# Patient Record
Sex: OTHER/UNKNOWN | NOT HISPANIC OR LATINO
[De-identification: names, ages, dates, MRNs, and addresses within clinical notes are randomized per-mention and may not be internally consistent; named-entity substitution may affect disease eponyms.]

---

## 2017-02-15 RX ORDER — MONTELUKAST SODIUM 10 MG/1
TABLET ORAL
Qty: 90 TABLET | Refills: 2 | Status: SHIPPED | OUTPATIENT
Start: 2017-02-15 | End: 2017-11-14 | Stop reason: SDUPTHER

## 2017-02-27 ENCOUNTER — RX ONLY (OUTPATIENT)
Age: 77
Setting detail: RX ONLY
End: 2017-02-27

## 2017-03-10 ENCOUNTER — RX ONLY (OUTPATIENT)
Age: 77
Setting detail: RX ONLY
End: 2017-03-10

## 2017-03-18 ENCOUNTER — TELEPHONE (OUTPATIENT)
Dept: FAMILY MEDICINE CLINIC | Facility: CLINIC | Age: 77
End: 2017-03-18

## 2017-05-02 ENCOUNTER — TELEPHONE (OUTPATIENT)
Dept: FAMILY MEDICINE CLINIC | Facility: CLINIC | Age: 77
End: 2017-05-02

## 2017-05-02 RX ORDER — RABEPRAZOLE SODIUM 20 MG/1
20 TABLET, DELAYED RELEASE ORAL DAILY
Qty: 90 TABLET | Refills: 3 | Status: SHIPPED | OUTPATIENT
Start: 2017-05-02 | End: 2018-03-24 | Stop reason: HOSPADM

## 2017-05-16 ENCOUNTER — TELEPHONE (OUTPATIENT)
Dept: FAMILY MEDICINE CLINIC | Facility: CLINIC | Age: 77
End: 2017-05-16

## 2017-05-16 RX ORDER — CLOPIDOGREL BISULFATE 75 MG/1
75 TABLET ORAL DAILY
Qty: 90 TABLET | Refills: 3 | Status: SHIPPED | OUTPATIENT
Start: 2017-05-16 | End: 2018-01-03 | Stop reason: ALTCHOICE

## 2017-06-12 ENCOUNTER — TELEPHONE (OUTPATIENT)
Dept: FAMILY MEDICINE CLINIC | Facility: CLINIC | Age: 77
End: 2017-06-12

## 2017-06-12 DIAGNOSIS — J44.9 OBSTRUCTIVE CHRONIC BRONCHITIS WITHOUT EXACERBATION (HCC): Primary | ICD-10-CM

## 2017-06-12 RX ORDER — ACETAMINOPHEN 325 MG/1
650 TABLET ORAL EVERY 6 HOURS PRN
Qty: 720 TABLET | Refills: 2 | Status: SHIPPED | OUTPATIENT
Start: 2017-06-12 | End: 2020-01-10 | Stop reason: SDUPTHER

## 2017-06-12 NOTE — TELEPHONE ENCOUNTER
Spoke with pt... informed us he wanted a portable oxygen tank order. Told pt we would get it done.

## 2017-06-12 NOTE — TELEPHONE ENCOUNTER
rivaroxaban (XARELTO) 15 MG tablet  #90      acetaminophen (TYLENOL) 325 MG tablet  #30  WITH 3 REFILLS

## 2017-06-19 ENCOUNTER — TELEPHONE (OUTPATIENT)
Dept: FAMILY MEDICINE CLINIC | Facility: CLINIC | Age: 77
End: 2017-06-19

## 2017-06-22 ENCOUNTER — TELEPHONE (OUTPATIENT)
Dept: FAMILY MEDICINE CLINIC | Facility: CLINIC | Age: 77
End: 2017-06-22

## 2017-07-13 ENCOUNTER — TELEPHONE (OUTPATIENT)
Dept: FAMILY MEDICINE CLINIC | Facility: CLINIC | Age: 77
End: 2017-07-13

## 2017-07-13 RX ORDER — SILDENAFIL 100 MG/1
100 TABLET, FILM COATED ORAL DAILY PRN
Qty: 6 TABLET | Refills: 3 | Status: SHIPPED | OUTPATIENT
Start: 2017-07-13 | End: 2018-06-17 | Stop reason: SDUPTHER

## 2017-08-03 ENCOUNTER — TELEPHONE (OUTPATIENT)
Dept: FAMILY MEDICINE CLINIC | Facility: CLINIC | Age: 77
End: 2017-08-03

## 2017-08-03 RX ORDER — BUDESONIDE AND FORMOTEROL FUMARATE DIHYDRATE 160; 4.5 UG/1; UG/1
2 AEROSOL RESPIRATORY (INHALATION)
Qty: 3 INHALER | Refills: 3 | Status: SHIPPED | OUTPATIENT
Start: 2017-08-03 | End: 2019-06-07

## 2017-11-14 RX ORDER — MONTELUKAST SODIUM 10 MG/1
TABLET ORAL
Qty: 90 TABLET | Refills: 2 | Status: SHIPPED | OUTPATIENT
Start: 2017-11-14 | End: 2018-03-24 | Stop reason: HOSPADM

## 2017-11-27 ENCOUNTER — TELEPHONE (OUTPATIENT)
Dept: FAMILY MEDICINE CLINIC | Facility: CLINIC | Age: 77
End: 2017-11-27

## 2017-11-27 RX ORDER — OMEPRAZOLE 20 MG/1
20 CAPSULE, DELAYED RELEASE ORAL DAILY
Qty: 90 CAPSULE | Refills: 3 | Status: SHIPPED | OUTPATIENT
Start: 2017-11-27 | End: 2019-06-07

## 2017-12-05 ENCOUNTER — TELEPHONE (OUTPATIENT)
Dept: FAMILY MEDICINE CLINIC | Facility: CLINIC | Age: 77
End: 2017-12-05

## 2017-12-05 DIAGNOSIS — J02.9 SORETHROAT: Primary | ICD-10-CM

## 2017-12-05 NOTE — TELEPHONE ENCOUNTER
PATIENT HAS HAD A SORE THROAT FOR A WHILE AND CAN'T GET RID OF IT. WANTS TO GET A REFERRAL FOR AN ENT

## 2017-12-14 ENCOUNTER — TELEPHONE (OUTPATIENT)
Dept: FAMILY MEDICINE CLINIC | Facility: CLINIC | Age: 77
End: 2017-12-14

## 2017-12-14 RX ORDER — ECHINACEA PURPUREA EXTRACT 125 MG
2 TABLET ORAL AS NEEDED
Qty: 4 EACH | Refills: 3 | Status: SHIPPED | OUTPATIENT
Start: 2017-12-14 | End: 2019-09-27 | Stop reason: SDUPTHER

## 2017-12-14 NOTE — TELEPHONE ENCOUNTER
REFILL ON sodium chloride (OCEAN NASAL SPRAY) 0.65 % nasal spray  GET 3 EVERY 21 DAYS. SO PATIENT CAN REFILL FASTER

## 2017-12-29 ENCOUNTER — TELEPHONE (OUTPATIENT)
Dept: FAMILY MEDICINE CLINIC | Facility: CLINIC | Age: 77
End: 2017-12-29

## 2018-01-03 ENCOUNTER — OFFICE VISIT (OUTPATIENT)
Dept: FAMILY MEDICINE CLINIC | Facility: CLINIC | Age: 78
End: 2018-01-03

## 2018-01-03 VITALS
BODY MASS INDEX: 32.34 KG/M2 | SYSTOLIC BLOOD PRESSURE: 116 MMHG | OXYGEN SATURATION: 93 % | TEMPERATURE: 97.6 F | RESPIRATION RATE: 22 BRPM | DIASTOLIC BLOOD PRESSURE: 66 MMHG | HEIGHT: 74 IN | WEIGHT: 252 LBS | HEART RATE: 77 BPM

## 2018-01-03 DIAGNOSIS — E78.5 DYSLIPIDEMIA: ICD-10-CM

## 2018-01-03 DIAGNOSIS — I48.91 ATRIAL FIBRILLATION, UNSPECIFIED TYPE (HCC): ICD-10-CM

## 2018-01-03 DIAGNOSIS — R73.9 HYPERGLYCEMIA: ICD-10-CM

## 2018-01-03 DIAGNOSIS — K21.9 GASTROESOPHAGEAL REFLUX DISEASE WITHOUT ESOPHAGITIS: ICD-10-CM

## 2018-01-03 DIAGNOSIS — J44.9 CHRONIC OBSTRUCTIVE PULMONARY DISEASE, UNSPECIFIED COPD TYPE (HCC): Primary | ICD-10-CM

## 2018-01-03 DIAGNOSIS — I10 ESSENTIAL HYPERTENSION: ICD-10-CM

## 2018-01-03 DIAGNOSIS — I25.10 CORONARY ARTERY DISEASE INVOLVING NATIVE CORONARY ARTERY OF NATIVE HEART WITHOUT ANGINA PECTORIS: ICD-10-CM

## 2018-01-03 DIAGNOSIS — G62.9 NEUROPATHY: ICD-10-CM

## 2018-01-03 LAB
ALBUMIN SERPL-MCNC: 4 G/DL (ref 3.5–5.2)
ALBUMIN/GLOB SERPL: 1.1 G/DL
ALP SERPL-CCNC: 78 U/L (ref 39–117)
ALT SERPL W P-5'-P-CCNC: 20 U/L (ref 1–41)
ANION GAP SERPL CALCULATED.3IONS-SCNC: 13.2 MMOL/L
ANISOCYTOSIS BLD QL: NORMAL
AST SERPL-CCNC: 21 U/L (ref 1–40)
BASOPHILS # BLD AUTO: 0.05 10*3/MM3 (ref 0–0.2)
BASOPHILS NFR BLD AUTO: 0.5 % (ref 0–1.5)
BILIRUB SERPL-MCNC: 0.6 MG/DL (ref 0.1–1.2)
BUN BLD-MCNC: 12 MG/DL (ref 8–23)
BUN/CREAT SERPL: 11.9 (ref 7–25)
CALCIUM SPEC-SCNC: 9.7 MG/DL (ref 8.6–10.5)
CHLORIDE SERPL-SCNC: 94 MMOL/L (ref 98–107)
CHOLEST SERPL-MCNC: 124 MG/DL (ref 0–200)
CO2 SERPL-SCNC: 28.8 MMOL/L (ref 22–29)
CREAT BLD-MCNC: 1.01 MG/DL (ref 0.76–1.27)
DEPRECATED RDW RBC AUTO: 53.8 FL (ref 37–54)
EOSINOPHIL # BLD AUTO: 0.47 10*3/MM3 (ref 0–0.7)
EOSINOPHIL NFR BLD AUTO: 4.8 % (ref 0.3–6.2)
ERYTHROCYTE [DISTWIDTH] IN BLOOD BY AUTOMATED COUNT: 22.6 % (ref 11.5–14.5)
GFR SERPL CREATININE-BSD FRML MDRD: 72 ML/MIN/1.73
GLOBULIN UR ELPH-MCNC: 3.5 GM/DL
GLUCOSE BLD-MCNC: 104 MG/DL (ref 65–99)
HBA1C MFR BLD: 6 % (ref 4.8–5.6)
HCT VFR BLD AUTO: 49.6 % (ref 40.4–52.2)
HDLC SERPL-MCNC: 47 MG/DL (ref 40–60)
HGB BLD-MCNC: 15 G/DL (ref 13.7–17.6)
IMM GRANULOCYTES # BLD: 0.02 10*3/MM3 (ref 0–0.03)
IMM GRANULOCYTES NFR BLD: 0.2 % (ref 0–0.5)
LDLC SERPL CALC-MCNC: 67 MG/DL (ref 0–100)
LDLC/HDLC SERPL: 1.42 {RATIO}
LYMPHOCYTES # BLD AUTO: 1.62 10*3/MM3 (ref 0.9–4.8)
LYMPHOCYTES NFR BLD AUTO: 16.6 % (ref 19.6–45.3)
MCH RBC QN AUTO: 20.7 PG (ref 27–32.7)
MCHC RBC AUTO-ENTMCNC: 30.2 G/DL (ref 32.6–36.4)
MCV RBC AUTO: 68.4 FL (ref 79.8–96.2)
MICROCYTES BLD QL: NORMAL
MONOCYTES # BLD AUTO: 1.28 10*3/MM3 (ref 0.2–1.2)
MONOCYTES NFR BLD AUTO: 13.1 % (ref 5–12)
NEUTROPHILS # BLD AUTO: 6.3 10*3/MM3 (ref 1.9–8.1)
NEUTROPHILS NFR BLD AUTO: 64.8 % (ref 42.7–76)
NRBC BLD MANUAL-RTO: 0 /100 WBC (ref 0–0)
PLAT MORPH BLD: NORMAL
PLATELET # BLD AUTO: 230 10*3/MM3 (ref 140–500)
PMV BLD AUTO: 9.5 FL (ref 6–12)
POTASSIUM BLD-SCNC: 4 MMOL/L (ref 3.5–5.2)
PROT SERPL-MCNC: 7.5 G/DL (ref 6–8.5)
RBC # BLD AUTO: 7.25 10*6/MM3 (ref 4.6–6)
SODIUM BLD-SCNC: 136 MMOL/L (ref 136–145)
STOMATOCYTES BLD QL SMEAR: NORMAL
TARGETS BLD QL SMEAR: NORMAL
TRIGL SERPL-MCNC: 51 MG/DL (ref 0–150)
VLDLC SERPL-MCNC: 10.2 MG/DL (ref 5–40)
WBC MORPH BLD: NORMAL
WBC NRBC COR # BLD: 9.74 10*3/MM3 (ref 4.5–10.7)

## 2018-01-03 PROCEDURE — 85007 BL SMEAR W/DIFF WBC COUNT: CPT | Performed by: FAMILY MEDICINE

## 2018-01-03 PROCEDURE — 99214 OFFICE O/P EST MOD 30 MIN: CPT | Performed by: FAMILY MEDICINE

## 2018-01-03 PROCEDURE — 85025 COMPLETE CBC W/AUTO DIFF WBC: CPT | Performed by: FAMILY MEDICINE

## 2018-01-03 PROCEDURE — 80061 LIPID PANEL: CPT | Performed by: FAMILY MEDICINE

## 2018-01-03 PROCEDURE — 36415 COLL VENOUS BLD VENIPUNCTURE: CPT | Performed by: FAMILY MEDICINE

## 2018-01-03 PROCEDURE — 83036 HEMOGLOBIN GLYCOSYLATED A1C: CPT | Performed by: FAMILY MEDICINE

## 2018-01-03 PROCEDURE — 80053 COMPREHEN METABOLIC PANEL: CPT | Performed by: FAMILY MEDICINE

## 2018-01-03 RX ORDER — RIVAROXABAN 20 MG/1
20 TABLET, FILM COATED ORAL DAILY
COMMUNITY
Start: 2017-11-20 | End: 2019-06-21 | Stop reason: HOSPADM

## 2018-01-03 RX ORDER — FEXOFENADINE HCL 180 MG/1
1 TABLET ORAL DAILY
COMMUNITY
Start: 2017-11-14 | End: 2018-02-14 | Stop reason: HOSPADM

## 2018-01-03 NOTE — PROGRESS NOTES
SUBJECTIVE:  The patient is  77-year-old white male comes in for follow-up.  He also sees physicians in [place] in the [facility].  He has a history of COPD hyperlipidemia arthritis neuropathy hypertension and coronary artery disease.  He has concern regarding his liver because he takes a lot of Tylenol.    PAST MEDICAL HISTORY:  Reviewed.    REVIEW OF SYSTEMS:  Please see above; 14 point ROS otherwise negative.      OBJECTIVE: Vitals signs are reviewed and are stable.    HEENT: PERRLA.    Neck:  Supple.  No bruits  Lungs:  Clear.    Heart:  Regular rate and rhythm.    Abdomen:   Soft, nontender.  Obese.  Bowel sounds present  Extremities:  No cyanosis, clubbing or edema.  Full range of motion    ASSESSMENT:    Coronary artery disease  Hypertension  Hyperlipidemia  COPD  Arthritis  Neuropathy  GERD    PLAN:  CMP fasting lipids CBC hemoglobin A1c ordered.  Patient will follow-up on his labs.  Diet and exercise discussed.  Patient will follow up on his labs.  He will call if problems.    Much of this encounter note is an electronic transcription/translation of spoken language to printed text.  The electronic translation of spoken language may permit erroneous, or at times, nonsensical words or phrases to be inadvertently transcribed.  Although I have reviewed the note for such errors, some may still exist.

## 2018-01-04 ENCOUNTER — TELEPHONE (OUTPATIENT)
Dept: FAMILY MEDICINE CLINIC | Facility: CLINIC | Age: 78
End: 2018-01-04

## 2018-01-04 DIAGNOSIS — R79.89 ABNORMAL CBC: Primary | ICD-10-CM

## 2018-01-22 ENCOUNTER — TELEPHONE (OUTPATIENT)
Dept: FAMILY MEDICINE CLINIC | Facility: CLINIC | Age: 78
End: 2018-01-22

## 2018-01-22 NOTE — TELEPHONE ENCOUNTER
IS RE-FAXING ORDERS FOR MR. RUSHING, THEY WILL SAY ATTENTION TO ELOISE, MR RUSHING WOULD LIKE A CALL BACK WHEN WE HAVE THEM.

## 2018-01-23 DIAGNOSIS — S82.891A CLOSED FRACTURE OF RIGHT ANKLE, INITIAL ENCOUNTER: Primary | ICD-10-CM

## 2018-01-26 ENCOUNTER — TELEPHONE (OUTPATIENT)
Dept: FAMILY MEDICINE CLINIC | Facility: CLINIC | Age: 78
End: 2018-01-26

## 2018-01-26 DIAGNOSIS — S82.891A CLOSED FRACTURE OF RIGHT ANKLE, INITIAL ENCOUNTER: Primary | ICD-10-CM

## 2018-01-26 NOTE — TELEPHONE ENCOUNTER
HAD THERAPY TODAY AND WAS INFORMED, HE NEEDS A WHEELCHAIR ORDER FOR GOULDS OR RESPI-CARE, BUT NEEDS IT TO HAPPEN TODAY BECAUSE HE IS NOT ALLOWED TO PUT WEIGHT ON HIS ANKLE AT ALL. IT WILL NEED TO BE DELIVERED, TO HIS HOUSE, AND EASY TO MANEUVER, AND LIGHTWEIGHT ACCORDING TO PATIENT.

## 2018-01-29 ENCOUNTER — TELEPHONE (OUTPATIENT)
Dept: FAMILY MEDICINE CLINIC | Facility: CLINIC | Age: 78
End: 2018-01-29

## 2018-01-29 NOTE — TELEPHONE ENCOUNTER
01/29 Pt called, per Lv's they need office notes for wheelchair. I informed pt we don't have any office notes because we did not see him for this. He said another dr told him no weight bearing. I told him to call them to put in order for wheelchair and send in office notes since he seen them and he is the one who gave him restrictions.

## 2018-02-07 ENCOUNTER — TELEPHONE (OUTPATIENT)
Dept: FAMILY MEDICINE CLINIC | Facility: CLINIC | Age: 78
End: 2018-02-07

## 2018-02-07 ENCOUNTER — HOSPITAL ENCOUNTER (INPATIENT)
Facility: HOSPITAL | Age: 78
LOS: 7 days | Discharge: HOME | End: 2018-02-14
Attending: INTERNAL MEDICINE | Admitting: INTERNAL MEDICINE

## 2018-02-07 DIAGNOSIS — J18.9 PNEUMONIA OF BOTH LUNGS DUE TO INFECTIOUS ORGANISM, UNSPECIFIED PART OF LUNG: Primary | ICD-10-CM

## 2018-02-07 PROBLEM — R09.02 HYPOXIA: Status: ACTIVE | Noted: 2018-02-07

## 2018-02-07 PROBLEM — Z79.01 CHRONIC ANTICOAGULATION: Status: ACTIVE | Noted: 2018-02-07

## 2018-02-07 RX ORDER — ACETAMINOPHEN 325 MG/1
650 TABLET ORAL EVERY 6 HOURS PRN
Status: DISCONTINUED | OUTPATIENT
Start: 2018-02-07 | End: 2018-02-14 | Stop reason: HOSPADM

## 2018-02-07 RX ORDER — PREGABALIN 75 MG/1
75 CAPSULE ORAL EVERY 12 HOURS SCHEDULED
Status: DISCONTINUED | OUTPATIENT
Start: 2018-02-08 | End: 2018-02-14 | Stop reason: HOSPADM

## 2018-02-07 RX ORDER — ATORVASTATIN CALCIUM 10 MG/1
10 TABLET, FILM COATED ORAL DAILY
Status: DISCONTINUED | OUTPATIENT
Start: 2018-02-08 | End: 2018-02-14 | Stop reason: HOSPADM

## 2018-02-07 RX ORDER — LEVOFLOXACIN 5 MG/ML
750 INJECTION, SOLUTION INTRAVENOUS EVERY 24 HOURS
Status: DISCONTINUED | OUTPATIENT
Start: 2018-02-08 | End: 2018-02-09

## 2018-02-07 RX ORDER — LANOLIN ALCOHOL/MO/W.PET/CERES
50 CREAM (GRAM) TOPICAL DAILY
Status: DISCONTINUED | OUTPATIENT
Start: 2018-02-08 | End: 2018-02-14 | Stop reason: HOSPADM

## 2018-02-07 RX ORDER — ASPIRIN 81 MG/1
81 TABLET, CHEWABLE ORAL DAILY
Status: DISCONTINUED | OUTPATIENT
Start: 2018-02-08 | End: 2018-02-14 | Stop reason: HOSPADM

## 2018-02-07 RX ORDER — SODIUM CHLORIDE 0.9 % (FLUSH) 0.9 %
1-10 SYRINGE (ML) INJECTION AS NEEDED
Status: DISCONTINUED | OUTPATIENT
Start: 2018-02-07 | End: 2018-02-14 | Stop reason: HOSPADM

## 2018-02-07 RX ORDER — BUDESONIDE AND FORMOTEROL FUMARATE DIHYDRATE 160; 4.5 UG/1; UG/1
2 AEROSOL RESPIRATORY (INHALATION)
Status: DISCONTINUED | OUTPATIENT
Start: 2018-02-08 | End: 2018-02-08

## 2018-02-07 RX ORDER — HYDROCHLOROTHIAZIDE 25 MG/1
25 TABLET ORAL DAILY
Status: DISCONTINUED | OUTPATIENT
Start: 2018-02-08 | End: 2018-02-09

## 2018-02-07 RX ORDER — ONDANSETRON 2 MG/ML
4 INJECTION INTRAMUSCULAR; INTRAVENOUS EVERY 6 HOURS PRN
Status: DISCONTINUED | OUTPATIENT
Start: 2018-02-07 | End: 2018-02-14 | Stop reason: HOSPADM

## 2018-02-07 RX ORDER — KETOTIFEN FUMARATE 0.35 MG/ML
1 SOLUTION/ DROPS OPHTHALMIC 2 TIMES DAILY
Status: DISCONTINUED | OUTPATIENT
Start: 2018-02-08 | End: 2018-02-14 | Stop reason: HOSPADM

## 2018-02-07 RX ORDER — ACETAMINOPHEN 325 MG/1
650 TABLET ORAL EVERY 4 HOURS PRN
Status: DISCONTINUED | OUTPATIENT
Start: 2018-02-07 | End: 2018-02-14 | Stop reason: HOSPADM

## 2018-02-07 RX ORDER — PANTOPRAZOLE SODIUM 40 MG/1
40 TABLET, DELAYED RELEASE ORAL
Status: DISCONTINUED | OUTPATIENT
Start: 2018-02-08 | End: 2018-02-14 | Stop reason: HOSPADM

## 2018-02-07 RX ORDER — ECHINACEA PURPUREA EXTRACT 125 MG
2 TABLET ORAL AS NEEDED
Status: DISCONTINUED | OUTPATIENT
Start: 2018-02-07 | End: 2018-02-14 | Stop reason: HOSPADM

## 2018-02-07 RX ORDER — TAMSULOSIN HYDROCHLORIDE 0.4 MG/1
0.4 CAPSULE ORAL NIGHTLY
Status: DISCONTINUED | OUTPATIENT
Start: 2018-02-08 | End: 2018-02-14 | Stop reason: HOSPADM

## 2018-02-07 RX ORDER — ALBUTEROL SULFATE 2.5 MG/3ML
2.5 SOLUTION RESPIRATORY (INHALATION)
Status: DISCONTINUED | OUTPATIENT
Start: 2018-02-08 | End: 2018-02-10

## 2018-02-07 RX ORDER — NIFEDIPINE 30 MG/1
30 TABLET, EXTENDED RELEASE ORAL DAILY
Status: DISCONTINUED | OUTPATIENT
Start: 2018-02-08 | End: 2018-02-14 | Stop reason: HOSPADM

## 2018-02-07 RX ORDER — MONTELUKAST SODIUM 10 MG/1
10 TABLET ORAL NIGHTLY
Status: DISCONTINUED | OUTPATIENT
Start: 2018-02-08 | End: 2018-02-14 | Stop reason: HOSPADM

## 2018-02-07 NOTE — TELEPHONE ENCOUNTER
[facility] SEEN PATIENT TODAY AND LUNGS ARE WET AND SOUND JUNKY. PATIENT IS ON OXYGEN AND HIS STATS IS IN THE 80'S

## 2018-02-07 NOTE — TELEPHONE ENCOUNTER
02/07 Jaida with [facility] was informed that he needs to go to the er as she was with the pt. She is going to let him know.

## 2018-02-08 ENCOUNTER — APPOINTMENT (OUTPATIENT)
Dept: ONCOLOGY | Facility: CLINIC | Age: 78
End: 2018-02-08

## 2018-02-08 ENCOUNTER — APPOINTMENT (OUTPATIENT)
Dept: CARDIOLOGY | Facility: HOSPITAL | Age: 78
End: 2018-02-08
Attending: INTERNAL MEDICINE

## 2018-02-08 ENCOUNTER — APPOINTMENT (OUTPATIENT)
Dept: OTHER | Facility: HOSPITAL | Age: 78
End: 2018-02-08

## 2018-02-08 ENCOUNTER — APPOINTMENT (OUTPATIENT)
Dept: GENERAL RADIOLOGY | Facility: HOSPITAL | Age: 78
End: 2018-02-08

## 2018-02-08 ENCOUNTER — TELEPHONE (OUTPATIENT)
Dept: FAMILY MEDICINE CLINIC | Facility: CLINIC | Age: 78
End: 2018-02-08

## 2018-02-08 LAB
ALBUMIN SERPL-MCNC: 3.2 G/DL (ref 3.5–5.2)
ALBUMIN/GLOB SERPL: 0.9 G/DL
ALP SERPL-CCNC: 64 U/L (ref 39–117)
ALT SERPL W P-5'-P-CCNC: 17 U/L (ref 1–41)
ANION GAP SERPL CALCULATED.3IONS-SCNC: 13.5 MMOL/L
AORTIC DIMENSIONLESS INDEX: 0.6 (DI)
AST SERPL-CCNC: 14 U/L (ref 1–40)
B PERT DNA SPEC QL NAA+PROBE: NOT DETECTED
BASOPHILS # BLD AUTO: 0.01 10*3/MM3 (ref 0–0.2)
BASOPHILS NFR BLD AUTO: 0.1 % (ref 0–1.5)
BH CV ECHO MEAS - ACS: 0.8 CM
BH CV ECHO MEAS - AO MAX PG (FULL): 16.2 MMHG
BH CV ECHO MEAS - AO MAX PG: 25 MMHG
BH CV ECHO MEAS - AO MEAN PG (FULL): 7 MMHG
BH CV ECHO MEAS - AO MEAN PG: 12 MMHG
BH CV ECHO MEAS - AO ROOT AREA (BSA CORRECTED): 1.7
BH CV ECHO MEAS - AO ROOT AREA: 13.2 CM^2
BH CV ECHO MEAS - AO ROOT DIAM: 4.1 CM
BH CV ECHO MEAS - AO V2 MAX: 256 CM/SEC
BH CV ECHO MEAS - AO V2 MEAN: 167 CM/SEC
BH CV ECHO MEAS - AO V2 VTI: 43.8 CM
BH CV ECHO MEAS - ASC AORTA: 4.4 CM
BH CV ECHO MEAS - AVA(I,A): 2.4 CM^2
BH CV ECHO MEAS - AVA(I,D): 2.4 CM^2
BH CV ECHO MEAS - AVA(V,A): 2.6 CM^2
BH CV ECHO MEAS - AVA(V,D): 2.6 CM^2
BH CV ECHO MEAS - BSA(HAYCOCK): 2.4 M^2
BH CV ECHO MEAS - BSA: 2.3 M^2
BH CV ECHO MEAS - BZI_BMI: 30.8 KILOGRAMS/M^2
BH CV ECHO MEAS - BZI_METRIC_HEIGHT: 188 CM
BH CV ECHO MEAS - BZI_METRIC_WEIGHT: 108.9 KG
BH CV ECHO MEAS - CONTRAST EF (2CH): 56.5 ML/M^2
BH CV ECHO MEAS - CONTRAST EF 4CH: 60 ML/M^2
BH CV ECHO MEAS - EDV(CUBED): 91.1 ML
BH CV ECHO MEAS - EDV(MOD-SP2): 115 ML
BH CV ECHO MEAS - EDV(MOD-SP4): 170 ML
BH CV ECHO MEAS - EDV(TEICH): 92.4 ML
BH CV ECHO MEAS - EF(CUBED): 60.6 %
BH CV ECHO MEAS - EF(MOD-SP2): 56.5 %
BH CV ECHO MEAS - EF(MOD-SP4): 60 %
BH CV ECHO MEAS - EF(TEICH): 52.3 %
BH CV ECHO MEAS - ESV(CUBED): 35.9 ML
BH CV ECHO MEAS - ESV(MOD-SP2): 50 ML
BH CV ECHO MEAS - ESV(MOD-SP4): 68 ML
BH CV ECHO MEAS - ESV(TEICH): 44.1 ML
BH CV ECHO MEAS - FS: 26.7 %
BH CV ECHO MEAS - IVS/LVPW: 1.5
BH CV ECHO MEAS - IVSD: 1.7 CM
BH CV ECHO MEAS - LAT PEAK E' VEL: 14 CM/SEC
BH CV ECHO MEAS - LV DIASTOLIC VOL/BSA (35-75): 72.4 ML/M^2
BH CV ECHO MEAS - LV MASS(C)D: 255.1 GRAMS
BH CV ECHO MEAS - LV MASS(C)DI: 108.7 GRAMS/M^2
BH CV ECHO MEAS - LV MAX PG: 10 MMHG
BH CV ECHO MEAS - LV MEAN PG: 5 MMHG
BH CV ECHO MEAS - LV SYSTOLIC VOL/BSA (12-30): 29 ML/M^2
BH CV ECHO MEAS - LV V1 MAX: 158 CM/SEC
BH CV ECHO MEAS - LV V1 MEAN: 100 CM/SEC
BH CV ECHO MEAS - LV V1 VTI: 25.6 CM
BH CV ECHO MEAS - LVIDD: 4.5 CM
BH CV ECHO MEAS - LVIDS: 3.3 CM
BH CV ECHO MEAS - LVLD AP2: 8 CM
BH CV ECHO MEAS - LVLD AP4: 8.7 CM
BH CV ECHO MEAS - LVLS AP2: 7.4 CM
BH CV ECHO MEAS - LVLS AP4: 7.5 CM
BH CV ECHO MEAS - LVOT AREA (M): 4.2 CM^2
BH CV ECHO MEAS - LVOT AREA: 4.2 CM^2
BH CV ECHO MEAS - LVOT DIAM: 2.3 CM
BH CV ECHO MEAS - LVPWD: 1.2 CM
BH CV ECHO MEAS - MED PEAK E' VEL: 8 CM/SEC
BH CV ECHO MEAS - MR MAX PG: 95.3 MMHG
BH CV ECHO MEAS - MR MAX VEL: 488 CM/SEC
BH CV ECHO MEAS - MV DEC SLOPE: 487 CM/SEC^2
BH CV ECHO MEAS - MV DEC TIME: 0.26 SEC
BH CV ECHO MEAS - MV E MAX VEL: 141 CM/SEC
BH CV ECHO MEAS - MV MAX PG: 7.2 MMHG
BH CV ECHO MEAS - MV MEAN PG: 3 MMHG
BH CV ECHO MEAS - MV P1/2T MAX VEL: 131 CM/SEC
BH CV ECHO MEAS - MV P1/2T: 78.8 MSEC
BH CV ECHO MEAS - MV V2 MAX: 134 CM/SEC
BH CV ECHO MEAS - MV V2 MEAN: 75.2 CM/SEC
BH CV ECHO MEAS - MV V2 VTI: 25.8 CM
BH CV ECHO MEAS - MVA P1/2T LCG: 1.7 CM^2
BH CV ECHO MEAS - MVA(P1/2T): 2.8 CM^2
BH CV ECHO MEAS - MVA(VTI): 4.1 CM^2
BH CV ECHO MEAS - PA ACC TIME: 0.08 SEC
BH CV ECHO MEAS - PA MAX PG (FULL): 7.5 MMHG
BH CV ECHO MEAS - PA MAX PG: 9.9 MMHG
BH CV ECHO MEAS - PA PR(ACCEL): 42.1 MMHG
BH CV ECHO MEAS - PA V2 MAX: 157 CM/SEC
BH CV ECHO MEAS - PVA(V,A): 3.4 CM^2
BH CV ECHO MEAS - PVA(V,D): 3.4 CM^2
BH CV ECHO MEAS - QP/QS: 0.8
BH CV ECHO MEAS - RAP SYSTOLE: 15 MMHG
BH CV ECHO MEAS - RV MAX PG: 2.3 MMHG
BH CV ECHO MEAS - RV MEAN PG: 1 MMHG
BH CV ECHO MEAS - RV V1 MAX: 76.1 CM/SEC
BH CV ECHO MEAS - RV V1 MEAN: 49.3 CM/SEC
BH CV ECHO MEAS - RV V1 VTI: 12.1 CM
BH CV ECHO MEAS - RVOT AREA: 7.1 CM^2
BH CV ECHO MEAS - RVOT DIAM: 3 CM
BH CV ECHO MEAS - RVSP: 54 MMHG
BH CV ECHO MEAS - SI(AO): 246.3 ML/M^2
BH CV ECHO MEAS - SI(CUBED): 23.5 ML/M^2
BH CV ECHO MEAS - SI(LVOT): 45.3 ML/M^2
BH CV ECHO MEAS - SI(MOD-SP2): 27.7 ML/M^2
BH CV ECHO MEAS - SI(MOD-SP4): 43.4 ML/M^2
BH CV ECHO MEAS - SI(TEICH): 20.6 ML/M^2
BH CV ECHO MEAS - SV(AO): 578.3 ML
BH CV ECHO MEAS - SV(CUBED): 55.2 ML
BH CV ECHO MEAS - SV(LVOT): 106.4 ML
BH CV ECHO MEAS - SV(MOD-SP2): 65 ML
BH CV ECHO MEAS - SV(MOD-SP4): 102 ML
BH CV ECHO MEAS - SV(RVOT): 85.5 ML
BH CV ECHO MEAS - SV(TEICH): 48.3 ML
BH CV ECHO MEAS - TAPSE (>1.6): 2 CM2
BH CV ECHO MEAS - TR MAX VEL: 311 CM/SEC
BH CV VAS BP RIGHT ARM: NORMAL MMHG
BH CV XLRA - RV BASE: 5.7 CM
BH CV XLRA - RV LENGTH: 8.4 CM
BH CV XLRA - RV MID: 3.8 CM
BH CV XLRA - TDI S': 15 CM/SEC
BILIRUB SERPL-MCNC: 0.5 MG/DL (ref 0.1–1.2)
BUN BLD-MCNC: 10 MG/DL (ref 8–23)
BUN/CREAT SERPL: 14.7 (ref 7–25)
C PNEUM DNA NPH QL NAA+NON-PROBE: NOT DETECTED
CALCIUM SPEC-SCNC: 9.1 MG/DL (ref 8.6–10.5)
CHLORIDE SERPL-SCNC: 92 MMOL/L (ref 98–107)
CO2 SERPL-SCNC: 25.5 MMOL/L (ref 22–29)
CREAT BLD-MCNC: 0.68 MG/DL (ref 0.76–1.27)
DEPRECATED RDW RBC AUTO: 50.5 FL (ref 37–54)
E/E' RATIO: 14
EOSINOPHIL # BLD AUTO: 0 10*3/MM3 (ref 0–0.7)
EOSINOPHIL NFR BLD AUTO: 0 % (ref 0.3–6.2)
ERYTHROCYTE [DISTWIDTH] IN BLOOD BY AUTOMATED COUNT: 20.5 % (ref 11.5–14.5)
FLUAV H1 2009 PAND RNA NPH QL NAA+PROBE: NOT DETECTED
FLUAV H1 HA GENE NPH QL NAA+PROBE: NOT DETECTED
FLUAV H3 RNA NPH QL NAA+PROBE: NOT DETECTED
FLUAV SUBTYP SPEC NAA+PROBE: NOT DETECTED
FLUBV RNA ISLT QL NAA+PROBE: NOT DETECTED
GFR SERPL CREATININE-BSD FRML MDRD: 113 ML/MIN/1.73
GLOBULIN UR ELPH-MCNC: 3.6 GM/DL
GLUCOSE BLD-MCNC: 147 MG/DL (ref 65–99)
HADV DNA SPEC NAA+PROBE: NOT DETECTED
HCOV 229E RNA SPEC QL NAA+PROBE: NOT DETECTED
HCOV HKU1 RNA SPEC QL NAA+PROBE: NOT DETECTED
HCOV NL63 RNA SPEC QL NAA+PROBE: NOT DETECTED
HCOV OC43 RNA SPEC QL NAA+PROBE: NOT DETECTED
HCT VFR BLD AUTO: 38.3 % (ref 40.4–52.2)
HGB BLD-MCNC: 11.5 G/DL (ref 13.7–17.6)
HMPV RNA NPH QL NAA+NON-PROBE: NOT DETECTED
HPIV1 RNA SPEC QL NAA+PROBE: NOT DETECTED
HPIV2 RNA SPEC QL NAA+PROBE: NOT DETECTED
HPIV3 RNA NPH QL NAA+PROBE: NOT DETECTED
HPIV4 P GENE NPH QL NAA+PROBE: NOT DETECTED
IMM GRANULOCYTES # BLD: 0.02 10*3/MM3 (ref 0–0.03)
IMM GRANULOCYTES NFR BLD: 0.2 % (ref 0–0.5)
L PNEUMO1 AG UR QL IA: NEGATIVE
LEFT ATRIUM VOLUME INDEX: 41 ML/M2
LEFT ATRIUM VOLUME: 91 CM3
LYMPHOCYTES # BLD AUTO: 0.41 10*3/MM3 (ref 0.9–4.8)
LYMPHOCYTES NFR BLD AUTO: 4.9 % (ref 19.6–45.3)
M PNEUMO IGG SER IA-ACNC: NOT DETECTED
MAXIMAL PREDICTED HEART RATE: 144 BPM
MCH RBC QN AUTO: 20.8 PG (ref 27–32.7)
MCHC RBC AUTO-ENTMCNC: 30 G/DL (ref 32.6–36.4)
MCV RBC AUTO: 69.3 FL (ref 79.8–96.2)
MONOCYTES # BLD AUTO: 0.11 10*3/MM3 (ref 0.2–1.2)
MONOCYTES NFR BLD AUTO: 1.3 % (ref 5–12)
NEUTROPHILS # BLD AUTO: 7.82 10*3/MM3 (ref 1.9–8.1)
NEUTROPHILS NFR BLD AUTO: 93.5 % (ref 42.7–76)
NT-PROBNP SERPL-MCNC: 2171 PG/ML (ref 0–1800)
PLATELET # BLD AUTO: 274 10*3/MM3 (ref 140–500)
PMV BLD AUTO: 8.7 FL (ref 6–12)
POTASSIUM BLD-SCNC: 3.8 MMOL/L (ref 3.5–5.2)
PROCALCITONIN SERPL-MCNC: 0.08 NG/ML (ref 0.1–0.25)
PROT SERPL-MCNC: 6.8 G/DL (ref 6–8.5)
RBC # BLD AUTO: 5.53 10*6/MM3 (ref 4.6–6)
RHINOVIRUS RNA SPEC NAA+PROBE: NOT DETECTED
RSV RNA NPH QL NAA+NON-PROBE: NOT DETECTED
S PNEUM AG SPEC QL LA: NEGATIVE
SODIUM BLD-SCNC: 131 MMOL/L (ref 136–145)
STRESS TARGET HR: 122 BPM
WBC NRBC COR # BLD: 8.37 10*3/MM3 (ref 4.5–10.7)

## 2018-02-08 PROCEDURE — 85025 COMPLETE CBC W/AUTO DIFF WBC: CPT | Performed by: INTERNAL MEDICINE

## 2018-02-08 PROCEDURE — 94799 UNLISTED PULMONARY SVC/PX: CPT

## 2018-02-08 PROCEDURE — G8979 MOBILITY GOAL STATUS: HCPCS

## 2018-02-08 PROCEDURE — 97161 PT EVAL LOW COMPLEX 20 MIN: CPT

## 2018-02-08 PROCEDURE — 83880 ASSAY OF NATRIURETIC PEPTIDE: CPT | Performed by: INTERNAL MEDICINE

## 2018-02-08 PROCEDURE — 87581 M.PNEUMON DNA AMP PROBE: CPT | Performed by: INTERNAL MEDICINE

## 2018-02-08 PROCEDURE — 87633 RESP VIRUS 12-25 TARGETS: CPT | Performed by: INTERNAL MEDICINE

## 2018-02-08 PROCEDURE — G8978 MOBILITY CURRENT STATUS: HCPCS

## 2018-02-08 PROCEDURE — 25010000002 LEVOFLOXACIN PER 250 MG: Performed by: INTERNAL MEDICINE

## 2018-02-08 PROCEDURE — 87899 AGENT NOS ASSAY W/OPTIC: CPT | Performed by: INTERNAL MEDICINE

## 2018-02-08 PROCEDURE — 80053 COMPREHEN METABOLIC PANEL: CPT | Performed by: INTERNAL MEDICINE

## 2018-02-08 PROCEDURE — 94640 AIRWAY INHALATION TREATMENT: CPT

## 2018-02-08 PROCEDURE — 87798 DETECT AGENT NOS DNA AMP: CPT | Performed by: INTERNAL MEDICINE

## 2018-02-08 PROCEDURE — 84145 PROCALCITONIN (PCT): CPT | Performed by: INTERNAL MEDICINE

## 2018-02-08 PROCEDURE — G8980 MOBILITY D/C STATUS: HCPCS

## 2018-02-08 PROCEDURE — 87486 CHLMYD PNEUM DNA AMP PROBE: CPT | Performed by: INTERNAL MEDICINE

## 2018-02-08 PROCEDURE — 93306 TTE W/DOPPLER COMPLETE: CPT

## 2018-02-08 PROCEDURE — 93306 TTE W/DOPPLER COMPLETE: CPT | Performed by: INTERNAL MEDICINE

## 2018-02-08 PROCEDURE — 71045 X-RAY EXAM CHEST 1 VIEW: CPT

## 2018-02-08 RX ORDER — BUMETANIDE 0.25 MG/ML
3 INJECTION INTRAMUSCULAR; INTRAVENOUS ONCE
Status: COMPLETED | OUTPATIENT
Start: 2018-02-08 | End: 2018-02-08

## 2018-02-08 RX ORDER — BUDESONIDE AND FORMOTEROL FUMARATE DIHYDRATE 160; 4.5 UG/1; UG/1
2 AEROSOL RESPIRATORY (INHALATION)
Status: DISCONTINUED | OUTPATIENT
Start: 2018-02-08 | End: 2018-02-14 | Stop reason: HOSPADM

## 2018-02-08 RX ADMIN — BUDESONIDE AND FORMOTEROL FUMARATE DIHYDRATE 2 PUFF: 160; 4.5 AEROSOL RESPIRATORY (INHALATION) at 20:17

## 2018-02-08 RX ADMIN — NIFEDIPINE 30 MG: 30 TABLET, FILM COATED, EXTENDED RELEASE ORAL at 08:26

## 2018-02-08 RX ADMIN — PREGABALIN 75 MG: 75 CAPSULE ORAL at 05:12

## 2018-02-08 RX ADMIN — TIOTROPIUM BROMIDE 1 CAPSULE: 18 CAPSULE ORAL; RESPIRATORY (INHALATION) at 09:37

## 2018-02-08 RX ADMIN — BUMETANIDE 3 MG: 0.25 INJECTION INTRAMUSCULAR; INTRAVENOUS at 10:59

## 2018-02-08 RX ADMIN — MONTELUKAST 10 MG: 10 TABLET, FILM COATED ORAL at 20:16

## 2018-02-08 RX ADMIN — ALBUTEROL SULFATE 2.5 MG: 2.5 SOLUTION RESPIRATORY (INHALATION) at 13:10

## 2018-02-08 RX ADMIN — ACETAMINOPHEN 650 MG: 325 TABLET, FILM COATED ORAL at 04:11

## 2018-02-08 RX ADMIN — ALBUTEROL SULFATE 2.5 MG: 2.5 SOLUTION RESPIRATORY (INHALATION) at 03:46

## 2018-02-08 RX ADMIN — PANTOPRAZOLE SODIUM 40 MG: 40 TABLET, DELAYED RELEASE ORAL at 08:29

## 2018-02-08 RX ADMIN — ALBUTEROL SULFATE 2.5 MG: 2.5 SOLUTION RESPIRATORY (INHALATION) at 16:35

## 2018-02-08 RX ADMIN — HYDROCHLOROTHIAZIDE 25 MG: 25 TABLET ORAL at 08:26

## 2018-02-08 RX ADMIN — ACETAMINOPHEN 650 MG: 325 TABLET, FILM COATED ORAL at 11:06

## 2018-02-08 RX ADMIN — ACETAMINOPHEN 650 MG: 325 TABLET, FILM COATED ORAL at 16:12

## 2018-02-08 RX ADMIN — ACETAMINOPHEN 650 MG: 325 TABLET, FILM COATED ORAL at 23:30

## 2018-02-08 RX ADMIN — PREGABALIN 75 MG: 75 CAPSULE ORAL at 16:12

## 2018-02-08 RX ADMIN — PYRIDOXINE HCL TAB 50 MG 50 MG: 50 TAB at 11:00

## 2018-02-08 RX ADMIN — ALBUTEROL SULFATE 2.5 MG: 2.5 SOLUTION RESPIRATORY (INHALATION) at 00:46

## 2018-02-08 RX ADMIN — BUDESONIDE AND FORMOTEROL FUMARATE DIHYDRATE 2 PUFF: 160; 4.5 AEROSOL RESPIRATORY (INHALATION) at 09:37

## 2018-02-08 RX ADMIN — RIVAROXABAN 20 MG: 20 TABLET, FILM COATED ORAL at 18:22

## 2018-02-08 RX ADMIN — ALBUTEROL SULFATE 2.5 MG: 2.5 SOLUTION RESPIRATORY (INHALATION) at 09:29

## 2018-02-08 RX ADMIN — ASPIRIN 81 MG: 81 TABLET, CHEWABLE ORAL at 08:27

## 2018-02-08 RX ADMIN — ALBUTEROL SULFATE 2.5 MG: 2.5 SOLUTION RESPIRATORY (INHALATION) at 20:17

## 2018-02-08 RX ADMIN — KETOTIFEN FUMARATE 1 DROP: 0.35 SOLUTION/ DROPS OPHTHALMIC at 20:16

## 2018-02-08 RX ADMIN — MONTELUKAST 10 MG: 10 TABLET, FILM COATED ORAL at 05:12

## 2018-02-08 RX ADMIN — KETOTIFEN FUMARATE 1 DROP: 0.35 SOLUTION/ DROPS OPHTHALMIC at 08:27

## 2018-02-08 RX ADMIN — ATORVASTATIN CALCIUM 10 MG: 10 TABLET, FILM COATED ORAL at 20:16

## 2018-02-08 RX ADMIN — LEVOFLOXACIN 750 MG: 5 INJECTION, SOLUTION INTRAVENOUS at 20:16

## 2018-02-08 RX ADMIN — TAMSULOSIN HYDROCHLORIDE 0.4 MG: 0.4 CAPSULE ORAL at 20:16

## 2018-02-08 NOTE — PLAN OF CARE
Problem: Patient Care Overview (Adult)  Goal: Plan of Care Review   02/08/18 0041 02/08/18 1553 02/08/18 1808   Coping/Psychosocial Response Interventions   Plan Of Care Reviewed With --  patient;family --    Patient Care Overview   Progress no change --  --    Outcome Evaluation   Outcome Summary/Follow up Plan --  --  Pt current requiring 15L of O2 to maintain sats over 90% as clarified by Dr. Kong. Pt complains of mild pain in feet controlled by Tylenol. Pt worked with PT today and had a CXR and Echo. Will continue to monitor.      Goal: Adult Individualization and Mutuality  Outcome: Ongoing (interventions implemented as appropriate)    Goal: Discharge Needs Assessment  Outcome: Ongoing (interventions implemented as appropriate)      Problem: COPD, Chronic Bronchitis/Emphysema (Adult)  Goal: Signs and Symptoms of Listed Potential Problems Will be Absent or Manageable (COPD, Chronic Bronchitis/Emphysema)  Outcome: Ongoing (interventions implemented as appropriate)      Problem: Activity Intolerance (Adult)  Goal: Identify Related Risk Factors and Signs and Symptoms  Outcome: Ongoing (interventions implemented as appropriate)    Goal: Activity Tolerance  Outcome: Ongoing (interventions implemented as appropriate)    Goal: Effective Energy Conservation Techniques  Outcome: Ongoing (interventions implemented as appropriate)      Problem: Fall Risk (Adult)  Goal: Identify Related Risk Factors and Signs and Symptoms  Outcome: Ongoing (interventions implemented as appropriate)    Goal: Absence of Falls  Outcome: Ongoing (interventions implemented as appropriate)

## 2018-02-08 NOTE — PROGRESS NOTES
"  Name: Alessio Allen  ADMIT: 2018   Age/Sex: 77 y.o.male LOS:  LOS: 1 day    :    1940     ROOM: [facility]   MRN:    [id number]    PCP:    Alan Santana MD     Subjective   Still with sob and productive cough    Objective   Vital Signs  Temp:  [97.6 °F (36.4 °C)-98.3 °F (36.8 °C)] 97.6 °F (36.4 °C)  Heart Rate:  [81-87] 84  Resp:  [16-24] 16  BP: (125-141)/(68-74) 133/72  Body mass index is 30.81 kg/(m^2).    Objective:  General Appearance:  Comfortable and well-appearing.    Vital signs: (most recent): Blood pressure 133/72, pulse 84, temperature 97.6 °F (36.4 °C), temperature source Oral, resp. rate 16, height 188 cm (74\"), weight 109 kg (240 lb), SpO2 (!) 86 %.  Vital signs are normal.    HEENT: Normal HEENT exam.    Lungs:  Normal effort.  There are rhonchi.    Heart: Normal rate.  Regular rhythm.    Abdomen: Abdomen is soft and non-distended.  Bowel sounds are normal.   There is no abdominal tenderness.     Extremities: Normal range of motion.  There is no dependent edema.    Neurological: Patient is alert and oriented to person, place and time.    Skin:  Warm and dry.  No rash.             Results Review:       I reviewed the patient's new clinical results.    Results from last 7 days  Lab Units 18  0317   WBC 10*3/mm3 8.37   HEMOGLOBIN g/dL 11.5*   PLATELETS 10*3/mm3 274     Results from last 7 days  Lab Units 18  0317   SODIUM mmol/L 131*   POTASSIUM mmol/L 3.8   CHLORIDE mmol/L 92*   CO2 mmol/L 25.5   BUN mg/dL 10   CREATININE mg/dL 0.68*   GLUCOSE mg/dL 147*   Estimated Creatinine Clearance: 103.2 mL/min (by C-G formula based on Cr of 0.68).  Results from last 7 days  Lab Units 18  0317   CALCIUM mg/dL 9.1   ALBUMIN g/dL 3.20*       RADIOLOGY  2018  Pending      albuterol 2.5 mg Nebulization Q4H - RT   aspirin 81 mg Oral Daily   atorvastatin 10 mg Oral Daily   budesonide-formoterol 2 puff Inhalation BID - RT   bumetanide 3 mg Intravenous Once   hydrochlorothiazide 25 mg " Oral Daily   ketotifen 1 drop Both Eyes BID   levoFLOXacin 750 mg Intravenous Q24H   montelukast 10 mg Oral Nightly   NIFEdipine XL 30 mg Oral Daily   pantoprazole 40 mg Oral Q AM   pregabalin 75 mg Oral Q12H   rivaroxaban 20 mg Oral Daily With Dinner   tamsulosin 0.4 mg Oral Nightly   tiotropium 1 capsule Inhalation Daily - RT   pyridoxine 50 mg Oral Daily      Diet Regular; Cardiac, Consistent Carbohydrate, Renal      Assessment/Plan   Assessment:   Principal Problem:    Pneumonia  Active Problems:    A-fib    BP (high blood pressure)    Neuropathy    Hypertension    COPD (chronic obstructive pulmonary disease)    Asthma    Hypoxia    Chronic anticoagulation        Plan:   1. Pneumonia with A/C HRF  - on levaquin  - RVP negative. I do not hear any wheezing   - could be a component of HF with elevated BNP. Dose of bumex ordered but not given yet  - I will check a CXR and procalcitonin  - check echo as well plus strep pneumo and legionella antigens  - CTA at OSH was reportedly negative for PE  - currently he is satting 86% on 8L NC. Desats to 83-84% with minimal movement    2. AFib  - on xarelto and     3. HTN  - added hold parameters to nifedipine and HCTZ      Disposition  TBD.      Torey Burrell MD  [facility]  02/08/18  10:36 AM

## 2018-02-08 NOTE — PROGRESS NOTES
The CT films were not available at the time of the evaluation by Dr. Sotelo and the concern was whether to treat this as an infectious or not.   CT film are now available and will work looking at seems more of a post infectious/inflammatory process and I would favor antibiotic treatment   Continue to follow

## 2018-02-08 NOTE — H&P
Internal medicine history and physical  INTERNAL MEDICINE   [facility]       Patient Identification:  Name: Alessio Allen  Age: 77 y.o.  Sex: male  :  1940  MRN: [id number]               Primary Care Physician: Alan Santana MD                                   Chief Complaint:  Progressive shortness of breath for the last 1 week increasing dyspnea on exertion and declining functional capacity for the last couple weeks.  Oxygen saturation in low 80s on the pulse ox noted earlier today.    History of Present Illness:   Patient is a 77-year-old male with past medical history as noted below including history of asthma and COPD on home oxygen with baseline oxygen saturation run somewhere between 87-90% on 2 l of nasal cannula oxygen was in his usual state of his health until about a month ago when on his way to [place] by making up with stop at [place] he slipped and fell and broke his right ankle.  Patient underwent open reduction internal fixation of the right ankle at that place and history of going further in [place] he returned around and came back to [place].  According to him he had a follow-up appointment with the podiatrist who he saw 2 weeks ago.  Until that point she was using walker and ambulating and bearing weight on his right ankle.  After the visit with a podiatrist he was told that he needs to offload on his right foot and ankle.  According to patient he has been sedentary since then and started noticing that activity that he was able to do without any problem including up to walk him up to 3-4 miles on oxygen was becoming atrophic for him.  Walking 20-30 feet and back was making him out of breath.  In that background about a week ago he started having increasing shortness of breath and was feeling tired.  He had a home with health nurse who comes and checks on his right ankle has a week and noticed that he was struggling to breathe and she checked his oxygen saturation  on pulse oximeter was 84%.  This resulted in her convincing him to go to the emergency room which did not argue against admitted to [facility] emergency room where he was found to have bibasilar pneumonia and hypoxemia.  Patient was given nebulizer treatment steroids and does of Levaquin and was sent here for further evaluation and management.  Patient denies any fever or denies any chills and says that his appetite is preserved.      Past Medical History:  Past Medical History:   Diagnosis Date   • Arthritis    • Asthma    • Atrial fibrillation    • BPH (benign prostatic hypertrophy)    • Cancer    • COPD (chronic obstructive pulmonary disease)    • H/O Abnormal CBC 2017   • Hyperlipidemia    • Hypertension    • Neuropathy     feet and hands      Past Surgical History:  Past Surgical History:   Procedure Laterality Date   • COLONOSCOPY  12/05/2016    polyps   • FRACTURE SURGERY     • PACEMAKER IMPLANTATION        Home Meds:  Prescriptions Prior to Admission   Medication Sig Dispense Refill Last Dose   • aspirin 81 MG tablet Take 81 mg by mouth Daily.   2/7/2018 at Unknown time   • budesonide-formoterol (SYMBICORT) 160-4.5 MCG/ACT inhaler Inhale 2 puffs 2 (Two) Times a Day. 3 inhaler 3 2/7/2018 at Unknown time   • fexofenadine (ALLEGRA) 180 MG tablet Take 1 tablet by mouth Daily.   2/7/2018 at Unknown time   • hydrochlorothiazide (HYDRODIURIL) 25 MG tablet Take  by mouth daily.   2/7/2018 at Unknown time   • montelukast (SINGULAIR) 10 MG tablet TAKE 1 TABLET DAILY 90 tablet 2 2/7/2018 at Unknown time   • Multiple Vitamin (MULTI VITAMIN DAILY) tablet Take  by mouth.   2/7/2018 at Unknown time   • NIFEdipine XL (PROCARDIA XL) 30 MG 24 hr tablet Take  by mouth daily.   2/7/2018 at Unknown time   • olopatadine (PATANOL) 0.1 % ophthalmic solution Administer 1 drop to both eyes daily. 3 each 12 2/7/2018 at Unknown time   • omeprazole (PRILOSEC) 20 MG capsule Take 1 capsule by mouth Daily. 90 capsule 3 2/7/2018 at  Unknown time   • pregabalin (LYRICA) 75 MG capsule Take 75 mg by mouth 2 (two) times a day.   2/7/2018 at Unknown time   • pyridoxine (VITAMIN B-6) 50 MG tablet Take  by mouth daily.   2/7/2018 at Unknown time   • rivaroxaban (XARELTO) 15 MG tablet Take 1 tablet by mouth Daily With Dinner. (Patient taking differently: Take 20 mg by mouth Daily With Dinner.) 42 tablet 3 2/6/2018 at Unknown time   • simvastatin (ZOCOR) 20 MG tablet Take 0.5 tablets by mouth daily.   2/7/2018 at Unknown time   • sodium chloride (OCEAN NASAL SPRAY) 0.65 % nasal spray 2 sprays into each nostril As Needed for Congestion (qid prn). May refill q 21 days 4 each 3 2/7/2018 at Unknown time   • tamsulosin (FLOMAX) 0.4 MG capsule 24 hr capsule Take 1 capsule by mouth every night. 90 capsule 3 2/7/2018 at Unknown time   • tiotropium (SPIRIVA HANDIHALER) 18 MCG per inhalation capsule Place 2 puffs into inhaler and inhale 1 (one) time daily. 90 each 3 2/7/2018 at Unknown time   • XARELTO 20 MG tablet Take 20 mg by mouth Daily.   2/7/2018 at Unknown time   • acetaminophen (TYLENOL) 325 MG tablet Take 2 tablets by mouth Every 6 (Six) Hours As Needed (prn for pain). 720 tablet 2 Unknown at Unknown time   • acyclovir (ZOVIRAX) 800 MG tablet Take 1 tablet (800 mg total) by mouth daily as needed (prn daily as needed fever blisters) 30 tablet 1 Unknown at Unknown time   • albuterol (PROVENTIL HFA;VENTOLIN HFA) 108 (90 BASE) MCG/ACT inhaler Inhale 2 puffs every 4 (four) hours as needed for wheezing. 1 inhaler 11 Unknown at Unknown time   • cetirizine (ZyrTEC) 10 MG tablet Take 1 tablet by mouth daily. 90 tablet 3 Unknown at Unknown time   • Desoximetasone 0.05 % ointment Apply to affected area bid 60 g 2 Unknown at Unknown time   • Omega-3 Fatty Acids (FISH OIL) 1000 MG capsule capsule Take 1,000 mg by mouth daily with breakfast.   Taking   • RABEprazole (ACIPHEX) 20 MG EC tablet Take 1 tablet by mouth Daily. 90 tablet 3 Unknown at Unknown time   •  sildenafil (VIAGRA) 100 MG tablet Take 1 tablet by mouth Daily As Needed for erectile dysfunction. 6 tablet 3 Taking   • Spacer/Aero-Holding Chambers device Give spacer to use with his inhalers whichever brand he has received is fine 2 each 3 Taking     Current Meds:   No current facility-administered medications for this encounter.   Allergies:  Allergies   Allergen Reactions   • Albuterol    • Atenolol    • Celebrex [Celecoxib]    • Coreg [Carvedilol] Cough     SOA,   • Ibuprofen    • Levaquin [Levofloxacin]      Pt states he can take   • Lisinopril    • Penicillins    • Sulfa Antibiotics      Social History:   Social History   Substance Use Topics   • Smoking status: Former Smoker     Types: Cigarettes     Quit date: 1/3/2001   • Smokeless tobacco: Never Used   • Alcohol use No      Family History:  Family History   Problem Relation Age of Onset   • Hypertension Mother    • Heart attack Father           Review of Systems  See history of present illness and past medical history.    Constitutional: Remarkable for no fever or chills this progressive generalized weakness  Cardiovascular: Remarkable for dyspnea on exertion getting worse denies any chest pain denies any orthopnea.  Patient has history of irregular heartbeat and he says that he's been taking his Xarelto without any interruption in all of this timeframe when he is sedentary and not putting weight on his leg.  GI: Remarkable for preserved appetite no nausea vomiting or diarrhea  Respiratory: Remarkable for cough and wheezing but no hemoptysis no chest pain patient has chronic COPD and his home home oxygen and keeps his oxygen saturation between 87-90% and a half liters nasal cannula.  : Remarkable for no burning in urination frequency urgency  Musculoskeletal: Remarkable for bruise in the right leg and discomfort in the right ankle which is getting better everyday she is still not putting weight on it and using orthotic boot.  Neurological: Remarkable  for no loss of consciousness continence.    Vitals:   /68 (BP Location: Right arm, Patient Position: Lying)  Pulse 87  Temp 98.3 °F (36.8 °C) (Oral)   Resp 22  SpO2 90%  I/O: No intake or output data in the 24 hours ending 02/07/18 2311  Exam:  General Appearance:    Alert, cooperative, no distress, appears stated age, Does not appear to be toxic or septic.     Head:    Normocephalic, without obvious abnormality, atraumatic   Eyes:    PERRL, conjunctiva/corneas clear, EOM's intact, both eyes   Ears:    Normal external ear canals, both ears   Nose:   Nares normal, septum midline, mucosa normal, no drainage    or sinus tenderness   Throat:   Lips, tongue, gums normal; oral mucosa pink and moist   Neck:   Supple, symmetrical, trachea midline, no adenopathy;     thyroid:  no enlargement/tenderness/nodules; no carotid    bruit or JVD   Back:     Symmetric, no curvature, ROM normal, no CVA tenderness   Lungs:     Bibasilar crackles no obvious rhonchi no obvious use of extremity muscles of breathing noted    Chest Wall:    No tenderness or deformity    Heart:    Irregularly irregular, S1 and S2 normal, no murmur, rub   or gallop   Abdomen:     Soft, non-tender, bowel sounds active all four quadrants,     no masses, no hepatomegaly, no splenomegaly   Extremities:   Bruise on the right leg,right ankle and orthotic boot    Pulses:   Pulses palpable in all extremities; symmetric all extremities   Skin:   Skin color normal, Skin is warm and dry,  no rashes or palpable lesions   Neurologic:   CNII-XII intact, motor strength grossly intact, sensation grossly intact to light touch, no focal deficits noted       Data Review:      I reviewed the patient's new clinical results.        ABG at the outside facility shows pH 7.45 PCO2 38 PO2 49 oxygen saturation of 82.9% on 4 L nasal cannula  His chemistry shows sodium 129 potassium 3.7 chloride 92.  Nystatin creatinine 0.78.  LFTs are within normal limits his status shows WBC  11 hemoglobin 12.2 MCV 65.7 and platelet 311 per physician at the outset facilities there is bibasilar infiltrate.  Troponin is 0.03) hypercholesterolemia level available    Assessment:  Active Hospital Problems (** Indicates Principal Problem)    Diagnosis Date Noted   • **Pneumonia [J18.9] 02/07/2018   • Hypoxia [R09.02] 02/07/2018   • Chronic anticoagulation [Z79.01] 02/07/2018   • COPD (chronic obstructive pulmonary disease) [J44.9]    • Hypertension [I10]    • Asthma [J45.909]    • Neuropathy [G62.9]      feet and hands      • A-fib [I48.91] 03/02/2016   • BP (high blood pressure) [I10] 03/02/2016      Resolved Hospital Problems    Diagnosis Date Noted Date Resolved   No resolved problems to display.       Plan:  COPD/asthma on chronic oxygen with home oxygen with increasing dyspnea on exertion and declining in functional capacity while being sedentary due to right ankle fracture in the last 2 weeks with no associated fever or decrease in appetite but chest x-ray showing bibasilar infiltrate and increasing hypoxemia and ongoing use of Xarelto.  This presentation is concerning and consistent with COPD exacerbation possible pneumonia though clinically unlikely given the lack of fever change in appetite and nontoxic appearance that he has with x-ray may be revealing atelectasis due to inactivity.  Plan is to continue nebulizer treatment steroids.  Continue Antibiotic therapy for now.  Check pro-calcitonin level and if normal consider DC antibiotics.  Get pulmonary consultation.  Repeat chest x-ray.  Status post recent right ankle fracture status post open reduction internal fixation in [place] currently on follow-up with podiatry service doing well  Chronic atrial fibrillation of paroxysmal-type on anticoagulation therapy and rate is controlled.  Continue present care.  Hypertension seems to have controlled blood pressure continues on medications.    Ru Royal MD   2/7/2018  11:11 PM  Much of this encounter  note is an electronic transcription/translation of spoken language to printed text. The electronic translation of spoken language may permit erroneous, or at times, nonsensical words or phrases to be inadvertently transcribed; Although I have reviewed the note for such errors, some may still exist

## 2018-02-08 NOTE — DISCHARGE PLACEMENT REQUEST
"Chioma Rushing (77 y.o. Male)     Date of Birth Social Security Number Address Home Phone MRN    1940  [id number] [phone] [id number]    Sikh Marital Status          Yazdanism        Admission Date Admission Type Admitting Provider Attending Provider Department, Room/Bed    2/7/18 Urgent Ru Royal MD Schmitt, Christopher E, MD [address], [address]    Discharge Date Discharge Disposition Discharge Destination                      Attending Provider: Torey Burrell MD     Allergies:  Albuterol, Atenolol, Celebrex [Celecoxib], Coreg [Carvedilol], Ibuprofen, Levaquin [Levofloxacin], Lisinopril, Penicillins, Sulfa Antibiotics    Isolation:  None   Infection:  None   Code Status:  FULL    Ht:  188 cm (74\")   Wt:  109 kg (240 lb)    Admission Cmt:  None   Principal Problem:  Pneumonia [J18.9]                 Active Insurance as of 2/7/2018     Primary Coverage     Payor Plan Insurance Group Employer/Plan Group    MEDICARE MEDICARE A & B      Payor Plan Address Payor Plan Phone Number Effective From Effective To    [address] [phone] 5/1/2000     [place] [address]       Subscriber Name Subscriber Birth Date Member ID       CHIOMA RUSHING 1940 [id number]           Secondary Coverage     Payor Plan Insurance Group Employer/Plan Group     FOR LIFE  FOR LIFE [employer] [id number]      Payor Plan Address Payor Plan Phone Number Effective From Effective To    [address] [phone] 1/30/2018     [place] [address]       Subscriber Name Subscriber Birth Date Member ID       CHIOMA RUSHING 1940 [id number]                 Emergency Contacts      (Rel.) Home Phone Work Phone Mobile Phone    Killian Rushing (Relative) [phone] -- [phone]              "

## 2018-02-08 NOTE — PLAN OF CARE
Problem: Patient Care Overview (Adult)  Goal: Plan of Care Review  Outcome: Ongoing (interventions implemented as appropriate)   02/08/18 0041   Coping/Psychosocial Response Interventions   Plan Of Care Reviewed With patient;spouse   Patient Care Overview   Progress no change   Outcome Evaluation   Outcome Summary/Follow up Plan admit to unit; pt dyspneic with any activity; requiring 7 l 02 to maintain sat at 88 percent; pt denies pain or any complaints at this time; will continue to carefully m ontior     Goal: Adult Individualization and Mutuality  Outcome: Ongoing (interventions implemented as appropriate)    Goal: Discharge Needs Assessment  Outcome: Ongoing (interventions implemented as appropriate)      Problem: COPD, Chronic Bronchitis/Emphysema (Adult)  Goal: Signs and Symptoms of Listed Potential Problems Will be Absent or Manageable (COPD, Chronic Bronchitis/Emphysema)  Outcome: Ongoing (interventions implemented as appropriate)      Problem: Activity Intolerance (Adult)  Goal: Identify Related Risk Factors and Signs and Symptoms  Outcome: Ongoing (interventions implemented as appropriate)    Goal: Activity Tolerance  Outcome: Ongoing (interventions implemented as appropriate)    Goal: Effective Energy Conservation Techniques  Outcome: Ongoing (interventions implemented as appropriate)      Problem: Fall Risk (Adult)  Goal: Identify Related Risk Factors and Signs and Symptoms  Outcome: Ongoing (interventions implemented as appropriate)    Goal: Absence of Falls  Outcome: Ongoing (interventions implemented as appropriate)

## 2018-02-08 NOTE — PLAN OF CARE
Problem: Patient Care Overview (Adult)  Goal: Plan of Care Review   02/08/18 8507   Coping/Psychosocial Response Interventions   Plan Of Care Reviewed With patient;family   Outcome Evaluation   Outcome Summary/Follow up Plan Pt. currently independent with functional mobility and is able to maintain his wt. bearing status with all mobility. Pt. does not require skilled inpt. P.T. and can get up/down with nursing to/from [facility] while here in the hospital. Pt./family with no further questions/concerns regarding functional mobility or home safety. Will sign off. Nursing notified.

## 2018-02-08 NOTE — THERAPY DISCHARGE NOTE
Acute Care - Physical Therapy Initial Eval/Discharge  [facility]     Patient Name: Alessio Allen  : 1940  MRN: [id number]  Today's Date: 2018   Onset of Illness/Injury or Date of Surgery Date: 18  Date of Referral to PT: 18  Referring Physician: Ru Hampton      Admit Date: 2018    Visit Dx:  No diagnosis found.  Patient Active Problem List   Diagnosis   • A-fib   • Dyslipidemia   • BP (high blood pressure)   • Neuropathy   • Hypertension   • COPD (chronic obstructive pulmonary disease)   • BPH (benign prostatic hypertrophy)   • Atrial fibrillation   • Asthma   • Hypoxia   • Pneumonia   • Chronic anticoagulation     Past Medical History:   Diagnosis Date   • Arthritis    • Asthma    • Atrial fibrillation    • BPH (benign prostatic hypertrophy)    • Cancer    • COPD (chronic obstructive pulmonary disease)    • H/O Abnormal CBC    • Hyperlipidemia    • Hypertension    • Neuropathy     feet and hands      Past Surgical History:   Procedure Laterality Date   • COLONOSCOPY  2016    polyps   • FRACTURE SURGERY     • PACEMAKER IMPLANTATION            PT ASSESSMENT (last 72 hours)      PT Evaluation       18 1549 18 1053    Rehab Evaluation    Document Type discharge evaluation/summary  -MS     Subjective Information agree to therapy;complains of;dyspnea  -MS     Patient Effort, Rehab Treatment excellent  -MS     Symptoms Noted Comment Pt. reports SOA with upright activity but otherwise just fatigued.  -MS     General Information    Onset of Illness/Injury or Date of Surgery Date 18  -MS     Referring Physician Ru Hampton  -MS     Pertinent History Of Current Problem Pt. admitted with PNA; SOA; h/o Right ankle fracture (TTWB per pt. report with walking boot)  -MS     Precautions/Limitations --   TTWB RLE with walking boot; 7 liters oxygen  -MS     Prior Level of Function independent:  -MS     Equipment Currently Used at Home walker, rolling  -MS  wheelchair;cane, straight  -DK    Plans/Goals Discussed With patient and family;agreed upon  -MS     Living Environment    Lives With  significant other  -DK    Living Arrangements  house  -DK    Home Accessibility  no concerns  -DK    Type of Financial/Environmental Concern  none  -DK    Transportation Available  car;family or friend will provide  -DK    Clinical Impression    Date of Referral to PT 02/08/18  -MS     Criteria for Skilled Therapeutic Interventions Met no problems identified which require skilled intervention  -MS     Pain Assessment    Pain Assessment No/denies pain   No verbal/visual signs of pain.  -MS     Cognitive Assessment/Intervention    Current Cognitive/Communication Assessment functional  -MS     Orientation Status oriented x 4  -MS     Follows Commands/Answers Questions 100% of the time  -MS     Personal Safety WNL/WFL  -MS     Personal Safety Interventions fall prevention program maintained;gait belt;nonskid shoes/slippers when out of bed;supervised activity  -MS     ROM (Range of Motion)    General ROM no range of motion deficits identified   Exception of Right ankle/foot (walking boot)  -MS     MMT (Manual Muscle Testing)    General MMT Assessment --   BUE/LE MMT (4/5); Exception to Right ankle/foot (boot)  -MS     Bed Mobility, Assessment/Treatment    Bed Mob, Supine to Sit, [place] independent  -MS     Bed Mob, Sit to Supine, [place] independent  -MS     Transfer Assessment/Treatment    Transfers, Sit-Stand [place] independent  -MS     Transfers, Stand-Sit [place] independent  -MS     Transfers, Sit-Stand-Sit, Assist Device rolling walker  -MS     Transfer, Maintain Weight Bearing Status able to maintain weight bearing status   TTWB Right L.E.  -MS     Gait Assessment/Treatment    Gait, [place] Level independent  -MS     Gait, Assistive Device rolling walker  -MS     Gait, Distance (Feet) 10  -MS     Gait, Maintain Weight Bearing Status able to maintain  weight bearing status   TTWB RLE with walking boot  -MS     Gait, Comment Pt. only limited by SOA with upright activity.  Functionally, pt. is completely independent  -MS     Positioning and Restraints    Pre-Treatment Position in bed  -MS     Post Treatment Position bed  -MS     In Bed notified nsg;supine;call light within reach;encouraged to call for assist;with family/caregiver;RLE elevated   All lines intact. V.S.S.  -MS       02/07/18 2253 02/07/18 2245    General Information    Equipment Currently Used at Home  cane, straight;wheelchair  -AC    Living Environment    Lives With significant other  -AC     Living Arrangements house  -AC     Home Accessibility no concerns  -AC     Stair Railings at Home none  -AC     Type of Financial/Environmental Concern none  -AC     Transportation Available family or friend will provide;car  -AC       User Key  (r) = Recorded By, (t) = Taken By, (c) = Cosigned By    Initials Name Provider Type    MS Killian Matos, PT Physical Therapist    AC Trisha Massey, RN Registered Nurse    KASSANDRA Diaz RN Case Manager          Physical Therapy Education     Title: PT OT SLP Therapies (Resolved)     Topic: Physical Therapy (Resolved)     Point: Mobility training (Resolved)    Learning Progress Summary    Learner Readiness Method Response Comment Documented by Status   Patient Acceptance LENORE PRIETO DU  MS 02/08/18 1553 Done               Point: Body mechanics (Resolved)    Learning Progress Summary    Learner Readiness Method Response Comment Documented by Status   Patient Acceptance LENORE PRIETO DU  MS 02/08/18 1553 Done               Point: Precautions (Resolved)    Learning Progress Summary    Learner Readiness Method Response Comment Documented by Status   Patient Acceptance LENORE PRIETO DU  MS 02/08/18 1553 Done                      User Key     Initials Effective Dates Name Provider Type [facility] 12/01/15 -  Killian Matos, PT Physical Therapist PT                PT Recommendation  and Plan  Anticipated Discharge Disposition: home with assist  Plan of Care Review  Plan Of Care Reviewed With: patient, family  Outcome Summary/Follow up Plan: Pt. currently independent with functional mobility and is able to maintain his wt. bearing status with all mobility.  Pt. does not require skilled inpt. P.T. and can get up/down with nursing to/from [facility] while here in the hospital.  Pt./family with no further questions/concerns regarding functional mobility or home safety. Will sign off. Nursing notified.              Outcome Measures       02/08/18 1500          How much help from another person do you currently need...    Turning from your back to your side while in flat bed without using bedrails? 4  -MS      Moving from lying on back to sitting on the side of a flat bed without bedrails? 4  -MS      Moving to and from a bed to a chair (including a wheelchair)? 4  -MS      Standing up from a chair using your arms (e.g., wheelchair, bedside chair)? 4  -MS      Climbing 3-5 steps with a railing? 4  -MS      To walk in hospital room? 4  -MS      AM-PAC 6 Clicks Score 24  -MS      Functional Assessment    Outcome Measure Options AM-PAC 6 Clicks Basic Mobility (PT)  -MS        User Key  (r) = Recorded By, (t) = Taken By, (c) = Cosigned By    Initials Name Provider Type    MS Killian Matos PT Physical Therapist           Time Calculation:         PT Charges       02/08/18 1555          Time Calculation    Start Time 1530  -MS      Stop Time 1547  -MS      Time Calculation (min) 17 min  -MS      PT Received On 02/08/18  -MS        User Key  (r) = Recorded By, (t) = Taken By, (c) = Cosigned By    Initials Name Provider Type    MS Killian Matos PT Physical Therapist          Therapy Charges for Today     Code Description Service Date Service Provider Modifiers Qty    63566399018 HC PT MOBILITY CURRENT 2/8/2018 Killian Matos PT GP,  1    57792923411 HC PT MOBILITY PROJECTED 2/8/2018 Killian Matos  PT GP, CH 1    73027461142 HC PT MOBILITY DISCHARGE 2/8/2018 Killian Matos, PT GP, CH 1    86511651292 HC PT EVAL LOW COMPLEXITY 1 2/8/2018 Killian Matos, PT GP 1          PT G-Codes  PT Professional Judgement Used?: Yes  Outcome Measure Options: AM-PAC 6 Clicks Basic Mobility (PT)  Functional Limitation: Mobility: Walking and moving around  Mobility: Walking and Moving Around Current Status (): 0 percent impaired, limited or restricted  Mobility: Walking and Moving Around Goal Status (): 0 percent impaired, limited or restricted  Mobility: Walking and Moving Around Discharge Status (): 0 percent impaired, limited or restricted    PT Discharge Summary  Anticipated Discharge Disposition: home with assist  Reason for Discharge: Independent  Outcomes Achieved: Refer to plan of care for updates on goals achieved  Discharge Destination: Home, Home with assist    Killian Matos, PT  2/8/2018

## 2018-02-08 NOTE — CONSULTS
Patient Care Team:  Alan Santana MD as PCP - General (Family Medicine)  Rao Maguire MD as Consulting Physician (Hematology and Oncology)  Alan Santana MD as Referring Physician (Family Medicine)      Subjective     Patient is a 77 y.o. male.  I was asked to see in consultation regarding possible pneumonia hypoxia.  I'm sorry I see the consult was entered yesterday it was not called until 6 AM today.  Patient came in for shortness of breath he actually went to one of the [facility] greatest emergency rooms and had evaluation there.  He was sent here for purported pneumonia.  I cannot get the disc to load up to review that CT I have asked radiology to see if they can load those disks.  Patient says they did a CT angiogram of his chest and indeed the handwritten CT scan says the CTA chest and he was told there were no blood clots.  There is unfortunately no records of his CT report in the paperwork that accompanies him.  Patient has COPD and purportedly asthma he is on Singulair Symbicort anterior throat.  He follows through the [facility].  He has got chronic respiratory failure he has been on O2 since 2001 he was a smoker up until 2000.  A few weeks ago he was traveling from [place] and in [place] he tripped and fell and broke his right ankle had surgery there and returned back to [place].  He has not been up on his foot much initially he says his leg was quite swollen but the swelling is gone down significantly.  He's had no fevers chills or sweats no cough versus maybe a little throat clearing cough but nothing deep no chest pain pressure or heaviness.  He denies a history of CHF or heart failure or other heart problems although I do note that there is a chest x-ray scan from 1/1118 into the computers here report lateral pulmonary edema right greater than left with right pleural effusion.  Patient had no sore throat or runny nose.  He denies any history  of kidney disease no decreased urine output no recent dysuria hematuria and urgency or frequency      Review of Systems:  No history of seizures or strokes no recent headaches or visual changes no difficulty swallowing bad heartburn or indigestion no history of ulcer disease no liver disease or hepatitis no melena or hematochezia.  He does have hypertension and high cholesterol he has peripheral neuropathy he has some degenerative arthritis he does have some BPH but not been bothered by that recently no history of diabetes or thyroid disease no polyuria polydipsia heat or cold intolerance now his records indicate he has a pacemaker and it appears on exam he does I have asked him 3 times about heart issues and he denies.  He denies any history of sleep apnea or snoring      History  Past Medical History:   Diagnosis Date   • Arthritis    • Asthma    • Atrial fibrillation    • BPH (benign prostatic hypertrophy)    • Cancer    • COPD (chronic obstructive pulmonary disease)    • H/O Abnormal CBC 2017   • Hyperlipidemia    • Hypertension    • Neuropathy     feet and hands      Past Surgical History:   Procedure Laterality Date   • COLONOSCOPY  12/05/2016    polyps   • FRACTURE SURGERY     • PACEMAKER IMPLANTATION       Social History     Social History   • Marital status:      Spouse name: N/A   • Number of children: N/A   • Years of education: N/A     Occupational History   •  Retired     Social History Main Topics   • Smoking status: Former Smoker     Types: Cigarettes     Quit date: 1/3/2001   • Smokeless tobacco: Never Used   • Alcohol use No   • Drug use: No   • Sexual activity: Not on file     Other Topics Concern   • Not on file     Social History Narrative     Family History   Problem Relation Age of Onset   • Hypertension Mother    • Heart attack Father          Allergies:  Albuterol; Atenolol; Celebrex [celecoxib]; Coreg [carvedilol]; Ibuprofen; Levaquin [levofloxacin]; Lisinopril; Penicillins; and Sulfa  antibiotics    Medications:  Prior to Admission medications    Medication Sig Start Date End Date Taking? Authorizing Provider   aspirin 81 MG tablet Take 81 mg by mouth Daily.   Yes Historical Provider, MD   budesonide-formoterol (SYMBICORT) 160-4.5 MCG/ACT inhaler Inhale 2 puffs 2 (Two) Times a Day. 8/3/17  Yes Alan Santana MD   fexofenadine (ALLEGRA) 180 MG tablet Take 1 tablet by mouth Daily. 11/14/17  Yes Historical Provider, MD   hydrochlorothiazide (HYDRODIURIL) 25 MG tablet Take  by mouth daily. 6/4/14  Yes Historical Provider, MD   montelukast (SINGULAIR) 10 MG tablet TAKE 1 TABLET DAILY 11/14/17  Yes Alan Santana MD   Multiple Vitamin (MULTI VITAMIN DAILY) tablet Take  by mouth. 8/6/14  Yes Historical Provider, MD   NIFEdipine XL (PROCARDIA XL) 30 MG 24 hr tablet Take  by mouth daily. 8/6/14  Yes Historical Provider, MD   olopatadine (PATANOL) 0.1 % ophthalmic solution Administer 1 drop to both eyes daily. 8/15/16  Yes Alan Santana MD   omeprazole (PRILOSEC) 20 MG capsule Take 1 capsule by mouth Daily. 11/27/17  Yes Alan Santana MD   pregabalin (LYRICA) 75 MG capsule Take 75 mg by mouth 2 (two) times a day.   Yes Historical Provider, MD   pyridoxine (VITAMIN B-6) 50 MG tablet Take  by mouth daily. 6/4/14  Yes Historical Provider, MD   rivaroxaban (XARELTO) 15 MG tablet Take 1 tablet by mouth Daily With Dinner.  Patient taking differently: Take 20 mg by mouth Daily With Dinner. 6/12/17  Yes Alan Santana MD   simvastatin (ZOCOR) 20 MG tablet Take 0.5 tablets by mouth daily. 6/4/14  Yes Historical Provider, MD   sodium chloride (OCEAN NASAL SPRAY) 0.65 % nasal spray 2 sprays into each nostril As Needed for Congestion (qid prn). May refill q 21 days 12/14/17  Yes Alan Santana MD   tamsulosin (FLOMAX) 0.4 MG capsule 24 hr capsule Take 1 capsule by mouth every night. 5/12/16  Yes Alan Santana MD   tiotropium (SPIRIVA HANDIHALER) 18 MCG per inhalation capsule Place 2 puffs into inhaler and inhale 1 (one)  time daily. 6/21/16  Yes Alan Santana MD   XARELTO 20 MG tablet Take 20 mg by mouth Daily. 11/20/17  Yes Historical Provider, MD   acetaminophen (TYLENOL) 325 MG tablet Take 2 tablets by mouth Every 6 (Six) Hours As Needed (prn for pain). 6/12/17   Alan Santana MD   acyclovir (ZOVIRAX) 800 MG tablet Take 1 tablet (800 mg total) by mouth daily as needed (prn daily as needed fever blisters) 6/30/16   Alan Santana MD   albuterol (PROVENTIL HFA;VENTOLIN HFA) 108 (90 BASE) MCG/ACT inhaler Inhale 2 puffs every 4 (four) hours as needed for wheezing. 5/24/16   Alan Santana MD   cetirizine (ZyrTEC) 10 MG tablet Take 1 tablet by mouth daily. 5/2/16   Alan Santana MD   Desoximetasone 0.05 % ointment Apply to affected area bid 12/23/16   Alan Santana MD   Omega-3 Fatty Acids (FISH OIL) 1000 MG capsule capsule Take 1,000 mg by mouth daily with breakfast.    Historical Provider, MD   RABEprazole (ACIPHEX) 20 MG EC tablet Take 1 tablet by mouth Daily. 5/2/17   Alan Santana MD   sildenafil (VIAGRA) 100 MG tablet Take 1 tablet by mouth Daily As Needed for erectile dysfunction. 7/13/17   Alan Santana MD   Spacer/Aero-Holding Chambers device Give spacer to use with his inhalers whichever brand he has received is fine 8/3/17   Alan Santana MD       albuterol 2.5 mg Nebulization Q4H - RT   aspirin 81 mg Oral Daily   atorvastatin 10 mg Oral Daily   budesonide-formoterol 2 puff Inhalation BID - RT   hydrochlorothiazide 25 mg Oral Daily   ketotifen 1 drop Both Eyes BID   levoFLOXacin 750 mg Intravenous Q24H   montelukast 10 mg Oral Nightly   NIFEdipine XL 30 mg Oral Daily   pantoprazole 40 mg Oral Q AM   pregabalin 75 mg Oral Q12H   rivaroxaban 20 mg Oral Daily   tamsulosin 0.4 mg Oral Nightly   tiotropium 1 capsule Inhalation Daily - RT   pyridoxine 50 mg Oral Daily          Objective     Vital Signs  Vital Sign Min/Max for last 24 hours  Temp  Min: 98.3 °F (36.8 °C)  Max: 98.3 °F (36.8 °C)   BP  Min: 125/74  Max: 141/68   Pulse   "Min: 81  Max: 87   Resp  Min: 22  Max: 24   SpO2  Min: 87 %  Max: 90 %   Flow (L/min)  Min: 6  Max: 6   Weight  Min: 109 kg (240 lb)  Max: 109 kg (240 lb)       Intake/Output Summary (Last 24 hours) at 02/08/18 0615  Last data filed at 02/08/18 0455   Gross per 24 hour   Intake                0 ml   Output              700 ml   Net             -700 ml     I/O this shift:  In: -   Out: 700 [Urine:700]  Last Weight and Admission Weight    Last Weight    02/08/18  0100   Weight: 109 kg (240 lb)     Flowsheet Rows         First Filed Value    Admission Height  188 cm (74\") Documented at 02/08/2018 0100    Admission Weight  109 kg (240 lb) Documented at 02/08/2018 0100          Body mass index is 30.81 kg/(m^2).           Physical Exam:  General Appearance: Well-developed obese white male he is resting comfortably in bed actually sleeping on my arrival he is on 6 L nasal cannula O2 with saturations of about 90%  Eyes: Conjunctiva are clear and anicteric pupils are 3 mm equal round and reactive to light  ENT: Oral mucous membranes are moist no erythema or exudates nasal septum midline he has a Mallampati type II airway  Neck: No adenopathy or thyromegaly no jugular venous distention trachea midline  Lungs: He's got wet rales crackles at the right lung base the left is clear there is no dullness on percussion chest expansion is symmetric and nonlabored.  There is no wheezing  Cardiac: Regular rate and rhythm no murmur or gallop  Abdomen: Soft nontender no palpable organomegaly or masses  : Examined  Musc/Skel: Recent right lower extremity edema he has a walking cast on his right foot  Skin: No jaundice no petechiae  Neuro: He is alert and oriented ×3 he is cooperative he's moving all extremities following commands  Extremities/P Vascular: No clubbing or cyanosis he has palpable dorsalis pedis and radial pulses bilaterally  MSE: In reasonably good spirits      Labs:    Results from last 7 days  Lab Units 02/08/18 0317 "   GLUCOSE mg/dL 147*   SODIUM mmol/L 131*   POTASSIUM mmol/L 3.8   CO2 mmol/L 25.5   CHLORIDE mmol/L 92*   ANION GAP mmol/L 13.5   CREATININE mg/dL 0.68*   BUN mg/dL 10   BUN / CREAT RATIO  14.7   CALCIUM mg/dL 9.1   EGFR IF NONAFRICN AM mL/min/1.73 113   ALK PHOS U/L 64   TOTAL PROTEIN g/dL 6.8   ALT (SGPT) U/L 17   AST (SGOT) U/L 14   BILIRUBIN mg/dL 0.5   ALBUMIN g/dL 3.20*   GLOBULIN gm/dL 3.6   A/G RATIO g/dL 0.9     Estimated Creatinine Clearance: 103.2 mL/min (by C-G formula based on Cr of 0.68).        Results from last 7 days  Lab Units 02/08/18  0317   WBC 10*3/mm3 8.37   RBC 10*6/mm3 5.53   HEMOGLOBIN g/dL 11.5*   HEMATOCRIT % 38.3*   MCV fL 69.3*   MCH pg 20.8*   MCHC g/dL 30.0*   RDW % 20.5*   RDW-SD fl 50.5   MPV fL 8.7   PLATELETS 10*3/mm3 274   NEUTROPHIL % % 93.5*   LYMPHOCYTE % % 4.9*   MONOCYTES % % 1.3*   EOSINOPHIL % % 0.0*   BASOPHIL % % 0.1   IMM GRAN % % 0.2   NEUTROS ABS 10*3/mm3 7.82   LYMPHS ABS 10*3/mm3 0.41*   MONOS ABS 10*3/mm3 0.11*   EOS ABS 10*3/mm3 0.00   BASOS ABS 10*3/mm3 0.01   IMMATURE GRANS (ABS) 10*3/mm3 0.02           also labs from the [facility] pH is 7.45 PCO2 39 PO2 of 49 on 4 L nasal cannula O2 troponin I was 0.03 PT/INR 1.47 sodium was 129 potassium 3.7 chloride 92 bicarbonate 28 BUN of 10 creatinine 0.78 calcium 8.6 protein 7.2 albumin 3.3 AST 20 ALT 19 white count 11,600 hemoglobin 12.2 and plate count 331,000    Results from last 7 days  Lab Units 02/08/18  0317   PROBNP pg/mL 2171.0*                 Microbiology Results (last 10 days)     Procedure Component Value - Date/Time    Respiratory Panel, PCR - Swab, Nasopharynx [[id number]]  (Normal) Collected:  02/08/18 0029    Lab Status:  Final result Specimen:  Swab from Nasopharynx Updated:  02/08/18 0240     ADENOVIRUS, PCR Not Detected     Coronavirus 229E Not Detected     Coronavirus HKU1 Not Detected     Coronavirus NL63 Not Detected     Coronavirus OC43 Not Detected     Human Metapneumovirus Not  Detected     Human Rhinovirus/Enterovirus Not Detected     Influenza B PCR Not Detected     Parainfluenza Virus 1 Not Detected     Parainfluenza Virus 2 Not Detected     Parainfluenza Virus 3 Not Detected     Parainfluenza Virus 4 Not Detected     Bordetella pertussis pcr Not Detected     Influenza A H1N1 2009 PCR Not Detected     Chlamydophila pneumoniae PCR Not Detected     Mycoplasma pneumo by PCR Not Detected     Influenza A PCR Not Detected     Influenza A H3 Not Detected     Influenza A H1 Not Detected     RSV, PCR Not Detected            Diagnostics:  No results found.  Results for orders placed in visit on 05/18/16   SCANNED - ECHOCARDIOGRAM   EKG was ventricular paced but looks like probably underlying A. fib    I am awaiting to see if radiology can get the CT scan loaded so that I can review.    Assessment/Plan     1. Acute on chronic hypoxemic respiratory failure I very much would like to review the CT scan.  Hopefully we can get that up in a short period of time.  I agree with Dr. Royal if as the patient states the CT angiogram was negative for PE.  Her some, reported bibasilar disease I would probably favor atelectasis over pneumonia given the lack of symptoms I agree a pro-calcitonin may be very helpful here the viral respiratory panel was negative.  Other possible causes CHF looking at his old records it looks like he's had CHF in the past and his proBNP is significantly elevated and he may benefit from diuretics.  Pulmonary hygiene may be beneficial particularly if there is any atelectasis involved  2. Cardiac patient denies heart disease and I ask him multiple times but he clearly has a pacemaker I think is probably get A. fib and that's probably wise on the Xarelto when I wonder about CHF and benefits potentially of diuretics.  3. COPD certainly doesn't sound like an acute exacerbation and I would hold on steroids.  And continue bronchodilators as you're doing.  4. Hyponatremia status to be  monitored the most obvious cause would be the hydrochlorothiazide that he is on at home may want to consider discontinuing this.  5. Hypertension  6. Hyperlipidemia  7. Recent right ankle fracture and repair  Addendum radiology's been trying for about 30 minutes and we still have not been able to get the films loaded for review    Ismael Sotelo MD  02/08/18  6:15 AM    Time:

## 2018-02-09 ENCOUNTER — APPOINTMENT (OUTPATIENT)
Dept: CARDIOLOGY | Facility: HOSPITAL | Age: 78
End: 2018-02-09
Attending: INTERNAL MEDICINE

## 2018-02-09 LAB
ANION GAP SERPL CALCULATED.3IONS-SCNC: 12 MMOL/L
BUN BLD-MCNC: 17 MG/DL (ref 8–23)
BUN/CREAT SERPL: 20.5 (ref 7–25)
CALCIUM SPEC-SCNC: 8.1 MG/DL (ref 8.6–10.5)
CHLORIDE SERPL-SCNC: 87 MMOL/L (ref 98–107)
CO2 SERPL-SCNC: 27 MMOL/L (ref 22–29)
CREAT BLD-MCNC: 0.83 MG/DL (ref 0.76–1.27)
CREAT UR-MCNC: 97.1 MG/DL
GFR SERPL CREATININE-BSD FRML MDRD: 90 ML/MIN/1.73
GLUCOSE BLD-MCNC: 102 MG/DL (ref 65–99)
POTASSIUM BLD-SCNC: 3.3 MMOL/L (ref 3.5–5.2)
SODIUM BLD-SCNC: 126 MMOL/L (ref 136–145)
SODIUM UR-SCNC: 24 MMOL/L

## 2018-02-09 PROCEDURE — 25010000002 CEFEPIME PER 500 MG: Performed by: INTERNAL MEDICINE

## 2018-02-09 PROCEDURE — 80048 BASIC METABOLIC PNL TOTAL CA: CPT | Performed by: INTERNAL MEDICINE

## 2018-02-09 PROCEDURE — 25010000002 METHYLPREDNISOLONE PER 40 MG: Performed by: INTERNAL MEDICINE

## 2018-02-09 PROCEDURE — 83935 ASSAY OF URINE OSMOLALITY: CPT | Performed by: INTERNAL MEDICINE

## 2018-02-09 PROCEDURE — 94799 UNLISTED PULMONARY SVC/PX: CPT

## 2018-02-09 PROCEDURE — 83930 ASSAY OF BLOOD OSMOLALITY: CPT | Performed by: INTERNAL MEDICINE

## 2018-02-09 PROCEDURE — 84300 ASSAY OF URINE SODIUM: CPT | Performed by: INTERNAL MEDICINE

## 2018-02-09 PROCEDURE — 82570 ASSAY OF URINE CREATININE: CPT | Performed by: INTERNAL MEDICINE

## 2018-02-09 PROCEDURE — 93970 EXTREMITY STUDY: CPT

## 2018-02-09 RX ORDER — DIAPER,BRIEF,INFANT-TODD,DISP
EACH MISCELLANEOUS EVERY 6 HOURS PRN
Status: DISCONTINUED | OUTPATIENT
Start: 2018-02-09 | End: 2018-02-14 | Stop reason: HOSPADM

## 2018-02-09 RX ORDER — SENNA AND DOCUSATE SODIUM 50; 8.6 MG/1; MG/1
2 TABLET, FILM COATED ORAL 2 TIMES DAILY
Status: DISCONTINUED | OUTPATIENT
Start: 2018-02-09 | End: 2018-02-14 | Stop reason: HOSPADM

## 2018-02-09 RX ORDER — FUROSEMIDE 40 MG/1
40 TABLET ORAL 3 TIMES DAILY
Status: DISCONTINUED | OUTPATIENT
Start: 2018-02-09 | End: 2018-02-14

## 2018-02-09 RX ORDER — METHYLPREDNISOLONE SODIUM SUCCINATE 40 MG/ML
40 INJECTION, POWDER, LYOPHILIZED, FOR SOLUTION INTRAMUSCULAR; INTRAVENOUS EVERY 6 HOURS
Status: DISCONTINUED | OUTPATIENT
Start: 2018-02-09 | End: 2018-02-10

## 2018-02-09 RX ADMIN — FUROSEMIDE 40 MG: 40 TABLET ORAL at 16:01

## 2018-02-09 RX ADMIN — NIFEDIPINE 30 MG: 30 TABLET, FILM COATED, EXTENDED RELEASE ORAL at 08:49

## 2018-02-09 RX ADMIN — ACETAMINOPHEN 650 MG: 325 TABLET, FILM COATED ORAL at 22:08

## 2018-02-09 RX ADMIN — HYDROCHLOROTHIAZIDE 25 MG: 25 TABLET ORAL at 08:49

## 2018-02-09 RX ADMIN — ALBUTEROL SULFATE 2.5 MG: 2.5 SOLUTION RESPIRATORY (INHALATION) at 15:46

## 2018-02-09 RX ADMIN — METHYLPREDNISOLONE SODIUM SUCCINATE 40 MG: 40 INJECTION, POWDER, FOR SOLUTION INTRAMUSCULAR; INTRAVENOUS at 16:01

## 2018-02-09 RX ADMIN — RIVAROXABAN 20 MG: 20 TABLET, FILM COATED ORAL at 18:01

## 2018-02-09 RX ADMIN — ALBUTEROL SULFATE 2.5 MG: 2.5 SOLUTION RESPIRATORY (INHALATION) at 04:17

## 2018-02-09 RX ADMIN — ATORVASTATIN CALCIUM 10 MG: 10 TABLET, FILM COATED ORAL at 21:46

## 2018-02-09 RX ADMIN — ALBUTEROL SULFATE 2.5 MG: 2.5 SOLUTION RESPIRATORY (INHALATION) at 12:02

## 2018-02-09 RX ADMIN — ASPIRIN 81 MG: 81 TABLET, CHEWABLE ORAL at 08:49

## 2018-02-09 RX ADMIN — WATER 2 G: 1 INJECTION INTRAMUSCULAR; INTRAVENOUS; SUBCUTANEOUS at 13:19

## 2018-02-09 RX ADMIN — BUDESONIDE AND FORMOTEROL FUMARATE DIHYDRATE 2 PUFF: 160; 4.5 AEROSOL RESPIRATORY (INHALATION) at 20:27

## 2018-02-09 RX ADMIN — MONTELUKAST 10 MG: 10 TABLET, FILM COATED ORAL at 21:46

## 2018-02-09 RX ADMIN — PREGABALIN 75 MG: 75 CAPSULE ORAL at 18:01

## 2018-02-09 RX ADMIN — ACETAMINOPHEN 650 MG: 325 TABLET, FILM COATED ORAL at 06:13

## 2018-02-09 RX ADMIN — ALBUTEROL SULFATE 2.5 MG: 2.5 SOLUTION RESPIRATORY (INHALATION) at 00:24

## 2018-02-09 RX ADMIN — FUROSEMIDE 40 MG: 40 TABLET ORAL at 21:46

## 2018-02-09 RX ADMIN — TIOTROPIUM BROMIDE 1 CAPSULE: 18 CAPSULE ORAL; RESPIRATORY (INHALATION) at 08:05

## 2018-02-09 RX ADMIN — WATER 2 G: 1 INJECTION INTRAMUSCULAR; INTRAVENOUS; SUBCUTANEOUS at 21:56

## 2018-02-09 RX ADMIN — DOCUSATE SODIUM -SENNOSIDES 2 TABLET: 50; 8.6 TABLET, COATED ORAL at 21:46

## 2018-02-09 RX ADMIN — PANTOPRAZOLE SODIUM 40 MG: 40 TABLET, DELAYED RELEASE ORAL at 06:09

## 2018-02-09 RX ADMIN — PYRIDOXINE HCL TAB 50 MG 50 MG: 50 TAB at 08:49

## 2018-02-09 RX ADMIN — METHYLPREDNISOLONE SODIUM SUCCINATE 40 MG: 40 INJECTION, POWDER, FOR SOLUTION INTRAMUSCULAR; INTRAVENOUS at 21:47

## 2018-02-09 RX ADMIN — ALBUTEROL SULFATE 2.5 MG: 2.5 SOLUTION RESPIRATORY (INHALATION) at 20:27

## 2018-02-09 RX ADMIN — KETOTIFEN FUMARATE 1 DROP: 0.35 SOLUTION/ DROPS OPHTHALMIC at 21:53

## 2018-02-09 RX ADMIN — ALBUTEROL SULFATE 2.5 MG: 2.5 SOLUTION RESPIRATORY (INHALATION) at 08:00

## 2018-02-09 RX ADMIN — KETOTIFEN FUMARATE 1 DROP: 0.35 SOLUTION/ DROPS OPHTHALMIC at 08:49

## 2018-02-09 RX ADMIN — BUDESONIDE AND FORMOTEROL FUMARATE DIHYDRATE 2 PUFF: 160; 4.5 AEROSOL RESPIRATORY (INHALATION) at 08:06

## 2018-02-09 RX ADMIN — ACETAMINOPHEN 650 MG: 325 TABLET, FILM COATED ORAL at 13:19

## 2018-02-09 RX ADMIN — PREGABALIN 75 MG: 75 CAPSULE ORAL at 06:09

## 2018-02-09 RX ADMIN — TAMSULOSIN HYDROCHLORIDE 0.4 MG: 0.4 CAPSULE ORAL at 21:46

## 2018-02-09 NOTE — PLAN OF CARE
Problem: Patient Care Overview (Adult)  Goal: Plan of Care Review  Outcome: Ongoing (interventions implemented as appropriate)   02/09/18 0454   Coping/Psychosocial Response Interventions   Plan Of Care Reviewed With patient   Patient Care Overview   Progress no change   Outcome Evaluation   Outcome Summary/Follow up Plan Pt. VS WNL. C/o leg/foot pain at times relieved by tylenol. Pt. was on 15L hiflow, decreased to 14L sats [id number]%. Lungs diminished. Pt. recieving IV abx, duoneb. Pt. resting comfortably at present, will continue to monitor closely.     Goal: Adult Individualization and Mutuality  Outcome: Ongoing (interventions implemented as appropriate)    Goal: Discharge Needs Assessment  Outcome: Ongoing (interventions implemented as appropriate)      Problem: COPD, Chronic Bronchitis/Emphysema (Adult)  Goal: Signs and Symptoms of Listed Potential Problems Will be Absent or Manageable (COPD, Chronic Bronchitis/Emphysema)  Outcome: Ongoing (interventions implemented as appropriate)      Problem: Activity Intolerance (Adult)  Goal: Identify Related Risk Factors and Signs and Symptoms  Outcome: Outcome(s) achieved Date Met: 02/09/18    Goal: Activity Tolerance  Outcome: Ongoing (interventions implemented as appropriate)    Goal: Effective Energy Conservation Techniques  Outcome: Ongoing (interventions implemented as appropriate)      Problem: Fall Risk (Adult)  Goal: Identify Related Risk Factors and Signs and Symptoms  Outcome: Outcome(s) achieved Date Met: 02/09/18    Goal: Absence of Falls  Outcome: Ongoing (interventions implemented as appropriate)

## 2018-02-09 NOTE — PROGRESS NOTES
"  Name: Alessio Allen  ADMIT: 2018   Age/Sex: 77 y.o.male LOS:  LOS: 2 days    :    1940     ROOM: [facility]   MRN:    [id number]    PCP:    Alan Santana MD     Subjective   Still sob. Now with rash on both arms.     Objective   Vital Signs  Temp:  [97.3 °F (36.3 °C)-98 °F (36.7 °C)] 97.3 °F (36.3 °C)  Heart Rate:  [79-91] 83  Resp:  [18-20] 18  BP: (112-129)/(59-73) 129/73  Body mass index is 30.81 kg/(m^2).    Objective:  General Appearance:  Comfortable and well-appearing.    Vital signs: (most recent): Blood pressure 129/73, pulse 83, temperature 97.3 °F (36.3 °C), temperature source Oral, resp. rate 18, height 188 cm (74\"), weight 109 kg (240 lb), SpO2 (!) 87 %.  Vital signs are normal.    HEENT: Normal HEENT exam.    Lungs:  Normal effort.  There are rhonchi and decreased breath sounds.    Heart: Normal rate.  Regular rhythm.    Abdomen: Abdomen is soft and non-distended.  Bowel sounds are normal.   There is no abdominal tenderness.     Extremities: There is no deformity or dependent edema.    Neurological: Patient is alert and oriented to person, place and time.    Skin:  Warm and dry.  There is a rash.              Results Review:       I reviewed the patient's new clinical results.    Results from last 7 days  Lab Units 18   WBC 10*3/mm3 8.37   HEMOGLOBIN g/dL 11.5*   PLATELETS 10*3/mm3 274     Results from last 7 days  Lab Units 18   SODIUM mmol/L 126* 131*   POTASSIUM mmol/L 3.3* 3.8   CHLORIDE mmol/L 87* 92*   CO2 mmol/L 27.0 25.5   BUN mg/dL 17 10   CREATININE mg/dL 0.83 0.68*   GLUCOSE mg/dL 102* 147*   Estimated Creatinine Clearance: 99.5 mL/min (by C-G formula based on Cr of 0.83).  Results from last 7 days  Lab Units 18   CALCIUM mg/dL 8.1* 9.1   ALBUMIN g/dL  --  3.20*       RADIOLOGY  2018  Pending      albuterol 2.5 mg Nebulization Q4H - RT   aspirin 81 mg Oral Daily   atorvastatin 10 mg Oral Daily "   budesonide-formoterol 2 puff Inhalation BID - RT   hydrochlorothiazide 25 mg Oral Daily   ketotifen 1 drop Both Eyes BID   levoFLOXacin 750 mg Intravenous Q24H   montelukast 10 mg Oral Nightly   NIFEdipine XL 30 mg Oral Daily   pantoprazole 40 mg Oral Q AM   pregabalin 75 mg Oral Q12H   rivaroxaban 20 mg Oral Daily With Dinner   tamsulosin 0.4 mg Oral Nightly   tiotropium 1 capsule Inhalation Daily - RT   pyridoxine 50 mg Oral Daily      Diet Regular; Cardiac, Consistent Carbohydrate, Renal      Assessment/Plan   Assessment:   Principal Problem:    Pneumonia  Active Problems:    A-fib    BP (high blood pressure)    Neuropathy    Hypertension    COPD (chronic obstructive pulmonary disease)    Asthma    Hypoxia    Chronic anticoagulation        Plan:   1. Pneumonia with A/C HRF  - on levaquin, day 3. Switch to cefepime for rash   - RVP negative. I do not hear any wheezing   - did not respond much to bumex given yesterday  - procalcitonin was normal and so is WBC. Could this be pulm fibrosis? Defer to pulm  - echo with normal EF  - CTA at OSH was reportedly negative for PE  - currently he is satting 89% on 14L HFNC. Desats to 83-84% with minimal movement    2. Hyponatremia  - checking urine and serum osm, urine sodium and Cr  - will ask nephro to see  - holding HCTZ  - no more diuretics until nephro sees    3. Rash  - looks like it could be allergic vs drug eruption. States he has had issues with levaquin in the past  - will switch to cefepime  - topical hydrocortisone cream     4. AFib  - on xarelto      5. HTN  - hold HCTZ due to hyponatremia       Disposition  TBD.      Torey Burrell MD  [facility]  02/09/18  11:15 AM

## 2018-02-09 NOTE — PROGRESS NOTES
BLEV doppler completed.  Preliminary results:  Negative for DVT in bilateral lower extremities.  Results given to GRETEL Hobbs.

## 2018-02-09 NOTE — PROGRESS NOTES
"  Daily Progress Note.   [address]  2/9/2018    Patient:  Name:  Alessio Allen  MRN:  [id number]  1940  77 y.o.  male         Interval History:  States he feels breathing is better.  Relates history of recent travel  Usually only wears 2L NC    Physical Exam:  /73 (BP Location: Left arm, Patient Position: Lying)  Pulse 83  Temp 97.3 °F (36.3 °C) (Oral)   Resp 18  Ht 188 cm (74\")  Wt 109 kg (240 lb)  SpO2 (!) 87%  BMI 30.81 kg/m2  Body mass index is 30.81 kg/(m^2).    Intake/Output Summary (Last 24 hours) at 02/09/18 0927  Last data filed at 02/09/18 0457   Gross per 24 hour   Intake              450 ml   Output             1700 ml   Net            -1250 ml     GENERAL:  Ill appearing but not toxic  HEENT:  EOMI, nonicteric sclera, no JVD  PULMONARY:   Diminished breath sounds, crackles at bilateral bases, no wheeze, unlabored resp effort, symmetric air entry  CARDIAC:  RRR   ABD: SNTND BS+  EXT: no c/c/e, pulses symmetric 2+ bilaterally RLe in boot  NEURO:  CNs II-XII intact, no focal deficits  SKIN: no lesions  PSYCH: appropriate mood    Data Review:  Notable Labs:    Results from last 7 days  Lab Units 02/08/18  0317   WBC 10*3/mm3 8.37   HEMOGLOBIN g/dL 11.5*   PLATELETS 10*3/mm3 274     Results from last 7 days  Lab Units 02/09/18  0315 02/08/18  0317   SODIUM mmol/L 126* 131*   POTASSIUM mmol/L 3.3* 3.8   CHLORIDE mmol/L 87* 92*   CO2 mmol/L 27.0 25.5   BUN mg/dL 17 10   CREATININE mg/dL 0.83 0.68*   GLUCOSE mg/dL 102* 147*   CALCIUM mg/dL 8.1* 9.1   Estimated Creatinine Clearance: 99.5 mL/min (by C-G formula based on Cr of 0.83).    Imaging:  osh reviewed ct chest    Scheduled meds:      albuterol 2.5 mg Nebulization Q4H - RT   aspirin 81 mg Oral Daily   atorvastatin 10 mg Oral Daily   budesonide-formoterol 2 puff Inhalation BID - RT   hydrochlorothiazide 25 mg Oral Daily   ketotifen 1 drop Both Eyes BID   levoFLOXacin 750 mg Intravenous Q24H   montelukast 10 mg Oral " Nightly   NIFEdipine XL 30 mg Oral Daily   pantoprazole 40 mg Oral Q AM   pregabalin 75 mg Oral Q12H   rivaroxaban 20 mg Oral Daily With Dinner   tamsulosin 0.4 mg Oral Nightly   tiotropium 1 capsule Inhalation Daily - RT   pyridoxine 50 mg Oral Daily       ASSESSMENT  /  PLAN:  Acute on chronic hypoxemic resp failure  ILD  COPD  Hyponatremia  HTN  HLD    Duplex le ultrasound  Continue abx  Will add steroids - severe emphysema on ct chest  Continue BD therapies  osh ct chest neg for pe    Surprised by degree of hypoxia, will scan le for clot.  reproted osh neg for pe and pt on rivaroxaban.  Start steriods given severity of disease on scan and degree of hypoxia.      Merlin Fontana MD  [facility]  02/09/18  9:27 AM

## 2018-02-09 NOTE — CONSULTS
Alessio Allen is a 77 y.o. male      Reason for Consultation: Hyponatremia        Chief complaint shortness of breath    History of present illness:  This is a 77-year-old male with a post medical history of COPD asthma who presented with a chief complaint of shortness of breath gradually worsening for about 1 week prior to admission.  He was on his way to [place] stopped initially and slipped and fell and broke his right ankle and he underwent on per reduction internal fixation of the right ankle.  Noted that he developed severe dyspnea on exertion about 30 feet.  He was started on antibiotics on admission for pneumonia and treatment for COPD exacerbation and pulmonary was consulted.  His sodium was noted to be low at 126 with a from 131 yesterday hydrochlorothiazide was stopped and the nephrology consult was obtained for further evaluation and treatment.  He denies dizziness or falls since admission his gait is impaired because of his boot in the right leg.  His only complaint is constipation he denies chest pain shortness of breath at rest fever chills.SNa 136 in January then 131 yesterday and 126 today.      Review of Systems  .ro  Pertinent items are noted in HPI    Objective     Vital Signs  Temp:  [97.3 °F (36.3 °C)-98 °F (36.7 °C)] 97.3 °F (36.3 °C)  Heart Rate:  [79-91] 89  Resp:  [18-20] 20  BP: (112-129)/(59-73) 129/73    Intake/Output Summary (Last 24 hours) at 02/09/18 1242  Last data filed at 02/09/18 0457   Gross per 24 hour   Intake              450 ml   Output              675 ml   Net             -225 ml         Physical Exam  Constitutional : well developed ,No acute distress  ENMT lips pink oral mucosa moist   Eyes sclerae white PERRL  Respiratory: Clear to auscultation , No crackles no wheezing respirations are even and un-labored decreased air entry b/l  GI: Soft, Non tender, BS active in all 4 quadrants  CVS: S1 S2 regular, no rub murmur or gallops  Skin warm dry no rashes no  petechiae  Neuro grossly nonfocal cranial nerves 2-12 grossly intact sensation intact  Ext: no LE b/l edema, Peripheral pulses intact   Heme no cervical  lymphadenopathy no petechiae    Results Review:   I reviewed the patient's new clinical results.    WBC No results found for: WBCS   HGB Hemoglobin   Date Value Ref Range Status   02/08/2018 11.5 (L) 13.7 - 17.6 g/dL Final      HCT Hematocrit   Date Value Ref Range Status   02/08/2018 38.3 (L) 40.4 - 52.2 % Final      Platlets No results found for: LABPLAT   MCV MCV   Date Value Ref Range Status   02/08/2018 69.3 (L) 79.8 - 96.2 fL Final          Sodium Sodium   Date Value Ref Range Status   02/09/2018 126 (L) 136 - 145 mmol/L Final   02/08/2018 131 (L) 136 - 145 mmol/L Final      Potassium Potassium   Date Value Ref Range Status   02/09/2018 3.3 (L) 3.5 - 5.2 mmol/L Final   02/08/2018 3.8 3.5 - 5.2 mmol/L Final      Chloride Chloride   Date Value Ref Range Status   02/09/2018 87 (L) 98 - 107 mmol/L Final   02/08/2018 92 (L) 98 - 107 mmol/L Final      CO2 CO2   Date Value Ref Range Status   02/09/2018 27.0 22.0 - 29.0 mmol/L Final   02/08/2018 25.5 22.0 - 29.0 mmol/L Final      BUN BUN   Date Value Ref Range Status   02/09/2018 17 8 - 23 mg/dL Final   02/08/2018 10 8 - 23 mg/dL Final      Creatinine Creatinine   Date Value Ref Range Status   02/09/2018 0.83 0.76 - 1.27 mg/dL Final   02/08/2018 0.68 (L) 0.76 - 1.27 mg/dL Final      Calcium Calcium   Date Value Ref Range Status   02/09/2018 8.1 (L) 8.6 - 10.5 mg/dL Final   02/08/2018 9.1 8.6 - 10.5 mg/dL Final      PO4 No results found for: CAPO4   Albumin Albumin   Date Value Ref Range Status   02/08/2018 3.20 (L) 3.50 - 5.20 g/dL Final      Magnesium No results found for: MG   Uric Acid No results found for: URICACID     Medications   Current Facility-Administered Medications   Medication Dose Route Frequency Provider Last Rate Last Dose   • acetaminophen (TYLENOL) tablet 650 mg  650 mg Oral Q6H PRN Ru Royal MD        • acetaminophen (TYLENOL) tablet 650 mg  650 mg Oral Q4H PRN Ru Royal MD   650 mg at 02/09/18 0613   • albuterol (PROVENTIL) nebulizer solution 0.083% 2.5 mg/3mL  2.5 mg Nebulization Q4H - RT Ru Royal MD   2.5 mg at 02/09/18 1202   • aspirin chewable tablet 81 mg  81 mg Oral Daily Jawed MD Lele   81 mg at 02/09/18 0849   • atorvastatin (LIPITOR) tablet 10 mg  10 mg Oral Daily Jawed MD Lele   10 mg at 02/08/18 2016   • budesonide-formoterol (SYMBICORT) 160-4.5 MCG/ACT inhaler 2 puff  2 puff Inhalation BID - RT Torey Burrell MD   2 puff at 02/09/18 0806   • ceFEPime (MAXIPIME) in SWFI 2g/10ml IV PUSH syringe  2 g Intravenous Q8H Torey Burrell MD       • hydrocortisone 1 % cream   Topical Q6H PRN Torey Burrell MD       • ketotifen (ZADITOR) 0.025 % ophthalmic solution 1 drop  1 drop Both Eyes BID Ru Royal MD   1 drop at 02/09/18 0849   • montelukast (SINGULAIR) tablet 10 mg  10 mg Oral Nightly Ru Royal MD   10 mg at 02/08/18 2016   • NIFEdipine XL (PROCARDIA XL) 24 hr tablet 30 mg  30 mg Oral Daily Torey Burrell MD   30 mg at 02/09/18 0849   • ondansetron (ZOFRAN) injection 4 mg  4 mg Intravenous Q6H PRN Ru Royal MD       • pantoprazole (PROTONIX) EC tablet 40 mg  40 mg Oral Q AM Ru Royal MD   40 mg at 02/09/18 0609   • pregabalin (LYRICA) capsule 75 mg  75 mg Oral Q12H Ru Royal MD   75 mg at 02/09/18 0609   • rivaroxaban (XARELTO) tablet 20 mg  20 mg Oral Daily With Dinner Torey Burrell MD   20 mg at 02/08/18 1822   • sodium chloride (OCEAN) nasal spray 2 spray  2 spray Nasal PRN Ru Royal MD       • sodium chloride 0.9 % flush 1-10 mL  1-10 mL Intravenous PRN Ru Royal MD       • tamsulosin (FLOMAX) 24 hr capsule 0.4 mg  0.4 mg Oral Nightly Jawed MD Lele   0.4 mg at 02/08/18 2016   • tiotropium (SPIRIVA) 18 MCG per inhalation capsule 1 capsule  1 capsule Inhalation Daily - RT Jawangelica Royal MD   1 capsule at 02/09/18 0805   •  vitamin B-6 (PYRIDOXINE) tablet 50 mg  50 mg Oral Daily Ru Royal MD   50 mg at 02/09/18 0849            Imaging studies: I have personally reviewed the pertinent imaging studies    2D Echo:    Assessment/Plan     Principal Problem:    Pneumonia  Active Problems:    A-fib    BP (high blood pressure)    Neuropathy    Hypertension    COPD (chronic obstructive pulmonary disease)    Asthma    Hypoxia    Chronic anticoagulation      Hyponatremia      stop HCTZ  Can check Ulytes U osm Sosm  Switch to lasix as a diuretic instead of HCTZ noted very elevated probnp  Fluid restriction 1 liter a day  Further mgmt as per clinical course.      The above assessment and plan was discussed at length with the patient who voiced understanding and agrees to proceed with the plan as outlined above.All the patient's questions were answered to his/her satisfaction.  Thank you very much for allowing me to participate in the care of this patient.Please do not hesitate to call if you have any questions or concerns.  Chart reviewed.  Lamine Franklin MD  02/09/18  @NOW

## 2018-02-09 NOTE — PLAN OF CARE
Problem: Patient Care Overview (Adult)  Goal: Plan of Care Review  Outcome: Ongoing (interventions implemented as appropriate)   02/09/18 4307   Coping/Psychosocial Response Interventions   Plan Of Care Reviewed With patient   Patient Care Overview   Progress improving   Outcome Evaluation   Outcome Summary/Follow up Plan Weaned pt from 15L HFNC to 10L HFNC, sats between 90-92%. Lungs remain diminished, pt compliant wiht IS. Pt has no complaints and is resting comfortably. VSS, will continue to monitor.     Goal: Adult Individualization and Mutuality  Outcome: Ongoing (interventions implemented as appropriate)    Goal: Discharge Needs Assessment  Outcome: Ongoing (interventions implemented as appropriate)      Problem: COPD, Chronic Bronchitis/Emphysema (Adult)  Goal: Signs and Symptoms of Listed Potential Problems Will be Absent or Manageable (COPD, Chronic Bronchitis/Emphysema)  Outcome: Ongoing (interventions implemented as appropriate)      Problem: Activity Intolerance (Adult)  Goal: Activity Tolerance  Outcome: Ongoing (interventions implemented as appropriate)    Goal: Effective Energy Conservation Techniques  Outcome: Ongoing (interventions implemented as appropriate)      Problem: Fall Risk (Adult)  Goal: Absence of Falls  Outcome: Ongoing (interventions implemented as appropriate)

## 2018-02-10 PROBLEM — J18.9 PNEUMONIA OF BOTH LUNGS DUE TO INFECTIOUS ORGANISM: Status: ACTIVE | Noted: 2018-02-07

## 2018-02-10 LAB
ANION GAP SERPL CALCULATED.3IONS-SCNC: 12.4 MMOL/L
BH CV LOWER VASCULAR LEFT COMMON FEMORAL AUGMENT: NORMAL
BH CV LOWER VASCULAR LEFT COMMON FEMORAL COMPETENT: NORMAL
BH CV LOWER VASCULAR LEFT COMMON FEMORAL COMPRESS: NORMAL
BH CV LOWER VASCULAR LEFT COMMON FEMORAL PHASIC: NORMAL
BH CV LOWER VASCULAR LEFT COMMON FEMORAL SPONT: NORMAL
BH CV LOWER VASCULAR LEFT DISTAL FEMORAL COMPRESS: NORMAL
BH CV LOWER VASCULAR LEFT GASTRONEMIUS COMPRESS: NORMAL
BH CV LOWER VASCULAR LEFT GREATER SAPH AK COMPRESS: NORMAL
BH CV LOWER VASCULAR LEFT GREATER SAPH BK COMPRESS: NORMAL
BH CV LOWER VASCULAR LEFT MID FEMORAL AUGMENT: NORMAL
BH CV LOWER VASCULAR LEFT MID FEMORAL COMPETENT: NORMAL
BH CV LOWER VASCULAR LEFT MID FEMORAL COMPRESS: NORMAL
BH CV LOWER VASCULAR LEFT MID FEMORAL PHASIC: NORMAL
BH CV LOWER VASCULAR LEFT MID FEMORAL SPONT: NORMAL
BH CV LOWER VASCULAR LEFT PERONEAL COMPRESS: NORMAL
BH CV LOWER VASCULAR LEFT POPLITEAL AUGMENT: NORMAL
BH CV LOWER VASCULAR LEFT POPLITEAL COMPETENT: NORMAL
BH CV LOWER VASCULAR LEFT POPLITEAL COMPRESS: NORMAL
BH CV LOWER VASCULAR LEFT POPLITEAL PHASIC: NORMAL
BH CV LOWER VASCULAR LEFT POPLITEAL SPONT: NORMAL
BH CV LOWER VASCULAR LEFT POSTERIOR TIBIAL COMPRESS: NORMAL
BH CV LOWER VASCULAR LEFT PROXIMAL FEMORAL COMPRESS: NORMAL
BH CV LOWER VASCULAR LEFT SAPHENOFEMORAL JUNCTION AUGMENT: NORMAL
BH CV LOWER VASCULAR LEFT SAPHENOFEMORAL JUNCTION COMPRESS: NORMAL
BH CV LOWER VASCULAR LEFT SAPHENOFEMORAL JUNCTION PHASIC: NORMAL
BH CV LOWER VASCULAR LEFT SAPHENOFEMORAL JUNCTION SPONT: NORMAL
BH CV LOWER VASCULAR RIGHT COMMON FEMORAL AUGMENT: NORMAL
BH CV LOWER VASCULAR RIGHT COMMON FEMORAL COMPETENT: NORMAL
BH CV LOWER VASCULAR RIGHT COMMON FEMORAL COMPRESS: NORMAL
BH CV LOWER VASCULAR RIGHT COMMON FEMORAL PHASIC: NORMAL
BH CV LOWER VASCULAR RIGHT COMMON FEMORAL SPONT: NORMAL
BH CV LOWER VASCULAR RIGHT DISTAL FEMORAL COMPRESS: NORMAL
BH CV LOWER VASCULAR RIGHT GASTRONEMIUS COMPRESS: NORMAL
BH CV LOWER VASCULAR RIGHT GREATER SAPH AK COMPRESS: NORMAL
BH CV LOWER VASCULAR RIGHT GREATER SAPH BK COMPRESS: NORMAL
BH CV LOWER VASCULAR RIGHT MID FEMORAL AUGMENT: NORMAL
BH CV LOWER VASCULAR RIGHT MID FEMORAL COMPETENT: NORMAL
BH CV LOWER VASCULAR RIGHT MID FEMORAL COMPRESS: NORMAL
BH CV LOWER VASCULAR RIGHT MID FEMORAL PHASIC: NORMAL
BH CV LOWER VASCULAR RIGHT MID FEMORAL SPONT: NORMAL
BH CV LOWER VASCULAR RIGHT PERONEAL COMPRESS: NORMAL
BH CV LOWER VASCULAR RIGHT POPLITEAL AUGMENT: NORMAL
BH CV LOWER VASCULAR RIGHT POPLITEAL COMPETENT: NORMAL
BH CV LOWER VASCULAR RIGHT POPLITEAL COMPRESS: NORMAL
BH CV LOWER VASCULAR RIGHT POPLITEAL PHASIC: NORMAL
BH CV LOWER VASCULAR RIGHT POPLITEAL SPONT: NORMAL
BH CV LOWER VASCULAR RIGHT POSTERIOR TIBIAL COMPRESS: NORMAL
BH CV LOWER VASCULAR RIGHT PROXIMAL FEMORAL COMPRESS: NORMAL
BH CV LOWER VASCULAR RIGHT SAPHENOFEMORAL JUNCTION AUGMENT: NORMAL
BH CV LOWER VASCULAR RIGHT SAPHENOFEMORAL JUNCTION COMPRESS: NORMAL
BH CV LOWER VASCULAR RIGHT SAPHENOFEMORAL JUNCTION PHASIC: NORMAL
BH CV LOWER VASCULAR RIGHT SAPHENOFEMORAL JUNCTION SPONT: NORMAL
BUN BLD-MCNC: 17 MG/DL (ref 8–23)
BUN/CREAT SERPL: 21.8 (ref 7–25)
CALCIUM SPEC-SCNC: 9 MG/DL (ref 8.6–10.5)
CHLORIDE SERPL-SCNC: 91 MMOL/L (ref 98–107)
CO2 SERPL-SCNC: 27.6 MMOL/L (ref 22–29)
CREAT BLD-MCNC: 0.78 MG/DL (ref 0.76–1.27)
GFR SERPL CREATININE-BSD FRML MDRD: 97 ML/MIN/1.73
GLUCOSE BLD-MCNC: 167 MG/DL (ref 65–99)
POTASSIUM BLD-SCNC: 4.1 MMOL/L (ref 3.5–5.2)
REF LAB TEST RESULTS: NORMAL
SODIUM BLD-SCNC: 131 MMOL/L (ref 136–145)

## 2018-02-10 PROCEDURE — 25010000002 METHYLPREDNISOLONE PER 40 MG: Performed by: INTERNAL MEDICINE

## 2018-02-10 PROCEDURE — 87070 CULTURE OTHR SPECIMN AEROBIC: CPT | Performed by: INTERNAL MEDICINE

## 2018-02-10 PROCEDURE — 80048 BASIC METABOLIC PNL TOTAL CA: CPT | Performed by: INTERNAL MEDICINE

## 2018-02-10 PROCEDURE — 94799 UNLISTED PULMONARY SVC/PX: CPT

## 2018-02-10 PROCEDURE — 87205 SMEAR GRAM STAIN: CPT | Performed by: INTERNAL MEDICINE

## 2018-02-10 PROCEDURE — 25010000002 CEFEPIME PER 500 MG: Performed by: INTERNAL MEDICINE

## 2018-02-10 RX ORDER — ALBUTEROL SULFATE 2.5 MG/3ML
2.5 SOLUTION RESPIRATORY (INHALATION)
Status: DISCONTINUED | OUTPATIENT
Start: 2018-02-10 | End: 2018-02-11

## 2018-02-10 RX ORDER — METHYLPREDNISOLONE SODIUM SUCCINATE 40 MG/ML
40 INJECTION, POWDER, LYOPHILIZED, FOR SOLUTION INTRAMUSCULAR; INTRAVENOUS EVERY 12 HOURS
Status: COMPLETED | OUTPATIENT
Start: 2018-02-10 | End: 2018-02-11

## 2018-02-10 RX ADMIN — PREGABALIN 75 MG: 75 CAPSULE ORAL at 18:47

## 2018-02-10 RX ADMIN — ALBUTEROL SULFATE 2.5 MG: 2.5 SOLUTION RESPIRATORY (INHALATION) at 17:28

## 2018-02-10 RX ADMIN — DOCUSATE SODIUM -SENNOSIDES 2 TABLET: 50; 8.6 TABLET, COATED ORAL at 09:30

## 2018-02-10 RX ADMIN — METHYLPREDNISOLONE SODIUM SUCCINATE 40 MG: 40 INJECTION, POWDER, FOR SOLUTION INTRAMUSCULAR; INTRAVENOUS at 03:36

## 2018-02-10 RX ADMIN — ATORVASTATIN CALCIUM 10 MG: 10 TABLET, FILM COATED ORAL at 19:57

## 2018-02-10 RX ADMIN — NIFEDIPINE 30 MG: 30 TABLET, FILM COATED, EXTENDED RELEASE ORAL at 09:29

## 2018-02-10 RX ADMIN — METHYLPREDNISOLONE SODIUM SUCCINATE 40 MG: 40 INJECTION, POWDER, FOR SOLUTION INTRAMUSCULAR; INTRAVENOUS at 09:30

## 2018-02-10 RX ADMIN — ALBUTEROL SULFATE 2.5 MG: 2.5 SOLUTION RESPIRATORY (INHALATION) at 21:05

## 2018-02-10 RX ADMIN — FUROSEMIDE 40 MG: 40 TABLET ORAL at 18:48

## 2018-02-10 RX ADMIN — BUDESONIDE AND FORMOTEROL FUMARATE DIHYDRATE 2 PUFF: 160; 4.5 AEROSOL RESPIRATORY (INHALATION) at 08:18

## 2018-02-10 RX ADMIN — METHYLPREDNISOLONE SODIUM SUCCINATE 40 MG: 40 INJECTION, POWDER, FOR SOLUTION INTRAMUSCULAR; INTRAVENOUS at 22:30

## 2018-02-10 RX ADMIN — DOCUSATE SODIUM -SENNOSIDES 2 TABLET: 50; 8.6 TABLET, COATED ORAL at 19:57

## 2018-02-10 RX ADMIN — MONTELUKAST 10 MG: 10 TABLET, FILM COATED ORAL at 19:58

## 2018-02-10 RX ADMIN — ALBUTEROL SULFATE 2.5 MG: 2.5 SOLUTION RESPIRATORY (INHALATION) at 12:46

## 2018-02-10 RX ADMIN — TIOTROPIUM BROMIDE 1 CAPSULE: 18 CAPSULE ORAL; RESPIRATORY (INHALATION) at 08:18

## 2018-02-10 RX ADMIN — ACETAMINOPHEN 650 MG: 325 TABLET, FILM COATED ORAL at 14:01

## 2018-02-10 RX ADMIN — TAMSULOSIN HYDROCHLORIDE 0.4 MG: 0.4 CAPSULE ORAL at 19:57

## 2018-02-10 RX ADMIN — PYRIDOXINE HCL TAB 50 MG 50 MG: 50 TAB at 09:30

## 2018-02-10 RX ADMIN — ASPIRIN 81 MG: 81 TABLET, CHEWABLE ORAL at 09:30

## 2018-02-10 RX ADMIN — FUROSEMIDE 40 MG: 40 TABLET ORAL at 09:29

## 2018-02-10 RX ADMIN — Medication 10 ML: at 13:08

## 2018-02-10 RX ADMIN — PANTOPRAZOLE SODIUM 40 MG: 40 TABLET, DELAYED RELEASE ORAL at 06:27

## 2018-02-10 RX ADMIN — WATER 2 G: 1 INJECTION INTRAMUSCULAR; INTRAVENOUS; SUBCUTANEOUS at 13:08

## 2018-02-10 RX ADMIN — WATER 2 G: 1 INJECTION INTRAMUSCULAR; INTRAVENOUS; SUBCUTANEOUS at 03:36

## 2018-02-10 RX ADMIN — ACETAMINOPHEN 650 MG: 325 TABLET, FILM COATED ORAL at 22:26

## 2018-02-10 RX ADMIN — KETOTIFEN FUMARATE 1 DROP: 0.35 SOLUTION/ DROPS OPHTHALMIC at 21:00

## 2018-02-10 RX ADMIN — KETOTIFEN FUMARATE 1 DROP: 0.35 SOLUTION/ DROPS OPHTHALMIC at 09:36

## 2018-02-10 RX ADMIN — PREGABALIN 75 MG: 75 CAPSULE ORAL at 06:27

## 2018-02-10 RX ADMIN — BUDESONIDE AND FORMOTEROL FUMARATE DIHYDRATE 2 PUFF: 160; 4.5 AEROSOL RESPIRATORY (INHALATION) at 21:06

## 2018-02-10 RX ADMIN — ALBUTEROL SULFATE 2.5 MG: 2.5 SOLUTION RESPIRATORY (INHALATION) at 00:28

## 2018-02-10 RX ADMIN — FUROSEMIDE 40 MG: 40 TABLET ORAL at 19:58

## 2018-02-10 RX ADMIN — ACETAMINOPHEN 650 MG: 325 TABLET, FILM COATED ORAL at 06:31

## 2018-02-10 RX ADMIN — WATER 2 G: 1 INJECTION INTRAMUSCULAR; INTRAVENOUS; SUBCUTANEOUS at 19:57

## 2018-02-10 RX ADMIN — ALBUTEROL SULFATE 2.5 MG: 2.5 SOLUTION RESPIRATORY (INHALATION) at 08:17

## 2018-02-10 NOTE — PLAN OF CARE
Problem: Patient Care Overview (Adult)  Goal: Plan of Care Review  Outcome: Ongoing (interventions implemented as appropriate)   02/10/18 1801   Coping/Psychosocial Response Interventions   Plan Of Care Reviewed With patient   Patient Care Overview   Progress improving   Outcome Evaluation   Outcome Summary/Follow up Plan Pt remains on 8liter high flow O2. Bronchoscopy scheduled for the morning. NPO after midnight. No complaints of pain at this time. Will continue to monitor closely     Goal: Adult Individualization and Mutuality  Outcome: Ongoing (interventions implemented as appropriate)    Goal: Discharge Needs Assessment  Outcome: Ongoing (interventions implemented as appropriate)   02/08/18 0041 02/10/18 1801   Discharge Needs Assessment   Concerns To Be Addressed --  denies needs/concerns at this time   Readmission Within The Last 30 Days --  no previous admission in last 30 days   Discharge Disposition still a patient --        Problem: COPD, Chronic Bronchitis/Emphysema (Adult)  Goal: Signs and Symptoms of Listed Potential Problems Will be Absent or Manageable (COPD, Chronic Bronchitis/Emphysema)  Outcome: Ongoing (interventions implemented as appropriate)   02/10/18 1801   COPD, Chronic Bronchitis/Emphysema   Problems Assessed (COPD, Chronic Bronchitis/Emphysema) all   Problems Present (COPD, Chronic Bronchitis/Emphysema) dyspnea;hypoxia/hypoxemia       Problem: Activity Intolerance (Adult)  Goal: Activity Tolerance  Outcome: Ongoing (interventions implemented as appropriate)   02/10/18 1801   Activity Intolerance (Adult)   Activity Tolerance making progress toward outcome     Goal: Effective Energy Conservation Techniques  Outcome: Ongoing (interventions implemented as appropriate)   02/10/18 1801   Activity Intolerance (Adult)   Effective Energy Conservation Techniques making progress toward outcome       Problem: Fall Risk (Adult)  Goal: Absence of Falls  Outcome: Ongoing (interventions implemented as  appropriate)   02/10/18 1801   Fall Risk (Adult)   Absence of Falls making progress toward outcome

## 2018-02-10 NOTE — PROGRESS NOTES
[facility]  Phone: [phone]  Suresh Hernandez MD    Subjective:  LOS: 3    He feels better. Not much cough or phlegm.    Objective   Vital Signs past 24hrs  BP range: BP: (117-123)/(68-75) 122/70  Pulse range: Heart Rate:  [79-96] 84  Resp rate range: Resp:  [16-20] 16  Temp range: Temp (24hrs), Av °F (36.7 °C), Min:97.9 °F (36.6 °C), Max:98.1 °F (36.7 °C)    O2 Device: high-flow nasal cannulaFlow (L/min):  [8-14] 8  Oxygen range:SpO2:  [89 %-93 %] 91 %   109 kg (240 lb); Body mass index is 30.81 kg/(m^2).    Intake/Output Summary (Last 24 hours) at 02/10/18 0954  Last data filed at 02/10/18 0715   Gross per 24 hour   Intake               10 ml   Output             2600 ml   Net            -2590 ml       Physical Exam   Cardiovascular: Normal rate and regular rhythm.    No murmur heard.  Pulmonary/Chest: He has decreased breath sounds. He has no wheezes. He has rales (fine) in the right middle field, the right lower field and the left lower field.   Abdominal: Soft. Bowel sounds are normal. There is no tenderness.   Musculoskeletal: He exhibits no edema.   Neurological: He is alert.   Nursing note and vitals reviewed.    Results Review:    I have reviewed the laboratory and imaging data since the last note by [facility]PC physician.  My annotations are noted in assessment and plan.    Medication Review:  I have reviewed the current MAR.  My annotations are noted in assessment and plan.       Plan   PCCM Problems  Acute on chronic hypoxemic resp failure  Pneumonia - bilateral on CT, left prominent on CxR  ILD ??  COPD  Severe pulmonary hypertension  Afib on AC  Hyponatremia      Plan of Treatment  Pneumonia. On abx. Send sputum sample. On Cefepime, may need Vanc.    Hypoxia is severe, if not better by tomorrow offered bronch, agrees. NPO after midnight. Hold Xarelto 20mg daily tonight.    Underlying COPD, not wheezing, reduce pred and nebs.    ECHO shows severe PHT, possibly due to COPD. Will need fu with  [facility] for [facility]. Contributing to hypoxia.    Address low Na per primary.    Suresh Hernandez MD  02/10/18  9:54 AM    Part of this note may be an electronic transcription/translation of spoken language to printed text using the Dragon Dictation System.

## 2018-02-10 NOTE — PROGRESS NOTES
"     LOS: 3 days   Primary Care Physician: Aaln Santana MD     Subjective   Short of breath/dyspnea on exertion.  Minimal cough, nonproductive.    Vital Signs  Body mass index is 30.81 kg/(m^2).  Temp:  [97.4 °F (36.3 °C)-98.1 °F (36.7 °C)] 97.4 °F (36.3 °C)  Heart Rate:  [79-90] 90  Resp:  [16-22] 22  BP: (117-122)/(68-75) 122/74    On 3 L oxygen    Objective:  General Appearance:  In no acute distress (Obese.  Older than age).    Vital signs: (most recent): Blood pressure 122/74, pulse 90, temperature 97.4 °F (36.3 °C), temperature source Oral, resp. rate 22, height 188 cm (74\"), weight 109 kg (240 lb), SpO2 (!) 89 %.    Lungs:  There are rales and decreased breath sounds.  No wheezes or rhonchi.  (I lateral crackles)  Heart: Normal rate.  Regular rhythm.  No murmur.   Abdomen: Abdomen is soft and non-distended.  (Obese)Bowel sounds are normal.   There is no abdominal tenderness.   There is no splenomegaly. There is no hepatomegaly.   Extremities: There is dependent edema.  (Trace to 1+ left ankle.  Right leg with walking boot on)  Neurological: Patient is alert.          Results Review:    I reviewed the patient's new clinical results.      Results from last 7 days  Lab Units 02/08/18  0317   WBC 10*3/mm3 8.37   HEMOGLOBIN g/dL 11.5*   PLATELETS 10*3/mm3 274       Results from last 7 days  Lab Units 02/10/18  0456 02/09/18  0315   SODIUM mmol/L 131* 126*   POTASSIUM mmol/L 4.1 3.3*   CHLORIDE mmol/L 91* 87*   CO2 mmol/L 27.6 27.0   BUN mg/dL 17 17   CREATININE mg/dL 0.78 0.83   CALCIUM mg/dL 9.0 8.1*   GLUCOSE mg/dL 167* 102*         Hemoglobin A1C:  Lab Results   Component Value Date    HGBA1C 6.00 (H) 01/03/2018       Glucose Range:No results found for: POCGLU    No results found for: XFTCPLSI25    No results found for: TSH    Assessment & Plan      Medication Review: Yes    Active Hospital Problems (** Indicates Principal Problem)    Diagnosis Date Noted   • **Pneumonia of both lungs due to infectious " organism [J18.9] 2018   • Hypoxia [R09.02] 2018   • Pneumonia [J18.9] 2018   • Chronic anticoagulation [Z79.01] 2018   • COPD (chronic obstructive pulmonary disease) [J44.9]    • Hypertension [I10]    • Asthma [J45.909]    • Neuropathy [G62.9]    • A-fib [I48.91] 2016   • BP (high blood pressure) [I10] 2016      Resolved Hospital Problems    Diagnosis Date Noted Date Resolved   No resolved problems to display.       Assessment/Plan  1.  Acute and chronic hypoxic respiratory failure, uncertain etiology.  Continue antibiotics for bilateral pneumonia.  Pulmonary hypertension by echo.  Possible pulmonary fibrosis.  May need bronchoscopy per pulmonary.  Continue O2, antibiotics, IV steroids, inhalers mini nebs  2.  Hyponatremia.  Better.  Off hydrochlorothiazide.  Nephrology following.  3.  PAF.  Xarelto on hold for bronchoscopy tomorrow  4.  Hypertension.  On Procardia (and Lasix).  Blood pressures are controlled.  5.  Right ankle/foot fracture 2018.  Initial orthopedist said he was able to bear weight but recent appointment prior to admission said no weightbearing.  Ask Ortho to see here.  Continue with boot    Discussed with patient at length.  Discussed with family member at bedside.  Total time 35 minutes with greater than half in counselin PM to 2:20 PM    Ellen Velázquez MD  02/10/18  4:25 PM

## 2018-02-10 NOTE — PROGRESS NOTES
Subjective sob is better    Chief complaint:hyponatremia         Objective:      Vital Signs  Temp:  [97.4 °F (36.3 °C)-98.1 °F (36.7 °C)] 97.4 °F (36.3 °C)  Heart Rate:  [79-90] 90  Resp:  [16-22] 22  BP: (117-122)/(68-75) 122/74        Intake/Output Summary (Last 24 hours) at 02/10/18 1636  Last data filed at 02/10/18 1100   Gross per 24 hour   Intake                0 ml   Output             2600 ml   Net            -2600 ml           Physical Exam    Constitutional : well developed ,No acute distress  ENMT lips pink oral mucosa moist   Eyes sclerae white PERRL  Respiratory: Clear to auscultation , No crackles no wheezing respirations are even and un-labored  GI: Soft, Non tender, BS active in all 4 quadrants  CVS: S1 S2 regular, no rub murmur or gallops  Skin warm dry no rashes no petechiae  Neuro grossly nonfocal cranial nerves 2-12 grossly intact sensation intact  Ext: no LE  edema, Peripheral pulses intact boot Right foot   Heme no cervical  lymphadenopathy no petechiae         Results Review:      Lab Results   Component Value Date    GLUCOSE 167 (H) 02/10/2018    CALCIUM 9.0 02/10/2018     (L) 02/10/2018    K 4.1 02/10/2018    CO2 27.6 02/10/2018    CL 91 (L) 02/10/2018    BUN 17 02/10/2018    CREATININE 0.78 02/10/2018    EGFRIFNONA 97 02/10/2018    BCR 21.8 02/10/2018    ANIONGAP 12.4 02/10/2018       Lab Results   Component Value Date    WBC 8.37 02/08/2018    HGB 11.5 (L) 02/08/2018    HCT 38.3 (L) 02/08/2018    MCV 69.3 (L) 02/08/2018     02/08/2018       Pertinent Imaging studies were reviewed      Medication Review:       Current Facility-Administered Medications:   •  acetaminophen (TYLENOL) tablet 650 mg, 650 mg, Oral, Q6H PRN, Ru Royal MD, 650 mg at 02/10/18 0631  •  acetaminophen (TYLENOL) tablet 650 mg, 650 mg, Oral, Q4H PRN, Ru Royal MD, 650 mg at 02/10/18 1401  •  albuterol (PROVENTIL) nebulizer solution 0.083% 2.5 mg/3mL, 2.5 mg, Nebulization, 4x Daily - RT, Suresh David,  MD, 2.5 mg at 02/10/18 1246  •  aspirin chewable tablet 81 mg, 81 mg, Oral, Daily, Ru Royal MD, 81 mg at 02/10/18 0930  •  atorvastatin (LIPITOR) tablet 10 mg, 10 mg, Oral, Daily, Jawangelica Royal MD, 10 mg at 02/09/18 2146  •  budesonide-formoterol (SYMBICORT) 160-4.5 MCG/ACT inhaler 2 puff, 2 puff, Inhalation, BID - RT, Torey Burrell MD, 2 puff at 02/10/18 0818  •  ceFEPime (MAXIPIME) in SWFI 2g/10ml IV PUSH syringe, 2 g, Intravenous, Q8H, Torey Burrell MD, 2 g at 02/10/18 1308  •  furosemide (LASIX) tablet 40 mg, 40 mg, Oral, TID, Lamine Franklin MD, 40 mg at 02/10/18 0929  •  hydrocortisone 1 % cream, , Topical, Q6H PRN, Torey Burrell MD  •  ketotifen (ZADITOR) 0.025 % ophthalmic solution 1 drop, 1 drop, Both Eyes, BID, Ru Royal MD, 1 drop at 02/10/18 0936  •  methylPREDNISolone sodium succinate (SOLU-Medrol) injection 40 mg, 40 mg, Intravenous, Q12H, Suresh Hernandez MD  •  montelukast (SINGULAIR) tablet 10 mg, 10 mg, Oral, Nightly, Ru Royal MD, 10 mg at 02/09/18 2146  •  NIFEdipine XL (PROCARDIA XL) 24 hr tablet 30 mg, 30 mg, Oral, Daily, Torey Burrell MD, 30 mg at 02/10/18 0929  •  ondansetron (ZOFRAN) injection 4 mg, 4 mg, Intravenous, Q6H PRN, Ru Royal MD  •  pantoprazole (PROTONIX) EC tablet 40 mg, 40 mg, Oral, Q AM, Ru Royal MD, 40 mg at 02/10/18 0627  •  pregabalin (LYRICA) capsule 75 mg, 75 mg, Oral, Q12H, Ru Royal MD, 75 mg at 02/10/18 0627  •  sennosides-docusate sodium (SENOKOT-S) 8.6-50 MG tablet 2 tablet, 2 tablet, Oral, BID, Torey Burrell MD, 2 tablet at 02/10/18 0930  •  sodium chloride (OCEAN) nasal spray 2 spray, 2 spray, Nasal, PRN, Jawed MD Lele  •  sodium chloride 0.9 % flush 1-10 mL, 1-10 mL, Intravenous, PRN, Jawed MD Lele, 10 mL at 02/10/18 1308  •  tamsulosin (FLOMAX) 24 hr capsule 0.4 mg, 0.4 mg, Oral, Nightly, Jawed MD Lele, 0.4 mg at 02/09/18 9512  •  tiotropium (SPIRIVA) 18 MCG per inhalation capsule 1 capsule, 1  capsule, Inhalation, Daily - RT, Jawed MD Lele, 1 capsule at 02/10/18 0818  •  vitamin B-6 (PYRIDOXINE) tablet 50 mg, 50 mg, Oral, Daily, Jawed MD Lele, 50 mg at 02/10/18 0930       Assesment  Hyponatremia  HTN  PH  PNA      Plan:  SNa 131 better avoided overcorrection continue lasix will decrease lasix dose tomorrow  bp ok        Lamine Franklin MD  02/10/18  4:36 PM  Tel: [phone]  Fax: [phone]

## 2018-02-11 LAB
ANION GAP SERPL CALCULATED.3IONS-SCNC: 11.9 MMOL/L
BUN BLD-MCNC: 22 MG/DL (ref 8–23)
BUN/CREAT SERPL: 26.5 (ref 7–25)
CALCIUM SPEC-SCNC: 8.4 MG/DL (ref 8.6–10.5)
CHLORIDE SERPL-SCNC: 92 MMOL/L (ref 98–107)
CO2 SERPL-SCNC: 28.1 MMOL/L (ref 22–29)
CREAT BLD-MCNC: 0.83 MG/DL (ref 0.76–1.27)
GFR SERPL CREATININE-BSD FRML MDRD: 90 ML/MIN/1.73
GLUCOSE BLD-MCNC: 137 MG/DL (ref 65–99)
POTASSIUM BLD-SCNC: 3.7 MMOL/L (ref 3.5–5.2)
SODIUM BLD-SCNC: 132 MMOL/L (ref 136–145)

## 2018-02-11 PROCEDURE — 25010000002 LINEZOLID 600 MG/300ML SOLUTION: Performed by: INTERNAL MEDICINE

## 2018-02-11 PROCEDURE — 94799 UNLISTED PULMONARY SVC/PX: CPT

## 2018-02-11 PROCEDURE — 25010000002 METHYLPREDNISOLONE PER 40 MG: Performed by: INTERNAL MEDICINE

## 2018-02-11 PROCEDURE — 80048 BASIC METABOLIC PNL TOTAL CA: CPT | Performed by: INTERNAL MEDICINE

## 2018-02-11 PROCEDURE — 25010000002 ENOXAPARIN PER 10 MG: Performed by: INTERNAL MEDICINE

## 2018-02-11 PROCEDURE — 25010000002 CEFEPIME PER 500 MG: Performed by: INTERNAL MEDICINE

## 2018-02-11 RX ORDER — LINEZOLID 2 MG/ML
600 INJECTION, SOLUTION INTRAVENOUS EVERY 12 HOURS
Status: DISCONTINUED | OUTPATIENT
Start: 2018-02-11 | End: 2018-02-13

## 2018-02-11 RX ORDER — PREDNISONE 20 MG/1
40 TABLET ORAL
Status: DISCONTINUED | OUTPATIENT
Start: 2018-02-11 | End: 2018-02-11

## 2018-02-11 RX ORDER — PREDNISONE 20 MG/1
40 TABLET ORAL
Status: DISCONTINUED | OUTPATIENT
Start: 2018-02-12 | End: 2018-02-14 | Stop reason: HOSPADM

## 2018-02-11 RX ADMIN — BUDESONIDE AND FORMOTEROL FUMARATE DIHYDRATE 2 PUFF: 160; 4.5 AEROSOL RESPIRATORY (INHALATION) at 21:05

## 2018-02-11 RX ADMIN — ENOXAPARIN SODIUM 110 MG: 120 INJECTION SUBCUTANEOUS at 12:12

## 2018-02-11 RX ADMIN — TAMSULOSIN HYDROCHLORIDE 0.4 MG: 0.4 CAPSULE ORAL at 22:33

## 2018-02-11 RX ADMIN — PREGABALIN 75 MG: 75 CAPSULE ORAL at 05:52

## 2018-02-11 RX ADMIN — LINEZOLID 600 MG: 600 INJECTION, SOLUTION INTRAVENOUS at 22:44

## 2018-02-11 RX ADMIN — PANTOPRAZOLE SODIUM 40 MG: 40 TABLET, DELAYED RELEASE ORAL at 05:52

## 2018-02-11 RX ADMIN — WATER 2 G: 1 INJECTION INTRAMUSCULAR; INTRAVENOUS; SUBCUTANEOUS at 22:34

## 2018-02-11 RX ADMIN — DOCUSATE SODIUM -SENNOSIDES 2 TABLET: 50; 8.6 TABLET, COATED ORAL at 09:19

## 2018-02-11 RX ADMIN — FUROSEMIDE 40 MG: 40 TABLET ORAL at 22:33

## 2018-02-11 RX ADMIN — ASPIRIN 81 MG: 81 TABLET, CHEWABLE ORAL at 09:20

## 2018-02-11 RX ADMIN — PREGABALIN 75 MG: 75 CAPSULE ORAL at 16:07

## 2018-02-11 RX ADMIN — ACETAMINOPHEN 650 MG: 325 TABLET, FILM COATED ORAL at 16:07

## 2018-02-11 RX ADMIN — ENOXAPARIN SODIUM 110 MG: 120 INJECTION SUBCUTANEOUS at 22:44

## 2018-02-11 RX ADMIN — ACETAMINOPHEN 650 MG: 325 TABLET, FILM COATED ORAL at 22:33

## 2018-02-11 RX ADMIN — BUDESONIDE AND FORMOTEROL FUMARATE DIHYDRATE 2 PUFF: 160; 4.5 AEROSOL RESPIRATORY (INHALATION) at 07:39

## 2018-02-11 RX ADMIN — LINEZOLID 600 MG: 600 INJECTION, SOLUTION INTRAVENOUS at 12:12

## 2018-02-11 RX ADMIN — FUROSEMIDE 40 MG: 40 TABLET ORAL at 09:19

## 2018-02-11 RX ADMIN — WATER 2 G: 1 INJECTION INTRAMUSCULAR; INTRAVENOUS; SUBCUTANEOUS at 13:55

## 2018-02-11 RX ADMIN — ALBUTEROL SULFATE 2.5 MG: 2.5 SOLUTION RESPIRATORY (INHALATION) at 07:39

## 2018-02-11 RX ADMIN — TIOTROPIUM BROMIDE 1 CAPSULE: 18 CAPSULE ORAL; RESPIRATORY (INHALATION) at 07:39

## 2018-02-11 RX ADMIN — DOCUSATE SODIUM -SENNOSIDES 2 TABLET: 50; 8.6 TABLET, COATED ORAL at 22:33

## 2018-02-11 RX ADMIN — ATORVASTATIN CALCIUM 10 MG: 10 TABLET, FILM COATED ORAL at 22:33

## 2018-02-11 RX ADMIN — ACETAMINOPHEN 650 MG: 325 TABLET, FILM COATED ORAL at 10:30

## 2018-02-11 RX ADMIN — MONTELUKAST 10 MG: 10 TABLET, FILM COATED ORAL at 22:33

## 2018-02-11 RX ADMIN — FUROSEMIDE 40 MG: 40 TABLET ORAL at 16:07

## 2018-02-11 RX ADMIN — KETOTIFEN FUMARATE 1 DROP: 0.35 SOLUTION/ DROPS OPHTHALMIC at 09:20

## 2018-02-11 RX ADMIN — ALBUTEROL SULFATE 2.5 MG: 2.5 SOLUTION RESPIRATORY (INHALATION) at 11:45

## 2018-02-11 RX ADMIN — NIFEDIPINE 30 MG: 30 TABLET, FILM COATED, EXTENDED RELEASE ORAL at 09:20

## 2018-02-11 RX ADMIN — PYRIDOXINE HCL TAB 50 MG 50 MG: 50 TAB at 09:19

## 2018-02-11 RX ADMIN — ACETAMINOPHEN 650 MG: 325 TABLET, FILM COATED ORAL at 03:37

## 2018-02-11 RX ADMIN — METHYLPREDNISOLONE SODIUM SUCCINATE 40 MG: 40 INJECTION, POWDER, FOR SOLUTION INTRAMUSCULAR; INTRAVENOUS at 09:19

## 2018-02-11 RX ADMIN — KETOTIFEN FUMARATE 1 DROP: 0.35 SOLUTION/ DROPS OPHTHALMIC at 22:34

## 2018-02-11 RX ADMIN — METHYLPREDNISOLONE SODIUM SUCCINATE 40 MG: 40 INJECTION, POWDER, FOR SOLUTION INTRAMUSCULAR; INTRAVENOUS at 22:33

## 2018-02-11 RX ADMIN — WATER 2 G: 1 INJECTION INTRAMUSCULAR; INTRAVENOUS; SUBCUTANEOUS at 03:38

## 2018-02-11 NOTE — PLAN OF CARE
Problem: Patient Care Overview (Adult)  Goal: Plan of Care Review  Outcome: Ongoing (interventions implemented as appropriate)   02/11/18 1703   Coping/Psychosocial Response Interventions   Plan Of Care Reviewed With patient   Patient Care Overview   Progress improving   Outcome Evaluation   Outcome Summary/Follow up Plan pt down to 5L high flow from 8L, bronch cancelled today because of high need of oxygen, tylenol given for neuropathy pain, VSS, will continue to monitor closely      Goal: Adult Individualization and Mutuality  Outcome: Ongoing (interventions implemented as appropriate)    Goal: Discharge Needs Assessment  Outcome: Ongoing (interventions implemented as appropriate)      Problem: COPD, Chronic Bronchitis/Emphysema (Adult)  Goal: Signs and Symptoms of Listed Potential Problems Will be Absent or Manageable (COPD, Chronic Bronchitis/Emphysema)  Outcome: Outcome(s) achieved Date Met: 02/11/18      Problem: Activity Intolerance (Adult)  Goal: Activity Tolerance  Outcome: Ongoing (interventions implemented as appropriate)    Goal: Effective Energy Conservation Techniques  Outcome: Ongoing (interventions implemented as appropriate)      Problem: Fall Risk (Adult)  Goal: Absence of Falls  Outcome: Ongoing (interventions implemented as appropriate)

## 2018-02-11 NOTE — CONSULTS
77-year-old male admitted for exacerbation of COPD.  Status post recent right ankle fracture status post open reduction internal fixation in [place] currently on follow-up with podiatry service Dr Valdovinos doing well   He was advised nonweightbearing.  He has a Cam boot.  As I was  On call  for the emergency room.  I was asked to see him for the RIGHT ankle.   But the patient denies any problems with it and would like to follow-up with his podiatrist.  Continue instructions from his physician.  I will sign off but will be available

## 2018-02-11 NOTE — PROGRESS NOTES
Subjective sob is better    Chief complaint:hyponatremia         Objective:      Vital Signs  Temp:  [97.3 °F (36.3 °C)-98.3 °F (36.8 °C)] 98.3 °F (36.8 °C)  Heart Rate:  [79-85] 85  Resp:  [18-20] 18  BP: (108-123)/(58-73) 123/70        Intake/Output Summary (Last 24 hours) at 02/11/18 1821  Last data filed at 02/11/18 0700   Gross per 24 hour   Intake               20 ml   Output             1350 ml   Net            -1330 ml           Physical Exam    Constitutional : well developed ,No acute distress  ENMT lips pink oral mucosa moist   Eyes sclerae white PERRL  Respiratory: Clear to auscultation , No crackles no wheezing respirations are even and un-labored  GI: Soft, Non tender, BS active in all 4 quadrants  CVS: S1 S2 regular, no rub murmur or gallops  Skin warm dry no rashes no petechiae  Neuro grossly nonfocal cranial nerves 2-12 grossly intact sensation intact  Ext: no LE  edema, Peripheral pulses intact boot Right foot   Heme no cervical  lymphadenopathy no petechiae         Results Review:      Lab Results   Component Value Date    GLUCOSE 137 (H) 02/11/2018    CALCIUM 8.4 (L) 02/11/2018     (L) 02/11/2018    K 3.7 02/11/2018    CO2 28.1 02/11/2018    CL 92 (L) 02/11/2018    BUN 22 02/11/2018    CREATININE 0.83 02/11/2018    EGFRIFNONA 90 02/11/2018    BCR 26.5 (H) 02/11/2018    ANIONGAP 11.9 02/11/2018       Lab Results   Component Value Date    WBC 8.37 02/08/2018    HGB 11.5 (L) 02/08/2018    HCT 38.3 (L) 02/08/2018    MCV 69.3 (L) 02/08/2018     02/08/2018       Pertinent Imaging studies were reviewed      Medication Review:       Current Facility-Administered Medications:   •  acetaminophen (TYLENOL) tablet 650 mg, 650 mg, Oral, Q6H PRN, Ru Royal MD, 650 mg at 02/10/18 0631  •  acetaminophen (TYLENOL) tablet 650 mg, 650 mg, Oral, Q4H PRN, Ru Royal MD, 650 mg at 02/11/18 1607  •  aspirin chewable tablet 81 mg, 81 mg, Oral, Daily, Jawed MD Lele, 81 mg at 02/11/18 0920  •   atorvastatin (LIPITOR) tablet 10 mg, 10 mg, Oral, Daily, Jawangelica Royal MD, 10 mg at 02/10/18 1957  •  budesonide-formoterol (SYMBICORT) 160-4.5 MCG/ACT inhaler 2 puff, 2 puff, Inhalation, BID - RT, Torey Burrell MD, 2 puff at 02/11/18 0739  •  ceFEPime (MAXIPIME) in SWFI 2g/10ml IV PUSH syringe, 2 g, Intravenous, Q8H, Torey Burrell MD, 2 g at 02/11/18 1355  •  enoxaparin (LOVENOX) syringe 110 mg, 1 mg/kg, Subcutaneous, Q12H, Suresh Hernandez MD, 110 mg at 02/11/18 1212  •  furosemide (LASIX) tablet 40 mg, 40 mg, Oral, TID, Lamine Franklin MD, 40 mg at 02/11/18 1607  •  hydrocortisone 1 % cream, , Topical, Q6H PRN, Torey Burrell MD  •  ketotifen (ZADITOR) 0.025 % ophthalmic solution 1 drop, 1 drop, Both Eyes, BID, Ru Royal MD, 1 drop at 02/11/18 0920  •  Linezolid (ZYVOX) 600 mg 300 mL, 600 mg, Intravenous, Q12H, Suresh Hernandez MD, Last Rate: 300 mL/hr at 02/11/18 1212, 600 mg at 02/11/18 1212  •  methylPREDNISolone sodium succinate (SOLU-Medrol) injection 40 mg, 40 mg, Intravenous, Q12H, Suresh Hernandez MD, 40 mg at 02/11/18 0919  •  montelukast (SINGULAIR) tablet 10 mg, 10 mg, Oral, Nightly, Ru Royal MD, 10 mg at 02/10/18 1958  •  NIFEdipine XL (PROCARDIA XL) 24 hr tablet 30 mg, 30 mg, Oral, Daily, Torey Burrell MD, 30 mg at 02/11/18 0920  •  ondansetron (ZOFRAN) injection 4 mg, 4 mg, Intravenous, Q6H PRN, Ru Royal MD  •  pantoprazole (PROTONIX) EC tablet 40 mg, 40 mg, Oral, Q AM, Ru Royal MD, 40 mg at 02/11/18 8541  •  Pharmacy to Dose enoxaparin (LOVENOX), , Does not apply, Continuous PRN, Suresh Hernandez MD  •  [START ON 2/12/2018] predniSONE (DELTASONE) tablet 40 mg, 40 mg, Oral, Daily With Breakfast, Suresh Hernandez MD  •  pregabalin (LYRICA) capsule 75 mg, 75 mg, Oral, Q12H, Ru Royal MD, 75 mg at 02/11/18 1607  •  sennosides-docusate sodium (SENOKOT-S) 8.6-50 MG tablet 2 tablet, 2 tablet, Oral, BID, Torey Burrell MD, 2 tablet at 02/11/18 9354  •  sodium chloride  (OCEAN) nasal spray 2 spray, 2 spray, Nasal, PRN, Jawangelica Royal MD  •  sodium chloride 0.9 % flush 1-10 mL, 1-10 mL, Intravenous, PRN, Jawangelica Royal MD, 10 mL at 02/10/18 1308  •  tamsulosin (FLOMAX) 24 hr capsule 0.4 mg, 0.4 mg, Oral, Nightly, Jawed MD Lele, 0.4 mg at 02/10/18 1957  •  tiotropium (SPIRIVA) 18 MCG per inhalation capsule 1 capsule, 1 capsule, Inhalation, Daily - RT, Jawangelica Royal MD, 1 capsule at 02/11/18 0739  •  vitamin B-6 (PYRIDOXINE) tablet 50 mg, 50 mg, Oral, Daily, Jawed MD Lele, 50 mg at 02/11/18 0919    Pharmacy to Dose enoxaparin (LOVENOX)        Assesment  Hyponatremia  HTN  PH  PNA      Plan:  SNa 132 better avoided overcorrection continue lasix   bp ok        Lamine Franklin MD  02/11/18  6:21 PM  Tel: [phone]  Fax: [phone]

## 2018-02-11 NOTE — PROGRESS NOTES
"Pharmacy Consult - [email or web address] [facility]    [facility] has been consulted for pharmacy to dose enoxaparin for atrial fibrillation.  Consult for Dr. Hernandez    Relevant clinical data and objective history reviewed:  77 y.o. male 188 cm (74\") 109 kg (240 lb)    Past Medical History:   Diagnosis Date   • Arthritis    • Asthma    • Atrial fibrillation    • BPH (benign prostatic hypertrophy)    • Cancer    • COPD (chronic obstructive pulmonary disease)    • H/O Abnormal CBC 2017   • Hyperlipidemia    • Hypertension    • Neuropathy     feet and hands      is allergic to albuterol; atenolol; celebrex [celecoxib]; coreg [carvedilol]; ibuprofen; levaquin [levofloxacin]; lisinopril; penicillins; and sulfa antibiotics.    Lab Results   Component Value Date    WBC 8.37 02/08/2018    HGB 11.5 (L) 02/08/2018    HCT 38.3 (L) 02/08/2018    MCV 69.3 (L) 02/08/2018     02/08/2018     Lab Results   Component Value Date    GLUCOSE 137 (H) 02/11/2018    CALCIUM 8.4 (L) 02/11/2018     (L) 02/11/2018    K 3.7 02/11/2018    CO2 28.1 02/11/2018    CL 92 (L) 02/11/2018    BUN 22 02/11/2018    CREATININE 0.83 02/11/2018    EGFRIFNONA 90 02/11/2018    BCR 26.5 (H) 02/11/2018    ANIONGAP 11.9 02/11/2018     Estimated Creatinine Clearance: 99.5 mL/min (by C-G formula based on Cr of 0.83).    Assessment/Plan    Will start patient on enoxaparin 110mg (1 mg/kg) subcutaneous every 12 hours.  Pharmacy will continue to follow.     Enma Rebolledo, PharmD  2/11/2018  9:54 AM    "

## 2018-02-11 NOTE — PLAN OF CARE
Problem: Patient Care Overview (Adult)  Goal: Plan of Care Review  Outcome: Ongoing (interventions implemented as appropriate)   02/11/18 0421   Coping/Psychosocial Response Interventions   Plan Of Care Reviewed With patient   Patient Care Overview   Progress improving   Outcome Evaluation   Outcome Summary/Follow up Plan pt remains on 8 liter high flow. Bronchoscopy scheduled in am. NPO at 12MN. C/o pain x2 prn Tylenol given. Will continue to monitor.       Problem: COPD, Chronic Bronchitis/Emphysema (Adult)  Goal: Signs and Symptoms of Listed Potential Problems Will be Absent or Manageable (COPD, Chronic Bronchitis/Emphysema)  Outcome: Ongoing (interventions implemented as appropriate)      Problem: Activity Intolerance (Adult)  Goal: Activity Tolerance  Outcome: Ongoing (interventions implemented as appropriate)    Goal: Effective Energy Conservation Techniques  Outcome: Ongoing (interventions implemented as appropriate)      Problem: Fall Risk (Adult)  Goal: Absence of Falls  Outcome: Ongoing (interventions implemented as appropriate)

## 2018-02-11 NOTE — PROGRESS NOTES
[facility]  Phone: [phone]  Suresh Hernandez MD    Subjective:  LOS: 4    He feels better however still on HF oxygen. No significant cough or phlegm. No chest pain.    Objective   Vital Signs past 24hrs  BP range: BP: (114-122)/(58-74) 114/73  Pulse range: Heart Rate:  [81-90] 81  Resp rate range: Resp:  [16-22] 20  Temp range: Temp (24hrs), Av.8 °F (36.6 °C), Min:97.4 °F (36.3 °C), Max:98 °F (36.7 °C)    O2 Device: high-flow nasal cannulaFlow (L/min):  [8] 8  Oxygen range:SpO2:  [89 %-93 %] 91 %   109 kg (240 lb); Body mass index is 30.81 kg/(m^2).    Intake/Output Summary (Last 24 hours) at 18 0842  Last data filed at 18 0540   Gross per 24 hour   Intake               20 ml   Output             1400 ml   Net            -1380 ml       Physical Exam   Cardiovascular: Normal rate and regular rhythm.    No murmur heard.  Pulmonary/Chest: He has decreased breath sounds. He has no wheezes. He has rales (fine) in the right lower field and the left lower field.   Abdominal: Soft. Bowel sounds are normal. There is no tenderness.   Musculoskeletal: He exhibits no edema.   Neurological: He is alert.   Nursing note and vitals reviewed.    Results Review:    I have reviewed the laboratory and imaging data since the last note by [facility] physician.  My annotations are noted in assessment and plan.    Medication Review:  I have reviewed the current MAR.  My annotations are noted in assessment and plan.       Plan   PCCM Problems  Acute on chronic hypoxemic resp failure  Pneumonia - bilateral on CT, left prominent on CxR  ILD ??  COPD  Severe pulmonary hypertension  Afib on AC  Hyponatremia      Plan of Treatment  Pneumonia. On abx. Send sputum sample. On Cefepime. Add Zyvox.  Offered bronch but anesthesia feels high risk. Defer for 1-2 days and try Zyvox first. Await sputum culture.  Continue to hold Xarelto. Added full dose lovenox.    Hypoxia is severe. Wean O2.    Underlying COPD, not wheezing,  reduce pred and nebs.    ECHO shows severe PHT, possibly due to COPD. [facility] need [facility] with [facility] for [facility]. Contributing to hypoxia.    Address low Na per renal. On Lasix.    Suresh Hernandez MD  02/11/18  8:42 AM    Part of this note may be an electronic transcription/translation of spoken language to printed text using the Dragon Dictation System.

## 2018-02-12 LAB
ANION GAP SERPL CALCULATED.3IONS-SCNC: 12.6 MMOL/L
BACTERIA SPEC RESP CULT: NORMAL
BACTERIA SPEC RESP CULT: NORMAL
BUN BLD-MCNC: 21 MG/DL (ref 8–23)
BUN/CREAT SERPL: 22.3 (ref 7–25)
CALCIUM SPEC-SCNC: 8.3 MG/DL (ref 8.6–10.5)
CHLORIDE SERPL-SCNC: 94 MMOL/L (ref 98–107)
CO2 SERPL-SCNC: 27.4 MMOL/L (ref 22–29)
CREAT BLD-MCNC: 0.94 MG/DL (ref 0.76–1.27)
GFR SERPL CREATININE-BSD FRML MDRD: 78 ML/MIN/1.73
GLUCOSE BLD-MCNC: 155 MG/DL (ref 65–99)
GRAM STN SPEC: NORMAL
POTASSIUM BLD-SCNC: 4.1 MMOL/L (ref 3.5–5.2)
REF LAB TEST RESULTS: NORMAL
REF LAB TEST RESULTS: NORMAL
SODIUM BLD-SCNC: 134 MMOL/L (ref 136–145)

## 2018-02-12 PROCEDURE — 80048 BASIC METABOLIC PNL TOTAL CA: CPT | Performed by: INTERNAL MEDICINE

## 2018-02-12 PROCEDURE — 25010000002 METHYLPREDNISOLONE PER 125 MG: Performed by: INTERNAL MEDICINE

## 2018-02-12 PROCEDURE — 25010000002 LINEZOLID 600 MG/300ML SOLUTION: Performed by: INTERNAL MEDICINE

## 2018-02-12 PROCEDURE — 25010000002 CEFEPIME PER 500 MG: Performed by: INTERNAL MEDICINE

## 2018-02-12 PROCEDURE — 25010000002 ENOXAPARIN PER 10 MG: Performed by: INTERNAL MEDICINE

## 2018-02-12 PROCEDURE — 94799 UNLISTED PULMONARY SVC/PX: CPT

## 2018-02-12 PROCEDURE — 63710000001 PREDNISONE PER 1 MG: Performed by: INTERNAL MEDICINE

## 2018-02-12 RX ORDER — METHYLPREDNISOLONE SODIUM SUCCINATE 125 MG/2ML
80 INJECTION, POWDER, LYOPHILIZED, FOR SOLUTION INTRAMUSCULAR; INTRAVENOUS ONCE
Status: COMPLETED | OUTPATIENT
Start: 2018-02-12 | End: 2018-02-12

## 2018-02-12 RX ADMIN — PREGABALIN 75 MG: 75 CAPSULE ORAL at 04:04

## 2018-02-12 RX ADMIN — WATER 2 G: 1 INJECTION INTRAMUSCULAR; INTRAVENOUS; SUBCUTANEOUS at 04:05

## 2018-02-12 RX ADMIN — ENOXAPARIN SODIUM 110 MG: 120 INJECTION SUBCUTANEOUS at 22:26

## 2018-02-12 RX ADMIN — MONTELUKAST 10 MG: 10 TABLET, FILM COATED ORAL at 21:26

## 2018-02-12 RX ADMIN — ACETAMINOPHEN 650 MG: 325 TABLET, FILM COATED ORAL at 14:03

## 2018-02-12 RX ADMIN — ACETAMINOPHEN 650 MG: 325 TABLET, FILM COATED ORAL at 22:26

## 2018-02-12 RX ADMIN — ACETAMINOPHEN 650 MG: 325 TABLET, FILM COATED ORAL at 05:46

## 2018-02-12 RX ADMIN — LINEZOLID 600 MG: 600 INJECTION, SOLUTION INTRAVENOUS at 21:27

## 2018-02-12 RX ADMIN — ASPIRIN 81 MG: 81 TABLET, CHEWABLE ORAL at 09:59

## 2018-02-12 RX ADMIN — KETOTIFEN FUMARATE 1 DROP: 0.35 SOLUTION/ DROPS OPHTHALMIC at 09:59

## 2018-02-12 RX ADMIN — DOCUSATE SODIUM -SENNOSIDES 2 TABLET: 50; 8.6 TABLET, COATED ORAL at 21:26

## 2018-02-12 RX ADMIN — PYRIDOXINE HCL TAB 50 MG 50 MG: 50 TAB at 09:59

## 2018-02-12 RX ADMIN — TIOTROPIUM BROMIDE 1 CAPSULE: 18 CAPSULE ORAL; RESPIRATORY (INHALATION) at 09:45

## 2018-02-12 RX ADMIN — FUROSEMIDE 40 MG: 40 TABLET ORAL at 09:58

## 2018-02-12 RX ADMIN — TAMSULOSIN HYDROCHLORIDE 0.4 MG: 0.4 CAPSULE ORAL at 21:26

## 2018-02-12 RX ADMIN — ENOXAPARIN SODIUM 110 MG: 120 INJECTION SUBCUTANEOUS at 11:09

## 2018-02-12 RX ADMIN — FUROSEMIDE 40 MG: 40 TABLET ORAL at 21:26

## 2018-02-12 RX ADMIN — LINEZOLID 600 MG: 600 INJECTION, SOLUTION INTRAVENOUS at 09:58

## 2018-02-12 RX ADMIN — WATER 2 G: 1 INJECTION INTRAMUSCULAR; INTRAVENOUS; SUBCUTANEOUS at 21:32

## 2018-02-12 RX ADMIN — NIFEDIPINE 30 MG: 30 TABLET, FILM COATED, EXTENDED RELEASE ORAL at 09:59

## 2018-02-12 RX ADMIN — BUDESONIDE AND FORMOTEROL FUMARATE DIHYDRATE 2 PUFF: 160; 4.5 AEROSOL RESPIRATORY (INHALATION) at 09:46

## 2018-02-12 RX ADMIN — BUDESONIDE AND FORMOTEROL FUMARATE DIHYDRATE 2 PUFF: 160; 4.5 AEROSOL RESPIRATORY (INHALATION) at 20:38

## 2018-02-12 RX ADMIN — ATORVASTATIN CALCIUM 10 MG: 10 TABLET, FILM COATED ORAL at 21:26

## 2018-02-12 RX ADMIN — WATER 2 G: 1 INJECTION INTRAMUSCULAR; INTRAVENOUS; SUBCUTANEOUS at 11:22

## 2018-02-12 RX ADMIN — PREDNISONE 40 MG: 20 TABLET ORAL at 09:59

## 2018-02-12 RX ADMIN — METHYLPREDNISOLONE SODIUM SUCCINATE 80 MG: 125 INJECTION, POWDER, FOR SOLUTION INTRAMUSCULAR; INTRAVENOUS at 17:09

## 2018-02-12 RX ADMIN — PREGABALIN 75 MG: 75 CAPSULE ORAL at 17:09

## 2018-02-12 RX ADMIN — KETOTIFEN FUMARATE 1 DROP: 0.35 SOLUTION/ DROPS OPHTHALMIC at 21:26

## 2018-02-12 RX ADMIN — DOCUSATE SODIUM -SENNOSIDES 2 TABLET: 50; 8.6 TABLET, COATED ORAL at 09:58

## 2018-02-12 RX ADMIN — PANTOPRAZOLE SODIUM 40 MG: 40 TABLET, DELAYED RELEASE ORAL at 05:45

## 2018-02-12 RX ADMIN — FUROSEMIDE 40 MG: 40 TABLET ORAL at 17:09

## 2018-02-12 NOTE — PROGRESS NOTES
"     LOS: 4 days   Primary Care Physician: Alan Santana MD     Subjective   Better today.  Still some short of breath but not as bad as yesterday.    Vital Signs  Body mass index is 30.81 kg/(m^2).  Temp:  [97.3 °F (36.3 °C)-98.3 °F (36.8 °C)] 98.3 °F (36.8 °C)  Heart Rate:  [79-85] 85  Resp:  [18-20] 18  BP: (108-123)/(58-73) 123/70    On 5 L oxygen    Objective:  General Appearance:  In no acute distress (More comfortable than yesterday).    Vital signs: (most recent): Blood pressure 123/70, pulse 85, temperature 98.3 °F (36.8 °C), temperature source Oral, resp. rate 18, height 188 cm (74\"), weight 109 kg (240 lb), SpO2 90 %.    HEENT: (No JVD.  Neck supple.  No lymphadenopathy.  Trachea midline)    Lungs:  There are rales and decreased breath sounds.  No wheezes or rhonchi.  (Crackles greater on the left than the right)  Heart: Normal rate.  Regular rhythm.  No murmur.   Abdomen: Abdomen is soft and non-distended.  Bowel sounds are normal.   There is no abdominal tenderness.   There is no splenomegaly. There is no hepatomegaly.   Extremities: There is dependent edema.  (Trace to 1+)  Neurological: Patient is alert.          Results Review:    I reviewed the patient's new clinical results.      Results from last 7 days  Lab Units 02/08/18  0317   WBC 10*3/mm3 8.37   HEMOGLOBIN g/dL 11.5*   PLATELETS 10*3/mm3 274       Results from last 7 days  Lab Units 02/11/18  0542 02/10/18  0456   SODIUM mmol/L 132* 131*   POTASSIUM mmol/L 3.7 4.1   CHLORIDE mmol/L 92* 91*   CO2 mmol/L 28.1 27.6   BUN mg/dL 22 17   CREATININE mg/dL 0.83 0.78   CALCIUM mg/dL 8.4* 9.0   GLUCOSE mg/dL 137* 167*         Hemoglobin A1C:  Lab Results   Component Value Date    HGBA1C 6.00 (H) 01/03/2018       Glucose Range:No results found for: POCGLU    No results found for: QHEROEIN51    No results found for: TSH    Assessment & Plan      Medication Review: Yes    Active Hospital Problems (** Indicates Principal Problem)    Diagnosis Date Noted "   • **Pneumonia of both lungs due to infectious organism [J18.9] 02/07/2018   • Hypoxia [R09.02] 02/07/2018   • Pneumonia [J18.9] 02/07/2018   • Chronic anticoagulation [Z79.01] 02/07/2018   • COPD (chronic obstructive pulmonary disease) [J44.9]    • Hypertension [I10]    • Asthma [J45.909]    • Neuropathy [G62.9]    • A-fib [I48.91] 03/02/2016   • BP (high blood pressure) [I10] 03/02/2016      Resolved Hospital Problems    Diagnosis Date Noted Date Resolved   No resolved problems to display.       Assessment/Plan  1.  Acute and chronic hypoxic respiratory failure.  Improving.  Zyvox added to antibiotics.  Severe pulmonary hypertension by echocardiogram.  Bronchoscopy on hold.  Continue O2, steroids, mini nebs, inhaler.  2.  Hyponatremia, improved.  Off hydrochlorothiazide  3.  PAF, now on weight-based Lovenox instead of Xarelto secondary to above  4.  Hypertension, controlled  5.  Right ankle fracture January 2018.  No weightbearing.  Follow-up with previous podiatrist.    Ellen Velázquez MD  02/11/18  7:46 PM

## 2018-02-12 NOTE — PROGRESS NOTES
Subjective sob better chronic neuropathic pain LE no other c/o    Chief complaint:hyponatremia         Objective:      Vital Signs  Temp:  [97.2 °F (36.2 °C)-98.3 °F (36.8 °C)] 97.2 °F (36.2 °C)  Heart Rate:  [79-92] 82  Resp:  [18-20] 20  BP: (120-142)/(70-87) 137/84        Intake/Output Summary (Last 24 hours) at 02/12/18 1049  Last data filed at 02/12/18 0548   Gross per 24 hour   Intake                0 ml   Output             2875 ml   Net            -2875 ml           Physical Exam    Constitutional : well developed ,No acute distress  ENMT lips pink oral mucosa moist   Eyes sclerae white PERRL  Respiratory: Clear to auscultation , No crackles no wheezing respirations are even and un-labored  GI: Soft, Non tender, BS active in all 4 quadrants  CVS: S1 S2 regular, no rub murmur or gallops  Skin warm dry no rashes no petechiae  Neuro grossly nonfocal cranial nerves 2-12 grossly intact sensation intact  Ext: no LE  edema, Peripheral pulses intact boot Right foot   Heme no cervical  lymphadenopathy no petechiae         Results Review:      Lab Results   Component Value Date    GLUCOSE 155 (H) 02/12/2018    CALCIUM 8.3 (L) 02/12/2018     (L) 02/12/2018    K 4.1 02/12/2018    CO2 27.4 02/12/2018    CL 94 (L) 02/12/2018    BUN 21 02/12/2018    CREATININE 0.94 02/12/2018    EGFRIFNONA 78 02/12/2018    BCR 22.3 02/12/2018    ANIONGAP 12.6 02/12/2018       Lab Results   Component Value Date    WBC 8.37 02/08/2018    HGB 11.5 (L) 02/08/2018    HCT 38.3 (L) 02/08/2018    MCV 69.3 (L) 02/08/2018     02/08/2018       Pertinent Imaging studies were reviewed      Medication Review:       Current Facility-Administered Medications:   •  acetaminophen (TYLENOL) tablet 650 mg, 650 mg, Oral, Q6H PRN, Ru Royal MD, 650 mg at 02/10/18 0631  •  acetaminophen (TYLENOL) tablet 650 mg, 650 mg, Oral, Q4H PRN, Ru Royal MD, 650 mg at 02/12/18 0546  •  aspirin chewable tablet 81 mg, 81 mg, Oral, Daily, Ru Royal MD,  81 mg at 02/12/18 0959  •  atorvastatin (LIPITOR) tablet 10 mg, 10 mg, Oral, Daily, Jawangelica Royal MD, 10 mg at 02/11/18 2233  •  budesonide-formoterol (SYMBICORT) 160-4.5 MCG/ACT inhaler 2 puff, 2 puff, Inhalation, BID - RT, Torey Burrell MD, 2 puff at 02/12/18 0946  •  ceFEPime (MAXIPIME) in SWFI 2g/10ml IV PUSH syringe, 2 g, Intravenous, Q8H, Torey Burrell MD, 2 g at 02/12/18 0405  •  enoxaparin (LOVENOX) syringe 110 mg, 1 mg/kg, Subcutaneous, Q12H, Suresh Hernandez MD, 110 mg at 02/11/18 2244  •  furosemide (LASIX) tablet 40 mg, 40 mg, Oral, TID, Lamine Franklin MD, 40 mg at 02/12/18 0958  •  hydrocortisone 1 % cream, , Topical, Q6H PRN, Torey Burrell MD  •  ketotifen (ZADITOR) 0.025 % ophthalmic solution 1 drop, 1 drop, Both Eyes, BID, Jawangelica Royal MD, 1 drop at 02/12/18 0959  •  Linezolid (ZYVOX) 600 mg 300 mL, 600 mg, Intravenous, Q12H, Suresh Hernandez MD, Last Rate: 300 mL/hr at 02/12/18 0958, 600 mg at 02/12/18 0958  •  montelukast (SINGULAIR) tablet 10 mg, 10 mg, Oral, Nightly, Ru Royal MD, 10 mg at 02/11/18 2233  •  NIFEdipine XL (PROCARDIA XL) 24 hr tablet 30 mg, 30 mg, Oral, Daily, Torey Burrell MD, 30 mg at 02/12/18 0959  •  ondansetron (ZOFRAN) injection 4 mg, 4 mg, Intravenous, Q6H PRN, Ru Royal MD  •  pantoprazole (PROTONIX) EC tablet 40 mg, 40 mg, Oral, Q AM, Ru Royal MD, 40 mg at 02/12/18 0545  •  Pharmacy to Dose enoxaparin (LOVENOX), , Does not apply, Continuous PRN, Suresh Hernandez MD  •  predniSONE (DELTASONE) tablet 40 mg, 40 mg, Oral, Daily With Breakfast, Suresh Hernandez MD, 40 mg at 02/12/18 0959  •  pregabalin (LYRICA) capsule 75 mg, 75 mg, Oral, Q12H, Jawed MD Lele, 75 mg at 02/12/18 0404  •  sennosides-docusate sodium (SENOKOT-S) 8.6-50 MG tablet 2 tablet, 2 tablet, Oral, BID, Torey Burrell MD, 2 tablet at 02/12/18 0958  •  sodium chloride (OCEAN) nasal spray 2 spray, 2 spray, Nasal, PRN, Jawangelica Royal MD  •  sodium chloride 0.9 % flush 1-10 mL,  1-10 mL, Intravenous, PRN, Jawangelica Royal MD, 10 mL at 02/10/18 1308  •  tamsulosin (FLOMAX) 24 hr capsule 0.4 mg, 0.4 mg, Oral, Nightly, Jawed MD Lele, 0.4 mg at 02/11/18 2233  •  tiotropium (SPIRIVA) 18 MCG per inhalation capsule 1 capsule, 1 capsule, Inhalation, Daily - RT, Jawangelica Royal MD, 1 capsule at 02/12/18 0945  •  vitamin B-6 (PYRIDOXINE) tablet 50 mg, 50 mg, Oral, Daily, Jawed MD Lele, 50 mg at 02/12/18 0959    Pharmacy to Dose enoxaparin (LOVENOX)        Assesment  Hyponatremia  HTN  PH  PNA      Plan:  SNa 13 better continue lasix   bp ok        Lamine Franklin MD  02/12/18  10:49 AM  Tel: [phone]  Fax: [phone]

## 2018-02-12 NOTE — PROGRESS NOTES
"     LOS: 5 days   Primary Care Physician: Alan Santana MD     Subjective   Feels better.  On 4 L oxygen now.    Vital Signs  Body mass index is 30.81 kg/(m^2).  Temp:  [97 °F (36.1 °C)-98.2 °F (36.8 °C)] 97 °F (36.1 °C)  Heart Rate:  [80-92] 83  Resp:  [18-20] 20  BP: (120-142)/(72-87) 133/86      Objective:  General Appearance:  In no acute distress.    Vital signs: (most recent): Blood pressure 133/86, pulse 83, temperature 97 °F (36.1 °C), temperature source Oral, resp. rate 20, height 188 cm (74\"), weight 109 kg (240 lb), SpO2 90 %.    Lungs:  There are rales and decreased breath sounds.  No wheezes or rhonchi.  (A few crackles right base.  Otherwise clear)  Heart: Normal rate.  Regular rhythm.  No murmur.   Abdomen: Abdomen is soft and non-distended.  (Obese)Bowel sounds are normal.   There is no abdominal tenderness.   There is no splenomegaly. There is no hepatomegaly.   Extremities: There is dependent edema.  (Trace left ankle.  Right has the walking boot on)  Neurological: Patient is alert.          Results Review:    I reviewed the patient's new clinical results.      Results from last 7 days  Lab Units 02/08/18  0317   WBC 10*3/mm3 8.37   HEMOGLOBIN g/dL 11.5*   PLATELETS 10*3/mm3 274       Results from last 7 days  Lab Units 02/12/18  0347 02/11/18  0542   SODIUM mmol/L 134* 132*   POTASSIUM mmol/L 4.1 3.7   CHLORIDE mmol/L 94* 92*   CO2 mmol/L 27.4 28.1   BUN mg/dL 21 22   CREATININE mg/dL 0.94 0.83   CALCIUM mg/dL 8.3* 8.4*   GLUCOSE mg/dL 155* 137*         Hemoglobin A1C:  Lab Results   Component Value Date    HGBA1C 6.00 (H) 01/03/2018       Glucose Range:No results found for: POCGLU    No results found for: VHVAQUOD84    No results found for: TSH    Assessment & Plan      Medication Review: Yes    Active Hospital Problems (** Indicates Principal Problem)    Diagnosis Date Noted   • **Pneumonia of both lungs due to infectious organism [J18.9] 02/07/2018   • Hypoxia [R09.02] 02/07/2018   • " Pneumonia [J18.9] 02/07/2018   • Chronic anticoagulation [Z79.01] 02/07/2018   • COPD (chronic obstructive pulmonary disease) [J44.9]    • Hypertension [I10]    • Asthma [J45.909]    • Neuropathy [G62.9]    • A-fib [I48.91] 03/02/2016   • BP (high blood pressure) [I10] 03/02/2016      Resolved Hospital Problems    Diagnosis Date Noted Date Resolved   No resolved problems to display.       Assessment/Plan  1.  Acute and chronic hypoxic respiratory failure.  He continues to improve daily.  Continue Zyvox and cefepime, O2, mini nebs, inhaler, steroids.  2.  Hyponatremia, improved off hydrochlorothiazide.  On 3 times a day Lasix per nephrology  3.  PAF on weight-based Lovenox.  On Xarelto at home.  4.  Right ankle fracture January 2018.  Follow-up with his outpatient podiatrist.  Nonweightbearing  5.  Hypertension.  Numbers acceptable.    Ellen Velázquez MD  02/12/18  6:55 PM

## 2018-02-12 NOTE — PLAN OF CARE
Problem: Patient Care Overview (Adult)  Goal: Plan of Care Review  Outcome: Ongoing (interventions implemented as appropriate)   02/12/18 0350   Coping/Psychosocial Response Interventions   Plan Of Care Reviewed With patient   Patient Care Overview   Progress improving   Outcome Evaluation   Outcome Summary/Follow up Plan pt lert and oriented X4. VSS, complains of leg and foot pain, managed with tylenol. 5 L O2. resting well, will continue to monitor.      Goal: Adult Individualization and Mutuality  Outcome: Ongoing (interventions implemented as appropriate)    Goal: Discharge Needs Assessment  Outcome: Ongoing (interventions implemented as appropriate)      Problem: Activity Intolerance (Adult)  Goal: Activity Tolerance  Outcome: Ongoing (interventions implemented as appropriate)    Goal: Effective Energy Conservation Techniques  Outcome: Ongoing (interventions implemented as appropriate)      Problem: Fall Risk (Adult)  Goal: Absence of Falls  Outcome: Ongoing (interventions implemented as appropriate)

## 2018-02-12 NOTE — PLAN OF CARE
Problem: Patient Care Overview (Adult)  Goal: Plan of Care Review  Outcome: Ongoing (interventions implemented as appropriate)   02/12/18 2486   Coping/Psychosocial Response Interventions   Plan Of Care Reviewed With patient   Patient Care Overview   Progress improving   Outcome Evaluation   Outcome Summary/Follow up Plan Pt ambulating well to BR using walker/assist x1, still some soa w/exertion.Steroids given today. O2 weaned to 3L NC. Tylenol PRN. Wound care to right ankle. Using IS independently. will closely monitor.       Problem: Activity Intolerance (Adult)  Goal: Activity Tolerance  Outcome: Ongoing (interventions implemented as appropriate)    Goal: Effective Energy Conservation Techniques  Outcome: Ongoing (interventions implemented as appropriate)      Problem: Fall Risk (Adult)  Goal: Absence of Falls  Outcome: Ongoing (interventions implemented as appropriate)

## 2018-02-12 NOTE — PROGRESS NOTES
[facility]  Phone: [phone]  Suresh Hernandez MD    Subjective:  LOS: 5    Much better today. Now on low flow O2.    Objective   Vital Signs past 24hrs  BP range: BP: (120-142)/(72-87) 133/86  Pulse range: Heart Rate:  [80-92] 83  Resp rate range: Resp:  [18-20] 20  Temp range: Temp (24hrs), Av.5 °F (36.4 °C), Min:97 °F (36.1 °C), Max:98.2 °F (36.8 °C)    O2 Device: nasal cannula with humidificationFlow (L/min):  [4-5] 4  Oxygen range:SpO2:  [90 %-93 %] 90 %   109 kg (240 lb); Body mass index is 30.81 kg/(m^2).    Intake/Output Summary (Last 24 hours) at 18 1614  Last data filed at 18 1357   Gross per 24 hour   Intake                0 ml   Output             3975 ml   Net            -3975 ml       Physical Exam   Cardiovascular: Normal rate and regular rhythm.    No murmur heard.  Pulmonary/Chest: He has decreased breath sounds. He has wheezes (slight wheeze). He has rales (minimal in bases now) in the right lower field and the left lower field.   Abdominal: Soft. Bowel sounds are normal. There is no tenderness.   Musculoskeletal: He exhibits no edema.   Neurological: He is alert.   Nursing note and vitals reviewed.    Results Review:    I have reviewed the laboratory and imaging data since the last note by [facility] physician.  My annotations are noted in assessment and plan.    Medication Review:  I have reviewed the current MAR.  My annotations are noted in assessment and plan.      Pharmacy to Dose enoxaparin (LOVENOX)      Plan   PCCM Problems  Acute on chronic hypoxemic resp failure  Pneumonia - bilateral on CT, left prominent on CxR  ILD ??  COPD  Severe pulmonary hypertension  Afib on AC  Hyponatremia      Plan of Treatment  Pneumonia. Offered bronch but anesthesia feels high risk. Defer for 1-2 days and try Zyvox first. Await sputum culture.  Continue to hold Xarelto. Added full dose lovenox.    Hypoxia improving. Wean O2.    Underlying COPD, some wheezing. Give medrol x 1.    ECHO  shows severe PHT, possibly due to COPD. [facility]. Contributing to hypoxia.    Address low Na per renal. On Lasix.    Suresh Hernandez MD  02/12/18  4:14 PM    Part of this note may be an electronic transcription/translation of spoken language to printed text using the Dragon Dictation System.

## 2018-02-12 NOTE — PROGRESS NOTES
Continued Stay Note  [facility]     Patient Name: Alessio Allen  MRN: [id number]  Today's Date: 2/12/2018    Admit Date: 2/7/2018          Discharge Plan       02/12/18 1532    Case Management/Social Work Plan    Plan Home with significant other, Pat and [facility]    Patient/Family In Agreement With Plan yes    Additional Comments Spoke with patient, who is alert and oriented x 4, and significant other, Pat, with patient's permission at bedside and they state the plan is for patient to return home with s/o and [facility](patient is current) and has a wheelchair, straight cane, oxygen 2l continuous from [employer] and denies any DME needs and Pat will provide transportation at discharge.  CCP will follow and assist as needed....Bhavna Griffin RN,CCP               Discharge Codes     None            Bhavna Griffin RN

## 2018-02-13 LAB
ANION GAP SERPL CALCULATED.3IONS-SCNC: 7.4 MMOL/L
BUN BLD-MCNC: 18 MG/DL (ref 8–23)
BUN/CREAT SERPL: 21.2 (ref 7–25)
CALCIUM SPEC-SCNC: 8.1 MG/DL (ref 8.6–10.5)
CHLORIDE SERPL-SCNC: 93 MMOL/L (ref 98–107)
CO2 SERPL-SCNC: 33.6 MMOL/L (ref 22–29)
CREAT BLD-MCNC: 0.85 MG/DL (ref 0.76–1.27)
GFR SERPL CREATININE-BSD FRML MDRD: 88 ML/MIN/1.73
GLUCOSE BLD-MCNC: 123 MG/DL (ref 65–99)
PLATELET # BLD AUTO: 346 10*3/MM3 (ref 140–500)
POTASSIUM BLD-SCNC: 3.7 MMOL/L (ref 3.5–5.2)
SODIUM BLD-SCNC: 134 MMOL/L (ref 136–145)

## 2018-02-13 PROCEDURE — 94799 UNLISTED PULMONARY SVC/PX: CPT

## 2018-02-13 PROCEDURE — 85049 AUTOMATED PLATELET COUNT: CPT | Performed by: PHARMACIST

## 2018-02-13 PROCEDURE — 25010000002 LINEZOLID 600 MG/300ML SOLUTION: Performed by: INTERNAL MEDICINE

## 2018-02-13 PROCEDURE — 63710000001 PREDNISONE PER 1 MG: Performed by: INTERNAL MEDICINE

## 2018-02-13 PROCEDURE — 25010000002 ENOXAPARIN PER 10 MG: Performed by: INTERNAL MEDICINE

## 2018-02-13 PROCEDURE — 94760 N-INVAS EAR/PLS OXIMETRY 1: CPT

## 2018-02-13 PROCEDURE — 25010000002 CEFEPIME PER 500 MG: Performed by: INTERNAL MEDICINE

## 2018-02-13 PROCEDURE — 80048 BASIC METABOLIC PNL TOTAL CA: CPT | Performed by: INTERNAL MEDICINE

## 2018-02-13 RX ORDER — LINEZOLID 600 MG/1
600 TABLET, FILM COATED ORAL EVERY 12 HOURS SCHEDULED
Status: DISCONTINUED | OUTPATIENT
Start: 2018-02-13 | End: 2018-02-13

## 2018-02-13 RX ORDER — LINEZOLID 600 MG/1
600 TABLET, FILM COATED ORAL EVERY 12 HOURS SCHEDULED
Status: DISCONTINUED | OUTPATIENT
Start: 2018-02-13 | End: 2018-02-14 | Stop reason: HOSPADM

## 2018-02-13 RX ADMIN — DOCUSATE SODIUM -SENNOSIDES 2 TABLET: 50; 8.6 TABLET, COATED ORAL at 08:20

## 2018-02-13 RX ADMIN — FUROSEMIDE 40 MG: 40 TABLET ORAL at 08:20

## 2018-02-13 RX ADMIN — ACETAMINOPHEN 650 MG: 325 TABLET, FILM COATED ORAL at 16:21

## 2018-02-13 RX ADMIN — BUDESONIDE AND FORMOTEROL FUMARATE DIHYDRATE 2 PUFF: 160; 4.5 AEROSOL RESPIRATORY (INHALATION) at 08:54

## 2018-02-13 RX ADMIN — ATORVASTATIN CALCIUM 10 MG: 10 TABLET, FILM COATED ORAL at 20:18

## 2018-02-13 RX ADMIN — ACETAMINOPHEN 650 MG: 325 TABLET, FILM COATED ORAL at 10:53

## 2018-02-13 RX ADMIN — FUROSEMIDE 40 MG: 40 TABLET ORAL at 20:18

## 2018-02-13 RX ADMIN — NIFEDIPINE 30 MG: 30 TABLET, FILM COATED, EXTENDED RELEASE ORAL at 08:20

## 2018-02-13 RX ADMIN — MONTELUKAST 10 MG: 10 TABLET, FILM COATED ORAL at 20:18

## 2018-02-13 RX ADMIN — ASPIRIN 81 MG: 81 TABLET, CHEWABLE ORAL at 08:20

## 2018-02-13 RX ADMIN — BUDESONIDE AND FORMOTEROL FUMARATE DIHYDRATE 2 PUFF: 160; 4.5 AEROSOL RESPIRATORY (INHALATION) at 21:05

## 2018-02-13 RX ADMIN — KETOTIFEN FUMARATE 1 DROP: 0.35 SOLUTION/ DROPS OPHTHALMIC at 20:23

## 2018-02-13 RX ADMIN — LINEZOLID 600 MG: 600 TABLET, FILM COATED ORAL at 20:18

## 2018-02-13 RX ADMIN — TIOTROPIUM BROMIDE 1 CAPSULE: 18 CAPSULE ORAL; RESPIRATORY (INHALATION) at 08:54

## 2018-02-13 RX ADMIN — ACETAMINOPHEN 650 MG: 325 TABLET, FILM COATED ORAL at 04:35

## 2018-02-13 RX ADMIN — PREGABALIN 75 MG: 75 CAPSULE ORAL at 16:21

## 2018-02-13 RX ADMIN — WATER 2 G: 1 INJECTION INTRAMUSCULAR; INTRAVENOUS; SUBCUTANEOUS at 20:23

## 2018-02-13 RX ADMIN — LINEZOLID 600 MG: 600 INJECTION, SOLUTION INTRAVENOUS at 08:20

## 2018-02-13 RX ADMIN — PYRIDOXINE HCL TAB 50 MG 50 MG: 50 TAB at 08:20

## 2018-02-13 RX ADMIN — TAMSULOSIN HYDROCHLORIDE 0.4 MG: 0.4 CAPSULE ORAL at 20:18

## 2018-02-13 RX ADMIN — PANTOPRAZOLE SODIUM 40 MG: 40 TABLET, DELAYED RELEASE ORAL at 05:57

## 2018-02-13 RX ADMIN — RIVAROXABAN 20 MG: 20 TABLET, FILM COATED ORAL at 18:13

## 2018-02-13 RX ADMIN — WATER 2 G: 1 INJECTION INTRAMUSCULAR; INTRAVENOUS; SUBCUTANEOUS at 04:35

## 2018-02-13 RX ADMIN — KETOTIFEN FUMARATE 1 DROP: 0.35 SOLUTION/ DROPS OPHTHALMIC at 08:33

## 2018-02-13 RX ADMIN — PREDNISONE 40 MG: 20 TABLET ORAL at 08:20

## 2018-02-13 RX ADMIN — FUROSEMIDE 40 MG: 40 TABLET ORAL at 16:21

## 2018-02-13 RX ADMIN — ENOXAPARIN SODIUM 110 MG: 120 INJECTION SUBCUTANEOUS at 13:39

## 2018-02-13 RX ADMIN — PREGABALIN 75 MG: 75 CAPSULE ORAL at 05:56

## 2018-02-13 RX ADMIN — ACETAMINOPHEN 650 MG: 325 TABLET, FILM COATED ORAL at 22:13

## 2018-02-13 RX ADMIN — WATER 2 G: 1 INJECTION INTRAMUSCULAR; INTRAVENOUS; SUBCUTANEOUS at 13:40

## 2018-02-13 RX ADMIN — DOCUSATE SODIUM -SENNOSIDES 2 TABLET: 50; 8.6 TABLET, COATED ORAL at 20:18

## 2018-02-13 NOTE — PROGRESS NOTES
[facility]  Phone: [phone]  Suresh Hernandez MD    Subjective:  LOS: 6    Continues to improve. Uses continuous O2 at 2L at home.    Objective   Vital Signs past 24hrs  BP range: BP: (115-126)/(72-85) 126/83  Pulse range: Heart Rate:  [79-96] 94  Resp rate range: Resp:  [18-20] 20  Temp range: Temp (24hrs), Av.8 °F (36.6 °C), Min:97.4 °F (36.3 °C), Max:98.2 °F (36.8 °C)    O2 Device: nasal cannula with humidificationFlow (L/min):  [3] 3  Oxygen range:SpO2:  [90 %-92 %] 91 %   109 kg (240 lb); Body mass index is 30.81 kg/(m^2).    Intake/Output Summary (Last 24 hours) at 18 1502  Last data filed at 18 1300   Gross per 24 hour   Intake              480 ml   Output             1750 ml   Net            -1270 ml       Physical Exam   Cardiovascular: Normal rate and regular rhythm.    No murmur heard.  Pulmonary/Chest: He has decreased breath sounds. He has no wheezes. He has rales (minimal in bases now) in the right lower field and the left lower field.   Abdominal: Soft. Bowel sounds are normal. There is no tenderness.   Musculoskeletal: He exhibits no edema.   Neurological: He is alert.   Nursing note and vitals reviewed.    Results Review:    I have reviewed the laboratory and imaging data since the last note by [facility] physician.  My annotations are noted in assessment and plan.    Medication Review:  I have reviewed the current MAR.  My annotations are noted in assessment and plan.      Pharmacy to Dose enoxaparin (LOVENOX)      Plan   PCCM Problems  Acute on chronic hypoxemic resp failure  Pneumonia - bilateral on CT, left prominent on CxR  ILD ??  COPD  Severe pulmonary hypertension  Afib on AC  Hyponatremia      Plan of Treatment  Pneumonia. Likely Staph but unable to confirm. Can switch to oral Zyvox to finish 10-14 days and if CxR better, no objections to discharge on same.    In view of improvement, no plans to bronch. Resume Xarelto.    Hypoxia improving. Wean O2. Almost at home  flows.    Underlying COPD, not wheezing any more. Can taper off prednisone on discharge.    ECHO shows severe PHT, possibly due to COPD. [facility]. Contributing to hypoxia.    Address low Na per renal. On Lasix.    Patient plans to [facility] Teran - advised to seek CxR or CT in 4-6 weeks for clearing of infiltrates.    No objection to discharge if continues to improve tomorrow.    Suresh Hernandez MD  02/13/18  3:02 PM    Part of this note may be an electronic transcription/translation of spoken language to printed text using the Dragon Dictation System.

## 2018-02-13 NOTE — PROGRESS NOTES
Subjective sob better chronic neuropathic pain LE no other c/o    Chief complaint:hyponatremia         Objective:      Vital Signs  Temp:  [97 °F (36.1 °C)-98.2 °F (36.8 °C)] 98.2 °F (36.8 °C)  Heart Rate:  [79-96] 94  Resp:  [18-20] 20  BP: (115-133)/(72-86) 126/83        Intake/Output Summary (Last 24 hours) at 02/13/18 1243  Last data filed at 02/13/18 0900   Gross per 24 hour   Intake              240 ml   Output             2650 ml   Net            -2410 ml           Physical Exam    Constitutional : well developed ,No acute distress  ENMT lips pink oral mucosa moist   Eyes sclerae white PERRL  Respiratory: Clear to auscultation , No crackles no wheezing respirations are even and un-labored  GI: Soft, Non tender, BS active in all 4 quadrants  CVS: S1 S2 regular, no rub murmur or gallops  Skin warm dry no rashes no petechiae  Neuro grossly nonfocal cranial nerves 2-12 grossly intact sensation intact  Ext: no LE  edema, Peripheral pulses intact boot Right foot   Heme no cervical  lymphadenopathy no petechiae         Results Review:      Lab Results   Component Value Date    GLUCOSE 123 (H) 02/13/2018    CALCIUM 8.1 (L) 02/13/2018     (L) 02/13/2018    K 3.7 02/13/2018    CO2 33.6 (H) 02/13/2018    CL 93 (L) 02/13/2018    BUN 18 02/13/2018    CREATININE 0.85 02/13/2018    EGFRIFNONA 88 02/13/2018    BCR 21.2 02/13/2018    ANIONGAP 7.4 02/13/2018       Lab Results   Component Value Date    WBC 8.37 02/08/2018    HGB 11.5 (L) 02/08/2018    HCT 38.3 (L) 02/08/2018    MCV 69.3 (L) 02/08/2018     02/13/2018       Pertinent Imaging studies were reviewed      Medication Review:       Current Facility-Administered Medications:   •  acetaminophen (TYLENOL) tablet 650 mg, 650 mg, Oral, Q6H PRN, Ru Royal MD, 650 mg at 02/12/18 1403  •  acetaminophen (TYLENOL) tablet 650 mg, 650 mg, Oral, Q4H PRN, Ru Royal MD, 650 mg at 02/13/18 1053  •  aspirin chewable tablet 81 mg, 81 mg, Oral, Daily, Ru Royal MD,  81 mg at 02/13/18 0820  •  atorvastatin (LIPITOR) tablet 10 mg, 10 mg, Oral, Daily, Jawangelica Royal MD, 10 mg at 02/12/18 2126  •  budesonide-formoterol (SYMBICORT) 160-4.5 MCG/ACT inhaler 2 puff, 2 puff, Inhalation, BID - RT, Torey Burrell MD, 2 puff at 02/13/18 0854  •  ceFEPime (MAXIPIME) in SWFI 2g/10ml IV PUSH syringe, 2 g, Intravenous, Q8H, Torey Burrell MD, 2 g at 02/13/18 0435  •  enoxaparin (LOVENOX) syringe 110 mg, 1 mg/kg, Subcutaneous, Q12H, Suresh Hernandez MD, 110 mg at 02/12/18 2226  •  furosemide (LASIX) tablet 40 mg, 40 mg, Oral, TID, Lamine Franklin MD, 40 mg at 02/13/18 0820  •  hydrocortisone 1 % cream, , Topical, Q6H PRN, Torey Burrell MD  •  ketotifen (ZADITOR) 0.025 % ophthalmic solution 1 drop, 1 drop, Both Eyes, BID, Jawangelica Royal MD, 1 drop at 02/13/18 0833  •  Linezolid (ZYVOX) 600 mg 300 mL, 600 mg, Intravenous, Q12H, Suresh Hernandez MD, Last Rate: 300 mL/hr at 02/13/18 0820, 600 mg at 02/13/18 0820  •  montelukast (SINGULAIR) tablet 10 mg, 10 mg, Oral, Nightly, Ru Royal MD, 10 mg at 02/12/18 2126  •  NIFEdipine XL (PROCARDIA XL) 24 hr tablet 30 mg, 30 mg, Oral, Daily, Torey Burrell MD, 30 mg at 02/13/18 0820  •  ondansetron (ZOFRAN) injection 4 mg, 4 mg, Intravenous, Q6H PRN, Ru Royal MD  •  pantoprazole (PROTONIX) EC tablet 40 mg, 40 mg, Oral, Q AM, Ru Royal MD, 40 mg at 02/13/18 0557  •  Pharmacy to Dose enoxaparin (LOVENOX), , Does not apply, Continuous PRN, Suresh Hernandez MD  •  predniSONE (DELTASONE) tablet 40 mg, 40 mg, Oral, Daily With Breakfast, Suresh Hernandez MD, 40 mg at 02/13/18 0820  •  pregabalin (LYRICA) capsule 75 mg, 75 mg, Oral, Q12H, Jawed MD Lele, 75 mg at 02/13/18 0556  •  sennosides-docusate sodium (SENOKOT-S) 8.6-50 MG tablet 2 tablet, 2 tablet, Oral, BID, Torey Burrell MD, 2 tablet at 02/13/18 0820  •  sodium chloride (OCEAN) nasal spray 2 spray, 2 spray, Nasal, PRN, Jawangelica Royal MD  •  sodium chloride 0.9 % flush 1-10 mL,  1-10 mL, Intravenous, PRN, Jawangelica Royal MD, 10 mL at 02/10/18 1308  •  tamsulosin (FLOMAX) 24 hr capsule 0.4 mg, 0.4 mg, Oral, Nightly, Jawangelica Royal MD, 0.4 mg at 02/12/18 2126  •  tiotropium (SPIRIVA) 18 MCG per inhalation capsule 1 capsule, 1 capsule, Inhalation, Daily - RT, Ru Royal MD, 1 capsule at 02/13/18 0854  •  vitamin B-6 (PYRIDOXINE) tablet 50 mg, 50 mg, Oral, Daily, Jawangelica Royal MD, 50 mg at 02/13/18 0820    Pharmacy to Dose enoxaparin (LOVENOX)        Assesment    Hyponatremia  HTN  PH  PNA      Plan:  SNa 134 stable continue lasix still on high dose steroids  bp ok        Lamine Franklin MD  02/13/18  12:43 PM  Tel: [phone]  Fax: [phone]

## 2018-02-13 NOTE — PROGRESS NOTES
"DAILY PROGRESS NOTE  [facility]    Patient Identification:  Name: Alessio Allen  Age: 77 y.o.  Sex: male  :  1940  MRN: [id number]         Primary Care Physician: Alan Santana MD      Subjective  No new c/o.  Overall feeling much better.     Objective:  General Appearance:  Comfortable, well-appearing, in no acute distress and not in pain.    Vital signs: (most recent): Blood pressure 126/83, pulse 94, temperature 98.2 °F (36.8 °C), temperature source Oral, resp. rate 20, height 188 cm (74\"), weight 109 kg (240 lb), SpO2 91 %.    Lungs:  Normal respiratory rate and normal effort.  He is not in respiratory distress.  No stridor.  There are decreased breath sounds.  No wheezes, rales or rhonchi.    Heart: Normal rate.  Irregular rhythm.  (Paced. )  Extremities: There is no dependent edema.    Neurological: Patient is alert and oriented to person, place and time.    Skin:  Warm and dry.                Vital signs in last 24 hours:  Temp:  [97.4 °F (36.3 °C)-98.2 °F (36.8 °C)] 98.2 °F (36.8 °C)  Heart Rate:  [79-96] 94  Resp:  [18-20] 20  BP: (115-126)/(72-85) 126/83    Intake/Output:    Intake/Output Summary (Last 24 hours) at 18 1705  Last data filed at 18 1300   Gross per 24 hour   Intake              480 ml   Output             1750 ml   Net            -1270 ml           Results from last 7 days  Lab Units 18  0401 18  0317   WBC 10*3/mm3  --  8.37   HEMOGLOBIN g/dL  --  11.5*   PLATELETS 10*3/mm3 346 274     Results from last 7 days  Lab Units 18  0401 18  0347 18  0542 02/10/18  0456 18  0315 18  0317   SODIUM mmol/L 134* 134* 132* 131* 126* 131*   POTASSIUM mmol/L 3.7 4.1 3.7 4.1 3.3* 3.8   CHLORIDE mmol/L 93* 94* 92* 91* 87* 92*   CO2 mmol/L 33.6* 27.4 28.1 27.6 27.0 25.5   BUN mg/dL 18 21 22 17 17 10   CREATININE mg/dL 0.85 0.94 0.83 0.78 0.83 0.68*   GLUCOSE mg/dL 123* 155* 137* 167* 102* 147*   Estimated Creatinine Clearance: " 97.2 mL/min (by C-G formula based on Cr of 0.85).  Results from last 7 days  Lab Units 02/13/18  0401 02/12/18  0347 02/11/18  0542 02/10/18  0456 02/09/18  0315 02/08/18  0317   CALCIUM mg/dL 8.1* 8.3* 8.4* 9.0 8.1* 9.1   ALBUMIN g/dL  --   --   --   --   --  3.20*     Results from last 7 days  Lab Units 02/08/18  0317   ALBUMIN g/dL 3.20*   BILIRUBIN mg/dL 0.5   ALK PHOS U/L 64   AST (SGOT) U/L 14   ALT (SGPT) U/L 17       Assessment:  Principal Problem:    Pneumonia of both lungs due to infectious organism  Active Problems:    Acute on chronic hypoxic respiratory failure.    A-fib    BP (high blood pressure)    Neuropathy    Hypertension    COPD (chronic obstructive pulmonary disease) w exacerbation.      Chronic anticoagulation    Pulm HTN    Microcytic anemia - check iron panel.     Hyponatremia.     Plan:  See above.  Pulm, Renal input appreciated.  D/C planning.     Marcos Trujillo MD  2/13/2018  5:05 PM

## 2018-02-13 NOTE — PLAN OF CARE
Problem: Patient Care Overview (Adult)  Goal: Plan of Care Review  Outcome: Ongoing (interventions implemented as appropriate)   02/13/18 0402 02/13/18 0818 02/13/18 1548   Coping/Psychosocial Response Interventions   Plan Of Care Reviewed With --  patient --    Patient Care Overview   Progress improving --  --    Outcome Evaluation   Outcome Summary/Follow up Plan --  --  patient alert and oriented today. Doing well and still trying to wean off O2     Goal: Adult Individualization and Mutuality  Outcome: Ongoing (interventions implemented as appropriate)    Goal: Discharge Needs Assessment  Outcome: Ongoing (interventions implemented as appropriate)      Problem: Activity Intolerance (Adult)  Goal: Activity Tolerance  Outcome: Ongoing (interventions implemented as appropriate)    Goal: Effective Energy Conservation Techniques  Outcome: Ongoing (interventions implemented as appropriate)      Problem: Fall Risk (Adult)  Goal: Absence of Falls  Outcome: Ongoing (interventions implemented as appropriate)

## 2018-02-13 NOTE — PLAN OF CARE
Problem: Patient Care Overview (Adult)  Goal: Plan of Care Review  Outcome: Ongoing (interventions implemented as appropriate)   02/13/18 0402   Coping/Psychosocial Response Interventions   Plan Of Care Reviewed With patient   Patient Care Overview   Progress improving   Outcome Evaluation   Outcome Summary/Follow up Plan pt alert and oriented X4. VSS, still complaining of foot pain, managed with tylenol. ot ambulates with walker to bathroom. diuresing well. O2 down to 3L. pt uses IS on his own, resting well. will continue to monitor.      Goal: Adult Individualization and Mutuality  Outcome: Ongoing (interventions implemented as appropriate)    Goal: Discharge Needs Assessment  Outcome: Ongoing (interventions implemented as appropriate)      Problem: Activity Intolerance (Adult)  Goal: Activity Tolerance  Outcome: Ongoing (interventions implemented as appropriate)    Goal: Effective Energy Conservation Techniques  Outcome: Ongoing (interventions implemented as appropriate)      Problem: Fall Risk (Adult)  Goal: Absence of Falls  Outcome: Ongoing (interventions implemented as appropriate)

## 2018-02-14 ENCOUNTER — APPOINTMENT (OUTPATIENT)
Dept: GENERAL RADIOLOGY | Facility: HOSPITAL | Age: 78
End: 2018-02-14
Attending: INTERNAL MEDICINE

## 2018-02-14 VITALS
TEMPERATURE: 97.2 F | HEART RATE: 83 BPM | WEIGHT: 252.2 LBS | RESPIRATION RATE: 18 BRPM | DIASTOLIC BLOOD PRESSURE: 79 MMHG | SYSTOLIC BLOOD PRESSURE: 122 MMHG | BODY MASS INDEX: 32.37 KG/M2 | HEIGHT: 74 IN | OXYGEN SATURATION: 90 %

## 2018-02-14 PROBLEM — E87.1 HYPONATREMIA: Status: ACTIVE | Noted: 2018-02-14

## 2018-02-14 LAB
ANION GAP SERPL CALCULATED.3IONS-SCNC: 6.1 MMOL/L
ANISOCYTOSIS BLD QL: NORMAL
BASOPHILS # BLD AUTO: 0.01 10*3/MM3 (ref 0–0.2)
BASOPHILS NFR BLD AUTO: 0.1 % (ref 0–1.5)
BUN BLD-MCNC: 20 MG/DL (ref 8–23)
BUN/CREAT SERPL: 21.5 (ref 7–25)
CALCIUM SPEC-SCNC: 8.2 MG/DL (ref 8.6–10.5)
CHLORIDE SERPL-SCNC: 93 MMOL/L (ref 98–107)
CO2 SERPL-SCNC: 34.9 MMOL/L (ref 22–29)
CREAT BLD-MCNC: 0.93 MG/DL (ref 0.76–1.27)
DEPRECATED RDW RBC AUTO: 50.9 FL (ref 37–54)
ELLIPTOCYTES BLD QL SMEAR: NORMAL
EOSINOPHIL # BLD AUTO: 0.07 10*3/MM3 (ref 0–0.7)
EOSINOPHIL NFR BLD AUTO: 0.5 % (ref 0.3–6.2)
ERYTHROCYTE [DISTWIDTH] IN BLOOD BY AUTOMATED COUNT: 20.4 % (ref 11.5–14.5)
GFR SERPL CREATININE-BSD FRML MDRD: 79 ML/MIN/1.73
GLUCOSE BLD-MCNC: 96 MG/DL (ref 65–99)
HCT VFR BLD AUTO: 41.7 % (ref 40.4–52.2)
HGB BLD-MCNC: 12.6 G/DL (ref 13.7–17.6)
IMM GRANULOCYTES # BLD: 0.03 10*3/MM3 (ref 0–0.03)
IMM GRANULOCYTES NFR BLD: 0.2 % (ref 0–0.5)
IRON 24H UR-MRATE: 26 MCG/DL (ref 59–158)
IRON SATN MFR SERPL: 6 % (ref 20–50)
LYMPHOCYTES # BLD AUTO: 2.55 10*3/MM3 (ref 0.9–4.8)
LYMPHOCYTES NFR BLD AUTO: 19.8 % (ref 19.6–45.3)
MCH RBC QN AUTO: 21 PG (ref 27–32.7)
MCHC RBC AUTO-ENTMCNC: 30.2 G/DL (ref 32.6–36.4)
MCV RBC AUTO: 69.4 FL (ref 79.8–96.2)
MICROCYTES BLD QL: NORMAL
MONOCYTES # BLD AUTO: 1.42 10*3/MM3 (ref 0.2–1.2)
MONOCYTES NFR BLD AUTO: 11 % (ref 5–12)
NEUTROPHILS # BLD AUTO: 8.82 10*3/MM3 (ref 1.9–8.1)
NEUTROPHILS NFR BLD AUTO: 68.4 % (ref 42.7–76)
PLAT MORPH BLD: NORMAL
PLATELET # BLD AUTO: 407 10*3/MM3 (ref 140–500)
PMV BLD AUTO: 8.6 FL (ref 6–12)
POTASSIUM BLD-SCNC: 3.2 MMOL/L (ref 3.5–5.2)
POTASSIUM BLD-SCNC: 4 MMOL/L (ref 3.5–5.2)
RBC # BLD AUTO: 6.01 10*6/MM3 (ref 4.6–6)
SODIUM BLD-SCNC: 134 MMOL/L (ref 136–145)
STOMATOCYTES BLD QL SMEAR: NORMAL
TARGETS BLD QL SMEAR: NORMAL
TIBC SERPL-MCNC: 402 MCG/DL
TRANSFERRIN SERPL-MCNC: 270 MG/DL (ref 200–360)
WBC MORPH BLD: NORMAL
WBC NRBC COR # BLD: 12.9 10*3/MM3 (ref 4.5–10.7)

## 2018-02-14 PROCEDURE — 84466 ASSAY OF TRANSFERRIN: CPT | Performed by: HOSPITALIST

## 2018-02-14 PROCEDURE — 83540 ASSAY OF IRON: CPT | Performed by: HOSPITALIST

## 2018-02-14 PROCEDURE — 85007 BL SMEAR W/DIFF WBC COUNT: CPT | Performed by: HOSPITALIST

## 2018-02-14 PROCEDURE — 84132 ASSAY OF SERUM POTASSIUM: CPT | Performed by: HOSPITALIST

## 2018-02-14 PROCEDURE — 25010000002 CEFEPIME PER 500 MG: Performed by: INTERNAL MEDICINE

## 2018-02-14 PROCEDURE — 71046 X-RAY EXAM CHEST 2 VIEWS: CPT

## 2018-02-14 PROCEDURE — 80048 BASIC METABOLIC PNL TOTAL CA: CPT | Performed by: INTERNAL MEDICINE

## 2018-02-14 PROCEDURE — 94799 UNLISTED PULMONARY SVC/PX: CPT

## 2018-02-14 PROCEDURE — 85025 COMPLETE CBC W/AUTO DIFF WBC: CPT | Performed by: HOSPITALIST

## 2018-02-14 PROCEDURE — 63710000001 PREDNISONE PER 1 MG: Performed by: INTERNAL MEDICINE

## 2018-02-14 RX ORDER — POTASSIUM CHLORIDE 1.5 G/1.77G
40 POWDER, FOR SOLUTION ORAL AS NEEDED
Status: DISCONTINUED | OUTPATIENT
Start: 2018-02-14 | End: 2018-02-14 | Stop reason: CLARIF

## 2018-02-14 RX ORDER — POTASSIUM CHLORIDE 750 MG/1
20 TABLET, FILM COATED, EXTENDED RELEASE ORAL DAILY
Qty: 60 TABLET | Refills: 0 | Status: SHIPPED | OUTPATIENT
Start: 2018-02-14 | End: 2018-02-23 | Stop reason: SDUPTHER

## 2018-02-14 RX ORDER — FUROSEMIDE 40 MG/1
40 TABLET ORAL DAILY
Status: DISCONTINUED | OUTPATIENT
Start: 2018-02-15 | End: 2018-02-14 | Stop reason: HOSPADM

## 2018-02-14 RX ORDER — FERROUS SULFATE 325(65) MG
325 TABLET ORAL
Status: DISCONTINUED | OUTPATIENT
Start: 2018-02-14 | End: 2018-02-14 | Stop reason: HOSPADM

## 2018-02-14 RX ORDER — PREDNISONE 20 MG/1
TABLET ORAL
Qty: 9 TABLET | Refills: 0 | Status: SHIPPED | OUTPATIENT
Start: 2018-02-14 | End: 2018-03-05 | Stop reason: HOSPADM

## 2018-02-14 RX ORDER — FERROUS SULFATE 325(65) MG
325 TABLET ORAL
Qty: 30 TABLET | Refills: 0 | Status: SHIPPED | OUTPATIENT
Start: 2018-02-15 | End: 2018-02-23 | Stop reason: SDUPTHER

## 2018-02-14 RX ORDER — LINEZOLID 600 MG/1
600 TABLET, FILM COATED ORAL EVERY 12 HOURS SCHEDULED
Qty: 14 TABLET | Refills: 0 | Status: SHIPPED | OUTPATIENT
Start: 2018-02-14 | End: 2018-02-21

## 2018-02-14 RX ORDER — FUROSEMIDE 40 MG/1
20 TABLET ORAL DAILY
Qty: 15 TABLET | Refills: 0 | Status: SHIPPED | OUTPATIENT
Start: 2018-02-15 | End: 2018-02-23 | Stop reason: SDUPTHER

## 2018-02-14 RX ORDER — POTASSIUM CHLORIDE 750 MG/1
40 CAPSULE, EXTENDED RELEASE ORAL AS NEEDED
Status: DISCONTINUED | OUTPATIENT
Start: 2018-02-14 | End: 2018-02-14 | Stop reason: HOSPADM

## 2018-02-14 RX ADMIN — FERROUS SULFATE TAB 325 MG (65 MG ELEMENTAL FE) 325 MG: 325 (65 FE) TAB at 12:24

## 2018-02-14 RX ADMIN — BUDESONIDE AND FORMOTEROL FUMARATE DIHYDRATE 2 PUFF: 160; 4.5 AEROSOL RESPIRATORY (INHALATION) at 10:08

## 2018-02-14 RX ADMIN — ASPIRIN 81 MG: 81 TABLET, CHEWABLE ORAL at 09:19

## 2018-02-14 RX ADMIN — DOCUSATE SODIUM -SENNOSIDES 2 TABLET: 50; 8.6 TABLET, COATED ORAL at 09:19

## 2018-02-14 RX ADMIN — WATER 2 G: 1 INJECTION INTRAMUSCULAR; INTRAVENOUS; SUBCUTANEOUS at 05:12

## 2018-02-14 RX ADMIN — KETOTIFEN FUMARATE 1 DROP: 0.35 SOLUTION/ DROPS OPHTHALMIC at 09:25

## 2018-02-14 RX ADMIN — PREGABALIN 75 MG: 75 CAPSULE ORAL at 05:12

## 2018-02-14 RX ADMIN — ACETAMINOPHEN 650 MG: 325 TABLET, FILM COATED ORAL at 04:13

## 2018-02-14 RX ADMIN — PANTOPRAZOLE SODIUM 40 MG: 40 TABLET, DELAYED RELEASE ORAL at 05:12

## 2018-02-14 RX ADMIN — NIFEDIPINE 30 MG: 30 TABLET, FILM COATED, EXTENDED RELEASE ORAL at 09:19

## 2018-02-14 RX ADMIN — TIOTROPIUM BROMIDE 1 CAPSULE: 18 CAPSULE ORAL; RESPIRATORY (INHALATION) at 10:08

## 2018-02-14 RX ADMIN — PREDNISONE 40 MG: 20 TABLET ORAL at 09:19

## 2018-02-14 RX ADMIN — LINEZOLID 600 MG: 600 TABLET, FILM COATED ORAL at 09:19

## 2018-02-14 RX ADMIN — POTASSIUM CHLORIDE 40 MEQ: 750 CAPSULE, EXTENDED RELEASE ORAL at 09:19

## 2018-02-14 RX ADMIN — WATER 2 G: 1 INJECTION INTRAMUSCULAR; INTRAVENOUS; SUBCUTANEOUS at 12:25

## 2018-02-14 RX ADMIN — PYRIDOXINE HCL TAB 50 MG 50 MG: 50 TAB at 09:19

## 2018-02-14 RX ADMIN — FUROSEMIDE 40 MG: 40 TABLET ORAL at 09:19

## 2018-02-14 RX ADMIN — POTASSIUM CHLORIDE 40 MEQ: 750 CAPSULE, EXTENDED RELEASE ORAL at 05:12

## 2018-02-14 NOTE — PROGRESS NOTES
[facility]  Phone: [phone]  Suresh Hernandez MD    Subjective:  LOS: 7    Continues to improve. Uses continuous O2 at 2L at home.    Objective   Vital Signs past 24hrs  BP range: BP: (121-124)/(73-82) 122/79  Pulse range: Heart Rate:  [82-85] 83  Resp rate range: Resp:  [16-18] 18  Temp range: Temp (24hrs), Av.3 °F (36.3 °C), Min:97.2 °F (36.2 °C), Max:97.6 °F (36.4 °C)    O2 Device: nasal cannula with humidificationFlow (L/min):  [3] 3  Oxygen range:SpO2:  [88 %-91 %] 90 %   114 kg (252 lb 3.2 oz); Body mass index is 32.38 kg/(m^2).    Intake/Output Summary (Last 24 hours) at 18 1308  Last data filed at 18 0345   Gross per 24 hour   Intake                0 ml   Output             3125 ml   Net            -3125 ml       Physical Exam   Cardiovascular: Normal rate and regular rhythm.    No murmur heard.  Pulmonary/Chest: He has decreased breath sounds. He has no wheezes. He has rales (minimal in bases now) in the right lower field and the left lower field.   Abdominal: Soft. Bowel sounds are normal. There is no tenderness.   Musculoskeletal: He exhibits no edema.   Neurological: He is alert.   Nursing note and vitals reviewed.    Results Review:    I have reviewed the laboratory and imaging data since the last note by [facility] physician.  My annotations are noted in assessment and plan.    Medication Review:  I have reviewed the current MAR.  My annotations are noted in assessment and plan.       Plan   PCCM Problems  Acute on chronic hypoxemic resp failure  Pneumonia - bilateral on CT, left prominent on CxR  ILD ??  COPD  Severe pulmonary hypertension  Afib on AC  Hyponatremia      Plan of Treatment  Pneumonia. Likely Staph but unable to confirm. Oral Zyvox to finish 10-14 days. CxR reviewed and improved per me. Leukocytosis due to steroids.  NEEDS FU IMAGING in 6 weeks and fu with pulmonary. Impressed upon patient and wife.    Hypoxia improving. Wean O2. Almost at home flows.    Underlying  COPD, not wheezing any more. Can taper off prednisone on discharge.    ECHO shows severe PHT, possibly due to COPD. [facility]. Contributing to hypoxia.    Address low Na per renal. On Lasix. Also low K.    Patient plans to fu [facility] Teran - advised to seek CxR or CT in 4-6 weeks for clearing of infiltrates.    No objection to discharge. Discussed with [facility].    Suresh Hernandez MD  02/14/18  1:08 PM    Part of this note may be an electronic transcription/translation of spoken language to printed text using the Dragon Dictation System.

## 2018-02-14 NOTE — PLAN OF CARE
Problem: Patient Care Overview (Adult)  Goal: Plan of Care Review  Outcome: Ongoing (interventions implemented as appropriate)   02/14/18 0307 02/14/18 0918 02/14/18 1333   Coping/Psychosocial Response Interventions   Plan Of Care Reviewed With --  patient --    Patient Care Overview   Progress improving --  --    Outcome Evaluation   Outcome Summary/Follow up Plan --  --  Pt has no complaints, d/c today with home health.     Goal: Adult Individualization and Mutuality  Outcome: Ongoing (interventions implemented as appropriate)      Problem: Activity Intolerance (Adult)  Goal: Activity Tolerance  Outcome: Ongoing (interventions implemented as appropriate)    Goal: Effective Energy Conservation Techniques  Outcome: Ongoing (interventions implemented as appropriate)      Problem: Fall Risk (Adult)  Goal: Absence of Falls  Outcome: Ongoing (interventions implemented as appropriate)

## 2018-02-14 NOTE — PLAN OF CARE
Problem: Patient Care Overview (Adult)  Goal: Plan of Care Review  Outcome: Ongoing (interventions implemented as appropriate)   02/14/18 0307   Coping/Psychosocial Response Interventions   Plan Of Care Reviewed With patient   Patient Care Overview   Progress improving   Outcome Evaluation   Outcome Summary/Follow up Plan pt alert and oriented. VSS, complains of bilateral foot pain, managed with tylenol Q4. trying to wean O2, down to 3L. possible dischareg today. resting well. will contue to monitor.      Goal: Adult Individualization and Mutuality  Outcome: Ongoing (interventions implemented as appropriate)    Goal: Discharge Needs Assessment  Outcome: Ongoing (interventions implemented as appropriate)      Problem: Activity Intolerance (Adult)  Goal: Activity Tolerance  Outcome: Ongoing (interventions implemented as appropriate)    Goal: Effective Energy Conservation Techniques  Outcome: Ongoing (interventions implemented as appropriate)      Problem: Fall Risk (Adult)  Goal: Absence of Falls  Outcome: Ongoing (interventions implemented as appropriate)

## 2018-02-14 NOTE — PROGRESS NOTES
Case Management Discharge Note    Final Note: Discharged  2/14/18 to home with [facility]    Discharge Placement     Facility/Agency Request Status Selected? Address Phone Number Fax Number    [facility] Accepted    Yes [address] [place] [id number] [phone]        Other: Other (private transportation at discharge)    Discharge Codes: 06  Discharged/transferred to home under care of organized home health service organization in anticipation of skilled care

## 2018-02-14 NOTE — PROGRESS NOTES
"   LOS: 7 days   Patient Care Team:  Alan Santana MD as PCP - General (Family Medicine)  Rao Maguire MD as Consulting Physician (Hematology and Oncology)  lAan Santana MD as Referring Physician (Family Medicine)    Chief Complaint/ Reason for encounter: Hyponatremia, diuretic management        Subjective     History of Present Illness    Subjective:  Symptoms:  Improved.  No shortness of breath or chest pain.  (No complaints  Shortness of breath improved  No edema, good appetite  No nausea or vomiting).    Diet:  Adequate intake.  No nausea.    Activity level: Normal.    Pain:  He reports no pain.          History taken from: Patient and chart    Objective     Vital Signs  Temp:  [97.2 °F (36.2 °C)-97.6 °F (36.4 °C)] 97.2 °F (36.2 °C)  Heart Rate:  [82-85] 83  Resp:  [16-18] 18  BP: (121-124)/(73-82) 122/79       Wt Readings from Last 1 Encounters:   02/14/18 0521 114 kg (252 lb 3.2 oz)   02/08/18 0100 109 kg (240 lb)       Objective:  General Appearance:  Comfortable, well-appearing, in no acute distress and not in pain.    Vital signs: (most recent): Blood pressure 122/79, pulse 83, temperature 97.2 °F (36.2 °C), temperature source Oral, resp. rate 18, height 188 cm (74\"), weight 114 kg (252 lb 3.2 oz), SpO2 90 %.  Vital signs are normal.  No fever.    Output: Producing urine.    HEENT: Normal HEENT exam.    Lungs:  Normal respiratory rate and normal effort.  He is not in respiratory distress.  Breath sounds clear to auscultation.  No stridor.  There are decreased breath sounds.  No wheezes, rales or rhonchi.    Heart: Normal rate.  Irregular rhythm.  (Paced. )  Abdomen: Bowel sounds are normal.   There is no abdominal tenderness.  There is no epigastric area or no suprapubic area tenderness.     Extremities: Normal range of motion.  There is no dependent edema.    Neurological: Patient is alert and oriented to person, place and time.    Skin:  Warm and dry.  No rash or cyanosis.             Results " Review:    Past Medical History: reviewed and updated  Past Medical History:   Diagnosis Date   • Arthritis    • Asthma    • Atrial fibrillation    • BPH (benign prostatic hypertrophy)    • Cancer    • COPD (chronic obstructive pulmonary disease)    • H/O Abnormal CBC 2017   • Hyperlipidemia    • Hypertension    • Neuropathy     feet and hands          Allergies:  Allergies   Allergen Reactions   • Albuterol    • Atenolol    • Celebrex [Celecoxib]    • Coreg [Carvedilol] Cough     SOA,   • Ibuprofen    • Levaquin [Levofloxacin]      Pt states he can take   • Lisinopril    • Penicillins    • Sulfa Antibiotics        Intake/Output:     Intake/Output Summary (Last 24 hours) at 02/14/18 1223  Last data filed at 02/14/18 0345   Gross per 24 hour   Intake              240 ml   Output             3125 ml   Net            -2885 ml         DATA:  Radiology and Labs:  The following labs independently reviewed by me  Interval notes, chart personally reviewed by me.       Labs:   Recent Results (from the past 24 hour(s))   Basic Metabolic Panel    Collection Time: 02/14/18  3:28 AM   Result Value Ref Range    Glucose 96 65 - 99 mg/dL    BUN 20 8 - 23 mg/dL    Creatinine 0.93 0.76 - 1.27 mg/dL    Sodium 134 (L) 136 - 145 mmol/L    Potassium 3.2 (L) 3.5 - 5.2 mmol/L    Chloride 93 (L) 98 - 107 mmol/L    CO2 34.9 (H) 22.0 - 29.0 mmol/L    Calcium 8.2 (L) 8.6 - 10.5 mg/dL    eGFR Non African Amer 79 >60 mL/min/1.73    BUN/Creatinine Ratio 21.5 7.0 - 25.0    Anion Gap 6.1 mmol/L   Iron Profile    Collection Time: 02/14/18  3:28 AM   Result Value Ref Range    Iron 26 (L) 59 - 158 mcg/dL    Iron Saturation 6 (L) 20 - 50 %    Transferrin 270 200 - 360 mg/dL    TIBC 402 mcg/dL   CBC Auto Differential    Collection Time: 02/14/18  3:28 AM   Result Value Ref Range    WBC 12.90 (H) 4.50 - 10.70 10*3/mm3    RBC 6.01 (H) 4.60 - 6.00 10*6/mm3    Hemoglobin 12.6 (L) 13.7 - 17.6 g/dL    Hematocrit 41.7 40.4 - 52.2 %    MCV 69.4 (L) 79.8 - 96.2  fL    MCH 21.0 (L) 27.0 - 32.7 pg    MCHC 30.2 (L) 32.6 - 36.4 g/dL    RDW 20.4 (H) 11.5 - 14.5 %    RDW-SD 50.9 37.0 - 54.0 fl    MPV 8.6 6.0 - 12.0 fL    Platelets 407 140 - 500 10*3/mm3    Neutrophil % 68.4 42.7 - 76.0 %    Lymphocyte % 19.8 19.6 - 45.3 %    Monocyte % 11.0 5.0 - 12.0 %    Eosinophil % 0.5 0.3 - 6.2 %    Basophil % 0.1 0.0 - 1.5 %    Immature Grans % 0.2 0.0 - 0.5 %    Neutrophils, Absolute 8.82 (H) 1.90 - 8.10 10*3/mm3    Lymphocytes, Absolute 2.55 0.90 - 4.80 10*3/mm3    Monocytes, Absolute 1.42 (H) 0.20 - 1.20 10*3/mm3    Eosinophils, Absolute 0.07 0.00 - 0.70 10*3/mm3    Basophils, Absolute 0.01 0.00 - 0.20 10*3/mm3    Immature Grans, Absolute 0.03 0.00 - 0.03 10*3/mm3   Scan Slide    Collection Time: 02/14/18  3:28 AM   Result Value Ref Range    Anisocytosis Mod/2+ None Seen    Elliptocytes Slight/1+ None Seen    Microcytes Mod/2+ None Seen    Stomatocytes Slight/1+ None Seen    Target Cells Mod/2+ None Seen    WBC Morphology Normal Normal    Platelet Morphology Normal Normal       Radiology:  Imaging Results (last 24 hours)     Procedure Component Value Units Date/Time    XR Chest 2 View [[id number]] Collected:  02/14/18 0933     Updated:  02/14/18 0933    Narrative:       PA AND LATERAL CHEST X-RAY     HISTORY: Follow-up pneumonia.     TECHNIQUE: PA and lateral images of the chest are provided and  correlated with prior x-rays, most recently February 2018.     FINDINGS: There is a cardiac pacing device. The cardiomediastinal  silhouette is normal. Interstitial and alveolar opacity in the lower  lung zones, more dense on the left than the right, is again observed.  The left base density appears slightly less dense today than on the  recent prior exam. No new abnormality is present. There is no effusion  or pneumothorax.       Impression:       Persistent bibasilar infiltrate with perhaps some mild  interval improvement.                Medications have been reviewed:  Current  Facility-Administered Medications   Medication Dose Route Frequency Provider Last Rate Last Dose   • acetaminophen (TYLENOL) tablet 650 mg  650 mg Oral Q6H PRN Ru Royal MD   650 mg at 02/12/18 1403   • acetaminophen (TYLENOL) tablet 650 mg  650 mg Oral Q4H PRN Ru Royal MD   650 mg at 02/14/18 0413   • aspirin chewable tablet 81 mg  81 mg Oral Daily Ru Royal MD   81 mg at 02/14/18 0919   • atorvastatin (LIPITOR) tablet 10 mg  10 mg Oral Daily Ru Royal MD   10 mg at 02/13/18 2018   • budesonide-formoterol (SYMBICORT) 160-4.5 MCG/ACT inhaler 2 puff  2 puff Inhalation BID - RT Torey Burrell MD   2 puff at 02/14/18 1008   • ceFEPime (MAXIPIME) in SWFI 2g/10ml IV PUSH syringe  2 g Intravenous Q8H Torey Burrell MD   2 g at 02/14/18 0512   • ferrous sulfate tablet 325 mg  325 mg Oral Daily With Breakfast Marcos Trujillo MD       • [START ON 2/15/2018] furosemide (LASIX) tablet 40 mg  40 mg Oral Daily James Tariq MD       • hydrocortisone 1 % cream   Topical Q6H PRN Torey Burrell MD       • ketotifen (ZADITOR) 0.025 % ophthalmic solution 1 drop  1 drop Both Eyes BID Ru Royal MD   1 drop at 02/14/18 0925   • linezolid (ZYVOX) tablet 600 mg  600 mg Oral Q12H Suresh Hernandez MD   600 mg at 02/14/18 0919   • montelukast (SINGULAIR) tablet 10 mg  10 mg Oral Nightly Ru Royal MD   10 mg at 02/13/18 2018   • NIFEdipine XL (PROCARDIA XL) 24 hr tablet 30 mg  30 mg Oral Daily Torey Burrell MD   30 mg at 02/14/18 0919   • ondansetron (ZOFRAN) injection 4 mg  4 mg Intravenous Q6H PRN Ru Royal MD       • pantoprazole (PROTONIX) EC tablet 40 mg  40 mg Oral Q AM Ru Royal MD   40 mg at 02/14/18 0512   • potassium chloride (MICRO-K) CR capsule 40 mEq  40 mEq Oral PRN Franco Montgomery MD   40 mEq at 02/14/18 0919   • predniSONE (DELTASONE) tablet 40 mg  40 mg Oral Daily With Breakfast Suresh Hernandez MD   40 mg at 02/14/18 0919   • pregabalin (LYRICA) capsule 75 mg   75 mg Oral Q12H Jawangelica Royal MD   75 mg at 02/14/18 0512   • rivaroxaban (XARELTO) tablet 20 mg  20 mg Oral Daily With Dinner Suresh Hernandez MD   20 mg at 02/13/18 1813   • sennosides-docusate sodium (SENOKOT-S) 8.6-50 MG tablet 2 tablet  2 tablet Oral BID Torey Burrell MD   2 tablet at 02/14/18 0919   • sodium chloride (OCEAN) nasal spray 2 spray  2 spray Nasal PRN Ru Royal MD       • sodium chloride 0.9 % flush 1-10 mL  1-10 mL Intravenous PRN Ru Royal MD   10 mL at 02/10/18 1308   • tamsulosin (FLOMAX) 24 hr capsule 0.4 mg  0.4 mg Oral Nightly Ru Royal MD   0.4 mg at 02/13/18 2018   • tiotropium (SPIRIVA) 18 MCG per inhalation capsule 1 capsule  1 capsule Inhalation Daily - RT Ru Royal MD   1 capsule at 02/14/18 1008   • vitamin B-6 (PYRIDOXINE) tablet 50 mg  50 mg Oral Daily Jawed MD Lele   50 mg at 02/14/18 0919       Assessment/Plan     Principal Problem:    Pneumonia of both lungs due to infectious organism  Active Problems:    A-fib    BP (high blood pressure)    Neuropathy    Hypertension    COPD (chronic obstructive pulmonary disease)    Asthma    Hypoxia    Pneumonia    Chronic anticoagulation      Assessment:  (Hyponatremia, improved, 134 today  Volume overload, improved with the diuretics  Chronic hypertension  Pulmonary hypertension  Improving pneumonia        Plan:  Renal function remains stable and at baseline  Sodium level improved to 134  Volume status much improved  Decrease Lasix to 40 mg daily  Steroid taper per pulmonary  ).             Continue to monitor renal function, electroytes and volume closely   Please call me with any questions or concerns      James Tariq MD   [facility]   [phone]    02/14/18  12:23 PM      Dictation performed using Dragon dictation software

## 2018-02-14 NOTE — PROGRESS NOTES
Continued Stay Note  [facility]     Patient Name: Alessio Allen  MRN: [id number]  Today's Date: 2/14/2018    Admit Date: 2/7/2018          Discharge Plan       02/14/18 1326    Case Management/Social Work Plan    Plan Home with significant other, [facility] and [facility]    Additional Comments Informed Zoie at [facility] at [phone] of patient's discharge home today.....Bhavna Griffin RN,CCP              Discharge Codes     None        Expected Discharge Date and Time     Expected Discharge Date Expected Discharge Time    Feb 14, 2018             Bhavna Griffin RN

## 2018-02-14 NOTE — DISCHARGE SUMMARY
PHYSICIAN DISCHARGE SUMMARY                                                                        [facility]    Patient Identification:  Name: Alessio Allen  Age: 77 y.o.  Sex: male  :  1940  MRN: [id number]  Primary Care Physician: Alan Santana MD    Admit date: 2018  Discharge date and time: 2018     Discharged Condition: good    Discharge Diagnoses:   Principal Problem:    Pneumonia of both lungs due to infectious organism  Active Problems:    Acute on chronic hypoxic respiratory failure.    A-fib    BP (high blood pressure)    Neuropathy    Hypertension    COPD (chronic obstructive pulmonary disease) w exacerbation.      Chronic anticoagulation    Pulm HTN    Microcytic anemia - Iron def.  (hx/o polycythemia)    Hyponatremia.       Hospital Course:  Pleasant 77 oral gentleman with history of advanced COPD with chronic respiratory failure on 2 L nasal cannula at home.  He presents with increasing shortness of air.  Please see history and physical for full details.  Chest x-ray results revealed haziness in both bases.  On presentation there is no leukocytosis but there was a left shift.  Pro-calcitonin was within normal limits.  He was initially hypoxic with O2 levels are running as low as 86% while using 7 L nasal cannula.  Pulmonary consultation was obtained.  It was their opinion this represented a MRSA ammonia.  Cultures were negative.  He was admitted and COPD exacerbation treated with aggressive steroids and aerosol treatments.  He was covered with broad-spectrum antibiotics and then narrowed down to Zyvox.  He hasn't improved markedly.  Symptomatically he feels he is back to baseline.  O2 has been weaned down to 3 L with good O2 sats.  His baseline is 2 L.  Chest x-ray also shows improvement.  In addition above is also noted be hyponatremic on presentation.  Sodium levels reach as well as 126.  Nephrology  consultation was obtained.  He was on hydrochlorothiazide which was discontinued.  This is later replaced with Lasix.  He is done very nicely with this switch.  Sodium this morning is 134.  Blood pressure remains in good range.  He did receive an echocardiogram during hospitalization which revealed the following:  · The left ventricular cavity is mildly dilated.  · Left ventricular systolic function is normal. Calculated EF = 60%  · Left ventricular wall thickness is consistent with mild concentric hypertrophy.  · Right ventricular cavity is severely dilated.  · Left atrial cavity size is severely dilated  · Right atrial cavity size is severely dilated.  · There is mild aortic stenosis with a peak gradient of 25 mm Hg, and a mean gradient of 12 mm Hg.  · Mild mitral valve regurgitation is present  · Mild to moderate tricuspid valve regurgitation is present.  · Calculated right ventricular systolic pressure from tricuspid regurgitation is 54 mmHg.  · The ascending aorta is moderately dilated, measuring up to 4.4 cm. .  · There is no evidence of pericardial effusion.  Presently he is on all oral treatment and markedly improved and can be discharge remainder of his treatment follow-up as an outpatient.    Consults:     Consults     Date and Time Order Name Status Description    2/10/2018 1624 Inpatient Consult to Orthopedic Surgery Completed     2/9/2018 1130 Inpatient Consult to Nephrology Completed     2/7/2018 2323 Inpatient Consult to Pulmonology              Discharge Exam:  Physical Exam  Afebrile vital signs stable.  Well-developed well-nourished male no apparent distress.  Lungs occasional rhonchi but good air movement.  Heart regular rate and rhythm.  Extremities with no clubbing cyanosis or edema  Alert oriented conversant cooperative and pleasant.       Disposition:  Home    Patient Instructions:    Alessio Allen   Home Medication Instructions VAN:[id number]    Printed on:02/14/18 1319   Medication  Information                      acetaminophen (TYLENOL) 325 MG tablet  Take 2 tablets by mouth Every 6 (Six) Hours As Needed (prn for pain).             acyclovir (ZOVIRAX) 800 MG tablet  Take 1 tablet (800 mg total) by mouth daily as needed (prn daily as needed fever blisters)             albuterol (PROVENTIL HFA;VENTOLIN HFA) 108 (90 BASE) MCG/ACT inhaler  Inhale 2 puffs every 4 (four) hours as needed for wheezing.             aspirin 81 MG tablet  Take 81 mg by mouth Daily.             budesonide-formoterol (SYMBICORT) 160-4.5 MCG/ACT inhaler  Inhale 2 puffs 2 (Two) Times a Day.             cetirizine (ZyrTEC) 10 MG tablet  Take 1 tablet by mouth daily.             Desoximetasone 0.05 % ointment  Apply to affected area bid             ferrous sulfate 325 (65 FE) MG tablet  Take 1 tablet by mouth Daily With Breakfast.             furosemide (LASIX) 40 MG tablet  Take 0.5 tablets by mouth Daily.             linezolid (ZYVOX) 600 MG tablet  Take 1 tablet by mouth Every 12 (Twelve) Hours for 7 days. Indications: Pneumonia             montelukast (SINGULAIR) 10 MG tablet  TAKE 1 TABLET DAILY             Multiple Vitamin (MULTI VITAMIN DAILY) tablet  Take  by mouth.             NIFEdipine XL (PROCARDIA XL) 30 MG 24 hr tablet  Take  by mouth daily.             olopatadine (PATANOL) 0.1 % ophthalmic solution  Administer 1 drop to both eyes daily.             Omega-3 Fatty Acids (FISH OIL) 1000 MG capsule capsule  Take 1,000 mg by mouth daily with breakfast.             omeprazole (PRILOSEC) 20 MG capsule  Take 1 capsule by mouth Daily.             potassium chloride (K-DUR) 10 MEQ CR tablet  Take 2 tablets by mouth Daily.             predniSONE (DELTASONE) 20 MG tablet  Take 2 pills daily for 3 days then take 1 pill daily for 3 days then stop.             pregabalin (LYRICA) 75 MG capsule  Take 75 mg by mouth 2 (two) times a day.             pyridoxine (VITAMIN B-6) 50 MG tablet  Take  by mouth daily.              RABEprazole (ACIPHEX) 20 MG EC tablet  Take 1 tablet by mouth Daily.             sildenafil (VIAGRA) 100 MG tablet  Take 1 tablet by mouth Daily As Needed for erectile dysfunction.             simvastatin (ZOCOR) 20 MG tablet  Take 0.5 tablets by mouth daily.             sodium chloride (OCEAN NASAL SPRAY) 0.65 % nasal spray  2 sprays into each nostril As Needed for Congestion (qid prn). May refill q 21 days             Spacer/Aero-Holding Chambers device  Give spacer to use with his inhalers whichever brand he has received is fine             tamsulosin (FLOMAX) 0.4 MG capsule 24 hr capsule  Take 1 capsule by mouth every night.             tiotropium (SPIRIVA HANDIHALER) 18 MCG per inhalation capsule  Place 2 puffs into inhaler and inhale 1 (one) time daily.             XARELTO 20 MG tablet  Take 20 mg by mouth Daily.               Diet Instructions     Diet: Cardiac       Discharge Diet:  Cardiac               Future Appointments  Date Time Provider Department Center   2/20/2018 3:10 PM Alan Santana MD [facility] None   3/15/2018 1:00 PM LAB CHAIR [facility] None   3/15/2018 1:40 PM Rao Maguire MD [facility]     Additional Instructions for the Follow-ups that You Need to Schedule     Discharge Follow-up with PCP    As directed    Follow Up Details:  1 week           Discharge Follow-up with Specialty: Pulmonology    As directed    Specialty:  Pulmonology    Follow Up Details:  As directed.           Basic Metabolic Panel    Feb 19, 2018 (Approximate)    Results to PCP   Order Comments:  Results to PCP            CBC & Differential    Feb 19, 2018 (Approximate)    Results to PCP   Order Comments:  Results to PCP     Manual Differential:  No                 Follow-up Information     Follow up with [facility] .    Specialty:  Home Health Services    Contact information:    [address] [address] [phone]        Follow up with Alan  JAY Santana MD .    Specialty:  Family Medicine    Why:  1 week    Contact information:    [address] [place] [facility] [address]  [phone]          Discharge Order     Start     Ordered    02/14/18 1315  Discharge patient  Once     Expected Discharge Date:  02/14/18    Discharge Disposition:  Home or Self Care        02/14/18 1319            Total time spent discharging patient including evaluation,post hospitalization follow up,  medication and post hospitalization instructions and education total time exceeds 30 minutes.    Signed:  Marcos Trujillo MD  2/14/2018  1:19 PM    EMR Dragon/Transcription disclaimer:   Much of this encounter note is an electronic transcription/translation of spoken language to printed text. The electronic translation of spoken language may permit erroneous, or at times, nonsensical words or phrases to be inadvertently transcribed; Although I have reviewed the note for such errors, some may still exist.

## 2018-02-16 ENCOUNTER — TELEPHONE (OUTPATIENT)
Dept: FAMILY MEDICINE CLINIC | Facility: CLINIC | Age: 78
End: 2018-02-16

## 2018-02-16 RX ORDER — CETIRIZINE HYDROCHLORIDE 10 MG/1
10 TABLET ORAL DAILY
Qty: 90 TABLET | Refills: 3 | Status: SHIPPED | OUTPATIENT
Start: 2018-02-16 | End: 2018-03-07 | Stop reason: SDUPTHER

## 2018-02-16 NOTE — TELEPHONE ENCOUNTER
DR. GILLESPIE HIS PULMONOLOGIST WANTS HIM TO SWITCH FROM ALLEGRA TO ZYRTEC AND HE NEEDS THE ZTE SENT TO [facility]

## 2018-02-22 ENCOUNTER — OFFICE VISIT (OUTPATIENT)
Dept: FAMILY MEDICINE CLINIC | Facility: CLINIC | Age: 78
End: 2018-02-22

## 2018-02-22 ENCOUNTER — TELEPHONE (OUTPATIENT)
Dept: FAMILY MEDICINE CLINIC | Facility: CLINIC | Age: 78
End: 2018-02-22

## 2018-02-22 VITALS
OXYGEN SATURATION: 88 % | SYSTOLIC BLOOD PRESSURE: 138 MMHG | BODY MASS INDEX: 32.34 KG/M2 | TEMPERATURE: 97.8 F | HEART RATE: 87 BPM | DIASTOLIC BLOOD PRESSURE: 78 MMHG | HEIGHT: 74 IN | WEIGHT: 252 LBS

## 2018-02-22 DIAGNOSIS — J44.9 CHRONIC OBSTRUCTIVE PULMONARY DISEASE, UNSPECIFIED COPD TYPE (HCC): ICD-10-CM

## 2018-02-22 DIAGNOSIS — R09.02 HYPOXIA: ICD-10-CM

## 2018-02-22 DIAGNOSIS — I48.0 PAROXYSMAL ATRIAL FIBRILLATION (HCC): Primary | ICD-10-CM

## 2018-02-22 DIAGNOSIS — E78.5 DYSLIPIDEMIA: ICD-10-CM

## 2018-02-22 DIAGNOSIS — J18.9 PNEUMONIA OF BOTH LUNGS DUE TO INFECTIOUS ORGANISM, UNSPECIFIED PART OF LUNG: ICD-10-CM

## 2018-02-22 DIAGNOSIS — I10 ESSENTIAL HYPERTENSION: ICD-10-CM

## 2018-02-22 PROCEDURE — 99213 OFFICE O/P EST LOW 20 MIN: CPT | Performed by: FAMILY MEDICINE

## 2018-02-22 NOTE — PROGRESS NOTES
SUBJECTIVE:  The patient is [] a 77-year-old white male is here for follow-up.  He was hospitalized earlier in the month for hypoxemia COPD  exacerbation and pneumonia.  He tells me he is doing fine.  He states he feels much better.  He states he feels better even though his O2 sats are in the upper 90s.    PAST MEDICAL HISTORY:  Reviewed.    REVIEW OF SYSTEMS:  Please see above; 14 point ROS otherwise negative.      OBJECTIVE: Vitals signs are reviewed and are stable.  He is chronically ill-appearing  HEENT: PERRLA.  []Throat clear  Neck:  Supple.  []  Lungs:  Clear.    Breath sounds are positive bilaterally.  Breath sounds are diminished.  Heart:  Regular rate and rhythm.  []  Abdomen:   Soft, nontender.  []  70s: Nontender.  No edema currently.    ASSESSMENT:    []Recent pneumonia-hospital follow-up  Recent respiratory failure  Hypoxemia   Recent ankle fracture   COPD    PLAN:  []   Even though the patient does not appear is normal state of health including his breathing he states he feels just fine.  Patient will continue his current medications.  He will follow-up in about 6 weeks.  He'll notify me sooner if any problems.  He's advised to go the emergency room if any problems.    Much of this encounter note is an electronic transcription/translation of spoken language to printed text.  The electronic translation of spoken language may permit erroneous, or at times, nonsensical words or phrases to be inadvertently transcribed.  Although I have reviewed the note for such errors, some may still exist.         Current outpatient and discharge medications have been reconciled for the patient.  Alan Santana MD

## 2018-02-23 RX ORDER — FERROUS SULFATE 325(65) MG
325 TABLET ORAL
Qty: 90 TABLET | Refills: 3 | Status: SHIPPED | OUTPATIENT
Start: 2018-02-23

## 2018-02-23 RX ORDER — FUROSEMIDE 20 MG/1
20 TABLET ORAL DAILY
Qty: 90 TABLET | Refills: 3 | Status: ON HOLD | OUTPATIENT
Start: 2018-02-23 | End: 2018-03-24

## 2018-02-23 RX ORDER — POTASSIUM CHLORIDE 750 MG/1
20 TABLET, FILM COATED, EXTENDED RELEASE ORAL DAILY
Qty: 180 TABLET | Refills: 3 | Status: SHIPPED | OUTPATIENT
Start: 2018-02-23

## 2018-02-26 ENCOUNTER — APPOINTMENT (OUTPATIENT)
Dept: CT IMAGING | Facility: HOSPITAL | Age: 78
End: 2018-02-26

## 2018-02-26 ENCOUNTER — HOSPITAL ENCOUNTER (INPATIENT)
Facility: HOSPITAL | Age: 78
LOS: 7 days | Discharge: NURSING, REHAB OR LONG-TERM CARE | End: 2018-03-05
Attending: FAMILY MEDICINE | Admitting: INTERNAL MEDICINE

## 2018-02-26 ENCOUNTER — APPOINTMENT (OUTPATIENT)
Dept: GENERAL RADIOLOGY | Facility: HOSPITAL | Age: 78
End: 2018-02-26

## 2018-02-26 DIAGNOSIS — J96.21 ACUTE ON CHRONIC RESPIRATORY FAILURE WITH HYPOXIA (HCC): ICD-10-CM

## 2018-02-26 DIAGNOSIS — J44.1 COPD EXACERBATION (HCC): Primary | ICD-10-CM

## 2018-02-26 DIAGNOSIS — R26.2 DIFFICULTY WALKING: ICD-10-CM

## 2018-02-26 LAB
ALBUMIN SERPL-MCNC: 3.8 G/DL (ref 3.5–5.2)
ALBUMIN/GLOB SERPL: 1.3 G/DL
ALP SERPL-CCNC: 77 U/L (ref 39–117)
ALT SERPL W P-5'-P-CCNC: 21 U/L (ref 1–41)
ANION GAP SERPL CALCULATED.3IONS-SCNC: 13.2 MMOL/L
ARTERIAL PATENCY WRIST A: POSITIVE
AST SERPL-CCNC: 19 U/L (ref 1–40)
ATMOSPHERIC PRESS: 757.2 MMHG
BASE EXCESS BLDA CALC-SCNC: 1.4 MMOL/L (ref 0–2)
BASOPHILS # BLD AUTO: 0.03 10*3/MM3 (ref 0–0.2)
BASOPHILS NFR BLD AUTO: 0.2 % (ref 0–1.5)
BDY SITE: ABNORMAL
BILIRUB SERPL-MCNC: 0.7 MG/DL (ref 0.1–1.2)
BUN BLD-MCNC: 10 MG/DL (ref 8–23)
BUN/CREAT SERPL: 11 (ref 7–25)
CALCIUM SPEC-SCNC: 9.2 MG/DL (ref 8.6–10.5)
CHLORIDE SERPL-SCNC: 96 MMOL/L (ref 98–107)
CO2 SERPL-SCNC: 25.8 MMOL/L (ref 22–29)
CREAT BLD-MCNC: 0.91 MG/DL (ref 0.76–1.27)
D DIMER PPP FEU-MCNC: 0.52 MCGFEU/ML (ref 0–0.49)
D-LACTATE SERPL-SCNC: 1 MMOL/L (ref 0.5–2)
D-LACTATE SERPL-SCNC: 2.5 MMOL/L (ref 0.5–2)
DEPRECATED RDW RBC AUTO: 51.1 FL (ref 37–54)
EOSINOPHIL # BLD AUTO: 0.08 10*3/MM3 (ref 0–0.7)
EOSINOPHIL NFR BLD AUTO: 0.6 % (ref 0.3–6.2)
ERYTHROCYTE [DISTWIDTH] IN BLOOD BY AUTOMATED COUNT: 21.9 % (ref 11.5–14.5)
GAS FLOW AIRWAY: 8 LPM
GFR SERPL CREATININE-BSD FRML MDRD: 81 ML/MIN/1.73
GLOBULIN UR ELPH-MCNC: 3 GM/DL
GLUCOSE BLD-MCNC: 129 MG/DL (ref 65–99)
GLUCOSE BLDC GLUCOMTR-MCNC: 98 MG/DL (ref 70–130)
HCO3 BLDA-SCNC: 25.2 MMOL/L (ref 22–28)
HCT VFR BLD AUTO: 39.9 % (ref 40.4–52.2)
HGB BLD-MCNC: 11.9 G/DL (ref 13.7–17.6)
HOLD SPECIMEN: NORMAL
IMM GRANULOCYTES # BLD: 0.2 10*3/MM3 (ref 0–0.03)
IMM GRANULOCYTES NFR BLD: 1.4 % (ref 0–0.5)
INR PPP: 1.28 (ref 0.9–1.1)
LYMPHOCYTES # BLD AUTO: 0.94 10*3/MM3 (ref 0.9–4.8)
LYMPHOCYTES NFR BLD AUTO: 6.6 % (ref 19.6–45.3)
MCH RBC QN AUTO: 21 PG (ref 27–32.7)
MCHC RBC AUTO-ENTMCNC: 29.8 G/DL (ref 32.6–36.4)
MCV RBC AUTO: 70.5 FL (ref 79.8–96.2)
MODALITY: ABNORMAL
MONOCYTES # BLD AUTO: 1.43 10*3/MM3 (ref 0.2–1.2)
MONOCYTES NFR BLD AUTO: 10 % (ref 5–12)
NEUTROPHILS # BLD AUTO: 11.55 10*3/MM3 (ref 1.9–8.1)
NEUTROPHILS NFR BLD AUTO: 81.2 % (ref 42.7–76)
NT-PROBNP SERPL-MCNC: 2167 PG/ML (ref 0–1800)
PCO2 BLDA: 36.4 MM HG (ref 35–45)
PH BLDA: 7.45 PH UNITS (ref 7.35–7.45)
PLATELET # BLD AUTO: 137 10*3/MM3 (ref 140–500)
PMV BLD AUTO: 9.3 FL (ref 6–12)
PO2 BLDA: 49.9 MM HG (ref 80–100)
POTASSIUM BLD-SCNC: 4.6 MMOL/L (ref 3.5–5.2)
PROT SERPL-MCNC: 6.8 G/DL (ref 6–8.5)
PROTHROMBIN TIME: 15.7 SECONDS (ref 11.7–14.2)
RBC # BLD AUTO: 5.66 10*6/MM3 (ref 4.6–6)
SAO2 % BLDCOA: 86.8 % (ref 92–99)
SODIUM BLD-SCNC: 135 MMOL/L (ref 136–145)
TOTAL RATE: 20 BREATHS/MINUTE
TROPONIN T SERPL-MCNC: <0.01 NG/ML (ref 0–0.03)
WBC NRBC COR # BLD: 14.23 10*3/MM3 (ref 4.5–10.7)
WHOLE BLOOD HOLD SPECIMEN: NORMAL
WHOLE BLOOD HOLD SPECIMEN: NORMAL

## 2018-02-26 PROCEDURE — 85379 FIBRIN DEGRADATION QUANT: CPT | Performed by: FAMILY MEDICINE

## 2018-02-26 PROCEDURE — 94640 AIRWAY INHALATION TREATMENT: CPT

## 2018-02-26 PROCEDURE — 80053 COMPREHEN METABOLIC PANEL: CPT | Performed by: FAMILY MEDICINE

## 2018-02-26 PROCEDURE — 83605 ASSAY OF LACTIC ACID: CPT | Performed by: INTERNAL MEDICINE

## 2018-02-26 PROCEDURE — 93005 ELECTROCARDIOGRAM TRACING: CPT | Performed by: FAMILY MEDICINE

## 2018-02-26 PROCEDURE — 25010000002 CEFEPIME PER 500 MG: Performed by: FAMILY MEDICINE

## 2018-02-26 PROCEDURE — 82803 BLOOD GASES ANY COMBINATION: CPT

## 2018-02-26 PROCEDURE — 94799 UNLISTED PULMONARY SVC/PX: CPT

## 2018-02-26 PROCEDURE — 84484 ASSAY OF TROPONIN QUANT: CPT | Performed by: FAMILY MEDICINE

## 2018-02-26 PROCEDURE — 25010000002 METHYLPREDNISOLONE PER 125 MG: Performed by: FAMILY MEDICINE

## 2018-02-26 PROCEDURE — 85610 PROTHROMBIN TIME: CPT | Performed by: INTERNAL MEDICINE

## 2018-02-26 PROCEDURE — 93010 ELECTROCARDIOGRAM REPORT: CPT | Performed by: INTERNAL MEDICINE

## 2018-02-26 PROCEDURE — 71275 CT ANGIOGRAPHY CHEST: CPT

## 2018-02-26 PROCEDURE — 0 IOPAMIDOL PER 1 ML: Performed by: INTERNAL MEDICINE

## 2018-02-26 PROCEDURE — 36600 WITHDRAWAL OF ARTERIAL BLOOD: CPT

## 2018-02-26 PROCEDURE — 87040 BLOOD CULTURE FOR BACTERIA: CPT | Performed by: FAMILY MEDICINE

## 2018-02-26 PROCEDURE — 25010000002 VANCOMYCIN PER 500 MG: Performed by: FAMILY MEDICINE

## 2018-02-26 PROCEDURE — 83605 ASSAY OF LACTIC ACID: CPT | Performed by: FAMILY MEDICINE

## 2018-02-26 PROCEDURE — 25010000002 METHYLPREDNISOLONE PER 40 MG: Performed by: INTERNAL MEDICINE

## 2018-02-26 PROCEDURE — 71046 X-RAY EXAM CHEST 2 VIEWS: CPT

## 2018-02-26 PROCEDURE — 83880 ASSAY OF NATRIURETIC PEPTIDE: CPT | Performed by: FAMILY MEDICINE

## 2018-02-26 PROCEDURE — 82962 GLUCOSE BLOOD TEST: CPT

## 2018-02-26 PROCEDURE — 85025 COMPLETE CBC W/AUTO DIFF WBC: CPT | Performed by: FAMILY MEDICINE

## 2018-02-26 PROCEDURE — 99291 CRITICAL CARE FIRST HOUR: CPT

## 2018-02-26 RX ORDER — METHYLPREDNISOLONE SODIUM SUCCINATE 125 MG/2ML
125 INJECTION, POWDER, LYOPHILIZED, FOR SOLUTION INTRAMUSCULAR; INTRAVENOUS ONCE
Status: COMPLETED | OUTPATIENT
Start: 2018-02-26 | End: 2018-02-26

## 2018-02-26 RX ORDER — PREGABALIN 75 MG/1
75 CAPSULE ORAL EVERY 12 HOURS SCHEDULED
Status: DISCONTINUED | OUTPATIENT
Start: 2018-02-26 | End: 2018-03-05 | Stop reason: HOSPADM

## 2018-02-26 RX ORDER — FUROSEMIDE 20 MG/1
20 TABLET ORAL DAILY
Status: DISCONTINUED | OUTPATIENT
Start: 2018-02-26 | End: 2018-03-05 | Stop reason: HOSPADM

## 2018-02-26 RX ORDER — ASPIRIN 81 MG/1
81 TABLET, CHEWABLE ORAL DAILY
Status: DISCONTINUED | OUTPATIENT
Start: 2018-02-26 | End: 2018-03-05 | Stop reason: HOSPADM

## 2018-02-26 RX ORDER — ACETAMINOPHEN 325 MG/1
650 TABLET ORAL EVERY 6 HOURS PRN
Status: DISCONTINUED | OUTPATIENT
Start: 2018-02-26 | End: 2018-03-05 | Stop reason: HOSPADM

## 2018-02-26 RX ORDER — TAMSULOSIN HYDROCHLORIDE 0.4 MG/1
0.4 CAPSULE ORAL NIGHTLY
Status: DISCONTINUED | OUTPATIENT
Start: 2018-02-26 | End: 2018-03-05 | Stop reason: HOSPADM

## 2018-02-26 RX ORDER — IPRATROPIUM BROMIDE AND ALBUTEROL SULFATE 2.5; .5 MG/3ML; MG/3ML
3 SOLUTION RESPIRATORY (INHALATION) ONCE
Status: COMPLETED | OUTPATIENT
Start: 2018-02-26 | End: 2018-02-26

## 2018-02-26 RX ORDER — METHYLPREDNISOLONE SODIUM SUCCINATE 40 MG/ML
40 INJECTION, POWDER, LYOPHILIZED, FOR SOLUTION INTRAMUSCULAR; INTRAVENOUS EVERY 8 HOURS
Status: DISCONTINUED | OUTPATIENT
Start: 2018-02-26 | End: 2018-03-02

## 2018-02-26 RX ORDER — SODIUM CHLORIDE 0.9 % (FLUSH) 0.9 %
10 SYRINGE (ML) INJECTION AS NEEDED
Status: DISCONTINUED | OUTPATIENT
Start: 2018-02-26 | End: 2018-03-05 | Stop reason: HOSPADM

## 2018-02-26 RX ORDER — IPRATROPIUM BROMIDE AND ALBUTEROL SULFATE 2.5; .5 MG/3ML; MG/3ML
3 SOLUTION RESPIRATORY (INHALATION)
Status: DISCONTINUED | OUTPATIENT
Start: 2018-02-26 | End: 2018-02-27

## 2018-02-26 RX ORDER — SODIUM CHLORIDE 9 MG/ML
10 INJECTION, SOLUTION INTRAVENOUS CONTINUOUS
Status: DISCONTINUED | OUTPATIENT
Start: 2018-02-26 | End: 2018-03-05 | Stop reason: HOSPADM

## 2018-02-26 RX ADMIN — IPRATROPIUM BROMIDE AND ALBUTEROL SULFATE 3 ML: .5; 3 SOLUTION RESPIRATORY (INHALATION) at 13:51

## 2018-02-26 RX ADMIN — VANCOMYCIN HYDROCHLORIDE 2250 MG: 5 INJECTION, POWDER, LYOPHILIZED, FOR SOLUTION INTRAVENOUS at 14:30

## 2018-02-26 RX ADMIN — TAMSULOSIN HYDROCHLORIDE 0.4 MG: 0.4 CAPSULE ORAL at 21:00

## 2018-02-26 RX ADMIN — CEFEPIME HYDROCHLORIDE 1 G: 1 INJECTION, POWDER, FOR SOLUTION INTRAMUSCULAR; INTRAVENOUS at 22:00

## 2018-02-26 RX ADMIN — IOPAMIDOL 95 ML: 755 INJECTION, SOLUTION INTRAVENOUS at 15:37

## 2018-02-26 RX ADMIN — METHYLPREDNISOLONE SODIUM SUCCINATE 125 MG: 125 INJECTION, POWDER, FOR SOLUTION INTRAMUSCULAR; INTRAVENOUS at 12:41

## 2018-02-26 RX ADMIN — IPRATROPIUM BROMIDE AND ALBUTEROL SULFATE 3 ML: .5; 3 SOLUTION RESPIRATORY (INHALATION) at 20:42

## 2018-02-26 RX ADMIN — RIVAROXABAN 20 MG: 20 TABLET, FILM COATED ORAL at 18:40

## 2018-02-26 RX ADMIN — CEFEPIME HYDROCHLORIDE 2 G: 2 INJECTION, POWDER, FOR SOLUTION INTRAVENOUS at 13:49

## 2018-02-26 RX ADMIN — SODIUM CHLORIDE 10 ML/HR: 9 INJECTION, SOLUTION INTRAVENOUS at 19:24

## 2018-02-26 RX ADMIN — METHYLPREDNISOLONE SODIUM SUCCINATE 40 MG: 40 INJECTION, POWDER, LYOPHILIZED, FOR SOLUTION INTRAMUSCULAR; INTRAVENOUS at 16:39

## 2018-02-26 RX ADMIN — PREGABALIN 75 MG: 75 CAPSULE ORAL at 22:30

## 2018-02-27 ENCOUNTER — APPOINTMENT (OUTPATIENT)
Dept: GENERAL RADIOLOGY | Facility: HOSPITAL | Age: 78
End: 2018-02-27
Attending: INTERNAL MEDICINE

## 2018-02-27 LAB
ALBUMIN SERPL-MCNC: 3.3 G/DL (ref 3.5–5.2)
ALBUMIN/GLOB SERPL: 1.1 G/DL
ALP SERPL-CCNC: 61 U/L (ref 39–117)
ALT SERPL W P-5'-P-CCNC: 17 U/L (ref 1–41)
ANION GAP SERPL CALCULATED.3IONS-SCNC: 12.5 MMOL/L
AST SERPL-CCNC: 15 U/L (ref 1–40)
BILIRUB SERPL-MCNC: 0.6 MG/DL (ref 0.1–1.2)
BUN BLD-MCNC: 12 MG/DL (ref 8–23)
BUN/CREAT SERPL: 17.6 (ref 7–25)
CALCIUM SPEC-SCNC: 8.8 MG/DL (ref 8.6–10.5)
CHLORIDE SERPL-SCNC: 97 MMOL/L (ref 98–107)
CO2 SERPL-SCNC: 24.5 MMOL/L (ref 22–29)
CREAT BLD-MCNC: 0.68 MG/DL (ref 0.76–1.27)
DEPRECATED RDW RBC AUTO: 51 FL (ref 37–54)
ERYTHROCYTE [DISTWIDTH] IN BLOOD BY AUTOMATED COUNT: 22.1 % (ref 11.5–14.5)
GFR SERPL CREATININE-BSD FRML MDRD: 113 ML/MIN/1.73
GLOBULIN UR ELPH-MCNC: 3 GM/DL
GLUCOSE BLD-MCNC: 137 MG/DL (ref 65–99)
GLUCOSE BLDC GLUCOMTR-MCNC: 113 MG/DL (ref 70–130)
HCT VFR BLD AUTO: 38 % (ref 40.4–52.2)
HGB BLD-MCNC: 11.2 G/DL (ref 13.7–17.6)
MCH RBC QN AUTO: 20.8 PG (ref 27–32.7)
MCHC RBC AUTO-ENTMCNC: 29.5 G/DL (ref 32.6–36.4)
MCV RBC AUTO: 70.6 FL (ref 79.8–96.2)
PLATELET # BLD AUTO: 124 10*3/MM3 (ref 140–500)
PMV BLD AUTO: 9 FL (ref 6–12)
POTASSIUM BLD-SCNC: 4.7 MMOL/L (ref 3.5–5.2)
PROT SERPL-MCNC: 6.3 G/DL (ref 6–8.5)
RBC # BLD AUTO: 5.38 10*6/MM3 (ref 4.6–6)
SODIUM BLD-SCNC: 134 MMOL/L (ref 136–145)
WBC NRBC COR # BLD: 7.09 10*3/MM3 (ref 4.5–10.7)

## 2018-02-27 PROCEDURE — 25010000002 METHYLPREDNISOLONE PER 40 MG: Performed by: INTERNAL MEDICINE

## 2018-02-27 PROCEDURE — 85027 COMPLETE CBC AUTOMATED: CPT | Performed by: INTERNAL MEDICINE

## 2018-02-27 PROCEDURE — 25010000002 VANCOMYCIN: Performed by: INTERNAL MEDICINE

## 2018-02-27 PROCEDURE — 92610 EVALUATE SWALLOWING FUNCTION: CPT

## 2018-02-27 PROCEDURE — 87081 CULTURE SCREEN ONLY: CPT | Performed by: INTERNAL MEDICINE

## 2018-02-27 PROCEDURE — 94799 UNLISTED PULMONARY SVC/PX: CPT

## 2018-02-27 PROCEDURE — 73600 X-RAY EXAM OF ANKLE: CPT

## 2018-02-27 PROCEDURE — 82962 GLUCOSE BLOOD TEST: CPT

## 2018-02-27 PROCEDURE — 80053 COMPREHEN METABOLIC PANEL: CPT | Performed by: INTERNAL MEDICINE

## 2018-02-27 RX ORDER — PETROLATUM 42 G/100G
OINTMENT TOPICAL
Status: DISCONTINUED | OUTPATIENT
Start: 2018-02-27 | End: 2018-03-05 | Stop reason: HOSPADM

## 2018-02-27 RX ORDER — KETOTIFEN FUMARATE 0.35 MG/ML
1 SOLUTION/ DROPS OPHTHALMIC 2 TIMES DAILY
Status: DISCONTINUED | OUTPATIENT
Start: 2018-02-27 | End: 2018-03-05 | Stop reason: HOSPADM

## 2018-02-27 RX ORDER — PANTOPRAZOLE SODIUM 40 MG/1
40 TABLET, DELAYED RELEASE ORAL
Status: DISCONTINUED | OUTPATIENT
Start: 2018-02-28 | End: 2018-03-05 | Stop reason: HOSPADM

## 2018-02-27 RX ORDER — DEXTROSE MONOHYDRATE 25 G/50ML
25 INJECTION, SOLUTION INTRAVENOUS
Status: DISCONTINUED | OUTPATIENT
Start: 2018-02-27 | End: 2018-03-05 | Stop reason: HOSPADM

## 2018-02-27 RX ORDER — NICOTINE POLACRILEX 4 MG
15 LOZENGE BUCCAL
Status: DISCONTINUED | OUTPATIENT
Start: 2018-02-27 | End: 2018-03-05 | Stop reason: HOSPADM

## 2018-02-27 RX ORDER — BUDESONIDE AND FORMOTEROL FUMARATE DIHYDRATE 160; 4.5 UG/1; UG/1
2 AEROSOL RESPIRATORY (INHALATION)
Status: DISCONTINUED | OUTPATIENT
Start: 2018-02-27 | End: 2018-03-05 | Stop reason: HOSPADM

## 2018-02-27 RX ORDER — IPRATROPIUM BROMIDE AND ALBUTEROL SULFATE 2.5; .5 MG/3ML; MG/3ML
3 SOLUTION RESPIRATORY (INHALATION) EVERY 4 HOURS PRN
Status: DISCONTINUED | OUTPATIENT
Start: 2018-02-27 | End: 2018-03-05 | Stop reason: HOSPADM

## 2018-02-27 RX ORDER — ECHINACEA PURPUREA EXTRACT 125 MG
1 TABLET ORAL AS NEEDED
Status: DISCONTINUED | OUTPATIENT
Start: 2018-02-27 | End: 2018-03-05 | Stop reason: HOSPADM

## 2018-02-27 RX ADMIN — CEFEPIME HYDROCHLORIDE 1 G: 1 INJECTION, POWDER, FOR SOLUTION INTRAMUSCULAR; INTRAVENOUS at 13:03

## 2018-02-27 RX ADMIN — PREGABALIN 75 MG: 75 CAPSULE ORAL at 20:56

## 2018-02-27 RX ADMIN — RIVAROXABAN 20 MG: 20 TABLET, FILM COATED ORAL at 18:25

## 2018-02-27 RX ADMIN — METHYLPREDNISOLONE SODIUM SUCCINATE 40 MG: 40 INJECTION, POWDER, LYOPHILIZED, FOR SOLUTION INTRAMUSCULAR; INTRAVENOUS at 17:11

## 2018-02-27 RX ADMIN — VANCOMYCIN HYDROCHLORIDE 1500 MG: 1 INJECTION, POWDER, LYOPHILIZED, FOR SOLUTION INTRAVENOUS at 14:58

## 2018-02-27 RX ADMIN — METHYLPREDNISOLONE SODIUM SUCCINATE 40 MG: 40 INJECTION, POWDER, LYOPHILIZED, FOR SOLUTION INTRAMUSCULAR; INTRAVENOUS at 08:32

## 2018-02-27 RX ADMIN — VANCOMYCIN HYDROCHLORIDE 1500 MG: 1 INJECTION, POWDER, LYOPHILIZED, FOR SOLUTION INTRAVENOUS at 02:30

## 2018-02-27 RX ADMIN — PREGABALIN 75 MG: 75 CAPSULE ORAL at 08:34

## 2018-02-27 RX ADMIN — ASPIRIN 81 MG: 81 TABLET, CHEWABLE ORAL at 08:29

## 2018-02-27 RX ADMIN — TIOTROPIUM BROMIDE 1 CAPSULE: 18 CAPSULE ORAL; RESPIRATORY (INHALATION) at 11:47

## 2018-02-27 RX ADMIN — ACETAMINOPHEN 650 MG: 325 TABLET ORAL at 18:25

## 2018-02-27 RX ADMIN — METHYLPREDNISOLONE SODIUM SUCCINATE 40 MG: 40 INJECTION, POWDER, LYOPHILIZED, FOR SOLUTION INTRAMUSCULAR; INTRAVENOUS at 00:45

## 2018-02-27 RX ADMIN — KETOTIFEN FUMARATE 1 DROP: 0.35 SOLUTION/ DROPS OPHTHALMIC at 20:56

## 2018-02-27 RX ADMIN — ACETAMINOPHEN 650 MG: 325 TABLET ORAL at 23:15

## 2018-02-27 RX ADMIN — Medication 1 SPRAY: at 11:19

## 2018-02-27 RX ADMIN — BUDESONIDE AND FORMOTEROL FUMARATE DIHYDRATE 2 PUFF: 160; 4.5 AEROSOL RESPIRATORY (INHALATION) at 20:23

## 2018-02-27 RX ADMIN — CEFEPIME HYDROCHLORIDE 1 G: 1 INJECTION, POWDER, FOR SOLUTION INTRAMUSCULAR; INTRAVENOUS at 06:02

## 2018-02-27 RX ADMIN — KETOTIFEN FUMARATE 1 DROP: 0.35 SOLUTION/ DROPS OPHTHALMIC at 11:18

## 2018-02-27 RX ADMIN — FUROSEMIDE 20 MG: 20 TABLET ORAL at 08:32

## 2018-02-27 RX ADMIN — CEFEPIME HYDROCHLORIDE 1 G: 1 INJECTION, POWDER, FOR SOLUTION INTRAMUSCULAR; INTRAVENOUS at 22:00

## 2018-02-27 RX ADMIN — PETROLATUM: 42 OINTMENT TOPICAL at 11:18

## 2018-02-27 RX ADMIN — IPRATROPIUM BROMIDE AND ALBUTEROL SULFATE 3 ML: .5; 3 SOLUTION RESPIRATORY (INHALATION) at 08:08

## 2018-02-27 RX ADMIN — BUDESONIDE AND FORMOTEROL FUMARATE DIHYDRATE 2 PUFF: 160; 4.5 AEROSOL RESPIRATORY (INHALATION) at 11:47

## 2018-02-27 RX ADMIN — TAMSULOSIN HYDROCHLORIDE 0.4 MG: 0.4 CAPSULE ORAL at 20:56

## 2018-02-28 ENCOUNTER — APPOINTMENT (OUTPATIENT)
Dept: GENERAL RADIOLOGY | Facility: HOSPITAL | Age: 78
End: 2018-02-28

## 2018-02-28 LAB
ANION GAP SERPL CALCULATED.3IONS-SCNC: 12 MMOL/L
BUN BLD-MCNC: 18 MG/DL (ref 8–23)
BUN/CREAT SERPL: 26.1 (ref 7–25)
CALCIUM SPEC-SCNC: 8.5 MG/DL (ref 8.6–10.5)
CHLORIDE SERPL-SCNC: 100 MMOL/L (ref 98–107)
CO2 SERPL-SCNC: 24 MMOL/L (ref 22–29)
CREAT BLD-MCNC: 0.69 MG/DL (ref 0.76–1.27)
DEPRECATED RDW RBC AUTO: 51.8 FL (ref 37–54)
ERYTHROCYTE [DISTWIDTH] IN BLOOD BY AUTOMATED COUNT: 22.2 % (ref 11.5–14.5)
GFR SERPL CREATININE-BSD FRML MDRD: 111 ML/MIN/1.73
GLUCOSE BLD-MCNC: 133 MG/DL (ref 65–99)
GLUCOSE BLDC GLUCOMTR-MCNC: 116 MG/DL (ref 70–130)
GLUCOSE BLDC GLUCOMTR-MCNC: 118 MG/DL (ref 70–130)
GLUCOSE BLDC GLUCOMTR-MCNC: 140 MG/DL (ref 70–130)
GLUCOSE BLDC GLUCOMTR-MCNC: 171 MG/DL (ref 70–130)
HCT VFR BLD AUTO: 36.6 % (ref 40.4–52.2)
HGB BLD-MCNC: 10.8 G/DL (ref 13.7–17.6)
MCH RBC QN AUTO: 21.1 PG (ref 27–32.7)
MCHC RBC AUTO-ENTMCNC: 29.5 G/DL (ref 32.6–36.4)
MCV RBC AUTO: 71.3 FL (ref 79.8–96.2)
PLATELET # BLD AUTO: 124 10*3/MM3 (ref 140–500)
PMV BLD AUTO: 9.5 FL (ref 6–12)
POTASSIUM BLD-SCNC: 4.6 MMOL/L (ref 3.5–5.2)
RBC # BLD AUTO: 5.13 10*6/MM3 (ref 4.6–6)
SODIUM BLD-SCNC: 136 MMOL/L (ref 136–145)
VANCOMYCIN TROUGH SERPL-MCNC: 8 MCG/ML (ref 5–20)
WBC NRBC COR # BLD: 10.44 10*3/MM3 (ref 4.5–10.7)

## 2018-02-28 PROCEDURE — 94799 UNLISTED PULMONARY SVC/PX: CPT

## 2018-02-28 PROCEDURE — 25010000002 CEFEPIME PER 500 MG: Performed by: INTERNAL MEDICINE

## 2018-02-28 PROCEDURE — 25010000002 METHYLPREDNISOLONE PER 40 MG: Performed by: INTERNAL MEDICINE

## 2018-02-28 PROCEDURE — 25010000002 VANCOMYCIN: Performed by: INTERNAL MEDICINE

## 2018-02-28 PROCEDURE — 82962 GLUCOSE BLOOD TEST: CPT

## 2018-02-28 PROCEDURE — 97110 THERAPEUTIC EXERCISES: CPT

## 2018-02-28 PROCEDURE — 63710000001 INSULIN ASPART PER 5 UNITS: Performed by: INTERNAL MEDICINE

## 2018-02-28 PROCEDURE — 74230 X-RAY XM SWLNG FUNCJ C+: CPT

## 2018-02-28 PROCEDURE — 25010000002 VANCOMYCIN 10 G RECONSTITUTED SOLUTION: Performed by: INTERNAL MEDICINE

## 2018-02-28 PROCEDURE — 97162 PT EVAL MOD COMPLEX 30 MIN: CPT

## 2018-02-28 PROCEDURE — 92611 MOTION FLUOROSCOPY/SWALLOW: CPT

## 2018-02-28 PROCEDURE — 80202 ASSAY OF VANCOMYCIN: CPT | Performed by: INTERNAL MEDICINE

## 2018-02-28 PROCEDURE — 71045 X-RAY EXAM CHEST 1 VIEW: CPT

## 2018-02-28 PROCEDURE — 85027 COMPLETE CBC AUTOMATED: CPT | Performed by: INTERNAL MEDICINE

## 2018-02-28 PROCEDURE — 80048 BASIC METABOLIC PNL TOTAL CA: CPT | Performed by: INTERNAL MEDICINE

## 2018-02-28 RX ADMIN — BARIUM SULFATE 65 ML: 960 POWDER, FOR SUSPENSION ORAL at 11:15

## 2018-02-28 RX ADMIN — PREGABALIN 75 MG: 75 CAPSULE ORAL at 08:50

## 2018-02-28 RX ADMIN — METHYLPREDNISOLONE SODIUM SUCCINATE 40 MG: 40 INJECTION, POWDER, LYOPHILIZED, FOR SOLUTION INTRAMUSCULAR; INTRAVENOUS at 18:40

## 2018-02-28 RX ADMIN — KETOTIFEN FUMARATE 1 DROP: 0.35 SOLUTION/ DROPS OPHTHALMIC at 20:46

## 2018-02-28 RX ADMIN — ASPIRIN 81 MG: 81 TABLET, CHEWABLE ORAL at 08:50

## 2018-02-28 RX ADMIN — BUDESONIDE AND FORMOTEROL FUMARATE DIHYDRATE 2 PUFF: 160; 4.5 AEROSOL RESPIRATORY (INHALATION) at 20:57

## 2018-02-28 RX ADMIN — BUDESONIDE AND FORMOTEROL FUMARATE DIHYDRATE 2 PUFF: 160; 4.5 AEROSOL RESPIRATORY (INHALATION) at 08:09

## 2018-02-28 RX ADMIN — TAMSULOSIN HYDROCHLORIDE 0.4 MG: 0.4 CAPSULE ORAL at 20:45

## 2018-02-28 RX ADMIN — CEFEPIME HYDROCHLORIDE 1 G: 1 INJECTION, POWDER, FOR SOLUTION INTRAMUSCULAR; INTRAVENOUS at 22:21

## 2018-02-28 RX ADMIN — FUROSEMIDE 20 MG: 20 TABLET ORAL at 08:50

## 2018-02-28 RX ADMIN — TIOTROPIUM BROMIDE 1 CAPSULE: 18 CAPSULE ORAL; RESPIRATORY (INHALATION) at 08:09

## 2018-02-28 RX ADMIN — METHYLPREDNISOLONE SODIUM SUCCINATE 40 MG: 40 INJECTION, POWDER, LYOPHILIZED, FOR SOLUTION INTRAMUSCULAR; INTRAVENOUS at 08:50

## 2018-02-28 RX ADMIN — RIVAROXABAN 20 MG: 20 TABLET, FILM COATED ORAL at 18:40

## 2018-02-28 RX ADMIN — BARIUM SULFATE 8 ML: 980 POWDER, FOR SUSPENSION ORAL at 11:15

## 2018-02-28 RX ADMIN — KETOTIFEN FUMARATE 1 DROP: 0.35 SOLUTION/ DROPS OPHTHALMIC at 08:50

## 2018-02-28 RX ADMIN — METHYLPREDNISOLONE SODIUM SUCCINATE 40 MG: 40 INJECTION, POWDER, LYOPHILIZED, FOR SOLUTION INTRAMUSCULAR; INTRAVENOUS at 01:00

## 2018-02-28 RX ADMIN — CEFEPIME HYDROCHLORIDE 1 G: 1 INJECTION, POWDER, FOR SOLUTION INTRAMUSCULAR; INTRAVENOUS at 06:26

## 2018-02-28 RX ADMIN — VANCOMYCIN HYDROCHLORIDE 1500 MG: 1 INJECTION, POWDER, LYOPHILIZED, FOR SOLUTION INTRAVENOUS at 02:00

## 2018-02-28 RX ADMIN — CEFEPIME HYDROCHLORIDE 1 G: 1 INJECTION, POWDER, FOR SOLUTION INTRAMUSCULAR; INTRAVENOUS at 14:02

## 2018-02-28 RX ADMIN — ACETAMINOPHEN 650 MG: 325 TABLET ORAL at 18:42

## 2018-02-28 RX ADMIN — VANCOMYCIN HYDROCHLORIDE 2000 MG: 10 INJECTION, POWDER, LYOPHILIZED, FOR SOLUTION INTRAVENOUS at 17:39

## 2018-02-28 RX ADMIN — PREGABALIN 75 MG: 75 CAPSULE ORAL at 20:46

## 2018-02-28 RX ADMIN — BARIUM SULFATE 50 ML: 400 SUSPENSION ORAL at 11:15

## 2018-02-28 RX ADMIN — PANTOPRAZOLE SODIUM 40 MG: 40 TABLET, DELAYED RELEASE ORAL at 06:27

## 2018-02-28 RX ADMIN — ACETAMINOPHEN 650 MG: 325 TABLET ORAL at 05:20

## 2018-02-28 RX ADMIN — INSULIN ASPART 4 UNITS: 100 INJECTION, SOLUTION INTRAVENOUS; SUBCUTANEOUS at 06:26

## 2018-03-01 LAB
ANION GAP SERPL CALCULATED.3IONS-SCNC: 10.6 MMOL/L
BUN BLD-MCNC: 19 MG/DL (ref 8–23)
BUN/CREAT SERPL: 26.8 (ref 7–25)
CALCIUM SPEC-SCNC: 8.2 MG/DL (ref 8.6–10.5)
CHLORIDE SERPL-SCNC: 99 MMOL/L (ref 98–107)
CO2 SERPL-SCNC: 27.4 MMOL/L (ref 22–29)
CREAT BLD-MCNC: 0.71 MG/DL (ref 0.76–1.27)
DEPRECATED RDW RBC AUTO: 53.6 FL (ref 37–54)
ERYTHROCYTE [DISTWIDTH] IN BLOOD BY AUTOMATED COUNT: 22.6 % (ref 11.5–14.5)
GFR SERPL CREATININE-BSD FRML MDRD: 108 ML/MIN/1.73
GLUCOSE BLD-MCNC: 131 MG/DL (ref 65–99)
GLUCOSE BLDC GLUCOMTR-MCNC: 121 MG/DL (ref 70–130)
GLUCOSE BLDC GLUCOMTR-MCNC: 129 MG/DL (ref 70–130)
GLUCOSE BLDC GLUCOMTR-MCNC: 140 MG/DL (ref 70–130)
HCT VFR BLD AUTO: 38.2 % (ref 40.4–52.2)
HGB BLD-MCNC: 11.2 G/DL (ref 13.7–17.6)
MAGNESIUM SERPL-MCNC: 2.2 MG/DL (ref 1.6–2.4)
MCH RBC QN AUTO: 20.8 PG (ref 27–32.7)
MCHC RBC AUTO-ENTMCNC: 29.3 G/DL (ref 32.6–36.4)
MCV RBC AUTO: 71 FL (ref 79.8–96.2)
MRSA SPEC QL CULT: NORMAL
PLATELET # BLD AUTO: 146 10*3/MM3 (ref 140–500)
PMV BLD AUTO: 9.8 FL (ref 6–12)
POTASSIUM BLD-SCNC: 4.4 MMOL/L (ref 3.5–5.2)
RBC # BLD AUTO: 5.38 10*6/MM3 (ref 4.6–6)
SODIUM BLD-SCNC: 137 MMOL/L (ref 136–145)
WBC NRBC COR # BLD: 8.56 10*3/MM3 (ref 4.5–10.7)

## 2018-03-01 PROCEDURE — 83735 ASSAY OF MAGNESIUM: CPT | Performed by: INTERNAL MEDICINE

## 2018-03-01 PROCEDURE — 25010000002 METHYLPREDNISOLONE PER 40 MG: Performed by: INTERNAL MEDICINE

## 2018-03-01 PROCEDURE — 80048 BASIC METABOLIC PNL TOTAL CA: CPT | Performed by: INTERNAL MEDICINE

## 2018-03-01 PROCEDURE — 85027 COMPLETE CBC AUTOMATED: CPT | Performed by: INTERNAL MEDICINE

## 2018-03-01 PROCEDURE — 94799 UNLISTED PULMONARY SVC/PX: CPT

## 2018-03-01 PROCEDURE — 25010000002 CEFEPIME PER 500 MG: Performed by: INTERNAL MEDICINE

## 2018-03-01 PROCEDURE — 82962 GLUCOSE BLOOD TEST: CPT

## 2018-03-01 PROCEDURE — 97110 THERAPEUTIC EXERCISES: CPT

## 2018-03-01 PROCEDURE — 25010000002 VANCOMYCIN 10 G RECONSTITUTED SOLUTION: Performed by: INTERNAL MEDICINE

## 2018-03-01 RX ORDER — CEFDINIR 300 MG/1
300 CAPSULE ORAL EVERY 12 HOURS SCHEDULED
Status: COMPLETED | OUTPATIENT
Start: 2018-03-01 | End: 2018-03-04

## 2018-03-01 RX ADMIN — BUDESONIDE AND FORMOTEROL FUMARATE DIHYDRATE 2 PUFF: 160; 4.5 AEROSOL RESPIRATORY (INHALATION) at 08:59

## 2018-03-01 RX ADMIN — ASPIRIN 81 MG: 81 TABLET, CHEWABLE ORAL at 09:47

## 2018-03-01 RX ADMIN — METHYLPREDNISOLONE SODIUM SUCCINATE 40 MG: 40 INJECTION, POWDER, LYOPHILIZED, FOR SOLUTION INTRAMUSCULAR; INTRAVENOUS at 17:21

## 2018-03-01 RX ADMIN — PETROLATUM: 42 OINTMENT TOPICAL at 09:47

## 2018-03-01 RX ADMIN — PREGABALIN 75 MG: 75 CAPSULE ORAL at 09:47

## 2018-03-01 RX ADMIN — CEFDINIR 300 MG: 300 CAPSULE ORAL at 21:46

## 2018-03-01 RX ADMIN — ACETAMINOPHEN 650 MG: 325 TABLET ORAL at 14:36

## 2018-03-01 RX ADMIN — Medication 1750 MG: at 04:52

## 2018-03-01 RX ADMIN — METHYLPREDNISOLONE SODIUM SUCCINATE 40 MG: 40 INJECTION, POWDER, LYOPHILIZED, FOR SOLUTION INTRAMUSCULAR; INTRAVENOUS at 09:47

## 2018-03-01 RX ADMIN — TIOTROPIUM BROMIDE 1 CAPSULE: 18 CAPSULE ORAL; RESPIRATORY (INHALATION) at 08:59

## 2018-03-01 RX ADMIN — ACETAMINOPHEN 650 MG: 325 TABLET ORAL at 21:46

## 2018-03-01 RX ADMIN — PREGABALIN 75 MG: 75 CAPSULE ORAL at 21:46

## 2018-03-01 RX ADMIN — TAMSULOSIN HYDROCHLORIDE 0.4 MG: 0.4 CAPSULE ORAL at 22:56

## 2018-03-01 RX ADMIN — RIVAROXABAN 20 MG: 20 TABLET, FILM COATED ORAL at 17:21

## 2018-03-01 RX ADMIN — PANTOPRAZOLE SODIUM 40 MG: 40 TABLET, DELAYED RELEASE ORAL at 07:16

## 2018-03-01 RX ADMIN — CEFEPIME HYDROCHLORIDE 1 G: 1 INJECTION, POWDER, FOR SOLUTION INTRAMUSCULAR; INTRAVENOUS at 08:05

## 2018-03-01 RX ADMIN — KETOTIFEN FUMARATE 1 DROP: 0.35 SOLUTION/ DROPS OPHTHALMIC at 21:48

## 2018-03-01 RX ADMIN — METHYLPREDNISOLONE SODIUM SUCCINATE 40 MG: 40 INJECTION, POWDER, LYOPHILIZED, FOR SOLUTION INTRAMUSCULAR; INTRAVENOUS at 01:43

## 2018-03-01 RX ADMIN — FUROSEMIDE 20 MG: 20 TABLET ORAL at 09:47

## 2018-03-01 RX ADMIN — ACETAMINOPHEN 650 MG: 325 TABLET ORAL at 04:56

## 2018-03-01 RX ADMIN — KETOTIFEN FUMARATE 1 DROP: 0.35 SOLUTION/ DROPS OPHTHALMIC at 09:52

## 2018-03-01 NOTE — PLAN OF CARE
Problem: Patient Care Overview (Adult)  Goal: Plan of Care Review  Outcome: Ongoing (interventions implemented as appropriate)   03/01/18 0328   Coping/Psychosocial Response Interventions   Plan Of Care Reviewed With patient   Patient Care Overview   Progress improving   Outcome Evaluation   Outcome Summary/Follow up Plan Boot on R ankle d/t a fracture 1/9/18. Bedrest. O2 on 5L overnight tolerated well. Solumedrol IV, abx for PNA. Pt had no c/o pain or discomfort. Cont to monitor, safety maintained.        Problem: Respiratory Insufficiency (Adult)  Goal: Acid/Base Balance  Outcome: Ongoing (interventions implemented as appropriate)    Goal: Effective Ventilation  Outcome: Ongoing (interventions implemented as appropriate)

## 2018-03-01 NOTE — PLAN OF CARE
Problem: Patient Care Overview (Adult)  Goal: Plan of Care Review  Outcome: Ongoing (interventions implemented as appropriate)   02/28/18 2004   Coping/Psychosocial Response Interventions   Plan Of Care Reviewed With patient;spouse   Patient Care Overview   Progress improving   Outcome Evaluation   Outcome Summary/Follow up Plan Pt moved out of ICU. O2 at 5l/Hi [place] NC. Sats 88%. Mary well. Brace on Rt ankle.     Goal: Adult Individualization and Mutuality  Outcome: Ongoing (interventions implemented as appropriate)    Goal: Discharge Needs Assessment  Outcome: Ongoing (interventions implemented as appropriate)      Problem: Respiratory Insufficiency (Adult)  Goal: Identify Related Risk Factors and Signs and Symptoms  Outcome: Outcome(s) achieved Date Met: 02/28/18    Goal: Acid/Base Balance  Outcome: Ongoing (interventions implemented as appropriate)    Goal: Effective Ventilation  Outcome: Ongoing (interventions implemented as appropriate)

## 2018-03-01 NOTE — THERAPY TREATMENT NOTE
Acute Care - Physical Therapy Treatment Note  [facility]     Patient Name: Alessio Allen  : 1940  MRN: [id number]  Today's Date: 3/1/2018  Onset of Illness/Injury or Date of Surgery Date: 18     Referring Physician: Cornell    Admit Date: 2018    Visit Dx:    ICD-10-CM ICD-9-CM   1. COPD exacerbation J44.1 491.21   2. Acute on chronic respiratory failure with hypoxia J96.21 518.84     799.02   3. Difficulty walking R26.2 719.7     Patient Active Problem List   Diagnosis   • A-fib   • Dyslipidemia   • BP (high blood pressure)   • Neuropathy   • Hypertension   • COPD (chronic obstructive pulmonary disease)   • BPH (benign prostatic hypertrophy)   • Atrial fibrillation   • Asthma   • Hypoxia   • Pneumonia   • Chronic anticoagulation   • Pneumonia of both lungs due to infectious organism   • Hyponatremia   • COPD exacerbation               Adult Rehabilitation Note       18 0800          Rehab Assessment/Intervention    Discipline physical therapy assistant  -CW      Document Type therapy note (daily note)  -CW      Subjective Information agree to therapy;complains of;weakness  -CW      Patient Effort, Rehab Treatment good  -CW      Precautions/Limitations fall precautions   Boot on R LE when up  -CW      Specific Treatment Considerations Pt states that he is NWB on R LE  -CW      Recorded by [CW] Cesario Kwon PTA      Vital Signs    O2 Delivery Pre Treatment supplemental O2  -CW      O2 Delivery Intra Treatment supplemental O2  -CW      O2 Delivery Post Treatment supplemental O2  -CW      Recorded by [CW] Cesario Kwon, PTA      Pain Assessment    Pain Assessment No/denies pain  -CW      Recorded by [CW] Cesario Kwon PTA      Cognitive Assessment/Intervention    Current Cognitive/Communication Assessment functional  -CW      Orientation Status oriented x 4  -CW      Follows Commands/Answers Questions 100% of the time  -CW      Personal Safety WNL/WFL  -CW      Personal  Safety Interventions fall prevention program maintained;gait belt;muscle strengthening facilitated;nonskid shoes/slippers when out of bed  -CW      Recorded by [CW] Cesario Kwon PTA      Bed Mobility, Assessment/Treatment    Bed Mob, Supine to Sit, [place] supervision required  -CW      Bed Mob, Sit to Supine, [place] supervision required  -CW      Recorded by [CW] Cesario Kwon PTA      Transfer Assessment/Treatment    Transfers, Sit-Stand [place] stand by assist  -CW      Transfers, Stand-Sit [place] stand by assist  -CW      Transfers, Sit-Stand-Sit, Assist Device rolling walker  -CW      Recorded by [CW] Cesario Kwon PTA      Gait Assessment/Treatment    Gait, [place] Level not tested  -CW      Gait, Comment Pt refused to to amb due to stating he can't put weight through R LE  -CW      Recorded by [CW] Cesario Kwon PTA      Positioning and Restraints    Pre-Treatment Position in bed  -CW      Post Treatment Position chair  -CW      In Chair notified nsg;reclined;call light within reach;encouraged to call for assist  -CW      Recorded by [CW] Cesario Kwon PTA        User Key  (r) = Recorded By, (t) = Taken By, (c) = Cosigned By    Initials Name Effective Dates     Cesario Kwon PTA 12/13/16 -                 IP PT Goals       02/28/18 1453          Transfer Training PT LTG    Transfer Training PT LTG, Date Established 02/28/18  -EM      Transfer Training PT LTG, Time to Achieve 1 wk  -EM      Transfer Training PT LTG, Activity Type all transfers  -EM      Transfer Training PT LTG, [place] Level conditional independence  -EM      Transfer Training PT LTG, Assist Device walker, rolling  -EM      Gait Training PT LTG    Gait Training Goal PT LTG, Date Established 02/28/18  -EM      Gait Training Goal PT LTG, Time to Achieve 1 wk  -EM      Gait Training Goal PT LTG, [place] Level supervision required  -EM      Gait Training Goal PT LTG,  Assist Device walker, rolling  -EM      Gait Training Goal PT LTG, Distance to Achieve 25 feet    with sats >90%   -EM        User Key  (r) = Recorded By, (t) = Taken By, (c) = Cosigned By    Initials Name Provider Type    EM Edna Guerrero, PT Physical Therapist          Physical Therapy Education     Title: PT OT SLP Therapies (Done)     Topic: Physical Therapy (Done)     Point: Mobility training (Done)    Learning Progress Summary    Learner Readiness Method Response Comment Documented by Status   Patient Acceptance E,TB DU,VU   03/01/18 0901 Done    Acceptance E NR   02/28/18 1451 Active               Point: Home exercise program (Done)    Learning Progress Summary    Learner Readiness Method Response Comment Documented by Status   Patient Acceptance E,TB DU,VU   03/01/18 0901 Done               Point: Body mechanics (Done)    Learning Progress Summary    Learner Readiness Method Response Comment Documented by Status   Patient Acceptance E,TB DU,VU   03/01/18 0901 Done               Point: Precautions (Done)    Learning Progress Summary    Learner Readiness Method Response Comment Documented by Status   Patient Acceptance E,TB DU,VU   03/01/18 0901 Done    Acceptance E NR   02/28/18 1451 Active                      User Key     Initials Effective Dates Name Provider Type Discipline    EM 12/01/15 -  Edna Guerrero, PT Physical Therapist PT     12/13/16 -  Cesario Kwon, PTA Physical Therapy Assistant PT                    PT Recommendation and Plan  Anticipated Discharge Disposition: home with assist  Planned Therapy Interventions: gait training, home exercise program, transfer training  PT Frequency: daily  Plan of Care Review  Plan Of Care Reviewed With: patient  Progress: progress towards functional goals is fair  Outcome Summary/Follow up Plan: Pt increased with transfer and bed mobility          Outcome Measures       03/01/18 0900 02/28/18 1500       How much help from another  person do you currently need...    Turning from your back to your side while in flat bed without using bedrails? 4  -CW 4  -EM     Moving from lying on back to sitting on the side of a flat bed without bedrails? 3  -CW 3  -EM     Moving to and from a bed to a chair (including a wheelchair)? 3  -CW 3  -EM     Standing up from a chair using your arms (e.g., wheelchair, bedside chair)? 3  -CW 3  -EM     Climbing 3-5 steps with a railing? 2  -CW 2  -EM     To walk in hospital room? 3  -CW 3  -EM     AM-PAC 6 Clicks Score 18  -CW 18  -EM     Functional Assessment    Outcome Measure Options AM-PAC 6 Clicks Basic Mobility (PT)  -CW AM-PAC 6 Clicks Basic Mobility (PT)  -EM       User Key  (r) = Recorded By, (t) = Taken By, (c) = Cosigned By    Initials Name Provider Type    EM Edna Guerrero, PT Physical Therapist    CW Cesario Kwon PTA Physical Therapy Assistant           Time Calculation:         PT Charges       03/01/18 0902          Time Calculation    Start Time 0834  -CW      Stop Time 0902  -CW      Time Calculation (min) 28 min  -CW      PT Received On 03/01/18  -CW      PT - Next Appointment 03/02/18  -CW        User Key  (r) = Recorded By, (t) = Taken By, (c) = Cosigned By    Initials Name Provider Type    NIKITA Kwon PTA Physical Therapy Assistant          Therapy Charges for Today     Code Description Service Date Service Provider Modifiers Qty    82569207426 HC PT THER PROC EA 15 MIN 3/1/2018 Cesario Kwon PTA GP 2    03558117904 HC PT THER SUPP EA 15 MIN 3/1/2018 Cesario Kwon PTA GP 2          PT G-Codes  Outcome Measure Options: AM-PAC 6 Clicks Basic Mobility (PT)    Cesario Kwon PTA  3/1/2018

## 2018-03-01 NOTE — PROGRESS NOTES
"[facility] PULMONARY CARE         Dr Cruz Sayied   LOS: 3 days   Patient Care Team:  Alan Santana MD as PCP - General (Family Medicine)  Rao Maguire MD as Consulting Physician (Hematology and Oncology)  Alan Santana MD as Referring Physician (Family Medicine)    Chief Complaint: Acute on chronic hypoxemic respiratory failure with bilateral pneumonia    Interval History:   Auction requirement continues to improve.  Still awaiting orthosis) patient can bear weight on his foot and ambulate.  REVIEW OF SYSTEMS:   CARDIOVASCULAR: No chest pain, chest pressure or chest discomfort. No palpitations or edema.   RESPIRATORY:  shortness of breath with minimal exertion.  GASTROINTESTINAL: No anorexia, nausea, vomiting or diarrhea. No abdominal pain or blood.   HEMATOLOGIC: No bleeding or bruising.     Ventilator/Non-Invasive Ventilation Settings     None            Vital Signs  Temp:  [97.6 °F (36.4 °C)-97.8 °F (36.6 °C)] 97.6 °F (36.4 °C)  Heart Rate:  [] 88  Resp:  [16-20] 16  BP: (132-145)/(80-93) 145/83    Intake/Output Summary (Last 24 hours) at 03/01/18 1642  Last data filed at 03/01/18 1333   Gross per 24 hour   Intake              720 ml   Output             1350 ml   Net             -630 ml     Flowsheet Rows         First Filed Value    Admission Height  188 cm (74\") Documented at 02/26/2018 1136    Admission Weight  113 kg (250 lb) Documented at 02/26/2018 1136          Physical Exam:   General Appearance:    Alert, cooperative, in no acute distress   Lungs:     Diminished breath sounds crackles bilaterally right greater than left.      Heart:    Regular rhythm and normal rate, normal S1 and S2, no            murmur, no gallop, no rub, no click   Chest Wall:    No abnormalities observed   Abdomen:     Normal bowel sounds, no masses, no organomegaly, soft        non-tender, non-distended, no guarding, no rebound                tenderness   Extremities:   Moves all extremities well, 1+  edema, no " cyanosis, no             redness     Results Review:          Results from last 7 days  Lab Units 03/01/18  0544 02/28/18  0352 02/27/18  0453   SODIUM mmol/L 137 136 134*   POTASSIUM mmol/L 4.4 4.6 4.7   CHLORIDE mmol/L 99 100 97*   CO2 mmol/L 27.4 24.0 24.5   BUN mg/dL 19 18 12   CREATININE mg/dL 0.71* 0.69* 0.68*   GLUCOSE mg/dL 131* 133* 137*   CALCIUM mg/dL 8.2* 8.5* 8.8       Results from last 7 days  Lab Units 02/26/18  1145   TROPONIN T ng/mL <0.010       Results from last 7 days  Lab Units 03/01/18  0544 02/28/18  0352 02/27/18  0454   WBC 10*3/mm3 8.56 10.44 7.09   HEMOGLOBIN g/dL 11.2* 10.8* 11.2*   HEMATOCRIT % 38.2* 36.6* 38.0*   PLATELETS 10*3/mm3 146 124* 124*       Results from last 7 days  Lab Units 02/26/18  1717   INR  1.28*           Results from last 7 days  Lab Units 03/01/18  0544   MAGNESIUM mg/dL 2.2           Results from last 7 days  Lab Units 02/26/18  1152   PH, ARTERIAL pH units 7.448   PO2 ART mm Hg 49.9*   PCO2, ARTERIAL mm Hg 36.4   HCO3 ART mmol/L 25.2       I reviewed the patient's new clinical results.  I personally viewed and interpreted the patient's CXR        Medication Review:     aspirin 81 mg Oral Daily   budesonide-formoterol 2 puff Inhalation BID - RT   cefepime 1 g Intravenous Q8H   furosemide 20 mg Oral Daily   hydrophor  Topical Q24H   insulin aspart 0-24 Units Subcutaneous Q6H   ketotifen 1 drop Both Eyes BID   methylPREDNISolone sodium succinate 40 mg Intravenous Q8H   pantoprazole 40 mg Oral Q AM   pregabalin 75 mg Oral Q12H   rivaroxaban 20 mg Oral Q24H   tamsulosin 0.4 mg Oral Nightly   tiotropium 1 capsule Inhalation Daily - RT   vancomycin 1,750 mg Intravenous Q12H         Pharmacy to dose vancomycin     sodium chloride 10 mL/hr Last Rate: 10 mL/hr (02/26/18 1924)       ASSESSMENT:   Acute on chronic hypoxemic respiratory failure  Bilateral pulmonary infiltrate worsening pneumonia  Questionable ILD  Severe pulmonary hypertension  COPD  A. fib on  anticoagulation  Right ankle fracture recent    PLAN:  Wean off oxygen as tolerated  Switch to oral antibiotics and discontinue vancomycin  Diet started.  Still awaiting orthopedics to clear for weightbearing physical therapy  Continue anticoagulation for A. fib  No further hemoptysis reported  Possibly discharge in a day or 2        Nancy Sarabia MD  03/01/18  4:42 PM

## 2018-03-01 NOTE — PLAN OF CARE
Problem: Patient Care Overview (Adult)  Goal: Plan of Care Review  Outcome: Ongoing (interventions implemented as appropriate)   03/01/18 0901   Coping/Psychosocial Response Interventions   Plan Of Care Reviewed With patient   Patient Care Overview   Progress progress towards functional goals is fair   Outcome Evaluation   Outcome Summary/Follow up Plan Pt increased with transfer and bed mobility

## 2018-03-02 LAB
ANION GAP SERPL CALCULATED.3IONS-SCNC: 10.9 MMOL/L
BUN BLD-MCNC: 20 MG/DL (ref 8–23)
BUN/CREAT SERPL: 24.7 (ref 7–25)
CALCIUM SPEC-SCNC: 8.3 MG/DL (ref 8.6–10.5)
CHLORIDE SERPL-SCNC: 100 MMOL/L (ref 98–107)
CO2 SERPL-SCNC: 27.1 MMOL/L (ref 22–29)
CREAT BLD-MCNC: 0.81 MG/DL (ref 0.76–1.27)
GFR SERPL CREATININE-BSD FRML MDRD: 93 ML/MIN/1.73
GLUCOSE BLD-MCNC: 126 MG/DL (ref 65–99)
GLUCOSE BLDC GLUCOMTR-MCNC: 108 MG/DL (ref 70–130)
GLUCOSE BLDC GLUCOMTR-MCNC: 126 MG/DL (ref 70–130)
GLUCOSE BLDC GLUCOMTR-MCNC: 128 MG/DL (ref 70–130)
GLUCOSE BLDC GLUCOMTR-MCNC: 138 MG/DL (ref 70–130)
POTASSIUM BLD-SCNC: 4.4 MMOL/L (ref 3.5–5.2)
SODIUM BLD-SCNC: 138 MMOL/L (ref 136–145)

## 2018-03-02 PROCEDURE — 97110 THERAPEUTIC EXERCISES: CPT

## 2018-03-02 PROCEDURE — 80048 BASIC METABOLIC PNL TOTAL CA: CPT | Performed by: INTERNAL MEDICINE

## 2018-03-02 PROCEDURE — 63710000001 PREDNISONE PER 1 MG: Performed by: INTERNAL MEDICINE

## 2018-03-02 PROCEDURE — 94799 UNLISTED PULMONARY SVC/PX: CPT

## 2018-03-02 PROCEDURE — 25010000002 METHYLPREDNISOLONE PER 40 MG: Performed by: INTERNAL MEDICINE

## 2018-03-02 PROCEDURE — 82962 GLUCOSE BLOOD TEST: CPT

## 2018-03-02 RX ORDER — PREDNISONE 20 MG/1
20 TABLET ORAL 2 TIMES DAILY WITH MEALS
Status: DISCONTINUED | OUTPATIENT
Start: 2018-03-02 | End: 2018-03-04

## 2018-03-02 RX ADMIN — METHYLPREDNISOLONE SODIUM SUCCINATE 40 MG: 40 INJECTION, POWDER, LYOPHILIZED, FOR SOLUTION INTRAMUSCULAR; INTRAVENOUS at 08:43

## 2018-03-02 RX ADMIN — FUROSEMIDE 20 MG: 20 TABLET ORAL at 08:41

## 2018-03-02 RX ADMIN — KETOTIFEN FUMARATE 1 DROP: 0.35 SOLUTION/ DROPS OPHTHALMIC at 20:17

## 2018-03-02 RX ADMIN — ACETAMINOPHEN 650 MG: 325 TABLET ORAL at 08:53

## 2018-03-02 RX ADMIN — TAMSULOSIN HYDROCHLORIDE 0.4 MG: 0.4 CAPSULE ORAL at 20:17

## 2018-03-02 RX ADMIN — PREGABALIN 75 MG: 75 CAPSULE ORAL at 20:17

## 2018-03-02 RX ADMIN — PREGABALIN 75 MG: 75 CAPSULE ORAL at 08:41

## 2018-03-02 RX ADMIN — RIVAROXABAN 20 MG: 20 TABLET, FILM COATED ORAL at 18:15

## 2018-03-02 RX ADMIN — PETROLATUM 1 APPLICATION: 42 OINTMENT TOPICAL at 08:42

## 2018-03-02 RX ADMIN — CEFDINIR 300 MG: 300 CAPSULE ORAL at 20:17

## 2018-03-02 RX ADMIN — ACETAMINOPHEN 650 MG: 325 TABLET ORAL at 18:15

## 2018-03-02 RX ADMIN — PREDNISONE 20 MG: 20 TABLET ORAL at 18:15

## 2018-03-02 RX ADMIN — ASPIRIN 81 MG: 81 TABLET, CHEWABLE ORAL at 08:41

## 2018-03-02 RX ADMIN — CEFDINIR 300 MG: 300 CAPSULE ORAL at 08:41

## 2018-03-02 RX ADMIN — KETOTIFEN FUMARATE 1 DROP: 0.35 SOLUTION/ DROPS OPHTHALMIC at 08:42

## 2018-03-02 RX ADMIN — BUDESONIDE AND FORMOTEROL FUMARATE DIHYDRATE 2 PUFF: 160; 4.5 AEROSOL RESPIRATORY (INHALATION) at 19:40

## 2018-03-02 RX ADMIN — BUDESONIDE AND FORMOTEROL FUMARATE DIHYDRATE 2 PUFF: 160; 4.5 AEROSOL RESPIRATORY (INHALATION) at 09:01

## 2018-03-02 RX ADMIN — PANTOPRAZOLE SODIUM 40 MG: 40 TABLET, DELAYED RELEASE ORAL at 06:34

## 2018-03-02 RX ADMIN — Medication 10 ML: at 08:43

## 2018-03-02 RX ADMIN — METHYLPREDNISOLONE SODIUM SUCCINATE 40 MG: 40 INJECTION, POWDER, LYOPHILIZED, FOR SOLUTION INTRAMUSCULAR; INTRAVENOUS at 01:39

## 2018-03-02 RX ADMIN — TIOTROPIUM BROMIDE 1 CAPSULE: 18 CAPSULE ORAL; RESPIRATORY (INHALATION) at 09:01

## 2018-03-02 NOTE — THERAPY TREATMENT NOTE
Acute Care - Physical Therapy Treatment Note  [facility]     Patient Name: Alessio Allen  : 1940  MRN: [id number]  Today's Date: 3/2/2018  Onset of Illness/Injury or Date of Surgery Date: 18     Referring Physician: Cornell    Admit Date: 2018    Visit Dx:    ICD-10-CM ICD-9-CM   1. COPD exacerbation J44.1 491.21   2. Acute on chronic respiratory failure with hypoxia J96.21 518.84     799.02   3. Difficulty walking R26.2 719.7     Patient Active Problem List   Diagnosis   • A-fib   • Dyslipidemia   • BP (high blood pressure)   • Neuropathy   • Hypertension   • COPD (chronic obstructive pulmonary disease)   • BPH (benign prostatic hypertrophy)   • Atrial fibrillation   • Asthma   • Hypoxia   • Pneumonia   • Chronic anticoagulation   • Pneumonia of both lungs due to infectious organism   • Hyponatremia   • COPD exacerbation               Adult Rehabilitation Note       18 0832 18 0800       Rehab Assessment/Intervention    Discipline physical therapy assistant  -RW physical therapy assistant  -CW     Document Type therapy note (daily note)  -RW therapy note (daily note)  -CW     Subjective Information agree to therapy;no complaints  -RW agree to therapy;complains of;weakness  -CW     Patient Effort, Rehab Treatment good  -RW good  -CW     Precautions/Limitations fall precautions;other (see comments)   CAM boot on RLE when up  -RW fall precautions   Boot on R LE when up  -CW     Specific Treatment Considerations Per Dr. Santos verbally, pt is WBAT on RLE for transfers and short distances w/ use of CAM boot  -RW Pt states that he is NWB on R LE  -CW     Recorded by [RW] Jerica Real PTA [CW] Cesario Kwon PTA     Vital Signs    Pre SpO2 (%) 89  -RW      O2 Delivery Pre Treatment supplemental O2   5L  -RW supplemental O2  -CW     O2 Delivery Intra Treatment supplemental O2   5L  -RW supplemental O2  -CW     O2 Delivery Post Treatment supplemental O2   5L  -RW supplemental O2   -CW     Recorded by [RW] Jercia Real PTA [CW] Cesario Kwon PTA     Pain Assessment    Pain Assessment No/denies pain  -RW No/denies pain  -CW     Recorded by [RW] Jerica Real PTA [CW] Cesario Kwon PTA     Cognitive Assessment/Intervention    Current Cognitive/Communication Assessment functional  -RW functional  -CW     Orientation Status oriented x 4  -RW oriented x 4  -CW     Follows Commands/Answers Questions 100% of the time  -% of the time  -CW     Personal Safety WNL/WFL  -RW WNL/WFL  -CW     Personal Safety Interventions fall prevention program maintained;gait belt;nonskid shoes/slippers when out of bed  -RW fall prevention program maintained;gait belt;muscle strengthening facilitated;nonskid shoes/slippers when out of bed  -CW     Recorded by [RW] Jerica Real PTA [CW] Cesario Kwon PTA     Mobility Assessment/Training    Extremity Weight-Bearing Status right lower extremity  -RW      Right Lower Extremity Weight-Bearing weight-bearing as tolerated   for tranfers and short distance only  -RW      Recorded by [RW] Jerica Real PTA      Bed Mobility, Assessment/Treatment    Bed Mobility, Assistive Device bed rails;head of bed elevated  -RW      Bed Mob, Supine to Sit, [place] supervision required  -RW supervision required  -CW     Bed Mob, Sit to Supine, [place] not tested   Pt in bathroom  -RW supervision required  -CW     Recorded by [RW] Jerica Real PTA [CW] Cesario Kwon PTA     Transfer Assessment/Treatment    Transfers, Sit-Stand [place] stand by assist  -RW stand by assist  -CW     Transfers, Stand-Sit [place] stand by assist;verbal cues required   cueing for hand placement when sitting at low commode  -RW stand by assist  -CW     Transfers, Sit-Stand-Sit, Assist Device rolling walker  -RW rolling walker  -CW     Recorded by [RW] Jerica Real PTA [CW] Cesario Kwon PTA     Gait Assessment/Treatment    Gait, [place]  Level stand by assist  -RW not tested  -CW     Gait, Assistive Device rolling walker  -RW      Gait, Distance (Feet) 12  -RW      Gait, Gait Deviations forward flexed posture;right:;antalgic;bilateral:;crystal decreased;step length decreased  -RW      Gait, Safety Issues step length decreased;supplemental O2   5L O2  -RW      Gait, Impairments strength decreased  -RW      Gait, Comment No LOB or c/o pain during ambulation  -RW Pt refused to to amb due to stating he can't put weight through R LE  -CW     Recorded by [RW] Jerica Real, PTA [CW] Cesario Kwon PTA     Positioning and Restraints    Pre-Treatment Position in bed  -RW in bed  -CW     Post Treatment Position bathroom  -RW chair  -CW     In Chair  notified nsg;reclined;call light within reach;encouraged to call for assist  -CW     Bathroom sitting;call light within reach;encouraged to call for assist;with nsg  -RW      Recorded by [RW] Jerica Real, HARDEEP [CW] Cesario Kwon PTA       User Key  (r) = Recorded By, (t) = Taken By, (c) = Cosigned By    Initials Name Effective Dates     Jerica Real, HARDEEP 04/06/16 -     CW Cesario Kwon, HARDEEP 12/13/16 -                 IP PT Goals       02/28/18 1453          Transfer Training PT LTG    Transfer Training PT LTG, Date Established 02/28/18  -EM      Transfer Training PT LTG, Time to Achieve 1 wk  -EM      Transfer Training PT LTG, Activity Type all transfers  -EM      Transfer Training PT LTG, [place] Level conditional independence  -EM      Transfer Training PT LTG, Assist Device walker, rolling  -EM      Gait Training PT LTG    Gait Training Goal PT LTG, Date Established 02/28/18  -EM      Gait Training Goal PT LTG, Time to Achieve 1 wk  -EM      Gait Training Goal PT LTG, [place] Level supervision required  -EM      Gait Training Goal PT LTG, Assist Device walker, rolling  -EM      Gait Training Goal PT LTG, Distance to Achieve 25 feet    with sats >90%   -EM        User Key   (r) = Recorded By, (t) = Taken By, (c) = Cosigned By    Initials Name Provider Type    EM Edna Guerrero, PT Physical Therapist          Physical Therapy Education     Title: PT OT SLP Therapies (Done)     Topic: Physical Therapy (Done)     Point: Mobility training (Done)    Learning Progress Summary    Learner Readiness Method Response Comment Documented by Status   Patient Acceptance E,TB,D VU,NR   03/02/18 0853 Done    Acceptance E VU   03/02/18 0312 Done    Acceptance E,TB DU,VU   03/01/18 0901 Done    Acceptance E NR   02/28/18 1451 Active               Point: Home exercise program (Done)    Learning Progress Summary    Learner Readiness Method Response Comment Documented by Status   Patient Acceptance E VU  TG 03/02/18 0312 Done    Acceptance E,TB DU,VU   03/01/18 0901 Done               Point: Body mechanics (Done)    Learning Progress Summary    Learner Readiness Method Response Comment Documented by Status   Patient Acceptance E,TB,D VU,NR   03/02/18 0853 Done    Acceptance E VU   03/02/18 0312 Done    Acceptance E,TB DU,VU   03/01/18 0901 Done               Point: Precautions (Done)    Learning Progress Summary    Learner Readiness Method Response Comment Documented by Status   Patient Acceptance E,TB,D VU,NR   03/02/18 0853 Done    Acceptance E VU   03/02/18 0312 Done    Acceptance E,TB DU,VU   03/01/18 0901 Done    Acceptance E NR   02/28/18 1451 Active                      User Key     Initials Effective Dates Name Provider Type Discipline     12/01/15 -  Edna Guerrero, PT Physical Therapist PT     04/06/16 -  Jerica Real, PTA Physical Therapy Assistant PT     06/16/16 -  Meenu Arora, RN Registered Nurse Nurse     12/13/16 -  Cesario Kwon PTA Physical Therapy Assistant PT                    PT Recommendation and Plan  Anticipated Discharge Disposition: home with assist  Planned Therapy Interventions: gait training, home exercise program, transfer  training  PT Frequency: daily  Plan of Care Review  Plan Of Care Reviewed With: patient  Outcome Summary/Follow up Plan: Pt is now able to WBAT on RLE w/ boot on for short distances or transfers. Ambulated to the restroom w/ SBA. Minimal verbal cueing for safety w/ low commode. Close to baseline at this time.          Outcome Measures       03/02/18 0830 03/01/18 0900 02/28/18 1500    How much help from another person do you currently need...    Turning from your back to your side while in flat bed without using bedrails? 4  -RW 4  -CW 4  -EM    Moving from lying on back to sitting on the side of a flat bed without bedrails? 4  -RW 3  -CW 3  -EM    Moving to and from a bed to a chair (including a wheelchair)? 3  -RW 3  -CW 3  -EM    Standing up from a chair using your arms (e.g., wheelchair, bedside chair)? 3  -RW 3  -CW 3  -EM    Climbing 3-5 steps with a railing? 2  -RW 2  -CW 2  -EM    To walk in hospital room? 3  -RW 3  -CW 3  -EM    AM-PAC 6 Clicks Score 19  -RW 18  -CW 18  -EM    Functional Assessment    Outcome Measure Options AM-PAC 6 Clicks Basic Mobility (PT)  -RW AM-PAC 6 Clicks Basic Mobility (PT)  -CW AM-PAC 6 Clicks Basic Mobility (PT)  -EM      User Key  (r) = Recorded By, (t) = Taken By, (c) = Cosigned By    Initials Name Provider Type    EM Edna Guerrero, PT Physical Therapist    SUKUMAR Real PTA Physical Therapy Assistant    NIKITA Kwon PTA Physical Therapy Assistant           Time Calculation:         PT Charges       03/02/18 0830          Time Calculation    Start Time 0825  -RW      Stop Time 0843  -RW      Time Calculation (min) 18 min  -RW      PT Received On 03/02/18  -RW      PT - Next Appointment 03/03/18  -RW        User Key  (r) = Recorded By, (t) = Taken By, (c) = Cosigned By    Initials Name Provider Type    SUKUMAR Real PTA Physical Therapy Assistant          Therapy Charges for Today     Code Description Service Date Service Provider Modifiers Qty     43853390272  PT THER PROC EA 15 MIN 3/2/2018 Jerica Real, PTA GP 1          PT G-Codes  Outcome Measure Options: AM-PAC 6 Clicks Basic Mobility (PT)    Jerica Real PTA  3/2/2018

## 2018-03-02 NOTE — DISCHARGE PLACEMENT REQUEST
"Alessio Allen (77 y.o. Male)     Date of Birth Social Security Number Address Home Phone MRN    1940  [id number] [address] [phone] [id number]    [employer] Marital Status          [facility] [place]        Admission Date Admission Type Admitting Provider Attending Provider Department, Room/Bed    2/26/18 Emergency Nancy Sarabia MD Sayied, Tausif, MD [address], 438/1    Discharge Date Discharge Disposition Discharge Destination                      Attending Provider: Nancy Sarabia MD     Allergies:  Albuterol, Atenolol, Celebrex [Celecoxib], Coreg [Carvedilol], Ibuprofen, Levaquin [Levofloxacin], Lisinopril, Penicillins, Sulfa Antibiotics    Isolation:  None   Infection:  None   Code Status:  Prior    Ht:  188 cm (74\")   Wt:  113 kg (250 lb)    Admission Cmt:  None   Principal Problem:  None                Active Insurance as of 2/26/2018     Primary Coverage     Payor Plan Insurance Group Employer/Plan Group    MEDICARE MEDICARE A & B      Payor Plan Address Payor Plan Phone Number Effective From Effective To    [address] [phone] 5/1/2000     [place] [address]       Subscriber Name Subscriber Birth Date Member ID       ALESSIO ALLEN 1940 [id number]           Secondary Coverage     Payor Plan Insurance Group Employer/Plan Group     FOR LIFE  FOR LIFE MC [id number]      Payor Plan Address Payor Plan Phone Number Effective From Effective To    [address] [phone] 1/30/2018     [place] [address]       Subscriber Name Subscriber Birth Date Member ID       ALESSIO ALLEN 1940 [id number]                 Emergency Contacts      (Rel.) Home Phone Work Phone Mobile Phone    Dulce Allen (Partner) [phone] -- --              "

## 2018-03-02 NOTE — CONSULTS
Orthopedic Consult      Patient: Alessio Allen  YOB: 1940     Date of Admission: 2/26/2018 11:29 AM    Medical Record Number: [id number]    Attending Physician: Nancy Sarabia MD    Consulting Physician: Nan Santos MD    Reason for Consult: Right ankle fracture, status post internal fixation managed elsewhere.  Question regarding weightbearing status.    History of Present Illness: 77 y.o. male admitted to [facility], [place], operated by [facility] with COPD exacerbation [J44.1]  Acute on chronic respiratory failure with hypoxia [J96.21].     The patient is a 77-year-old male who has been admitted with history of COPD exacerbation.  He has a history of right ankle fracture managed with open reduction internal fixation around January 19 in [place].  Apparently he slipped on ice and was managed with internal fixation at that time.  He was initially advised weightbearing as tolerated by his surgeon  According to the patient.  The patient was admitted to this hospital around the first week of February and I was consult regarding his right ankle fracture.  At that time the patient denied any problem with his ankle and wanted to establish care with a podiatrist with  Whom he had an appointment.  The patient has been kept nonweightbearing by the podiatrist and the patient has been neck cam walker boot.  He was initially admitted to the ICU.  I was contacted when he was in the ICU.  I advised him to remain nonweightbearing.  I'm seeing him on  the floor and phyio therapists are in the room.  The patient seems to be very impatient and wants to weight-bear right away.  He has an appointment to see his podiatrist today and he was hoping that he would be allowed to weight-bear.  Unfortunately, he is here in the hospital and wants somebody to address and tell him to go ahead and walk on his leg.  Secondary to the age and multiple medical co morbidities the patient was admitted to the hospitalist.   As I was on call  for the emergency room I was consulted for further evaluation and treatment.     Patient denies any history of: DVT/PE, MRSA, CHF, CAD, Diabetes mellitus, Dementia or .   The patient has history of : Atrial fibrillation, COPD  The patient is on anticoagulants: Xarelto      Past medical history, Past surgical history, family history, Social history, current medications, home medications Have been reviewed by me.    Past Medical History:   Diagnosis Date   • Arthritis    • Asthma    • Atrial fibrillation    • BPH (benign prostatic hypertrophy)    • Cancer    • COPD (chronic obstructive pulmonary disease)    • H/O Abnormal CBC 2017   • Hyperlipidemia    • Hypertension    • Neuropathy     feet and hands    • Pneumonia      Past Surgical History:   Procedure Laterality Date   • COLONOSCOPY  12/05/2016    polyps   • FRACTURE SURGERY     • PACEMAKER IMPLANTATION       Social History     Occupational History   •  Retired     Social History Main Topics   • Smoking status: Former Smoker     Types: Cigarettes     Quit date: 1/3/2001   • Smokeless tobacco: Never Used   • Alcohol use No   • Drug use: No   • Sexual activity: Not on file    Social History     Social History Narrative     Family History   Problem Relation Age of Onset   • Hypertension Mother    • Heart attack Father           Allergies   Allergen Reactions   • Albuterol    • Atenolol    • Celebrex [Celecoxib]    • Coreg [Carvedilol] Cough     SOA,   • Ibuprofen    • Levaquin [Levofloxacin]      Pt states he can take   • Lisinopril    • Penicillins    • Sulfa Antibiotics         Home Medications:  Prescriptions Prior to Admission   Medication Sig Dispense Refill Last Dose   • albuterol (PROVENTIL HFA;VENTOLIN HFA) 108 (90 BASE) MCG/ACT inhaler Inhale 2 puffs every 4 (four) hours as needed for wheezing. 1 inhaler 11 2/26/2018 at Unknown time   • aspirin 81 MG tablet Take 81 mg by mouth Daily.   2/26/2018 at Unknown time   • budesonide-formoterol (SYMBICORT) 160-4.5  MCG/ACT inhaler Inhale 2 puffs 2 (Two) Times a Day. 3 inhaler 3 2/26/2018 at Unknown time   • cetirizine (zyrTEC) 10 MG tablet Take 1 tablet by mouth Daily. 90 tablet 3 2/26/2018 at Unknown time   • ferrous sulfate 325 (65 FE) MG tablet Take 1 tablet by mouth Daily With Breakfast. 90 tablet 3    • furosemide (LASIX) 20 MG tablet Take 1 tablet by mouth Daily. 90 tablet 3    • montelukast (SINGULAIR) 10 MG tablet TAKE 1 TABLET DAILY 90 tablet 2 Taking   • Multiple Vitamin (MULTI VITAMIN DAILY) tablet Take  by mouth.   Taking   • NIFEdipine XL (PROCARDIA XL) 30 MG 24 hr tablet Take  by mouth daily.   Taking   • olopatadine (PATANOL) 0.1 % ophthalmic solution Administer 1 drop to both eyes daily. 3 each 12 Taking   • Omega-3 Fatty Acids (FISH OIL) 1000 MG capsule capsule Take 1,000 mg by mouth daily with breakfast.   Taking   • omeprazole (PRILOSEC) 20 MG capsule Take 1 capsule by mouth Daily. 90 capsule 3 Taking   • potassium chloride (K-DUR) 10 MEQ CR tablet Take 2 tablets by mouth Daily. 180 tablet 3    • pregabalin (LYRICA) 75 MG capsule Take 75 mg by mouth 2 (two) times a day.   Taking   • pyridoxine (VITAMIN B-6) 50 MG tablet Take  by mouth daily.   Taking   • RABEprazole (ACIPHEX) 20 MG EC tablet Take 1 tablet by mouth Daily. 90 tablet 3 Taking   • sildenafil (VIAGRA) 100 MG tablet Take 1 tablet by mouth Daily As Needed for erectile dysfunction. 6 tablet 3 Taking   • simvastatin (ZOCOR) 20 MG tablet Take 0.5 tablets by mouth daily.   Taking   • sodium chloride (OCEAN NASAL SPRAY) 0.65 % nasal spray 2 sprays into each nostril As Needed for Congestion (qid prn). May refill q 21 days 4 each 3 Taking   • Spacer/Aero-Holding Chambers device Give spacer to use with his inhalers whichever brand he has received is fine 2 each 3 Taking   • tamsulosin (FLOMAX) 0.4 MG capsule 24 hr capsule Take 1 capsule by mouth every night. 90 capsule 3 Taking   • tiotropium (SPIRIVA HANDIHALER) 18 MCG per inhalation capsule Place 2 puffs  into inhaler and inhale 1 (one) time daily. 90 each 3 Taking   • XARELTO 20 MG tablet Take 20 mg by mouth Daily.   Taking   • acetaminophen (TYLENOL) 325 MG tablet Take 2 tablets by mouth Every 6 (Six) Hours As Needed (prn for pain). 720 tablet 2 Unknown at Unknown time   • acyclovir (ZOVIRAX) 800 MG tablet Take 1 tablet (800 mg total) by mouth daily as needed (prn daily as needed fever blisters) 30 tablet 1 Unknown at Unknown time   • Desoximetasone 0.05 % ointment Apply to affected area bid 60 g 2 Unknown at Unknown time   • predniSONE (DELTASONE) 20 MG tablet Take 2 pills daily for 3 days then take 1 pill daily for 3 days then stop. 9 tablet 0 Taking       Current Medications:  Scheduled Meds:  aspirin 81 mg Oral Daily   budesonide-formoterol 2 puff Inhalation BID - RT   cefdinir 300 mg Oral Q12H   furosemide 20 mg Oral Daily   hydrophor  Topical Q24H   insulin aspart 0-24 Units Subcutaneous Q6H   ketotifen 1 drop Both Eyes BID   pantoprazole 40 mg Oral Q AM   predniSONE 20 mg Oral BID With Meals   pregabalin 75 mg Oral Q12H   rivaroxaban 20 mg Oral Q24H   tamsulosin 0.4 mg Oral Nightly   tiotropium 1 capsule Inhalation Daily - RT     Continuous Infusions:  sodium chloride 10 mL/hr Last Rate: 10 mL/hr (02/26/18 1924)     PRN Meds:.•  acetaminophen  •  dextrose  •  dextrose  •  glucagon (human recombinant)  •  ipratropium-albuterol  •  sodium chloride  •  sodium chloride    Review of Systems:   A 12 point system review was reviewed with the patient and from the chart  and is negative except as mentioned in history of present illness.      Physical Exam: 77 y.o. male                    Vitals:    03/02/18 0710 03/02/18 0855 03/02/18 0901 03/02/18 1347   BP: 144/84 147/80  132/82   BP Location: Right arm Right arm  Right arm   Patient Position: Lying Sitting  Lying   Pulse: 84 88 91 84   Resp: 20  20 20   Temp: 97.7 °F (36.5 °C)   97.6 °F (36.4 °C)   TempSrc: Oral   Oral   SpO2: 96%  90% 91%   Weight:       Height:             Gait: Not evaluated.     Mental/HEENT/cardio/skin: The patient's general appearance was well-nourished, well-hydrated, no acute distress.  Orientation was alert and oriented ×3.  The patient's mood was normal.   Pulmonary exam shows normal late exchange, no labored breathing, or shortness of breath.    The  skin exam showed normal temperature and color in the area of examination. He has a medial malleolar eschar.    Extremities: Right ankle in a cam walker boot.  Boot removed and examined.  Lateral incision has healed well.  No sign of infection.  There is a small eschar measuring 1 cm² on the medial malleolus.  There is no sign of infection.  No sign of drainage.  No exposed hardware.  He has some stiffness of the ankle.  He is able to do gentle active toe moments.    Pulses:  Pulses palpable and equal bilaterally    Diagnostic Tests:      Results from last 7 days  Lab Units 03/01/18  0544 02/28/18  0352 02/27/18  0454   WBC 10*3/mm3 8.56 10.44 7.09   HEMOGLOBIN g/dL 11.2* 10.8* 11.2*   HEMATOCRIT % 38.2* 36.6* 38.0*   PLATELETS 10*3/mm3 146 124* 124*       Results from last 7 days  Lab Units 03/02/18  0438 03/01/18  0544 02/28/18  0352   SODIUM mmol/L 138 137 136   POTASSIUM mmol/L 4.4 4.4 4.6   CHLORIDE mmol/L 100 99 100   CO2 mmol/L 27.1 27.4 24.0   BUN mg/dL 20 19 18   CREATININE mg/dL 0.81 0.71* 0.69*   GLUCOSE mg/dL 126* 131* 133*   CALCIUM mg/dL 8.3* 8.2* 8.5*       Results from last 7 days  Lab Units 02/26/18  1717   INR  1.28*     No results found for: CRYSTAL]  No results found for: CULTURE]  No results found for: URICACID]  )Xr Chest 2 View    Result Date: 2/26/2018  Narrative: XR CHEST 2 VW-  HISTORY: Male who is 77 years-old,  short of breath  TECHNIQUE: Frontal and lateral views of the chest  COMPARISON: 2/14/2018  FINDINGS: Heart size is borderline. Left-sided pacemaker and cardiac leads are seen. Mild prominence of vascular and interstitial markings. Borderline fullness of right hilum  could reflect presence of lymph nodes. Patchy bilateral pulmonary densities, predominantly in the mid to lower lungs appears similar to prior exam. Minimal pleural effusion, nor pneumothorax. No acute osseous process.      Impression: No significant change, continued follow-up recommended  This report was finalized on 2/26/2018 12:16 PM by Dr. Krishan Escalera MD.      Xr Chest 2 View    Result Date: 2/14/2018  Narrative: PA AND LATERAL CHEST X-RAY  HISTORY: Follow-up pneumonia.  TECHNIQUE: PA and lateral images of the chest are provided and correlated with prior x-rays, most recently February 2018.  FINDINGS: There is a cardiac pacing device. The cardiomediastinal silhouette is normal. Interstitial and alveolar opacity in the lower lung zones, more dense on the left than the right, is again observed. The left base density appears slightly less dense today than on the recent prior exam. No new abnormality is present. There is no effusion or pneumothorax.      Impression: Persistent bibasilar infiltrate with perhaps some mild interval improvement.  This report was finalized on 2/14/2018 2:54 PM by Dr. Toby Thomas MD.      Xr Ankle 2 View Right    Result Date: 2/27/2018  Narrative: RIGHT ANKLE: 3 VIEWS  HISTORY: Postoperative right ankle surgery in January 2018.  FINDINGS: A pin and 3 screws transfix an obliquely oriented distal fibular fracture that is associated with 5 mm lateral displacement. This fracture extends into the distal tibiofibular articulation and there is a single syndesmotic screw. Mild lucency surrounds the syndesmotic screw tip within the distal tibia.  2 screws transfix the medial malleolar fracture that is transversely oriented. The distal fibular and tibial fractures do not appear to be completely healed/united. There is widening of the articulation between the base of the medial malleolus and the medial talar dome. Degenerative heel spurs are present. Overlying casting material is noted.  There is no evidence for new fracture.  This report was finalized on 2/27/2018 12:23 PM by Dr. Feliberto Serrano MD.      Xr Chest 1 View    Result Date: 3/2/2018  Narrative: X-RAY CHEST 1 VIEW.  HISTORY: Shortness of breath.  COMPARISON: 02/26/2018.  FINDINGS: Cardiomegaly and low lung volumes.     Bilateral infiltrates in the mid and lower lung zones.  Mild to moderate emphysema.          Impression: Overall no significant improvement.     This report was finalized on 3/2/2018 11:33 AM by Dr. Nick Evans MD.      Xr Chest 1 View    Result Date: 2/8/2018  Narrative: One VIEW PORTABLE CHEST AT 10:57 AM  HISTORY: Shortness of breath.  There is moderate cardiomegaly with a pacemaker in place. There is some increased haziness in both lower lobes that appears to correspond areas of pneumonia and chronic underlying fibrotic scarring is noted on yesterday's outside chest CT scan. The patient has severe pulmonary emphysema as seen on the CT scan.  This report was finalized on 2/8/2018 11:16 AM by Dr. Dusty Cisneros MD.      Ct Angiogram Chest With Contrast    Result Date: 2/26/2018  Narrative: CT ANGIOGRAM CHEST W CONTRAST-  Clinical: Shortness of breath, rule out pulmonary embolus, pneumonia  COMPARISON 02/07/2018  CT scan of the chest with intravenous contrast pulmonary embolus protocol to include CT angiogram and three-dimensional reconstructed images  FINDINGS: Thin axial and CT Angio reconstructed images fail to demonstrate pulmonary embolus. There is cardiac enlargement with pacer leads in the right heart. There is ectasia of the thoracic aorta, mild dilatation of the ascending portion, 4.7 cm in diameter. No interval change. There are stable enlarged mediastinal and hilar lymph nodes. The esophagus is satisfactory in course and caliber.  There is extensive bleb and bullae formation. There are small free-flowing pleural effusions. No change in the distribution size. There appears to be an abnormal subpleural  interstitial pattern involving both lungs with a basilar predominance with areas having the appearance of honeycombing, suggesting pulmonary fibrosis. No acute consolidation has developed. There are again demonstrated patchy nodular areas of consolidation in both lower lobes. No significant improvement. No suspicious pulmonary nodule has developed.. Limited images through the upper abdomen.  FINDINGS called to the ICU at the time of completion, 3:50 PM.  CONCLUSION: 1. Cardiomegaly. 2. No pulmonary embolus, no aortic dissection. 3. Pleural effusions with abnormal interstitial pattern suggesting pulmonary fibrosis, there are patchy areas of bibasilar consolidation without improvement. 3. Extensive bleb and bullae formation compatible with emphysema. 4. Stable mediastinal and hilar adenopathy.    Radiation dose reduction techniques were utilized, including automated exposure control and exposure modulation based on body size.  This report was finalized on 2/26/2018 3:54 PM by Dr. Zach Epperson MD.      Fl Video Swallow    Result Date: 2/28/2018  Narrative: VIDEO ESOPHAGRAM  HISTORY: Dysphagia.  The patient exhibits functional swallowing with all consistencies that were evaluated. Please also see the speech therapist's report. 3 images were obtained and the fluoroscopy time measures 1 minute and 39 seconds.  This report was finalized on 2/28/2018 6:00 PM by Dr. Dusty Cisneros MD.        Assessment: Right ankle fracture, status post ORIF in an outside institution.    Patient Active Problem List   Diagnosis   • A-fib   • Dyslipidemia   • BP (high blood pressure)   • Neuropathy   • Hypertension   • COPD (chronic obstructive pulmonary disease)   • BPH (benign prostatic hypertrophy)   • Atrial fibrillation   • Asthma   • Hypoxia   • Pneumonia   • Chronic anticoagulation   • Pneumonia of both lungs due to infectious organism   • Hyponatremia   • COPD exacerbation       Plan:    Options and alternatives were discussed in  detail with the patient . I have reviewed his x-rays.  The fixation is intact.  I do have genuine concerns that he may worsen, the eschar if he starts weightbearing in the boot.    But I do feel that the patient should start weightbearing as tolerated with the boot for transfers  Initially, and for short distance gait training only.  He is encouraged to remove the boot the rest of the time  he is in bed.  He is advised to follow-up with his podiatrist on release from the hospital.    Date: 3/2/2018    Nan Santos MD     CC: Alan Santana MD; MD Nancy Dominguez MD

## 2018-03-02 NOTE — PLAN OF CARE
Problem: Patient Care Overview (Adult)  Goal: Plan of Care Review  Outcome: Ongoing (interventions implemented as appropriate)   03/02/18 0850   Coping/Psychosocial Response Interventions   Plan Of Care Reviewed With patient   Outcome Evaluation   Outcome Summary/Follow up Plan Pt is now able to WBAT on RLE w/ boot on for short distances or transfers. Ambulated to the restroom w/ SBA. Minimal verbal cueing for safety w/ low commode. Close to baseline at this time.

## 2018-03-02 NOTE — PLAN OF CARE
Problem: Patient Care Overview (Adult)  Goal: Plan of Care Review  Outcome: Ongoing (interventions implemented as appropriate)   03/01/18 1927   Coping/Psychosocial Response Interventions   Plan Of Care Reviewed With patient   Patient Care Overview   Progress improving   Outcome Evaluation   Outcome Summary/Follow up Plan O2 on at 5 l/NC. Denies any SOA with activity. Lungs diminished.     Goal: Adult Individualization and Mutuality  Outcome: Ongoing (interventions implemented as appropriate)    Goal: Discharge Needs Assessment  Outcome: Ongoing (interventions implemented as appropriate)      Problem: Respiratory Insufficiency (Adult)  Goal: Acid/Base Balance  Outcome: Ongoing (interventions implemented as appropriate)    Goal: Effective Ventilation  Outcome: Ongoing (interventions implemented as appropriate)      Problem: Fall Risk (Adult)  Goal: Identify Related Risk Factors and Signs and Symptoms  Outcome: Outcome(s) achieved Date Met: 03/01/18    Goal: Absence of Falls  Outcome: Ongoing (interventions implemented as appropriate)      Problem: Pressure Ulcer Risk (Kev Scale) (Adult,Obstetrics,Pediatric)  Goal: Identify Related Risk Factors and Signs and Symptoms  Outcome: Outcome(s) achieved Date Met: 03/01/18    Goal: Skin Integrity  Outcome: Ongoing (interventions implemented as appropriate)

## 2018-03-02 NOTE — PLAN OF CARE
Problem: Patient Care Overview (Adult)  Goal: Plan of Care Review  Outcome: Ongoing (interventions implemented as appropriate)   03/02/18 0307   Coping/Psychosocial Response Interventions   Plan Of Care Reviewed With patient   Patient Care Overview   Progress improving   Outcome Evaluation   Outcome Summary/Follow up Plan O2 on 3L/ NC, pt tolerating well. Lungs diminished. Pt wants to be able to walk and move better with the boot for the fractured ankle. Abx PO. Solu-medrol IV. Pt was able to have PT yesterday. Pt possible D/C in a day or two. Cont to monitor, safety maintained.        Problem: Respiratory Insufficiency (Adult)  Goal: Acid/Base Balance  Outcome: Ongoing (interventions implemented as appropriate)    Goal: Effective Ventilation  Outcome: Ongoing (interventions implemented as appropriate)      Problem: Fall Risk (Adult)  Goal: Absence of Falls  Outcome: Ongoing (interventions implemented as appropriate)      Problem: Pressure Ulcer Risk (Kev Scale) (Adult,Obstetrics,Pediatric)  Goal: Skin Integrity  Outcome: Ongoing (interventions implemented as appropriate)

## 2018-03-02 NOTE — PROGRESS NOTES
Discharge Planning Assessment  [facility]     Patient Name: Alessio Allen  MRN: [id number]  Today's Date: 3/2/2018    Admit Date: 2/26/2018          Discharge Needs Assessment     None            Discharge Plan       03/02/18 1059    Case Management/Social Work Plan    Additional Comments IMM   02/26    Spoke to patient  at bedside.  I introduced myself and explained CCP role.  Verified face sheet.  Confirmed   that patient obtains his medications from  [facility].  Pt lives in a house wife..  He uses a rolling walker to ambulate.   Pt is independent with some ADL's.  He reports no history of home health.  He has not  been to rehab.  He would like a referral to [facility]  first [facility] and [facility] second [facility]  No other needs   Call to Reba sanchez  and Erica/ Lit.  CCP following      03/02/18 1057    Case Management/Social Work Plan    Plan Rehab  [facility] 1st choice [facility] 2nd choice        Discharge Placement     Facility/Agency Request Status Selected? Address Phone Number Fax Number    [facility] Accepted     [address] [place], [place] [id number] [phone]    [facility] Pending - Request Sent     [address] [facility] [address] [phone]        Hermila Merchant RN 3/2/2018 11:03    Messaged [facility] Pending - Request Sent     [address] [facility] [address] [phone]        Hermila Merchant RN 3/2/2018 11:05    Erica aware following                           Demographic Summary     None            Functional Status     None            Psychosocial     None            Abuse/Neglect     None            Legal     None            Substance Abuse     None            Patient Forms     None          Hermila Merchant RN

## 2018-03-02 NOTE — PLAN OF CARE
Problem: Patient Care Overview (Adult)  Goal: Plan of Care Review  Outcome: Ongoing (interventions implemented as appropriate)   03/02/18 1613   Coping/Psychosocial Response Interventions   Plan Of Care Reviewed With patient;spouse   Patient Care Overview   Progress improving   Outcome Evaluation   Outcome Summary/Follow up Plan VSS, mild pain to right ankle relieved by PRN Tylenol. Had WOCN eval right ankle wound, picture taken. Keep boot off when not ambulating to reduce pressure occurance. O2 weaned to 3 liters, tolerating well. BG monitoring, WNL. Remains in AFib, rate controlled w/ occ A-Paced spikes. Patient wishes to go to short-term rehab Continue to monitor.       Problem: Respiratory Insufficiency (Adult)  Goal: Acid/Base Balance  Outcome: Ongoing (interventions implemented as appropriate)   03/02/18 1613   Respiratory Insufficiency (Adult)   Acid/Base Balance making progress toward outcome     Goal: Effective Ventilation  Outcome: Ongoing (interventions implemented as appropriate)   03/02/18 1613   Respiratory Insufficiency (Adult)   Effective Ventilation making progress toward outcome       Problem: Fall Risk (Adult)  Goal: Absence of Falls  Outcome: Ongoing (interventions implemented as appropriate)   03/02/18 1613   Fall Risk (Adult)   Absence of Falls making progress toward outcome       Problem: Pressure Ulcer Risk (Kev Scale) (Adult,Obstetrics,Pediatric)  Goal: Skin Integrity  Outcome: Ongoing (interventions implemented as appropriate)   03/02/18 1613   Pressure Ulcer Risk (Kev Scale) (Adult,Obstetrics,Pediatric)   Skin Integrity making progress toward outcome       Problem: Pain, Acute (Adult)  Goal: Identify Related Risk Factors and Signs and Symptoms  Outcome: Outcome(s) achieved Date Met: 03/02/18 03/02/18 1613   Pain, Acute   Related Risk Factors (Acute Pain) patient perception;persistent pain;positioning;surgery   Signs and Symptoms (Acute Pain) alteration in muscle tone;BADLs/IADLs  reluctance/inability to perform;fatigue/weakness;guarding/abnormal posturing/positioning;verbalization of pain descriptors     Goal: Acceptable Pain Control/Comfort Level  Outcome: Ongoing (interventions implemented as appropriate)   03/02/18 1613   Pain, Acute (Adult)   Acceptable Pain Control/Comfort Level making progress toward outcome

## 2018-03-02 NOTE — NURSING NOTE
WOCN assessement: Dry eschar wound right lateral ankle. Previous ankle surgery in [place] last January.  States Surgeon in [place] aware of dry wound.  Was wearing foot boot 24 hours a day but is now starting remove when non ambulatory.    Home Health visits patient 2 times per week, applies betadine / kerlix and ace to right leg.  Recommend applying foam dressing to avoid friction. Patient verbalizes understanding . Explained HH can order the foam dressing. No further recommendations. MD assessed wound as well today.     03/02/18 1137   Wound 02/27/18 1200 Right other (see comments) ankle other (see comments)   Date first assessed/Time first assessed: 02/27/18 1200   Side: Right  Orientation: other (see comments)  Location: ankle  Type: other (see comments)  Additional Comments: right inner ankle; scab   Wound WDL WDL   Dressing Appearance dry;intact   Base dry  (black)   Periwound Area intact;pink   Edges irregular   Length (cm) 2   Width (cm) 2.5   Drainage Amount none   Picture taken yes   Wound Interventions (betadine applied kerlix ace wraps)

## 2018-03-02 NOTE — PROGRESS NOTES
"[facility]         Dr Cruz Sayied   LOS: 4 days   Patient Care Team:  Alan Santana MD as PCP - General (Family Medicine)  Rao Maguire MD as Consulting Physician (Hematology and Oncology)  Alan Santana MD as Referring Physician (Family Medicine)    Chief Complaint: Acute on chronic hypoxemic respiratory failure with bilateral pneumonia    Interval History:   Oxygen requirement continues to improve.  Finally cleared by by orthopedics to ambulate     REVIEW  OF SYSTEMS:   CARDIOVASCULAR: No chest pain, chest pressure or chest discomfort. No palpitations or edema.   RESPIRATORY:  shortness of breath with minimal exertion.  GASTROINTESTINAL: No anorexia, nausea, vomiting or diarrhea. No abdominal pain or blood.   HEMATOLOGIC: No bleeding or bruising.     Ventilator/Non-Invasive Ventilation Settings     None            Vital Signs  Temp:  [97.7 °F (36.5 °C)-97.9 °F (36.6 °C)] 97.7 °F (36.5 °C)  Heart Rate:  [80-91] 91  Resp:  [16-20] 20  BP: (144-149)/(80-95) 147/80    Intake/Output Summary (Last 24 hours) at 03/02/18 1337  Last data filed at 03/02/18 0710   Gross per 24 hour   Intake              360 ml   Output             2525 ml   Net            -2165 ml     Flowsheet Rows         First Filed Value    Admission Height  188 cm (74\") Documented at 02/26/2018 1136    Admission Weight  113 kg (250 lb) Documented at 02/26/2018 1136          Physical Exam:   General Appearance:    Alert, cooperative, in no acute distress   Lungs:     Diminished breath sounds crackles bilaterally right greater than left.      Heart:    Regular rhythm and normal rate, normal S1 and S2, no            murmur, no gallop, no rub, no click   Chest Wall:    No abnormalities observed   Abdomen:     Normal bowel sounds, no masses, no organomegaly, soft        non-tender, non-distended, no guarding, no rebound                tenderness   Extremities:   Moves all extremities well, 1+  edema, no cyanosis, no             " redness     Results Review:          Results from last 7 days  Lab Units 03/02/18  0438 03/01/18  0544 02/28/18  0352   SODIUM mmol/L 138 137 136   POTASSIUM mmol/L 4.4 4.4 4.6   CHLORIDE mmol/L 100 99 100   CO2 mmol/L 27.1 27.4 24.0   BUN mg/dL 20 19 18   CREATININE mg/dL 0.81 0.71* 0.69*   GLUCOSE mg/dL 126* 131* 133*   CALCIUM mg/dL 8.3* 8.2* 8.5*       Results from last 7 days  Lab Units 02/26/18  1145   TROPONIN T ng/mL <0.010       Results from last 7 days  Lab Units 03/01/18  0544 02/28/18  0352 02/27/18  0454   WBC 10*3/mm3 8.56 10.44 7.09   HEMOGLOBIN g/dL 11.2* 10.8* 11.2*   HEMATOCRIT % 38.2* 36.6* 38.0*   PLATELETS 10*3/mm3 146 124* 124*       Results from last 7 days  Lab Units 02/26/18  1717   INR  1.28*           Results from last 7 days  Lab Units 03/01/18  0544   MAGNESIUM mg/dL 2.2           Results from last 7 days  Lab Units 02/26/18  1152   PH, ARTERIAL pH units 7.448   PO2 ART mm Hg 49.9*   PCO2, ARTERIAL mm Hg 36.4   HCO3 ART mmol/L 25.2       I reviewed the patient's new clinical results.  I personally viewed and interpreted the patient's CXR        Medication Review:     aspirin 81 mg Oral Daily   budesonide-formoterol 2 puff Inhalation BID - RT   cefdinir 300 mg Oral Q12H   furosemide 20 mg Oral Daily   hydrophor  Topical Q24H   insulin aspart 0-24 Units Subcutaneous Q6H   ketotifen 1 drop Both Eyes BID   methylPREDNISolone sodium succinate 40 mg Intravenous Q8H   pantoprazole 40 mg Oral Q AM   pregabalin 75 mg Oral Q12H   rivaroxaban 20 mg Oral Q24H   tamsulosin 0.4 mg Oral Nightly   tiotropium 1 capsule Inhalation Daily - RT         sodium chloride 10 mL/hr Last Rate: 10 mL/hr (02/26/18 1924)       ASSESSMENT:   Acute on chronic hypoxemic respiratory failure  Bilateral pulmonary infiltrate worsening pneumonia  Questionable ILD  Severe pulmonary hypertension  COPD  A. fib on anticoagulation  Right ankle fracture recent    PLAN:  Wean off oxygen as tolerated  Oral antibiotics   Diet  started.  PT as per recommendation of orthopedics   Continue anticoagulation for A. fib  No further hemoptysis reported  Discharge once bed available at nursing home.          Nancy Sarabia MD  03/02/18  1:37 PM

## 2018-03-03 LAB
ANION GAP SERPL CALCULATED.3IONS-SCNC: 6.9 MMOL/L
BACTERIA SPEC AEROBE CULT: NORMAL
BACTERIA SPEC AEROBE CULT: NORMAL
BUN BLD-MCNC: 19 MG/DL (ref 8–23)
BUN/CREAT SERPL: 27.9 (ref 7–25)
CALCIUM SPEC-SCNC: 8.2 MG/DL (ref 8.6–10.5)
CHLORIDE SERPL-SCNC: 99 MMOL/L (ref 98–107)
CO2 SERPL-SCNC: 31.1 MMOL/L (ref 22–29)
CREAT BLD-MCNC: 0.68 MG/DL (ref 0.76–1.27)
GFR SERPL CREATININE-BSD FRML MDRD: 113 ML/MIN/1.73
GLUCOSE BLD-MCNC: 112 MG/DL (ref 65–99)
GLUCOSE BLDC GLUCOMTR-MCNC: 101 MG/DL (ref 70–130)
POTASSIUM BLD-SCNC: 4.4 MMOL/L (ref 3.5–5.2)
SODIUM BLD-SCNC: 137 MMOL/L (ref 136–145)

## 2018-03-03 PROCEDURE — 82962 GLUCOSE BLOOD TEST: CPT

## 2018-03-03 PROCEDURE — 94799 UNLISTED PULMONARY SVC/PX: CPT

## 2018-03-03 PROCEDURE — 63710000001 PREDNISONE PER 1 MG: Performed by: INTERNAL MEDICINE

## 2018-03-03 PROCEDURE — 80048 BASIC METABOLIC PNL TOTAL CA: CPT | Performed by: INTERNAL MEDICINE

## 2018-03-03 RX ADMIN — PREGABALIN 75 MG: 75 CAPSULE ORAL at 21:09

## 2018-03-03 RX ADMIN — ACETAMINOPHEN 650 MG: 325 TABLET ORAL at 21:42

## 2018-03-03 RX ADMIN — RIVAROXABAN 20 MG: 20 TABLET, FILM COATED ORAL at 18:46

## 2018-03-03 RX ADMIN — TIOTROPIUM BROMIDE 1 CAPSULE: 18 CAPSULE ORAL; RESPIRATORY (INHALATION) at 10:16

## 2018-03-03 RX ADMIN — TAMSULOSIN HYDROCHLORIDE 0.4 MG: 0.4 CAPSULE ORAL at 21:09

## 2018-03-03 RX ADMIN — ACETAMINOPHEN 650 MG: 325 TABLET ORAL at 05:01

## 2018-03-03 RX ADMIN — PETROLATUM: 42 OINTMENT TOPICAL at 09:12

## 2018-03-03 RX ADMIN — BUDESONIDE AND FORMOTEROL FUMARATE DIHYDRATE 2 PUFF: 160; 4.5 AEROSOL RESPIRATORY (INHALATION) at 22:28

## 2018-03-03 RX ADMIN — PANTOPRAZOLE SODIUM 40 MG: 40 TABLET, DELAYED RELEASE ORAL at 05:01

## 2018-03-03 RX ADMIN — KETOTIFEN FUMARATE 1 DROP: 0.35 SOLUTION/ DROPS OPHTHALMIC at 09:11

## 2018-03-03 RX ADMIN — BUDESONIDE AND FORMOTEROL FUMARATE DIHYDRATE 2 PUFF: 160; 4.5 AEROSOL RESPIRATORY (INHALATION) at 10:16

## 2018-03-03 RX ADMIN — PREGABALIN 75 MG: 75 CAPSULE ORAL at 09:16

## 2018-03-03 RX ADMIN — CEFDINIR 300 MG: 300 CAPSULE ORAL at 21:09

## 2018-03-03 RX ADMIN — CEFDINIR 300 MG: 300 CAPSULE ORAL at 09:11

## 2018-03-03 RX ADMIN — ASPIRIN 81 MG: 81 TABLET, CHEWABLE ORAL at 09:11

## 2018-03-03 RX ADMIN — FUROSEMIDE 20 MG: 20 TABLET ORAL at 09:11

## 2018-03-03 RX ADMIN — PREDNISONE 20 MG: 20 TABLET ORAL at 18:46

## 2018-03-03 RX ADMIN — KETOTIFEN FUMARATE 1 DROP: 0.35 SOLUTION/ DROPS OPHTHALMIC at 21:10

## 2018-03-03 RX ADMIN — PREDNISONE 20 MG: 20 TABLET ORAL at 09:11

## 2018-03-03 NOTE — PROGRESS NOTES
[facility]            LOS: 5 days   Patient Care Team:  Alan Santana MD as PCP - General (Family Medicine)  Rao Maguire MD as Consulting Physician (Hematology and Oncology)  Alan Santana MD as Referring Physician (Family Medicine)    Chief Complaint: Acute on chronic hypoxemic respiratory failure with bilateral pneumonia    Interval History:   Breathing improved.  Ambulated to bathroom, ortho seeing appreciated and extending his walking - working with PT.  No hemoptysis.      Vital Signs  Temp:  [97.2 °F (36.2 °C)-98.1 °F (36.7 °C)] 97.2 °F (36.2 °C)  Heart Rate:  [82-88] 82  Resp:  [18-20] 20  BP: (132-138)/(82-98) 138/96    Intake/Output Summary (Last 24 hours) at 03/03/18 1129  Last data filed at 03/03/18 0900   Gross per 24 hour   Intake              240 ml   Output             1650 ml   Net            -1410 ml     GENERAL:  NAD,alert oriented  HEENT:  EOMI, nonicteric sclera, no JVD  PULMONARY:    Unlabored effort, no wheeze, faint bibasilar crackles, no rhonchi, symmetric air entry  CARDIAC:  RRR no MRG, S1 S2  ABD: obese SNTND BS+  EXT: no c/c/e, pulses symmetric 2+ bilaterally RLE wrapped with ace  NEURO:  CNs II-XII intact, no focal deficits  SKIN: no lesions, no rash  PSYCH: appropriate mood  LYMPH: no cervical LAD    Results Review:    Results from last 7 days  Lab Units 03/03/18  0440 03/02/18  0438 03/01/18  0544   SODIUM mmol/L 137 138 137   POTASSIUM mmol/L 4.4 4.4 4.4   CHLORIDE mmol/L 99 100 99   CO2 mmol/L 31.1* 27.1 27.4   BUN mg/dL 19 20 19   CREATININE mg/dL 0.68* 0.81 0.71*   GLUCOSE mg/dL 112* 126* 131*   CALCIUM mg/dL 8.2* 8.3* 8.2*       Results from last 7 days  Lab Units 02/26/18  1145   TROPONIN T ng/mL <0.010       Results from last 7 days  Lab Units 03/01/18  0544 02/28/18  0352 02/27/18  0454   WBC 10*3/mm3 8.56 10.44 7.09   HEMOGLOBIN g/dL 11.2* 10.8* 11.2*   HEMATOCRIT % 38.2* 36.6* 38.0*   PLATELETS 10*3/mm3 146 124* 124*       Results from last 7  days  Lab Units 02/26/18  1717   INR  1.28*           Results from last 7 days  Lab Units 03/01/18  0544   MAGNESIUM mg/dL 2.2           Results from last 7 days  Lab Units 02/26/18  1152   PH, ARTERIAL pH units 7.448   PO2 ART mm Hg 49.9*   PCO2, ARTERIAL mm Hg 36.4   HCO3 ART mmol/L 25.2       I reviewed the patient's new clinical results.  I personally viewed and interpreted the patient's CXR        Medication Review:     aspirin 81 mg Oral Daily   budesonide-formoterol 2 puff Inhalation BID - RT   cefdinir 300 mg Oral Q12H   furosemide 20 mg Oral Daily   hydrophor  Topical Q24H   insulin aspart 0-24 Units Subcutaneous Q6H   ketotifen 1 drop Both Eyes BID   pantoprazole 40 mg Oral Q AM   predniSONE 20 mg Oral BID With Meals   pregabalin 75 mg Oral Q12H   rivaroxaban 20 mg Oral Q24H   tamsulosin 0.4 mg Oral Nightly   tiotropium 1 capsule Inhalation Daily - RT         sodium chloride 10 mL/hr Last Rate: 10 mL/hr (02/26/18 1924)       ASSESSMENT:   Acute on chronic hypoxemic respiratory failure  Bilateral pulmonary infiltrate worsening pneumonia  AS mild  Mild MR  Suggestion of Phtn on echo  COPD  A. fib on anticoagulation  Right ankle fracture recent    PLAN:  Wean off oxygen as tolerated  Oral antibiotics   Diet started.  PT as per recommendation of orthopedics   Continue anticoagulation for A. fib  No further hemoptysis reported  Discharge once bed available at nursing home.          Merlin Fontana MD  03/03/18  11:29 AM

## 2018-03-03 NOTE — PLAN OF CARE
Problem: Patient Care Overview (Adult)  Goal: Plan of Care Review  Outcome: Ongoing (interventions implemented as appropriate)   03/02/18 1613 03/03/18 0049   Coping/Psychosocial Response Interventions   Plan Of Care Reviewed With --  patient   Patient Care Overview   Progress improving --    Outcome Evaluation   Outcome Summary/Follow up Plan --  Pt has no complaints of pain. Pt needs to keep boot off except for as tolerated weight bearing . 3L O2. ABX changed to PO. VSS. Continue to monitor. Safety maintained.        Problem: Respiratory Insufficiency (Adult)  Goal: Acid/Base Balance  Outcome: Ongoing (interventions implemented as appropriate)    Goal: Effective Ventilation  Outcome: Ongoing (interventions implemented as appropriate)      Problem: Fall Risk (Adult)  Goal: Absence of Falls  Outcome: Ongoing (interventions implemented as appropriate)      Problem: Pressure Ulcer Risk (Kev Scale) (Adult,Obstetrics,Pediatric)  Goal: Skin Integrity  Outcome: Ongoing (interventions implemented as appropriate)      Problem: Pain, Acute (Adult)  Goal: Acceptable Pain Control/Comfort Level  Outcome: Ongoing (interventions implemented as appropriate)

## 2018-03-04 LAB
ANION GAP SERPL CALCULATED.3IONS-SCNC: 10.6 MMOL/L
BUN BLD-MCNC: 18 MG/DL (ref 8–23)
BUN/CREAT SERPL: 25 (ref 7–25)
CALCIUM SPEC-SCNC: 8.2 MG/DL (ref 8.6–10.5)
CHLORIDE SERPL-SCNC: 99 MMOL/L (ref 98–107)
CO2 SERPL-SCNC: 27.4 MMOL/L (ref 22–29)
CREAT BLD-MCNC: 0.72 MG/DL (ref 0.76–1.27)
GFR SERPL CREATININE-BSD FRML MDRD: 106 ML/MIN/1.73
GLUCOSE BLD-MCNC: 107 MG/DL (ref 65–99)
GLUCOSE BLDC GLUCOMTR-MCNC: 113 MG/DL (ref 70–130)
GLUCOSE BLDC GLUCOMTR-MCNC: 114 MG/DL (ref 70–130)
GLUCOSE BLDC GLUCOMTR-MCNC: 122 MG/DL (ref 70–130)
GLUCOSE BLDC GLUCOMTR-MCNC: 143 MG/DL (ref 70–130)
GLUCOSE BLDC GLUCOMTR-MCNC: 90 MG/DL (ref 70–130)
POTASSIUM BLD-SCNC: 4.4 MMOL/L (ref 3.5–5.2)
SODIUM BLD-SCNC: 137 MMOL/L (ref 136–145)

## 2018-03-04 PROCEDURE — 82962 GLUCOSE BLOOD TEST: CPT

## 2018-03-04 PROCEDURE — 63710000001 PREDNISONE PER 1 MG: Performed by: INTERNAL MEDICINE

## 2018-03-04 PROCEDURE — 97110 THERAPEUTIC EXERCISES: CPT | Performed by: PHYSICAL THERAPIST

## 2018-03-04 PROCEDURE — 94799 UNLISTED PULMONARY SVC/PX: CPT

## 2018-03-04 PROCEDURE — 80048 BASIC METABOLIC PNL TOTAL CA: CPT | Performed by: INTERNAL MEDICINE

## 2018-03-04 RX ORDER — PREDNISONE 20 MG/1
20 TABLET ORAL ONCE
Status: COMPLETED | OUTPATIENT
Start: 2018-03-04 | End: 2018-03-04

## 2018-03-04 RX ADMIN — PREGABALIN 75 MG: 75 CAPSULE ORAL at 08:49

## 2018-03-04 RX ADMIN — KETOTIFEN FUMARATE 1 DROP: 0.35 SOLUTION/ DROPS OPHTHALMIC at 08:49

## 2018-03-04 RX ADMIN — PREDNISONE 20 MG: 20 TABLET ORAL at 19:08

## 2018-03-04 RX ADMIN — TAMSULOSIN HYDROCHLORIDE 0.4 MG: 0.4 CAPSULE ORAL at 20:18

## 2018-03-04 RX ADMIN — RIVAROXABAN 20 MG: 20 TABLET, FILM COATED ORAL at 19:08

## 2018-03-04 RX ADMIN — KETOTIFEN FUMARATE 1 DROP: 0.35 SOLUTION/ DROPS OPHTHALMIC at 20:18

## 2018-03-04 RX ADMIN — PETROLATUM: 42 OINTMENT TOPICAL at 08:49

## 2018-03-04 RX ADMIN — TIOTROPIUM BROMIDE 1 CAPSULE: 18 CAPSULE ORAL; RESPIRATORY (INHALATION) at 13:35

## 2018-03-04 RX ADMIN — PANTOPRAZOLE SODIUM 40 MG: 40 TABLET, DELAYED RELEASE ORAL at 06:24

## 2018-03-04 RX ADMIN — PREGABALIN 75 MG: 75 CAPSULE ORAL at 20:17

## 2018-03-04 RX ADMIN — FUROSEMIDE 20 MG: 20 TABLET ORAL at 08:49

## 2018-03-04 RX ADMIN — PREDNISONE 20 MG: 20 TABLET ORAL at 08:49

## 2018-03-04 RX ADMIN — ACETAMINOPHEN 650 MG: 325 TABLET ORAL at 22:07

## 2018-03-04 RX ADMIN — CEFDINIR 300 MG: 300 CAPSULE ORAL at 08:49

## 2018-03-04 RX ADMIN — BUDESONIDE AND FORMOTEROL FUMARATE DIHYDRATE 2 PUFF: 160; 4.5 AEROSOL RESPIRATORY (INHALATION) at 13:34

## 2018-03-04 RX ADMIN — ACETAMINOPHEN 650 MG: 325 TABLET ORAL at 04:18

## 2018-03-04 RX ADMIN — BUDESONIDE AND FORMOTEROL FUMARATE DIHYDRATE 2 PUFF: 160; 4.5 AEROSOL RESPIRATORY (INHALATION) at 20:48

## 2018-03-04 RX ADMIN — ACETAMINOPHEN 650 MG: 325 TABLET ORAL at 10:17

## 2018-03-04 RX ADMIN — ASPIRIN 81 MG: 81 TABLET, CHEWABLE ORAL at 08:49

## 2018-03-04 NOTE — PLAN OF CARE
Problem: Patient Care Overview (Adult)  Goal: Plan of Care Review  Outcome: Ongoing (interventions implemented as appropriate)   03/04/18 1817   Coping/Psychosocial Response Interventions   Plan Of Care Reviewed With patient   Patient Care Overview   Progress improving   Outcome Evaluation   Outcome Summary/Follow up Plan Patient cleared to ambulate further in CAM boot by AB through heel but is limited by SOB. Took about 1 min of seated rest to recover in drop of SpO2 while walking

## 2018-03-04 NOTE — PROGRESS NOTES
Orthopedic Progress Note      Patient: Alessio Allen  YOB: 1940     Date of Admission: 2/26/2018 11:29 AM Medical Record Number: [id number]     Attending Physician: Merlin Fontana MD    Right ankle fracture post op , outside institution, WB status    Subjective : No new orthopaedic complaints , was able to WB with boot and walker    Pain Relief: some relief with present medication.     Systemic Complaints: No Complaints  Vitals:    03/03/18 1314 03/03/18 1948 03/03/18 2228 03/04/18 0024   BP: 145/89 156/92  144/98   BP Location: Left arm Right arm  Right arm   Patient Position: Sitting Lying  Lying   Pulse: 85 90 83 88   Resp: 20 20 20 20   Temp: 97.4 °F (36.3 °C) 97.8 °F (36.6 °C)  98.2 °F (36.8 °C)   TempSrc: Oral Oral  Oral   SpO2:  93% 93% 95%   Weight:       Height:           Physical Exam: 77 y.o. male    General Appearance:       Alert, cooperative, in no acute distress                  Extremities:    Dressing Clean, Dry and Intact, medial malleolar eschar         Incision healthy without signs or symptoms of infections         No clinical sign of DVT        Able to do good movements of digits    Pulses:   Pulses palpable and equal bilaterally           Diagnostic Tests:       Results from last 7 days  Lab Units 03/01/18  0544 02/28/18  0352 02/27/18  0454   WBC 10*3/mm3 8.56 10.44 7.09   HEMOGLOBIN g/dL 11.2* 10.8* 11.2*   HEMATOCRIT % 38.2* 36.6* 38.0*   PLATELETS 10*3/mm3 146 124* 124*       Results from last 7 days  Lab Units 03/04/18  0501 03/03/18  0440 03/02/18  0438   SODIUM mmol/L 137 137 138   POTASSIUM mmol/L 4.4 4.4 4.4   CHLORIDE mmol/L 99 99 100   CO2 mmol/L 27.4 31.1* 27.1   BUN mg/dL 18 19 20   CREATININE mg/dL 0.72* 0.68* 0.81   GLUCOSE mg/dL 107* 112* 126*   CALCIUM mg/dL 8.2* 8.2* 8.3*       Results from last 7 days  Lab Units 02/26/18  1717   INR  1.28*     No results found for: CRYSTAL]  No results found for: CULTURE]  No results found for: URICACID]  Xr Chest 2  View    Result Date: 2/26/2018  Narrative: XR CHEST 2 VW-  HISTORY: Male who is 77 years-old,  short of breath  TECHNIQUE: Frontal and lateral views of the chest  COMPARISON: 2/14/2018  FINDINGS: Heart size is borderline. Left-sided pacemaker and cardiac leads are seen. Mild prominence of vascular and interstitial markings. Borderline fullness of right hilum could reflect presence of lymph nodes. Patchy bilateral pulmonary densities, predominantly in the mid to lower lungs appears similar to prior exam. Minimal pleural effusion, nor pneumothorax. No acute osseous process.      Impression: No significant change, continued follow-up recommended  This report was finalized on 2/26/2018 12:16 PM by Dr. Krishan Escalera MD.      Xr Chest 2 View    Result Date: 2/14/2018  Narrative: PA AND LATERAL CHEST X-RAY  HISTORY: Follow-up pneumonia.  TECHNIQUE: PA and lateral images of the chest are provided and correlated with prior x-rays, most recently February 2018.  FINDINGS: There is a cardiac pacing device. The cardiomediastinal silhouette is normal. Interstitial and alveolar opacity in the lower lung zones, more dense on the left than the right, is again observed. The left base density appears slightly less dense today than on the recent prior exam. No new abnormality is present. There is no effusion or pneumothorax.      Impression: Persistent bibasilar infiltrate with perhaps some mild interval improvement.  This report was finalized on 2/14/2018 2:54 PM by Dr. Toby Thomas MD.      Xr Ankle 2 View Right    Result Date: 2/27/2018  Narrative: RIGHT ANKLE: 3 VIEWS  HISTORY: Postoperative right ankle surgery in January 2018.  FINDINGS: A pin and 3 screws transfix an obliquely oriented distal fibular fracture that is associated with 5 mm lateral displacement. This fracture extends into the distal tibiofibular articulation and there is a single syndesmotic screw. Mild lucency surrounds the syndesmotic screw tip within the  distal tibia.  2 screws transfix the medial malleolar fracture that is transversely oriented. The distal fibular and tibial fractures do not appear to be completely healed/united. There is widening of the articulation between the base of the medial malleolus and the medial talar dome. Degenerative heel spurs are present. Overlying casting material is noted. There is no evidence for new fracture.  This report was finalized on 2/27/2018 12:23 PM by Dr. Feliberto Serrano MD.      Xr Chest 1 View    Result Date: 3/2/2018  Narrative: X-RAY CHEST 1 VIEW.  HISTORY: Shortness of breath.  COMPARISON: 02/26/2018.  FINDINGS: Cardiomegaly and low lung volumes.     Bilateral infiltrates in the mid and lower lung zones.  Mild to moderate emphysema.          Impression: Overall no significant improvement.     This report was finalized on 3/2/2018 11:33 AM by Dr. Nick Evans MD.      Xr Chest 1 View    Result Date: 2/8/2018  Narrative: One VIEW PORTABLE CHEST AT 10:57 AM  HISTORY: Shortness of breath.  There is moderate cardiomegaly with a pacemaker in place. There is some increased haziness in both lower lobes that appears to correspond areas of pneumonia and chronic underlying fibrotic scarring is noted on yesterday's outside chest CT scan. The patient has severe pulmonary emphysema as seen on the CT scan.  This report was finalized on 2/8/2018 11:16 AM by Dr. Dusty Cisneros MD.      Ct Angiogram Chest With Contrast    Result Date: 2/26/2018  Narrative: CT ANGIOGRAM CHEST W CONTRAST-  Clinical: Shortness of breath, rule out pulmonary embolus, pneumonia  COMPARISON 02/07/2018  CT scan of the chest with intravenous contrast pulmonary embolus protocol to include CT angiogram and three-dimensional reconstructed images  FINDINGS: Thin axial and CT Angio reconstructed images fail to demonstrate pulmonary embolus. There is cardiac enlargement with pacer leads in the right heart. There is ectasia of the thoracic aorta, mild dilatation  of the ascending portion, 4.7 cm in diameter. No interval change. There are stable enlarged mediastinal and hilar lymph nodes. The esophagus is satisfactory in course and caliber.  There is extensive bleb and bullae formation. There are small free-flowing pleural effusions. No change in the distribution size. There appears to be an abnormal subpleural interstitial pattern involving both lungs with a basilar predominance with areas having the appearance of honeycombing, suggesting pulmonary fibrosis. No acute consolidation has developed. There are again demonstrated patchy nodular areas of consolidation in both lower lobes. No significant improvement. No suspicious pulmonary nodule has developed.. Limited images through the upper abdomen.  FINDINGS called to the ICU at the time of completion, 3:50 PM.  CONCLUSION: 1. Cardiomegaly. 2. No pulmonary embolus, no aortic dissection. 3. Pleural effusions with abnormal interstitial pattern suggesting pulmonary fibrosis, there are patchy areas of bibasilar consolidation without improvement. 3. Extensive bleb and bullae formation compatible with emphysema. 4. Stable mediastinal and hilar adenopathy.    Radiation dose reduction techniques were utilized, including automated exposure control and exposure modulation based on body size.  This report was finalized on 2/26/2018 3:54 PM by Dr. Zach Epperson MD.      Fl Video Swallow    Result Date: 2/28/2018  Narrative: VIDEO ESOPHAGRAM  HISTORY: Dysphagia.  The patient exhibits functional swallowing with all consistencies that were evaluated. Please also see the speech therapist's report. 3 images were obtained and the fluoroscopy time measures 1 minute and 39 seconds.  This report was finalized on 2/28/2018 6:00 PM by Dr. Dusty Cisneros MD.          Current Medications:  Scheduled Meds:  aspirin 81 mg Oral Daily   budesonide-formoterol 2 puff Inhalation BID - RT   cefdinir 300 mg Oral Q12H   furosemide 20 mg Oral Daily   hydrophor   Topical Q24H   insulin aspart 0-24 Units Subcutaneous Q6H   ketotifen 1 drop Both Eyes BID   pantoprazole 40 mg Oral Q AM   predniSONE 20 mg Oral BID With Meals   pregabalin 75 mg Oral Q12H   rivaroxaban 20 mg Oral Q24H   tamsulosin 0.4 mg Oral Nightly   tiotropium 1 capsule Inhalation Daily - RT     Continuous Infusions:  sodium chloride 10 mL/hr Last Rate: 10 mL/hr (02/26/18 1924)     PRN Meds:.•  acetaminophen  •  dextrose  •  dextrose  •  glucagon (human recombinant)  •  ipratropium-albuterol  •  sodium chloride  •  sodium chloride    Right ankle fracture post op , outside institution, WB status    Patient Active Problem List   Diagnosis   • A-fib   • Dyslipidemia   • BP (high blood pressure)   • Neuropathy   • Hypertension   • COPD (chronic obstructive pulmonary disease)   • BPH (benign prostatic hypertrophy)   • Atrial fibrillation   • Asthma   • Hypoxia   • Pneumonia   • Chronic anticoagulation   • Pneumonia of both lungs due to infectious organism   • Hyponatremia   • COPD exacerbation       PLAN:   In view of his COPD and pending rehab, I think we should let him WBAT , with walker, wean off boot. Likely his ankle fracture may heal / go onto posttraumatic arthritis which can be dealt at a later date.     Weight Bearing: WBAT  Discharge Plan: OK to plan for discharge   from orthopadic perspective.  I will sign off but will be available. OK to follow up with me in office in 3 -4 weeks.     Nan Santos MD    Date: 3/4/2018    Time: 7:54 AM

## 2018-03-04 NOTE — PLAN OF CARE
Problem: Patient Care Overview (Adult)  Goal: Plan of Care Review  Outcome: Ongoing (interventions implemented as appropriate)   03/04/18 0354   Coping/Psychosocial Response Interventions   Plan Of Care Reviewed With patient   Patient Care Overview   Progress improving   Outcome Evaluation   Outcome Summary/Follow up Plan Pt c/o neuropathy pain in toes. Given tylenol. Activity is weight bearing as tolerated. Continue to monitor BG Q6H. Pt is on 3L NC. Has infrequent cough with brown sputum. Contine to monitor. VSS. Safety maintained.       Problem: Respiratory Insufficiency (Adult)  Goal: Acid/Base Balance  Outcome: Ongoing (interventions implemented as appropriate)    Goal: Effective Ventilation  Outcome: Ongoing (interventions implemented as appropriate)      Problem: Fall Risk (Adult)  Goal: Absence of Falls  Outcome: Ongoing (interventions implemented as appropriate)      Problem: Pressure Ulcer Risk (Kev Scale) (Adult,Obstetrics,Pediatric)  Goal: Skin Integrity  Outcome: Ongoing (interventions implemented as appropriate)      Problem: Pain, Acute (Adult)  Goal: Acceptable Pain Control/Comfort Level  Outcome: Ongoing (interventions implemented as appropriate)

## 2018-03-04 NOTE — PLAN OF CARE
Problem: Patient Care Overview (Adult)  Goal: Plan of Care Review  Outcome: Ongoing (interventions implemented as appropriate)   03/03/18 1945   Coping/Psychosocial Response Interventions   Plan Of Care Reviewed With patient   Patient Care Overview   Progress improving   Outcome Evaluation   Outcome Summary/Follow up Plan Pending placememnt; up in chair majority of the day       Problem: Respiratory Insufficiency (Adult)  Goal: Acid/Base Balance  Outcome: Ongoing (interventions implemented as appropriate)      Problem: Fall Risk (Adult)  Goal: Absence of Falls  Outcome: Ongoing (interventions implemented as appropriate)   03/03/18 1945   Fall Risk (Adult)   Absence of Falls making progress toward outcome       Problem: Pressure Ulcer Risk (Kev Scale) (Adult,Obstetrics,Pediatric)  Goal: Skin Integrity  Outcome: Ongoing (interventions implemented as appropriate)      Problem: Pain, Acute (Adult)  Goal: Acceptable Pain Control/Comfort Level  Outcome: Ongoing (interventions implemented as appropriate)

## 2018-03-04 NOTE — PROGRESS NOTES
[facility]            LOS: 6 days   Patient Care Team:  Alan Santana MD as PCP - General (Family Medicine)  Rao Maguire MD as Consulting Physician (Hematology and Oncology)  Alan Santana MD as Referring Physician (Family Medicine)    Chief Complaint: Acute on chronic hypoxemic respiratory failure with bilateral pneumonia    Interval History:   Breathing improved.    No cp  Some cough, worse in afternoon after PT.  Walked with pt, needs o2 when ambulating.  No hemoptysis.      Vital Signs  Temp:  [97.5 °F (36.4 °C)-98.2 °F (36.8 °C)] 97.5 °F (36.4 °C)  Heart Rate:  [83-90] 86  Resp:  [20] 20  BP: (126-156)/(75-98) 126/75    Intake/Output Summary (Last 24 hours) at 03/04/18 1613  Last data filed at 03/04/18 0900   Gross per 24 hour   Intake              680 ml   Output             1750 ml   Net            -1070 ml     GENERAL:  NAD,alert oriented  HEENT:  EOMI, nonicteric sclera, no JVD  PULMONARY:    Unlabored effort, no wheeze, left basilar crackles, no rhonchi, symmetric air entry  CARDIAC:  RRR no MRG, S1 S2  ABD: obese SNTND BS+  EXT: no c/c/e, pulses symmetric 2+ bilaterally RLE wrapped with ace  NEURO:  CNs II-XII intact, no focal deficits  SKIN: no lesions, no rash  PSYCH: appropriate mood  LYMPH: no cervical LAD    Results Review:      Results from last 7 days  Lab Units 03/04/18  0501 03/03/18  0440 03/02/18  0438   SODIUM mmol/L 137 137 138   POTASSIUM mmol/L 4.4 4.4 4.4   CHLORIDE mmol/L 99 99 100   CO2 mmol/L 27.4 31.1* 27.1   BUN mg/dL 18 19 20   CREATININE mg/dL 0.72* 0.68* 0.81   GLUCOSE mg/dL 107* 112* 126*   CALCIUM mg/dL 8.2* 8.2* 8.3*       Results from last 7 days  Lab Units 02/26/18  1145   TROPONIN T ng/mL <0.010       Results from last 7 days  Lab Units 03/01/18  0544 02/28/18  0352 02/27/18  0454   WBC 10*3/mm3 8.56 10.44 7.09   HEMOGLOBIN g/dL 11.2* 10.8* 11.2*   HEMATOCRIT % 38.2* 36.6* 38.0*   PLATELETS 10*3/mm3 146 124* 124*       Results from last 7 days  Lab  Units 02/26/18  1717   INR  1.28*           Results from last 7 days  Lab Units 03/01/18  0544   MAGNESIUM mg/dL 2.2           Results from last 7 days  Lab Units 02/26/18  1152   PH, ARTERIAL pH units 7.448   PO2 ART mm Hg 49.9*   PCO2, ARTERIAL mm Hg 36.4   HCO3 ART mmol/L 25.2       I reviewed the patient's new clinical results.  I personally viewed and interpreted the patient's CXR        Medication Review:     aspirin 81 mg Oral Daily   budesonide-formoterol 2 puff Inhalation BID - RT   furosemide 20 mg Oral Daily   hydrophor  Topical Q24H   insulin aspart 0-24 Units Subcutaneous Q6H   ketotifen 1 drop Both Eyes BID   pantoprazole 40 mg Oral Q AM   predniSONE 20 mg Oral BID With Meals   pregabalin 75 mg Oral Q12H   rivaroxaban 20 mg Oral Q24H   tamsulosin 0.4 mg Oral Nightly   tiotropium 1 capsule Inhalation Daily - RT         sodium chloride 10 mL/hr Last Rate: 10 mL/hr (02/26/18 1924)       ASSESSMENT:   Acute on chronic hypoxemic respiratory failure  Bilateral pulmonary infiltrate worsening pneumonia  AS mild  Mild MR  Suggestion of Phtn on echo  COPD  A. fib on anticoagulation  Right ankle fracture recent    PLAN:  Wean off oxygen as tolerated  Oral antibiotics - completed 7 day course  Wean pred to 30  Diet started.  PT as per recommendation of orthopedics   Continue anticoagulation for A. fib  No further hemoptysis reported  Discharge once bed available at nursing home.          Merlin Fontana MD  03/04/18  4:13 PM

## 2018-03-04 NOTE — THERAPY TREATMENT NOTE
Acute Care - Physical Therapy Treatment Note  [facility]     Patient Name: Alessio Allen  : 1940  MRN: [id number]  Today's Date: 3/4/2018  Onset of Illness/Injury or Date of Surgery Date: 18     Referring Physician: Cornell    Admit Date: 2018    Visit Dx:    ICD-10-CM ICD-9-CM   1. COPD exacerbation J44.1 491.21   2. Acute on chronic respiratory failure with hypoxia J96.21 518.84     799.02   3. Difficulty walking R26.2 719.7     Patient Active Problem List   Diagnosis   • A-fib   • Dyslipidemia   • BP (high blood pressure)   • Neuropathy   • Hypertension   • COPD (chronic obstructive pulmonary disease)   • BPH (benign prostatic hypertrophy)   • Atrial fibrillation   • Asthma   • Hypoxia   • Pneumonia   • Chronic anticoagulation   • Pneumonia of both lungs due to infectious organism   • Hyponatremia   • COPD exacerbation               Adult Rehabilitation Note       18 1355 18 0832       Rehab Assessment/Intervention    Discipline physical therapist  - physical therapy assistant  -RW     Document Type therapy note (daily note)  - therapy note (daily note)  -RW     Subjective Information no complaints;agree to therapy  - agree to therapy;no complaints  -RW     Patient Effort, Rehab Treatment good  -KH good  -RW     Symptoms Noted During/After Treatment shortness of breath  -      Precautions/Limitations fall precautions;oxygen therapy device and L/min  -KH fall precautions;other (see comments)   CAM boot on RLE when up  -RW     Specific Treatment Considerations WBAT with boot  -KH Per Dr. Santos verbally, pt is WBAT on RLE for transfers and short distances w/ use of CAM boot  -RW     Recorded by [KH] Luly Dawkins, PT [RW] Jerica Real, PTA     Vital Signs    Pre SpO2 (%) 90  -KH 89  -RW     O2 Delivery Pre Treatment supplemental O2  -KH supplemental O2   5L  -RW     Intra SpO2 (%) 70  -KH      O2 Delivery Intra Treatment supplemental O2  -KH supplemental O2   5L  -RW      Post SpO2 (%) 88  -KH      O2 Delivery Post Treatment supplemental O2  -KH supplemental O2   5L  -RW     Pre Patient Position Sitting  -KH      Intra Patient Position Standing  -KH      Post Patient Position Sitting  -KH      Recovery Time 1 min  -KH      Rest Breaks  1  -KH      Recorded by [KH] Luly Dawkins, PT [RW] Jerica Real PTA     Pain Assessment    Pain Assessment No/denies pain  -KH No/denies pain  -RW     Recorded by [KH] Luly Dawkins, PT [RW] Jerica Real PTA     Vision Assessment/Intervention    Visual Impairment WFL  -KH      Recorded by [KH] Luly Dawkins PT      Cognitive Assessment/Intervention    Current Cognitive/Communication Assessment functional  -KH functional  -RW     Orientation Status oriented x 4  -KH oriented x 4  -RW     Follows Commands/Answers Questions 100% of the time  -% of the time  -RW     Personal Safety WNL/WFL  -KH WNL/WFL  -RW     Personal Safety Interventions fall prevention program maintained;gait belt;nonskid shoes/slippers when out of bed;supervised activity  -KH fall prevention program maintained;gait belt;nonskid shoes/slippers when out of bed  -RW     Short/Long Term Memory intact short term memory;intact long term memory  -KH      Recorded by [KH] Luly Dawkins, PT [RW] Jerica Real PTA     Mobility Assessment/Training    Extremity Weight-Bearing Status  right lower extremity  -RW     Right Lower Extremity Weight-Bearing  weight-bearing as tolerated   for tranfers and short distance only  -RW     Recorded by  [RW] Jerica Real PTA     Bed Mobility, Assessment/Treatment    Bed Mobility, Assistive Device  bed rails;head of bed elevated  -RW     Bed Mob, Supine to Sit, [place] not tested  -KH supervision required  -RW     Bed Mob, Sit to Supine, [place] not tested  -KH not tested   Pt in bathroom  -RW     Bed Mobility, Comment pt. up in chair  -KH      Recorded by [KH] Luly Dawkins, PT [RW] Jerica Real PTA     Transfer  Assessment/Treatment    Transfers, Sit-Stand [place] stand by assist  - stand by assist  -RW     Transfers, Stand-Sit [place] stand by assist  - stand by assist;verbal cues required   cueing for hand placement when sitting at low commode  -RW     Transfers, Sit-Stand-Sit, Assist Device rolling walker  - rolling walker  -RW     Recorded by [KH] Luly Dawkins, PT [RW] Jerica Real PTA     Gait Assessment/Treatment    Gait, [place] Level stand by assist  - stand by assist  -RW     Gait, Assistive Device rolling walker  -KH rolling walker  -RW     Gait, Distance (Feet) 150  - 12  -RW     Gait, Gait Deviations crystal decreased;forward flexed posture;stride width increased  - forward flexed posture;right:;antalgic;bilateral:;crystal decreased;step length decreased  -RW     Gait, Safety Issues  step length decreased;supplemental O2   5L O2  -RW     Gait, Impairments strength decreased  - strength decreased  -     Gait, Comment one standing rest break due to SOB. WB through heel of CAM boot  - No LOB or c/o pain during ambulation  -RW     Recorded by [KH] Luly Dawkins, PT [RW] Jerica Real PTA     Positioning and Restraints    Pre-Treatment Position sitting in chair/recliner  - in bed  -RW     Post Treatment Position chair  - bathroom  -     In Chair reclined;call light within reach;encouraged to call for assist;RUE elevated;LUE elevated  -      Bathroom  sitting;call light within reach;encouraged to call for assist;with nsg  -RW     Recorded by [KH] Luly Dawkins, PT [RW] Jerica Real PTA       User Key  (r) = Recorded By, (t) = Taken By, (c) = Cosigned By    Initials Name Effective Dates     Luly Dawkins, PT 06/22/16 -     RW Jerica Real PTA 04/06/16 -                 IP PT Goals       02/28/18 1453          Transfer Training PT LTG    Transfer Training PT LTG, Date Established 02/28/18  -EM      Transfer Training PT LTG, Time to Achieve 1 wk  -EM      Transfer  Training PT LTG, Activity Type all transfers  -EM      Transfer Training PT LTG, [place] Level conditional independence  -EM      Transfer Training PT LTG, Assist Device walker, rolling  -EM      Gait Training PT LTG    Gait Training Goal PT LTG, Date Established 02/28/18  -EM      Gait Training Goal PT LTG, Time to Achieve 1 wk  -EM      Gait Training Goal PT LTG, [place] Level supervision required  -EM      Gait Training Goal PT LTG, Assist Device walker, rolling  -EM      Gait Training Goal PT LTG, Distance to Achieve 25 feet    with sats >90%   -EM        User Key  (r) = Recorded By, (t) = Taken By, (c) = Cosigned By    Initials Name Provider Type    EM Edna Guerrero, PT Physical Therapist          Physical Therapy Education     Title: PT OT SLP Therapies (Done)     Topic: Physical Therapy (Done)     Point: Mobility training (Done)    Learning Progress Summary    Learner Readiness Method Response Comment Documented by Status   Patient Acceptance E VU  KH 03/04/18 1357 Done    Acceptance E,TB,D VU,NR  RW 03/02/18 0853 Done    Acceptance E VU  TG 03/02/18 0312 Done    Acceptance E,TB DU,VU  CW 03/01/18 0901 Done    Acceptance E NR  EM 02/28/18 1451 Active               Point: Home exercise program (Done)    Learning Progress Summary    Learner Readiness Method Response Comment Documented by Status   Patient Acceptance E VU  KH 03/04/18 1357 Done    Acceptance E VU  TG 03/02/18 0312 Done    Acceptance E,TB DU,VU  CW 03/01/18 0901 Done               Point: Body mechanics (Done)    Learning Progress Summary    Learner Readiness Method Response Comment Documented by Status   Patient Acceptance E,TB,D VU,NR  RW 03/02/18 0853 Done    Acceptance E VU  TG 03/02/18 0312 Done    Acceptance E,TB DU,VU  CW 03/01/18 0901 Done               Point: Precautions (Done)    Learning Progress Summary    Learner Readiness Method Response Comment Documented by Status   Patient Acceptance E VU  KH 03/04/18 1357 Done     Acceptance E,TB,D VU,NR  RW 03/02/18 0853 Done    Acceptance E VU  TG 03/02/18 0312 Done    Acceptance E,TB DU,VU   03/01/18 0901 Done    Acceptance E NR  EM 02/28/18 1451 Active                      User Key     Initials Effective Dates Name Provider Type Discipline    EM 12/01/15 -  Edna Guerrero, PT Physical Therapist PT     06/22/16 -  Luly Dawkins, PT Physical Therapist PT     04/06/16 -  Jerica Real, PTA Physical Therapy Assistant PT     06/16/16 -  Meenu Arora RN Registered Nurse Nurse     12/13/16 -  Cesario Kwon, PTA Physical Therapy Assistant PT                    PT Recommendation and Plan  Anticipated Discharge Disposition: home with assist  Planned Therapy Interventions: gait training, home exercise program, transfer training  PT Frequency: daily  Plan of Care Review  Plan Of Care Reviewed With: patient  Progress: improving  Outcome Summary/Follow up Plan: Patient cleared to ambulate further in CAM boot by AB through heel but is limited by SOB. Took about 1 min of seated rest to recover in drop of SpO2 while walking          Outcome Measures       03/04/18 1300 03/02/18 0830       How much help from another person do you currently need...    Turning from your back to your side while in flat bed without using bedrails? 4  -KH 4  -RW     Moving from lying on back to sitting on the side of a flat bed without bedrails? 4  -KH 4  -RW     Moving to and from a bed to a chair (including a wheelchair)? 3  -KH 3  -RW     Standing up from a chair using your arms (e.g., wheelchair, bedside chair)? 3  -KH 3  -RW     Climbing 3-5 steps with a railing? 3  -KH 2  -RW     To walk in hospital room? 3  -KH 3  -RW     AM-PAC 6 Clicks Score 20  -KH 19  -RW     Functional Assessment    Outcome Measure Options AM-PAC 6 Clicks Basic Mobility (PT)  -KH AM-PAC 6 Clicks Basic Mobility (PT)  -RW       User Key  (r) = Recorded By, (t) = Taken By, (c) = Cosigned By    Initials Name Provider Type      Luly Dawkins, PT Physical Therapist    RW Jerica Real, PTA Physical Therapy Assistant           Time Calculation:         PT Charges       03/04/18 1355          Time Calculation    Start Time 1330  -KH      Stop Time 1344  -KH      Time Calculation (min) 14 min  -      PT Received On 03/04/18  -      PT - Next Appointment 03/05/18  -        User Key  (r) = Recorded By, (t) = Taken By, (c) = Cosigned By    Initials Name Provider Type     Luly Dawkins, PT Physical Therapist          Therapy Charges for Today     Code Description Service Date Service Provider Modifiers Qty    85052455548 HC PT THER PROC EA 15 MIN 3/4/2018 Luly Dawkins, PT GP 1          PT G-Codes  Outcome Measure Options: AM-PAC 6 Clicks Basic Mobility (PT)    Luly Dawkins PT  3/4/2018

## 2018-03-04 NOTE — PLAN OF CARE
Problem: Patient Care Overview (Adult)  Goal: Plan of Care Review  Outcome: Ongoing (interventions implemented as appropriate)   03/04/18 1574   Coping/Psychosocial Response Interventions   Plan Of Care Reviewed With patient   Patient Care Overview   Progress improving   Outcome Evaluation   Outcome Summary/Follow up Plan Remains on 2L; steroids titrated; ambulated halls with PT; mild dyspnea; pending rehab placement       Problem: Respiratory Insufficiency (Adult)  Goal: Acid/Base Balance  Outcome: Ongoing (interventions implemented as appropriate)      Problem: Fall Risk (Adult)  Goal: Absence of Falls  Outcome: Ongoing (interventions implemented as appropriate)      Problem: Pressure Ulcer Risk (Kev Scale) (Adult,Obstetrics,Pediatric)  Goal: Skin Integrity  Outcome: Ongoing (interventions implemented as appropriate)      Problem: Pain, Acute (Adult)  Goal: Acceptable Pain Control/Comfort Level  Outcome: Ongoing (interventions implemented as appropriate)

## 2018-03-05 VITALS
SYSTOLIC BLOOD PRESSURE: 123 MMHG | OXYGEN SATURATION: 93 % | DIASTOLIC BLOOD PRESSURE: 86 MMHG | BODY MASS INDEX: 32.08 KG/M2 | WEIGHT: 250 LBS | HEART RATE: 98 BPM | RESPIRATION RATE: 20 BRPM | TEMPERATURE: 98.3 F | HEIGHT: 74 IN

## 2018-03-05 LAB
ANION GAP SERPL CALCULATED.3IONS-SCNC: 10.6 MMOL/L
BUN BLD-MCNC: 15 MG/DL (ref 8–23)
BUN/CREAT SERPL: 19.2 (ref 7–25)
CALCIUM SPEC-SCNC: 8.5 MG/DL (ref 8.6–10.5)
CHLORIDE SERPL-SCNC: 98 MMOL/L (ref 98–107)
CO2 SERPL-SCNC: 27.4 MMOL/L (ref 22–29)
CREAT BLD-MCNC: 0.78 MG/DL (ref 0.76–1.27)
GFR SERPL CREATININE-BSD FRML MDRD: 97 ML/MIN/1.73
GLUCOSE BLD-MCNC: 114 MG/DL (ref 65–99)
GLUCOSE BLDC GLUCOMTR-MCNC: 105 MG/DL (ref 70–130)
GLUCOSE BLDC GLUCOMTR-MCNC: 113 MG/DL (ref 70–130)
GLUCOSE BLDC GLUCOMTR-MCNC: 84 MG/DL (ref 70–130)
POTASSIUM BLD-SCNC: 4.4 MMOL/L (ref 3.5–5.2)
SODIUM BLD-SCNC: 136 MMOL/L (ref 136–145)

## 2018-03-05 PROCEDURE — 97110 THERAPEUTIC EXERCISES: CPT

## 2018-03-05 PROCEDURE — 63710000001 PREDNISONE PER 1 MG: Performed by: INTERNAL MEDICINE

## 2018-03-05 PROCEDURE — 63710000001 PREDNISONE PER 5 MG: Performed by: INTERNAL MEDICINE

## 2018-03-05 PROCEDURE — 94799 UNLISTED PULMONARY SVC/PX: CPT

## 2018-03-05 PROCEDURE — 82962 GLUCOSE BLOOD TEST: CPT

## 2018-03-05 PROCEDURE — 80048 BASIC METABOLIC PNL TOTAL CA: CPT | Performed by: INTERNAL MEDICINE

## 2018-03-05 RX ORDER — PREDNISONE 10 MG/1
TABLET ORAL
Qty: 31 TABLET | Refills: 0 | Status: SHIPPED | OUTPATIENT
Start: 2018-03-05 | End: 2018-03-24 | Stop reason: HOSPADM

## 2018-03-05 RX ADMIN — RIVAROXABAN 20 MG: 20 TABLET, FILM COATED ORAL at 17:23

## 2018-03-05 RX ADMIN — ASPIRIN 81 MG: 81 TABLET, CHEWABLE ORAL at 09:44

## 2018-03-05 RX ADMIN — FUROSEMIDE 20 MG: 20 TABLET ORAL at 09:44

## 2018-03-05 RX ADMIN — PREDNISONE 30 MG: 20 TABLET ORAL at 09:43

## 2018-03-05 RX ADMIN — ACETAMINOPHEN 650 MG: 325 TABLET ORAL at 05:10

## 2018-03-05 RX ADMIN — KETOTIFEN FUMARATE 1 DROP: 0.35 SOLUTION/ DROPS OPHTHALMIC at 09:44

## 2018-03-05 RX ADMIN — TIOTROPIUM BROMIDE 1 CAPSULE: 18 CAPSULE ORAL; RESPIRATORY (INHALATION) at 07:17

## 2018-03-05 RX ADMIN — PETROLATUM: 42 OINTMENT TOPICAL at 11:00

## 2018-03-05 RX ADMIN — PREGABALIN 75 MG: 75 CAPSULE ORAL at 09:43

## 2018-03-05 RX ADMIN — PREGABALIN 75 MG: 75 CAPSULE ORAL at 18:06

## 2018-03-05 RX ADMIN — PANTOPRAZOLE SODIUM 40 MG: 40 TABLET, DELAYED RELEASE ORAL at 06:26

## 2018-03-05 RX ADMIN — BUDESONIDE AND FORMOTEROL FUMARATE DIHYDRATE 2 PUFF: 160; 4.5 AEROSOL RESPIRATORY (INHALATION) at 07:17

## 2018-03-05 RX ADMIN — ACETAMINOPHEN 650 MG: 325 TABLET ORAL at 14:05

## 2018-03-05 NOTE — PLAN OF CARE
Problem: Patient Care Overview (Adult)  Goal: Plan of Care Review  Outcome: Ongoing (interventions implemented as appropriate)   03/05/18 0908   Coping/Psychosocial Response Interventions   Plan Of Care Reviewed With patient   Patient Care Overview   Progress progress toward functional goals as expected   Outcome Evaluation   Outcome Summary/Follow up Plan pt slightly fatigued and incr SOA , but recovered well after brief standing rest(pt insisted on amb that dist even w/educ offered ), if pt has someone at home just for safety, could manage at home if all agree

## 2018-03-05 NOTE — PROGRESS NOTES
Continued Stay Note  [facility]     Patient Name: Alessio Allen  MRN: [id number]  Today's Date: 3/5/2018    Admit Date: 2/26/2018          Discharge Plan       03/05/18 1335    Case Management/Social Work Plan    Plan [facility]    Additional Comments [facility] has no beds  Offered [facility]  Pt agrees  bernadette notified  Pt to DC to ready bed at [facility]                Discharge Codes     None            Hermila Merchant RN

## 2018-03-05 NOTE — PLAN OF CARE
Problem: Respiratory Insufficiency (Adult)  Goal: Acid/Base Balance  Outcome: Ongoing (interventions implemented as appropriate)    Goal: Effective Ventilation  Outcome: Ongoing (interventions implemented as appropriate)      Problem: Fall Risk (Adult)  Goal: Absence of Falls  Outcome: Ongoing (interventions implemented as appropriate)      Problem: Pressure Ulcer Risk (Kev Scale) (Adult,Obstetrics,Pediatric)  Goal: Skin Integrity  Outcome: Ongoing (interventions implemented as appropriate)      Problem: Pain, Acute (Adult)  Goal: Acceptable Pain Control/Comfort Level  Outcome: Ongoing (interventions implemented as appropriate)

## 2018-03-05 NOTE — THERAPY TREATMENT NOTE
Acute Care - Physical Therapy Treatment Note  [facility]     Patient Name: Alessio Allen  : 1940  MRN: [id number]  Today's Date: 3/5/2018  Onset of Illness/Injury or Date of Surgery Date: 18     Referring Physician: Cornell    Admit Date: 2018    Visit Dx:    ICD-10-CM ICD-9-CM   1. COPD exacerbation J44.1 491.21   2. Acute on chronic respiratory failure with hypoxia J96.21 518.84     799.02   3. Difficulty walking R26.2 719.7     Patient Active Problem List   Diagnosis   • A-fib   • Dyslipidemia   • BP (high blood pressure)   • Neuropathy   • Hypertension   • COPD (chronic obstructive pulmonary disease)   • BPH (benign prostatic hypertrophy)   • Atrial fibrillation   • Asthma   • Hypoxia   • Pneumonia   • Chronic anticoagulation   • Pneumonia of both lungs due to infectious organism   • Hyponatremia   • COPD exacerbation               Adult Rehabilitation Note       18 0900 18 1355       Rehab Assessment/Intervention    Discipline physical therapy assistant  - physical therapist  -     Document Type therapy note (daily note)  - therapy note (daily note)  -     Subjective Information agree to therapy;complains of;fatigue;dyspnea  -DAVID no complaints;agree to therapy  -     Patient Effort, Rehab Treatment  good  -     Symptoms Noted During/After Treatment  shortness of breath  -     Precautions/Limitations fall precautions;oxygen therapy device and L/min  -DAVID fall precautions;oxygen therapy device and L/min  -     Specific Treatment Considerations WBAT w/CAM boot, pt states he can now walk further per MD MORRISON WBAT with boot  -KH     Recorded by [DAVID] Korin Cleveland PTA [] Luly Dawkins, [place]     Vital Signs    Pre SpO2 (%) --   sensor not reading   -DAVID 90  -KH     O2 Delivery Pre Treatment  supplemental O2  -KH     Intra SpO2 (%)  70  -KH     O2 Delivery Intra Treatment  supplemental O2  -KH     Post SpO2 (%)  88  -KH     O2 Delivery Post Treatment  supplemental O2  -KH      Pre Patient Position  Sitting  -     Intra Patient Position  Standing  -     Post Patient Position  Sitting  -     Recovery Time  1 min  -     Rest Breaks   1  -KH     Recorded by [JM] Korin Cleveland PTA [] Luly Dawkins, PT     Pain Assessment    Pain Assessment 0-10  - No/denies pain  -     Pain Score 3  -      Pain Location Ankle  -      Pain Orientation Right  -JM      Recorded by [JM] Korin Cleveland PTA [KH] uLly Dawkins, PT     Vision Assessment/Intervention    Visual Impairment  WFL  -KH     Recorded by  [KH] Luly Dawkins PT     Cognitive Assessment/Intervention    Current Cognitive/Communication Assessment  functional  -     Orientation Status  oriented x 4  -     Follows Commands/Answers Questions  100% of the time  -     Personal Safety  WNL/WFL  -     Personal Safety Interventions  fall prevention program maintained;gait belt;nonskid shoes/slippers when out of bed;supervised activity  -     Short/Long Term Memory  intact short term memory;intact long term memory  -     Recorded by  [KH] Luly Dawkins, PT     Bed Mobility, Assessment/Treatment    Bed Mob, Supine to Sit, [place] conditional independence  - not tested  -     Bed Mob, Sit to Supine, [place]  not tested  -     Bed Mobility, Comment  pt. up in chair  -     Recorded by [JM] Korin Cleveland PTA [KH] Luly Dawkins, PT     Transfer Assessment/Treatment    Transfers, Sit-Stand [place] supervision required  - stand by assist  -     Transfers, Stand-Sit [place] supervision required  - stand by assist  -     Transfers, Sit-Stand-Sit, Assist Device rolling walker  - rolling walker  -     Recorded by [JM] Korin Cleveland PTA [KH] Luly Dawkins, PT     Gait Assessment/Treatment    Gait, [place] Level stand by assist  - stand by assist  -     Gait, Assistive Device rolling walker  - rolling walker  -     Gait, Distance (Feet) 75   x2-one standing rest req, unable to get  reading of SATS  - 150  -KH     Gait, Gait Deviations  crystal decreased;forward flexed posture;stride width increased  -     Gait, Impairments strength decreased  - strength decreased  -     Gait, Comment one standing rest to complete  - one standing rest break due to SOB. WB through heel of CAM boot  -     Recorded by [DAVID] Korin Cleveland PTA [] Luly Dawkins, PT     Positioning and Restraints    Pre-Treatment Position in bed  - sitting in chair/recliner  -     Post Treatment Position  chair  -     In Chair  reclined;call light within reach;encouraged to call for assist;RUE elevated;LUE elevated  -     Bathroom sitting;call light within reach;encouraged to call for assist;notified nsg   GRETEL Carrasco aware  -      Recorded by [DAVID] Korin Cleveland PTA [] Luly Dawkins, PT       User Key  (r) = Recorded By, (t) = Taken By, (c) = Cosigned By    Initials Name Effective Dates     Korin Cleveland, HARDEEP 02/18/16 -     KH Luly Dawkins, PT 06/22/16 -                 IP PT Goals       02/28/18 1453          Transfer Training PT LTG    Transfer Training PT LTG, Date Established 02/28/18  -EM      Transfer Training PT LTG, Time to Achieve 1 wk  -EM      Transfer Training PT LTG, Activity Type all transfers  -EM      Transfer Training PT LTG, [place] conditional independence  -EM      Transfer Training PT LTG, Assist Device walker, rolling  -EM      Gait Training PT LTG    Gait Training Goal PT LTG, Date Established 02/28/18  -EM      Gait Training Goal PT LTG, Time to Achieve 1 wk  -EM      Gait Training Goal PT LTG, [place] supervision required  -EM      Gait Training Goal PT LTG, Assist Device walker, rolling  -EM      Gait Training Goal PT LTG, Distance to Achieve 25 feet    with sats >90%   -EM        User Key  (r) = Recorded By, (t) = Taken By, (c) = Cosigned By    Initials Name Provider Type    EM Edna Guerrero, PT Physical Therapist          Physical Therapy Education      Title: PT OT SLP Therapies (Done)     Topic: Physical Therapy (Done)     Point: Mobility training (Done)    Learning Progress Summary    Learner Readiness Method Response Comment Documented by Status   Patient Eager E,TB DU   03/05/18 0911 Done    Acceptance E VU   03/04/18 1357 Done    Acceptance E,TB,D VU,NR  RW 03/02/18 0853 Done    Acceptance E VU  TG 03/02/18 0312 Done    Acceptance E,TB DU,VU  CW 03/01/18 0901 Done    Acceptance E NR  EM 02/28/18 1451 Active               Point: Home exercise program (Done)    Learning Progress Summary    Learner Readiness Method Response Comment Documented by Status   Patient Eager E,TB DU   03/05/18 0911 Done    Acceptance E VU  KH 03/04/18 1357 Done    Acceptance E VU  TG 03/02/18 0312 Done    Acceptance E,TB DU,VU  CW 03/01/18 0901 Done               Point: Body mechanics (Done)    Learning Progress Summary    Learner Readiness Method Response Comment Documented by Status   Patient Eager E,TB DU   03/05/18 0911 Done    Acceptance E,TB,D VU,NR  RW 03/02/18 0853 Done    Acceptance E VU  TG 03/02/18 0312 Done    Acceptance E,TB DU,VU  CW 03/01/18 0901 Done               Point: Precautions (Done)    Learning Progress Summary    Learner Readiness Method Response Comment Documented by Status   Patient Eager E,TB DU   03/05/18 0911 Done    Acceptance E VU  KH 03/04/18 1357 Done    Acceptance E,TB,D VU,NR  RW 03/02/18 0853 Done    Acceptance E VU  TG 03/02/18 0312 Done    Acceptance E,TB DU,VU  CW 03/01/18 0901 Done    Acceptance E NR  EM 02/28/18 1451 Active                      User Key     Initials Effective Dates Name Provider Type Discipline    EM 12/01/15 -  Edna Guerrero, PT Physical Therapist PT     02/18/16 -  Korin Cleveland, PTA Physical Therapy Assistant PT     06/22/16 -  Luly Dawkins, PT Physical Therapist PT    RW 04/06/16 -  Jerica Real PTA Physical Therapy Assistant PT    TG 06/16/16 -  Meenu Arora, RN Registered Nurse Nurse      12/13/16 -  Cesario Kwon PTA Physical Therapy Assistant PT                    PT Recommendation and Plan  Anticipated Discharge Disposition: home with assist  Planned Therapy Interventions: gait training, home exercise program, transfer training  PT Frequency: daily  Plan of Care Review  Plan Of Care Reviewed With: patient  Progress: progress toward functional goals as expected  Outcome Summary/Follow up Plan: pt slightly fatigued and incr SOA , but recovered well after brief standing rest(pt insisted on amb that dist even w/educ offered ), if pt has someone at home just for safety, could manage at home if all agree          Outcome Measures       03/05/18 0900 03/04/18 1300       How much help from another person do you currently need...    Turning from your back to your side while in flat bed without using bedrails? 4  -JM 4  -KH     Moving from lying on back to sitting on the side of a flat bed without bedrails? 4  - 4  -KH     Moving to and from a bed to a chair (including a wheelchair)? 4  - 3  -KH     Standing up from a chair using your arms (e.g., wheelchair, bedside chair)? 4  - 3  -KH     Climbing 3-5 steps with a railing? 3  - 3  -KH     To walk in hospital room? 3  - 3  -KH     AM-PAC 6 Clicks Score 22  - 20  -KH     Functional Assessment    Outcome Measure Options  AM-PAC 6 Clicks Basic Mobility (PT)  -       User Key  (r) = Recorded By, (t) = Taken By, (c) = Cosigned By    Initials Name Provider Type    DAVID Cleveland PTA Physical Therapy Assistant     Luly Dawkins, PT Physical Therapist           Time Calculation:         PT Charges       03/05/18 0911          Time Calculation    Start Time 0840  -      Stop Time 0900  -      Time Calculation (min) 20 min  -      PT Received On 03/05/18  -DAVID      PT - Next Appointment 03/06/18  -        User Key  (r) = Recorded By, (t) = Taken By, (c) = Cosigned By    Initials Name Provider Type    DAVID Cleveland PTA Physical  Therapy Assistant          Therapy Charges for Today     Code Description Service Date Service Provider Modifiers Qty    63827582970 HC PT THER PROC EA 15 MIN 3/5/2018 Korin Cleveland, PTA GP 1          PT G-Codes  Outcome Measure Options: AM-PAC 6 Clicks Basic Mobility (PT)    Korin Cleveland, HARDEEP  3/5/2018

## 2018-03-05 NOTE — DISCHARGE SUMMARY
PHYSICIAN DISCHARGE SUMMARY                                                                          [facility]    Patient Identification:  Name: Alessio Allen  Age: 77 y.o.  Sex: male  :  1940  MRN: [id number]  Primary Care Physician: Alan Santana MD    Admit date: 2018  Discharge date and time:   Discharged Condition: good    Discharge Diagnoses:Active Problems:    COPD exacerbation       Hospital Course: Alessio Allen  is 77-year-old gentleman who presented to the hospital with acute hypoxic respiratory failure bilateral pulmonary infiltrates was treated for acute on chronic hypoxic respiratory failure.  He was treated with 7 day course of antibiotics.  He subsequently improves significant only.  Discharge on a prednisone wean was up and ambulating with this with a prior discharge.  He did have some scant hemoptysis during his hospital stay however this resolved with antibiotic therapy.  The patient will follow-up with Dr. Suresh Hernandez with [facility] upon discharge.  He did tolerate resumption of his any coagulation with no further hemoptysis.    Consults:   IP CONSULT TO INTERNAL MEDICINE  IP CONSULT TO PULMONOLOGY  IP CONSULT TO ORTHOPEDIC SURGERY  IP CONSULT TO CASE MANAGEMENT   IP CONSULT TO CASE MANAGEMENT     Significant Diagnostic Studies:    Results from last 7 days  Lab Units 18  0544 18  0352 18  0454   WBC 10*3/mm3 8.56 10.44 7.09   HEMOGLOBIN g/dL 11.2* 10.8* 11.2*   PLATELETS 10*3/mm3 146 124* 124*     Results from last 7 days  Lab Units 18  0529 18  0501 18  0440 18  0438 18  0544 18  0352 18  0453   SODIUM mmol/L 136 137 137 138 137 136 134*   POTASSIUM mmol/L 4.4 4.4 4.4 4.4 4.4 4.6 4.7   CHLORIDE mmol/L 98 99 99 100 99 100 97*   CO2 mmol/L 27.4 27.4 31.1* 27.1 27.4 24.0 24.5   BUN mg/dL 15 18 19 20 19 18 12    CREATININE mg/dL 0.78 0.72* 0.68* 0.81 0.71* 0.69* 0.68*   GLUCOSE mg/dL 114* 107* 112* 126* 131* 133* 137*   CALCIUM mg/dL 8.5* 8.2* 8.2* 8.3* 8.2* 8.5* 8.8   MAGNESIUM mg/dL  --   --   --   --  2.2  --   --    Estimated Creatinine Clearance: 105 mL/min (by C-G formula based on Cr of 0.78).    Discharge Exam:  Temp:  [97.6 °F (36.4 °C)-98.5 °F (36.9 °C)] 98.3 °F (36.8 °C)  Heart Rate:  [86-98] 98  Resp:  [18-20] 20  BP: (123-149)/(76-98) 123/86  GENERAL:  NAD,alert oriented  HEENT:  EOMI, nonicteric sclera, no JVD  PULMONARY:    Unlabored effort, no wheeze, left basilar crackles, no rhonchi, symmetric air entry  CARDIAC:  RRR no MRG, S1 S2  ABD: obese SNTND BS+  EXT: no c/c/e, pulses symmetric 2+ bilaterally RLE wrapped with ace  NEURO:  CNs II-XII intact, no focal deficits  SKIN: no lesions, no rash  PSYCH: appropriate mood  LYMPH: no cervical LAD     Disposition:  Skilled nursing facility     Your medication list      CHANGE how you take these medications       Instructions Last Dose Given Next Dose Due    predniSONE 10 MG tablet   Commonly known as:  DELTASONE   What changed:    - medication strength  - additional instructions        Take  take 3 tabs daily x 3 days, then take 2 tabs daily x 3 days, then take 1 tab daily x 3 days           CONTINUE taking these medications       Instructions Last Dose Given Next Dose Due    acetaminophen 325 MG tablet   Commonly known as:  TYLENOL        Take 2 tablets by mouth Every 6 (Six) Hours As Needed (prn for pain).         acyclovir 800 MG tablet   Commonly known as:  ZOVIRAX        Take 1 tablet (800 mg total) by mouth daily as needed (prn daily as needed fever blisters)         albuterol 108 (90 Base) MCG/ACT inhaler   Commonly known as:  PROVENTIL HFA;VENTOLIN HFA        Inhale 2 puffs every 4 (four) hours as needed for wheezing.         aspirin 81 MG tablet              budesonide-formoterol 160-4.5 MCG/ACT inhaler   Commonly known as:  SYMBICORT        Inhale 2  puffs 2 (Two) Times a Day.         cetirizine 10 MG tablet   Commonly known as:  zyrTEC        Take 1 tablet by mouth Daily.         Desoximetasone 0.05 % ointment        Apply to affected area bid         ferrous sulfate 325 (65 FE) MG tablet        Take 1 tablet by mouth Daily With Breakfast.         fish oil 1000 MG capsule capsule              furosemide 20 MG tablet   Commonly known as:  LASIX        Take 1 tablet by mouth Daily.         montelukast 10 MG tablet   Commonly known as:  SINGULAIR        TAKE 1 TABLET DAILY         MULTI VITAMIN DAILY tablet              NIFEdipine XL 30 MG 24 hr tablet   Commonly known as:  PROCARDIA XL              olopatadine 0.1 % ophthalmic solution   Commonly known as:  PATANOL        Administer 1 drop to both eyes daily.         omeprazole 20 MG capsule   Commonly known as:  PRILOSEC        Take 1 capsule by mouth Daily.         potassium chloride 10 MEQ CR tablet   Commonly known as:  K-DUR        Take 2 tablets by mouth Daily.         pregabalin 75 MG capsule   Commonly known as:  LYRICA              pyridoxine 50 MG tablet   Commonly known as:  VITAMIN B-6              RABEprazole 20 MG EC tablet   Commonly known as:  ACIPHEX        Take 1 tablet by mouth Daily.         sildenafil 100 MG tablet   Commonly known as:  VIAGRA        Take 1 tablet by mouth Daily As Needed for erectile dysfunction.         simvastatin 20 MG tablet   Commonly known as:  ZOCOR              sodium chloride 0.65 % nasal spray   Commonly known as:  OCEAN NASAL SPRAY        2 sprays into each nostril As Needed for Congestion (qid prn). May refill q 21 days         Spacer/Aero-Holding Chambers device        Give spacer to use with his inhalers whichever brand he has received is fine         tamsulosin 0.4 MG capsule 24 hr capsule   Commonly known as:  FLOMAX        Take 1 capsule by mouth every night.         tiotropium 18 MCG per inhalation capsule   Commonly known as:  SPIRIVA HANDIHALER         Place 2 puffs into inhaler and inhale 1 (one) time daily.         XARELTO 20 MG tablet   Generic drug:  rivaroxaban                   Where to Get Your Medications      These medications were sent to [facility] [facility] [place] - [address] - [phone]  - [phone] FX  [address] [place] [address]     Phone:  [phone]    • predniSONE 10 MG tablet             Patient Instructions:   Follow-up Information     Follow up with Nan Santos MD Follow up in 4 week(s).    Specialty:  Orthopedic Surgery    Contact information:    [address] [place] [address] [address]  [place] [id number] [phone]             Medication Reconciliation: Please see electronically completed Med Rec.    Total time spent discharging patient including evaluation, medication reconciliation, arranging follow up, and post hospitalization instructions and education total time exceeds 30 minutes.    Signed:  Merlin Fontana MD  3/5/2018  3:19 PM

## 2018-03-07 ENCOUNTER — TELEPHONE (OUTPATIENT)
Dept: FAMILY MEDICINE CLINIC | Facility: CLINIC | Age: 78
End: 2018-03-07

## 2018-03-07 RX ORDER — CETIRIZINE HYDROCHLORIDE 10 MG/1
10 TABLET ORAL DAILY
Qty: 90 TABLET | Refills: 3 | Status: SHIPPED | OUTPATIENT
Start: 2018-03-07 | End: 2018-03-24 | Stop reason: HOSPADM

## 2018-03-07 NOTE — PROGRESS NOTES
Case Management Discharge Note    Final Note: Victor Manuel Bridges for [facility]    Discharge Placement     Facility/Agency Request Status Selected? Address Phone Number Fax Number    [facility] Accepted    Yes [address] [facility] [address] [phone]    [facility] Accepted     [address] [id number] [phone]    [facility] Pending - Request Sent     [address] [facility] [address] [phone]        Hermila Merchant, RN 3/2/2018 11:05    Erica aware following               [facility] Declined     [address] [facility] [address] [phone]        Hermila Merchant, RN 3/2/2018 11:03    Messaged Luz                   Other: Other    Discharge Codes: 03  Discharged/transferred to skilled nursing facility (SNF) with Medicare certification in anticipation of skilled care

## 2018-03-15 ENCOUNTER — APPOINTMENT (OUTPATIENT)
Dept: OTHER | Facility: HOSPITAL | Age: 78
End: 2018-03-15

## 2018-03-15 ENCOUNTER — APPOINTMENT (OUTPATIENT)
Dept: ONCOLOGY | Facility: CLINIC | Age: 78
End: 2018-03-15

## 2018-03-17 ENCOUNTER — APPOINTMENT (OUTPATIENT)
Dept: GENERAL RADIOLOGY | Facility: HOSPITAL | Age: 78
End: 2018-03-17

## 2018-03-17 ENCOUNTER — APPOINTMENT (OUTPATIENT)
Dept: CT IMAGING | Facility: HOSPITAL | Age: 78
End: 2018-03-17

## 2018-03-17 ENCOUNTER — HOSPITAL ENCOUNTER (INPATIENT)
Facility: HOSPITAL | Age: 78
LOS: 6 days | Discharge: HOME HEALTH | End: 2018-03-24
Attending: EMERGENCY MEDICINE | Admitting: INTERNAL MEDICINE

## 2018-03-17 DIAGNOSIS — J96.01 ACUTE RESPIRATORY FAILURE WITH HYPOXIA (HCC): Primary | ICD-10-CM

## 2018-03-17 DIAGNOSIS — R53.1 GENERALIZED WEAKNESS: ICD-10-CM

## 2018-03-17 PROBLEM — J96.00 ACUTE RESPIRATORY FAILURE (HCC): Status: ACTIVE | Noted: 2018-03-17

## 2018-03-17 LAB
ALBUMIN SERPL-MCNC: 3.9 G/DL (ref 3.5–5.2)
ALBUMIN/GLOB SERPL: 1.3 G/DL
ALP SERPL-CCNC: 65 U/L (ref 39–117)
ALT SERPL W P-5'-P-CCNC: 29 U/L (ref 1–41)
ANION GAP SERPL CALCULATED.3IONS-SCNC: 12.5 MMOL/L
ANION GAP SERPL CALCULATED.3IONS-SCNC: 12.8 MMOL/L
ANISOCYTOSIS BLD QL: NORMAL
ARTERIAL PATENCY WRIST A: POSITIVE
AST SERPL-CCNC: 20 U/L (ref 1–40)
ATMOSPHERIC PRESS: 744.3 MMHG
B PERT DNA SPEC QL NAA+PROBE: NOT DETECTED
BASE EXCESS BLDA CALC-SCNC: 3.7 MMOL/L (ref 0–2)
BASOPHILS # BLD AUTO: 0.02 10*3/MM3 (ref 0–0.2)
BASOPHILS # BLD AUTO: 0.02 10*3/MM3 (ref 0–0.2)
BASOPHILS NFR BLD AUTO: 0.2 % (ref 0–1.5)
BASOPHILS NFR BLD AUTO: 0.2 % (ref 0–1.5)
BDY SITE: ABNORMAL
BILIRUB SERPL-MCNC: 0.6 MG/DL (ref 0.1–1.2)
BUN BLD-MCNC: 11 MG/DL (ref 8–23)
BUN BLD-MCNC: 11 MG/DL (ref 8–23)
BUN/CREAT SERPL: 13.8 (ref 7–25)
BUN/CREAT SERPL: 15.1 (ref 7–25)
C PNEUM DNA NPH QL NAA+NON-PROBE: NOT DETECTED
CALCIUM SPEC-SCNC: 8.9 MG/DL (ref 8.6–10.5)
CALCIUM SPEC-SCNC: 9 MG/DL (ref 8.6–10.5)
CHLORIDE SERPL-SCNC: 99 MMOL/L (ref 98–107)
CHLORIDE SERPL-SCNC: 99 MMOL/L (ref 98–107)
CO2 SERPL-SCNC: 26.2 MMOL/L (ref 22–29)
CO2 SERPL-SCNC: 27.5 MMOL/L (ref 22–29)
CREAT BLD-MCNC: 0.73 MG/DL (ref 0.76–1.27)
CREAT BLD-MCNC: 0.8 MG/DL (ref 0.76–1.27)
D-LACTATE SERPL-SCNC: 1.9 MMOL/L (ref 0.5–2)
DEPRECATED RDW RBC AUTO: 62.5 FL (ref 37–54)
DEPRECATED RDW RBC AUTO: 62.6 FL (ref 37–54)
EOSINOPHIL # BLD AUTO: 0.01 10*3/MM3 (ref 0–0.7)
EOSINOPHIL # BLD AUTO: 0.07 10*3/MM3 (ref 0–0.7)
EOSINOPHIL NFR BLD AUTO: 0.1 % (ref 0.3–6.2)
EOSINOPHIL NFR BLD AUTO: 0.8 % (ref 0.3–6.2)
ERYTHROCYTE [DISTWIDTH] IN BLOOD BY AUTOMATED COUNT: 24.5 % (ref 11.5–14.5)
ERYTHROCYTE [DISTWIDTH] IN BLOOD BY AUTOMATED COUNT: 24.5 % (ref 11.5–14.5)
FLUAV H1 2009 PAND RNA NPH QL NAA+PROBE: NOT DETECTED
FLUAV H1 HA GENE NPH QL NAA+PROBE: NOT DETECTED
FLUAV H3 RNA NPH QL NAA+PROBE: NOT DETECTED
FLUAV SUBTYP SPEC NAA+PROBE: NOT DETECTED
FLUBV RNA ISLT QL NAA+PROBE: NOT DETECTED
GFR SERPL CREATININE-BSD FRML MDRD: 104 ML/MIN/1.73
GFR SERPL CREATININE-BSD FRML MDRD: 94 ML/MIN/1.73
GLOBULIN UR ELPH-MCNC: 2.9 GM/DL
GLUCOSE BLD-MCNC: 116 MG/DL (ref 65–99)
GLUCOSE BLD-MCNC: 136 MG/DL (ref 65–99)
GLUCOSE BLDC GLUCOMTR-MCNC: 158 MG/DL (ref 70–130)
GLUCOSE BLDC GLUCOMTR-MCNC: 94 MG/DL (ref 70–130)
HADV DNA SPEC NAA+PROBE: NOT DETECTED
HCO3 BLDA-SCNC: 28.9 MMOL/L (ref 22–28)
HCOV 229E RNA SPEC QL NAA+PROBE: NOT DETECTED
HCOV HKU1 RNA SPEC QL NAA+PROBE: NOT DETECTED
HCOV NL63 RNA SPEC QL NAA+PROBE: NOT DETECTED
HCOV OC43 RNA SPEC QL NAA+PROBE: NOT DETECTED
HCT VFR BLD AUTO: 41 % (ref 40.4–52.2)
HCT VFR BLD AUTO: 42.5 % (ref 40.4–52.2)
HGB BLD-MCNC: 12.1 G/DL (ref 13.7–17.6)
HGB BLD-MCNC: 12.5 G/DL (ref 13.7–17.6)
HMPV RNA NPH QL NAA+NON-PROBE: NOT DETECTED
HOLD SPECIMEN: NORMAL
HOLD SPECIMEN: NORMAL
HOROWITZ INDEX BLD+IHG-RTO: 60 %
HPIV1 RNA SPEC QL NAA+PROBE: NOT DETECTED
HPIV2 RNA SPEC QL NAA+PROBE: NOT DETECTED
HPIV3 RNA NPH QL NAA+PROBE: NOT DETECTED
HPIV4 P GENE NPH QL NAA+PROBE: NOT DETECTED
IMM GRANULOCYTES # BLD: 0.02 10*3/MM3 (ref 0–0.03)
IMM GRANULOCYTES # BLD: 0.03 10*3/MM3 (ref 0–0.03)
IMM GRANULOCYTES NFR BLD: 0.2 % (ref 0–0.5)
IMM GRANULOCYTES NFR BLD: 0.3 % (ref 0–0.5)
LYMPHOCYTES # BLD AUTO: 0.67 10*3/MM3 (ref 0.9–4.8)
LYMPHOCYTES # BLD AUTO: 1.03 10*3/MM3 (ref 0.9–4.8)
LYMPHOCYTES NFR BLD AUTO: 11.3 % (ref 19.6–45.3)
LYMPHOCYTES NFR BLD AUTO: 6 % (ref 19.6–45.3)
M PNEUMO IGG SER IA-ACNC: NOT DETECTED
MAGNESIUM SERPL-MCNC: 1.9 MG/DL (ref 1.6–2.4)
MCH RBC QN AUTO: 21.6 PG (ref 27–32.7)
MCH RBC QN AUTO: 21.6 PG (ref 27–32.7)
MCHC RBC AUTO-ENTMCNC: 29.4 G/DL (ref 32.6–36.4)
MCHC RBC AUTO-ENTMCNC: 29.5 G/DL (ref 32.6–36.4)
MCV RBC AUTO: 73.2 FL (ref 79.8–96.2)
MCV RBC AUTO: 73.3 FL (ref 79.8–96.2)
MODALITY: ABNORMAL
MONOCYTES # BLD AUTO: 0.25 10*3/MM3 (ref 0.2–1.2)
MONOCYTES # BLD AUTO: 0.69 10*3/MM3 (ref 0.2–1.2)
MONOCYTES NFR BLD AUTO: 2.2 % (ref 5–12)
MONOCYTES NFR BLD AUTO: 7.6 % (ref 5–12)
NEUTROPHILS # BLD AUTO: 10.17 10*3/MM3 (ref 1.9–8.1)
NEUTROPHILS # BLD AUTO: 7.3 10*3/MM3 (ref 1.9–8.1)
NEUTROPHILS NFR BLD AUTO: 79.9 % (ref 42.7–76)
NEUTROPHILS NFR BLD AUTO: 91.2 % (ref 42.7–76)
NRBC BLD MANUAL-RTO: 0 /100 WBC (ref 0–0)
NT-PROBNP SERPL-MCNC: 1698 PG/ML (ref 0–1800)
O2 A-A PPRESDIFF RESPIRATORY: 0.2 MMHG
OVALOCYTES BLD QL SMEAR: NORMAL
PCO2 BLDA: 45 MM HG (ref 35–45)
PEEP RESPIRATORY: 6 CM[H2O]
PH BLDA: 7.42 PH UNITS (ref 7.35–7.45)
PHOSPHATE SERPL-MCNC: 3.5 MG/DL (ref 2.5–4.5)
PLAT MORPH BLD: NORMAL
PLATELET # BLD AUTO: 175 10*3/MM3 (ref 140–500)
PLATELET # BLD AUTO: 177 10*3/MM3 (ref 140–500)
PMV BLD AUTO: 8.8 FL (ref 6–12)
PMV BLD AUTO: 9.1 FL (ref 6–12)
PO2 BLDA: 81.4 MM HG (ref 80–100)
POTASSIUM BLD-SCNC: 4.5 MMOL/L (ref 3.5–5.2)
POTASSIUM BLD-SCNC: 4.7 MMOL/L (ref 3.5–5.2)
PROCALCITONIN SERPL-MCNC: 0.04 NG/ML (ref 0.1–0.25)
PROT SERPL-MCNC: 6.8 G/DL (ref 6–8.5)
RBC # BLD AUTO: 5.6 10*6/MM3 (ref 4.6–6)
RBC # BLD AUTO: 5.8 10*6/MM3 (ref 4.6–6)
RHINOVIRUS RNA SPEC NAA+PROBE: NOT DETECTED
RSV RNA NPH QL NAA+NON-PROBE: NOT DETECTED
SAO2 % BLDCOA: 96 % (ref 92–99)
SODIUM BLD-SCNC: 138 MMOL/L (ref 136–145)
SODIUM BLD-SCNC: 139 MMOL/L (ref 136–145)
SPHEROCYTES BLD QL SMEAR: NORMAL
TOTAL RATE: 25 BREATHS/MINUTE
TROPONIN T SERPL-MCNC: <0.01 NG/ML (ref 0–0.03)
WBC MORPH BLD: NORMAL
WBC NRBC COR # BLD: 11.15 10*3/MM3 (ref 4.5–10.7)
WBC NRBC COR # BLD: 9.13 10*3/MM3 (ref 4.5–10.7)
WHOLE BLOOD HOLD SPECIMEN: NORMAL
WHOLE BLOOD HOLD SPECIMEN: NORMAL

## 2018-03-17 PROCEDURE — 84145 PROCALCITONIN (PCT): CPT | Performed by: EMERGENCY MEDICINE

## 2018-03-17 PROCEDURE — 25010000002 METHYLPREDNISOLONE PER 40 MG: Performed by: INTERNAL MEDICINE

## 2018-03-17 PROCEDURE — 94799 UNLISTED PULMONARY SVC/PX: CPT

## 2018-03-17 PROCEDURE — 84100 ASSAY OF PHOSPHORUS: CPT | Performed by: INTERNAL MEDICINE

## 2018-03-17 PROCEDURE — 71045 X-RAY EXAM CHEST 1 VIEW: CPT

## 2018-03-17 PROCEDURE — 25010000002 METHYLPREDNISOLONE PER 125 MG

## 2018-03-17 PROCEDURE — G0378 HOSPITAL OBSERVATION PER HR: HCPCS

## 2018-03-17 PROCEDURE — 82803 BLOOD GASES ANY COMBINATION: CPT

## 2018-03-17 PROCEDURE — 87633 RESP VIRUS 12-25 TARGETS: CPT | Performed by: INTERNAL MEDICINE

## 2018-03-17 PROCEDURE — 94640 AIRWAY INHALATION TREATMENT: CPT

## 2018-03-17 PROCEDURE — 85025 COMPLETE CBC W/AUTO DIFF WBC: CPT | Performed by: EMERGENCY MEDICINE

## 2018-03-17 PROCEDURE — 87040 BLOOD CULTURE FOR BACTERIA: CPT | Performed by: INTERNAL MEDICINE

## 2018-03-17 PROCEDURE — 94660 CPAP INITIATION&MGMT: CPT

## 2018-03-17 PROCEDURE — 83880 ASSAY OF NATRIURETIC PEPTIDE: CPT | Performed by: EMERGENCY MEDICINE

## 2018-03-17 PROCEDURE — 25010000002 VANCOMYCIN 10 G RECONSTITUTED SOLUTION: Performed by: INTERNAL MEDICINE

## 2018-03-17 PROCEDURE — 71275 CT ANGIOGRAPHY CHEST: CPT

## 2018-03-17 PROCEDURE — 99284 EMERGENCY DEPT VISIT MOD MDM: CPT

## 2018-03-17 PROCEDURE — 93005 ELECTROCARDIOGRAM TRACING: CPT | Performed by: EMERGENCY MEDICINE

## 2018-03-17 PROCEDURE — 94760 N-INVAS EAR/PLS OXIMETRY 1: CPT

## 2018-03-17 PROCEDURE — 84484 ASSAY OF TROPONIN QUANT: CPT | Performed by: EMERGENCY MEDICINE

## 2018-03-17 PROCEDURE — 85025 COMPLETE CBC W/AUTO DIFF WBC: CPT | Performed by: INTERNAL MEDICINE

## 2018-03-17 PROCEDURE — 87798 DETECT AGENT NOS DNA AMP: CPT | Performed by: INTERNAL MEDICINE

## 2018-03-17 PROCEDURE — 85007 BL SMEAR W/DIFF WBC COUNT: CPT | Performed by: EMERGENCY MEDICINE

## 2018-03-17 PROCEDURE — 82962 GLUCOSE BLOOD TEST: CPT

## 2018-03-17 PROCEDURE — 87581 M.PNEUMON DNA AMP PROBE: CPT | Performed by: INTERNAL MEDICINE

## 2018-03-17 PROCEDURE — 36600 WITHDRAWAL OF ARTERIAL BLOOD: CPT

## 2018-03-17 PROCEDURE — 83735 ASSAY OF MAGNESIUM: CPT | Performed by: INTERNAL MEDICINE

## 2018-03-17 PROCEDURE — 87040 BLOOD CULTURE FOR BACTERIA: CPT | Performed by: EMERGENCY MEDICINE

## 2018-03-17 PROCEDURE — 87486 CHLMYD PNEUM DNA AMP PROBE: CPT | Performed by: INTERNAL MEDICINE

## 2018-03-17 PROCEDURE — 83605 ASSAY OF LACTIC ACID: CPT | Performed by: EMERGENCY MEDICINE

## 2018-03-17 PROCEDURE — 80053 COMPREHEN METABOLIC PANEL: CPT | Performed by: EMERGENCY MEDICINE

## 2018-03-17 PROCEDURE — 93010 ELECTROCARDIOGRAM REPORT: CPT | Performed by: INTERNAL MEDICINE

## 2018-03-17 RX ORDER — KETOTIFEN FUMARATE 0.35 MG/ML
1 SOLUTION/ DROPS OPHTHALMIC 2 TIMES DAILY
Status: DISCONTINUED | OUTPATIENT
Start: 2018-03-17 | End: 2018-03-24 | Stop reason: HOSPADM

## 2018-03-17 RX ORDER — FERROUS SULFATE 325(65) MG
325 TABLET ORAL
Status: DISCONTINUED | OUTPATIENT
Start: 2018-03-18 | End: 2018-03-22

## 2018-03-17 RX ORDER — SODIUM CHLORIDE 0.9 % (FLUSH) 0.9 %
1-10 SYRINGE (ML) INJECTION AS NEEDED
Status: DISCONTINUED | OUTPATIENT
Start: 2018-03-17 | End: 2018-03-24 | Stop reason: HOSPADM

## 2018-03-17 RX ORDER — NIFEDIPINE 30 MG/1
30 TABLET, EXTENDED RELEASE ORAL DAILY
Status: DISCONTINUED | OUTPATIENT
Start: 2018-03-17 | End: 2018-03-24 | Stop reason: HOSPADM

## 2018-03-17 RX ORDER — PANTOPRAZOLE SODIUM 40 MG/1
40 TABLET, DELAYED RELEASE ORAL EVERY MORNING
Status: DISCONTINUED | OUTPATIENT
Start: 2018-03-18 | End: 2018-03-24 | Stop reason: HOSPADM

## 2018-03-17 RX ORDER — METHYLPREDNISOLONE SODIUM SUCCINATE 125 MG/2ML
125 INJECTION, POWDER, LYOPHILIZED, FOR SOLUTION INTRAMUSCULAR; INTRAVENOUS ONCE
Status: COMPLETED | OUTPATIENT
Start: 2018-03-17 | End: 2018-03-17

## 2018-03-17 RX ORDER — IPRATROPIUM BROMIDE AND ALBUTEROL SULFATE 2.5; .5 MG/3ML; MG/3ML
3 SOLUTION RESPIRATORY (INHALATION) ONCE
Status: COMPLETED | OUTPATIENT
Start: 2018-03-17 | End: 2018-03-17

## 2018-03-17 RX ORDER — MONTELUKAST SODIUM 10 MG/1
10 TABLET ORAL NIGHTLY
Status: DISCONTINUED | OUTPATIENT
Start: 2018-03-17 | End: 2018-03-24 | Stop reason: HOSPADM

## 2018-03-17 RX ORDER — ATORVASTATIN CALCIUM 10 MG/1
10 TABLET, FILM COATED ORAL NIGHTLY
Status: DISCONTINUED | OUTPATIENT
Start: 2018-03-17 | End: 2018-03-24 | Stop reason: HOSPADM

## 2018-03-17 RX ORDER — IPRATROPIUM BROMIDE AND ALBUTEROL SULFATE 2.5; .5 MG/3ML; MG/3ML
3 SOLUTION RESPIRATORY (INHALATION)
Status: DISCONTINUED | OUTPATIENT
Start: 2018-03-17 | End: 2018-03-24 | Stop reason: HOSPADM

## 2018-03-17 RX ORDER — SODIUM CHLORIDE 0.9 % (FLUSH) 0.9 %
10 SYRINGE (ML) INJECTION AS NEEDED
Status: DISCONTINUED | OUTPATIENT
Start: 2018-03-17 | End: 2018-03-24 | Stop reason: HOSPADM

## 2018-03-17 RX ORDER — VANCOMYCIN HYDROCHLORIDE 1 G/200ML
1000 INJECTION, SOLUTION INTRAVENOUS EVERY 12 HOURS
Status: DISCONTINUED | OUTPATIENT
Start: 2018-03-17 | End: 2018-03-17 | Stop reason: DRUGHIGH

## 2018-03-17 RX ORDER — ASPIRIN 81 MG/1
81 TABLET ORAL DAILY
Status: DISCONTINUED | OUTPATIENT
Start: 2018-03-17 | End: 2018-03-24 | Stop reason: HOSPADM

## 2018-03-17 RX ORDER — PREGABALIN 75 MG/1
75 CAPSULE ORAL EVERY 12 HOURS SCHEDULED
Status: DISCONTINUED | OUTPATIENT
Start: 2018-03-17 | End: 2018-03-24 | Stop reason: HOSPADM

## 2018-03-17 RX ORDER — METHYLPREDNISOLONE SODIUM SUCCINATE 125 MG/2ML
INJECTION, POWDER, LYOPHILIZED, FOR SOLUTION INTRAMUSCULAR; INTRAVENOUS
Status: COMPLETED
Start: 2018-03-17 | End: 2018-03-17

## 2018-03-17 RX ORDER — IPRATROPIUM BROMIDE AND ALBUTEROL SULFATE 2.5; .5 MG/3ML; MG/3ML
SOLUTION RESPIRATORY (INHALATION)
Status: COMPLETED
Start: 2018-03-17 | End: 2018-03-17

## 2018-03-17 RX ORDER — METHYLPREDNISOLONE SODIUM SUCCINATE 40 MG/ML
40 INJECTION, POWDER, LYOPHILIZED, FOR SOLUTION INTRAMUSCULAR; INTRAVENOUS EVERY 6 HOURS
Status: DISCONTINUED | OUTPATIENT
Start: 2018-03-17 | End: 2018-03-19

## 2018-03-17 RX ORDER — TAMSULOSIN HYDROCHLORIDE 0.4 MG/1
0.4 CAPSULE ORAL NIGHTLY
Status: DISCONTINUED | OUTPATIENT
Start: 2018-03-17 | End: 2018-03-24 | Stop reason: HOSPADM

## 2018-03-17 RX ADMIN — TAMSULOSIN HYDROCHLORIDE 0.4 MG: 0.4 CAPSULE ORAL at 22:03

## 2018-03-17 RX ADMIN — METHYLPREDNISOLONE SODIUM SUCCINATE 125 MG: 125 INJECTION, POWDER, LYOPHILIZED, FOR SOLUTION INTRAMUSCULAR; INTRAVENOUS at 15:12

## 2018-03-17 RX ADMIN — MONTELUKAST 10 MG: 10 TABLET, FILM COATED ORAL at 21:00

## 2018-03-17 RX ADMIN — METHYLPREDNISOLONE SODIUM SUCCINATE 125 MG: 125 INJECTION, POWDER, FOR SOLUTION INTRAMUSCULAR; INTRAVENOUS at 15:12

## 2018-03-17 RX ADMIN — METHYLPREDNISOLONE SODIUM SUCCINATE 40 MG: 40 INJECTION, POWDER, FOR SOLUTION INTRAMUSCULAR; INTRAVENOUS at 18:36

## 2018-03-17 RX ADMIN — ATORVASTATIN CALCIUM 10 MG: 10 TABLET, FILM COATED ORAL at 22:03

## 2018-03-17 RX ADMIN — IPRATROPIUM BROMIDE AND ALBUTEROL SULFATE 3 ML: 2.5; .5 SOLUTION RESPIRATORY (INHALATION) at 13:22

## 2018-03-17 RX ADMIN — SODIUM CHLORIDE 2 G: 900 INJECTION INTRAVENOUS at 22:03

## 2018-03-17 RX ADMIN — KETOTIFEN FUMARATE 1 DROP: 0.35 SOLUTION/ DROPS OPHTHALMIC at 22:03

## 2018-03-17 RX ADMIN — IPRATROPIUM BROMIDE AND ALBUTEROL SULFATE 3 ML: .5; 3 SOLUTION RESPIRATORY (INHALATION) at 13:22

## 2018-03-17 RX ADMIN — PREGABALIN 75 MG: 75 CAPSULE ORAL at 22:02

## 2018-03-17 RX ADMIN — VANCOMYCIN HYDROCHLORIDE 2250 MG: 10 INJECTION, POWDER, LYOPHILIZED, FOR SOLUTION INTRAVENOUS at 18:45

## 2018-03-17 NOTE — NURSING NOTE
Pt sats 87 on 15L hiflow oxygen, RR 30, does not tolerate supine position at this time. Dr Fontana notified. Stat CT pending. Plan is to transport pt on bipap to CT accompanied by RT. RT notified.

## 2018-03-17 NOTE — ED NOTES
Pt actively coughing. Pt. has purulent sputum followed by hemoptysis.     Joseph Crawley, GRETEL  03/17/18 5704

## 2018-03-17 NOTE — ED NOTES
As per Dr. Delacruz pt to come off BiPAP and placed on high flow NC between 5-6 LPM to maintain saturation of 90%. Respiratory paged and made aware.     Joseph Crawley RN  03/17/18 2232

## 2018-03-17 NOTE — PLAN OF CARE
Problem: Chronic Obstructive Pulmonary Disease (Adult)  Goal: Signs and Symptoms of Listed Potential Problems Will be Absent, Minimized or Managed (Chronic Obstructive Pulmonary Disease)  Outcome: Ongoing (interventions implemented as appropriate)      Problem: Fall Risk (Adult)  Goal: Identify Related Risk Factors and Signs and Symptoms  Outcome: Ongoing (interventions implemented as appropriate)      Problem: Asthma (Adult)  Goal: Signs and Symptoms of Listed Potential Problems Will be Absent, Minimized or Managed (Asthma)  Outcome: Ongoing (interventions implemented as appropriate)      Problem: Cardiac Rhythm Management Device (Adult)  Goal: Signs and Symptoms of Listed Potential Problems Will be Absent, Minimized or Managed (Cardiac Rhythm Management Device)  Outcome: Ongoing (interventions implemented as appropriate)

## 2018-03-17 NOTE — ED NOTES
Pt sat dropped to 89% respiratory made aware as well as ED physician.     Joseph Crawley, RN  03/17/18 4197

## 2018-03-17 NOTE — PROGRESS NOTES
"Pharmacokinetic Consult - Vancomycin Dosing (Initial Note)    [facility]andro has been consulted for pharmacy to dose vancomycin for HCAP per Dr. Merlin Fontana's request. Goal trough: 15-20 mcg/mL.    Duration of Therapy: 7 days    Other Antimicrobials: Aztreonam 2g IV q8h EI    Relevant clinical data and objective history reviewed:  77 y.o. male 188 cm (74\") 117 kg (257 lb 15 oz)  Patient presents to the hospital from a rehab facility with acute onset worsening SOA. She had been sent to the rehab facility after hospitalization with COPD exacerbation secondary to PNA. CXR on 3/17 shows minimal patchy infiltrate in both lower lungs.     Creatinine   Date Value Ref Range Status   03/17/2018 0.80 0.76 - 1.27 mg/dL Final     BUN   Date Value Ref Range Status   03/17/2018 11 8 - 23 mg/dL Final     Estimated Creatinine Clearance: 106.8 mL/min (by C-G formula based on SCr of 0.8 mg/dL).    Lab Results   Component Value Date    WBC 9.13 03/17/2018     Temp Readings from Last 3 Encounters:   03/17/18 97.6 °F (36.4 °C) (Oral)      Baseline culture/source/susceptibility:   3/17: Blood cultures x 2-in process    Assessment/Plan    1. Will give the patient a vancomycin loading dose of 2250 mg (20 mg/kg) IV once now, then start a maintenance dose of 1500 mg (~13 mg/kg) IV q12h starting tomorrow around 0600 per patient parameters.     2. Will schedule a vancomycin trough for Monday morning 3/19 before the 0600 dose (before the 4th total dose).    3. Will monitor serum creatinine every 24 hours for the first 3 days then at least every 48 hours per dosing recommendations. SCr was 0.8 this afternoon before antibiotics were started.    4. Pharmacy will continue to follow daily while on vancomycin and adjust as needed.     Thank you for allowing me to participate in your patient's care,  Riri Toledo, Pharm.D., BCPS  "

## 2018-03-17 NOTE — ED TRIAGE NOTES
Patient reports feeling SOB yesterday. Patient 71% on 4 liters nasal cannula. Patient in mild respiratory distress.

## 2018-03-17 NOTE — H&P
.     Admission Note    Patient Identification:  Alessio Allen  77 y.o.  male  1940  [id number]            CC: Short of breath   History of Present Illness:  Patient is a 77-year-old male who presents today with acute onset worsening shortness of breath.  Patient has recently been discharged to rehabilitation about 2 weeks ago from a hospitalization with acute exacerbation CBC secondary to pneumonia.  Patient progressing well and his rehabilitation.  Patient acute onset shortness of breath.  No significant cough.  No chest pain.  No palpitations.  Patient takes therapeutic any coagulation it has been taking so rehabilitation.  In the ED he was noted to have some purulent sputum as well as some description of what may be hemoptysis.  Chest x-ray showed no change from prior did show bibasilar interstitial prominence.  Patient denies fevers chills.    Past Medical History:   Diagnosis Date   • Arthritis    • Asthma    • Atrial fibrillation    • BPH (benign prostatic hypertrophy)    • Cancer    • COPD (chronic obstructive pulmonary disease)    • H/O Abnormal CBC 2017   • History of heart artery stent 03/2017   • Hyperlipidemia    • Hypertension    • Neuropathy     feet and hands    • Pneumonia        Past Surgical History:   Procedure Laterality Date   • COLONOSCOPY  12/05/2016    polyps   • FRACTURE SURGERY     • PACEMAKER IMPLANTATION          Social History     Social History   • Marital status:      Spouse name: N/A   • Number of children: N/A   • Years of education: N/A     Occupational History   •  Retired     Social History Main Topics   • Smoking status: Former Smoker     Types: Cigarettes     Quit date: 1/3/2001   • Smokeless tobacco: Never Used   • Alcohol use No   • Drug use: No   • Sexual activity: Defer     Other Topics Concern   • Not on file     Social History Narrative   • No narrative on file       Family History   Problem Relation Age of Onset   • Hypertension Mother    • Heart attack  Father        Prescriptions Prior to Admission   Medication Sig Dispense Refill Last Dose   • acetaminophen (TYLENOL) 325 MG tablet Take 2 tablets by mouth Every 6 (Six) Hours As Needed (prn for pain). 720 tablet 2 Unknown at Unknown time   • aspirin 81 MG tablet Take 81 mg by mouth Daily.   2/26/2018 at Unknown time   • budesonide-formoterol (SYMBICORT) 160-4.5 MCG/ACT inhaler Inhale 2 puffs 2 (Two) Times a Day. 3 inhaler 3 2/26/2018 at Unknown time   • cetirizine (zyrTEC) 10 MG tablet Take 1 tablet by mouth Daily. 90 tablet 3    • ferrous sulfate 325 (65 FE) MG tablet Take 1 tablet by mouth Daily With Breakfast. 90 tablet 3    • furosemide (LASIX) 20 MG tablet Take 1 tablet by mouth Daily. 90 tablet 3    • montelukast (SINGULAIR) 10 MG tablet TAKE 1 TABLET DAILY 90 tablet 2 Taking   • Multiple Vitamin (MULTI VITAMIN DAILY) tablet Take  by mouth.   Taking   • NIFEdipine XL (PROCARDIA XL) 30 MG 24 hr tablet Take  by mouth daily.   Taking   • olopatadine (PATANOL) 0.1 % ophthalmic solution Administer 1 drop to both eyes daily. 3 each 12 Taking   • Omega-3 Fatty Acids (FISH OIL) 1000 MG capsule capsule Take 1,000 mg by mouth daily with breakfast.   Taking   • omeprazole (PRILOSEC) 20 MG capsule Take 1 capsule by mouth Daily. 90 capsule 3 Taking   • potassium chloride (K-DUR) 10 MEQ CR tablet Take 2 tablets by mouth Daily. 180 tablet 3    • pregabalin (LYRICA) 75 MG capsule Take 75 mg by mouth 2 (two) times a day.   Taking   • pyridoxine (VITAMIN B-6) 50 MG tablet Take  by mouth daily.   Taking   • sildenafil (VIAGRA) 100 MG tablet Take 1 tablet by mouth Daily As Needed for erectile dysfunction. 6 tablet 3 Taking   • simvastatin (ZOCOR) 20 MG tablet Take 0.5 tablets by mouth daily.   Taking   • sodium chloride (OCEAN NASAL SPRAY) 0.65 % nasal spray 2 sprays into each nostril As Needed for Congestion (qid prn). May refill q 21 days 4 each 3 Taking   • Spacer/Aero-Holding Chambers device Give spacer to use with his  inhalers whichever brand he has received is fine 2 each 3 Taking   • tamsulosin (FLOMAX) 0.4 MG capsule 24 hr capsule Take 1 capsule by mouth every night. 90 capsule 3 Taking   • tiotropium (SPIRIVA HANDIHALER) 18 MCG per inhalation capsule Place 2 puffs into inhaler and inhale 1 (one) time daily. 90 each 3 Taking   • XARELTO 20 MG tablet Take 20 mg by mouth Daily.   Taking   • acyclovir (ZOVIRAX) 800 MG tablet Take 1 tablet (800 mg total) by mouth daily as needed (prn daily as needed fever blisters) 30 tablet 1 Unknown at Unknown time   • albuterol (PROVENTIL HFA;VENTOLIN HFA) 108 (90 BASE) MCG/ACT inhaler Inhale 2 puffs every 4 (four) hours as needed for wheezing. 1 inhaler 11 2/26/2018 at Unknown time   • Desoximetasone 0.05 % ointment Apply to affected area bid 60 g 2 Unknown at Unknown time   • predniSONE (DELTASONE) 10 MG tablet Take  take 3 tabs daily x 3 days, then take 2 tabs daily x 3 days, then take 1 tab daily x 3 days 31 tablet 0    • RABEprazole (ACIPHEX) 20 MG EC tablet Take 1 tablet by mouth Daily. 90 tablet 3 Taking       Allergies   Allergen Reactions   • Lisinopril Shortness Of Breath   • Penicillins Shortness Of Breath   • Sulfa Antibiotics Shortness Of Breath   • Albuterol Irritability and Hallucinations   • Atenolol Other (See Comments)     drowsiness   • Coreg [Carvedilol] Cough     SOA,   • Ibuprofen    • Levaquin [Levofloxacin]      Pt states he can take   • Celebrex [Celecoxib] Rash       Review of Systems:  CONSTITUTIONAL:  Denies fevers or chills  EYE:  No new vision changes  EAR:  No change in hearing  CARDIAC:  No chest pain  PULMONARY:  No productive cough +shortness of breath  GI:  No diarrhea, hematemesis or hematochezia,  RENAL:  No dysuria or urinary frequency  MUSCULOSKELETAL:  No musculoskeletal complaints  ENDOCRINE:  No heat or cold intolerance  INTEGUMENTARY: RLE with redness, warmth around prior surgical site  NEUROLOGICAL:  No dizziness or confusion.  No seizure  "activity  PSYCHIATRIC:  No new anxiety or depression  12 system review of systems performed and all else negative    Physical Exam:  Vitals:  Vitals:    03/17/18 1333 03/17/18 1402 03/17/18 1503 03/17/18 1638   BP: 117/77 117/72 122/82    Pulse:  89 80    Resp:       Temp:    97.6 °F (36.4 °C)   TempSrc:    Oral   SpO2: 95% (!) 89% 94%    Weight:    117 kg (257 lb 15 oz)   Height:    188 cm (74\")     FiO2 (%): 50 %     Body mass index is 33.12 kg/m².  No intake or output data in the 24 hours ending 03/17/18 1704  On bipap    Exam:  GENERAL: on NIV, no in acute distress but ill  HEENT:  EOMI, nonicteric sclera, no JVD  PULMONARY:    + wheeze, no crackles, no rhonchi, symmetric air entry  CARDIAC:  Tachy reg   ABD: mild truncal obesitySNTND BS+  EXT: bilateral le edema 1+ pulses symmetric 2+ bilaterally  NEURO:  CNs II-XII intact, no focal deficits  SKIN:erythematous right ankle  PSYCH: appropriate mood  LYMPH: no cervical LAD    Scheduled meds:    aspirin 81 mg Oral Daily   atorvastatin 10 mg Oral Daily   [START ON 3/18/2018] ferrous sulfate 325 mg Oral Daily With Breakfast   ipratropium-albuterol 3 mL Nebulization 4x Daily - RT   ketotifen 1 drop Both Eyes BID   methylPREDNISolone sodium succinate 40 mg Intravenous Q6H   montelukast 10 mg Oral Daily   NIFEdipine XL 30 mg Oral Daily   [START ON 3/18/2018] pantoprazole 40 mg Oral QAM   pregabalin 75 mg Oral Q12H   tamsulosin 0.4 mg Oral Nightly       Data Review:   I reviewed the patient's medications and new clinical results.  Lab Results   Component Value Date    CALCIUM 9.0 03/17/2018     Results from last 7 days  Lab Units 03/17/18  1312   AST (SGOT) U/L 20   SODIUM mmol/L 139   POTASSIUM mmol/L 4.5   CHLORIDE mmol/L 99   CO2 mmol/L 27.5   BUN mg/dL 11   CREATININE mg/dL 0.80   GLUCOSE mg/dL 136*   CALCIUM mg/dL 9.0   WBC 10*3/mm3 9.13   HEMOGLOBIN g/dL 12.5*   PLATELETS 10*3/mm3 175   ALT (SGPT) U/L 29       Results from last 7 days  Lab Units 03/17/18  1312 "   TROPONIN T ng/mL <0.010       Results from last 7 days  Lab Units 03/17/18  1420   PH, ARTERIAL pH units 7.416   PO2 ART mm Hg 81.4   PCO2, ARTERIAL mm Hg 45.0   HCO3 ART mmol/L 28.9*     Estimated Creatinine Clearance: 106.8 mL/min (by C-G formula based on SCr of 0.8 mg/dL).    IMAGING:  I have reviewed all imaging studies since my last documentation.  Imaging Results (most recent)     Procedure Component Value Units Date/Time    XR Chest 1 View [[id number]] Collected:  03/17/18 1344     Updated:  03/17/18 1350    Narrative:       PORTABLE CHEST 3/17/2018 AT 1:15 PM     CLINICAL HISTORY: Dyspnea. COPD. Hypertension.     Compared to the previous chest x-ray dated 2/28/2018.     The lungs are somewhat poorly expanded. Minimal patchy infiltrate in  both lower lung zones shown on the previous chest x-ray has cleared.  Currently, no definite acute focal infiltrates are identified. There are  no pleural effusions. The heart is moderately enlarged and unchanged. A  dual-chamber pacemaker remains in satisfactory position in the left  subclavian vein.     IMPRESSIONS: Moderate cardiomegaly. No evidence of acute disease within  the chest.     This report was finalized on 3/17/2018 1:47 PM by Dr. Alden Spencer MD.             ASSESSMENT /   PLAN:  Ae of COPD  Acute hypoxic respiratory failure  Noninvasive ventilation requiring BiPAP therapy  Patient Active Problem List   Diagnosis   • A-fib   • Dyslipidemia   • BP (high blood pressure)   • Neuropathy   • Hypertension   • COPD (chronic obstructive pulmonary disease)   • BPH (benign prostatic hypertrophy)   • Atrial fibrillation   • Asthma   • Hypoxia   • Pneumonia   • Chronic anticoagulation   • Pneumonia of both lungs due to infectious organism   • Hyponatremia   • COPD exacerbation   • Acute respiratory failure     Shortness of breath of sudden onset after a hospital stay and rehabilitation stay.  He is on coagulation therapeutic leave which she takes on routine.  However  given the sudden onset of symptoms without associated fevers chills or cough we will have to rule out pulmonary embolism.  This also let us evaluate whether pulmonary edema or infiltrate is present.  Infectious workup on labs appears negative so far.  Will send a respiratory viral panel.  We'll start empiric antibiotics at this point however likely be able to de-escalate.  Bronchodilator therapy  steriods      Total critical care time was 45 minutes, excluding any separately billable procedure time.    Merlin Fontana MD  [facility]  03/17/18  5:04 PM

## 2018-03-17 NOTE — ED NOTES
Pt arrives to ED via private vehicle from [facility]. Pt. Wears oxygen 24 hours a day at 4 LPM and had a saturation of 71%. Pt exhibits moderate work of breathing and placed on BiPAP as per ED physician. Pt oxygen saturation now 94 % on BiPAP.     Joseph Crawley RN  03/17/18 1312

## 2018-03-17 NOTE — ED PROVIDER NOTES
EMERGENCY DEPARTMENT ENCOUNTER    CHIEF COMPLAINT  Chief Complaint: dyspnea  History given by: patient, family  History limited by: none  Room Number: 16/16  PMD: Alan Santana MD      HPI:  Pt is a 77 y.o. male who [facility] complaining of worsening dyspnea that began yesterday. Pt's family are bedside and advised that pt was released from the hospital on 3/5/18 for respiratory failure. She reports pt uses supplemental O2 at home (2L via NC).    Duration:  One day  Onset: gradual  Timing: constant  Location: n/a  Radiation: n/a  Quality: shortness of air  Intensity/Severity: moderate to severe  Progression: worsening  Associated Symptoms: none  Aggravating Factors: none  Alleviating Factors: none  Previous Episodes: pt has chronic respiratory illness  Treatment before arrival: none    PAST MEDICAL HISTORY  Active Ambulatory Problems     Diagnosis Date Noted   • A-fib 03/02/2016   • Dyslipidemia 03/02/2016   • BP (high blood pressure) 03/02/2016   • Neuropathy    • Hypertension    • COPD (chronic obstructive pulmonary disease)    • BPH (benign prostatic hypertrophy)    • Atrial fibrillation    • Asthma    • Hypoxia 02/07/2018   • Pneumonia 02/07/2018   • Chronic anticoagulation 02/07/2018   • Pneumonia of both lungs due to infectious organism 02/07/2018   • Hyponatremia 02/14/2018   • COPD exacerbation 02/26/2018     Resolved Ambulatory Problems     Diagnosis Date Noted   • No Resolved Ambulatory Problems     Past Medical History:   Diagnosis Date   • Arthritis    • Asthma    • Atrial fibrillation    • BPH (benign prostatic hypertrophy)    • Cancer    • COPD (chronic obstructive pulmonary disease)    • H/O Abnormal CBC 2017   • Hyperlipidemia    • Hypertension    • Neuropathy    • Pneumonia        PAST SURGICAL HISTORY  Past Surgical History:   Procedure Laterality Date   • COLONOSCOPY  12/05/2016    polyps   • FRACTURE SURGERY     • PACEMAKER IMPLANTATION         FAMILY HISTORY  Family History    Problem Relation Age of Onset   • Hypertension Mother    • Heart attack Father        SOCIAL HISTORY  Social History     Social History   • Marital status:      Spouse name: N/A   • Number of children: N/A   • Years of education: N/A     Occupational History   •  Retired     Social History Main Topics   • Smoking status: Former Smoker     Types: Cigarettes     Quit date: 1/3/2001   • Smokeless tobacco: Never Used   • Alcohol use No   • Drug use: No   • Sexual activity: Not on file     Other Topics Concern   • Not on file     Social History Narrative   • No narrative on file       ALLERGIES  Albuterol; Atenolol; Celebrex [celecoxib]; Coreg [carvedilol]; Ibuprofen; Levaquin [levofloxacin]; Lisinopril; Penicillins; and Sulfa antibiotics    REVIEW OF SYSTEMS  Review of Systems   Constitutional: Negative for activity change, appetite change and fever.   HENT: Negative for congestion and sore throat.    Eyes: Negative.    Respiratory: Positive for shortness of breath. Negative for cough.    Cardiovascular: Negative for chest pain and leg swelling.   Gastrointestinal: Negative for abdominal pain, diarrhea and vomiting.   Endocrine: Negative.    Genitourinary: Negative for decreased urine volume and dysuria.   Musculoskeletal: Negative for neck pain.   Skin: Negative for rash and wound.   Allergic/Immunologic: Negative.    Neurological: Negative for weakness, numbness and headaches.   Hematological: Negative.    Psychiatric/Behavioral: Negative.    All other systems reviewed and are negative.      PHYSICAL EXAM  ED Triage Vitals   Temp Heart Rate Resp BP SpO2   -- 03/17/18 1305 03/17/18 1303 -- 03/17/18 1300    97 24  (!) 71 %      Temp src Heart Rate Source Patient Position BP Location FiO2 (%)   -- -- -- -- --              Physical Exam   Constitutional: He appears distressed (moderately).   HENT:   Head: Normocephalic and atraumatic.   Eyes: EOM are normal. Pupils are equal, round, and reactive to light.   Neck:  Normal range of motion. Neck supple.   Cardiovascular: Normal rate, regular rhythm and normal heart sounds.    Pulmonary/Chest: Tachypnea noted. He is in respiratory distress (moderate). He has rhonchi (left).   Diminished lung sounds throughout. 92% on 15 L NRB.   Abdominal: Soft. There is no tenderness. There is no rebound and no guarding.   Musculoskeletal: Normal range of motion. He exhibits no edema.   1+ edema bilateral. Right ankle in ortho boot.   Neurological: He is alert.   Skin: Skin is warm and dry.   Psychiatric: Mood and affect normal.   Nursing note and vitals reviewed.      LAB RESULTS  Lab Results (last 24 hours)     Procedure Component Value Units Date/Time    CBC & Differential [[id number]] Collected:  03/17/18 1312    Specimen:  Blood Updated:  03/17/18 1169    Narrative:       The following orders were created for panel order CBC & Differential.  Procedure                               Abnormality         Status                     ---------                               -----------         ------                     Scan Slide[[id number]]                                       Final result               CBC Auto Differential[[id number]]        Abnormal            Final result                 Please view results for these tests on the individual orders.    Comprehensive Metabolic Panel [[id number]]  (Abnormal) Collected:  03/17/18 1312    Specimen:  Blood Updated:  03/17/18 1350     Glucose 136 (H) mg/dL      BUN 11 mg/dL      Creatinine 0.80 mg/dL      Sodium 139 mmol/L      Potassium 4.5 mmol/L      Chloride 99 mmol/L      CO2 27.5 mmol/L      Calcium 9.0 mg/dL      Total Protein 6.8 g/dL      Albumin 3.90 g/dL      ALT (SGPT) 29 U/L      AST (SGOT) 20 U/L      Alkaline Phosphatase 65 U/L      Total Bilirubin 0.6 mg/dL      eGFR Non African Amer 94 mL/min/1.73      Globulin 2.9 gm/dL      A/G Ratio 1.3 g/dL      BUN/Creatinine Ratio 13.8     Anion Gap 12.5 mmol/L     Narrative:       The MDRD GFR  formula is only valid for adults with stable renal function between ages 18 and 70.    BNP [[id number]]  (Normal) Collected:  03/17/18 1312    Specimen:  Blood Updated:  03/17/18 1348     proBNP 1,698.0 pg/mL     Narrative:       Among patients with dyspnea, NT-proBNP is highly sensitive for the detection of acute congestive heart failure. In addition NT-proBNP of <300 pg/ml effectively rules out acute congestive heart failure with 99% negative predictive value.    Troponin [[id number]]  (Normal) Collected:  03/17/18 1312    Specimen:  Blood Updated:  03/17/18 1350     Troponin T <0.010 ng/mL     Narrative:       Troponin T Reference Ranges:  Less than 0.03 ng/mL:    Negative for AMI  0.03 to 0.09 ng/mL:      Indeterminant for AMI  Greater than 0.09 ng/mL: Positive for AMI    Lactic Acid, Plasma [[id number]]  (Normal) Collected:  03/17/18 1312    Specimen:  Blood Updated:  03/17/18 1333     Lactate 1.9 mmol/L     CBC Auto Differential [[id number]]  (Abnormal) Collected:  03/17/18 1312    Specimen:  Blood Updated:  03/17/18 1339     WBC 9.13 10*3/mm3      RBC 5.80 10*6/mm3      Hemoglobin 12.5 (L) g/dL      Hematocrit 42.5 %      MCV 73.3 (L) fL      MCH 21.6 (L) pg      MCHC 29.4 (L) g/dL      RDW 24.5 (H) %      RDW-SD 62.5 (H) fl      MPV 8.8 fL      Platelets 175 10*3/mm3      Neutrophil % 79.9 (H) %      Lymphocyte % 11.3 (L) %      Monocyte % 7.6 %      Eosinophil % 0.8 %      Basophil % 0.2 %      Immature Grans % 0.2 %      Neutrophils, Absolute 7.30 10*3/mm3      Lymphocytes, Absolute 1.03 10*3/mm3      Monocytes, Absolute 0.69 10*3/mm3      Eosinophils, Absolute 0.07 10*3/mm3      Basophils, Absolute 0.02 10*3/mm3      Immature Grans, Absolute 0.02 10*3/mm3      nRBC 0.0 /100 WBC     Scan Slide [[id number]] Collected:  03/17/18 1312    Specimen:  Blood Updated:  03/17/18 1339     Anisocytosis Mod/2+     Ovalocytes Slight/1+     Spherocytes Slight/1+     WBC Morphology Normal     Platelet Morphology Normal     "Procalcitonin [[id number]]  (Abnormal) Collected:  03/17/18 1312    Specimen:  Blood Updated:  03/17/18 1414     Procalcitonin 0.04 (L) ng/mL     Narrative:       As a Marker for Sepsis (Non-Neonates):   1. <0.5 ng/mL represents a low risk of severe sepsis and/or septic shock.  1. >2 ng/mL represents a high risk of severe sepsis and/or septic shock.    As a Marker for Lower Respiratory Tract Infections that require antibiotic therapy:  PCT on Admission     Antibiotic Therapy             6-12 Hrs later  > 0.5                Strongly Recommended            >0.25 - <0.5         Recommended  0.1 - 0.25           Discouraged                   Remeasure/reassess PCT  <0.1                 Strongly Discouraged          Remeasure/reassess PCT      As 28 day mortality risk marker: \"Change in Procalcitonin Result\" (> 80 % or <=80 %) if Day 0 (or Day 1) and Day 4 values are available. Refer to http://www.[facility]-pct-calculator.com/   Change in PCT <=80 %   A decrease of PCT levels below or equal to 80 % defines a positive change in PCT test result representing a higher risk for 28-day all-cause mortality of patients diagnosed with severe sepsis or septic shock.  Change in PCT > 80 %   A decrease of PCT levels of more than 80 % defines a negative change in PCT result representing a lower risk for 28-day all-cause mortality of patients diagnosed with severe sepsis or septic shock.                Blood Culture - Blood, Blood, Venous Line [[id number]] Collected:  03/17/18 1313    Specimen:  Blood from Arm, Left Updated:  03/17/18 1318    Blood Culture - Blood, Blood, Venous Line [[id number]] Collected:  03/17/18 1348    Specimen:  Blood from Arm, Right Updated:  03/17/18 1432    Blood Gas, Arterial [[id number]]  (Abnormal) Collected:  03/17/18 1420    Specimen:  Arterial Blood Updated:  03/17/18 1422     Site Arterial: right radial     Jordy's Test Positive     pH, Arterial 7.416 pH units      pCO2, Arterial 45.0 mm Hg      pO2, " Arterial 81.4 mm Hg      HCO3, Arterial 28.9 (H) mmol/L      Base Excess, Arterial 3.7 (H) mmol/L      O2 Saturation Calculated 96.0 %      A-a Gradiant 0.2 mmHg      Barometric Pressure for Blood Gas 744.3 mmHg      Modality BiPap     FIO2 60 %      Rate 25 Breaths/minute      PEEP 6    Narrative:       sat 94 12/6 Meter: [id number] : [id number] Vargasrose Butlern          I ordered the above labs and reviewed the results    RADIOLOGY  XR Chest 1 View   Final Result   Moderate cardiomegaly. No evidence of acute disease within the chest.        I ordered the above noted radiological studies. Interpreted by radiologist. Reviewed by me in PACS.       PROCEDURES  Procedures      PROGRESS AND CONSULTS  ED Course   1308  Respiratory paged. Will place pt on bipap.    1458  BP- 117/72 HR- 89 Temp- 99.1 °F (37.3 °C) (Tympanic) O2 sat- 94%  Rechecked the patient who is in NAD and is resting comfortably with bipap placed. Pt feels improved. Discussed imaging showing no signs of PNA and the plan to remove the bipap and evaluate.     1509  BP- 122/82 HR- 80 Temp- 99.1 °F (37.3 °C) (Tympanic) O2 sat- 90%  Pt is is now on 14L via NRB. Will administer solumedrol.    1519  Consulted with Dr. Sotelo , pulmonology, about the pt. He agreed to admit. He also advised to put pt back on bipap.     MEDICAL DECISION MAKING  Results were reviewed/discussed with the patient and they were also made aware of online access. Pt also made aware that some labs, such as cultures, will not be resulted during ER visit and follow up with PMD is necessary.     MDM  Number of Diagnoses or Management Options  Acute respiratory failure with hypoxia:      Amount and/or Complexity of Data Reviewed  Clinical lab tests: reviewed and ordered (BNP 1,698, Troponin is <0.010, lactate 1.9)  Tests in the radiology section of CPT®: reviewed and ordered (CXR- moderate cardiomegaly, negative acute)  Tests in the medicine section of CPT®: reviewed and ordered (See EKG  procedure note. )  Decide to obtain previous medical records or to obtain history from someone other than the patient: yes (D/c on 3/5/18 after acute respiratory failure w/ PNA. Dr. JARON Fontana sent him home on prednisone.)  Discuss the patient with other providers: yes (Dr. Sotelo, pulmonology)    Critical Care  Total time providing critical care: 30-74 minutes    Patient Progress  Patient progress: stable         DIAGNOSIS  Final diagnoses:   Acute respiratory failure with hypoxia       DISPOSITION  ADMISSION    Discussed treatment plan and reason for admission with pt/family and admitting physician.  Pt/family voiced understanding of the plan for admission for further testing/treatment as needed.     Latest Documented Vital Signs:  As of 3:40 PM  BP- 122/82 HR- 80 Temp- 99.1 °F (37.3 °C) (Tympanic) O2 sat- 94%    --  Documentation assistance provided by tomasa Peters for Dr. Delacruz.  Information recorded by the scribe was done at my direction and has been verified and validated by me.       Lynne Peters  03/17/18 9228       Shiraz Delacruz MD  03/17/18 2876

## 2018-03-18 ENCOUNTER — APPOINTMENT (OUTPATIENT)
Dept: CARDIOLOGY | Facility: HOSPITAL | Age: 78
End: 2018-03-18
Attending: INTERNAL MEDICINE

## 2018-03-18 LAB
ANION GAP SERPL CALCULATED.3IONS-SCNC: 12.7 MMOL/L
BASOPHILS # BLD AUTO: 0 10*3/MM3 (ref 0–0.2)
BASOPHILS NFR BLD AUTO: 0 % (ref 0–1.5)
BH CV LOWER VASCULAR LEFT COMMON FEMORAL AUGMENT: NORMAL
BH CV LOWER VASCULAR LEFT COMMON FEMORAL COMPETENT: NORMAL
BH CV LOWER VASCULAR LEFT COMMON FEMORAL COMPRESS: NORMAL
BH CV LOWER VASCULAR LEFT COMMON FEMORAL PHASIC: NORMAL
BH CV LOWER VASCULAR LEFT COMMON FEMORAL SPONT: NORMAL
BH CV LOWER VASCULAR LEFT DISTAL FEMORAL COMPRESS: NORMAL
BH CV LOWER VASCULAR LEFT GASTRONEMIUS COMPRESS: NORMAL
BH CV LOWER VASCULAR LEFT GREATER SAPH AK COMPRESS: NORMAL
BH CV LOWER VASCULAR LEFT GREATER SAPH BK COMPRESS: NORMAL
BH CV LOWER VASCULAR LEFT MID FEMORAL AUGMENT: NORMAL
BH CV LOWER VASCULAR LEFT MID FEMORAL COMPETENT: NORMAL
BH CV LOWER VASCULAR LEFT MID FEMORAL COMPRESS: NORMAL
BH CV LOWER VASCULAR LEFT MID FEMORAL PHASIC: NORMAL
BH CV LOWER VASCULAR LEFT MID FEMORAL SPONT: NORMAL
BH CV LOWER VASCULAR LEFT PERONEAL COMPRESS: NORMAL
BH CV LOWER VASCULAR LEFT POPLITEAL AUGMENT: NORMAL
BH CV LOWER VASCULAR LEFT POPLITEAL COMPETENT: NORMAL
BH CV LOWER VASCULAR LEFT POPLITEAL COMPRESS: NORMAL
BH CV LOWER VASCULAR LEFT POPLITEAL PHASIC: NORMAL
BH CV LOWER VASCULAR LEFT POPLITEAL SPONT: NORMAL
BH CV LOWER VASCULAR LEFT POSTERIOR TIBIAL COMPRESS: NORMAL
BH CV LOWER VASCULAR LEFT PROXIMAL FEMORAL COMPRESS: NORMAL
BH CV LOWER VASCULAR LEFT SAPHENOFEMORAL JUNCTION AUGMENT: NORMAL
BH CV LOWER VASCULAR LEFT SAPHENOFEMORAL JUNCTION COMPETENT: NORMAL
BH CV LOWER VASCULAR LEFT SAPHENOFEMORAL JUNCTION COMPRESS: NORMAL
BH CV LOWER VASCULAR LEFT SAPHENOFEMORAL JUNCTION PHASIC: NORMAL
BH CV LOWER VASCULAR LEFT SAPHENOFEMORAL JUNCTION SPONT: NORMAL
BH CV LOWER VASCULAR RIGHT COMMON FEMORAL AUGMENT: NORMAL
BH CV LOWER VASCULAR RIGHT COMMON FEMORAL COMPETENT: NORMAL
BH CV LOWER VASCULAR RIGHT COMMON FEMORAL COMPRESS: NORMAL
BH CV LOWER VASCULAR RIGHT COMMON FEMORAL PHASIC: NORMAL
BH CV LOWER VASCULAR RIGHT COMMON FEMORAL SPONT: NORMAL
BH CV LOWER VASCULAR RIGHT DISTAL FEMORAL COMPRESS: NORMAL
BH CV LOWER VASCULAR RIGHT GASTRONEMIUS COMPRESS: NORMAL
BH CV LOWER VASCULAR RIGHT GREATER SAPH AK COMPRESS: NORMAL
BH CV LOWER VASCULAR RIGHT GREATER SAPH BK COMPRESS: NORMAL
BH CV LOWER VASCULAR RIGHT MID FEMORAL AUGMENT: NORMAL
BH CV LOWER VASCULAR RIGHT MID FEMORAL COMPETENT: NORMAL
BH CV LOWER VASCULAR RIGHT MID FEMORAL COMPRESS: NORMAL
BH CV LOWER VASCULAR RIGHT MID FEMORAL PHASIC: NORMAL
BH CV LOWER VASCULAR RIGHT MID FEMORAL SPONT: NORMAL
BH CV LOWER VASCULAR RIGHT PERONEAL COMPRESS: NORMAL
BH CV LOWER VASCULAR RIGHT POPLITEAL AUGMENT: NORMAL
BH CV LOWER VASCULAR RIGHT POPLITEAL COMPETENT: NORMAL
BH CV LOWER VASCULAR RIGHT POPLITEAL COMPRESS: NORMAL
BH CV LOWER VASCULAR RIGHT POPLITEAL PHASIC: NORMAL
BH CV LOWER VASCULAR RIGHT POPLITEAL SPONT: NORMAL
BH CV LOWER VASCULAR RIGHT POSTERIOR TIBIAL COMPRESS: NORMAL
BH CV LOWER VASCULAR RIGHT PROXIMAL FEMORAL COMPRESS: NORMAL
BH CV LOWER VASCULAR RIGHT SAPHENOFEMORAL JUNCTION AUGMENT: NORMAL
BH CV LOWER VASCULAR RIGHT SAPHENOFEMORAL JUNCTION COMPETENT: NORMAL
BH CV LOWER VASCULAR RIGHT SAPHENOFEMORAL JUNCTION COMPRESS: NORMAL
BH CV LOWER VASCULAR RIGHT SAPHENOFEMORAL JUNCTION PHASIC: NORMAL
BH CV LOWER VASCULAR RIGHT SAPHENOFEMORAL JUNCTION SPONT: NORMAL
BUN BLD-MCNC: 15 MG/DL (ref 8–23)
BUN/CREAT SERPL: 23.1 (ref 7–25)
CALCIUM SPEC-SCNC: 8.4 MG/DL (ref 8.6–10.5)
CHLORIDE SERPL-SCNC: 98 MMOL/L (ref 98–107)
CO2 SERPL-SCNC: 25.3 MMOL/L (ref 22–29)
CREAT BLD-MCNC: 0.65 MG/DL (ref 0.76–1.27)
DEPRECATED RDW RBC AUTO: 62.2 FL (ref 37–54)
EOSINOPHIL # BLD AUTO: 0 10*3/MM3 (ref 0–0.7)
EOSINOPHIL NFR BLD AUTO: 0 % (ref 0.3–6.2)
ERYTHROCYTE [DISTWIDTH] IN BLOOD BY AUTOMATED COUNT: 24.4 % (ref 11.5–14.5)
GFR SERPL CREATININE-BSD FRML MDRD: 119 ML/MIN/1.73
GLUCOSE BLD-MCNC: 247 MG/DL (ref 65–99)
GLUCOSE BLDC GLUCOMTR-MCNC: 130 MG/DL (ref 70–130)
GLUCOSE BLDC GLUCOMTR-MCNC: 141 MG/DL (ref 70–130)
GLUCOSE BLDC GLUCOMTR-MCNC: 191 MG/DL (ref 70–130)
GLUCOSE BLDC GLUCOMTR-MCNC: 191 MG/DL (ref 70–130)
HCT VFR BLD AUTO: 40.1 % (ref 40.4–52.2)
HGB BLD-MCNC: 11.8 G/DL (ref 13.7–17.6)
IMM GRANULOCYTES # BLD: 0.03 10*3/MM3 (ref 0–0.03)
IMM GRANULOCYTES NFR BLD: 0.4 % (ref 0–0.5)
LYMPHOCYTES # BLD AUTO: 0.53 10*3/MM3 (ref 0.9–4.8)
LYMPHOCYTES NFR BLD AUTO: 7.9 % (ref 19.6–45.3)
MAGNESIUM SERPL-MCNC: 1.9 MG/DL (ref 1.6–2.4)
MCH RBC QN AUTO: 21.7 PG (ref 27–32.7)
MCHC RBC AUTO-ENTMCNC: 29.4 G/DL (ref 32.6–36.4)
MCV RBC AUTO: 73.6 FL (ref 79.8–96.2)
MONOCYTES # BLD AUTO: 0.06 10*3/MM3 (ref 0.2–1.2)
MONOCYTES NFR BLD AUTO: 0.9 % (ref 5–12)
NEUTROPHILS # BLD AUTO: 6.11 10*3/MM3 (ref 1.9–8.1)
NEUTROPHILS NFR BLD AUTO: 90.8 % (ref 42.7–76)
NRBC BLD MANUAL-RTO: 0 /100 WBC (ref 0–0)
PHOSPHATE SERPL-MCNC: 3 MG/DL (ref 2.5–4.5)
PLATELET # BLD AUTO: 169 10*3/MM3 (ref 140–500)
PMV BLD AUTO: 9.5 FL (ref 6–12)
POTASSIUM BLD-SCNC: 4.5 MMOL/L (ref 3.5–5.2)
RBC # BLD AUTO: 5.45 10*6/MM3 (ref 4.6–6)
SODIUM BLD-SCNC: 136 MMOL/L (ref 136–145)
WBC NRBC COR # BLD: 6.73 10*3/MM3 (ref 4.5–10.7)

## 2018-03-18 PROCEDURE — 85025 COMPLETE CBC W/AUTO DIFF WBC: CPT | Performed by: INTERNAL MEDICINE

## 2018-03-18 PROCEDURE — 93970 EXTREMITY STUDY: CPT

## 2018-03-18 PROCEDURE — 94799 UNLISTED PULMONARY SVC/PX: CPT

## 2018-03-18 PROCEDURE — 87040 BLOOD CULTURE FOR BACTERIA: CPT | Performed by: INTERNAL MEDICINE

## 2018-03-18 PROCEDURE — 25010000002 VANCOMYCIN 10 G RECONSTITUTED SOLUTION: Performed by: INTERNAL MEDICINE

## 2018-03-18 PROCEDURE — 0 IOPAMIDOL PER 1 ML: Performed by: INTERNAL MEDICINE

## 2018-03-18 PROCEDURE — 80048 BASIC METABOLIC PNL TOTAL CA: CPT | Performed by: INTERNAL MEDICINE

## 2018-03-18 PROCEDURE — 94660 CPAP INITIATION&MGMT: CPT

## 2018-03-18 PROCEDURE — 84100 ASSAY OF PHOSPHORUS: CPT | Performed by: INTERNAL MEDICINE

## 2018-03-18 PROCEDURE — 83735 ASSAY OF MAGNESIUM: CPT | Performed by: INTERNAL MEDICINE

## 2018-03-18 PROCEDURE — 82962 GLUCOSE BLOOD TEST: CPT

## 2018-03-18 PROCEDURE — 25010000002 METHYLPREDNISOLONE PER 40 MG: Performed by: INTERNAL MEDICINE

## 2018-03-18 RX ORDER — MINERAL OIL AND PETROLATUM 150; 830 MG/G; MG/G
OINTMENT OPHTHALMIC
Status: DISCONTINUED | OUTPATIENT
Start: 2018-03-18 | End: 2018-03-24 | Stop reason: HOSPADM

## 2018-03-18 RX ORDER — ECHINACEA PURPUREA EXTRACT 125 MG
1 TABLET ORAL AS NEEDED
Status: DISCONTINUED | OUTPATIENT
Start: 2018-03-18 | End: 2018-03-24 | Stop reason: HOSPADM

## 2018-03-18 RX ORDER — SODIUM CHLORIDE 9 MG/ML
9 INJECTION, SOLUTION INTRAVENOUS CONTINUOUS
Status: DISCONTINUED | OUTPATIENT
Start: 2018-03-18 | End: 2018-03-24 | Stop reason: HOSPADM

## 2018-03-18 RX ORDER — SODIUM CHLORIDE FOR INHALATION 7 %
4 VIAL, NEBULIZER (ML) INHALATION
Status: DISCONTINUED | OUTPATIENT
Start: 2018-03-18 | End: 2018-03-23

## 2018-03-18 RX ORDER — ACETAMINOPHEN 325 MG/1
650 TABLET ORAL EVERY 6 HOURS PRN
Status: DISCONTINUED | OUTPATIENT
Start: 2018-03-18 | End: 2018-03-24 | Stop reason: HOSPADM

## 2018-03-18 RX ADMIN — METHYLPREDNISOLONE SODIUM SUCCINATE 40 MG: 40 INJECTION, POWDER, FOR SOLUTION INTRAMUSCULAR; INTRAVENOUS at 23:59

## 2018-03-18 RX ADMIN — FERROUS SULFATE TAB 325 MG (65 MG ELEMENTAL FE) 325 MG: 325 (65 FE) TAB at 08:08

## 2018-03-18 RX ADMIN — IPRATROPIUM BROMIDE AND ALBUTEROL SULFATE 3 ML: .5; 3 SOLUTION RESPIRATORY (INHALATION) at 06:49

## 2018-03-18 RX ADMIN — VANCOMYCIN HYDROCHLORIDE 1500 MG: 10 INJECTION, POWDER, LYOPHILIZED, FOR SOLUTION INTRAVENOUS at 05:59

## 2018-03-18 RX ADMIN — IPRATROPIUM BROMIDE AND ALBUTEROL SULFATE 3 ML: .5; 3 SOLUTION RESPIRATORY (INHALATION) at 16:12

## 2018-03-18 RX ADMIN — ATORVASTATIN CALCIUM 10 MG: 10 TABLET, FILM COATED ORAL at 20:34

## 2018-03-18 RX ADMIN — PREGABALIN 75 MG: 75 CAPSULE ORAL at 08:08

## 2018-03-18 RX ADMIN — ACETAMINOPHEN 650 MG: 325 TABLET ORAL at 16:27

## 2018-03-18 RX ADMIN — PREGABALIN 75 MG: 75 CAPSULE ORAL at 20:34

## 2018-03-18 RX ADMIN — AZTREONAM 2 G: 2 INJECTION, POWDER, LYOPHILIZED, FOR SOLUTION INTRAMUSCULAR; INTRAVENOUS at 20:39

## 2018-03-18 RX ADMIN — KETOTIFEN FUMARATE 1 DROP: 0.35 SOLUTION/ DROPS OPHTHALMIC at 08:12

## 2018-03-18 RX ADMIN — TAMSULOSIN HYDROCHLORIDE 0.4 MG: 0.4 CAPSULE ORAL at 20:34

## 2018-03-18 RX ADMIN — ASPIRIN 81 MG: 81 TABLET ORAL at 08:09

## 2018-03-18 RX ADMIN — METHYLPREDNISOLONE SODIUM SUCCINATE 40 MG: 40 INJECTION, POWDER, FOR SOLUTION INTRAMUSCULAR; INTRAVENOUS at 17:49

## 2018-03-18 RX ADMIN — IOPAMIDOL 95 ML: 755 INJECTION, SOLUTION INTRAVENOUS at 00:01

## 2018-03-18 RX ADMIN — AZTREONAM 2 G: 2 INJECTION, POWDER, LYOPHILIZED, FOR SOLUTION INTRAMUSCULAR; INTRAVENOUS at 12:14

## 2018-03-18 RX ADMIN — RIVAROXABAN 20 MG: 20 TABLET, FILM COATED ORAL at 00:25

## 2018-03-18 RX ADMIN — AZTREONAM 2 G: 2 INJECTION, POWDER, LYOPHILIZED, FOR SOLUTION INTRAMUSCULAR; INTRAVENOUS at 03:20

## 2018-03-18 RX ADMIN — IPRATROPIUM BROMIDE AND ALBUTEROL SULFATE 3 ML: .5; 3 SOLUTION RESPIRATORY (INHALATION) at 20:02

## 2018-03-18 RX ADMIN — PANTOPRAZOLE SODIUM 40 MG: 40 TABLET, DELAYED RELEASE ORAL at 06:02

## 2018-03-18 RX ADMIN — NIFEDIPINE 30 MG: 30 TABLET, FILM COATED, EXTENDED RELEASE ORAL at 08:08

## 2018-03-18 RX ADMIN — IPRATROPIUM BROMIDE AND ALBUTEROL SULFATE 3 ML: .5; 3 SOLUTION RESPIRATORY (INHALATION) at 11:44

## 2018-03-18 RX ADMIN — IPRATROPIUM BROMIDE AND ALBUTEROL SULFATE 3 ML: .5; 3 SOLUTION RESPIRATORY (INHALATION) at 00:15

## 2018-03-18 RX ADMIN — METHYLPREDNISOLONE SODIUM SUCCINATE 40 MG: 40 INJECTION, POWDER, FOR SOLUTION INTRAMUSCULAR; INTRAVENOUS at 00:25

## 2018-03-18 RX ADMIN — ACETAMINOPHEN 650 MG: 325 TABLET ORAL at 22:30

## 2018-03-18 RX ADMIN — METHYLPREDNISOLONE SODIUM SUCCINATE 40 MG: 40 INJECTION, POWDER, FOR SOLUTION INTRAMUSCULAR; INTRAVENOUS at 05:59

## 2018-03-18 RX ADMIN — KETOTIFEN FUMARATE 1 DROP: 0.35 SOLUTION/ DROPS OPHTHALMIC at 20:36

## 2018-03-18 RX ADMIN — RIVAROXABAN 20 MG: 20 TABLET, FILM COATED ORAL at 17:49

## 2018-03-18 RX ADMIN — MONTELUKAST 10 MG: 10 TABLET, FILM COATED ORAL at 20:34

## 2018-03-18 RX ADMIN — VANCOMYCIN HYDROCHLORIDE 1500 MG: 10 INJECTION, POWDER, LYOPHILIZED, FOR SOLUTION INTRAVENOUS at 17:49

## 2018-03-18 RX ADMIN — SODIUM CHLORIDE SOLN NEBU 7% 4 ML: 7 NEBU SOLN at 20:12

## 2018-03-18 RX ADMIN — METHYLPREDNISOLONE SODIUM SUCCINATE 40 MG: 40 INJECTION, POWDER, FOR SOLUTION INTRAMUSCULAR; INTRAVENOUS at 12:15

## 2018-03-18 NOTE — PLAN OF CARE
Problem: Patient Care Overview  Goal: Plan of Care Review  Outcome: Ongoing (interventions implemented as appropriate)   03/18/18 1938   OTHER   Outcome Summary Pt on 10L high flow cannula. Initiated diet- tolerated well. Wound care consulted for right ankle wound. Doppler bilat lower ext negative. Tylenol given for pain.        Problem: Chronic Obstructive Pulmonary Disease (Adult)  Goal: Signs and Symptoms of Listed Potential Problems Will be Absent, Minimized or Managed (Chronic Obstructive Pulmonary Disease)  Outcome: Ongoing (interventions implemented as appropriate)      Problem: Cardiac Rhythm Management Device (Adult)  Goal: Signs and Symptoms of Listed Potential Problems Will be Absent, Minimized or Managed (Cardiac Rhythm Management Device)  Outcome: Ongoing (interventions implemented as appropriate)

## 2018-03-18 NOTE — PLAN OF CARE
Problem: Patient Care Overview  Goal: Plan of Care Review  Outcome: Ongoing (interventions implemented as appropriate)  The patient has done well throughout the shift. The CT of the chest was negative for PE, though it showed lower lung opacities. He was primarily been on bipap through the evening, taking occasional breaks for non-rebreather/hi-jason. Morning labs look relatively unremarkable, BS is becoming more elevated. No acute changes through the evening.     03/18/18 2336   Coping/Psychosocial   Plan of Care Reviewed With patient   Plan of Care Review   Progress improving

## 2018-03-18 NOTE — PROGRESS NOTES
"  Daily Progress Note.   [facility] CORONARY CARE  3/18/2018    Patient:  Name:  Alessio Allen  MRN:  [id number]  1940  77 y.o.  male         Interval History:  Ct overnight revealing of pna  Diffuse emphysema  Weaned off bipap overnight  Still on high flow nc  Feels breathing is much better    Physical Exam:  /81   Pulse 80   Temp 98.4 °F (36.9 °C) (Oral)   Resp 21   Ht 188 cm (74\")   Wt 117 kg (257 lb 11.5 oz)   SpO2 94%   BMI 33.09 kg/m²   Body mass index is 33.09 kg/m².    Intake/Output Summary (Last 24 hours) at 03/18/18 1022  Last data filed at 03/18/18 0320   Gross per 24 hour   Intake              690 ml   Output             1400 ml   Net             -710 ml     GENERAL: on NIV, no in acute distress but ill  HEENT:  EOMI, nonicteric sclera, no JVD  PULMONARY:   decreased breath sounds bilaterally no wheeze, no crackles, no rhonchi, symmetric air entry  CARDIAC:  RRR  ABD: mild truncal obesity SNTND BS+  EXT: bilateral le edema 1+ pulses symmetric 2+ bilaterally  NEURO:  CNs II-XII intact, no focal deficits  SKIN:erythematous right ankle  PSYCH: appropriate mood  LYMPH: no cervical LAD    Data Review:  Notable Labs:    Results from last 7 days  Lab Units 03/18/18  0317 03/17/18  1754 03/17/18  1312   WBC 10*3/mm3 6.73 11.15* 9.13   HEMOGLOBIN g/dL 11.8* 12.1* 12.5*   PLATELETS 10*3/mm3 169 177 175     Results from last 7 days  Lab Units 03/18/18  0317 03/17/18  1754 03/17/18  1312   SODIUM mmol/L 136 138 139   POTASSIUM mmol/L 4.5 4.7 4.5   CHLORIDE mmol/L 98 99 99   CO2 mmol/L 25.3 26.2 27.5   BUN mg/dL 15 11 11   CREATININE mg/dL 0.65* 0.73* 0.80   GLUCOSE mg/dL 247* 116* 136*   CALCIUM mg/dL 8.4* 8.9 9.0   MAGNESIUM mg/dL 1.9 1.9  --    PHOSPHORUS mg/dL 3.0 3.5  --    Estimated Creatinine Clearance: 106.8 mL/min (by C-G formula based on SCr of 0.65 mg/dL (L)).    Imaging:  Ct chest reviewed personally  Ct overnight revealing of pna  Diffuse emphysema    Scheduled meds:  "     aspirin 81 mg Oral Daily   atorvastatin 10 mg Oral Nightly   aztreonam 2 g Intravenous Q8H   ferrous sulfate 325 mg Oral Daily With Breakfast   ipratropium-albuterol 3 mL Nebulization 4x Daily - RT   ketotifen 1 drop Both Eyes BID   methylPREDNISolone sodium succinate 40 mg Intravenous Q6H   montelukast 10 mg Oral Nightly   NIFEdipine XL 30 mg Oral Daily   pantoprazole 40 mg Oral QAM   pregabalin 75 mg Oral Q12H   rivaroxaban 20 mg Oral Daily With Dinner   tamsulosin 0.4 mg Oral Nightly   vancomycin 1,500 mg Intravenous Q12H       ASSESSMENT  /  PLAN:  Ae of COPD  Acute hypoxic respiratory failure  Noninvasive ventilation requiring BiPAP therapy  Patient Active Problem List   Diagnosis   • A-fib   • Dyslipidemia   • BP (high blood pressure)   • Neuropathy   • Hypertension   • COPD (chronic obstructive pulmonary disease)   • BPH (benign prostatic hypertrophy)   • Atrial fibrillation   • Asthma   • Hypoxia   • Pneumonia   • Chronic anticoagulation   • Pneumonia of both lungs due to infectious organism   • Hyponatremia   • COPD exacerbation   • Acute respiratory failure      Cont steroids  Cont bds  Continue abx  Monitor today, out of unit later potentially if resp status maintains.  BIPAP at night and PRN       Merlin Fontana MD  [facility]  03/18/18  [id number]

## 2018-03-19 LAB
ANION GAP SERPL CALCULATED.3IONS-SCNC: 12.8 MMOL/L
BASOPHILS # BLD AUTO: 0.01 10*3/MM3 (ref 0–0.2)
BASOPHILS NFR BLD AUTO: 0.1 % (ref 0–1.5)
BUN BLD-MCNC: 24 MG/DL (ref 8–23)
BUN/CREAT SERPL: 33.3 (ref 7–25)
CALCIUM SPEC-SCNC: 8.4 MG/DL (ref 8.6–10.5)
CHLORIDE SERPL-SCNC: 102 MMOL/L (ref 98–107)
CO2 SERPL-SCNC: 25.2 MMOL/L (ref 22–29)
CREAT BLD-MCNC: 0.72 MG/DL (ref 0.76–1.27)
DEPRECATED RDW RBC AUTO: 65.1 FL (ref 37–54)
EOSINOPHIL # BLD AUTO: 0 10*3/MM3 (ref 0–0.7)
EOSINOPHIL NFR BLD AUTO: 0 % (ref 0.3–6.2)
ERYTHROCYTE [DISTWIDTH] IN BLOOD BY AUTOMATED COUNT: 24.8 % (ref 11.5–14.5)
GFR SERPL CREATININE-BSD FRML MDRD: 106 ML/MIN/1.73
GLUCOSE BLD-MCNC: 161 MG/DL (ref 65–99)
GLUCOSE BLDC GLUCOMTR-MCNC: 152 MG/DL (ref 70–130)
GLUCOSE BLDC GLUCOMTR-MCNC: 162 MG/DL (ref 70–130)
HCT VFR BLD AUTO: 37.5 % (ref 40.4–52.2)
HGB BLD-MCNC: 11 G/DL (ref 13.7–17.6)
IMM GRANULOCYTES # BLD: 0.03 10*3/MM3 (ref 0–0.03)
IMM GRANULOCYTES NFR BLD: 0.2 % (ref 0–0.5)
LYMPHOCYTES # BLD AUTO: 0.54 10*3/MM3 (ref 0.9–4.8)
LYMPHOCYTES NFR BLD AUTO: 4.3 % (ref 19.6–45.3)
MAGNESIUM SERPL-MCNC: 2.1 MG/DL (ref 1.6–2.4)
MCH RBC QN AUTO: 21.8 PG (ref 27–32.7)
MCHC RBC AUTO-ENTMCNC: 29.3 G/DL (ref 32.6–36.4)
MCV RBC AUTO: 74.3 FL (ref 79.8–96.2)
MONOCYTES # BLD AUTO: 0.43 10*3/MM3 (ref 0.2–1.2)
MONOCYTES NFR BLD AUTO: 3.4 % (ref 5–12)
NEUTROPHILS # BLD AUTO: 11.65 10*3/MM3 (ref 1.9–8.1)
NEUTROPHILS NFR BLD AUTO: 92 % (ref 42.7–76)
PHOSPHATE SERPL-MCNC: 3.9 MG/DL (ref 2.5–4.5)
PLATELET # BLD AUTO: 176 10*3/MM3 (ref 140–500)
PMV BLD AUTO: 9.3 FL (ref 6–12)
POTASSIUM BLD-SCNC: 4.7 MMOL/L (ref 3.5–5.2)
RBC # BLD AUTO: 5.05 10*6/MM3 (ref 4.6–6)
SODIUM BLD-SCNC: 140 MMOL/L (ref 136–145)
VANCOMYCIN TROUGH SERPL-MCNC: 10.7 MCG/ML (ref 5–20)
WBC NRBC COR # BLD: 12.66 10*3/MM3 (ref 4.5–10.7)

## 2018-03-19 PROCEDURE — 25010000002 VANCOMYCIN 10 G RECONSTITUTED SOLUTION: Performed by: INTERNAL MEDICINE

## 2018-03-19 PROCEDURE — 80202 ASSAY OF VANCOMYCIN: CPT | Performed by: INTERNAL MEDICINE

## 2018-03-19 PROCEDURE — 85025 COMPLETE CBC W/AUTO DIFF WBC: CPT | Performed by: INTERNAL MEDICINE

## 2018-03-19 PROCEDURE — 83735 ASSAY OF MAGNESIUM: CPT | Performed by: INTERNAL MEDICINE

## 2018-03-19 PROCEDURE — 80048 BASIC METABOLIC PNL TOTAL CA: CPT | Performed by: INTERNAL MEDICINE

## 2018-03-19 PROCEDURE — 94799 UNLISTED PULMONARY SVC/PX: CPT

## 2018-03-19 PROCEDURE — 82962 GLUCOSE BLOOD TEST: CPT

## 2018-03-19 PROCEDURE — 84100 ASSAY OF PHOSPHORUS: CPT | Performed by: INTERNAL MEDICINE

## 2018-03-19 PROCEDURE — 25010000002 METHYLPREDNISOLONE PER 40 MG: Performed by: INTERNAL MEDICINE

## 2018-03-19 RX ORDER — METHYLPREDNISOLONE SODIUM SUCCINATE 40 MG/ML
40 INJECTION, POWDER, LYOPHILIZED, FOR SOLUTION INTRAMUSCULAR; INTRAVENOUS EVERY 12 HOURS
Status: DISCONTINUED | OUTPATIENT
Start: 2018-03-19 | End: 2018-03-20

## 2018-03-19 RX ADMIN — RIVAROXABAN 20 MG: 20 TABLET, FILM COATED ORAL at 17:17

## 2018-03-19 RX ADMIN — KETOTIFEN FUMARATE 1 DROP: 0.35 SOLUTION/ DROPS OPHTHALMIC at 21:07

## 2018-03-19 RX ADMIN — VANCOMYCIN HYDROCHLORIDE 1500 MG: 10 INJECTION, POWDER, LYOPHILIZED, FOR SOLUTION INTRAVENOUS at 06:44

## 2018-03-19 RX ADMIN — ACETAMINOPHEN 650 MG: 325 TABLET ORAL at 21:51

## 2018-03-19 RX ADMIN — KETOTIFEN FUMARATE 1 DROP: 0.35 SOLUTION/ DROPS OPHTHALMIC at 09:45

## 2018-03-19 RX ADMIN — TAMSULOSIN HYDROCHLORIDE 0.4 MG: 0.4 CAPSULE ORAL at 21:07

## 2018-03-19 RX ADMIN — ASPIRIN 81 MG: 81 TABLET ORAL at 09:12

## 2018-03-19 RX ADMIN — NIFEDIPINE 30 MG: 30 TABLET, FILM COATED, EXTENDED RELEASE ORAL at 09:12

## 2018-03-19 RX ADMIN — FERROUS SULFATE TAB 325 MG (65 MG ELEMENTAL FE) 325 MG: 325 (65 FE) TAB at 09:12

## 2018-03-19 RX ADMIN — ACETAMINOPHEN 650 MG: 325 TABLET ORAL at 04:26

## 2018-03-19 RX ADMIN — AZTREONAM 2 G: 2 INJECTION, POWDER, LYOPHILIZED, FOR SOLUTION INTRAMUSCULAR; INTRAVENOUS at 11:56

## 2018-03-19 RX ADMIN — SODIUM CHLORIDE SOLN NEBU 7% 4 ML: 7 NEBU SOLN at 21:28

## 2018-03-19 RX ADMIN — SODIUM CHLORIDE SOLN NEBU 7% 4 ML: 7 NEBU SOLN at 07:22

## 2018-03-19 RX ADMIN — MONTELUKAST 10 MG: 10 TABLET, FILM COATED ORAL at 21:07

## 2018-03-19 RX ADMIN — AZTREONAM 2 G: 2 INJECTION, POWDER, LYOPHILIZED, FOR SOLUTION INTRAMUSCULAR; INTRAVENOUS at 02:23

## 2018-03-19 RX ADMIN — PREGABALIN 75 MG: 75 CAPSULE ORAL at 21:07

## 2018-03-19 RX ADMIN — PREGABALIN 75 MG: 75 CAPSULE ORAL at 09:12

## 2018-03-19 RX ADMIN — IPRATROPIUM BROMIDE AND ALBUTEROL SULFATE 3 ML: .5; 3 SOLUTION RESPIRATORY (INHALATION) at 15:26

## 2018-03-19 RX ADMIN — PANTOPRAZOLE SODIUM 40 MG: 40 TABLET, DELAYED RELEASE ORAL at 06:02

## 2018-03-19 RX ADMIN — NYSTATIN 500000 UNITS: 100000 SUSPENSION ORAL at 21:07

## 2018-03-19 RX ADMIN — ATORVASTATIN CALCIUM 10 MG: 10 TABLET, FILM COATED ORAL at 21:07

## 2018-03-19 RX ADMIN — IPRATROPIUM BROMIDE AND ALBUTEROL SULFATE 3 ML: .5; 3 SOLUTION RESPIRATORY (INHALATION) at 07:22

## 2018-03-19 RX ADMIN — ACETAMINOPHEN 650 MG: 325 TABLET ORAL at 10:23

## 2018-03-19 RX ADMIN — METHYLPREDNISOLONE SODIUM SUCCINATE 40 MG: 40 INJECTION, POWDER, FOR SOLUTION INTRAMUSCULAR; INTRAVENOUS at 06:02

## 2018-03-19 RX ADMIN — METHYLPREDNISOLONE SODIUM SUCCINATE 40 MG: 40 INJECTION, POWDER, FOR SOLUTION INTRAMUSCULAR; INTRAVENOUS at 17:16

## 2018-03-19 RX ADMIN — IPRATROPIUM BROMIDE AND ALBUTEROL SULFATE 3 ML: .5; 3 SOLUTION RESPIRATORY (INHALATION) at 11:47

## 2018-03-19 RX ADMIN — IPRATROPIUM BROMIDE AND ALBUTEROL SULFATE 3 ML: .5; 3 SOLUTION RESPIRATORY (INHALATION) at 21:28

## 2018-03-19 NOTE — PLAN OF CARE
Problem: Patient Care Overview  Goal: Plan of Care Review  Outcome: Ongoing (interventions implemented as appropriate)   03/19/18 1720   Coping/Psychosocial   Plan of Care Reviewed With patient;spouse   Plan of Care Review   Progress improving   OTHER   Outcome Summary Pt transfred from CCU today with resp issues. Pt had a fall in feb and foot fx pt has cast on right foot. BS done no coverage given. Pt is alert oriented x4 pt O2 8Lt high flow . Pt has dressing change to the right foot Q shift will cont to monitoer.       Problem: Chronic Obstructive Pulmonary Disease (Adult)  Goal: Signs and Symptoms of Listed Potential Problems Will be Absent, Minimized or Managed (Chronic Obstructive Pulmonary Disease)  Outcome: Ongoing (interventions implemented as appropriate)      Problem: Fall Risk (Adult)  Goal: Identify Related Risk Factors and Signs and Symptoms  Outcome: Ongoing (interventions implemented as appropriate)    Goal: Absence of Fall  Outcome: Ongoing (interventions implemented as appropriate)      Problem: Asthma (Adult)  Goal: Signs and Symptoms of Listed Potential Problems Will be Absent, Minimized or Managed (Asthma)  Outcome: Ongoing (interventions implemented as appropriate)      Problem: Cardiac Rhythm Management Device (Adult)  Goal: Signs and Symptoms of Listed Potential Problems Will be Absent, Minimized or Managed (Cardiac Rhythm Management Device)  Outcome: Ongoing (interventions implemented as appropriate)      Problem: Skin Injury Risk (Adult)  Goal: Identify Related Risk Factors and Signs and Symptoms  Outcome: Ongoing (interventions implemented as appropriate)    Goal: Skin Health and Integrity  Outcome: Ongoing (interventions implemented as appropriate)

## 2018-03-19 NOTE — PLAN OF CARE
Problem: Patient Care Overview  Goal: Plan of Care Review  Outcome: Ongoing (interventions implemented as appropriate)  The patient has done well through the evening. His morning labs look relatively unremarkable, slight elevation in the WBC. The patient has refused the bipap through the evening, he has good oxygen saturation on 10 liters high-flow. No acute changes through the evening.     03/19/18 0512   Coping/Psychosocial   Plan of Care Reviewed With patient   Plan of Care Review   Progress improving

## 2018-03-19 NOTE — NURSING NOTE
WOCN consult: Patient known from previous admission. Previous right ankle surgery due to fall .  Dry black scab Right medial ankle. Right foot edematous. Wears ace wraps and brace.  Recently Discharged from Rehab facility to home.     03/19/18 1658   Wound 03/19/18 Right medial    Date first assessed: 03/19/18   Present On Admission : yes  Side: Right  Orientation: (c) medial  Type: (c)    Dressing Appearance dry;intact   Base black eschar   Periwound intact;pink   Periwound Temperature warm   Edges irregular   Wound length (cm) 1 cm   Wound width (cm) 2 cm   Drainage Amount none   Care, Wound (apply betadine/ silicone dsg/ apply ace wraps)

## 2018-03-19 NOTE — PROGRESS NOTES
"  Daily Progress Note.   [facility] CORONARY CARE  3/19/2018    Patient:  Name:  Alessio Allen  MRN:  [id number]  1940  77 y.o.  male         Interval History:  Continues to improve  Less soa.  No sig cough.  No chest pain    Physical Exam:  /72   Pulse 79   Temp 97.1 °F (36.2 °C)   Resp 16   Ht 188 cm (74\")   Wt 117 kg (257 lb 8 oz)   SpO2 96%   BMI 33.06 kg/m²   Body mass index is 33.06 kg/m².    Intake/Output Summary (Last 24 hours) at 03/19/18 0802  Last data filed at 03/19/18 0427   Gross per 24 hour   Intake             1400 ml   Output             1800 ml   Net             -400 ml     GENERAL: on high flow nc on 8l no in acute distress   HEENT:  EOMI, nonicteric sclera, no JVD  PULMONARY:   decreased breath sounds bilaterally no wheeze, no crackles, no rhonchi, symmetric air entry  CARDIAC:  RRR  ABD: mild truncal obesity SNTND BS+  EXT: bilateral le edema trace pulses symmetric 2+ bilaterally  NEURO:  CNs II-XII intact, no focal deficits  SKIN:no focal rash  PSYCH: appropriate mood  LYMPH: no cervical LAD    Data Review:  Notable Labs:    Results from last 7 days  Lab Units 03/19/18  0222 03/18/18  0317 03/17/18  1754 03/17/18  1312   WBC 10*3/mm3 12.66* 6.73 11.15* 9.13   HEMOGLOBIN g/dL 11.0* 11.8* 12.1* 12.5*   PLATELETS 10*3/mm3 176 169 177 175       Results from last 7 days  Lab Units 03/19/18  0222 03/18/18  0317 03/17/18  1754 03/17/18  1312   SODIUM mmol/L 140 136 138 139   POTASSIUM mmol/L 4.7 4.5 4.7 4.5   CHLORIDE mmol/L 102 98 99 99   CO2 mmol/L 25.2 25.3 26.2 27.5   BUN mg/dL 24* 15 11 11   CREATININE mg/dL 0.72* 0.65* 0.73* 0.80   GLUCOSE mg/dL 161* 247* 116* 136*   CALCIUM mg/dL 8.4* 8.4* 8.9 9.0   MAGNESIUM mg/dL 2.1 1.9 1.9  --    PHOSPHORUS mg/dL 3.9 3.0 3.5  --    Estimated Creatinine Clearance: 106.8 mL/min (by C-G formula based on SCr of 0.72 mg/dL (L)).    Imaging:  Ct chest reviewed personally  Ct overnight revealing of pna  Diffuse " emphysema    Scheduled meds:      aspirin 81 mg Oral Daily   atorvastatin 10 mg Oral Nightly   aztreonam 2 g Intravenous Q8H   ferrous sulfate 325 mg Oral Daily With Breakfast   ipratropium-albuterol 3 mL Nebulization 4x Daily - RT   ketotifen 1 drop Both Eyes BID   methylPREDNISolone sodium succinate 40 mg Intravenous Q6H   montelukast 10 mg Oral Nightly   NIFEdipine XL 30 mg Oral Daily   pantoprazole 40 mg Oral QAM   pregabalin 75 mg Oral Q12H   rivaroxaban 20 mg Oral Daily With Dinner   sodium chloride 4 mL Nebulization BID - RT   tamsulosin 0.4 mg Oral Nightly   vancomycin 1,500 mg Intravenous Q12H       ASSESSMENT  /  PLAN:  Ae of COPD  Acute hypoxic respiratory failure  Leukocytosis likely reactive to steroids      Patient Active Problem List   Diagnosis   • A-fib   • Dyslipidemia   • BP (high blood pressure)   • Neuropathy   • Hypertension   • COPD (chronic obstructive pulmonary disease)   • BPH (benign prostatic hypertrophy)   • Atrial fibrillation   • Asthma   • Hypoxia   • Pneumonia   • Chronic anticoagulation   • Pneumonia of both lungs due to infectious organism   • Hyponatremia   • COPD exacerbation   • Acute respiratory failure      Cont steroids - weaning dose  Cont bds  De-escalate abx, recheck procal, neg mrsa screen stop vanco  TTF       Merlin Fontana MD  [facility]  03/19/18

## 2018-03-20 LAB
ANION GAP SERPL CALCULATED.3IONS-SCNC: 9 MMOL/L
BASOPHILS # BLD AUTO: 0 10*3/MM3 (ref 0–0.2)
BASOPHILS NFR BLD AUTO: 0 % (ref 0–1.5)
BUN BLD-MCNC: 19 MG/DL (ref 8–23)
BUN/CREAT SERPL: 34.5 (ref 7–25)
CALCIUM SPEC-SCNC: 8.9 MG/DL (ref 8.6–10.5)
CHLORIDE SERPL-SCNC: 96 MMOL/L (ref 98–107)
CO2 SERPL-SCNC: 27 MMOL/L (ref 22–29)
CREAT BLD-MCNC: 0.55 MG/DL (ref 0.76–1.27)
DEPRECATED RDW RBC AUTO: 65.7 FL (ref 37–54)
EOSINOPHIL # BLD AUTO: 0 10*3/MM3 (ref 0–0.7)
EOSINOPHIL NFR BLD AUTO: 0 % (ref 0.3–6.2)
ERYTHROCYTE [DISTWIDTH] IN BLOOD BY AUTOMATED COUNT: 25 % (ref 11.5–14.5)
GFR SERPL CREATININE-BSD FRML MDRD: 145 ML/MIN/1.73
GLUCOSE BLD-MCNC: 119 MG/DL (ref 65–99)
HCT VFR BLD AUTO: 40.8 % (ref 40.4–52.2)
HGB BLD-MCNC: 11.7 G/DL (ref 13.7–17.6)
IMM GRANULOCYTES # BLD: 0.03 10*3/MM3 (ref 0–0.03)
IMM GRANULOCYTES NFR BLD: 0.3 % (ref 0–0.5)
LYMPHOCYTES # BLD AUTO: 0.93 10*3/MM3 (ref 0.9–4.8)
LYMPHOCYTES NFR BLD AUTO: 7.8 % (ref 19.6–45.3)
MAGNESIUM SERPL-MCNC: 2.1 MG/DL (ref 1.6–2.4)
MCH RBC QN AUTO: 21.3 PG (ref 27–32.7)
MCHC RBC AUTO-ENTMCNC: 28.7 G/DL (ref 32.6–36.4)
MCV RBC AUTO: 74.3 FL (ref 79.8–96.2)
MONOCYTES # BLD AUTO: 0.97 10*3/MM3 (ref 0.2–1.2)
MONOCYTES NFR BLD AUTO: 8.1 % (ref 5–12)
NEUTROPHILS # BLD AUTO: 9.98 10*3/MM3 (ref 1.9–8.1)
NEUTROPHILS NFR BLD AUTO: 83.8 % (ref 42.7–76)
PHOSPHATE SERPL-MCNC: 3.3 MG/DL (ref 2.5–4.5)
PLATELET # BLD AUTO: 166 10*3/MM3 (ref 140–500)
PMV BLD AUTO: 9 FL (ref 6–12)
POTASSIUM BLD-SCNC: 4.7 MMOL/L (ref 3.5–5.2)
PROCALCITONIN SERPL-MCNC: 0.04 NG/ML (ref 0.1–0.25)
RBC # BLD AUTO: 5.49 10*6/MM3 (ref 4.6–6)
SODIUM BLD-SCNC: 132 MMOL/L (ref 136–145)
WBC NRBC COR # BLD: 11.91 10*3/MM3 (ref 4.5–10.7)

## 2018-03-20 PROCEDURE — 97161 PT EVAL LOW COMPLEX 20 MIN: CPT

## 2018-03-20 PROCEDURE — 25010000002 METHYLPREDNISOLONE PER 40 MG: Performed by: INTERNAL MEDICINE

## 2018-03-20 PROCEDURE — G8978 MOBILITY CURRENT STATUS: HCPCS

## 2018-03-20 PROCEDURE — 80048 BASIC METABOLIC PNL TOTAL CA: CPT | Performed by: INTERNAL MEDICINE

## 2018-03-20 PROCEDURE — 94799 UNLISTED PULMONARY SVC/PX: CPT

## 2018-03-20 PROCEDURE — 85025 COMPLETE CBC W/AUTO DIFF WBC: CPT | Performed by: INTERNAL MEDICINE

## 2018-03-20 PROCEDURE — G8979 MOBILITY GOAL STATUS: HCPCS

## 2018-03-20 PROCEDURE — 84145 PROCALCITONIN (PCT): CPT | Performed by: INTERNAL MEDICINE

## 2018-03-20 PROCEDURE — G8980 MOBILITY D/C STATUS: HCPCS

## 2018-03-20 PROCEDURE — 84100 ASSAY OF PHOSPHORUS: CPT | Performed by: INTERNAL MEDICINE

## 2018-03-20 PROCEDURE — 83735 ASSAY OF MAGNESIUM: CPT | Performed by: INTERNAL MEDICINE

## 2018-03-20 RX ORDER — METHYLPREDNISOLONE SODIUM SUCCINATE 40 MG/ML
40 INJECTION, POWDER, LYOPHILIZED, FOR SOLUTION INTRAMUSCULAR; INTRAVENOUS EVERY 8 HOURS
Status: COMPLETED | OUTPATIENT
Start: 2018-03-20 | End: 2018-03-21

## 2018-03-20 RX ADMIN — PANTOPRAZOLE SODIUM 40 MG: 40 TABLET, DELAYED RELEASE ORAL at 06:45

## 2018-03-20 RX ADMIN — FERROUS SULFATE TAB 325 MG (65 MG ELEMENTAL FE) 325 MG: 325 (65 FE) TAB at 09:02

## 2018-03-20 RX ADMIN — AZTREONAM 2 G: 2 INJECTION, POWDER, LYOPHILIZED, FOR SOLUTION INTRAMUSCULAR; INTRAVENOUS at 11:19

## 2018-03-20 RX ADMIN — SODIUM CHLORIDE SOLN NEBU 7% 4 ML: 7 NEBU SOLN at 12:40

## 2018-03-20 RX ADMIN — IPRATROPIUM BROMIDE AND ALBUTEROL SULFATE 3 ML: .5; 3 SOLUTION RESPIRATORY (INHALATION) at 20:16

## 2018-03-20 RX ADMIN — IPRATROPIUM BROMIDE AND ALBUTEROL SULFATE 3 ML: .5; 3 SOLUTION RESPIRATORY (INHALATION) at 12:40

## 2018-03-20 RX ADMIN — KETOTIFEN FUMARATE 1 DROP: 0.35 SOLUTION/ DROPS OPHTHALMIC at 09:03

## 2018-03-20 RX ADMIN — NIFEDIPINE 30 MG: 30 TABLET, FILM COATED, EXTENDED RELEASE ORAL at 08:23

## 2018-03-20 RX ADMIN — TAMSULOSIN HYDROCHLORIDE 0.4 MG: 0.4 CAPSULE ORAL at 21:06

## 2018-03-20 RX ADMIN — KETOTIFEN FUMARATE 1 DROP: 0.35 SOLUTION/ DROPS OPHTHALMIC at 21:06

## 2018-03-20 RX ADMIN — METHYLPREDNISOLONE SODIUM SUCCINATE 40 MG: 40 INJECTION, POWDER, FOR SOLUTION INTRAMUSCULAR; INTRAVENOUS at 06:45

## 2018-03-20 RX ADMIN — RIVAROXABAN 20 MG: 20 TABLET, FILM COATED ORAL at 17:14

## 2018-03-20 RX ADMIN — METHYLPREDNISOLONE SODIUM SUCCINATE 40 MG: 40 INJECTION, POWDER, FOR SOLUTION INTRAMUSCULAR; INTRAVENOUS at 17:12

## 2018-03-20 RX ADMIN — ATORVASTATIN CALCIUM 10 MG: 10 TABLET, FILM COATED ORAL at 21:06

## 2018-03-20 RX ADMIN — IPRATROPIUM BROMIDE AND ALBUTEROL SULFATE 3 ML: .5; 3 SOLUTION RESPIRATORY (INHALATION) at 16:52

## 2018-03-20 RX ADMIN — AZTREONAM 2 G: 2 INJECTION, POWDER, LYOPHILIZED, FOR SOLUTION INTRAMUSCULAR; INTRAVENOUS at 18:35

## 2018-03-20 RX ADMIN — ASPIRIN 81 MG: 81 TABLET ORAL at 08:24

## 2018-03-20 RX ADMIN — NYSTATIN 500000 UNITS: 100000 SUSPENSION ORAL at 17:12

## 2018-03-20 RX ADMIN — NYSTATIN 500000 UNITS: 100000 SUSPENSION ORAL at 11:19

## 2018-03-20 RX ADMIN — ACETAMINOPHEN 650 MG: 325 TABLET ORAL at 15:33

## 2018-03-20 RX ADMIN — NYSTATIN 500000 UNITS: 100000 SUSPENSION ORAL at 21:06

## 2018-03-20 RX ADMIN — MONTELUKAST 10 MG: 10 TABLET, FILM COATED ORAL at 21:06

## 2018-03-20 RX ADMIN — NYSTATIN 500000 UNITS: 100000 SUSPENSION ORAL at 08:23

## 2018-03-20 RX ADMIN — ACETAMINOPHEN 650 MG: 325 TABLET ORAL at 21:35

## 2018-03-20 RX ADMIN — PREGABALIN 75 MG: 75 CAPSULE ORAL at 21:06

## 2018-03-20 RX ADMIN — PREGABALIN 75 MG: 75 CAPSULE ORAL at 08:25

## 2018-03-20 RX ADMIN — AZTREONAM 2 G: 2 INJECTION, POWDER, LYOPHILIZED, FOR SOLUTION INTRAMUSCULAR; INTRAVENOUS at 04:10

## 2018-03-20 NOTE — DISCHARGE PLACEMENT REQUEST
"Chioma Allen (77 y.o. Male)     Date of Birth Social Security Number Address Home Phone MRN    1940  [address] [facility] [address] [phone] [id number]    Orthodoxy Marital Status          Latter-day        Admission Date Admission Type Admitting Provider Attending Provider Department, Room/Bed    3/17/18 Emergency Ismael Sotelo MD Haller, Ismael Seaman MD [address], 612/1    Discharge Date Discharge Disposition Discharge Destination                       Attending Provider:  Ismael Sotelo MD    Allergies:  Lisinopril, Penicillins, Sulfa Antibiotics, Albuterol, Atenolol, Coreg [Carvedilol], Ibuprofen, Levaquin [Levofloxacin], Celebrex [Celecoxib]    Isolation:  None   Infection:  None   Code Status:  FULL    Ht:  188 cm (74\")   Wt:  123 kg (270 lb 11.2 oz)    Admission Cmt:  None   Principal Problem:  None                Active Insurance as of 3/17/2018     Primary Coverage     Payor Plan Insurance Group Employer/Plan Group    MEDICARE MEDICARE A & B      Payor Plan Address Payor Plan Phone Number Effective From Effective To    [address] [phone] 5/1/2000     [place] [address]       Subscriber Name Subscriber Birth Date Member ID       CHIOMA ALLEN 1940 [id number]           Secondary Coverage     Payor Plan Insurance Group Employer/Plan Group     FOR LIFE  FOR LIFE MC [id number]      Payor Plan Address Payor Plan Phone Number Effective From Effective To    [address] [phone] 1/30/2018     [place] [address]       Subscriber Name Subscriber Birth Date Member ID       CHIOMA ALLEN 1940 [id number]                 Emergency Contacts      (Rel.) Home Phone Work Phone Mobile Phone    Dulce Allen (Partner) [phone] -- --              "

## 2018-03-20 NOTE — PROGRESS NOTES
Discharge Planning Assessment  [facility]     Patient Name: Alessio Allen  MRN: [id number]  Today's Date: 3/20/2018    Admit Date: 3/17/2018          Discharge Needs Assessment     Row Name 03/20/18 1442       Living Environment    Lives With spouse    Name(s) of Who Lives With Patient Dulce Allen [phone]    Current Living Arrangements home/apartment/condo    Primary Care Provided by self    Provides Primary Care For no one    Family Caregiver if Needed spouse    Quality of Family Relationships helpful;involved;supportive    Able to Return to Prior Arrangements yes       Resource/Environmental Concerns    Resource/Environmental Concerns none    Transportation Concerns car, none       Transition Planning    Patient/Family Anticipates Transition to home with help/services    Patient/Family Anticipated Services at Transition home health care    Transportation Anticipated family or friend will provide       Discharge Needs Assessment    Concerns to be Addressed discharge planning    Equipment Currently Used at Home walker, rolling    Anticipated Changes Related to Illness none    Outpatient/Agency/Support Group Needs homecare agency    Discharge Facility/Level of Care Needs home with home health    Discharge Coordination/Progress [facility]             Discharge Plan     Row Name 03/20/18 1315       Plan    Plan [facility]     Patient/Family in Agreement with Plan yes    Plan Comments IMM letter checked. CCP met with pt and wife (Dulce Allen, [phone]) to discuss d/c planning. Facesheet verified. CCP role explained. Pt resides with his wife and uses rolling walker for mobility, and reports current home health via [facility]  (confirmed via Zoie) and was admitted while participating in sub-acute rehab at [facility]. Pt confirms pharmacy is [facility] pharmacy. Per Erica, pt is from a skilled bed  with no bed hold but can return. Pt states plan to d/c home with [facility]  as he was reportedly planned to complete rehab today.  CCP to follow to assist should additional d/c needs arise. Yuli Palmer LCSW         Destination     Service Request Status Selected Specialties Address Phone Number Fax Number    [facility] Pending - Request Sent N/A [address] [address] [phone]      Durable Medical Equipment     No service coordination in this encounter.      Dialysis/Infusion     No service coordination in this encounter.      Home Medical Care - Selection Complete     Service Request Status Selected Specialties Address Phone Number Fax Number    [facility] Selected [facility] Health Services [address] [address] [phone]      Social Care     No service coordination in this encounter.                Demographic Summary     Row Name 03/20/18 1441       General Information    Admission Type inpatient    Arrived From subacute/long term acute care    Required Notices Provided Important Message from Medicare    Referral Source nursing;physician    Reason for Consult discharge planning    Preferred Language English            Functional Status     Row Name 03/20/18 1441       Functional Status    Usual Activity Tolerance moderate    Current Activity Tolerance moderate       Functional Status, IADL    Medications independent    Meal Preparation independent    Housekeeping independent    Laundry independent    Shopping independent       Mental Status Summary    Recent Changes in Mental Status/Cognitive Functioning no changes            Psychosocial    No documentation.           Abuse/Neglect    No documentation.           Legal    No documentation.           Substance Abuse    No documentation.           Patient Forms    No documentation.         Madison Palmer LCSW

## 2018-03-20 NOTE — PLAN OF CARE
Problem: Patient Care Overview  Goal: Plan of Care Review   03/20/18 1004   Coping/Psychosocial   Plan of Care Reviewed With spouse;patient   OTHER   Outcome Summary pt Mod independent w. functional mobility, safe household ambulator good tolerance, pursed lip breathing 8L high flow - R boot and 02, rwx. has all equipment needs., most recently at [facility] with plan to return home, anticipate home with HHC at [place]. notified RN and CCP of pt status. needs to ambulate with nsg staff BID. no further skilled PT warranted at pt at Mod I level household distances. will sign off.

## 2018-03-20 NOTE — THERAPY DISCHARGE NOTE
Acute Care - Physical Therapy Initial Eval/Discharge  [facility]     Patient Name: Alessio Allen  : 1940  MRN: [id number]  Today's Date: 3/20/2018   Onset of Illness/Injury or Date of Surgery: 18  Date of Referral to PT: 18  Referring Physician: Ashleigh      Admit Date: 3/17/2018    Visit Dx:    ICD-10-CM ICD-9-CM   1. Acute respiratory failure with hypoxia J96.01 518.81   2. Generalized weakness R53.1 780.79     Patient Active Problem List   Diagnosis   • A-fib   • Dyslipidemia   • BP (high blood pressure)   • Neuropathy   • Hypertension   • COPD (chronic obstructive pulmonary disease)   • BPH (benign prostatic hypertrophy)   • Atrial fibrillation   • Asthma   • Hypoxia   • Pneumonia   • Chronic anticoagulation   • Pneumonia of both lungs due to infectious organism   • Hyponatremia   • COPD exacerbation   • Acute respiratory failure     Past Medical History:   Diagnosis Date   • Arthritis    • Asthma    • Atrial fibrillation    • BPH (benign prostatic hypertrophy)    • Cancer    • COPD (chronic obstructive pulmonary disease)    • H/O Abnormal CBC    • History of heart artery stent 2017   • Hyperlipidemia    • Hypertension    • Neuropathy     feet and hands    • Pneumonia      Past Surgical History:   Procedure Laterality Date   • COLONOSCOPY  2016    polyps   • FRACTURE SURGERY     • PACEMAKER IMPLANTATION            PT ASSESSMENT (last 72 hours)      Physical Therapy Evaluation     Row Name 18 0956          PT Evaluation Time/Intention    Subjective Information no complaints  -     Document Type evaluation  -     Mode of Treatment physical therapy;individual therapy  -     Patient Effort excellent  -     Symptoms Noted During/After Treatment none  -     Comment 8L high flow, no acute distress  -     Row Name 18 0956          General Information    Patient Profile Reviewed? yes  -     Onset of Illness/Injury or Date of Surgery 18  -      Referring Physician Ashleigh  -     Patient Observations alert;cooperative;agree to therapy  -     Patient/Family Observations spouse bedside - states has all equipment needs and reinforced goal is to return home  -     General Observations of Patient supine in bed no acute distress, pt very particular,  was just at [facility] and going to be DC'ed home, did well. pt very indep.   -     Prior Level of Function independent:  -     Equipment Currently Used at Home walker, rolling   R boot  -     [facility] of [facility] resp issues  -     Existing Precautions/Restrictions fall;oxygen therapy device and L/min  -     Risks Reviewed patient and family:  -     Benefits Reviewed patient and family:  -     Barriers to Rehab none identified  -     Row Name 03/20/18 0956          Relationship/Environment    Lives With spouse  -     Row Name 03/20/18 0956          Resource/Environmental Concerns    Current Living Arrangements home/apartment/condo  -     Row Name 03/20/18 0956          Cognitive Assessment/Intervention- PT/OT    Orientation Status (Cognition) oriented x 3  -     Follows Commands (Cognition) WFL  -     Safety Deficit (Cognitive) safety precautions awareness  -     Row Name 03/20/18 0956          Bed Mobility Assessment/Treatment    Bed Mobility Assessment/Treatment supine-sit;rolling left  -     Rolling Left [place] (Bed Mobility) conditional independence  -     Supine-Sit [place] (Bed Mobility) conditional independence  -     Row Name 03/20/18 0956          Transfer Assessment/Treatment    Transfer Assessment/Treatment sit-stand transfer;stand-sit transfer;toilet transfer  -     Maintains Weight-bearing Status (Transfers) able to maintain  -     Sit-Stand [place] (Transfers) conditional independence  -     Stand-Sit [place] (Transfers) conditional independence   -LH     [place] Level (Toilet Transfer) conditional independence  -     Assistive Device (Toilet Transfer) walker, front-wheeled  -     Row Name 03/20/18 0956          Sit-Stand Transfer    Assistive Device (Sit-Stand Transfers) walker, front-wheeled  -     Row Name 03/20/18 0956          Stand-Sit Transfer    Assistive Device (Stand-Sit Transfers) walker, front-wheeled  -     Row Name 03/20/18 0956          Toilet Transfer    Type (Toilet Transfer) stand pivot/stand step  -     Row Name 03/20/18 0956          Gait/Stairs Assessment/Training    [place] Level (Gait) conditional independence  -     Assistive Device (Gait) walker, front-wheeled  -     Distance in Feet (Gait) 100  -LH     Comment (Gait/Stairs) R boot donned  -     Row Name 03/20/18 0956          General ROM    GENERAL ROM COMMENTS BLEs WFL except R ankle  -     Row Name 03/20/18 0956          General Assessment (Manual Muscle Testing)    Comment, General Manual Muscle Testing (MMT) Assessment BLEs WFL except R ankle  -     Row Name 03/20/18 0956          Vision Assessment/Intervention    Visual Impairment/Limitations WNL  -     Row Name 03/20/18 0956          Pain Assessment    Additional Documentation Pain Scale: Numbers Pre/Post-Treatment (Group)  -     Row Name 03/20/18 0956          Pain Scale: Numbers Pre/Post-Treatment    Pain Scale: Numbers, Pretreatment 0/10 - no pain  -     Pain Scale: Numbers, Post-Treatment 0/10 - no pain  -     Row Name             Wound 03/19/18 Right medial     Wound - Properties Group Date first assessed: 03/19/18  -BF Present On Admission : yes  -BF Side: Right  -BF Orientation: medial  -BF, medial ankle     Row Name 03/20/18 0956          Physical Therapy Clinical Impression    Date of Referral to PT 03/20/18  -     PT Diagnosis (PT Clinical Impression) baseline mobility  -     Criteria for Skilled Interventions Met (PT Clinical Impression) current level of function same as  previous level of function  -     Pathology/Pathophysiology Noted (Describe Specifically for Each System) pulmonary  -     Impairments Found (describe specific impairments) aerobic capacity/endurance  -     Row Name 03/20/18 0956          Positioning and Restraints    Pre-Treatment Position in bed  -     Post Treatment Position bathroom  -     Bathroom sitting;with family/caregiver;encouraged to call for assist;call light within reach;notified nsg  -     Row Name 03/20/18 0956          Living Environment    Home Accessibility other (see comments)   no stairs  -       User Key  (r) = Recorded By, (t) = Taken By, (c) = Cosigned By    Initials Name Provider Type     Elizabeth Alexandre, RN, CWOCN Registered Nurse     Jaida Porter, [place] Physical Therapist          Physical Therapy Education     Title: PT OT SLP Therapies (Resolved)     Topic: Physical Therapy (Resolved)     Point: Mobility training (Resolved)    Learning Progress Summary     Learner Status Readiness Method Response Comment Documented by    Patient Done Acceptance E VU,[facility] 03/20/18 1004    Family Done Acceptance E ,[facility] 03/20/18 1004          Point: Home exercise program (Resolved)    Learning Progress Summary     Learner Status Readiness Method Response Comment Documented by    Patient Done Acceptance E VU,[facility] 03/20/18 1004    Family Done Acceptance E VU,[facility] 03/20/18 1004          Point: Body mechanics (Resolved)    Learning Progress Summary     Learner Status Readiness Method Response Comment Documented by    Patient Done Acceptance E VU,[facility] 03/20/18 1004    Family Done Acceptance E VU,[facility] 03/20/18 1004          Point: Precautions (Resolved)    Learning Progress Summary     Learner Status Readiness Method Response Comment Documented by    Patient Done Acceptance E VU,[facility] 03/20/18 1004    Family Done Acceptance E VU,[facility] 03/20/18 1004                      User Key     Initials Effective Dates Name Provider Type  Discipline     02/07/17 -  Jaida Porter PT Physical Therapist PT                PT Recommendation and Plan  Anticipated Discharge Disposition (PT): home with home health care  Therapy Frequency (PT Clinical Impression): evaluation only  Outcome Summary/Treatment Plan (PT)  Anticipated Discharge Disposition (PT): home with home health care  Plan of Care Reviewed With: spouse, patient  Outcome Summary: pt Mod independent w. functional mobility, safe household ambulator - R boot and 02, rwx. has all equipment needs., most recently at [facility] with plan to return home, anticipate home with HHC at [place]. notified RN and CCP of pt status. needs to ambulate with [facility] staff BID. no further skilled PT warranted at pt at Mod I level household distances. will sign off.           Outcome Measures     Row Name 03/20/18 1000             How much help from another person do you currently need...    Turning from your back to your side while in flat bed without using bedrails? 4  -LH      Moving from lying on back to sitting on the side of a flat bed without bedrails? 4  -LH      Moving to and from a bed to a chair (including a wheelchair)? 4  -LH      Standing up from a chair using your arms (e.g., wheelchair, bedside chair)? 4  -LH      Climbing 3-5 steps with a railing? 3  -LH      To walk in hospital room? 3  -      AM-PAC 6 Clicks Score 22  -         Functional Assessment    Outcome Measure Options AM-PAC 6 Clicks Basic Mobility (PT)  -        User Key  (r) = Recorded By, (t) = Taken By, (c) = Cosigned By    Initials Name Provider Type     Jaida Porter PT Physical Therapist           Time Calculation:         PT Charges     Row Name 03/20/18 1007             Time Calculation    Start Time 0932  -      Stop Time 0950  -      Time Calculation (min) 18 min  -      PT Received On 03/20/18  -        User Key  (r) = Recorded By, (t) = Taken By, (c) = Cosigned By    Initials Name Provider Type     Jaida Porter PT  Physical Therapist          Therapy Charges for Today     Code Description Service Date Service Provider Modifiers Qty    06991075730 HC PT EVAL LOW COMPLEXITY 2 3/20/2018 Jaida Porter, PT GP 1    35129712388 HC PT MOBILITY CURRENT 3/20/2018 Jaida Porter, PT GP, CI 1    80658279451 HC PT MOBILITY PROJECTED 3/20/2018 Jaida Porter, PT GP, CI 1    64792562989 HC PT MOBILITY DISCHARGE 3/20/2018 Jaida Porter, PT GP, CI 1          PT G-Codes  Outcome Measure Options: AM-PAC 6 Clicks Basic Mobility (PT)  Functional Limitation: Mobility: Walking and moving around  Mobility: Walking and Moving Around Current Status (): At least 1 percent but less than 20 percent impaired, limited or restricted  Mobility: Walking and Moving Around Goal Status (): At least 1 percent but less than 20 percent impaired, limited or restricted  Mobility: Walking and Moving Around Discharge Status (): At least 1 percent but less than 20 percent impaired, limited or restricted    PT Discharge Summary  Anticipated Discharge Disposition (PT): home with home health care    Jaida Porter, PT  3/20/2018

## 2018-03-20 NOTE — PLAN OF CARE
Problem: Patient Care Overview  Goal: Plan of Care Review  Outcome: Ongoing (interventions implemented as appropriate)   03/20/18 6969   Coping/Psychosocial   Plan of Care Reviewed With patient;spouse   Plan of Care Review   Progress improving   OTHER   Outcome Summary Pt In chair this shift pt has been up to BR with assist PT worked with pt and pt ok to be up with stand by assist abt given will cont to monitor.       Problem: Chronic Obstructive Pulmonary Disease (Adult)  Goal: Signs and Symptoms of Listed Potential Problems Will be Absent, Minimized or Managed (Chronic Obstructive Pulmonary Disease)  Outcome: Ongoing (interventions implemented as appropriate)      Problem: Fall Risk (Adult)  Goal: Identify Related Risk Factors and Signs and Symptoms  Outcome: Ongoing (interventions implemented as appropriate)    Goal: Absence of Fall  Outcome: Ongoing (interventions implemented as appropriate)      Problem: Asthma (Adult)  Goal: Signs and Symptoms of Listed Potential Problems Will be Absent, Minimized or Managed (Asthma)  Outcome: Ongoing (interventions implemented as appropriate)      Problem: Cardiac Rhythm Management Device (Adult)  Goal: Signs and Symptoms of Listed Potential Problems Will be Absent, Minimized or Managed (Cardiac Rhythm Management Device)  Outcome: Ongoing (interventions implemented as appropriate)      Problem: Skin Injury Risk (Adult)  Goal: Identify Related Risk Factors and Signs and Symptoms  Outcome: Ongoing (interventions implemented as appropriate)    Goal: Skin Health and Integrity  Outcome: Ongoing (interventions implemented as appropriate)

## 2018-03-21 DIAGNOSIS — J18.9 PNEUMONIA OF BOTH LUNGS DUE TO INFECTIOUS ORGANISM, UNSPECIFIED PART OF LUNG: ICD-10-CM

## 2018-03-21 LAB
ANION GAP SERPL CALCULATED.3IONS-SCNC: 11.3 MMOL/L
BASOPHILS # BLD AUTO: 0 10*3/MM3 (ref 0–0.2)
BASOPHILS NFR BLD AUTO: 0 % (ref 0–1.5)
BUN BLD-MCNC: 16 MG/DL (ref 8–23)
BUN/CREAT SERPL: 27.6 (ref 7–25)
CALCIUM SPEC-SCNC: 8.7 MG/DL (ref 8.6–10.5)
CHLORIDE SERPL-SCNC: 98 MMOL/L (ref 98–107)
CO2 SERPL-SCNC: 25.7 MMOL/L (ref 22–29)
CREAT BLD-MCNC: 0.58 MG/DL (ref 0.76–1.27)
DEPRECATED RDW RBC AUTO: 65.5 FL (ref 37–54)
EOSINOPHIL # BLD AUTO: 0 10*3/MM3 (ref 0–0.7)
EOSINOPHIL NFR BLD AUTO: 0 % (ref 0.3–6.2)
ERYTHROCYTE [DISTWIDTH] IN BLOOD BY AUTOMATED COUNT: 24.6 % (ref 11.5–14.5)
GFR SERPL CREATININE-BSD FRML MDRD: 136 ML/MIN/1.73
GLUCOSE BLD-MCNC: 141 MG/DL (ref 65–99)
HCT VFR BLD AUTO: 39.5 % (ref 40.4–52.2)
HGB BLD-MCNC: 11.5 G/DL (ref 13.7–17.6)
IMM GRANULOCYTES # BLD: 0 10*3/MM3 (ref 0–0.03)
IMM GRANULOCYTES NFR BLD: 0 % (ref 0–0.5)
LYMPHOCYTES # BLD AUTO: 0.53 10*3/MM3 (ref 0.9–4.8)
LYMPHOCYTES NFR BLD AUTO: 6.7 % (ref 19.6–45.3)
MAGNESIUM SERPL-MCNC: 2.1 MG/DL (ref 1.6–2.4)
MCH RBC QN AUTO: 21.7 PG (ref 27–32.7)
MCHC RBC AUTO-ENTMCNC: 29.1 G/DL (ref 32.6–36.4)
MCV RBC AUTO: 74.4 FL (ref 79.8–96.2)
MONOCYTES # BLD AUTO: 0.41 10*3/MM3 (ref 0.2–1.2)
MONOCYTES NFR BLD AUTO: 5.2 % (ref 5–12)
NEUTROPHILS # BLD AUTO: 6.96 10*3/MM3 (ref 1.9–8.1)
NEUTROPHILS NFR BLD AUTO: 88.1 % (ref 42.7–76)
NRBC BLD MANUAL-RTO: 0 /100 WBC (ref 0–0)
PHOSPHATE SERPL-MCNC: 3.1 MG/DL (ref 2.5–4.5)
PLATELET # BLD AUTO: 145 10*3/MM3 (ref 140–500)
PMV BLD AUTO: 9.3 FL (ref 6–12)
POTASSIUM BLD-SCNC: 4.5 MMOL/L (ref 3.5–5.2)
RBC # BLD AUTO: 5.31 10*6/MM3 (ref 4.6–6)
SODIUM BLD-SCNC: 135 MMOL/L (ref 136–145)
WBC NRBC COR # BLD: 7.9 10*3/MM3 (ref 4.5–10.7)

## 2018-03-21 PROCEDURE — 94799 UNLISTED PULMONARY SVC/PX: CPT

## 2018-03-21 PROCEDURE — 80048 BASIC METABOLIC PNL TOTAL CA: CPT

## 2018-03-21 PROCEDURE — 36415 COLL VENOUS BLD VENIPUNCTURE: CPT

## 2018-03-21 PROCEDURE — 83735 ASSAY OF MAGNESIUM: CPT | Performed by: INTERNAL MEDICINE

## 2018-03-21 PROCEDURE — 85007 BL SMEAR W/DIFF WBC COUNT: CPT

## 2018-03-21 PROCEDURE — 84100 ASSAY OF PHOSPHORUS: CPT | Performed by: INTERNAL MEDICINE

## 2018-03-21 PROCEDURE — 85025 COMPLETE CBC W/AUTO DIFF WBC: CPT

## 2018-03-21 PROCEDURE — 25010000002 METHYLPREDNISOLONE PER 40 MG: Performed by: INTERNAL MEDICINE

## 2018-03-21 RX ORDER — PREDNISONE 50 MG/1
50 TABLET ORAL
Status: DISCONTINUED | OUTPATIENT
Start: 2018-03-22 | End: 2018-03-24 | Stop reason: HOSPADM

## 2018-03-21 RX ADMIN — FERROUS SULFATE TAB 325 MG (65 MG ELEMENTAL FE) 325 MG: 325 (65 FE) TAB at 09:03

## 2018-03-21 RX ADMIN — ACETAMINOPHEN 650 MG: 325 TABLET ORAL at 13:05

## 2018-03-21 RX ADMIN — ASPIRIN 81 MG: 81 TABLET ORAL at 09:02

## 2018-03-21 RX ADMIN — NYSTATIN 500000 UNITS: 100000 SUSPENSION ORAL at 18:02

## 2018-03-21 RX ADMIN — TAMSULOSIN HYDROCHLORIDE 0.4 MG: 0.4 CAPSULE ORAL at 21:05

## 2018-03-21 RX ADMIN — IPRATROPIUM BROMIDE AND ALBUTEROL SULFATE 3 ML: .5; 3 SOLUTION RESPIRATORY (INHALATION) at 10:16

## 2018-03-21 RX ADMIN — METHYLPREDNISOLONE SODIUM SUCCINATE 40 MG: 40 INJECTION, POWDER, FOR SOLUTION INTRAMUSCULAR; INTRAVENOUS at 18:02

## 2018-03-21 RX ADMIN — PREGABALIN 75 MG: 75 CAPSULE ORAL at 21:05

## 2018-03-21 RX ADMIN — RIVAROXABAN 20 MG: 20 TABLET, FILM COATED ORAL at 18:02

## 2018-03-21 RX ADMIN — METHYLPREDNISOLONE SODIUM SUCCINATE 40 MG: 40 INJECTION, POWDER, FOR SOLUTION INTRAMUSCULAR; INTRAVENOUS at 23:35

## 2018-03-21 RX ADMIN — PANTOPRAZOLE SODIUM 40 MG: 40 TABLET, DELAYED RELEASE ORAL at 06:15

## 2018-03-21 RX ADMIN — ACETAMINOPHEN 650 MG: 325 TABLET ORAL at 19:26

## 2018-03-21 RX ADMIN — METHYLPREDNISOLONE SODIUM SUCCINATE 40 MG: 40 INJECTION, POWDER, FOR SOLUTION INTRAMUSCULAR; INTRAVENOUS at 09:03

## 2018-03-21 RX ADMIN — KETOTIFEN FUMARATE 1 DROP: 0.35 SOLUTION/ DROPS OPHTHALMIC at 09:00

## 2018-03-21 RX ADMIN — AZTREONAM 2 G: 2 INJECTION, POWDER, LYOPHILIZED, FOR SOLUTION INTRAMUSCULAR; INTRAVENOUS at 23:34

## 2018-03-21 RX ADMIN — AZTREONAM 2 G: 2 INJECTION, POWDER, LYOPHILIZED, FOR SOLUTION INTRAMUSCULAR; INTRAVENOUS at 18:02

## 2018-03-21 RX ADMIN — SODIUM CHLORIDE SOLN NEBU 7% 4 ML: 7 NEBU SOLN at 10:16

## 2018-03-21 RX ADMIN — NYSTATIN 500000 UNITS: 100000 SUSPENSION ORAL at 13:05

## 2018-03-21 RX ADMIN — METHYLPREDNISOLONE SODIUM SUCCINATE 40 MG: 40 INJECTION, POWDER, FOR SOLUTION INTRAMUSCULAR; INTRAVENOUS at 01:47

## 2018-03-21 RX ADMIN — NIFEDIPINE 30 MG: 30 TABLET, FILM COATED, EXTENDED RELEASE ORAL at 09:03

## 2018-03-21 RX ADMIN — IPRATROPIUM BROMIDE AND ALBUTEROL SULFATE 3 ML: .5; 3 SOLUTION RESPIRATORY (INHALATION) at 12:38

## 2018-03-21 RX ADMIN — MONTELUKAST 10 MG: 10 TABLET, FILM COATED ORAL at 21:05

## 2018-03-21 RX ADMIN — ATORVASTATIN CALCIUM 10 MG: 10 TABLET, FILM COATED ORAL at 21:05

## 2018-03-21 RX ADMIN — IPRATROPIUM BROMIDE AND ALBUTEROL SULFATE 3 ML: .5; 3 SOLUTION RESPIRATORY (INHALATION) at 16:24

## 2018-03-21 RX ADMIN — SODIUM CHLORIDE SOLN NEBU 7% 4 ML: 7 NEBU SOLN at 23:21

## 2018-03-21 RX ADMIN — NYSTATIN 500000 UNITS: 100000 SUSPENSION ORAL at 21:06

## 2018-03-21 RX ADMIN — NYSTATIN 500000 UNITS: 100000 SUSPENSION ORAL at 09:03

## 2018-03-21 RX ADMIN — AZTREONAM 2 G: 2 INJECTION, POWDER, LYOPHILIZED, FOR SOLUTION INTRAMUSCULAR; INTRAVENOUS at 03:03

## 2018-03-21 RX ADMIN — PREGABALIN 75 MG: 75 CAPSULE ORAL at 09:03

## 2018-03-21 RX ADMIN — IPRATROPIUM BROMIDE AND ALBUTEROL SULFATE 3 ML: .5; 3 SOLUTION RESPIRATORY (INHALATION) at 23:21

## 2018-03-21 RX ADMIN — KETOTIFEN FUMARATE 1 DROP: 0.35 SOLUTION/ DROPS OPHTHALMIC at 21:08

## 2018-03-21 RX ADMIN — AZTREONAM 2 G: 2 INJECTION, POWDER, LYOPHILIZED, FOR SOLUTION INTRAMUSCULAR; INTRAVENOUS at 11:04

## 2018-03-21 NOTE — PLAN OF CARE
Problem: Patient Care Overview  Goal: Plan of Care Review  Outcome: Ongoing (interventions implemented as appropriate)   03/20/18 1643 03/21/18 0904 03/21/18 9670   Coping/Psychosocial   Plan of Care Reviewed With --  patient --    Plan of Care Review   Progress improving --  --    OTHER   Outcome Summary --  --  Pt sat up in chair most of the day. Ambulated twice in the hallway with staff. Still requiring 8L high flow nc. IV steroid to be switched to PO in the morning, IV antibiotics may get switched to PO tomorrow depending on cultures. Will continue to monitor.       Problem: Chronic Obstructive Pulmonary Disease (Adult)  Goal: Signs and Symptoms of Listed Potential Problems Will be Absent, Minimized or Managed (Chronic Obstructive Pulmonary Disease)  Outcome: Ongoing (interventions implemented as appropriate)      Problem: Fall Risk (Adult)  Goal: Identify Related Risk Factors and Signs and Symptoms  Outcome: Ongoing (interventions implemented as appropriate)    Goal: Absence of Fall  Outcome: Ongoing (interventions implemented as appropriate)      Problem: Asthma (Adult)  Goal: Signs and Symptoms of Listed Potential Problems Will be Absent, Minimized or Managed (Asthma)  Outcome: Ongoing (interventions implemented as appropriate)      Problem: Cardiac Rhythm Management Device (Adult)  Goal: Signs and Symptoms of Listed Potential Problems Will be Absent, Minimized or Managed (Cardiac Rhythm Management Device)  Outcome: Ongoing (interventions implemented as appropriate)      Problem: Skin Injury Risk (Adult)  Goal: Identify Related Risk Factors and Signs and Symptoms  Outcome: Ongoing (interventions implemented as appropriate)    Goal: Skin Health and Integrity  Outcome: Ongoing (interventions implemented as appropriate)

## 2018-03-21 NOTE — PROGRESS NOTES
"  Daily Progress Note.   [address]  3/20/2018    Patient:  Name:  Alessio Allen  MRN:  [id number]  1940  77 y.o.  male         Interval History:  Doing better than admission. Still on 10L high flow up from 8 yesterday.  No distress.  States he is doing better overall    Physical Exam:  /77 (BP Location: Right arm, Patient Position: Sitting)   Pulse 93   Temp 97.4 °F (36.3 °C) (Oral)   Resp 18   Ht 188 cm (74\")   Wt 123 kg (270 lb 11.2 oz)   SpO2 92%   BMI 34.76 kg/m²   Body mass index is 34.76 kg/m².    Intake/Output Summary (Last 24 hours) at 03/20/18 2010  Last data filed at 03/20/18 1835   Gross per 24 hour   Intake              400 ml   Output             1525 ml   Net            -1125 ml     GENERAL: on high flow nc on 8l no in acute distress   HEENT:  EOMI, nonicteric sclera, no JVD  PULMONARY:   decreased breath sounds bilaterally no wheeze, no crackles, no rhonchi, symmetric air entry  CARDIAC:  RRR  ABD: mild truncal obesity SNTND BS+  EXT: bilateral le edema trace pulses symmetric 2+ bilaterally  NEURO:  CNs II-XII intact, no focal deficits  SKIN:no focal rash  PSYCH: appropriate mood  LYMPH: no cervical LAD    Data Review:  Notable Labs:    Results from last 7 days  Lab Units 03/20/18  0544 03/19/18 0222 03/18/18 0317 03/17/18  1754 03/17/18  1312   WBC 10*3/mm3 11.91* 12.66* 6.73 11.15* 9.13   HEMOGLOBIN g/dL 11.7* 11.0* 11.8* 12.1* 12.5*   PLATELETS 10*3/mm3 166 176 169 177 175       Results from last 7 days  Lab Units 03/20/18  0544 03/19/18  0222 03/18/18  0317 03/17/18  1754 03/17/18  1312   SODIUM mmol/L 132* 140 136 138 139   POTASSIUM mmol/L 4.7 4.7 4.5 4.7 4.5   CHLORIDE mmol/L 96* 102 98 99 99   CO2 mmol/L 27.0 25.2 25.3 26.2 27.5   BUN mg/dL 19 24* 15 11 11   CREATININE mg/dL 0.55* 0.72* 0.65* 0.73* 0.80   GLUCOSE mg/dL 119* 161* 247* 116* 136*   CALCIUM mg/dL 8.9 8.4* 8.4* 8.9 9.0   MAGNESIUM mg/dL 2.1 2.1 1.9 1.9  --    PHOSPHORUS mg/dL 3.3 3.9 " 3.0 3.5  --    Estimated Creatinine Clearance: 109.4 mL/min (by C-G formula based on SCr of 0.55 mg/dL (L)).    Imaging:  Ct chest reviewed personally  Ct overnight revealing of pna  Diffuse emphysema    Scheduled meds:      aspirin 81 mg Oral Daily   atorvastatin 10 mg Oral Nightly   aztreonam 2 g Intravenous Q8H   ferrous sulfate 325 mg Oral Daily With Breakfast   ipratropium-albuterol 3 mL Nebulization 4x Daily - RT   ketotifen 1 drop Both Eyes BID   methylPREDNISolone sodium succinate 40 mg Intravenous Q8H   montelukast 10 mg Oral Nightly   NIFEdipine XL 30 mg Oral Daily   nystatin 5 mL Oral 4x Daily   pantoprazole 40 mg Oral QAM   pregabalin 75 mg Oral Q12H   rivaroxaban 20 mg Oral Daily With Dinner   sodium chloride 4 mL Nebulization BID - RT   tamsulosin 0.4 mg Oral Nightly       ASSESSMENT  /  PLAN:  Ae of COPD  Acute hypoxic respiratory failure  Leukocytosis likely reactive to steroids      Patient Active Problem List   Diagnosis   • A-fib   • Dyslipidemia   • BP (high blood pressure)   • Neuropathy   • Hypertension   • COPD (chronic obstructive pulmonary disease)   • BPH (benign prostatic hypertrophy)   • Atrial fibrillation   • Asthma   • Hypoxia   • Pneumonia   • Chronic anticoagulation   • Pneumonia of both lungs due to infectious organism   • Hyponatremia   • COPD exacerbation   • Acute respiratory failure      Cont steroids still not making great progress seems to be slow  Cont bds  Cont current abx  More time       Merlin Fontana MD  [facility]  03/20/18

## 2018-03-21 NOTE — PLAN OF CARE
Problem: Patient Care Overview  Goal: Plan of Care Review   03/20/18 1643 03/20/18 2135 03/21/18 0540   Coping/Psychosocial   Plan of Care Reviewed With --  patient --    Plan of Care Review   Progress improving --  --    OTHER   Outcome Summary --  --  Pt resting well this shift. 8L high flow nc. Slow progress, but improving. Possible DC later in week. Monitor labs, vitals.

## 2018-03-21 NOTE — PROGRESS NOTES
"  Daily Progress Note.   [address]  3/21/2018    Patient:  Name:  Alessio Allen  MRN:  [id number]  1940  77 y.o.  male         Interval History:  Continues to improve  Awake alert sitting in chair  No resp distress  Physical Exam:  /69 (BP Location: Right arm, Patient Position: Sitting)   Pulse 83   Temp 97.6 °F (36.4 °C) (Oral)   Resp 18   Ht 188 cm (74\")   Wt 124 kg (272 lb 11.2 oz)   SpO2 97%   BMI 35.01 kg/m²   Body mass index is 35.01 kg/m².    Intake/Output Summary (Last 24 hours) at 03/21/18 1641  Last data filed at 03/21/18 1330   Gross per 24 hour   Intake              460 ml   Output             2720 ml   Net            -2260 ml     GENERAL: on high flow nc on 8l no in acute distress   HEENT:  EOMI, nonicteric sclera, no JVD  PULMONARY:   decreased breath sounds bilaterally no wheeze, no crackles, no rhonchi, symmetric air entry  CARDIAC:  RRR  ABD: mild truncal obesity SNTND BS+  EXT: bilateral le edema trace pulses symmetric 2+ bilaterally  NEURO:  CNs II-XII intact, no focal deficits  SKIN:no focal rash  PSYCH: appropriate mood  LYMPH: no cervical LAD    Data Review:  Notable Labs:    Results from last 7 days  Lab Units 03/21/18  0635 03/20/18  0544 03/19/18  0222 03/18/18  0317 03/17/18  1754 03/17/18  1312   WBC 10*3/mm3 7.90 11.91* 12.66* 6.73 11.15* 9.13   HEMOGLOBIN g/dL 11.5* 11.7* 11.0* 11.8* 12.1* 12.5*   PLATELETS 10*3/mm3 145 166 176 169 177 175       Results from last 7 days  Lab Units 03/21/18  0645 03/20/18  0544 03/19/18  0222 03/18/18  0317 03/17/18  1754 03/17/18  1312   SODIUM mmol/L 135* 132* 140 136 138 139   POTASSIUM mmol/L 4.5 4.7 4.7 4.5 4.7 4.5   CHLORIDE mmol/L 98 96* 102 98 99 99   CO2 mmol/L 25.7 27.0 25.2 25.3 26.2 27.5   BUN mg/dL 16 19 24* 15 11 11   CREATININE mg/dL 0.58* 0.55* 0.72* 0.65* 0.73* 0.80   GLUCOSE mg/dL 141* 119* 161* 247* 116* 136*   CALCIUM mg/dL 8.7 8.9 8.4* 8.4* 8.9 9.0   MAGNESIUM mg/dL 2.1 2.1 2.1 1.9 1.9  --  "   PHOSPHORUS mg/dL 3.1 3.3 3.9 3.0 3.5  --    Estimated Creatinine Clearance: 109.9 mL/min (by C-G formula based on SCr of 0.58 mg/dL (L)).    Imaging:  Ct chest reviewed personally  Ct overnight revealing of pna  Diffuse emphysema    Scheduled meds:      aspirin 81 mg Oral Daily   atorvastatin 10 mg Oral Nightly   aztreonam 2 g Intravenous Q8H   ferrous sulfate 325 mg Oral Daily With Breakfast   ipratropium-albuterol 3 mL Nebulization 4x Daily - RT   ketotifen 1 drop Both Eyes BID   methylPREDNISolone sodium succinate 40 mg Intravenous Q8H   montelukast 10 mg Oral Nightly   NIFEdipine XL 30 mg Oral Daily   nystatin 5 mL Oral 4x Daily   pantoprazole 40 mg Oral QAM   [START ON 3/22/2018] predniSONE 50 mg Oral Daily With Breakfast   pregabalin 75 mg Oral Q12H   rivaroxaban 20 mg Oral Daily With Dinner   sodium chloride 4 mL Nebulization BID - RT   tamsulosin 0.4 mg Oral Nightly       ASSESSMENT  /  PLAN:  Ae of COPD  Acute hypoxic respiratory failure  Leukocytosis likely reactive to steroids      Patient Active Problem List   Diagnosis   • A-fib   • Dyslipidemia   • BP (high blood pressure)   • Neuropathy   • Hypertension   • COPD (chronic obstructive pulmonary disease)   • BPH (benign prostatic hypertrophy)   • Atrial fibrillation   • Asthma   • Hypoxia   • Pneumonia   • Chronic anticoagulation   • Pneumonia of both lungs due to infectious organism   • Hyponatremia   • COPD exacerbation   • Acute respiratory failure      Steroids to po  Cont bds  Cont resp toilet  Abx to po tomorrow pending culture       Merlin Fontana MD  [facility]  03/21/18

## 2018-03-22 LAB
ANION GAP SERPL CALCULATED.3IONS-SCNC: 9.7 MMOL/L
BACTERIA SPEC AEROBE CULT: NORMAL
BASOPHILS # BLD AUTO: 0 10*3/MM3 (ref 0–0.2)
BASOPHILS NFR BLD AUTO: 0 % (ref 0–1.5)
BUN BLD-MCNC: 17 MG/DL (ref 8–23)
BUN/CREAT SERPL: 28.3 (ref 7–25)
CALCIUM SPEC-SCNC: 8.3 MG/DL (ref 8.6–10.5)
CHLORIDE SERPL-SCNC: 98 MMOL/L (ref 98–107)
CO2 SERPL-SCNC: 29.3 MMOL/L (ref 22–29)
CREAT BLD-MCNC: 0.6 MG/DL (ref 0.76–1.27)
DEPRECATED RDW RBC AUTO: 66 FL (ref 37–54)
EOSINOPHIL # BLD AUTO: 0 10*3/MM3 (ref 0–0.7)
EOSINOPHIL NFR BLD AUTO: 0 % (ref 0.3–6.2)
ERYTHROCYTE [DISTWIDTH] IN BLOOD BY AUTOMATED COUNT: 24.9 % (ref 11.5–14.5)
GFR SERPL CREATININE-BSD FRML MDRD: 131 ML/MIN/1.73
GLUCOSE BLD-MCNC: 169 MG/DL (ref 65–99)
HCT VFR BLD AUTO: 40.2 % (ref 40.4–52.2)
HGB BLD-MCNC: 11.9 G/DL (ref 13.7–17.6)
IMM GRANULOCYTES # BLD: 0.02 10*3/MM3 (ref 0–0.03)
IMM GRANULOCYTES NFR BLD: 0.3 % (ref 0–0.5)
LYMPHOCYTES # BLD AUTO: 0.46 10*3/MM3 (ref 0.9–4.8)
LYMPHOCYTES NFR BLD AUTO: 6.5 % (ref 19.6–45.3)
MAGNESIUM SERPL-MCNC: 2.1 MG/DL (ref 1.6–2.4)
MCH RBC QN AUTO: 22 PG (ref 27–32.7)
MCHC RBC AUTO-ENTMCNC: 29.6 G/DL (ref 32.6–36.4)
MCV RBC AUTO: 74.3 FL (ref 79.8–96.2)
MONOCYTES # BLD AUTO: 0.37 10*3/MM3 (ref 0.2–1.2)
MONOCYTES NFR BLD AUTO: 5.2 % (ref 5–12)
NEUTROPHILS # BLD AUTO: 6.22 10*3/MM3 (ref 1.9–8.1)
NEUTROPHILS NFR BLD AUTO: 88 % (ref 42.7–76)
NRBC BLD MANUAL-RTO: 0 /100 WBC (ref 0–0)
PHOSPHATE SERPL-MCNC: 3 MG/DL (ref 2.5–4.5)
PLATELET # BLD AUTO: 142 10*3/MM3 (ref 140–500)
PMV BLD AUTO: 9.3 FL (ref 6–12)
POTASSIUM BLD-SCNC: 4.6 MMOL/L (ref 3.5–5.2)
RBC # BLD AUTO: 5.41 10*6/MM3 (ref 4.6–6)
SODIUM BLD-SCNC: 137 MMOL/L (ref 136–145)
WBC NRBC COR # BLD: 7.07 10*3/MM3 (ref 4.5–10.7)

## 2018-03-22 PROCEDURE — 84100 ASSAY OF PHOSPHORUS: CPT | Performed by: INTERNAL MEDICINE

## 2018-03-22 PROCEDURE — 80048 BASIC METABOLIC PNL TOTAL CA: CPT | Performed by: INTERNAL MEDICINE

## 2018-03-22 PROCEDURE — 94799 UNLISTED PULMONARY SVC/PX: CPT

## 2018-03-22 PROCEDURE — 83735 ASSAY OF MAGNESIUM: CPT | Performed by: INTERNAL MEDICINE

## 2018-03-22 PROCEDURE — 63710000001 PREDNISONE PER 5 MG: Performed by: INTERNAL MEDICINE

## 2018-03-22 PROCEDURE — 85025 COMPLETE CBC W/AUTO DIFF WBC: CPT | Performed by: INTERNAL MEDICINE

## 2018-03-22 RX ORDER — DOXYCYCLINE 100 MG/1
100 CAPSULE ORAL EVERY 12 HOURS SCHEDULED
Status: DISCONTINUED | OUTPATIENT
Start: 2018-03-22 | End: 2018-03-24

## 2018-03-22 RX ORDER — FUROSEMIDE 20 MG/1
20 TABLET ORAL DAILY
Status: DISCONTINUED | OUTPATIENT
Start: 2018-03-22 | End: 2018-03-24

## 2018-03-22 RX ADMIN — NYSTATIN 500000 UNITS: 100000 SUSPENSION ORAL at 08:57

## 2018-03-22 RX ADMIN — ASPIRIN 81 MG: 81 TABLET ORAL at 08:57

## 2018-03-22 RX ADMIN — FERROUS SULFATE TAB 325 MG (65 MG ELEMENTAL FE) 325 MG: 325 (65 FE) TAB at 08:56

## 2018-03-22 RX ADMIN — AZTREONAM 2 G: 2 INJECTION, POWDER, LYOPHILIZED, FOR SOLUTION INTRAMUSCULAR; INTRAVENOUS at 12:15

## 2018-03-22 RX ADMIN — NIFEDIPINE 30 MG: 30 TABLET, FILM COATED, EXTENDED RELEASE ORAL at 08:56

## 2018-03-22 RX ADMIN — MONTELUKAST 10 MG: 10 TABLET, FILM COATED ORAL at 20:19

## 2018-03-22 RX ADMIN — FUROSEMIDE 20 MG: 20 TABLET ORAL at 16:00

## 2018-03-22 RX ADMIN — RIVAROXABAN 20 MG: 20 TABLET, FILM COATED ORAL at 17:19

## 2018-03-22 RX ADMIN — IPRATROPIUM BROMIDE AND ALBUTEROL SULFATE 3 ML: .5; 3 SOLUTION RESPIRATORY (INHALATION) at 15:48

## 2018-03-22 RX ADMIN — PREDNISONE 50 MG: 50 TABLET ORAL at 08:57

## 2018-03-22 RX ADMIN — TAMSULOSIN HYDROCHLORIDE 0.4 MG: 0.4 CAPSULE ORAL at 20:19

## 2018-03-22 RX ADMIN — ATORVASTATIN CALCIUM 10 MG: 10 TABLET, FILM COATED ORAL at 20:19

## 2018-03-22 RX ADMIN — IPRATROPIUM BROMIDE AND ALBUTEROL SULFATE 3 ML: .5; 3 SOLUTION RESPIRATORY (INHALATION) at 11:17

## 2018-03-22 RX ADMIN — SODIUM CHLORIDE SOLN NEBU 7% 4 ML: 7 NEBU SOLN at 07:36

## 2018-03-22 RX ADMIN — KETOTIFEN FUMARATE 1 DROP: 0.35 SOLUTION/ DROPS OPHTHALMIC at 20:20

## 2018-03-22 RX ADMIN — PREGABALIN 75 MG: 75 CAPSULE ORAL at 08:57

## 2018-03-22 RX ADMIN — IPRATROPIUM BROMIDE AND ALBUTEROL SULFATE 3 ML: .5; 3 SOLUTION RESPIRATORY (INHALATION) at 19:48

## 2018-03-22 RX ADMIN — NYSTATIN 500000 UNITS: 100000 SUSPENSION ORAL at 20:19

## 2018-03-22 RX ADMIN — SODIUM CHLORIDE SOLN NEBU 7% 4 ML: 7 NEBU SOLN at 19:48

## 2018-03-22 RX ADMIN — PANTOPRAZOLE SODIUM 40 MG: 40 TABLET, DELAYED RELEASE ORAL at 05:33

## 2018-03-22 RX ADMIN — PREGABALIN 75 MG: 75 CAPSULE ORAL at 20:19

## 2018-03-22 RX ADMIN — IPRATROPIUM BROMIDE AND ALBUTEROL SULFATE 3 ML: .5; 3 SOLUTION RESPIRATORY (INHALATION) at 07:35

## 2018-03-22 RX ADMIN — NYSTATIN 500000 UNITS: 100000 SUSPENSION ORAL at 17:19

## 2018-03-22 RX ADMIN — NYSTATIN 500000 UNITS: 100000 SUSPENSION ORAL at 12:15

## 2018-03-22 RX ADMIN — ACETAMINOPHEN 650 MG: 325 TABLET ORAL at 12:15

## 2018-03-22 RX ADMIN — DOXYCYCLINE 100 MG: 100 CAPSULE ORAL at 16:00

## 2018-03-22 RX ADMIN — ACETAMINOPHEN 650 MG: 325 TABLET ORAL at 05:32

## 2018-03-22 RX ADMIN — KETOTIFEN FUMARATE 1 DROP: 0.35 SOLUTION/ DROPS OPHTHALMIC at 08:58

## 2018-03-22 RX ADMIN — ACETAMINOPHEN 650 MG: 325 TABLET ORAL at 18:23

## 2018-03-22 NOTE — PROGRESS NOTES
"  Daily Progress Note.   [address]  3/22/2018    Patient:  Name:  Alessio Allen  MRN:  [id number]  1940  77 y.o.  male         Interval History:  Continues to improve  Awake alert sitting in chair  No resp distress  Physical Exam:  /82 (BP Location: Left arm, Patient Position: Lying)   Pulse 96   Temp 97.3 °F (36.3 °C) (Oral)   Resp 20   Ht 188 cm (74\")   Wt 125 kg (275 lb 3.2 oz)   SpO2 95%   BMI 35.33 kg/m²   Body mass index is 35.33 kg/m².    Intake/Output Summary (Last 24 hours) at 03/22/18 1148  Last data filed at 03/22/18 0530   Gross per 24 hour   Intake              360 ml   Output              650 ml   Net             -290 ml     GENERAL: on high flow nc on 8l no in acute distress   HEENT:  EOMI, nonicteric sclera, no JVD  PULMONARY:   decreased breath sounds bilaterally no wheeze, no crackles, no rhonchi, symmetric air entry  CARDIAC:  RRR  ABD: mild truncal obesity SNTND BS+  EXT: bilateral le edema trace pulses symmetric 2+ bilaterally  NEURO:  CNs II-XII intact, no focal deficits  SKIN:no focal rash  PSYCH: appropriate mood  LYMPH: no cervical LAD    Data Review:  Notable Labs:    Results from last 7 days  Lab Units 03/22/18  0508 03/21/18  0635 03/20/18  0544 03/19/18  0222 03/18/18  0317 03/17/18  1754 03/17/18  1312   WBC 10*3/mm3 7.07 7.90 11.91* 12.66* 6.73 11.15* 9.13   HEMOGLOBIN g/dL 11.9* 11.5* 11.7* 11.0* 11.8* 12.1* 12.5*   PLATELETS 10*3/mm3 142 145 166 176 169 177 175       Results from last 7 days  Lab Units 03/22/18  0508 03/21/18  0645 03/20/18  0544 03/19/18  0222 03/18/18  0317 03/17/18  1754 03/17/18  1312   SODIUM mmol/L 137 135* 132* 140 136 138 139   POTASSIUM mmol/L 4.6 4.5 4.7 4.7 4.5 4.7 4.5   CHLORIDE mmol/L 98 98 96* 102 98 99 99   CO2 mmol/L 29.3* 25.7 27.0 25.2 25.3 26.2 27.5   BUN mg/dL 17 16 19 24* 15 11 11   CREATININE mg/dL 0.60* 0.58* 0.55* 0.72* 0.65* 0.73* 0.80   GLUCOSE mg/dL 169* 141* 119* 161* 247* 116* 136*   CALCIUM " mg/dL 8.3* 8.7 8.9 8.4* 8.4* 8.9 9.0   MAGNESIUM mg/dL 2.1 2.1 2.1 2.1 1.9 1.9  --    PHOSPHORUS mg/dL 3.0 3.1 3.3 3.9 3.0 3.5  --    Estimated Creatinine Clearance: 110.3 mL/min (by C-G formula based on SCr of 0.6 mg/dL (L)).    Imaging:  Ct chest reviewed personally  Ct overnight revealing of pna  Diffuse emphysema    Scheduled meds:      aspirin 81 mg Oral Daily   atorvastatin 10 mg Oral Nightly   aztreonam 2 g Intravenous Q8H   ferrous sulfate 325 mg Oral Daily With Breakfast   ipratropium-albuterol 3 mL Nebulization 4x Daily - RT   ketotifen 1 drop Both Eyes BID   montelukast 10 mg Oral Nightly   NIFEdipine XL 30 mg Oral Daily   nystatin 5 mL Oral 4x Daily   pantoprazole 40 mg Oral QAM   predniSONE 50 mg Oral Daily With Breakfast   pregabalin 75 mg Oral Q12H   rivaroxaban 20 mg Oral Daily With Dinner   sodium chloride 4 mL Nebulization BID - RT   tamsulosin 0.4 mg Oral Nightly       ASSESSMENT  /  PLAN:  Ae of COPD  Acute hypoxic respiratory failure  Leukocytosis likely reactive to steroids      Patient Active Problem List   Diagnosis   • A-fib   • Dyslipidemia   • BP (high blood pressure)   • Neuropathy   • Hypertension   • COPD (chronic obstructive pulmonary disease)   • BPH (benign prostatic hypertrophy)   • Atrial fibrillation   • Asthma   • Hypoxia   • Pneumonia   • Chronic anticoagulation   • Pneumonia of both lungs due to infectious organism   • Hyponatremia   • COPD exacerbation   • Acute respiratory failure      Steroids to po today  Cont bds  Cont resp toilet  Abx to po today  - seems that doxy may be best option that he doesn't have an allergy to, as he has quite a few.         Merlin Fontana MD  [facility]  03/22/18

## 2018-03-22 NOTE — PROGRESS NOTES
Continued Stay Note  [facility]     Patient Name: Alessio Allen  MRN: [id number]  Today's Date: 3/22/2018    Admit Date: 3/17/2018          Discharge Plan     Row Name 03/22/18 1328       Plan    Plan home with [facility]    Patient/Family in Agreement with Plan yes    Plan Comments Spoke with patient in room.  He confirms that plan is home with [facility].  States that he has home o2 from [facility], but it only goes up to 5 liters.  CCP will follow. Madison De Anda RN              Discharge Codes    No documentation.           Madison De Anda RN

## 2018-03-22 NOTE — PLAN OF CARE
Problem: Patient Care Overview  Goal: Plan of Care Review   03/20/18 1643 03/22/18 0527   Coping/Psychosocial   Plan of Care Reviewed With --  patient   Plan of Care Review   Progress improving --    OTHER   Outcome Summary --  Patient improving, O2 decreased to 6L high flow. Plan to change steroids and antibiotics to PO. VSS this shift, will continue to monitor.      Goal: Individualization and Mutuality  Outcome: Ongoing (interventions implemented as appropriate)    Goal: Discharge Needs Assessment  Outcome: Ongoing (interventions implemented as appropriate)      Problem: Chronic Obstructive Pulmonary Disease (Adult)  Goal: Signs and Symptoms of Listed Potential Problems Will be Absent, Minimized or Managed (Chronic Obstructive Pulmonary Disease)  Outcome: Ongoing (interventions implemented as appropriate)      Problem: Fall Risk (Adult)  Goal: Identify Related Risk Factors and Signs and Symptoms  Outcome: Ongoing (interventions implemented as appropriate)    Goal: Absence of Fall  Outcome: Ongoing (interventions implemented as appropriate)      Problem: Asthma (Adult)  Goal: Signs and Symptoms of Listed Potential Problems Will be Absent, Minimized or Managed (Asthma)  Outcome: Ongoing (interventions implemented as appropriate)      Problem: Cardiac Rhythm Management Device (Adult)  Goal: Signs and Symptoms of Listed Potential Problems Will be Absent, Minimized or Managed (Cardiac Rhythm Management Device)  Outcome: Ongoing (interventions implemented as appropriate)      Problem: Skin Injury Risk (Adult)  Goal: Identify Related Risk Factors and Signs and Symptoms  Outcome: Ongoing (interventions implemented as appropriate)    Goal: Skin Health and Integrity  Outcome: Ongoing (interventions implemented as appropriate)

## 2018-03-23 LAB
ANION GAP SERPL CALCULATED.3IONS-SCNC: 8.3 MMOL/L
BACTERIA SPEC AEROBE CULT: NORMAL
BASOPHILS # BLD AUTO: 0 10*3/MM3 (ref 0–0.2)
BASOPHILS NFR BLD AUTO: 0 % (ref 0–1.5)
BUN BLD-MCNC: 16 MG/DL (ref 8–23)
BUN/CREAT SERPL: 23.5 (ref 7–25)
CALCIUM SPEC-SCNC: 8.5 MG/DL (ref 8.6–10.5)
CHLORIDE SERPL-SCNC: 100 MMOL/L (ref 98–107)
CO2 SERPL-SCNC: 29.7 MMOL/L (ref 22–29)
CREAT BLD-MCNC: 0.68 MG/DL (ref 0.76–1.27)
DEPRECATED RDW RBC AUTO: 66.8 FL (ref 37–54)
EOSINOPHIL # BLD AUTO: 0.16 10*3/MM3 (ref 0–0.7)
EOSINOPHIL NFR BLD AUTO: 1.7 % (ref 0.3–6.2)
ERYTHROCYTE [DISTWIDTH] IN BLOOD BY AUTOMATED COUNT: 25.3 % (ref 11.5–14.5)
GFR SERPL CREATININE-BSD FRML MDRD: 113 ML/MIN/1.73
GLUCOSE BLD-MCNC: 91 MG/DL (ref 65–99)
HCT VFR BLD AUTO: 41.4 % (ref 40.4–52.2)
HGB BLD-MCNC: 12.3 G/DL (ref 13.7–17.6)
IMM GRANULOCYTES # BLD: 0.03 10*3/MM3 (ref 0–0.03)
IMM GRANULOCYTES NFR BLD: 0.3 % (ref 0–0.5)
LYMPHOCYTES # BLD AUTO: 2.16 10*3/MM3 (ref 0.9–4.8)
LYMPHOCYTES NFR BLD AUTO: 22.8 % (ref 19.6–45.3)
MAGNESIUM SERPL-MCNC: 2.1 MG/DL (ref 1.6–2.4)
MCH RBC QN AUTO: 22.2 PG (ref 27–32.7)
MCHC RBC AUTO-ENTMCNC: 29.7 G/DL (ref 32.6–36.4)
MCV RBC AUTO: 74.6 FL (ref 79.8–96.2)
MONOCYTES # BLD AUTO: 1.04 10*3/MM3 (ref 0.2–1.2)
MONOCYTES NFR BLD AUTO: 11 % (ref 5–12)
NEUTROPHILS # BLD AUTO: 6.1 10*3/MM3 (ref 1.9–8.1)
NEUTROPHILS NFR BLD AUTO: 64.2 % (ref 42.7–76)
PHOSPHATE SERPL-MCNC: 2.6 MG/DL (ref 2.5–4.5)
PLATELET # BLD AUTO: 174 10*3/MM3 (ref 140–500)
PMV BLD AUTO: 9.2 FL (ref 6–12)
POTASSIUM BLD-SCNC: 4.2 MMOL/L (ref 3.5–5.2)
RBC # BLD AUTO: 5.55 10*6/MM3 (ref 4.6–6)
SODIUM BLD-SCNC: 138 MMOL/L (ref 136–145)
WBC NRBC COR # BLD: 9.49 10*3/MM3 (ref 4.5–10.7)

## 2018-03-23 PROCEDURE — 94799 UNLISTED PULMONARY SVC/PX: CPT

## 2018-03-23 PROCEDURE — 84100 ASSAY OF PHOSPHORUS: CPT | Performed by: INTERNAL MEDICINE

## 2018-03-23 PROCEDURE — 83735 ASSAY OF MAGNESIUM: CPT | Performed by: INTERNAL MEDICINE

## 2018-03-23 PROCEDURE — 85025 COMPLETE CBC W/AUTO DIFF WBC: CPT | Performed by: INTERNAL MEDICINE

## 2018-03-23 PROCEDURE — 25010000002 FUROSEMIDE PER 20 MG: Performed by: INTERNAL MEDICINE

## 2018-03-23 PROCEDURE — 63710000001 PREDNISONE PER 5 MG: Performed by: INTERNAL MEDICINE

## 2018-03-23 PROCEDURE — 80048 BASIC METABOLIC PNL TOTAL CA: CPT | Performed by: INTERNAL MEDICINE

## 2018-03-23 RX ORDER — FUROSEMIDE 10 MG/ML
40 INJECTION INTRAMUSCULAR; INTRAVENOUS EVERY 24 HOURS
Status: DISCONTINUED | OUTPATIENT
Start: 2018-03-23 | End: 2018-03-24

## 2018-03-23 RX ADMIN — KETOTIFEN FUMARATE 1 DROP: 0.35 SOLUTION/ DROPS OPHTHALMIC at 09:59

## 2018-03-23 RX ADMIN — FUROSEMIDE 40 MG: 10 INJECTION, SOLUTION INTRAMUSCULAR; INTRAVENOUS at 15:43

## 2018-03-23 RX ADMIN — FUROSEMIDE 20 MG: 20 TABLET ORAL at 09:51

## 2018-03-23 RX ADMIN — PREDNISONE 50 MG: 50 TABLET ORAL at 09:51

## 2018-03-23 RX ADMIN — PREGABALIN 75 MG: 75 CAPSULE ORAL at 09:51

## 2018-03-23 RX ADMIN — DOXYCYCLINE 100 MG: 100 CAPSULE ORAL at 09:51

## 2018-03-23 RX ADMIN — ACETAMINOPHEN 650 MG: 325 TABLET ORAL at 13:37

## 2018-03-23 RX ADMIN — NYSTATIN 500000 UNITS: 100000 SUSPENSION ORAL at 10:00

## 2018-03-23 RX ADMIN — NYSTATIN 500000 UNITS: 100000 SUSPENSION ORAL at 21:24

## 2018-03-23 RX ADMIN — ACETAMINOPHEN 650 MG: 325 TABLET ORAL at 00:51

## 2018-03-23 RX ADMIN — ACETAMINOPHEN 650 MG: 325 TABLET ORAL at 07:41

## 2018-03-23 RX ADMIN — DOXYCYCLINE 100 MG: 100 CAPSULE ORAL at 21:21

## 2018-03-23 RX ADMIN — ASPIRIN 81 MG: 81 TABLET ORAL at 09:51

## 2018-03-23 RX ADMIN — NIFEDIPINE 30 MG: 30 TABLET, FILM COATED, EXTENDED RELEASE ORAL at 09:51

## 2018-03-23 RX ADMIN — MONTELUKAST 10 MG: 10 TABLET, FILM COATED ORAL at 21:21

## 2018-03-23 RX ADMIN — ATORVASTATIN CALCIUM 10 MG: 10 TABLET, FILM COATED ORAL at 21:22

## 2018-03-23 RX ADMIN — PREGABALIN 75 MG: 75 CAPSULE ORAL at 21:22

## 2018-03-23 RX ADMIN — NYSTATIN 500000 UNITS: 100000 SUSPENSION ORAL at 13:37

## 2018-03-23 RX ADMIN — IPRATROPIUM BROMIDE AND ALBUTEROL SULFATE 3 ML: .5; 3 SOLUTION RESPIRATORY (INHALATION) at 11:36

## 2018-03-23 RX ADMIN — KETOTIFEN FUMARATE 1 DROP: 0.35 SOLUTION/ DROPS OPHTHALMIC at 21:22

## 2018-03-23 RX ADMIN — NYSTATIN 500000 UNITS: 100000 SUSPENSION ORAL at 18:46

## 2018-03-23 RX ADMIN — IPRATROPIUM BROMIDE AND ALBUTEROL SULFATE 3 ML: .5; 3 SOLUTION RESPIRATORY (INHALATION) at 15:54

## 2018-03-23 RX ADMIN — RIVAROXABAN 20 MG: 20 TABLET, FILM COATED ORAL at 18:46

## 2018-03-23 RX ADMIN — IPRATROPIUM BROMIDE AND ALBUTEROL SULFATE 3 ML: .5; 3 SOLUTION RESPIRATORY (INHALATION) at 20:21

## 2018-03-23 RX ADMIN — PANTOPRAZOLE SODIUM 40 MG: 40 TABLET, DELAYED RELEASE ORAL at 05:53

## 2018-03-23 RX ADMIN — IPRATROPIUM BROMIDE AND ALBUTEROL SULFATE 3 ML: .5; 3 SOLUTION RESPIRATORY (INHALATION) at 07:31

## 2018-03-23 RX ADMIN — TAMSULOSIN HYDROCHLORIDE 0.4 MG: 0.4 CAPSULE ORAL at 21:22

## 2018-03-23 NOTE — PROGRESS NOTES
"  Daily Progress Note.   [address]  3/23/2018    Patient:  Name:  Alessio Allen  MRN:  [id number]  1940  77 y.o.  male         Interval History:  Sig le swelling bothering him.  Otherwise breathing imrpoved.  Back on home lasix.  No hemoptysis    Physical Exam:  /75 (BP Location: Left arm, Patient Position: Sitting)   Pulse 82   Temp 97.6 °F (36.4 °C) (Oral)   Resp 18   Ht 188 cm (74\")   Wt 125 kg (274 lb 11.2 oz)   SpO2 93%   BMI 35.27 kg/m²   Body mass index is 35.27 kg/m².    Intake/Output Summary (Last 24 hours) at 03/23/18 1413  Last data filed at 03/23/18 1347   Gross per 24 hour   Intake              480 ml   Output             3900 ml   Net            -3420 ml     GENERAL: on high flow nc on 4 no in acute distress   HEENT:  EOMI, nonicteric sclera, no JVD  PULMONARY:   decreased breath sounds bilaterally no wheeze, no crackles, no rhonchi, symmetric air entry  CARDIAC:  RRR  ABD: mild truncal obesity SNTND BS+  EXT: bilateral le edema trace pulses symmetric 2+ bilaterally  NEURO:  CNs II-XII intact, no focal deficits  SKIN:no focal rash  PSYCH: appropriate mood  LYMPH: no cervical LAD    Data Review:  Notable Labs:    Results from last 7 days  Lab Units 03/23/18  0622 03/22/18  0508 03/21/18  0635 03/20/18  0544 03/19/18  0222 03/18/18  0317 03/17/18  1754   WBC 10*3/mm3 9.49 7.07 7.90 11.91* 12.66* 6.73 11.15*   HEMOGLOBIN g/dL 12.3* 11.9* 11.5* 11.7* 11.0* 11.8* 12.1*   PLATELETS 10*3/mm3 174 142 145 166 176 169 177       Results from last 7 days  Lab Units 03/23/18  0621 03/22/18  0508 03/21/18  0645 03/20/18  0544 03/19/18  0222 03/18/18  0317 03/17/18  1754   SODIUM mmol/L 138 137 135* 132* 140 136 138   POTASSIUM mmol/L 4.2 4.6 4.5 4.7 4.7 4.5 4.7   CHLORIDE mmol/L 100 98 98 96* 102 98 99   CO2 mmol/L 29.7* 29.3* 25.7 27.0 25.2 25.3 26.2   BUN mg/dL 16 17 16 19 24* 15 11   CREATININE mg/dL 0.68* 0.60* 0.58* 0.55* 0.72* 0.65* 0.73*   GLUCOSE mg/dL 91 169* 141* " 119* 161* 247* 116*   CALCIUM mg/dL 8.5* 8.3* 8.7 8.9 8.4* 8.4* 8.9   MAGNESIUM mg/dL 2.1 2.1 2.1 2.1 2.1 1.9 1.9   PHOSPHORUS mg/dL 2.6 3.0 3.1 3.3 3.9 3.0 3.5   Estimated Creatinine Clearance: 110.3 mL/min (by C-G formula based on SCr of 0.68 mg/dL (L)).    Imaging:  Ct chest reviewed personally  Ct overnight revealing of pna  Diffuse emphysema    Scheduled meds:      aspirin 81 mg Oral Daily   atorvastatin 10 mg Oral Nightly   doxycycline 100 mg Oral Q12H   furosemide 40 mg Intravenous Q24H   furosemide 20 mg Oral Daily   ipratropium-albuterol 3 mL Nebulization 4x Daily - RT   ketotifen 1 drop Both Eyes BID   montelukast 10 mg Oral Nightly   NIFEdipine XL 30 mg Oral Daily   nystatin 5 mL Oral 4x Daily   pantoprazole 40 mg Oral QAM   predniSONE 50 mg Oral Daily With Breakfast   pregabalin 75 mg Oral Q12H   rivaroxaban 20 mg Oral Daily With Dinner   tamsulosin 0.4 mg Oral Nightly       ASSESSMENT  /  PLAN:  Ae of COPD  Acute hypoxic respiratory failure        Patient Active Problem List   Diagnosis   • A-fib   • Dyslipidemia   • BP (high blood pressure)   • Neuropathy   • Hypertension   • COPD (chronic obstructive pulmonary disease)   • BPH (benign prostatic hypertrophy)   • Atrial fibrillation   • Asthma   • Hypoxia   • Pneumonia   • Chronic anticoagulation   • Pneumonia of both lungs due to infectious organism   • Hyponatremia   • COPD exacerbation   • Acute respiratory failure      Steroids po  Cont bds  Cont resp toilet  Abx  Po ( seems that doxy may be best option that he doesn't have an allergy to, as he has quite a few.)  Will diurese today with extra lasix IV 40 in afternoon.  Otherwise close to discharge.         Merlin Fontana MD  [facility]  03/23/18

## 2018-03-24 VITALS
RESPIRATION RATE: 18 BRPM | HEART RATE: 78 BPM | DIASTOLIC BLOOD PRESSURE: 79 MMHG | TEMPERATURE: 97.7 F | BODY MASS INDEX: 35.29 KG/M2 | SYSTOLIC BLOOD PRESSURE: 140 MMHG | OXYGEN SATURATION: 90 % | WEIGHT: 275 LBS | HEIGHT: 74 IN

## 2018-03-24 LAB
ANION GAP SERPL CALCULATED.3IONS-SCNC: 9 MMOL/L
BASOPHILS # BLD AUTO: 0 10*3/MM3 (ref 0–0.2)
BASOPHILS NFR BLD AUTO: 0 % (ref 0–1.5)
BUN BLD-MCNC: 13 MG/DL (ref 8–23)
BUN/CREAT SERPL: 22 (ref 7–25)
CALCIUM SPEC-SCNC: 8.2 MG/DL (ref 8.6–10.5)
CHLORIDE SERPL-SCNC: 97 MMOL/L (ref 98–107)
CO2 SERPL-SCNC: 31 MMOL/L (ref 22–29)
CREAT BLD-MCNC: 0.59 MG/DL (ref 0.76–1.27)
DEPRECATED RDW RBC AUTO: 65.9 FL (ref 37–54)
EOSINOPHIL # BLD AUTO: 0.26 10*3/MM3 (ref 0–0.7)
EOSINOPHIL NFR BLD AUTO: 3.1 % (ref 0.3–6.2)
ERYTHROCYTE [DISTWIDTH] IN BLOOD BY AUTOMATED COUNT: 25 % (ref 11.5–14.5)
GFR SERPL CREATININE-BSD FRML MDRD: 134 ML/MIN/1.73
GLUCOSE BLD-MCNC: 87 MG/DL (ref 65–99)
HCT VFR BLD AUTO: 41.1 % (ref 40.4–52.2)
HGB BLD-MCNC: 12.1 G/DL (ref 13.7–17.6)
IMM GRANULOCYTES # BLD: 0.03 10*3/MM3 (ref 0–0.03)
IMM GRANULOCYTES NFR BLD: 0.4 % (ref 0–0.5)
LYMPHOCYTES # BLD AUTO: 1.86 10*3/MM3 (ref 0.9–4.8)
LYMPHOCYTES NFR BLD AUTO: 22.1 % (ref 19.6–45.3)
MAGNESIUM SERPL-MCNC: 2.1 MG/DL (ref 1.6–2.4)
MCH RBC QN AUTO: 22 PG (ref 27–32.7)
MCHC RBC AUTO-ENTMCNC: 29.4 G/DL (ref 32.6–36.4)
MCV RBC AUTO: 74.6 FL (ref 79.8–96.2)
MONOCYTES # BLD AUTO: 0.88 10*3/MM3 (ref 0.2–1.2)
MONOCYTES NFR BLD AUTO: 10.5 % (ref 5–12)
NEUTROPHILS # BLD AUTO: 5.37 10*3/MM3 (ref 1.9–8.1)
NEUTROPHILS NFR BLD AUTO: 63.9 % (ref 42.7–76)
PHOSPHATE SERPL-MCNC: 3.3 MG/DL (ref 2.5–4.5)
PLATELET # BLD AUTO: 187 10*3/MM3 (ref 140–500)
PMV BLD AUTO: 9.3 FL (ref 6–12)
POTASSIUM BLD-SCNC: 3.6 MMOL/L (ref 3.5–5.2)
RBC # BLD AUTO: 5.51 10*6/MM3 (ref 4.6–6)
SODIUM BLD-SCNC: 137 MMOL/L (ref 136–145)
WBC NRBC COR # BLD: 8.4 10*3/MM3 (ref 4.5–10.7)

## 2018-03-24 PROCEDURE — 84100 ASSAY OF PHOSPHORUS: CPT | Performed by: INTERNAL MEDICINE

## 2018-03-24 PROCEDURE — 25010000002 FUROSEMIDE PER 20 MG: Performed by: INTERNAL MEDICINE

## 2018-03-24 PROCEDURE — 83735 ASSAY OF MAGNESIUM: CPT | Performed by: INTERNAL MEDICINE

## 2018-03-24 PROCEDURE — 63710000001 PREDNISONE PER 5 MG: Performed by: INTERNAL MEDICINE

## 2018-03-24 PROCEDURE — 85025 COMPLETE CBC W/AUTO DIFF WBC: CPT | Performed by: INTERNAL MEDICINE

## 2018-03-24 PROCEDURE — 94799 UNLISTED PULMONARY SVC/PX: CPT

## 2018-03-24 PROCEDURE — 80048 BASIC METABOLIC PNL TOTAL CA: CPT | Performed by: INTERNAL MEDICINE

## 2018-03-24 RX ORDER — FUROSEMIDE 20 MG/1
40 TABLET ORAL DAILY
Qty: 90 TABLET | Refills: 3 | Status: ON HOLD | OUTPATIENT
Start: 2018-03-24 | End: 2018-04-19

## 2018-03-24 RX ORDER — ALBUTEROL SULFATE 90 UG/1
2 AEROSOL, METERED RESPIRATORY (INHALATION) EVERY 6 HOURS PRN
Qty: 1 INHALER | Refills: 11 | Status: SHIPPED | OUTPATIENT
Start: 2018-03-24 | End: 2022-04-28

## 2018-03-24 RX ORDER — FUROSEMIDE 10 MG/ML
40 INJECTION INTRAMUSCULAR; INTRAVENOUS ONCE
Status: COMPLETED | OUTPATIENT
Start: 2018-03-24 | End: 2018-03-24

## 2018-03-24 RX ADMIN — ACETAMINOPHEN 650 MG: 325 TABLET ORAL at 06:54

## 2018-03-24 RX ADMIN — IPRATROPIUM BROMIDE AND ALBUTEROL SULFATE 3 ML: .5; 3 SOLUTION RESPIRATORY (INHALATION) at 08:52

## 2018-03-24 RX ADMIN — KETOTIFEN FUMARATE 1 DROP: 0.35 SOLUTION/ DROPS OPHTHALMIC at 10:29

## 2018-03-24 RX ADMIN — RIVAROXABAN 20 MG: 20 TABLET, FILM COATED ORAL at 17:48

## 2018-03-24 RX ADMIN — PANTOPRAZOLE SODIUM 40 MG: 40 TABLET, DELAYED RELEASE ORAL at 06:22

## 2018-03-24 RX ADMIN — NYSTATIN 500000 UNITS: 100000 SUSPENSION ORAL at 13:20

## 2018-03-24 RX ADMIN — NYSTATIN 500000 UNITS: 100000 SUSPENSION ORAL at 17:49

## 2018-03-24 RX ADMIN — PREGABALIN 75 MG: 75 CAPSULE ORAL at 10:27

## 2018-03-24 RX ADMIN — NYSTATIN 500000 UNITS: 100000 SUSPENSION ORAL at 10:27

## 2018-03-24 RX ADMIN — NIFEDIPINE 30 MG: 30 TABLET, FILM COATED, EXTENDED RELEASE ORAL at 10:27

## 2018-03-24 RX ADMIN — ASPIRIN 81 MG: 81 TABLET ORAL at 10:28

## 2018-03-24 RX ADMIN — DOXYCYCLINE 100 MG: 100 CAPSULE ORAL at 10:27

## 2018-03-24 RX ADMIN — FUROSEMIDE 20 MG: 20 TABLET ORAL at 10:28

## 2018-03-24 RX ADMIN — IPRATROPIUM BROMIDE AND ALBUTEROL SULFATE 3 ML: .5; 3 SOLUTION RESPIRATORY (INHALATION) at 11:19

## 2018-03-24 RX ADMIN — IPRATROPIUM BROMIDE AND ALBUTEROL SULFATE 3 ML: .5; 3 SOLUTION RESPIRATORY (INHALATION) at 16:21

## 2018-03-24 RX ADMIN — FUROSEMIDE 40 MG: 10 INJECTION, SOLUTION INTRAMUSCULAR; INTRAVENOUS at 17:36

## 2018-03-24 RX ADMIN — PREDNISONE 50 MG: 50 TABLET ORAL at 10:27

## 2018-03-24 NOTE — DISCHARGE SUMMARY
Patient Identification:  Name: Alessio Allen  Age: 77 y.o.  Sex: male  :  1940  MRN: [id number]  Primary Care Physician: Alan Santana MD    Admit date: 3/17/2018  Discharge date and time: 2018   Discharged Condition: good    Discharge Diagnoses:   COPD exacerbation  Pneumonia versus atelectasis  Acute hypoxic respiratory failure  Right hilar adenopathy  Volume overload  Bilateral lower extremity edema      Hospital Course: Alessio Allen presented to [facility]  with shortness of breath.  He was diagnosed with acute on chronic respiratory failure and significant edema of his lower extremities.  He was also diagnosed with possible pneumonia.  He was treated with 6 days of doxycycline.  He has responded quite well.    CT scan of the chest showed what seems to be atelectasis.  Nevertheless the patient was treated for possible pneumonia with 6 days of doxycycline, especially because he is allergic to several antibiotics.  He responded quite well, therefore today was his last dose of doxycycline.    His edema has responded quite well to diuresis.  He is now being discharged home with double dose of Lasix, 40 mg daily, until he sees Dr. Hernandez on Monday in the office.    He has chronic respiratory failure and is usually on oxygen at home.  He is now going to require at least 3-4 L continuous flow of oxygen until he starts feeling better at home.  He is going home with home health for nursing needs as well as physical therapy.    I am adding rescue albuterol inhaler to his regimen since he tells me he does not have one at home.      Consults:   IP CONSULT TO PULMONOLOGY  IP CONSULT TO CASE MANAGEMENT     Significant Diagnostic Studies:   Imaging Results (most recent)     Procedure Component Value Units Date/Time    CT Angiogram Chest With & Without Contrast [[id number]] Collected:  18 0012     Updated:  18 0158    Narrative:       CT ANGIOGRAM THORAX  WITH CONTRAST, PULMONARY EMBOLISM PROTOCOL     HISTORY: Pulmonary embolism.     COMPARISON: February 26, 2018.     TECHNIQUE: Radiation dose reduction techniques were utilized, including  automated exposure control and exposure modulation based on body size.  Axial contrast-enhanced images of the chest were obtained according to  the pulmonary embolism protocol. Coronal oblique 3-D MIP reformatted  images were supplemented and reviewed.  100 mls of non ionic contrast  was utilized intravenously.     FINDINGS CHEST CT: The lungs are hyperexpanded suggesting COPD.  Emphysema and diffuse fibrotic opacities are again seen. There are  increasing areas of consolidation mostly in the dependent portions of  the lower lobes which may be related to atelectasis or pneumonia. These  are more pronounced on the left side compared to the right. There are  some air bronchograms as well. Small bilateral effusions have improved.  Neither appears loculated. Stable cardiomegaly. There is right  infrahilar adenopathy measuring 2.4 cm, increased slightly from 2.1 cm.  This may be reactive or neoplastic. Recommend follow-up. No filling  defects are identified in the pulmonary arteries to suggest pulmonary  embolism. Aorta poorly opacified but nonaneurysmal.                   Impression:       1. Emphysema/COPD with increasing lower lobe opacities as discussed.  Pneumonia not excluded.  2. Improving pleural effusions.  3. Some increase in right infrahilar adenopathy. Recommend follow-up.  4. No evidence of pulmonary embolism     This report was finalized on 3/18/2018 12:16 AM by Killian Smiley MD.       XR Chest 1 View [[id number]] Collected:  03/17/18 1344     Updated:  03/17/18 1350    Narrative:       PORTABLE CHEST 3/17/2018 AT 1:15 PM     CLINICAL HISTORY: Dyspnea. COPD. Hypertension.     Compared to the previous chest x-ray dated 2/28/2018.     The lungs are somewhat poorly expanded. Minimal patchy infiltrate in  both lower lung zones  shown on the previous chest x-ray has cleared.  Currently, no definite acute focal infiltrates are identified. There are  no pleural effusions. The heart is moderately enlarged and unchanged. A  dual-chamber pacemaker remains in satisfactory position in the left  subclavian vein.     IMPRESSIONS: Moderate cardiomegaly. No evidence of acute disease within  the chest.     This report was finalized on 3/17/2018 1:47 PM by Dr. Alden Spencer MD.             Results from last 7 days  Lab Units 03/17/18  1742   ADENOVIRUS DETECTION BY PCR  Not Detected   CORONAVIRUS 229E  Not Detected   CORONAVIRUS HKU1  Not Detected   CORONAVIRUS NL63  Not Detected   CORONAVIRUS OC43  Not Detected   HUMAN METAPNEUMOVIRUS  Not Detected   HUMAN RHINOVIRUS/ENTEROVIRUS  Not Detected   INFLUENZA B PCR  Not Detected   PARAINFLUENZA 1  Not Detected   PARAINFLUENZA VIRUS 2  Not Detected   PARAINFLUENZA VIRUS 3  Not Detected   PARAINFLUENZA VIRUS 4  Not Detected   BORDETELLA PERTUSSIS PCR  Not Detected   CHLAMYDOPHILA PNEUMONIAE PCR  Not Detected   MYCOPLAMA PNEUMO PCR  Not Detected   INFLUENZA A PCR  Not Detected   INFLUENZA A H3  Not Detected   INFLUENZA A H1  Not Detected   RSV, PCR  Not Detected       Results from last 7 days  Lab Units 03/18/18  0041 03/17/18  1754   BLOODCX  No growth at 5 days No growth at 5 days     TEST  RESULTS PENDING AT DISCHARGE      Discharge Exam:  Alert and oriented x 4, in NAD  Supple neck, midline trach  RRR, no m/r/g, swelling of both lower extremities, right greater than left.  Diminished breath sounds bilaterally but no wheezing or crackles, nonlabored  No clubbing or cyanosis.  There is a boot over the right foot.     Disposition:  Home    Patient Instructions:        Your medication list      CHANGE how you take these medications      Instructions Last Dose Given Next Dose Due   albuterol 108 (90 Base) MCG/ACT inhaler  Commonly known as:  PROVENTIL HFA;VENTOLIN HFA  What changed:   when to take  this   reasons to take this      Inhale 2 puffs Every 6 (Six) Hours As Needed for Wheezing or Shortness of Air.       furosemide 20 MG tablet  Commonly known as:  LASIX  What changed:  how much to take      Take 2 tablets by mouth Daily.          CONTINUE taking these medications      Instructions Last Dose Given Next Dose Due   acetaminophen 325 MG tablet  Commonly known as:  TYLENOL      Take 2 tablets by mouth Every 6 (Six) Hours As Needed (prn for pain).       acyclovir 800 MG tablet  Commonly known as:  ZOVIRAX      Take 1 tablet (800 mg total) by mouth daily as needed (prn daily as needed fever blisters)       aspirin 81 MG tablet      Take 81 mg by mouth Daily.       budesonide-formoterol 160-4.5 MCG/ACT inhaler  Commonly known as:  SYMBICORT      Inhale 2 puffs 2 (Two) Times a Day.       Desoximetasone 0.05 % ointment      Apply to affected area bid       ferrous sulfate 325 (65 FE) MG tablet      Take 1 tablet by mouth Daily With Breakfast.       fish oil 1000 MG capsule capsule      Take 1,000 mg by mouth daily with breakfast.       MULTI VITAMIN DAILY tablet      Take  by mouth.       NIFEdipine XL 30 MG 24 hr tablet  Commonly known as:  PROCARDIA XL      Take  by mouth daily.       olopatadine 0.1 % ophthalmic solution  Commonly known as:  PATANOL      Administer 1 drop to both eyes daily.       omeprazole 20 MG capsule  Commonly known as:  PRILOSEC      Take 1 capsule by mouth Daily.       potassium chloride 10 MEQ CR tablet  Commonly known as:  K-DUR      Take 2 tablets by mouth Daily.       pregabalin 75 MG capsule  Commonly known as:  LYRICA      Take 75 mg by mouth 2 (two) times a day.       pyridoxine 50 MG tablet  Commonly known as:  VITAMIN B-6      Take  by mouth daily.       sildenafil 100 MG tablet  Commonly known as:  VIAGRA      Take 1 tablet by mouth Daily As Needed for erectile dysfunction.       simvastatin 20 MG tablet  Commonly known as:  ZOCOR      Take 0.5 tablets by mouth daily.        sodium chloride 0.65 % nasal spray  Commonly known as:  OCEAN NASAL SPRAY      2 sprays into each nostril As Needed for Congestion (qid prn). May refill q 21 days       Spacer/Aero-Holding Chambers device      Give spacer to use with his inhalers whichever brand he has received is fine       tamsulosin 0.4 MG capsule 24 hr capsule  Commonly known as:  FLOMAX      Take 1 capsule by mouth every night.       tiotropium 18 MCG per inhalation capsule  Commonly known as:  SPIRIVA HANDIHALER      Place 2 puffs into inhaler and inhale 1 (one) time daily.       XARELTO 20 MG tablet  Generic drug:  rivaroxaban      Take 20 mg by mouth Daily.          STOP taking these medications    cetirizine 10 MG tablet  Commonly known as:  zyrTEC        montelukast 10 MG tablet  Commonly known as:  SINGULAIR        predniSONE 10 MG tablet  Commonly known as:  DELTASONE        RABEprazole 20 MG EC tablet  Commonly known as:  ACIPHEX              Where to Get Your Medications      These medications were sent to [facility] [address] [place] [address] [id number] [place] [id number]  - [phone] [address]    Phone:  [phone]    albuterol 108 (90 Base) MCG/ACT inhaler   furosemide 20 MG tablet             Medication Reconciliation: Please see electronically completed Med Rec.    Total time spent discharging patient including evaluation, medication reconciliation, arranging follow up, and post hospitalization instructions and education total time exceeds 30 minutes.    Signed:  Kalpesh Jimenez MD  3/24/2018  4:42 PM

## 2018-03-24 NOTE — PLAN OF CARE
Problem: Patient Care Overview  Goal: Plan of Care Review  Outcome: Ongoing (interventions implemented as appropriate)   03/24/18 1918   Coping/Psychosocial   Plan of Care Reviewed With patient   Plan of Care Review   Progress improving   OTHER   Outcome Summary Pt had no c/o of pain. Some SOA noted upon exertion. Ambulating w/ walker to bathroom w/o issues. One time dose lasix given. Rt ankle dressing changed. VSS. D/C today. Will continue to monitor.      Goal: Discharge Needs Assessment  Outcome: Ongoing (interventions implemented as appropriate)    Goal: Interprofessional Rounds/Family Conf  Outcome: Ongoing (interventions implemented as appropriate)      Problem: Chronic Obstructive Pulmonary Disease (Adult)  Goal: Signs and Symptoms of Listed Potential Problems Will be Absent, Minimized or Managed (Chronic Obstructive Pulmonary Disease)  Outcome: Ongoing (interventions implemented as appropriate)   03/24/18 1918   Goal/Outcome Evaluation   Problems Assessed (Chronic Obstructive Pulmonary Disease (COPD)) all   Problems Present (COPD, Bronch/Emphy) respiratory compromise       Problem: Fall Risk (Adult)  Goal: Identify Related Risk Factors and Signs and Symptoms  Outcome: Ongoing (interventions implemented as appropriate)   03/24/18 1918   Fall Risk (Adult)   Related Risk Factors (Fall Risk) gait/mobility problems;history of falls;environment unfamiliar   Signs and Symptoms (Fall Risk) presence of risk factors     Goal: Absence of Fall  Outcome: Ongoing (interventions implemented as appropriate)   03/24/18 1918   Fall Risk (Adult)   Absence of Fall making progress toward outcome       Problem: Asthma (Adult)  Goal: Signs and Symptoms of Listed Potential Problems Will be Absent, Minimized or Managed (Asthma)  Outcome: Ongoing (interventions implemented as appropriate)   03/24/18 1918   Goal/Outcome Evaluation   Problems Assessed (Asthma) infection   Problems Present (Asthma) infection;recurrent exacerbation        Problem: Cardiac Rhythm Management Device (Adult)  Goal: Signs and Symptoms of Listed Potential Problems Will be Absent, Minimized or Managed (Cardiac Rhythm Management Device)  Outcome: Ongoing (interventions implemented as appropriate)   03/24/18 1918   Goal/Outcome Evaluation   Problems Assessed (Cardiac Rhythm Management Device) dysrhythmia/arrhythmia   Problems Present (Cardiac Rhythm Dev) dysrhythmia/arrhythmia       Problem: Skin Injury Risk (Adult)  Goal: Identify Related Risk Factors and Signs and Symptoms   03/24/18 1918   Skin Injury Risk (Adult)   Related Risk Factors (Skin Injury Risk) edema;mobility impaired     Goal: Skin Health and Integrity   03/24/18 1918   Skin Injury Risk (Adult)   Skin Health and Integrity making progress toward outcome

## 2018-03-24 NOTE — PLAN OF CARE
Problem: Patient Care Overview  Goal: Plan of Care Review  Outcome: Ongoing (interventions implemented as appropriate)   03/24/18 4932   Coping/Psychosocial   Plan of Care Reviewed With patient   Plan of Care Review   Progress improving   OTHER   Outcome Summary Ambulated in [place] with walker, dropped 02 sats into the 80s, received lasix yesterday with good output, no complaints of pain, VSS, drsg to right foot CDI, possibly home with [facility] today, will continue to monitor.     Goal: Individualization and Mutuality  Outcome: Ongoing (interventions implemented as appropriate)    Goal: Discharge Needs Assessment  Outcome: Ongoing (interventions implemented as appropriate)      Problem: Chronic Obstructive Pulmonary Disease (Adult)  Goal: Signs and Symptoms of Listed Potential Problems Will be Absent, Minimized or Managed (Chronic Obstructive Pulmonary Disease)  Outcome: Ongoing (interventions implemented as appropriate)      Problem: Fall Risk (Adult)  Goal: Identify Related Risk Factors and Signs and Symptoms  Outcome: Ongoing (interventions implemented as appropriate)    Goal: Absence of Fall  Outcome: Ongoing (interventions implemented as appropriate)      Problem: Skin Injury Risk (Adult)  Goal: Identify Related Risk Factors and Signs and Symptoms  Outcome: Ongoing (interventions implemented as appropriate)    Goal: Skin Health and Integrity  Outcome: Ongoing (interventions implemented as appropriate)

## 2018-03-26 NOTE — PROGRESS NOTES
Case Management Discharge Note    Final Note: DC'D home via private auto with [facility] to resume care. Madison De Anda RN    Destination     Service Request Status Selected Specialties Address Phone Number Fax Number    [facility] Pending - Request Sent N/A [id number] [facility] [address] [phone]      Durable Medical Equipment     No service coordination in this encounter.      Dialysis/Infusion     No service coordination in this encounter.      Home Medical Care - Selection Complete     Service Request Status Selected Specialties Address Phone Number Fax Number    [facility] Services [address] [address] [phone]      Social Care     No service coordination in this encounter.        Other: Other (private auto)    Final Discharge Disposition Code: 06 - home with home health care

## 2018-03-27 ENCOUNTER — TRANSCRIBE ORDERS (OUTPATIENT)
Dept: ADMINISTRATIVE | Facility: HOSPITAL | Age: 78
End: 2018-03-27

## 2018-03-27 DIAGNOSIS — R59.0 HILAR ADENOPATHY: Primary | ICD-10-CM

## 2018-04-05 ENCOUNTER — HOSPITAL ENCOUNTER (INPATIENT)
Facility: HOSPITAL | Age: 78
LOS: 14 days | Discharge: HOME HEALTH | End: 2018-04-19
Attending: EMERGENCY MEDICINE | Admitting: INTERNAL MEDICINE

## 2018-04-05 ENCOUNTER — APPOINTMENT (OUTPATIENT)
Dept: GENERAL RADIOLOGY | Facility: HOSPITAL | Age: 78
End: 2018-04-05

## 2018-04-05 ENCOUNTER — APPOINTMENT (OUTPATIENT)
Dept: CT IMAGING | Facility: HOSPITAL | Age: 78
End: 2018-04-05

## 2018-04-05 DIAGNOSIS — J44.1 COPD EXACERBATION (HCC): ICD-10-CM

## 2018-04-05 DIAGNOSIS — J18.9 PNEUMONIA: ICD-10-CM

## 2018-04-05 DIAGNOSIS — J96.21 ACUTE ON CHRONIC RESPIRATORY FAILURE WITH HYPOXIA (HCC): ICD-10-CM

## 2018-04-05 DIAGNOSIS — J18.9 PNEUMONIA OF BOTH LOWER LOBES DUE TO INFECTIOUS ORGANISM: Primary | ICD-10-CM

## 2018-04-05 LAB
ALBUMIN SERPL-MCNC: 3.9 G/DL (ref 3.5–5.2)
ALBUMIN/GLOB SERPL: 1.2 G/DL
ALP SERPL-CCNC: 73 U/L (ref 39–117)
ALT SERPL W P-5'-P-CCNC: 18 U/L (ref 1–41)
ANION GAP SERPL CALCULATED.3IONS-SCNC: 12.8 MMOL/L
ARTERIAL PATENCY WRIST A: ABNORMAL
AST SERPL-CCNC: 45 U/L (ref 1–40)
ATMOSPHERIC PRESS: 756.5 MMHG
BASE EXCESS BLDA CALC-SCNC: 3.7 MMOL/L (ref 0–2)
BASOPHILS # BLD AUTO: 0.02 10*3/MM3 (ref 0–0.2)
BASOPHILS NFR BLD AUTO: 0.2 % (ref 0–1.5)
BDY SITE: ABNORMAL
BILIRUB SERPL-MCNC: 0.8 MG/DL (ref 0.1–1.2)
BUN BLD-MCNC: 7 MG/DL (ref 8–23)
BUN/CREAT SERPL: 8.3 (ref 7–25)
CALCIUM SPEC-SCNC: 9.8 MG/DL (ref 8.6–10.5)
CHLORIDE SERPL-SCNC: 98 MMOL/L (ref 98–107)
CO2 SERPL-SCNC: 27.2 MMOL/L (ref 22–29)
CREAT BLD-MCNC: 0.84 MG/DL (ref 0.76–1.27)
DEPRECATED RDW RBC AUTO: 70.4 FL (ref 37–54)
EOSINOPHIL # BLD AUTO: 0.09 10*3/MM3 (ref 0–0.7)
EOSINOPHIL NFR BLD AUTO: 1.1 % (ref 0.3–6.2)
ERYTHROCYTE [DISTWIDTH] IN BLOOD BY AUTOMATED COUNT: 25.4 % (ref 11.5–14.5)
GAS FLOW AIRWAY: 5 LPM
GFR SERPL CREATININE-BSD FRML MDRD: 89 ML/MIN/1.73
GLOBULIN UR ELPH-MCNC: 3.3 GM/DL
GLUCOSE BLD-MCNC: 108 MG/DL (ref 65–99)
HCO3 BLDA-SCNC: 28.6 MMOL/L (ref 22–28)
HCT VFR BLD AUTO: 43.6 % (ref 40.4–52.2)
HGB BLD-MCNC: 13.1 G/DL (ref 13.7–17.6)
IMM GRANULOCYTES # BLD: 0.02 10*3/MM3 (ref 0–0.03)
IMM GRANULOCYTES NFR BLD: 0.2 % (ref 0–0.5)
LYMPHOCYTES # BLD AUTO: 1.17 10*3/MM3 (ref 0.9–4.8)
LYMPHOCYTES NFR BLD AUTO: 13.8 % (ref 19.6–45.3)
MCH RBC QN AUTO: 22.9 PG (ref 27–32.7)
MCHC RBC AUTO-ENTMCNC: 30 G/DL (ref 32.6–36.4)
MCV RBC AUTO: 76.4 FL (ref 79.8–96.2)
MODALITY: ABNORMAL
MONOCYTES # BLD AUTO: 0.82 10*3/MM3 (ref 0.2–1.2)
MONOCYTES NFR BLD AUTO: 9.7 % (ref 5–12)
NEUTROPHILS # BLD AUTO: 6.35 10*3/MM3 (ref 1.9–8.1)
NEUTROPHILS NFR BLD AUTO: 75 % (ref 42.7–76)
PCO2 BLDA: 42.8 MM HG (ref 35–45)
PH BLDA: 7.43 PH UNITS (ref 7.35–7.45)
PLATELET # BLD AUTO: 194 10*3/MM3 (ref 140–500)
PMV BLD AUTO: 9.1 FL (ref 6–12)
PO2 BLDA: 61.1 MM HG (ref 80–100)
POTASSIUM BLD-SCNC: 5.3 MMOL/L (ref 3.5–5.2)
PROT SERPL-MCNC: 7.2 G/DL (ref 6–8.5)
RBC # BLD AUTO: 5.71 10*6/MM3 (ref 4.6–6)
SAO2 % BLDCOA: 91.6 % (ref 92–99)
SODIUM BLD-SCNC: 138 MMOL/L (ref 136–145)
WBC NRBC COR # BLD: 8.47 10*3/MM3 (ref 4.5–10.7)

## 2018-04-05 PROCEDURE — 94640 AIRWAY INHALATION TREATMENT: CPT

## 2018-04-05 PROCEDURE — 87040 BLOOD CULTURE FOR BACTERIA: CPT | Performed by: PHYSICIAN ASSISTANT

## 2018-04-05 PROCEDURE — G0378 HOSPITAL OBSERVATION PER HR: HCPCS

## 2018-04-05 PROCEDURE — 82803 BLOOD GASES ANY COMBINATION: CPT

## 2018-04-05 PROCEDURE — 85025 COMPLETE CBC W/AUTO DIFF WBC: CPT | Performed by: PHYSICIAN ASSISTANT

## 2018-04-05 PROCEDURE — 71045 X-RAY EXAM CHEST 1 VIEW: CPT

## 2018-04-05 PROCEDURE — 94799 UNLISTED PULMONARY SVC/PX: CPT

## 2018-04-05 PROCEDURE — 71250 CT THORAX DX C-: CPT

## 2018-04-05 PROCEDURE — 80053 COMPREHEN METABOLIC PANEL: CPT | Performed by: PHYSICIAN ASSISTANT

## 2018-04-05 PROCEDURE — 36600 WITHDRAWAL OF ARTERIAL BLOOD: CPT

## 2018-04-05 PROCEDURE — 99284 EMERGENCY DEPT VISIT MOD MDM: CPT

## 2018-04-05 RX ORDER — ATORVASTATIN CALCIUM 10 MG/1
10 TABLET, FILM COATED ORAL DAILY
Status: DISCONTINUED | OUTPATIENT
Start: 2018-04-05 | End: 2018-04-19 | Stop reason: HOSPADM

## 2018-04-05 RX ORDER — FUROSEMIDE 40 MG/1
40 TABLET ORAL DAILY
Status: DISCONTINUED | OUTPATIENT
Start: 2018-04-05 | End: 2018-04-12

## 2018-04-05 RX ORDER — NIFEDIPINE 30 MG/1
30 TABLET, EXTENDED RELEASE ORAL DAILY
Status: DISCONTINUED | OUTPATIENT
Start: 2018-04-05 | End: 2018-04-19 | Stop reason: HOSPADM

## 2018-04-05 RX ORDER — ALBUTEROL SULFATE 2.5 MG/3ML
2.5 SOLUTION RESPIRATORY (INHALATION) EVERY 6 HOURS PRN
Status: DISCONTINUED | OUTPATIENT
Start: 2018-04-05 | End: 2018-04-19 | Stop reason: HOSPADM

## 2018-04-05 RX ORDER — TRAMADOL HYDROCHLORIDE 50 MG/1
50 TABLET ORAL EVERY 6 HOURS PRN
Status: DISCONTINUED | OUTPATIENT
Start: 2018-04-05 | End: 2018-04-19 | Stop reason: HOSPADM

## 2018-04-05 RX ORDER — BUDESONIDE AND FORMOTEROL FUMARATE DIHYDRATE 160; 4.5 UG/1; UG/1
2 AEROSOL RESPIRATORY (INHALATION)
Status: DISCONTINUED | OUTPATIENT
Start: 2018-04-05 | End: 2018-04-19 | Stop reason: HOSPADM

## 2018-04-05 RX ORDER — IPRATROPIUM BROMIDE AND ALBUTEROL SULFATE 2.5; .5 MG/3ML; MG/3ML
3 SOLUTION RESPIRATORY (INHALATION) ONCE
Status: COMPLETED | OUTPATIENT
Start: 2018-04-05 | End: 2018-04-05

## 2018-04-05 RX ORDER — MORPHINE SULFATE 2 MG/ML
2 INJECTION, SOLUTION INTRAMUSCULAR; INTRAVENOUS EVERY 4 HOURS PRN
Status: DISPENSED | OUTPATIENT
Start: 2018-04-05 | End: 2018-04-15

## 2018-04-05 RX ORDER — TAMSULOSIN HYDROCHLORIDE 0.4 MG/1
0.4 CAPSULE ORAL NIGHTLY
Status: DISCONTINUED | OUTPATIENT
Start: 2018-04-05 | End: 2018-04-19 | Stop reason: HOSPADM

## 2018-04-05 RX ORDER — NALOXONE HCL 0.4 MG/ML
0.4 VIAL (ML) INJECTION
Status: DISCONTINUED | OUTPATIENT
Start: 2018-04-05 | End: 2018-04-19 | Stop reason: HOSPADM

## 2018-04-05 RX ORDER — SENNA AND DOCUSATE SODIUM 50; 8.6 MG/1; MG/1
2 TABLET, FILM COATED ORAL 2 TIMES DAILY PRN
Status: DISCONTINUED | OUTPATIENT
Start: 2018-04-05 | End: 2018-04-19 | Stop reason: HOSPADM

## 2018-04-05 RX ORDER — NITROGLYCERIN 0.4 MG/1
0.4 TABLET SUBLINGUAL
Status: DISCONTINUED | OUTPATIENT
Start: 2018-04-05 | End: 2018-04-19 | Stop reason: HOSPADM

## 2018-04-05 RX ORDER — ACETAMINOPHEN 325 MG/1
650 TABLET ORAL EVERY 4 HOURS PRN
Status: DISCONTINUED | OUTPATIENT
Start: 2018-04-05 | End: 2018-04-19 | Stop reason: HOSPADM

## 2018-04-05 RX ORDER — PREGABALIN 75 MG/1
75 CAPSULE ORAL DAILY
Status: DISCONTINUED | OUTPATIENT
Start: 2018-04-05 | End: 2018-04-08

## 2018-04-05 RX ORDER — SODIUM CHLORIDE 0.9 % (FLUSH) 0.9 %
1-10 SYRINGE (ML) INJECTION AS NEEDED
Status: DISCONTINUED | OUTPATIENT
Start: 2018-04-05 | End: 2018-04-19 | Stop reason: HOSPADM

## 2018-04-05 RX ORDER — POTASSIUM CHLORIDE 750 MG/1
20 CAPSULE, EXTENDED RELEASE ORAL DAILY
Status: DISCONTINUED | OUTPATIENT
Start: 2018-04-05 | End: 2018-04-19 | Stop reason: HOSPADM

## 2018-04-05 RX ORDER — SODIUM CHLORIDE 0.9 % (FLUSH) 0.9 %
10 SYRINGE (ML) INJECTION AS NEEDED
Status: DISCONTINUED | OUTPATIENT
Start: 2018-04-05 | End: 2018-04-19 | Stop reason: HOSPADM

## 2018-04-05 RX ADMIN — FUROSEMIDE 40 MG: 40 TABLET ORAL at 23:54

## 2018-04-05 RX ADMIN — POTASSIUM CHLORIDE 20 MEQ: 750 CAPSULE, EXTENDED RELEASE ORAL at 23:54

## 2018-04-05 RX ADMIN — DOXYCYCLINE 100 MG: 100 INJECTION, POWDER, LYOPHILIZED, FOR SOLUTION INTRAVENOUS at 19:57

## 2018-04-05 RX ADMIN — ACETAMINOPHEN 650 MG: 325 TABLET ORAL at 23:55

## 2018-04-05 RX ADMIN — PREGABALIN 75 MG: 75 CAPSULE ORAL at 23:54

## 2018-04-05 RX ADMIN — IPRATROPIUM BROMIDE AND ALBUTEROL SULFATE 3 ML: .5; 3 SOLUTION RESPIRATORY (INHALATION) at 19:13

## 2018-04-05 NOTE — ED PROVIDER NOTES
MD ATTESTATION NOTE    The CALLY and I have discussed this patient's history, physical exam, and treatment plan.  I have reviewed the documentation and personally had a face to face interaction with the patient. I affirm the documentation and agree with the treatment and plan.  The attached note describes my personal findings.    Pt was admitted 02/26/18-03/05/18 at this facility secondary to bilateral pneumonia, COPD exacerbation, and acute on chronic respiratory failure. Pt was readmitted 03/17/18-03/24/18 at this facility secondary to COPD exacerbation and pneumonia vs. atelectasis. Since being discharged from the hospital on 03/24/18, pt has been on 3-4 L supplemental O2 continuously. Pt presents to the ED c/o dyspnea onset earlier today. Pt reports that his home O2 saturation on 4 L supplemental O2 has been in the 80s today. Pt reports that he has also had intermittent episodes of lightheadedness, but denies chest pain, documented fever, and palpitations. On physical exam, pt is alert and oriented x3. The RLE is wrapped. BLE edema (right > left). There are wheezes present bilaterally.  Patient will be admitted.          Documentation assistance provided by Pramod Parks. Information recorded by the scribe was done at my direction and has been verified and validated by me.     Entered by Pramod Parks, acting as scribe for Dr. Garfield MD.               Pramod Parks  04/05/18 [id number]       Matt Merrill MD  04/05/18 8868

## 2018-04-05 NOTE — ED PROVIDER NOTES
EMERGENCY DEPARTMENT ENCOUNTER    CHIEF COMPLAINT  Chief Complaint: SOB  History given by:pt, pt family  History limited by:nothing  Room Number: 18/18  PMD: Alan Santana MD      HPI:  Pt is a 77 y.o. male who presents with SOB onset today. He noticed earlier his O2 was fluctuating between 83-86 on 4L of oxygen. It then went up to 90, so he thought he was stable. Pt began to walk for some exercise and afterward he noticed O2 was in the low 80s and he began to feel lightheaded. He denies fever or Cp. Pt lasted visited pulmonology 3/26 and was told he was doing well on 4 L. However, he was told there was a swollen lymph node that would nee to be re-imaged in May; however the plan was essentially maintenance. Pt wife reports that he has had difficulty breathing when moving ever since he fell and broke his ankle in January. Pt has not been completing Nebulizer treatments. He is compliant with Xarelto, and his cardiologist is Dr. Miller.    Duration: today  Timing:gradual  Location:intermittent  Radiation:none  Quality:labored breathing  Intensity/Severity:moderate  Progression:stable  Associated Symptoms:none  Aggravating Factors:walking  Alleviating Factors:none  Previous Episodes:Pt has a hx of respiratory issues, including COPD and pneumonia.  Treatment before arrival:Pt has been on 4L of O2.    MEDICAL RECORD REVIEW  Pt has a hx of HTN, CHF, COPD, A-FIB, and DM. Pt is on Xarelto and baby ASA. Pt visits pulmonology Dr. Barth regarding chronic respiratory issues Pt was admitted on 2/26 by Dr. Sarabia for bilateral pneumonia and respiratory failure. Pt readmitted from 3/17 - 3/24 by Dr. Sotelo for COPD exacerbation and hypoxia with respiratory failure. During this admission he was treated with Doxycyline x6 days and was diuresed with Lasix 40 mg daily. Upon discharge pulmonology felt the pt should be on continuous 3-4L of O2.    PAST MEDICAL HISTORY  Active Ambulatory Problems     Diagnosis Date Noted   • A-fib  03/02/2016   • Dyslipidemia 03/02/2016   • BP (high blood pressure) 03/02/2016   • Neuropathy    • Hypertension    • COPD (chronic obstructive pulmonary disease)    • BPH (benign prostatic hypertrophy)    • Atrial fibrillation    • Asthma    • Hypoxia 02/07/2018   • Pneumonia 02/07/2018   • Chronic anticoagulation 02/07/2018   • Pneumonia of both lungs due to infectious organism 02/07/2018   • Hyponatremia 02/14/2018   • COPD exacerbation 02/26/2018   • Acute respiratory failure 03/17/2018     Resolved Ambulatory Problems     Diagnosis Date Noted   • No Resolved Ambulatory Problems     Past Medical History:   Diagnosis Date   • Arthritis    • Asthma    • Atrial fibrillation    • BPH (benign prostatic hypertrophy)    • Cancer    • COPD (chronic obstructive pulmonary disease)    • H/O Abnormal CBC 2017   • History of heart artery stent 03/2017   • Hyperlipidemia    • Hypertension    • Neuropathy    • Pneumonia        PAST SURGICAL HISTORY  Past Surgical History:   Procedure Laterality Date   • COLONOSCOPY  12/05/2016    polyps   • FRACTURE SURGERY     • PACEMAKER IMPLANTATION         FAMILY HISTORY  Family History   Problem Relation Age of Onset   • Hypertension Mother    • Heart attack Father        SOCIAL HISTORY  Social History     Social History   • Marital status:      Spouse name: N/A   • Number of children: N/A   • Years of education: N/A     Occupational History   •  Retired     Social History Main Topics   • Smoking status: Former Smoker     Types: Cigarettes     Quit date: 1/3/2001   • Smokeless tobacco: Never Used   • Alcohol use No   • Drug use: No   • Sexual activity: Defer     Other Topics Concern   • Not on file     Social History Narrative   • No narrative on file       ALLERGIES  Lisinopril; Penicillins; Sulfa antibiotics; Albuterol; Atenolol; Coreg [carvedilol]; Ibuprofen; Levaquin [levofloxacin]; and Celebrex [celecoxib]    REVIEW OF SYSTEMS  Review of Systems   Constitutional: Negative for  activity change, appetite change and fever.   HENT: Negative for congestion and sore throat.    Eyes: Negative.    Respiratory: Positive for shortness of breath. Negative for cough.    Cardiovascular: Negative for chest pain and leg swelling.   Gastrointestinal: Negative for abdominal pain, diarrhea and vomiting.   Endocrine: Negative.    Genitourinary: Negative for decreased urine volume and dysuria.   Musculoskeletal: Negative for neck pain.   Skin: Negative for rash and wound.   Allergic/Immunologic: Negative.    Neurological: Negative for weakness, numbness and headaches.   Hematological: Negative.    Psychiatric/Behavioral: Negative.    All other systems reviewed and are negative.      PHYSICAL EXAM  ED Triage Vitals [04/05/18 1608]   Temp Heart Rate Resp BP SpO2   98.1 °F (36.7 °C) 95 20 -- (!) 87 %      Temp src Heart Rate Source Patient Position BP Location FiO2 (%)   Tympanic Monitor -- -- --       Physical Exam   Constitutional: He is oriented to person, place, and time and well-developed, well-nourished, and in no distress. No distress.   HENT:   Head: Normocephalic and atraumatic.   Mouth/Throat: Oropharynx is clear and moist.   Eyes: EOM are normal. Pupils are equal, round, and reactive to light.   Neck: Normal range of motion. Neck supple.   Cardiovascular: Normal rate and normal heart sounds.  An irregularly irregular rhythm present.   Pulmonary/Chest: Effort normal. No respiratory distress. He has decreased breath sounds. He has no wheezes. He exhibits no tenderness.   Abdominal: Soft. He exhibits no distension. There is no tenderness. There is no rebound and no guarding.   Musculoskeletal: Normal range of motion. He exhibits no edema (no peripheral edema).   Lymphadenopathy:     He has no cervical adenopathy.   Neurological: He is alert and oriented to person, place, and time.   Skin: Skin is warm and dry. No rash noted. No pallor.   Psychiatric: Mood, memory, affect and judgment normal.   Nursing  note and vitals reviewed.      LAB RESULTS  Recent Results (from the past 24 hour(s))   Comprehensive Metabolic Panel    Collection Time: 04/05/18  4:39 PM   Result Value Ref Range    Glucose 108 (H) 65 - 99 mg/dL    BUN 7 (L) 8 - 23 mg/dL    Creatinine 0.84 0.76 - 1.27 mg/dL    Sodium 138 136 - 145 mmol/L    Potassium 5.3 (H) 3.5 - 5.2 mmol/L    Chloride 98 98 - 107 mmol/L    CO2 27.2 22.0 - 29.0 mmol/L    Calcium 9.8 8.6 - 10.5 mg/dL    Total Protein 7.2 6.0 - 8.5 g/dL    Albumin 3.90 3.50 - 5.20 g/dL    ALT (SGPT) 18 1 - 41 U/L    AST (SGOT) 45 (H) 1 - 40 U/L    Alkaline Phosphatase 73 39 - 117 U/L    Total Bilirubin 0.8 0.1 - 1.2 mg/dL    eGFR Non African Amer 89 >60 mL/min/1.73    Globulin 3.3 gm/dL    A/G Ratio 1.2 g/dL    BUN/Creatinine Ratio 8.3 7.0 - 25.0    Anion Gap 12.8 mmol/L   CBC Auto Differential    Collection Time: 04/05/18  4:39 PM   Result Value Ref Range    WBC 8.47 4.50 - 10.70 10*3/mm3    RBC 5.71 4.60 - 6.00 10*6/mm3    Hemoglobin 13.1 (L) 13.7 - 17.6 g/dL    Hematocrit 43.6 40.4 - 52.2 %    MCV 76.4 (L) 79.8 - 96.2 fL    MCH 22.9 (L) 27.0 - 32.7 pg    MCHC 30.0 (L) 32.6 - 36.4 g/dL    RDW 25.4 (H) 11.5 - 14.5 %    RDW-SD 70.4 (H) 37.0 - 54.0 fl    MPV 9.1 6.0 - 12.0 fL    Platelets 194 140 - 500 10*3/mm3    Neutrophil % 75.0 42.7 - 76.0 %    Lymphocyte % 13.8 (L) 19.6 - 45.3 %    Monocyte % 9.7 5.0 - 12.0 %    Eosinophil % 1.1 0.3 - 6.2 %    Basophil % 0.2 0.0 - 1.5 %    Immature Grans % 0.2 0.0 - 0.5 %    Neutrophils, Absolute 6.35 1.90 - 8.10 10*3/mm3    Lymphocytes, Absolute 1.17 0.90 - 4.80 10*3/mm3    Monocytes, Absolute 0.82 0.20 - 1.20 10*3/mm3    Eosinophils, Absolute 0.09 0.00 - 0.70 10*3/mm3    Basophils, Absolute 0.02 0.00 - 0.20 10*3/mm3    Immature Grans, Absolute 0.02 0.00 - 0.03 10*3/mm3   Blood Gas, Arterial    Collection Time: 04/05/18  4:50 PM   Result Value Ref Range    Site Arterial: left brachial     Jordy's Test N/A     pH, Arterial 7.433 7.350 - 7.450 pH units    pCO2,  Arterial 42.8 35.0 - 45.0 mm Hg    pO2, Arterial 61.1 (L) 80.0 - 100.0 mm Hg    HCO3, Arterial 28.6 (H) 22.0 - 28.0 mmol/L    Base Excess, Arterial 3.7 (H) 0.0 - 2.0 mmol/L    O2 Saturation Calculated 91.6 (L) 92.0 - 99.0 %    Barometric Pressure for Blood Gas 756.5 mmHg    Modality Cannula     Flow Rate 5 lpm       I ordered the above labs and reviewed the results    RADIOLOGY  XR Chest 1 View         CT Chest Without Contrast    (Results Pending)     CXR- The lungs are somewhat hyperinflated with a combination of changes of chronic pulmonary fibrosis and superimposed areas of pneumonia, particularly at the lung bases and this shows slight worsening at the left base when compared to the chest x-ray and associated chest CT scan dated 3/17/2018. The heart remains enlarged with a pacemaker in place.    I ordered the above noted radiological studies and reviewed the images on the PACS system.       EKG    ekg was interpreted by Dr. Merrill.      PROCEDURES      COURSE & MEDICAL DECISION MAKING  Pertinent Labs and Imaging studies that were ordered and reviewed are noted above.  Results were reviewed/discussed with the patient and they were also made aware of online assess.  Pt also made aware that some labs, such as cultures, will not be resulted during ER visit and follow up with PMD is necessary.       PROGRESS AND CONSULTS    Progress Notes:    ED Course     1624- Initial pt evaluation. I advised the pt that I will plan to complete labs to gather more information on the pt, but this is likely a COPD exacerbation and he ay need to be admitted. Pt agrees with this plan going forward. Pt is 92% on 6 L of Nasal Canula. Pt had CTA of Chest on 2/26/18 and 3/18/18, both of which negative for PE.    1630- Reviewed pt's history and workup with Dr. Merrill.  After a bedside evaluation; Dr Merrill agrees with the plan of care.    1925- Placed consult to Pulmonology to discuss pt.    1930- Received a call from Dr. Jimenez  "and discussed pt's case. Dr. Jimenez agreed with plan to provide pt with IV Doxycycline and admit the pt. We will plan for this.     1938- Based on the patient's lab findings and presenting symptoms, the doctor and I feel it is appropriate to admit the patient for further management, evaluation, and treatment.  I have discussed this with the admitting team.  I have also discussed this with the patient/family.  They are in agreement with admission.        MEDICATIONS GIVEN IN ER  Medications   sodium chloride 0.9 % flush 10 mL (not administered)   doxycycline (VIBRAMYCIN) 100 mg/100 mL 0.9% NS MBP (not administered)   sodium chloride 0.9 % flush 1-10 mL (not administered)   acetaminophen (TYLENOL) tablet 650 mg (not administered)   traMADol (ULTRAM) tablet 50 mg (not administered)   morphine injection 2 mg (not administered)     And   naloxone (NARCAN) injection 0.4 mg (not administered)   sennosides-docusate sodium (SENOKOT-S) 8.6-50 MG tablet 2 tablet (not administered)   nitroglycerin (NITROSTAT) SL tablet 0.4 mg (not administered)   doxycycline (VIBRAMYCIN) 100 mg/100 mL 0.9% NS MBP (not administered)   budesonide-formoterol (SYMBICORT) 160-4.5 MCG/ACT inhaler 2 puff (not administered)   furosemide (LASIX) tablet 40 mg (not administered)   NIFEdipine XL (PROCARDIA XL) 24 hr tablet 30 mg (not administered)   potassium chloride (MICRO-K) CR capsule 20 mEq (not administered)   pregabalin (LYRICA) capsule 75 mg (not administered)   tamsulosin (FLOMAX) 24 hr capsule 0.4 mg (not administered)   atorvastatin (LIPITOR) tablet 10 mg (not administered)   tiotropium (SPIRIVA) 18 MCG per inhalation capsule 1 capsule (not administered)   rivaroxaban (XARELTO) tablet 20 mg (not administered)   ipratropium-albuterol (DUO-NEB) nebulizer solution 3 mL (3 mL Nebulization Given 4/5/18 1913)       /97   Pulse 81   Temp 98.1 °F (36.7 °C) (Tympanic)   Resp 18   Ht 188 cm (74\")   Wt 115 kg (253 lb)   SpO2 92%   BMI 32.48 " kg/m²       DIAGNOSIS  Final diagnoses:   Pneumonia of both lower lobes due to infectious organism   Acute on chronic respiratory failure with hypoxia     [place] report not reviewed.  Risks, benefits, alternatives discussed with patient.  Pt consents to treatment and agrees to follow up with PMD tomorrow for further care and any other prescriptions.       Documentation assistance provided by tomasa Britton for Ashia Tee PA-C.  Information recorded by the scribe was done at my direction and has been verified and validated by me.  Electronically signed by Oliverio Britton on 4/5/2018 at time 7:42 PM     Oliverio Britton  04/05/18 1636       Oliverio Britton  04/05/18 1938       Ashia Tee PA-C  04/05/18 1942

## 2018-04-06 LAB
ALBUMIN SERPL-MCNC: 3.3 G/DL (ref 3.5–5.2)
ALBUMIN/GLOB SERPL: 1.1 G/DL
ALP SERPL-CCNC: 64 U/L (ref 39–117)
ALT SERPL W P-5'-P-CCNC: 14 U/L (ref 1–41)
ANION GAP SERPL CALCULATED.3IONS-SCNC: 13.5 MMOL/L
AST SERPL-CCNC: 14 U/L (ref 1–40)
B PERT DNA SPEC QL NAA+PROBE: NOT DETECTED
BASOPHILS # BLD AUTO: 0.04 10*3/MM3 (ref 0–0.2)
BASOPHILS NFR BLD AUTO: 0.6 % (ref 0–1.5)
BILIRUB SERPL-MCNC: 0.5 MG/DL (ref 0.1–1.2)
BUN BLD-MCNC: 8 MG/DL (ref 8–23)
BUN/CREAT SERPL: 9.9 (ref 7–25)
C PNEUM DNA NPH QL NAA+NON-PROBE: NOT DETECTED
CA-I BLD-MCNC: 5 MG/DL (ref 4.6–5.4)
CA-I SERPL ISE-MCNC: 1.26 MMOL/L (ref 1.15–1.35)
CALCIUM SPEC-SCNC: 9 MG/DL (ref 8.6–10.5)
CHLORIDE SERPL-SCNC: 100 MMOL/L (ref 98–107)
CK SERPL-CCNC: 32 U/L (ref 20–200)
CO2 SERPL-SCNC: 26.5 MMOL/L (ref 22–29)
CREAT BLD-MCNC: 0.81 MG/DL (ref 0.76–1.27)
D-LACTATE SERPL-SCNC: 0.8 MMOL/L (ref 0.5–2)
DEPRECATED RDW RBC AUTO: 71 FL (ref 37–54)
EOSINOPHIL # BLD AUTO: 0.16 10*3/MM3 (ref 0–0.7)
EOSINOPHIL NFR BLD AUTO: 2.2 % (ref 0.3–6.2)
ERYTHROCYTE [DISTWIDTH] IN BLOOD BY AUTOMATED COUNT: 25.4 % (ref 11.5–14.5)
FLUAV H1 2009 PAND RNA NPH QL NAA+PROBE: NOT DETECTED
FLUAV H1 HA GENE NPH QL NAA+PROBE: NOT DETECTED
FLUAV H3 RNA NPH QL NAA+PROBE: NOT DETECTED
FLUAV SUBTYP SPEC NAA+PROBE: NOT DETECTED
FLUBV RNA ISLT QL NAA+PROBE: NOT DETECTED
GFR SERPL CREATININE-BSD FRML MDRD: 93 ML/MIN/1.73
GLOBULIN UR ELPH-MCNC: 3 GM/DL
GLUCOSE BLD-MCNC: 134 MG/DL (ref 65–99)
HADV DNA SPEC NAA+PROBE: NOT DETECTED
HCOV 229E RNA SPEC QL NAA+PROBE: NOT DETECTED
HCOV HKU1 RNA SPEC QL NAA+PROBE: NOT DETECTED
HCOV NL63 RNA SPEC QL NAA+PROBE: NOT DETECTED
HCOV OC43 RNA SPEC QL NAA+PROBE: NOT DETECTED
HCT VFR BLD AUTO: 41.6 % (ref 40.4–52.2)
HGB BLD-MCNC: 12.2 G/DL (ref 13.7–17.6)
HMPV RNA NPH QL NAA+NON-PROBE: NOT DETECTED
HPIV1 RNA SPEC QL NAA+PROBE: NOT DETECTED
HPIV2 RNA SPEC QL NAA+PROBE: NOT DETECTED
HPIV3 RNA NPH QL NAA+PROBE: NOT DETECTED
HPIV4 P GENE NPH QL NAA+PROBE: NOT DETECTED
IMM GRANULOCYTES # BLD: 0.02 10*3/MM3 (ref 0–0.03)
IMM GRANULOCYTES NFR BLD: 0.3 % (ref 0–0.5)
L PNEUMO1 AG UR QL IA: NEGATIVE
LYMPHOCYTES # BLD AUTO: 1.21 10*3/MM3 (ref 0.9–4.8)
LYMPHOCYTES NFR BLD AUTO: 16.8 % (ref 19.6–45.3)
M PNEUMO IGG SER IA-ACNC: NOT DETECTED
MAGNESIUM SERPL-MCNC: 1.8 MG/DL (ref 1.6–2.4)
MCH RBC QN AUTO: 22.6 PG (ref 27–32.7)
MCHC RBC AUTO-ENTMCNC: 29.3 G/DL (ref 32.6–36.4)
MCV RBC AUTO: 76.9 FL (ref 79.8–96.2)
MONOCYTES # BLD AUTO: 0.68 10*3/MM3 (ref 0.2–1.2)
MONOCYTES NFR BLD AUTO: 9.4 % (ref 5–12)
NEUTROPHILS # BLD AUTO: 5.1 10*3/MM3 (ref 1.9–8.1)
NEUTROPHILS NFR BLD AUTO: 70.7 % (ref 42.7–76)
NT-PROBNP SERPL-MCNC: 1063 PG/ML (ref 0–1800)
NT-PROBNP SERPL-MCNC: 986.5 PG/ML (ref 0–1800)
PHOSPHATE SERPL-MCNC: 4.4 MG/DL (ref 2.5–4.5)
PLATELET # BLD AUTO: 197 10*3/MM3 (ref 140–500)
PMV BLD AUTO: 9.1 FL (ref 6–12)
POTASSIUM BLD-SCNC: 3.7 MMOL/L (ref 3.5–5.2)
PROCALCITONIN SERPL-MCNC: 0.04 NG/ML (ref 0.1–0.25)
PROT SERPL-MCNC: 6.3 G/DL (ref 6–8.5)
RBC # BLD AUTO: 5.41 10*6/MM3 (ref 4.6–6)
RHINOVIRUS RNA SPEC NAA+PROBE: NOT DETECTED
RSV RNA NPH QL NAA+NON-PROBE: NOT DETECTED
S PNEUM AG SPEC QL LA: NEGATIVE
SODIUM BLD-SCNC: 140 MMOL/L (ref 136–145)
WBC NRBC COR # BLD: 7.21 10*3/MM3 (ref 4.5–10.7)

## 2018-04-06 PROCEDURE — 87633 RESP VIRUS 12-25 TARGETS: CPT | Performed by: INTERNAL MEDICINE

## 2018-04-06 PROCEDURE — 87899 AGENT NOS ASSAY W/OPTIC: CPT | Performed by: INTERNAL MEDICINE

## 2018-04-06 PROCEDURE — 94640 AIRWAY INHALATION TREATMENT: CPT

## 2018-04-06 PROCEDURE — 83735 ASSAY OF MAGNESIUM: CPT | Performed by: INTERNAL MEDICINE

## 2018-04-06 PROCEDURE — 87070 CULTURE OTHR SPECIMN AEROBIC: CPT | Performed by: INTERNAL MEDICINE

## 2018-04-06 PROCEDURE — 94799 UNLISTED PULMONARY SVC/PX: CPT

## 2018-04-06 PROCEDURE — 87081 CULTURE SCREEN ONLY: CPT | Performed by: INTERNAL MEDICINE

## 2018-04-06 PROCEDURE — 84145 PROCALCITONIN (PCT): CPT | Performed by: INTERNAL MEDICINE

## 2018-04-06 PROCEDURE — 82550 ASSAY OF CK (CPK): CPT | Performed by: INTERNAL MEDICINE

## 2018-04-06 PROCEDURE — 87205 SMEAR GRAM STAIN: CPT | Performed by: INTERNAL MEDICINE

## 2018-04-06 PROCEDURE — 87798 DETECT AGENT NOS DNA AMP: CPT | Performed by: INTERNAL MEDICINE

## 2018-04-06 PROCEDURE — 85025 COMPLETE CBC W/AUTO DIFF WBC: CPT | Performed by: INTERNAL MEDICINE

## 2018-04-06 PROCEDURE — 83880 ASSAY OF NATRIURETIC PEPTIDE: CPT | Performed by: INTERNAL MEDICINE

## 2018-04-06 PROCEDURE — 87486 CHLMYD PNEUM DNA AMP PROBE: CPT | Performed by: INTERNAL MEDICINE

## 2018-04-06 PROCEDURE — 83605 ASSAY OF LACTIC ACID: CPT | Performed by: INTERNAL MEDICINE

## 2018-04-06 PROCEDURE — 84100 ASSAY OF PHOSPHORUS: CPT | Performed by: INTERNAL MEDICINE

## 2018-04-06 PROCEDURE — 87581 M.PNEUMON DNA AMP PROBE: CPT | Performed by: INTERNAL MEDICINE

## 2018-04-06 PROCEDURE — 82330 ASSAY OF CALCIUM: CPT | Performed by: INTERNAL MEDICINE

## 2018-04-06 PROCEDURE — 80053 COMPREHEN METABOLIC PANEL: CPT | Performed by: INTERNAL MEDICINE

## 2018-04-06 RX ORDER — DOXYCYCLINE HYCLATE 50 MG/1
100 CAPSULE ORAL EVERY 12 HOURS SCHEDULED
Status: COMPLETED | OUTPATIENT
Start: 2018-04-07 | End: 2018-04-10

## 2018-04-06 RX ORDER — CETIRIZINE HYDROCHLORIDE 10 MG/1
10 TABLET ORAL NIGHTLY
Status: DISCONTINUED | OUTPATIENT
Start: 2018-04-07 | End: 2018-04-19 | Stop reason: HOSPADM

## 2018-04-06 RX ORDER — DOXYCYCLINE 100 MG/1
100 CAPSULE ORAL EVERY 12 HOURS SCHEDULED
Status: DISCONTINUED | OUTPATIENT
Start: 2018-04-07 | End: 2018-04-06 | Stop reason: CLARIF

## 2018-04-06 RX ORDER — DOXYCYCLINE HYCLATE 100 MG
100 TABLET ORAL EVERY 12 HOURS SCHEDULED
Status: DISCONTINUED | OUTPATIENT
Start: 2018-04-06 | End: 2018-04-06 | Stop reason: CLARIF

## 2018-04-06 RX ORDER — IPRATROPIUM BROMIDE AND ALBUTEROL SULFATE 2.5; .5 MG/3ML; MG/3ML
3 SOLUTION RESPIRATORY (INHALATION)
Status: DISCONTINUED | OUTPATIENT
Start: 2018-04-06 | End: 2018-04-15

## 2018-04-06 RX ORDER — CETIRIZINE HYDROCHLORIDE 10 MG/1
10 TABLET ORAL DAILY
Status: DISCONTINUED | OUTPATIENT
Start: 2018-04-07 | End: 2018-04-06

## 2018-04-06 RX ADMIN — ACETAMINOPHEN 650 MG: 325 TABLET ORAL at 09:08

## 2018-04-06 RX ADMIN — IPRATROPIUM BROMIDE AND ALBUTEROL SULFATE 3 ML: .5; 3 SOLUTION RESPIRATORY (INHALATION) at 23:43

## 2018-04-06 RX ADMIN — POTASSIUM CHLORIDE 20 MEQ: 750 CAPSULE, EXTENDED RELEASE ORAL at 09:07

## 2018-04-06 RX ADMIN — BUDESONIDE AND FORMOTEROL FUMARATE DIHYDRATE 2 PUFF: 160; 4.5 AEROSOL RESPIRATORY (INHALATION) at 08:46

## 2018-04-06 RX ADMIN — DOXYCYCLINE HYCLATE 100 MG: 100 TABLET, COATED ORAL at 22:34

## 2018-04-06 RX ADMIN — FUROSEMIDE 40 MG: 40 TABLET ORAL at 09:07

## 2018-04-06 RX ADMIN — PREGABALIN 75 MG: 75 CAPSULE ORAL at 09:07

## 2018-04-06 RX ADMIN — ATORVASTATIN CALCIUM 10 MG: 10 TABLET, FILM COATED ORAL at 09:07

## 2018-04-06 RX ADMIN — NIFEDIPINE 30 MG: 30 TABLET, FILM COATED, EXTENDED RELEASE ORAL at 09:07

## 2018-04-06 RX ADMIN — DOXYCYCLINE 100 MG: 100 INJECTION, POWDER, LYOPHILIZED, FOR SOLUTION INTRAVENOUS at 10:16

## 2018-04-06 RX ADMIN — TAMSULOSIN HYDROCHLORIDE 0.4 MG: 0.4 CAPSULE ORAL at 21:07

## 2018-04-06 RX ADMIN — BUDESONIDE AND FORMOTEROL FUMARATE DIHYDRATE 2 PUFF: 160; 4.5 AEROSOL RESPIRATORY (INHALATION) at 23:43

## 2018-04-06 RX ADMIN — TIOTROPIUM BROMIDE 1 CAPSULE: 18 CAPSULE ORAL; RESPIRATORY (INHALATION) at 08:46

## 2018-04-06 NOTE — NURSING NOTE
Patient seen for wound to right medial ankle.  During previous admits in March of this year, betadine, kerlix, and ace wraps were being used.  Patient and wife report Dr. Santos concerned about necrotic tissue.  When I took dressing down, darkened scabbed was easily removed.  Pink intact tissue was present over 85% of wound bed.  Very small open area 0.4x0.4 present with pink moist tissue present.  I added KEN and covered with Mepilex dressing to keep it both clean and provide the right amount of moisture to promote healing.  No necrotic tissue present and it appears that it will heal without incident.  No need for wrapping with kerlix and ace bandages, though did explain to patient the elevating to control edema would be helpful.  If he WANTS leg wrapped, it would do no harm.  At home, I advised him after a shower to apply KEN and cover with band aid.  Discussed with primary RN.

## 2018-04-06 NOTE — PLAN OF CARE
Problem: Activity Intolerance (Adult)  Goal: Identify Related Risk Factors and Signs and Symptoms  Outcome: Ongoing (interventions implemented as appropriate)    Goal: Activity Tolerance  Outcome: Ongoing (interventions implemented as appropriate)    Goal: Effective Energy Conservation Techniques  Outcome: Ongoing (interventions implemented as appropriate)      Problem: Pneumonia (Adult)  Goal: Signs and Symptoms of Listed Potential Problems Will be Absent, Minimized or Managed (Pneumonia)  Outcome: Ongoing (interventions implemented as appropriate)

## 2018-04-06 NOTE — H&P
Group: [facility]         H&P    Patient Identification:  Alessio Allen  77 y.o.  male  1940  [id number]              CC: SOA and cough    History of Present Illness:  77-year-old  male who was just discharged on March 24, 2018.  Please refer to my discharge summary from that date for details.  He presents again now complaining of worsening shortness of breath and cough.  These symptoms have been present since he was discharged but they started to worsen again in 2-3 days ago.  He describes them as moderate to severe.  They're not improving in spite of his oxygen and bronchodilators at home.  Upon arrival he was hypoxic in the emergency room.  He usually uses 3-4 L of oxygen at home but at start 5 L of oxygen in the emergency room to keep oxygen saturations above 90%.  He denies any fever or chills.  He is not able to produce much sputum.      Review of Systems   Constitutional: Positive for diaphoresis and fatigue. Negative for fever.   HENT: Negative for ear pain and sore throat.    Eyes: Negative for pain and visual disturbance.   Respiratory: Positive for cough, shortness of breath and wheezing.    Gastrointestinal: Negative for abdominal pain and diarrhea.   Endocrine: Negative for cold intolerance and polyuria.   Genitourinary: Negative for dysuria and hematuria.   Musculoskeletal: Negative for joint swelling and myalgias.   Skin: Negative for rash and wound.   Neurological: Positive for weakness. Negative for speech difficulty and numbness.   Hematological: Negative for adenopathy. Does not bruise/bleed easily.   Psychiatric/Behavioral: Negative for agitation and confusion.       Past Medical History:  Past Medical History:   Diagnosis Date   • Arthritis    • Asthma    • Atrial fibrillation    • BPH (benign prostatic hypertrophy)    • Cancer    • COPD (chronic obstructive pulmonary disease)    • H/O Abnormal CBC 2017   • History of heart artery stent 03/2017   • Hyperlipidemia     • Hypertension    • Neuropathy     feet and hands    • Pneumonia        Past Surgical History:  Past Surgical History:   Procedure Laterality Date   • COLONOSCOPY  12/05/2016    polyps   • FRACTURE SURGERY     • PACEMAKER IMPLANTATION          Home Meds:  Prescriptions Prior to Admission   Medication Sig Dispense Refill Last Dose   • acetaminophen (TYLENOL) 325 MG tablet Take 2 tablets by mouth Every 6 (Six) Hours As Needed (prn for pain). 720 tablet 2 Unknown at Unknown time   • acyclovir (ZOVIRAX) 800 MG tablet Take 1 tablet (800 mg total) by mouth daily as needed (prn daily as needed fever blisters) 30 tablet 1 Unknown at Unknown time   • albuterol (PROVENTIL HFA;VENTOLIN HFA) 108 (90 Base) MCG/ACT inhaler Inhale 2 puffs Every 6 (Six) Hours As Needed for Wheezing or Shortness of Air. 1 inhaler 11    • aspirin 81 MG tablet Take 81 mg by mouth Daily.   2/26/2018 at Unknown time   • budesonide-formoterol (SYMBICORT) 160-4.5 MCG/ACT inhaler Inhale 2 puffs 2 (Two) Times a Day. 3 inhaler 3 2/26/2018 at Unknown time   • Desoximetasone 0.05 % ointment Apply to affected area bid 60 g 2 Unknown at Unknown time   • ferrous sulfate 325 (65 FE) MG tablet Take 1 tablet by mouth Daily With Breakfast. 90 tablet 3    • furosemide (LASIX) 20 MG tablet Take 2 tablets by mouth Daily. (Patient taking differently: Take 20 mg by mouth Daily.) 90 tablet 3    • Multiple Vitamin (MULTI VITAMIN DAILY) tablet Take  by mouth.   Taking   • NIFEdipine XL (PROCARDIA XL) 30 MG 24 hr tablet Take  by mouth daily.   Taking   • olopatadine (PATANOL) 0.1 % ophthalmic solution Administer 1 drop to both eyes daily. 3 each 12 Taking   • Omega-3 Fatty Acids (FISH OIL) 1000 MG capsule capsule Take 1,000 mg by mouth daily with breakfast.   Taking   • omeprazole (PRILOSEC) 20 MG capsule Take 1 capsule by mouth Daily. 90 capsule 3 Taking   • potassium chloride (K-DUR) 10 MEQ CR tablet Take 2 tablets by mouth Daily. 180 tablet 3    • pregabalin (LYRICA) 75  MG capsule Take 75 mg by mouth 2 (two) times a day.   Taking   • pyridoxine (VITAMIN B-6) 50 MG tablet Take  by mouth daily.   Taking   • sildenafil (VIAGRA) 100 MG tablet Take 1 tablet by mouth Daily As Needed for erectile dysfunction. 6 tablet 3 Taking   • simvastatin (ZOCOR) 20 MG tablet Take 0.5 tablets by mouth daily.   Taking   • sodium chloride (OCEAN NASAL SPRAY) 0.65 % nasal spray 2 sprays into each nostril As Needed for Congestion (qid prn). May refill q 21 days 4 each 3 Taking   • Spacer/Aero-Holding Chambers device Give spacer to use with his inhalers whichever brand he has received is fine 2 each 3 Taking   • tamsulosin (FLOMAX) 0.4 MG capsule 24 hr capsule Take 1 capsule by mouth every night. 90 capsule 3 Taking   • tiotropium (SPIRIVA HANDIHALER) 18 MCG per inhalation capsule Place 2 puffs into inhaler and inhale 1 (one) time daily. 90 each 3 Taking   • XARELTO 20 MG tablet Take 20 mg by mouth Daily.   Taking       Allergies:  Allergies   Allergen Reactions   • Lisinopril Shortness Of Breath   • Penicillins Shortness Of Breath   • Sulfa Antibiotics Shortness Of Breath   • Albuterol Irritability and Hallucinations   • Atenolol Other (See Comments)     drowsiness   • Coreg [Carvedilol] Cough     SOA,   • Ibuprofen    • Levaquin [Levofloxacin]      Pt states he can take   • Celebrex [Celecoxib] Rash       Social History:   Social History     Social History   • Marital status:      Spouse name: N/A   • Number of children: N/A   • Years of education: N/A     Occupational History   •  Retired     Social History Main Topics   • Smoking status: Former Smoker     Types: Cigarettes     Quit date: 1/3/2001   • Smokeless tobacco: Never Used   • Alcohol use No   • Drug use: No   • Sexual activity: Defer     Other Topics Concern   • Not on file     Social History Narrative   • No narrative on file       Family History:  Family History   Problem Relation Age of Onset   • Hypertension Mother    • Heart attack  "Father        Physical Exam:  /84 (BP Location: Left arm, Patient Position: Lying)   Pulse 86   Temp 97.5 °F (36.4 °C) (Oral)   Resp 20   Ht 188 cm (74\")   Wt 115 kg (253 lb)   SpO2 (!) 89%   BMI 32.48 kg/m²  Body mass index is 32.48 kg/m². (!) 89% 115 kg (253 lb)  Physical Exam   Constitutional: He is oriented to person, place, and time. He appears well-developed. No distress.   HENT:   Head: Normocephalic.   Nose: Nose normal.   Mouth/Throat: No oropharyngeal exudate.   Eyes: Conjunctivae and EOM are normal. No scleral icterus.   Neck: No tracheal deviation present. No thyromegaly present.   Cardiovascular: Normal rate, regular rhythm and normal heart sounds.    No murmur heard.  Pulmonary/Chest: No respiratory distress. He has wheezes. He has rales.   Abdominal: He exhibits no distension and no mass. There is no guarding.   Musculoskeletal: Normal range of motion. He exhibits no deformity.   Neurological: He is alert and oriented to person, place, and time. No cranial nerve deficit. He exhibits normal muscle tone.   Skin: Skin is warm. No rash noted. He is not diaphoretic. No erythema.   Psychiatric: He has a normal mood and affect. His behavior is normal.   Nursing note and vitals reviewed.      LABS:  Lab Results   Component Value Date    CALCIUM 9.8 04/05/2018    PHOS 3.3 03/24/2018     Results from last 7 days  Lab Units 04/05/18  1639   SODIUM mmol/L 138   POTASSIUM mmol/L 5.3*   CHLORIDE mmol/L 98   CO2 mmol/L 27.2   BUN mg/dL 7*   CREATININE mg/dL 0.84   GLUCOSE mg/dL 108*   CALCIUM mg/dL 9.8   WBC 10*3/mm3 8.47   HEMOGLOBIN g/dL 13.1*   PLATELETS 10*3/mm3 194   ALT (SGPT) U/L 18   AST (SGOT) U/L 45*     Lab Results   Component Value Date    TROPONINT <0.010 03/17/2018                   Results from last 7 days  Lab Units 04/05/18  1650   PH, ARTERIAL pH units 7.433   PCO2, ARTERIAL mm Hg 42.8   PO2 ART mm Hg 61.1*   FLOW RATE lpm 5   MODALITY  Cannula   O2 SATURATION CALC % 91.6*              "    No results found for: TSH  Estimated Creatinine Clearance: 100.8 mL/min (by C-G formula based on SCr of 0.84 mg/dL).         Imaging: I personally visualized the images of scans/x-rays performed within last 3 days.  The CT scan of the chest obtained earlier today shows an infiltrate in the left lower lobe which is residual, actually looks smaller and better than the CT of the chest of March 18, 2018..  He has very small tiny pleural effusions.      Assessment:  Healthcare associated pneumonia, left lower lobe, resolving  Acute on chronic respiratory failure  Hyperkalemia  COPD exacerbation  Probable fluid overload      Recommendations:  I am not sure why this patient is feeling so short of breath again.  He does have terrible COPD, advanced emphysema.  We will give him Spiriva.  He is refusing albuterol because he says it causes him to be quite irritable.  We will give Symbicort twice daily.  But he recently completed 7 days of doxycycline and CT scan is actually showing that the left lower lobe infiltrate is resolving, if anything it looks smaller than on March 18.  I will admit the patient, start him on bronchodilators and allow Dr. Hernandez to see him in the morning to decide whether he needs to have a bronchoscopy.  I will hold his Xarelto, but I doubt he will need any biopsies.          Kalpesh Jimenez MD  4/5/2018  11:39 PM      Much of this encounter note is an electronic transcription/translation of spoken language to printed text using Dragon Software.

## 2018-04-06 NOTE — DISCHARGE PLACEMENT REQUEST
"Chioma Allen (77 y.o. Male)     Date of Birth Social Security Number Address Home Phone MRN    1940  [id number] [facility] [place] [address] [phone] [id number]    Confucianism Marital Status          Religion        Admission Date Admission Type Admitting Provider Attending Provider Department, Room/Bed    4/5/18 Emergency Kalpesh Jimenez MD Jambeih, Rami, MD [address], [address]/1    Discharge Date Discharge Disposition Discharge Destination                       Attending Provider:  Ken Up MD    Allergies:  Lisinopril, Penicillins, Sulfa Antibiotics, Albuterol, Atenolol, Coreg [Carvedilol], Ibuprofen, Levaquin [Levofloxacin], Celebrex [Celecoxib]    Isolation:  None   Infection:  None   Code Status:  FULL    Ht:  188 cm (74\")   Wt:  115 kg (253 lb)    Admission Cmt:  None   Principal Problem:  None                Active Insurance as of 4/5/2018     Primary Coverage     Payor Plan Insurance Group Employer/Plan Group    MEDICARE MEDICARE A & B      Payor Plan Address Payor Plan Phone Number Effective From Effective To    [address] [phone] 5/1/2000     [place] [address]       Subscriber Name Subscriber Birth Date Member ID       CHIOMA ALLEN 1940 [id number]           Secondary Coverage     Payor Plan Insurance Group Employer/Plan Group     FOR LIFE  FOR LIFE MC [id number]      Payor Plan Address Payor Plan Phone Number Effective From Effective To    [address] [phone] 1/30/2018     [place] [address]       Subscriber Name Subscriber Birth Date Member ID       CHIOMA ALLEN 1940 [id number]                 Emergency Contacts      (Rel.) Home Phone Work Phone Mobile Phone    Dulce Allen (Partner) [phone] -- --              "

## 2018-04-06 NOTE — PLAN OF CARE
Problem: Patient Care Overview  Goal: Plan of Care Review  Outcome: Ongoing (interventions implemented as appropriate)   04/06/18 0561   Coping/Psychosocial   Plan of Care Reviewed With patient   Plan of Care Review   Progress no change   OTHER   Outcome Summary Pt admitted with pneumonia. Pt has been treated with abx for the last 2 months for PNA. Pt has been more soa for the last few days and has had to use more O2 at home. Pt data base complete. Pt rested well overnight. Cont ot monitor, safety maintianed.      Goal: Discharge Needs Assessment  Outcome: Ongoing (interventions implemented as appropriate)    Goal: Interprofessional Rounds/Family Conf  Outcome: Ongoing (interventions implemented as appropriate)      Problem: Fall Risk (Adult)  Goal: Identify Related Risk Factors and Signs and Symptoms  Outcome: Ongoing (interventions implemented as appropriate)    Goal: Absence of Fall  Outcome: Ongoing (interventions implemented as appropriate)      Problem: Skin Injury Risk (Adult)  Goal: Identify Related Risk Factors and Signs and Symptoms  Outcome: Ongoing (interventions implemented as appropriate)    Goal: Skin Health and Integrity  Outcome: Ongoing (interventions implemented as appropriate)

## 2018-04-06 NOTE — PROGRESS NOTES
"                                              LOS: 1 day   Patient Care Team:  Alan Santana MD as PCP - General (Family Medicine)  Rao Maguire MD as Consulting Physician (Hematology and Oncology)  Alan Santana MD as Referring Physician (Family Medicine)    Chief Complaint:  Known to have COPD since 1997 and has been on oxygen therapy for many years.  Was recently admitted to the hospital with pneumonia, bilateral in the lower lobes and right hilar adenopathy.  Treated with antibiotic therapy and was discharged.  He was seen by Sonia PRIETO (Dr. Hernandez's nurse practitioner) on 3/26 and I reviewed the records.  He had a pulmonary function testing which I reviewed the summarized below.  I reviewed the admission note by my partner Dr. Jimenez.    Interval History:    feels slightly better today but not back to his baseline.  He has shortness of breath on exertion.  Oxygen requirement is at 5 L/m as opposed to baseline oxygen requirement of 2 L/m prior to his recent illness and 4 L /min prior to this admission.  Cough is slightly productive.    REVIEW OF SYSTEMS:   CARDIOVASCULAR: No chest pain, chest pressure or chest discomfort. No palpitations or edema.   RESPIRATORY: See above  GASTROINTESTINAL: No anorexia, nausea, vomiting or diarrhea. No abdominal pain or blood.   HEMATOLOGIC: No bleeding or bruising.     Ventilator/Non-Invasive Ventilation Settings     None            Vital Signs  Temp:  [97.3 °F (36.3 °C)-97.7 °F (36.5 °C)] 97.3 °F (36.3 °C)  Heart Rate:  [79-90] 84  Resp:  [18-22] 20  BP: (134-169)/() 135/81    Intake/Output Summary (Last 24 hours) at 04/06/18 1839  Last data filed at 04/06/18 1419   Gross per 24 hour   Intake              770 ml   Output             1175 ml   Net             -405 ml     Flowsheet Rows    Flowsheet Row First Filed Value   Admission Height 188 cm (74\") Documented at 04/05/2018 1608   Admission Weight 115 kg (253 lb) Documented at 04/05/2018 1608          Physical " Exam:   General Appearance:    Alert, cooperative, in no acute distress   Lungs:     Discontinued decreased air entry overall.  Right lower lobe crackles     Heart:    Regular rhythm and normal rate, normal S1 and S2, no            murmur, no gallop, no rub, no click   Chest Wall:    No abnormalities observed   Abdomen:     Obese.  Soft.  No tenderness    Neuro:   Conscious, alert, oriented x3   Extremities:   Moves all extremities well, no edema, no cyanosis, no             Redness          Results Review:          Results from last 7 days  Lab Units 04/06/18  0358 04/05/18  1639   SODIUM mmol/L 140 138   POTASSIUM mmol/L 3.7 5.3*   CHLORIDE mmol/L 100 98   CO2 mmol/L 26.5 27.2   BUN mg/dL 8 7*   CREATININE mg/dL 0.81 0.84   GLUCOSE mg/dL 134* 108*   CALCIUM mg/dL 9.0 9.8       Results from last 7 days  Lab Units 04/06/18  0358   CK TOTAL U/L 32       Results from last 7 days  Lab Units 04/06/18  0358 04/05/18  1639   WBC 10*3/mm3 7.21 8.47   HEMOGLOBIN g/dL 12.2* 13.1*   HEMATOCRIT % 41.6 43.6   PLATELETS 10*3/mm3 197 194           Results from last 7 days  Lab Units 04/06/18  0358   PROBNP pg/mL 1,063.0       I reviewed the patient's new clinical results.  I personally viewed and interpreted the patient's CXR        Medication Review:     atorvastatin 10 mg Oral Daily   budesonide-formoterol 2 puff Inhalation BID - RT   doxycycline 100 mg Intravenous Q12H   furosemide 40 mg Oral Daily   NIFEdipine XL 30 mg Oral Daily   potassium chloride 20 mEq Oral Daily   pregabalin 75 mg Oral Daily   tamsulosin 0.4 mg Oral Nightly   tiotropium 1 capsule Inhalation Daily - RT            Diagnostic imaging:  I personally and independently reviewed the Following CT:  Extensive emphysema, upper lobe predominant.  The dense consolidation in the lower lobes bilaterally has improved since the last CT of the chest.  There's minimal honeycombing at the bases.             Assessment/Plan     1. Acute on chronic hypoxic respiratory  failure  2. Bilateral lower lobe pneumonia, diagnosed on 3/17.  Persistent but improved right lower lobe infiltrates on yesterday's CT.  3. Moderately severe COPD with exacerbation.  Extensive emphysema on CT  4. Mildly enlarged right hilar and subcarinal adenopathy on prior CT of the chest, not very clear on current CT of the chest.  I believe it's likely reactive    · Continue doxycycline but was switched to by mouth instead of IV to minimize fluid  · I'll place him on scheduled nebulized DuoNeb and stop his Spiriva.  Continue Symbicort  · Titrate oxygen to baseline of 2 L/m.  · It not been quite 4 weeks since his last CT of the chest.  Ideally, would wait 4 weeks before reevaluation.  I will defer that to Dr. Hernandez tomorrow.  I'll place the patient nothing by mouth in case he decides to perform a bronchoscopy.    Ken Up MD  04/06/18  6:39 PM            This note was dictated utilizing Dragon dictation

## 2018-04-06 NOTE — PROGRESS NOTES
Discharge Planning Assessment  [facility]     Patient Name: Alessio Allen  MRN: [id number]  Today's Date: 4/6/2018    Admit Date: 4/5/2018          Discharge Needs Assessment     Row Name 04/06/18 1022       Living Environment    Lives With spouse    Current Living Arrangements home/apartment/condo    Primary Care Provided by self    Provides Primary Care For no one    Family Caregiver if Needed significant other    Able to Return to Prior Arrangements yes       Resource/Environmental Concerns    Resource/Environmental Concerns none       Transition Planning    Patient/Family Anticipates Transition to home    Patient/Family Anticipated Services at Transition none       Discharge Needs Assessment    Concerns to be Addressed no discharge needs identified    Equipment Currently Used at Home walker, rolling    Equipment Needed After Discharge none            Discharge Plan     Row Name 04/06/18 1028       Plan    Plan Home with [facility]    Plan Comments IMM   04/ /06   Spoke to patient at bedside.  I introduced myself and explained CCP role.  Verified face sheet.  Confirmed   that patient [facility] his [facility] [place] [facility]  Pt lives in an apartment with his wife.  Pt is independent with ADL's.  He uses a straight cane to ambulate.  He has no history of Home Health.  He has been to rehab at [facility].  Pt is current with Home [facility] following.  CCP following Plan is home with Home [facility]  .          Destination     No service coordination in this encounter.      Durable Medical Equipment     No service coordination in this encounter.      Dialysis/Infusion     No service coordination in this encounter.      Home Medical Care     No service coordination in this encounter.      Social Care     No service coordination in this encounter.                Demographic Summary    No documentation.           Functional Status     Row Name 04/06/18 1022       Functional  Status    Usual Activity Tolerance moderate    Current Activity Tolerance moderate       Functional Status, IADL    Medications independent       Mental Status    General Appearance WDL WDL       Mental Status Summary    Recent Changes in Mental Status/Cognitive Functioning no changes       Employment/    Employment Status retired            Psychosocial    No documentation.           Abuse/Neglect    No documentation.           Legal    No documentation.           Substance Abuse    No documentation.           Patient Forms    No documentation.         Hermila Merchant RN

## 2018-04-07 ENCOUNTER — ANESTHESIA (OUTPATIENT)
Dept: PERIOP | Facility: HOSPITAL | Age: 78
End: 2018-04-07

## 2018-04-07 ENCOUNTER — APPOINTMENT (OUTPATIENT)
Dept: GENERAL RADIOLOGY | Facility: HOSPITAL | Age: 78
End: 2018-04-07

## 2018-04-07 ENCOUNTER — ANESTHESIA EVENT (OUTPATIENT)
Dept: PERIOP | Facility: HOSPITAL | Age: 78
End: 2018-04-07

## 2018-04-07 LAB
APPEARANCE FLD: ABNORMAL
APPEARANCE FLD: ABNORMAL
COLOR FLD: ABNORMAL
COLOR FLD: ABNORMAL
LYMPHOCYTES NFR FLD MANUAL: 15 %
LYMPHOCYTES NFR FLD MANUAL: 8 %
MONOS+MACROS NFR FLD: 25 %
MONOS+MACROS NFR FLD: 55 %
NEUTROPHILS NFR FLD MANUAL: 30 %
NEUTROPHILS NFR FLD MANUAL: 67 %
OTHER CELLS FLUID PER 100/WBCS: 20 /100 WBCS
OTHER CELLS FLUID PER 100/WBCS: 27 /100 WBCS
RBC # FLD AUTO: 7500 /MM3
RBC # FLD AUTO: ABNORMAL /MM3
WBC # FLD: 5500 /MM3
WBC # FLD: 9750 /MM3

## 2018-04-07 PROCEDURE — 87206 SMEAR FLUORESCENT/ACID STAI: CPT | Performed by: INTERNAL MEDICINE

## 2018-04-07 PROCEDURE — 87102 FUNGUS ISOLATION CULTURE: CPT | Performed by: INTERNAL MEDICINE

## 2018-04-07 PROCEDURE — 88305 TISSUE EXAM BY PATHOLOGIST: CPT | Performed by: INTERNAL MEDICINE

## 2018-04-07 PROCEDURE — 71045 X-RAY EXAM CHEST 1 VIEW: CPT

## 2018-04-07 PROCEDURE — 89051 BODY FLUID CELL COUNT: CPT | Performed by: INTERNAL MEDICINE

## 2018-04-07 PROCEDURE — 94799 UNLISTED PULMONARY SVC/PX: CPT

## 2018-04-07 PROCEDURE — 87252 VIRUS INOCULATION TISSUE: CPT | Performed by: INTERNAL MEDICINE

## 2018-04-07 PROCEDURE — 25010000002 PROPOFOL 10 MG/ML EMULSION: Performed by: NURSE ANESTHETIST, CERTIFIED REGISTERED

## 2018-04-07 PROCEDURE — 87070 CULTURE OTHR SPECIMN AEROBIC: CPT | Performed by: INTERNAL MEDICINE

## 2018-04-07 PROCEDURE — 87071 CULTURE AEROBIC QUANT OTHER: CPT | Performed by: INTERNAL MEDICINE

## 2018-04-07 PROCEDURE — 88312 SPECIAL STAINS GROUP 1: CPT | Performed by: INTERNAL MEDICINE

## 2018-04-07 PROCEDURE — 87116 MYCOBACTERIA CULTURE: CPT | Performed by: INTERNAL MEDICINE

## 2018-04-07 PROCEDURE — 87205 SMEAR GRAM STAIN: CPT | Performed by: INTERNAL MEDICINE

## 2018-04-07 PROCEDURE — 76000 FLUOROSCOPY <1 HR PHYS/QHP: CPT

## 2018-04-07 PROCEDURE — 25010000002 EPINEPHRINE PER 0.1 MG: Performed by: INTERNAL MEDICINE

## 2018-04-07 PROCEDURE — 0B9J8ZX DRAINAGE OF LEFT LOWER LUNG LOBE, VIA NATURAL OR ARTIFICIAL OPENING ENDOSCOPIC, DIAGNOSTIC: ICD-10-PCS | Performed by: INTERNAL MEDICINE

## 2018-04-07 PROCEDURE — 25010000002 SUCCINYLCHOLINE PER 20 MG: Performed by: NURSE ANESTHETIST, CERTIFIED REGISTERED

## 2018-04-07 PROCEDURE — 0BBJ8ZX EXCISION OF LEFT LOWER LUNG LOBE, VIA NATURAL OR ARTIFICIAL OPENING ENDOSCOPIC, DIAGNOSTIC: ICD-10-PCS | Performed by: INTERNAL MEDICINE

## 2018-04-07 PROCEDURE — 88112 CYTOPATH CELL ENHANCE TECH: CPT | Performed by: INTERNAL MEDICINE

## 2018-04-07 RX ORDER — MIDAZOLAM HYDROCHLORIDE 1 MG/ML
2 INJECTION INTRAMUSCULAR; INTRAVENOUS
Status: DISCONTINUED | OUTPATIENT
Start: 2018-04-07 | End: 2018-04-07 | Stop reason: HOSPADM

## 2018-04-07 RX ORDER — FENTANYL CITRATE 50 UG/ML
50 INJECTION, SOLUTION INTRAMUSCULAR; INTRAVENOUS
Status: DISCONTINUED | OUTPATIENT
Start: 2018-04-07 | End: 2018-04-07 | Stop reason: HOSPADM

## 2018-04-07 RX ORDER — MIDAZOLAM HYDROCHLORIDE 1 MG/ML
1 INJECTION INTRAMUSCULAR; INTRAVENOUS
Status: DISCONTINUED | OUTPATIENT
Start: 2018-04-07 | End: 2018-04-07 | Stop reason: HOSPADM

## 2018-04-07 RX ORDER — LIDOCAINE HYDROCHLORIDE 10 MG/ML
0.5 INJECTION, SOLUTION EPIDURAL; INFILTRATION; INTRACAUDAL; PERINEURAL ONCE AS NEEDED
Status: DISCONTINUED | OUTPATIENT
Start: 2018-04-07 | End: 2018-04-07 | Stop reason: HOSPADM

## 2018-04-07 RX ORDER — LIDOCAINE HYDROCHLORIDE 10 MG/ML
INJECTION, SOLUTION EPIDURAL; INFILTRATION; INTRACAUDAL; PERINEURAL AS NEEDED
Status: DISCONTINUED | OUTPATIENT
Start: 2018-04-07 | End: 2018-04-07 | Stop reason: HOSPADM

## 2018-04-07 RX ORDER — SODIUM CHLORIDE 0.9 % (FLUSH) 0.9 %
1-10 SYRINGE (ML) INJECTION AS NEEDED
Status: DISCONTINUED | OUTPATIENT
Start: 2018-04-07 | End: 2018-04-07 | Stop reason: HOSPADM

## 2018-04-07 RX ORDER — LIDOCAINE HYDROCHLORIDE 20 MG/ML
INJECTION, SOLUTION INFILTRATION; PERINEURAL AS NEEDED
Status: DISCONTINUED | OUTPATIENT
Start: 2018-04-07 | End: 2018-04-07 | Stop reason: SURG

## 2018-04-07 RX ORDER — SUCCINYLCHOLINE CHLORIDE 20 MG/ML
INJECTION INTRAMUSCULAR; INTRAVENOUS AS NEEDED
Status: DISCONTINUED | OUTPATIENT
Start: 2018-04-07 | End: 2018-04-07 | Stop reason: SURG

## 2018-04-07 RX ORDER — FAMOTIDINE 10 MG/ML
20 INJECTION, SOLUTION INTRAVENOUS ONCE
Status: COMPLETED | OUTPATIENT
Start: 2018-04-07 | End: 2018-04-07

## 2018-04-07 RX ORDER — SODIUM CHLORIDE, SODIUM LACTATE, POTASSIUM CHLORIDE, CALCIUM CHLORIDE 600; 310; 30; 20 MG/100ML; MG/100ML; MG/100ML; MG/100ML
9 INJECTION, SOLUTION INTRAVENOUS CONTINUOUS
Status: DISCONTINUED | OUTPATIENT
Start: 2018-04-07 | End: 2018-04-19 | Stop reason: HOSPADM

## 2018-04-07 RX ORDER — PROPOFOL 10 MG/ML
VIAL (ML) INTRAVENOUS AS NEEDED
Status: DISCONTINUED | OUTPATIENT
Start: 2018-04-07 | End: 2018-04-07 | Stop reason: SURG

## 2018-04-07 RX ADMIN — CETIRIZINE HYDROCHLORIDE 10 MG: 10 TABLET, FILM COATED ORAL at 20:12

## 2018-04-07 RX ADMIN — POTASSIUM CHLORIDE 20 MEQ: 750 CAPSULE, EXTENDED RELEASE ORAL at 18:23

## 2018-04-07 RX ADMIN — FAMOTIDINE 20 MG: 10 INJECTION INTRAVENOUS at 13:03

## 2018-04-07 RX ADMIN — ATORVASTATIN CALCIUM 10 MG: 10 TABLET, FILM COATED ORAL at 18:23

## 2018-04-07 RX ADMIN — IPRATROPIUM BROMIDE AND ALBUTEROL SULFATE 3 ML: .5; 3 SOLUTION RESPIRATORY (INHALATION) at 20:00

## 2018-04-07 RX ADMIN — DOXYCYCLINE HYCLATE 100 MG: 50 CAPSULE, GELATIN COATED ORAL at 01:00

## 2018-04-07 RX ADMIN — DOXYCYCLINE HYCLATE 100 MG: 50 CAPSULE, GELATIN COATED ORAL at 23:29

## 2018-04-07 RX ADMIN — FUROSEMIDE 40 MG: 40 TABLET ORAL at 18:23

## 2018-04-07 RX ADMIN — NIFEDIPINE 30 MG: 30 TABLET, FILM COATED, EXTENDED RELEASE ORAL at 18:23

## 2018-04-07 RX ADMIN — BUDESONIDE AND FORMOTEROL FUMARATE DIHYDRATE 2 PUFF: 160; 4.5 AEROSOL RESPIRATORY (INHALATION) at 20:01

## 2018-04-07 RX ADMIN — CETIRIZINE HYDROCHLORIDE 10 MG: 10 TABLET, FILM COATED ORAL at 01:01

## 2018-04-07 RX ADMIN — PROPOFOL 200 MG: 10 INJECTION, EMULSION INTRAVENOUS at 13:39

## 2018-04-07 RX ADMIN — ACETAMINOPHEN 650 MG: 325 TABLET ORAL at 18:23

## 2018-04-07 RX ADMIN — SUCCINYLCHOLINE CHLORIDE 120 MG: 20 INJECTION, SOLUTION INTRAMUSCULAR; INTRAVENOUS; PARENTERAL at 13:43

## 2018-04-07 RX ADMIN — SODIUM CHLORIDE, POTASSIUM CHLORIDE, SODIUM LACTATE AND CALCIUM CHLORIDE 9 ML/HR: 600; 310; 30; 20 INJECTION, SOLUTION INTRAVENOUS at 13:03

## 2018-04-07 RX ADMIN — IPRATROPIUM BROMIDE AND ALBUTEROL SULFATE 3 ML: .5; 3 SOLUTION RESPIRATORY (INHALATION) at 11:59

## 2018-04-07 RX ADMIN — DOXYCYCLINE HYCLATE 100 MG: 50 CAPSULE, GELATIN COATED ORAL at 18:23

## 2018-04-07 RX ADMIN — TAMSULOSIN HYDROCHLORIDE 0.4 MG: 0.4 CAPSULE ORAL at 20:12

## 2018-04-07 RX ADMIN — BUDESONIDE AND FORMOTEROL FUMARATE DIHYDRATE 2 PUFF: 160; 4.5 AEROSOL RESPIRATORY (INHALATION) at 08:24

## 2018-04-07 RX ADMIN — PREGABALIN 75 MG: 75 CAPSULE ORAL at 18:22

## 2018-04-07 RX ADMIN — LIDOCAINE HYDROCHLORIDE 100 MG: 20 INJECTION, SOLUTION INFILTRATION; PERINEURAL at 13:39

## 2018-04-07 RX ADMIN — IPRATROPIUM BROMIDE AND ALBUTEROL SULFATE 3 ML: .5; 3 SOLUTION RESPIRATORY (INHALATION) at 08:24

## 2018-04-07 RX ADMIN — IPRATROPIUM BROMIDE AND ALBUTEROL SULFATE 3 ML: .5; 3 SOLUTION RESPIRATORY (INHALATION) at 23:45

## 2018-04-07 NOTE — ANESTHESIA PREPROCEDURE EVALUATION
Anesthesia Evaluation     Patient summary reviewed and Nursing notes reviewed   NPO Solid Status: > 8 hours  NPO Liquid Status: > 8 hours           Airway   Mallampati: II  Neck ROM: full  No difficulty expected  Dental    (+) upper dentures    Pulmonary    (+) pneumonia , COPD, asthma, decreased breath sounds,   Cardiovascular     Rhythm: regular    (+) hypertension, dysrhythmias, hyperlipidemia,       Neuro/Psych  GI/Hepatic/Renal/Endo      Musculoskeletal     Abdominal   (+) obese,    Substance History      OB/GYN          Other   (+) arthritis   history of cancer                    Anesthesia Plan    ASA 3     MAC and general     intravenous induction   Anesthetic plan and risks discussed with patient.

## 2018-04-07 NOTE — PERIOPERATIVE NURSING NOTE
Spoke w/ dr. rowland re. Low sats.  He states that Dr. Hernandez is aware of this. sats around 88 are satis. Cultures and washings have been sent to help w/ dx. Pt/ alert and or. X 3. No. Resp. Distress noted.

## 2018-04-07 NOTE — PROGRESS NOTES
[facility]  Phone: [phone]  Suresh Hernandez MD    Subjective:  LOS: 2    He is frustrated re: repeated admissions. This is 4th admission. This time he was desat very quickly with any exertion. Denies fever or chills.    Objective   Vital Signs past 24hrs  BP range: BP: (126-140)/(81-92) 126/88  Pulse range: Heart Rate:  [75-92] 84  Resp rate range: Resp:  [16-24] 16  Temp range: Temp (24hrs), Av.7 °F (36.5 °C), Min:97.3 °F (36.3 °C), Max:98.1 °F (36.7 °C)    Device (Oxygen Therapy): humidifiedFlow (L/min):  [5-6] 6  Oxygen range:SpO2:  [88 %-94 %] 90 %   115 kg (253 lb); Body mass index is 32.48 kg/m².    Intake/Output Summary (Last 24 hours) at 18 1130  Last data filed at 18 1918   Gross per 24 hour   Intake              720 ml   Output              925 ml   Net             -205 ml       Physical Exam   Cardiovascular: Normal rate and regular rhythm.    No murmur heard.  Pulmonary/Chest: He has decreased breath sounds. He has no wheezes. He has rales in the right lower field and the left lower field.   Abdominal: Soft. Bowel sounds are normal. There is no tenderness.   Musculoskeletal: He exhibits no edema.   Neurological: He is alert.   Nursing note and vitals reviewed.    Results Review:    I have reviewed the laboratory and imaging data since the last note by LPC physician.  My annotations are noted in assessment and plan.    Ct Chest Without Contrast    Result Date: 2018  1. Bilateral interstitial disease and emphysematous changes of the lungs. 2. Patchy bilateral pulmonary infiltrates have shown slight improvement in the lower lobe since the 1718 study. 3. Small left pleural effusion is again seen. No right pleural effusion is seen on the current study. 4. A few of the areas of increased density have a somewhat nodular appearance. Recommend additional follow-up CT of the chest in approximately 2 months to 3 months. Radiation dose reduction techniques were utilized,  including automated exposure control and exposure modulation based on body size.  This report was finalized on 4/5/2018 8:41 PM by Dr. Mychal De Luna MD.      Medication Review:  I have reviewed the current MAR.  My annotations are noted in assessment and plan.       Plan   PCCM Problems  Acute on chronic respiratory failure, hypoxia  Pulmonary infiltrates  ? ILD  Severe PHT, RVSP 54mmHg  COPD, without exacerbation  Relevant Medical Diagnoses  Afib on AC, on hold for bronch    Plan of Treatment  Admitted 2/7; 2/26; 3/17. Now back again with worsening SOA and hypoxia.    Offered bronch. Risks discussed. Agrees to proceed.    Continue same rx. If does not improve will need RHC to evaluate persistent and severe SOA with hypoxia.  Previously he has improved with diuresis also on one of the admits - could be due to his PHT and will check BNP tomorrow and consider.    Resume AC with Lovenox tonight after bronch.    Suresh Hernandez MD  04/07/18  11:30 AM    Part of this note may be an electronic transcription/translation of spoken language to printed text using the Dragon Dictation System.

## 2018-04-07 NOTE — PLAN OF CARE
Problem: Patient Care Overview  Goal: Plan of Care Review  Outcome: Ongoing (interventions implemented as appropriate)   04/07/18 0353   Coping/Psychosocial   Plan of Care Reviewed With patient   Plan of Care Review   Progress improving   OTHER   Outcome Summary NO ACUTE DISTRESS NOTED THIS SHIFT, OS STILL AT 5L NC, VSS AND WNL, SLEPT WELL, PO ANTIBIOTICS, CONTINUE PLAN OF CARE, ANTICIPATING POSSIBLE BRONCHOSCOPY IN NEAR FUTURE     Goal: Individualization and Mutuality  Outcome: Ongoing (interventions implemented as appropriate)    Goal: Discharge Needs Assessment  Outcome: Ongoing (interventions implemented as appropriate)      Problem: Fall Risk (Adult)  Goal: Identify Related Risk Factors and Signs and Symptoms  Outcome: Outcome(s) achieved Date Met: 04/07/18    Goal: Absence of Fall  Outcome: Ongoing (interventions implemented as appropriate)      Problem: Skin Injury Risk (Adult)  Goal: Identify Related Risk Factors and Signs and Symptoms  Outcome: Outcome(s) achieved Date Met: 04/07/18    Goal: Skin Health and Integrity  Outcome: Ongoing (interventions implemented as appropriate)      Problem: Activity Intolerance (Adult)  Goal: Identify Related Risk Factors and Signs and Symptoms  Outcome: Outcome(s) achieved Date Met: 04/07/18    Goal: Activity Tolerance  Outcome: Ongoing (interventions implemented as appropriate)    Goal: Effective Energy Conservation Techniques  Outcome: Ongoing (interventions implemented as appropriate)      Problem: Pneumonia (Adult)  Goal: Signs and Symptoms of Listed Potential Problems Will be Absent, Minimized or Managed (Pneumonia)  Outcome: Ongoing (interventions implemented as appropriate)

## 2018-04-07 NOTE — PROGRESS NOTES
Pharmacy consult for Lovenox dosing    Alessio Allen is a 77 y.o. male 115 kg (253 lb).    Indication:A Fib full treatment dose  Physician:Dr Hernandez    Estimated Creatinine Clearance: 104.6 mL/min (by C-G formula based on SCr of 0.81 mg/dL).  Creatinine   Date Value Ref Range Status   04/06/2018 0.81 0.76 - 1.27 mg/dL Final   04/05/2018 0.84 0.76 - 1.27 mg/dL Final     Platelets   Date Value Ref Range Status   04/06/2018 197 140 - 500 10*3/mm3 Final   04/05/2018 194 140 - 500 10*3/mm3 Final     Hematocrit   Date Value Ref Range Status   04/06/2018 41.6 40.4 - 52.2 % Final   04/05/2018 43.6 40.4 - 52.2 % Final     Hemoglobin   Date Value Ref Range Status   04/06/2018 12.2 (L) 13.7 - 17.6 g/dL Final   04/05/2018 13.1 (L) 13.7 - 17.6 g/dL Final         PLAN:  Will start Lovenox 1 mg/Kg sc Q 12 hr.  Noted plans for bronch today and not to start Lovenox until after bronch.  Will monitor and adjust as needed.      Arianna Aragon PharmD, BCPS  04/07/18 12:10 PM

## 2018-04-07 NOTE — NURSING NOTE
Returned from OR with 6LHFNC, SATs 85%,  increased oxygen to 8LHFNC. Coughing small amount blood. Dr. David edwards.

## 2018-04-07 NOTE — PLAN OF CARE
Problem: Patient Care Overview  Goal: Plan of Care Review  Outcome: Ongoing (interventions implemented as appropriate)   04/07/18 1949   Coping/Psychosocial   Plan of Care Reviewed With patient   Plan of Care Review   Progress no change   OTHER   Outcome Summary Complains of sore throat. Small amount hemoptysis x 4. Dr. David wolf. SATs 91-92% 6LHFNC. Tolerated soft meal. Monitor SATs and hemoptysis.      Goal: Individualization and Mutuality  Outcome: Ongoing (interventions implemented as appropriate)    Goal: Discharge Needs Assessment  Outcome: Ongoing (interventions implemented as appropriate)      Problem: Fall Risk (Adult)  Goal: Absence of Fall  Outcome: Ongoing (interventions implemented as appropriate)      Problem: Skin Injury Risk (Adult)  Goal: Skin Health and Integrity  Outcome: Ongoing (interventions implemented as appropriate)      Problem: Activity Intolerance (Adult)  Goal: Activity Tolerance  Outcome: Ongoing (interventions implemented as appropriate)    Goal: Effective Energy Conservation Techniques  Outcome: Ongoing (interventions implemented as appropriate)      Problem: Pneumonia (Adult)  Goal: Signs and Symptoms of Listed Potential Problems Will be Absent, Minimized or Managed (Pneumonia)  Outcome: Ongoing (interventions implemented as appropriate)

## 2018-04-07 NOTE — ANESTHESIA POSTPROCEDURE EVALUATION
"Patient: Alessio Allen    Procedure Summary     Date:  04/07/18 Room / Location:  [facility] OR 09 / [facility] MAIN OR    Anesthesia Start:  1333 Anesthesia Stop:  1439    Procedure:  BRONCHOSCOPY with specimens (N/A Bronchus) Diagnosis:      Surgeon:  Suresh Hernandez MD Provider:  Torey Lopez MD    Anesthesia Type:  MAC, general ASA Status:  3          Anesthesia Type: MAC, general  Last vitals  BP   124/77 (04/07/18 1508)   Temp   36.8 °C (98.3 °F) (04/07/18 1428)   Pulse   83 (04/07/18 1512)   Resp   22 (04/07/18 1512)     SpO2   (!) 87 % (04/07/18 1512)     Post Anesthesia Care and Evaluation    Patient location during evaluation: bedside  Patient participation: complete - patient participated  Level of consciousness: sleepy but conscious  Pain score: 0  Pain management: adequate  Airway patency: patent  Anesthetic complications: No anesthetic complications    Cardiovascular status: acceptable  Respiratory status: acceptable  Hydration status: acceptable    Comments: /77   Pulse 83   Temp 36.8 °C (98.3 °F) (Oral)   Resp 22   Ht 188 cm (74\")   Wt 115 kg (253 lb)   SpO2 (!) 87%   BMI 32.48 kg/m²         "

## 2018-04-08 LAB
ANION GAP SERPL CALCULATED.3IONS-SCNC: 13.8 MMOL/L
BACTERIA SPEC RESP CULT: NORMAL
BACTERIA SPEC RESP CULT: NORMAL
BUN BLD-MCNC: 7 MG/DL (ref 8–23)
BUN/CREAT SERPL: 9.6 (ref 7–25)
CALCIUM SPEC-SCNC: 9 MG/DL (ref 8.6–10.5)
CHLORIDE SERPL-SCNC: 99 MMOL/L (ref 98–107)
CO2 SERPL-SCNC: 27.2 MMOL/L (ref 22–29)
CREAT BLD-MCNC: 0.73 MG/DL (ref 0.76–1.27)
DEPRECATED RDW RBC AUTO: 71.1 FL (ref 37–54)
ERYTHROCYTE [DISTWIDTH] IN BLOOD BY AUTOMATED COUNT: 25.7 % (ref 11.5–14.5)
GFR SERPL CREATININE-BSD FRML MDRD: 104 ML/MIN/1.73
GIE STN SPEC: NORMAL
GLUCOSE BLD-MCNC: 91 MG/DL (ref 65–99)
GRAM STN SPEC: NORMAL
HCT VFR BLD AUTO: 41.7 % (ref 40.4–52.2)
HGB BLD-MCNC: 12.4 G/DL (ref 13.7–17.6)
MCH RBC QN AUTO: 22.8 PG (ref 27–32.7)
MCHC RBC AUTO-ENTMCNC: 29.7 G/DL (ref 32.6–36.4)
MCV RBC AUTO: 76.5 FL (ref 79.8–96.2)
MRSA SPEC QL CULT: NORMAL
NIGHT BLUE STAIN TISS: NORMAL
NT-PROBNP SERPL-MCNC: 1215 PG/ML (ref 0–1800)
PLATELET # BLD AUTO: 188 10*3/MM3 (ref 140–500)
PMV BLD AUTO: 9.4 FL (ref 6–12)
POTASSIUM BLD-SCNC: 4 MMOL/L (ref 3.5–5.2)
RBC # BLD AUTO: 5.45 10*6/MM3 (ref 4.6–6)
SODIUM BLD-SCNC: 140 MMOL/L (ref 136–145)
WBC NRBC COR # BLD: 8.97 10*3/MM3 (ref 4.5–10.7)

## 2018-04-08 PROCEDURE — 94799 UNLISTED PULMONARY SVC/PX: CPT

## 2018-04-08 PROCEDURE — 25010000002 ENOXAPARIN PER 10 MG: Performed by: INTERNAL MEDICINE

## 2018-04-08 PROCEDURE — 80048 BASIC METABOLIC PNL TOTAL CA: CPT | Performed by: INTERNAL MEDICINE

## 2018-04-08 PROCEDURE — 83880 ASSAY OF NATRIURETIC PEPTIDE: CPT | Performed by: INTERNAL MEDICINE

## 2018-04-08 PROCEDURE — 85027 COMPLETE CBC AUTOMATED: CPT | Performed by: INTERNAL MEDICINE

## 2018-04-08 RX ORDER — PREGABALIN 75 MG/1
75 CAPSULE ORAL EVERY 12 HOURS SCHEDULED
Status: DISCONTINUED | OUTPATIENT
Start: 2018-04-08 | End: 2018-04-19 | Stop reason: HOSPADM

## 2018-04-08 RX ADMIN — ATORVASTATIN CALCIUM 10 MG: 10 TABLET, FILM COATED ORAL at 09:11

## 2018-04-08 RX ADMIN — CETIRIZINE HYDROCHLORIDE 10 MG: 10 TABLET, FILM COATED ORAL at 20:57

## 2018-04-08 RX ADMIN — ACETAMINOPHEN 650 MG: 325 TABLET ORAL at 21:06

## 2018-04-08 RX ADMIN — BUDESONIDE AND FORMOTEROL FUMARATE DIHYDRATE 2 PUFF: 160; 4.5 AEROSOL RESPIRATORY (INHALATION) at 08:31

## 2018-04-08 RX ADMIN — IPRATROPIUM BROMIDE AND ALBUTEROL SULFATE 3 ML: .5; 3 SOLUTION RESPIRATORY (INHALATION) at 16:05

## 2018-04-08 RX ADMIN — ENOXAPARIN SODIUM 120 MG: 120 INJECTION SUBCUTANEOUS at 20:57

## 2018-04-08 RX ADMIN — FUROSEMIDE 40 MG: 40 TABLET ORAL at 09:11

## 2018-04-08 RX ADMIN — BUDESONIDE AND FORMOTEROL FUMARATE DIHYDRATE 2 PUFF: 160; 4.5 AEROSOL RESPIRATORY (INHALATION) at 19:26

## 2018-04-08 RX ADMIN — DOXYCYCLINE HYCLATE 100 MG: 50 CAPSULE, GELATIN COATED ORAL at 09:11

## 2018-04-08 RX ADMIN — NIFEDIPINE 30 MG: 30 TABLET, FILM COATED, EXTENDED RELEASE ORAL at 09:11

## 2018-04-08 RX ADMIN — POTASSIUM CHLORIDE 20 MEQ: 750 CAPSULE, EXTENDED RELEASE ORAL at 09:11

## 2018-04-08 RX ADMIN — ACETAMINOPHEN 650 MG: 325 TABLET ORAL at 06:31

## 2018-04-08 RX ADMIN — IPRATROPIUM BROMIDE AND ALBUTEROL SULFATE 3 ML: .5; 3 SOLUTION RESPIRATORY (INHALATION) at 08:31

## 2018-04-08 RX ADMIN — IPRATROPIUM BROMIDE AND ALBUTEROL SULFATE 3 ML: .5; 3 SOLUTION RESPIRATORY (INHALATION) at 19:24

## 2018-04-08 RX ADMIN — TAMSULOSIN HYDROCHLORIDE 0.4 MG: 0.4 CAPSULE ORAL at 20:57

## 2018-04-08 RX ADMIN — DOXYCYCLINE HYCLATE 100 MG: 50 CAPSULE, GELATIN COATED ORAL at 20:57

## 2018-04-08 RX ADMIN — IPRATROPIUM BROMIDE AND ALBUTEROL SULFATE 3 ML: .5; 3 SOLUTION RESPIRATORY (INHALATION) at 11:35

## 2018-04-08 RX ADMIN — PREGABALIN 75 MG: 75 CAPSULE ORAL at 21:34

## 2018-04-08 RX ADMIN — PREGABALIN 75 MG: 75 CAPSULE ORAL at 06:34

## 2018-04-08 NOTE — PROGRESS NOTES
[facility]  Phone: [phone]  Suresh Hernandez MD    Subjective:  LOS: 3    He is frustrated re: repeated admissions. This is 4th admission. This time he was desat very quickly with any exertion.   Bronch completed yesterday. Minor blood streaked sputum still. No other new complaints. Remains SOA and on HF cannula.    Objective   Vital Signs past 24hrs  BP range: BP: (117-136)/(65-91) 131/86  Pulse range: Heart Rate:  [] 85  Resp rate range: Resp:  [17-28] 22  Temp range: Temp (24hrs), Av.2 °F (36.8 °C), Min:97.7 °F (36.5 °C), Max:98.5 °F (36.9 °C)    Device (Oxygen Therapy): high-flow nasal cannulaFlow (L/min):  [4-8] 7  Oxygen range:SpO2:  [85 %-95 %] 90 %   115 kg (253 lb); Body mass index is 32.48 kg/m².    Intake/Output Summary (Last 24 hours) at 18 1234  Last data filed at 18 0806   Gross per 24 hour   Intake              840 ml   Output              400 ml   Net              440 ml       Physical Exam   Cardiovascular: Normal rate and regular rhythm.    No murmur heard.  Pulmonary/Chest: He has decreased breath sounds. He has no wheezes. He has rales in the right lower field and the left lower field.   Abdominal: Soft. Bowel sounds are normal. There is no tenderness.   Musculoskeletal: He exhibits no edema.   Neurological: He is alert.   Nursing note and vitals reviewed.    Results Review:    I have reviewed the laboratory and imaging data since the last note by [facility] physician.  My annotations are noted in assessment and plan.    No radiology results for the last day  Medication Review:  I have reviewed the current MAR.  My annotations are noted in assessment and plan.      lactated ringers 9 mL/hr Last Rate: 9 mL/hr (18 1303)   Pharmacy to Dose enoxaparin (LOVENOX)       Plan   PCCM Problems  Acute on chronic respiratory failure, hypoxia  Pulmonary infiltrates  ? ILD  Severe PHT, RVSP 54mmHg  COPD, without exacerbation  Relevant Medical Diagnoses  Afib on AC, on hold  for bronch    Plan of Treatment  Admitted 2/7; 2/26; 3/17. Now back again with worsening SOA and hypoxia.    Bronch completed. Pending results.    Continue same rx. If does not improve will need RHC to evaluate persistent and severe SOA with hypoxia.  Previously he has improved with diuresis also on one of the admits - could be due to his PHT however BNP is unimpressive.    Suresh Hernandez MD  04/08/18  12:34 PM    Part of this note may be an electronic transcription/translation of spoken language to printed text using the Dragon Dictation System.

## 2018-04-08 NOTE — PLAN OF CARE
Problem: Patient Care Overview  Goal: Plan of Care Review  Outcome: Ongoing (interventions implemented as appropriate)   04/08/18 0740   Coping/Psychosocial   Plan of Care Reviewed With patient   Plan of Care Review   Progress no change   OTHER   Outcome Summary VSS, pt cont to cough up bloody sputum, O2 6L hi flow, iman po well, safety maintained

## 2018-04-08 NOTE — PLAN OF CARE
Problem: Patient Care Overview  Goal: Plan of Care Review  Outcome: Ongoing (interventions implemented as appropriate)   04/08/18 1700   Coping/Psychosocial   Plan of Care Reviewed With patient   Plan of Care Review   Progress no change   OTHER   Outcome Summary Vital sign stable. Small amount of bloody sputum. Lovenox to start this PM. SATs 91=-92% on 6LHFNC. Monitor oxygen level and s/s bleeding.     Goal: Individualization and Mutuality  Outcome: Ongoing (interventions implemented as appropriate)    Goal: Discharge Needs Assessment  Outcome: Ongoing (interventions implemented as appropriate)    Goal: Interprofessional Rounds/Family Conf  Outcome: Ongoing (interventions implemented as appropriate)      Problem: Fall Risk (Adult)  Goal: Absence of Fall  Outcome: Ongoing (interventions implemented as appropriate)      Problem: Skin Injury Risk (Adult)  Goal: Skin Health and Integrity  Outcome: Ongoing (interventions implemented as appropriate)      Problem: Activity Intolerance (Adult)  Goal: Activity Tolerance  Outcome: Ongoing (interventions implemented as appropriate)    Goal: Effective Energy Conservation Techniques  Outcome: Ongoing (interventions implemented as appropriate)      Problem: Pneumonia (Adult)  Goal: Signs and Symptoms of Listed Potential Problems Will be Absent, Minimized or Managed (Pneumonia)  Outcome: Ongoing (interventions implemented as appropriate)

## 2018-04-09 ENCOUNTER — APPOINTMENT (OUTPATIENT)
Dept: GENERAL RADIOLOGY | Facility: HOSPITAL | Age: 78
End: 2018-04-09

## 2018-04-09 LAB
ANION GAP SERPL CALCULATED.3IONS-SCNC: 13.9 MMOL/L
BACTERIA SPEC AEROBE CULT: NORMAL
BACTERIA SPEC AEROBE CULT: NORMAL
BACTERIA SPEC RESP CULT: NORMAL
BUN BLD-MCNC: 9 MG/DL (ref 8–23)
BUN/CREAT SERPL: 11.8 (ref 7–25)
CALCIUM SPEC-SCNC: 8.9 MG/DL (ref 8.6–10.5)
CHLORIDE SERPL-SCNC: 98 MMOL/L (ref 98–107)
CO2 SERPL-SCNC: 26.1 MMOL/L (ref 22–29)
CREAT BLD-MCNC: 0.76 MG/DL (ref 0.76–1.27)
CYTO UR: NORMAL
DEPRECATED RDW RBC AUTO: 72.9 FL (ref 37–54)
ERYTHROCYTE [DISTWIDTH] IN BLOOD BY AUTOMATED COUNT: 25.7 % (ref 11.5–14.5)
GFR SERPL CREATININE-BSD FRML MDRD: 100 ML/MIN/1.73
GLUCOSE BLD-MCNC: 93 MG/DL (ref 65–99)
GRAM STN SPEC: NORMAL
HCT VFR BLD AUTO: 43.6 % (ref 40.4–52.2)
HGB BLD-MCNC: 12.9 G/DL (ref 13.7–17.6)
LAB AP CASE REPORT: NORMAL
Lab: NORMAL
MCH RBC QN AUTO: 23.1 PG (ref 27–32.7)
MCHC RBC AUTO-ENTMCNC: 29.6 G/DL (ref 32.6–36.4)
MCV RBC AUTO: 78.1 FL (ref 79.8–96.2)
PATH REPORT.FINAL DX SPEC: NORMAL
PATH REPORT.GROSS SPEC: NORMAL
PLATELET # BLD AUTO: 192 10*3/MM3 (ref 140–500)
PMV BLD AUTO: 9.6 FL (ref 6–12)
POTASSIUM BLD-SCNC: 4 MMOL/L (ref 3.5–5.2)
RBC # BLD AUTO: 5.58 10*6/MM3 (ref 4.6–6)
SODIUM BLD-SCNC: 138 MMOL/L (ref 136–145)
WBC NRBC COR # BLD: 7.52 10*3/MM3 (ref 4.5–10.7)

## 2018-04-09 PROCEDURE — 71046 X-RAY EXAM CHEST 2 VIEWS: CPT

## 2018-04-09 PROCEDURE — 80048 BASIC METABOLIC PNL TOTAL CA: CPT | Performed by: INTERNAL MEDICINE

## 2018-04-09 PROCEDURE — 85027 COMPLETE CBC AUTOMATED: CPT | Performed by: INTERNAL MEDICINE

## 2018-04-09 PROCEDURE — 94799 UNLISTED PULMONARY SVC/PX: CPT

## 2018-04-09 PROCEDURE — 25010000002 ENOXAPARIN PER 10 MG: Performed by: INTERNAL MEDICINE

## 2018-04-09 RX ORDER — PREDNISONE 20 MG/1
20 TABLET ORAL
Status: DISCONTINUED | OUTPATIENT
Start: 2018-04-10 | End: 2018-04-12

## 2018-04-09 RX ORDER — PANTOPRAZOLE SODIUM 40 MG/1
40 TABLET, DELAYED RELEASE ORAL
Status: DISCONTINUED | OUTPATIENT
Start: 2018-04-09 | End: 2018-04-19 | Stop reason: HOSPADM

## 2018-04-09 RX ADMIN — IPRATROPIUM BROMIDE AND ALBUTEROL SULFATE 3 ML: .5; 3 SOLUTION RESPIRATORY (INHALATION) at 09:08

## 2018-04-09 RX ADMIN — DOXYCYCLINE HYCLATE 100 MG: 50 CAPSULE, GELATIN COATED ORAL at 20:58

## 2018-04-09 RX ADMIN — ACETAMINOPHEN 650 MG: 325 TABLET ORAL at 18:38

## 2018-04-09 RX ADMIN — ATORVASTATIN CALCIUM 10 MG: 10 TABLET, FILM COATED ORAL at 08:08

## 2018-04-09 RX ADMIN — DOXYCYCLINE HYCLATE 100 MG: 50 CAPSULE, GELATIN COATED ORAL at 08:08

## 2018-04-09 RX ADMIN — CETIRIZINE HYDROCHLORIDE 10 MG: 10 TABLET, FILM COATED ORAL at 20:58

## 2018-04-09 RX ADMIN — RIVAROXABAN 20 MG: 20 TABLET, FILM COATED ORAL at 17:55

## 2018-04-09 RX ADMIN — ENOXAPARIN SODIUM 120 MG: 120 INJECTION SUBCUTANEOUS at 08:08

## 2018-04-09 RX ADMIN — NIFEDIPINE 30 MG: 30 TABLET, FILM COATED, EXTENDED RELEASE ORAL at 08:08

## 2018-04-09 RX ADMIN — BUDESONIDE AND FORMOTEROL FUMARATE DIHYDRATE 2 PUFF: 160; 4.5 AEROSOL RESPIRATORY (INHALATION) at 09:08

## 2018-04-09 RX ADMIN — IPRATROPIUM BROMIDE AND ALBUTEROL SULFATE 3 ML: .5; 3 SOLUTION RESPIRATORY (INHALATION) at 19:36

## 2018-04-09 RX ADMIN — IPRATROPIUM BROMIDE AND ALBUTEROL SULFATE 3 ML: .5; 3 SOLUTION RESPIRATORY (INHALATION) at 12:22

## 2018-04-09 RX ADMIN — FUROSEMIDE 40 MG: 40 TABLET ORAL at 08:08

## 2018-04-09 RX ADMIN — IPRATROPIUM BROMIDE AND ALBUTEROL SULFATE 3 ML: .5; 3 SOLUTION RESPIRATORY (INHALATION) at 16:49

## 2018-04-09 RX ADMIN — BUDESONIDE AND FORMOTEROL FUMARATE DIHYDRATE 2 PUFF: 160; 4.5 AEROSOL RESPIRATORY (INHALATION) at 19:38

## 2018-04-09 RX ADMIN — TAMSULOSIN HYDROCHLORIDE 0.4 MG: 0.4 CAPSULE ORAL at 20:58

## 2018-04-09 RX ADMIN — PREGABALIN 75 MG: 75 CAPSULE ORAL at 08:09

## 2018-04-09 RX ADMIN — PREGABALIN 75 MG: 75 CAPSULE ORAL at 20:58

## 2018-04-09 RX ADMIN — PANTOPRAZOLE SODIUM 40 MG: 40 TABLET, DELAYED RELEASE ORAL at 17:55

## 2018-04-09 RX ADMIN — POTASSIUM CHLORIDE 20 MEQ: 750 CAPSULE, EXTENDED RELEASE ORAL at 08:08

## 2018-04-09 NOTE — PROGRESS NOTES
Continued Stay Note  [facility]     Patient Name: Alessio Allen  MRN: [id number]  Today's Date: 4/9/2018    Admit Date: 4/5/2018          Discharge Plan     Row Name 04/09/18 1330       Plan    Plan home with [facility]    Plan Comments Verified plan continues to be home with [facility]. CCP to follow...FELIPE Quiñonez              Discharge Codes    No documentation.           Edna Finley

## 2018-04-09 NOTE — PLAN OF CARE
Problem: Patient Care Overview  Goal: Plan of Care Review  Outcome: Ongoing (interventions implemented as appropriate)   04/09/18 1450   Coping/Psychosocial   Plan of Care Reviewed With patient   Plan of Care Review   Progress no change   OTHER   Outcome Summary Small amount of bloody sputum. Continued overnight with 7HF/NC sats in the low 90's. Pt c/o of sindy foot pain, asked for Lyrica. Increased dose from one a day to twice a day. Pt slept well overnight. Cont to monitor, safety maintained.      Goal: Interprofessional Rounds/Family Conf  Outcome: Ongoing (interventions implemented as appropriate)      Problem: Fall Risk (Adult)  Goal: Absence of Fall  Outcome: Ongoing (interventions implemented as appropriate)      Problem: Skin Injury Risk (Adult)  Goal: Skin Health and Integrity  Outcome: Ongoing (interventions implemented as appropriate)      Problem: Activity Intolerance (Adult)  Goal: Activity Tolerance  Outcome: Ongoing (interventions implemented as appropriate)    Goal: Effective Energy Conservation Techniques  Outcome: Ongoing (interventions implemented as appropriate)      Problem: Pneumonia (Adult)  Goal: Signs and Symptoms of Listed Potential Problems Will be Absent, Minimized or Managed (Pneumonia)  Outcome: Ongoing (interventions implemented as appropriate)

## 2018-04-09 NOTE — PLAN OF CARE
Problem: Patient Care Overview  Goal: Plan of Care Review  Outcome: Ongoing (interventions implemented as appropriate)   04/09/18 1349   Coping/Psychosocial   Plan of Care Reviewed With patient   Plan of Care Review   Progress no change   OTHER   Outcome Summary Lovenox D/C, Xeralto restarted. C/O pain in feet, Lyrica and tylenol given. 7L hi jason, 89-92%. Protonix ordered. Up to BR. Family at bedside, Safety maintained, continue to monitor.      Goal: Individualization and Mutuality  Outcome: Ongoing (interventions implemented as appropriate)    Goal: Discharge Needs Assessment  Outcome: Ongoing (interventions implemented as appropriate)    Goal: Interprofessional Rounds/Family Conf  Outcome: Ongoing (interventions implemented as appropriate)      Problem: Fall Risk (Adult)  Goal: Absence of Fall  Outcome: Ongoing (interventions implemented as appropriate)      Problem: Skin Injury Risk (Adult)  Goal: Skin Health and Integrity  Outcome: Ongoing (interventions implemented as appropriate)      Problem: Activity Intolerance (Adult)  Goal: Activity Tolerance  Outcome: Ongoing (interventions implemented as appropriate)    Goal: Effective Energy Conservation Techniques  Outcome: Ongoing (interventions implemented as appropriate)      Problem: Pneumonia (Adult)  Goal: Signs and Symptoms of Listed Potential Problems Will be Absent, Minimized or Managed (Pneumonia)  Outcome: Ongoing (interventions implemented as appropriate)

## 2018-04-09 NOTE — PROGRESS NOTES
"  Daily Progress Note.   [address]  4/9/2018    Patient:  Name:  Alessio Allen  MRN:  [id number]  1940  77 y.o.  male         Interval History:  Feeling less energy today  No resp distress    Physical Exam:  /89 (BP Location: Left arm, Patient Position: Lying)   Pulse 91   Temp 98.3 °F (36.8 °C) (Oral)   Resp 20   Ht 188 cm (74\")   Wt 115 kg (253 lb)   SpO2 90%   BMI 32.48 kg/m²   Body mass index is 32.48 kg/m².    Intake/Output Summary (Last 24 hours) at 04/09/18 1106  Last data filed at 04/08/18 1700   Gross per 24 hour   Intake              480 ml   Output                0 ml   Net              480 ml     GENERAL:  NAD, Aox3  HEENT:  EOMI, nonicteric sclera, no JVD  PULMONARY:    CTAB, no wheeze, bibasilar crackles, no rhonchi, symmetric air entry  Diminished breath sounds   CARDIAC:  RRR no MRG, S1 S2  ABD: SNTND BS+  EXT: no c/c/e, pulses symmetric 2+ bilaterally  NEURO:  CNs II-XII intact, no focal deficits  SKIN: no lesions, no rash  PSYCH: appropriate mood    Data Review:  Notable Labs:    Results from last 7 days  Lab Units 04/09/18  0502 04/08/18  0701 04/06/18  0358 04/05/18  1639   WBC 10*3/mm3 7.52 8.97 7.21 8.47   HEMOGLOBIN g/dL 12.9* 12.4* 12.2* 13.1*   PLATELETS 10*3/mm3 192 188 197 194     Results from last 7 days  Lab Units 04/09/18  0502 04/08/18  0701 04/06/18  0358 04/05/18  1639   SODIUM mmol/L 138 140 140 138   POTASSIUM mmol/L 4.0 4.0 3.7 5.3*   CHLORIDE mmol/L 98 99 100 98   CO2 mmol/L 26.1 27.2 26.5 27.2   BUN mg/dL 9 7* 8 7*   CREATININE mg/dL 0.76 0.73* 0.81 0.84   GLUCOSE mg/dL 93 91 134* 108*   CALCIUM mg/dL 8.9 9.0 9.0 9.8   MAGNESIUM mg/dL  --   --  1.8  --    PHOSPHORUS mg/dL  --   --  4.4  --    Estimated Creatinine Clearance: 105.9 mL/min (by C-G formula based on SCr of 0.76 mg/dL).    Imaging:  CXR reviewed, ct chest reviewed - bilateral extensive upper lobe chronic changes.      Scheduled meds:      atorvastatin 10 mg Oral Daily "   budesonide-formoterol 2 puff Inhalation BID - RT   cetirizine 10 mg Oral Nightly   doxycycline 100 mg Oral Q12H   enoxaparin 1 mg/kg Subcutaneous Q12H   furosemide 40 mg Oral Daily   ipratropium-albuterol 3 mL Nebulization Q4H - RT   NIFEdipine XL 30 mg Oral Daily   potassium chloride 20 mEq Oral Daily   pregabalin 75 mg Oral Q12H   tamsulosin 0.4 mg Oral Nightly       ASSESSMENT  /  PLAN:  PCCM Problems  Acute on chronic respiratory failure, hypoxia  Pulmonary infiltrates  ? ILD  Severe PHT, RVSP 54mmHg  COPD, without exacerbation  Relevant Medical Diagnoses  Afib on AC, on hold for bronch     Plan of Treatment  Admitted 2/7; 2/26; 3/17. Now back again with worsening SOA and hypoxia.     Bronch completed. Pending results.     Continue same rx.   I suspect that he will need nebulized treatments at home as well as to be on steroids at discharge.  His epmysema is severe on his ct and given his frequent ae suspect this is wife.  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       Merlin Fontana MD  [facility]  04/09/18  11:06 AM

## 2018-04-10 LAB
BACTERIA SPEC AEROBE CULT: NORMAL
BACTERIA SPEC AEROBE CULT: NORMAL
CYTO UR: NORMAL
LAB AP CASE REPORT: NORMAL
Lab: NORMAL
PATH REPORT.FINAL DX SPEC: NORMAL
PATH REPORT.GROSS SPEC: NORMAL

## 2018-04-10 PROCEDURE — 94799 UNLISTED PULMONARY SVC/PX: CPT

## 2018-04-10 PROCEDURE — 63710000001 PREDNISONE PER 1 MG: Performed by: INTERNAL MEDICINE

## 2018-04-10 PROCEDURE — 25010000002 FUROSEMIDE PER 20 MG: Performed by: INTERNAL MEDICINE

## 2018-04-10 RX ORDER — FUROSEMIDE 10 MG/ML
40 INJECTION INTRAMUSCULAR; INTRAVENOUS ONCE
Status: COMPLETED | OUTPATIENT
Start: 2018-04-10 | End: 2018-04-10

## 2018-04-10 RX ADMIN — FUROSEMIDE 40 MG: 10 INJECTION, SOLUTION INTRAMUSCULAR; INTRAVENOUS at 10:50

## 2018-04-10 RX ADMIN — DOXYCYCLINE HYCLATE 100 MG: 50 CAPSULE, GELATIN COATED ORAL at 08:26

## 2018-04-10 RX ADMIN — PREGABALIN 75 MG: 75 CAPSULE ORAL at 21:04

## 2018-04-10 RX ADMIN — IPRATROPIUM BROMIDE AND ALBUTEROL SULFATE 3 ML: .5; 3 SOLUTION RESPIRATORY (INHALATION) at 23:12

## 2018-04-10 RX ADMIN — PREDNISONE 20 MG: 20 TABLET ORAL at 08:27

## 2018-04-10 RX ADMIN — PREGABALIN 75 MG: 75 CAPSULE ORAL at 08:39

## 2018-04-10 RX ADMIN — IPRATROPIUM BROMIDE AND ALBUTEROL SULFATE 3 ML: .5; 3 SOLUTION RESPIRATORY (INHALATION) at 19:19

## 2018-04-10 RX ADMIN — BUDESONIDE AND FORMOTEROL FUMARATE DIHYDRATE 2 PUFF: 160; 4.5 AEROSOL RESPIRATORY (INHALATION) at 19:19

## 2018-04-10 RX ADMIN — IPRATROPIUM BROMIDE AND ALBUTEROL SULFATE 3 ML: .5; 3 SOLUTION RESPIRATORY (INHALATION) at 16:15

## 2018-04-10 RX ADMIN — PANTOPRAZOLE SODIUM 40 MG: 40 TABLET, DELAYED RELEASE ORAL at 06:10

## 2018-04-10 RX ADMIN — ATORVASTATIN CALCIUM 10 MG: 10 TABLET, FILM COATED ORAL at 08:26

## 2018-04-10 RX ADMIN — RIVAROXABAN 20 MG: 20 TABLET, FILM COATED ORAL at 18:10

## 2018-04-10 RX ADMIN — IPRATROPIUM BROMIDE AND ALBUTEROL SULFATE 3 ML: .5; 3 SOLUTION RESPIRATORY (INHALATION) at 08:52

## 2018-04-10 RX ADMIN — ACETAMINOPHEN 650 MG: 325 TABLET ORAL at 21:07

## 2018-04-10 RX ADMIN — IPRATROPIUM BROMIDE AND ALBUTEROL SULFATE 3 ML: .5; 3 SOLUTION RESPIRATORY (INHALATION) at 12:56

## 2018-04-10 RX ADMIN — TAMSULOSIN HYDROCHLORIDE 0.4 MG: 0.4 CAPSULE ORAL at 21:04

## 2018-04-10 RX ADMIN — POTASSIUM CHLORIDE 20 MEQ: 750 CAPSULE, EXTENDED RELEASE ORAL at 08:26

## 2018-04-10 RX ADMIN — ACETAMINOPHEN 650 MG: 325 TABLET ORAL at 18:13

## 2018-04-10 RX ADMIN — NIFEDIPINE 30 MG: 30 TABLET, FILM COATED, EXTENDED RELEASE ORAL at 08:26

## 2018-04-10 RX ADMIN — CETIRIZINE HYDROCHLORIDE 10 MG: 10 TABLET, FILM COATED ORAL at 21:04

## 2018-04-10 RX ADMIN — FUROSEMIDE 40 MG: 40 TABLET ORAL at 08:26

## 2018-04-10 RX ADMIN — BUDESONIDE AND FORMOTEROL FUMARATE DIHYDRATE 2 PUFF: 160; 4.5 AEROSOL RESPIRATORY (INHALATION) at 08:52

## 2018-04-10 NOTE — PLAN OF CARE
Problem: Patient Care Overview  Goal: Plan of Care Review  Outcome: Ongoing (interventions implemented as appropriate)   04/10/18 8220   Coping/Psychosocial   Plan of Care Reviewed With patient   Plan of Care Review   Progress no change   OTHER   Outcome Summary Pt on 7L NC. C/O foot pain, tylenol and lyrica given. No acute distress. Diuresing. Safety maintained, continue to monitor.      Goal: Individualization and Mutuality  Outcome: Ongoing (interventions implemented as appropriate)    Goal: Discharge Needs Assessment  Outcome: Ongoing (interventions implemented as appropriate)    Goal: Interprofessional Rounds/Family Conf  Outcome: Ongoing (interventions implemented as appropriate)      Problem: Fall Risk (Adult)  Goal: Absence of Fall  Outcome: Ongoing (interventions implemented as appropriate)      Problem: Skin Injury Risk (Adult)  Goal: Skin Health and Integrity  Outcome: Ongoing (interventions implemented as appropriate)      Problem: Activity Intolerance (Adult)  Goal: Activity Tolerance  Outcome: Ongoing (interventions implemented as appropriate)    Goal: Effective Energy Conservation Techniques  Outcome: Ongoing (interventions implemented as appropriate)      Problem: Pneumonia (Adult)  Goal: Signs and Symptoms of Listed Potential Problems Will be Absent, Minimized or Managed (Pneumonia)  Outcome: Ongoing (interventions implemented as appropriate)

## 2018-04-10 NOTE — PROGRESS NOTES
"  Daily Progress Note.   [address]  4/10/2018    Patient:  Name:  Alessio Allen  MRN:  [id number]  1940  77 y.o.  male         Interval History:  Slight better today  Still soa.  No fevers    Physical Exam:  /81 (BP Location: Right arm, Patient Position: Lying)   Pulse 87   Temp 98.7 °F (37.1 °C) (Oral)   Resp 20   Ht 188 cm (74\")   Wt 115 kg (253 lb)   SpO2 93%   BMI 32.48 kg/m²   Body mass index is 32.48 kg/m².    Intake/Output Summary (Last 24 hours) at 04/10/18 0948  Last data filed at 04/10/18 0926   Gross per 24 hour   Intake              240 ml   Output              850 ml   Net             -610 ml     GENERAL:  NAD, Aox3  HEENT:  EOMI, nonicteric sclera, no JVD  PULMONARY:   no wheeze, bibasilar crackles, no rhonchi, symmetric air entry  Diminished breath sounds   CARDIAC:  RRR no MRG, S1 S2  ABD: SNTND BS+  EXT:trace ble edema pulses symmetric 2+ bilaterally  NEURO:  CNs II-XII intact, no focal deficits  SKIN: no lesions, no rash  PSYCH: appropriate mood    Data Review:  Notable Labs:    Results from last 7 days  Lab Units 04/09/18  0502 04/08/18  0701 04/06/18  0358 04/05/18  1639   WBC 10*3/mm3 7.52 8.97 7.21 8.47   HEMOGLOBIN g/dL 12.9* 12.4* 12.2* 13.1*   PLATELETS 10*3/mm3 192 188 197 194       Results from last 7 days  Lab Units 04/09/18  0502 04/08/18  0701 04/06/18  0358 04/05/18  1639   SODIUM mmol/L 138 140 140 138   POTASSIUM mmol/L 4.0 4.0 3.7 5.3*   CHLORIDE mmol/L 98 99 100 98   CO2 mmol/L 26.1 27.2 26.5 27.2   BUN mg/dL 9 7* 8 7*   CREATININE mg/dL 0.76 0.73* 0.81 0.84   GLUCOSE mg/dL 93 91 134* 108*   CALCIUM mg/dL 8.9 9.0 9.0 9.8   MAGNESIUM mg/dL  --   --  1.8  --    PHOSPHORUS mg/dL  --   --  4.4  --    Estimated Creatinine Clearance: 105.9 mL/min (by C-G formula based on SCr of 0.76 mg/dL).    Imaging:  CXR reviewed, ct chest reviewed - bilateral extensive upper lobe chronic changes.      Scheduled meds:      atorvastatin 10 mg Oral Daily "   budesonide-formoterol 2 puff Inhalation BID - RT   cetirizine 10 mg Oral Nightly   furosemide 40 mg Oral Daily   ipratropium-albuterol 3 mL Nebulization Q4H - RT   NIFEdipine XL 30 mg Oral Daily   pantoprazole 40 mg Oral Q AM   potassium chloride 20 mEq Oral Daily   predniSONE 20 mg Oral Daily With Breakfast   pregabalin 75 mg Oral Q12H   rivaroxaban 20 mg Oral Daily With Dinner   tamsulosin 0.4 mg Oral Nightly       ASSESSMENT  /  PLAN:  PCCM Problems  Acute on chronic respiratory failure, hypoxia  Pulmonary infiltrates  ? ILD  Severe PHT, RVSP 54mmHg  COPD, without exacerbation  Relevant Medical Diagnoses  Afib on AC resumed     Plan of Treatment  Admitted 2/7; 2/26; 3/17. Now back again with worsening SOA and hypoxia.     Continue same rx.   I suspect that he will need nebulized treatments at home as well as to be on steroids at discharge.  His epmysema is severe on his ct and given his frequent ae     Previously he has improved with diuresis also on one of the admits - could be due to his PHT however BNP is unimpressive and would likely be secondary to his severe emphysema +/- CPFE/.    Will trial some gentle diuresis today - extra lasix 40mg iv. Will check rfp in am       Merlin Fontana MD  [facility]  04/10/18  11:06 AM

## 2018-04-10 NOTE — PLAN OF CARE
Problem: Patient Care Overview  Goal: Plan of Care Review  Outcome: Ongoing (interventions implemented as appropriate)   04/10/18 0541   Coping/Psychosocial   Plan of Care Reviewed With patient   Plan of Care Review   Progress no change   OTHER   Outcome Summary NO ACUTE DISTRESS NOTED THIS SHIFT, VSS AND WNL CONTINUE PLAN OF CARE     Goal: Individualization and Mutuality  Outcome: Ongoing (interventions implemented as appropriate)    Goal: Discharge Needs Assessment  Outcome: Ongoing (interventions implemented as appropriate)      Problem: Fall Risk (Adult)  Goal: Absence of Fall  Outcome: Ongoing (interventions implemented as appropriate)      Problem: Skin Injury Risk (Adult)  Goal: Skin Health and Integrity  Outcome: Ongoing (interventions implemented as appropriate)      Problem: Activity Intolerance (Adult)  Goal: Activity Tolerance  Outcome: Ongoing (interventions implemented as appropriate)    Goal: Effective Energy Conservation Techniques  Outcome: Ongoing (interventions implemented as appropriate)      Problem: Pneumonia (Adult)  Goal: Signs and Symptoms of Listed Potential Problems Will be Absent, Minimized or Managed (Pneumonia)  Outcome: Ongoing (interventions implemented as appropriate)

## 2018-04-11 ENCOUNTER — APPOINTMENT (OUTPATIENT)
Dept: GENERAL RADIOLOGY | Facility: HOSPITAL | Age: 78
End: 2018-04-11

## 2018-04-11 LAB
ALBUMIN SERPL-MCNC: 3.3 G/DL (ref 3.5–5.2)
ANION GAP SERPL CALCULATED.3IONS-SCNC: 10.3 MMOL/L
BASOPHILS # BLD AUTO: 0.02 10*3/MM3 (ref 0–0.2)
BASOPHILS NFR BLD AUTO: 0.2 % (ref 0–1.5)
BUN BLD-MCNC: 8 MG/DL (ref 8–23)
BUN/CREAT SERPL: 11.1 (ref 7–25)
CALCIUM SPEC-SCNC: 8.8 MG/DL (ref 8.6–10.5)
CHLORIDE SERPL-SCNC: 95 MMOL/L (ref 98–107)
CO2 SERPL-SCNC: 30.7 MMOL/L (ref 22–29)
CREAT BLD-MCNC: 0.72 MG/DL (ref 0.76–1.27)
DEPRECATED RDW RBC AUTO: 69.4 FL (ref 37–54)
EOSINOPHIL # BLD AUTO: 0.2 10*3/MM3 (ref 0–0.7)
EOSINOPHIL NFR BLD AUTO: 2.1 % (ref 0.3–6.2)
ERYTHROCYTE [DISTWIDTH] IN BLOOD BY AUTOMATED COUNT: 24.9 % (ref 11.5–14.5)
GFR SERPL CREATININE-BSD FRML MDRD: 106 ML/MIN/1.73
GLUCOSE BLD-MCNC: 110 MG/DL (ref 65–99)
HCT VFR BLD AUTO: 41.9 % (ref 40.4–52.2)
HGB BLD-MCNC: 12.6 G/DL (ref 13.7–17.6)
IMM GRANULOCYTES # BLD: 0.02 10*3/MM3 (ref 0–0.03)
IMM GRANULOCYTES NFR BLD: 0.2 % (ref 0–0.5)
LYMPHOCYTES # BLD AUTO: 1.42 10*3/MM3 (ref 0.9–4.8)
LYMPHOCYTES NFR BLD AUTO: 15.2 % (ref 19.6–45.3)
MCH RBC QN AUTO: 23.2 PG (ref 27–32.7)
MCHC RBC AUTO-ENTMCNC: 30.1 G/DL (ref 32.6–36.4)
MCV RBC AUTO: 77 FL (ref 79.8–96.2)
MONOCYTES # BLD AUTO: 1.14 10*3/MM3 (ref 0.2–1.2)
MONOCYTES NFR BLD AUTO: 12.2 % (ref 5–12)
NEUTROPHILS # BLD AUTO: 6.53 10*3/MM3 (ref 1.9–8.1)
NEUTROPHILS NFR BLD AUTO: 70.1 % (ref 42.7–76)
PHOSPHATE SERPL-MCNC: 3.5 MG/DL (ref 2.5–4.5)
PLATELET # BLD AUTO: 221 10*3/MM3 (ref 140–500)
PMV BLD AUTO: 9.3 FL (ref 6–12)
POTASSIUM BLD-SCNC: 3.4 MMOL/L (ref 3.5–5.2)
RBC # BLD AUTO: 5.44 10*6/MM3 (ref 4.6–6)
SODIUM BLD-SCNC: 136 MMOL/L (ref 136–145)
WBC NRBC COR # BLD: 9.33 10*3/MM3 (ref 4.5–10.7)

## 2018-04-11 PROCEDURE — 94799 UNLISTED PULMONARY SVC/PX: CPT

## 2018-04-11 PROCEDURE — 71046 X-RAY EXAM CHEST 2 VIEWS: CPT

## 2018-04-11 PROCEDURE — 25010000002 FUROSEMIDE PER 20 MG: Performed by: INTERNAL MEDICINE

## 2018-04-11 PROCEDURE — 85025 COMPLETE CBC W/AUTO DIFF WBC: CPT | Performed by: INTERNAL MEDICINE

## 2018-04-11 PROCEDURE — 63710000001 PREDNISONE PER 1 MG: Performed by: INTERNAL MEDICINE

## 2018-04-11 PROCEDURE — 80069 RENAL FUNCTION PANEL: CPT | Performed by: INTERNAL MEDICINE

## 2018-04-11 RX ORDER — FUROSEMIDE 10 MG/ML
40 INJECTION INTRAMUSCULAR; INTRAVENOUS ONCE
Status: COMPLETED | OUTPATIENT
Start: 2018-04-11 | End: 2018-04-11

## 2018-04-11 RX ADMIN — ATORVASTATIN CALCIUM 10 MG: 10 TABLET, FILM COATED ORAL at 09:52

## 2018-04-11 RX ADMIN — IPRATROPIUM BROMIDE AND ALBUTEROL SULFATE 3 ML: .5; 3 SOLUTION RESPIRATORY (INHALATION) at 07:01

## 2018-04-11 RX ADMIN — ACETAMINOPHEN 650 MG: 325 TABLET ORAL at 20:44

## 2018-04-11 RX ADMIN — BUDESONIDE AND FORMOTEROL FUMARATE DIHYDRATE 2 PUFF: 160; 4.5 AEROSOL RESPIRATORY (INHALATION) at 07:02

## 2018-04-11 RX ADMIN — NIFEDIPINE 30 MG: 30 TABLET, FILM COATED, EXTENDED RELEASE ORAL at 09:52

## 2018-04-11 RX ADMIN — IPRATROPIUM BROMIDE AND ALBUTEROL SULFATE 3 ML: .5; 3 SOLUTION RESPIRATORY (INHALATION) at 15:25

## 2018-04-11 RX ADMIN — PREGABALIN 75 MG: 75 CAPSULE ORAL at 20:44

## 2018-04-11 RX ADMIN — IPRATROPIUM BROMIDE AND ALBUTEROL SULFATE 3 ML: .5; 3 SOLUTION RESPIRATORY (INHALATION) at 19:58

## 2018-04-11 RX ADMIN — IPRATROPIUM BROMIDE AND ALBUTEROL SULFATE 3 ML: .5; 3 SOLUTION RESPIRATORY (INHALATION) at 03:31

## 2018-04-11 RX ADMIN — IPRATROPIUM BROMIDE AND ALBUTEROL SULFATE 3 ML: .5; 3 SOLUTION RESPIRATORY (INHALATION) at 11:47

## 2018-04-11 RX ADMIN — POTASSIUM CHLORIDE 20 MEQ: 750 CAPSULE, EXTENDED RELEASE ORAL at 09:52

## 2018-04-11 RX ADMIN — ACETAMINOPHEN 650 MG: 325 TABLET ORAL at 09:52

## 2018-04-11 RX ADMIN — FUROSEMIDE 40 MG: 10 INJECTION, SOLUTION INTRAMUSCULAR; INTRAVENOUS at 14:41

## 2018-04-11 RX ADMIN — CETIRIZINE HYDROCHLORIDE 10 MG: 10 TABLET, FILM COATED ORAL at 20:44

## 2018-04-11 RX ADMIN — PREDNISONE 20 MG: 20 TABLET ORAL at 09:52

## 2018-04-11 RX ADMIN — FUROSEMIDE 40 MG: 40 TABLET ORAL at 09:52

## 2018-04-11 RX ADMIN — RIVAROXABAN 20 MG: 20 TABLET, FILM COATED ORAL at 18:35

## 2018-04-11 RX ADMIN — PREGABALIN 75 MG: 75 CAPSULE ORAL at 09:52

## 2018-04-11 RX ADMIN — TAMSULOSIN HYDROCHLORIDE 0.4 MG: 0.4 CAPSULE ORAL at 20:44

## 2018-04-11 RX ADMIN — BUDESONIDE AND FORMOTEROL FUMARATE DIHYDRATE 2 PUFF: 160; 4.5 AEROSOL RESPIRATORY (INHALATION) at 19:59

## 2018-04-11 NOTE — PLAN OF CARE
Problem: Patient Care Overview  Goal: Plan of Care Review  Outcome: Ongoing (interventions implemented as appropriate)   04/11/18 1441 04/11/18 1826   Coping/Psychosocial   Plan of Care Reviewed With patient --    Plan of Care Review   Progress --  no change   OTHER   Outcome Summary --  VSS; right ankle mepilex changed; recieving diuretics p.o. and a 1 x dose of lasix IV; patient had a chest x-ray today that showed a little fluid on the left side     Goal: Individualization and Mutuality  Outcome: Ongoing (interventions implemented as appropriate)   04/11/18 1826   Individualization   Patient Specific Interventions continue to monitor     Goal: Interprofessional Rounds/Family Conf  Outcome: Ongoing (interventions implemented as appropriate)      Problem: Fall Risk (Adult)  Goal: Absence of Fall  Outcome: Ongoing (interventions implemented as appropriate)      Problem: Skin Injury Risk (Adult)  Goal: Skin Health and Integrity  Outcome: Ongoing (interventions implemented as appropriate)      Problem: Activity Intolerance (Adult)  Goal: Activity Tolerance  Outcome: Ongoing (interventions implemented as appropriate)    Goal: Effective Energy Conservation Techniques  Outcome: Ongoing (interventions implemented as appropriate)      Problem: Pneumonia (Adult)  Goal: Signs and Symptoms of Listed Potential Problems Will be Absent, Minimized or Managed (Pneumonia)  Outcome: Ongoing (interventions implemented as appropriate)

## 2018-04-11 NOTE — PROGRESS NOTES
Continued Stay Note  [facility]     Patient Name: Alessio Allen  MRN: [id number]  Today's Date: 4/11/2018    Admit Date: 4/5/2018          Discharge Plan     Row Name 04/11/18 1421       Plan    Plan Comments Home with [facility]  Pt wants [facility] notified if he needs greater than 5 litres at DC                Discharge Codes    No documentation.           Hermila Merchant RN

## 2018-04-11 NOTE — PROGRESS NOTES
"  Daily Progress Note.   [address]  4/11/2018    Patient:  Name:  Alessio Allen  MRN:  [id number]  1940  77 y.o.  male         Interval History:  Seems to have improved with extra diuretics yesterday  No chest tightness.  More energy    Physical Exam:  /76 (BP Location: Left arm, Patient Position: Lying)   Pulse 75   Temp 97.3 °F (36.3 °C) (Oral)   Resp 18   Ht 188 cm (74\")   Wt 115 kg (253 lb)   SpO2 90%   BMI 32.48 kg/m²   Body mass index is 32.48 kg/m².    Intake/Output Summary (Last 24 hours) at 04/11/18 0824  Last data filed at 04/11/18 0738   Gross per 24 hour   Intake              720 ml   Output             2750 ml   Net            -2030 ml     GENERAL:  NAD, Aox3  HEENT:  EOMI, nonicteric sclera, no JVD  PULMONARY:   no wheeze, bibasilar crackles, no rhonchi, symmetric air entry  Diminished breath sounds   CARDIAC:  RRR no MRG, S1 S2  ABD: SNTND BS+  EXT:trace ble edema pulses symmetric 2+ bilaterally  NEURO:  CNs II-XII intact, no focal deficits  SKIN: no lesions, no rash  PSYCH: appropriate mood    Data Review:  Notable Labs:    Results from last 7 days  Lab Units 04/09/18  0502 04/08/18  0701 04/06/18  0358 04/05/18  1639   WBC 10*3/mm3 7.52 8.97 7.21 8.47   HEMOGLOBIN g/dL 12.9* 12.4* 12.2* 13.1*   PLATELETS 10*3/mm3 192 188 197 194       Results from last 7 days  Lab Units 04/09/18  0502 04/08/18  0701 04/06/18  0358 04/05/18  1639   SODIUM mmol/L 138 140 140 138   POTASSIUM mmol/L 4.0 4.0 3.7 5.3*   CHLORIDE mmol/L 98 99 100 98   CO2 mmol/L 26.1 27.2 26.5 27.2   BUN mg/dL 9 7* 8 7*   CREATININE mg/dL 0.76 0.73* 0.81 0.84   GLUCOSE mg/dL 93 91 134* 108*   CALCIUM mg/dL 8.9 9.0 9.0 9.8   MAGNESIUM mg/dL  --   --  1.8  --    PHOSPHORUS mg/dL  --   --  4.4  --    Estimated Creatinine Clearance: 105.9 mL/min (by C-G formula based on SCr of 0.76 mg/dL).    Imaging:  CXR reviewed, ct chest reviewed - bilateral extensive upper lobe chronic changes.      Scheduled " meds:      atorvastatin 10 mg Oral Daily   budesonide-formoterol 2 puff Inhalation BID - RT   cetirizine 10 mg Oral Nightly   furosemide 40 mg Oral Daily   ipratropium-albuterol 3 mL Nebulization Q4H - RT   NIFEdipine XL 30 mg Oral Daily   pantoprazole 40 mg Oral Q AM   potassium chloride 20 mEq Oral Daily   predniSONE 20 mg Oral Daily With Breakfast   pregabalin 75 mg Oral Q12H   rivaroxaban 20 mg Oral Daily With Dinner   tamsulosin 0.4 mg Oral Nightly       ASSESSMENT  /  PLAN:  PCCM Problems  Acute on chronic respiratory failure, hypoxia  Pulmonary infiltrates  ? ILD  Severe PHT, RVSP 54mmHg  COPD, without exacerbation  Relevant Medical Diagnoses  Afib on AC resumed     Plan of Treatment  Admitted 2/7; 2/26; 3/17. Now back again with worsening SOA and hypoxia.     Continue same rx.   I suspect that he will need nebulized treatments at home as well as to be on steroids at discharge.  His epmysema is severe on his ct and given his frequent ae     Previously he has improved with diuresis also on one of the admits -continue gentle diuresis, check lytes. Replace as needed         Merlin Fontana MD  [facility]  04/11/18  176

## 2018-04-11 NOTE — PLAN OF CARE
Problem: Patient Care Overview  Goal: Plan of Care Review  Outcome: Ongoing (interventions implemented as appropriate)   04/10/18 1906 04/11/18 0756   Coping/Psychosocial   Plan of Care Reviewed With --  patient   Plan of Care Review   Progress --  no change   OTHER   Outcome Summary Pt on 7L NC. C/O foot pain, tylenol and lyrica given. No acute distress. Diuresing. Safety maintained, continue to monitor.  --      Goal: Individualization and Mutuality  Outcome: Ongoing (interventions implemented as appropriate)    Goal: Discharge Needs Assessment  Outcome: Ongoing (interventions implemented as appropriate)      Problem: Fall Risk (Adult)  Goal: Absence of Fall  Outcome: Ongoing (interventions implemented as appropriate)      Problem: Skin Injury Risk (Adult)  Goal: Skin Health and Integrity  Outcome: Ongoing (interventions implemented as appropriate)      Problem: Activity Intolerance (Adult)  Goal: Activity Tolerance  Outcome: Ongoing (interventions implemented as appropriate)    Goal: Effective Energy Conservation Techniques  Outcome: Ongoing (interventions implemented as appropriate)      Problem: Pneumonia (Adult)  Goal: Signs and Symptoms of Listed Potential Problems Will be Absent, Minimized or Managed (Pneumonia)  Outcome: Ongoing (interventions implemented as appropriate)

## 2018-04-12 LAB
ALBUMIN SERPL-MCNC: 3.2 G/DL (ref 3.5–5.2)
ANION GAP SERPL CALCULATED.3IONS-SCNC: 9.7 MMOL/L
BASOPHILS # BLD AUTO: 0.01 10*3/MM3 (ref 0–0.2)
BASOPHILS NFR BLD AUTO: 0.2 % (ref 0–1.5)
BUN BLD-MCNC: 10 MG/DL (ref 8–23)
BUN/CREAT SERPL: 14.5 (ref 7–25)
CALCIUM SPEC-SCNC: 8.9 MG/DL (ref 8.6–10.5)
CHLORIDE SERPL-SCNC: 96 MMOL/L (ref 98–107)
CO2 SERPL-SCNC: 30.3 MMOL/L (ref 22–29)
CREAT BLD-MCNC: 0.69 MG/DL (ref 0.76–1.27)
DEPRECATED RDW RBC AUTO: 69.7 FL (ref 37–54)
EOSINOPHIL # BLD AUTO: 0.13 10*3/MM3 (ref 0–0.7)
EOSINOPHIL NFR BLD AUTO: 2 % (ref 0.3–6.2)
ERYTHROCYTE [DISTWIDTH] IN BLOOD BY AUTOMATED COUNT: 24.7 % (ref 11.5–14.5)
GFR SERPL CREATININE-BSD FRML MDRD: 111 ML/MIN/1.73
GLUCOSE BLD-MCNC: 129 MG/DL (ref 65–99)
HCT VFR BLD AUTO: 41.5 % (ref 40.4–52.2)
HGB BLD-MCNC: 12.2 G/DL (ref 13.7–17.6)
IMM GRANULOCYTES # BLD: 0 10*3/MM3 (ref 0–0.03)
IMM GRANULOCYTES NFR BLD: 0 % (ref 0–0.5)
LYMPHOCYTES # BLD AUTO: 1.29 10*3/MM3 (ref 0.9–4.8)
LYMPHOCYTES NFR BLD AUTO: 20 % (ref 19.6–45.3)
MCH RBC QN AUTO: 22.9 PG (ref 27–32.7)
MCHC RBC AUTO-ENTMCNC: 29.4 G/DL (ref 32.6–36.4)
MCV RBC AUTO: 77.9 FL (ref 79.8–96.2)
MONOCYTES # BLD AUTO: 0.81 10*3/MM3 (ref 0.2–1.2)
MONOCYTES NFR BLD AUTO: 12.5 % (ref 5–12)
NEUTROPHILS # BLD AUTO: 4.22 10*3/MM3 (ref 1.9–8.1)
NEUTROPHILS NFR BLD AUTO: 65.3 % (ref 42.7–76)
PHOSPHATE SERPL-MCNC: 3.8 MG/DL (ref 2.5–4.5)
PLATELET # BLD AUTO: 237 10*3/MM3 (ref 140–500)
PMV BLD AUTO: 9.2 FL (ref 6–12)
POTASSIUM BLD-SCNC: 3.8 MMOL/L (ref 3.5–5.2)
RBC # BLD AUTO: 5.33 10*6/MM3 (ref 4.6–6)
SODIUM BLD-SCNC: 136 MMOL/L (ref 136–145)
WBC NRBC COR # BLD: 6.46 10*3/MM3 (ref 4.5–10.7)

## 2018-04-12 PROCEDURE — 63710000001 PREDNISONE PER 1 MG: Performed by: INTERNAL MEDICINE

## 2018-04-12 PROCEDURE — 94799 UNLISTED PULMONARY SVC/PX: CPT

## 2018-04-12 PROCEDURE — 25010000002 FUROSEMIDE PER 20 MG: Performed by: INTERNAL MEDICINE

## 2018-04-12 PROCEDURE — 99232 SBSQ HOSP IP/OBS MODERATE 35: CPT | Performed by: INTERNAL MEDICINE

## 2018-04-12 PROCEDURE — 80069 RENAL FUNCTION PANEL: CPT | Performed by: INTERNAL MEDICINE

## 2018-04-12 PROCEDURE — 85025 COMPLETE CBC W/AUTO DIFF WBC: CPT | Performed by: INTERNAL MEDICINE

## 2018-04-12 RX ORDER — PREDNISONE 20 MG/1
40 TABLET ORAL
Status: DISCONTINUED | OUTPATIENT
Start: 2018-04-13 | End: 2018-04-15

## 2018-04-12 RX ORDER — FUROSEMIDE 10 MG/ML
40 INJECTION INTRAMUSCULAR; INTRAVENOUS 2 TIMES DAILY
Status: DISCONTINUED | OUTPATIENT
Start: 2018-04-12 | End: 2018-04-17

## 2018-04-12 RX ADMIN — IPRATROPIUM BROMIDE AND ALBUTEROL SULFATE 3 ML: .5; 3 SOLUTION RESPIRATORY (INHALATION) at 08:48

## 2018-04-12 RX ADMIN — ACETAMINOPHEN 650 MG: 325 TABLET ORAL at 18:09

## 2018-04-12 RX ADMIN — IPRATROPIUM BROMIDE AND ALBUTEROL SULFATE 3 ML: .5; 3 SOLUTION RESPIRATORY (INHALATION) at 16:09

## 2018-04-12 RX ADMIN — PREGABALIN 75 MG: 75 CAPSULE ORAL at 20:57

## 2018-04-12 RX ADMIN — FUROSEMIDE 40 MG: 10 INJECTION, SOLUTION INTRAMUSCULAR; INTRAVENOUS at 20:57

## 2018-04-12 RX ADMIN — IPRATROPIUM BROMIDE AND ALBUTEROL SULFATE 3 ML: .5; 3 SOLUTION RESPIRATORY (INHALATION) at 18:42

## 2018-04-12 RX ADMIN — NIFEDIPINE 30 MG: 30 TABLET, FILM COATED, EXTENDED RELEASE ORAL at 08:16

## 2018-04-12 RX ADMIN — FUROSEMIDE 40 MG: 40 TABLET ORAL at 08:16

## 2018-04-12 RX ADMIN — IPRATROPIUM BROMIDE AND ALBUTEROL SULFATE 3 ML: .5; 3 SOLUTION RESPIRATORY (INHALATION) at 11:31

## 2018-04-12 RX ADMIN — BUDESONIDE AND FORMOTEROL FUMARATE DIHYDRATE 2 PUFF: 160; 4.5 AEROSOL RESPIRATORY (INHALATION) at 08:48

## 2018-04-12 RX ADMIN — BUDESONIDE AND FORMOTEROL FUMARATE DIHYDRATE 2 PUFF: 160; 4.5 AEROSOL RESPIRATORY (INHALATION) at 18:42

## 2018-04-12 RX ADMIN — POTASSIUM CHLORIDE 20 MEQ: 750 CAPSULE, EXTENDED RELEASE ORAL at 08:16

## 2018-04-12 RX ADMIN — ATORVASTATIN CALCIUM 10 MG: 10 TABLET, FILM COATED ORAL at 08:16

## 2018-04-12 RX ADMIN — PREDNISONE 20 MG: 20 TABLET ORAL at 08:16

## 2018-04-12 RX ADMIN — CETIRIZINE HYDROCHLORIDE 10 MG: 10 TABLET, FILM COATED ORAL at 20:57

## 2018-04-12 RX ADMIN — PREGABALIN 75 MG: 75 CAPSULE ORAL at 08:16

## 2018-04-12 RX ADMIN — FUROSEMIDE 40 MG: 10 INJECTION, SOLUTION INTRAMUSCULAR; INTRAVENOUS at 12:43

## 2018-04-12 RX ADMIN — ACETAMINOPHEN 650 MG: 325 TABLET ORAL at 22:18

## 2018-04-12 RX ADMIN — PANTOPRAZOLE SODIUM 40 MG: 40 TABLET, DELAYED RELEASE ORAL at 08:19

## 2018-04-12 RX ADMIN — TAMSULOSIN HYDROCHLORIDE 0.4 MG: 0.4 CAPSULE ORAL at 20:57

## 2018-04-12 RX ADMIN — RIVAROXABAN 20 MG: 20 TABLET, FILM COATED ORAL at 18:09

## 2018-04-12 NOTE — PLAN OF CARE
Problem: Patient Care Overview  Goal: Plan of Care Review  Outcome: Ongoing (interventions implemented as appropriate)   04/12/18 0634   Coping/Psychosocial   Plan of Care Reviewed With patient   Plan of Care Review   Progress no change   OTHER   Outcome Summary Pt c/o of pain both feet neuropathy. Medicated with tylenol. Pt is getting discouraged and wants to know why he is not getting better. Asked about abx treatment. Cont to monitor, safety maintained.      Goal: Interprofessional Rounds/Family Conf  Outcome: Ongoing (interventions implemented as appropriate)      Problem: Fall Risk (Adult)  Goal: Absence of Fall  Outcome: Ongoing (interventions implemented as appropriate)      Problem: Skin Injury Risk (Adult)  Goal: Skin Health and Integrity  Outcome: Ongoing (interventions implemented as appropriate)      Problem: Activity Intolerance (Adult)  Goal: Activity Tolerance  Outcome: Ongoing (interventions implemented as appropriate)    Goal: Effective Energy Conservation Techniques  Outcome: Ongoing (interventions implemented as appropriate)      Problem: Pneumonia (Adult)  Goal: Signs and Symptoms of Listed Potential Problems Will be Absent, Minimized or Managed (Pneumonia)  Outcome: Ongoing (interventions implemented as appropriate)

## 2018-04-12 NOTE — PROGRESS NOTES
"  Daily Progress Note.   [address]  4/12/2018    Patient:  Name:  Alessio Allen  MRN:  [id number]  1940  77 y.o.  male         Interval History:  lingering oxygen need, prev on 3 L, improved with diuretics last visit, completed courseabx, still with  Cough  Still with soa.    Physical Exam:  /91 (BP Location: Left arm, Patient Position: Lying)   Pulse 79   Temp 97.4 °F (36.3 °C) (Oral)   Resp 18   Ht 188 cm (74\")   Wt 115 kg (253 lb)   SpO2 90%   BMI 32.48 kg/m²   Body mass index is 32.48 kg/m².    Intake/Output Summary (Last 24 hours) at 04/12/18 1219  Last data filed at 04/11/18 1834   Gross per 24 hour   Intake                0 ml   Output             1800 ml   Net            -1800 ml     GENERAL:  NAD, Aox3  HEENT:  EOMI, nonicteric sclera, no JVD  PULMONARY:   Faint exp wheeze, bibasilar crackles, intrhonchi, symmetric air entry  Diminished breath sounds   CARDIAC:  RRR no MRG, S1 S2  ABD: SNTND BS+  EXT:trace ble edema pulses symmetric 2+ bilaterally  NEURO:  CNs II-XII intact, no focal deficits  SKIN: no lesions, no rash  PSYCH: appropriate mood    Data Review:  Notable Labs:    Results from last 7 days  Lab Units 04/12/18 0317 04/11/18  0956 04/09/18  0502 04/08/18  0701 04/06/18  0358 04/05/18  1639   WBC 10*3/mm3 6.46 9.33 7.52 8.97 7.21 8.47   HEMOGLOBIN g/dL 12.2* 12.6* 12.9* 12.4* 12.2* 13.1*   PLATELETS 10*3/mm3 237 221 192 188 197 194       Results from last 7 days  Lab Units 04/12/18 0317 04/11/18  0956 04/09/18  0502 04/08/18  0701 04/06/18  0358 04/05/18  1639   SODIUM mmol/L 136 136 138 140 140 138   POTASSIUM mmol/L 3.8 3.4* 4.0 4.0 3.7 5.3*   CHLORIDE mmol/L 96* 95* 98 99 100 98   CO2 mmol/L 30.3* 30.7* 26.1 27.2 26.5 27.2   BUN mg/dL 10 8 9 7* 8 7*   CREATININE mg/dL 0.69* 0.72* 0.76 0.73* 0.81 0.84   GLUCOSE mg/dL 129* 110* 93 91 134* 108*   CALCIUM mg/dL 8.9 8.8 8.9 9.0 9.0 9.8   MAGNESIUM mg/dL  --   --   --   --  1.8  --    PHOSPHORUS mg/dL 3.8 " 3.5  --   --  4.4  --    Estimated Creatinine Clearance: 105.9 mL/min (by C-G formula based on SCr of 0.69 mg/dL (L)).    Imaging:  CXR reviewed, ct chest reviewed - bilateral extensive upper lobe chronic changes.        TTE2/2018:  · The left ventricular cavity is mildly dilated.  · Left ventricular systolic function is normal. Calculated EF = 60%  · Left ventricular wall thickness is consistent with mild concentric hypertrophy.  · Right ventricular cavity is severely dilated.  · Left atrial cavity size is severely dilated  · Right atrial cavity size is severely dilated.  · There is mild aortic stenosis with a peak gradient of 25 mm Hg, and a mean gradient of 12 mm Hg.  · Mild mitral valve regurgitation is present  · Mild to moderate tricuspid valve regurgitation is present.  · Calculated right ventricular systolic pressure from tricuspid regurgitation is 54 mmHg.  · The ascending aorta is moderately dilated, measuring up to 4.4 cm. .  · There is no evidence of pericardial effusion.  Scheduled meds:      atorvastatin 10 mg Oral Daily   budesonide-formoterol 2 puff Inhalation BID - RT   cetirizine 10 mg Oral Nightly   furosemide 40 mg Intravenous BID   ipratropium-albuterol 3 mL Nebulization Q4H - RT   NIFEdipine XL 30 mg Oral Daily   pantoprazole 40 mg Oral Q AM   potassium chloride 20 mEq Oral Daily   [START ON 4/13/2018] predniSONE 40 mg Oral Daily With Breakfast   pregabalin 75 mg Oral Q12H   rivaroxaban 20 mg Oral Daily With Dinner   tamsulosin 0.4 mg Oral Nightly       ASSESSMENT  /  PLAN:  PCCM Problems  Acute on chronic respiratory failure, hypoxia  Pulmonary edema  ? ILD  COPD, without exacerbation  Relevant Medical Diagnoses  Afib on AC resumed     Plan of Treatment  Admitted 2/7; 2/26; 3/17. Now back again with worsening SOA and hypoxia.    I suspect that he will need nebulized treatments at home as well as to be on steroids at discharge.  His epmysema is severe on his ct and given his frequent ae      Lasix 40mg iv BID    Previously he has improved with diuresis also on one of the admits -continue gentle diuresis, check lytes. Replace as needed.  Family has not established cardiologist in [facility] system, note that he has valvular disease and continued admission with diuretic therapy and improvement in ahrf, request cards consult, will place.         Merlin Fontana MD  [facility]  04/12/18  3955

## 2018-04-12 NOTE — CONSULTS
Patient Name: Alessio Allen  :1940  77 y.o.    Date of Admission: 2018  Date of Consultation:  18  Encounter Provider: Marizol Holt MD  Place of Service: [facility] CARDIOLOGY  Referring Provider: Kalpesh Jimenez MD  Patient Care Team:  Alan Santana MD as PCP - General (Family Medicine)  Rao Maguire MD as Consulting Physician (Hematology and Oncology)  Alan Santana MD as Referring Physician (Family Medicine)      Chief complaint: SOB    Consulted for: patient request    History of Present Illness: Mr. Allen is a 77 year old with a history of COPD (4L home O2), hypertension, paroxsymal atrial fibrillation on chronic anticoagulation on rivaroxaban, status post permanent pacemaker placement, and diabetes mellitus type 2 who is admitted for acute hypoxic respiratory failure.    The patient presented to the emergency room from rehab with hypoxia.  He has been treated for a COPD exacerbation and pneumonia.  This is his fourth admission in the last couple of months for acute respiratory failure.  He reports he has been on low dose oral furosemide following the last couple of admission.  An echocardiogram was done performed in 3018 that showed an EF of 60%, LA/RA cavity severely dilated,mild AS, mild-mod TR and moderately dilated ascending aorta; 4.4cm.     I previously followed the patient and last saw him in the office in 2016 for a history of paroxsymal atrial fibrillation, intermittent junctional bradycardia and 2:1 AV block.  At that time I recommended anticoagulation for his atrial fibrillation.  Since then he has primarily followed up with his cardiologist in [place] (where he normally spends his schneider).  He reports that about a year ago he underwent a stent placement after a coronary CT scan showed evidence of coronary artery disease.  It was recommended that he undergo pacemaker placement at that time but the patient waited to return to  [place] to get that done.  He had the pacemaker placed last year with Dr. Miller and has since followed up with him.  He last saw him about 6 months ago.  He reports he had an echo performed by Dr. Miller sometime last year.  Unfortunately his [facility] records are not available for my review on Care Everywhere.      Despite treatment for COPD and presumed pneumonia the patient is still requiring high amounts of oxygen.  It is now felt that his respiratory failure may be more due to volume overload and was just switched from oral furosemide to IV within the last day.  He and his family report improvement in lower extremity swelling since starting the IV diuresis.  He denies any chest pain or palpitations.       Cardiac Testing:  Echo 2/8/18  · The left ventricular cavity is mildly dilated.  · Left ventricular systolic function is normal. Calculated EF = 60%  · Left ventricular wall thickness is consistent with mild concentric hypertrophy.  · Right ventricular cavity is severely dilated.  · Left atrial cavity size is severely dilated  · Right atrial cavity size is severely dilated.  · There is mild aortic stenosis with a peak gradient of 25 mm Hg, and a mean gradient of 12 mm Hg.  · Mild mitral valve regurgitation is present  · Mild to moderate tricuspid valve regurgitation is present.  · Calculated right ventricular systolic pressure from tricuspid regurgitation is 54 mmHg.  · The ascending aorta is moderately dilated, measuring up to 4.4 cm. .  · There is no evidence of pericardial effusion.    Holter Monitor 6/2016  No symptoms reported during the monitoring period. No complications noted. The predominant rhythm noted during the testing period was sinus rhythm. Premature atrial contractions occured rarely. Evidence of atrial fibrillation was noted. Episodes of atrial arrhythmias noted with heart rates of 138-160 bpm. Can not rule out atrial flutter or atrial fibrillation with rapid ventricular rate. Premature  ventricular contractions occured occasionally. And rare ventricular couplets, triplets, bigeminy and trigeminy. 6 episodes of non-sustained ventricular tachycardia with the longest run lasting 9 beats and fastest with a rate of 231 bpm. Sinoatrial node conduction was normal. First degree AV block at baseline. There was an episode of Mobitz 1 second degree AV block and junctional bradycardia.  Past Medical History:   Diagnosis Date   • Arthritis    • Asthma    • Atrial fibrillation    • BPH (benign prostatic hypertrophy)    • Cancer    • COPD (chronic obstructive pulmonary disease)    • H/O Abnormal CBC 2017   • History of heart artery stent 03/2017   • Hyperlipidemia    • Hypertension    • Neuropathy     feet and hands    • Pneumonia        Past Surgical History:   Procedure Laterality Date   • BRONCHOSCOPY N/A 4/7/2018    Procedure: BRONCHOSCOPY with specimens;  Surgeon: Suresh Hernandez MD;  Location: [facility];  Service: Pulmonary   • COLONOSCOPY  12/05/2016    polyps   • FRACTURE SURGERY     • PACEMAKER IMPLANTATION           Prior to Admission medications    Medication Sig Start Date End Date Taking? Authorizing Provider   acetaminophen (TYLENOL) 325 MG tablet Take 2 tablets by mouth Every 6 (Six) Hours As Needed (prn for pain). 6/12/17  Yes Alan Santana MD   acyclovir (ZOVIRAX) 800 MG tablet Take 1 tablet (800 mg total) by mouth daily as needed (prn daily as needed fever blisters) 6/30/16  Yes Alan Santana MD   albuterol (PROVENTIL HFA;VENTOLIN HFA) 108 (90 Base) MCG/ACT inhaler Inhale 2 puffs Every 6 (Six) Hours As Needed for Wheezing or Shortness of Air. 3/24/18  Yes Kalpesh Jimenez MD   aspirin 81 MG tablet Take 81 mg by mouth Daily.   Yes Historical Provider, MD   budesonide-formoterol (SYMBICORT) 160-4.5 MCG/ACT inhaler Inhale 2 puffs 2 (Two) Times a Day. 8/3/17  Yes Alan Santana MD   Cetirizine HCl (ZYRTEC ALLERGY) 10 MG capsule Take 1 capsule by mouth Every Night.   Yes Historical Provider, MD    Desoximetasone 0.05 % ointment Apply to affected area bid 12/23/16  Yes Alan Santana MD   ferrous sulfate 325 (65 FE) MG tablet Take 1 tablet by mouth Daily With Breakfast. 2/23/18  Yes Alan Santana MD   furosemide (LASIX) 20 MG tablet Take 2 tablets by mouth Daily.  Patient taking differently: Take 20 mg by mouth Daily. 3/24/18  Yes Kalpesh Jimenez MD   Multiple Vitamin (MULTI VITAMIN DAILY) tablet Take  by mouth. 8/6/14  Yes Historical Provider, MD   NIFEdipine XL (PROCARDIA XL) 30 MG 24 hr tablet Take  by mouth daily. 8/6/14  Yes Historical Provider, MD   olopatadine (PATANOL) 0.1 % ophthalmic solution Administer 1 drop to both eyes daily. 8/15/16  Yes Alan Santana MD   Omega-3 Fatty Acids (FISH OIL) 1000 MG capsule capsule Take 1,000 mg by mouth daily with breakfast.   Yes Historical Provider, MD   omeprazole (PRILOSEC) 20 MG capsule Take 1 capsule by mouth Daily. 11/27/17  Yes Alan Santana MD   potassium chloride (K-DUR) 10 MEQ CR tablet Take 2 tablets by mouth Daily. 2/23/18  Yes Alan Santana MD   pregabalin (LYRICA) 75 MG capsule Take 75 mg by mouth 2 (two) times a day.   Yes Historical Provider, MD   pyridoxine (VITAMIN B-6) 50 MG tablet Take  by mouth daily. 6/4/14  Yes Historical Provider, MD   sildenafil (VIAGRA) 100 MG tablet Take 1 tablet by mouth Daily As Needed for erectile dysfunction. 7/13/17  Yes Alan Santana MD   simvastatin (ZOCOR) 20 MG tablet Take 0.5 tablets by mouth daily. 6/4/14  Yes Historical Provider, MD   sodium chloride (OCEAN NASAL SPRAY) 0.65 % nasal spray 2 sprays into each nostril As Needed for Congestion (qid prn). May refill q 21 days 12/14/17  Yes Alan Santana MD   Spacer/Aero-Holding Chambers device Give spacer to use with his inhalers whichever brand he has received is fine 8/3/17  Yes Alan Santana MD   tamsulosin (FLOMAX) 0.4 MG capsule 24 hr capsule Take 1 capsule by mouth every night. 5/12/16  Yes Alan Santana MD   tiotropium (SPIRIVA HANDIHALER) 18 MCG per  inhalation capsule Place 2 puffs into inhaler and inhale 1 (one) time daily. 6/21/16  Yes Alan Santana MD   XARELTO 20 MG tablet Take 20 mg by mouth Daily. 11/20/17  Yes Historical Provider, MD       Allergies   Allergen Reactions   • Lisinopril Shortness Of Breath   • Penicillins Shortness Of Breath   • Sulfa Antibiotics Shortness Of Breath   • Albuterol Irritability and Hallucinations   • Atenolol Other (See Comments)     drowsiness   • Coreg [Carvedilol] Cough     SOA,   • Ibuprofen    • Levaquin [Levofloxacin]      Pt states he can take   • Celebrex [Celecoxib] Rash       Social History     Social History   • Marital status:      Occupational History   •  Retired     Social History Main Topics   • Smoking status: Former Smoker     Types: Cigarettes     Quit date: 1/3/2001   • Smokeless tobacco: Never Used   • Alcohol use No   • Drug use: No   • Sexual activity: Defer     Other Topics Concern   • Not on file       Family History   Problem Relation Age of Onset   • Hypertension Mother    • Heart attack Father        REVIEW OF SYSTEMS:   All systems reviewed.  Pertinent positives identified in HPI.  All other systems are negative.      Objective:     Vitals:    04/12/18 0848 04/12/18 0852 04/12/18 1131 04/12/18 1303   BP:    141/86   BP Location:    Left arm   Patient Position:    Sitting   Pulse: 79 83 79 94   Resp: 16 16 18 20   Temp:    98.1 °F (36.7 °C)   TempSrc:    Oral   SpO2: 90%  90% (!) 88%   Weight:       Height:         Body mass index is 32.48 kg/m².    General Appearance:    Alert, cooperative, in no acute distress   Head:    Normocephalic, without obvious abnormality, atraumatic   Eyes:            Lids and lashes normal, conjunctivae and sclerae normal, no   icterus, no pallor, corneas clear, PERRLA   Ears:    Ears appear intact with no abnormalities noted   Neck:   No adenopathy, supple, trachea midline, no thyromegaly, no   carotid bruit, no JVD   Lungs:     Clear to  auscultation,respirations regular, even and unlabored    Heart:    Regular rhythm and normal rate, normal S1 and S2, no murmur, no gallop, no rub, no click   Chest Wall:    No abnormalities observed   Abdomen:     Normal bowel sounds, no masses, no organomegaly, soft        non-tender, non-distended, no guarding, no rebound  tenderness   Extremities:   Moves all extremities well, no edema, no cyanosis, no redness   Pulses:   Pulses palpable and equal bilaterally. Normal radial, carotid, femoral, dorsalis pedis and posterior tibial pulses bilaterally. Normal abdominal aorta   Skin:  Psychiatric:   No bleeding, bruising or rash    Alert and oriented x 3, normal mood and affect   Lab Review:       Results from last 7 days  Lab Units 04/12/18  0317  04/06/18  0358   SODIUM mmol/L 136  < > 140   POTASSIUM mmol/L 3.8  < > 3.7   CHLORIDE mmol/L 96*  < > 100   CO2 mmol/L 30.3*  < > 26.5   BUN mg/dL 10  < > 8   CREATININE mg/dL 0.69*  < > 0.81   CALCIUM mg/dL 8.9  < > 9.0   BILIRUBIN mg/dL  --   --  0.5   ALK PHOS U/L  --   --  64   ALT (SGPT) U/L  --   --  14   AST (SGOT) U/L  --   --  14   GLUCOSE mg/dL 129*  < > 134*   < > = values in this interval not displayed.    Results from last 7 days  Lab Units 04/06/18  0358   CK TOTAL U/L 32       Results from last 7 days  Lab Units 04/12/18  0317   WBC 10*3/mm3 6.46   HEMOGLOBIN g/dL 12.2*   HEMATOCRIT % 41.5   PLATELETS 10*3/mm3 237           Results from last 7 days  Lab Units 04/06/18  0358   MAGNESIUM mg/dL 1.8                    I personally viewed and interpreted the patient's EKG/Telemetry data.        Assessment and Plan:       1. Volume overload. Likely due to diastolic CHF.  On IV furosemide currently.  Echo from 2/2018 with normal LV function and no significant valvular disease.   2. Acute hypoxic respiratory failure. Still requiring 7L high flow.   3. COPD  4. Paroxysmal atrial fibrillation. Appears to be in sinus rhythm.  On rivaroxaban for anticoagulation.   5.  Status post dual chamber permanent pacemaker placement. Followed by Dr. Miller.   6. Pulmonary hypertension. Likely due to COPD and diastolic CHF.     -  Continue IV furosemide at current dose.        Marizol Holt MD  04/12/18  3:39 PM

## 2018-04-12 NOTE — PLAN OF CARE
Problem: Patient Care Overview  Goal: Plan of Care Review  Outcome: Ongoing (interventions implemented as appropriate)   04/12/18 6160   Coping/Psychosocial   Plan of Care Reviewed With patient   Plan of Care Review   Progress no change   OTHER   Outcome Summary Pt on 6L o2. Cardio consulted. Up in chair. Lasix increased. Safety maintained, continue to monitor.      Goal: Individualization and Mutuality  Outcome: Ongoing (interventions implemented as appropriate)    Goal: Discharge Needs Assessment  Outcome: Ongoing (interventions implemented as appropriate)    Goal: Interprofessional Rounds/Family Conf  Outcome: Ongoing (interventions implemented as appropriate)      Problem: Fall Risk (Adult)  Goal: Absence of Fall  Outcome: Ongoing (interventions implemented as appropriate)      Problem: Skin Injury Risk (Adult)  Goal: Skin Health and Integrity  Outcome: Ongoing (interventions implemented as appropriate)      Problem: Activity Intolerance (Adult)  Goal: Activity Tolerance  Outcome: Ongoing (interventions implemented as appropriate)    Goal: Effective Energy Conservation Techniques  Outcome: Ongoing (interventions implemented as appropriate)      Problem: Pneumonia (Adult)  Goal: Signs and Symptoms of Listed Potential Problems Will be Absent, Minimized or Managed (Pneumonia)  Outcome: Ongoing (interventions implemented as appropriate)

## 2018-04-13 LAB
ALBUMIN SERPL-MCNC: 3.3 G/DL (ref 3.5–5.2)
ANION GAP SERPL CALCULATED.3IONS-SCNC: 13.4 MMOL/L
BASOPHILS # BLD AUTO: 0.03 10*3/MM3 (ref 0–0.2)
BASOPHILS NFR BLD AUTO: 0.4 % (ref 0–1.5)
BUN BLD-MCNC: 11 MG/DL (ref 8–23)
BUN/CREAT SERPL: 16.9 (ref 7–25)
CALCIUM SPEC-SCNC: 9 MG/DL (ref 8.6–10.5)
CHLORIDE SERPL-SCNC: 95 MMOL/L (ref 98–107)
CO2 SERPL-SCNC: 29.6 MMOL/L (ref 22–29)
CREAT BLD-MCNC: 0.65 MG/DL (ref 0.76–1.27)
DEPRECATED RDW RBC AUTO: 71.8 FL (ref 37–54)
EOSINOPHIL # BLD AUTO: 0.16 10*3/MM3 (ref 0–0.7)
EOSINOPHIL NFR BLD AUTO: 2.2 % (ref 0.3–6.2)
ERYTHROCYTE [DISTWIDTH] IN BLOOD BY AUTOMATED COUNT: 24.9 % (ref 11.5–14.5)
GFR SERPL CREATININE-BSD FRML MDRD: 119 ML/MIN/1.73
GLUCOSE BLD-MCNC: 112 MG/DL (ref 65–99)
HCT VFR BLD AUTO: 44 % (ref 40.4–52.2)
HGB BLD-MCNC: 13.3 G/DL (ref 13.7–17.6)
IMM GRANULOCYTES # BLD: 0.02 10*3/MM3 (ref 0–0.03)
IMM GRANULOCYTES NFR BLD: 0.3 % (ref 0–0.5)
LYMPHOCYTES # BLD AUTO: 1.89 10*3/MM3 (ref 0.9–4.8)
LYMPHOCYTES NFR BLD AUTO: 25.8 % (ref 19.6–45.3)
MCH RBC QN AUTO: 23.8 PG (ref 27–32.7)
MCHC RBC AUTO-ENTMCNC: 30.2 G/DL (ref 32.6–36.4)
MCV RBC AUTO: 78.7 FL (ref 79.8–96.2)
MONOCYTES # BLD AUTO: 1 10*3/MM3 (ref 0.2–1.2)
MONOCYTES NFR BLD AUTO: 13.6 % (ref 5–12)
NEUTROPHILS # BLD AUTO: 4.23 10*3/MM3 (ref 1.9–8.1)
NEUTROPHILS NFR BLD AUTO: 57.7 % (ref 42.7–76)
PHOSPHATE SERPL-MCNC: 4.4 MG/DL (ref 2.5–4.5)
PLATELET # BLD AUTO: 258 10*3/MM3 (ref 140–500)
PMV BLD AUTO: 9.5 FL (ref 6–12)
POTASSIUM BLD-SCNC: 3.5 MMOL/L (ref 3.5–5.2)
RBC # BLD AUTO: 5.59 10*6/MM3 (ref 4.6–6)
SODIUM BLD-SCNC: 138 MMOL/L (ref 136–145)
WBC NRBC COR # BLD: 7.33 10*3/MM3 (ref 4.5–10.7)

## 2018-04-13 PROCEDURE — 25010000002 FUROSEMIDE PER 20 MG: Performed by: INTERNAL MEDICINE

## 2018-04-13 PROCEDURE — 99232 SBSQ HOSP IP/OBS MODERATE 35: CPT | Performed by: NURSE PRACTITIONER

## 2018-04-13 PROCEDURE — 94799 UNLISTED PULMONARY SVC/PX: CPT

## 2018-04-13 PROCEDURE — 63710000001 PREDNISONE PER 1 MG: Performed by: INTERNAL MEDICINE

## 2018-04-13 PROCEDURE — 85025 COMPLETE CBC W/AUTO DIFF WBC: CPT | Performed by: INTERNAL MEDICINE

## 2018-04-13 PROCEDURE — 80069 RENAL FUNCTION PANEL: CPT | Performed by: INTERNAL MEDICINE

## 2018-04-13 RX ORDER — POTASSIUM CHLORIDE 7.45 MG/ML
10 INJECTION INTRAVENOUS
Status: DISCONTINUED | OUTPATIENT
Start: 2018-04-13 | End: 2018-04-19 | Stop reason: HOSPADM

## 2018-04-13 RX ORDER — POTASSIUM CHLORIDE 750 MG/1
40 CAPSULE, EXTENDED RELEASE ORAL AS NEEDED
Status: DISCONTINUED | OUTPATIENT
Start: 2018-04-13 | End: 2018-04-19 | Stop reason: HOSPADM

## 2018-04-13 RX ORDER — POTASSIUM CHLORIDE 1.5 G/1.77G
40 POWDER, FOR SOLUTION ORAL AS NEEDED
Status: DISCONTINUED | OUTPATIENT
Start: 2018-04-13 | End: 2018-04-13 | Stop reason: CLARIF

## 2018-04-13 RX ADMIN — TAMSULOSIN HYDROCHLORIDE 0.4 MG: 0.4 CAPSULE ORAL at 19:56

## 2018-04-13 RX ADMIN — ACETAMINOPHEN 650 MG: 325 TABLET ORAL at 19:58

## 2018-04-13 RX ADMIN — PREGABALIN 75 MG: 75 CAPSULE ORAL at 11:37

## 2018-04-13 RX ADMIN — BUDESONIDE AND FORMOTEROL FUMARATE DIHYDRATE 2 PUFF: 160; 4.5 AEROSOL RESPIRATORY (INHALATION) at 07:29

## 2018-04-13 RX ADMIN — CETIRIZINE HYDROCHLORIDE 10 MG: 10 TABLET, FILM COATED ORAL at 19:57

## 2018-04-13 RX ADMIN — NIFEDIPINE 30 MG: 30 TABLET, FILM COATED, EXTENDED RELEASE ORAL at 09:59

## 2018-04-13 RX ADMIN — ACETAMINOPHEN 650 MG: 325 TABLET ORAL at 06:38

## 2018-04-13 RX ADMIN — IPRATROPIUM BROMIDE AND ALBUTEROL SULFATE 3 ML: .5; 3 SOLUTION RESPIRATORY (INHALATION) at 15:04

## 2018-04-13 RX ADMIN — FUROSEMIDE 40 MG: 10 INJECTION, SOLUTION INTRAMUSCULAR; INTRAVENOUS at 09:58

## 2018-04-13 RX ADMIN — IPRATROPIUM BROMIDE AND ALBUTEROL SULFATE 3 ML: .5; 3 SOLUTION RESPIRATORY (INHALATION) at 10:42

## 2018-04-13 RX ADMIN — PANTOPRAZOLE SODIUM 40 MG: 40 TABLET, DELAYED RELEASE ORAL at 06:36

## 2018-04-13 RX ADMIN — PREGABALIN 75 MG: 75 CAPSULE ORAL at 19:57

## 2018-04-13 RX ADMIN — BUDESONIDE AND FORMOTEROL FUMARATE DIHYDRATE 2 PUFF: 160; 4.5 AEROSOL RESPIRATORY (INHALATION) at 19:29

## 2018-04-13 RX ADMIN — POTASSIUM CHLORIDE 40 MEQ: 750 CAPSULE, EXTENDED RELEASE ORAL at 10:18

## 2018-04-13 RX ADMIN — POTASSIUM CHLORIDE 20 MEQ: 750 CAPSULE, EXTENDED RELEASE ORAL at 09:57

## 2018-04-13 RX ADMIN — PREDNISONE 40 MG: 20 TABLET ORAL at 09:56

## 2018-04-13 RX ADMIN — FUROSEMIDE 40 MG: 10 INJECTION, SOLUTION INTRAMUSCULAR; INTRAVENOUS at 19:54

## 2018-04-13 RX ADMIN — IPRATROPIUM BROMIDE AND ALBUTEROL SULFATE 3 ML: .5; 3 SOLUTION RESPIRATORY (INHALATION) at 07:29

## 2018-04-13 RX ADMIN — RIVAROXABAN 20 MG: 20 TABLET, FILM COATED ORAL at 18:06

## 2018-04-13 RX ADMIN — POTASSIUM CHLORIDE 40 MEQ: 750 CAPSULE, EXTENDED RELEASE ORAL at 18:07

## 2018-04-13 RX ADMIN — IPRATROPIUM BROMIDE AND ALBUTEROL SULFATE 3 ML: .5; 3 SOLUTION RESPIRATORY (INHALATION) at 19:27

## 2018-04-13 RX ADMIN — IPRATROPIUM BROMIDE AND ALBUTEROL SULFATE 3 ML: .5; 3 SOLUTION RESPIRATORY (INHALATION) at 23:15

## 2018-04-13 RX ADMIN — ATORVASTATIN CALCIUM 10 MG: 10 TABLET, FILM COATED ORAL at 09:56

## 2018-04-13 NOTE — PLAN OF CARE
Problem: Patient Care Overview  Goal: Plan of Care Review  Outcome: Ongoing (interventions implemented as appropriate)   04/13/18 0529   Coping/Psychosocial   Plan of Care Reviewed With patient   Plan of Care Review   Progress no change   OTHER   Outcome Summary Pt continues on 6L of O2 even though at home he was on 3L. Lasix seemes to be helping his edema. C/o of bilat foot pain chronic. Given tylenol given. Cont to monitor, safety maintianed.        Problem: Fall Risk (Adult)  Goal: Absence of Fall  Outcome: Ongoing (interventions implemented as appropriate)      Problem: Skin Injury Risk (Adult)  Goal: Skin Health and Integrity  Outcome: Ongoing (interventions implemented as appropriate)      Problem: Activity Intolerance (Adult)  Goal: Activity Tolerance  Outcome: Ongoing (interventions implemented as appropriate)    Goal: Effective Energy Conservation Techniques  Outcome: Ongoing (interventions implemented as appropriate)      Problem: Pneumonia (Adult)  Goal: Signs and Symptoms of Listed Potential Problems Will be Absent, Minimized or Managed (Pneumonia)  Outcome: Ongoing (interventions implemented as appropriate)

## 2018-04-13 NOTE — PROGRESS NOTES
"    Patient Name: Alessio Allen  :1940  77 y.o.      Patient Care Team:  Alan Santana MD as PCP - General (Family Medicine)  Rao Maguire MD as Consulting Physician (Hematology and Oncology)  Alan Santana MD as Referring Physician (Family Medicine)    Chief Complaint: follow up diastolic heart failure    Interval History: Reports he feels the same as yesterday. His oxygen drops quickly with exertion. No orthopnea.        Objective   Vital Signs  Temp:  [97.2 °F (36.2 °C)-97.7 °F (36.5 °C)] 97.2 °F (36.2 °C)  Heart Rate:  [79-92] 79  Resp:  [18-20] 18  BP: (117-135)/(72-90) 128/90    Intake/Output Summary (Last 24 hours) at 18 1447  Last data filed at 18 0505   Gross per 24 hour   Intake              240 ml   Output             1300 ml   Net            -1060 ml     Flowsheet Rows    Flowsheet Row First Filed Value   Admission Height 188 cm (74\") Documented at 2018 1608   Admission Weight 115 kg (253 lb) Documented at 2018 1608          Physical Exam:   General Appearance:    Alert, cooperative, in no acute distress   Lungs:     Crackles in bilateral bases to auscultation.  Normal respiratory effort and rate.      Heart:    Regular rhythm and normal rate, normal S1 and S2, no murmurs, gallops or rubs.     Chest Wall:    No abnormalities observed   Abdomen:     Soft, nontender, positive bowel sounds.     Extremities:   no cyanosis, clubbing or edema.  No marked joint deformities.  Adequate musculoskeletal strength.       Results Review:      Results from last 7 days  Lab Units 18  0348   SODIUM mmol/L 138   POTASSIUM mmol/L 3.5   CHLORIDE mmol/L 95*   CO2 mmol/L 29.6*   BUN mg/dL 11   CREATININE mg/dL 0.65*   GLUCOSE mg/dL 112*   CALCIUM mg/dL 9.0           Results from last 7 days  Lab Units 18  0348   WBC 10*3/mm3 7.33   HEMOGLOBIN g/dL 13.3*   HEMATOCRIT % 44.0   PLATELETS 10*3/mm3 258                           Medication Review:     atorvastatin 10 mg Oral Daily "   budesonide-formoterol 2 puff Inhalation BID - RT   cetirizine 10 mg Oral Nightly   furosemide 40 mg Intravenous BID   ipratropium-albuterol 3 mL Nebulization Q4H - RT   NIFEdipine XL 30 mg Oral Daily   pantoprazole 40 mg Oral Q AM   potassium chloride 20 mEq Oral Daily   predniSONE 40 mg Oral Daily With Breakfast   pregabalin 75 mg Oral Q12H   rivaroxaban 20 mg Oral Daily With Dinner   tamsulosin 0.4 mg Oral Nightly          lactated ringers 9 mL/hr Last Rate: 9 mL/hr (04/07/18 1303)       Assessment/Plan   1. Volume overload. Likely due to diastolic CHF.  On IV furosemide currently.  Echo from 2/2018 with normal LV function and no significant valvular disease.   2. Acute hypoxic respiratory failure. Still requiring 7L high flow.   3. COPD  4. Paroxysmal atrial fibrillation. Appears to be in sinus rhythm.  On rivaroxaban for anticoagulation.   5. Status post dual chamber permanent pacemaker placement. Followed by Dr. Miller.   6. Pulmonary hypertension. Likely due to COPD and diastolic CHF.      Kidney function is stable. He remains short of breath with exertion and on high flow oxygen. Continue lasix IV today.     DERRELL Joseph  [facility]  04/13/18  2:47 PM

## 2018-04-13 NOTE — PROGRESS NOTES
"  Daily Progress Note.   [address]  4/13/2018    Patient:  Name:  Alessio Allen  MRN:  [id number]  1940  77 y.o.  male         Interval History:  Chart reivwed.  Doing well  Less soa.  Still not feeling well  No fevers.  No productive cough - cough but feels that he cant bring it up    Physical Exam:  /90 (BP Location: Left arm, Patient Position: Sitting)   Pulse 79   Temp 97.2 °F (36.2 °C) (Oral)   Resp 20   Ht 188 cm (74\")   Wt 115 kg (253 lb)   SpO2 98%   BMI 32.48 kg/m²   Body mass index is 32.48 kg/m².    Intake/Output Summary (Last 24 hours) at 04/13/18 0832  Last data filed at 04/13/18 0505   Gross per 24 hour   Intake              240 ml   Output             1300 ml   Net            -1060 ml     GENERAL:  NAD, Aox3  HEENT:  EOMI, nonicteric sclera, no JVD  PULMONARY:   noexp wheeze, bibasilar crackles, no rhonchi, symmetric air entry  Diminished breath sounds   CARDIAC:  RRR no MRG, S1 S2  ABD: SNTND BS+  EXT:no le edema pulses symmetric 2+ bilaterally  NEURO:  CNs II-XII intact, no focal deficits  SKIN: no lesions, no rash  PSYCH: appropriate mood    Data Review:  Notable Labs:    Results from last 7 days  Lab Units 04/13/18 0348 04/12/18 0317 04/11/18  0956 04/09/18  0502 04/08/18  0701   WBC 10*3/mm3 7.33 6.46 9.33 7.52 8.97   HEMOGLOBIN g/dL 13.3* 12.2* 12.6* 12.9* 12.4*   PLATELETS 10*3/mm3 258 237 221 192 188       Results from last 7 days  Lab Units 04/13/18 0348 04/12/18 0317 04/11/18  0956 04/09/18  0502 04/08/18  0701   SODIUM mmol/L 138 136 136 138 140   POTASSIUM mmol/L 3.5 3.8 3.4* 4.0 4.0   CHLORIDE mmol/L 95* 96* 95* 98 99   CO2 mmol/L 29.6* 30.3* 30.7* 26.1 27.2   BUN mg/dL 11 10 8 9 7*   CREATININE mg/dL 0.65* 0.69* 0.72* 0.76 0.73*   GLUCOSE mg/dL 112* 129* 110* 93 91   CALCIUM mg/dL 9.0 8.9 8.8 8.9 9.0   PHOSPHORUS mg/dL 4.4 3.8 3.5  --   --    Estimated Creatinine Clearance: 105.9 mL/min (by C-G formula based on SCr of 0.65 mg/dL " (L)).    Imaging:  CXR reviewed, ct chest reviewed - bilateral extensive upper lobe chronic changes.        TTE2/2018:  · The left ventricular cavity is mildly dilated.  · Left ventricular systolic function is normal. Calculated EF = 60%  · Left ventricular wall thickness is consistent with mild concentric hypertrophy.  · Right ventricular cavity is severely dilated.  · Left atrial cavity size is severely dilated  · Right atrial cavity size is severely dilated.  · There is mild aortic stenosis with a peak gradient of 25 mm Hg, and a mean gradient of 12 mm Hg.  · Mild mitral valve regurgitation is present  · Mild to moderate tricuspid valve regurgitation is present.  · Calculated right ventricular systolic pressure from tricuspid regurgitation is 54 mmHg.  · The ascending aorta is moderately dilated, measuring up to 4.4 cm. .  · There is no evidence of pericardial effusion.  Scheduled meds:      atorvastatin 10 mg Oral Daily   budesonide-formoterol 2 puff Inhalation BID - RT   cetirizine 10 mg Oral Nightly   furosemide 40 mg Intravenous BID   ipratropium-albuterol 3 mL Nebulization Q4H - RT   NIFEdipine XL 30 mg Oral Daily   pantoprazole 40 mg Oral Q AM   potassium chloride 20 mEq Oral Daily   predniSONE 40 mg Oral Daily With Breakfast   pregabalin 75 mg Oral Q12H   rivaroxaban 20 mg Oral Daily With Dinner   tamsulosin 0.4 mg Oral Nightly       ASSESSMENT  /  PLAN:  PCCM Problems  Acute on chronic respiratory failure, hypoxia  Pulmonary edema  COPD, without exacerbation  Relevant Medical Diagnoses  Afib on AC resumed     Plan of Treatment  Admitted 2/7; 2/26; 3/17. Now back again with worsening SOA and hypoxia.    I suspect that he will need nebulized treatments at home as well as to be on steroids at discharge.  His epmysema is severe on his ct and given his frequent ae     Lasix 40mg iv again this am. - seeing improvement in oxygenation slowly    Appreciate cards consultation.           Merlin Fontana,  MD [facility]  04/13/18

## 2018-04-14 LAB
ALBUMIN SERPL-MCNC: 3.3 G/DL (ref 3.5–5.2)
ANION GAP SERPL CALCULATED.3IONS-SCNC: 12.1 MMOL/L
BASOPHILS # BLD AUTO: 0.01 10*3/MM3 (ref 0–0.2)
BASOPHILS NFR BLD AUTO: 0.1 % (ref 0–1.5)
BUN BLD-MCNC: 11 MG/DL (ref 8–23)
BUN/CREAT SERPL: 16.4 (ref 7–25)
CALCIUM SPEC-SCNC: 9.2 MG/DL (ref 8.6–10.5)
CHLORIDE SERPL-SCNC: 95 MMOL/L (ref 98–107)
CO2 SERPL-SCNC: 27.9 MMOL/L (ref 22–29)
CREAT BLD-MCNC: 0.67 MG/DL (ref 0.76–1.27)
DEPRECATED RDW RBC AUTO: 69.8 FL (ref 37–54)
EOSINOPHIL # BLD AUTO: 0 10*3/MM3 (ref 0–0.7)
EOSINOPHIL NFR BLD AUTO: 0 % (ref 0.3–6.2)
ERYTHROCYTE [DISTWIDTH] IN BLOOD BY AUTOMATED COUNT: 24.5 % (ref 11.5–14.5)
GFR SERPL CREATININE-BSD FRML MDRD: 115 ML/MIN/1.73
GLUCOSE BLD-MCNC: 179 MG/DL (ref 65–99)
HCT VFR BLD AUTO: 43.7 % (ref 40.4–52.2)
HGB BLD-MCNC: 13.1 G/DL (ref 13.7–17.6)
IMM GRANULOCYTES # BLD: 0.02 10*3/MM3 (ref 0–0.03)
IMM GRANULOCYTES NFR BLD: 0.2 % (ref 0–0.5)
LYMPHOCYTES # BLD AUTO: 1.01 10*3/MM3 (ref 0.9–4.8)
LYMPHOCYTES NFR BLD AUTO: 12.5 % (ref 19.6–45.3)
MCH RBC QN AUTO: 23.4 PG (ref 27–32.7)
MCHC RBC AUTO-ENTMCNC: 30 G/DL (ref 32.6–36.4)
MCV RBC AUTO: 77.9 FL (ref 79.8–96.2)
MONOCYTES # BLD AUTO: 0.76 10*3/MM3 (ref 0.2–1.2)
MONOCYTES NFR BLD AUTO: 9.4 % (ref 5–12)
NEUTROPHILS # BLD AUTO: 6.29 10*3/MM3 (ref 1.9–8.1)
NEUTROPHILS NFR BLD AUTO: 77.8 % (ref 42.7–76)
PHOSPHATE SERPL-MCNC: 3.8 MG/DL (ref 2.5–4.5)
PLATELET # BLD AUTO: 265 10*3/MM3 (ref 140–500)
PMV BLD AUTO: 9 FL (ref 6–12)
POTASSIUM BLD-SCNC: 4.5 MMOL/L (ref 3.5–5.2)
RBC # BLD AUTO: 5.61 10*6/MM3 (ref 4.6–6)
SODIUM BLD-SCNC: 135 MMOL/L (ref 136–145)
WBC NRBC COR # BLD: 8.09 10*3/MM3 (ref 4.5–10.7)

## 2018-04-14 PROCEDURE — 80069 RENAL FUNCTION PANEL: CPT | Performed by: INTERNAL MEDICINE

## 2018-04-14 PROCEDURE — 94799 UNLISTED PULMONARY SVC/PX: CPT

## 2018-04-14 PROCEDURE — 63710000001 PREDNISONE PER 1 MG: Performed by: INTERNAL MEDICINE

## 2018-04-14 PROCEDURE — 99232 SBSQ HOSP IP/OBS MODERATE 35: CPT | Performed by: INTERNAL MEDICINE

## 2018-04-14 PROCEDURE — 85025 COMPLETE CBC W/AUTO DIFF WBC: CPT | Performed by: INTERNAL MEDICINE

## 2018-04-14 PROCEDURE — 25010000002 FUROSEMIDE PER 20 MG: Performed by: INTERNAL MEDICINE

## 2018-04-14 RX ORDER — KETOTIFEN FUMARATE 0.35 MG/ML
1 SOLUTION/ DROPS OPHTHALMIC 2 TIMES DAILY
Status: DISCONTINUED | OUTPATIENT
Start: 2018-04-14 | End: 2018-04-19 | Stop reason: HOSPADM

## 2018-04-14 RX ORDER — ECHINACEA PURPUREA EXTRACT 125 MG
1 TABLET ORAL AS NEEDED
Status: DISCONTINUED | OUTPATIENT
Start: 2018-04-14 | End: 2018-04-19 | Stop reason: HOSPADM

## 2018-04-14 RX ADMIN — ACETAMINOPHEN 650 MG: 325 TABLET ORAL at 20:59

## 2018-04-14 RX ADMIN — FUROSEMIDE 40 MG: 10 INJECTION, SOLUTION INTRAMUSCULAR; INTRAVENOUS at 18:07

## 2018-04-14 RX ADMIN — KETOTIFEN FUMARATE 1 DROP: 0.35 SOLUTION/ DROPS OPHTHALMIC at 21:00

## 2018-04-14 RX ADMIN — PREDNISONE 40 MG: 20 TABLET ORAL at 09:35

## 2018-04-14 RX ADMIN — ACETAMINOPHEN 650 MG: 325 TABLET ORAL at 09:35

## 2018-04-14 RX ADMIN — RIVAROXABAN 20 MG: 20 TABLET, FILM COATED ORAL at 17:57

## 2018-04-14 RX ADMIN — IPRATROPIUM BROMIDE AND ALBUTEROL SULFATE 3 ML: .5; 3 SOLUTION RESPIRATORY (INHALATION) at 07:04

## 2018-04-14 RX ADMIN — IPRATROPIUM BROMIDE AND ALBUTEROL SULFATE 3 ML: .5; 3 SOLUTION RESPIRATORY (INHALATION) at 10:48

## 2018-04-14 RX ADMIN — TRAMADOL HYDROCHLORIDE 50 MG: 50 TABLET, FILM COATED ORAL at 14:21

## 2018-04-14 RX ADMIN — IPRATROPIUM BROMIDE AND ALBUTEROL SULFATE 3 ML: .5; 3 SOLUTION RESPIRATORY (INHALATION) at 15:18

## 2018-04-14 RX ADMIN — BUDESONIDE AND FORMOTEROL FUMARATE DIHYDRATE 2 PUFF: 160; 4.5 AEROSOL RESPIRATORY (INHALATION) at 07:04

## 2018-04-14 RX ADMIN — FUROSEMIDE 40 MG: 10 INJECTION, SOLUTION INTRAMUSCULAR; INTRAVENOUS at 09:35

## 2018-04-14 RX ADMIN — TAMSULOSIN HYDROCHLORIDE 0.4 MG: 0.4 CAPSULE ORAL at 20:59

## 2018-04-14 RX ADMIN — POTASSIUM CHLORIDE 20 MEQ: 750 CAPSULE, EXTENDED RELEASE ORAL at 09:34

## 2018-04-14 RX ADMIN — ATORVASTATIN CALCIUM 10 MG: 10 TABLET, FILM COATED ORAL at 09:34

## 2018-04-14 RX ADMIN — IPRATROPIUM BROMIDE AND ALBUTEROL SULFATE 3 ML: .5; 3 SOLUTION RESPIRATORY (INHALATION) at 19:58

## 2018-04-14 RX ADMIN — CETIRIZINE HYDROCHLORIDE 10 MG: 10 TABLET, FILM COATED ORAL at 20:59

## 2018-04-14 RX ADMIN — NIFEDIPINE 30 MG: 30 TABLET, FILM COATED, EXTENDED RELEASE ORAL at 09:35

## 2018-04-14 RX ADMIN — PREGABALIN 75 MG: 75 CAPSULE ORAL at 09:35

## 2018-04-14 RX ADMIN — PANTOPRAZOLE SODIUM 40 MG: 40 TABLET, DELAYED RELEASE ORAL at 06:38

## 2018-04-14 RX ADMIN — BUDESONIDE AND FORMOTEROL FUMARATE DIHYDRATE 2 PUFF: 160; 4.5 AEROSOL RESPIRATORY (INHALATION) at 19:58

## 2018-04-14 RX ADMIN — PREGABALIN 75 MG: 75 CAPSULE ORAL at 20:59

## 2018-04-14 NOTE — PLAN OF CARE
Problem: Patient Care Overview  Goal: Plan of Care Review  Outcome: Ongoing (interventions implemented as appropriate)   04/14/18 8901   Coping/Psychosocial   Plan of Care Reviewed With patient;spouse   Plan of Care Review   Progress improving   OTHER   Outcome Summary O2 decreased to 3L NC, Ambulates w walker and stand by assist x 1. A&O x 4. Tramadol x1. Lungs coarse and diminished. No other complaints at this time, Will continue to monitor. CCRN 4/14/18 @ 7666

## 2018-04-14 NOTE — PLAN OF CARE
Problem: Pneumonia (Adult)  Goal: Signs and Symptoms of Listed Potential Problems Will be Absent, Minimized or Managed (Pneumonia)  Outcome: Ongoing (interventions implemented as appropriate)

## 2018-04-14 NOTE — PROGRESS NOTES
[facility]  Progress note: 2018    Patient Identification:  Name:Alessio Allen  Age:77 y.o.  Sex: male  :  1940  MRN: [id number]           CC:  Diastolic heart failure    Interval history:  Modest diuresis overnight and vitals stable.  Renal function stable.  Still on IV Lasix.  Agitated. He denies chest pain    Vital Signs:   Temp:  [97.7 °F (36.5 °C)-98.7 °F (37.1 °C)] 97.7 °F (36.5 °C)  Heart Rate:  [72-90] 72  Resp:  [18] 18  BP: (113-135)/(77-84) 119/77    Intake/Output Summary (Last 24 hours) at 18 1317  Last data filed at 18 0900   Gross per 24 hour   Intake              540 ml   Output             1450 ml   Net             -910 ml       Physical Examination:    General Appearance No acute distress   Neck No adenopathy, supple, trachea midline, no thyromegaly, no carotid bruit, no JVD   Lungs Bibasilar rales approximately 1/5 of the way out,respirations regular, even and unlabored   Heart Regular rhythm and normal rate, normal S1 and S2, no murmur, no gallop, no rub, no click   Chest wall No abnormalities observed   Abdomen Normal bowel sounds, no masses, no hepatomegaly, soft   Extremities Moves all extremities well, trace edema, no cyanosis, no redness   Neurological Alert and oriented x 3     Lab Review:  Personally reviewed the labs, radiology imaging and other cardiac procedures.   Results from last 7 days  Lab Units 18  0422   SODIUM mmol/L 135*   POTASSIUM mmol/L 4.5   CHLORIDE mmol/L 95*   CO2 mmol/L 27.9   BUN mg/dL 11   CREATININE mg/dL 0.67*   CALCIUM mg/dL 9.2   GLUCOSE mg/dL 179*         )  Results from last 7 days  Lab Units 18  0422 18  0348 18  0317   WBC 10*3/mm3 8.09 7.33 6.46   HEMOGLOBIN g/dL 13.1* 13.3* 12.2*   HEMATOCRIT % 43.7 44.0 41.5   PLATELETS 10*3/mm3 265 258 237           Medication Review:   Meds reviewed  Scheduled Meds:  atorvastatin 10 mg Oral Daily   budesonide-formoterol 2 puff Inhalation BID - RT   cetirizine 10  mg Oral Nightly   furosemide 40 mg Intravenous BID   ipratropium-albuterol 3 mL Nebulization Q4H - RT   NIFEdipine XL 30 mg Oral Daily   pantoprazole 40 mg Oral Q AM   potassium chloride 20 mEq Oral Daily   predniSONE 40 mg Oral Daily With Breakfast   pregabalin 75 mg Oral Q12H   rivaroxaban 20 mg Oral Daily With Dinner   tamsulosin 0.4 mg Oral Nightly     Continuous Infusions:  lactated ringers 9 mL/hr Last Rate: 9 mL/hr (04/07/18 1303)     I personally viewed and interpreted the patient's EKG/Telemetry data    Assessment and Plan  1.  Acute heart failure with preserved LV systolic function.  Still sounds wet.  Continue with IV Lasix for another 24 hours  2.  COPD exacerbation  3.  Paroxysmal atrial fibrillation on anticoagulant therapy and maintaining sinus rhythm  4.  Status post permanent pacemaker placement  5.  Pulmonary hypertension.  6.  Questionable a sending aortic aneurysm.  Echocardiogram noted a size of 4.4 cm in February 2018 and CT in February 2018 showed an aortic size of 4.7 cm.  Continue with outpatient follow-up.  7.  Coronary artery disease with history of prior stent placement    Ayse Sarabia  4/14/20181:17 PM  25min spent in reviewing records, discussion and examination of the patient and discussion with other members of the patient's medical team.     Dictated utilizing Dragon dictation

## 2018-04-14 NOTE — PROGRESS NOTES
[facility]  Phone: [phone]  Suresh Hernandez MD    Subjective:  LOS: 9    Feels better with diuresis. Cardiology managing. Complains of itchy eyes due to allergies.    Objective   Vital Signs past 24hrs    Temp range: Temp (24hrs), Av °F (36.7 °C), Min:97.6 °F (36.4 °C), Max:98.7 °F (37.1 °C)    BP range: BP: (113-135)/(75-84) 120/75  Pulse range: Heart Rate:  [72-88] 83  Resp rate range: Resp:  [18-20] 18    Device (Oxygen Therapy): nasal cannula;humidifiedFlow (L/min):  [3-6] 3  Oxygen range:SpO2:  [92 %-96 %] 92 %      104 kg (229 lb); Body mass index is 29.4 kg/m².    Intake/Output Summary (Last 24 hours) at 18 1523  Last data filed at 18 1334   Gross per 24 hour   Intake              640 ml   Output             2050 ml   Net            -1410 ml       Physical Exam   Cardiovascular: Normal rate and regular rhythm.    No murmur heard.  Pulmonary/Chest: He has decreased breath sounds. He has no wheezes. He has rales (mild) in the right lower field and the left lower field.   Abdominal: Soft. Bowel sounds are normal. There is no tenderness.   Musculoskeletal: He exhibits no edema.   Neurological: He is alert.   Nursing note and vitals reviewed.    Results Review:    I have reviewed the laboratory and imaging data since the last note by [facility] physician.  My annotations are noted in assessment and plan.    Medication Review:  I have reviewed the current MAR.  My annotations are noted in assessment and plan.      lactated ringers 9 mL/hr Last Rate: 9 mL/hr (18 1303)     Plan   PCCM Problems  Acute on chronic resp failure, hyoxia  Acute decompensated HFpEF  COPD severe, exacerbation  Relevant Medical Diagnoses  Afib, on AC  PHT  CAD    Plan of Treatment  Doing better with diuretics. Manage per cards.    COPD on oral steroids. Monitor and taper.    Continue O2 and wean as tolerated.    Give eye drops for itching.    CMV detected in BAL from . Unclear if it means infection. Check CMV  - IgM. Check with ID - curbside.    Suresh Hernandez MD  04/14/18  3:23 PM    Part of this note may be an electronic transcription/translation of spoken language to printed text using the Dragon Dictation System.

## 2018-04-14 NOTE — PLAN OF CARE
Problem: Activity Intolerance (Adult)  Goal: Effective Energy Conservation Techniques  Outcome: Ongoing (interventions implemented as appropriate)

## 2018-04-14 NOTE — PLAN OF CARE
Problem: Activity Intolerance (Adult)  Goal: Activity Tolerance  Outcome: Outcome(s) achieved Date Met: 04/14/18

## 2018-04-14 NOTE — PLAN OF CARE
Problem: Patient Care Overview  Goal: Plan of Care Review  Outcome: Ongoing (interventions implemented as appropriate)   04/13/18 1958 04/14/18 0456   Coping/Psychosocial   Plan of Care Reviewed With patient --    Plan of Care Review   Progress --  no change   OTHER   Outcome Summary --  Pt on 5 L NC. IV lasix continues. Pt encouraged to turn. PRN tylenol given. Safety maintained.      Goal: Individualization and Mutuality  Outcome: Ongoing (interventions implemented as appropriate)    Goal: Discharge Needs Assessment  Outcome: Ongoing (interventions implemented as appropriate)    Goal: Interprofessional Rounds/Family Conf  Outcome: Ongoing (interventions implemented as appropriate)      Problem: Fall Risk (Adult)  Goal: Absence of Fall  Outcome: Ongoing (interventions implemented as appropriate)      Problem: Skin Injury Risk (Adult)  Goal: Skin Health and Integrity  Outcome: Ongoing (interventions implemented as appropriate)      Problem: Activity Intolerance (Adult)  Goal: Activity Tolerance  Outcome: Ongoing (interventions implemented as appropriate)    Goal: Effective Energy Conservation Techniques  Outcome: Ongoing (interventions implemented as appropriate)      Problem: Pneumonia (Adult)  Goal: Signs and Symptoms of Listed Potential Problems Will be Absent, Minimized or Managed (Pneumonia)  Outcome: Ongoing (interventions implemented as appropriate)

## 2018-04-15 LAB
ALBUMIN SERPL-MCNC: 3.3 G/DL (ref 3.5–5.2)
ANION GAP SERPL CALCULATED.3IONS-SCNC: 11.7 MMOL/L
BASOPHILS # BLD AUTO: 0.01 10*3/MM3 (ref 0–0.2)
BASOPHILS NFR BLD AUTO: 0.1 % (ref 0–1.5)
BUN BLD-MCNC: 15 MG/DL (ref 8–23)
BUN/CREAT SERPL: 21.7 (ref 7–25)
CALCIUM SPEC-SCNC: 9.2 MG/DL (ref 8.6–10.5)
CHLORIDE SERPL-SCNC: 97 MMOL/L (ref 98–107)
CO2 SERPL-SCNC: 31.3 MMOL/L (ref 22–29)
CREAT BLD-MCNC: 0.69 MG/DL (ref 0.76–1.27)
DEPRECATED RDW RBC AUTO: 68.5 FL (ref 37–54)
EOSINOPHIL # BLD AUTO: 0.08 10*3/MM3 (ref 0–0.7)
EOSINOPHIL NFR BLD AUTO: 0.9 % (ref 0.3–6.2)
ERYTHROCYTE [DISTWIDTH] IN BLOOD BY AUTOMATED COUNT: 24.4 % (ref 11.5–14.5)
GFR SERPL CREATININE-BSD FRML MDRD: 111 ML/MIN/1.73
GLUCOSE BLD-MCNC: 102 MG/DL (ref 65–99)
HCT VFR BLD AUTO: 43.4 % (ref 40.4–52.2)
HGB BLD-MCNC: 12.9 G/DL (ref 13.7–17.6)
IMM GRANULOCYTES # BLD: 0.04 10*3/MM3 (ref 0–0.03)
IMM GRANULOCYTES NFR BLD: 0.4 % (ref 0–0.5)
LYMPHOCYTES # BLD AUTO: 1.86 10*3/MM3 (ref 0.9–4.8)
LYMPHOCYTES NFR BLD AUTO: 20 % (ref 19.6–45.3)
MCH RBC QN AUTO: 23 PG (ref 27–32.7)
MCHC RBC AUTO-ENTMCNC: 29.7 G/DL (ref 32.6–36.4)
MCV RBC AUTO: 77.5 FL (ref 79.8–96.2)
MONOCYTES # BLD AUTO: 1.14 10*3/MM3 (ref 0.2–1.2)
MONOCYTES NFR BLD AUTO: 12.2 % (ref 5–12)
NEUTROPHILS # BLD AUTO: 6.19 10*3/MM3 (ref 1.9–8.1)
NEUTROPHILS NFR BLD AUTO: 66.4 % (ref 42.7–76)
PHOSPHATE SERPL-MCNC: 4.4 MG/DL (ref 2.5–4.5)
PLATELET # BLD AUTO: 289 10*3/MM3 (ref 140–500)
PMV BLD AUTO: 9.3 FL (ref 6–12)
POTASSIUM BLD-SCNC: 4 MMOL/L (ref 3.5–5.2)
RBC # BLD AUTO: 5.6 10*6/MM3 (ref 4.6–6)
SODIUM BLD-SCNC: 140 MMOL/L (ref 136–145)
WBC NRBC COR # BLD: 9.32 10*3/MM3 (ref 4.5–10.7)

## 2018-04-15 PROCEDURE — 94799 UNLISTED PULMONARY SVC/PX: CPT

## 2018-04-15 PROCEDURE — 63710000001 PREDNISONE PER 1 MG: Performed by: INTERNAL MEDICINE

## 2018-04-15 PROCEDURE — 25010000002 FUROSEMIDE PER 20 MG: Performed by: INTERNAL MEDICINE

## 2018-04-15 PROCEDURE — 86645 CMV ANTIBODY IGM: CPT | Performed by: INTERNAL MEDICINE

## 2018-04-15 PROCEDURE — 99223 1ST HOSP IP/OBS HIGH 75: CPT | Performed by: INTERNAL MEDICINE

## 2018-04-15 PROCEDURE — 80069 RENAL FUNCTION PANEL: CPT | Performed by: INTERNAL MEDICINE

## 2018-04-15 PROCEDURE — 85025 COMPLETE CBC W/AUTO DIFF WBC: CPT | Performed by: INTERNAL MEDICINE

## 2018-04-15 RX ORDER — ASPIRIN 81 MG/1
81 TABLET ORAL DAILY
Status: DISCONTINUED | OUTPATIENT
Start: 2018-04-15 | End: 2018-04-19 | Stop reason: HOSPADM

## 2018-04-15 RX ORDER — PREDNISONE 20 MG/1
20 TABLET ORAL
Status: DISCONTINUED | OUTPATIENT
Start: 2018-04-16 | End: 2018-04-19 | Stop reason: HOSPADM

## 2018-04-15 RX ORDER — IPRATROPIUM BROMIDE AND ALBUTEROL SULFATE 2.5; .5 MG/3ML; MG/3ML
3 SOLUTION RESPIRATORY (INHALATION)
Status: DISCONTINUED | OUTPATIENT
Start: 2018-04-15 | End: 2018-04-19 | Stop reason: HOSPADM

## 2018-04-15 RX ADMIN — BUDESONIDE AND FORMOTEROL FUMARATE DIHYDRATE 2 PUFF: 160; 4.5 AEROSOL RESPIRATORY (INHALATION) at 20:26

## 2018-04-15 RX ADMIN — ACETAMINOPHEN 650 MG: 325 TABLET ORAL at 17:35

## 2018-04-15 RX ADMIN — TAMSULOSIN HYDROCHLORIDE 0.4 MG: 0.4 CAPSULE ORAL at 20:29

## 2018-04-15 RX ADMIN — PREGABALIN 75 MG: 75 CAPSULE ORAL at 17:35

## 2018-04-15 RX ADMIN — PREDNISONE 40 MG: 20 TABLET ORAL at 09:41

## 2018-04-15 RX ADMIN — FUROSEMIDE 40 MG: 10 INJECTION, SOLUTION INTRAMUSCULAR; INTRAVENOUS at 05:31

## 2018-04-15 RX ADMIN — KETOTIFEN FUMARATE 1 DROP: 0.35 SOLUTION/ DROPS OPHTHALMIC at 20:32

## 2018-04-15 RX ADMIN — CETIRIZINE HYDROCHLORIDE 10 MG: 10 TABLET, FILM COATED ORAL at 20:29

## 2018-04-15 RX ADMIN — ASPIRIN 81 MG: 81 TABLET ORAL at 20:29

## 2018-04-15 RX ADMIN — NIFEDIPINE 30 MG: 30 TABLET, FILM COATED, EXTENDED RELEASE ORAL at 09:41

## 2018-04-15 RX ADMIN — BUDESONIDE AND FORMOTEROL FUMARATE DIHYDRATE 2 PUFF: 160; 4.5 AEROSOL RESPIRATORY (INHALATION) at 07:04

## 2018-04-15 RX ADMIN — IPRATROPIUM BROMIDE AND ALBUTEROL SULFATE 3 ML: .5; 3 SOLUTION RESPIRATORY (INHALATION) at 07:04

## 2018-04-15 RX ADMIN — PANTOPRAZOLE SODIUM 40 MG: 40 TABLET, DELAYED RELEASE ORAL at 05:32

## 2018-04-15 RX ADMIN — ATORVASTATIN CALCIUM 10 MG: 10 TABLET, FILM COATED ORAL at 09:41

## 2018-04-15 RX ADMIN — RIVAROXABAN 20 MG: 20 TABLET, FILM COATED ORAL at 17:35

## 2018-04-15 RX ADMIN — FUROSEMIDE 40 MG: 10 INJECTION, SOLUTION INTRAMUSCULAR; INTRAVENOUS at 17:35

## 2018-04-15 RX ADMIN — IPRATROPIUM BROMIDE AND ALBUTEROL SULFATE 3 ML: .5; 3 SOLUTION RESPIRATORY (INHALATION) at 14:53

## 2018-04-15 RX ADMIN — IPRATROPIUM BROMIDE AND ALBUTEROL SULFATE 3 ML: .5; 3 SOLUTION RESPIRATORY (INHALATION) at 20:26

## 2018-04-15 RX ADMIN — KETOTIFEN FUMARATE 1 DROP: 0.35 SOLUTION/ DROPS OPHTHALMIC at 09:41

## 2018-04-15 RX ADMIN — ACETAMINOPHEN 650 MG: 325 TABLET ORAL at 05:32

## 2018-04-15 RX ADMIN — ACETAMINOPHEN 650 MG: 325 TABLET ORAL at 11:44

## 2018-04-15 RX ADMIN — PREGABALIN 75 MG: 75 CAPSULE ORAL at 05:32

## 2018-04-15 RX ADMIN — POTASSIUM CHLORIDE 20 MEQ: 750 CAPSULE, EXTENDED RELEASE ORAL at 09:41

## 2018-04-15 RX ADMIN — IPRATROPIUM BROMIDE AND ALBUTEROL SULFATE 3 ML: .5; 3 SOLUTION RESPIRATORY (INHALATION) at 10:52

## 2018-04-15 NOTE — PLAN OF CARE
Problem: Patient Care Overview  Goal: Plan of Care Review  Outcome: Ongoing (interventions implemented as appropriate)   04/14/18 2059 04/15/18 0454   Coping/Psychosocial   Plan of Care Reviewed With patient --    Plan of Care Review   Progress --  no change   OTHER   Outcome Summary --  Pt tolerating 3 L NC. Safety maintained. BM at start of shift. No complaints of pain after lyrica. Safety maintained. Continue to monitor.      Goal: Individualization and Mutuality  Outcome: Ongoing (interventions implemented as appropriate)    Goal: Discharge Needs Assessment  Outcome: Ongoing (interventions implemented as appropriate)    Goal: Interprofessional Rounds/Family Conf  Outcome: Ongoing (interventions implemented as appropriate)      Problem: Fall Risk (Adult)  Goal: Absence of Fall  Outcome: Ongoing (interventions implemented as appropriate)      Problem: Skin Injury Risk (Adult)  Goal: Skin Health and Integrity  Outcome: Ongoing (interventions implemented as appropriate)      Problem: Activity Intolerance (Adult)  Goal: Effective Energy Conservation Techniques  Outcome: Ongoing (interventions implemented as appropriate)      Problem: Pneumonia (Adult)  Goal: Signs and Symptoms of Listed Potential Problems Will be Absent, Minimized or Managed (Pneumonia)  Outcome: Ongoing (interventions implemented as appropriate)

## 2018-04-15 NOTE — CONSULTS
Referring Provider: Kalpesh Jimenez MD  [id number]  [address] [address]    Reason for Consultation: significance of CMV on BAL    History of present illness:  Mr Allen is a 78 YO who I am asked to evaluate and give opinion for significance of CMV on BAL. History is obtained from the patient, Dr SHENA Hernandez, and review of the old medical records which I summarize/synthesize as follows: He presented to the ER on 4/5/18 with acute on chronic shortness of air made worse w/ exertion. He had mild productive cough. He was not having fevers, chills, or sweats. No sick contacts.     In the ER he was afebrile with normal HR with O2 sat 87%. Labs were notable for normal WBC. CXR showed chronic changes and question infiltrate at the left base. He was started on doxycycline. Cardiology saw the patient and felt he had diastolic heart failure and recommended diuresis.     He follows with pulmonology for COPD and wears 3-5 L O2 via NC at home. He has had COPD since the early 1990s but says it has been worse recently. He says it all start January 2018 when he fell and shattered his R ankle requiring ORIF. He was then admitted here 2/7-2/14 and diagnosed with MRSA pneumonia (I don't see any cultures positive for MRSA) and discharged on linezolid. He was readmitted 2/26-3/5 for COPD exacerbation and treated with steroids and 7 days of cefdinir and methylprednisolone. Most recently admitted 3/17-3/24 for COPD exacerbation and treated with steroids and doxycycline. Each time his symptoms improved but required readmission.    On 4/7/18 he went for bronch and had BAL done. All cultures done except a CMV was positive. He has no history of profound immunosupression such as transplant. His only immunosuppression is intermittent steroids. Bronch was negative for PCP.    He says his breathing is now improved but not quite back to baseline. He is requiring about 4L NC. He still has a non-productive cough.         Past Medical  History:   Diagnosis Date   • Arthritis    • Asthma    • Atrial fibrillation    • BPH (benign prostatic hypertrophy)    • Cancer    • COPD (chronic obstructive pulmonary disease)    • H/O Abnormal CBC 2017   • History of heart artery stent 03/2017   • Hyperlipidemia    • Hypertension    • Neuropathy     feet and hands    • Pneumonia        Past Surgical History:   Procedure Laterality Date   • BRONCHOSCOPY N/A 4/7/2018    Procedure: BRONCHOSCOPY with specimens;  Surgeon: Suresh Hernandez MD;  Location: [facility];  Service: Pulmonary   • COLONOSCOPY  12/05/2016    polyps   • FRACTURE SURGERY     • PACEMAKER IMPLANTATION         Social History:  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 PCN - rash and hives in late 1990s  2. Sulfa - hives unknown year      Medications:    Current Facility-Administered Medications:   •  acetaminophen (TYLENOL) tablet 650 mg, 650 mg, Oral, Q4H PRN, Kalpesh Jimenez MD, 650 mg at 04/15/18 0532  •  albuterol (PROVENTIL) nebulizer solution 0.083% 2.5 mg/3mL, 2.5 mg, Nebulization, Q6H PRN, Kalpesh Jimenez MD  •  atorvastatin (LIPITOR) tablet 10 mg, 10 mg, Oral, Daily, Kalpesh Jimenez MD, 10 mg at 04/15/18 0941  •  budesonide-formoterol (SYMBICORT) 160-4.5 MCG/ACT inhaler 2 puff, 2 puff, Inhalation, BID - RT, Kalpesh Jimenez MD, 2 puff at 04/15/18 0704  •  cetirizine (zyrTEC) tablet 10 mg, 10 mg, Oral, Nightly, Macho Fontana MD, 10 mg at 04/14/18 2059  •  furosemide (LASIX) injection 40 mg, 40 mg, Intravenous, BID, Merlin Fontana MD, 40 mg at 04/15/18 0531  •  ipratropium-albuterol (DUO-NEB) nebulizer solution 3 mL, 3 mL, Nebulization, Q4H - RT, Ken Up MD, 3 mL at 04/15/18 1052  •  ketotifen (ZADITOR) 0.025 % ophthalmic solution 1 drop, 1 drop, Both Eyes, BID, Suresh Hernandez MD, 1 drop at 04/15/18 0941  •  lactated ringers infusion, 9 mL/hr, Intravenous, Continuous, Torey EVANS  MD Jessica, Last Rate: 9 mL/hr at 04/07/18 1303, 9 mL/hr at 04/07/18 1303  •  morphine injection 2 mg, 2 mg, Intravenous, Q4H PRN **AND** naloxone (NARCAN) injection 0.4 mg, 0.4 mg, Intravenous, Q5 Min PRN, Kalpesh Jimenez MD  •  NIFEdipine XL (PROCARDIA XL) 24 hr tablet 30 mg, 30 mg, Oral, Daily, Kalpeshfreddie Jimenez MD, 30 mg at 04/15/18 0941  •  nitroglycerin (NITROSTAT) SL tablet 0.4 mg, 0.4 mg, Sublingual, Q5 Min PRN, Kalpesh Jimenez MD  •  pantoprazole (PROTONIX) EC tablet 40 mg, 40 mg, Oral, Q AM, Merlin Fontana MD, 40 mg at 04/15/18 0532  •  potassium chloride (MICRO-K) CR capsule 20 mEq, 20 mEq, Oral, Daily, Kalpeshfreddie Jimenez MD, 20 mEq at 04/15/18 0941  •  potassium chloride (MICRO-K) CR capsule 40 mEq, 40 mEq, Oral, PRN, 40 mEq at 04/13/18 1807 **OR** [DISCONTINUED] potassium chloride (KLOR-CON) packet 40 mEq, 40 mEq, Oral, PRN **OR** potassium chloride 10 mEq in 100 mL IVPB, 10 mEq, Intravenous, Q1H PRN, Merlin Fontana MD  •  predniSONE (DELTASONE) tablet 40 mg, 40 mg, Oral, Daily With Breakfast, Merlin Fontana MD, 40 mg at 04/15/18 0941  •  pregabalin (LYRICA) capsule 75 mg, 75 mg, Oral, Q12H, Ken Up MD, 75 mg at 04/15/18 0532  •  rivaroxaban (XARELTO) tablet 20 mg, 20 mg, Oral, Daily With Dinner, Merlin Fontana MD, 20 mg at 04/14/18 1757  •  sennosides-docusate sodium (SENOKOT-S) 8.6-50 MG tablet 2 tablet, 2 tablet, Oral, BID PRN, Kalpesh Jimenez MD  •  sodium chloride (OCEAN) nasal spray 1 spray, 1 spray, Each Nare, PRN, Merlin Fontana MD  •  sodium chloride 0.9 % flush 1-10 mL, 1-10 mL, Intravenous, PRN, Kalpesh Jimenez MD  •  Insert peripheral IV, , , Once **AND** sodium chloride 0.9 % flush 10 mL, 10 mL, Intravenous, PRN, Ashia Tee PA-C  •  tamsulosin (FLOMAX) 24 hr capsule 0.4 mg, 0.4 mg, Oral, Nightly, Kalpesh Jimenez MD, 0.4 mg at 04/14/18 2059  •  traMADol (ULTRAM) tablet 50 mg, 50 mg, Oral, Q6H PRN, Kalpesh Jimenez MD, 50 mg at 04/14/18 1421      Review  of Systems  All systems were reviewed and are negative unless otherwise stated above in the HPI    Objective   Vital Signs   Temp:  [97.5 °F (36.4 °C)-98.7 °F (37.1 °C)] 97.5 °F (36.4 °C)  Heart Rate:  [78-84] 80  Resp:  [18-20] 18  BP: (119-136)/(75-85) 119/81    Physical Exam:   General: awake, alert, chronically ill appearing, fair historian  Head: Normocephalic, atraumatic  Eyes: PERRL, EOMI, no scleral icterus, no conjunctival pallor, no conjunctival hemorrhages.   ENT: MMM, OP clear, no thrush. Dentures  Neck: Supple, no visible thyromegaly  Cardiovascular: NR, RR, no murmurs, rubs, or gallops; no LE edema  Respiratory: poor air movement, no rales or wheezing; some cough,  On 4L NC  GI: Abdomen is obese, soft, non-tender, non-distended, normal bowel sounds in all four quadrants; no hepatosplenomegaly, no masses palpated  : no Springer catheter present  Musculoskeletal: R ankle incisions healing well  Skin: No rashes, lesions, or embolic phenomenon  Neurological: Alert and oriented x 3, cranial nerves 2-12 grossly intact, motor strength 5/5 in all four extremities  Psychiatric: Normal mood and affect   Lymph: no pre-auricular, post-auricular, submandibular, cervical, supraclavicular  LAD  Vasc: no cyanosis; PIV w/o erythema    Labs:     Lab Results   Component Value Date    WBC 9.32 04/15/2018    HGB 12.9 (L) 04/15/2018    HCT 43.4 04/15/2018    MCV 77.5 (L) 04/15/2018     04/15/2018       Lab Results   Component Value Date    GLUCOSE 102 (H) 04/15/2018    BUN 15 04/15/2018    CREATININE 0.69 (L) 04/15/2018    EGFRIFNONA 111 04/15/2018    BCR 21.7 04/15/2018    CO2 31.3 (H) 04/15/2018    CALCIUM 9.2 04/15/2018    ALBUMIN 3.30 (L) 04/15/2018    LABIL2 1.1 04/06/2018    AST 14 04/06/2018    ALT 14 04/06/2018     Procal 0.04    Microbiology:  4/5 BCx: negative  4/6 RVP: negative  4/7 Bronch Cx:  -Bacteria negative  -Fungus negative  -AFB negative  -Viral culture with CMV immunoflouresence    BAL Path  4/7/18:  No tumor or granuloma. GMS stain negative for PCP though some small yeast seen.    Radiology (personally reviewed images and interpreted:  CXR 4/11 with cardiomegaly, pacer present, no new new infiltrate; agree w/ radiologist's mention of fibrotic changes  CT chest with emphysema and patchy B infiltrates with groundglass changes on my read; similar compared to CTs going back 2 months    Assessment/Plan   1. Acute on chronic hypoxic respiratory failure secondary to COPD exacerbation  -continue supportive pulmonary care with supplemental O2  -agree with steroids; he might benefit from a longer course this time  -in a non-transplant patient, I do not think the CMV seen on the BAL is causing disease and it does not require treatment; I'll send a CMV PCR but expect it to either be negative or just low-level positive  -glad to see the GMS stain is negative for PCP  -with recurrent admissions and CT findings, could he have ILD? Or is this just all end stage COPD?    2. History of tobacco abuse    3. Acute on chronic diastolic heart failure  -complicating #1; diuresis has helped    Thank you for this consult. ID will follow. I discussed the workup with Dr Hernandez.

## 2018-04-15 NOTE — PLAN OF CARE
Problem: Patient Care Overview  Goal: Plan of Care Review  Outcome: Ongoing (interventions implemented as appropriate)   04/15/18 2482   Coping/Psychosocial   Plan of Care Reviewed With patient;spouse   Plan of Care Review   Progress improving   OTHER   Outcome Summary iv lasix bid. id consulted. prn tylenol for neuropathy pain in feet and toes. up in room and chair. resting comfortably. safety maintained, cont. to monitor        Problem: Fall Risk (Adult)  Goal: Absence of Fall  Outcome: Ongoing (interventions implemented as appropriate)      Problem: Skin Injury Risk (Adult)  Goal: Skin Health and Integrity  Outcome: Ongoing (interventions implemented as appropriate)      Problem: Activity Intolerance (Adult)  Goal: Effective Energy Conservation Techniques  Outcome: Ongoing (interventions implemented as appropriate)      Problem: Pneumonia (Adult)  Goal: Signs and Symptoms of Listed Potential Problems Will be Absent, Minimized or Managed (Pneumonia)  Outcome: Ongoing (interventions implemented as appropriate)

## 2018-04-15 NOTE — PROGRESS NOTES
[facility]  Phone: [phone]  Suresh Hernandez MD    Subjective:  LOS: 10    Slowly better. Diuresing with improvement.    Objective   Vital Signs past 24hrs    Temp range: Temp (24hrs), Av °F (36.7 °C), Min:97.5 °F (36.4 °C), Max:98.7 °F (37.1 °C)    BP range: BP: (119-136)/(75-85) 119/81  Pulse range: Heart Rate:  [78-84] 82  Resp rate range: Resp:  [18-20] 18    Device (Oxygen Therapy): nasal cannula;humidifiedFlow (L/min):  [3-4] 3  Oxygen range:SpO2:  [90 %-93 %] 90 %      106 kg (233 lb 3.2 oz); Body mass index is 29.94 kg/m².    Intake/Output Summary (Last 24 hours) at 04/15/18 1220  Last data filed at 04/15/18 0605   Gross per 24 hour   Intake              100 ml   Output             1930 ml   Net            -1830 ml       Physical Exam   Cardiovascular: Normal rate and regular rhythm.    No murmur heard.  Pulmonary/Chest: He has decreased breath sounds. He has no wheezes. He has rales (mild) in the right lower field and the left lower field.   Abdominal: Soft. Bowel sounds are normal. There is no tenderness.   Musculoskeletal: He exhibits no edema.   Neurological: He is alert.   Nursing note and vitals reviewed.    Results Review:    I have reviewed the laboratory and imaging data since the last note by [facility] physician.  My annotations are noted in assessment and plan.    Medication Review:  I have reviewed the current MAR.  My annotations are noted in assessment and plan.      lactated ringers 9 mL/hr Last Rate: 9 mL/hr (18 1303)     Plan   PCCM Problems  Acute on chronic resp failure, hyoxia  Acute decompensated HFpEF  COPD severe, exacerbation  CMV +ve BAL  Relevant Medical Diagnoses  Afib, on AC  PHT  CAD    Plan of Treatment  Doing better with diuretics. Manage per cards.    COPD on oral steroids. Monitor and taper.    Continue O2 and wean as tolerated.    CMV detected in BAL from . Unclear if it means infection. Discussed with ID. Await recos.    Given eye drops for  itching.      Suresh Hernandez MD  04/15/18  12:20 PM    Part of this note may be an electronic transcription/translation of spoken language to printed text using the Dragon Dictation System.

## 2018-04-15 NOTE — PROGRESS NOTES
"[facility]  Progress note: 4/15/2018    Patient Identification:  Name:Alessio Allen  Age:77 y.o.  Sex: male  :  1940  MRN: [id number]           CC:  Diastolic heart failure    Interval history:  Modest diuresis overnight and vitals stable.  Renal function stable.  Still on IV Lasix.  easily Agitated. He denies chest pain or orthopnea.  \"i can lay flat just fine, I just don't want to.\"    Vital Signs:   Temp:  [97.5 °F (36.4 °C)-98.7 °F (37.1 °C)] 97.6 °F (36.4 °C)  Heart Rate:  [78-90] 86  Resp:  [18-20] 20  BP: (109-136)/(68-85) 109/68    Intake/Output Summary (Last 24 hours) at 04/15/18 1841  Last data filed at 04/15/18 1348   Gross per 24 hour   Intake              780 ml   Output             1930 ml   Net            -1150 ml       04/15/18 0605  106 kg (233 lb 3.2 oz)   18 0638  104 kg (229 lb)   18 1608  115 kg (253 lb)         Physical Examination:    General Appearance No acute distress   Neck No adenopathy, supple, trachea midline, no thyromegaly, no carotid bruit, no JVD   Lungs Bibasilar rales noted.  respirations regular, even and unlabored is on 02.    Heart Regular rhythm and normal rate, normal S1 and S2, no murmur, no gallop, no rub, no click   Chest wall No abnormalities observed   Abdomen Normal bowel sounds, no masses, no hepatomegaly, soft   Extremities Moves all extremities well, trace edema, no cyanosis, no redness   Neurological Alert and oriented x 3     Lab Review:  Personally reviewed the labs, radiology imaging and other cardiac procedures.     Results from last 7 days  Lab Units 04/15/18  0500   SODIUM mmol/L 140   POTASSIUM mmol/L 4.0   CHLORIDE mmol/L 97*   CO2 mmol/L 31.3*   BUN mg/dL 15   CREATININE mg/dL 0.69*   CALCIUM mg/dL 9.2   GLUCOSE mg/dL 102*         )    Results from last 7 days  Lab Units 04/15/18  0459 18  0422 18  0348   WBC 10*3/mm3 9.32 8.09 7.33   HEMOGLOBIN g/dL 12.9* 13.1* 13.3*   HEMATOCRIT % 43.4 43.7 44.0   PLATELETS " 10*3/mm3 289 265 258           Medication Review:   Meds reviewed  Scheduled Meds:    atorvastatin 10 mg Oral Daily   budesonide-formoterol 2 puff Inhalation BID - RT   cetirizine 10 mg Oral Nightly   furosemide 40 mg Intravenous BID   ipratropium-albuterol 3 mL Nebulization 4x Daily - RT   ketotifen 1 drop Both Eyes BID   NIFEdipine XL 30 mg Oral Daily   pantoprazole 40 mg Oral Q AM   potassium chloride 20 mEq Oral Daily   [START ON 4/16/2018] predniSONE 20 mg Oral Daily With Breakfast   pregabalin 75 mg Oral Q12H   rivaroxaban 20 mg Oral Daily With Dinner   tamsulosin 0.4 mg Oral Nightly     Continuous Infusions:    lactated ringers 9 mL/hr Last Rate: 9 mL/hr (04/07/18 1303)     I personally viewed and interpreted the patient's EKG/Telemetry data    Assessment and Plan  1.  Acute heart failure with preserved LV systolic function. .  Continue with IV Lasix. reevalute in am. Creatine stable  2.  COPD exacerbation  3.  Paroxysmal atrial fibrillation on anticoagulant therapy and maintaining sinus rhythm  4.  Status post permanent pacemaker placement  5.  Pulmonary hypertension.  6.  Questionable ascending aortic aneurysm.  Echocardiogram noted a size of 4.4 cm in February 2018 and CT in February 2018 showed an aortic size of 4.7 cm.  Continue with outpatient follow-up.  7.  Coronary artery disease with history of prior stent placement is on atorvastatin, not currently on asa inpt.  Was on his med profile from home.  Plt wnl.  Mild anemia.  Will resume.     Radha Alexander, APRN  4/15/2018 1844

## 2018-04-16 LAB
ALBUMIN SERPL-MCNC: 3.5 G/DL (ref 3.5–5.2)
ANION GAP SERPL CALCULATED.3IONS-SCNC: 11.2 MMOL/L
BUN BLD-MCNC: 18 MG/DL (ref 8–23)
BUN/CREAT SERPL: 24 (ref 7–25)
CALCIUM SPEC-SCNC: 8.9 MG/DL (ref 8.6–10.5)
CHLORIDE SERPL-SCNC: 95 MMOL/L (ref 98–107)
CO2 SERPL-SCNC: 30.8 MMOL/L (ref 22–29)
CREAT BLD-MCNC: 0.75 MG/DL (ref 0.76–1.27)
DEPRECATED RDW RBC AUTO: 68.7 FL (ref 37–54)
ERYTHROCYTE [DISTWIDTH] IN BLOOD BY AUTOMATED COUNT: 24.5 % (ref 11.5–14.5)
GFR SERPL CREATININE-BSD FRML MDRD: 101 ML/MIN/1.73
GLUCOSE BLD-MCNC: 143 MG/DL (ref 65–99)
HCT VFR BLD AUTO: 44.3 % (ref 40.4–52.2)
HGB BLD-MCNC: 13.2 G/DL (ref 13.7–17.6)
MCH RBC QN AUTO: 23.2 PG (ref 27–32.7)
MCHC RBC AUTO-ENTMCNC: 29.8 G/DL (ref 32.6–36.4)
MCV RBC AUTO: 77.7 FL (ref 79.8–96.2)
PHOSPHATE SERPL-MCNC: 4.5 MG/DL (ref 2.5–4.5)
PLATELET # BLD AUTO: 304 10*3/MM3 (ref 140–500)
PMV BLD AUTO: 9.6 FL (ref 6–12)
POTASSIUM BLD-SCNC: 4.3 MMOL/L (ref 3.5–5.2)
RBC # BLD AUTO: 5.7 10*6/MM3 (ref 4.6–6)
SODIUM BLD-SCNC: 137 MMOL/L (ref 136–145)
WBC NRBC COR # BLD: 8.17 10*3/MM3 (ref 4.5–10.7)

## 2018-04-16 PROCEDURE — 94799 UNLISTED PULMONARY SVC/PX: CPT

## 2018-04-16 PROCEDURE — 63710000001 PREDNISONE PER 1 MG: Performed by: INTERNAL MEDICINE

## 2018-04-16 PROCEDURE — 80069 RENAL FUNCTION PANEL: CPT | Performed by: INTERNAL MEDICINE

## 2018-04-16 PROCEDURE — 25010000002 FUROSEMIDE PER 20 MG: Performed by: INTERNAL MEDICINE

## 2018-04-16 PROCEDURE — 99232 SBSQ HOSP IP/OBS MODERATE 35: CPT | Performed by: INTERNAL MEDICINE

## 2018-04-16 PROCEDURE — 85027 COMPLETE CBC AUTOMATED: CPT | Performed by: INTERNAL MEDICINE

## 2018-04-16 RX ADMIN — BUDESONIDE AND FORMOTEROL FUMARATE DIHYDRATE 2 PUFF: 160; 4.5 AEROSOL RESPIRATORY (INHALATION) at 07:29

## 2018-04-16 RX ADMIN — PANTOPRAZOLE SODIUM 40 MG: 40 TABLET, DELAYED RELEASE ORAL at 06:38

## 2018-04-16 RX ADMIN — PREGABALIN 75 MG: 75 CAPSULE ORAL at 06:38

## 2018-04-16 RX ADMIN — CETIRIZINE HYDROCHLORIDE 10 MG: 10 TABLET, FILM COATED ORAL at 20:09

## 2018-04-16 RX ADMIN — IPRATROPIUM BROMIDE AND ALBUTEROL SULFATE 3 ML: .5; 3 SOLUTION RESPIRATORY (INHALATION) at 19:58

## 2018-04-16 RX ADMIN — ACETAMINOPHEN 650 MG: 325 TABLET ORAL at 18:27

## 2018-04-16 RX ADMIN — PREGABALIN 75 MG: 75 CAPSULE ORAL at 18:27

## 2018-04-16 RX ADMIN — IPRATROPIUM BROMIDE AND ALBUTEROL SULFATE 3 ML: .5; 3 SOLUTION RESPIRATORY (INHALATION) at 16:10

## 2018-04-16 RX ADMIN — ATORVASTATIN CALCIUM 10 MG: 10 TABLET, FILM COATED ORAL at 08:54

## 2018-04-16 RX ADMIN — KETOTIFEN FUMARATE 1 DROP: 0.35 SOLUTION/ DROPS OPHTHALMIC at 20:09

## 2018-04-16 RX ADMIN — KETOTIFEN FUMARATE 1 DROP: 0.35 SOLUTION/ DROPS OPHTHALMIC at 08:55

## 2018-04-16 RX ADMIN — FUROSEMIDE 40 MG: 10 INJECTION, SOLUTION INTRAMUSCULAR; INTRAVENOUS at 06:39

## 2018-04-16 RX ADMIN — PREDNISONE 20 MG: 20 TABLET ORAL at 08:54

## 2018-04-16 RX ADMIN — TAMSULOSIN HYDROCHLORIDE 0.4 MG: 0.4 CAPSULE ORAL at 20:09

## 2018-04-16 RX ADMIN — IPRATROPIUM BROMIDE AND ALBUTEROL SULFATE 3 ML: .5; 3 SOLUTION RESPIRATORY (INHALATION) at 11:08

## 2018-04-16 RX ADMIN — BUDESONIDE AND FORMOTEROL FUMARATE DIHYDRATE 2 PUFF: 160; 4.5 AEROSOL RESPIRATORY (INHALATION) at 19:58

## 2018-04-16 RX ADMIN — ASPIRIN 81 MG: 81 TABLET ORAL at 08:54

## 2018-04-16 RX ADMIN — IPRATROPIUM BROMIDE AND ALBUTEROL SULFATE 3 ML: .5; 3 SOLUTION RESPIRATORY (INHALATION) at 07:29

## 2018-04-16 RX ADMIN — RIVAROXABAN 20 MG: 20 TABLET, FILM COATED ORAL at 18:22

## 2018-04-16 RX ADMIN — ACETAMINOPHEN 650 MG: 325 TABLET ORAL at 06:38

## 2018-04-16 RX ADMIN — NIFEDIPINE 30 MG: 30 TABLET, FILM COATED, EXTENDED RELEASE ORAL at 08:53

## 2018-04-16 RX ADMIN — FUROSEMIDE 40 MG: 10 INJECTION, SOLUTION INTRAMUSCULAR; INTRAVENOUS at 18:22

## 2018-04-16 RX ADMIN — POTASSIUM CHLORIDE 20 MEQ: 750 CAPSULE, EXTENDED RELEASE ORAL at 08:53

## 2018-04-16 NOTE — PLAN OF CARE
Problem: Patient Care Overview  Goal: Plan of Care Review  Outcome: Ongoing (interventions implemented as appropriate)   04/15/18 2029 04/16/18 0301   Coping/Psychosocial   Plan of Care Reviewed With patient --    Plan of Care Review   Progress --  no change   OTHER   Outcome Summary --  IV lasix continues. VSS. Pt feeling better. Safety maintained. Good output with lasix. Aspirin restarted. Continue to monitor.      Goal: Individualization and Mutuality  Outcome: Ongoing (interventions implemented as appropriate)    Goal: Discharge Needs Assessment  Outcome: Ongoing (interventions implemented as appropriate)    Goal: Interprofessional Rounds/Family Conf  Outcome: Ongoing (interventions implemented as appropriate)      Problem: Fall Risk (Adult)  Goal: Absence of Fall  Outcome: Ongoing (interventions implemented as appropriate)      Problem: Skin Injury Risk (Adult)  Goal: Skin Health and Integrity  Outcome: Ongoing (interventions implemented as appropriate)      Problem: Activity Intolerance (Adult)  Goal: Effective Energy Conservation Techniques  Outcome: Ongoing (interventions implemented as appropriate)      Problem: Pneumonia (Adult)  Goal: Signs and Symptoms of Listed Potential Problems Will be Absent, Minimized or Managed (Pneumonia)  Outcome: Ongoing (interventions implemented as appropriate)

## 2018-04-16 NOTE — PLAN OF CARE
Problem: Patient Care Overview  Goal: Plan of Care Review  Outcome: Ongoing (interventions implemented as appropriate)   04/16/18 2225   Coping/Psychosocial   Plan of Care Reviewed With patient   Plan of Care Review   Progress no change   OTHER   Outcome Summary Pt SOA on exertion. 4L O2 today. Diuresing on IV lasix. Pain in feet from Neuropathy. Safety maintained, continue to monitor.      Goal: Individualization and Mutuality  Outcome: Ongoing (interventions implemented as appropriate)    Goal: Discharge Needs Assessment  Outcome: Ongoing (interventions implemented as appropriate)    Goal: Interprofessional Rounds/Family Conf  Outcome: Ongoing (interventions implemented as appropriate)      Problem: Fall Risk (Adult)  Goal: Absence of Fall  Outcome: Ongoing (interventions implemented as appropriate)      Problem: Skin Injury Risk (Adult)  Goal: Skin Health and Integrity  Outcome: Ongoing (interventions implemented as appropriate)      Problem: Activity Intolerance (Adult)  Goal: Effective Energy Conservation Techniques  Outcome: Ongoing (interventions implemented as appropriate)      Problem: Pneumonia (Adult)  Goal: Signs and Symptoms of Listed Potential Problems Will be Absent, Minimized or Managed (Pneumonia)  Outcome: Ongoing (interventions implemented as appropriate)

## 2018-04-16 NOTE — PROGRESS NOTES
Hospital Follow Up        Chief Complaint: Follow up CHF    Interval History:  Does not feel like breathing has improved much.    Objective:     Objective:  Temp:  [97.4 °F (36.3 °C)-98 °F (36.7 °C)] 97.4 °F (36.3 °C)  Heart Rate:  [78-90] 78  Resp:  [16-20] 18  BP: (109-120)/(68-86) 120/86     Intake/Output Summary (Last 24 hours) at 04/16/18 0734  Last data filed at 04/15/18 2311   Gross per 24 hour   Intake             1400 ml   Output             1875 ml   Net             -475 ml     Body mass index is 29.94 kg/m².  1    04/05/18  1608 04/14/18  0638 04/15/18  0605   Weight: 115 kg (253 lb) 104 kg (229 lb) 106 kg (233 lb 3.2 oz)     Weight change:       Physical Exam:   General : Alert, cooperative, in no acute distress.  Neuro: alert,cooperative and oriented  Lungs: improved air movement.  CV:: Regular rate and rhythm, normal S1 and S2, no murmurs, gallops or rubs.  ABD: Soft, nontender, non-distended. positive bowel sounds  Extr: trace bilateral pedal edema    Lab Review:     Results from last 7 days  Lab Units 04/16/18  0400 04/15/18  0500   SODIUM mmol/L 137 140   POTASSIUM mmol/L 4.3 4.0   CHLORIDE mmol/L 95* 97*   CO2 mmol/L 30.8* 31.3*   BUN mg/dL 18 15   CREATININE mg/dL 0.75* 0.69*   GLUCOSE mg/dL 143* 102*   CALCIUM mg/dL 8.9 9.2           Results from last 7 days  Lab Units 04/16/18  0400 04/15/18  0459   WBC 10*3/mm3 8.17 9.32   HEMOGLOBIN g/dL 13.2* 12.9*   HEMATOCRIT % 44.3 43.4   PLATELETS 10*3/mm3 304 289                   Invalid input(s): LDLCALC          I reviewed the patient's new clinical results.  I personally viewed and interpreted the patient's EKG  Current Medications:   Scheduled Meds:  aspirin 81 mg Oral Daily   atorvastatin 10 mg Oral Daily   budesonide-formoterol 2 puff Inhalation BID - RT   cetirizine 10 mg Oral Nightly   furosemide 40 mg Intravenous BID   ipratropium-albuterol 3 mL Nebulization 4x Daily - RT   ketotifen 1 drop Both Eyes BID   NIFEdipine XL 30 mg Oral Daily    pantoprazole 40 mg Oral Q AM   potassium chloride 20 mEq Oral Daily   predniSONE 20 mg Oral Daily With Breakfast   pregabalin 75 mg Oral Q12H   rivaroxaban 20 mg Oral Daily With Dinner   tamsulosin 0.4 mg Oral Nightly     Continuous Infusions:  lactated ringers 9 mL/hr Last Rate: 9 mL/hr (04/07/18 1303)       Allergies:  Allergies   Allergen Reactions   • Lisinopril Shortness Of Breath   • Penicillins Shortness Of Breath   • Sulfa Antibiotics Shortness Of Breath   • Albuterol Irritability and Hallucinations   • Atenolol Other (See Comments)     drowsiness   • Coreg [Carvedilol] Cough     SOA,   • Ibuprofen    • Celebrex [Celecoxib] Rash       Assessment/Plan:       1.  Acute heart failure with preserved LV systolic function. Appears improved overall on IV furosemide.  2.  Acute hypoxic respiratory failure.  Some improvement since last week, but slow.  3.  COPD exacerbation  4.  Paroxysmal atrial fibrillation. On anticoagulant therapy and maintaining sinus rhythm  5.  Status post permanent pacemaker placement  6.  Pulmonary hypertension.  7.  Questionable ascending aortic aneurysm.  Echocardiogram noted a size of 4.4 cm in February 2018 and CT in February 2018 showed an aortic size of 4.7 cm.  Continue with outpatient follow-up.  8.  Coronary artery disease with history of prior stent placement. Is on atorvastatin and aspirin.   9.  Pulmonary hypertension    -  Continue IV furosemide today and consider switch to oral tomorrow.        Marizol Holt MD  04/16/18  7:34 AM

## 2018-04-16 NOTE — PROGRESS NOTES
[facility]  Phone: [phone]  Suresh Hernandez MD    Subjective:  LOS: 11    Improving. Still desats easily with exertion.    Objective   Vital Signs past 24hrs    Temp range: Temp (24hrs), Av.6 °F (36.4 °C), Min:97.3 °F (36.3 °C), Max:98 °F (36.7 °C)    BP range: BP: (113-122)/(71-86) 113/71  Pulse range: Heart Rate:  [78-91] 86  Resp rate range: Resp:  [16-20] 16    Device (Oxygen Therapy): nasal cannula;humidifiedFlow (L/min):  [3-4] 4  Oxygen range:SpO2:  [85 %-98 %] 94 %      106 kg (233 lb 3.2 oz); Body mass index is 29.94 kg/m².    Intake/Output Summary (Last 24 hours) at 18  Last data filed at 18 1745   Gross per 24 hour   Intake             1260 ml   Output             1075 ml   Net              185 ml       Physical Exam   Cardiovascular: Normal rate and regular rhythm.    No murmur heard.  Pulmonary/Chest: He has decreased breath sounds. He has no wheezes. He has rales (mild) in the right lower field and the left lower field.   Abdominal: Soft. Bowel sounds are normal. There is no tenderness.   Musculoskeletal: He exhibits no edema.   Neurological: He is alert.   Nursing note and vitals reviewed.    Results Review:    I have reviewed the laboratory and imaging data since the last note by [facility]PC physician.  My annotations are noted in assessment and plan.    Medication Review:  I have reviewed the current MAR.  My annotations are noted in assessment and plan.      lactated ringers 9 mL/hr Last Rate: 9 mL/hr (18 1303)     Plan   PCCM Problems  Acute on chronic resp failure, hyoxia  Acute decompensated HFpEF  COPD severe, exacerbation  CMV +ve BAL  Relevant Medical Diagnoses  Afib, on AC  PHT  CAD    Plan of Treatment  Doing better with diuretics. Manage per cards. He is tolerating them amazingly well !!    COPD on oral steroids. Monitor and taper. Remains without wheezing.    Continue O2 and wean as tolerated.    CMV detected in BAL from . Unclear if it means  infection. Discussed with ID - await labs and unlikely to need rx.    Given eye drops for itching.      Suresh Hernandez MD  04/16/18  7:50 PM    Part of this note may be an electronic transcription/translation of spoken language to printed text using the Dragon Dictation System.

## 2018-04-16 NOTE — PROGRESS NOTES
LOS: 11 days     Chief Complaint:  Follow-up respiratory failure     Interval History:  No acute events. He reports excellent UOP with diuretics which has helped his breathing. He has no fevers or productive cough. He is stable off of antibiotics.    ROS: no n/v/d    Vital Signs  Temp:  [97.4 °F (36.3 °C)-98 °F (36.7 °C)] 97.4 °F (36.3 °C)  Heart Rate:  [78-90] 83  Resp:  [16-20] 18  BP: (109-120)/(68-86) 120/86    Physical Exam:  General: awake, alert, chronically ill appearing  Head: Normocephalic  Eyes: Pno scleral icterus  ENT: MMM, OP clear, no thrush. Dentures  Neck: Supple,  Cardiovascular: NR, s; no LE edema  Respiratory: poor air movement, no rales or wheezing; some cough,  On 4L NC  GI: Abdomen is obese, soft, non-tender, non-distended  : no Springer catheter present  Musculoskeletal: R ankle incisions healing well  Skin: No rashes, lesions, or embolic phenomenon  Neurological: Alert and oriented x 3,  motor strength 5/5 in all four extremities  Psychiatric: Normal mood and affect   Vasc: no cyanosis; PIV w/o erythema    Medications:    Current Facility-Administered Medications:   •  acetaminophen (TYLENOL) tablet 650 mg, 650 mg, Oral, Q4H PRN, Kalpesh Jimenez MD, 650 mg at 04/16/18 0638  •  albuterol (PROVENTIL) nebulizer solution 0.083% 2.5 mg/3mL, 2.5 mg, Nebulization, Q6H PRN, Kalpesh Jimenez MD  •  aspirin EC tablet 81 mg, 81 mg, Oral, Daily, Radha Alexander, APRN, 81 mg at 04/16/18 0854  •  atorvastatin (LIPITOR) tablet 10 mg, 10 mg, Oral, Daily, Kalpesh Jimenez MD, 10 mg at 04/16/18 0854  •  budesonide-formoterol (SYMBICORT) 160-4.5 MCG/ACT inhaler 2 puff, 2 puff, Inhalation, BID - RT, Kalpesh Jimenez MD, 2 puff at 04/16/18 0729  •  cetirizine (zyrTEC) tablet 10 mg, 10 mg, Oral, Nightly, Macho Fontana MD, 10 mg at 04/15/18 2029  •  furosemide (LASIX) injection 40 mg, 40 mg, Intravenous, BID, Merlin Fontana MD, 40 mg at 04/16/18 0639  •  ipratropium-albuterol (DUO-NEB) nebulizer solution 3  mL, 3 mL, Nebulization, 4x Daily - RT, Suresh Hernandez MD, 3 mL at 18 0729  •  ketotifen (ZADITOR) 0.025 % ophthalmic solution 1 drop, 1 drop, Both Eyes, BID, Suresh Hernandez MD, 1 drop at 18 0855  •  lactated ringers infusion, 9 mL/hr, Intravenous, Continuous, Torey Lopez MD, Last Rate: 9 mL/hr at 18 1303, 9 mL/hr at 18 1303  •  [] morphine injection 2 mg, 2 mg, Intravenous, Q4H PRN **AND** naloxone (NARCAN) injection 0.4 mg, 0.4 mg, Intravenous, Q5 Min PRN, Kalpesh Jimenez MD  •  NIFEdipine XL (PROCARDIA XL) 24 hr tablet 30 mg, 30 mg, Oral, Daily, Kalpeshfreddie Jimenez MD, 30 mg at 18 0853  •  nitroglycerin (NITROSTAT) SL tablet 0.4 mg, 0.4 mg, Sublingual, Q5 Min PRN, Kalpesh Jimenez MD  •  pantoprazole (PROTONIX) EC tablet 40 mg, 40 mg, Oral, Q AM, Merlin Fontana MD, 40 mg at 18 0638  •  potassium chloride (MICRO-K) CR capsule 20 mEq, 20 mEq, Oral, Daily, Kalpeshfreddie Jimenez MD, 20 mEq at 18 0853  •  potassium chloride (MICRO-K) CR capsule 40 mEq, 40 mEq, Oral, PRN, 40 mEq at 18 1807 **OR** [DISCONTINUED] potassium chloride (KLOR-CON) packet 40 mEq, 40 mEq, Oral, PRN **OR** potassium chloride 10 mEq in 100 mL IVPB, 10 mEq, Intravenous, Q1H PRN, Merlin Fontana MD  •  predniSONE (DELTASONE) tablet 20 mg, 20 mg, Oral, Daily With Breakfast, Suresh Hernandez MD, 20 mg at 18 0854  •  pregabalin (LYRICA) capsule 75 mg, 75 mg, Oral, Q12H, Ken Up MD, 75 mg at 18 0638  •  rivaroxaban (XARELTO) tablet 20 mg, 20 mg, Oral, Daily With Dinner, Merlin Fontana MD, 20 mg at 04/15/18 1735  •  sennosides-docusate sodium (SENOKOT-S) 8.6-50 MG tablet 2 tablet, 2 tablet, Oral, BID PRN, Kalpesh Jimenez MD  •  sodium chloride (OCEAN) nasal spray 1 spray, 1 spray, Each Nare, PRN, Merlin Fontana MD  •  sodium chloride 0.9 % flush 1-10 mL, 1-10 mL, Intravenous, PRN, Kalpesh Jimenez MD  •  Insert peripheral IV, , , Once **AND** sodium chloride 0.9 % flush 10  mL, 10 mL, Intravenous, PRN, Ashia Tee PA-C  •  tamsulosin (FLOMAX) 24 hr capsule 0.4 mg, 0.4 mg, Oral, Nightly, Kalpesh Jimenez MD, 0.4 mg at 04/15/18 2029  •  traMADol (ULTRAM) tablet 50 mg, 50 mg, Oral, Q6H PRN, Kalpesh Jimenez MD, 50 mg at 04/14/18 1421    LABS:  CBC, BMP, micro reviewed today  Lab Results   Component Value Date    WBC 8.17 04/16/2018    HGB 13.2 (L) 04/16/2018    HCT 44.3 04/16/2018    MCV 77.7 (L) 04/16/2018     04/16/2018     Lab Results   Component Value Date    GLUCOSE 143 (H) 04/16/2018    BUN 18 04/16/2018    CREATININE 0.75 (L) 04/16/2018    EGFRIFNONA 101 04/16/2018    BCR 24.0 04/16/2018    CO2 30.8 (H) 04/16/2018    CALCIUM 8.9 04/16/2018    ALBUMIN 3.50 04/16/2018    LABIL2 1.1 04/06/2018    AST 14 04/06/2018    ALT 14 04/06/2018     CMV PCR pending    Microbiology:  4/5 BCx: negative  4/6 RVP: negative  4/7 Bronch Cx:  -Bacteria negative  -Fungus negative  -AFB negative  -Viral culture with CMV immunoflouresence    BAL Path 4/7/18:  No tumor or granuloma. GMS stain negative for PCP though some small yeast seen.    Assessment/Plan   1. Acute on chronic hypoxic respiratory failure secondary to COPD exacerbation  -continue supportive pulmonary care with supplemental O2  -agree with steroids  -in a non-transplant patient, I do not think the CMV seen on the BAL is causing disease and it does not require treatment; I sent a CMV PCR but expect it to either be negative or just low-level positive  -glad to see the GMS stain is negative for PCP  -with recurrent admissions and CT findings, could he have ILD? Or is this just all end stage COPD?     2. History of tobacco abuse     3. Acute on chronic diastolic heart failure  -complicating #1; diuresis has helped significantly     Thank you for this consult. ID will sign off but I will follow-up the CMV testing and contact the patient if any treatment were to be required. I discussed the case and workup with Dr SHENA Hernandez.

## 2018-04-17 LAB
ALBUMIN SERPL-MCNC: 3.4 G/DL (ref 3.5–5.2)
ANION GAP SERPL CALCULATED.3IONS-SCNC: 12.9 MMOL/L
BUN BLD-MCNC: 19 MG/DL (ref 8–23)
BUN/CREAT SERPL: 23.8 (ref 7–25)
CALCIUM SPEC-SCNC: 9.5 MG/DL (ref 8.6–10.5)
CHLORIDE SERPL-SCNC: 94 MMOL/L (ref 98–107)
CMV IGM SERPL IA-ACNC: <30 AU/ML (ref 0–29.9)
CO2 SERPL-SCNC: 30.1 MMOL/L (ref 22–29)
CREAT BLD-MCNC: 0.8 MG/DL (ref 0.76–1.27)
GFR SERPL CREATININE-BSD FRML MDRD: 94 ML/MIN/1.73
GLUCOSE BLD-MCNC: 105 MG/DL (ref 65–99)
NT-PROBNP SERPL-MCNC: 700.7 PG/ML (ref 0–1800)
PHOSPHATE SERPL-MCNC: 4.6 MG/DL (ref 2.5–4.5)
POTASSIUM BLD-SCNC: 3.9 MMOL/L (ref 3.5–5.2)
SODIUM BLD-SCNC: 137 MMOL/L (ref 136–145)
VIRAL CULTURE, GENERAL: ABNORMAL

## 2018-04-17 PROCEDURE — 63710000001 PREDNISONE PER 1 MG: Performed by: INTERNAL MEDICINE

## 2018-04-17 PROCEDURE — 94799 UNLISTED PULMONARY SVC/PX: CPT

## 2018-04-17 PROCEDURE — 4A023N6 MEASUREMENT OF CARDIAC SAMPLING AND PRESSURE, RIGHT HEART, PERCUTANEOUS APPROACH: ICD-10-PCS | Performed by: INTERNAL MEDICINE

## 2018-04-17 PROCEDURE — 80069 RENAL FUNCTION PANEL: CPT | Performed by: INTERNAL MEDICINE

## 2018-04-17 PROCEDURE — 25010000002 FUROSEMIDE PER 20 MG: Performed by: INTERNAL MEDICINE

## 2018-04-17 PROCEDURE — 94618 PULMONARY STRESS TESTING: CPT

## 2018-04-17 PROCEDURE — 99232 SBSQ HOSP IP/OBS MODERATE 35: CPT | Performed by: INTERNAL MEDICINE

## 2018-04-17 PROCEDURE — 83880 ASSAY OF NATRIURETIC PEPTIDE: CPT | Performed by: INTERNAL MEDICINE

## 2018-04-17 RX ORDER — FUROSEMIDE 40 MG/1
40 TABLET ORAL
Status: DISCONTINUED | OUTPATIENT
Start: 2018-04-17 | End: 2018-04-19 | Stop reason: HOSPADM

## 2018-04-17 RX ORDER — FUROSEMIDE 40 MG/1
40 TABLET ORAL
Status: DISCONTINUED | OUTPATIENT
Start: 2018-04-17 | End: 2018-04-17

## 2018-04-17 RX ADMIN — IPRATROPIUM BROMIDE AND ALBUTEROL SULFATE 3 ML: .5; 3 SOLUTION RESPIRATORY (INHALATION) at 15:30

## 2018-04-17 RX ADMIN — POTASSIUM CHLORIDE 20 MEQ: 750 CAPSULE, EXTENDED RELEASE ORAL at 10:04

## 2018-04-17 RX ADMIN — ACETAMINOPHEN 650 MG: 325 TABLET ORAL at 23:09

## 2018-04-17 RX ADMIN — CETIRIZINE HYDROCHLORIDE 10 MG: 10 TABLET, FILM COATED ORAL at 22:08

## 2018-04-17 RX ADMIN — PREGABALIN 75 MG: 75 CAPSULE ORAL at 05:11

## 2018-04-17 RX ADMIN — ATORVASTATIN CALCIUM 10 MG: 10 TABLET, FILM COATED ORAL at 10:04

## 2018-04-17 RX ADMIN — ASPIRIN 81 MG: 81 TABLET ORAL at 10:05

## 2018-04-17 RX ADMIN — KETOTIFEN FUMARATE 1 DROP: 0.35 SOLUTION/ DROPS OPHTHALMIC at 10:05

## 2018-04-17 RX ADMIN — BUDESONIDE AND FORMOTEROL FUMARATE DIHYDRATE 2 PUFF: 160; 4.5 AEROSOL RESPIRATORY (INHALATION) at 11:17

## 2018-04-17 RX ADMIN — IPRATROPIUM BROMIDE AND ALBUTEROL SULFATE 3 ML: .5; 3 SOLUTION RESPIRATORY (INHALATION) at 21:12

## 2018-04-17 RX ADMIN — NIFEDIPINE 30 MG: 30 TABLET, FILM COATED, EXTENDED RELEASE ORAL at 10:04

## 2018-04-17 RX ADMIN — FUROSEMIDE 40 MG: 40 TABLET ORAL at 19:03

## 2018-04-17 RX ADMIN — KETOTIFEN FUMARATE 1 DROP: 0.35 SOLUTION/ DROPS OPHTHALMIC at 22:08

## 2018-04-17 RX ADMIN — PREGABALIN 75 MG: 75 CAPSULE ORAL at 19:03

## 2018-04-17 RX ADMIN — IPRATROPIUM BROMIDE AND ALBUTEROL SULFATE 3 ML: .5; 3 SOLUTION RESPIRATORY (INHALATION) at 11:17

## 2018-04-17 RX ADMIN — FUROSEMIDE 40 MG: 10 INJECTION, SOLUTION INTRAMUSCULAR; INTRAVENOUS at 05:11

## 2018-04-17 RX ADMIN — ACETAMINOPHEN 650 MG: 325 TABLET ORAL at 19:03

## 2018-04-17 RX ADMIN — BUDESONIDE AND FORMOTEROL FUMARATE DIHYDRATE 2 PUFF: 160; 4.5 AEROSOL RESPIRATORY (INHALATION) at 21:13

## 2018-04-17 RX ADMIN — ACETAMINOPHEN 650 MG: 325 TABLET ORAL at 05:15

## 2018-04-17 RX ADMIN — TAMSULOSIN HYDROCHLORIDE 0.4 MG: 0.4 CAPSULE ORAL at 22:08

## 2018-04-17 RX ADMIN — PREDNISONE 20 MG: 20 TABLET ORAL at 10:05

## 2018-04-17 RX ADMIN — PANTOPRAZOLE SODIUM 40 MG: 40 TABLET, DELAYED RELEASE ORAL at 05:11

## 2018-04-17 NOTE — PLAN OF CARE
Problem: Fall Risk (Adult)  Goal: Absence of Fall  Outcome: Ongoing (interventions implemented as appropriate)      Problem: Skin Injury Risk (Adult)  Goal: Skin Health and Integrity  Outcome: Ongoing (interventions implemented as appropriate)      Problem: Activity Intolerance (Adult)  Goal: Effective Energy Conservation Techniques  Outcome: Ongoing (interventions implemented as appropriate)      Problem: Pneumonia (Adult)  Goal: Signs and Symptoms of Listed Potential Problems Will be Absent, Minimized or Managed (Pneumonia)  Outcome: Ongoing (interventions implemented as appropriate)

## 2018-04-17 NOTE — PROGRESS NOTES
Exercise Oximetry    Patient Name:Alessio Allen   MRN: [id number]   Date: 04/17/18             ROOM AIR BASELINE   SpO2% 93   Heart Rate 77   Blood Pressure      EXERCISE ON ROOM AIR SpO2% EXERCISE ON O2 @ 2-10 LPM SpO2%   1 MINUTE  1 MINUTE 92%   2 MINUTES  2 MINUTES 76% pt placed on 5L   3 MINUTES  3 MINUTES 82%, pt placed on 8L   4 MINUTES  4 MINUTES 86 %   5 MINUTES  5 MINUTES 82%, pt placed on 10L   6 MINUTES  6 MINUTES 85%              Distance Walked   Distance Walked   Dyspnea (Kayla Scale)   Dyspnea (Kayla Scale)   Fatigue (Kayla Scale)   Fatigue (Kayla Scale)   SpO2% Post Exercise  95% after 5 mins  SpO2% Post Exercise   HR Post Exercise  81 HR Post Exercise   Time to Recovery  5 Time to Recovery     Comments: Pt placed on RA prior to walk and pt sats were 72%, pt immediately placed on 4L, then I waited until pt recovered to start walk.  Pt  wanted to complete walk after me advising him that his sats were 85%, stated he felt fine and he wanted to finish.  Pt's sats never got over 85% during walk, but after walk pt's sats returned to 95% on 3L O2.  Pt will qualify for home O2 at 10L during exertion.

## 2018-04-17 NOTE — PROGRESS NOTES
Hospital Follow Up        Chief Complaint: Follow up  CHF    Interval History: Reports he feels more weak today.  He is concerned that he is getting dehydrated.    Objective:     Objective:  Temp:  [97.3 °F (36.3 °C)-98.1 °F (36.7 °C)] 98.1 °F (36.7 °C)  Heart Rate:  [79-91] 82  Resp:  [16-20] 16  BP: (109-122)/(71-81) 109/73     Intake/Output Summary (Last 24 hours) at 04/17/18 0733  Last data filed at 04/17/18 0500   Gross per 24 hour   Intake             1260 ml   Output             2050 ml   Net             -790 ml     Body mass index is 30.53 kg/m².  1    04/14/18  0638 04/15/18  0605 04/17/18  0500   Weight: 104 kg (229 lb) 106 kg (233 lb 3.2 oz) 108 kg (237 lb 12.8 oz)     Weight change:       Physical Exam:   General : Alert, cooperative, in no acute distress.  Neuro: alert,cooperative and oriented  Lungs: CTAB. Normal respiratory effort and rate.  CV:: Regular rate and rhythm, normal S1 and S2, no murmurs, gallops or rubs.  ABD: Soft, nontender, non-distended. positive bowel sounds  Extr: No edema     Lab Review:     Results from last 7 days  Lab Units 04/17/18  0513 04/16/18  0400   SODIUM mmol/L 137 137   POTASSIUM mmol/L 3.9 4.3   CHLORIDE mmol/L 94* 95*   CO2 mmol/L 30.1* 30.8*   BUN mg/dL 19 18   CREATININE mg/dL 0.80 0.75*   GLUCOSE mg/dL 105* 143*   CALCIUM mg/dL 9.5 8.9           Results from last 7 days  Lab Units 04/16/18  0400 04/15/18  0459   WBC 10*3/mm3 8.17 9.32   HEMOGLOBIN g/dL 13.2* 12.9*   HEMATOCRIT % 44.3 43.4   PLATELETS 10*3/mm3 304 289                   Invalid input(s): LDLCALC    Results from last 7 days  Lab Units 04/17/18  0513   PROBNP pg/mL 700.7         I reviewed the patient's new clinical results.  I personally viewed and interpreted the patient's EKG  Current Medications:   Scheduled Meds:  aspirin 81 mg Oral Daily   atorvastatin 10 mg Oral Daily   budesonide-formoterol 2 puff Inhalation BID - RT   cetirizine 10 mg Oral Nightly   furosemide 40 mg Intravenous BID    ipratropium-albuterol 3 mL Nebulization 4x Daily - RT   ketotifen 1 drop Both Eyes BID   NIFEdipine XL 30 mg Oral Daily   pantoprazole 40 mg Oral Q AM   potassium chloride 20 mEq Oral Daily   predniSONE 20 mg Oral Daily With Breakfast   pregabalin 75 mg Oral Q12H   rivaroxaban 20 mg Oral Daily With Dinner   tamsulosin 0.4 mg Oral Nightly     Continuous Infusions:  lactated ringers 9 mL/hr Last Rate: 9 mL/hr (04/07/18 1303)       Allergies:  Allergies   Allergen Reactions   • Lisinopril Shortness Of Breath   • Penicillins Shortness Of Breath   • Sulfa Antibiotics Shortness Of Breath   • Albuterol Irritability and Hallucinations   • Atenolol Other (See Comments)     drowsiness   • Coreg [Carvedilol] Cough     SOA,   • Ibuprofen    • Celebrex [Celecoxib] Rash       Assessment/Plan:     1.  Acute heart failure with preserved LV systolic function. Appears improved overall on IV furosemide.  2.  Acute hypoxic respiratory failure.  Some improvement since last week, but slow.  3.  COPD exacerbation  4.  Paroxysmal atrial fibrillation. On anticoagulant therapy with rivaroxaban and maintaining sinus rhythm  5.  Status post permanent pacemaker placement  6.  Pulmonary hypertension.  7.  Questionable ascending aortic aneurysm.  Echocardiogram noted a size of 4.4 cm in February 2018 and CT in February 2018 showed an aortic size of 4.7 cm.  Continue with outpatient follow-up.  8.  Coronary artery disease with history of prior stent placement. Is on atorvastatin and aspirin.   9.  Pulmonary hypertension    -  Switch to oral furosemide today      Marizol Holt MD  04/17/18  7:33 AM

## 2018-04-18 LAB
ALBUMIN SERPL-MCNC: 3.3 G/DL (ref 3.5–5.2)
ANION GAP SERPL CALCULATED.3IONS-SCNC: 12.8 MMOL/L
BUN BLD-MCNC: 17 MG/DL (ref 8–23)
BUN/CREAT SERPL: 23.9 (ref 7–25)
CALCIUM SPEC-SCNC: 8.9 MG/DL (ref 8.6–10.5)
CHLORIDE SERPL-SCNC: 97 MMOL/L (ref 98–107)
CMV DNA SERPL NAA+PROBE-ACNC: NEGATIVE IU/ML
CO2 SERPL-SCNC: 30.2 MMOL/L (ref 22–29)
CREAT BLD-MCNC: 0.71 MG/DL (ref 0.76–1.27)
GFR SERPL CREATININE-BSD FRML MDRD: 108 ML/MIN/1.73
GLUCOSE BLD-MCNC: 132 MG/DL (ref 65–99)
HCT VFR BLDA CALC: 46 % (ref 38–51)
HGB BLDA-MCNC: 15.6 G/DL (ref 12–17)
LOG 10 CMV QN DNA PI: NORMAL LOG10 IU/ML
PHOSPHATE SERPL-MCNC: 4.1 MG/DL (ref 2.5–4.5)
POTASSIUM BLD-SCNC: 3.8 MMOL/L (ref 3.5–5.2)
SAO2 % BLDA: 63 % (ref 95–98)
SAO2 % BLDA: 64 % (ref 95–98)
SAO2 % BLDA: 65 % (ref 95–98)
SODIUM BLD-SCNC: 140 MMOL/L (ref 136–145)

## 2018-04-18 PROCEDURE — 99232 SBSQ HOSP IP/OBS MODERATE 35: CPT | Performed by: INTERNAL MEDICINE

## 2018-04-18 PROCEDURE — 94799 UNLISTED PULMONARY SVC/PX: CPT

## 2018-04-18 PROCEDURE — 85014 HEMATOCRIT: CPT

## 2018-04-18 PROCEDURE — 25010000002 MIDAZOLAM PER 1 MG: Performed by: INTERNAL MEDICINE

## 2018-04-18 PROCEDURE — 85018 HEMOGLOBIN: CPT

## 2018-04-18 PROCEDURE — C1894 INTRO/SHEATH, NON-LASER: HCPCS | Performed by: INTERNAL MEDICINE

## 2018-04-18 PROCEDURE — 25010000002 FENTANYL CITRATE (PF) 100 MCG/2ML SOLUTION: Performed by: INTERNAL MEDICINE

## 2018-04-18 PROCEDURE — 80069 RENAL FUNCTION PANEL: CPT | Performed by: INTERNAL MEDICINE

## 2018-04-18 PROCEDURE — 63710000001 PREDNISONE PER 1 MG: Performed by: INTERNAL MEDICINE

## 2018-04-18 PROCEDURE — 93451 RIGHT HEART CATH: CPT | Performed by: INTERNAL MEDICINE

## 2018-04-18 PROCEDURE — C1769 GUIDE WIRE: HCPCS | Performed by: INTERNAL MEDICINE

## 2018-04-18 PROCEDURE — 99152 MOD SED SAME PHYS/QHP 5/>YRS: CPT | Performed by: INTERNAL MEDICINE

## 2018-04-18 RX ORDER — FENTANYL CITRATE 50 UG/ML
INJECTION, SOLUTION INTRAMUSCULAR; INTRAVENOUS AS NEEDED
Status: DISCONTINUED | OUTPATIENT
Start: 2018-04-18 | End: 2018-04-18 | Stop reason: HOSPADM

## 2018-04-18 RX ORDER — MIDAZOLAM HYDROCHLORIDE 1 MG/ML
INJECTION INTRAMUSCULAR; INTRAVENOUS AS NEEDED
Status: DISCONTINUED | OUTPATIENT
Start: 2018-04-18 | End: 2018-04-18 | Stop reason: HOSPADM

## 2018-04-18 RX ORDER — SODIUM CHLORIDE 9 MG/ML
INJECTION, SOLUTION INTRAVENOUS CONTINUOUS PRN
Status: DISCONTINUED | OUTPATIENT
Start: 2018-04-18 | End: 2018-04-18 | Stop reason: HOSPADM

## 2018-04-18 RX ORDER — LIDOCAINE HYDROCHLORIDE 20 MG/ML
INJECTION, SOLUTION INFILTRATION; PERINEURAL AS NEEDED
Status: DISCONTINUED | OUTPATIENT
Start: 2018-04-18 | End: 2018-04-18 | Stop reason: HOSPADM

## 2018-04-18 RX ADMIN — FUROSEMIDE 40 MG: 40 TABLET ORAL at 20:09

## 2018-04-18 RX ADMIN — ACETAMINOPHEN 650 MG: 325 TABLET ORAL at 20:09

## 2018-04-18 RX ADMIN — NIFEDIPINE 30 MG: 30 TABLET, FILM COATED, EXTENDED RELEASE ORAL at 08:44

## 2018-04-18 RX ADMIN — BUDESONIDE AND FORMOTEROL FUMARATE DIHYDRATE 2 PUFF: 160; 4.5 AEROSOL RESPIRATORY (INHALATION) at 08:12

## 2018-04-18 RX ADMIN — IPRATROPIUM BROMIDE AND ALBUTEROL SULFATE 3 ML: .5; 3 SOLUTION RESPIRATORY (INHALATION) at 08:12

## 2018-04-18 RX ADMIN — KETOTIFEN FUMARATE 1 DROP: 0.35 SOLUTION/ DROPS OPHTHALMIC at 08:45

## 2018-04-18 RX ADMIN — PANTOPRAZOLE SODIUM 40 MG: 40 TABLET, DELAYED RELEASE ORAL at 06:08

## 2018-04-18 RX ADMIN — ASPIRIN 81 MG: 81 TABLET ORAL at 15:35

## 2018-04-18 RX ADMIN — PREGABALIN 75 MG: 75 CAPSULE ORAL at 18:27

## 2018-04-18 RX ADMIN — ATORVASTATIN CALCIUM 10 MG: 10 TABLET, FILM COATED ORAL at 15:35

## 2018-04-18 RX ADMIN — ACETAMINOPHEN 650 MG: 325 TABLET ORAL at 08:44

## 2018-04-18 RX ADMIN — TAMSULOSIN HYDROCHLORIDE 0.4 MG: 0.4 CAPSULE ORAL at 20:09

## 2018-04-18 RX ADMIN — RIVAROXABAN 20 MG: 20 TABLET, FILM COATED ORAL at 18:27

## 2018-04-18 RX ADMIN — POTASSIUM CHLORIDE 20 MEQ: 750 CAPSULE, EXTENDED RELEASE ORAL at 15:35

## 2018-04-18 RX ADMIN — KETOTIFEN FUMARATE 1 DROP: 0.35 SOLUTION/ DROPS OPHTHALMIC at 20:10

## 2018-04-18 RX ADMIN — IPRATROPIUM BROMIDE AND ALBUTEROL SULFATE 3 ML: .5; 3 SOLUTION RESPIRATORY (INHALATION) at 10:49

## 2018-04-18 RX ADMIN — BUDESONIDE AND FORMOTEROL FUMARATE DIHYDRATE 2 PUFF: 160; 4.5 AEROSOL RESPIRATORY (INHALATION) at 19:14

## 2018-04-18 RX ADMIN — ACETAMINOPHEN 650 MG: 325 TABLET ORAL at 15:39

## 2018-04-18 RX ADMIN — CETIRIZINE HYDROCHLORIDE 10 MG: 10 TABLET, FILM COATED ORAL at 20:09

## 2018-04-18 RX ADMIN — IPRATROPIUM BROMIDE AND ALBUTEROL SULFATE 3 ML: .5; 3 SOLUTION RESPIRATORY (INHALATION) at 19:14

## 2018-04-18 RX ADMIN — IPRATROPIUM BROMIDE AND ALBUTEROL SULFATE 3 ML: .5; 3 SOLUTION RESPIRATORY (INHALATION) at 17:14

## 2018-04-18 RX ADMIN — PREDNISONE 20 MG: 20 TABLET ORAL at 08:44

## 2018-04-18 RX ADMIN — PREGABALIN 75 MG: 75 CAPSULE ORAL at 06:09

## 2018-04-18 RX ADMIN — FUROSEMIDE 40 MG: 40 TABLET ORAL at 15:35

## 2018-04-18 NOTE — PLAN OF CARE
Problem: Patient Care Overview  Goal: Plan of Care Review  Outcome: Ongoing (interventions implemented as appropriate)   04/17/18 2009   Coping/Psychosocial   Plan of Care Reviewed With patient   Plan of Care Review   Progress no change   OTHER   Outcome Summary Pt on 3L o2 at rest, 4-5L when eating or exerting himself. Walking oximetry done by RT, 85% on 10L walking 240 feet and he felt okay. Cath scheduled for tomorrow, NPO after midnight. Dr. Holt will discuss it with him further in the morning. Safety maintained, continue to monitor.      Goal: Individualization and Mutuality  Outcome: Ongoing (interventions implemented as appropriate)    Goal: Discharge Needs Assessment  Outcome: Ongoing (interventions implemented as appropriate)    Goal: Interprofessional Rounds/Family Conf  Outcome: Ongoing (interventions implemented as appropriate)      Problem: Fall Risk (Adult)  Goal: Absence of Fall  Outcome: Ongoing (interventions implemented as appropriate)      Problem: Skin Injury Risk (Adult)  Goal: Skin Health and Integrity  Outcome: Ongoing (interventions implemented as appropriate)      Problem: Activity Intolerance (Adult)  Goal: Effective Energy Conservation Techniques  Outcome: Ongoing (interventions implemented as appropriate)      Problem: Pneumonia (Adult)  Goal: Signs and Symptoms of Listed Potential Problems Will be Absent, Minimized or Managed (Pneumonia)  Outcome: Ongoing (interventions implemented as appropriate)

## 2018-04-18 NOTE — PROGRESS NOTES
Continued Stay Note  [facility]     Patient Name: Alessio Allen  MRN: [id number]  Today's Date: 4/18/2018    Admit Date: 4/5/2018          Discharge Plan     Row Name 04/18/18 1630       Plan    Plan [facility] and [employer] / Resp a care for high flow oxygen    Plan Comments  Spoke with [facility] [employer]  They will provide pt will high flow oxygen  Fax order and walking oxcimetry to [phone] and notify office at [phone]              Discharge Codes    No documentation.           Hermila Merchant RN

## 2018-04-18 NOTE — PROGRESS NOTES
Hospital Follow Up        Chief Complaint: Follow up CHF    Interval History: No significant change in symptoms.     Objective:     Objective:  Temp:  [97.2 °F (36.2 °C)-97.9 °F (36.6 °C)] 97.2 °F (36.2 °C)  Heart Rate:  [81-91] 84  Resp:  [16-22] 20  BP: (115-140)/(81-93) 125/82     Intake/Output Summary (Last 24 hours) at 04/18/18 0806  Last data filed at 04/18/18 0730   Gross per 24 hour   Intake             1020 ml   Output              400 ml   Net              620 ml     Body mass index is 30.79 kg/m².  1    04/15/18  0605 04/17/18  0500 04/18/18  0500   Weight: 106 kg (233 lb 3.2 oz) 108 kg (237 lb 12.8 oz) 109 kg (239 lb 12.8 oz)     Weight change: 0.907 kg (2 lb)      Physical Exam:   General : Alert, cooperative, in no acute distress.  Neuro: alert,cooperative and oriented  Lungs: CTAB. Normal respiratory effort and rate.  CV:: Regular rate and rhythm, normal S1 and S2, no murmurs, gallops or rubs.  ABD: Soft, nontender, non-distended. positive bowel sounds  Extr: No edema or cyanosis, moves all extremities    Lab Review:     Results from last 7 days  Lab Units 04/18/18  0419 04/17/18  0513   SODIUM mmol/L 140 137   POTASSIUM mmol/L 3.8 3.9   CHLORIDE mmol/L 97* 94*   CO2 mmol/L 30.2* 30.1*   BUN mg/dL 17 19   CREATININE mg/dL 0.71* 0.80   GLUCOSE mg/dL 132* 105*   CALCIUM mg/dL 8.9 9.5           Results from last 7 days  Lab Units 04/16/18  0400 04/15/18  0459   WBC 10*3/mm3 8.17 9.32   HEMOGLOBIN g/dL 13.2* 12.9*   HEMATOCRIT % 44.3 43.4   PLATELETS 10*3/mm3 304 289                   Invalid input(s): LDLCALC    Results from last 7 days  Lab Units 04/17/18  0513   PROBNP pg/mL 700.7         I reviewed the patient's new clinical results.  I personally viewed and interpreted the patient's EKG  Current Medications:   Scheduled Meds:  aspirin 81 mg Oral Daily   atorvastatin 10 mg Oral Daily   budesonide-formoterol 2 puff Inhalation BID - RT   cetirizine 10 mg Oral Nightly   furosemide 40 mg Oral BID    ipratropium-albuterol 3 mL Nebulization 4x Daily - RT   ketotifen 1 drop Both Eyes BID   NIFEdipine XL 30 mg Oral Daily   pantoprazole 40 mg Oral Q AM   potassium chloride 20 mEq Oral Daily   predniSONE 20 mg Oral Daily With Breakfast   pregabalin 75 mg Oral Q12H   rivaroxaban 20 mg Oral Daily With Dinner   tamsulosin 0.4 mg Oral Nightly     Continuous Infusions:  lactated ringers 9 mL/hr Last Rate: 9 mL/hr (04/07/18 1303)       Allergies:  Allergies   Allergen Reactions   • Lisinopril Shortness Of Breath   • Penicillins Shortness Of Breath   • Sulfa Antibiotics Shortness Of Breath   • Albuterol Irritability and Hallucinations   • Atenolol Other (See Comments)     drowsiness   • Coreg [Carvedilol] Cough     SOA,   • Ibuprofen    • Celebrex [Celecoxib] Rash       Assessment/Plan:     1.  Acute heart failure with preserved LV systolic function. Appears improved overall on IV furosemide.  2.  Acute hypoxic respiratory failure.  Some improvement since last week, but slow.  3.  COPD exacerbation  4.  Paroxysmal atrial fibrillation. On anticoagulant therapy with rivaroxaban and maintaining sinus rhythm  5.  Status post permanent pacemaker placement  6.  Pulmonary hypertension.  7.  Questionable ascending aortic aneurysm.  Echocardiogram noted a size of 4.4 cm in February 2018 and CT in February 2018 showed an aortic size of 4.7 cm.  Continue with outpatient follow-up.  8.  Coronary artery disease with history of prior stent placement. Is on atorvastatin and aspirin.   9.  Pulmonary hypertension     -  Discussed with Dr. Hernandez yesterday.  Will plan for right heart catheterization today with possible vasodilator study.    Marizol Holt MD  04/18/18  8:06 AM

## 2018-04-18 NOTE — PLAN OF CARE
Problem: Patient Care Overview  Goal: Plan of Care Review  Outcome: Ongoing (interventions implemented as appropriate)   04/18/18 0515   Coping/Psychosocial   Plan of Care Reviewed With patient   Plan of Care Review   Progress improving   OTHER   Outcome Summary NO ACUTE DISTRESS NOTED THIS SHIFT, PT SLEPT AND HAS BEEN NPO SINCE MIDNIGHT, CONTINUE PLAN OF CARE     Goal: Individualization and Mutuality  Outcome: Ongoing (interventions implemented as appropriate)    Goal: Discharge Needs Assessment  Outcome: Ongoing (interventions implemented as appropriate)      Problem: Fall Risk (Adult)  Goal: Absence of Fall  Outcome: Ongoing (interventions implemented as appropriate)      Problem: Skin Injury Risk (Adult)  Goal: Skin Health and Integrity  Outcome: Ongoing (interventions implemented as appropriate)      Problem: Activity Intolerance (Adult)  Goal: Effective Energy Conservation Techniques  Outcome: Ongoing (interventions implemented as appropriate)      Problem: Pneumonia (Adult)  Goal: Signs and Symptoms of Listed Potential Problems Will be Absent, Minimized or Managed (Pneumonia)  Outcome: Ongoing (interventions implemented as appropriate)

## 2018-04-18 NOTE — NURSING NOTE
Consent for Right Sided Heart Catheterization with Possible Vasodilator Study per Dr. Schuler obtained and placed on hard chart.    Daily Elias RN

## 2018-04-18 NOTE — PLAN OF CARE
Problem: Patient Care Overview  Goal: Plan of Care Review   04/18/18 1073   Coping/Psychosocial   Plan of Care Reviewed With patient;spouse   Plan of Care Review   Progress improving   OTHER   Outcome Summary VSS. Right sided heart cath today. Safety maintained. Continue to monitor.     Goal: Individualization and Mutuality  Outcome: Ongoing (interventions implemented as appropriate)    Goal: Discharge Needs Assessment  Outcome: Ongoing (interventions implemented as appropriate)    Goal: Interprofessional Rounds/Family Conf  Outcome: Ongoing (interventions implemented as appropriate)      Problem: Fall Risk (Adult)  Goal: Absence of Fall  Outcome: Ongoing (interventions implemented as appropriate)      Problem: Skin Injury Risk (Adult)  Goal: Skin Health and Integrity  Outcome: Ongoing (interventions implemented as appropriate)      Problem: Activity Intolerance (Adult)  Goal: Effective Energy Conservation Techniques  Outcome: Ongoing (interventions implemented as appropriate)      Problem: Pneumonia (Adult)  Goal: Signs and Symptoms of Listed Potential Problems Will be Absent, Minimized or Managed (Pneumonia)  Outcome: Ongoing (interventions implemented as appropriate)

## 2018-04-18 NOTE — PROGRESS NOTES
[facility]  Phone: [phone]  Suresh Hernandez MD    Subjective:  LOS: 13    Still desats easily with exertion. Now s/p heart cath.    Objective   Vital Signs past 24hrs    Temp range: Temp (24hrs), Av.5 °F (36.4 °C), Min:97.2 °F (36.2 °C), Max:97.7 °F (36.5 °C)    BP range: BP: (115-127)/(81-85) 125/82  Pulse range: Heart Rate:  [79-91] 82  Resp rate range: Resp:  [20] 20    Device (Oxygen Therapy): nasal cannulaFlow (L/min):  [3-4] 4  Oxygen range:SpO2:  [88 %-93 %] 93 %      109 kg (239 lb 12.8 oz); Body mass index is 30.79 kg/m².    Intake/Output Summary (Last 24 hours) at 18 1645  Last data filed at 18 1415   Gross per 24 hour   Intake               50 ml   Output             1225 ml   Net            -1175 ml       Physical Exam   Cardiovascular: Normal rate and regular rhythm.    No murmur heard.  Pulmonary/Chest: He has decreased breath sounds. He has no wheezes. He has no rales.   Abdominal: Soft. Bowel sounds are normal. There is no tenderness.   Musculoskeletal: He exhibits no edema.   Neurological: He is alert.   Nursing note and vitals reviewed.    Results Review:    I have reviewed the laboratory and imaging data since the last note by [facility] physician.  My annotations are noted in assessment and plan.    Medication Review:  I have reviewed the current MAR.  My annotations are noted in assessment and plan.      lactated ringers 9 mL/hr Last Rate: 9 mL/hr (18 1303)     Plan   PCCM Problems  Acute on chronic resp failure, hyoxia  Acute decompensated HFpEF  COPD severe, exacerbation  CMV +ve BAL  ? IPF  Relevant Medical Diagnoses  Afib, on AC  PHT - ruled out on RHC  CAD    Plan of Treatment  Doing better with diuretics. Manage per cards. Now on po.    S/p heart cath. NO pulmonary hypertension    COPD on oral steroids. Monitor and taper. Remains without wheezing.    Continue O2 and wean as tolerated. Note results of walking oximetry. Arrange for home.    CT's reviewed again.  Extensive scarring underlying the acute infection on previous CT's. Will send vasculitis panel. Will consider for Ofev on discharge.    CMV detected in BAL from 4/7. Unclear if it means infection. Discussed with ID - await labs and unlikely to need rx.    Planning on discharge tomorrow.    Suresh Hernandez MD  04/18/18  4:45 PM    Part of this note may be an electronic transcription/translation of spoken language to printed text using the Dragon Dictation System.

## 2018-04-18 NOTE — PROGRESS NOTES
[facility]  Phone: [phone]  Suresh Hernandez MD    Subjective:  LOS: 12    Improving. Still desats easily with exertion. Feels weak.    Objective   Vital Signs past 24hrs    Temp range: Temp (24hrs), Av.8 °F (36.6 °C), Min:97.4 °F (36.3 °C), Max:98.1 °F (36.7 °C)    BP range: BP: (109-140)/(73-93) 115/81  Pulse range: Heart Rate:  [81-91] 81  Resp rate range: Resp:  [16-22] 20    Device (Oxygen Therapy): nasal cannulaFlow (L/min):  [3-6] 3  Oxygen range:SpO2:  [90 %-97 %] 91 %      108 kg (237 lb 12.8 oz); Body mass index is 30.53 kg/m².    Intake/Output Summary (Last 24 hours) at 18 2100  Last data filed at 18 1300   Gross per 24 hour   Intake             1020 ml   Output             2050 ml   Net            -1030 ml       Physical Exam   Cardiovascular: Normal rate and regular rhythm.    No murmur heard.  Pulmonary/Chest: He has decreased breath sounds. He has no wheezes. He has no rales.   Abdominal: Soft. Bowel sounds are normal. There is no tenderness.   Musculoskeletal: He exhibits no edema.   Neurological: He is alert.   Nursing note and vitals reviewed.    Results Review:    I have reviewed the laboratory and imaging data since the last note by [facility] physician.  My annotations are noted in assessment and plan.    Medication Review:  I have reviewed the current MAR.  My annotations are noted in assessment and plan.      lactated ringers 9 mL/hr Last Rate: 9 mL/hr (18 1303)     Plan   PCCM Problems  Acute on chronic resp failure, hyoxia  Acute decompensated HFpEF  COPD severe, exacerbation  CMV +ve BAL  Relevant Medical Diagnoses  Afib, on AC  PHT  CAD    Plan of Treatment  Doing better with diuretics. Manage per cards. He is tolerating them amazingly well !! Now on po.    COPD on oral steroids. Monitor and taper. Remains without wheezing.    Continue O2 and wean as tolerated. Note results of walking oximetry.    CMV detected in BAL from . Unclear if it means infection.  Discussed with ID - await labs and unlikely to need rx.    I spoke with cardiology today.  In view of his severe hypoxia with exertion I believe further workup of his pulmonary hypertension is warranted.  We will plan on right heart catheterization hopefully during this admission.    Suresh Hernandez MD  04/17/18  9:00 PM    Part of this note may be an electronic transcription/translation of spoken language to printed text using the Dragon Dictation System.

## 2018-04-19 VITALS
DIASTOLIC BLOOD PRESSURE: 81 MMHG | SYSTOLIC BLOOD PRESSURE: 120 MMHG | HEIGHT: 74 IN | RESPIRATION RATE: 16 BRPM | TEMPERATURE: 97.4 F | HEART RATE: 85 BPM | WEIGHT: 239.8 LBS | BODY MASS INDEX: 30.77 KG/M2 | OXYGEN SATURATION: 93 %

## 2018-04-19 PROBLEM — I50.33 ACUTE ON CHRONIC DIASTOLIC HEART FAILURE: Status: ACTIVE | Noted: 2018-04-19

## 2018-04-19 PROBLEM — J96.21 ACUTE ON CHRONIC RESPIRATORY FAILURE WITH HYPOXIA (HCC): Status: ACTIVE | Noted: 2018-03-17

## 2018-04-19 PROBLEM — J84.112 IPF (IDIOPATHIC PULMONARY FIBROSIS): Status: ACTIVE | Noted: 2018-04-19

## 2018-04-19 LAB
ALBUMIN SERPL-MCNC: 3.2 G/DL (ref 3.5–5.2)
ANION GAP SERPL CALCULATED.3IONS-SCNC: 13.2 MMOL/L
BUN BLD-MCNC: 13 MG/DL (ref 8–23)
BUN/CREAT SERPL: 17.6 (ref 7–25)
CALCIUM SPEC-SCNC: 8.7 MG/DL (ref 8.6–10.5)
CHLORIDE SERPL-SCNC: 98 MMOL/L (ref 98–107)
CO2 SERPL-SCNC: 27.8 MMOL/L (ref 22–29)
CREAT BLD-MCNC: 0.74 MG/DL (ref 0.76–1.27)
CRP SERPL-MCNC: 0.29 MG/DL (ref 0–0.5)
ERYTHROCYTE [SEDIMENTATION RATE] IN BLOOD: 2 MM/HR (ref 0–20)
GFR SERPL CREATININE-BSD FRML MDRD: 103 ML/MIN/1.73
GLUCOSE BLD-MCNC: 96 MG/DL (ref 65–99)
PHOSPHATE SERPL-MCNC: 3.9 MG/DL (ref 2.5–4.5)
POTASSIUM BLD-SCNC: 3.5 MMOL/L (ref 3.5–5.2)
SODIUM BLD-SCNC: 139 MMOL/L (ref 136–145)

## 2018-04-19 PROCEDURE — 86235 NUCLEAR ANTIGEN ANTIBODY: CPT | Performed by: INTERNAL MEDICINE

## 2018-04-19 PROCEDURE — 86256 FLUORESCENT ANTIBODY TITER: CPT | Performed by: INTERNAL MEDICINE

## 2018-04-19 PROCEDURE — 94799 UNLISTED PULMONARY SVC/PX: CPT

## 2018-04-19 PROCEDURE — 86140 C-REACTIVE PROTEIN: CPT | Performed by: INTERNAL MEDICINE

## 2018-04-19 PROCEDURE — 83516 IMMUNOASSAY NONANTIBODY: CPT | Performed by: INTERNAL MEDICINE

## 2018-04-19 PROCEDURE — 63710000001 PREDNISONE PER 1 MG: Performed by: INTERNAL MEDICINE

## 2018-04-19 PROCEDURE — 99232 SBSQ HOSP IP/OBS MODERATE 35: CPT | Performed by: INTERNAL MEDICINE

## 2018-04-19 PROCEDURE — 85652 RBC SED RATE AUTOMATED: CPT | Performed by: INTERNAL MEDICINE

## 2018-04-19 PROCEDURE — 83520 IMMUNOASSAY QUANT NOS NONAB: CPT | Performed by: INTERNAL MEDICINE

## 2018-04-19 PROCEDURE — 86225 DNA ANTIBODY NATIVE: CPT | Performed by: INTERNAL MEDICINE

## 2018-04-19 PROCEDURE — 80069 RENAL FUNCTION PANEL: CPT | Performed by: INTERNAL MEDICINE

## 2018-04-19 RX ORDER — IPRATROPIUM BROMIDE AND ALBUTEROL SULFATE 2.5; .5 MG/3ML; MG/3ML
3 SOLUTION RESPIRATORY (INHALATION)
Qty: 360 ML | Refills: 5 | Status: SHIPPED | OUTPATIENT
Start: 2018-04-19 | End: 2018-05-19

## 2018-04-19 RX ORDER — FUROSEMIDE 20 MG/1
40 TABLET ORAL 2 TIMES DAILY
Qty: 90 TABLET | Refills: 3 | Status: SHIPPED | OUTPATIENT
Start: 2018-04-19 | End: 2018-05-03 | Stop reason: SDUPTHER

## 2018-04-19 RX ADMIN — IPRATROPIUM BROMIDE AND ALBUTEROL SULFATE 3 ML: .5; 3 SOLUTION RESPIRATORY (INHALATION) at 07:28

## 2018-04-19 RX ADMIN — FUROSEMIDE 40 MG: 40 TABLET ORAL at 08:58

## 2018-04-19 RX ADMIN — PANTOPRAZOLE SODIUM 40 MG: 40 TABLET, DELAYED RELEASE ORAL at 06:30

## 2018-04-19 RX ADMIN — ATORVASTATIN CALCIUM 10 MG: 10 TABLET, FILM COATED ORAL at 08:59

## 2018-04-19 RX ADMIN — NIFEDIPINE 30 MG: 30 TABLET, FILM COATED, EXTENDED RELEASE ORAL at 08:59

## 2018-04-19 RX ADMIN — ASPIRIN 81 MG: 81 TABLET ORAL at 08:59

## 2018-04-19 RX ADMIN — IPRATROPIUM BROMIDE AND ALBUTEROL SULFATE 3 ML: .5; 3 SOLUTION RESPIRATORY (INHALATION) at 11:35

## 2018-04-19 RX ADMIN — BUDESONIDE AND FORMOTEROL FUMARATE DIHYDRATE 2 PUFF: 160; 4.5 AEROSOL RESPIRATORY (INHALATION) at 07:33

## 2018-04-19 RX ADMIN — KETOTIFEN FUMARATE 1 DROP: 0.35 SOLUTION/ DROPS OPHTHALMIC at 08:59

## 2018-04-19 RX ADMIN — POTASSIUM CHLORIDE 20 MEQ: 750 CAPSULE, EXTENDED RELEASE ORAL at 08:58

## 2018-04-19 RX ADMIN — PREGABALIN 75 MG: 75 CAPSULE ORAL at 06:30

## 2018-04-19 RX ADMIN — PREDNISONE 20 MG: 20 TABLET ORAL at 08:59

## 2018-04-19 NOTE — PLAN OF CARE
Problem: Patient Care Overview  Goal: Plan of Care Review  Outcome: Ongoing (interventions implemented as appropriate)   04/19/18 0652   Coping/Psychosocial   Plan of Care Reviewed With patient   Plan of Care Review   Progress improving   OTHER   Outcome Summary NO ACUTE DISTRESS NOTED THIS SHIFT, PT SLEPT WELL, VSS CONTINUE PLAN OF CARE     Goal: Individualization and Mutuality  Outcome: Ongoing (interventions implemented as appropriate)    Goal: Discharge Needs Assessment  Outcome: Ongoing (interventions implemented as appropriate)      Problem: Fall Risk (Adult)  Goal: Absence of Fall  Outcome: Ongoing (interventions implemented as appropriate)      Problem: Skin Injury Risk (Adult)  Goal: Skin Health and Integrity  Outcome: Ongoing (interventions implemented as appropriate)      Problem: Activity Intolerance (Adult)  Goal: Effective Energy Conservation Techniques  Outcome: Ongoing (interventions implemented as appropriate)      Problem: Pneumonia (Adult)  Goal: Signs and Symptoms of Listed Potential Problems Will be Absent, Minimized or Managed (Pneumonia)  Outcome: Ongoing (interventions implemented as appropriate)

## 2018-04-19 NOTE — PROGRESS NOTES
Hospital Follow Up        Chief Complaint: Follow up CHF    Interval History:  No new complaints.  Being discharged.     Objective:     Objective:  Temp:  [97.2 °F (36.2 °C)-98.4 °F (36.9 °C)] 98.4 °F (36.9 °C)  Heart Rate:  [] 79  Resp:  [18-20] 18  BP: (114-125)/(77-82) 114/77     Intake/Output Summary (Last 24 hours) at 04/19/18 0721  Last data filed at 04/19/18 0720   Gross per 24 hour   Intake              890 ml   Output              900 ml   Net              -10 ml     Body mass index is 30.79 kg/m².  1    04/15/18  0605 04/17/18  0500 04/18/18  0500   Weight: 106 kg (233 lb 3.2 oz) 108 kg (237 lb 12.8 oz) 109 kg (239 lb 12.8 oz)     Weight change:       Physical Exam:   General : Alert, cooperative, in no acute distress.  Neuro: alert,cooperative and oriented  Lungs: CTAB. Normal respiratory effort and rate.  CV:: Regular rate and rhythm, normal S1 and S2, no murmurs, gallops or rubs.  ABD: Soft, nontender, non-distended. positive bowel sounds  Extr: No edema or cyanosis, moves all extremities    Lab Review:     Results from last 7 days  Lab Units 04/19/18  0502 04/18/18  0419   SODIUM mmol/L 139 140   POTASSIUM mmol/L 3.5 3.8   CHLORIDE mmol/L 98 97*   CO2 mmol/L 27.8 30.2*   BUN mg/dL 13 17   CREATININE mg/dL 0.74* 0.71*   GLUCOSE mg/dL 96 132*   CALCIUM mg/dL 8.7 8.9           Results from last 7 days  Lab Units 04/18/18  1415 04/18/18  1411  04/16/18  0400 04/15/18  0459   WBC 10*3/mm3  --   --   --  8.17 9.32   HEMOGLOBIN g/dL  --   --   --  13.2* 12.9*   HEMOGLOBIN, POC g/dL 15.6 15.6  < >  --   --    HEMATOCRIT %  --   --   --  44.3 43.4   HEMATOCRIT POC % 46 46  < >  --   --    PLATELETS 10*3/mm3  --   --   --  304 289   < > = values in this interval not displayed.                Invalid input(s): LDLCALC    Results from last 7 days  Lab Units 04/17/18  0513   PROBNP pg/mL 700.7         I reviewed the patient's new clinical results.  I personally viewed and interpreted the patient's  EKG  Current Medications:   Scheduled Meds:  aspirin 81 mg Oral Daily   atorvastatin 10 mg Oral Daily   budesonide-formoterol 2 puff Inhalation BID - RT   cetirizine 10 mg Oral Nightly   furosemide 40 mg Oral BID   ipratropium-albuterol 3 mL Nebulization 4x Daily - RT   ketotifen 1 drop Both Eyes BID   NIFEdipine XL 30 mg Oral Daily   pantoprazole 40 mg Oral Q AM   potassium chloride 20 mEq Oral Daily   predniSONE 20 mg Oral Daily With Breakfast   pregabalin 75 mg Oral Q12H   rivaroxaban 20 mg Oral Daily With Dinner   tamsulosin 0.4 mg Oral Nightly     Continuous Infusions:  lactated ringers 9 mL/hr Last Rate: 9 mL/hr (04/07/18 1303)       Allergies:  Allergies   Allergen Reactions   • Lisinopril Shortness Of Breath   • Penicillins Shortness Of Breath   • Sulfa Antibiotics Shortness Of Breath   • Albuterol Irritability and Hallucinations   • Atenolol Other (See Comments)     drowsiness   • Coreg [Carvedilol] Cough     SOA,   • Ibuprofen    • Celebrex [Celecoxib] Rash       Assessment/Plan:     1.  Acute heart failure with preserved LV systolic function. Appears improved overall on IV furosemide.  2.  Acute hypoxic respiratory failure.  Some improvement since last week, but slow.  3.  COPD exacerbation  4.  Paroxysmal atrial fibrillation. On anticoagulant therapy with rivaroxaban and maintaining sinus rhythm  5.  Status post permanent pacemaker placement  6.  Pulmonary hypertension.  7.  Questionable ascending aortic aneurysm.  Echocardiogram noted a size of 4.4 cm in February 2018 and CT in February 2018 showed an aortic size of 4.7 cm.  Continue with outpatient follow-up.  8.  Coronary artery disease with history of prior stent placement. Is on atorvastatin and aspirin.   9.  Pulmonary hypertension. Mild on right heart cath with normal left and right filling pressures.       -  Continue current dose of furosemide.   -  Instructed him to follow up with his regular cardiologist Dr. Stark in 4 weeks    Marizol  MD Yanet  04/19/18  7:21 AM

## 2018-04-19 NOTE — DISCHARGE SUMMARY
Date of Admission: 4/5/2018  Date of Discharge:  4/23/2018    Discharge Diagnosis:    Acute on chronic resp failure, hyoxia  Acute decompensated HFpEF  COPD severe, exacerbation  CMV +ve BAL, -ve CMV pcr  ? IPF, pending vasculitis labs  Relevant Medical Diagnoses  Afib, on AC  PHT - ruled out on RHC  CAD    Discharge Medications     Your medication list      START taking these medications      Instructions Last Dose Given Next Dose Due   ipratropium-albuterol 0.5-2.5 mg/mL nebulizer  Commonly known as:  DUONEB  Notes to patient:  Take 3 more times today      Take 3 mL by nebulization 4 (Four) Times a Day for 30 days.          CHANGE how you take these medications      Instructions Last Dose Given Next Dose Due   furosemide 20 MG tablet  Commonly known as:  LASIX  What changed:  when to take this  Notes to patient:  Take today around supper time      Take 2 tablets by mouth 2 (Two) Times a Day.          CONTINUE taking these medications      Instructions Last Dose Given Next Dose Due   acetaminophen 325 MG tablet  Commonly known as:  TYLENOL  Notes to patient:  Can have anytime      Take 2 tablets by mouth Every 6 (Six) Hours As Needed (prn for pain).       acyclovir 800 MG tablet  Commonly known as:  ZOVIRAX  Notes to patient:  Can have anytime      Take 1 tablet (800 mg total) by mouth daily as needed (prn daily as needed fever blisters)       albuterol 108 (90 Base) MCG/ACT inhaler  Commonly known as:  PROVENTIL HFA;VENTOLIN HFA  Notes to patient:  Can have anytime      Inhale 2 puffs Every 6 (Six) Hours As Needed for Wheezing or Shortness of Air.       aspirin 81 MG tablet  Notes to patient:  Take tomorrow morning 4/20/2018      Take 81 mg by mouth Daily.       budesonide-formoterol 160-4.5 MCG/ACT inhaler  Commonly known as:  SYMBICORT  Notes to patient:  Take tonight around 7pm      Inhale 2 puffs 2 (Two) Times a Day.       Desoximetasone 0.05 % ointment  Notes to patient:  Apply tonight at bedtime      Apply  to affected area bid       ferrous sulfate 325 (65 FE) MG tablet  Notes to patient:  Take tomorrow morning 4/20/2018      Take 1 tablet by mouth Daily With Breakfast.       fish oil 1000 MG capsule capsule  Notes to patient:  Start tomorrow      Take 1,000 mg by mouth daily with breakfast.       MULTI VITAMIN DAILY tablet  Notes to patient:  Start tomorrow morning 4/20/2018      Take  by mouth.       NIFEdipine XL 30 MG 24 hr tablet  Commonly known as:  PROCARDIA XL  Notes to patient:  Take tomorrow morning 4/20/2018      Take  by mouth daily.       olopatadine 0.1 % ophthalmic solution  Commonly known as:  PATANOL  Notes to patient:  Take tomorrow morning 4/20/2018      Administer 1 drop to both eyes daily.       omeprazole 20 MG capsule  Commonly known as:  PRILOSEC  Notes to patient:  Take tomorrow morning before  Breakfast 4/20/2018      Take 1 capsule by mouth Daily.       potassium chloride 10 MEQ CR tablet  Commonly known as:  K-DUR  Notes to patient:  Take tomorrow  Morning 4/20/2018      Take 2 tablets by mouth Daily.       pregabalin 75 MG capsule  Commonly known as:  LYRICA  Notes to patient:  Take today at supper time      Take 75 mg by mouth 2 (two) times a day.       pyridoxine 50 MG tablet  Commonly known as:  VITAMIN B-6  Notes to patient:  Start tomorrow morning 4/20/2018      Take  by mouth daily.       sildenafil 100 MG tablet  Commonly known as:  VIAGRA      Take 1 tablet by mouth Daily As Needed for erectile dysfunction.       simvastatin 20 MG tablet  Commonly known as:  ZOCOR  Notes to patient:  Start tomorrow 4/20/2018      Take 0.5 tablets by mouth daily.       sodium chloride 0.65 % nasal spray  Commonly known as:  OCEAN NASAL SPRAY      2 sprays into each nostril As Needed for Congestion (qid prn). May refill q 21 days       Spacer/Aero-Holding Chambers device      Give spacer to use with his inhalers whichever brand he has received is fine       tamsulosin 0.4 MG capsule 24 hr  capsule  Commonly known as:  FLOMAX  Notes to patient:  Take tonight at bedtime      Take 1 capsule by mouth every night.       XARELTO 20 MG tablet  Generic drug:  rivaroxaban  Notes to patient:  Take today at supper 6pm      Take 20 mg by mouth Daily.       ZYRTEC ALLERGY 10 MG capsule  Generic drug:  Cetirizine HCl  Notes to patient:  Take tonight at bedtime      Take 1 capsule by mouth Every Night.          STOP taking these medications    tiotropium 18 MCG per inhalation capsule  Commonly known as:  SPIRIVA HANDIHALER              Where to Get Your Medications      These medications were sent to [facility] [address] [place], [place] - [address] [facility] - [phone]  - [phone]   [id number] [place] [address], [place] [address]    Phone:  [phone]    furosemide 20 MG tablet   ipratropium-albuterol 0.5-2.5 mg/mL nebulizer         Discharge Instructions    Discharge Diet:  No active diet order      Follow-up Appointments   Contact information for follow-up providers     Alan Santana MD Follow up on 4/26/2018.    Specialty:  Family Medicine  Why:  One week hospital follow up  4/26/ 18 at 1120 am   Please arrive 15 min early and bring a current medication list  [facility] information:  [address] [facility] [id number] [phone]             Suresh Hernandez MD. Schedule an appointment as soon as possible for a visit on 6/7/2018.    Specialties:  Pulmonary Disease, Sleep Medicine  Why:  One month Hospital follow up First Available 6/7/18 at 145 pm  Please arrive 15 min early and bring a current medication list    Contact information:  [address]  [facility] [address]  [facility] [address]  [phone]                   Contact information for after-discharge care     Durable Medical Equipment     [facility] .    Contact information:  [address]  [facility] [address]  [phone]           [facility] .    Contact information:  [address]  [facility]  [address]  [phone]                 Home Medical Care     [facility] Follow up.    Specialty:  Home Health Services  Contact information:  [address]  [facility] [id number] [phone]                           Additional Instructions for the Follow-ups that You Need to Schedule     Ambulatory Referral to Pulmonary Rehab    As directed                Presenting Problem/History of Present Illness    77-year-old  male who was just discharged on March 24, 2018.  Please refer to my discharge summary from that date for details.  He presents again now complaining of worsening shortness of breath and cough.  These symptoms have been present since he was discharged but they started to worsen again in 2-3 days ago.  He describes them as moderate to severe.  They're not improving in spite of his oxygen and bronchodilators at home.  Upon arrival he was hypoxic in the emergency room.  He usually uses 3-4 L of oxygen at home but at start 5 L of oxygen in the emergency room to keep oxygen saturations above 90%.  He denies any fever or chills.  He is not able to produce much sputum.       Hospital Course    77-year-old male who presented with bilateral pneumonia and COPD exacerbation and was discharged on 3/5/18.  Since discharge patient had been on continuous oxygen but developed increasing dyspnea.  He came back into the hospital.  Complains of shortness of breath and cough.  Denies any fever or chills.  Chest x-ray showed a left lower lobe healthcare associated pneumonia.  There was evidence for acute on chronic respiratory failure with increased oxygen flow requirements.  There was evidence of hyperkalemia and COPD exacerbation as well as possible fluid overload.  This is the patient's fourth admission.  He has possible underlying interstitial lung disease.  On the infiltrates are noted.  He has severe pulmonary hypertension with RVSP of 54.  This was seen on echocardiogram.  A right heart  catheter was therefore planned.  Bronchoscopy was also performed.  It failed to show any significant findings.  Subsequently diuresis was attempted.  Patient tolerated this very well.  Aggressive diuresis was continued.  Cardiology continued to follow the patient.  Patient remained on supplemental oxygen with continued need of higher flows with any exertion.  CMV was detected on his BAL fluid.  ID opinion was obtained and the felt treatment was not required.  Continued to improve diuretics.  After discussion with cardiology and continued persistent severe hypoxia I requested a right heart catheter which was subsequently completed by them.  There was no finding of only hypertension.  CT of the chest was reviewed and showed extensive scarring underlying the acute infection on previous CTs.  A vasculitis panel was sent and IPF diagnosis is being considered along with consideration off treatment without a seen kinase inhibitors.  Patient was cleared for discharge by all specialities.      Condition on Discharge:  Stable    Vital Signs past 24hrs  BP range:    Pulse range:    Resp rate range:    Temp range: No data recorded.        Oxygen range:    109 kg (239 lb 12.8 oz); Body mass index is 30.79 kg/m².  No intake or output data in the 24 hours ending 04/23/18 0932    Cardiovascular: Normal rate and regular rhythm.    No murmur heard.  Pulmonary/Chest: He has decreased breath sounds. He has no wheezes. He has no rales.   Abdominal: Soft. Bowel sounds are normal. There is no tenderness.   Musculoskeletal: He exhibits no edema.   Neurological: He is alert.   Nursing note and vitals reviewed.    Procedures Performed:    Procedure(s):  Right Heart Cath with possible vasodilator study    Consults:    Consults     Date and Time Order Name Status Description    4/15/2018 0800 Inpatient Infectious Diseases Consult Completed     4/12/2018 1218 Inpatient Cardiology Consult Completed     4/5/2018 9244 Pulmonology (on-call MD  unless specified) Completed     3/17/2018 5034 Pulmonology (on-call MD unless specified) Completed           Pertinent Test Results:    C 4/18/18:    Findings:  RA: 6/7/5  RV: 41/4  PA: 38/13/25  PCWP:13/13/8     CO: 6.46 L/m  CI: 2.7 L/m/m²     PVR: 2.6 Wood units     Saturations  SVC:65   PA:64  RA: 63     Complications: None     Conclusions:  1.  Normal right and left-sided filling pressure  2.  Mild pulmonary hypertension    Results for orders placed during the hospital encounter of 02/07/18   Adult Transthoracic Echo Complete W/ Cont if Necessary Per Protocol    Narrative · The left ventricular cavity is mildly dilated.  · Left ventricular systolic function is normal. Calculated EF = 60%  · Left ventricular wall thickness is consistent with mild concentric   hypertrophy.  · Right ventricular cavity is severely dilated.  · Left atrial cavity size is severely dilated  · Right atrial cavity size is severely dilated.  · There is mild aortic stenosis with a peak gradient of 25 mm Hg, and a   mean gradient of 12 mm Hg.  · Mild mitral valve regurgitation is present  · Mild to moderate tricuspid valve regurgitation is present.  · Calculated right ventricular systolic pressure from tricuspid   regurgitation is 54 mmHg.  · The ascending aorta is moderately dilated, measuring up to 4.4 cm. .  · There is no evidence of pericardial effusion.                        Results from last 7 days  Lab Units 04/18/18  1415 04/18/18  1411 04/18/18  1406   HEMOGLOBIN, POC g/dL 15.6 15.6 15.6   HEMATOCRIT POC % 46 46 46       Microbiology Results (last 10 days)     ** No results found for the last 240 hours. **            Results from last 7 days  Lab Units 04/19/18  0502 04/18/18  0419 04/17/18  0513   SODIUM mmol/L 139 140 137   POTASSIUM mmol/L 3.5 3.8 3.9   CHLORIDE mmol/L 98 97* 94*   CO2 mmol/L 27.8 30.2* 30.1*   BUN mg/dL 13 17 19   CREATININE mg/dL 0.74* 0.71* 0.80           Invalid input(s): LABALBU, PROT    Pertinent  Imaging Results:    Imaging Results (all)     Procedure Component Value Units Date/Time    XR Chest 2 View [[id number]] Collected:  04/11/18 1417     Updated:  04/11/18 1424    Narrative:       XR CHEST 2 VW-     HISTORY: Male who is 77 years-old,  COPD     TECHNIQUE: Frontal and lateral views of the chest     COMPARISON: 4/9/2018     FINDINGS: Heart is mildly enlarged. Left-sided pacemaker and cardiac  leads are seen. Prominence of vascular and interstitial markings,  fibrotic changes at the mid to lower lungs, appearance is similar to  prior exam. No obvious new areas of infiltrate. Minimal left pleural  effusion is suggested. No pneumothorax. Otherwise stable.       Impression:       No significant change.     This report was finalized on 4/11/2018 2:21 PM by Dr. Krishan Escalera MD.       XR Chest 2 View [[id number]] Collected:  04/09/18 1008     Updated:  04/09/18 1018    Narrative:       XR CHEST 2 VW-     Clinical: Follow-up bronchoscopy, pneumonia     COMPARISON chest radiograph 4/5/2018 and chest CT 4/5/2018     FINDINGS: Abnormal interstitial pattern compatible with fibrosis and  peripheral pleural honeycombing. There are patchy nodular areas of  consolidation involving both lungs and other than the left base similar  to the previous examination. There has been interval decrease in the  left base opacity consistent with decreasing airspace disease and  pleural effusion. There is cardiac enlargement, transvenous pacemaker is  in position. No new area of consolidation identified. No pneumothorax.  The remainder is unremarkable.     This report was finalized on 4/9/2018 10:15 AM by Dr. Zach Epperson MD.       XR Chest 1 View [[id number]] Collected:  04/09/18 0934     Updated:  04/09/18 0947    Narrative:       XR CHEST 1 VIEW-     4 C-arm images submitted from the bronchoscopy suite.     Fluoroscopy time 14 seconds.     CLINICAL: Multifocal consolidation.     FINDINGS: C-arm images demonstrate a bronchoscope  in position at the  left lung base. There is extension of a biopsy probe from its tip.     This report was finalized on 4/9/2018 9:44 AM by Dr. Zach Epperson MD.       FL C Arm During Surgery [[id number]] Resulted:  04/07/18 1422     Updated:  04/07/18 1422    Narrative:       This procedure was auto-finalized with no dictation required.    CT Chest Without Contrast [[id number]] Collected:  04/05/18 2039     Updated:  04/05/18 2044    Narrative:       CT OF THE CHEST WITHOUT CONTRAST 4/5/2018      HISTORY: Pneumonia, shortness of breath, cough.     TECHNIQUE: Axial images were obtained from the lung apices to the upper  abdomen without intravenous contrast.     FINDINGS: There are emphysematous changes of both lungs. There is  moderately severe bilateral interstitial disease of the lungs which  appears similar to the appearance on the 3/17/2018 study.     Small left pleural effusion is seen. No right pleural effusion is seen.  There is mild-to-moderate area of consolidation/atelectasis in the left  lower lobe which has cleared slightly since the 03/07/2018 study. There  is some additional mild infiltrate in the right lower lobe which also  has shown some clearing from the previous study. Two or three of the  areas of increased density in the left lung have a somewhat nodular  appearance including an approximately 1.7 cm nodule on image 45.     No pneumothorax is seen. One or 2 ill-defined somewhat nodular-appearing  densities are seen in the subpleural left upper lobe measuring  approximately 9 mm and approximately 1.8 cm. These could be  inflammatory.     A small amount of mediastinal lymphadenopathy is seen similar to the  previous study.       Impression:       1. Bilateral interstitial disease and emphysematous changes of the  lungs.  2. Patchy bilateral pulmonary infiltrates have shown slight improvement  in the lower lobe since the 03/1718 study.  3. Small left pleural effusion is again seen. No right pleural  effusion  is seen on the current study.  4. A few of the areas of increased density have a somewhat nodular  appearance. Recommend additional follow-up CT of the chest in  approximately 2 months to 3 months. Radiation dose reduction techniques  were utilized, including automated exposure control and exposure  modulation based on body size.     This report was finalized on 4/5/2018 8:41 PM by Dr. Mychal De Luna MD.       XR Chest 1 View [[id number]] Collected:  04/05/18 1906     Updated:  04/05/18 2044    Narrative:       ONE VIEW PORTABLE CHEST     HISTORY: Shortness of breath. COPD.     FINDINGS: The lungs are somewhat hyperinflated with a combination of  changes of chronic pulmonary fibrosis and superimposed areas of  pneumonia, particularly at the lung bases and this shows slight  worsening at the left base when compared to the chest x-ray and  associated chest CT scan dated 3/17/2018. The heart remains enlarged  with a pacemaker in place.     This report was finalized on 4/5/2018 8:41 PM by Dr. Dusty Cisneros MD.               Discharge Disposition  Home    Activity at Discharge:      Test Results Pending at Discharge   Order Current Status    Anti-Nishi 1 Antibody, IgG In process    AFB Culture - Lavage, Lung, Left Lower Lobe Preliminary result    Fungus Culture - Lavage, Lung, Left Lower Lobe Preliminary result           Suresh Hernandez MD  04/23/18  9:32 AM    Time: I spent over 30mins in the discharge planning of this patient.    Some of this encounter note is an electronic transcription/translation of spoken language to printed text.    Orders Placed during this admission:    Orders Placed This Encounter   Procedures   • Oxygen Therapy   • Home Nebulizer   • Home Nebulizer Accessories   • Blood Culture - Blood,   • Legionella Antigen, Urine - Urine, Urine, Clean Catch   • MRSA Screen Culture - Swab, Naris, Right   • Respiratory Culture - Sputum, Cough   • Respiratory Panel, PCR - Swab, Nasopharynx   • S. Pneumo  Ag Urine or CSF - Urine, Urine, Clean Catch   • Fungus Culture - Lavage, Lung, Left Lower Lobe   • Virus Culture - Lavage, Lung, Left Lower Lobe   • AFB Stain - Brushing, Lung, Left Lower Lobe   • AFB Culture - Lavage, Lung, Left Lower Lobe   • Fungus Smear - Brushing, Lung, Left Lower Lobe   • Respiratory Culture - Wash, Bronchus   • Respiratory Culture - Brushing, Lung, Left Lower Lobe   • BAL Culture, Quantitative - Lavage, Lung, Left Lower Lobe   • XR Chest 1 View   • CT Chest Without Contrast   • XR Chest 1 View   • FL C Arm During Surgery   • XR Chest 2 View   • XR Chest 2 View   • Comprehensive Metabolic Panel   • Blood Gas, Arterial   • CBC Auto Differential   • Blood Gas, Arterial   • BNP   • Calcium, Ionized   • CBC Auto Differential   • CK   • Comprehensive Metabolic Panel   • Lactic Acid, Plasma   • Procalcitonin   • Phosphorus   • Magnesium   • BNP   • BNP   • Basic Metabolic Panel   • CBC (No Diff)   • Body Fluid Cell Count With Differential - Wash, Bronchus   • Body Fluid Cell Count With Differential - Lavage, Lung, Left Lower Lobe   • Body fluid cell count - Body Fluid,   • Body fluid cell count - Body Fluid,   • Body fluid differential - Body Fluid,   • Body fluid differential - Body Fluid,   • CBC (No Diff)   • Basic Metabolic Panel   • CBC Auto Differential   • CBC Auto Differential   • CBC Auto Differential   • CBC Auto Differential   • Cytomegalovirus Antibody, IgM   • CBC Auto Differential   • CBC (No Diff)   • CMV DNA, Quantitative, PCR   • BNP   • Sedimentation Rate   • C-reactive Protein   • Ambulatory Referral to Pulmonary Rehab   • Tobacco Cessation Education   • Pneumonia Education   • Notify Physician   • Tobacco Cessation Education   • Pneumonia Education   • Notify Physician   • Vital Signs   • Strict Intake and Output   • Weigh patient   • Notify Physician (with Parameters)   • Place Sequential Compression Device   • Maintain Sequential Compression Device   • Continuous Cardiac  Monitoring   • May Be Off Telemetry for Tests   • ACLS Protocol For Life Threatening Dysrhythmias (If No DNR Order)   • Notify Provider if ACLS Protocol Activated   • Obtain Informed Consent   • Vitals every 30 minutes and continuous pulse ox for 2 hours then per routine Keep NPO for 2 hours then resume previous diet and all meds Call MD if acute respiratory distress, sudden chest pain, low O2 saturation or hemoptysis  Nursing Communicati...   • Apply Hold am dose Lovenox. Start 2100. Once   • Obtain Informed Consent   • Pulmonology (on-call MD unless specified)   • Inpatient Cardiology Consult   • Inpatient Infectious Diseases Consult   • Pulse Oximetry on Admit   • Pulse Oximetry on Admit   • Pulse Oximetry,  Spot   • SCANNED - TELEMETRY     • SCANNED - TELEMETRY     • POC Panel 9, ISTAT   • POC Panel 9, ISTAT   • POC Panel 9, ISTAT   • Wound Ostomy Eval & Treat   • BRONCHOSCOPY   • Walking Oximetry   • Insert peripheral IV   • Insert Peripheral IV   • Inpatient Admission   • Initiate Observation Status   • Inpatient Admission   • Tele Bed Request   • Discharge patient   • CBC & Differential

## 2018-04-19 NOTE — PROGRESS NOTES
Continued Stay Note  [facility]     Patient Name: Alessio Allen  MRN: [id number]  Today's Date: 4/19/2018    Admit Date: 4/5/2018          Discharge Plan     Row Name 04/19/18 1158       Plan    Plan Home via private auto with [facility] to follow and O2 from [facility] and nebulizer from [facility]    Plan Comments [facility] has delivered O2 tank to the room and per Bassam/[employer] they will deliver the concentrator to pt's home later today. Faxed all clinicals to [facility] and indicated on fax cover sheet that pt would like the nebulizer/supplies to his home later today. Staff RN updated. CCP to follow...........ANIVAL    Row Name 04/19/18 5617       Plan    Plan Comments Received fax notification that earlier fax did not go thru. Called to verify fax number and spoke with Leonel/Js. He provided fax number [phone]. Faxed clinicals to this number and will follow for fax confirmation............ANIVAL              Discharge Codes    No documentation.       Expected Discharge Date and Time     Expected Discharge Date Expected Discharge Time    Apr 19, 2018             Vickie Obando, RN

## 2018-04-19 NOTE — PROGRESS NOTES
Continued Stay Note  [facility]     Patient Name: Alessio Allen  MRN: [id number]  Today's Date: 4/19/2018    Admit Date: 4/5/2018          Discharge Plan     Row Name 04/19/18 0945       Plan    Plan Comments Received fax notification that earlier fax did not go thru. Called to verify fax number and spoke with Leonel/Js. He provided fax number [phone]. Faxed clinicals to this number and will follow for fax confirmation............ANIVAL    Row Name 04/19/18 1877       Plan    Plan Home via private auto with [facility] to follow and O2 from [employer]    Patient/Family in Agreement with Plan yes   spoke with pt and his partner Pat at bedside    Plan Comments Spoke with Dr. Hernandez who states when O2 is arranged he will DC pt today. Spoke with Edgardo/Js ([phone]) and they have a concentrator ready to be delivered to pt's home and will bring a tank to the hospital for transport to home. Faxed MD order and walking oximetry to [phone]. Updated staff RN. CCP to follow...............ANIVAL              Discharge Codes    No documentation.           Vickie Obando, RN

## 2018-04-19 NOTE — PROGRESS NOTES
Continued Stay Note  [facility]     Patient Name: Alessio Allen  MRN: [id number]  Today's Date: 4/19/2018    Admit Date: 4/5/2018          Discharge Plan     Row Name 04/19/18 0839       Plan    Plan Home via private auto with [facility] to follow and O2 from [employer]    Patient/Family in Agreement with Plan yes   spoke with pt and his partner Pat at bedside    Plan Comments Spoke with Dr. Hernandez who states when O2 is arranged he will DC pt today. Spoke with Edg[employer] ([phone]) and they have a concentrator ready to be delivered to pt's home and will bring a tank to the hospital for transport to home. Faxed MD order and walking oximetry to [phone]. Updated staff RN. CCP to follow...............JW              Discharge Codes    No documentation.           Vickie Obando, RN

## 2018-04-20 LAB
CENTROMERE B AB SER-ACNC: <0.2 AI (ref 0–0.9)
CHROMATIN AB SERPL-ACNC: <0.2 AI (ref 0–0.9)
DSDNA AB SER-ACNC: <1 IU/ML (ref 0–9)
ENA JO1 AB SER-ACNC: <0.2 AI (ref 0–0.9)
ENA RNP AB SER-ACNC: 0.5 AI (ref 0–0.9)
ENA SCL70 AB SER-ACNC: <0.2 AI (ref 0–0.9)
ENA SM AB SER-ACNC: <0.2 AI (ref 0–0.9)
ENA SS-A AB SER-ACNC: <0.2 AI (ref 0–0.9)
ENA SS-B AB SER-ACNC: <0.2 AI (ref 0–0.9)
Lab: NORMAL
RIBOSOMAL P AB SER-ACNC: <0.2 AI (ref 0–0.9)
RIBOSOMAL P AB SER-ACNC: <0.2 AI (ref 0–0.9)

## 2018-04-20 NOTE — PROGRESS NOTES
Case Management Discharge Note    Final Note: Home w/ [facility] and DME from [facility]'s    Destination     No service coordination in this encounter.      Durable Medical Equipment - Selection Complete     Service Request Status Selected Specialties Address Phone Number Fax Number    [facility] [address] [facility] [id number] [phone]        Vickie Obando, RN 4/19/2018 0846    Will deliver a tank to room today and concentrator to home later today               [facility] [place] [facility] [address] [address] [phone]        Vickie Obando, RN 4/19/2018 1029    Would like nebulizer from [facility]. Waiting for DC summary / med list to fax to [facility].                 Dialysis/Infusion     No service coordination in this encounter.      Home Medical Care - Selection Complete     Service Request Status Selected Specialties Address Phone Number Fax Number    [facility] Selected [facility] Services [address] [id number] [phone]        Hermila Merchant, RN 4/6/2018 1604    Zoie aware and following                 Social Care     No service coordination in this encounter.        Other: Other    Final Discharge Disposition Code: 06 - home with home health care

## 2018-04-21 LAB
C-ANCA TITR SER IF: NORMAL TITER
MYELOPEROXIDASE AB SER-ACNC: <9 U/ML (ref 0–9)
P-ANCA ATYPICAL TITR SER IF: NORMAL TITER
P-ANCA TITR SER IF: NORMAL TITER
PROTEINASE3 AB SER IA-ACNC: <3.5 U/ML (ref 0–3.5)

## 2018-04-23 LAB
FUNGUS WND CULT: ABNORMAL
FUNGUS WND CULT: ABNORMAL

## 2018-04-27 ENCOUNTER — TELEPHONE (OUTPATIENT)
Dept: FAMILY MEDICINE CLINIC | Facility: CLINIC | Age: 78
End: 2018-04-27

## 2018-05-03 ENCOUNTER — OFFICE VISIT (OUTPATIENT)
Dept: FAMILY MEDICINE CLINIC | Facility: CLINIC | Age: 78
End: 2018-05-03

## 2018-05-03 VITALS
BODY MASS INDEX: 30.8 KG/M2 | TEMPERATURE: 98.6 F | OXYGEN SATURATION: 90 % | WEIGHT: 240 LBS | SYSTOLIC BLOOD PRESSURE: 130 MMHG | DIASTOLIC BLOOD PRESSURE: 72 MMHG | HEART RATE: 82 BPM | HEIGHT: 74 IN

## 2018-05-03 DIAGNOSIS — J18.9 PNEUMONIA OF BOTH LUNGS DUE TO INFECTIOUS ORGANISM, UNSPECIFIED PART OF LUNG: ICD-10-CM

## 2018-05-03 DIAGNOSIS — J44.9 CHRONIC OBSTRUCTIVE PULMONARY DISEASE, UNSPECIFIED COPD TYPE (HCC): ICD-10-CM

## 2018-05-03 DIAGNOSIS — Z11.59 NEED FOR HEPATITIS C SCREENING TEST: Primary | ICD-10-CM

## 2018-05-03 DIAGNOSIS — Z09 HOSPITAL DISCHARGE FOLLOW-UP: ICD-10-CM

## 2018-05-03 LAB — HCV AB SER DONR QL: NORMAL

## 2018-05-03 PROCEDURE — 86803 HEPATITIS C AB TEST: CPT | Performed by: NURSE PRACTITIONER

## 2018-05-03 PROCEDURE — 36415 COLL VENOUS BLD VENIPUNCTURE: CPT | Performed by: NURSE PRACTITIONER

## 2018-05-03 PROCEDURE — 80048 BASIC METABOLIC PNL TOTAL CA: CPT | Performed by: NURSE PRACTITIONER

## 2018-05-03 PROCEDURE — 99213 OFFICE O/P EST LOW 20 MIN: CPT | Performed by: NURSE PRACTITIONER

## 2018-05-03 RX ORDER — FUROSEMIDE 20 MG/1
40 TABLET ORAL 2 TIMES DAILY
Qty: 120 TABLET | Refills: 3 | Status: SHIPPED | OUTPATIENT
Start: 2018-05-03 | End: 2021-02-26

## 2018-05-03 NOTE — PATIENT INSTRUCTIONS
Bmp and hep c screen today.   Cont f/u with pulm as scheduled.   Refilled lasix 40mg bid and incruse inhaler.   Increase fluid intake, get plenty of rest.   If any worsening symptoms call office or go to the ER.   Patient agrees with plan of care and understands instructions. Call if worsening symptoms or any problems or concerns.

## 2018-05-03 NOTE — PROGRESS NOTES
Leyda Allen is a 77 y.o. male.     History of Present Illness   Here today for hospital f/u, he was admitted to [facility] on 4/5/18 d/c home on 4/23/18, resp faily, effusion, pneumonia, COPD exacerbation, he had bronch, he sees tammie Tate, sees again in 6/18. His lasix dose was changed 40mg bid, he needs refill of this, also needs recheck of labs since dose changed. He is on 4L O2 at home and today room, he states he does have home health once a week, he states he is feeling much better. Uses symbicort and incruse inhalers, needs incruse refilled. States this is helping, has not used albuterol, was given duoneb nebulizer but has not used d/t sore throat. He states wheezing improved. He denies smoking, quit in 2000. Nodule on CT chest, has f/u in 2-3 months for follow up with pulm.   He is also due for hep C test.     The following portions of the patient's history were reviewed and updated as appropriate: allergies, current medications, past family history, past medical history, past social history, past surgical history and problem list.    Review of Systems   Constitutional: Negative for chills, diaphoresis and fever.   Respiratory: Negative for cough, chest tightness, shortness of breath and wheezing.    Cardiovascular: Negative for chest pain and leg swelling.   Musculoskeletal: Negative for arthralgias and myalgias.   Skin: Negative for pallor.   Neurological: Negative for dizziness, light-headedness and headache.   All other systems reviewed and are negative.      Objective   Physical Exam   Constitutional: He is oriented to person, place, and time. He appears well-developed and well-nourished.   HENT:   Head: Normocephalic.   Right Ear: Tympanic membrane and ear canal normal.   Left Ear: Tympanic membrane and ear canal normal.   Nose: Nose normal.   Mouth/Throat: Uvula is midline, oropharynx is clear and moist and mucous membranes are normal.   Eyes: Pupils are equal, round, and reactive to  light.   Neck: Normal range of motion.   Cardiovascular: Normal rate, regular rhythm, normal heart sounds and intact distal pulses.    Pulmonary/Chest: Effort normal and breath sounds normal. No respiratory distress. He has no decreased breath sounds. He has no wheezes. He has no rhonchi. He has no rales. He exhibits no tenderness.   Musculoskeletal: Normal range of motion.   Neurological: He is alert and oriented to person, place, and time.   Skin: Skin is warm and dry.   Psychiatric: He has a normal mood and affect. His behavior is normal.   Nursing note and vitals reviewed.        Assessment/Plan   Alessio was seen today for follow-up.    Diagnoses and all orders for this visit:    Need for hepatitis C screening test  -     Hepatitis C antibody    Pneumonia of both lungs due to infectious organism, unspecified part of lung  -     Basic Metabolic Panel    Chronic obstructive pulmonary disease, unspecified COPD type  -     Basic Metabolic Panel    Hospital discharge follow-up    Other orders  -     furosemide (LASIX) 20 MG tablet; Take 2 tablets by mouth 2 (Two) Times a Day.  -     Umeclidinium Bromide 62.5 MCG/INH aerosol powder ; Inhale 1 puff Daily.    Bmp and hep c screen today.   Cont f/u with pulm as scheduled.   Refilled lasix 40mg bid and incruse inhaler.   Increase fluid intake, get plenty of rest.   If any worsening symptoms call office or go to the ER.   Patient agrees with plan of care and understands instructions. Call if worsening symptoms or any problems or concerns.       Current outpatient and discharge medications have been reconciled for the patient.  DERRELL Rose

## 2018-05-04 LAB
ANION GAP SERPL CALCULATED.3IONS-SCNC: 15.1 MMOL/L
BUN BLD-MCNC: 11 MG/DL (ref 8–23)
BUN/CREAT SERPL: 13.1 (ref 7–25)
CALCIUM SPEC-SCNC: 9.8 MG/DL (ref 8.6–10.5)
CHLORIDE SERPL-SCNC: 96 MMOL/L (ref 98–107)
CO2 SERPL-SCNC: 28.9 MMOL/L (ref 22–29)
CREAT BLD-MCNC: 0.84 MG/DL (ref 0.76–1.27)
GFR SERPL CREATININE-BSD FRML MDRD: 89 ML/MIN/1.73
GLUCOSE BLD-MCNC: 86 MG/DL (ref 65–99)
POTASSIUM BLD-SCNC: 4.5 MMOL/L (ref 3.5–5.2)
SODIUM BLD-SCNC: 140 MMOL/L (ref 136–145)

## 2018-05-07 ENCOUNTER — TELEPHONE (OUTPATIENT)
Dept: FAMILY MEDICINE CLINIC | Facility: CLINIC | Age: 78
End: 2018-05-07

## 2018-05-07 NOTE — TELEPHONE ENCOUNTER
----- Message from DERRELL Rose sent at 5/7/2018  8:15 AM EDT -----  Please call patient with results. BS, liver func and electrolytes are normal.    Pt informed of lab results

## 2018-05-10 ENCOUNTER — TELEPHONE (OUTPATIENT)
Dept: FAMILY MEDICINE CLINIC | Facility: CLINIC | Age: 78
End: 2018-05-10

## 2018-05-10 NOTE — TELEPHONE ENCOUNTER
PT CALLING IN REGARDS TO A PRESCRIPTION, STATES HE TALKED TO LESA EARLIER THIS MORNING AND WOULD LIKE TO TALK TO HER AGAIN

## 2018-05-10 NOTE — TELEPHONE ENCOUNTER
Patient called he had requested 90 day supply on meds but when picked up at [facility] [place] [facility] they only had 30 day, he will call when he needs Rx filled the next time.

## 2018-05-16 ENCOUNTER — TELEPHONE (OUTPATIENT)
Dept: FAMILY MEDICINE CLINIC | Facility: CLINIC | Age: 78
End: 2018-05-16

## 2018-05-19 LAB
MYCOBACTERIUM SPEC CULT: NORMAL
NIGHT BLUE STAIN TISS: NORMAL

## 2018-05-31 ENCOUNTER — HOSPITAL ENCOUNTER (OUTPATIENT)
Dept: CT IMAGING | Facility: HOSPITAL | Age: 78
Discharge: HOME | End: 2018-05-31
Admitting: NURSE PRACTITIONER

## 2018-05-31 DIAGNOSIS — R59.0 HILAR ADENOPATHY: ICD-10-CM

## 2018-05-31 LAB — CREAT BLDA-MCNC: 0.9 MG/DL (ref 0.6–1.3)

## 2018-05-31 PROCEDURE — 25010000002 IOPAMIDOL 61 % SOLUTION: Performed by: NURSE PRACTITIONER

## 2018-05-31 PROCEDURE — 82565 ASSAY OF CREATININE: CPT

## 2018-05-31 PROCEDURE — 71260 CT THORAX DX C+: CPT

## 2018-05-31 RX ADMIN — IOPAMIDOL 75 ML: 612 INJECTION, SOLUTION INTRAVENOUS at 13:16

## 2018-06-01 ENCOUNTER — TELEPHONE (OUTPATIENT)
Dept: FAMILY MEDICINE CLINIC | Facility: CLINIC | Age: 78
End: 2018-06-01

## 2018-06-01 ENCOUNTER — OFFICE VISIT (OUTPATIENT)
Dept: FAMILY MEDICINE CLINIC | Facility: CLINIC | Age: 78
End: 2018-06-01

## 2018-06-01 VITALS
WEIGHT: 247 LBS | HEIGHT: 74 IN | SYSTOLIC BLOOD PRESSURE: 98 MMHG | BODY MASS INDEX: 31.7 KG/M2 | TEMPERATURE: 97.7 F | DIASTOLIC BLOOD PRESSURE: 62 MMHG | HEART RATE: 77 BPM | OXYGEN SATURATION: 92 % | RESPIRATION RATE: 16 BRPM

## 2018-06-01 DIAGNOSIS — R68.82 LOW LIBIDO: ICD-10-CM

## 2018-06-01 DIAGNOSIS — G62.9 NEUROPATHY: ICD-10-CM

## 2018-06-01 DIAGNOSIS — J02.9 ACUTE PHARYNGITIS, UNSPECIFIED ETIOLOGY: ICD-10-CM

## 2018-06-01 DIAGNOSIS — R59.0 CERVICAL LYMPHADENOPATHY: Primary | ICD-10-CM

## 2018-06-01 LAB
ALBUMIN SERPL-MCNC: 4 G/DL (ref 3.5–5.2)
ALBUMIN/GLOB SERPL: 1.1 G/DL
ALP SERPL-CCNC: 70 U/L (ref 39–117)
ALT SERPL W P-5'-P-CCNC: 13 U/L (ref 1–41)
ANION GAP SERPL CALCULATED.3IONS-SCNC: 12.1 MMOL/L
AST SERPL-CCNC: 20 U/L (ref 1–40)
BILIRUB SERPL-MCNC: 0.4 MG/DL (ref 0.1–1.2)
BUN BLD-MCNC: 10 MG/DL (ref 8–23)
BUN/CREAT SERPL: 11.5 (ref 7–25)
CALCIUM SPEC-SCNC: 9.5 MG/DL (ref 8.6–10.5)
CHLORIDE SERPL-SCNC: 100 MMOL/L (ref 98–107)
CO2 SERPL-SCNC: 28.9 MMOL/L (ref 22–29)
CREAT BLD-MCNC: 0.87 MG/DL (ref 0.76–1.27)
ERYTHROCYTE [DISTWIDTH] IN BLOOD BY AUTOMATED COUNT: 21.5 % (ref 4.5–15)
GFR SERPL CREATININE-BSD FRML MDRD: 85 ML/MIN/1.73
GLOBULIN UR ELPH-MCNC: 3.5 GM/DL
GLUCOSE BLD-MCNC: 103 MG/DL (ref 65–99)
HCT VFR BLD AUTO: 46.8 % (ref 35–60)
HGB BLD-MCNC: 15.5 G/DL (ref 13.5–18)
LYMPHOCYTES # BLD AUTO: 1.8 10*3/MM3 (ref 1.2–3.4)
LYMPHOCYTES NFR BLD AUTO: 22.5 % (ref 21–51)
MCH RBC QN AUTO: 26.2 PG (ref 26.1–33.1)
MCHC RBC AUTO-ENTMCNC: 33.2 G/DL (ref 33–37)
MCV RBC AUTO: 78.8 FL (ref 80–99)
MONOCYTES # BLD AUTO: 0.3 10*3/MM3 (ref 0.1–0.6)
MONOCYTES NFR BLD AUTO: 3.4 % (ref 2–9)
NEUTROPHILS # BLD AUTO: 5.9 10*3/MM3 (ref 1.4–6.5)
NEUTROPHILS NFR BLD AUTO: 74.1 % (ref 42–75)
PLATELET # BLD AUTO: 188 10*3/MM3 (ref 150–450)
PMV BLD AUTO: 7.4 FL (ref 7.1–10.5)
POTASSIUM BLD-SCNC: 4 MMOL/L (ref 3.5–5.2)
PROT SERPL-MCNC: 7.5 G/DL (ref 6–8.5)
RBC # BLD AUTO: 5.93 10*6/MM3 (ref 4–6)
SODIUM BLD-SCNC: 141 MMOL/L (ref 136–145)
VIT B12 BLD-MCNC: 535 PG/ML (ref 211–946)
WBC NRBC COR # BLD: 8 10*3/MM3 (ref 4.5–10)

## 2018-06-01 PROCEDURE — 36415 COLL VENOUS BLD VENIPUNCTURE: CPT | Performed by: NURSE PRACTITIONER

## 2018-06-01 PROCEDURE — 80053 COMPREHEN METABOLIC PANEL: CPT | Performed by: NURSE PRACTITIONER

## 2018-06-01 PROCEDURE — 85025 COMPLETE CBC W/AUTO DIFF WBC: CPT | Performed by: NURSE PRACTITIONER

## 2018-06-01 PROCEDURE — 99213 OFFICE O/P EST LOW 20 MIN: CPT | Performed by: NURSE PRACTITIONER

## 2018-06-01 PROCEDURE — 82607 VITAMIN B-12: CPT | Performed by: NURSE PRACTITIONER

## 2018-06-01 RX ORDER — AZITHROMYCIN 250 MG/1
TABLET, FILM COATED ORAL
Qty: 6 TABLET | Refills: 0 | Status: SHIPPED | OUTPATIENT
Start: 2018-06-01 | End: 2019-03-20 | Stop reason: HOSPADM

## 2018-06-01 NOTE — TELEPHONE ENCOUNTER
----- Message from DERRELL Rose sent at 6/1/2018  3:20 PM EDT -----  Please call patient with results. Blood count is normal.    Pt informed of lab results

## 2018-06-01 NOTE — PATIENT INSTRUCTIONS
Start zpack, take as directed   US neck will call with appt.   Cbc,cmp,vit b12, testosterone ordered will call with results.   Cont home O2 and f/u with pulm,   Call office if symptoms persist.   Increase fluid intake, get plenty of rest.   Patient agrees with plan of care and understands instructions. Call if worsening symptoms or any problems or concerns.

## 2018-06-01 NOTE — PROGRESS NOTES
Leyda Allen is a 77 y.o. male.     History of Present Illness   C/o sore throat, swollen gland on right neck. He states he noticed this about 1 week ago. He denies fever. Some pain with swallowing at time. He does have some slight cough d/t drainage. He denies ear pain. He did try cough drops at home, he denies aches, he denies wheezing, SOA, he does wear home O2 4L, sees Dr. Hernandez, sees again on 6/7/18.   he would also like his testosterone checked. He has low libido, ED, he states he would also like b12 checked d/t his neuropathy.     The following portions of the patient's history were reviewed and updated as appropriate: allergies, current medications, past family history, past medical history, past social history, past surgical history and problem list.    Review of Systems   Constitutional: Negative for chills, diaphoresis and fever.   HENT: Positive for sore throat. Negative for congestion, ear pain, rhinorrhea and sinus pressure.    Respiratory: Positive for cough. Negative for shortness of breath and wheezing.    Cardiovascular: Negative for chest pain.   Musculoskeletal: Negative for arthralgias and myalgias.   Skin: Negative for pallor.   Allergic/Immunologic: Positive for environmental allergies.   Neurological: Negative for dizziness, light-headedness and headache.   Hematological: Positive for adenopathy.   All other systems reviewed and are negative.      Objective   Physical Exam   Constitutional: He is oriented to person, place, and time. He appears well-developed and well-nourished.   HENT:   Head: Normocephalic.   Right Ear: Tympanic membrane and ear canal normal.   Left Ear: Tympanic membrane is erythematous.   Nose: Nose normal.   Mouth/Throat: Uvula is midline and mucous membranes are normal. Posterior oropharyngeal erythema present.   Eyes: Pupils are equal, round, and reactive to light.   Neck: Normal range of motion.   Cardiovascular: Normal rate, regular rhythm and normal  heart sounds.    Pulmonary/Chest: Effort normal and breath sounds normal. No respiratory distress. He has no decreased breath sounds. He has no wheezes. He has no rhonchi. He has no rales.   Musculoskeletal: Normal range of motion.   Lymphadenopathy:     He has cervical adenopathy.   Neurological: He is alert and oriented to person, place, and time.   Skin: Skin is warm and dry.   Psychiatric: He has a normal mood and affect. His behavior is normal.   Nursing note and vitals reviewed.        Assessment/Plan   Alessio was seen today for swollen glands.    Diagnoses and all orders for this visit:    Cervical lymphadenopathy  -     US Head Neck Soft Tissue; Future    Neuropathy  -     CBC & Differential  -     Comprehensive Metabolic Panel  -     Vitamin B12  -     CBC Auto Differential    Low libido  -     Testosterone, Free, Total    Acute pharyngitis, unspecified etiology  -     CBC & Differential  -     Comprehensive Metabolic Panel  -     CBC Auto Differential    Other orders  -     azithromycin (ZITHROMAX) 250 MG tablet; Take 2 tablets the first day, then 1 tablet daily for 4 days.      Start zpack, take as directed   US neck will call with appt.   Cbc,cmp,vit b12, testosterone ordered will call with results.   Cont home O2 and f/u with pulm,   Call office if symptoms persist.   Increase fluid intake, get plenty of rest.   Patient agrees with plan of care and understands instructions. Call if worsening symptoms or any problems or concerns.

## 2018-06-03 LAB
TESTOST FREE SERPL-MCNC: 2.4 PG/ML (ref 6.6–18.1)
TESTOST SERPL-MCNC: 267 NG/DL (ref 264–916)

## 2018-06-04 ENCOUNTER — TELEPHONE (OUTPATIENT)
Dept: FAMILY MEDICINE CLINIC | Facility: CLINIC | Age: 78
End: 2018-06-04

## 2018-06-04 DIAGNOSIS — R79.89 LOW TESTOSTERONE IN MALE: Primary | ICD-10-CM

## 2018-06-04 NOTE — TELEPHONE ENCOUNTER
----- Message from DERRELL Rose sent at 6/4/2018  8:20 AM EDT -----  Please call patient with results. Testosterone is low, will refer to urology for further eval, B12 is normal. BS, kidney and liver func is normal.    Pt's wife informed of lab results

## 2018-06-08 ENCOUNTER — HOSPITAL ENCOUNTER (OUTPATIENT)
Dept: ULTRASOUND IMAGING | Facility: HOSPITAL | Age: 78
Discharge: HOME | End: 2018-06-08
Admitting: NURSE PRACTITIONER

## 2018-06-08 DIAGNOSIS — R59.0 CERVICAL LYMPHADENOPATHY: ICD-10-CM

## 2018-06-08 PROCEDURE — 76536 US EXAM OF HEAD AND NECK: CPT

## 2018-06-11 DIAGNOSIS — R59.0 CERVICAL LYMPHADENOPATHY: Primary | ICD-10-CM

## 2018-06-12 ENCOUNTER — TELEPHONE (OUTPATIENT)
Dept: FAMILY MEDICINE CLINIC | Facility: CLINIC | Age: 78
End: 2018-06-12

## 2018-06-12 NOTE — TELEPHONE ENCOUNTER
----- Message from DERRELL Rose sent at 6/11/2018  4:09 PM EDT -----  Please call patient with results.US shows cervical lymph nodes enlarged, will refer to ENT    Pt informed of US results

## 2018-06-15 ENCOUNTER — TELEPHONE (OUTPATIENT)
Dept: FAMILY MEDICINE CLINIC | Facility: CLINIC | Age: 78
End: 2018-06-15

## 2018-06-15 NOTE — TELEPHONE ENCOUNTER
PATIENT WANTS TO BE DISCHARGED FROM O.T. A WEEK EARLY BEFORE HE STARTS OUT PATIENT  PULMONARY. NEXT WEEK WITH BE PATIENTS LAST WEEK WITH HOME HEALTH

## 2018-06-17 RX ORDER — SILDENAFIL 100 MG/1
100 TABLET, FILM COATED ORAL DAILY PRN
Qty: 6 TABLET | Refills: 3 | Status: SHIPPED | OUTPATIENT
Start: 2018-06-17 | End: 2019-06-21 | Stop reason: HOSPADM

## 2018-06-18 RX ORDER — FLUOCINOLONE ACETONIDE 0.1 MG/ML
SOLUTION TOPICAL AS NEEDED
Qty: 60 ML | Refills: 1 | Status: SHIPPED | OUTPATIENT
Start: 2018-06-18 | End: 2018-07-16 | Stop reason: SDUPTHER

## 2018-06-18 RX ORDER — FLUOCINOLONE ACETONIDE 0.1 MG/ML
SOLUTION TOPICAL AS NEEDED
Qty: 60 ML | Refills: 1 | Status: SHIPPED | OUTPATIENT
Start: 2018-06-18 | End: 2018-06-18 | Stop reason: SDUPTHER

## 2018-06-20 ENCOUNTER — TELEPHONE (OUTPATIENT)
Dept: FAMILY MEDICINE CLINIC | Facility: CLINIC | Age: 78
End: 2018-06-20

## 2018-06-26 ENCOUNTER — TRANSCRIBE ORDERS (OUTPATIENT)
Dept: CARDIAC REHAB | Facility: HOSPITAL | Age: 78
End: 2018-06-26

## 2018-06-26 DIAGNOSIS — J44.9 COPD, MODERATE (HCC): Primary | ICD-10-CM

## 2018-06-29 ENCOUNTER — RX ONLY (OUTPATIENT)
Age: 78
Setting detail: RX ONLY
End: 2018-06-29

## 2018-07-02 ENCOUNTER — TELEPHONE (OUTPATIENT)
Dept: FAMILY MEDICINE CLINIC | Facility: CLINIC | Age: 78
End: 2018-07-02

## 2018-07-03 ENCOUNTER — TREATMENT (OUTPATIENT)
Dept: CARDIAC REHAB | Facility: HOSPITAL | Age: 78
End: 2018-07-03

## 2018-07-03 DIAGNOSIS — J44.9 COPD, SEVERE (HCC): Primary | ICD-10-CM

## 2018-07-03 PROCEDURE — G0424 PULMONARY REHAB W EXER: HCPCS

## 2018-07-05 ENCOUNTER — TREATMENT (OUTPATIENT)
Dept: CARDIAC REHAB | Facility: HOSPITAL | Age: 78
End: 2018-07-05

## 2018-07-05 DIAGNOSIS — J44.9 COPD, SEVERE (HCC): Primary | ICD-10-CM

## 2018-07-05 PROCEDURE — G0424 PULMONARY REHAB W EXER: HCPCS

## 2018-07-07 ENCOUNTER — TREATMENT (OUTPATIENT)
Dept: CARDIAC REHAB | Facility: HOSPITAL | Age: 78
End: 2018-07-07

## 2018-07-07 DIAGNOSIS — J44.9 COPD, SEVERE (HCC): Primary | ICD-10-CM

## 2018-07-07 PROCEDURE — G0424 PULMONARY REHAB W EXER: HCPCS

## 2018-07-10 ENCOUNTER — TREATMENT (OUTPATIENT)
Dept: CARDIAC REHAB | Facility: HOSPITAL | Age: 78
End: 2018-07-10

## 2018-07-10 DIAGNOSIS — J44.9 COPD, SEVERE (HCC): Primary | ICD-10-CM

## 2018-07-10 PROCEDURE — G0424 PULMONARY REHAB W EXER: HCPCS

## 2018-07-12 ENCOUNTER — TREATMENT (OUTPATIENT)
Dept: CARDIAC REHAB | Facility: HOSPITAL | Age: 78
End: 2018-07-12

## 2018-07-12 DIAGNOSIS — J44.9 COPD, SEVERE (HCC): Primary | ICD-10-CM

## 2018-07-12 PROCEDURE — G0424 PULMONARY REHAB W EXER: HCPCS

## 2018-07-14 ENCOUNTER — TREATMENT (OUTPATIENT)
Dept: CARDIAC REHAB | Facility: HOSPITAL | Age: 78
End: 2018-07-14

## 2018-07-14 DIAGNOSIS — J44.9 COPD, SEVERE (HCC): Primary | ICD-10-CM

## 2018-07-14 PROCEDURE — G0424 PULMONARY REHAB W EXER: HCPCS

## 2018-07-16 ENCOUNTER — TELEPHONE (OUTPATIENT)
Dept: FAMILY MEDICINE CLINIC | Facility: CLINIC | Age: 78
End: 2018-07-16

## 2018-07-16 RX ORDER — FLUOCINOLONE ACETONIDE 0.1 MG/ML
SOLUTION TOPICAL AS NEEDED
Qty: 60 ML | Refills: 1 | Status: SHIPPED | OUTPATIENT
Start: 2018-07-16 | End: 2022-09-23

## 2018-07-16 NOTE — TELEPHONE ENCOUNTER
CAN fluocinolone (SYNALAR) 0.01 % external solution BE RE SENT TO PHARMACY. PHARMACY NEVER RECEIVED RX

## 2018-07-17 ENCOUNTER — TREATMENT (OUTPATIENT)
Dept: CARDIAC REHAB | Facility: HOSPITAL | Age: 78
End: 2018-07-17

## 2018-07-17 DIAGNOSIS — J44.9 COPD, SEVERE (HCC): Primary | ICD-10-CM

## 2018-07-17 PROCEDURE — G0424 PULMONARY REHAB W EXER: HCPCS

## 2018-07-17 NOTE — PROGRESS NOTES
CARDIAC/PULMONARY REHAB NUTRITION EDUCATION/ASSESSMENT      78 y.o.         Height: 74 in    Weight: 245 lb     BMI: 31.5 IBW: 190 lb +/- 10%                                   Time seen: 3 PM   Diet Survey Score: 59   Weight Assessment: Obese  Weight Change:  weight loss of 15 lbs over the past 6 month(s)    Intentional?  No  Usual Weight: 260 lb  Desired Weight: 245 lb     Current Diet: Regular  Appetite: good   Factors limiting PO intake: N/A  Taste/smell changes:  No Food records reviewed? Yes     Occupation: Retired  Job Activity Level: N/A    Routine Exercise: mild   Who does the patient live with: significant other  Who does the cooking at home: Significant other  Spouse/significant other present for diet instruction today? No  Patient actively receiving lifestyle support from others at home? Yes       Pertinent Lab Values:   Total: No components found for: CHOLESTEROL  HDL:   HDL Cholesterol   Date Value Ref Range Status   01/03/2018 47 40 - 60 mg/dL Final     LDL:  LDL Cholesterol    Date Value Ref Range Status   01/03/2018 67 0 - 100 mg/dL Final     Triglyceride: No components found for: TRIGLYCERIDE  Last HGBA1C with date if applicable:No components found for: A1C  Glucose:   Glucose   Date Value Ref Range Status   06/01/2018 103 (H) 65 - 99 mg/dL Final    Nutritional Supplements: Multivitamin, calcium, garlic, fish oil             Stated Problem Areas / Concerns: Lawrence states that he fell on ice in January 2018 and broke his ankle. He has been admitted to the hospital 5 times in 2018. He states that he has lost 15 pounds since January due to illness. He would like to maintain his weight loss and hopefully lose a few pounds more. He states that he has a history of hypertension.      Assessment / Recommendations: Offered encouragement for Lawrence's recent weight loss. Discussed weight maintenance tips. Obtained his usual meal pattern and food preferences. Encouraged nutrient-dense, fiber-rich food choices.  Discussed the COPD Foundation's nutrition tips, DASH diet guidelines to help manage hypertension, and food labels.   Motivation level toward diet compliance: strong         Instructed on: COPD Foundation's nutrition tips, DASH diet, food labels  Written materials given: COPD Foundation's nutrition tips, DASH diet, food labels       Goals: Follow DASH diet guidelines to help manage hypertension, COPD and weight              2:59 PM  7/17/2018  Joana Gonzalez RD

## 2018-07-19 ENCOUNTER — TREATMENT (OUTPATIENT)
Dept: CARDIAC REHAB | Facility: HOSPITAL | Age: 78
End: 2018-07-19

## 2018-07-19 DIAGNOSIS — J44.9 COPD, SEVERE (HCC): Primary | ICD-10-CM

## 2018-07-19 PROCEDURE — G0424 PULMONARY REHAB W EXER: HCPCS

## 2018-07-21 ENCOUNTER — TREATMENT (OUTPATIENT)
Dept: CARDIAC REHAB | Facility: HOSPITAL | Age: 78
End: 2018-07-21

## 2018-07-21 DIAGNOSIS — J44.9 COPD, SEVERE (HCC): Primary | ICD-10-CM

## 2018-07-21 PROCEDURE — G0424 PULMONARY REHAB W EXER: HCPCS

## 2018-07-24 ENCOUNTER — TREATMENT (OUTPATIENT)
Dept: CARDIAC REHAB | Facility: HOSPITAL | Age: 78
End: 2018-07-24

## 2018-07-24 DIAGNOSIS — J44.9 COPD, SEVERE (HCC): Primary | ICD-10-CM

## 2018-07-24 PROCEDURE — G0424 PULMONARY REHAB W EXER: HCPCS

## 2018-07-26 ENCOUNTER — TREATMENT (OUTPATIENT)
Dept: CARDIAC REHAB | Facility: HOSPITAL | Age: 78
End: 2018-07-26

## 2018-07-26 DIAGNOSIS — J44.9 COPD, SEVERE (HCC): Primary | ICD-10-CM

## 2018-07-26 PROCEDURE — G0424 PULMONARY REHAB W EXER: HCPCS

## 2018-07-27 ENCOUNTER — TELEPHONE (OUTPATIENT)
Dept: FAMILY MEDICINE CLINIC | Facility: CLINIC | Age: 78
End: 2018-07-27

## 2018-07-27 RX ORDER — TAMSULOSIN HYDROCHLORIDE 0.4 MG/1
1 CAPSULE ORAL NIGHTLY
Qty: 90 CAPSULE | Refills: 3 | Status: SHIPPED | OUTPATIENT
Start: 2018-07-27 | End: 2019-06-07

## 2018-07-27 NOTE — TELEPHONE ENCOUNTER
Needs the blue pill flomax filled he is out not any others only the blue pill,... Wants a call please, so he can explain?

## 2018-07-28 ENCOUNTER — TREATMENT (OUTPATIENT)
Dept: CARDIAC REHAB | Facility: HOSPITAL | Age: 78
End: 2018-07-28

## 2018-07-28 DIAGNOSIS — J44.9 COPD, SEVERE (HCC): Primary | ICD-10-CM

## 2018-07-28 PROCEDURE — G0424 PULMONARY REHAB W EXER: HCPCS

## 2018-08-04 ENCOUNTER — TREATMENT (OUTPATIENT)
Dept: CARDIAC REHAB | Facility: HOSPITAL | Age: 78
End: 2018-08-04

## 2018-08-04 DIAGNOSIS — J44.9 COPD, SEVERE (HCC): Primary | ICD-10-CM

## 2018-08-04 PROCEDURE — G0424 PULMONARY REHAB W EXER: HCPCS

## 2018-08-07 ENCOUNTER — TREATMENT (OUTPATIENT)
Dept: CARDIAC REHAB | Facility: HOSPITAL | Age: 78
End: 2018-08-07

## 2018-08-07 DIAGNOSIS — J44.9 COPD, SEVERE (HCC): Primary | ICD-10-CM

## 2018-08-07 PROCEDURE — G0424 PULMONARY REHAB W EXER: HCPCS

## 2018-08-09 ENCOUNTER — TREATMENT (OUTPATIENT)
Dept: CARDIAC REHAB | Facility: HOSPITAL | Age: 78
End: 2018-08-09

## 2018-08-09 DIAGNOSIS — J44.9 COPD, SEVERE (HCC): Primary | ICD-10-CM

## 2018-08-09 PROCEDURE — G0424 PULMONARY REHAB W EXER: HCPCS

## 2018-08-10 ENCOUNTER — DOCUMENTATION (OUTPATIENT)
Dept: FAMILY MEDICINE CLINIC | Facility: CLINIC | Age: 78
End: 2018-08-10

## 2018-08-11 ENCOUNTER — TREATMENT (OUTPATIENT)
Dept: CARDIAC REHAB | Facility: HOSPITAL | Age: 78
End: 2018-08-11

## 2018-08-11 DIAGNOSIS — J44.9 COPD, SEVERE (HCC): Primary | ICD-10-CM

## 2018-08-11 PROCEDURE — G0424 PULMONARY REHAB W EXER: HCPCS

## 2018-08-14 ENCOUNTER — TREATMENT (OUTPATIENT)
Dept: CARDIAC REHAB | Facility: HOSPITAL | Age: 78
End: 2018-08-14

## 2018-08-14 DIAGNOSIS — J44.9 COPD, SEVERE (HCC): Primary | ICD-10-CM

## 2018-08-14 PROCEDURE — G0424 PULMONARY REHAB W EXER: HCPCS

## 2018-08-16 ENCOUNTER — TREATMENT (OUTPATIENT)
Dept: CARDIAC REHAB | Facility: HOSPITAL | Age: 78
End: 2018-08-16

## 2018-08-16 DIAGNOSIS — J44.9 COPD, SEVERE (HCC): Primary | ICD-10-CM

## 2018-08-16 PROCEDURE — G0424 PULMONARY REHAB W EXER: HCPCS

## 2018-08-18 ENCOUNTER — TREATMENT (OUTPATIENT)
Dept: CARDIAC REHAB | Facility: HOSPITAL | Age: 78
End: 2018-08-18

## 2018-08-18 DIAGNOSIS — J44.9 COPD, SEVERE (HCC): Primary | ICD-10-CM

## 2018-08-18 PROCEDURE — G0424 PULMONARY REHAB W EXER: HCPCS

## 2018-08-21 ENCOUNTER — TREATMENT (OUTPATIENT)
Dept: CARDIAC REHAB | Facility: HOSPITAL | Age: 78
End: 2018-08-21

## 2018-08-21 DIAGNOSIS — J44.9 COPD, SEVERE (HCC): Primary | ICD-10-CM

## 2018-08-21 PROCEDURE — G0424 PULMONARY REHAB W EXER: HCPCS

## 2018-08-23 ENCOUNTER — TREATMENT (OUTPATIENT)
Dept: CARDIAC REHAB | Facility: HOSPITAL | Age: 78
End: 2018-08-23

## 2018-08-23 DIAGNOSIS — J44.9 COPD, SEVERE (HCC): Primary | ICD-10-CM

## 2018-08-23 PROCEDURE — G0424 PULMONARY REHAB W EXER: HCPCS

## 2018-08-25 ENCOUNTER — TREATMENT (OUTPATIENT)
Dept: CARDIAC REHAB | Facility: HOSPITAL | Age: 78
End: 2018-08-25

## 2018-08-25 DIAGNOSIS — J44.9 COPD, SEVERE (HCC): Primary | ICD-10-CM

## 2018-08-25 PROCEDURE — G0424 PULMONARY REHAB W EXER: HCPCS

## 2018-08-28 ENCOUNTER — TRANSCRIBE ORDERS (OUTPATIENT)
Dept: ADMINISTRATIVE | Facility: HOSPITAL | Age: 78
End: 2018-08-28

## 2018-08-28 ENCOUNTER — TREATMENT (OUTPATIENT)
Dept: CARDIAC REHAB | Facility: HOSPITAL | Age: 78
End: 2018-08-28

## 2018-08-28 DIAGNOSIS — J44.9 COPD, SEVERE (HCC): Primary | ICD-10-CM

## 2018-08-28 DIAGNOSIS — C32.1: Primary | ICD-10-CM

## 2018-08-28 PROCEDURE — G0424 PULMONARY REHAB W EXER: HCPCS

## 2018-08-30 ENCOUNTER — TREATMENT (OUTPATIENT)
Dept: CARDIAC REHAB | Facility: HOSPITAL | Age: 78
End: 2018-08-30

## 2018-08-30 DIAGNOSIS — J44.9 COPD, SEVERE (HCC): Primary | ICD-10-CM

## 2018-08-30 PROCEDURE — G0424 PULMONARY REHAB W EXER: HCPCS

## 2018-08-31 ENCOUNTER — HOSPITAL ENCOUNTER (OUTPATIENT)
Dept: PET IMAGING | Facility: HOSPITAL | Age: 78
Discharge: HOME | End: 2018-08-31

## 2018-08-31 ENCOUNTER — HOSPITAL ENCOUNTER (OUTPATIENT)
Dept: PET IMAGING | Facility: HOSPITAL | Age: 78
Discharge: HOME | End: 2018-08-31
Admitting: RADIOLOGY

## 2018-08-31 DIAGNOSIS — C32.1: ICD-10-CM

## 2018-08-31 LAB — GLUCOSE BLDC GLUCOMTR-MCNC: 102 MG/DL (ref 70–130)

## 2018-08-31 PROCEDURE — 82962 GLUCOSE BLOOD TEST: CPT

## 2018-08-31 PROCEDURE — A9552 F18 FDG: HCPCS | Performed by: RADIOLOGY

## 2018-08-31 PROCEDURE — 0 FLUDEOXYGLUCOSE F18 SOLUTION: Performed by: RADIOLOGY

## 2018-08-31 PROCEDURE — 78815 PET IMAGE W/CT SKULL-THIGH: CPT

## 2018-08-31 RX ADMIN — FLUDEOXYGLUCOSE F18 1 DOSE: 300 INJECTION INTRAVENOUS at 07:57

## 2018-09-01 ENCOUNTER — TREATMENT (OUTPATIENT)
Dept: CARDIAC REHAB | Facility: HOSPITAL | Age: 78
End: 2018-09-01

## 2018-09-01 DIAGNOSIS — J44.9 COPD, SEVERE (HCC): Primary | ICD-10-CM

## 2018-09-01 PROCEDURE — G0424 PULMONARY REHAB W EXER: HCPCS

## 2018-09-04 ENCOUNTER — TREATMENT (OUTPATIENT)
Dept: CARDIAC REHAB | Facility: HOSPITAL | Age: 78
End: 2018-09-04

## 2018-09-04 DIAGNOSIS — J44.9 COPD, SEVERE (HCC): Primary | ICD-10-CM

## 2018-09-04 PROCEDURE — G0424 PULMONARY REHAB W EXER: HCPCS

## 2018-09-06 ENCOUNTER — TREATMENT (OUTPATIENT)
Dept: CARDIAC REHAB | Facility: HOSPITAL | Age: 78
End: 2018-09-06

## 2018-09-06 DIAGNOSIS — J44.9 COPD, SEVERE (HCC): Primary | ICD-10-CM

## 2018-09-06 PROCEDURE — G0424 PULMONARY REHAB W EXER: HCPCS

## 2018-09-08 ENCOUNTER — TREATMENT (OUTPATIENT)
Dept: CARDIAC REHAB | Facility: HOSPITAL | Age: 78
End: 2018-09-08

## 2018-09-08 DIAGNOSIS — J44.9 COPD, SEVERE (HCC): Primary | ICD-10-CM

## 2018-09-08 PROCEDURE — G0424 PULMONARY REHAB W EXER: HCPCS

## 2018-09-11 ENCOUNTER — TREATMENT (OUTPATIENT)
Dept: CARDIAC REHAB | Facility: HOSPITAL | Age: 78
End: 2018-09-11

## 2018-09-11 DIAGNOSIS — J44.9 COPD, SEVERE (HCC): Primary | ICD-10-CM

## 2018-09-11 PROCEDURE — G0424 PULMONARY REHAB W EXER: HCPCS

## 2018-09-13 ENCOUNTER — TREATMENT (OUTPATIENT)
Dept: CARDIAC REHAB | Facility: HOSPITAL | Age: 78
End: 2018-09-13

## 2018-09-13 DIAGNOSIS — J44.9 COPD, SEVERE (HCC): Primary | ICD-10-CM

## 2018-09-13 PROCEDURE — G0424 PULMONARY REHAB W EXER: HCPCS

## 2018-09-15 ENCOUNTER — TREATMENT (OUTPATIENT)
Dept: CARDIAC REHAB | Facility: HOSPITAL | Age: 78
End: 2018-09-15

## 2018-09-15 DIAGNOSIS — J44.9 COPD, SEVERE (HCC): Primary | ICD-10-CM

## 2018-09-15 PROCEDURE — G0424 PULMONARY REHAB W EXER: HCPCS

## 2018-09-19 ENCOUNTER — TELEPHONE (OUTPATIENT)
Dept: FAMILY MEDICINE CLINIC | Facility: CLINIC | Age: 78
End: 2018-09-19

## 2018-09-19 NOTE — TELEPHONE ENCOUNTER
Patient states he missed a few doses of his Lasix 40 bid and now has swelling in ankles, states oxygen level normal no soa/ per Dr Santana take an additional Lasix 20mg daily for a couple days check potassium with labs for chemo next week and call in a few days with progress report.

## 2018-09-21 ENCOUNTER — TELEPHONE (OUTPATIENT)
Dept: FAMILY MEDICINE CLINIC | Facility: CLINIC | Age: 78
End: 2018-09-21

## 2018-09-21 NOTE — TELEPHONE ENCOUNTER
Patient states he has not been drinking much water, he has no symptoms, he will increase water go to er if sx develop

## 2018-09-27 ENCOUNTER — TREATMENT (OUTPATIENT)
Dept: CARDIAC REHAB | Facility: HOSPITAL | Age: 78
End: 2018-09-27

## 2018-09-27 DIAGNOSIS — J44.9 COPD, SEVERE (HCC): Primary | ICD-10-CM

## 2018-09-27 PROCEDURE — G0424 PULMONARY REHAB W EXER: HCPCS

## 2018-09-28 ENCOUNTER — HOSPITAL ENCOUNTER (EMERGENCY)
Facility: HOSPITAL | Age: 78
Discharge: HOME | End: 2018-09-28
Attending: FAMILY MEDICINE | Admitting: FAMILY MEDICINE

## 2018-09-28 ENCOUNTER — APPOINTMENT (OUTPATIENT)
Dept: GENERAL RADIOLOGY | Facility: HOSPITAL | Age: 78
End: 2018-09-28

## 2018-09-28 VITALS
HEIGHT: 74 IN | TEMPERATURE: 98.2 F | WEIGHT: 261 LBS | DIASTOLIC BLOOD PRESSURE: 70 MMHG | RESPIRATION RATE: 17 BRPM | SYSTOLIC BLOOD PRESSURE: 125 MMHG | HEART RATE: 77 BPM | OXYGEN SATURATION: 95 % | BODY MASS INDEX: 33.5 KG/M2

## 2018-09-28 DIAGNOSIS — I50.9 ACUTE ON CHRONIC CONGESTIVE HEART FAILURE, UNSPECIFIED CONGESTIVE HEART FAILURE TYPE: Primary | ICD-10-CM

## 2018-09-28 LAB
ALBUMIN SERPL-MCNC: 4 G/DL (ref 3.5–5.2)
ALBUMIN/GLOB SERPL: 1.2 G/DL
ALP SERPL-CCNC: 66 U/L (ref 39–117)
ALT SERPL W P-5'-P-CCNC: 23 U/L (ref 1–41)
ANION GAP SERPL CALCULATED.3IONS-SCNC: 7.3 MMOL/L
AST SERPL-CCNC: 20 U/L (ref 1–40)
BASOPHILS # BLD AUTO: 0 10*3/MM3 (ref 0–0.2)
BASOPHILS NFR BLD AUTO: 0 % (ref 0–1.5)
BILIRUB SERPL-MCNC: 0.4 MG/DL (ref 0.1–1.2)
BUN BLD-MCNC: 25 MG/DL (ref 8–23)
BUN/CREAT SERPL: 22.5 (ref 7–25)
CALCIUM SPEC-SCNC: 9.3 MG/DL (ref 8.6–10.5)
CHLORIDE SERPL-SCNC: 100 MMOL/L (ref 98–107)
CO2 SERPL-SCNC: 24.7 MMOL/L (ref 22–29)
CREAT BLD-MCNC: 1.11 MG/DL (ref 0.76–1.27)
DEPRECATED RDW RBC AUTO: 44.9 FL (ref 37–54)
EOSINOPHIL # BLD AUTO: 0 10*3/MM3 (ref 0–0.7)
EOSINOPHIL NFR BLD AUTO: 0 % (ref 0.3–6.2)
ERYTHROCYTE [DISTWIDTH] IN BLOOD BY AUTOMATED COUNT: 13.6 % (ref 11.5–14.5)
GFR SERPL CREATININE-BSD FRML MDRD: 64 ML/MIN/1.73
GLOBULIN UR ELPH-MCNC: 3.4 GM/DL
GLUCOSE BLD-MCNC: 142 MG/DL (ref 65–99)
HCT VFR BLD AUTO: 48 % (ref 40.4–52.2)
HGB BLD-MCNC: 16 G/DL (ref 13.7–17.6)
HOLD SPECIMEN: NORMAL
HOLD SPECIMEN: NORMAL
IMM GRANULOCYTES # BLD: 0.04 10*3/MM3 (ref 0–0.03)
IMM GRANULOCYTES NFR BLD: 0.3 % (ref 0–0.5)
LYMPHOCYTES # BLD AUTO: 0.41 10*3/MM3 (ref 0.9–4.8)
LYMPHOCYTES NFR BLD AUTO: 3.1 % (ref 19.6–45.3)
MCH RBC QN AUTO: 30.4 PG (ref 27–32.7)
MCHC RBC AUTO-ENTMCNC: 33.3 G/DL (ref 32.6–36.4)
MCV RBC AUTO: 91.3 FL (ref 79.8–96.2)
MONOCYTES # BLD AUTO: 0.77 10*3/MM3 (ref 0.2–1.2)
MONOCYTES NFR BLD AUTO: 5.9 % (ref 5–12)
NEUTROPHILS # BLD AUTO: 11.94 10*3/MM3 (ref 1.9–8.1)
NEUTROPHILS NFR BLD AUTO: 91 % (ref 42.7–76)
NT-PROBNP SERPL-MCNC: 2463 PG/ML (ref 0–1800)
PLATELET # BLD AUTO: 224 10*3/MM3 (ref 140–500)
PMV BLD AUTO: 9.3 FL (ref 6–12)
POTASSIUM BLD-SCNC: 4.7 MMOL/L (ref 3.5–5.2)
PROT SERPL-MCNC: 7.4 G/DL (ref 6–8.5)
RBC # BLD AUTO: 5.26 10*6/MM3 (ref 4.6–6)
SODIUM BLD-SCNC: 132 MMOL/L (ref 136–145)
TROPONIN T SERPL-MCNC: <0.01 NG/ML (ref 0–0.03)
WBC NRBC COR # BLD: 13.12 10*3/MM3 (ref 4.5–10.7)
WHOLE BLOOD HOLD SPECIMEN: NORMAL
WHOLE BLOOD HOLD SPECIMEN: NORMAL

## 2018-09-28 PROCEDURE — 93005 ELECTROCARDIOGRAM TRACING: CPT | Performed by: PHYSICIAN ASSISTANT

## 2018-09-28 PROCEDURE — 83880 ASSAY OF NATRIURETIC PEPTIDE: CPT | Performed by: PHYSICIAN ASSISTANT

## 2018-09-28 PROCEDURE — 85025 COMPLETE CBC W/AUTO DIFF WBC: CPT | Performed by: PHYSICIAN ASSISTANT

## 2018-09-28 PROCEDURE — 80053 COMPREHEN METABOLIC PANEL: CPT | Performed by: PHYSICIAN ASSISTANT

## 2018-09-28 PROCEDURE — 71045 X-RAY EXAM CHEST 1 VIEW: CPT

## 2018-09-28 PROCEDURE — 99283 EMERGENCY DEPT VISIT LOW MDM: CPT

## 2018-09-28 PROCEDURE — 84484 ASSAY OF TROPONIN QUANT: CPT | Performed by: PHYSICIAN ASSISTANT

## 2018-09-28 PROCEDURE — 36415 COLL VENOUS BLD VENIPUNCTURE: CPT

## 2018-09-28 RX ORDER — DEXAMETHASONE 4 MG/1
4 TABLET ORAL 2 TIMES DAILY WITH MEALS
COMMUNITY
End: 2019-03-20 | Stop reason: HOSPADM

## 2018-09-28 RX ORDER — SODIUM CHLORIDE 0.9 % (FLUSH) 0.9 %
10 SYRINGE (ML) INJECTION AS NEEDED
Status: DISCONTINUED | OUTPATIENT
Start: 2018-09-28 | End: 2018-09-28 | Stop reason: HOSPADM

## 2018-09-29 ENCOUNTER — TREATMENT (OUTPATIENT)
Dept: CARDIAC REHAB | Facility: HOSPITAL | Age: 78
End: 2018-09-29

## 2018-09-29 DIAGNOSIS — J44.9 COPD, SEVERE (HCC): Primary | ICD-10-CM

## 2018-09-29 PROCEDURE — G0424 PULMONARY REHAB W EXER: HCPCS

## 2018-10-04 ENCOUNTER — TREATMENT (OUTPATIENT)
Dept: CARDIAC REHAB | Facility: HOSPITAL | Age: 78
End: 2018-10-04

## 2018-10-04 DIAGNOSIS — J44.9 COPD, SEVERE (HCC): Primary | ICD-10-CM

## 2018-10-04 PROCEDURE — G0424 PULMONARY REHAB W EXER: HCPCS

## 2018-10-06 ENCOUNTER — TREATMENT (OUTPATIENT)
Dept: CARDIAC REHAB | Facility: HOSPITAL | Age: 78
End: 2018-10-06

## 2018-10-06 DIAGNOSIS — J44.9 COPD, SEVERE (HCC): Primary | ICD-10-CM

## 2018-10-06 PROCEDURE — G0424 PULMONARY REHAB W EXER: HCPCS

## 2018-10-08 ENCOUNTER — TELEPHONE (OUTPATIENT)
Dept: FAMILY MEDICINE CLINIC | Facility: CLINIC | Age: 78
End: 2018-10-08

## 2018-10-08 ENCOUNTER — EPISODE CHANGES (OUTPATIENT)
Dept: CASE MANAGEMENT | Facility: OTHER | Age: 78
End: 2018-10-08

## 2018-10-09 ENCOUNTER — OFFICE VISIT (OUTPATIENT)
Dept: CARDIAC REHAB | Facility: HOSPITAL | Age: 78
End: 2018-10-09

## 2018-10-09 ENCOUNTER — TRANSCRIBE ORDERS (OUTPATIENT)
Dept: CARDIAC REHAB | Facility: HOSPITAL | Age: 78
End: 2018-10-09

## 2018-10-09 DIAGNOSIS — J44.9 COPD MIXED TYPE (HCC): Primary | ICD-10-CM

## 2018-10-09 DIAGNOSIS — J44.9 COPD, SEVERE (HCC): Primary | ICD-10-CM

## 2018-10-13 ENCOUNTER — OFFICE VISIT (OUTPATIENT)
Dept: CARDIAC REHAB | Facility: HOSPITAL | Age: 78
End: 2018-10-13

## 2018-10-13 DIAGNOSIS — J44.9 COPD, SEVERE (HCC): Primary | ICD-10-CM

## 2018-10-24 ENCOUNTER — TELEPHONE (OUTPATIENT)
Dept: FAMILY MEDICINE CLINIC | Facility: CLINIC | Age: 78
End: 2018-10-24

## 2018-10-24 NOTE — TELEPHONE ENCOUNTER
Pt was d/c from the hospital and d/c instructions said to stop lasix, prilosec, and potassium. Wife says he should not stop lasix. Please advise.

## 2018-10-29 ENCOUNTER — TELEPHONE (OUTPATIENT)
Dept: FAMILY MEDICINE CLINIC | Facility: CLINIC | Age: 78
End: 2018-10-29

## 2018-10-29 NOTE — TELEPHONE ENCOUNTER
HOME HEALTH NURSE IS AT THE PT'S HOUSE AT THE MOMENT, AND HE INFORMED HER THAT THE TUBE FEEDS HE IS RECEIVING ARE GIVING HIM CONSTANT DIARRHEA.  SHE STATED THAT SOMEONE HAD MENTIONED A DIETICIAN, BUT SHE INFORMED ME THAT THERE IS NO ONE ON HER END TO CONTACT REGARDING ONE. SHE ASKED IF MAYBE DR. GRIFFIN WAS ALREADY AWARE OF THIS AND HAD A PLAN IN PLACE?

## 2018-10-30 ENCOUNTER — TELEPHONE (OUTPATIENT)
Dept: FAMILY MEDICINE CLINIC | Facility: CLINIC | Age: 78
End: 2018-10-30

## 2018-10-30 NOTE — TELEPHONE ENCOUNTER
PT CALLED IN REGARDS TO WHAT THE HOME HEALTH NURSE WAS CALLING ABOUT YESTERDAY. HE STATES THAT DR. GRIFFIN WAS SUPPOSE TO BE SIGNING SOMETHING TO DO WITH A DIETICIAN, BUT HE ALSO STATES THAT HE BELIEVES THE REASON FOR HIS TUBE FEEDING ISSUE IS DUE TO NEEDING A DIFFERENT FORMULA WITH FIBER.  HE ASKED IF LESA OR DR. GRIFFIN COULD PLEASE GIVE HIM A CALL ABOUT ALL OF THIS

## 2018-11-01 ENCOUNTER — OFFICE VISIT (OUTPATIENT)
Dept: CARDIAC REHAB | Facility: HOSPITAL | Age: 78
End: 2018-11-01

## 2018-11-01 DIAGNOSIS — J44.9 COPD, SEVERE (HCC): Primary | ICD-10-CM

## 2018-11-03 ENCOUNTER — OFFICE VISIT (OUTPATIENT)
Dept: CARDIAC REHAB | Facility: HOSPITAL | Age: 78
End: 2018-11-03

## 2018-11-03 DIAGNOSIS — J44.9 COPD, SEVERE (HCC): Primary | ICD-10-CM

## 2018-11-05 ENCOUNTER — TELEPHONE (OUTPATIENT)
Dept: FAMILY MEDICINE CLINIC | Facility: CLINIC | Age: 78
End: 2018-11-05

## 2018-11-05 NOTE — TELEPHONE ENCOUNTER
PT CALLED TO GET A COUPLE OF HIS MEDICATIONS REFILLED. BECAUSE THEY ARE NOT CONTROLLED MEDICATIONS, I WAS TOLD TO INFORM PT'S TO CALL THEIR PHARMACY FOR THE REFILL SO THEY CAN FAX US THE REQUEST. PT WAS UPSET WITH THIS AND ASKED ME TO SEND A MESSAGE TO ' NURSE SO SHE COULD CALL HIM ABOUT THIS ISSUE PLEASE

## 2018-11-05 NOTE — TELEPHONE ENCOUNTER
Called pt, he said [facility] gave him the meds he needs. We have no record of them. He said rena downey doesn't fax. He wanted me to take the meds from him and call them in. I informed him I could not do that but I will ask the on call dr to see if he can do that. Pt was upset and said forget it.

## 2018-11-06 ENCOUNTER — OFFICE VISIT (OUTPATIENT)
Dept: CARDIAC REHAB | Facility: HOSPITAL | Age: 78
End: 2018-11-06

## 2018-11-06 DIAGNOSIS — J44.9 COPD, SEVERE (HCC): Primary | ICD-10-CM

## 2018-11-07 ENCOUNTER — TELEPHONE (OUTPATIENT)
Dept: FAMILY MEDICINE CLINIC | Facility: CLINIC | Age: 78
End: 2018-11-07

## 2018-11-07 NOTE — TELEPHONE ENCOUNTER
WHEN THE NURSE VISITED THE PATIENT NURSE STATED THAT PATIENT HAS DIABETES PATIENT GOT UPSET AND SAID HE DOESN'T HAVE DIABETES. [facility] NEEDS TO KNOW IF PATIENT HAS DIABETES

## 2018-11-12 ENCOUNTER — TELEPHONE (OUTPATIENT)
Dept: FAMILY MEDICINE CLINIC | Facility: CLINIC | Age: 78
End: 2018-11-12

## 2018-11-12 ENCOUNTER — DOCUMENTATION (OUTPATIENT)
Dept: FAMILY MEDICINE CLINIC | Facility: CLINIC | Age: 78
End: 2018-11-12

## 2018-11-12 RX ORDER — CLONIDINE HYDROCHLORIDE 0.1 MG/1
0.1 TABLET ORAL EVERY 12 HOURS SCHEDULED
Qty: 60 TABLET | Refills: 0 | Status: SHIPPED | OUTPATIENT
Start: 2018-11-12 | End: 2019-06-07

## 2018-11-12 NOTE — TELEPHONE ENCOUNTER
PT CALLED STATING THAT WHILE IN THE HOSPITAL, THE DOCTOR PRESCRIBED HIM   CLONIDINE HCL 0.1 MG. TAKING TWICE DAILY    HE ASKED FOR IT TO BE REFILLED AND SENT TO THE [facility], 1 MONTH SUPPLY

## 2018-11-12 NOTE — TELEPHONE ENCOUNTER
Patient informed med sent over FYI he only has 2 more chemo and 4 more radiation then hopefully will be able to swallow again

## 2018-11-13 ENCOUNTER — OFFICE VISIT (OUTPATIENT)
Dept: CARDIAC REHAB | Facility: HOSPITAL | Age: 78
End: 2018-11-13

## 2018-11-13 DIAGNOSIS — J44.9 COPD, SEVERE (HCC): Primary | ICD-10-CM

## 2018-11-14 ENCOUNTER — TELEPHONE (OUTPATIENT)
Dept: FAMILY MEDICINE CLINIC | Facility: CLINIC | Age: 78
End: 2018-11-14

## 2018-11-14 NOTE — TELEPHONE ENCOUNTER
CAN THEY DC TEST FOR C-DIFF TEST ? STOOLS HAVE BEEN NORMAL, AND IT WAS DONE IN THE HOSPITAL. ALSO, CAN HE STAY ON ENSURE PLUS?

## 2018-11-19 ENCOUNTER — TELEPHONE (OUTPATIENT)
Dept: FAMILY MEDICINE CLINIC | Facility: CLINIC | Age: 78
End: 2018-11-19

## 2018-11-19 NOTE — TELEPHONE ENCOUNTER
Sending over a total of four pages, that the last page needs to be signed and returned. They also need it dated and a length of need when it comes through and faxed back to [phone]

## 2018-12-05 ENCOUNTER — TELEPHONE (OUTPATIENT)
Dept: FAMILY MEDICINE CLINIC | Facility: CLINIC | Age: 78
End: 2018-12-05

## 2018-12-05 NOTE — TELEPHONE ENCOUNTER
Calling in regards to discharging patient from home health today, and he is following up with oncology, he is on point with everything, they wanted him to do. This is an FYI

## 2018-12-07 ENCOUNTER — TELEPHONE (OUTPATIENT)
Dept: FAMILY MEDICINE CLINIC | Facility: CLINIC | Age: 78
End: 2018-12-07

## 2018-12-07 NOTE — TELEPHONE ENCOUNTER
Pt called and he has throat cancer, and he has trouble swallowing. He was taking omeprazole with applesauce and now he is not able to do that. He was informed zantac comes in liquid form and if it is compares to omeprazole could you call in a rx for it? [facility] in [place].

## 2018-12-21 ENCOUNTER — TRANSCRIBE ORDERS (OUTPATIENT)
Dept: ADMINISTRATIVE | Facility: HOSPITAL | Age: 78
End: 2018-12-21

## 2018-12-21 DIAGNOSIS — C32.1 MALIGNANT NEOPLASM OF SUPRAGLOTTIS (HCC): Primary | ICD-10-CM

## 2019-01-05 ENCOUNTER — OFFICE VISIT (OUTPATIENT)
Dept: CARDIAC REHAB | Facility: HOSPITAL | Age: 79
End: 2019-01-05

## 2019-01-05 DIAGNOSIS — J44.9 COPD, SEVERE (HCC): Primary | ICD-10-CM

## 2019-01-08 ENCOUNTER — OFFICE VISIT (OUTPATIENT)
Dept: CARDIAC REHAB | Facility: HOSPITAL | Age: 79
End: 2019-01-08

## 2019-01-08 DIAGNOSIS — J44.9 COPD, SEVERE (HCC): Primary | ICD-10-CM

## 2019-01-10 ENCOUNTER — OFFICE VISIT (OUTPATIENT)
Dept: CARDIAC REHAB | Facility: HOSPITAL | Age: 79
End: 2019-01-10

## 2019-01-10 DIAGNOSIS — J44.9 COPD, SEVERE (HCC): Primary | ICD-10-CM

## 2019-01-15 ENCOUNTER — OFFICE VISIT (OUTPATIENT)
Dept: CARDIAC REHAB | Facility: HOSPITAL | Age: 79
End: 2019-01-15

## 2019-01-15 DIAGNOSIS — J44.9 COPD, SEVERE (HCC): Primary | ICD-10-CM

## 2019-01-18 ENCOUNTER — TELEPHONE (OUTPATIENT)
Dept: FAMILY MEDICINE CLINIC | Facility: CLINIC | Age: 79
End: 2019-01-18

## 2019-01-19 ENCOUNTER — OFFICE VISIT (OUTPATIENT)
Dept: CARDIAC REHAB | Facility: HOSPITAL | Age: 79
End: 2019-01-19

## 2019-01-19 DIAGNOSIS — J44.9 COPD, SEVERE (HCC): Primary | ICD-10-CM

## 2019-01-22 ENCOUNTER — OFFICE VISIT (OUTPATIENT)
Dept: CARDIAC REHAB | Facility: HOSPITAL | Age: 79
End: 2019-01-22

## 2019-01-22 DIAGNOSIS — J44.9 COPD, SEVERE (HCC): Primary | ICD-10-CM

## 2019-01-24 ENCOUNTER — OFFICE VISIT (OUTPATIENT)
Dept: CARDIAC REHAB | Facility: HOSPITAL | Age: 79
End: 2019-01-24

## 2019-01-24 DIAGNOSIS — J44.9 COPD, SEVERE (HCC): Primary | ICD-10-CM

## 2019-01-26 ENCOUNTER — OFFICE VISIT (OUTPATIENT)
Dept: CARDIAC REHAB | Facility: HOSPITAL | Age: 79
End: 2019-01-26

## 2019-01-26 DIAGNOSIS — J44.9 COPD, SEVERE (HCC): Primary | ICD-10-CM

## 2019-01-29 ENCOUNTER — OFFICE VISIT (OUTPATIENT)
Dept: CARDIAC REHAB | Facility: HOSPITAL | Age: 79
End: 2019-01-29

## 2019-01-29 DIAGNOSIS — J44.9 COPD, SEVERE (HCC): Primary | ICD-10-CM

## 2019-02-02 ENCOUNTER — OFFICE VISIT (OUTPATIENT)
Dept: CARDIAC REHAB | Facility: HOSPITAL | Age: 79
End: 2019-02-02

## 2019-02-02 DIAGNOSIS — J44.9 COPD, SEVERE (HCC): Primary | ICD-10-CM

## 2019-02-05 ENCOUNTER — OFFICE VISIT (OUTPATIENT)
Dept: CARDIAC REHAB | Facility: HOSPITAL | Age: 79
End: 2019-02-05

## 2019-02-05 DIAGNOSIS — J44.9 COPD, SEVERE (HCC): Primary | ICD-10-CM

## 2019-02-09 ENCOUNTER — OFFICE VISIT (OUTPATIENT)
Dept: CARDIAC REHAB | Facility: HOSPITAL | Age: 79
End: 2019-02-09

## 2019-02-09 DIAGNOSIS — J44.9 COPD, SEVERE (HCC): Primary | ICD-10-CM

## 2019-02-12 ENCOUNTER — OFFICE VISIT (OUTPATIENT)
Dept: CARDIAC REHAB | Facility: HOSPITAL | Age: 79
End: 2019-02-12

## 2019-02-12 DIAGNOSIS — J44.9 COPD, SEVERE (HCC): Primary | ICD-10-CM

## 2019-02-14 ENCOUNTER — OFFICE VISIT (OUTPATIENT)
Dept: CARDIAC REHAB | Facility: HOSPITAL | Age: 79
End: 2019-02-14

## 2019-02-14 DIAGNOSIS — J44.9 COPD, SEVERE (HCC): Primary | ICD-10-CM

## 2019-02-16 ENCOUNTER — OFFICE VISIT (OUTPATIENT)
Dept: CARDIAC REHAB | Facility: HOSPITAL | Age: 79
End: 2019-02-16

## 2019-02-16 DIAGNOSIS — J44.9 COPD, SEVERE (HCC): Primary | ICD-10-CM

## 2019-02-18 ENCOUNTER — HOSPITAL ENCOUNTER (OUTPATIENT)
Dept: PET IMAGING | Facility: HOSPITAL | Age: 79
Discharge: HOME | End: 2019-02-18

## 2019-02-18 ENCOUNTER — HOSPITAL ENCOUNTER (OUTPATIENT)
Dept: PET IMAGING | Facility: HOSPITAL | Age: 79
Discharge: HOME | End: 2019-02-18
Admitting: RADIOLOGY

## 2019-02-18 DIAGNOSIS — C32.1 MALIGNANT NEOPLASM OF SUPRAGLOTTIS (HCC): ICD-10-CM

## 2019-02-18 LAB — GLUCOSE BLDC GLUCOMTR-MCNC: 87 MG/DL (ref 70–130)

## 2019-02-18 PROCEDURE — 0 FLUDEOXYGLUCOSE F18 SOLUTION: Performed by: RADIOLOGY

## 2019-02-18 PROCEDURE — 78815 PET IMAGE W/CT SKULL-THIGH: CPT

## 2019-02-18 PROCEDURE — 82962 GLUCOSE BLOOD TEST: CPT

## 2019-02-18 PROCEDURE — A9552 F18 FDG: HCPCS | Performed by: RADIOLOGY

## 2019-02-18 RX ADMIN — FLUDEOXYGLUCOSE F18 1 DOSE: 300 INJECTION INTRAVENOUS at 12:07

## 2019-02-19 ENCOUNTER — OFFICE VISIT (OUTPATIENT)
Dept: CARDIAC REHAB | Facility: HOSPITAL | Age: 79
End: 2019-02-19

## 2019-02-19 DIAGNOSIS — J44.9 COPD, SEVERE (HCC): Primary | ICD-10-CM

## 2019-02-23 ENCOUNTER — OFFICE VISIT (OUTPATIENT)
Dept: CARDIAC REHAB | Facility: HOSPITAL | Age: 79
End: 2019-02-23

## 2019-02-23 DIAGNOSIS — J44.9 COPD, SEVERE (HCC): Primary | ICD-10-CM

## 2019-02-28 ENCOUNTER — APPOINTMENT (OUTPATIENT)
Dept: GENERAL RADIOLOGY | Facility: HOSPITAL | Age: 79
End: 2019-02-28

## 2019-02-28 ENCOUNTER — HOSPITAL ENCOUNTER (INPATIENT)
Facility: HOSPITAL | Age: 79
LOS: 20 days | Discharge: HOME | End: 2019-03-20
Attending: INTERNAL MEDICINE | Admitting: INTERNAL MEDICINE

## 2019-02-28 DIAGNOSIS — J96.01 ACUTE RESPIRATORY FAILURE WITH HYPOXIA (HCC): Primary | ICD-10-CM

## 2019-02-28 DIAGNOSIS — I48.0 AF (PAROXYSMAL ATRIAL FIBRILLATION) (HCC): ICD-10-CM

## 2019-02-28 DIAGNOSIS — Z74.09 IMPAIRED MOBILITY: ICD-10-CM

## 2019-02-28 LAB
ALBUMIN SERPL-MCNC: 3.8 G/DL (ref 3.5–5.2)
ALBUMIN/GLOB SERPL: 0.9 G/DL
ALP SERPL-CCNC: 84 U/L (ref 39–117)
ALT SERPL W P-5'-P-CCNC: 12 U/L (ref 1–41)
ANION GAP SERPL CALCULATED.3IONS-SCNC: 12.2 MMOL/L
ARTERIAL PATENCY WRIST A: POSITIVE
AST SERPL-CCNC: 20 U/L (ref 1–40)
ATMOSPHERIC PRESS: 750 MMHG
BASE EXCESS BLDA CALC-SCNC: 5.6 MMOL/L (ref 0–2)
BASOPHILS # BLD AUTO: 0.04 10*3/MM3 (ref 0–0.2)
BASOPHILS NFR BLD AUTO: 0.3 % (ref 0–1.5)
BDY SITE: ABNORMAL
BILIRUB SERPL-MCNC: 0.8 MG/DL (ref 0.1–1.2)
BUN BLD-MCNC: 16 MG/DL (ref 8–23)
BUN/CREAT SERPL: 16.8 (ref 7–25)
CALCIUM SPEC-SCNC: 9.8 MG/DL (ref 8.6–10.5)
CHLORIDE SERPL-SCNC: 93 MMOL/L (ref 98–107)
CO2 SERPL-SCNC: 28.8 MMOL/L (ref 22–29)
CREAT BLD-MCNC: 0.95 MG/DL (ref 0.76–1.27)
D-LACTATE SERPL-SCNC: 1.1 MMOL/L (ref 0.5–2)
DEPRECATED RDW RBC AUTO: 51 FL (ref 37–54)
EOSINOPHIL # BLD AUTO: 0.04 10*3/MM3 (ref 0–0.4)
EOSINOPHIL NFR BLD AUTO: 0.3 % (ref 0.3–6.2)
ERYTHROCYTE [DISTWIDTH] IN BLOOD BY AUTOMATED COUNT: 14.2 % (ref 12.3–15.4)
GFR SERPL CREATININE-BSD FRML MDRD: 77 ML/MIN/1.73
GLOBULIN UR ELPH-MCNC: 4.3 GM/DL
GLUCOSE BLD-MCNC: 91 MG/DL (ref 65–99)
HCO3 BLDA-SCNC: 30.6 MMOL/L (ref 22–28)
HCT VFR BLD AUTO: 43.7 % (ref 37.5–51)
HGB BLD-MCNC: 13.8 G/DL (ref 13–17.7)
IMM GRANULOCYTES # BLD AUTO: 0.04 10*3/MM3 (ref 0–0.05)
IMM GRANULOCYTES NFR BLD AUTO: 0.3 % (ref 0–0.5)
LYMPHOCYTES # BLD AUTO: 0.73 10*3/MM3 (ref 0.7–3.1)
LYMPHOCYTES NFR BLD AUTO: 5.7 % (ref 19.6–45.3)
MCH RBC QN AUTO: 30.8 PG (ref 26.6–33)
MCHC RBC AUTO-ENTMCNC: 31.6 G/DL (ref 31.5–35.7)
MCV RBC AUTO: 97.5 FL (ref 79–97)
MODALITY: ABNORMAL
MONOCYTES # BLD AUTO: 1.25 10*3/MM3 (ref 0.1–0.9)
MONOCYTES NFR BLD AUTO: 9.7 % (ref 5–12)
NEUTROPHILS # BLD AUTO: 10.74 10*3/MM3 (ref 1.4–7)
NEUTROPHILS NFR BLD AUTO: 83.7 % (ref 42.7–76)
NRBC BLD AUTO-RTO: 0 /100 WBC (ref 0–0)
NT-PROBNP SERPL-MCNC: 1492 PG/ML (ref 0–1800)
PCO2 BLDA: 44.3 MM HG (ref 35–45)
PH BLDA: 7.45 PH UNITS (ref 7.35–7.45)
PLATELET # BLD AUTO: 198 10*3/MM3 (ref 140–450)
PMV BLD AUTO: 9.6 FL (ref 6–12)
PO2 BLDA: 53.2 MM HG (ref 80–100)
POTASSIUM BLD-SCNC: 4.2 MMOL/L (ref 3.5–5.2)
PROCALCITONIN SERPL-MCNC: 0.07 NG/ML (ref 0.1–0.25)
PROT SERPL-MCNC: 8.1 G/DL (ref 6–8.5)
RBC # BLD AUTO: 4.48 10*6/MM3 (ref 4.14–5.8)
SAO2 % BLDCOA: 88.3 % (ref 92–99)
SODIUM BLD-SCNC: 134 MMOL/L (ref 136–145)
TOTAL RATE: 20 BREATHS/MINUTE
TROPONIN T SERPL-MCNC: <0.01 NG/ML (ref 0–0.03)
WBC NRBC COR # BLD: 12.84 10*3/MM3 (ref 3.4–10.8)

## 2019-02-28 PROCEDURE — 93005 ELECTROCARDIOGRAM TRACING: CPT | Performed by: INTERNAL MEDICINE

## 2019-02-28 PROCEDURE — 93010 ELECTROCARDIOGRAM REPORT: CPT | Performed by: INTERNAL MEDICINE

## 2019-02-28 PROCEDURE — 71046 X-RAY EXAM CHEST 2 VIEWS: CPT

## 2019-02-28 PROCEDURE — 83605 ASSAY OF LACTIC ACID: CPT | Performed by: INTERNAL MEDICINE

## 2019-02-28 PROCEDURE — 84484 ASSAY OF TROPONIN QUANT: CPT | Performed by: INTERNAL MEDICINE

## 2019-02-28 PROCEDURE — 87040 BLOOD CULTURE FOR BACTERIA: CPT | Performed by: INTERNAL MEDICINE

## 2019-02-28 PROCEDURE — 84145 PROCALCITONIN (PCT): CPT | Performed by: INTERNAL MEDICINE

## 2019-02-28 PROCEDURE — 94799 UNLISTED PULMONARY SVC/PX: CPT

## 2019-02-28 PROCEDURE — 80053 COMPREHEN METABOLIC PANEL: CPT | Performed by: INTERNAL MEDICINE

## 2019-02-28 PROCEDURE — 36600 WITHDRAWAL OF ARTERIAL BLOOD: CPT

## 2019-02-28 PROCEDURE — 83880 ASSAY OF NATRIURETIC PEPTIDE: CPT | Performed by: INTERNAL MEDICINE

## 2019-02-28 PROCEDURE — 25010000002 FUROSEMIDE PER 20 MG: Performed by: INTERNAL MEDICINE

## 2019-02-28 PROCEDURE — 85025 COMPLETE CBC W/AUTO DIFF WBC: CPT | Performed by: INTERNAL MEDICINE

## 2019-02-28 PROCEDURE — 82803 BLOOD GASES ANY COMBINATION: CPT

## 2019-02-28 RX ORDER — IPRATROPIUM BROMIDE AND ALBUTEROL SULFATE 2.5; .5 MG/3ML; MG/3ML
3 SOLUTION RESPIRATORY (INHALATION) EVERY 4 HOURS PRN
Status: ON HOLD | COMMUNITY
End: 2019-06-21 | Stop reason: SDUPTHER

## 2019-02-28 RX ORDER — POLYVINYL ALCOHOL 14 MG/ML
1 SOLUTION/ DROPS OPHTHALMIC AS NEEDED
COMMUNITY

## 2019-02-28 RX ORDER — PILOCARPINE HYDROCHLORIDE 5 MG/1
5 TABLET, FILM COATED ORAL 3 TIMES DAILY
COMMUNITY
End: 2020-07-14

## 2019-02-28 RX ORDER — AMLODIPINE BESYLATE 5 MG/1
5 TABLET ORAL NIGHTLY
COMMUNITY
End: 2019-03-20 | Stop reason: HOSPADM

## 2019-02-28 RX ORDER — LANSOPRAZOLE
30 KIT
Status: DISCONTINUED | OUTPATIENT
Start: 2019-03-01 | End: 2019-03-20 | Stop reason: HOSPADM

## 2019-02-28 RX ORDER — PANTOPRAZOLE SODIUM 40 MG/1
40 GRANULE, DELAYED RELEASE ORAL
COMMUNITY

## 2019-02-28 RX ORDER — DIAPER,BRIEF,INFANT-TODD,DISP
1 EACH MISCELLANEOUS 2 TIMES DAILY PRN
COMMUNITY
End: 2020-12-21 | Stop reason: SDUPTHER

## 2019-02-28 RX ORDER — ASPIRIN 81 MG/1
81 TABLET ORAL DAILY
Status: DISCONTINUED | OUTPATIENT
Start: 2019-02-28 | End: 2019-03-15

## 2019-02-28 RX ORDER — FUROSEMIDE 10 MG/ML
40 INJECTION INTRAMUSCULAR; INTRAVENOUS EVERY 12 HOURS
Status: COMPLETED | OUTPATIENT
Start: 2019-02-28 | End: 2019-03-01

## 2019-02-28 RX ORDER — FERROUS SULFATE 325(65) MG
325 TABLET ORAL
Status: DISCONTINUED | OUTPATIENT
Start: 2019-03-01 | End: 2019-03-10

## 2019-02-28 RX ORDER — ATORVASTATIN CALCIUM 10 MG/1
10 TABLET, FILM COATED ORAL DAILY
Status: DISCONTINUED | OUTPATIENT
Start: 2019-02-28 | End: 2019-03-20 | Stop reason: HOSPADM

## 2019-02-28 RX ORDER — MONTELUKAST SODIUM 10 MG/1
10 TABLET ORAL NIGHTLY
Status: DISCONTINUED | OUTPATIENT
Start: 2019-02-28 | End: 2019-03-10

## 2019-02-28 RX ORDER — MONTELUKAST SODIUM 10 MG/1
10 TABLET ORAL NIGHTLY
COMMUNITY

## 2019-02-28 RX ADMIN — RIVAROXABAN 20 MG: 20 TABLET, FILM COATED ORAL at 21:15

## 2019-02-28 RX ADMIN — MONTELUKAST SODIUM 10 MG: 10 TABLET, FILM COATED ORAL at 21:15

## 2019-02-28 RX ADMIN — FUROSEMIDE 40 MG: 10 INJECTION, SOLUTION INTRAMUSCULAR; INTRAVENOUS at 16:25

## 2019-02-28 RX ADMIN — ATORVASTATIN CALCIUM 10 MG: 10 TABLET, FILM COATED ORAL at 21:15

## 2019-03-01 LAB
ANION GAP SERPL CALCULATED.3IONS-SCNC: 14 MMOL/L
BUN BLD-MCNC: 17 MG/DL (ref 8–23)
BUN/CREAT SERPL: 18.5 (ref 7–25)
CALCIUM SPEC-SCNC: 9.7 MG/DL (ref 8.6–10.5)
CHLORIDE SERPL-SCNC: 94 MMOL/L (ref 98–107)
CO2 SERPL-SCNC: 29 MMOL/L (ref 22–29)
CREAT BLD-MCNC: 0.92 MG/DL (ref 0.76–1.27)
DEPRECATED RDW RBC AUTO: 51.9 FL (ref 37–54)
ERYTHROCYTE [DISTWIDTH] IN BLOOD BY AUTOMATED COUNT: 14.3 % (ref 12.3–15.4)
GFR SERPL CREATININE-BSD FRML MDRD: 80 ML/MIN/1.73
GLUCOSE BLD-MCNC: 103 MG/DL (ref 65–99)
GLUCOSE BLDC GLUCOMTR-MCNC: 159 MG/DL (ref 70–130)
GLUCOSE BLDC GLUCOMTR-MCNC: 215 MG/DL (ref 70–130)
HCT VFR BLD AUTO: 43.6 % (ref 37.5–51)
HGB BLD-MCNC: 13.6 G/DL (ref 13–17.7)
MCH RBC QN AUTO: 31 PG (ref 26.6–33)
MCHC RBC AUTO-ENTMCNC: 31.2 G/DL (ref 31.5–35.7)
MCV RBC AUTO: 99.3 FL (ref 79–97)
PLATELET # BLD AUTO: 196 10*3/MM3 (ref 140–450)
PMV BLD AUTO: 10 FL (ref 6–12)
POTASSIUM BLD-SCNC: 3.7 MMOL/L (ref 3.5–5.2)
RBC # BLD AUTO: 4.39 10*6/MM3 (ref 4.14–5.8)
SODIUM BLD-SCNC: 137 MMOL/L (ref 136–145)
WBC NRBC COR # BLD: 10.71 10*3/MM3 (ref 3.4–10.8)

## 2019-03-01 PROCEDURE — 94799 UNLISTED PULMONARY SVC/PX: CPT

## 2019-03-01 PROCEDURE — 25010000002 FUROSEMIDE PER 20 MG: Performed by: INTERNAL MEDICINE

## 2019-03-01 PROCEDURE — 82962 GLUCOSE BLOOD TEST: CPT

## 2019-03-01 PROCEDURE — 63710000001 INSULIN LISPRO (HUMAN) PER 5 UNITS: Performed by: INTERNAL MEDICINE

## 2019-03-01 PROCEDURE — 94640 AIRWAY INHALATION TREATMENT: CPT

## 2019-03-01 PROCEDURE — 85027 COMPLETE CBC AUTOMATED: CPT | Performed by: INTERNAL MEDICINE

## 2019-03-01 PROCEDURE — 80048 BASIC METABOLIC PNL TOTAL CA: CPT | Performed by: INTERNAL MEDICINE

## 2019-03-01 PROCEDURE — 63710000001 PREDNISONE PER 1 MG: Performed by: INTERNAL MEDICINE

## 2019-03-01 RX ORDER — NICOTINE POLACRILEX 4 MG
15 LOZENGE BUCCAL
Status: DISCONTINUED | OUTPATIENT
Start: 2019-03-01 | End: 2019-03-20 | Stop reason: HOSPADM

## 2019-03-01 RX ORDER — IPRATROPIUM BROMIDE AND ALBUTEROL SULFATE 2.5; .5 MG/3ML; MG/3ML
3 SOLUTION RESPIRATORY (INHALATION)
Status: DISCONTINUED | OUTPATIENT
Start: 2019-03-01 | End: 2019-03-20 | Stop reason: HOSPADM

## 2019-03-01 RX ORDER — PREDNISONE 20 MG/1
20 TABLET ORAL
Status: DISCONTINUED | OUTPATIENT
Start: 2019-03-01 | End: 2019-03-02

## 2019-03-01 RX ORDER — DEXTROSE MONOHYDRATE 25 G/50ML
25 INJECTION, SOLUTION INTRAVENOUS
Status: DISCONTINUED | OUTPATIENT
Start: 2019-03-01 | End: 2019-03-20 | Stop reason: HOSPADM

## 2019-03-01 RX ADMIN — IPRATROPIUM BROMIDE AND ALBUTEROL SULFATE 3 ML: 2.5; .5 SOLUTION RESPIRATORY (INHALATION) at 15:57

## 2019-03-01 RX ADMIN — LANSOPRAZOLE 30 MG: KIT at 05:44

## 2019-03-01 RX ADMIN — INSULIN LISPRO 4 UNITS: 100 INJECTION, SOLUTION INTRAVENOUS; SUBCUTANEOUS at 17:44

## 2019-03-01 RX ADMIN — ASPIRIN 81 MG: 81 TABLET, DELAYED RELEASE ORAL at 08:19

## 2019-03-01 RX ADMIN — IPRATROPIUM BROMIDE AND ALBUTEROL SULFATE 3 ML: 2.5; .5 SOLUTION RESPIRATORY (INHALATION) at 19:20

## 2019-03-01 RX ADMIN — PREDNISONE 20 MG: 20 TABLET ORAL at 19:37

## 2019-03-01 RX ADMIN — FUROSEMIDE 40 MG: 10 INJECTION, SOLUTION INTRAMUSCULAR; INTRAVENOUS at 16:07

## 2019-03-01 RX ADMIN — RIVAROXABAN 20 MG: 20 TABLET, FILM COATED ORAL at 17:44

## 2019-03-01 RX ADMIN — MONTELUKAST SODIUM 10 MG: 10 TABLET, FILM COATED ORAL at 20:45

## 2019-03-01 RX ADMIN — INSULIN LISPRO 2 UNITS: 100 INJECTION, SOLUTION INTRAVENOUS; SUBCUTANEOUS at 21:30

## 2019-03-01 RX ADMIN — ATORVASTATIN CALCIUM 10 MG: 10 TABLET, FILM COATED ORAL at 08:19

## 2019-03-01 RX ADMIN — FUROSEMIDE 40 MG: 10 INJECTION, SOLUTION INTRAMUSCULAR; INTRAVENOUS at 03:38

## 2019-03-01 NOTE — CONSULTS
Adult Nutrition  Assessment/PES    Patient Name:  Alessio Allen  YOB: 1940  MRN: [id number]  Admit Date:  2/28/2019    Assessment Date:  3/1/2019    Comments:  Consult for TF assessment.  Patient with history of throat cancer, s/p chemo RT.  Scheduled for RND by ENT, here for pre-op eval.  Found to be in acute respiratory failure.    Patient has PEG tube in place.  This was placed in September per wife and has been used for TFs since October.  Wife reports they have tried TF formulas in the past and the only thing he has tolerated per PEG is Ensure Plus.  She reports his TFs are being managed per RD at [facility].    TF regimen at home consists of 8 cartons of Ensure Plus per day.  She reports patient gets boluses of 2-3 cartons at breakfast, lunch and dinner.  She reports 60 ml flushes before and after each bolus.  Patient also receives 20-30 ml water flushes with meds.  Wife reports patient has been maintaining weight.    Patient is requesting water.  Per his and wife's report, SLP evaluated patient back in the beginning of February (not at this facility) and cleared patient to have sips of water throughout day to practice swallowing.  Spoke with RN about this.    Wife brought Ensure Plus with her.  Told her she can go ahead and use that supply and I am getting Boost Plus ordered to come up as TFs.    RD to continue to follow.    Reason for Assessment     Row Name 03/01/19 1303          Reason for Assessment    Reason For Assessment  physician consult;TF/PN     Diagnosis  pulmonary disease;cardiac disease;cancer diagnosis/related complications;neurologic conditions acute respiratory failure, acute HF, HTN, COPD, throat CA s/p chemo RT, dysphagia         Nutrition/Diet History     Row Name 03/01/19 1309          Nutrition/Diet History    Typical Food/Fluid Intake  per wife report patient recieves Ensure Plus 8 cartons per day (2-3 cartons per bolus) via PEG, flushes with 50-60 ml  before and after each bolus         Anthropometrics     Row Name 03/01/19 1310 03/01/19 0631       Anthropometrics    Weight  --  106 kg (234 lb 6.4 oz)       Admit Weight    Admit Weight  -- 234# 3/1  --       Usual Body Weight (UBW)    Weight Loss Time Frame  wife reports has been maintaining weight  --       Body Mass Index (BMI)    BMI Assessment  BMI 30-34.9: obesity grade I  --        Labs/Tests/Procedures/Meds     Row Name 03/01/19 1312          Labs/Procedures/Meds    Lab Results Reviewed  reviewed, pertinent     Lab Results Comments  Gluc        Diagnostic Tests/Procedures    Diagnostic Test/Procedure Reviewed  reviewed, pertinent     Diagnostic Test/Procedures Comments  scheduled for RND by ENT, but too unwell for this presently per MD note        Medications    Pertinent Medications Reviewed  reviewed, pertinent     Pertinent Medications Comments  FeSO4, lasix         Physical Findings     Row Name 03/01/19 1313          Physical Findings    Overall Physical Appearance  obese     Gastrointestinal  feeding tube     Tubes  PEG     Skin  -- B=20, bruised         Estimated/Assessed Needs     Row Name 03/01/19 1313          Calculation Measurements    Weight Used For Calculations  106 kg (233 lb 11 oz)        Estimated/Assessed Needs    Additional Documentation  KCAL/KG (Group);Protein Requirements (Group);Fluid Requirements (Group)        KCAL/KG    14 Kcal/Kg (kcal)  1484     15 Kcal/Kg (kcal)  1590     18 Kcal/Kg (kcal)  1908     20 Kcal/Kg (kcal)  2120     25 Kcal/Kg (kcal)  2650     30 Kcal/Kg (kcal)  3180     35 Kcal/Kg (kcal)  3710     40 Kcal/Kg (kcal)  4240     45 Kcal/Kg (kcal)  4770     50 Kcal/Kg (kcal)  5300     kcal/kg (Specify)  -- 2650        Protein Requirements    Est Protein Requirement Amount (gms/kg)  1.0 gm protein     Estimated Protein Requirements (gms/day)  106        Fluid Requirements    Estimated Fluid Requirements (mL/day)  2650     Estimated Fluid Requirement Method  RDA Method      RDA Method (mL)  2650     Nicole-Rosalie Method (over 20 kg)  3620         Nutrition Prescription Ordered     Row Name 03/01/19 1326          Nutrition Prescription PO    Current PO Diet  NPO         Evaluation of Received Nutrient/Fluid Intake     Row Name 03/01/19 1313          Calculation Measurements    Weight Used For Calculations  106 kg (233 lb 11 oz)         Evaluation of Prescribed Nutrient/Fluid Intake     Row Name 03/01/19 1313          Calculation Measurements    Weight Used For Calculations  106 kg (233 lb 11 oz)             Problem/Interventions:  Problem 1     Row Name 03/01/19 1329          Nutrition Diagnoses Problem 1    Problem 1  Needs Alternate Route     Etiology (related to)  Medical Diagnosis     Neurological  Dysphagia     Oncology  Head/neck cancer     Signs/Symptoms (evidenced by)  Report/Observation                 Intervention Goal     Row Name 03/01/19 1329          Intervention Goal    General  Maintain nutrition;Nutrition support treatment;Meet nutritional needs for age/condition;Disease management/therapy;Reduce/improve symptoms     TF/PN  Inititiate TF/PN;Maintain TF/PN;Deliver (%) goal     Deliver % of Goal  100 %     Weight  No significant weight loss         Nutrition Intervention     Row Name 03/01/19 1330          Nutrition Intervention    RD/Tech Action  Follow Tx progress;Care plan reviewd;Recommend/ordered     Recommended/Ordered  EN         Nutrition Prescription     Row Name 03/01/19 1330          Nutrition Prescription PO    PO Prescription  Begin/change supplement     Supplement  Boost Plus     Supplement Frequency  3 times a day 3 cartons at breakfast and lunch, 2 cartons at dinner     New PO Prescription Ordered?  Yes        Nutrition Prescription EN    Enteral Prescription  Enteral begin/change;Enteral to supply     Enteral Route  PEG     Product  -- Boost Plus - 8 cartons/day     TF Delivery Method  Bolus     TF Bolus Goal Volume (mL)  711 mL 2 boluses per day with  3 cartons; 1 bolus per day with 2 cartons     TF Bolus Frequency  3 times a day     Water flush (mL)   120 mL 60 ml before and after each bolus     Water Flush Frequency  Every feeding        EN to Supply    Kcal/Day  2880 Kcal/Day     Kcal/Kg  27 Kcal/Kg     Kcal/Kg Weight Method  Actual weight     Protein (gm/day)  112 gm/day     Meet Estimated Kcal Need (%)  100 %     Meet Estimated Protein Need (%)  100 %     TF Free H2O (mL)  1480 mL     Total Free H2O (mL/day)  1840 mL/day plus 20-30 ml flushes with meds and sips of water         Education/Evaluation     Row Name 03/01/19 1334 03/01/19 1331       Education    Education  Will Instruct as appropriate  Will Instruct as appropriate       Monitor/Evaluation    Monitor  Per protocol;I&O;Pertinent labs;TF delivery/tolerance;Weight;Symptoms  Per protocol    Education Follow-up  Reinforce PRN  --          Electronically signed by:  Makayla Mccollum RD  03/01/19 1:35 PM

## 2019-03-01 NOTE — PROGRESS NOTES
Discharge Planning Assessment  [facility]     Patient Name: Alessio Allen  MRN: [id number]  Today's Date: 3/1/2019    Admit Date: 2/28/2019    Discharge Needs Assessment     Row Name 03/01/19 1601       Living Environment    Lives With  spouse    Name(s) of Who Lives With Patient  wife    Current Living Arrangements  home/apartment/condo    Primary Care Provided by  self    Provides Primary Care For  no one    Family Caregiver if Needed  spouse    Quality of Family Relationships  helpful;involved;supportive    Able to Return to Prior Arrangements  yes       Resource/Environmental Concerns    Resource/Environmental Concerns  none       Transition Planning    Patient/Family Anticipates Transition to  home with family;home with help/services    Patient/Family Anticipated Services at Transition  home health care    Transportation Anticipated  family or friend will provide       Discharge Needs Assessment    Concerns to be Addressed  no discharge needs identified;denies needs/concerns at this time    Equipment Currently Used at Home  oxygen;nebulizer;cane, straight    Outpatient/Agency/Support Group Needs  homecare agency    Discharge Facility/Level of Care Needs  home with home health    Discharge Coordination/Progress  Referral to A         Discharge Plan     Row Name 03/01/19 1606       Plan    Plan  Referral to A     Patient/Family in Agreement with Plan  yes    Plan Comments  IMM letter checked. CCP met with pt and wife ( Dulce Allen, [phone]) to discuss d/c planning. Facesheet verified. CCP role explained. Pt resides with his wife in a single level home and uses continuous O2 via [employer], tube feed supplies, nebulizer and cane. Pt reports past home health via A  and requests a referral if services needed following d/c (referral made to [facility]). Pt has been to past outpatient pulmonary rehab and past sub-acute rehab at [place]. Pt confirms pharmacy is [facility]. Pt identifies no  additional known needs. CCP to follow to assist should additional d/c needs arise. Yuli Palmer LCSW        Destination      No service coordination in this encounter.      Durable Medical Equipment      No service coordination in this encounter.      Dialysis/Infusion      No service coordination in this encounter.      Home Medical Care      Service Provider Request Status Selected Services Address Phone Number Fax Number    [facility] Pending - Request Sent N/A [address] [place] [address] [phone]      [facility]      No service coordination in this encounter.          Demographic Summary     Row Name 03/01/19 1559       General Information    Admission Type  inpatient    Arrived From  home    Required Notices Provided  Important Message from Medicare    Referral Source  admission list    Reason for Consult  discharge planning    Preferred Language  English        Functional Status     Row Name 03/01/19 1559       Functional Status    Usual Activity Tolerance  moderate    Current Activity Tolerance  moderate       Functional Status, IADL    Medications  assistive equipment    Meal Preparation  assistive equipment    Housekeeping  assistive equipment    Laundry  assistive equipment    Shopping  assistive equipment       Mental Status Summary    Recent Changes in Mental Status/Cognitive Functioning  no changes        Psychosocial    No documentation.       Abuse/Neglect    No documentation.       Legal    No documentation.       Substance Abuse    No documentation.       Patient Forms    No documentation.           Madison Palmer LCSW

## 2019-03-01 NOTE — PLAN OF CARE
Problem: Patient Care Overview  Goal: Plan of Care Review  Outcome: Ongoing (interventions implemented as appropriate)   03/01/19 1602   Plan of Care Review   Progress declining   OTHER   Outcome Summary Pt showing SOA and increasedwork of breathing. O2 reqirment higher than home use.

## 2019-03-01 NOTE — PROGRESS NOTES
PROGRESS NOTE  Patient Name: Alessio Allen  Age/Sex: 78 y.o. male  : 1940  MRN: [id number]    Date of Admission: 2019  Date of Encounter Visit: 19   LOS: 1 day   Patient Care Team:  Alan Santana MD as PCP - General (Family Medicine)  Isael Beltre MD as PCP - Claims Attributed  Rao Maguire MD as Consulting Physician (Hematology and Oncology)  Alan Santana MD as Referring Physician (Family Medicine)    Chief Complaint: Cough and shortness of breath    Hospital course: Admitted from our office with worsening dyspnea and increased oxygen requirement and cough    Interval History: Patient has laryngeal cancer, his last imaging showed some increased activity in the neck lymph node and is supposed to be admitted for radical neck dissection, he was not cleared for the surgery on yesterday's assessment rather he was admitted to the hospital for treatment with diuretics and for COPD exacerbation.    REVIEW OF SYSTEMS:   CONSTITUTIONAL: no fever or chills  CARDIOVASCULAR: No chest pain, chest pressure or chest discomfort. No palpitations or edema.   RESPIRATORY: Stiff cough positive wheezing and positive congestion.   GASTROINTESTINAL: No anorexia, nausea, vomiting or diarrhea. No abdominal pain or blood.   HEMATOLOGIC: No bleeding or bruising.     Ventilator/Non-Invasive Ventilation Settings (From admission, onward)    None            Vital Signs  Temp:  [97.8 °F (36.6 °C)-98.6 °F (37 °C)] 97.8 °F (36.6 °C)  Heart Rate:  [79-99] 79  Resp:  [18-20] 20  BP: (111-125)/(63-76) 125/76  SpO2:  [87 %-90 %] 90 %  on  Flow (L/min):  [8-10] 10 Device (Oxygen Therapy): high-flow nasal cannula    Intake/Output Summary (Last 24 hours) at 3/1/2019 1305  Last data filed at 3/1/2019 0728  Gross per 24 hour   Intake 30 ml   Output 1175 ml   Net -1145 ml     Flowsheet Rows      First Filed Value   Admission Height  No data   Admission Weight  109 kg (240 lb) Documented at 2019 1023        Body mass  index is 30.1 kg/m².      02/28/19  1023 03/01/19  0631   Weight: 109 kg (240 lb) 106 kg (234 lb 6.4 oz)       Physical Exam:  GEN: In mild distress from the coughing, alert, cooperative, well developed   EYES:   Sclera clear. No icterus. PERRL. Normal EOM  ENT:   External ears/nose normal, no oral lesions, no thrush, mucous membranes moist  NECK:  Supple, midline trachea, no JVD  LUNGS: Normal chest on inspection, patient has been coughing and having wheezing and tightness with crackles posteriorly right more than left.   CV:  Regular rhythm and rate. Normal S1/S2. No murmurs, gallops, or rubs noted.  ABD:  Soft, non-tender and non-distended. Normal bowel sounds. No guarding old healed PEG tube site there is a small hole in the PEG tube  EXT:  Moves all extremities well. No cyanosis. No redness. No edema.   Skin: dry, intact, no bleeding    Results Review:      Results from last 7 days   Lab Units 03/01/19  0431 02/28/19  1243   SODIUM mmol/L 137 134*   POTASSIUM mmol/L 3.7 4.2   CHLORIDE mmol/L 94* 93*   CO2 mmol/L 29.0 28.8   BUN mg/dL 17 16   CREATININE mg/dL 0.92 0.95   CALCIUM mg/dL 9.7 9.8   AST (SGOT) U/L  --  20   ALT (SGPT) U/L  --  12   ANION GAP mmol/L 14.0 12.2   ALBUMIN g/dL  --  3.80     Results from last 7 days   Lab Units 02/28/19  1243   TROPONIN T ng/mL <0.010         Results from last 7 days   Lab Units 02/28/19  1243   PROBNP pg/mL 1,492.0     Results from last 7 days   Lab Units 03/01/19  0431 02/28/19  1243   WBC 10*3/mm3 10.71 12.84*   HEMOGLOBIN g/dL 13.6 13.8   HEMATOCRIT % 43.6 43.7   PLATELETS 10*3/mm3 196 198   MCV fL 99.3* 97.5*   NEUTROPHIL % %  --  83.7*   LYMPHOCYTE % %  --  5.7*   MONOCYTES % %  --  9.7   EOSINOPHIL % %  --  0.3   BASOPHIL % %  --  0.3   IMM GRAN % %  --  0.3                   Invalid input(s): LDLCALC  Results from last 7 days   Lab Units 02/28/19  1444   PH, ARTERIAL pH units 7.447   PCO2, ARTERIAL mm Hg 44.3   PO2 ART mm Hg 53.2*   HCO3 ART mmol/L 30.6*          No results found for: POCGLU  Results from last 7 days   Lab Units 02/28/19  1243   PROCALCITONIN ng/mL 0.07*   LACTATE mmol/L 1.1     Results from last 7 days   Lab Units 02/28/19  1612 02/28/19  1444   BLOODCX  No growth at less than 24 hours No growth at less than 24 hours                   Imaging:   Imaging Results (all)     Procedure Component Value Units Date/Time    XR Chest 2 View [[id number]] Collected:  02/28/19 1425     Updated:  02/28/19 1430    Narrative:       XR CHEST 2 VW-     HISTORY: Male who is 78 years-old,  hypoxia     TECHNIQUE: Frontal and lateral views of the chest     COMPARISON: 9/28/2018     FINDINGS: Stable appearing right chest port. Left-sided pacemaker and  cardiac leads. Heart is enlarged. Mild prominence of vascular and  interstitial markings. A new area of opacity at the right mid lung,  suspicious for infection or edema superimposed on chronic changes. No  pleural effusion, or pneumothorax. No acute osseous process.       Impression:       A new area of opacity at the right mid lung, suspicious for  infection or edema superimposed on chronic changes, clinical correlation  and follow-up advised.      This report was finalized on 2/28/2019 2:27 PM by Dr. Krishan Escalera M.D.             I reviewed the patient's new clinical results.  I personally viewed and interpreted the patient's imaging results:        Medication Review:     aspirin 81 mg Oral Daily   atorvastatin 10 mg Oral Daily   ferrous sulfate 325 mg Oral Daily With Breakfast   furosemide 40 mg Intravenous Q12H   lansoprazole 30 mg Per PEG Tube Q AM   montelukast 10 mg Oral Nightly   rivaroxaban 20 mg Oral Daily With Dinner            ASSESSMENT:   1. Acute respiratory failure on chronic  2. Acute decompensated diastolic HF  3. COPD, no obvious exacerbation  4. Throat cancer s/p chemo RT  5. Dysphagia - PEG  6. ILD ??  7. Afib, on AC  8. CAD  9. Obesity    PLAN:  Patient needs to be on some systemic steroids to help  with the bronchospasm and the coughing spell and bronchodilator therapy  His PEG tube need to be replaced, will see if radiology can do that over a wire otherwise may need to consult the surgeon, his procedure was done at an outlying facility and his surgeon does not practice at [facility]  Patient is to have speech therapy reevaluation  Need to have Accu-Cheks to cover for any potential steroid-induced hyperglycemia with sliding scale insulin  As stated by Dr. Suresh Hernandez, his surgery is definitely on hold until the patient gets better from the clinical standpoint  Consider evaluation with HRCT once his acute decompensation has been treated and reversed  Discussed with patient and with the spouse    Disposition: Continue to monitor as an inpatient    Chani Kong MD  03/01/19  1:05 PM          Dictated utilizing Dragon dictation

## 2019-03-01 NOTE — DISCHARGE PLACEMENT REQUEST
"Chioma Rushing (78 y.o. Male)     Date of Birth Social Security Number Address Home Phone MRN    1940  [id number] [facility] [address] [phone] [id number]    [employer] Mar[employer] Status          Jainism        Admission Date Admission Type Admitting Provider Attending Provider Department, Room/Bed    2/28/19 Urgent Chani Kong MD Saad, Lebnan S, MD [address], [address]/1    Discharge Date Discharge Disposition Discharge Destination                       Attending Provider:  Chani Kong MD    Allergies:  Lisinopril, Penicillins, Sulfa Antibiotics, Atenolol, Coreg [Carvedilol], Ibuprofen, Celebrex [Celecoxib]    Isolation:  None   Infection:  None   Code Status:  CPR    Ht:  188 cm (74\")   Wt:  106 kg (234 lb 6.4 oz)    Admission Cmt:  None   Principal Problem:  None                Active Insurance as of 2/28/2019     Primary Coverage     Payor Plan Insurance Group Employer/Plan Group    MEDICARE MEDICARE A & B      Payor Plan Address Payor Plan Phone Number Payor Plan Fax Number Effective Dates    [address] [phone]  5/1/2000 - None Entered    [facility] [address]       Subscriber Name Subscriber Birth Date Member ID       CHIOMA RUSHING 1940 [id number]           Secondary Coverage     Payor Plan Insurance Group Employer/Plan Group     FOR LIFE  FOR LIFE MC SUPP      Payor Plan Address Payor Plan Phone Number Payor Plan Fax Number Effective Dates    [address] [phone]  1/30/2018 - None Entered    [facility] [address]       Subscriber Name Subscriber Birth Date Member ID       CHIOMA RUSHING 1940 [id number]                 Emergency Contacts      (Rel.) Home Phone Work Phone Mobile Phone    Alejandro,Pat (Friend) [phone] -- --    AlejandroEllis (Son) -- -- [phone]            "

## 2019-03-01 NOTE — PLAN OF CARE
Problem: Nutrition, Enteral (Adult)  Intervention: Monitor/Manage Nutrition Support   03/01/19 1340   Nutrition Interventions   Nutrition Support Management tube feeding to be initiated; recommend Boost Plus 8 cartons per day (comparable to home TFs)

## 2019-03-02 ENCOUNTER — APPOINTMENT (OUTPATIENT)
Dept: GENERAL RADIOLOGY | Facility: HOSPITAL | Age: 79
End: 2019-03-02

## 2019-03-02 LAB
GLUCOSE BLDC GLUCOMTR-MCNC: 114 MG/DL (ref 70–130)
GLUCOSE BLDC GLUCOMTR-MCNC: 135 MG/DL (ref 70–130)
GLUCOSE BLDC GLUCOMTR-MCNC: 146 MG/DL (ref 70–130)
GLUCOSE BLDC GLUCOMTR-MCNC: 227 MG/DL (ref 70–130)

## 2019-03-02 PROCEDURE — 94799 UNLISTED PULMONARY SVC/PX: CPT

## 2019-03-02 PROCEDURE — 63710000001 PREDNISONE PER 1 MG: Performed by: INTERNAL MEDICINE

## 2019-03-02 PROCEDURE — 71046 X-RAY EXAM CHEST 2 VIEWS: CPT

## 2019-03-02 PROCEDURE — 82962 GLUCOSE BLOOD TEST: CPT

## 2019-03-02 PROCEDURE — 63710000001 INSULIN LISPRO (HUMAN) PER 5 UNITS: Performed by: INTERNAL MEDICINE

## 2019-03-02 PROCEDURE — 25010000002 FUROSEMIDE PER 20 MG: Performed by: INTERNAL MEDICINE

## 2019-03-02 RX ORDER — FUROSEMIDE 10 MG/ML
40 INJECTION INTRAMUSCULAR; INTRAVENOUS EVERY 8 HOURS
Status: DISCONTINUED | OUTPATIENT
Start: 2019-03-02 | End: 2019-03-03

## 2019-03-02 RX ADMIN — INSULIN LISPRO 6 UNITS: 100 INJECTION, SOLUTION INTRAVENOUS; SUBCUTANEOUS at 21:15

## 2019-03-02 RX ADMIN — LANSOPRAZOLE 30 MG: KIT at 06:09

## 2019-03-02 RX ADMIN — PREDNISONE 20 MG: 20 TABLET ORAL at 12:47

## 2019-03-02 RX ADMIN — ASPIRIN 81 MG: 81 TABLET, DELAYED RELEASE ORAL at 08:13

## 2019-03-02 RX ADMIN — IPRATROPIUM BROMIDE AND ALBUTEROL SULFATE 3 ML: 2.5; .5 SOLUTION RESPIRATORY (INHALATION) at 15:22

## 2019-03-02 RX ADMIN — ATORVASTATIN CALCIUM 10 MG: 10 TABLET, FILM COATED ORAL at 08:13

## 2019-03-02 RX ADMIN — FERROUS SULFATE TAB 325 MG (65 MG ELEMENTAL FE) 325 MG: 325 (65 FE) TAB at 08:13

## 2019-03-02 RX ADMIN — PREDNISONE 20 MG: 20 TABLET ORAL at 08:13

## 2019-03-02 RX ADMIN — MONTELUKAST SODIUM 10 MG: 10 TABLET, FILM COATED ORAL at 21:15

## 2019-03-02 RX ADMIN — RIVAROXABAN 20 MG: 20 TABLET, FILM COATED ORAL at 17:04

## 2019-03-02 RX ADMIN — IPRATROPIUM BROMIDE AND ALBUTEROL SULFATE 3 ML: 2.5; .5 SOLUTION RESPIRATORY (INHALATION) at 07:07

## 2019-03-02 RX ADMIN — IPRATROPIUM BROMIDE AND ALBUTEROL SULFATE 3 ML: 2.5; .5 SOLUTION RESPIRATORY (INHALATION) at 19:54

## 2019-03-02 RX ADMIN — FUROSEMIDE 40 MG: 10 INJECTION, SOLUTION INTRAVENOUS at 16:01

## 2019-03-02 NOTE — PROGRESS NOTES
[facility]  [phone]  Suresh Hernandez MD    Subjective:  LOS: 2    He continues to require extremely high flows oxygen.  He was given 3 doses of IV Lasix.  No urine output today.  Last dose did not produce a lot of urine.  He takes 40 mg p.o. twice daily of Lasix at home.  He denies coughing up any significant phlegm.    Objective   Vital Signs past 24hrs    Temp range: Temp (24hrs), Av.8 °F (37.1 °C), Min:98.4 °F (36.9 °C), Max:99.2 °F (37.3 °C)    BP range: BP: (115-121)/(62-68) 121/62  Pulse range: Heart Rate:  [] 79  Resp rate range: Resp:  [16-28] 18    Device (Oxygen Therapy): high-flow nasal cannula;humidifiedFlow (L/min):  [10-15] 15  Oxygen range:SpO2:  [88 %-90 %] 89 %      105 kg (232 lb 9.4 oz); Body mass index is 29.86 kg/m².    Intake/Output Summary (Last 24 hours) at 3/2/2019 1517  Last data filed at 3/2/2019 0813  Gross per 24 hour   Intake 826 ml   Output 1 ml   Net 825 ml       Physical Exam   Cardiovascular: Normal rate and regular rhythm.   No murmur heard.  Pulmonary/Chest: He has no wheezes. He has rales in the right lower field and the left lower field.   Abdominal: Soft. Bowel sounds are normal. There is no tenderness.   PEG +   Musculoskeletal: He exhibits no edema.   Neurological: He is alert.   Nursing note and vitals reviewed.    Results Review:    I have reviewed the laboratory and imaging data since the last note by [facility] physician.  My annotations are noted in assessment and plan.    Medication Review:  I have reviewed the current MAR.  My annotations are noted in assessment and plan.       Plan   PCCM Problems  Acute respiratory failure  Acute decompensated diastolic HF  COPD, no obvious exacerbation  Throat cancer s/p chemo RT  Dysphagia - PEG  ILD ??  Relevant Medical Diagnoses  Afib, on AC  CAD  Obesity    Plan of Treatment  Add Lasix 40 mg IV every 8 hours scheduled.  If no significant improvement in oxygenation will do a CT of the chest.    Chest x-ray  today reviewed.  Right lower lobe patchy infiltrate is again seen.  Unclear of significance with negative pro calcitonin.  Again as above to CT chest if does not improve.    Repeat labs tomorrow morning.    Patient is taking sips of water.  I would like to see a swallow evaluation so patient can continue to take sips of water.  He remains with a PEG tube and feet.  Majority of his intake as well as medicines go down his PEG tube.    I have previously inserted the diagnosis of interstitial lung disease on this patient.  However his infiltrates substantially improved with diuresis.  Obviously not typical for interstitial lung disease.    He remains on Xarelto for his A. Fib.    Stop prednisone. Unclear why started. Not on decadron at home per review of home meds.    His PEG tube is leaking.  There is a small hole apparently.  We are awaiting replacement though it appears to be functioning appropriately at the moment.    If he again substantially improved with diuresis I might want to get another cardiology opinion as to why he keeps decompensating.  His last echo was again reviewed by me.    Discussed with wife.    Suresh Hernandez MD  03/02/19  3:17 PM    Part of this note may be an electronic transcription/translation of spoken language to printed text using the Dragon Dictation System.

## 2019-03-02 NOTE — SIGNIFICANT NOTE
03/02/19 0921   Rehab Time/Intention   Evaluation Not Performed other (see comments)  ( Note order for Bedside Swallow Eval, but patient is on PEG tube feedings, was cleared by SLP at another hospital for water sips only, and per RN patient is not appropriate due to respiratory status. Clinical Swallow Evaluation to be deferred this date, and plan to discuss goals of care with MD)   Rehab Treatment   Discipline speech language pathologist

## 2019-03-02 NOTE — PLAN OF CARE
Problem: Patient Care Overview  Goal: Plan of Care Review  Outcome: Ongoing (interventions implemented as appropriate)   03/02/19 1521   Coping/Psychosocial   Plan of Care Reviewed With patient   Plan of Care Review   Progress no change   OTHER   Outcome Summary No c/o pain but increased oxygen to 15L high flow. Xray unremarkable. Feeding boluses throughout the day with peg tube. Radiology unable to replace peg tube at this time. Will continue to closely monitor.

## 2019-03-02 NOTE — PLAN OF CARE
Problem: Breathing Pattern Ineffective (Adult)  Intervention: Monitor/Manage Contributing Psychosocial Factors  Pt has increaed o2 needs. Pt on high flow nc & mini nebs. On going SOA

## 2019-03-02 NOTE — PLAN OF CARE
Problem: Patient Care Overview  Goal: Plan of Care Review  Outcome: Ongoing (interventions implemented as appropriate)   03/02/19 5882   Coping/Psychosocial   Plan of Care Reviewed With patient   Plan of Care Review   Progress no change   OTHER   Outcome Summary No complaints of pain, increased 02 to 13L to maintain sats of 89%, confused almost combative at beginning of shift, fed patient via GTube, seemed to calm down, instructed patient that he is not to get up to the bathroom, takes too long to get 02 sats back to 89%, radiology to replace peg tube today, vss, will continue to monitor.     Goal: Individualization and Mutuality  Outcome: Ongoing (interventions implemented as appropriate)    Goal: Discharge Needs Assessment  Outcome: Ongoing (interventions implemented as appropriate)      Problem: Fall Risk (Adult)  Goal: Absence of Fall  Outcome: Ongoing (interventions implemented as appropriate)      Problem: Breathing Pattern Ineffective (Adult)  Goal: Effective Oxygenation/Ventilation  Outcome: Ongoing (interventions implemented as appropriate)    Goal: Anxiety/Fear Reduction  Outcome: Ongoing (interventions implemented as appropriate)      Problem: Nutrition, Enteral (Adult)  Goal: Signs and Symptoms of Listed Potential Problems Will be Absent, Minimized or Managed (Nutrition, Enteral)  Outcome: Ongoing (interventions implemented as appropriate)

## 2019-03-03 ENCOUNTER — APPOINTMENT (OUTPATIENT)
Dept: CT IMAGING | Facility: HOSPITAL | Age: 79
End: 2019-03-03

## 2019-03-03 PROBLEM — Z43.1 ATTENTION TO G-TUBE (HCC): Status: ACTIVE | Noted: 2019-03-03

## 2019-03-03 LAB
ANION GAP SERPL CALCULATED.3IONS-SCNC: 12.4 MMOL/L
BUN BLD-MCNC: 30 MG/DL (ref 8–23)
BUN/CREAT SERPL: 33.3 (ref 7–25)
CALCIUM SPEC-SCNC: 9.8 MG/DL (ref 8.6–10.5)
CHLORIDE SERPL-SCNC: 93 MMOL/L (ref 98–107)
CO2 SERPL-SCNC: 35.6 MMOL/L (ref 22–29)
CREAT BLD-MCNC: 0.9 MG/DL (ref 0.76–1.27)
DEPRECATED RDW RBC AUTO: 49.8 FL (ref 37–54)
ERYTHROCYTE [DISTWIDTH] IN BLOOD BY AUTOMATED COUNT: 13.6 % (ref 12.3–15.4)
GFR SERPL CREATININE-BSD FRML MDRD: 82 ML/MIN/1.73
GLUCOSE BLD-MCNC: 114 MG/DL (ref 65–99)
GLUCOSE BLDC GLUCOMTR-MCNC: 104 MG/DL (ref 70–130)
GLUCOSE BLDC GLUCOMTR-MCNC: 138 MG/DL (ref 70–130)
GLUCOSE BLDC GLUCOMTR-MCNC: 168 MG/DL (ref 70–130)
GLUCOSE BLDC GLUCOMTR-MCNC: 183 MG/DL (ref 70–130)
HCT VFR BLD AUTO: 43.4 % (ref 37.5–51)
HGB BLD-MCNC: 13.5 G/DL (ref 13–17.7)
MAGNESIUM SERPL-MCNC: 2 MG/DL (ref 1.6–2.4)
MCH RBC QN AUTO: 30.7 PG (ref 26.6–33)
MCHC RBC AUTO-ENTMCNC: 31.1 G/DL (ref 31.5–35.7)
MCV RBC AUTO: 98.6 FL (ref 79–97)
NT-PROBNP SERPL-MCNC: 1560 PG/ML (ref 0–1800)
PLATELET # BLD AUTO: 184 10*3/MM3 (ref 140–450)
PMV BLD AUTO: 9.7 FL (ref 6–12)
POTASSIUM BLD-SCNC: 3.2 MMOL/L (ref 3.5–5.2)
POTASSIUM BLD-SCNC: 3.5 MMOL/L (ref 3.5–5.2)
PROCALCITONIN SERPL-MCNC: 0.11 NG/ML (ref 0.1–0.25)
RBC # BLD AUTO: 4.4 10*6/MM3 (ref 4.14–5.8)
SODIUM BLD-SCNC: 141 MMOL/L (ref 136–145)
WBC NRBC COR # BLD: 10.84 10*3/MM3 (ref 3.4–10.8)

## 2019-03-03 PROCEDURE — 94799 UNLISTED PULMONARY SVC/PX: CPT

## 2019-03-03 PROCEDURE — 84145 PROCALCITONIN (PCT): CPT | Performed by: INTERNAL MEDICINE

## 2019-03-03 PROCEDURE — 82962 GLUCOSE BLOOD TEST: CPT

## 2019-03-03 PROCEDURE — 99232 SBSQ HOSP IP/OBS MODERATE 35: CPT | Performed by: SURGERY

## 2019-03-03 PROCEDURE — 70491 CT SOFT TISSUE NECK W/DYE: CPT

## 2019-03-03 PROCEDURE — 25010000002 ACETAZOLAMIDE PER 500 MG: Performed by: INTERNAL MEDICINE

## 2019-03-03 PROCEDURE — 83880 ASSAY OF NATRIURETIC PEPTIDE: CPT | Performed by: INTERNAL MEDICINE

## 2019-03-03 PROCEDURE — 63710000001 INSULIN LISPRO (HUMAN) PER 5 UNITS: Performed by: INTERNAL MEDICINE

## 2019-03-03 PROCEDURE — 85027 COMPLETE CBC AUTOMATED: CPT | Performed by: INTERNAL MEDICINE

## 2019-03-03 PROCEDURE — 80048 BASIC METABOLIC PNL TOTAL CA: CPT | Performed by: INTERNAL MEDICINE

## 2019-03-03 PROCEDURE — 25010000002 FUROSEMIDE PER 20 MG: Performed by: INTERNAL MEDICINE

## 2019-03-03 PROCEDURE — 25010000002 IOPAMIDOL 61 % SOLUTION: Performed by: INTERNAL MEDICINE

## 2019-03-03 PROCEDURE — 84132 ASSAY OF SERUM POTASSIUM: CPT | Performed by: INTERNAL MEDICINE

## 2019-03-03 PROCEDURE — 71260 CT THORAX DX C+: CPT

## 2019-03-03 PROCEDURE — 83735 ASSAY OF MAGNESIUM: CPT | Performed by: INTERNAL MEDICINE

## 2019-03-03 RX ORDER — ACETAZOLAMIDE 500 MG/5ML
500 INJECTION, POWDER, LYOPHILIZED, FOR SOLUTION INTRAVENOUS ONCE
Status: COMPLETED | OUTPATIENT
Start: 2019-03-03 | End: 2019-03-03

## 2019-03-03 RX ORDER — POTASSIUM CHLORIDE 1.5 G/1.77G
40 POWDER, FOR SOLUTION ORAL AS NEEDED
Status: DISCONTINUED | OUTPATIENT
Start: 2019-03-03 | End: 2019-03-20 | Stop reason: HOSPADM

## 2019-03-03 RX ORDER — POTASSIUM CHLORIDE 750 MG/1
40 CAPSULE, EXTENDED RELEASE ORAL AS NEEDED
Status: DISCONTINUED | OUTPATIENT
Start: 2019-03-03 | End: 2019-03-20 | Stop reason: HOSPADM

## 2019-03-03 RX ORDER — FUROSEMIDE 10 MG/ML
40 INJECTION INTRAMUSCULAR; INTRAVENOUS EVERY 8 HOURS
Status: COMPLETED | OUTPATIENT
Start: 2019-03-03 | End: 2019-03-03

## 2019-03-03 RX ORDER — POTASSIUM CHLORIDE 7.45 MG/ML
10 INJECTION INTRAVENOUS
Status: DISCONTINUED | OUTPATIENT
Start: 2019-03-03 | End: 2019-03-20 | Stop reason: HOSPADM

## 2019-03-03 RX ADMIN — INSULIN LISPRO 2 UNITS: 100 INJECTION, SOLUTION INTRAVENOUS; SUBCUTANEOUS at 22:39

## 2019-03-03 RX ADMIN — POTASSIUM CHLORIDE 40 MEQ: 750 CAPSULE, EXTENDED RELEASE ORAL at 08:20

## 2019-03-03 RX ADMIN — FUROSEMIDE 40 MG: 10 INJECTION, SOLUTION INTRAVENOUS at 22:40

## 2019-03-03 RX ADMIN — POTASSIUM CHLORIDE 40 MEQ: 750 CAPSULE, EXTENDED RELEASE ORAL at 11:31

## 2019-03-03 RX ADMIN — MONTELUKAST SODIUM 10 MG: 10 TABLET, FILM COATED ORAL at 22:39

## 2019-03-03 RX ADMIN — IPRATROPIUM BROMIDE AND ALBUTEROL SULFATE 3 ML: 2.5; .5 SOLUTION RESPIRATORY (INHALATION) at 12:20

## 2019-03-03 RX ADMIN — IPRATROPIUM BROMIDE AND ALBUTEROL SULFATE 3 ML: 2.5; .5 SOLUTION RESPIRATORY (INHALATION) at 07:09

## 2019-03-03 RX ADMIN — FUROSEMIDE 40 MG: 10 INJECTION, SOLUTION INTRAVENOUS at 14:26

## 2019-03-03 RX ADMIN — IPRATROPIUM BROMIDE AND ALBUTEROL SULFATE 3 ML: 2.5; .5 SOLUTION RESPIRATORY (INHALATION) at 15:36

## 2019-03-03 RX ADMIN — ACETAZOLAMIDE SODIUM 500 MG: 500 INJECTION, POWDER, LYOPHILIZED, FOR SOLUTION INTRAVENOUS at 09:34

## 2019-03-03 RX ADMIN — ASPIRIN 81 MG: 81 TABLET, DELAYED RELEASE ORAL at 08:20

## 2019-03-03 RX ADMIN — ATORVASTATIN CALCIUM 10 MG: 10 TABLET, FILM COATED ORAL at 08:20

## 2019-03-03 RX ADMIN — LANSOPRAZOLE 30 MG: KIT at 05:26

## 2019-03-03 RX ADMIN — FUROSEMIDE 40 MG: 10 INJECTION, SOLUTION INTRAVENOUS at 00:17

## 2019-03-03 RX ADMIN — IPRATROPIUM BROMIDE AND ALBUTEROL SULFATE 3 ML: 2.5; .5 SOLUTION RESPIRATORY (INHALATION) at 19:41

## 2019-03-03 RX ADMIN — FERROUS SULFATE TAB 325 MG (65 MG ELEMENTAL FE) 325 MG: 325 (65 FE) TAB at 08:20

## 2019-03-03 RX ADMIN — IOPAMIDOL 75 ML: 612 INJECTION, SOLUTION INTRAVENOUS at 21:55

## 2019-03-03 RX ADMIN — POTASSIUM CHLORIDE 40 MEQ: 750 CAPSULE, EXTENDED RELEASE ORAL at 15:16

## 2019-03-03 RX ADMIN — INSULIN LISPRO 2 UNITS: 100 INJECTION, SOLUTION INTRAVENOUS; SUBCUTANEOUS at 11:31

## 2019-03-03 NOTE — PROGRESS NOTES
[facility]  [phone]  Suresh Hernandez MD    Subjective:  LOS: 3    He continues to require extremely high flows oxygen.  He was given additional IV Lasix.  This morning I gave him Diamox for high bicarb.  He denies subjective dyspnea.  Still requiring high flow oxygen.    He had some nosebleed last night.  Thereafter he is coughed up some blood.  He believes as do I that this is likely from the nosebleed caused by high flow oxygen.    Objective   Vital Signs past 24hrs    Temp range: Temp (24hrs), Av.7 °F (36.5 °C), Min:97.3 °F (36.3 °C), Max:98.5 °F (36.9 °C)    BP range: BP: (119-133)/(72-81) 125/79  Pulse range: Heart Rate:  [79-85] 84  Resp rate range: Resp:  [14-16] 16    Device (Oxygen Therapy): high-flow nasal cannula;humidifiedFlow (L/min):  [12-15] 15  Oxygen range:SpO2:  [90 %-94 %] 92 %      104 kg (228 lb 8 oz); Body mass index is 29.34 kg/m².    Intake/Output Summary (Last 24 hours) at 3/3/2019 1332  Last data filed at 3/3/2019 1230  Gross per 24 hour   Intake 1618 ml   Output 1300 ml   Net 318 ml       Physical Exam   Cardiovascular: Normal rate and regular rhythm.   No murmur heard.  Pulmonary/Chest: He has no wheezes. He has rales in the right lower field and the left lower field.   Abdominal: Soft. Bowel sounds are normal. There is no tenderness.   PEG +   Musculoskeletal: He exhibits no edema.   Neurological: He is alert.   Nursing note and vitals reviewed.    Results Review:    I have reviewed the laboratory and imaging data since the last note by [facility] physician.  My annotations are noted in assessment and plan.    Medication Review:  I have reviewed the current MAR.  My annotations are noted in assessment and plan.       Plan   PCCM Problems  Acute respiratory failure  Acute decompensated diastolic HF  COPD, no obvious exacerbation  Throat cancer s/p chemo RT  Dysphagia - PEG  ILD ??  Relevant Medical Diagnoses  Afib, on AC  CAD  Obesity    Plan of Treatment  Has gotten Lasix  and Diamox.  Significant urination.  Still with severe hypoxia.  I will proceed with CT of the chest with IV contrast.    Chest x-ray today reviewed.  Right lower lobe patchy infiltrate is again seen.  Unclear of significance with negative pro calcitonin.  CT chest.    Patient is taking sips of water.  I would like to see a swallow evaluation so patient can continue to take sips of water.  He remains with a PEG tube and feeds.  Majority of his intake as well as medicines go down his PEG tube.    I have previously considered the diagnosis of interstitial lung disease on this patient.  However his infiltrates substantially improved with diuresis.  Obviously not typical for interstitial lung disease.    He remains on Xarelto for his A. Fib. Was on hold for PEG tube replacement. Hold today and resume tomorrow if no further significant hemoptysis or epistaxis.    His PEG tube was replaced / fixed by surgery.    I will get another cardiology opinion as to why he keeps decompensating.  ? Evaluate for shunt.    Replace lytes.    Discussed with wife.    Suresh Hernandez MD  03/03/19  1:32 PM    Part of this note may be an electronic transcription/translation of spoken language to printed text using the Dragon Dictation System.

## 2019-03-03 NOTE — PLAN OF CARE
Problem: Patient Care Overview  Goal: Plan of Care Review  Outcome: Ongoing (interventions implemented as appropriate)   03/03/19 0354   Coping/Psychosocial   Plan of Care Reviewed With patient   Plan of Care Review   Progress no change   OTHER   Outcome Summary patient alert and oriented denies pain. up to the bathroom with large BM. patient with nose bleed with noted to be swallowed and coughed up with cough-monitored and seems to have stopped. patient continue to cough. pt on 15 liters high flow. no acute distress noted, will continue to monitor.     Goal: Individualization and Mutuality  Outcome: Ongoing (interventions implemented as appropriate)    Goal: Discharge Needs Assessment  Outcome: Ongoing (interventions implemented as appropriate)    Goal: Interprofessional Rounds/Family Conf  Outcome: Ongoing (interventions implemented as appropriate)      Problem: Fall Risk (Adult)  Goal: Absence of Fall  Outcome: Ongoing (interventions implemented as appropriate)      Problem: Breathing Pattern Ineffective (Adult)  Goal: Effective Oxygenation/Ventilation  Outcome: Ongoing (interventions implemented as appropriate)    Goal: Anxiety/Fear Reduction  Outcome: Ongoing (interventions implemented as appropriate)      Problem: Nutrition, Enteral (Adult)  Goal: Signs and Symptoms of Listed Potential Problems Will be Absent, Minimized or Managed (Nutrition, Enteral)  Outcome: Ongoing (interventions implemented as appropriate)

## 2019-03-03 NOTE — PLAN OF CARE
Problem: Breathing Pattern Ineffective (Adult)  Goal: Effective Oxygenation/Ventilation  Outcome: Ongoing (interventions implemented as appropriate)  Pt remains on 15 lpm high flow cannula, unable to wean at this time. Encouraged pt to deep breathe and cough.

## 2019-03-03 NOTE — CONSULTS
Inpatient General Surgery Consult  Consult performed by: Joseph Nickerson Jr., MD  Consult ordered by: Suresh Hernandez MD          Patient Care Team:  Alan Santana MD as PCP - General (Family Medicine)  Isael Beltre MD as PCP - Claims Attributed  Rao Maguire MD as Consulting Physician (Hematology and Oncology)  Alan Santana MD as Referring Physician (Family Medicine)    Chief complaint: G-tube malfunction    Subjective     History of Present Illness     The patient is a pleasant 78-year-old male with laryngeal cancer with the need for radical neck dissection.  He has COPD and was admitted on 2/28/2019 with cough and shortness of breath related to his COPD with respiratory failure.  He is requiring high flow oxygen for acute respiratory failure.    The patient has dysphagia and a long-standing PEG.  He has had difficulty with leakage from the PEG due to a pinhole defect.  Request was made for surgical consultation to address the leaking G-tube.    Review of Systems   Constitutional: Negative for chills and fever.   Respiratory: Positive for shortness of breath. Negative for chest tightness.    Cardiovascular: Negative for chest pain and palpitations.   Gastrointestinal: Negative for abdominal pain, blood in stool, constipation, diarrhea, nausea and vomiting.        Past Medical History:   Diagnosis Date   • Acute on chronic diastolic heart failure (CMS/HCC)    • Arthritis    • Asthma    • Atrial fibrillation (CMS/HCC)    • BPH (benign prostatic hypertrophy)    • Cancer (CMS/HCC)     throat CA   • COPD (chronic obstructive pulmonary disease) (CMS/HCC)    • H/O Abnormal CBC 2017   • History of heart artery stent 03/2017   • Hyperlipidemia    • Hypertension    • Neuropathy     feet and hands    • Pneumonia    ,   Past Surgical History:   Procedure Laterality Date   • BRONCHOSCOPY N/A 4/7/2018    Procedure: BRONCHOSCOPY with specimens;  Surgeon: Suresh Hernandez MD;  Location: [facility] OR;  Service: Pulmonary   •  CARDIAC CATHETERIZATION N/A 2018    Procedure: Right Heart Cath with possible vasodilator study;  Surgeon: Torey Schuler MD;  Location:  [facility];  Service: Cardiovascular   • COLONOSCOPY  2016    polyps   • FRACTURE SURGERY     • PACEMAKER IMPLANTATION     ,   Family History   Problem Relation Age of Onset   • Hypertension Mother    • Heart attack Father    ,   Social History     Tobacco Use   • Smoking status: Former Smoker     Packs/day: 1.50     Years: 45.00     Pack years: 67.50     Types: Cigarettes     Last attempt to quit: 1/3/2001     Years since quittin.1   • Smokeless tobacco: Never Used   Substance Use Topics   • Alcohol use: No   • Drug use: No    and Allergies:  Lisinopril; Penicillins; Sulfa antibiotics; Atenolol; Coreg [carvedilol]; Ibuprofen; and Celebrex [celecoxib]    Objective      Vital Signs  Temp:  [97.3 °F (36.3 °C)-98.5 °F (36.9 °C)] 97.3 °F (36.3 °C)  Heart Rate:  [79-85] 79  Resp:  [14-16] 16  BP: (119-133)/(72-81) 125/79    Physical Exam   Constitutional: He is oriented to person, place, and time. He appears well-developed and well-nourished.  Non-toxic appearance.   Abdominal: Soft. Normal appearance. There is no tenderness.   There is a left upper quadrant PEG with no evidence of infection.  There is a pinhole defect in the distal tube with drainage.   Neurological: He is alert and oriented to person, place, and time.   Skin: Skin is warm and dry.   Psychiatric: He has a normal mood and affect. His behavior is normal. Judgment and thought content normal.       Results Review:    I reviewed the patient's new clinical results.        Assessment/Plan       Acute respiratory failure with hypoxia (CMS/HCC)    Attention to G-tube (CMS/HCC)      Assessment & Plan     1.  G-tube malfunction: The patient has a pinhole leak in the G-tube and the distal tubing.  The distal tubing was cut off and the tip of the G-tube was replaced onto the end of the  shortened tubing.  There should be no further drainage.  The tubing has adequate length for proper use.  I will sign off but remain available if needed.    I discussed the patients findings and my recommendations with patient and nursing staff    Joseph Nickerson Jr., MD  03/03/19  9:00 AM

## 2019-03-03 NOTE — PLAN OF CARE
Problem: Patient Care Overview  Goal: Plan of Care Review  Outcome: Ongoing (interventions implemented as appropriate)   03/03/19 6503   Coping/Psychosocial   Plan of Care Reviewed With patient   Plan of Care Review   Progress no change   OTHER   Outcome Summary Patient still requiring large amounts of oxygen. Coughed up large blood clot today and had a nose bleed last night.Will hold xarelto today. Orders for CT and cardiology consult. Will continue to monitor and assess.

## 2019-03-03 NOTE — NURSING NOTE
Patient with cough on 15 L/highflow. Earlier in shift patient with nose bleed, it resolved. Patient noted to have blood coming out of his mouth with cough. Inspected mouth and some blood is noted in the back of his throat. Called respiratory therapy and with the oxygen being humidified there is nothing they can do. Placed call to Dr Sarabia at [phone] to answering service, waiting for return call at this time.

## 2019-03-03 NOTE — SIGNIFICANT NOTE
PT coughed up large pljug/clot of blood post treatment. PT stated he had had a nose bleed this am and feels it is a result of that. RN made aware.

## 2019-03-04 ENCOUNTER — APPOINTMENT (OUTPATIENT)
Dept: CARDIOLOGY | Facility: HOSPITAL | Age: 79
End: 2019-03-04

## 2019-03-04 ENCOUNTER — ANESTHESIA (OUTPATIENT)
Dept: GASTROENTEROLOGY | Facility: HOSPITAL | Age: 79
End: 2019-03-04

## 2019-03-04 ENCOUNTER — ANESTHESIA EVENT (OUTPATIENT)
Dept: GASTROENTEROLOGY | Facility: HOSPITAL | Age: 79
End: 2019-03-04

## 2019-03-04 LAB
ANION GAP SERPL CALCULATED.3IONS-SCNC: 13.7 MMOL/L
AORTIC DIMENSIONLESS INDEX: 0.6 (DI)
BH CV ECHO MEAS - ACS: 2 CM
BH CV ECHO MEAS - AO MAX PG (FULL): 14.3 MMHG
BH CV ECHO MEAS - AO MAX PG: 20.6 MMHG
BH CV ECHO MEAS - AO MEAN PG (FULL): 7 MMHG
BH CV ECHO MEAS - AO MEAN PG: 10 MMHG
BH CV ECHO MEAS - AO ROOT AREA (BSA CORRECTED): 1.6
BH CV ECHO MEAS - AO ROOT AREA: 10.2 CM^2
BH CV ECHO MEAS - AO ROOT DIAM: 3.6 CM
BH CV ECHO MEAS - AO V2 MAX: 227 CM/SEC
BH CV ECHO MEAS - AO V2 MEAN: 143 CM/SEC
BH CV ECHO MEAS - AO V2 VTI: 32.7 CM
BH CV ECHO MEAS - ASC AORTA: 4.4 CM
BH CV ECHO MEAS - AVA(I,A): 2.4 CM^2
BH CV ECHO MEAS - AVA(I,D): 2.4 CM^2
BH CV ECHO MEAS - AVA(V,A): 2.1 CM^2
BH CV ECHO MEAS - AVA(V,D): 2.1 CM^2
BH CV ECHO MEAS - BSA(HAYCOCK): 2.3 M^2
BH CV ECHO MEAS - BSA: 2.3 M^2
BH CV ECHO MEAS - BZI_BMI: 29 KILOGRAMS/M^2
BH CV ECHO MEAS - BZI_METRIC_HEIGHT: 188 CM
BH CV ECHO MEAS - BZI_METRIC_WEIGHT: 102.5 KG
BH CV ECHO MEAS - EDV(CUBED): 227 ML
BH CV ECHO MEAS - EDV(MOD-SP2): 110 ML
BH CV ECHO MEAS - EDV(MOD-SP4): 137 ML
BH CV ECHO MEAS - EDV(TEICH): 186.9 ML
BH CV ECHO MEAS - EF(CUBED): 59.9 %
BH CV ECHO MEAS - EF(MOD-BP): 58 %
BH CV ECHO MEAS - EF(MOD-SP2): 55.5 %
BH CV ECHO MEAS - EF(MOD-SP4): 57.7 %
BH CV ECHO MEAS - EF(TEICH): 50.5 %
BH CV ECHO MEAS - ESV(CUBED): 91.1 ML
BH CV ECHO MEAS - ESV(MOD-SP2): 49 ML
BH CV ECHO MEAS - ESV(MOD-SP4): 58 ML
BH CV ECHO MEAS - ESV(TEICH): 92.4 ML
BH CV ECHO MEAS - FS: 26.2 %
BH CV ECHO MEAS - IVS/LVPW: 1
BH CV ECHO MEAS - IVSD: 1.1 CM
BH CV ECHO MEAS - LAT PEAK E' VEL: 11 CM/SEC
BH CV ECHO MEAS - LV DIASTOLIC VOL/BSA (35-75): 59.9 ML/M^2
BH CV ECHO MEAS - LV MASS(C)D: 287.5 GRAMS
BH CV ECHO MEAS - LV MASS(C)DI: 125.6 GRAMS/M^2
BH CV ECHO MEAS - LV MAX PG: 6.4 MMHG
BH CV ECHO MEAS - LV MEAN PG: 3 MMHG
BH CV ECHO MEAS - LV SYSTOLIC VOL/BSA (12-30): 25.3 ML/M^2
BH CV ECHO MEAS - LV V1 MAX: 126 CM/SEC
BH CV ECHO MEAS - LV V1 MEAN: 78.5 CM/SEC
BH CV ECHO MEAS - LV V1 VTI: 20.5 CM
BH CV ECHO MEAS - LVIDD: 6.1 CM
BH CV ECHO MEAS - LVIDS: 4.5 CM
BH CV ECHO MEAS - LVLD AP2: 7.7 CM
BH CV ECHO MEAS - LVLD AP4: 8.3 CM
BH CV ECHO MEAS - LVLS AP2: 7.4 CM
BH CV ECHO MEAS - LVLS AP4: 7.4 CM
BH CV ECHO MEAS - LVOT AREA (M): 3.8 CM^2
BH CV ECHO MEAS - LVOT AREA: 3.8 CM^2
BH CV ECHO MEAS - LVOT DIAM: 2.2 CM
BH CV ECHO MEAS - LVPWD: 1.1 CM
BH CV ECHO MEAS - MED PEAK E' VEL: 9 CM/SEC
BH CV ECHO MEAS - MR MAX PG: 110.7 MMHG
BH CV ECHO MEAS - MR MAX VEL: 526 CM/SEC
BH CV ECHO MEAS - MV DEC SLOPE: 610 CM/SEC^2
BH CV ECHO MEAS - MV DEC TIME: 0.2 SEC
BH CV ECHO MEAS - MV E MAX VEL: 99 CM/SEC
BH CV ECHO MEAS - MV MAX PG: 5.6 MMHG
BH CV ECHO MEAS - MV MEAN PG: 2 MMHG
BH CV ECHO MEAS - MV P1/2T MAX VEL: 118 CM/SEC
BH CV ECHO MEAS - MV P1/2T: 56.7 MSEC
BH CV ECHO MEAS - MV V2 MAX: 118 CM/SEC
BH CV ECHO MEAS - MV V2 MEAN: 60.4 CM/SEC
BH CV ECHO MEAS - MV V2 VTI: 26 CM
BH CV ECHO MEAS - MVA P1/2T LCG: 1.9 CM^2
BH CV ECHO MEAS - MVA(P1/2T): 3.9 CM^2
BH CV ECHO MEAS - MVA(VTI): 3 CM^2
BH CV ECHO MEAS - PA ACC TIME: 0.09 SEC
BH CV ECHO MEAS - PA MAX PG (FULL): 7.3 MMHG
BH CV ECHO MEAS - PA MAX PG: 8.3 MMHG
BH CV ECHO MEAS - PA PR(ACCEL): 38.5 MMHG
BH CV ECHO MEAS - PA V2 MAX: 144 CM/SEC
BH CV ECHO MEAS - PULM DIAS VEL: 62.5 CM/SEC
BH CV ECHO MEAS - PULM S/D: 0.58
BH CV ECHO MEAS - PULM SYS VEL: 36.5 CM/SEC
BH CV ECHO MEAS - PVA(V,A): 2 CM^2
BH CV ECHO MEAS - PVA(V,D): 2 CM^2
BH CV ECHO MEAS - QP/QS: 0.66
BH CV ECHO MEAS - RAP SYSTOLE: 8 MMHG
BH CV ECHO MEAS - RV MAX PG: 1 MMHG
BH CV ECHO MEAS - RV MEAN PG: 0 MMHG
BH CV ECHO MEAS - RV V1 MAX: 50.2 CM/SEC
BH CV ECHO MEAS - RV V1 MEAN: 31.2 CM/SEC
BH CV ECHO MEAS - RV V1 VTI: 9 CM
BH CV ECHO MEAS - RVOT AREA: 5.7 CM^2
BH CV ECHO MEAS - RVOT DIAM: 2.7 CM
BH CV ECHO MEAS - RVSP: 53.4 MMHG
BH CV ECHO MEAS - SI(AO): 145.4 ML/M^2
BH CV ECHO MEAS - SI(CUBED): 59.4 ML/M^2
BH CV ECHO MEAS - SI(LVOT): 34 ML/M^2
BH CV ECHO MEAS - SI(MOD-SP2): 26.6 ML/M^2
BH CV ECHO MEAS - SI(MOD-SP4): 34.5 ML/M^2
BH CV ECHO MEAS - SI(TEICH): 41.3 ML/M^2
BH CV ECHO MEAS - SV(AO): 332.8 ML
BH CV ECHO MEAS - SV(CUBED): 135.9 ML
BH CV ECHO MEAS - SV(LVOT): 77.9 ML
BH CV ECHO MEAS - SV(MOD-SP2): 61 ML
BH CV ECHO MEAS - SV(MOD-SP4): 79 ML
BH CV ECHO MEAS - SV(RVOT): 51.2 ML
BH CV ECHO MEAS - SV(TEICH): 94.5 ML
BH CV ECHO MEAS - TAPSE (>1.6): 1.6 CM2
BH CV ECHO MEAS - TR MAX VEL: 337 CM/SEC
BH CV ECHO MEASUREMENTS AVERAGE E/E' RATIO: 9.9
BH CV VAS BP LEFT ARM: NORMAL MMHG
BH CV XLRA - RV BASE: 5.2 CM
BH CV XLRA - TDI S': 11 CM/SEC
BUN BLD-MCNC: 31 MG/DL (ref 8–23)
BUN/CREAT SERPL: 31.3 (ref 7–25)
CALCIUM SPEC-SCNC: 9.9 MG/DL (ref 8.6–10.5)
CHLORIDE SERPL-SCNC: 96 MMOL/L (ref 98–107)
CO2 SERPL-SCNC: 32.3 MMOL/L (ref 22–29)
CREAT BLD-MCNC: 0.99 MG/DL (ref 0.76–1.27)
DEPRECATED RDW RBC AUTO: 51.4 FL (ref 37–54)
ERYTHROCYTE [DISTWIDTH] IN BLOOD BY AUTOMATED COUNT: 13.9 % (ref 12.3–15.4)
GFR SERPL CREATININE-BSD FRML MDRD: 73 ML/MIN/1.73
GLUCOSE BLD-MCNC: 115 MG/DL (ref 65–99)
GLUCOSE BLDC GLUCOMTR-MCNC: 111 MG/DL (ref 70–130)
GLUCOSE BLDC GLUCOMTR-MCNC: 151 MG/DL (ref 70–130)
GLUCOSE BLDC GLUCOMTR-MCNC: 174 MG/DL (ref 70–130)
GLUCOSE BLDC GLUCOMTR-MCNC: 203 MG/DL (ref 70–130)
HCT VFR BLD AUTO: 42.3 % (ref 37.5–51)
HGB BLD-MCNC: 12.7 G/DL (ref 13–17.7)
LEFT ATRIUM VOLUME INDEX: 51 ML/M2
LEFT ATRIUM VOLUME: 110 CM3
MAGNESIUM SERPL-MCNC: 1.4 MG/DL (ref 1.6–2.4)
MAXIMAL PREDICTED HEART RATE: 143 BPM
MCH RBC QN AUTO: 29.7 PG (ref 26.6–33)
MCHC RBC AUTO-ENTMCNC: 30 G/DL (ref 31.5–35.7)
MCV RBC AUTO: 99.1 FL (ref 79–97)
NT-PROBNP SERPL-MCNC: 875.8 PG/ML (ref 0–1800)
PHOSPHATE SERPL-MCNC: 3.1 MG/DL (ref 2.5–4.5)
PLATELET # BLD AUTO: 201 10*3/MM3 (ref 140–450)
PMV BLD AUTO: 10 FL (ref 6–12)
POTASSIUM BLD-SCNC: 3.9 MMOL/L (ref 3.5–5.2)
RBC # BLD AUTO: 4.27 10*6/MM3 (ref 4.14–5.8)
SODIUM BLD-SCNC: 142 MMOL/L (ref 136–145)
STRESS TARGET HR: 122 BPM
WBC NRBC COR # BLD: 10.08 10*3/MM3 (ref 3.4–10.8)

## 2019-03-04 PROCEDURE — 94799 UNLISTED PULMONARY SVC/PX: CPT

## 2019-03-04 PROCEDURE — 82962 GLUCOSE BLOOD TEST: CPT

## 2019-03-04 PROCEDURE — 83735 ASSAY OF MAGNESIUM: CPT | Performed by: INTERNAL MEDICINE

## 2019-03-04 PROCEDURE — 85027 COMPLETE CBC AUTOMATED: CPT | Performed by: INTERNAL MEDICINE

## 2019-03-04 PROCEDURE — 25010000002 MAGNESIUM SULFATE IN D5W 1G/100ML (PREMIX) 1-5 GM/100ML-% SOLUTION: Performed by: INTERNAL MEDICINE

## 2019-03-04 PROCEDURE — 84100 ASSAY OF PHOSPHORUS: CPT | Performed by: INTERNAL MEDICINE

## 2019-03-04 PROCEDURE — 83880 ASSAY OF NATRIURETIC PEPTIDE: CPT | Performed by: INTERNAL MEDICINE

## 2019-03-04 PROCEDURE — 99221 1ST HOSP IP/OBS SF/LOW 40: CPT | Performed by: INTERNAL MEDICINE

## 2019-03-04 PROCEDURE — 80048 BASIC METABOLIC PNL TOTAL CA: CPT | Performed by: INTERNAL MEDICINE

## 2019-03-04 PROCEDURE — 63710000001 INSULIN LISPRO (HUMAN) PER 5 UNITS: Performed by: INTERNAL MEDICINE

## 2019-03-04 PROCEDURE — 93306 TTE W/DOPPLER COMPLETE: CPT

## 2019-03-04 PROCEDURE — 93306 TTE W/DOPPLER COMPLETE: CPT | Performed by: INTERNAL MEDICINE

## 2019-03-04 RX ORDER — MAGNESIUM SULFATE 1 G/100ML
2 INJECTION INTRAVENOUS
Status: DISCONTINUED | OUTPATIENT
Start: 2019-03-04 | End: 2019-03-04 | Stop reason: SDUPTHER

## 2019-03-04 RX ORDER — MAGNESIUM SULFATE 1 G/100ML
1 INJECTION INTRAVENOUS
Status: COMPLETED | OUTPATIENT
Start: 2019-03-04 | End: 2019-03-04

## 2019-03-04 RX ADMIN — IPRATROPIUM BROMIDE AND ALBUTEROL SULFATE 3 ML: 2.5; .5 SOLUTION RESPIRATORY (INHALATION) at 11:27

## 2019-03-04 RX ADMIN — ASPIRIN 81 MG: 81 TABLET, DELAYED RELEASE ORAL at 12:39

## 2019-03-04 RX ADMIN — IPRATROPIUM BROMIDE AND ALBUTEROL SULFATE 3 ML: 2.5; .5 SOLUTION RESPIRATORY (INHALATION) at 21:17

## 2019-03-04 RX ADMIN — IPRATROPIUM BROMIDE AND ALBUTEROL SULFATE 3 ML: 2.5; .5 SOLUTION RESPIRATORY (INHALATION) at 15:24

## 2019-03-04 RX ADMIN — MAGNESIUM SULFATE HEPTAHYDRATE 1 G: 1 INJECTION, SOLUTION INTRAVENOUS at 11:25

## 2019-03-04 RX ADMIN — MAGNESIUM SULFATE HEPTAHYDRATE 1 G: 1 INJECTION, SOLUTION INTRAVENOUS at 09:00

## 2019-03-04 RX ADMIN — INSULIN LISPRO 4 UNITS: 100 INJECTION, SOLUTION INTRAVENOUS; SUBCUTANEOUS at 17:33

## 2019-03-04 RX ADMIN — MONTELUKAST SODIUM 10 MG: 10 TABLET, FILM COATED ORAL at 20:07

## 2019-03-04 RX ADMIN — MAGNESIUM SULFATE HEPTAHYDRATE 1 G: 1 INJECTION, SOLUTION INTRAVENOUS at 12:37

## 2019-03-04 RX ADMIN — INSULIN LISPRO 2 UNITS: 100 INJECTION, SOLUTION INTRAVENOUS; SUBCUTANEOUS at 21:05

## 2019-03-04 RX ADMIN — IPRATROPIUM BROMIDE AND ALBUTEROL SULFATE 3 ML: 2.5; .5 SOLUTION RESPIRATORY (INHALATION) at 07:28

## 2019-03-04 RX ADMIN — ATORVASTATIN CALCIUM 10 MG: 10 TABLET, FILM COATED ORAL at 12:39

## 2019-03-04 RX ADMIN — LANSOPRAZOLE 30 MG: KIT at 05:05

## 2019-03-04 RX ADMIN — INSULIN LISPRO 2 UNITS: 100 INJECTION, SOLUTION INTRAVENOUS; SUBCUTANEOUS at 12:40

## 2019-03-04 RX ADMIN — MAGNESIUM SULFATE HEPTAHYDRATE 1 G: 1 INJECTION, SOLUTION INTRAVENOUS at 10:22

## 2019-03-04 NOTE — PROGRESS NOTES
[facility]  [phone]  Suresh Hernandez MD    Subjective:  LOS: 4    Continued need for high flow oxygen.  Had CT chest yesterday.  Good diuresis with IV Lasix and Diamox.  He is back on his Xarelto.  He remains n.p.o. with a PEG tube.  He feels extremely weak.      Objective   Vital Signs past 24hrs    Temp range: Temp (24hrs), Av.4 °F (36.3 °C), Min:97 °F (36.1 °C), Max:97.6 °F (36.4 °C)    BP range: BP: (113-123)/(66-86) 113/86  Pulse range: Heart Rate:  [79-92] 82  Resp rate range: Resp:  [16-22] 16    Device (Oxygen Therapy): high-flow nasal cannula;humidifiedFlow (L/min):  [15] 15  Oxygen range:SpO2:  [88 %-94 %] 91 %      103 kg (226 lb 6.6 oz); Body mass index is 29.07 kg/m².    Intake/Output Summary (Last 24 hours) at 3/4/2019 0902  Last data filed at 3/4/2019 0523  Gross per 24 hour   Intake 692 ml   Output 2500 ml   Net -1808 ml       Physical Exam   Cardiovascular: Normal rate and regular rhythm.   No murmur heard.  Pulmonary/Chest: He has no wheezes. He has rales in the right lower field and the left lower field.   Abdominal: Soft. Bowel sounds are normal. There is no tenderness.   PEG +   Musculoskeletal: He exhibits no edema.   Neurological: He is alert.   Nursing note and vitals reviewed.    Results Review:    I have reviewed the laboratory and imaging data since the last note by [facility] physician.  My annotations are noted in assessment and plan.    Medication Review:  I have reviewed the current MAR.  My annotations are noted in assessment and plan.       Plan   PCCM Problems  Acute respiratory failure  Acute decompensated diastolic HF  COPD, no obvious exacerbation  Throat cancer s/p chemo RT  Dysphagia - PEG  ILD ??  Relevant Medical Diagnoses  Afib, on AC  CAD  Obesity    Plan of Treatment  Has gotten Lasix and Diamox.  Significant volume out.  Still with severe hypoxia.  BUN is now up.  Hold on further diuresis.    CT chest shows bibasilar infiltrates.  Could be continued  aspiration despite PEG tube feeding tube.  His pro calcitonin and white cell count are normal.  I will check a bronchoscopy today for cultures and BAL only, consider biopsy.  Last dose Xarelto was 3-19.    CT neck was reviewed. Subtle inflammation of right nasopharynx. Opacification of left sphenoid sinus. Will review with Dr. Merrill later.    Patient is taking sips of water.  I would like to see a swallow evaluation so patient can continue to take sips of water.  He remains with a PEG tube and feeds.  Majority of his intake as well as medicines go down his PEG tube. Schedule for tomorrow.    I have previously considered the diagnosis of interstitial lung disease on this patient.  However his infiltrates substantially improved with diuresis.  Not typical for interstitial lung disease.    He remains on Xarelto for his A. Fib. Was on hold for PEG tube replacement. On hold since 3/2/19. Risk of bleeding should be minimal.    His PEG tube was replaced / fixed by surgery.    I will get another cardiology opinion as to why he keeps decompensating.  ? Evaluate for shunt.    Replace lytes.    Discussed with wife/friend.    Suresh Hernandez MD  03/04/19  9:02 AM    Part of this note may be an electronic transcription/translation of spoken language to printed text using the Dragon Dictation System.

## 2019-03-04 NOTE — PLAN OF CARE
Problem: Patient Care Overview  Goal: Plan of Care Review  Outcome: Ongoing (interventions implemented as appropriate)   03/04/19 2416   Coping/Psychosocial   Plan of Care Reviewed With patient   Plan of Care Review   Progress no change   OTHER   Outcome Summary patient remains on 15L high flow nasal cannula overnight. CT chest and neck completed overnight per order. no blood clots/nose bleeds noted this shift-xarelto on hold. cardiology consulted to see this am. continue IV lasix as ordered-700ml urine output thus far in shift. patient became angry when staff took away water at bedside stating he has been told he can have sips of water for dry mouth, staff educated pt on NPO order until speech eval to r/o aspiration issues. Oral care supplies offered and pt refused. meds per PEG tube. Slept between care. No falls. Am labs. ctm closely      Goal: Individualization and Mutuality  Outcome: Ongoing (interventions implemented as appropriate)    Goal: Discharge Needs Assessment  Outcome: Ongoing (interventions implemented as appropriate)      Problem: Fall Risk (Adult)  Goal: Absence of Fall  Outcome: Ongoing (interventions implemented as appropriate)      Problem: Breathing Pattern Ineffective (Adult)  Goal: Anxiety/Fear Reduction  Outcome: Ongoing (interventions implemented as appropriate)      Problem: Nutrition, Enteral (Adult)  Goal: Signs and Symptoms of Listed Potential Problems Will be Absent, Minimized or Managed (Nutrition, Enteral)  Outcome: Ongoing (interventions implemented as appropriate)      Problem: Skin Injury Risk (Adult)  Goal: Skin Health and Integrity  Outcome: Ongoing (interventions implemented as appropriate)

## 2019-03-04 NOTE — CONSULTS
[facility] Consult    Patient Name: Alesiso Allen  :1940  78 y.o.    Date of Admission: 2019  Date of Consultation:  19  Encounter Provider: Marizol Holt MD  Place of Service: [facility] CARDIOLOGY  Referring Provider: Chani Kong MD  Patient Care Team:  Alan Santana MD as PCP - General (Family Medicine)  Isael Beltre MD as PCP - Claims Attributed  Rao Maguire MD as Consulting Physician (Hematology and Oncology)  Alan Santana MD as Referring Physician (Family Medicine)      Chief complaint: Hypoxia    History of Present Illness: Mr. Allen is a 78 year old with a history of COPD (4L home O2), hypertension, paroxsymal atrial fibrillation on chronic anticoagulation on rivaroxaban, status post permanent pacemaker placement, and diabetes mellitus type 2, throat cancer status post chemo and radiation therapy, who is admitted for acute hypoxic respiratory failure.    He was seen by Dr. Kong in his office  for preoperative clearance for surgery of his head and neck cancer. He had reported increasing his oxygen to 8 liters due to shortness of breath on exertion at that visit and was subsequently admitted to the hospital.  A CT of his chest showed interval development of bilateral lower lobe infiltrates, concerning for pneumonia or interstitial fibrosis.  It also showed enlargement of the main and bilateral pulmonary arteries suggestive of pulmonary hypertension.  During his admission he has been treated with antibiotics and diuresis.  Despite this the patient continues to have high O2 requirements.      He was initially seen by me in 2016 for history of PAF, intermittent junctional and 2:1 AV Block. He was recommended anticoagulation for his atrial fibrillation. He followed wit his cardiologist in [place]( where he spent winter). He reported history of stent placement. He underwent pacemaker placement in 2017 by Dr. Miller.  It does not  appear that he has followed up with them since.   I saw him last in as a consult in 4/208 while he was admitted for treatment of COPD and resumed pneumonia . He was in volume overload and his diuretics were switched form po to IV for diuresis.  An echocardiogram at that time was unremarkable.       Previous Cardiac Testing:     Right Heart Cardiac Catheterization: 4/2018    Conclusions:  1.  Normal right and left-sided filling pressure  2.  Mild pulmonary hypertension      Venous Dopplers 3/2018    · Normal bilateral lower extremity venous duplex scan      Echo 2/2018    · The left ventricular cavity is mildly dilated.  · Left ventricular systolic function is normal. Calculated EF = 60%  · Left ventricular wall thickness is consistent with mild concentric hypertrophy.  · Right ventricular cavity is severely dilated.  · Left atrial cavity size is severely dilated  · Right atrial cavity size is severely dilated.  · There is mild aortic stenosis with a peak gradient of 25 mm Hg, and a mean gradient of 12 mm Hg.  · Mild mitral valve regurgitation is present  · Mild to moderate tricuspid valve regurgitation is present.  · Calculated right ventricular systolic pressure from tricuspid regurgitation is 54 mmHg.  · The ascending aorta is moderately dilated, measuring up to 4.4 cm. .  · There is no evidence of pericardial effusion.         Past Medical History:   Diagnosis Date   • Acute on chronic diastolic heart failure (CMS/HCC)    • Arthritis    • Asthma    • Atrial fibrillation (CMS/HCC)    • BPH (benign prostatic hypertrophy)    • Cancer (CMS/HCC)     throat CA   • COPD (chronic obstructive pulmonary disease) (CMS/HCC)    • H/O Abnormal CBC 2017   • History of heart artery stent 03/2017   • Hyperlipidemia    • Hypertension    • Neuropathy     feet and hands    • Pneumonia        Past Surgical History:   Procedure Laterality Date   • BRONCHOSCOPY N/A 4/7/2018    Procedure: BRONCHOSCOPY with specimens;  Surgeon: Suresh  MD David;  Location: [facility] MAIN OR;  Service: Pulmonary   • CARDIAC CATHETERIZATION N/A 4/18/2018    Procedure: Right Heart Cath with possible vasodilator study;  Surgeon: Torey Schuler MD;  Location: [facility] CATH INVASIVE LOCATION;  Service: Cardiovascular   • COLONOSCOPY  12/05/2016    polyps   • FRACTURE SURGERY     • PACEMAKER IMPLANTATION           Prior to Admission medications    Medication Sig Start Date End Date Taking? Authorizing Provider   acetaminophen (TYLENOL) 325 MG tablet Take 2 tablets by mouth Every 6 (Six) Hours As Needed (prn for pain). 6/12/17  Yes Alan Santana MD   albuterol (PROVENTIL HFA;VENTOLIN HFA) 108 (90 Base) MCG/ACT inhaler Inhale 2 puffs Every 6 (Six) Hours As Needed for Wheezing or Shortness of Air. 3/24/18  Yes Kalpesh Jimenez MD   amLODIPine (NORVASC) 5 MG tablet Take 5 mg by mouth Every Night.   Yes ProviderNorberto MD   aspirin 81 MG tablet Take 81 mg by mouth Daily.   Yes ProviderNorberto MD   Cetirizine HCl (ZYRTEC ALLERGY) 10 MG capsule Take 1 capsule by mouth Every Night.   Yes ProviderNorberto MD   ferrous sulfate 325 (65 FE) MG tablet Take 1 tablet by mouth Daily With Breakfast. 2/23/18  Yes Alan Santana MD   Fluticasone-Umeclidin-Vilant (TRELEGY ELLIPTA) 100-62.5-25 MCG/INH aerosol powder  Inhale 1 each Daily. 1/18/19  Yes Alan Santana MD   furosemide (LASIX) 20 MG tablet Take 2 tablets by mouth 2 (Two) Times a Day. 5/3/18  Yes Luly Nickerson APRN   hydrocortisone 1 % cream Apply 1 application topically to the appropriate area as directed 2 (Two) Times a Day As Needed for Rash. Chest   Yes Norberto Zazueta MD   magic mouthwash oral suspension Swish and spit 5 mL Every 4 (Four) Hours As Needed.   Yes Norberto Zazueta MD   montelukast (SINGULAIR) 10 MG tablet Take 10 mg by mouth Every Night.   Yes Norberto Zazueta MD   olopatadine (PATANOL) 0.1 % ophthalmic solution Administer 1 drop to both eyes daily. 8/15/16  Yes Alan Santana  MD KATHY   pantoprazole (PROTONIX) 40 MG pack packet Take 40 mg by mouth Every Morning Before Breakfast. PEG tube   Yes Norberto Zazueta MD   pilocarpine (SALAGEN) 5 MG tablet Take 5 mg by mouth 3 (Three) Times a Day.   Yes Norberto Zazueta MD   polyvinyl alcohol (LIQUIFILM) 1.4 % ophthalmic solution 1 drop As Needed for Dry Eyes.   Yes Norberto Zazueta MD   potassium chloride (K-DUR) 10 MEQ CR tablet Take 2 tablets by mouth Daily. 2/23/18  Yes Alan Santana MD   pregabalin (LYRICA) 75 MG capsule Take 75 mg by mouth 2 (two) times a day.   Yes Norberto Zazueta MD   pyridoxine (VITAMIN B-6) 50 MG tablet Take  by mouth daily. 6/4/14  Yes Norberto Zazueta MD   simvastatin (ZOCOR) 20 MG tablet Take 0.5 tablets by mouth daily. 6/4/14  Yes Norberto Zazueta MD   sodium chloride (OCEAN NASAL SPRAY) 0.65 % nasal spray 2 sprays into each nostril As Needed for Congestion (qid prn). May refill q 21 days 12/14/17  Yes Alan Santana MD   Spacer/Aero-Holding Chambers device Give spacer to use with his inhalers whichever brand he has received is fine 8/3/17  Yes Alan Santana MD   XARELTO 20 MG tablet Take 20 mg by mouth Daily. 11/20/17  Yes Norberto Zazueta MD   acyclovir (ZOVIRAX) 800 MG tablet Take 1 tablet (800 mg total) by mouth daily as needed (prn daily as needed fever blisters) 6/30/16   Alan Santana MD   azithromycin (ZITHROMAX) 250 MG tablet Take 2 tablets the first day, then 1 tablet daily for 4 days. 6/1/18   Luly Nickerson, APRN   budesonide-formoterol (SYMBICORT) 160-4.5 MCG/ACT inhaler Inhale 2 puffs 2 (Two) Times a Day. 8/3/17   Alan Santana MD   CloNIDine (CATAPRES) 0.1 MG tablet Take 1 tablet by mouth Every 12 (Twelve) Hours. 11/12/18   Alan Santana MD   Desoximetasone 0.05 % ointment Apply to affected area bid 12/23/16   Alan Santana MD   dexamethasone (DECADRON) 4 MG tablet Take 4 mg by mouth 2 (Two) Times a Day With Meals.    Provider, MD Norberto   fluocinolone  (SYNALAR) 0.01 % external solution Apply  topically As Needed (prn). 7/16/18   Alan Santana MD   ipratropium-albuterol (DUO-NEB) 0.5-2.5 mg/3 ml nebulizer Take 3 mL by nebulization Every 4 (Four) Hours As Needed for Wheezing.    Norberto Zazueta MD   Multiple Vitamin (MULTI VITAMIN DAILY) tablet Take  by mouth. 8/6/14   Norberto Zazueta MD   NIFEdipine XL (PROCARDIA XL) 30 MG 24 hr tablet Take  by mouth daily. 8/6/14   Norberto Zazueta MD   Omega-3 Fatty Acids (FISH OIL) 1000 MG capsule capsule Take 1,000 mg by mouth daily with breakfast.    Norberto Zazueta MD   omeprazole (PRILOSEC) 20 MG capsule Take 1 capsule by mouth Daily. 11/27/17   Alan Santana MD   pramoxine-hydrocortisone 1-1 % rectal cream Insert 1 application into the rectum 2 (Two) Times a Day As Needed for Hemorrhoids.    Norberto Zazueta MD   sildenafil (VIAGRA) 100 MG tablet Take 1 tablet by mouth Daily As Needed for erectile dysfunction. 6/17/18   Alan Santana MD   tamsulosin (FLOMAX) 0.4 MG capsule 24 hr capsule Take 1 capsule by mouth Every Night. Patient needs the blue capsule 7/27/18   Alan Santana MD   Umeclidinium Bromide 62.5 MCG/INH aerosol powder  Inhale 1 puff Daily. 5/3/18   Luly Nickerson APRN       Allergies   Allergen Reactions   • Lisinopril Shortness Of Breath   • Penicillins Shortness Of Breath   • Sulfa Antibiotics Shortness Of Breath   • Atenolol Other (See Comments)     drowsiness   • Coreg [Carvedilol] Cough     SOA,   • Ibuprofen    • Celebrex [Celecoxib] Rash       Social History     Socioeconomic History   • Marital status:      Spouse name: Not on file   • Number of children: 2   • Years of education: Not on file   • Highest education level: Not on file   Occupational History     Employer: RETIRED   Tobacco Use   • Smoking status: Former Smoker     Packs/day: 1.50     Years: 45.00     Pack years: 67.50     Types: Cigarettes     Last attempt to quit: 1/3/2001     Years since quitting:  18.1   • Smokeless tobacco: Never Used   Substance and Sexual Activity   • Alcohol use: No   • Drug use: No   • Sexual activity: Defer       Family History   Problem Relation Age of Onset   • Hypertension Mother    • Heart attack Father        REVIEW OF SYSTEMS:   All systems reviewed.  Pertinent positives identified in HPI.  All other systems are negative.      Objective:     Vitals:    03/04/19 0600 03/04/19 0713 03/04/19 0728 03/04/19 0734   BP:  113/86     BP Location:  Left arm     Patient Position:  Lying     Pulse: 79 79 85 82   Resp: 22 22 16 16   Temp:  97 °F (36.1 °C)     TempSrc:  Oral     SpO2: 93% 93% 92% 91%   Weight: 103 kg (226 lb 6.6 oz)      Height:         Body mass index is 29.07 kg/m².    General Appearance:    Alert, cooperative, appears unwell   Head:    Normocephalic, without obvious abnormality, atraumatic   Eyes:            Lids and lashes normal, conjunctivae and sclerae normal, no   icterus, no pallor, corneas clear, PERRLA   Ears:    Ears appear intact with no abnormalities noted   Neck:   No adenopathy, supple, trachea midline, no thyromegaly, no   carotid bruit, no JVD   Lungs:     Clear to auscultation,respirations regular, even and unlabored    Heart:    Regular rhythm and normal rate, normal S1 and S2, no murmur, no gallop, no rub, no click   Chest Wall:    No abnormalities observed   Abdomen:     Normal bowel sounds, no masses, no organomegaly, soft        non-tender, non-distended, no guarding, no rebound  tenderness   Extremities:   Moves all extremities well, no edema, no cyanosis, no redness   Pulses:   Pulses palpable and equal bilaterally. Normal radial, carotid, femoral, dorsalis pedis and posterior tibial pulses bilaterally. Normal abdominal aorta   Skin:  Psychiatric:   No bleeding, bruising or rash    Alert and oriented x 3, normal mood and affect   Lab Review:     Results from last 7 days   Lab Units 03/04/19  0628  02/28/19  1243   SODIUM mmol/L 142   < > 134*    POTASSIUM mmol/L 3.9   < > 4.2   CHLORIDE mmol/L 96*   < > 93*   CO2 mmol/L 32.3*   < > 28.8   BUN mg/dL 31*   < > 16   CREATININE mg/dL 0.99   < > 0.95   CALCIUM mg/dL 9.9   < > 9.8   BILIRUBIN mg/dL  --   --  0.8   ALK PHOS U/L  --   --  84   ALT (SGPT) U/L  --   --  12   AST (SGOT) U/L  --   --  20   GLUCOSE mg/dL 115*   < > 91    < > = values in this interval not displayed.     Results from last 7 days   Lab Units 02/28/19  1243   TROPONIN T ng/mL <0.010     Results from last 7 days   Lab Units 03/04/19  0509   WBC 10*3/mm3 10.08   HEMOGLOBIN g/dL 12.7*   HEMATOCRIT % 42.3   PLATELETS 10*3/mm3 201         Results from last 7 days   Lab Units 03/04/19  0509   MAGNESIUM mg/dL 1.4*            9/2018              I personally viewed and interpreted the patient's EKG/Telemetry data.        Assessment and Plan:       1. Acute/chronic hypoxic respiratory failure.  No significant improvement despite treatment for pneumonia and diuresis.  Concern for cardiac shunt per pulmonary noted.   2. Bilateral pulmonary infiltrates.  Noted on CT yesterday.   3. Throat cancer.  Status post chemotherapy and radiation  4. PEG placement.  Some dysfunction that was addressed by surgery.  Plan for swallow study per pulmonary noted.   2. Paroxsymal atrial fibrillation. Paced rhythm with underlying atrial fibrillation.   Rivaroxaban on hold for bronchoscopy.   3. Status post permanent pacemaker placement.  No recent interrogations here or at [facility] noted.   4. COPD  5. Pulmonary hypertension  6. Coronary artery disease.  History of prior stent placement.   7. Ascending aortic aneurysm.  Stable on recent CT.     -  Will start with echocardiogram to re-evaluate function since it has been over a year since last study.  Will note be able to due agitated saline contrast study due to high O2 requirement.  Depending on that will determine if we need to consider a repeat right heart cath with shunt study.   -  Agree with holding diuresis for  now.   -  Plan for bronchoscopy today noted.    Marizol Holt MD  03/04/19  8:14 AM

## 2019-03-04 NOTE — PLAN OF CARE
Problem: Patient Care Overview  Goal: Plan of Care Review  Outcome: Ongoing (interventions implemented as appropriate)   03/04/19 7569   Coping/Psychosocial   Plan of Care Reviewed With patient;spouse   Plan of Care Review   Progress improving   OTHER   Outcome Summary a&o, VSS, afebrile,monitor tracing paced rhythm, O2 HF humidified at 15l in place with O2 sats 91-96%, lungs with coarse breath sounds on expiration, denies SOB, voiding qs in urinal johnna urine, appetite fair, no BM, pt was originally for bronchoscopy today, teaching done , consent and ed sheet in chart, NPO status maintained, procedure cancelled for today, given bolus feeds per orders and meds, SLP recalled to see pt, echo done per orders, Dr Hernandez at bedside in afternoon, O2 turned down to 6l HF by MD and O2 sats low 90s, MD also made aware pt c/o eye irritation and has drainage tan from left eye, wife at bedside since AM, monitored closely.     Goal: Individualization and Mutuality  Outcome: Ongoing (interventions implemented as appropriate)    Goal: Discharge Needs Assessment  Outcome: Ongoing (interventions implemented as appropriate)    Goal: Interprofessional Rounds/Family Conf  Outcome: Ongoing (interventions implemented as appropriate)      Problem: Fall Risk (Adult)  Goal: Absence of Fall  Outcome: Ongoing (interventions implemented as appropriate)      Problem: Breathing Pattern Ineffective (Adult)  Goal: Effective Oxygenation/Ventilation  Outcome: Ongoing (interventions implemented as appropriate)    Goal: Anxiety/Fear Reduction  Outcome: Ongoing (interventions implemented as appropriate)      Problem: Nutrition, Enteral (Adult)  Goal: Signs and Symptoms of Listed Potential Problems Will be Absent, Minimized or Managed (Nutrition, Enteral)  Outcome: Ongoing (interventions implemented as appropriate)      Problem: Skin Injury Risk (Adult)  Goal: Identify Related Risk Factors and Signs and Symptoms  Outcome: Ongoing (interventions  implemented as appropriate)    Goal: Skin Health and Integrity  Outcome: Ongoing (interventions implemented as appropriate)

## 2019-03-04 NOTE — SIGNIFICANT NOTE
03/04/19 1442   Rehab Time/Intention   Evaluation Not Performed other (see comments)  (Discussed with RN, Bronch cancelled for today. Pt with PEG and currently on 14 liters high flow. ST to follow for bedside swallow tomorrow AM and instrumental as warranted. )   Rehab Treatment   Discipline speech language pathologist

## 2019-03-04 NOTE — SIGNIFICANT NOTE
03/04/19 0857   Rehab Time/Intention   Evaluation Not Performed other (see comments)  (Dr. Hernandez discussed with SLP on unit. Hold eval 3/4/19 as patient to have bronch this date. )   Rehab Treatment   Discipline speech language pathologist   Recommendations   SLP - Next Appointment (See 3/5/19 per MD. )

## 2019-03-04 NOTE — PROGRESS NOTES
Continued Stay Note  [facility]     Patient Name: Alessio Allen  MRN: [id number]  Today's Date: 3/4/2019    Admit Date: 2/28/2019    Discharge Plan     Row Name 03/04/19 0841       Plan    Plan  Plan SNF or home with [facility].   JARON Juarez RN    Plan Comments  Spoke to pt at bedside.  Pt on 15L High Flow O2.  Pt to have bronch today.  Follow for skilled needs.   Plan home with [facility]. JARON Juarez RN        Discharge Codes    No documentation.             Mahsa Juarez RN

## 2019-03-05 LAB
ANION GAP SERPL CALCULATED.3IONS-SCNC: 11.4 MMOL/L
BACTERIA SPEC AEROBE CULT: NORMAL
BACTERIA SPEC AEROBE CULT: NORMAL
BUN BLD-MCNC: 33 MG/DL (ref 8–23)
BUN/CREAT SERPL: 31.4 (ref 7–25)
CALCIUM SPEC-SCNC: 9.9 MG/DL (ref 8.6–10.5)
CHLORIDE SERPL-SCNC: 99 MMOL/L (ref 98–107)
CO2 SERPL-SCNC: 31.6 MMOL/L (ref 22–29)
CREAT BLD-MCNC: 1.05 MG/DL (ref 0.76–1.27)
DEPRECATED RDW RBC AUTO: 50.4 FL (ref 37–54)
ERYTHROCYTE [DISTWIDTH] IN BLOOD BY AUTOMATED COUNT: 13.8 % (ref 12.3–15.4)
GFR SERPL CREATININE-BSD FRML MDRD: 68 ML/MIN/1.73
GLUCOSE BLD-MCNC: 124 MG/DL (ref 65–99)
GLUCOSE BLDC GLUCOMTR-MCNC: 128 MG/DL (ref 70–130)
GLUCOSE BLDC GLUCOMTR-MCNC: 154 MG/DL (ref 70–130)
GLUCOSE BLDC GLUCOMTR-MCNC: 190 MG/DL (ref 70–130)
GLUCOSE BLDC GLUCOMTR-MCNC: 202 MG/DL (ref 70–130)
GLUCOSE BLDC GLUCOMTR-MCNC: 214 MG/DL (ref 70–130)
HCT VFR BLD AUTO: 44 % (ref 37.5–51)
HGB BLD-MCNC: 13.5 G/DL (ref 13–17.7)
MAGNESIUM SERPL-MCNC: 2.3 MG/DL (ref 1.6–2.4)
MCH RBC QN AUTO: 30.3 PG (ref 26.6–33)
MCHC RBC AUTO-ENTMCNC: 30.7 G/DL (ref 31.5–35.7)
MCV RBC AUTO: 98.7 FL (ref 79–97)
PLATELET # BLD AUTO: 201 10*3/MM3 (ref 140–450)
PMV BLD AUTO: 9.8 FL (ref 6–12)
POTASSIUM BLD-SCNC: 3.8 MMOL/L (ref 3.5–5.2)
RBC # BLD AUTO: 4.46 10*6/MM3 (ref 4.14–5.8)
SODIUM BLD-SCNC: 142 MMOL/L (ref 136–145)
WBC NRBC COR # BLD: 12.66 10*3/MM3 (ref 3.4–10.8)

## 2019-03-05 PROCEDURE — 80048 BASIC METABOLIC PNL TOTAL CA: CPT | Performed by: INTERNAL MEDICINE

## 2019-03-05 PROCEDURE — 63710000001 INSULIN LISPRO (HUMAN) PER 5 UNITS: Performed by: INTERNAL MEDICINE

## 2019-03-05 PROCEDURE — 92610 EVALUATE SWALLOWING FUNCTION: CPT

## 2019-03-05 PROCEDURE — 25010000002 FUROSEMIDE PER 20 MG: Performed by: INTERNAL MEDICINE

## 2019-03-05 PROCEDURE — 83735 ASSAY OF MAGNESIUM: CPT | Performed by: INTERNAL MEDICINE

## 2019-03-05 PROCEDURE — 97162 PT EVAL MOD COMPLEX 30 MIN: CPT

## 2019-03-05 PROCEDURE — 85027 COMPLETE CBC AUTOMATED: CPT | Performed by: INTERNAL MEDICINE

## 2019-03-05 PROCEDURE — 99232 SBSQ HOSP IP/OBS MODERATE 35: CPT | Performed by: INTERNAL MEDICINE

## 2019-03-05 PROCEDURE — 82962 GLUCOSE BLOOD TEST: CPT

## 2019-03-05 PROCEDURE — 94799 UNLISTED PULMONARY SVC/PX: CPT

## 2019-03-05 RX ORDER — KETOTIFEN FUMARATE 0.35 MG/ML
1 SOLUTION/ DROPS OPHTHALMIC 2 TIMES DAILY
Status: DISCONTINUED | OUTPATIENT
Start: 2019-03-05 | End: 2019-03-20 | Stop reason: HOSPADM

## 2019-03-05 RX ORDER — FUROSEMIDE 10 MG/ML
40 INJECTION INTRAMUSCULAR; INTRAVENOUS EVERY 8 HOURS
Status: COMPLETED | OUTPATIENT
Start: 2019-03-05 | End: 2019-03-06

## 2019-03-05 RX ADMIN — IPRATROPIUM BROMIDE AND ALBUTEROL SULFATE 3 ML: 2.5; .5 SOLUTION RESPIRATORY (INHALATION) at 15:24

## 2019-03-05 RX ADMIN — MONTELUKAST SODIUM 10 MG: 10 TABLET, FILM COATED ORAL at 20:20

## 2019-03-05 RX ADMIN — KETOTIFEN FUMARATE 1 DROP: 0.35 SOLUTION/ DROPS OPHTHALMIC at 20:20

## 2019-03-05 RX ADMIN — LANSOPRAZOLE 30 MG: KIT at 05:17

## 2019-03-05 RX ADMIN — INSULIN LISPRO 4 UNITS: 100 INJECTION, SOLUTION INTRAVENOUS; SUBCUTANEOUS at 13:09

## 2019-03-05 RX ADMIN — IPRATROPIUM BROMIDE AND ALBUTEROL SULFATE 3 ML: 2.5; .5 SOLUTION RESPIRATORY (INHALATION) at 11:06

## 2019-03-05 RX ADMIN — FERROUS SULFATE TAB 325 MG (65 MG ELEMENTAL FE) 325 MG: 325 (65 FE) TAB at 09:36

## 2019-03-05 RX ADMIN — FUROSEMIDE 40 MG: 10 INJECTION, SOLUTION INTRAVENOUS at 20:20

## 2019-03-05 RX ADMIN — IPRATROPIUM BROMIDE AND ALBUTEROL SULFATE 3 ML: 2.5; .5 SOLUTION RESPIRATORY (INHALATION) at 07:22

## 2019-03-05 RX ADMIN — INSULIN LISPRO 2 UNITS: 100 INJECTION, SOLUTION INTRAVENOUS; SUBCUTANEOUS at 17:41

## 2019-03-05 RX ADMIN — FUROSEMIDE 40 MG: 10 INJECTION, SOLUTION INTRAVENOUS at 13:13

## 2019-03-05 RX ADMIN — ASPIRIN 81 MG: 81 TABLET, DELAYED RELEASE ORAL at 09:37

## 2019-03-05 RX ADMIN — IPRATROPIUM BROMIDE AND ALBUTEROL SULFATE 3 ML: 2.5; .5 SOLUTION RESPIRATORY (INHALATION) at 20:11

## 2019-03-05 RX ADMIN — INSULIN LISPRO 2 UNITS: 100 INJECTION, SOLUTION INTRAVENOUS; SUBCUTANEOUS at 21:50

## 2019-03-05 RX ADMIN — ATORVASTATIN CALCIUM 10 MG: 10 TABLET, FILM COATED ORAL at 09:37

## 2019-03-05 NOTE — PLAN OF CARE
Problem: Patient Care Overview  Goal: Plan of Care Review   03/05/19 0097   Coping/Psychosocial   Plan of Care Reviewed With patient   OTHER   Outcome Summary pt presents w generalized weakness 2' immobility, fair tolerance to short distance gait MIn A rwx, 8L high flow, sa02 86% after recovery. pt may benefit from skilled PT acutely to address functional deficits, inc pulmonary endurance and independence.

## 2019-03-05 NOTE — THERAPY EVALUATION
Acute Care - Speech Language Pathology   Swallow Initial Evaluation [facility]     Patient Name: Alessio Allen  : 1940  MRN: [id number]  Today's Date: 3/5/2019               Admit Date: 2019    Visit Dx:     ICD-10-CM ICD-9-CM   1. Acute respiratory failure with hypoxia (CMS/Formerly [facility]) J96.01 518.81     Patient Active Problem List   Diagnosis   • A-fib (CMS/Formerly [facility])   • Dyslipidemia   • BP (high blood pressure)   • Neuropathy   • Hypertension   • COPD (chronic obstructive pulmonary disease) (CMS/HCC)   • BPH (benign prostatic hypertrophy)   • Atrial fibrillation (CMS/HCC)   • Asthma   • Hypoxia   • Pneumonia   • Chronic anticoagulation   • Pneumonia of both lungs due to infectious organism   • Hyponatremia   • COPD exacerbation (CMS/HCC)   • Acute on chronic respiratory failure with hypoxia (CMS/HCC)   • Pneumonia of both lower lobes due to infectious organism (CMS/HCC)   • Acute on chronic diastolic heart failure (CMS/HCC)   • IPF (idiopathic pulmonary fibrosis) (CMS/Formerly [facility])   • Acute respiratory failure with hypoxia (CMS/HCC)   • Attention to G-tube (CMS/HCC)     Past Medical History:   Diagnosis Date   • Acute on chronic diastolic heart failure (CMS/HCC)    • Arthritis    • Asthma    • Atrial fibrillation (CMS/HCC)    • BPH (benign prostatic hypertrophy)    • Cancer (CMS/Formerly [facility])     throat CA   • COPD (chronic obstructive pulmonary disease) (CMS/Formerly [facility])    • H/O Abnormal CBC    • History of heart artery stent 2017   • Hyperlipidemia    • Hypertension    • Neuropathy     feet and hands    • Pneumonia      Past Surgical History:   Procedure Laterality Date   • BRONCHOSCOPY N/A 2018    Procedure: BRONCHOSCOPY with specimens;  Surgeon: Suresh Hernandez MD;  Location: [facility] OR;  Service: Pulmonary   • CARDIAC CATHETERIZATION N/A 2018    Procedure: Right Heart Cath with possible vasodilator study;  Surgeon: Torey Schuler MD;  Location: [facility] INVASIVE LOCATION;  Service: Cardiovascular   •  COLONOSCOPY  12/05/2016    polyps   • FRACTURE SURGERY     • PACEMAKER IMPLANTATION          SWALLOW EVALUATION (last 72 hours)      SLP Adult Swallow Evaluation     Row Name 03/05/19 0943          Document Type  evaluation  -OC    Subjective Information  no complaints  -OC    Patient Observations  alert;cooperative;agree to therapy  -OC    Patient Effort  good  -OC    Symptoms Noted During/After Treatment  none  -OC          Patient Profile Reviewed  yes  -OC    Pertinent History Of Current Problem  Pt admitted for acute respiratory failure with hypoxia. Pt s/p radiation/chemo for layngreal CA fall 2018. PEG placed 09/2018, NPO with TFs 10/2018. Pt awaiting clearance for surgery for radial neck dissection, unsure if treatment plan to include future chemo/radiation.    -OC    Current Method of Nutrition  gastrostomy feedings  -OC    Precautions/Limitations, Vision  WFL  -OC    Precautions/Limitations, Hearing  WFL  -OC    Prior Level of Function-Communication  WFL  -OC    Prior Level of Function-Swallowing  other (see comments) VFSS 02/2019 per pt/fam report NPO, small sips water only  -OC    Plans/Goals Discussed with  patient;spouse/S.O.;agreed upon  -OC    Barriers to Rehab  medically complex  -OC    Patient's Goals for Discharge  return to PO diet water  -OC    Family Goals for Discharge  patient able to return to PO diet water  -OC          Additional Documentation  Pain Scale: Numbers Pre/Post-Treatment (Group)  -OC          Pain Scale: Numbers, Pretreatment  0/10 - no pain  -OC    Pain Scale: Numbers, Post-Treatment  0/10 - no pain  -OC          Dentition Assessment  natural, present and adequate  -OC    Secretion Management  WNL/WFL  -OC    Mucosal Quality  dry  -OC    Volitional Swallow  WFL  -OC    Volitional Cough  WFL  -OC          Oral Motor General Assessment  WFL  -OC    Vocal Impairment, Detail. Cranial Nerve X (Vagus)  other (see comments) hoarse vocal quality  -OC          Oral Prep Phase  WFL  -OC     Oral Transit  WFL  -OC    Oral Residue  WFL  -OC    Pharyngeal Phase  no overt signs/symptoms of pharyngeal impairment  -OC    Clinical Swallow Evaluation Summary  No overt s/s aspiration with ice chips and small single sips of water. No change in vocal quality appreciated, no cough/throat clear. Discussed recent VFSS with patient. Reviewed effects of chemo/radiation and dysphagia.  Further trials not assessed secondary to VFSS recs 02/2019  -OC          SLP Swallowing Diagnosis  oral dysfunction;suspected pharyngeal dysfunction  -OC    Functional Impact  risk of aspiration/pneumonia  -OC    Rehab Potential/Prognosis, Swallowing  good, to achieve stated therapy goals  -OC    Swallow Criteria for Skilled Therapeutic Interventions Met  demonstrates skilled criteria  -OC          Therapy Frequency (Swallow)  PRN  -OC    Predicted Duration Therapy Intervention (Days)  until discharge  -OC    SLP Diet Recommendation  NPO;long term alternate methods of nutrition/hydration;water between meals after oral care, with supervision;ice chips between meals after oral care, with supervision  -OC    Recommended Diagnostics  reassess via clinical swallow evaluation;other (see comments) as warranted  -OC    Recommended Precautions and Strategies  upright posture during/after eating;small bites of food and sips of liquid;other (see comments) small single sips  -OC    SLP Rec. for Method of Medication Administration  meds via alternate route  -OC    Monitor for Signs of Aspiration  yes;notify SLP if any concerns  -OC    Anticipated Dischage Disposition  anticipate therapy at next level of care  -OC          Oral Nutrition/Hydration Goal Selection (SLP)  oral nutrition/hydration, SLP goal 1  -OC          Oral Nutrition/Hydration Goal 1, SLP  Tolerate small sips of water without overt s/s aspiration.   -OC    Time Frame (Oral Nutrition/Hydration Goal 1, SLP)  by discharge  -OC      User Key  (r) = Recorded By, (t) = Taken By, (c) =  Cosigned By    Initials Name Effective Dates    Roxana Gabriel MA,[facility]-SLP 06/08/18 -           EDUCATION  The patient has been educated in the following areas:   Dysphagia (Swallowing Impairment).    SLP Recommendation and Plan  SLP Swallowing Diagnosis: oral dysfunction, suspected pharyngeal dysfunction  SLP Diet Recommendation: NPO, long term alternate methods of nutrition/hydration, water between meals after oral care, with supervision, ice chips between meals after oral care, with supervision  Recommended Precautions and Strategies: upright posture during/after eating, small bites of food and sips of liquid, other (see comments)(small single sips)     Monitor for Signs of Aspiration: yes, notify SLP if any concerns  Recommended Diagnostics: reassess via clinical swallow evaluation, other (see comments)(as warranted)  Swallow Criteria for Skilled Therapeutic Interventions Met: demonstrates skilled criteria  Anticipated Dischage Disposition: anticipate therapy at next level of care  Rehab Potential/Prognosis, Swallowing: good, to achieve stated therapy goals  Therapy Frequency (Swallow): PRN  Predicted Duration Therapy Intervention (Days): until discharge       Plan of Care Reviewed With: patient  Plan of Care Review  Plan of Care Reviewed With: patient  Outcome Summary: Bedside swallow eval completed. Recommend continue with water protocol only, small single sips of water. ST to follow for education PRN and recommend continued therapy as outpatient.     SLP GOALS     Row Name 03/05/19 0943             Oral Nutrition/Hydration Goal 1 (SLP)    Oral Nutrition/Hydration Goal 1, SLP  Tolerate small sips of water without overt s/s aspiration.   -OC      Time Frame (Oral Nutrition/Hydration Goal 1, SLP)  by discharge  -OC        User Key  (r) = Recorded By, (t) = Taken By, (c) = Cosigned By    Initials Name Provider Type    Roxana Gabriel MA,[facility]-SLP Speech and Language Pathologist           SLP Outcome Measures  (last 72 hours)      SLP Outcome Measures     Row Name 03/05/19 0954             SLP Outcome Measures    Outcome Measure Used?  Adult NOMS  -OC         Adult FCM Scores    FCM Chosen  Swallowing  -OC      Swallowing FCM Score  2  -OC        User Key  (r) = Recorded By, (t) = Taken By, (c) = Cosigned By    Initials Name Effective Dates    Roxana Gabriel MA,[facility]-SLP 06/08/18 -            Time Calculation:   Time Calculation- SLP     Row Name 03/05/19 0954             Time Calculation- SLP    SLP Start Time  0900  -OC      SLP Received On  03/05/19  -OC        User Key  (r) = Recorded By, (t) = Taken By, (c) = Cosigned By    Initials Name Provider Type    Roxana Gabriel MA,CCC-SLP Speech and Language Pathologist          Therapy Charges for Today     Code Description Service Date Service Provider Modifiers Qty    13170338026  ST EVAL ORAL PHARYNG SWALLOW 3 3/5/2019 Roxana Aguayo MA,[facility]-SLP GN 1               Roxana Aguayo MA, CCC-SLP  3/5/2019

## 2019-03-05 NOTE — PLAN OF CARE
Problem: Patient Care Overview  Goal: Plan of Care Review  Outcome: Ongoing (interventions implemented as appropriate)   03/05/19 0421   Coping/Psychosocial   Plan of Care Reviewed With patient   Plan of Care Review   Progress no change   OTHER   Outcome Summary Patient alert and oriented. VSS. SpO2 low 90s on 6L high flow while awake. When sleeping, pt is mouth breather and SpO2 dropping to mid 80s- requiring 10L high flow to maintain >88%. Very tired this shift. Frequent loose cough. Denies pain. Speech to see patient today for follow up. No signs of acute distress noted at this time. Will continue to monitor.        Problem: Fall Risk (Adult)  Goal: Absence of Fall  Outcome: Ongoing (interventions implemented as appropriate)   03/05/19 0421   Fall Risk (Adult)   Absence of Fall making progress toward outcome       Problem: Breathing Pattern Ineffective (Adult)  Goal: Anxiety/Fear Reduction  Outcome: Ongoing (interventions implemented as appropriate)   03/05/19 0421   Breathing Pattern Ineffective (Adult)   Anxiety/Fear Reduction making progress toward outcome       Problem: Nutrition, Enteral (Adult)  Goal: Signs and Symptoms of Listed Potential Problems Will be Absent, Minimized or Managed (Nutrition, Enteral)  Outcome: Ongoing (interventions implemented as appropriate)   03/05/19 0421   Goal/Outcome Evaluation   Problems Assessed (Enteral Nutrition) all   Problems Present (Enteral Nutrition) gastrointestinal complications;malnutrition       Problem: Skin Injury Risk (Adult)  Goal: Identify Related Risk Factors and Signs and Symptoms  Outcome: Outcome(s) achieved Date Met: 03/05/19 03/05/19 0421   Skin Injury Risk (Adult)   Related Risk Factors (Skin Injury Risk) advanced age;mobility impaired     Goal: Skin Health and Integrity  Outcome: Ongoing (interventions implemented as appropriate)

## 2019-03-05 NOTE — THERAPY EVALUATION
Acute Care - Physical Therapy Initial Evaluation  [facility]     Patient Name: Alessio Allen  : 1940  MRN: [id number]  Today's Date: 3/5/2019   Onset of Illness/Injury or Date of Surgery: 19  Date of Referral to PT: 19  Referring Physician: David      Admit Date: 2019    Visit Dx:     ICD-10-CM ICD-9-CM   1. Acute respiratory failure with hypoxia (CMS/HCC) J96.01 518.81   2. Impaired mobility Z74.09 799.89     Patient Active Problem List   Diagnosis   • A-fib (CMS/HCC)   • Dyslipidemia   • BP (high blood pressure)   • Neuropathy   • Hypertension   • COPD (chronic obstructive pulmonary disease) (CMS/HCC)   • BPH (benign prostatic hypertrophy)   • Atrial fibrillation (CMS/HCC)   • Asthma   • Hypoxia   • Pneumonia   • Chronic anticoagulation   • Pneumonia of both lungs due to infectious organism   • Hyponatremia   • COPD exacerbation (CMS/HCC)   • Acute on chronic respiratory failure with hypoxia (CMS/HCC)   • Pneumonia of both lower lobes due to infectious organism (CMS/HCC)   • Acute on chronic diastolic heart failure (CMS/HCC)   • IPF (idiopathic pulmonary fibrosis) (CMS/HCC)   • Acute respiratory failure with hypoxia (CMS/HCC)   • Attention to G-tube (CMS/HCC)     Past Medical History:   Diagnosis Date   • Acute on chronic diastolic heart failure (CMS/HCC)    • Arthritis    • Asthma    • Atrial fibrillation (CMS/HCC)    • BPH (benign prostatic hypertrophy)    • Cancer (CMS/HCC)     throat CA   • COPD (chronic obstructive pulmonary disease) (CMS/HCC)    • H/O Abnormal CBC    • History of heart artery stent 2017   • Hyperlipidemia    • Hypertension    • Neuropathy     feet and hands    • Pneumonia      Past Surgical History:   Procedure Laterality Date   • BRONCHOSCOPY N/A 2018    Procedure: BRONCHOSCOPY with specimens;  Surgeon: Suresh Hernandez MD;  Location: [facility] MAIN OR;  Service: Pulmonary   • CARDIAC CATHETERIZATION N/A 2018    Procedure: Right Heart Cath with possible  vasodilator study;  Surgeon: Torey Schuler MD;  Location:  [facility] INVASIVE LOCATION;  Service: Cardiovascular   • COLONOSCOPY  12/05/2016    polyps   • FRACTURE SURGERY     • PACEMAKER IMPLANTATION          PT ASSESSMENT (last 12 hours)      Physical Therapy Evaluation     Row Name 03/05/19 1259          PT Evaluation Time/Intention    Subjective Information  no complaints  -     Document Type  evaluation  -     Mode of Treatment  individual therapy;physical therapy  -     Patient Effort  good  -     Row Name 03/05/19 1259          General Information    Patient Profile Reviewed?  yes  -     Onset of Illness/Injury or Date of Surgery  02/28/19  -     Referring Physician  David  -     Patient Observations  alert;cooperative;agree to therapy  -     Patient/Family Observations  pt spouse bedside  -     General Observations of Patient  pt supine in bed no acute distress  -     Pertinent History of Current Functional Problem  resp failure  -     Existing Precautions/Restrictions  fall  -     Risks Reviewed  patient and family:  -     Benefits Reviewed  patient and family:  -     Row Name 03/05/19 1259          Cognitive Assessment/Intervention- PT/OT    Orientation Status (Cognition)  oriented x 3  -     Follows Commands (Cognition)  WFL  -     Safety Deficit (Cognitive)  mild deficit  -     Row Name 03/05/19 1259          Bed Mobility Assessment/Treatment    Bed Mobility Assessment/Treatment  supine-sit;sit-supine  -     Supine-Sit [place] (Bed Mobility)  minimum assist (75% patient effort);contact guard;verbal cues  -     Sit-Supine [place] (Bed Mobility)  not tested  -     Row Name 03/05/19 1259          Transfer Assessment/Treatment    Transfer Assessment/Treatment  sit-stand transfer;stand-sit transfer  -     Sit-Stand [place] (Transfers)  minimum assist (75% patient effort);verbal cues  -     Stand-Sit [place] (Transfers)  minimum assist  (75% patient effort);verbal cues  -     Row Name 03/05/19 1259          Sit-Stand Transfer    Assistive Device (Sit-Stand Transfers)  walker, front-wheeled  -     Row Name 03/05/19 1259          Stand-Sit Transfer    Assistive Device (Stand-Sit Transfers)  walker, front-wheeled  -     Row Name 03/05/19 1259          Gait/Stairs Assessment/Training    [place] Level (Gait)  minimum assist (75% patient effort);nonverbal cues (demo/gesture);verbal cues  -     Assistive Device (Gait)  walker, front-wheeled  -     Distance in Feet (Gait)  65  -     Pattern (Gait)  step-through  -     Deviations/Abnormal Patterns (Gait)  gait speed decreased  -     Bilateral Gait Deviations  forward flexed posture;heel strike decreased  -[facility] Name 03/05/19 1259          General ROM    GENERAL ROM COMMENTS  BLEs grossly functional  -[facility] Name 03/05/19 1259          MMT (Manual Muscle Testing)    General MMT Comments  BLEs grossly at least 3/5  -[facility] Name 03/05/19 1259          Pain Scale: Numbers Pre/Post-Treatment    Pain Scale: Numbers, Pretreatment  0/10 - no pain  -     Pain Scale: Numbers, Post-Treatment  0/10 - no pain  -[facility] Name 03/05/19 1259          Plan of Care Review    Plan of Care Reviewed With  patient  -[facility] Name 03/05/19 1259          Physical Therapy Clinical Impression    Date of Referral to PT  03/05/19  -     Criteria for Skilled Interventions Met (PT Clinical Impression)  yes  -     Pathology/Pathophysiology Noted (Describe Specifically for Each System)  musculoskeletal;neuromuscular  -     Impairments Found (describe specific impairments)  ROM;joint integrity and mobility;gait, locomotion, and balance;aerobic capacity/endurance  -     Functional Limitations in Following Categories (Describe Specific Limitations)  self-care;home management  -     Rehab Potential (PT Clinical Summary)  good, to achieve stated therapy goals  -[facility] Name 03/05/19 1251           Vital Signs    Pre SpO2 (%)  88  -LH     O2 Delivery Pre Treatment  hi-flow 6L  -LH     Post SpO2 (%)  86  -     O2 Delivery Post Treatment  hi-flow 8L  -LH     Row Name 03/05/19 1259          Physical Therapy Goals    Bed Mobility Goal Selection (PT)  bed mobility, PT goal 1  -     Transfer Goal Selection (PT)  transfer, PT goal 1  -     Gait Training Goal Selection (PT)  gait training, PT goal 1  -     Row Name 03/05/19 1259          Bed Mobility Goal 1 (PT)    Activity/Assistive Device (Bed Mobility Goal 1, PT)  bed mobility activities, all  -LH     [place] Level/Cues Needed (Bed Mobility Goal 1, PT)  supervision required  -LH     Time Frame (Bed Mobility Goal 1, PT)  long term goal (LTG);1 week  -[facility] Name 03/05/19 125          Transfer Goal 1 (PT)    Activity/Assistive Device (Transfer Goal 1, PT)  sit-to-stand/stand-to-sit;bed-to-chair/chair-to-bed;walker, rolling  -LH     [place] Level/Cues Needed (Transfer Goal 1, PT)  standby assist  -LH     Time Frame (Transfer Goal 1, PT)  long term goal (LTG);1 week  -[facility] Name 03/05/19 1250          Gait Training Goal 1 (PT)    Activity/Assistive Device (Gait Training Goal 1, PT)  assistive device use;walker, rolling  -LH     [place] Level (Gait Training Goal 1, PT)  contact guard assist  -LH     Distance (Gait Goal 1, PT)  150  -LH     Time Frame (Gait Training Goal 1, PT)  long term goal (LTG);1 week  -     Row Name 03/05/19 1250          Positioning and Restraints    Pre-Treatment Position  in bed  -LH     Post Treatment Position  chair  -LH     In Chair  reclined;call light within reach;encouraged to call for assist;with family/caregiver;with nsg  -LH       User Key  (r) = Recorded By, (t) = Taken By, (c) = Cosigned By    Initials Name Provider Type     Jaida Porter, PT Physical Therapist        Physical Therapy Education     Title: PT OT SLP Therapies (In Progress)     Topic: Physical Therapy (In Progress)     Point:  Mobility training (In Progress)     Learning Progress Summary           Patient Acceptance, E, NR by  at 3/5/2019  1:08 PM                   Point: Home exercise program (In Progress)     Learning Progress Summary           Patient Acceptance, E, NR by  at 3/5/2019  1:08 PM                   Point: Body mechanics (In Progress)     Learning Progress Summary           Patient Acceptance, E, NR by  at 3/5/2019  1:08 PM                   Point: Precautions (In Progress)     Learning Progress Summary           Patient Acceptance, E, NR by  at 3/5/2019  1:08 PM                               User Key     Initials Effective Dates Name Provider Type Discipline     04/03/18 -  Jaida Porter, PT Physical Therapist PT              PT Recommendation and Plan  Anticipated Discharge Disposition (PT): skilled nursing facility, home with OP services  Planned Therapy Interventions (PT Eval): balance training, bed mobility training, gait training, home exercise program, ROM (range of motion), stair training, strengthening, stretching, transfer training  Therapy Frequency (PT Clinical Impression): daily  Outcome Summary/Treatment Plan (PT)  Anticipated Discharge Disposition (PT): skilled nursing facility, home with OP services  Plan of Care Reviewed With: patient  Outcome Summary: pt presents w generalized weakness 2' immobility, fair tolerance to short distance gait MIn A rwx, 8L high flow, sa02 86% after recovery. pt may benefit from skilled PT acutely to address functional deficits, inc pulmonary endurance and independence.   Outcome Measures     Row Name 03/05/19 1300             How much help from another person do you currently need...    Turning from your back to your side while in flat bed without using bedrails?  3  -LH      Moving from lying on back to sitting on the side of a flat bed without bedrails?  3  -LH      Moving to and from a bed to a chair (including a wheelchair)?  2  -LH      Standing up from a chair  using your arms (e.g., wheelchair, bedside chair)?  2  -      Climbing 3-5 steps with a railing?  2  -      To walk in hospital room?  2  -      AM-PAC 6 Clicks Score  14  -         Functional Assessment    Outcome Measure Options  AM-PAC 6 Clicks Basic Mobility (PT)  -        User Key  (r) = Recorded By, (t) = Taken By, (c) = Cosigned By    Initials Name Provider Type     Jaida Porter, PT Physical Therapist         Time Calculation:   PT Charges     Row Name 03/05/19 1310             Time Calculation    Start Time  1145  -      Stop Time  1200  -      Time Calculation (min)  15 min  -      PT Received On  03/05/19  -      PT - Next Appointment  03/06/19  -      PT Goal Re-Cert Due Date  03/12/19  -        User Key  (r) = Recorded By, (t) = Taken By, (c) = Cosigned By    Initials Name Provider Type     Jaida Porter, PT Physical Therapist        Therapy Suggested Charges     Code   Minutes Charges    None           Therapy Charges for Today     Code Description Service Date Service Provider Modifiers Qty    23971778254 HC PT EVAL MOD COMPLEXITY 2 3/5/2019 Jaida Porter, PT GP 1          PT G-Codes  Outcome Measure Options: AM-PAC 6 Clicks Basic Mobility (PT)  AM-PAC 6 Clicks Score: 14      Jaida Porter, PT  3/5/2019

## 2019-03-05 NOTE — PROGRESS NOTES
Adult Nutrition  Assessment/PES    Patient Name:  Alessio Allen  YOB: 1940  MRN: [id number]  Admit Date:  2/28/2019    Assessment Date:  3/5/2019    Comments:  Follow up.  Patient is s/p SLP evaluation this am - rec continuing only free water protocol.  ECHO yesterday, bronch yesterday cancelled.      Patient seems to be tolerating Boost Plus as well as he typically tolerates Ensure Plus per wife and patient.  No diarrhea reported.    Will continue to follow.    Reason for Assessment     Row Name 03/05/19 1339          Reason for Assessment    Reason For Assessment  TF/PN;follow-up protocol         Nutrition/Diet History     Row Name 03/05/19 1340          Nutrition/Diet History    Typical Food/Fluid Intake  tolerating Boost well per wife and patient         Anthropometrics     Row Name 03/05/19 1340          Admit Weight    Admit Weight  -- 231# 3/5        Body Mass Index (BMI)    BMI Assessment  BMI 25-29.9: overweight         Labs/Tests/Procedures/Meds     Row Name 03/05/19 1340          Labs/Procedures/Meds    Lab Results Reviewed  reviewed, pertinent     Lab Results Comments  BUN, WBC        Diagnostic Tests/Procedures    Diagnostic Test/Procedure Reviewed  reviewed, pertinent     Diagnostic Test/Procedures Comments  s/p ECHO yesterday; bronch cancelled yesterday        Medications    Pertinent Medications Reviewed  reviewed, pertinent     Pertinent Medications Comments  FeSO4, lasix, insulin         Physical Findings     Row Name 03/05/19 1341          Physical Findings    Overall Physical Appearance  obese     Gastrointestinal  feeding tube     Tubes  PEG     Skin  -- B=17, bruised           Nutrition Prescription Ordered     Row Name 03/05/19 1341          Nutrition Prescription PO    Current PO Diet  NPO        Nutrition Prescription EN    Enteral Route  PEG     Product  -- Boost Plus (8 cartons/day)     TF Delivery Method  Bolus     TF Bolus Goal Volume (mL)  711 mL     TF Bolus Frequency   3 times a day     Water flush (mL)   120 mL     Water Flush Frequency  Every feeding         Evaluation of Received Nutrient/Fluid Intake     Row Name 03/05/19 1342          Nutrient/Fluid Evaluation    Additional Documentation  Calories Evaluation (Group);Protein Evaluation (Group)        Calories Evaluation    Enteral Calories (kcal)  2880     % of Kcal Needs  100        Protein Evaluation    Enteral Protein (gm)  112     % of Protein Needs  100               Problem/Interventions:            Intervention Goal     Row Name 03/05/19 1343          Intervention Goal    General  Maintain nutrition;Nutrition support treatment;Meet nutritional needs for age/condition;Disease management/therapy     TF/PN  Maintain TF/PN     Weight  No significant weight loss         Nutrition Intervention     Row Name 03/05/19 1347          Nutrition Intervention    RD/Tech Action  Follow Tx progress     Recommended/Ordered  EN         Nutrition Prescription     Row Name 03/05/19 1350          Nutrition Prescription EN    Enteral Prescription  Continue same protocol         Education/Evaluation     Row Name 03/05/19 1350          Education    Education  Will Instruct as appropriate        Monitor/Evaluation    Monitor  Per protocol;I&O;Pertinent labs;TF delivery/tolerance;Weight;Symptoms     Education Follow-up  Reinforce PRN           Electronically signed by:  Makayla Mccollum RD  03/05/19 1:50 PM

## 2019-03-05 NOTE — PROGRESS NOTES
[facility] Follow Up      Chief Complaint: Follow up hypoxia, pulmonary hypertension    Interval History:  Breathing unchanged.  Down to 6 liters yesterday but required higher O2 overnight.  Feels a little better otherwise this morning. Asking when he can drink water.     Objective:     Objective:  Temp:  [97.6 °F (36.4 °C)-98.1 °F (36.7 °C)] 98.1 °F (36.7 °C)  Heart Rate:  [79-84] 79  Resp:  [16-20] 20  BP: (113-121)/(66-91) 116/91     Intake/Output Summary (Last 24 hours) at 3/5/2019 0740  Last data filed at 3/5/2019 0720  Gross per 24 hour   Intake 180 ml   Output 925 ml   Net -745 ml     Body mass index is 29.72 kg/m².      03/04/19  0600 03/04/19  1311 03/05/19  0532   Weight: 103 kg (226 lb 6.6 oz) 103 kg (226 lb) 105 kg (231 lb 8 oz)     Weight change: -0.187 kg (-6.6 oz)      Physical Exam:   General : Alert, cooperative, in no acute distress.  Neuro: alert,cooperative and oriented  Lungs: CTAB. Normal respiratory effort and rate.  CV:: Regular rate and rhythm, normal S1 and S2, no murmurs, gallops or rubs.  ABD: Soft, nontender, non-distended. positive bowel sounds  Extr: No edema or cyanosis, moves all extremities    Lab Review:   Results from last 7 days   Lab Units 03/05/19  0517 03/04/19  0628  02/28/19  1243   SODIUM mmol/L 142 142   < > 134*   POTASSIUM mmol/L 3.8 3.9   < > 4.2   CHLORIDE mmol/L 99 96*   < > 93*   CO2 mmol/L 31.6* 32.3*   < > 28.8   BUN mg/dL 33* 31*   < > 16   CREATININE mg/dL 1.05 0.99   < > 0.95   GLUCOSE mg/dL 124* 115*   < > 91   CALCIUM mg/dL 9.9 9.9   < > 9.8   AST (SGOT) U/L  --   --   --  20   ALT (SGPT) U/L  --   --   --  12    < > = values in this interval not displayed.     Results from last 7 days   Lab Units 02/28/19  1243   TROPONIN T ng/mL <0.010     Results from last 7 days   Lab Units 03/05/19  0517 03/04/19  0509   WBC 10*3/mm3 12.66* 10.08   HEMOGLOBIN g/dL 13.5 12.7*   HEMATOCRIT % 44.0 42.3   PLATELETS 10*3/mm3 201 201         Results from  last 7 days   Lab Units 03/05/19  0517 03/04/19  0509   MAGNESIUM mg/dL 2.3 1.4*           Invalid input(s): LDLCALC  Results from last 7 days   Lab Units 03/04/19  0509 03/03/19  0531 02/28/19  1243   PROBNP pg/mL 875.8 1,560.0 1,492.0         I reviewed the patient's new clinical results.  I personally viewed and interpreted the patient's EKG  Current Medications:   Scheduled Meds:  aspirin 81 mg Oral Daily   atorvastatin 10 mg Oral Daily   ferrous sulfate 325 mg Oral Daily With Breakfast   insulin lispro 0-9 Units Subcutaneous 4x Daily With Meals & Nightly   ipratropium-albuterol 3 mL Nebulization 4x Daily - RT   lansoprazole 30 mg Per PEG Tube Q AM   montelukast 10 mg Oral Nightly     Continuous Infusions:     Allergies:  Allergies   Allergen Reactions   • Lisinopril Shortness Of Breath   • Penicillins Shortness Of Breath   • Sulfa Antibiotics Shortness Of Breath   • Atenolol Other (See Comments)     drowsiness   • Coreg [Carvedilol] Cough     SOA,   • Ibuprofen    • Celebrex [Celecoxib] Rash       Assessment/Plan:     1. Acute/chronic hypoxic respiratory failure.  No significant improvement despite treatment for pneumonia and diuresis.  Concern for cardiac shunt per pulmonary noted.  Left ventricular systolic function normal on echo yesterday, diastolic function indeterminate.  No shunt noted on agitated saline contrast study.   2. Bilateral pulmonary infiltrates.  Noted on CT yesterday.    3. Throat cancer.  Status post chemotherapy and radiation  4. PEG placement.  Some dysfunction that was addressed by surgery.  Plan for swallow study per pulmonary noted.   2. Paroxsymal atrial fibrillation. Paced rhythm with underlying atrial fibrillation.   Rivaroxaban on hold for bronchoscopy.   3. Status post permanent pacemaker placement.  No recent interrogations here or at [facility] noted.   4. COPD  5. Pulmonary hypertension.  Moderate to severe on echo yesterday.  Could be all related to COPD but can not rule out  cardiac component.    6. Coronary artery disease.  History of prior stent placement.   7. Ascending aortic aneurysm.  Stable on recent CT.   8. Mitral regurgitation. Moderate on echo yesterday.     -  Echo yesterday with normal LV systolic function and normal agitated saline contrast study without evidence of interatrial shunt.  There was evidence of moderate to severe pulmonary hypertension.    May still benefit from a right heart cath with shunt run to evaluate for elevated left sided pressures as cause of pulmonary hypertension and further rule out shunt.   Will tentatively consider proceeding later this week depending on further work up with swallow eval and bronchoscopy per Dr. Hernandez.     Marizol Holt MD  03/05/19  7:40 AM

## 2019-03-05 NOTE — PLAN OF CARE
Problem: Patient Care Overview  Goal: Plan of Care Review  Outcome: Ongoing (interventions implemented as appropriate)   03/05/19 6377   Coping/Psychosocial   Plan of Care Reviewed With patient;spouse   Plan of Care Review   Progress no change   OTHER   Outcome Summary A&O,gets frustrated at times, VSS, afebrile, monitor tracing Vpacing with underlying afib, O2 via nc HF at 10 liters in place in AM, lungs with course rhonchi, loose productive cough for thick white sputum small amounts, O2 sats 88-89%, Dr Hernandez at bedside and titrated O2 to 6l HF with new parameters, to keep O2 sats > 86%, ambulated with O2 and PT, O2 sats decreased afterwards to 80%, O2 increased to 8l HF and O2 sats 86-89%, pt sitting in chair for 2 hours, tolerating feeds without residual, Lasix given IV per orders and pt diuresing (see I&O), eyes irritated, SLP in and pt tolerating small sips H2O, Dr Hernandez at bedside to speak with pta nd wife re bronchoscopy for 3/6/19, consent obtained, assisted to commode and BTB, wife at bedside. monitored closely.     Goal: Individualization and Mutuality  Outcome: Ongoing (interventions implemented as appropriate)    Goal: Discharge Needs Assessment  Outcome: Ongoing (interventions implemented as appropriate)    Goal: Interprofessional Rounds/Family Conf  Outcome: Ongoing (interventions implemented as appropriate)      Problem: Fall Risk (Adult)  Goal: Absence of Fall  Outcome: Ongoing (interventions implemented as appropriate)      Problem: Breathing Pattern Ineffective (Adult)  Goal: Effective Oxygenation/Ventilation  Outcome: Ongoing (interventions implemented as appropriate)    Goal: Anxiety/Fear Reduction  Outcome: Ongoing (interventions implemented as appropriate)      Problem: Nutrition, Enteral (Adult)  Goal: Signs and Symptoms of Listed Potential Problems Will be Absent, Minimized or Managed (Nutrition, Enteral)  Outcome: Ongoing (interventions implemented as appropriate)      Problem: Skin Injury  Risk (Adult)  Goal: Skin Health and Integrity  Outcome: Ongoing (interventions implemented as appropriate)

## 2019-03-05 NOTE — PLAN OF CARE
Problem: Patient Care Overview  Goal: Plan of Care Review  Outcome: Ongoing (interventions implemented as appropriate)   03/05/19 1378   Coping/Psychosocial   Plan of Care Reviewed With patient   OTHER   Outcome Summary Bedside swallow eval completed. Recommend continue with water protocol only, small single sips of water. TF and meds alternate route. ST to follow for education PRN and recommend continued therapy as outpatient.

## 2019-03-05 NOTE — PLAN OF CARE
Problem: Patient Care Overview  Goal: Plan of Care Review  Outcome: Ongoing (interventions implemented as appropriate)   03/05/19 1392   Coping/Psychosocial   Plan of Care Reviewed With patient   Plan of Care Review   Progress no change   OTHER   Outcome Summary Pt O2 have been decreased and increased, not complaing of SOA, but has increased WOB. Continue to monitor and treatment plan.

## 2019-03-05 NOTE — PROGRESS NOTES
[facility]  [phone]  Suresh Hernandez MD    Subjective:  LOS: 5    Continued need for high flow oxygen.  He feels like his breathing is normal even when his saturation is 85% on the monitor.    Objective   Vital Signs past 24hrs    Temp range: Temp (24hrs), Av.8 °F (36.6 °C), Min:97.6 °F (36.4 °C), Max:98.1 °F (36.7 °C)    BP range: BP: (113-121)/(66-91) 116/91  Pulse range: Heart Rate:  [79-84] 84  Resp rate range: Resp:  [16-20] 18    Device (Oxygen Therapy): high-flow nasal cannula;humidifiedFlow (L/min):  [6-14] 6  Oxygen range:SpO2:  [88 %-95 %] 88 %      105 kg (231 lb 8 oz); Body mass index is 29.72 kg/m².    Intake/Output Summary (Last 24 hours) at 3/5/2019 1141  Last data filed at 3/5/2019 0900  Gross per 24 hour   Intake 300 ml   Output 925 ml   Net -625 ml       Physical Exam   Cardiovascular: Normal rate and regular rhythm.   No murmur heard.  Pulmonary/Chest: He has no wheezes. He has rales in the right lower field and the left lower field.   Abdominal: Soft. Bowel sounds are normal. There is no tenderness.   PEG +   Musculoskeletal: He exhibits no edema.   Neurological: He is alert.   Nursing note and vitals reviewed.    Results Review:    I have reviewed the laboratory and imaging data since the last note by LPC physician.  My annotations are noted in assessment and plan.    Medication Review:  I have reviewed the current MAR.  My annotations are noted in assessment and plan.       Plan   PCCM Problems  Acute respiratory failure  Acute decompensated diastolic HF  COPD, no obvious exacerbation  Throat cancer s/p chemo RT  Dysphagia - PEG  ILD ??  Relevant Medical Diagnoses  Afib, on AC  CAD  Obesity    Plan of Treatment  Discussed with Dr. Holt.  Patient does have some mitral regurgitation.  Unclear if this completely explains his hypoxia.  Only medical intervention for now.  However may still be worthwhile doing a right and left heart cath to risk stratify the patient especially  with planned radical neck dissection.  Cardiology will decide regarding same.  In the meantime we will try and diurese the patient more.  Discussed the same also with cardiology.    Borderline/low blood pressure.  Therefore all of his antihypertensives from home are on hold.    CT chest shows bibasilar infiltrates.  White count slightly higher.  So far bronchoscopy on hold because anesthesia is unable to do an Endo suite.  Will schedule in the OR tomorrow.  We will try and get biopsies from the lower lobes as well to try and get a more definitive diagnosis.    CT neck was reviewed. Subtle inflammation of right nasopharynx. Opacification of left sphenoid sinus. Will review with Dr. Merrill later.    Patient is taking sips of water.  Note and appreciate speech input.   He remains with a PEG tube and feeds.  Majority of his intake as well as medicines go down his PEG tube.    I have previously considered the diagnosis of interstitial lung disease on this patient.  However his infiltrates substantially improved with diuresis.  Not typical for interstitial lung disease.    No further epistaxis or hemoptysis reported.  Patient's Xarelto has been on hold for several days.  I will give him 1 dose of Lovenox subcutaneous now.  Resume full anticoagulation after bronchoscopy and potentially after right and left heart catheterization.    Discussed with wife/friend.    Suresh Hernandez MD  03/05/19  11:41 AM    Part of this note may be an electronic transcription/translation of spoken language to printed text using the Dragon Dictation System.

## 2019-03-06 ENCOUNTER — APPOINTMENT (OUTPATIENT)
Dept: GENERAL RADIOLOGY | Facility: HOSPITAL | Age: 79
End: 2019-03-06

## 2019-03-06 ENCOUNTER — ANESTHESIA (OUTPATIENT)
Dept: PERIOP | Facility: HOSPITAL | Age: 79
End: 2019-03-06

## 2019-03-06 ENCOUNTER — ANESTHESIA EVENT (OUTPATIENT)
Dept: PERIOP | Facility: HOSPITAL | Age: 79
End: 2019-03-06

## 2019-03-06 LAB
ANION GAP SERPL CALCULATED.3IONS-SCNC: 10.7 MMOL/L
APPEARANCE FLD: ABNORMAL
ARTERIAL PATENCY WRIST A: POSITIVE
ATMOSPHERIC PRESS: 761 MMHG
BASE EXCESS BLDA CALC-SCNC: 4.9 MMOL/L (ref 0–2)
BDY SITE: ABNORMAL
BUN BLD-MCNC: 39 MG/DL (ref 8–23)
BUN/CREAT SERPL: 39 (ref 7–25)
CALCIUM SPEC-SCNC: 10.3 MG/DL (ref 8.6–10.5)
CHLORIDE SERPL-SCNC: 101 MMOL/L (ref 98–107)
CO2 SERPL-SCNC: 33.3 MMOL/L (ref 22–29)
COLOR FLD: ABNORMAL
CREAT BLD-MCNC: 1 MG/DL (ref 0.76–1.27)
DEPRECATED RDW RBC AUTO: 50.3 FL (ref 37–54)
ERYTHROCYTE [DISTWIDTH] IN BLOOD BY AUTOMATED COUNT: 14.2 % (ref 12.3–15.4)
GAS FLOW AIRWAY: 15 LPM
GFR SERPL CREATININE-BSD FRML MDRD: 72 ML/MIN/1.73
GLUCOSE BLD-MCNC: 127 MG/DL (ref 65–99)
GLUCOSE BLDC GLUCOMTR-MCNC: 111 MG/DL (ref 70–130)
GLUCOSE BLDC GLUCOMTR-MCNC: 124 MG/DL (ref 70–130)
GLUCOSE BLDC GLUCOMTR-MCNC: 125 MG/DL (ref 70–130)
GLUCOSE BLDC GLUCOMTR-MCNC: 172 MG/DL (ref 70–130)
HCO3 BLDA-SCNC: 32.9 MMOL/L (ref 22–28)
HCT VFR BLD AUTO: 44.4 % (ref 37.5–51)
HGB BLD-MCNC: 14 G/DL (ref 13–17.7)
LYMPHOCYTES NFR FLD MANUAL: 3 %
MCH RBC QN AUTO: 30.2 PG (ref 26.6–33)
MCHC RBC AUTO-ENTMCNC: 31.5 G/DL (ref 31.5–35.7)
MCV RBC AUTO: 95.9 FL (ref 79–97)
MODALITY: ABNORMAL
MONOCYTES NFR FLD: 1 %
MONOS+MACROS NFR FLD: 4 %
NEUTROPHILS NFR FLD MANUAL: 92 %
PCO2 BLDA: 60 MM HG (ref 35–45)
PH BLDA: 7.35 PH UNITS (ref 7.35–7.45)
PLATELET # BLD AUTO: 217 10*3/MM3 (ref 140–450)
PMV BLD AUTO: 10.3 FL (ref 6–12)
PO2 BLDA: 61 MM HG (ref 80–100)
POTASSIUM BLD-SCNC: 3.4 MMOL/L (ref 3.5–5.2)
RBC # BLD AUTO: 4.63 10*6/MM3 (ref 4.14–5.8)
RBC # FLD AUTO: 7100 /MM3
SAO2 % BLDCOA: 88.8 % (ref 92–99)
SODIUM BLD-SCNC: 145 MMOL/L (ref 136–145)
TOTAL RATE: 28 BREATHS/MINUTE
WBC # FLD AUTO: ABNORMAL /MM3
WBC NRBC COR # BLD: 12.02 10*3/MM3 (ref 3.4–10.8)

## 2019-03-06 PROCEDURE — 82962 GLUCOSE BLOOD TEST: CPT

## 2019-03-06 PROCEDURE — 0B9J8ZX DRAINAGE OF LEFT LOWER LUNG LOBE, VIA NATURAL OR ARTIFICIAL OPENING ENDOSCOPIC, DIAGNOSTIC: ICD-10-PCS | Performed by: INTERNAL MEDICINE

## 2019-03-06 PROCEDURE — 94799 UNLISTED PULMONARY SVC/PX: CPT

## 2019-03-06 PROCEDURE — 87102 FUNGUS ISOLATION CULTURE: CPT | Performed by: INTERNAL MEDICINE

## 2019-03-06 PROCEDURE — 88305 TISSUE EXAM BY PATHOLOGIST: CPT | Performed by: INTERNAL MEDICINE

## 2019-03-06 PROCEDURE — 0BDF8ZX EXTRACTION OF RIGHT LOWER LUNG LOBE, VIA NATURAL OR ARTIFICIAL OPENING ENDOSCOPIC, DIAGNOSTIC: ICD-10-PCS | Performed by: INTERNAL MEDICINE

## 2019-03-06 PROCEDURE — 89051 BODY FLUID CELL COUNT: CPT | Performed by: INTERNAL MEDICINE

## 2019-03-06 PROCEDURE — 71045 X-RAY EXAM CHEST 1 VIEW: CPT

## 2019-03-06 PROCEDURE — 36600 WITHDRAWAL OF ARTERIAL BLOOD: CPT

## 2019-03-06 PROCEDURE — 87206 SMEAR FLUORESCENT/ACID STAI: CPT | Performed by: INTERNAL MEDICINE

## 2019-03-06 PROCEDURE — 88112 CYTOPATH CELL ENHANCE TECH: CPT | Performed by: INTERNAL MEDICINE

## 2019-03-06 PROCEDURE — 25010000002 PHENYLEPHRINE PER 1 ML: Performed by: ANESTHESIOLOGY

## 2019-03-06 PROCEDURE — 25010000002 LEVOFLOXACIN PER 250 MG: Performed by: INTERNAL MEDICINE

## 2019-03-06 PROCEDURE — 76000 FLUOROSCOPY <1 HR PHYS/QHP: CPT

## 2019-03-06 PROCEDURE — 87070 CULTURE OTHR SPECIMN AEROBIC: CPT | Performed by: INTERNAL MEDICINE

## 2019-03-06 PROCEDURE — 97110 THERAPEUTIC EXERCISES: CPT

## 2019-03-06 PROCEDURE — 87205 SMEAR GRAM STAIN: CPT | Performed by: INTERNAL MEDICINE

## 2019-03-06 PROCEDURE — 25010000002 FUROSEMIDE PER 20 MG: Performed by: INTERNAL MEDICINE

## 2019-03-06 PROCEDURE — 85027 COMPLETE CBC AUTOMATED: CPT | Performed by: INTERNAL MEDICINE

## 2019-03-06 PROCEDURE — 87116 MYCOBACTERIA CULTURE: CPT | Performed by: INTERNAL MEDICINE

## 2019-03-06 PROCEDURE — 87176 TISSUE HOMOGENIZATION CULTR: CPT | Performed by: INTERNAL MEDICINE

## 2019-03-06 PROCEDURE — 25010000002 SUCCINYLCHOLINE PER 20 MG: Performed by: ANESTHESIOLOGY

## 2019-03-06 PROCEDURE — 87076 CULTURE ANAEROBE IDENT EACH: CPT | Performed by: INTERNAL MEDICINE

## 2019-03-06 PROCEDURE — 25010000002 PROPOFOL 10 MG/ML EMULSION: Performed by: ANESTHESIOLOGY

## 2019-03-06 PROCEDURE — 82803 BLOOD GASES ANY COMBINATION: CPT

## 2019-03-06 PROCEDURE — 87071 CULTURE AEROBIC QUANT OTHER: CPT | Performed by: INTERNAL MEDICINE

## 2019-03-06 PROCEDURE — 80048 BASIC METABOLIC PNL TOTAL CA: CPT | Performed by: INTERNAL MEDICINE

## 2019-03-06 PROCEDURE — 25010000002 ONDANSETRON PER 1 MG: Performed by: ANESTHESIOLOGY

## 2019-03-06 PROCEDURE — 99232 SBSQ HOSP IP/OBS MODERATE 35: CPT | Performed by: NURSE PRACTITIONER

## 2019-03-06 RX ORDER — FAMOTIDINE 10 MG/ML
20 INJECTION, SOLUTION INTRAVENOUS ONCE
Status: COMPLETED | OUTPATIENT
Start: 2019-03-06 | End: 2019-03-06

## 2019-03-06 RX ORDER — HYDRALAZINE HYDROCHLORIDE 20 MG/ML
5 INJECTION INTRAMUSCULAR; INTRAVENOUS
Status: DISCONTINUED | OUTPATIENT
Start: 2019-03-06 | End: 2019-03-06 | Stop reason: HOSPADM

## 2019-03-06 RX ORDER — FENTANYL CITRATE 50 UG/ML
50 INJECTION, SOLUTION INTRAMUSCULAR; INTRAVENOUS
Status: DISCONTINUED | OUTPATIENT
Start: 2019-03-06 | End: 2019-03-06 | Stop reason: HOSPADM

## 2019-03-06 RX ORDER — PROMETHAZINE HYDROCHLORIDE 25 MG/1
25 SUPPOSITORY RECTAL ONCE AS NEEDED
Status: DISCONTINUED | OUTPATIENT
Start: 2019-03-06 | End: 2019-03-06 | Stop reason: HOSPADM

## 2019-03-06 RX ORDER — MIDAZOLAM HYDROCHLORIDE 1 MG/ML
2 INJECTION INTRAMUSCULAR; INTRAVENOUS
Status: DISCONTINUED | OUTPATIENT
Start: 2019-03-06 | End: 2019-03-06 | Stop reason: HOSPADM

## 2019-03-06 RX ORDER — HYDROCODONE BITARTRATE AND ACETAMINOPHEN 7.5; 325 MG/1; MG/1
1 TABLET ORAL ONCE AS NEEDED
Status: DISCONTINUED | OUTPATIENT
Start: 2019-03-06 | End: 2019-03-06 | Stop reason: HOSPADM

## 2019-03-06 RX ORDER — ACETAMINOPHEN 325 MG/1
650 TABLET ORAL ONCE AS NEEDED
Status: DISCONTINUED | OUTPATIENT
Start: 2019-03-06 | End: 2019-03-06 | Stop reason: HOSPADM

## 2019-03-06 RX ORDER — ACETYLCYSTEINE 200 MG/ML
4 SOLUTION ORAL; RESPIRATORY (INHALATION)
Status: DISCONTINUED | OUTPATIENT
Start: 2019-03-06 | End: 2019-03-10

## 2019-03-06 RX ORDER — SODIUM CHLORIDE 0.9 % (FLUSH) 0.9 %
1-10 SYRINGE (ML) INJECTION AS NEEDED
Status: DISCONTINUED | OUTPATIENT
Start: 2019-03-06 | End: 2019-03-06 | Stop reason: HOSPADM

## 2019-03-06 RX ORDER — SODIUM CHLORIDE, SODIUM LACTATE, POTASSIUM CHLORIDE, CALCIUM CHLORIDE 600; 310; 30; 20 MG/100ML; MG/100ML; MG/100ML; MG/100ML
9 INJECTION, SOLUTION INTRAVENOUS CONTINUOUS
Status: DISCONTINUED | OUTPATIENT
Start: 2019-03-06 | End: 2019-03-20 | Stop reason: HOSPADM

## 2019-03-06 RX ORDER — PROMETHAZINE HYDROCHLORIDE 25 MG/1
25 TABLET ORAL ONCE AS NEEDED
Status: DISCONTINUED | OUTPATIENT
Start: 2019-03-06 | End: 2019-03-06 | Stop reason: HOSPADM

## 2019-03-06 RX ORDER — IPRATROPIUM BROMIDE AND ALBUTEROL SULFATE 2.5; .5 MG/3ML; MG/3ML
3 SOLUTION RESPIRATORY (INHALATION) ONCE AS NEEDED
Status: DISCONTINUED | OUTPATIENT
Start: 2019-03-06 | End: 2019-03-06 | Stop reason: HOSPADM

## 2019-03-06 RX ORDER — PROMETHAZINE HYDROCHLORIDE 25 MG/ML
12.5 INJECTION, SOLUTION INTRAMUSCULAR; INTRAVENOUS ONCE AS NEEDED
Status: DISCONTINUED | OUTPATIENT
Start: 2019-03-06 | End: 2019-03-06 | Stop reason: HOSPADM

## 2019-03-06 RX ORDER — FLUMAZENIL 0.1 MG/ML
0.2 INJECTION INTRAVENOUS AS NEEDED
Status: DISCONTINUED | OUTPATIENT
Start: 2019-03-06 | End: 2019-03-06 | Stop reason: HOSPADM

## 2019-03-06 RX ORDER — LEVOFLOXACIN 5 MG/ML
750 INJECTION, SOLUTION INTRAVENOUS EVERY 24 HOURS
Status: DISCONTINUED | OUTPATIENT
Start: 2019-03-06 | End: 2019-03-08

## 2019-03-06 RX ORDER — LABETALOL HYDROCHLORIDE 5 MG/ML
5 INJECTION, SOLUTION INTRAVENOUS
Status: DISCONTINUED | OUTPATIENT
Start: 2019-03-06 | End: 2019-03-06 | Stop reason: HOSPADM

## 2019-03-06 RX ORDER — MIDAZOLAM HYDROCHLORIDE 1 MG/ML
0.5 INJECTION INTRAMUSCULAR; INTRAVENOUS
Status: DISCONTINUED | OUTPATIENT
Start: 2019-03-06 | End: 2019-03-06 | Stop reason: HOSPADM

## 2019-03-06 RX ORDER — DIPHENHYDRAMINE HCL 25 MG
25 CAPSULE ORAL
Status: DISCONTINUED | OUTPATIENT
Start: 2019-03-06 | End: 2019-03-06 | Stop reason: HOSPADM

## 2019-03-06 RX ORDER — HYDROMORPHONE HYDROCHLORIDE 1 MG/ML
0.5 INJECTION, SOLUTION INTRAMUSCULAR; INTRAVENOUS; SUBCUTANEOUS
Status: DISCONTINUED | OUTPATIENT
Start: 2019-03-06 | End: 2019-03-06 | Stop reason: HOSPADM

## 2019-03-06 RX ORDER — OXYCODONE AND ACETAMINOPHEN 7.5; 325 MG/1; MG/1
1 TABLET ORAL ONCE AS NEEDED
Status: DISCONTINUED | OUTPATIENT
Start: 2019-03-06 | End: 2019-03-06 | Stop reason: HOSPADM

## 2019-03-06 RX ORDER — LIDOCAINE HYDROCHLORIDE 20 MG/ML
INJECTION, SOLUTION INFILTRATION; PERINEURAL AS NEEDED
Status: DISCONTINUED | OUTPATIENT
Start: 2019-03-06 | End: 2019-03-06 | Stop reason: SURG

## 2019-03-06 RX ORDER — SUCCINYLCHOLINE CHLORIDE 20 MG/ML
INJECTION INTRAMUSCULAR; INTRAVENOUS AS NEEDED
Status: DISCONTINUED | OUTPATIENT
Start: 2019-03-06 | End: 2019-03-06 | Stop reason: SURG

## 2019-03-06 RX ORDER — NALOXONE HCL 0.4 MG/ML
0.2 VIAL (ML) INJECTION AS NEEDED
Status: DISCONTINUED | OUTPATIENT
Start: 2019-03-06 | End: 2019-03-06 | Stop reason: HOSPADM

## 2019-03-06 RX ORDER — PROPOFOL 10 MG/ML
VIAL (ML) INTRAVENOUS AS NEEDED
Status: DISCONTINUED | OUTPATIENT
Start: 2019-03-06 | End: 2019-03-06 | Stop reason: SURG

## 2019-03-06 RX ORDER — DIPHENHYDRAMINE HCL 25 MG
25 CAPSULE ORAL EVERY 6 HOURS PRN
Status: DISCONTINUED | OUTPATIENT
Start: 2019-03-06 | End: 2019-03-06 | Stop reason: HOSPADM

## 2019-03-06 RX ORDER — LIDOCAINE HYDROCHLORIDE 10 MG/ML
0.5 INJECTION, SOLUTION EPIDURAL; INFILTRATION; INTRACAUDAL; PERINEURAL ONCE AS NEEDED
Status: DISCONTINUED | OUTPATIENT
Start: 2019-03-06 | End: 2019-03-06 | Stop reason: HOSPADM

## 2019-03-06 RX ORDER — ONDANSETRON 2 MG/ML
4 INJECTION INTRAMUSCULAR; INTRAVENOUS ONCE AS NEEDED
Status: DISCONTINUED | OUTPATIENT
Start: 2019-03-06 | End: 2019-03-06 | Stop reason: HOSPADM

## 2019-03-06 RX ORDER — ONDANSETRON 2 MG/ML
INJECTION INTRAMUSCULAR; INTRAVENOUS AS NEEDED
Status: DISCONTINUED | OUTPATIENT
Start: 2019-03-06 | End: 2019-03-06 | Stop reason: SURG

## 2019-03-06 RX ORDER — EPHEDRINE SULFATE 50 MG/ML
5 INJECTION, SOLUTION INTRAVENOUS ONCE AS NEEDED
Status: DISCONTINUED | OUTPATIENT
Start: 2019-03-06 | End: 2019-03-06 | Stop reason: HOSPADM

## 2019-03-06 RX ADMIN — PHENYLEPHRINE HYDROCHLORIDE 100 MCG: 10 INJECTION INTRAVENOUS at 09:49

## 2019-03-06 RX ADMIN — PROPOFOL 5 MG: 10 INJECTION, EMULSION INTRAVENOUS at 10:06

## 2019-03-06 RX ADMIN — PROPOFOL 10 MG: 10 INJECTION, EMULSION INTRAVENOUS at 09:55

## 2019-03-06 RX ADMIN — KETOTIFEN FUMARATE 1 DROP: 0.35 SOLUTION/ DROPS OPHTHALMIC at 21:06

## 2019-03-06 RX ADMIN — ONDANSETRON 4 MG: 2 INJECTION INTRAMUSCULAR; INTRAVENOUS at 10:10

## 2019-03-06 RX ADMIN — ACETYLCYSTEINE 4 ML: 200 SOLUTION ORAL; RESPIRATORY (INHALATION) at 20:34

## 2019-03-06 RX ADMIN — FAMOTIDINE 20 MG: 10 INJECTION, SOLUTION INTRAVENOUS at 09:26

## 2019-03-06 RX ADMIN — PROPOFOL 5 MG: 10 INJECTION, EMULSION INTRAVENOUS at 10:03

## 2019-03-06 RX ADMIN — IPRATROPIUM BROMIDE AND ALBUTEROL SULFATE 3 ML: 2.5; .5 SOLUTION RESPIRATORY (INHALATION) at 20:34

## 2019-03-06 RX ADMIN — PROPOFOL 200 MG: 10 INJECTION, EMULSION INTRAVENOUS at 09:49

## 2019-03-06 RX ADMIN — LEVOFLOXACIN 750 MG: 5 INJECTION, SOLUTION INTRAVENOUS at 17:20

## 2019-03-06 RX ADMIN — ACETYLCYSTEINE 4 ML: 200 SOLUTION ORAL; RESPIRATORY (INHALATION) at 14:37

## 2019-03-06 RX ADMIN — SUCCINYLCHOLINE CHLORIDE 100 MG: 20 INJECTION, SOLUTION INTRAMUSCULAR; INTRAVENOUS; PARENTERAL at 09:54

## 2019-03-06 RX ADMIN — PROPOFOL 50 MG: 10 INJECTION, EMULSION INTRAVENOUS at 09:51

## 2019-03-06 RX ADMIN — LIDOCAINE HYDROCHLORIDE 100 MG: 20 INJECTION, SOLUTION INFILTRATION; PERINEURAL at 09:48

## 2019-03-06 RX ADMIN — PROPOFOL 5 MG: 10 INJECTION, EMULSION INTRAVENOUS at 10:00

## 2019-03-06 RX ADMIN — SODIUM CHLORIDE, POTASSIUM CHLORIDE, SODIUM LACTATE AND CALCIUM CHLORIDE 9 ML/HR: 600; 310; 30; 20 INJECTION, SOLUTION INTRAVENOUS at 09:25

## 2019-03-06 RX ADMIN — PHENYLEPHRINE HYDROCHLORIDE 100 MCG: 10 INJECTION INTRAVENOUS at 09:57

## 2019-03-06 RX ADMIN — MONTELUKAST SODIUM 10 MG: 10 TABLET, FILM COATED ORAL at 21:06

## 2019-03-06 RX ADMIN — FUROSEMIDE 40 MG: 10 INJECTION, SOLUTION INTRAVENOUS at 05:39

## 2019-03-06 RX ADMIN — IPRATROPIUM BROMIDE AND ALBUTEROL SULFATE 3 ML: 2.5; .5 SOLUTION RESPIRATORY (INHALATION) at 07:01

## 2019-03-06 RX ADMIN — PHENYLEPHRINE HYDROCHLORIDE 100 MCG: 10 INJECTION INTRAVENOUS at 09:52

## 2019-03-06 RX ADMIN — FERROUS SULFATE TAB 325 MG (65 MG ELEMENTAL FE) 325 MG: 325 (65 FE) TAB at 15:05

## 2019-03-06 RX ADMIN — IPRATROPIUM BROMIDE AND ALBUTEROL SULFATE 3 ML: 2.5; .5 SOLUTION RESPIRATORY (INHALATION) at 14:37

## 2019-03-06 RX ADMIN — PHENYLEPHRINE HYDROCHLORIDE 100 MCG: 10 INJECTION INTRAVENOUS at 10:03

## 2019-03-06 NOTE — ANESTHESIA POSTPROCEDURE EVALUATION
Patient: Alessio Allen    Procedure Summary     Date:  03/06/19 Room / Location:  [facility] OR  / [facility] MAIN OR    Anesthesia Start:  0937 Anesthesia Stop:  1042    Procedure:  BRONCHOSCOPY WITH BAL AND BIOPSY UNDER FLUORO AND ANESTHESIA (N/A Bronchus) Diagnosis:       Acute respiratory failure with hypoxia (CMS/HCC)      (Acute respiratory failure with hypoxia (CMS/HCC) [J96.01])    Surgeon:  Suresh Hernandez MD Provider:  Toby Stanley MD    Anesthesia Type:  general ASA Status:  3          Anesthesia Type: general  Last vitals  BP   111/92 (03/06/19 1038)   Temp   37.2 °C (99 °F) (03/06/19 1038)   Pulse   88 (03/06/19 1038)   Resp   20 (03/06/19 1038)     SpO2   (!) 87 % (03/06/19 1038)     Post Anesthesia Care and Evaluation    Patient location during evaluation: PACU  Patient participation: complete - patient participated  Level of consciousness: awake and alert  Pain management: adequate  Airway patency: patent  Anesthetic complications: No anesthetic complications    Cardiovascular status: acceptable  Respiratory status: acceptable  Hydration status: acceptable    Comments: -------------------------              03/06/19                    1038        -------------------------   BP:         111/92        Pulse:        88          Resp:         20          Temp:   37.2 °C (99 °F)   SpO2:       (!) 87%      -------------------------

## 2019-03-06 NOTE — ANESTHESIA PROCEDURE NOTES
Airway  Urgency: elective    Date/Time: 3/6/2019 9:54 AM  End Time:3/6/2019 9:55 AM  Airway not difficult    General Information and Staff    Patient location during procedure: OR  Anesthesiologist: Toby Stanley MD    Indications and Patient Condition  Indications for airway management: airway protection    Preoxygenated: yes  Mask difficulty assessment: 1 - vent by mask    Final Airway Details  Final airway type: endotracheal airway      Successful airway: ETT  Cuffed: yes   Successful intubation technique: direct laryngoscopy  Endotracheal tube insertion site: oral  Blade: Yarelis  Blade size: 4  ETT size (mm): 8.0  Cormack-Lehane Classification: grade IIa - partial view of glottis  Placement verified by: chest auscultation and bronchoscopy   Measured from: lips  ETT to lips (cm): 21  Number of attempts at approach: 1               n/a

## 2019-03-06 NOTE — ANESTHESIA PREPROCEDURE EVALUATION
Anesthesia Evaluation     Patient summary reviewed and Nursing notes reviewed   NPO Solid Status: > 8 hours  NPO Liquid Status: > 8 hours           Airway   Mallampati: II  Dental    (+) upper dentures    Pulmonary    (+) COPD severe, asthma, decreased breath sounds,   Cardiovascular - normal exam    ECG reviewed    (+) pacemaker pacemaker, hypertension well controlled 2 medications or greater, dysrhythmias Atrial Fib, Atrial Flutter,       Neuro/Psych- negative ROS  GI/Hepatic/Renal/Endo - negative ROS     Musculoskeletal     Abdominal    Substance History - negative use     OB/GYN          Other   (+) arthritis                     Anesthesia Plan    ASA 3     general     intravenous induction   Anesthetic plan, all risks, benefits, and alternatives have been provided, discussed and informed consent has been obtained with: patient.

## 2019-03-06 NOTE — NURSING NOTE
Addendum to nurse's note: RN in room from 1740 to 1800h, gave pt his insulin and bolus feeding via PEG tube, pt in bed at this time,denies c/o, O2 had been titrated back down to 6l HF n/c with O2 sats 89-91%, wife left for the day, pt given call bell, A&O, I was called to room at 1820, pt found by GRETEL Thomas sitting on floor next to bedside commode that was tipped over. Per EL Ventura pt was assisted to bedside commode, she gave him call light,instructed him to call, left room, pt then found on floor, pt states he slid off commode, denies hitting his head or any other part of his body, no injury noted, VS as charted, blood sugar 214, O2 sat unchanged, A&O, Dr Hernandez called and call back received from Dr Sarabia, made MD aware of events, wife Dulce called and made aware as well, charge nurse called .

## 2019-03-06 NOTE — THERAPY TREATMENT NOTE
Acute Care - Physical Therapy Treatment Note  [facility]     Patient Name: Alessio Allen  : 1940  MRN: [id number]  Today's Date: 3/6/2019  Onset of Illness/Injury or Date of Surgery: 19  Date of Referral to PT: 19  Referring Physician: David    Admit Date: 2019    Visit Dx:    ICD-10-CM ICD-9-CM   1. Acute respiratory failure with hypoxia (CMS/[facility]) J96.01 518.81   2. Impaired mobility Z74.09 799.89   3. AF (paroxysmal atrial fibrillation) (CMS/[facility]) I48.0 427.31     Patient Active Problem List   Diagnosis   • A-fib (CMS/[facility])   • Dyslipidemia   • BP (high blood pressure)   • Neuropathy   • Hypertension   • COPD (chronic obstructive pulmonary disease) (CMS/[facility])   • BPH (benign prostatic hypertrophy)   • Atrial fibrillation (CMS/[facility])   • Asthma   • Hypoxia   • Pneumonia   • Chronic anticoagulation   • Pneumonia of both lungs due to infectious organism   • Hyponatremia   • COPD exacerbation (CMS/[facility])   • Acute on chronic respiratory failure with hypoxia (CMS/[facility])   • Pneumonia of both lower lobes due to infectious organism (CMS/[facility])   • Acute on chronic diastolic heart failure (CMS/[facility])   • IPF (idiopathic pulmonary fibrosis) (CMS/[facility])   • Acute respiratory failure with hypoxia (CMS/[facility])   • Attention to G-tube (CMS/[facility])       Therapy Treatment    Rehabilitation Treatment Summary     Row Name 19 1600             Treatment Time/Intention    Discipline  physical therapy assistant  -CW      Document Type  therapy note (daily note)  -CW      Subjective Information  complains of;weakness;fatigue  -CW      Mode of Treatment  physical therapy  -CW      Therapy Frequency (PT Clinical Impression)  daily  -CW      Patient Effort  good  -CW      Existing Precautions/Restrictions  fall;oxygen therapy device and L/min  -CW      Recorded by [CW] Cesario Kwon PTA 19 1652      Row Name 19 1600             Vital Signs    O2 Delivery Pre Treatment  hi-flow  -CW      O2 Delivery Intra  Treatment  hi-flow  -CW      O2 Delivery Post Treatment  hi-flow  -CW      Recorded by [CW] Cesario Kwon, PTA 03/06/19 1652      Row Name 03/06/19 1600             Cognitive Assessment/Intervention- PT/OT    Orientation Status (Cognition)  oriented x 3  -CW      Follows Commands (Cognition)  WFL  -CW      Safety Deficit (Cognitive)  moderate deficit;awareness of need for assistance;safety precautions follow-through/compliance  -CW      Recorded by [CW] Cesario Kwon, PTA 03/06/19 1652      Row Name 03/06/19 1600             Bed Mobility Assessment/Treatment    Sit-Supine [place] (Bed Mobility)  not tested  -CW      Comment (Bed Mobility)  Pt lethargic possibly from procedure earlier  -CW      Recorded by [CW] Cesario Kwon, PTA 03/06/19 1652      Row Name 03/06/19 1600             Transfer Assessment/Treatment    Comment (Transfers)  NT  -CW      Recorded by [CW] Cesario Kwon, PTA 03/06/19 1652      Row Name 03/06/19 1600             Therapeutic Exercise    Lower Extremity (Therapeutic Exercise)  gluteal sets;SAQ (short arc quad), bilateral  -CW      Lower Extremity Range of Motion (Therapeutic Exercise)  ankle dorsiflexion/plantar flexion, bilateral  -CW      Exercise Type (Therapeutic Exercise)  AROM (active range of motion)  -CW      Position (Therapeutic Exercise)  supine  -CW      Sets/Reps (Therapeutic Exercise)  10  -CW      Recorded by [CW] Cesario Kwon, PTA 03/06/19 1652      Row Name 03/06/19 1600             Positioning and Restraints    Pre-Treatment Position  in bed  -CW      Post Treatment Position  bed  -CW      In Bed  notified nsg;supine;call light within reach;encouraged to call for assist;exit alarm on;with family/caregiver  -CW      Recorded by [CW] Cesario Kwon, PTA 03/06/19 1652      Row Name                [REMOVED] Wound 03/19/18 Right medial     Wound - Properties Group Date first assessed: 03/19/18 [BF] Present On Admission : yes [BF] Side: Right  [BF] Orientation: medial [BF], medial ankle  Type: -- [BF], previous ankle surgery  Resolution Date: 03/06/19 [SM], not present   Resolution Time: 0853 [SM] Recorded by:  [BF] Elizabeth Alexandre RN, CWOCN 03/19/18 1659 [SM] Shahla Farrell RN 03/06/19 0853    Row Name                Wound 03/06/19 1003 other (see notes) incision    Wound - Properties Group Date first assessed: 03/06/19 [MONTANA] Time first assessed: 1003 [MONTANA] Location: other (see notes) [MONTANA] Type: incision [MONTANA] Recorded by:  [MONTANA] Sanket Jaimes RN 03/06/19 1003    Row Name 03/06/19 1600             Outcome Summary/Treatment Plan (PT)    Anticipated Discharge Disposition (PT)  skilled nursing facility;home with OP services  -CW      Recorded by [CW] Cesario Kwon, PTA 03/06/19 1652        User Key  (r) = Recorded By, (t) = Taken By, (c) = Cosigned By    Initials Name Effective Dates Discipline    BF Elizabeth Alexandre RN, CWOCN 06/16/16 -  Nurse    Sanket Evans RN 06/16/16 -  Nurse    Shahla Oliver RN 06/16/16 -  Nurse    Cesario Rivera, PTA 03/07/18 -  PT                   Physical Therapy Education     Title: PT OT SLP Therapies (In Progress)     Topic: Physical Therapy (In Progress)     Point: Mobility training (In Progress)     Learning Progress Summary           Patient Acceptance, D,E, NR by  at 3/6/2019  4:52 PM    Acceptance, E, NR by  at 3/5/2019  1:08 PM                   Point: Home exercise program (In Progress)     Learning Progress Summary           Patient Acceptance, D,E, NR by CW at 3/6/2019  4:52 PM    Acceptance, E, NR by  at 3/5/2019  1:08 PM                   Point: Body mechanics (In Progress)     Learning Progress Summary           Patient Acceptance, D,E, NR by  at 3/6/2019  4:52 PM    Acceptance, E, NR by  at 3/5/2019  1:08 PM                   Point: Precautions (In Progress)     Learning Progress Summary           Patient Acceptance, D,E, NR by  at 3/6/2019  4:52 PM    Acceptance, E,  NR by  at 3/5/2019  1:08 PM                               User Key     Initials Effective Dates Name Provider Type [facility] 04/03/18 -  Jaida Porter, PT Physical Therapist PT     03/07/18 -  Cesario Kwon PTA Physical Therapy Assistant PT                PT Recommendation and Plan  Anticipated Discharge Disposition (PT): skilled nursing facility, home with OP services  Therapy Frequency (PT Clinical Impression): daily  Outcome Summary/Treatment Plan (PT)  Anticipated Discharge Disposition (PT): skilled nursing facility, home with OP services  Plan of Care Reviewed With: patient  Progress: no change  Outcome Summary: Pt very lethargic and unsafe to stand and had a fall earlier today going to [facility]  Outcome Measures     Row Name 03/06/19 1600 03/05/19 1300          How much help from another person do you currently need...    Turning from your back to your side while in flat bed without using bedrails?  2  -CW  3  -LH     Moving from lying on back to sitting on the side of a flat bed without bedrails?  2  -CW  3  -LH     Moving to and from a bed to a chair (including a wheelchair)?  1  -CW  2  -LH     Standing up from a chair using your arms (e.g., wheelchair, bedside chair)?  1  -CW  2  -LH     Climbing 3-5 steps with a railing?  1  -CW  2  -LH     To walk in hospital room?  1  -CW  2  -LH     AM-PAC 6 Clicks Score  8  -CW  14  -LH        Functional Assessment    Outcome Measure Options  AM-PAC 6 Clicks Basic Mobility (PT)  -  AM-PAC 6 Clicks Basic Mobility (PT)  -       User Key  (r) = Recorded By, (t) = Taken By, (c) = Cosigned By    Initials Name Provider Type     Jaida Porter, PT Physical Therapist    CW Cesario Kwon, HARDEEP Physical Therapy Assistant         Time Calculation:   PT Charges     Row Name 03/06/19 1654             Time Calculation    Start Time  1639  -CW      Stop Time  1654  -CW      Time Calculation (min)  15 min  -CW      PT Received On  03/06/19  -CW      PT - Next  Appointment  03/07/19  -NIKITA        User Key  (r) = Recorded By, (t) = Taken By, (c) = Cosigned By    Initials Name Provider Type    Cesario Rivera PTA Physical Therapy Assistant        Therapy Suggested Charges     Code   Minutes Charges    None           Therapy Charges for Today     Code Description Service Date Service Provider Modifiers Qty    76821935805 HC PT THER PROC EA 15 MIN 3/6/2019 Cesario Kwon, HARDEEP GP 1    37121309205 HC PT THER SUPP EA 15 MIN 3/6/2019 Cesario Kwon PTA GP 1          PT G-Codes  Outcome Measure Options: AM-PAC 6 Clicks Basic Mobility (PT)  AM-PAC 6 Clicks Score: 8    Cesario Kwon PTA  3/6/2019

## 2019-03-06 NOTE — PLAN OF CARE
Problem: Patient Care Overview  Goal: Plan of Care Review  Outcome: Ongoing (interventions implemented as appropriate)   03/06/19 4676   Coping/Psychosocial   Plan of Care Reviewed With patient;family   Plan of Care Review   Progress no change   OTHER   Outcome Summary Pt bronched today thick mucus plugs, mucolytics added to treatment, and aerobika. Continue treatment plan and encourage pulmonary hygiene.

## 2019-03-06 NOTE — PROGRESS NOTES
Continued Stay Note  [facility]     Patient Name: Alessio Allen  MRN: [id number]  Today's Date: 3/6/2019    Admit Date: 2/28/2019    Discharge Plan     Row Name 03/06/19 1316       Plan    Plan  Plan home with [facility]  or [facility].   JARON Juarez RN    Patient/Family in Agreement with Plan  yes    Plan Comments  Spoke to pt and pt's significant other ( Dulce Allen [phone]) at bedside.  Pt significant other states pt has been in [facility] in the past and if skilled care needed would want to return.  Pt agrees that [facility] would be his choice for skilled care.  Erica  ( [phone]) called to follow.  Plan home with [facility]  or [facility] for skilled care.   JARON Juarez RN        Discharge Codes    No documentation.             Mahsa Juarez, RN

## 2019-03-06 NOTE — ANESTHESIA POSTPROCEDURE EVALUATION
Patient: Alessio Allen    Procedure Summary     Date:  03/06/19 Room / Location:  [facility] OR  / [facility] MAIN OR    Anesthesia Start:  0937 Anesthesia Stop:  1042    Procedure:  BRONCHOSCOPY WITH BAL AND BIOPSY UNDER FLUORO AND ANESTHESIA (N/A Bronchus) Diagnosis:       Acute respiratory failure with hypoxia (CMS/HCC)      (Acute respiratory failure with hypoxia (CMS/HCC) [J96.01])    Surgeon:  Suresh Hernandez MD Provider:  Toby Stanley MD    Anesthesia Type:  general ASA Status:  3          Anesthesia Type: general  Last vitals  BP   115/78 (03/06/19 1055)   Temp   37.2 °C (99 °F) (03/06/19 1038)   Pulse   84 (03/06/19 1055)   Resp   20 (03/06/19 1055)     SpO2   93 % (03/06/19 1055)     Post Anesthesia Care and Evaluation    Patient location during evaluation: PACU  Patient participation: complete - patient participated  Level of consciousness: awake and alert  Pain management: adequate  Airway patency: patent  Anesthetic complications: No anesthetic complications    Cardiovascular status: acceptable  Respiratory status: acceptable  Hydration status: acceptable    Comments: --------------------            03/06/19               1055     --------------------   BP:       115/78     Pulse:      84       Resp:       20       Temp:                SpO2:      93%      --------------------

## 2019-03-06 NOTE — ANESTHESIA PROCEDURE NOTES
Airway  Urgency: elective    Date/Time: 3/6/2019 9:50 AM  End Time:3/6/2019 9:53 AM  Airway not difficult    General Information and Staff    Patient location during procedure: OR  Anesthesiologist: Toby Stanley MD    Indications and Patient Condition  Indications for airway management: airway protection    Preoxygenated: yes  Mask difficulty assessment: 1 - vent by mask    Final Airway Details  Final airway type: supraglottic airway      Successful airway: LMA  Size 5    Number of attempts at approach: 2    Additional Comments  Unsuccessful x 2 copious blood in airway despite minimal trauma converted to gen with ETT

## 2019-03-06 NOTE — PLAN OF CARE
Problem: Patient Care Overview  Goal: Plan of Care Review  Outcome: Ongoing (interventions implemented as appropriate)   03/06/19 0343   Coping/Psychosocial   Plan of Care Reviewed With patient   Plan of Care Review   Progress no change   OTHER   Outcome Summary Patient alert and oriented. VSS- SpO2 87-92% on 8L overnight. Denies pain. Persisitant cough continues. Denies SOA but having increased RR and shallow breathing. Eye drops administered. Scheduled for bronch this AM- consent and education on chart. Patient still very upset about fall he had on day shift. He has called me into the room repeatedly to explain what happen. He insists he just slid off the BSC and couldn't get up so he sat and waited for help. I was able to redirect him temporarily to positive things that happened during the day, but he was still upset. No acute distress noted. Will continue to monitor.        Problem: Fall Risk (Adult)  Goal: Absence of Fall  Outcome: Ongoing (interventions implemented as appropriate)   03/06/19 0343   Fall Risk (Adult)   Absence of Fall making progress toward outcome       Problem: Breathing Pattern Ineffective (Adult)  Goal: Effective Oxygenation/Ventilation  Outcome: Ongoing (interventions implemented as appropriate)   03/06/19 0343   Breathing Pattern Ineffective (Adult)   Effective Oxygenation/Ventilation making progress toward outcome     Goal: Anxiety/Fear Reduction  Outcome: Ongoing (interventions implemented as appropriate)   03/06/19 0343   Breathing Pattern Ineffective (Adult)   Anxiety/Fear Reduction making progress toward outcome       Problem: Nutrition, Enteral (Adult)  Goal: Signs and Symptoms of Listed Potential Problems Will be Absent, Minimized or Managed (Nutrition, Enteral)  Outcome: Ongoing (interventions implemented as appropriate)   03/06/19 0343   Goal/Outcome Evaluation   Problems Assessed (Enteral Nutrition) all   Problems Present (Enteral Nutrition) malnutrition       Problem: Skin  Injury Risk (Adult)  Goal: Skin Health and Integrity  Outcome: Ongoing (interventions implemented as appropriate)   03/06/19 6723   Skin Injury Risk (Adult)   Skin Health and Integrity making progress toward outcome

## 2019-03-06 NOTE — PROGRESS NOTES
[facility]  [phone]  Suresh Hernandez MD    Subjective:  LOS: 6     He is on 7-8 L high flow when I walked in.  He denies other new complaints.  Remains weak.    Objective   Vital Signs past 24hrs    Temp range: Temp (24hrs), Av.9 °F (36.6 °C), Min:97.5 °F (36.4 °C), Max:98.2 °F (36.8 °C)    BP range: BP: (102-154)/(73-94) 133/85  Pulse range: Heart Rate:  [] 87  Resp rate range: Resp:  [18-24] 22    Device (Oxygen Therapy): nasal cannula with reservoir (i.e. oximizer)Flow (L/min):  [6-10] 10  Oxygen range:SpO2:  [85 %-90 %] 88 %      101 kg (222 lb); Body mass index is 28.5 kg/m².    Intake/Output Summary (Last 24 hours) at 3/6/2019 1031  Last data filed at 3/6/2019 1005  Gross per 24 hour   Intake 370 ml   Output 1300 ml   Net -930 ml       Physical Exam   Cardiovascular: Normal rate and regular rhythm.   No murmur heard.  Pulmonary/Chest: He has no wheezes. He has rales (very minimal now).   Abdominal: Soft. Bowel sounds are normal. There is no tenderness.   PEG +   Musculoskeletal: He exhibits no edema.   Neurological: He is alert.   Nursing note and vitals reviewed.    Results Review:    I have reviewed the laboratory and imaging data since the last note by [facility] physician.  My annotations are noted in assessment and plan.    Medication Review:  I have reviewed the current MAR.  My annotations are noted in assessment and plan.      lactated ringers 9 mL/hr Last Rate: 9 mL/hr (19 0420)     Plan   PCCM Problems  Acute respiratory failure  Acute decompensated diastolic HF  COPD, no obvious exacerbation  Throat cancer s/p chemo RT  Dysphagia - PEG  ILD ??  Relevant Medical Diagnoses  Afib, on AC  CAD  Obesity    Plan of Treatment  Bronch today to assess for acute respiratory failure with failure to improve despite aggressive diuresis.    Possible heart cath.  Await cardiology input today.    Borderline/low blood pressure.  Therefore all of his antihypertensives from home are on  hold.    CT chest shows bibasilar infiltrates.  Bronch today.    CT neck was reviewed. Subtle inflammation of right nasopharynx. Opacification of left sphenoid sinus. Will review with Dr. Merrill later.    Patient is taking sips of water.  Note and appreciate speech input.   He remains with a PEG tube and feeds.  Majority of his intake as well as medicines go down his PEG tube.    I have previously considered the diagnosis of interstitial lung disease on this patient.  However his infiltrates substantially improved with diuresis.  Not typical for interstitial lung disease.    No further epistaxis or hemoptysis reported.  Patient's Xarelto has been on hold for several days.    Give 1 dose of Lovenox subcutaneous now - not given yesterday.  Resume full anticoagulation after bronchoscopy and potentially after right and left heart catheterization.    Discussed with wife/friend.    Suresh Hernandez MD  03/06/19  10:31 AM    Part of this note may be an electronic transcription/translation of spoken language to printed text using the Dragon Dictation System.

## 2019-03-06 NOTE — PROGRESS NOTES
Bronch shows thick mucoid secretions in the lungs.  These were suctioned and sent for analysis.  Will add antibiotics pending results.  Findings on bronchoscopy may contribute to hypoxia.  We will see if he improves after

## 2019-03-06 NOTE — PLAN OF CARE
Problem: Patient Care Overview  Goal: Plan of Care Review  Outcome: Ongoing (interventions implemented as appropriate)   03/06/19 3917   Coping/Psychosocial   Plan of Care Reviewed With patient   Plan of Care Review   Progress no change   OTHER   Outcome Summary Pt very lethargic and unsafe to stand and had a fall earlier today going to [facility]

## 2019-03-06 NOTE — DISCHARGE PLACEMENT REQUEST
"Chioma Rushing (78 y.o. Male)     Date of Birth Social Security Number Address Home Phone MRN    1940  [id number] [facility] [address] [phone] [id number]    Mandaeism Marital Status          Restoration        Admission Date Admission Type Admitting Provider Attending Provider Department, Room/Bed    2/28/19 Urgent Chani Kong MD Saad, Lebnan S, MD [address], [address]/1    Discharge Date Discharge Disposition Discharge Destination                       Attending Provider:  Chani Kong MD    Allergies:  Lisinopril, Penicillins, Sulfa Antibiotics, Atenolol, Coreg [Carvedilol], Ibuprofen, Celebrex [Celecoxib]    Isolation:  None   Infection:  None   Code Status:  CPR    Ht:  188 cm (74\")   Wt:  101 kg (222 lb)    Admission Cmt:  None   Principal Problem:  None                Active Insurance as of 2/28/2019     Primary Coverage     Payor Plan Insurance Group Employer/Plan Group    MEDICARE MEDICARE A & B      Payor Plan Address Payor Plan Phone Number Payor Plan Fax Number Effective Dates    [address] [phone]  5/1/2000 - None Entered    [facility] [id number]       Subscriber Name Subscriber Birth Date Member ID       CHIOMA RUSHING 1940 [id number]           Secondary Coverage     Payor Plan Insurance Group Employer/Plan Group     FOR LIFE  FOR LIFE MC SUPP      Payor Plan Address Payor Plan Phone Number Payor Plan Fax Number Effective Dates    [address] [phone]  1/30/2018 - None Entered    [facility] [address]       Subscriber Name Subscriber Birth Date Member ID       CHIOMA RUSHING 1940 [id number]                 Emergency Contacts      (Rel.) Home Phone Work Phone Mobile Phone    Alejandro,Pat (Friend) [phone] -- --    AlejandroEllis (Son) -- -- [phone]              "

## 2019-03-07 LAB
ANION GAP SERPL CALCULATED.3IONS-SCNC: 9.9 MMOL/L
ARTERIAL PATENCY WRIST A: POSITIVE
ATMOSPHERIC PRESS: 762 MMHG
BASE EXCESS BLDA CALC-SCNC: 5 MMOL/L (ref 0–2)
BDY SITE: ABNORMAL
BUN BLD-MCNC: 49 MG/DL (ref 8–23)
BUN/CREAT SERPL: 43.8 (ref 7–25)
CALCIUM SPEC-SCNC: 10.1 MG/DL (ref 8.6–10.5)
CHLORIDE SERPL-SCNC: 99 MMOL/L (ref 98–107)
CO2 SERPL-SCNC: 34.1 MMOL/L (ref 22–29)
CREAT BLD-MCNC: 1.12 MG/DL (ref 0.76–1.27)
CYTO UR: NORMAL
CYTO UR: NORMAL
DEPRECATED RDW RBC AUTO: 51.5 FL (ref 37–54)
ERYTHROCYTE [DISTWIDTH] IN BLOOD BY AUTOMATED COUNT: 13.9 % (ref 12.3–15.4)
GFR SERPL CREATININE-BSD FRML MDRD: 64 ML/MIN/1.73
GLUCOSE BLD-MCNC: 125 MG/DL (ref 65–99)
GLUCOSE BLDC GLUCOMTR-MCNC: 113 MG/DL (ref 70–130)
GLUCOSE BLDC GLUCOMTR-MCNC: 118 MG/DL (ref 70–130)
GLUCOSE BLDC GLUCOMTR-MCNC: 217 MG/DL (ref 70–130)
HCO3 BLDA-SCNC: 32.8 MMOL/L (ref 22–28)
HCT VFR BLD AUTO: 44.2 % (ref 37.5–51)
HGB BLD-MCNC: 13.8 G/DL (ref 13–17.7)
HOROWITZ INDEX BLD+IHG-RTO: 45 %
LAB AP CASE REPORT: NORMAL
LAB AP CASE REPORT: NORMAL
MCH RBC QN AUTO: 31.1 PG (ref 26.6–33)
MCHC RBC AUTO-ENTMCNC: 31.2 G/DL (ref 31.5–35.7)
MCV RBC AUTO: 99.5 FL (ref 79–97)
MODALITY: ABNORMAL
O2 A-A PPRESDIFF RESPIRATORY: 0.3 MMHG
PATH REPORT.FINAL DX SPEC: NORMAL
PATH REPORT.FINAL DX SPEC: NORMAL
PATH REPORT.GROSS SPEC: NORMAL
PATH REPORT.GROSS SPEC: NORMAL
PCO2 BLDA: 58.1 MM HG (ref 35–45)
PH BLDA: 7.36 PH UNITS (ref 7.35–7.45)
PLATELET # BLD AUTO: 206 10*3/MM3 (ref 140–450)
PMV BLD AUTO: 9.8 FL (ref 6–12)
PO2 BLDA: 74.3 MM HG (ref 80–100)
POTASSIUM BLD-SCNC: 4 MMOL/L (ref 3.5–5.2)
RBC # BLD AUTO: 4.44 10*6/MM3 (ref 4.14–5.8)
SAO2 % BLDCOA: 93.6 % (ref 92–99)
SET MECH RESP RATE: 8
SODIUM BLD-SCNC: 143 MMOL/L (ref 136–145)
TOTAL RATE: 26 BREATHS/MINUTE
VENTILATOR MODE: ABNORMAL
VT ON VENT VENT: 518 ML
WBC NRBC COR # BLD: 12.93 10*3/MM3 (ref 3.4–10.8)

## 2019-03-07 PROCEDURE — 85027 COMPLETE CBC AUTOMATED: CPT | Performed by: INTERNAL MEDICINE

## 2019-03-07 PROCEDURE — C1894 INTRO/SHEATH, NON-LASER: HCPCS | Performed by: INTERNAL MEDICINE

## 2019-03-07 PROCEDURE — 94799 UNLISTED PULMONARY SVC/PX: CPT

## 2019-03-07 PROCEDURE — 36600 WITHDRAWAL OF ARTERIAL BLOOD: CPT

## 2019-03-07 PROCEDURE — B2111ZZ FLUOROSCOPY OF MULTIPLE CORONARY ARTERIES USING LOW OSMOLAR CONTRAST: ICD-10-PCS | Performed by: INTERNAL MEDICINE

## 2019-03-07 PROCEDURE — 25010000002 LEVOFLOXACIN PER 250 MG: Performed by: INTERNAL MEDICINE

## 2019-03-07 PROCEDURE — 80048 BASIC METABOLIC PNL TOTAL CA: CPT | Performed by: INTERNAL MEDICINE

## 2019-03-07 PROCEDURE — C1769 GUIDE WIRE: HCPCS | Performed by: INTERNAL MEDICINE

## 2019-03-07 PROCEDURE — 82962 GLUCOSE BLOOD TEST: CPT

## 2019-03-07 PROCEDURE — 99153 MOD SED SAME PHYS/QHP EA: CPT | Performed by: INTERNAL MEDICINE

## 2019-03-07 PROCEDURE — 25010000002 MIDAZOLAM PER 1 MG: Performed by: INTERNAL MEDICINE

## 2019-03-07 PROCEDURE — 25010000002 HEPARIN (PORCINE) PER 1000 UNITS: Performed by: INTERNAL MEDICINE

## 2019-03-07 PROCEDURE — 97110 THERAPEUTIC EXERCISES: CPT | Performed by: PHYSICAL THERAPIST

## 2019-03-07 PROCEDURE — B2151ZZ FLUOROSCOPY OF LEFT HEART USING LOW OSMOLAR CONTRAST: ICD-10-PCS | Performed by: INTERNAL MEDICINE

## 2019-03-07 PROCEDURE — 85014 HEMATOCRIT: CPT

## 2019-03-07 PROCEDURE — 93460 R&L HRT ART/VENTRICLE ANGIO: CPT | Performed by: INTERNAL MEDICINE

## 2019-03-07 PROCEDURE — 25010000002 FENTANYL CITRATE (PF) 100 MCG/2ML SOLUTION: Performed by: INTERNAL MEDICINE

## 2019-03-07 PROCEDURE — 63710000001 INSULIN LISPRO (HUMAN) PER 5 UNITS: Performed by: INTERNAL MEDICINE

## 2019-03-07 PROCEDURE — 4A023N8 MEASUREMENT OF CARDIAC SAMPLING AND PRESSURE, BILATERAL, PERCUTANEOUS APPROACH: ICD-10-PCS | Performed by: INTERNAL MEDICINE

## 2019-03-07 PROCEDURE — 0 IOPAMIDOL PER 1 ML: Performed by: INTERNAL MEDICINE

## 2019-03-07 PROCEDURE — 99152 MOD SED SAME PHYS/QHP 5/>YRS: CPT | Performed by: INTERNAL MEDICINE

## 2019-03-07 PROCEDURE — 99232 SBSQ HOSP IP/OBS MODERATE 35: CPT | Performed by: INTERNAL MEDICINE

## 2019-03-07 PROCEDURE — 82803 BLOOD GASES ANY COMBINATION: CPT

## 2019-03-07 PROCEDURE — 85018 HEMOGLOBIN: CPT

## 2019-03-07 RX ORDER — SODIUM CHLORIDE 9 MG/ML
INJECTION, SOLUTION INTRAVENOUS CONTINUOUS PRN
Status: COMPLETED | OUTPATIENT
Start: 2019-03-07 | End: 2019-03-07

## 2019-03-07 RX ORDER — LIDOCAINE HYDROCHLORIDE 20 MG/ML
INJECTION, SOLUTION INFILTRATION; PERINEURAL AS NEEDED
Status: DISCONTINUED | OUTPATIENT
Start: 2019-03-07 | End: 2019-03-07 | Stop reason: HOSPADM

## 2019-03-07 RX ORDER — SODIUM CHLORIDE 9 MG/ML
75 INJECTION, SOLUTION INTRAVENOUS CONTINUOUS
Status: ACTIVE | OUTPATIENT
Start: 2019-03-07 | End: 2019-03-07

## 2019-03-07 RX ORDER — MIDAZOLAM HYDROCHLORIDE 1 MG/ML
INJECTION INTRAMUSCULAR; INTRAVENOUS AS NEEDED
Status: DISCONTINUED | OUTPATIENT
Start: 2019-03-07 | End: 2019-03-07 | Stop reason: HOSPADM

## 2019-03-07 RX ORDER — FENTANYL CITRATE 50 UG/ML
INJECTION, SOLUTION INTRAMUSCULAR; INTRAVENOUS AS NEEDED
Status: DISCONTINUED | OUTPATIENT
Start: 2019-03-07 | End: 2019-03-07 | Stop reason: HOSPADM

## 2019-03-07 RX ADMIN — ACETYLCYSTEINE 4 ML: 200 SOLUTION ORAL; RESPIRATORY (INHALATION) at 06:57

## 2019-03-07 RX ADMIN — LEVOFLOXACIN 750 MG: 5 INJECTION, SOLUTION INTRAVENOUS at 18:48

## 2019-03-07 RX ADMIN — MONTELUKAST SODIUM 10 MG: 10 TABLET, FILM COATED ORAL at 21:51

## 2019-03-07 RX ADMIN — ACETYLCYSTEINE 4 ML: 200 SOLUTION ORAL; RESPIRATORY (INHALATION) at 10:53

## 2019-03-07 RX ADMIN — INSULIN LISPRO 4 UNITS: 100 INJECTION, SOLUTION INTRAVENOUS; SUBCUTANEOUS at 21:51

## 2019-03-07 RX ADMIN — KETOTIFEN FUMARATE 1 DROP: 0.35 SOLUTION/ DROPS OPHTHALMIC at 21:51

## 2019-03-07 RX ADMIN — IPRATROPIUM BROMIDE AND ALBUTEROL SULFATE 3 ML: 2.5; .5 SOLUTION RESPIRATORY (INHALATION) at 10:54

## 2019-03-07 RX ADMIN — IPRATROPIUM BROMIDE AND ALBUTEROL SULFATE 3 ML: 2.5; .5 SOLUTION RESPIRATORY (INHALATION) at 06:57

## 2019-03-07 RX ADMIN — SODIUM CHLORIDE 75 ML/HR: 9 INJECTION, SOLUTION INTRAVENOUS at 18:48

## 2019-03-07 RX ADMIN — LANSOPRAZOLE 30 MG: KIT at 06:45

## 2019-03-07 RX ADMIN — IPRATROPIUM BROMIDE AND ALBUTEROL SULFATE 3 ML: 2.5; .5 SOLUTION RESPIRATORY (INHALATION) at 19:30

## 2019-03-07 RX ADMIN — ACETYLCYSTEINE 4 ML: 200 SOLUTION ORAL; RESPIRATORY (INHALATION) at 19:30

## 2019-03-07 NOTE — DISCHARGE INSTRUCTIONS
[facility]  [address] [place] [address]    Coronary Angiogram (Radial/Ulnar Approach) After Care    Refer to this sheet in the next few weeks. These instructions provide you with information on caring for yourself after your procedure. Your caregiver may also give you more specific instructions. Your treatment has been planned according to current medical practices, but problems sometimes occur. Call your caregiver if you have any problems or questions after your procedure.    Home Care Instructions:  · You may shower the day after the procedure. Remove the bandage (dressing) and gently wash the site with plain soap and water. Gently pat the site dry. You may apply a band aid daily for 2 days if desired.    · Do not apply powder or lotion to the site.  · Do not submerge the affected site in water for 3 to 5 days or until the site is completely healed.   · Do not lift, push or pull anything over 10 pounds for 2 days after your procedure.  · Inspect the site at least twice daily. You may notice some bruising at the site and it may be tender for 1 to 2 weeks.     · Increase your fluid intake for the next 2 days.    · Keep arm elevated for 24 hours. For the remainder of the day, keep your arm in “Pledge of Allegiance” position when up and about.     · You may drive 24 hours after the procedure unless otherwise instructed by your caregiver.  · Do not operate machinery or power tools for 24 hours.  · A responsible adult should be with you for the first 24 hours after you arrive home. Do not make any important legal decisions or sign legal papers for 24 hours.  Do not drink alcohol for 24 hours.    · Metformin or any medications containing Metformin should not be taken for 48 hours after your procedure.      Call Your Doctor if:   · You have unusual pain at the radial/ulnar (wrist) site.  · You have redness, warmth, swelling, or pain at the radial/ulnar (wrist) site.  · You have drainage (other  than a small amount of blood on the dressing).  · You have chills or a fever > 101.  · Your arm becomes pale or dark, cool, tingly, or numb.  · You have heavy bleeding from the site, hold pressure on the site for 20 minutes.  If the bleeding stops, apply a fresh bandage and call your cardiologist.  However, if you continue to have bleeding, call 911.

## 2019-03-07 NOTE — PROGRESS NOTES
[facility]  [phone]  Suresh Hernandez MD    Subjective:  LOS: 7     In the evening yesterday patient saturations declined.  A blood gas revealed hypercapnia.  He was placed on noninvasive ventilation and has stayed on the same overnight.  He denies other new symptoms.  He is awake and alert.  I took him off the noninvasive and placed him on a nasal cannula at 5 L which he is tolerating.  He denies any new pains.  He has some mild congested cough.    Objective   Vital Signs past 24hrs    Temp range: Temp (24hrs), Av.2 °F (36.8 °C), Min:97.4 °F (36.3 °C), Max:99 °F (37.2 °C)    BP range: BP: (104-131)/(58-92) 123/74  Pulse range: Heart Rate:  [] 85  Resp rate range: Resp:  [16-23] 20    Device (Oxygen Therapy): NPPV/NIVFlow (L/min):  [8-15] 15  Oxygen range:SpO2:  [86 %-97 %] 95 %   FiO2 (%):  [45 %-80 %] 45 %  S RR:  [8] 8  102 kg (225 lb 8.5 oz); Body mass index is 28.96 kg/m².    Intake/Output Summary (Last 24 hours) at 3/7/2019 0858  Last data filed at 3/7/2019 0813  Gross per 24 hour   Intake 772 ml   Output 1335 ml   Net -563 ml       Physical Exam   Cardiovascular: Normal rate and regular rhythm.   No murmur heard.  Pulmonary/Chest: He has no wheezes. He has no rales.   Abdominal: Soft. Bowel sounds are normal. There is no tenderness.   PEG +   Musculoskeletal: He exhibits no edema.   Neurological: He is alert.   Nursing note and vitals reviewed.    Results Review:    I have reviewed the laboratory and imaging data since the last note by [facility] physician.  My annotations are noted in assessment and plan.    Medication Review:  I have reviewed the current MAR.  My annotations are noted in assessment and plan.      lactated ringers 9 mL/hr Last Rate: Stopped (19 1042)     Plan   PCCM Problems  Acute respiratory failure  Acute decompensated diastolic HF  COPD, no obvious exacerbation  Throat cancer s/p chemo RT  Dysphagia - PEG  ILD ??  Relevant Medical Diagnoses  Afib, on  AC  CAD  Obesity    Plan of Treatment  Acute respiratory failure with hypoxia and hypercapnia requiring noninvasive ventilation overnight.  I will continue noninvasive nightly but try and keep him on a nasal cannula during the day.    Bronchoscopy showed mucopurulent secretions in the lung bases.  Patient was started on antibiotics and we are pending culture results.    Due to previous concerns of interstitial lung disease I also did lung biopsies to look for any interstitial process.  My suspicion of a interstitial process is very low.  However since the bronchoscopy had to be done in the OR, I did not want to miss this opportunity of obtaining a sample.    Note plans for heart cath today.  I believe we can go ahead with this.    Borderline/low blood pressure.  Therefore all of his antihypertensives from home are on hold.    CT neck was reviewed. Subtle inflammation of right nasopharynx. Opacification of left sphenoid sinus. Will review with Dr. Merrill later.    Patient is taking sips of water.  Note and appreciate speech input.   He remains with a PEG tube and feeds.  Majority of his intake as well as medicines go down his PEG tube.    No further epistaxis or hemoptysis reported.  Patient's Xarelto has been on hold for several days.    Given 1 dose of Lovenox subcutaneous yesterday -though from looking at [facility] it is not clear if he received this dose.    I will resume his Xarelto tonight.    Discussed with wife/friend.    Suresh Hernandez MD  03/07/19  8:58 AM    Part of this note may be an electronic transcription/translation of spoken language to printed text using the Dragon Dictation System.

## 2019-03-07 NOTE — PROGRESS NOTES
[facility] Follow Up    Chief Complaint:  Follow up hypoxia, pulmonary hypertension    Interval History:  Breathing unchanged.  No pain.  Results of bronch yesterday noted.     Objective:     Objective:  Temp:  [97.4 °F (36.3 °C)-99 °F (37.2 °C)] 97.8 °F (36.6 °C)  Heart Rate:  [] 85  Resp:  [16-23] 20  BP: (104-133)/(58-92) 123/74  FiO2 (%):  [45 %-80 %] 45 %     Intake/Output Summary (Last 24 hours) at 3/7/2019 0839  Last data filed at 3/7/2019 0813  Gross per 24 hour   Intake 772 ml   Output 1335 ml   Net -563 ml     Body mass index is 28.96 kg/m².      03/05/19  0532 03/06/19  0500 03/07/19  0502   Weight: 105 kg (231 lb 8 oz) 101 kg (222 lb) 102 kg (225 lb 8.5 oz)     Weight change: 1.601 kg (3 lb 8.5 oz)      Physical Exam:   General : Alert, cooperative, in no acute distress.  Neuro: alert,cooperative and oriented  Lungs: CTAB. Normal respiratory effort and rate.  CV:: Regular rate and rhythm, normal S1 and S2, no murmurs, gallops or rubs.  ABD: Soft, nontender, non-distended. positive bowel sounds  Extr: No edema or cyanosis, moves all extremities    Lab Review:   Results from last 7 days   Lab Units 03/07/19  0649 03/06/19  0539  02/28/19  1243   SODIUM mmol/L 143 145   < > 134*   POTASSIUM mmol/L 4.0 3.4*   < > 4.2   CHLORIDE mmol/L 99 101   < > 93*   CO2 mmol/L 34.1* 33.3*   < > 28.8   BUN mg/dL 49* 39*   < > 16   CREATININE mg/dL 1.12 1.00   < > 0.95   GLUCOSE mg/dL 125* 127*   < > 91   CALCIUM mg/dL 10.1 10.3   < > 9.8   AST (SGOT) U/L  --   --   --  20   ALT (SGPT) U/L  --   --   --  12    < > = values in this interval not displayed.     Results from last 7 days   Lab Units 02/28/19  1243   TROPONIN T ng/mL <0.010     Results from last 7 days   Lab Units 03/07/19  0649 03/06/19  0539   WBC 10*3/mm3 12.93* 12.02*   HEMOGLOBIN g/dL 13.8 14.0   HEMATOCRIT % 44.2 44.4   PLATELETS 10*3/mm3 206 217         Results from last 7 days   Lab Units 03/05/19  0517 03/04/19  0507    MAGNESIUM mg/dL 2.3 1.4*           Invalid input(s): LDLCALC  Results from last 7 days   Lab Units 03/04/19  0509 03/03/19  0531 02/28/19  1243   PROBNP pg/mL 875.8 1,560.0 1,492.0         I reviewed the patient's new clinical results.  I personally viewed and interpreted the patient's EKG  Current Medications:   Scheduled Meds:  acetylcysteine 4 mL Nebulization 4x Daily - RT   aspirin 81 mg Oral Daily   atorvastatin 10 mg Oral Daily   enoxaparin 1 mg/kg Subcutaneous Once   ferrous sulfate 325 mg Oral Daily With Breakfast   insulin lispro 0-9 Units Subcutaneous 4x Daily With Meals & Nightly   ipratropium-albuterol 3 mL Nebulization 4x Daily - RT   ketotifen 1 drop Both Eyes BID   lansoprazole 30 mg Per PEG Tube Q AM   levoFLOXacin 750 mg Intravenous Q24H   montelukast 10 mg Oral Nightly     Continuous Infusions:  lactated ringers 9 mL/hr Last Rate: Stopped (03/06/19 1042)       Allergies:  Allergies   Allergen Reactions   • Lisinopril Shortness Of Breath   • Penicillins Shortness Of Breath   • Sulfa Antibiotics Shortness Of Breath   • Atenolol Other (See Comments)     drowsiness   • Coreg [Carvedilol] Cough     SOA,   • Ibuprofen    • Celebrex [Celecoxib] Rash       Assessment/Plan:     1. Acute/chronic hypoxic respiratory failure.  No significant improvement despite treatment for pneumonia and diuresis.    Left ventricular systolic function normal on echo yesterday, diastolic function indeterminate.  No shunt noted on agitated saline contrast study. Bronch today with thick mucoid secretions that may be contributing to hypoxia.   2. Bilateral pulmonary infiltrates.  Noted on CT scan.   3. Throat cancer.  Status post chemotherapy and radiation - in need of radical neck dissection.   4. PEG placement.  Some dysfunction that was addressed by surgery.     2. Paroxsymal atrial fibrillation. Paced rhythm with underlying atrial fibrillation.   Rivaroxaban on hold for bronchoscopy and cath. Plan to resume after cath.   3.  Status post permanent pacemaker placement.  No recent interrogations here or at [facility] noted.   4. COPD  5. Pulmonary hypertension.  Moderate on cath today. Only mildly elevated left ventricular filling pressures.  Suspect this is mainly from pulmonary disease.   6. Coronary artery disease.  Cath today with moderate non-obstructive disease and patent mid LAD stent.   7. Ascending aortic aneurysm.  Stable on recent CT.   8. Mitral regurgitation. Moderate with some views appearing moderate to severe on echo.    -  Cath today with moderate pulmonary hypertension, mildly elevated left ventricular filling pressures and moderate non-obstructive coronary artery disease with patent LAD stent.  Suspect pulmonary hypertension more from pulmonary issues.   It does not appear that he needs more aggressive diuresis.  Filling pressures also indicate that his mitral regurgitation is not hemodynamically significant.     Marizol Holt MD  03/07/19  8:39 AM

## 2019-03-07 NOTE — PROGRESS NOTES
Continued Stay Note  [facility]     Patient Name: Alessio Allen  MRN: [id number]  Today's Date: 3/7/2019    Admit Date: 2/28/2019    Discharge Plan     Row Name 03/07/19 1357       Plan    Plan  Plan home with [facility]  or [facility] for skilled care.   JARON Juarez RN    Patient/Family in Agreement with Plan  yes    Plan Comments  Pt off unit for Cardiac Cath.  Plan home with [facility]  or [facility] for skilled care.   JARON Juarez RN        Discharge Codes    No documentation.             Mahsa Juarez, RN

## 2019-03-07 NOTE — PLAN OF CARE
Problem: Patient Care Overview  Goal: Plan of Care Review   03/07/19 1147   OTHER   Outcome Summary Pt agreeable to PT. Ambulated 150 ft with Rwx on 6L O2. He required 4 standing rest breaks secondary to SOA and O2 sat dropped to 77% after returning to room. O2 increased to 8L O2 to recover then back to 7L once pt recovered to 88-90%. Notified RN that O2 was left on 7L as pt felt he needed increased O2 to fully recover.

## 2019-03-07 NOTE — THERAPY TREATMENT NOTE
Acute Care - Physical Therapy Treatment Note  [facility]     Patient Name: Alessio Allen  : 1940  MRN: [id number]  Today's Date: 3/7/2019  Onset of Illness/Injury or Date of Surgery: 19  Date of Referral to PT: 19  Referring Physician: David    Admit Date: 2019    Visit Dx:    ICD-10-CM ICD-9-CM   1. Acute respiratory failure with hypoxia (CMS/[facility]) J96.01 518.81   2. Impaired mobility Z74.09 799.89   3. AF (paroxysmal atrial fibrillation) (CMS/[facility]) I48.0 427.31     Patient Active Problem List   Diagnosis   • A-fib (CMS/[facility])   • Dyslipidemia   • BP (high blood pressure)   • Neuropathy   • Hypertension   • COPD (chronic obstructive pulmonary disease) (CMS/[facility])   • BPH (benign prostatic hypertrophy)   • Atrial fibrillation (CMS/[facility])   • Asthma   • Hypoxia   • Pneumonia   • Chronic anticoagulation   • Pneumonia of both lungs due to infectious organism   • Hyponatremia   • COPD exacerbation (CMS/[facility])   • Acute on chronic respiratory failure with hypoxia (CMS/[facility])   • Pneumonia of both lower lobes due to infectious organism (CMS/[facility])   • Acute on chronic diastolic heart failure (CMS/[facility])   • IPF (idiopathic pulmonary fibrosis) (CMS/[facility])   • Acute respiratory failure with hypoxia (CMS/[facility])   • Attention to G-tube (CMS/[facility])       Therapy Treatment    Rehabilitation Treatment Summary     Row Name 19 1143             Treatment Time/Intention    Discipline  physical therapist  -      Document Type  therapy note (daily note)  -      Subjective Information  complains of;dyspnea  -      Mode of Treatment  physical therapy  -      Patient/Family Observations  in bed, spouse present  -      Therapy Frequency (PT Clinical Impression)  daily  -      Patient Effort  good  -      Existing Precautions/Restrictions  fall;oxygen therapy device and L/min  -      Treatment Considerations/Comments  increased from 5L O2 to 6L O2 for ambulation  -WANDER      Recorded by [WANDER] Leigh Woodard  Myranda, PT 03/07/19 1147      Row Name 03/07/19 1143             Vital Signs    Pre SpO2 (%)  88  -KH      O2 Delivery Pre Treatment  supplemental O2 5L  -KH      Intra SpO2 (%)  77  -KH      O2 Delivery Intra Treatment  supplemental O2 6L   -KH      Post SpO2 (%)  91  -KH      O2 Delivery Post Treatment  supplemental O2 7L O2  -KH      Recorded by [WANDER] Leigh Woodard, PT 03/07/19 1147      Row Name 03/07/19 1143             Bed Mobility Assessment/Treatment    Supine-Sit [place] (Bed Mobility)  contact guard  -KH      Assistive Device (Bed Mobility)  bed rails;head of bed elevated  -KH      Recorded by [WANDER] Leigh Woodard, PT 03/07/19 1147      Row Name 03/07/19 1143             Sit-Stand Transfer    Sit-Stand [place] (Transfers)  contact guard  -KH      Assistive Device (Sit-Stand Transfers)  walker, front-wheeled  -KH      Recorded by [WANDER] Leigh Woodard, PT 03/07/19 1147      Row Name 03/07/19 1143             Stand-Sit Transfer    Stand-Sit [place] (Transfers)  contact guard  -KH      Assistive Device (Stand-Sit Transfers)  walker, front-wheeled  -KH      Recorded by [WANDER] Leigh Woodard, PT 03/07/19 1147      Row Name 03/07/19 1143             Gait/Stairs Assessment/Training    Mammoth Level (Gait)  contact guard  -KH      Assistive Device (Gait)  walker, front-wheeled  -KH      Distance in Feet (Gait)  150  -KH      Pattern (Gait)  step-through  -KH      Deviations/Abnormal Patterns (Gait)  gait speed decreased;stride length decreased  -KH      Bilateral Gait Deviations  forward flexed posture  -WANDER      Comment (Gait/Stairs)  4 standing rest breaks  -KH      Recorded by [WANDER] Leigh Woodard, PT 03/07/19 1147      Row Name 03/07/19 1143             Motor Skills Assessment/Interventions    Additional Documentation  Therapeutic Exercise Interventions (Group);Therapeutic Exercise (Group)  -KH      Recorded by [WANDER] Leigh Woodard, PT 03/07/19  1147      Row Name 03/07/19 1143             Therapeutic Exercise    Comment (Therapeutic Exercise)  AP, LAQ x 10 reps  -KH      Recorded by [WANDER] Leigh Woodard, PT 03/07/19 1147      Row Name 03/07/19 1143             Positioning and Restraints    Pre-Treatment Position  in bed  -KH      Post Treatment Position  bed  -KH      In Bed  fowlers;call light within reach;encouraged to call for assist;exit alarm on;with family/caregiver;notified nsg  -KH      Recorded by [WANDER] Leigh Woodard, PT 03/07/19 1147      Row Name 03/07/19 1143             Pain Scale: Numbers Pre/Post-Treatment    Pain Scale: Numbers, Pretreatment  0/10 - no pain  -WANDER      Pain Scale: Numbers, Post-Treatment  0/10 - no pain  -KH      Recorded by [WANDER] Leigh Woodard, PT 03/07/19 1147      Row Name                Wound 03/06/19 1003 other (see notes) incision    Wound - Properties Group Date first assessed: 03/06/19 [MONTANA] Time first assessed: 1003 [MONTANA] Location: other (see notes) [MONTANA] Type: incision [MONTANA] Recorded by:  [MONTANA] Sanket Jaimes, RN 03/06/19 1003    Row Name 03/07/19 1143             Plan of Care Review    Plan of Care Reviewed With  patient  -KH      Recorded by [WANDER] Leigh Woodard, PT 03/07/19 1147        User Key  (r) = Recorded By, (t) = Taken By, (c) = Cosigned By    Initials Name Effective Dates Discipline    Leigh Holloway, PT 02/05/19 -  PT    Sanket Evans, RN 06/16/16 -  Nurse                   Physical Therapy Education     Title: PT OT SLP Therapies (Done)     Topic: Physical Therapy (Done)     Point: Mobility training (Done)     Learning Progress Summary           Patient Acceptance, E, VU by WANDER at 3/7/2019 11:47 AM    Acceptance, D,E, NR by NIKITA at 3/6/2019  4:52 PM    Acceptance, E, NR by REESE at 3/5/2019  1:08 PM                   Point: Home exercise program (Done)     Learning Progress Summary           Patient Acceptance, E, VU by WANDER at 3/7/2019 11:47 AM    Acceptance, D,E,  NR by  at 3/6/2019  4:52 PM    Acceptance, E, NR by  at 3/5/2019  1:08 PM                   Point: Body mechanics (Done)     Learning Progress Summary           Patient Acceptance, E, VU by  at 3/7/2019 11:47 AM    Acceptance, D,E, NR by  at 3/6/2019  4:52 PM    Acceptance, E, NR by  at 3/5/2019  1:08 PM                   Point: Precautions (Done)     Learning Progress Summary           Patient Acceptance, E, VU by  at 3/7/2019 11:47 AM    Acceptance, D,E, NR by  at 3/6/2019  4:52 PM    Acceptance, E, NR by  at 3/5/2019  1:08 PM                               User Key     Initials Effective Dates Name Provider Type Discipline     02/05/19 -  Leigh Woodard, PT Physical Therapist PT     04/03/18 -  Jaida Porter, PT Physical Therapist PT     03/07/18 -  Cesario Kwon, PTA Physical Therapy Assistant PT                PT Recommendation and Plan  Therapy Frequency (PT Clinical Impression): daily  Plan of Care Reviewed With: patient  Outcome Summary: Pt agreeable to PT. Ambulated 150 ft with Rwx on 6L O2. He required 4 standing rest breaks secondary to SOA and O2 sat dropped to 77% after returning to room. O2 increased to 8L O2 to recover then back to 7L once pt recovered to 88-90%. Notified RN that O2 was left on 7L as pt felt he needed increased O2 to fully recover.   Outcome Measures     Row Name 03/07/19 1100 03/06/19 1600 03/05/19 1300       How much help from another person do you currently need...    Turning from your back to your side while in flat bed without using bedrails?  3  -KH  2  -CW  3  -LH    Moving from lying on back to sitting on the side of a flat bed without bedrails?  3  -KH  2  -CW  3  -LH    Moving to and from a bed to a chair (including a wheelchair)?  3  -KH  1  -CW  2  -LH    Standing up from a chair using your arms (e.g., wheelchair, bedside chair)?  3  -KH  1  -CW  2  -LH    Climbing 3-5 steps with a railing?  2  -KH  1  -CW  2  -LH    To walk in hospital  room?  3  -KH  1  -CW  2  -    AM-PAC 6 Clicks Score  17  -KH  8  -CW  14  -       Functional Assessment    Outcome Measure Options  AM-PAC 6 Clicks Basic Mobility (PT)  -  AM-PAC 6 Clicks Basic Mobility (PT)  -  AM-PAC 6 Clicks Basic Mobility (PT)  -      User Key  (r) = Recorded By, (t) = Taken By, (c) = Cosigned By    Initials Name Provider Type    Leigh Holloway, PT Physical Therapist    LH Jaida Porter, PT Physical Therapist    CW Cesario Kwon, PTA Physical Therapy Assistant         Time Calculation:   PT Charges     Row Name 03/07/19 1142             Time Calculation    Start Time  1119  -      Stop Time  1142  -      Time Calculation (min)  23 min  -      PT Received On  03/07/19  -      PT - Next Appointment  03/08/19  -         Time Calculation- PT    Total Timed Code Minutes- PT  23 minute(s)  -        User Key  (r) = Recorded By, (t) = Taken By, (c) = Cosigned By    Initials Name Provider Type    Leigh Holloway, PT Physical Therapist        Therapy Suggested Charges     Code   Minutes Charges    None           Therapy Charges for Today     Code Description Service Date Service Provider Modifiers Qty    82798640594 HC PT THER PROC EA 15 MIN 3/7/2019 Leigh Woodard, PT GP 2          PT G-Codes  Outcome Measure Options: AM-PAC 6 Clicks Basic Mobility (PT)  AM-PAC 6 Clicks Score: 17    Leigh Woodard, PT  3/7/2019

## 2019-03-07 NOTE — PLAN OF CARE
Problem: Patient Care Overview  Goal: Interprofessional Rounds/Family Conf  Outcome: Ongoing (interventions implemented as appropriate)   03/07/19 0033   Interdisciplinary Rounds/Family Conf   Participants respiratory therapy   Started pt on v 60 bipap

## 2019-03-07 NOTE — PLAN OF CARE
Problem: Patient Care Overview  Goal: Plan of Care Review  Outcome: Ongoing (interventions implemented as appropriate)   03/07/19 0412   Coping/Psychosocial   Plan of Care Reviewed With patient   Plan of Care Review   Progress no change   OTHER   Outcome Summary Patient in bed resting. On NPPV, tolerating at this time. was anxious for a while and was placed on nonrebreather at 15l/m. Denies any pain. Port patent, flushed with good blood return. Using urinal this shift. POD on monitor. Nursing will continue to monitor.     Goal: Individualization and Mutuality  Outcome: Ongoing (interventions implemented as appropriate)    Goal: Discharge Needs Assessment  Outcome: Ongoing (interventions implemented as appropriate)    Goal: Interprofessional Rounds/Family Conf  Outcome: Ongoing (interventions implemented as appropriate)      Problem: Fall Risk (Adult)  Goal: Absence of Fall  Outcome: Ongoing (interventions implemented as appropriate)      Problem: Breathing Pattern Ineffective (Adult)  Goal: Effective Oxygenation/Ventilation  Outcome: Ongoing (interventions implemented as appropriate)    Goal: Anxiety/Fear Reduction  Outcome: Ongoing (interventions implemented as appropriate)      Problem: Nutrition, Enteral (Adult)  Goal: Signs and Symptoms of Listed Potential Problems Will be Absent, Minimized or Managed (Nutrition, Enteral)  Outcome: Ongoing (interventions implemented as appropriate)      Problem: Skin Injury Risk (Adult)  Goal: Skin Health and Integrity  Outcome: Ongoing (interventions implemented as appropriate)

## 2019-03-08 LAB
ANION GAP SERPL CALCULATED.3IONS-SCNC: 12.5 MMOL/L
BACTERIA SPEC AEROBE CULT: ABNORMAL
BACTERIA SPEC AEROBE CULT: ABNORMAL
BACTERIA SPEC RESP CULT: ABNORMAL
BACTERIA SPEC RESP CULT: ABNORMAL
BUN BLD-MCNC: 42 MG/DL (ref 8–23)
BUN/CREAT SERPL: 44.7 (ref 7–25)
CALCIUM SPEC-SCNC: 10 MG/DL (ref 8.6–10.5)
CHLORIDE SERPL-SCNC: 103 MMOL/L (ref 98–107)
CO2 SERPL-SCNC: 33.5 MMOL/L (ref 22–29)
CREAT BLD-MCNC: 0.94 MG/DL (ref 0.76–1.27)
DEPRECATED RDW RBC AUTO: 52 FL (ref 37–54)
ERYTHROCYTE [DISTWIDTH] IN BLOOD BY AUTOMATED COUNT: 14 % (ref 12.3–15.4)
GFR SERPL CREATININE-BSD FRML MDRD: 78 ML/MIN/1.73
GLUCOSE BLD-MCNC: 130 MG/DL (ref 65–99)
GLUCOSE BLDC GLUCOMTR-MCNC: 122 MG/DL (ref 70–130)
GLUCOSE BLDC GLUCOMTR-MCNC: 128 MG/DL (ref 70–130)
GLUCOSE BLDC GLUCOMTR-MCNC: 176 MG/DL (ref 70–130)
GLUCOSE BLDC GLUCOMTR-MCNC: 264 MG/DL (ref 70–130)
GRAM STN SPEC: ABNORMAL
HCT VFR BLD AUTO: 44.3 % (ref 37.5–51)
HCT VFR BLDA CALC: 43 % (ref 38–51)
HCT VFR BLDA CALC: 44 % (ref 38–51)
HGB BLD-MCNC: 13.3 G/DL (ref 13–17.7)
HGB BLDA-MCNC: 14.6 G/DL (ref 12–17)
HGB BLDA-MCNC: 15 G/DL (ref 12–17)
MCH RBC QN AUTO: 30.1 PG (ref 26.6–33)
MCHC RBC AUTO-ENTMCNC: 30 G/DL (ref 31.5–35.7)
MCV RBC AUTO: 100.2 FL (ref 79–97)
PLATELET # BLD AUTO: 216 10*3/MM3 (ref 140–450)
PMV BLD AUTO: 9.9 FL (ref 6–12)
POTASSIUM BLD-SCNC: 4.1 MMOL/L (ref 3.5–5.2)
RBC # BLD AUTO: 4.42 10*6/MM3 (ref 4.14–5.8)
SAO2 % BLDA: 71 % (ref 95–98)
SAO2 % BLDA: 97 % (ref 95–98)
SODIUM BLD-SCNC: 149 MMOL/L (ref 136–145)
WBC NRBC COR # BLD: 11.66 10*3/MM3 (ref 3.4–10.8)

## 2019-03-08 PROCEDURE — 63710000001 DIPHENHYDRAMINE PER 50 MG: Performed by: INTERNAL MEDICINE

## 2019-03-08 PROCEDURE — 94799 UNLISTED PULMONARY SVC/PX: CPT

## 2019-03-08 PROCEDURE — 99232 SBSQ HOSP IP/OBS MODERATE 35: CPT | Performed by: INTERNAL MEDICINE

## 2019-03-08 PROCEDURE — 80048 BASIC METABOLIC PNL TOTAL CA: CPT | Performed by: INTERNAL MEDICINE

## 2019-03-08 PROCEDURE — 85027 COMPLETE CBC AUTOMATED: CPT | Performed by: INTERNAL MEDICINE

## 2019-03-08 PROCEDURE — 94660 CPAP INITIATION&MGMT: CPT

## 2019-03-08 PROCEDURE — 97110 THERAPEUTIC EXERCISES: CPT

## 2019-03-08 PROCEDURE — 82962 GLUCOSE BLOOD TEST: CPT

## 2019-03-08 PROCEDURE — 63710000001 INSULIN LISPRO (HUMAN) PER 5 UNITS: Performed by: INTERNAL MEDICINE

## 2019-03-08 RX ORDER — PREGABALIN 75 MG/1
75 CAPSULE ORAL EVERY 12 HOURS SCHEDULED
Status: DISCONTINUED | OUTPATIENT
Start: 2019-03-08 | End: 2019-03-20 | Stop reason: HOSPADM

## 2019-03-08 RX ORDER — LEVOFLOXACIN 5 MG/ML
750 INJECTION, SOLUTION INTRAVENOUS EVERY 24 HOURS
Status: DISCONTINUED | OUTPATIENT
Start: 2019-03-09 | End: 2019-03-10

## 2019-03-08 RX ORDER — POTASSIUM CHLORIDE 750 MG/1
20 CAPSULE, EXTENDED RELEASE ORAL DAILY
Status: DISCONTINUED | OUTPATIENT
Start: 2019-03-09 | End: 2019-03-20 | Stop reason: HOSPADM

## 2019-03-08 RX ORDER — DIPHENHYDRAMINE HCL 25 MG
25 CAPSULE ORAL EVERY 6 HOURS PRN
Status: DISCONTINUED | OUTPATIENT
Start: 2019-03-08 | End: 2019-03-10

## 2019-03-08 RX ORDER — LEVOFLOXACIN 750 MG/1
750 TABLET ORAL EVERY 24 HOURS
Status: DISCONTINUED | OUTPATIENT
Start: 2019-03-08 | End: 2019-03-08

## 2019-03-08 RX ORDER — LANOLIN ALCOHOL/MO/W.PET/CERES
50 CREAM (GRAM) TOPICAL DAILY
Status: DISCONTINUED | OUTPATIENT
Start: 2019-03-08 | End: 2019-03-20 | Stop reason: HOSPADM

## 2019-03-08 RX ORDER — FUROSEMIDE 40 MG/1
40 TABLET ORAL 2 TIMES DAILY
Status: DISCONTINUED | OUTPATIENT
Start: 2019-03-09 | End: 2019-03-11

## 2019-03-08 RX ADMIN — ASPIRIN 81 MG: 81 TABLET, DELAYED RELEASE ORAL at 09:11

## 2019-03-08 RX ADMIN — DIPHENHYDRAMINE HYDROCHLORIDE 25 MG: 25 CAPSULE ORAL at 22:36

## 2019-03-08 RX ADMIN — LANSOPRAZOLE 30 MG: KIT at 06:18

## 2019-03-08 RX ADMIN — FERROUS SULFATE TAB 325 MG (65 MG ELEMENTAL FE) 325 MG: 325 (65 FE) TAB at 09:12

## 2019-03-08 RX ADMIN — ACETYLCYSTEINE 4 ML: 200 SOLUTION ORAL; RESPIRATORY (INHALATION) at 06:32

## 2019-03-08 RX ADMIN — IPRATROPIUM BROMIDE AND ALBUTEROL SULFATE 3 ML: 2.5; .5 SOLUTION RESPIRATORY (INHALATION) at 14:39

## 2019-03-08 RX ADMIN — RIVAROXABAN 20 MG: 20 TABLET, FILM COATED ORAL at 17:59

## 2019-03-08 RX ADMIN — INSULIN LISPRO 6 UNITS: 100 INJECTION, SOLUTION INTRAVENOUS; SUBCUTANEOUS at 12:04

## 2019-03-08 RX ADMIN — ACETYLCYSTEINE 4 ML: 200 SOLUTION ORAL; RESPIRATORY (INHALATION) at 10:35

## 2019-03-08 RX ADMIN — IPRATROPIUM BROMIDE AND ALBUTEROL SULFATE 3 ML: 2.5; .5 SOLUTION RESPIRATORY (INHALATION) at 10:35

## 2019-03-08 RX ADMIN — Medication 50 MG: at 12:04

## 2019-03-08 RX ADMIN — ACETYLCYSTEINE 4 ML: 200 SOLUTION ORAL; RESPIRATORY (INHALATION) at 14:39

## 2019-03-08 RX ADMIN — PREGABALIN 75 MG: 75 CAPSULE ORAL at 22:00

## 2019-03-08 RX ADMIN — ATORVASTATIN CALCIUM 10 MG: 10 TABLET, FILM COATED ORAL at 09:12

## 2019-03-08 RX ADMIN — MONTELUKAST SODIUM 10 MG: 10 TABLET, FILM COATED ORAL at 22:00

## 2019-03-08 RX ADMIN — IPRATROPIUM BROMIDE AND ALBUTEROL SULFATE 3 ML: 2.5; .5 SOLUTION RESPIRATORY (INHALATION) at 06:31

## 2019-03-08 RX ADMIN — KETOTIFEN FUMARATE 1 DROP: 0.35 SOLUTION/ DROPS OPHTHALMIC at 22:00

## 2019-03-08 RX ADMIN — IPRATROPIUM BROMIDE AND ALBUTEROL SULFATE 3 ML: 2.5; .5 SOLUTION RESPIRATORY (INHALATION) at 21:47

## 2019-03-08 RX ADMIN — INSULIN LISPRO 2 UNITS: 100 INJECTION, SOLUTION INTRAVENOUS; SUBCUTANEOUS at 22:00

## 2019-03-08 RX ADMIN — LEVOFLOXACIN 750 MG: 750 TABLET, FILM COATED ORAL at 12:03

## 2019-03-08 RX ADMIN — PREGABALIN 75 MG: 75 CAPSULE ORAL at 12:03

## 2019-03-08 RX ADMIN — ACETYLCYSTEINE 4 ML: 200 SOLUTION ORAL; RESPIRATORY (INHALATION) at 21:47

## 2019-03-08 RX ADMIN — KETOTIFEN FUMARATE 1 DROP: 0.35 SOLUTION/ DROPS OPHTHALMIC at 09:11

## 2019-03-08 NOTE — PLAN OF CARE
Problem: Patient Care Overview  Goal: Plan of Care Review  Outcome: Ongoing (interventions implemented as appropriate)   03/08/19 1324   OTHER   Outcome Summary Pt is alert and oriented. High flow cannula turned down to 6L but has to be increased to 8L this afternoon. Pt tolerating TF bolus. Switched to PO levaquin. No c/o pain. VSS. Will continue to monitor.     Goal: Individualization and Mutuality  Outcome: Ongoing (interventions implemented as appropriate)    Goal: Discharge Needs Assessment  Outcome: Ongoing (interventions implemented as appropriate)    Goal: Interprofessional Rounds/Family Conf  Outcome: Ongoing (interventions implemented as appropriate)      Problem: Fall Risk (Adult)  Goal: Absence of Fall  Outcome: Ongoing (interventions implemented as appropriate)      Problem: Breathing Pattern Ineffective (Adult)  Goal: Effective Oxygenation/Ventilation  Outcome: Ongoing (interventions implemented as appropriate)    Goal: Anxiety/Fear Reduction  Outcome: Ongoing (interventions implemented as appropriate)      Problem: Nutrition, Enteral (Adult)  Goal: Signs and Symptoms of Listed Potential Problems Will be Absent, Minimized or Managed (Nutrition, Enteral)  Outcome: Ongoing (interventions implemented as appropriate)      Problem: Skin Injury Risk (Adult)  Goal: Skin Health and Integrity  Outcome: Ongoing (interventions implemented as appropriate)

## 2019-03-08 NOTE — THERAPY TREATMENT NOTE
Acute Care - Physical Therapy Treatment Note  [facility]     Patient Name: Alessio Allen  : 1940  MRN: [id number]  Today's Date: 3/8/2019  Onset of Illness/Injury or Date of Surgery: 19  Date of Referral to PT: 19  Referring Physician: David    Admit Date: 2019    Visit Dx:    ICD-10-CM ICD-9-CM   1. Acute respiratory failure with hypoxia (CMS/Formerly [facility]) J96.01 518.81   2. Impaired mobility Z74.09 799.89   3. AF (paroxysmal atrial fibrillation) (CMS/Formerly [facility]) I48.0 427.31     Patient Active Problem List   Diagnosis   • A-fib (CMS/Formerly [facility])   • Dyslipidemia   • BP (high blood pressure)   • Neuropathy   • Hypertension   • COPD (chronic obstructive pulmonary disease) (CMS/Formerly [facility])   • BPH (benign prostatic hypertrophy)   • Atrial fibrillation (CMS/Formerly [facility])   • Asthma   • Hypoxia   • Pneumonia   • Chronic anticoagulation   • Pneumonia of both lungs due to infectious organism   • Hyponatremia   • COPD exacerbation (CMS/Formerly [facility])   • Acute on chronic respiratory failure with hypoxia (CMS/Formerly [facility])   • Pneumonia of both lower lobes due to infectious organism (CMS/Formerly [facility])   • Acute on chronic diastolic heart failure (CMS/Formerly [facility])   • IPF (idiopathic pulmonary fibrosis) (CMS/Formerly [facility])   • Acute respiratory failure with hypoxia (CMS/[facility]ly [facility])   • Attention to G-tube (CMS/Formerly [facility])       Therapy Treatment    Rehabilitation Treatment Summary     Row Name 19 1100             Treatment Time/Intention    Discipline  physical therapy assistant  -CW      Document Type  therapy note (daily note)  -CW      Subjective Information  complains of;dyspnea  -CW      Mode of Treatment  physical therapy  -CW      Therapy Frequency (PT Clinical Impression)  daily  -CW      Patient Effort  good  -CW      Existing Precautions/Restrictions  fall;oxygen therapy device and L/min  -CW      Recorded by [CW] Cesario Kwon PTA 19 1121      Row Name 19 1100             Vital Signs    O2 Delivery Pre Treatment  hi-flow  -CW      O2 Delivery Intra Treatment   hi-flow  -CW      O2 Delivery Post Treatment  hi-flow  -CW      Recorded by [CW] Cesario Kwon, PTA 03/08/19 1121      Row Name 03/08/19 1100             Cognitive Assessment/Intervention- PT/OT    Orientation Status (Cognition)  oriented x 3  -CW      Follows Commands (Cognition)  WFL  -CW      Safety Deficit (Cognitive)  mild deficit  -CW      Personal Safety Interventions  fall prevention program maintained;gait belt;muscle strengthening facilitated;nonskid shoes/slippers when out of bed  -CW      Recorded by [CW] Cesario Kwon, PTA 03/08/19 1121      Row Name 03/08/19 1100             Bed Mobility Assessment/Treatment    Bed Mobility Assessment/Treatment  supine-sit;sit-supine  -CW      Supine-Sit [place] (Bed Mobility)  contact guard  -CW      Sit-Supine [place] (Bed Mobility)  contact guard  -CW      Assistive Device (Bed Mobility)  bed rails  -CW      Recorded by [CW] Cesario Kwon, PTA 03/08/19 1121      Row Name 03/08/19 1100             Transfer Assessment/Treatment    Transfer Assessment/Treatment  sit-stand transfer;stand-sit transfer  -CW      Recorded by [CW] Cesario Kwon, PTA 03/08/19 1121      Row Name 03/08/19 1100             Sit-Stand Transfer    Sit-Stand [place] (Transfers)  contact guard  -CW      Assistive Device (Sit-Stand Transfers)  walker, front-wheeled  -CW      Recorded by [CW] Cesario Kwon, PTA 03/08/19 1121      Row Name 03/08/19 1100             Stand-Sit Transfer    Stand-Sit [place] (Transfers)  contact guard  -CW      Assistive Device (Stand-Sit Transfers)  walker, front-wheeled  -CW      Recorded by [CW] Cesario Kwon, PTA 03/08/19 1121      Row Name 03/08/19 1100             Gait/Stairs Assessment/Training    [place] Level (Gait)  contact guard  -CW      Assistive Device (Gait)  walker, front-wheeled  -CW      Distance in Feet (Gait)  150  -CW      Pattern (Gait)  step-through  -CW      Deviations/Abnormal Patterns  (Gait)  gait speed decreased;stride length decreased  -CW      Bilateral Gait Deviations  forward flexed posture  -CW      Recorded by [CW] Cesario Kwon PTA 03/08/19 1121      Row Name 03/08/19 1100             Positioning and Restraints    Pre-Treatment Position  in bed  -CW      Post Treatment Position  bed  -CW      In Bed  notified nsg;fowlers;call light within reach;encouraged to call for assist;exit alarm on;with family/caregiver  -CW      Recorded by [CW] Cesario Kwon, PTA 03/08/19 1121      Row Name                Wound 03/06/19 1003 other (see notes) incision    Wound - Properties Group Date first assessed: 03/06/19 [MONTANA] Time first assessed: 1003 [MONTANA] Location: other (see notes) [MONTANA] Type: incision [MONTANA] Recorded by:  [MONTANA] Sanket Jaimes RN 03/06/19 1003    Row Name 03/08/19 1100             Outcome Summary/Treatment Plan (PT)    Anticipated Discharge Disposition (PT)  skilled nursing facility;home with OP services  -CW      Recorded by [CW] Cesario Kwon PTA 03/08/19 1121        User Key  (r) = Recorded By, (t) = Taken By, (c) = Cosigned By    Initials Name Effective Dates Discipline    Sanket Evans RN 06/16/16 -  Nurse    Cesario Rivera, HARDEEP 03/07/18 -  PT                   Physical Therapy Education     Title: PT OT SLP Therapies (Done)     Topic: Physical Therapy (Done)     Point: Mobility training (Done)     Learning Progress Summary           Patient Acceptance, E,TB, VU,DU by NIKITA at 3/8/2019 11:21 AM    Acceptance, E, VU by WANDER at 3/7/2019 11:47 AM    Acceptance, D,E, NR by NIKITA at 3/6/2019  4:52 PM    Acceptance, E, NR by  at 3/5/2019  1:08 PM                   Point: Home exercise program (Done)     Learning Progress Summary           Patient Acceptance, E,TB, VU,DU by NIKITA at 3/8/2019 11:21 AM    Acceptance, E, VU by WANDER at 3/7/2019 11:47 AM    Acceptance, D,E, NR by NIKITA at 3/6/2019  4:52 PM    Acceptance, E, NR by  at 3/5/2019  1:08 PM                   Point:  Body mechanics (Done)     Learning Progress Summary           Patient Acceptance, E,TB, VU,DU by  at 3/8/2019 11:21 AM    Acceptance, E, VU by  at 3/7/2019 11:47 AM    Acceptance, D,E, NR by  at 3/6/2019  4:52 PM    Acceptance, E, NR by  at 3/5/2019  1:08 PM                   Point: Precautions (Done)     Learning Progress Summary           Patient Acceptance, E,TB, VU,DU by  at 3/8/2019 11:21 AM    Acceptance, E, VU by  at 3/7/2019 11:47 AM    Acceptance, D,E, NR by  at 3/6/2019  4:52 PM    Acceptance, E, NR by  at 3/5/2019  1:08 PM                               User Key     Initials Effective Dates Name Provider Type Discipline     02/05/19 -  Leigh Woodard, PT Physical Therapist PT     04/03/18 -  Jaida Porter, PT Physical Therapist PT     03/07/18 -  Cesario Kwon, PTA Physical Therapy Assistant PT                PT Recommendation and Plan  Anticipated Discharge Disposition (PT): skilled nursing facility, home with OP services  Therapy Frequency (PT Clinical Impression): daily  Outcome Summary/Treatment Plan (PT)  Anticipated Discharge Disposition (PT): skilled nursing facility, home with OP services  Plan of Care Reviewed With: patient  Progress: improving  Outcome Summary: Pt increasing with strength and bed mobility to improve with safety and I.  Pt increasing with amb safety with RWX but limited due to SOA  Outcome Measures     Row Name 03/08/19 1100 03/07/19 1100 03/06/19 1600       How much help from another person do you currently need...    Turning from your back to your side while in flat bed without using bedrails?  3  -CW  3  -KH  2  -CW    Moving from lying on back to sitting on the side of a flat bed without bedrails?  3  -CW  3  -KH  2  -CW    Moving to and from a bed to a chair (including a wheelchair)?  3  -CW  3  -KH  1  -CW    Standing up from a chair using your arms (e.g., wheelchair, bedside chair)?  3  -CW  3  -KH  1  -CW    Climbing 3-5 steps with a  railing?  2  -CW  2  -KH  1  -CW    To walk in hospital room?  3  -CW  3  -KH  1  -CW    AM-PAC 6 Clicks Score  17  -CW  17  -KH  8  -CW       Functional Assessment    Outcome Measure Options  AM-PAC 6 Clicks Basic Mobility (PT)  -CW  AM-PAC 6 Clicks Basic Mobility (PT)  -KH  AM-PAC 6 Clicks Basic Mobility (PT)  -CW    Row Name 03/05/19 1300             How much help from another person do you currently need...    Turning from your back to your side while in flat bed without using bedrails?  3  -LH      Moving from lying on back to sitting on the side of a flat bed without bedrails?  3  -LH      Moving to and from a bed to a chair (including a wheelchair)?  2  -LH      Standing up from a chair using your arms (e.g., wheelchair, bedside chair)?  2  -LH      Climbing 3-5 steps with a railing?  2  -LH      To walk in hospital room?  2  -LH      AM-PAC 6 Clicks Score  14  -LH         Functional Assessment    Outcome Measure Options  AM-PAC 6 Clicks Basic Mobility (PT)  -LH        User Key  (r) = Recorded By, (t) = Taken By, (c) = Cosigned By    Initials Name Provider Type     Leigh Woodard, PT Physical Therapist     Jaida Porter, PT Physical Therapist    Cesario Rivera, HARDEEP Physical Therapy Assistant         Time Calculation:   PT Charges     Row Name 03/08/19 1123             Time Calculation    Start Time  1100  -CW      Stop Time  1123  -CW      Time Calculation (min)  23 min  -CW      PT Received On  03/08/19  -CW      PT - Next Appointment  03/09/19  -CW        User Key  (r) = Recorded By, (t) = Taken By, (c) = Cosigned By    Initials Name Provider Type    Cesario Rivera, HARDEEP Physical Therapy Assistant        Therapy Suggested Charges     Code   Minutes Charges    None           Therapy Charges for Today     Code Description Service Date Service Provider Modifiers Qty    97400295901 HC PT THER PROC EA 15 MIN 3/8/2019 Cesario Kwon PTA GP 2    79102789494 HC PT THER SUPP EA 15  MIN 3/8/2019 Cesario Kwon, PTA GP 2          PT G-Codes  Outcome Measure Options: AM-PAC 6 Clicks Basic Mobility (PT)  AM-PAC 6 Clicks Score: 17    Cesario Kwon, HARDEEP  3/8/2019

## 2019-03-08 NOTE — PLAN OF CARE
Problem: Patient Care Overview  Goal: Plan of Care Review  Outcome: Ongoing (interventions implemented as appropriate)   03/08/19 0300   Coping/Psychosocial   Plan of Care Reviewed With patient   Plan of Care Review   Progress no change   OTHER   Outcome Summary Pt tolerated duoneb/mucomyst treatment well. Changed pt from NRB to HFNC on 15L. Refused to wear V60 tonight per RRT.

## 2019-03-08 NOTE — PROGRESS NOTES
[facility] Follow Up    Chief Complaint: Follow up hypoxia, pulmonary hypertension    Interval History:  Feels about the same.      Objective:     Objective:  Temp:  [97.9 °F (36.6 °C)-99.4 °F (37.4 °C)] 98.8 °F (37.1 °C)  Heart Rate:  [] 85  Resp:  [18-26] 22  BP: ()/(71-91) 126/71  FiO2 (%):  [100 %] 100 %     Intake/Output Summary (Last 24 hours) at 3/8/2019 0800  Last data filed at 3/8/2019 0749  Gross per 24 hour   Intake 1080 ml   Output 1175 ml   Net -95 ml     Body mass index is 29.15 kg/m².      03/06/19  0500 03/07/19  0502 03/08/19  0507   Weight: 101 kg (222 lb) 102 kg (225 lb 8.5 oz) 103 kg (227 lb 1.2 oz)     Weight change: 0.7 kg (1 lb 8.7 oz)      Physical Exam:   General : Alert, cooperative, in no acute distress.  Neuro: alert,cooperative and oriented  Lungs: rales on right.   CV:: Regular rate and rhythm, normal S1 and S2, no murmurs, gallops or rubs.  ABD: Soft, nontender, non-distended. positive bowel sounds  Extr: No edema or cyanosis, moves all extremities    Lab Review:   Results from last 7 days   Lab Units 03/08/19  0619 03/07/19  0649   SODIUM mmol/L 149* 143   POTASSIUM mmol/L 4.1 4.0   CHLORIDE mmol/L 103 99   CO2 mmol/L 33.5* 34.1*   BUN mg/dL 42* 49*   CREATININE mg/dL 0.94 1.12   GLUCOSE mg/dL 130* 125*   CALCIUM mg/dL 10.0 10.1         Results from last 7 days   Lab Units 03/08/19  0619 03/07/19  1438  03/07/19  0649   WBC 10*3/mm3 11.66*  --   --  12.93*   HEMOGLOBIN g/dL 13.3  --   --  13.8   HEMOGLOBIN, POC g/dL  --  14.6   < >  --    HEMATOCRIT % 44.3  --   --  44.2   HEMATOCRIT POC %  --  43   < >  --    PLATELETS 10*3/mm3 216  --   --  206    < > = values in this interval not displayed.         Results from last 7 days   Lab Units 03/05/19  0517 03/04/19  0509   MAGNESIUM mg/dL 2.3 1.4*           Invalid input(s): LDLCALC  Results from last 7 days   Lab Units 03/04/19  0509 03/03/19  0531   PROBNP pg/mL 875.8 1,560.0         I reviewed the  patient's new clinical results.  I personally viewed and interpreted the patient's EKG  Current Medications:   Scheduled Meds:  acetylcysteine 4 mL Nebulization 4x Daily - RT   aspirin 81 mg Oral Daily   atorvastatin 10 mg Oral Daily   ferrous sulfate 325 mg Oral Daily With Breakfast   insulin lispro 0-9 Units Subcutaneous 4x Daily With Meals & Nightly   ipratropium-albuterol 3 mL Nebulization 4x Daily - RT   ketotifen 1 drop Both Eyes BID   lansoprazole 30 mg Per PEG Tube Q AM   levoFLOXacin 750 mg Intravenous Q24H   montelukast 10 mg Oral Nightly   rivaroxaban 20 mg Oral Daily With Dinner     Continuous Infusions:  lactated ringers 9 mL/hr Last Rate: Stopped (03/06/19 1042)       Allergies:  Allergies   Allergen Reactions   • Lisinopril Shortness Of Breath   • Penicillins Shortness Of Breath   • Sulfa Antibiotics Shortness Of Breath   • Atenolol Other (See Comments)     drowsiness   • Coreg [Carvedilol] Cough     SOA,   • Ibuprofen    • Celebrex [Celecoxib] Rash       Assessment/Plan:     1. Acute/chronic hypoxic respiratory failure.  No significant improvement despite treatment for pneumonia and diuresis.    Left ventricular systolic function normal on echo yesterday, diastolic function indeterminate.  No shunt noted on agitated saline contrast study. Bronch on 3/6 with thick mucoid secretions that may be contributing to hypoxia.   2. Bilateral pulmonary infiltrates.  Noted on CT scan.   3. Throat cancer.  Status post chemotherapy and radiation - in need of radical neck dissection.   4. PEG placement.  Some dysfunction that was addressed by surgery.     2. Paroxsymal atrial fibrillation. Paced rhythm with underlying atrial fibrillation.   Rivaroxaban on hold for bronchoscopy and cath. Plan to resume after cath.   3. Status post permanent pacemaker placement.  No recent interrogations here or at [facility] noted.   4. COPD  5. Pulmonary hypertension.  Moderate on cath today. Only mildly elevated left ventricular  filling pressures.  Suspect this is mainly from pulmonary disease.   6. Coronary artery disease.  Cath today with moderate non-obstructive disease and patent mid LAD stent.   7. Ascending aortic aneurysm.  Stable on recent CT.   8. Mitral regurgitation. Moderate with some views appearing moderate to severe on echo.  Right and left heart cath on 3/7 did not show any evidence of hemodynamically significant regurgitation.     -  Agree with resuming home dose of oral furosemide  -  Agree with continuing to hold his antihypertensives for now due to borderline low BP  -  Will see again on Monday and as needed over the weekend    Marizol Holt MD  03/08/19  8:00 AM

## 2019-03-08 NOTE — PLAN OF CARE
Problem: Patient Care Overview  Goal: Plan of Care Review  Outcome: Ongoing (interventions implemented as appropriate)   03/08/19 1122   Coping/Psychosocial   Plan of Care Reviewed With patient   Plan of Care Review   Progress improving   OTHER   Outcome Summary Pt increasing with strength and bed mobility to improve with safety and I. Pt increasing with amb safety with RWX but limited due to SOA

## 2019-03-08 NOTE — PROGRESS NOTES
Adult Nutrition  Assessment/PES    Patient Name:  Alessio Allen  YOB: 1940  MRN: [id number]  Admit Date:  2/28/2019    Assessment Date:  3/8/2019    Comments:  Follow up.  Patient continues on bolus TFs of Boost Plus, 8 cartons/day.  Continues to tolerate TFs well per patient and wife.    S/p bronch 3/6 and s/p cardiac cath yesterday.  Pending radaical neck dissection per MD note.    Will continue to follow.    Reason for Assessment     Row Name 03/08/19 1412          Reason for Assessment    Reason For Assessment  TF/PN;follow-up protocol           Anthropometrics     Row Name 03/08/19 1412          Admit Weight    Admit Weight  -- 227# 3/8        Body Mass Index (BMI)    BMI Assessment  BMI 25-29.9: overweight         Labs/Tests/Procedures/Meds     Row Name 03/08/19 1413          Labs/Procedures/Meds    Lab Results Reviewed  reviewed, pertinent     Lab Results Comments  Gluc, BUN, WBC        Diagnostic Tests/Procedures    Diagnostic Test/Procedure Reviewed  reviewed, pertinent     Diagnostic Test/Procedures Comments  s/p bronch 3/6, cardiac cath yesterday        Medications    Pertinent Medications Reviewed  reviewed, pertinent     Pertinent Medications Comments  FeSO4, lasix, insulin, KCl, vit B6         Physical Findings     Row Name 03/08/19 1414          Physical Findings    Overall Physical Appearance  overweight     Gastrointestinal  feeding tube     Tubes  PEG     Skin  other (see comments) B=19, inc           Nutrition Prescription Ordered     Row Name 03/08/19 1414          Nutrition Prescription PO    Current PO Diet  NPO        Nutrition Prescription EN    Enteral Route  PEG     Product  Other (comment) Boost Plus 8 cartons/day     TF Delivery Method  Bolus     TF Bolus Goal Volume (mL)  711 mL     TF Bolus Frequency  3 times a day     Water flush (mL)   120 mL     Water Flush Frequency  Every feeding         Evaluation of Received Nutrient/Fluid Intake     Row Name 03/08/19 1414           Nutrient/Fluid Evaluation    Additional Documentation  Calories Evaluation (Group);Protein Evaluation (Group)        Calories Evaluation    Enteral Calories (kcal)  2880     % of Kcal Needs  100        Protein Evaluation    Enteral Protein (gm)  112     % of Protein Needs  100               Problem/Interventions:            Intervention Goal     Row Name 03/08/19 1415          Intervention Goal    General  Maintain nutrition;Nutrition support treatment;Disease management/therapy;Reduce/improve symptoms     TF/PN  Maintain TF/PN     Weight  No significant weight loss         Nutrition Intervention     Row Name 03/08/19 1415          Nutrition Intervention    RD/Tech Action  Follow Tx progress;Care plan reviewd     Recommended/Ordered  EN         Nutrition Prescription     Row Name 03/08/19 1415          Nutrition Prescription EN    Enteral Prescription  Continue same protocol         Education/Evaluation     Row Name 03/08/19 1415          Education    Education  Will Instruct as appropriate        Monitor/Evaluation    Monitor  Per protocol;I&O;Pertinent labs;TF delivery/tolerance;Weight;Symptoms           Electronically signed by:  Makayla Mccollum RD  03/08/19 2:17 PM

## 2019-03-08 NOTE — PROGRESS NOTES
[facility]  [phone]  Suresh Hernandez MD    Subjective:  LOS: 8     He did not use noninvasive ventilation last night.  I again advised him to use nightly.  He feels about the same.  He had a heart cath yesterday as well as bronchoscopy the day before.  He does not have significant complaints    Objective   Vital Signs past 24hrs    Temp range: Temp (24hrs), Av.8 °F (37.1 °C), Min:97.9 °F (36.6 °C), Max:99.4 °F (37.4 °C)    BP range: BP: ()/(71-91) 126/71  Pulse range: Heart Rate:  [] 85  Resp rate range: Resp:  [18-26] 22    Device (Oxygen Therapy): high-flow nasal cannulaFlow (L/min):  [5-15] 10  Oxygen range:SpO2:  [82 %-96 %] 93 %   FiO2 (%):  [100 %] 100 %  103 kg (227 lb 1.2 oz); Body mass index is 29.15 kg/m².    Intake/Output Summary (Last 24 hours) at 3/8/2019 0955  Last data filed at 3/8/2019 0929  Gross per 24 hour   Intake 1971 ml   Output 925 ml   Net 1046 ml       Physical Exam   Cardiovascular: Normal rate and regular rhythm.   No murmur heard.  Pulmonary/Chest: He has decreased breath sounds. He has no wheezes. He has no rales.   Abdominal: Soft. Bowel sounds are normal. There is no tenderness.   PEG +   Musculoskeletal: He exhibits no edema.   Neurological: He is alert.   Nursing note and vitals reviewed.    Results Review:    I have reviewed the laboratory and imaging data since the last note by [facility] physician.  My annotations are noted in assessment and plan.    Medication Review:  I have reviewed the current MAR.  My annotations are noted in assessment and plan.      lactated ringers 9 mL/hr Last Rate: Stopped (19 1042)     Plan   PCCM Problems  Acute respiratory failure  Acute decompensated diastolic HF  COPD, no obvious exacerbation  Throat cancer s/p chemo RT  Dysphagia - PEG  ILD ??  Relevant Medical Diagnoses  Afib, on AC  CAD  Obesity    Plan of Treatment  Acute respiratory failure with hypoxia and hypercapnia requiring noninvasive ventilation overnight.   I will continue noninvasive nightly but try and keep him on a nasal cannula during the day.  He must use noninvasive ventilation at night.  He may need a trilogy set up before discharge and needs an ABG the day prior to discharge to determine this.  ABG ordered for 3/10/19.     Bronchoscopy showed mucopurulent secretions in the lung bases.  Patient was started on antibiotics and BAL grew corynebacterium.  Currently on Levaquin.    Due to previous concerns of interstitial lung disease I also did lung biopsies to look for any interstitial process.  My suspicion of a interstitial process is very low.  However since the bronchoscopy had to be done in the OR, I did not want to miss this opportunity of obtaining a sample.  The pathology shows fibroblastic plugs and possible organizing pneumonia.  In setting of acute infection I think an infectious etiology is more likely.  Certainly no interstitial lung disease seen on the path results.    Patient had heart cath right and left on 3/7/2019.    RIGHT VENTRICULAR HEMODYNAMICS:    1.  Pulmonary wedge pressure: -/19, 16  2.  Pulmonary artery pressure: 48/23/33  POST-PROCEDURE DIAGNOSIS:   1. Moderate pulmonary hypertension  2. Mildly elevated left ventricular filling pressures  3. Moderate non-obstructive coronary artery disease with patent mid LAD stent  4. No evidence of hemodynamically significant mitral regurgitation    Borderline/low blood pressure.  Therefore all of his antihypertensives from home are on hold.    Resume home dose lasix.    CT neck was reviewed. Subtle inflammation of right nasopharynx. Opacification of left sphenoid sinus.    Patient needs to review with Dr. Merrill later.  Patient is pending radical neck dissection with Dr. Merrill.  He needs to follow-up with Dr. Merrill in the office in a couple of weeks.  He also needs to follow-up with me in the office in a couple of weeks to see if he can be optimized for surgery.    Patient is taking sips of water.   Note and appreciate speech input.   He remains with a PEG tube and feeds.  Majority of his intake as well as medicines go down his PEG tube.    No further epistaxis or hemoptysis reported.  Back on Xarelto now.    Hopefully discharge by Monday.  He may need rehab before he can go home again.    Suresh Hernandez MD  03/08/19  9:55 AM    Part of this note may be an electronic transcription/translation of spoken language to printed text using the Dragon Dictation System.

## 2019-03-08 NOTE — PLAN OF CARE
Problem: Patient Care Overview  Goal: Plan of Care Review  Outcome: Ongoing (interventions implemented as appropriate)   03/08/19 9619   Coping/Psychosocial   Plan of Care Reviewed With patient   Plan of Care Review   Progress improving   OTHER   Outcome Summary Patient resting. Had Cardiac Cath yesterday and tolerated well. Neurovascular check WNL. Now on High flow nasal cannula at 10l/m. Nursing will continue to monitor.     Goal: Individualization and Mutuality  Outcome: Ongoing (interventions implemented as appropriate)    Goal: Discharge Needs Assessment  Outcome: Ongoing (interventions implemented as appropriate)      Problem: Fall Risk (Adult)  Goal: Absence of Fall  Outcome: Ongoing (interventions implemented as appropriate)      Problem: Breathing Pattern Ineffective (Adult)  Goal: Effective Oxygenation/Ventilation  Outcome: Ongoing (interventions implemented as appropriate)    Goal: Anxiety/Fear Reduction  Outcome: Ongoing (interventions implemented as appropriate)      Problem: Nutrition, Enteral (Adult)  Goal: Signs and Symptoms of Listed Potential Problems Will be Absent, Minimized or Managed (Nutrition, Enteral)  Outcome: Ongoing (interventions implemented as appropriate)      Problem: Skin Injury Risk (Adult)  Goal: Skin Health and Integrity  Outcome: Ongoing (interventions implemented as appropriate)

## 2019-03-08 NOTE — PLAN OF CARE
Problem: Patient Care Overview  Goal: Plan of Care Review  Outcome: Ongoing (interventions implemented as appropriate)   03/08/19 6024   Coping/Psychosocial   Plan of Care Reviewed With patient;spouse   Plan of Care Review   Progress no change   OTHER   Outcome Summary High flow cannula was briefly weaned to 6lpm before having to be increased back to 10lpm. Breathing txs administered and patient was able produce moderate amounts of thick creamy sputum post tx. Encouraged use of aerobika, cough and deep breathing. Also encouraged patient to wear bipap at night.       Problem: Breathing Pattern Ineffective (Adult)  Goal: Effective Oxygenation/Ventilation  Outcome: Ongoing (interventions implemented as appropriate)

## 2019-03-09 LAB
ANION GAP SERPL CALCULATED.3IONS-SCNC: 10.2 MMOL/L
BACTERIA SPEC AEROBE CULT: ABNORMAL
BACTERIA SPEC AEROBE CULT: ABNORMAL
BUN BLD-MCNC: 36 MG/DL (ref 8–23)
BUN/CREAT SERPL: 40.4 (ref 7–25)
CALCIUM SPEC-SCNC: 10.1 MG/DL (ref 8.6–10.5)
CHLORIDE SERPL-SCNC: 106 MMOL/L (ref 98–107)
CO2 SERPL-SCNC: 33.8 MMOL/L (ref 22–29)
CREAT BLD-MCNC: 0.89 MG/DL (ref 0.76–1.27)
DEPRECATED RDW RBC AUTO: 51.7 FL (ref 37–54)
ERYTHROCYTE [DISTWIDTH] IN BLOOD BY AUTOMATED COUNT: 13.9 % (ref 12.3–15.4)
GFR SERPL CREATININE-BSD FRML MDRD: 83 ML/MIN/1.73
GLUCOSE BLD-MCNC: 134 MG/DL (ref 65–99)
GLUCOSE BLDC GLUCOMTR-MCNC: 109 MG/DL (ref 70–130)
GLUCOSE BLDC GLUCOMTR-MCNC: 123 MG/DL (ref 70–130)
GLUCOSE BLDC GLUCOMTR-MCNC: 141 MG/DL (ref 70–130)
GLUCOSE BLDC GLUCOMTR-MCNC: 205 MG/DL (ref 70–130)
GRAM STN SPEC: ABNORMAL
HCT VFR BLD AUTO: 45.8 % (ref 37.5–51)
HGB BLD-MCNC: 14.1 G/DL (ref 13–17.7)
MCH RBC QN AUTO: 30.9 PG (ref 26.6–33)
MCHC RBC AUTO-ENTMCNC: 30.8 G/DL (ref 31.5–35.7)
MCV RBC AUTO: 100.2 FL (ref 79–97)
PLATELET # BLD AUTO: 227 10*3/MM3 (ref 140–450)
PMV BLD AUTO: 10.6 FL (ref 6–12)
POTASSIUM BLD-SCNC: 3.7 MMOL/L (ref 3.5–5.2)
RBC # BLD AUTO: 4.57 10*6/MM3 (ref 4.14–5.8)
SODIUM BLD-SCNC: 150 MMOL/L (ref 136–145)
WBC NRBC COR # BLD: 12.49 10*3/MM3 (ref 3.4–10.8)

## 2019-03-09 PROCEDURE — 94799 UNLISTED PULMONARY SVC/PX: CPT

## 2019-03-09 PROCEDURE — 25010000002 LEVOFLOXACIN PER 250 MG: Performed by: INTERNAL MEDICINE

## 2019-03-09 PROCEDURE — 85027 COMPLETE CBC AUTOMATED: CPT | Performed by: INTERNAL MEDICINE

## 2019-03-09 PROCEDURE — 63710000001 DIPHENHYDRAMINE PER 50 MG: Performed by: INTERNAL MEDICINE

## 2019-03-09 PROCEDURE — 94660 CPAP INITIATION&MGMT: CPT

## 2019-03-09 PROCEDURE — 80048 BASIC METABOLIC PNL TOTAL CA: CPT | Performed by: INTERNAL MEDICINE

## 2019-03-09 PROCEDURE — 82962 GLUCOSE BLOOD TEST: CPT

## 2019-03-09 PROCEDURE — 97110 THERAPEUTIC EXERCISES: CPT

## 2019-03-09 RX ADMIN — ATORVASTATIN CALCIUM 10 MG: 10 TABLET, FILM COATED ORAL at 11:46

## 2019-03-09 RX ADMIN — MONTELUKAST SODIUM 10 MG: 10 TABLET, FILM COATED ORAL at 20:28

## 2019-03-09 RX ADMIN — IPRATROPIUM BROMIDE AND ALBUTEROL SULFATE 3 ML: 2.5; .5 SOLUTION RESPIRATORY (INHALATION) at 06:53

## 2019-03-09 RX ADMIN — ACETYLCYSTEINE 4 ML: 200 SOLUTION ORAL; RESPIRATORY (INHALATION) at 22:25

## 2019-03-09 RX ADMIN — IPRATROPIUM BROMIDE AND ALBUTEROL SULFATE 3 ML: 2.5; .5 SOLUTION RESPIRATORY (INHALATION) at 10:18

## 2019-03-09 RX ADMIN — ACETYLCYSTEINE 4 ML: 200 SOLUTION ORAL; RESPIRATORY (INHALATION) at 14:32

## 2019-03-09 RX ADMIN — IPRATROPIUM BROMIDE AND ALBUTEROL SULFATE 3 ML: 2.5; .5 SOLUTION RESPIRATORY (INHALATION) at 22:25

## 2019-03-09 RX ADMIN — FUROSEMIDE 40 MG: 40 TABLET ORAL at 11:46

## 2019-03-09 RX ADMIN — PREGABALIN 75 MG: 75 CAPSULE ORAL at 20:35

## 2019-03-09 RX ADMIN — RIVAROXABAN 20 MG: 20 TABLET, FILM COATED ORAL at 20:29

## 2019-03-09 RX ADMIN — FUROSEMIDE 40 MG: 40 TABLET ORAL at 20:28

## 2019-03-09 RX ADMIN — DIPHENHYDRAMINE HYDROCHLORIDE 25 MG: 25 CAPSULE ORAL at 20:28

## 2019-03-09 RX ADMIN — LANSOPRAZOLE 30 MG: KIT at 05:04

## 2019-03-09 RX ADMIN — ASPIRIN 81 MG: 81 TABLET, DELAYED RELEASE ORAL at 11:46

## 2019-03-09 RX ADMIN — ACETYLCYSTEINE 4 ML: 200 SOLUTION ORAL; RESPIRATORY (INHALATION) at 10:18

## 2019-03-09 RX ADMIN — LEVOFLOXACIN 750 MG: 5 INJECTION, SOLUTION INTRAVENOUS at 11:47

## 2019-03-09 RX ADMIN — ACETYLCYSTEINE 4 ML: 200 SOLUTION ORAL; RESPIRATORY (INHALATION) at 06:53

## 2019-03-09 RX ADMIN — FERROUS SULFATE TAB 325 MG (65 MG ELEMENTAL FE) 325 MG: 325 (65 FE) TAB at 11:46

## 2019-03-09 RX ADMIN — IPRATROPIUM BROMIDE AND ALBUTEROL SULFATE 3 ML: 2.5; .5 SOLUTION RESPIRATORY (INHALATION) at 14:32

## 2019-03-09 RX ADMIN — KETOTIFEN FUMARATE 1 DROP: 0.35 SOLUTION/ DROPS OPHTHALMIC at 20:29

## 2019-03-09 RX ADMIN — PREGABALIN 75 MG: 75 CAPSULE ORAL at 11:46

## 2019-03-09 RX ADMIN — POTASSIUM CHLORIDE 20 MEQ: 750 CAPSULE, EXTENDED RELEASE ORAL at 11:46

## 2019-03-09 RX ADMIN — Medication 50 MG: at 11:48

## 2019-03-09 RX ADMIN — KETOTIFEN FUMARATE 1 DROP: 0.35 SOLUTION/ DROPS OPHTHALMIC at 08:00

## 2019-03-09 NOTE — PLAN OF CARE
Problem: Patient Care Overview  Goal: Plan of Care Review  Outcome: Ongoing (interventions implemented as appropriate)   03/09/19 4320   Coping/Psychosocial   Plan of Care Reviewed With patient   Plan of Care Review   Progress no change   OTHER   Outcome Summary Patient remains on high flow nasal cannula. Breathing treatments given per MD order.

## 2019-03-09 NOTE — PLAN OF CARE
Problem: Patient Care Overview  Goal: Plan of Care Review  Outcome: Ongoing (interventions implemented as appropriate)   03/09/19 1113   Coping/Psychosocial   Plan of Care Reviewed With patient   Plan of Care Review   Progress improving   OTHER   Outcome Summary Pt increased with bed mobility and transfer safety to [facility] and improved activity tolerance

## 2019-03-09 NOTE — PROGRESS NOTES
Dr. YVROSE Jimenez    [address]    3/9/2019    Patient ID:  Name:  Alessio Allen  MRN:  [id number]  1940  78 y.o.  male            CC/Reason for visit: Acute respiratory failure, dysphagia, throat cancer, decompensated CHF diastolic    Interval hx: Breathing better.  Using noninvasive positive pressure ventilation at night.  Used it for 5 hours.  Still has a cough    ROS: Negative for fever.  Negative for hemoptysis    Vitals:  Vitals:    03/09/19 1027 03/09/19 1426 03/09/19 1432 03/09/19 1445   BP:  112/66     BP Location:  Left arm     Patient Position:  Lying     Pulse: 91 93 94 87   Resp: 22 22 22 22   Temp:  97.5 °F (36.4 °C)     TempSrc:  Oral     SpO2:  90% 91%    Weight:       Height:         FiO2 (%): 60 %     Body mass index is 29.27 kg/m².    Intake/Output Summary (Last 24 hours) at 3/9/2019 1614  Last data filed at 3/9/2019 0635  Gross per 24 hour   Intake 891 ml   Output 575 ml   Net 316 ml       Exam:  GEN:  No distress  Alert, oriented x 3.   LUNGS: Barrel chest, distant and diminished breath sounds bilaterally but overall clear breath sounds bilat, nonlabored breathing  CV:  Normal S1S2, without murmur, no edema  ABD:  Non tender, no enlarged liver or masses      Scheduled meds:    acetylcysteine 4 mL Nebulization 4x Daily - RT   aspirin 81 mg Oral Daily   atorvastatin 10 mg Oral Daily   ferrous sulfate 325 mg Oral Daily With Breakfast   furosemide 40 mg Oral BID   insulin lispro 0-9 Units Subcutaneous 4x Daily With Meals & Nightly   ipratropium-albuterol 3 mL Nebulization 4x Daily - RT   ketotifen 1 drop Both Eyes BID   lansoprazole 30 mg Per PEG Tube Q AM   levoFLOXacin 750 mg Intravenous Q24H   montelukast 10 mg Oral Nightly   potassium chloride 20 mEq Oral Daily   pregabalin 75 mg Oral Q12H   rivaroxaban 20 mg Oral Daily With Dinner   pyridoxine 50 mg Oral Daily     IV meds:                        lactated ringers 9 mL/hr Last Rate: Stopped (03/06/19 1042)       Data  Review:   I reviewed the patient's medications and new clinical results.  Lab Results   Component Value Date    CALCIUM 10.1 03/09/2019    PHOS 3.1 03/04/2019    MG 2.3 03/05/2019    MG 1.4 (C) 03/04/2019    MG 2.0 03/03/2019     Results from last 7 days   Lab Units 03/09/19  0504 03/08/19  0619 03/07/19  1438  03/07/19  0649  03/04/19  0509  03/03/19  0531   SODIUM mmol/L 150* 149*  --   --  143   < >  --   --  141   POTASSIUM mmol/L 3.7 4.1  --   --  4.0   < >  --    < > 3.2*   CHLORIDE mmol/L 106 103  --   --  99   < >  --   --  93*   CO2 mmol/L 33.8* 33.5*  --   --  34.1*   < >  --   --  35.6*   BUN mg/dL 36* 42*  --   --  49*   < >  --   --  30*   CREATININE mg/dL 0.89 0.94  --   --  1.12   < >  --   --  0.90   CALCIUM mg/dL 10.1 10.0  --   --  10.1   < >  --   --  9.8   GLUCOSE mg/dL 134* 130*  --   --  125*   < >  --   --  114*   WBC 10*3/mm3 12.49* 11.66*  --   --  12.93*   < > 10.08  --  10.84*   HEMOGLOBIN g/dL 14.1 13.3  --   --  13.8   < > 12.7*  --  13.5   HEMOGLOBIN, POC g/dL  --   --  14.6   < >  --   --   --   --   --    PLATELETS 10*3/mm3 227 216  --   --  206   < > 201  --  184   PROBNP pg/mL  --   --   --   --   --   --  875.8  --  1,560.0   PROCALCITONIN ng/mL  --   --   --   --   --   --   --   --  0.11    < > = values in this interval not displayed.     Results from last 7 days   Lab Units 03/06/19  1013   RESPCX  Moderate growth (3+) Corynebacterium species*  Light growth (2+) Normal Respiratory Magdalena                   ASSESSMENT:   Hypernatremia  Acute respiratory failure  Acute decompensated diastolic HF  COPD, no obvious exacerbation  Throat cancer s/p chemo RT  Dysphagia - PEG  ILD ??  Afib, on AC  CAD  Obesity        PLAN:  Now developing hypernatremia.  I have encouraged increased intake of free water by mouth.  Repeat labs in the morning and check sodium level.  Follow urine output closely.  Continue noninvasive positive pressure ventilation at night, plan for trilogy ventilator  machine on Monday if resting ABG makes the patient qualify.      Kalpesh Jimenez MD  3/9/2019

## 2019-03-09 NOTE — THERAPY TREATMENT NOTE
Acute Care - Physical Therapy Treatment Note  [facility]     Patient Name: Alessio Allen  : 1940  MRN: [id number]  Today's Date: 3/9/2019  Onset of Illness/Injury or Date of Surgery: 19  Date of Referral to PT: 19  Referring Physician: David    Admit Date: 2019    Visit Dx:    ICD-10-CM ICD-9-CM   1. Acute respiratory failure with hypoxia (CMS/[facility]) J96.01 518.81   2. Impaired mobility Z74.09 799.89   3. AF (paroxysmal atrial fibrillation) (CMS/[facility]) I48.0 427.31     Patient Active Problem List   Diagnosis   • A-fib (CMS/[facility])   • Dyslipidemia   • BP (high blood pressure)   • Neuropathy   • Hypertension   • COPD (chronic obstructive pulmonary disease) (CMS/[facility])   • BPH (benign prostatic hypertrophy)   • Atrial fibrillation (CMS/[facility])   • Asthma   • Hypoxia   • Pneumonia   • Chronic anticoagulation   • Pneumonia of both lungs due to infectious organism   • Hyponatremia   • COPD exacerbation (CMS/[facility])   • Acute on chronic respiratory failure with hypoxia (CMS/[facility])   • Pneumonia of both lower lobes due to infectious organism (CMS/[facility])   • Acute on chronic diastolic heart failure (CMS/[facility])   • IPF (idiopathic pulmonary fibrosis) (CMS/[facility])   • Acute respiratory failure with hypoxia (CMS/[facility])   • Attention to G-tube (CMS/[facility])       Therapy Treatment    Rehabilitation Treatment Summary     Row Name 19 1100             Treatment Time/Intention    Discipline  physical therapy assistant  -CW      Document Type  therapy note (daily note)  -CW      Subjective Information  complains of;dyspnea  -CW      Mode of Treatment  physical therapy  -CW      Therapy Frequency (PT Clinical Impression)  daily  -CW      Patient Effort  good  -CW      Existing Precautions/Restrictions  fall;oxygen therapy device and L/min  -CW      Recorded by [CW] Cesario Kwon PTA 19 1113      Row Name 19 1100             Vital Signs    O2 Delivery Pre Treatment  hi-flow  -CW      O2 Delivery Intra Treatment   hi-flow  -CW      O2 Delivery Post Treatment  hi-flow  -CW      Recorded by [CW] Cesario Kwon, PTA 03/09/19 1113      Row Name 03/09/19 1100             Cognitive Assessment/Intervention- PT/OT    Orientation Status (Cognition)  oriented x 3  -CW      Follows Commands (Cognition)  WFL  -CW      Safety Deficit (Cognitive)  mild deficit  -CW      Personal Safety Interventions  fall prevention program maintained;gait belt;muscle strengthening facilitated;nonskid shoes/slippers when out of bed  -CW      Recorded by [CW] Cesario Kwon, PTA 03/09/19 1113      Row Name 03/09/19 1100             Bed Mobility Assessment/Treatment    Bed Mobility Assessment/Treatment  supine-sit;sit-supine  -CW      Supine-Sit Victoria (Bed Mobility)  contact guard  -CW      Sit-Supine Victoria (Bed Mobility)  contact guard  -CW      Assistive Device (Bed Mobility)  bed rails  -CW      Recorded by [CW] Cesario Kwon, PTA 03/09/19 1113      Row Name 03/09/19 1100             Transfer Assessment/Treatment    Transfer Assessment/Treatment  sit-stand transfer;stand-sit transfer  -CW      Recorded by [CW] Cesario Kwon, PTA 03/09/19 1113      Row Name 03/09/19 1100             Sit-Stand Transfer    Sit-Stand Victoria (Transfers)  contact guard  -CW      Assistive Device (Sit-Stand Transfers)  walker, front-wheeled  -CW      Recorded by [CW] Cesario Kwon, PTA 03/09/19 1113      Row Name 03/09/19 1100             Stand-Sit Transfer    Stand-Sit Victoria (Transfers)  contact guard  -CW      Assistive Device (Stand-Sit Transfers)  walker, front-wheeled  -CW      Recorded by [CW] Cesario Kwon, PTA 03/09/19 1113      Row Name 03/09/19 1100             Gait/Stairs Assessment/Training    Victoria Level (Gait)  contact guard  -CW      Assistive Device (Gait)  walker, front-wheeled  -CW      Distance in Feet (Gait)  150  -CW      Pattern (Gait)  step-through  -CW      Deviations/Abnormal Patterns  (Gait)  gait speed decreased;stride length decreased  -CW      Bilateral Gait Deviations  forward flexed posture  -CW      Recorded by [CW] Cesario Kwon PTA 03/09/19 1113      Row Name 03/09/19 1100             Positioning and Restraints    Pre-Treatment Position  in bed  -CW      Post Treatment Position  bed  -CW      In Bed  notified nsg;supine;call light within reach;encouraged to call for assist  -CW      Recorded by [CW] Cesario Kwon PTA 03/09/19 1113      Row Name                Wound 03/06/19 1003 other (see notes) incision    Wound - Properties Group Date first assessed: 03/06/19 [MONTANA] Time first assessed: 1003 [MONTANA] Location: other (see notes) [MONTANA] Type: incision [MONTANA] Recorded by:  [MONTANA] Sanket Jaimes RN 03/06/19 1003    Row Name 03/09/19 1100             Outcome Summary/Treatment Plan (PT)    Anticipated Discharge Disposition (PT)  skilled nursing facility;home with OP services  -CW      Recorded by [CW] Cesario Kwon PTA 03/09/19 1113        User Key  (r) = Recorded By, (t) = Taken By, (c) = Cosigned By    Initials Name Effective Dates Discipline    MONTANA Sanket Jaimes RN 06/16/16 -  Nurse    CW Cesario Kwon PTA 03/07/18 -  PT          Rash 03/08/19 2000 Right arm (Active)   Distribution localized 3/9/2019 12:11 AM   Borders raised 3/9/2019 12:11 AM   Characteristics itching;dry 3/9/2019 12:11 AM   Color red 3/9/2019 12:11 AM   Care, Rash cleansed with;soap and water;open to air 3/8/2019  8:31 PM       Wound 03/06/19 1003 other (see notes) incision (Active)   Closure Open to air 3/9/2019 12:11 AM           Physical Therapy Education     Title: PT OT SLP Therapies (Done)     Topic: Physical Therapy (Done)     Point: Mobility training (Done)     Learning Progress Summary           Patient Acceptance, E,TB, VU,DU by NIKITA at 3/9/2019 11:13 AM    Acceptance, E,TB, VU,DU by NIKITA at 3/8/2019 11:21 AM    Acceptance, E, VU by  at 3/7/2019 11:47 AM    Acceptance, D,E, NR by CW at  3/6/2019  4:52 PM    Acceptance, E, NR by  at 3/5/2019  1:08 PM                   Point: Home exercise program (Done)     Learning Progress Summary           Patient Acceptance, E,TB, VU,DU by CW at 3/9/2019 11:13 AM    Acceptance, E,TB, VU,DU by CW at 3/8/2019 11:21 AM    Acceptance, E, VU by  at 3/7/2019 11:47 AM    Acceptance, D,E, NR by CW at 3/6/2019  4:52 PM    Acceptance, E, NR by  at 3/5/2019  1:08 PM                   Point: Body mechanics (Done)     Learning Progress Summary           Patient Acceptance, E,TB, VU,DU by CW at 3/9/2019 11:13 AM    Acceptance, E,TB, VU,DU by CW at 3/8/2019 11:21 AM    Acceptance, E, VU by  at 3/7/2019 11:47 AM    Acceptance, D,E, NR by  at 3/6/2019  4:52 PM    Acceptance, E, NR by  at 3/5/2019  1:08 PM                   Point: Precautions (Done)     Learning Progress Summary           Patient Acceptance, E,TB, VU,DU by CW at 3/9/2019 11:13 AM    Acceptance, E,TB, VU,DU by CW at 3/8/2019 11:21 AM    Acceptance, E, VU by  at 3/7/2019 11:47 AM    Acceptance, D,E, NR by  at 3/6/2019  4:52 PM    Acceptance, E, NR by  at 3/5/2019  1:08 PM                               User Key     Initials Effective Dates Name Provider Type Discipline     02/05/19 -  Leigh Woodard, PT Physical Therapist PT     04/03/18 -  Jaida Porter, PT Physical Therapist PT     03/07/18 -  Cesario Kwon, PTA Physical Therapy Assistant PT                PT Recommendation and Plan  Anticipated Discharge Disposition (PT): skilled nursing facility, home with OP services  Therapy Frequency (PT Clinical Impression): daily  Outcome Summary/Treatment Plan (PT)  Anticipated Discharge Disposition (PT): skilled nursing facility, home with OP services  Plan of Care Reviewed With: patient  Progress: improving  Outcome Summary: Pt increased with bed mobility and tyransfer safety to RWX and improved activity tolerance  Outcome Measures     Row Name 03/09/19 1100 03/08/19 1100 03/07/19  1100       How much help from another person do you currently need...    Turning from your back to your side while in flat bed without using bedrails?  3  -CW  3  -CW  3  -KH    Moving from lying on back to sitting on the side of a flat bed without bedrails?  3  -CW  3  -CW  3  -KH    Moving to and from a bed to a chair (including a wheelchair)?  3  -CW  3  -CW  3  -KH    Standing up from a chair using your arms (e.g., wheelchair, bedside chair)?  3  -CW  3  -CW  3  -KH    Climbing 3-5 steps with a railing?  2  -CW  2  -CW  2  -KH    To walk in hospital room?  3  -CW  3  -CW  3  -KH    AM-PAC 6 Clicks Score  17  -CW  17  -CW  17  -KH       Functional Assessment    Outcome Measure Options  AM-PAC 6 Clicks Basic Mobility (PT)  -CW  AM-PAC 6 Clicks Basic Mobility (PT)  -CW  AM-PAC 6 Clicks Basic Mobility (PT)  -KH    Row Name 03/06/19 1600             How much help from another person do you currently need...    Turning from your back to your side while in flat bed without using bedrails?  2  -CW      Moving from lying on back to sitting on the side of a flat bed without bedrails?  2  -CW      Moving to and from a bed to a chair (including a wheelchair)?  1  -CW      Standing up from a chair using your arms (e.g., wheelchair, bedside chair)?  1  -CW      Climbing 3-5 steps with a railing?  1  -CW      To walk in hospital room?  1  -CW      AM-PAC 6 Clicks Score  8  -CW         Functional Assessment    Outcome Measure Options  AM-PAC 6 Clicks Basic Mobility (PT)  -CW        User Key  (r) = Recorded By, (t) = Taken By, (c) = Cosigned By    Initials Name Provider Type    Leigh Holloway, PT Physical Therapist    CW Cesario Kwon, PTA Physical Therapy Assistant         Time Calculation:   PT Charges     Row Name 03/09/19 1114             Time Calculation    Start Time  1057  -CW      Stop Time  1114  -CW      Time Calculation (min)  17 min  -CW      PT Received On  03/09/19  -CW      PT - Next Appointment   03/10/19  -        User Key  (r) = Recorded By, (t) = Taken By, (c) = Cosigned By    Initials Name Provider Type    Cesario Rivera PTA Physical Therapy Assistant        Therapy Suggested Charges     Code   Minutes Charges    None           Therapy Charges for Today     Code Description Service Date Service Provider Modifiers Qty    97374356522 HC PT THER PROC EA 15 MIN 3/8/2019 Cesario Kwon, PTA GP 2    13606498366 HC PT THER SUPP EA 15 MIN 3/8/2019 Cesario Kwon, HARDEEP GP 2    42647430478 HC PT THER PROC EA 15 MIN 3/9/2019 Cesario Kwon, PTA GP 1    42431562629 HC PT THER SUPP EA 15 MIN 3/9/2019 Cesario Kwon, PTA GP 1          PT G-Codes  Outcome Measure Options: AM-PAC 6 Clicks Basic Mobility (PT)  AM-PAC 6 Clicks Score: 17    Cesario Kwon PTA  3/9/2019

## 2019-03-09 NOTE — PLAN OF CARE
Problem: Patient Care Overview  Goal: Plan of Care Review  Outcome: Ongoing (interventions implemented as appropriate)   03/09/19 0532   Coping/Psychosocial   Plan of Care Reviewed With patient   Plan of Care Review   Progress no change   OTHER   Outcome Summary Patient in bed resting. Currently on high flow nasal cannula at 8l/m, Was able to wear NPPV for 5 hours. Coughing up thick brown sputum. Patient has rash to ight forearm, unknown etiology. Dr Kong notified and new order for benadryl received and given. Itiching decreased. Denies pain. Neurovascular assessment WNL. Port patent and flushes well. Remain afib/POD on monitor. Nursing will continue to monitor.     Goal: Individualization and Mutuality  Outcome: Ongoing (interventions implemented as appropriate)    Goal: Discharge Needs Assessment  Outcome: Ongoing (interventions implemented as appropriate)    Goal: Interprofessional Rounds/Family Conf  Outcome: Ongoing (interventions implemented as appropriate)      Problem: Fall Risk (Adult)  Goal: Absence of Fall  Outcome: Ongoing (interventions implemented as appropriate)      Problem: Breathing Pattern Ineffective (Adult)  Goal: Effective Oxygenation/Ventilation  Outcome: Ongoing (interventions implemented as appropriate)    Goal: Anxiety/Fear Reduction  Outcome: Ongoing (interventions implemented as appropriate)      Problem: Nutrition, Enteral (Adult)  Goal: Signs and Symptoms of Listed Potential Problems Will be Absent, Minimized or Managed (Nutrition, Enteral)  Outcome: Ongoing (interventions implemented as appropriate)      Problem: Skin Injury Risk (Adult)  Goal: Skin Health and Integrity  Outcome: Ongoing (interventions implemented as appropriate)

## 2019-03-10 LAB
ANION GAP SERPL CALCULATED.3IONS-SCNC: 10.3 MMOL/L
APTT PPP: 35.3 SECONDS (ref 22.7–35.4)
ARTERIAL PATENCY WRIST A: POSITIVE
ATMOSPHERIC PRESS: 748.6 MMHG
BASE EXCESS BLDA CALC-SCNC: 8.5 MMOL/L (ref 0–2)
BDY SITE: ABNORMAL
BUN BLD-MCNC: 35 MG/DL (ref 8–23)
BUN/CREAT SERPL: 36.8 (ref 7–25)
CALCIUM SPEC-SCNC: 9.7 MG/DL (ref 8.6–10.5)
CHLORIDE SERPL-SCNC: 106 MMOL/L (ref 98–107)
CO2 SERPL-SCNC: 33.7 MMOL/L (ref 22–29)
CREAT BLD-MCNC: 0.95 MG/DL (ref 0.76–1.27)
DEPRECATED RDW RBC AUTO: 51.4 FL (ref 37–54)
ERYTHROCYTE [DISTWIDTH] IN BLOOD BY AUTOMATED COUNT: 14.2 % (ref 12.3–15.4)
GAS FLOW AIRWAY: 9 LPM
GFR SERPL CREATININE-BSD FRML MDRD: 77 ML/MIN/1.73
GLUCOSE BLD-MCNC: 105 MG/DL (ref 65–99)
GLUCOSE BLDC GLUCOMTR-MCNC: 103 MG/DL (ref 70–130)
GLUCOSE BLDC GLUCOMTR-MCNC: 118 MG/DL (ref 70–130)
GLUCOSE BLDC GLUCOMTR-MCNC: 126 MG/DL (ref 70–130)
GLUCOSE BLDC GLUCOMTR-MCNC: 149 MG/DL (ref 70–130)
HCO3 BLDA-SCNC: 34.2 MMOL/L (ref 22–28)
HCT VFR BLD AUTO: 45.2 % (ref 37.5–51)
HGB BLD-MCNC: 13.7 G/DL (ref 13–17.7)
INR PPP: 1.63 (ref 0.9–1.1)
MCH RBC QN AUTO: 29.8 PG (ref 26.6–33)
MCHC RBC AUTO-ENTMCNC: 30.3 G/DL (ref 31.5–35.7)
MCV RBC AUTO: 98.3 FL (ref 79–97)
MODALITY: ABNORMAL
NT-PROBNP SERPL-MCNC: 1148 PG/ML (ref 0–1800)
PCO2 BLDA: 49.5 MM HG (ref 35–45)
PH BLDA: 7.45 PH UNITS (ref 7.35–7.45)
PLATELET # BLD AUTO: 241 10*3/MM3 (ref 140–450)
PMV BLD AUTO: 10.3 FL (ref 6–12)
PO2 BLDA: 56.2 MM HG (ref 80–100)
POTASSIUM BLD-SCNC: 3.5 MMOL/L (ref 3.5–5.2)
PROTHROMBIN TIME: 19.1 SECONDS (ref 11.7–14.2)
RBC # BLD AUTO: 4.6 10*6/MM3 (ref 4.14–5.8)
SAO2 % BLDCOA: 89.5 % (ref 92–99)
SET MECH RESP RATE: 22
SODIUM BLD-SCNC: 150 MMOL/L (ref 136–145)
WBC NRBC COR # BLD: 12.02 10*3/MM3 (ref 3.4–10.8)

## 2019-03-10 PROCEDURE — 36600 WITHDRAWAL OF ARTERIAL BLOOD: CPT

## 2019-03-10 PROCEDURE — 94799 UNLISTED PULMONARY SVC/PX: CPT

## 2019-03-10 PROCEDURE — 82962 GLUCOSE BLOOD TEST: CPT

## 2019-03-10 PROCEDURE — 63710000001 DIPHENHYDRAMINE PER 50 MG: Performed by: INTERNAL MEDICINE

## 2019-03-10 PROCEDURE — 97110 THERAPEUTIC EXERCISES: CPT

## 2019-03-10 PROCEDURE — 83880 ASSAY OF NATRIURETIC PEPTIDE: CPT | Performed by: INTERNAL MEDICINE

## 2019-03-10 PROCEDURE — 85730 THROMBOPLASTIN TIME PARTIAL: CPT | Performed by: INTERNAL MEDICINE

## 2019-03-10 PROCEDURE — 85610 PROTHROMBIN TIME: CPT | Performed by: INTERNAL MEDICINE

## 2019-03-10 PROCEDURE — 82803 BLOOD GASES ANY COMBINATION: CPT

## 2019-03-10 PROCEDURE — 85027 COMPLETE CBC AUTOMATED: CPT | Performed by: INTERNAL MEDICINE

## 2019-03-10 PROCEDURE — 80048 BASIC METABOLIC PNL TOTAL CA: CPT | Performed by: INTERNAL MEDICINE

## 2019-03-10 RX ORDER — SODIUM CHLORIDE 0.9 % (FLUSH) 0.9 %
10 SYRINGE (ML) INJECTION EVERY 12 HOURS SCHEDULED
Status: DISCONTINUED | OUTPATIENT
Start: 2019-03-10 | End: 2019-03-20 | Stop reason: HOSPADM

## 2019-03-10 RX ORDER — ACETYLCYSTEINE 200 MG/ML
4 SOLUTION ORAL; RESPIRATORY (INHALATION)
Status: DISCONTINUED | OUTPATIENT
Start: 2019-03-10 | End: 2019-03-11

## 2019-03-10 RX ORDER — SODIUM CHLORIDE 0.9 % (FLUSH) 0.9 %
10 SYRINGE (ML) INJECTION AS NEEDED
Status: DISCONTINUED | OUTPATIENT
Start: 2019-03-10 | End: 2019-03-20 | Stop reason: HOSPADM

## 2019-03-10 RX ORDER — SODIUM CHLORIDE 0.9 % (FLUSH) 0.9 %
20 SYRINGE (ML) INJECTION AS NEEDED
Status: DISCONTINUED | OUTPATIENT
Start: 2019-03-10 | End: 2019-03-20 | Stop reason: HOSPADM

## 2019-03-10 RX ORDER — DIPHENHYDRAMINE HCL 25 MG
25 CAPSULE ORAL EVERY 6 HOURS PRN
Status: DISCONTINUED | OUTPATIENT
Start: 2019-03-10 | End: 2019-03-20 | Stop reason: HOSPADM

## 2019-03-10 RX ADMIN — DIPHENHYDRAMINE HYDROCHLORIDE 25 MG: 25 CAPSULE ORAL at 20:58

## 2019-03-10 RX ADMIN — FUROSEMIDE 40 MG: 40 TABLET ORAL at 20:46

## 2019-03-10 RX ADMIN — ACETYLCYSTEINE 4 ML: 200 SOLUTION ORAL; RESPIRATORY (INHALATION) at 14:45

## 2019-03-10 RX ADMIN — FERROUS SULFATE TAB 325 MG (65 MG ELEMENTAL FE) 325 MG: 325 (65 FE) TAB at 11:25

## 2019-03-10 RX ADMIN — SODIUM CHLORIDE, PRESERVATIVE FREE 10 ML: 5 INJECTION INTRAVENOUS at 11:26

## 2019-03-10 RX ADMIN — DIPHENHYDRAMINE HYDROCHLORIDE 25 MG: 25 CAPSULE ORAL at 11:24

## 2019-03-10 RX ADMIN — IPRATROPIUM BROMIDE AND ALBUTEROL SULFATE 3 ML: 2.5; .5 SOLUTION RESPIRATORY (INHALATION) at 06:33

## 2019-03-10 RX ADMIN — ACETYLCYSTEINE 4 ML: 200 SOLUTION ORAL; RESPIRATORY (INHALATION) at 10:17

## 2019-03-10 RX ADMIN — Medication 10 ML: at 23:21

## 2019-03-10 RX ADMIN — ATORVASTATIN CALCIUM 10 MG: 10 TABLET, FILM COATED ORAL at 11:25

## 2019-03-10 RX ADMIN — RIVAROXABAN 20 MG: 20 TABLET, FILM COATED ORAL at 19:45

## 2019-03-10 RX ADMIN — PREGABALIN 75 MG: 75 CAPSULE ORAL at 11:26

## 2019-03-10 RX ADMIN — LANSOPRAZOLE 30 MG: KIT at 06:43

## 2019-03-10 RX ADMIN — KETOTIFEN FUMARATE 1 DROP: 0.35 SOLUTION/ DROPS OPHTHALMIC at 20:46

## 2019-03-10 RX ADMIN — Medication 50 MG: at 11:25

## 2019-03-10 RX ADMIN — ACETYLCYSTEINE 4 ML: 200 SOLUTION ORAL; RESPIRATORY (INHALATION) at 20:28

## 2019-03-10 RX ADMIN — IPRATROPIUM BROMIDE AND ALBUTEROL SULFATE 3 ML: 2.5; .5 SOLUTION RESPIRATORY (INHALATION) at 10:17

## 2019-03-10 RX ADMIN — ASPIRIN 81 MG: 81 TABLET, DELAYED RELEASE ORAL at 11:25

## 2019-03-10 RX ADMIN — SODIUM CHLORIDE, PRESERVATIVE FREE 10 ML: 5 INJECTION INTRAVENOUS at 20:58

## 2019-03-10 RX ADMIN — IPRATROPIUM BROMIDE AND ALBUTEROL SULFATE 3 ML: 2.5; .5 SOLUTION RESPIRATORY (INHALATION) at 14:45

## 2019-03-10 RX ADMIN — POTASSIUM CHLORIDE 20 MEQ: 750 CAPSULE, EXTENDED RELEASE ORAL at 11:24

## 2019-03-10 RX ADMIN — ACETYLCYSTEINE 4 ML: 200 SOLUTION ORAL; RESPIRATORY (INHALATION) at 06:33

## 2019-03-10 RX ADMIN — PREGABALIN 75 MG: 75 CAPSULE ORAL at 20:46

## 2019-03-10 RX ADMIN — IPRATROPIUM BROMIDE AND ALBUTEROL SULFATE 3 ML: 2.5; .5 SOLUTION RESPIRATORY (INHALATION) at 20:28

## 2019-03-10 RX ADMIN — KETOTIFEN FUMARATE 1 DROP: 0.35 SOLUTION/ DROPS OPHTHALMIC at 11:26

## 2019-03-10 RX ADMIN — FUROSEMIDE 40 MG: 40 TABLET ORAL at 11:25

## 2019-03-10 RX ADMIN — POTASSIUM CHLORIDE 40 MEQ: 750 CAPSULE, EXTENDED RELEASE ORAL at 19:45

## 2019-03-10 NOTE — PROGRESS NOTES
Dr. YVROSE Jimenez    [address]    3/10/2019    Patient ID:  Name:  Alessio Allen  MRN:  [id number]  1940  78 y.o.  male            CC/Reason for visit: Acute respiratory failure, dysphagia, throat cancer, decompensated CHF diastolic    HPI: Still very short of breath at rest.  Denies much of a cough.  Using noninvasive ventilation at night, hospital machine.  Still requiring 9 L of oxygen via high flow system.    ROS: No hemoptysis.  No sputum    Vitals:  Vitals:    03/10/19 1029 03/10/19 1423 03/10/19 1445 03/10/19 1456   BP:  116/72     BP Location:  Left arm     Patient Position:  Lying     Pulse: 91 86 96 85   Resp: 22 18 24 24   Temp:  97.6 °F (36.4 °C)     TempSrc:  Oral     SpO2: (!) 89% 92% 92%    Weight:       Height:         FiO2 (%): 65 %     Body mass index is 28.22 kg/m².    Intake/Output Summary (Last 24 hours) at 3/10/2019 1459  Last data filed at 3/10/2019 0730  Gross per 24 hour   Intake 594 ml   Output 1300 ml   Net -706 ml       Exam:  GEN:               No distress  Alert, oriented x 3.   LUNGS:           Barrel chest, distant and diminished breath sounds bilaterally but overall clear breath sounds bilat, nonlabored breathing  CV:                  Normal S1S2, without murmur, no edema  ABD:                Non tender, no enlarged liver or masses        Scheduled meds:    acetylcysteine 4 mL Nebulization 4x Daily - RT   aspirin 81 mg Oral Daily   atorvastatin 10 mg Oral Daily   ferrous sulfate 325 mg Oral Daily With Breakfast   furosemide 40 mg Oral BID   insulin lispro 0-9 Units Subcutaneous 4x Daily With Meals & Nightly   ipratropium-albuterol 3 mL Nebulization 4x Daily - RT   ketotifen 1 drop Both Eyes BID   lansoprazole 30 mg Per PEG Tube Q AM   levoFLOXacin 750 mg Intravenous Q24H   montelukast 10 mg Oral Nightly   potassium chloride 20 mEq Oral Daily   pregabalin 75 mg Oral Q12H   rivaroxaban 20 mg Oral Daily With Dinner   sodium chloride 10 mL Intravenous Q12H    pyridoxine 50 mg Oral Daily     IV meds:                        lactated ringers 9 mL/hr Last Rate: Stopped (03/06/19 1042)       Data Review:   I reviewed the patient's medications and new clinical results.  Lab Results   Component Value Date    CALCIUM 9.7 03/10/2019    PHOS 3.1 03/04/2019    MG 2.3 03/05/2019    MG 1.4 (C) 03/04/2019    MG 2.0 03/03/2019     Results from last 7 days   Lab Units 03/10/19  0432 03/09/19  0504 03/08/19  0619  03/04/19  0509   SODIUM mmol/L 150* 150* 149*   < >  --    POTASSIUM mmol/L 3.5 3.7 4.1   < >  --    CHLORIDE mmol/L 106 106 103   < >  --    CO2 mmol/L 33.7* 33.8* 33.5*   < >  --    BUN mg/dL 35* 36* 42*   < >  --    CREATININE mg/dL 0.95 0.89 0.94   < >  --    CALCIUM mg/dL 9.7 10.1 10.0   < >  --    GLUCOSE mg/dL 105* 134* 130*   < >  --    WBC 10*3/mm3 12.02* 12.49* 11.66*   < > 10.08   HEMOGLOBIN g/dL 13.7 14.1 13.3   < > 12.7*   HEMOGLOBIN, POC   --   --   --    < >  --    PLATELETS 10*3/mm3 241 227 216   < > 201   INR  1.63*  --   --   --   --    PROBNP pg/mL 1,148.0  --   --   --  875.8    < > = values in this interval not displayed.     Results from last 7 days   Lab Units 03/06/19  1013   RESPCX  Moderate growth (3+) Corynebacterium species*  Light growth (2+) Normal Respiratory Magdalena               Results from last 7 days   Lab Units 03/10/19  1019 03/07/19  0501 03/06/19  1746   PH, ARTERIAL pH units 7.448 7.359 7.347*   PCO2, ARTERIAL mm Hg 49.5* 58.1* 60.0*   PO2 ART mm Hg 56.2* 74.3* 61.0*   FLOW RATE lpm 9  --  15   MODALITY  HFNC BiPap NRB   O2 SATURATION CALC % 89.5* 93.6 88.8*       Estimated Creatinine Clearance: 82.2 mL/min (by C-G formula based on SCr of 0.95 mg/dL).      ASSESSMENT:   Hypernatremia  Acute respiratory failure  Acute decompensated diastolic HF  COPD, no obvious exacerbation  Throat cancer s/p chemo RT  Dysphagia - PEG  Afib, on AC  CAD  Obesity        PLAN:  Sodium level is still not normal, but has not worsened.  Repeat renal function  panel in the morning.  Continue noninvasive positive pressure ventilation at nighttime.  ABG today on 9 L high flow oxygen system shows hypercapnia and PaO2 of barely 56.  Stop antibiotics.  Both Corynebacterium and Streptococcus are oral contaminants.  Check pro calcitonin in the morning.  Continue beta agonist bronchodilators and Mucomyst nebulizations.    Not close to being able to be discharged.  Still requiring 9 L high flow oxygen system.    Kalpesh Jimenez MD  3/10/2019

## 2019-03-10 NOTE — PLAN OF CARE
Problem: Patient Care Overview  Goal: Plan of Care Review  Outcome: Ongoing (interventions implemented as appropriate)   03/10/19 0643   Coping/Psychosocial   Plan of Care Reviewed With patient   Plan of Care Review   Progress no change   OTHER   Outcome Summary Patient placed on NPPV last night but started having lip bleeding. Changed back to high flow at 8l/m. area cleaned and kept dry. Sry scab remain to lower lips. Dr Kong notified new order noted for PT/INR, PTT with am labs. Remain afib on monitor. Nursing will continue to monitor.     Goal: Individualization and Mutuality  Outcome: Ongoing (interventions implemented as appropriate)    Goal: Discharge Needs Assessment  Outcome: Ongoing (interventions implemented as appropriate)    Goal: Interprofessional Rounds/Family Conf  Outcome: Ongoing (interventions implemented as appropriate)      Problem: Fall Risk (Adult)  Goal: Absence of Fall  Outcome: Ongoing (interventions implemented as appropriate)      Problem: Breathing Pattern Ineffective (Adult)  Goal: Effective Oxygenation/Ventilation  Outcome: Ongoing (interventions implemented as appropriate)    Goal: Anxiety/Fear Reduction  Outcome: Ongoing (interventions implemented as appropriate)      Problem: Nutrition, Enteral (Adult)  Goal: Signs and Symptoms of Listed Potential Problems Will be Absent, Minimized or Managed (Nutrition, Enteral)  Outcome: Ongoing (interventions implemented as appropriate)      Problem: Skin Injury Risk (Adult)  Goal: Skin Health and Integrity  Outcome: Ongoing (interventions implemented as appropriate)

## 2019-03-10 NOTE — THERAPY TREATMENT NOTE
Acute Care - Physical Therapy Treatment Note  [facility]     Patient Name: Alessio Allen  : 1940  MRN: [id number]  Today's Date: 3/10/2019  Onset of Illness/Injury or Date of Surgery: 19  Date of Referral to PT: 19  Referring Physician: David    Admit Date: 2019    Visit Dx:    ICD-10-CM ICD-9-CM   1. Acute respiratory failure with hypoxia (CMS/[facility]) J96.01 518.81   2. Impaired mobility Z74.09 799.89   3. AF (paroxysmal atrial fibrillation) (CMS/[facility]) I48.0 427.31     Patient Active Problem List   Diagnosis   • A-fib (CMS/[facility])   • Dyslipidemia   • BP (high blood pressure)   • Neuropathy   • Hypertension   • COPD (chronic obstructive pulmonary disease) (CMS/[facility])   • BPH (benign prostatic hypertrophy)   • Atrial fibrillation (CMS/[facility])   • Asthma   • Hypoxia   • Pneumonia   • Chronic anticoagulation   • Pneumonia of both lungs due to infectious organism   • Hyponatremia   • COPD exacerbation (CMS/[facility])   • Acute on chronic respiratory failure with hypoxia (CMS/[facility])   • Pneumonia of both lower lobes due to infectious organism (CMS/[facility])   • Acute on chronic diastolic heart failure (CMS/[facility])   • IPF (idiopathic pulmonary fibrosis) (CMS/[facility])   • Acute respiratory failure with hypoxia (CMS/[facility])   • Attention to G-tube (CMS/[facility])       Therapy Treatment    Rehabilitation Treatment Summary     Row Name 03/10/19 1100             Treatment Time/Intention    Discipline  physical therapy assistant  -CW      Document Type  therapy note (daily note)  -CW      Subjective Information  no complaints  -CW      Mode of Treatment  physical therapy  -CW      Therapy Frequency (PT Clinical Impression)  daily  -CW      Patient Effort  good  -CW      Existing Precautions/Restrictions  fall;oxygen therapy device and L/min  -CW      Recorded by [CW] Cesario Kwon PTA 03/10/19 1113      Row Name 03/10/19 1100             Vital Signs    O2 Delivery Pre Treatment  hi-flow  -CW      O2 Delivery Intra Treatment   hi-flow  -CW      O2 Delivery Post Treatment  hi-flow  -CW      Recorded by [CW] Cesario Kwon, PTA 03/10/19 1113      Row Name 03/10/19 1100             Cognitive Assessment/Intervention- PT/OT    Orientation Status (Cognition)  oriented x 3  -CW      Follows Commands (Cognition)  WFL  -CW      Safety Deficit (Cognitive)  mild deficit  -CW      Personal Safety Interventions  fall prevention program maintained;gait belt;muscle strengthening facilitated;nonskid shoes/slippers when out of bed  -CW      Recorded by [CW] Cesario Kwon, PTA 03/10/19 1113      Row Name 03/10/19 1100             Bed Mobility Assessment/Treatment    Bed Mobility Assessment/Treatment  supine-sit;sit-supine  -CW      Supine-Sit [place] (Bed Mobility)  contact guard  -CW      Sit-Supine [place] (Bed Mobility)  contact guard  -CW      Assistive Device (Bed Mobility)  bed rails  -CW      Recorded by [CW] Cesario Kwon, PTA 03/10/19 1113      Row Name 03/10/19 1100             Transfer Assessment/Treatment    Transfer Assessment/Treatment  sit-stand transfer;stand-sit transfer  -CW      Recorded by [CW] Cesario Kwon, PTA 03/10/19 1113      Row Name 03/10/19 1100             Sit-Stand Transfer    Sit-Stand [place] (Transfers)  contact guard  -CW      Assistive Device (Sit-Stand Transfers)  walker, front-wheeled  -CW      Recorded by [CW] Cesario Kwon, PTA 03/10/19 1113      Row Name 03/10/19 1100             Stand-Sit Transfer    Stand-Sit [place] (Transfers)  contact guard  -CW      Assistive Device (Stand-Sit Transfers)  walker, front-wheeled  -CW      Recorded by [CW] Cesario Kwon, PTA 03/10/19 1113      Row Name 03/10/19 1100             Gait/Stairs Assessment/Training    [place] Level (Gait)  contact guard  -CW      Assistive Device (Gait)  walker, front-wheeled  -CW      Distance in Feet (Gait)  150  -CW      Pattern (Gait)  step-through  -CW      Deviations/Abnormal Patterns  (Gait)  gait speed decreased;stride length decreased  -CW      Bilateral Gait Deviations  forward flexed posture  -CW      Recorded by [CW] Cesario Kwon PTA 03/10/19 1113      Row Name 03/10/19 1100             Positioning and Restraints    Pre-Treatment Position  in bed  -CW      Post Treatment Position  bed  -CW      In Bed  notified nsg;supine;call light within reach;encouraged to call for assist  -CW      Recorded by [CW] Cesario Kwon PTA 03/10/19 1113      Row Name                Wound 03/06/19 1003 other (see notes) incision    Wound - Properties Group Date first assessed: 03/06/19 [MONTANA] Time first assessed: 1003 [MONTANA] Location: other (see notes) [MONTANA] Type: incision [MONTANA] Recorded by:  [MONTANA] Sanket Jaimes RN 03/06/19 1003    Row Name 03/10/19 1100             Outcome Summary/Treatment Plan (PT)    Anticipated Discharge Disposition (PT)  skilled nursing facility;home with OP services  -CW      Recorded by [CW] Cesario Kwon PTA 03/10/19 1113        User Key  (r) = Recorded By, (t) = Taken By, (c) = Cosigned By    Initials Name Effective Dates Discipline    MONTANA Sanekt Jaimes RN 06/16/16 -  Nurse    CW Cesario Kwon PTA 03/07/18 -  PT          Rash 03/08/19 2000 Right arm (Active)   Distribution localized 3/10/2019 12:15 AM   Borders raised 3/10/2019 12:15 AM   Characteristics itching;dry 3/10/2019 12:15 AM   Color red 3/10/2019 12:15 AM       Wound 03/06/19 1003 other (see notes) incision (Active)   Closure Open to air 3/10/2019 12:15 AM           Physical Therapy Education     Title: PT OT SLP Therapies (Done)     Topic: Physical Therapy (Done)     Point: Mobility training (Done)     Learning Progress Summary           Patient Acceptance, E,TB, VU,DU by CW at 3/10/2019 11:14 AM    Acceptance, E,TB, VU,DU by CW at 3/9/2019 11:13 AM    Acceptance, E,TB, VU,DU by CW at 3/8/2019 11:21 AM    Acceptance, E, VU by KH at 3/7/2019 11:47 AM    Acceptance, D,E, NR by CW at 3/6/2019  4:52 PM     Acceptance, E, NR by  at 3/5/2019  1:08 PM                   Point: Home exercise program (Done)     Learning Progress Summary           Patient Acceptance, E,TB, VU,DU by CW at 3/10/2019 11:14 AM    Acceptance, E,TB, VU,DU by CW at 3/9/2019 11:13 AM    Acceptance, E,TB, VU,DU by CW at 3/8/2019 11:21 AM    Acceptance, E, VU by  at 3/7/2019 11:47 AM    Acceptance, D,E, NR by  at 3/6/2019  4:52 PM    Acceptance, E, NR by  at 3/5/2019  1:08 PM                   Point: Body mechanics (Done)     Learning Progress Summary           Patient Acceptance, E,TB, VU,DU by CW at 3/10/2019 11:14 AM    Acceptance, E,TB, VU,DU by CW at 3/9/2019 11:13 AM    Acceptance, E,TB, VU,DU by CW at 3/8/2019 11:21 AM    Acceptance, E, VU by  at 3/7/2019 11:47 AM    Acceptance, D,E, NR by  at 3/6/2019  4:52 PM    Acceptance, E, NR by  at 3/5/2019  1:08 PM                   Point: Precautions (Done)     Learning Progress Summary           Patient Acceptance, E,TB, VU,DU by CW at 3/10/2019 11:14 AM    Acceptance, E,TB, VU,DU by CW at 3/9/2019 11:13 AM    Acceptance, E,TB, VU,DU by CW at 3/8/2019 11:21 AM    Acceptance, E, VU by  at 3/7/2019 11:47 AM    Acceptance, D,E, NR by  at 3/6/2019  4:52 PM    Acceptance, E, NR by  at 3/5/2019  1:08 PM                               User Key     Initials Effective Dates Name Provider Type Discipline     02/05/19 -  Leigh Woodard, PT Physical Therapist PT     04/03/18 -  Jaida Porter, PT Physical Therapist PT     03/07/18 -  Cesario Kwon, PTA Physical Therapy Assistant PT                PT Recommendation and Plan  Anticipated Discharge Disposition (PT): skilled nursing facility, home with OP services  Therapy Frequency (PT Clinical Impression): daily  Outcome Summary/Treatment Plan (PT)  Anticipated Discharge Disposition (PT): skilled nursing facility, home with OP services  Plan of Care Reviewed With: patient  Progress: improving  Outcome Summary: Pt increased  with amb safety and I with RWX and increased with bed mobility  Outcome Measures     Row Name 03/10/19 1100 03/09/19 1100 03/08/19 1100       How much help from another person do you currently need...    Turning from your back to your side while in flat bed without using bedrails?  3  -CW  3  -CW  3  -CW    Moving from lying on back to sitting on the side of a flat bed without bedrails?  3  -CW  3  -CW  3  -CW    Moving to and from a bed to a chair (including a wheelchair)?  3  -CW  3  -CW  3  -CW    Standing up from a chair using your arms (e.g., wheelchair, bedside chair)?  3  -CW  3  -CW  3  -CW    Climbing 3-5 steps with a railing?  2  -CW  2  -CW  2  -CW    To walk in hospital room?  3  -CW  3  -CW  3  -CW    AM-PAC 6 Clicks Score  17  -CW  17  -CW  17  -CW       Functional Assessment    Outcome Measure Options  AM-PAC 6 Clicks Basic Mobility (PT)  -CW  AM-PAC 6 Clicks Basic Mobility (PT)  -CW  AM-PAC 6 Clicks Basic Mobility (PT)  -CW      User Key  (r) = Recorded By, (t) = Taken By, (c) = Cosigned By    Initials Name Provider Type    Cesario Rivera PTA Physical Therapy Assistant         Time Calculation:   PT Charges     Row Name 03/10/19 1115             Time Calculation    Start Time  1100  -CW      Stop Time  1115  -CW      Time Calculation (min)  15 min  -CW      PT Received On  03/10/19  -CW      PT - Next Appointment  03/11/19  -CW        User Key  (r) = Recorded By, (t) = Taken By, (c) = Cosigned By    Initials Name Provider Type    Cesario Rivera PTA Physical Therapy Assistant        Therapy Suggested Charges     Code   Minutes Charges    None           Therapy Charges for Today     Code Description Service Date Service Provider Modifiers Qty    65239483450 HC PT THER PROC EA 15 MIN 3/9/2019 Cesario Kwon PTA GP 1    20856249964 HC PT THER SUPP EA 15 MIN 3/9/2019 Cesario Kwon, HARDEEP GP 1    58687357306 HC PT THER PROC EA 15 MIN 3/10/2019 Cesario Kwon PTA GP 1           PT G-Codes  Outcome Measure Options: AM-PAC 6 Clicks Basic Mobility (PT)  AM-PAC 6 Clicks Score: 17    Cesario Kwon, PTA  3/10/2019

## 2019-03-10 NOTE — PLAN OF CARE
Problem: Patient Care Overview  Goal: Plan of Care Review  Outcome: Ongoing (interventions implemented as appropriate)   03/10/19 2364   Coping/Psychosocial   Plan of Care Reviewed With patient   Plan of Care Review   Progress improving   OTHER   Outcome Summary Weaned O2 today. Patient tolerating well. Breathing treatments given per MD order.

## 2019-03-10 NOTE — SIGNIFICANT NOTE
Called to change needle site for mediport-- chart reviewed - spoke with Dr DURAN obtained / clarified orders for line care for mediport.  ==  The previous needle and dressing were appropriately intact.  - needle was changed per orders sterile technique with mask and sterile gloves.  Skin was cleansed with chloraprep per protocol.  Patient tolerated well without incident

## 2019-03-10 NOTE — PLAN OF CARE
Problem: Patient Care Overview  Goal: Plan of Care Review  Outcome: Ongoing (interventions implemented as appropriate)   03/10/19 1115   Coping/Psychosocial   Plan of Care Reviewed With patient   Plan of Care Review   Progress improving   OTHER   Outcome Summary Pt increased with amb safety and I with RWX and increased with bed mobility

## 2019-03-11 ENCOUNTER — APPOINTMENT (OUTPATIENT)
Dept: GENERAL RADIOLOGY | Facility: HOSPITAL | Age: 79
End: 2019-03-11

## 2019-03-11 LAB
ANION GAP SERPL CALCULATED.3IONS-SCNC: 10.4 MMOL/L
BASOPHILS # BLD AUTO: 0.03 10*3/MM3 (ref 0–0.2)
BASOPHILS NFR BLD AUTO: 0.3 % (ref 0–1.5)
BUN BLD-MCNC: 33 MG/DL (ref 8–23)
BUN/CREAT SERPL: 32.7 (ref 7–25)
CALCIUM SPEC-SCNC: 9.7 MG/DL (ref 8.6–10.5)
CHLORIDE SERPL-SCNC: 105 MMOL/L (ref 98–107)
CO2 SERPL-SCNC: 33.6 MMOL/L (ref 22–29)
CREAT BLD-MCNC: 1.01 MG/DL (ref 0.76–1.27)
DEPRECATED RDW RBC AUTO: 52.6 FL (ref 37–54)
EOSINOPHIL # BLD AUTO: 0.21 10*3/MM3 (ref 0–0.4)
EOSINOPHIL NFR BLD AUTO: 1.8 % (ref 0.3–6.2)
ERYTHROCYTE [DISTWIDTH] IN BLOOD BY AUTOMATED COUNT: 14.2 % (ref 12.3–15.4)
GFR SERPL CREATININE-BSD FRML MDRD: 72 ML/MIN/1.73
GLUCOSE BLD-MCNC: 107 MG/DL (ref 65–99)
GLUCOSE BLDC GLUCOMTR-MCNC: 133 MG/DL (ref 70–130)
GLUCOSE BLDC GLUCOMTR-MCNC: 135 MG/DL (ref 70–130)
GLUCOSE BLDC GLUCOMTR-MCNC: 171 MG/DL (ref 70–130)
GLUCOSE BLDC GLUCOMTR-MCNC: 180 MG/DL (ref 70–130)
HCT VFR BLD AUTO: 46.1 % (ref 37.5–51)
HGB BLD-MCNC: 13.9 G/DL (ref 13–17.7)
IMM GRANULOCYTES # BLD AUTO: 0.09 10*3/MM3 (ref 0–0.05)
IMM GRANULOCYTES NFR BLD AUTO: 0.8 % (ref 0–0.5)
LYMPHOCYTES # BLD AUTO: 0.95 10*3/MM3 (ref 0.7–3.1)
LYMPHOCYTES NFR BLD AUTO: 8.2 % (ref 19.6–45.3)
MAGNESIUM SERPL-MCNC: 2.1 MG/DL (ref 1.6–2.4)
MCH RBC QN AUTO: 30 PG (ref 26.6–33)
MCHC RBC AUTO-ENTMCNC: 30.2 G/DL (ref 31.5–35.7)
MCV RBC AUTO: 99.4 FL (ref 79–97)
MONOCYTES # BLD AUTO: 0.81 10*3/MM3 (ref 0.1–0.9)
MONOCYTES NFR BLD AUTO: 7 % (ref 5–12)
NEUTROPHILS # BLD AUTO: 9.52 10*3/MM3 (ref 1.4–7)
NEUTROPHILS NFR BLD AUTO: 81.9 % (ref 42.7–76)
NRBC BLD AUTO-RTO: 0 /100 WBC (ref 0–0)
NT-PROBNP SERPL-MCNC: 1004 PG/ML (ref 0–1800)
PHOSPHATE SERPL-MCNC: 4.2 MG/DL (ref 2.5–4.5)
PLATELET # BLD AUTO: 244 10*3/MM3 (ref 140–450)
PMV BLD AUTO: 10.5 FL (ref 6–12)
POTASSIUM BLD-SCNC: 3.7 MMOL/L (ref 3.5–5.2)
PROCALCITONIN SERPL-MCNC: 0.04 NG/ML (ref 0.1–0.25)
RBC # BLD AUTO: 4.64 10*6/MM3 (ref 4.14–5.8)
SODIUM BLD-SCNC: 149 MMOL/L (ref 136–145)
WBC NRBC COR # BLD: 11.61 10*3/MM3 (ref 3.4–10.8)

## 2019-03-11 PROCEDURE — 84100 ASSAY OF PHOSPHORUS: CPT | Performed by: INTERNAL MEDICINE

## 2019-03-11 PROCEDURE — 94799 UNLISTED PULMONARY SVC/PX: CPT

## 2019-03-11 PROCEDURE — 94660 CPAP INITIATION&MGMT: CPT

## 2019-03-11 PROCEDURE — 71046 X-RAY EXAM CHEST 2 VIEWS: CPT

## 2019-03-11 PROCEDURE — 83735 ASSAY OF MAGNESIUM: CPT | Performed by: INTERNAL MEDICINE

## 2019-03-11 PROCEDURE — 83880 ASSAY OF NATRIURETIC PEPTIDE: CPT | Performed by: INTERNAL MEDICINE

## 2019-03-11 PROCEDURE — 82962 GLUCOSE BLOOD TEST: CPT

## 2019-03-11 PROCEDURE — 85025 COMPLETE CBC W/AUTO DIFF WBC: CPT | Performed by: INTERNAL MEDICINE

## 2019-03-11 PROCEDURE — 84145 PROCALCITONIN (PCT): CPT | Performed by: INTERNAL MEDICINE

## 2019-03-11 PROCEDURE — 63710000001 INSULIN LISPRO (HUMAN) PER 5 UNITS: Performed by: INTERNAL MEDICINE

## 2019-03-11 PROCEDURE — 99232 SBSQ HOSP IP/OBS MODERATE 35: CPT | Performed by: INTERNAL MEDICINE

## 2019-03-11 PROCEDURE — 63710000001 DIPHENHYDRAMINE PER 50 MG: Performed by: INTERNAL MEDICINE

## 2019-03-11 PROCEDURE — 80048 BASIC METABOLIC PNL TOTAL CA: CPT | Performed by: INTERNAL MEDICINE

## 2019-03-11 PROCEDURE — 97110 THERAPEUTIC EXERCISES: CPT

## 2019-03-11 RX ORDER — DIAPER,BRIEF,INFANT-TODD,DISP
EACH MISCELLANEOUS EVERY 12 HOURS SCHEDULED
Status: DISCONTINUED | OUTPATIENT
Start: 2019-03-11 | End: 2019-03-20 | Stop reason: HOSPADM

## 2019-03-11 RX ORDER — FUROSEMIDE 80 MG
80 TABLET ORAL
Status: DISCONTINUED | OUTPATIENT
Start: 2019-03-12 | End: 2019-03-15

## 2019-03-11 RX ADMIN — ATORVASTATIN CALCIUM 10 MG: 10 TABLET, FILM COATED ORAL at 10:03

## 2019-03-11 RX ADMIN — IPRATROPIUM BROMIDE AND ALBUTEROL SULFATE 3 ML: 2.5; .5 SOLUTION RESPIRATORY (INHALATION) at 11:59

## 2019-03-11 RX ADMIN — INSULIN LISPRO 2 UNITS: 100 INJECTION, SOLUTION INTRAVENOUS; SUBCUTANEOUS at 22:31

## 2019-03-11 RX ADMIN — ASPIRIN 81 MG: 81 TABLET, DELAYED RELEASE ORAL at 10:03

## 2019-03-11 RX ADMIN — FUROSEMIDE 40 MG: 40 TABLET ORAL at 10:03

## 2019-03-11 RX ADMIN — Medication 50 MG: at 10:02

## 2019-03-11 RX ADMIN — LANSOPRAZOLE 30 MG: KIT at 05:36

## 2019-03-11 RX ADMIN — IPRATROPIUM BROMIDE AND ALBUTEROL SULFATE 3 ML: 2.5; .5 SOLUTION RESPIRATORY (INHALATION) at 19:35

## 2019-03-11 RX ADMIN — ACETYLCYSTEINE 4 ML: 200 SOLUTION ORAL; RESPIRATORY (INHALATION) at 08:28

## 2019-03-11 RX ADMIN — PREGABALIN 75 MG: 75 CAPSULE ORAL at 10:02

## 2019-03-11 RX ADMIN — INSULIN LISPRO 2 UNITS: 100 INJECTION, SOLUTION INTRAVENOUS; SUBCUTANEOUS at 08:07

## 2019-03-11 RX ADMIN — SODIUM CHLORIDE, PRESERVATIVE FREE 10 ML: 5 INJECTION INTRAVENOUS at 20:43

## 2019-03-11 RX ADMIN — HYDROCORTISONE: 1 CREAM TOPICAL at 20:43

## 2019-03-11 RX ADMIN — RIVAROXABAN 20 MG: 20 TABLET, FILM COATED ORAL at 18:26

## 2019-03-11 RX ADMIN — POTASSIUM CHLORIDE 20 MEQ: 750 CAPSULE, EXTENDED RELEASE ORAL at 10:03

## 2019-03-11 RX ADMIN — IPRATROPIUM BROMIDE AND ALBUTEROL SULFATE 3 ML: 2.5; .5 SOLUTION RESPIRATORY (INHALATION) at 08:22

## 2019-03-11 RX ADMIN — SODIUM CHLORIDE, PRESERVATIVE FREE 10 ML: 5 INJECTION INTRAVENOUS at 10:04

## 2019-03-11 RX ADMIN — IPRATROPIUM BROMIDE AND ALBUTEROL SULFATE 3 ML: 2.5; .5 SOLUTION RESPIRATORY (INHALATION) at 17:17

## 2019-03-11 RX ADMIN — KETOTIFEN FUMARATE 1 DROP: 0.35 SOLUTION/ DROPS OPHTHALMIC at 20:43

## 2019-03-11 RX ADMIN — PREGABALIN 75 MG: 75 CAPSULE ORAL at 20:43

## 2019-03-11 RX ADMIN — DIPHENHYDRAMINE HYDROCHLORIDE 25 MG: 25 CAPSULE ORAL at 05:38

## 2019-03-11 RX ADMIN — KETOTIFEN FUMARATE 1 DROP: 0.35 SOLUTION/ DROPS OPHTHALMIC at 10:04

## 2019-03-11 NOTE — PROGRESS NOTES
Dr. YVROSE Jimenez    [address]    3/11/2019    Patient ID:  Name:  Alessio Allen  MRN:  [id number]  1940  78 y.o.  male            CC/Reason for visit: Acute respiratory failure, dysphagia, throat cancer, decompensated CHF diastolic       HPI: Is requiring higher flow of oxygen today, namely 11 L.  Therefore ABG was counseled.  Patient tolerating noninvasive hospital bilevel ventilation machine at nighttime.  Denies much of a cough.  Still very short of breath with minimal exertion    ROS: Has been having some itching over both upper extremities.  Denies fever.  Denies hemoptysis.  Denies sputum    Vitals:  Vitals:    03/11/19 0844 03/11/19 0847 03/11/19 1159 03/11/19 1204   BP:       BP Location:       Patient Position:       Pulse:   85 88   Resp: 28  16 16   Temp:       TempSrc:       SpO2:  (!) 89% 92% 92%   Weight:       Height:         FiO2 (%): 65 %     Body mass index is 29.04 kg/m².    Intake/Output Summary (Last 24 hours) at 3/11/2019 1456  Last data filed at 3/11/2019 0624  Gross per 24 hour   Intake 240 ml   Output 600 ml   Net -360 ml       Exam:  GEN:               No distress  Alert, oriented x 3.   LUNGS:           Barrel chest, distant and diminished breath sounds bilaterally but overall clear breath sounds bilat, nonlabored breathing  CV:                  Normal S1S2, without murmur, no edema  ABD:                Non tender, no enlarged liver or masses        Scheduled meds:    acetylcysteine 4 mL Nebulization BID - RT   aspirin 81 mg Oral Daily   atorvastatin 10 mg Oral Daily   furosemide 40 mg Oral BID   insulin lispro 0-9 Units Subcutaneous 4x Daily With Meals & Nightly   ipratropium-albuterol 3 mL Nebulization 4x Daily - RT   ketotifen 1 drop Both Eyes BID   lansoprazole 30 mg Per PEG Tube Q AM   potassium chloride 20 mEq Oral Daily   pregabalin 75 mg Oral Q12H   rivaroxaban 20 mg Oral Daily With Dinner   sodium chloride 10 mL Intravenous Q12H   pyridoxine 50 mg Oral  Daily     IV meds:                        lactated ringers 9 mL/hr Last Rate: Stopped (03/06/19 1042)       Data Review:   I reviewed the patient's medications and new clinical results.  Lab Results   Component Value Date    CALCIUM 9.7 03/11/2019    PHOS 4.2 03/11/2019    MG 2.1 03/11/2019    MG 2.3 03/05/2019    MG 1.4 (C) 03/04/2019     Results from last 7 days   Lab Units 03/11/19  0515 03/10/19  0432 03/09/19  0504   SODIUM mmol/L 149* 150* 150*   POTASSIUM mmol/L 3.7 3.5 3.7   CHLORIDE mmol/L 105 106 106   CO2 mmol/L 33.6* 33.7* 33.8*   BUN mg/dL 33* 35* 36*   CREATININE mg/dL 1.01 0.95 0.89   CALCIUM mg/dL 9.7 9.7 10.1   GLUCOSE mg/dL 107* 105* 134*   WBC 10*3/mm3 11.61* 12.02* 12.49*   HEMOGLOBIN g/dL 13.9 13.7 14.1   PLATELETS 10*3/mm3 244 241 227   INR   --  1.63*  --    PROBNP pg/mL 1,004.0 1,148.0  --    PROCALCITONIN ng/mL 0.04*  --   --      Results from last 7 days   Lab Units 03/06/19  1013   RESPCX  Moderate growth (3+) Corynebacterium species*  Light growth (2+) Normal Respiratory [place]               Results from last 7 days   Lab Units 03/10/19  1019 03/07/19  0501 03/06/19  1746   PH, ARTERIAL pH units 7.448 7.359 7.347*   PCO2, ARTERIAL mm Hg 49.5* 58.1* 60.0*   PO2 ART mm Hg 56.2* 74.3* 61.0*   FLOW RATE lpm 9  --  15   MODALITY  HFNC BiPap NRB   O2 SATURATION CALC % 89.5* 93.6 88.8*       Estimated Creatinine Clearance: 78.4 mL/min (by C-G formula based on SCr of 1.01 mg/dL).      ASSESSMENT:   Hypernatremia  Acute respiratory failure on chronic respiratory failure  Acute decompensated diastolic HF  COPD, no obvious exacerbation  Throat cancer s/p chemo RT  Dysphagia - PEG  Afib, on AC  CAD  Obesity        PLAN:  Unfortunately the patient continues to require high flow oxygen.  He tells me that at home he is usually on high flow oxygen between 5-8 L.  His home oxygen concentrator is a high flow concentrator and goes up to 10 L/min.  He has been tolerating noninvasive bilevel positive pressure  ventilation with BiPAP machine here in the hospital at night.  We will order a trilogy machine for him to be started at the outside receiving rehab center where he will be going.  Continue bronchodilators for COPD.  Continue diuretics for heart failure.  Continue feeding tube for chronic dysphagia due to throat cancer.  Hopefully we will discharge in the next few days if his oxygen requirements drop below 9 L/min.        Kalpesh Jimenez MD  3/11/2019

## 2019-03-11 NOTE — PLAN OF CARE
Problem: Patient Care Overview  Goal: Plan of Care Review   03/11/19 1720   OTHER   Outcome Summary Continue care.

## 2019-03-11 NOTE — PROGRESS NOTES
Continued Stay Note  [facility]     Patient Name: Alessio Allen  MRN: [id number]  Today's Date: 3/11/2019    Admit Date: 2/28/2019    Discharge Plan     Row Name 03/11/19 1217       Plan    Plan  Plan home with [facility]  or [facility] skilled care.   JARON Juarez RN    Patient/Family in Agreement with Plan  yes    Plan Comments  Spoke to pt and pt's significant other ( Dulce Allen [phone]) at bedside.  Pt states plan remains [facility] for skilled care.   [facility]  ( [phone]) called and [facility] will accept [facility] if needed.  Plan home with [facility] or [facility] for skilled care.   JARON Juarez, RN        Discharge Codes    No documentation.             Mahsa Juarez, RN

## 2019-03-11 NOTE — PROGRESS NOTES
[facility] Follow Up    Chief Complaint: Follow up hypoxia, pulmonary hypertension, CAD, atrial fibrillation    Interval History:  On 11 liters nasal cannula.  Complains of itching all over.  No chest pain.     Objective:     Objective:  Temp:  [97.1 °F (36.2 °C)-97.8 °F (36.6 °C)] 97.3 °F (36.3 °C)  Heart Rate:  [84-97] 90  Resp:  [18-24] 24  BP: (116-131)/(72-89) 118/76  FiO2 (%):  [65 %] 65 %     Intake/Output Summary (Last 24 hours) at 3/11/2019 0718  Last data filed at 3/11/2019 0624  Gross per 24 hour   Intake 240 ml   Output 800 ml   Net -560 ml     Body mass index is 29.04 kg/m².      03/09/19  0459 03/10/19  0500 03/11/19  0624   Weight: 103 kg (227 lb 15.3 oz) 99.7 kg (219 lb 12.8 oz) 103 kg (226 lb 3.1 oz)     Weight change: 2.9 kg (6 lb 6.3 oz)      Physical Exam:   General : Alert, cooperative, in no acute distress.  Neuro: alert,cooperative and oriented  Lungs: CTAB. Normal respiratory effort and rate.  CV:: Regular rate and rhythm, normal S1 and S2, no murmurs, gallops or rubs.  ABD: Soft, nontender, non-distended. positive bowel sounds  Extr: No edema or cyanosis, moves all extremities    Lab Review:   Results from last 7 days   Lab Units 03/11/19  0515 03/10/19  0432   SODIUM mmol/L 149* 150*   POTASSIUM mmol/L 3.7 3.5   CHLORIDE mmol/L 105 106   CO2 mmol/L 33.6* 33.7*   BUN mg/dL 33* 35*   CREATININE mg/dL 1.01 0.95   GLUCOSE mg/dL 107* 105*   CALCIUM mg/dL 9.7 9.7         Results from last 7 days   Lab Units 03/11/19  0515 03/10/19  0432   WBC 10*3/mm3 11.61* 12.02*   HEMOGLOBIN g/dL 13.9 13.7   HEMATOCRIT % 46.1 45.2   PLATELETS 10*3/mm3 244 241     Results from last 7 days   Lab Units 03/10/19  0432   INR  1.63*   APTT seconds 35.3     Results from last 7 days   Lab Units 03/11/19  0515 03/05/19  0517   MAGNESIUM mg/dL 2.1 2.3           Invalid input(s): LDLCALC  Results from last 7 days   Lab Units 03/11/19  0515 03/10/19  0432   PROBNP pg/mL 1,004.0 1,148.0         I  reviewed the patient's new clinical results.  I personally viewed and interpreted the patient's EKG  Current Medications:   Scheduled Meds:  acetylcysteine 4 mL Nebulization BID - RT   aspirin 81 mg Oral Daily   atorvastatin 10 mg Oral Daily   furosemide 40 mg Oral BID   insulin lispro 0-9 Units Subcutaneous 4x Daily With Meals & Nightly   ipratropium-albuterol 3 mL Nebulization 4x Daily - RT   ketotifen 1 drop Both Eyes BID   lansoprazole 30 mg Per PEG Tube Q AM   potassium chloride 20 mEq Oral Daily   pregabalin 75 mg Oral Q12H   rivaroxaban 20 mg Oral Daily With Dinner   sodium chloride 10 mL Intravenous Q12H   pyridoxine 50 mg Oral Daily     Continuous Infusions:  lactated ringers 9 mL/hr Last Rate: Stopped (03/06/19 1042)       Allergies:  Allergies   Allergen Reactions   • Lisinopril Shortness Of Breath   • Penicillins Shortness Of Breath   • Sulfa Antibiotics Shortness Of Breath   • Atenolol Other (See Comments)     drowsiness   • Coreg [Carvedilol] Cough     SOA,   • Ibuprofen    • Celebrex [Celecoxib] Rash       Assessment/Plan:      1. Acute/chronic hypoxic respiratory failure.  Still with high O2 requirements.    Left ventricular systolic function normal on echo.  Mildly elevated left ventricular filling pressures on cath.  No shunt noted on agitated saline contrast study. Bronch on 3/6 with thick mucoid secretions that may be contributing to hypoxia.   2. Bilateral pulmonary infiltrates.  Noted on CT scan.   3. Throat cancer.  Status post chemotherapy and radiation - in need of radical neck dissection.   4. PEG placement.  Some dysfunction that was addressed by surgery.     2. Paroxsymal atrial fibrillation. Paced rhythm with underlying atrial fibrillation.   Rivaroxaban resumed.   3. Status post permanent pacemaker placement.  No recent interrogations here or at [facility] noted.  Will need as outpatient.   4. COPD  5. Pulmonary hypertension.  Moderate on cath today. Only mildly elevated left ventricular  filling pressures.  Suspect this is mainly from pulmonary disease.   6. Coronary artery disease.  Cath on 3/7 with moderate non-obstructive disease and patent mid LAD stent.   7. Ascending aortic aneurysm.  Stable on recent CT.   8. Mitral regurgitation. Moderate with some views appearing moderate to severe on echo.  Right and left heart cath on 3/7 did not show any evidence of hemodynamically significant regurgitation.  9. Hypernatremia.  Mild improvement today.     -  Appears stable from cardiac standpoint.    -  Will see as needed.  He will need to follow up with Dr. Miller in 4-6 weeks.    Marizol Holt MD  03/11/19  7:18 AM

## 2019-03-11 NOTE — PLAN OF CARE
Problem: Patient Care Overview  Goal: Plan of Care Review  Outcome: Ongoing (interventions implemented as appropriate)   03/11/19 0453   Coping/Psychosocial   Plan of Care Reviewed With patient   Plan of Care Review   Progress improving   OTHER   Outcome Summary Patient alert and oriented. Denies pain. Sp02 mid 90s on 11L high flow. Bipap at night with sats % but patient only tolerated for around 2 hours before going back the nasal cannula. Medicated for itching and mouth pain PRN. Less coughing this shift.No signs of acute distress noted. Will continue to monitor.        Problem: Fall Risk (Adult)  Goal: Absence of Fall  Outcome: Ongoing (interventions implemented as appropriate)   03/11/19 0453   Fall Risk (Adult)   Absence of Fall making progress toward outcome       Problem: Breathing Pattern Ineffective (Adult)  Goal: Effective Oxygenation/Ventilation  Outcome: Ongoing (interventions implemented as appropriate)   03/11/19 0453   Breathing Pattern Ineffective (Adult)   Effective Oxygenation/Ventilation making progress toward outcome     Goal: Anxiety/Fear Reduction  Outcome: Ongoing (interventions implemented as appropriate)   03/11/19 0453   Breathing Pattern Ineffective (Adult)   Anxiety/Fear Reduction making progress toward outcome       Problem: Nutrition, Enteral (Adult)  Goal: Signs and Symptoms of Listed Potential Problems Will be Absent, Minimized or Managed (Nutrition, Enteral)  Outcome: Ongoing (interventions implemented as appropriate)   03/11/19 0453   Goal/Outcome Evaluation   Problems Assessed (Enteral Nutrition) all   Problems Present (Enteral Nutrition) malnutrition       Problem: Skin Injury Risk (Adult)  Goal: Skin Health and Integrity  Outcome: Ongoing (interventions implemented as appropriate)   03/11/19 0453   Skin Injury Risk (Adult)   Skin Health and Integrity making progress toward outcome

## 2019-03-11 NOTE — THERAPY TREATMENT NOTE
Acute Care - Physical Therapy Treatment Note  [facility]     Patient Name: Alessio Allen  : 1940  MRN: [id number]  Today's Date: 3/11/2019  Onset of Illness/Injury or Date of Surgery: 19  Date of Referral to PT: 19  Referring Physician: David    Admit Date: 2019    Visit Dx:    ICD-10-CM ICD-9-CM   1. Acute respiratory failure with hypoxia (CMS/[facility]) J96.01 518.81   2. Impaired mobility Z74.09 799.89   3. AF (paroxysmal atrial fibrillation) (CMS/[facility]) I48.0 427.31     Patient Active Problem List   Diagnosis   • A-fib (CMS/[facility])   • Dyslipidemia   • BP (high blood pressure)   • Neuropathy   • Hypertension   • COPD (chronic obstructive pulmonary disease) (CMS/[facility])   • BPH (benign prostatic hypertrophy)   • Atrial fibrillation (CMS/[facility])   • Asthma   • Hypoxia   • Pneumonia   • Chronic anticoagulation   • Pneumonia of both lungs due to infectious organism   • Hyponatremia   • COPD exacerbation (CMS/[facility])   • Acute on chronic respiratory failure with hypoxia (CMS/[facility])   • Pneumonia of both lower lobes due to infectious organism (CMS/[facility])   • Acute on chronic diastolic heart failure (CMS/[facility])   • IPF (idiopathic pulmonary fibrosis) (CMS/[facility])   • Acute respiratory failure with hypoxia (CMS/[facility])   • Attention to G-tube (CMS/[facility])       Therapy Treatment    Rehabilitation Treatment Summary     Row Name 19 1400             Treatment Time/Intention    Discipline  physical therapy assistant  -CW      Document Type  therapy note (daily note)  -CW      Subjective Information  complains of;dyspnea  -CW      Mode of Treatment  physical therapy  -CW      Therapy Frequency (PT Clinical Impression)  daily  -CW      Patient Effort  good  -CW      Existing Precautions/Restrictions  fall;oxygen therapy device and L/min  -CW      Recorded by [CW] Cesario Kwon PTA 19 1402      Row Name 19 1400             Vital Signs    O2 Delivery Pre Treatment  hi-flow  -CW      O2 Delivery Intra  Treatment  hi-flow  -CW      O2 Delivery Post Treatment  hi-flow  -CW      Recorded by [CW] Cesario Kwon, PTA 03/11/19 1402      Row Name 03/11/19 1400             Cognitive Assessment/Intervention- PT/OT    Orientation Status (Cognition)  oriented x 3  -CW      Follows Commands (Cognition)  WFL  -CW      Personal Safety Interventions  fall prevention program maintained;gait belt;muscle strengthening facilitated;nonskid shoes/slippers when out of bed  -CW      Recorded by [CW] Cesario Kwon, PTA 03/11/19 1402      Row Name 03/11/19 1400             Bed Mobility Assessment/Treatment    Bed Mobility Assessment/Treatment  supine-sit;sit-supine  -CW      Supine-Sit [place] (Bed Mobility)  contact guard  -CW      Sit-Supine [place] (Bed Mobility)  contact guard  -CW      Assistive Device (Bed Mobility)  bed rails  -CW      Recorded by [CW] Cesario Kwon, PTA 03/11/19 1402      Row Name 03/11/19 1400             Transfer Assessment/Treatment    Transfer Assessment/Treatment  sit-stand transfer;stand-sit transfer  -CW      Recorded by [CW] Cesario Kwon, PTA 03/11/19 1402      Row Name 03/11/19 1400             Sit-Stand Transfer    Sit-Stand [place] (Transfers)  contact guard  -CW      Assistive Device (Sit-Stand Transfers)  walker, front-wheeled  -CW      Recorded by [CW] Cesario Kwon, PTA 03/11/19 1402      Row Name 03/11/19 1400             Stand-Sit Transfer    Stand-Sit [place] (Transfers)  contact guard  -CW      Assistive Device (Stand-Sit Transfers)  walker, front-wheeled  -CW      Recorded by [CW] Cesario Kwon, PTA 03/11/19 1402      Row Name 03/11/19 1400             Gait/Stairs Assessment/Training    [place] Level (Gait)  contact guard  -CW      Assistive Device (Gait)  walker, front-wheeled  -CW      Distance in Feet (Gait)  100  -CW      Pattern (Gait)  step-through  -CW      Deviations/Abnormal Patterns (Gait)  gait speed decreased;stride length  decreased  -CW      Bilateral Gait Deviations  forward flexed posture  -CW      Comment (Gait/Stairs)  pt on 11L O2 and very slow gait and unsteady at times requiring standing rest breaks and purse lip breathing  -CW      Recorded by [CW] Cesario Kwon, PTA 03/11/19 1402      Row Name 03/11/19 1400             Positioning and Restraints    Pre-Treatment Position  in bed  -CW      Post Treatment Position  bed  -CW      In Bed  notified nsg;fowlers;call light within reach;encouraged to call for assist;exit alarm on;with family/caregiver  -CW      Recorded by [CW] Cesario Kwon, PTA 03/11/19 1402      Row Name                Wound 03/06/19 1003 other (see notes) incision    Wound - Properties Group Date first assessed: 03/06/19 [MONTANA] Time first assessed: 1003 [MONTANA] Location: other (see notes) [MONTANA] Type: incision [MONTANA] Recorded by:  [MONTANA] Sanket Jaimes RN 03/06/19 1003    Row Name 03/11/19 1400             Outcome Summary/Treatment Plan (PT)    Anticipated Discharge Disposition (PT)  skilled nursing facility;home with OP services  -CW      Recorded by [CW] Cesario Kwon, PTA 03/11/19 1402        User Key  (r) = Recorded By, (t) = Taken By, (c) = Cosigned By    Initials Name Effective Dates Discipline    MONTANA Sanket Jaimes RN 06/16/16 -  Nurse    CW Cesario Kwon, PTA 03/07/18 -  PT          Rash 03/08/19 2000 Right arm (Active)   Distribution localized 3/11/2019 12:44 AM   Borders raised 3/11/2019 12:44 AM   Characteristics itching;dry 3/11/2019 12:44 AM   Color red 3/11/2019 12:44 AM   Care, Rash other (see comments) 3/11/2019 12:44 AM       Wound 03/06/19 1003 other (see notes) incision (Active)   Closure Open to air 3/11/2019 12:44 AM           Physical Therapy Education     Title: PT OT SLP Therapies (Done)     Topic: Physical Therapy (Done)     Point: Mobility training (Done)     Learning Progress Summary           Patient Acceptance, TB,E, VU,DU by CW at 3/11/2019  2:02 PM    Acceptance,  E,TB, VU,DU by CW at 3/10/2019 11:14 AM    Acceptance, E,TB, VU,DU by CW at 3/9/2019 11:13 AM    Acceptance, E,TB, VU,DU by CW at 3/8/2019 11:21 AM    Acceptance, E, VU by WANDER at 3/7/2019 11:47 AM    Acceptance, D,E, NR by CW at 3/6/2019  4:52 PM    Acceptance, E, NR by  at 3/5/2019  1:08 PM                   Point: Home exercise program (Done)     Learning Progress Summary           Patient Acceptance, TB,E, VU,DU by CW at 3/11/2019  2:02 PM    Acceptance, E,TB, VU,DU by CW at 3/10/2019 11:14 AM    Acceptance, E,TB, VU,DU by CW at 3/9/2019 11:13 AM    Acceptance, E,TB, VU,DU by CW at 3/8/2019 11:21 AM    Acceptance, E, VU by WANDER at 3/7/2019 11:47 AM    Acceptance, D,E, NR by CW at 3/6/2019  4:52 PM    Acceptance, E, NR by  at 3/5/2019  1:08 PM                   Point: Body mechanics (Done)     Learning Progress Summary           Patient Acceptance, TB,E, VU,DU by CW at 3/11/2019  2:02 PM    Acceptance, E,TB, VU,DU by CW at 3/10/2019 11:14 AM    Acceptance, E,TB, VU,DU by CW at 3/9/2019 11:13 AM    Acceptance, E,TB, VU,DU by CW at 3/8/2019 11:21 AM    Acceptance, E, VU by WANDER at 3/7/2019 11:47 AM    Acceptance, D,E, NR by CW at 3/6/2019  4:52 PM    Acceptance, E, NR by  at 3/5/2019  1:08 PM                   Point: Precautions (Done)     Learning Progress Summary           Patient Acceptance, TB,E, VU,DU by CW at 3/11/2019  2:02 PM    Acceptance, E,TB, VU,DU by CW at 3/10/2019 11:14 AM    Acceptance, E,TB, VU,DU by CW at 3/9/2019 11:13 AM    Acceptance, E,TB, VU,DU by CW at 3/8/2019 11:21 AM    Acceptance, E, VU by  at 3/7/2019 11:47 AM    Acceptance, D,E, NR by CW at 3/6/2019  4:52 PM    Acceptance, E, NR by  at 3/5/2019  1:08 PM                               User Key     Initials Effective Dates Name Provider Type Discipline     02/05/19 -  Leigh Woodard, PT Physical Therapist PT     04/03/18 -  Jaida Porter, PT Physical Therapist PT     03/07/18 -  Cesario Kwon, PTA Physical Therapy  Assistant PT                PT Recommendation and Plan  Anticipated Discharge Disposition (PT): skilled nursing facility, home with OP services  Therapy Frequency (PT Clinical Impression): daily  Outcome Summary/Treatment Plan (PT)  Anticipated Discharge Disposition (PT): skilled nursing facility, home with OP services  Plan of Care Reviewed With: patient  Progress: improving  Outcome Summary: Pt on High Flow O2 11L when in bed.  Pt turned up when out of bed amb with RWX and fatigues quickly with amb.  Outcome Measures     Row Name 03/11/19 1400 03/10/19 1100 03/09/19 1100       How much help from another person do you currently need...    Turning from your back to your side while in flat bed without using bedrails?  3  -CW  3  -CW  3  -CW    Moving from lying on back to sitting on the side of a flat bed without bedrails?  3  -CW  3  -CW  3  -CW    Moving to and from a bed to a chair (including a wheelchair)?  3  -CW  3  -CW  3  -CW    Standing up from a chair using your arms (e.g., wheelchair, bedside chair)?  3  -CW  3  -CW  3  -CW    Climbing 3-5 steps with a railing?  2  -CW  2  -CW  2  -CW    To walk in hospital room?  3  -CW  3  -CW  3  -CW    AM-PAC 6 Clicks Score  17  -CW  17  -CW  17  -CW       Functional Assessment    Outcome Measure Options  AM-PAC 6 Clicks Basic Mobility (PT)  -CW  AM-PAC 6 Clicks Basic Mobility (PT)  -CW  AM-PAC 6 Clicks Basic Mobility (PT)  -CW      User Key  (r) = Recorded By, (t) = Taken By, (c) = Cosigned By    Initials Name Provider Type    Cesario Rivera PTA Physical Therapy Assistant         Time Calculation:   PT Charges     Row Name 03/11/19 1403             Time Calculation    Start Time  1345  -CW      Stop Time  1403  -CW      Time Calculation (min)  18 min  -CW      PT Received On  03/11/19  -CW      PT - Next Appointment  03/12/19  -CW        User Key  (r) = Recorded By, (t) = Taken By, (c) = Cosigned By    Initials Name Provider Type    Cesario Rivera,  PTA Physical Therapy Assistant        Therapy Suggested Charges     Code   Minutes Charges    None           Therapy Charges for Today     Code Description Service Date Service Provider Modifiers Qty    86393503624 HC PT THER PROC EA 15 MIN 3/10/2019 Cesario Kwon, PTA GP 1    98562431548 HC PT THER PROC EA 15 MIN 3/11/2019 Cesario Kwon, PTA GP 1    59295168983 HC PT THER SUPP EA 15 MIN 3/11/2019 Cesario Kwon, PTA GP 1          PT G-Codes  Outcome Measure Options: AM-PAC 6 Clicks Basic Mobility (PT)  AM-PAC 6 Clicks Score: 17    Cesario Kwon PTA  3/11/2019

## 2019-03-11 NOTE — PLAN OF CARE
Problem: Patient Care Overview  Goal: Plan of Care Review  Outcome: Ongoing (interventions implemented as appropriate)   03/11/19 1402   Coping/Psychosocial   Plan of Care Reviewed With patient   Plan of Care Review   Progress improving   OTHER   Outcome Summary Pt on High Flow O2 11L when in bed. Pt turned up when out of bed amb with RWX and fatigues quickly with amb.

## 2019-03-12 LAB
ANION GAP SERPL CALCULATED.3IONS-SCNC: 13.2 MMOL/L
BUN BLD-MCNC: 34 MG/DL (ref 8–23)
BUN/CREAT SERPL: 30.6 (ref 7–25)
CALCIUM SPEC-SCNC: 9.5 MG/DL (ref 8.6–10.5)
CHLORIDE SERPL-SCNC: 106 MMOL/L (ref 98–107)
CO2 SERPL-SCNC: 30.8 MMOL/L (ref 22–29)
CREAT BLD-MCNC: 1.11 MG/DL (ref 0.76–1.27)
GFR SERPL CREATININE-BSD FRML MDRD: 64 ML/MIN/1.73
GLUCOSE BLD-MCNC: 141 MG/DL (ref 65–99)
GLUCOSE BLDC GLUCOMTR-MCNC: 119 MG/DL (ref 70–130)
GLUCOSE BLDC GLUCOMTR-MCNC: 124 MG/DL (ref 70–130)
GLUCOSE BLDC GLUCOMTR-MCNC: 133 MG/DL (ref 70–130)
GLUCOSE BLDC GLUCOMTR-MCNC: 158 MG/DL (ref 70–130)
MAGNESIUM SERPL-MCNC: 2.1 MG/DL (ref 1.6–2.4)
NT-PROBNP SERPL-MCNC: 1024 PG/ML (ref 0–1800)
PHOSPHATE SERPL-MCNC: 3.4 MG/DL (ref 2.5–4.5)
POTASSIUM BLD-SCNC: 4.5 MMOL/L (ref 3.5–5.2)
PROCALCITONIN SERPL-MCNC: 0.09 NG/ML (ref 0.1–0.25)
SODIUM BLD-SCNC: 150 MMOL/L (ref 136–145)

## 2019-03-12 PROCEDURE — 99231 SBSQ HOSP IP/OBS SF/LOW 25: CPT | Performed by: SURGERY

## 2019-03-12 PROCEDURE — 94799 UNLISTED PULMONARY SVC/PX: CPT

## 2019-03-12 PROCEDURE — 83735 ASSAY OF MAGNESIUM: CPT | Performed by: INTERNAL MEDICINE

## 2019-03-12 PROCEDURE — 94660 CPAP INITIATION&MGMT: CPT

## 2019-03-12 PROCEDURE — 82962 GLUCOSE BLOOD TEST: CPT

## 2019-03-12 PROCEDURE — 84145 PROCALCITONIN (PCT): CPT | Performed by: INTERNAL MEDICINE

## 2019-03-12 PROCEDURE — 80048 BASIC METABOLIC PNL TOTAL CA: CPT | Performed by: INTERNAL MEDICINE

## 2019-03-12 PROCEDURE — 63710000001 INSULIN LISPRO (HUMAN) PER 5 UNITS: Performed by: INTERNAL MEDICINE

## 2019-03-12 PROCEDURE — 83880 ASSAY OF NATRIURETIC PEPTIDE: CPT | Performed by: INTERNAL MEDICINE

## 2019-03-12 PROCEDURE — 97110 THERAPEUTIC EXERCISES: CPT

## 2019-03-12 PROCEDURE — 84100 ASSAY OF PHOSPHORUS: CPT | Performed by: INTERNAL MEDICINE

## 2019-03-12 RX ADMIN — PREGABALIN 75 MG: 75 CAPSULE ORAL at 21:03

## 2019-03-12 RX ADMIN — IPRATROPIUM BROMIDE AND ALBUTEROL SULFATE 3 ML: 2.5; .5 SOLUTION RESPIRATORY (INHALATION) at 20:44

## 2019-03-12 RX ADMIN — FUROSEMIDE 80 MG: 80 TABLET ORAL at 08:38

## 2019-03-12 RX ADMIN — IPRATROPIUM BROMIDE AND ALBUTEROL SULFATE 3 ML: 2.5; .5 SOLUTION RESPIRATORY (INHALATION) at 11:19

## 2019-03-12 RX ADMIN — KETOTIFEN FUMARATE 1 DROP: 0.35 SOLUTION/ DROPS OPHTHALMIC at 09:04

## 2019-03-12 RX ADMIN — LANSOPRAZOLE 30 MG: KIT at 06:26

## 2019-03-12 RX ADMIN — IPRATROPIUM BROMIDE AND ALBUTEROL SULFATE 3 ML: 2.5; .5 SOLUTION RESPIRATORY (INHALATION) at 07:08

## 2019-03-12 RX ADMIN — PREGABALIN 75 MG: 75 CAPSULE ORAL at 08:38

## 2019-03-12 RX ADMIN — SODIUM CHLORIDE, PRESERVATIVE FREE 10 ML: 5 INJECTION INTRAVENOUS at 19:40

## 2019-03-12 RX ADMIN — HYDROCORTISONE: 1 CREAM TOPICAL at 09:03

## 2019-03-12 RX ADMIN — ATORVASTATIN CALCIUM 10 MG: 10 TABLET, FILM COATED ORAL at 08:38

## 2019-03-12 RX ADMIN — ASPIRIN 81 MG: 81 TABLET, DELAYED RELEASE ORAL at 08:38

## 2019-03-12 RX ADMIN — INSULIN LISPRO 2 UNITS: 100 INJECTION, SOLUTION INTRAVENOUS; SUBCUTANEOUS at 18:04

## 2019-03-12 RX ADMIN — RIVAROXABAN 20 MG: 20 TABLET, FILM COATED ORAL at 18:01

## 2019-03-12 RX ADMIN — HYDROCORTISONE: 1 CREAM TOPICAL at 21:03

## 2019-03-12 RX ADMIN — POTASSIUM CHLORIDE 20 MEQ: 750 CAPSULE, EXTENDED RELEASE ORAL at 08:38

## 2019-03-12 RX ADMIN — Medication 50 MG: at 08:38

## 2019-03-12 RX ADMIN — KETOTIFEN FUMARATE 1 DROP: 0.35 SOLUTION/ DROPS OPHTHALMIC at 21:02

## 2019-03-12 RX ADMIN — IPRATROPIUM BROMIDE AND ALBUTEROL SULFATE 3 ML: 2.5; .5 SOLUTION RESPIRATORY (INHALATION) at 15:24

## 2019-03-12 RX ADMIN — SODIUM CHLORIDE, PRESERVATIVE FREE 10 ML: 5 INJECTION INTRAVENOUS at 09:04

## 2019-03-12 NOTE — PLAN OF CARE
Problem: Patient Care Overview  Goal: Plan of Care Review  Outcome: Ongoing (interventions implemented as appropriate)   03/12/19 0451   Coping/Psychosocial   Plan of Care Reviewed With patient   Plan of Care Review   Progress no change   OTHER   Outcome Summary Continues to have soa on exceration. Tolerated being on Bpap for several hours thru out the night. VSS. Will continue to monitor     Goal: Interprofessional Rounds/Family Conf  Outcome: Ongoing (interventions implemented as appropriate)      Problem: Fall Risk (Adult)  Goal: Absence of Fall  Outcome: Ongoing (interventions implemented as appropriate)      Problem: Breathing Pattern Ineffective (Adult)  Goal: Effective Oxygenation/Ventilation  Outcome: Ongoing (interventions implemented as appropriate)      Problem: Nutrition, Enteral (Adult)  Goal: Signs and Symptoms of Listed Potential Problems Will be Absent, Minimized or Managed (Nutrition, Enteral)  Outcome: Ongoing (interventions implemented as appropriate)      Problem: Skin Injury Risk (Adult)  Goal: Skin Health and Integrity  Outcome: Ongoing (interventions implemented as appropriate)

## 2019-03-12 NOTE — PLAN OF CARE
Problem: Patient Care Overview  Goal: Plan of Care Review  Outcome: Ongoing (interventions implemented as appropriate)   03/12/19 7983   Coping/Psychosocial   Plan of Care Reviewed With patient   Plan of Care Review   Progress improving   OTHER   Outcome Summary Pt increasing with activity tolerance and bed mobility safety

## 2019-03-12 NOTE — PROGRESS NOTES
Dr. YVROSE Jimenez    [address]    3/12/2019    Patient ID:  Name:  Alessio Allen  MRN:  [id number]  1940  78 y.o.  male            CC/Reason for visit: Acute respiratory failure, dysphagia, throat cancer, decompensated CHF diastolic    HPI: Patient remains on 7-8 L of high flow oxygen at times.  Trying the trilogy ventilator at night, tolerating only a few hours.  Says the machine is very loud.    ROS: Negative for fever.  Negative for hemoptysis    Vitals:  Vitals:    03/12/19 1346 03/12/19 1524 03/12/19 1531 03/12/19 1637   BP: 126/73      BP Location: Left arm      Patient Position: Lying      Pulse: 84 81 88 93   Resp: 20 18 18    Temp: 98 °F (36.7 °C)      TempSrc: Oral      SpO2: 91% 96% 95% 92%   Weight:       Height:         FiO2 (%): 65 %     Body mass index is 28.84 kg/m².    Intake/Output Summary (Last 24 hours) at 3/12/2019 1708  Last data filed at 3/12/2019 1346  Gross per 24 hour   Intake 831 ml   Output 1400 ml   Net -569 ml       Exam:  GEN:               No distress  Alert, oriented x 3.   LUNGS:           Barrel chest, distant and diminished breath sounds bilaterally but overall clear breath sounds bilat, nonlabored breathing  CV:                  Normal S1S2, without murmur, no edema  ABD:                Non tender, no enlarged liver or masses        Scheduled meds:    aspirin 81 mg Oral Daily   atorvastatin 10 mg Oral Daily   furosemide 80 mg Oral Daily With Breakfast   hydrocortisone  Topical Q12H   insulin lispro 0-9 Units Subcutaneous 4x Daily With Meals & Nightly   ipratropium-albuterol 3 mL Nebulization 4x Daily - RT   ketotifen 1 drop Both Eyes BID   lansoprazole 30 mg Per PEG Tube Q AM   potassium chloride 20 mEq Oral Daily   pregabalin 75 mg Oral Q12H   rivaroxaban 20 mg Oral Daily With Dinner   sodium chloride 10 mL Intravenous Q12H   pyridoxine 50 mg Oral Daily     IV meds:                        lactated ringers 9 mL/hr Last Rate: Stopped (03/06/19 1042)        Data Review:   I reviewed the patient's medications and new clinical results.  Lab Results   Component Value Date    CALCIUM 9.5 03/12/2019    PHOS 3.4 03/12/2019    MG 2.1 03/12/2019    MG 2.1 03/11/2019    MG 2.3 03/05/2019     Results from last 7 days   Lab Units 03/12/19  0414 03/11/19  0515 03/10/19  0432 03/09/19  0504   SODIUM mmol/L 150* 149* 150* 150*   POTASSIUM mmol/L 4.5 3.7 3.5 3.7   CHLORIDE mmol/L 106 105 106 106   CO2 mmol/L 30.8* 33.6* 33.7* 33.8*   BUN mg/dL 34* 33* 35* 36*   CREATININE mg/dL 1.11 1.01 0.95 0.89   CALCIUM mg/dL 9.5 9.7 9.7 10.1   GLUCOSE mg/dL 141* 107* 105* 134*   WBC 10*3/mm3  --  11.61* 12.02* 12.49*   HEMOGLOBIN g/dL  --  13.9 13.7 14.1   PLATELETS 10*3/mm3  --  244 241 227   INR   --   --  1.63*  --    PROBNP pg/mL 1,024.0 1,004.0 1,148.0  --    PROCALCITONIN ng/mL 0.09* 0.04*  --   --      Results from last 7 days   Lab Units 03/06/19  1013   RESPCX  Moderate growth (3+) Corynebacterium species*  Light growth (2+) Normal Respiratory [place]               Results from last 7 days   Lab Units 03/10/19  1019 03/07/19  0501 03/06/19  1746   PH, ARTERIAL pH units 7.448 7.359 7.347*   PCO2, ARTERIAL mm Hg 49.5* 58.1* 60.0*   PO2 ART mm Hg 56.2* 74.3* 61.0*   FLOW RATE lpm 9  --  15   MODALITY  HFNC BiPap NRB   O2 SATURATION CALC % 89.5* 93.6 88.8*           ASSESSMENT:     Hypernatremia  Acute respiratory failure on chronic respiratory failure  Acute decompensated diastolic HF  COPD, no obvious exacerbation  Throat cancer s/p chemo RT  Dysphagia - PEG  Afib, on AC  CAD  Obesity    Attention to G-tube (CMS/[facility])      PLAN:  Continue current treatment.  The patient does not like trilogy machine.  I explained to him he needs to get used to this machine and he needs to use it because of his advanced severe respiratory failure, COPD and heart failure.  He will try again a few more hours tonight.  My colleague, Dr. Kong will try to adjust settings tonight depending on how he  tolerates it.  Discussed with Dr. Nickerson, surgeon.  He will replace his PEG tube today.  Anticipate discharge tomorrow        Kalpesh Jimenez MD  3/12/2019

## 2019-03-12 NOTE — THERAPY TREATMENT NOTE
Acute Care - Physical Therapy Treatment Note  [facility]     Patient Name: Alessio Allen  : 1940  MRN: [id number]  Today's Date: 3/12/2019  Onset of Illness/Injury or Date of Surgery: 19  Date of Referral to PT: 19  Referring Physician: David    Admit Date: 2019    Visit Dx:    ICD-10-CM ICD-9-CM   1. Acute respiratory failure with hypoxia (CMS/Formerly [facility]) J96.01 518.81   2. Impaired mobility Z74.09 799.89   3. AF (paroxysmal atrial fibrillation) (CMS/Formerly [facility]) I48.0 427.31     Patient Active Problem List   Diagnosis   • A-fib (CMS/Formerly [facility])   • Dyslipidemia   • BP (high blood pressure)   • Neuropathy   • Hypertension   • COPD (chronic obstructive pulmonary disease) (CMS/Formerly [facility])   • BPH (benign prostatic hypertrophy)   • Atrial fibrillation (CMS/Formerly [facility])   • Asthma   • Hypoxia   • Pneumonia   • Chronic anticoagulation   • Pneumonia of both lungs due to infectious organism   • Hyponatremia   • COPD exacerbation (CMS/Formerly [facility])   • Acute on chronic respiratory failure with hypoxia (CMS/Formerly [facility])   • Pneumonia of both lower lobes due to infectious organism (CMS/Formerly [facility])   • Acute on chronic diastolic heart failure (CMS/Formerly [facility])   • IPF (idiopathic pulmonary fibrosis) (CMS/Formerly [facility])   • Acute respiratory failure with hypoxia (CMS/Formerly [facility])   • Attention to G-tube (CMS/Formerly [facility])       Therapy Treatment    Rehabilitation Treatment Summary     Row Name 19             Treatment Time/Intention    Discipline  physical therapy assistant  -CW      Document Type  therapy note (daily note)  -CW      Subjective Information  complains of;dyspnea  -CW      Mode of Treatment  physical therapy  -CW      Therapy Frequency (PT Clinical Impression)  daily  -CW      Patient Effort  good  -CW      Existing Precautions/Restrictions  fall;oxygen therapy device and L/min  -CW      Recorded by [CW] Cesario Kwon PTA 19 0958      Row Name 19             Vital Signs    O2 Delivery Pre Treatment  hi-flow  -CW      O2 Delivery Intra  Treatment  hi-flow  -CW      O2 Delivery Post Treatment  hi-flow  -CW      Recorded by [CW] Cesario Kwon, PTA 03/12/19 0958      Row Name 03/12/19 0905             Cognitive Assessment/Intervention- PT/OT    Orientation Status (Cognition)  oriented x 3  -CW      Follows Commands (Cognition)  WFL  -CW      Safety Deficit (Cognitive)  mild deficit  -CW      Personal Safety Interventions  fall prevention program maintained;gait belt;muscle strengthening facilitated;nonskid shoes/slippers when out of bed  -CW      Recorded by [CW] Cesario Kwon, PTA 03/12/19 0958      Row Name 03/12/19 0905             Bed Mobility Assessment/Treatment    Bed Mobility Assessment/Treatment  supine-sit;sit-supine  -CW      Supine-Sit [place] (Bed Mobility)  contact guard  -CW      Sit-Supine [place] (Bed Mobility)  contact guard  -CW      Assistive Device (Bed Mobility)  bed rails  -CW      Recorded by [CW] Cesario Kwon, PTA 03/12/19 0958      Row Name 03/12/19 0905             Transfer Assessment/Treatment    Transfer Assessment/Treatment  sit-stand transfer;stand-sit transfer  -CW      Recorded by [CW] Cesario Kwon, PTA 03/12/19 0958      Row Name 03/12/19 0905             Sit-Stand Transfer    Sit-Stand [place] (Transfers)  contact guard  -CW      Assistive Device (Sit-Stand Transfers)  walker, front-wheeled  -CW      Recorded by [CW] Cesario Kwon, PTA 03/12/19 0958      Row Name 03/12/19 0905             Stand-Sit Transfer    Stand-Sit [place] (Transfers)  contact guard  -CW      Assistive Device (Stand-Sit Transfers)  walker, front-wheeled  -CW      Recorded by [CW] Cesario Kwon, PTA 03/12/19 0958      Row Name 03/12/19 0905             Gait/Stairs Assessment/Training    [place] Level (Gait)  contact guard  -CW      Assistive Device (Gait)  walker, front-wheeled  -CW      Distance in Feet (Gait)  100  -CW      Pattern (Gait)  step-through  -CW      Deviations/Abnormal  Patterns (Gait)  gait speed decreased;stride length decreased  -CW      Bilateral Gait Deviations  forward flexed posture  -CW      Recorded by [CW] Cesario Kwon PTA 03/12/19 0958      Row Name 03/12/19 0905             Positioning and Restraints    Pre-Treatment Position  in bed  -CW      Post Treatment Position  bed  -CW      In Bed  notified nsg;supine;call light within reach;encouraged to call for assist;with family/caregiver  -CW      Recorded by [CW] Cesario Kwon PTA 03/12/19 0958      Row Name                Wound 03/06/19 1003 other (see notes) incision    Wound - Properties Group Date first assessed: 03/06/19 [MONTANA] Time first assessed: 1003 [MONTANA] Location: other (see notes) [MONTANA] Type: incision [MONTANA] Recorded by:  [MONTANA] Sanket Jaimes RN 03/06/19 1003    Row Name 03/12/19 0905             Outcome Summary/Treatment Plan (PT)    Anticipated Discharge Disposition (PT)  skilled nursing facility;home with OP services  -CW      Recorded by [CW] Cesario Kwon PTA 03/12/19 0958        User Key  (r) = Recorded By, (t) = Taken By, (c) = Cosigned By    Initials Name Effective Dates Discipline    MONTANA Sanket Jaimes RN 06/16/16 -  Nurse    CW Cesario Kwon PTA 03/07/18 -  PT          Rash 03/08/19 2000 Right arm (Active)   Distribution localized 3/12/2019  9:05 AM   Borders raised 3/12/2019  9:05 AM   Characteristics itching 3/12/2019  9:05 AM   Color pink;red 3/12/2019  9:05 AM       Wound 03/06/19 1003 other (see notes) incision (Active)   Closure Open to air 3/12/2019  9:05 AM   Drainage Amount none 3/12/2019  9:05 AM   Dressing Care, Wound open to air 3/12/2019  9:05 AM           Physical Therapy Education     Title: PT OT SLP Therapies (Done)     Topic: Physical Therapy (Done)     Point: Mobility training (Done)     Learning Progress Summary           Patient Acceptance, E,TB, VU,DU by CW at 3/12/2019  9:58 AM    Acceptance, TB,E, VU,DU by CW at 3/11/2019  2:02 PM    Acceptance, E,TB,  VU,DU by CW at 3/10/2019 11:14 AM    Acceptance, E,TB, VU,DU by CW at 3/9/2019 11:13 AM    Acceptance, E,TB, VU,DU by CW at 3/8/2019 11:21 AM    Acceptance, E, VU by WANDER at 3/7/2019 11:47 AM    Acceptance, D,E, NR by CW at 3/6/2019  4:52 PM    Acceptance, E, NR by  at 3/5/2019  1:08 PM                   Point: Home exercise program (Done)     Learning Progress Summary           Patient Acceptance, E,TB, VU,DU by CW at 3/12/2019  9:58 AM    Acceptance, TB,E, VU,DU by CW at 3/11/2019  2:02 PM    Acceptance, E,TB, VU,DU by CW at 3/10/2019 11:14 AM    Acceptance, E,TB, VU,DU by CW at 3/9/2019 11:13 AM    Acceptance, E,TB, VU,DU by CW at 3/8/2019 11:21 AM    Acceptance, E, VU by WANDER at 3/7/2019 11:47 AM    Acceptance, D,E, NR by CW at 3/6/2019  4:52 PM    Acceptance, E, NR by  at 3/5/2019  1:08 PM                   Point: Body mechanics (Done)     Learning Progress Summary           Patient Acceptance, E,TB, VU,DU by CW at 3/12/2019  9:58 AM    Acceptance, TB,E, VU,DU by CW at 3/11/2019  2:02 PM    Acceptance, E,TB, VU,DU by CW at 3/10/2019 11:14 AM    Acceptance, E,TB, VU,DU by CW at 3/9/2019 11:13 AM    Acceptance, E,TB, VU,DU by CW at 3/8/2019 11:21 AM    Acceptance, E, VU by WANDER at 3/7/2019 11:47 AM    Acceptance, D,E, NR by CW at 3/6/2019  4:52 PM    Acceptance, E, NR by  at 3/5/2019  1:08 PM                   Point: Precautions (Done)     Learning Progress Summary           Patient Acceptance, E,TB, VU,DU by CW at 3/12/2019  9:58 AM    Acceptance, TB,E, VU,DU by CW at 3/11/2019  2:02 PM    Acceptance, E,TB, VU,DU by CW at 3/10/2019 11:14 AM    Acceptance, E,TB, VU,DU by CW at 3/9/2019 11:13 AM    Acceptance, E,TB, VU,DU by CW at 3/8/2019 11:21 AM    Acceptance, E, VU by  at 3/7/2019 11:47 AM    Acceptance, D,E, NR by CW at 3/6/2019  4:52 PM    Acceptance, E, NR by  at 3/5/2019  1:08 PM                               User Key     Initials Effective Dates Name Provider Type Discipline    WANDER 02/05/19 -   Leigh Woodard, PT Physical Therapist PT     04/03/18 -  Jaida Porter, PT Physical Therapist PT     03/07/18 -  Cesario Kwon, PTA Physical Therapy Assistant PT                PT Recommendation and Plan  Anticipated Discharge Disposition (PT): skilled nursing facility, home with OP services  Therapy Frequency (PT Clinical Impression): daily  Outcome Summary/Treatment Plan (PT)  Anticipated Discharge Disposition (PT): skilled nursing facility, home with OP services  Plan of Care Reviewed With: patient  Progress: improving  Outcome Summary: Pt increasing with activity tolerance and bed mobility safety  Outcome Measures     Row Name 03/12/19 0900 03/11/19 1400 03/10/19 1100       How much help from another person do you currently need...    Turning from your back to your side while in flat bed without using bedrails?  3  -CW  3  -CW  3  -CW    Moving from lying on back to sitting on the side of a flat bed without bedrails?  3  -CW  3  -CW  3  -CW    Moving to and from a bed to a chair (including a wheelchair)?  3  -CW  3  -CW  3  -CW    Standing up from a chair using your arms (e.g., wheelchair, bedside chair)?  3  -CW  3  -CW  3  -CW    Climbing 3-5 steps with a railing?  2  -CW  2  -CW  2  -CW    To walk in hospital room?  3  -CW  3  -CW  3  -CW    AM-PAC 6 Clicks Score  17  -CW  17  -CW  17  -CW       Functional Assessment    Outcome Measure Options  AM-PAC 6 Clicks Basic Mobility (PT)  -CW  AM-PAC 6 Clicks Basic Mobility (PT)  -CW  AM-PAC 6 Clicks Basic Mobility (PT)  -CW    Row Name 03/09/19 1100             How much help from another person do you currently need...    Turning from your back to your side while in flat bed without using bedrails?  3  -CW      Moving from lying on back to sitting on the side of a flat bed without bedrails?  3  -CW      Moving to and from a bed to a chair (including a wheelchair)?  3  -CW      Standing up from a chair using your arms (e.g., wheelchair, bedside  chair)?  3  -CW      Climbing 3-5 steps with a railing?  2  -CW      To walk in hospital room?  3  -CW      AM-PAC 6 Clicks Score  17  -CW         Functional Assessment    Outcome Measure Options  AM-PAC 6 Clicks Basic Mobility (PT)  -CW        User Key  (r) = Recorded By, (t) = Taken By, (c) = Cosigned By    Initials Name Provider Type    Cesario Rivera PTA Physical Therapy Assistant         Time Calculation:   PT Charges     Row Name 03/12/19 1000             Time Calculation    Start Time  0934  -CW      Stop Time  1000  -CW      Time Calculation (min)  26 min  -CW      PT Received On  03/12/19  -CW      PT - Next Appointment  03/13/19  -CW        User Key  (r) = Recorded By, (t) = Taken By, (c) = Cosigned By    Initials Name Provider Type    Cesario Rivera PTA Physical Therapy Assistant        Therapy Suggested Charges     Code   Minutes Charges    None           Therapy Charges for Today     Code Description Service Date Service Provider Modifiers Qty    47884997826 HC PT THER PROC EA 15 MIN 3/11/2019 Cesario Kwon, HARDEEP GP 1    76437492894 HC PT THER SUPP EA 15 MIN 3/11/2019 Cesario Kwon, HARDEEP GP 1    50648988118 HC PT THER PROC EA 15 MIN 3/12/2019 Cesario Kwon, HARDEEP GP 2    99805634492 HC PT THER SUPP EA 15 MIN 3/12/2019 Cesario Kwon, HARDEEP GP 2          PT G-Codes  Outcome Measure Options: AM-PAC 6 Clicks Basic Mobility (PT)  AM-PAC 6 Clicks Score: 17    Cesario Kwon PTA  3/12/2019

## 2019-03-12 NOTE — PROGRESS NOTES
Chief Complaint:    Leaking G-tube    Subjective:    The patient has a long-standing PEG that developed a pinhole defect in the tubing and was modified just over a week ago.  He is now developed a second pinhole defect in the tubing.    Objective:    Temp:  [97.3 °F (36.3 °C)-98 °F (36.7 °C)] 98 °F (36.7 °C)  Heart Rate:  [81-96] 93  Resp:  [18-28] 18  BP: (122-126)/(71-93) 126/73  FiO2 (%):  [65 %] 65 %    Physical Exam   Constitutional: He is cooperative. He appears ill. No distress.   Abdominal: Soft. There is no tenderness.   Left upper quadrant PEG with clean site.  There is tape on the tubing where a small leak is present.   Neurological: He is alert.       Results:    There is no CBC for today.    Assessment/Plan:    The patient has a small pinhole leak in the tubing of the PEG.  This is the second leak in 2 weeks suggesting compromised tubing.  This will be changed at the bedside with a new replacement G-tube.    Joseph Nickerson Jr., M.D.

## 2019-03-12 NOTE — PLAN OF CARE
Problem: Patient Care Overview  Goal: Plan of Care Review  Outcome: Ongoing (interventions implemented as appropriate)   03/12/19 9064   Coping/Psychosocial   Plan of Care Reviewed With patient   Plan of Care Review   Progress improving   OTHER   Outcome Summary Patient ahs been pleasant and cooperative during shift. Up to chair with PT. RT dropped patients O2 to 8L and he is satting in the 90's still. Patient still itching but has hydrocortisone cream available. Dr. Nickerson planning on coming tomorrow. Will continue to monitor and assist patient as needed.       Problem: Fall Risk (Adult)  Goal: Absence of Fall  Outcome: Ongoing (interventions implemented as appropriate)      Problem: Breathing Pattern Ineffective (Adult)  Goal: Effective Oxygenation/Ventilation  Outcome: Ongoing (interventions implemented as appropriate)      Problem: Nutrition, Enteral (Adult)  Goal: Signs and Symptoms of Listed Potential Problems Will be Absent, Minimized or Managed (Nutrition, Enteral)  Outcome: Ongoing (interventions implemented as appropriate)      Problem: Skin Injury Risk (Adult)  Goal: Skin Health and Integrity  Outcome: Ongoing (interventions implemented as appropriate)

## 2019-03-12 NOTE — PLAN OF CARE
Problem: Patient Care Overview  Goal: Plan of Care Review  Outcome: Ongoing (interventions implemented as appropriate)   03/11/19 2059   Coping/Psychosocial   Plan of Care Reviewed With patient   Plan of Care Review   Progress no change   OTHER   Outcome Summary Respirations SOA. 02 11 L HF. Tolerating bolus tube feeds. Telemetry A-fib w/ occ pacer beat. Will continue to monitor.       Problem: Fall Risk (Adult)  Goal: Absence of Fall  Outcome: Ongoing (interventions implemented as appropriate)      Problem: Breathing Pattern Ineffective (Adult)  Goal: Effective Oxygenation/Ventilation  Outcome: Ongoing (interventions implemented as appropriate)    Goal: Anxiety/Fear Reduction  Outcome: Outcome(s) achieved Date Met: 03/11/19      Problem: Nutrition, Enteral (Adult)  Goal: Signs and Symptoms of Listed Potential Problems Will be Absent, Minimized or Managed (Nutrition, Enteral)  Outcome: Ongoing (interventions implemented as appropriate)   03/11/19 2059   Goal/Outcome Evaluation   Problems Assessed (Enteral Nutrition) all   Problems Present (Enteral Nutrition) none       Problem: Skin Injury Risk (Adult)  Goal: Skin Health and Integrity  Outcome: Ongoing (interventions implemented as appropriate)

## 2019-03-13 LAB
GLUCOSE BLDC GLUCOMTR-MCNC: 101 MG/DL (ref 70–130)
GLUCOSE BLDC GLUCOMTR-MCNC: 120 MG/DL (ref 70–130)
GLUCOSE BLDC GLUCOMTR-MCNC: 172 MG/DL (ref 70–130)
GLUCOSE BLDC GLUCOMTR-MCNC: 201 MG/DL (ref 70–130)

## 2019-03-13 PROCEDURE — 63710000001 INSULIN LISPRO (HUMAN) PER 5 UNITS: Performed by: INTERNAL MEDICINE

## 2019-03-13 PROCEDURE — 94799 UNLISTED PULMONARY SVC/PX: CPT

## 2019-03-13 PROCEDURE — 82962 GLUCOSE BLOOD TEST: CPT

## 2019-03-13 PROCEDURE — 97110 THERAPEUTIC EXERCISES: CPT

## 2019-03-13 PROCEDURE — 99231 SBSQ HOSP IP/OBS SF/LOW 25: CPT | Performed by: SURGERY

## 2019-03-13 RX ADMIN — IPRATROPIUM BROMIDE AND ALBUTEROL SULFATE 3 ML: 2.5; .5 SOLUTION RESPIRATORY (INHALATION) at 15:27

## 2019-03-13 RX ADMIN — SODIUM CHLORIDE, PRESERVATIVE FREE 10 ML: 5 INJECTION INTRAVENOUS at 09:30

## 2019-03-13 RX ADMIN — IPRATROPIUM BROMIDE AND ALBUTEROL SULFATE 3 ML: 2.5; .5 SOLUTION RESPIRATORY (INHALATION) at 10:29

## 2019-03-13 RX ADMIN — HYDROCORTISONE: 1 CREAM TOPICAL at 09:29

## 2019-03-13 RX ADMIN — LANSOPRAZOLE 30 MG: KIT at 06:13

## 2019-03-13 RX ADMIN — INSULIN LISPRO 2 UNITS: 100 INJECTION, SOLUTION INTRAVENOUS; SUBCUTANEOUS at 11:48

## 2019-03-13 RX ADMIN — IPRATROPIUM BROMIDE AND ALBUTEROL SULFATE 3 ML: 2.5; .5 SOLUTION RESPIRATORY (INHALATION) at 20:06

## 2019-03-13 RX ADMIN — KETOTIFEN FUMARATE 1 DROP: 0.35 SOLUTION/ DROPS OPHTHALMIC at 20:39

## 2019-03-13 RX ADMIN — RIVAROXABAN 20 MG: 20 TABLET, FILM COATED ORAL at 16:53

## 2019-03-13 RX ADMIN — IPRATROPIUM BROMIDE AND ALBUTEROL SULFATE 3 ML: 2.5; .5 SOLUTION RESPIRATORY (INHALATION) at 06:23

## 2019-03-13 RX ADMIN — KETOTIFEN FUMARATE 1 DROP: 0.35 SOLUTION/ DROPS OPHTHALMIC at 09:29

## 2019-03-13 RX ADMIN — PREGABALIN 75 MG: 75 CAPSULE ORAL at 20:37

## 2019-03-13 RX ADMIN — Medication 10 ML: at 05:12

## 2019-03-13 RX ADMIN — ATORVASTATIN CALCIUM 10 MG: 10 TABLET, FILM COATED ORAL at 09:29

## 2019-03-13 RX ADMIN — PREGABALIN 75 MG: 75 CAPSULE ORAL at 09:28

## 2019-03-13 RX ADMIN — FUROSEMIDE 80 MG: 80 TABLET ORAL at 09:28

## 2019-03-13 RX ADMIN — SODIUM CHLORIDE, PRESERVATIVE FREE 10 ML: 5 INJECTION INTRAVENOUS at 20:37

## 2019-03-13 RX ADMIN — INSULIN LISPRO 2 UNITS: 100 INJECTION, SOLUTION INTRAVENOUS; SUBCUTANEOUS at 16:53

## 2019-03-13 RX ADMIN — ASPIRIN 81 MG: 81 TABLET, DELAYED RELEASE ORAL at 09:28

## 2019-03-13 RX ADMIN — HYDROCORTISONE: 1 CREAM TOPICAL at 20:39

## 2019-03-13 RX ADMIN — Medication 50 MG: at 09:28

## 2019-03-13 RX ADMIN — POTASSIUM CHLORIDE 20 MEQ: 750 CAPSULE, EXTENDED RELEASE ORAL at 09:28

## 2019-03-13 NOTE — PROGRESS NOTES
Continued Stay Note  [facility]     Patient Name: Alessio Allen  MRN: [id number]  Today's Date: 3/13/2019    Admit Date: 2/28/2019    Discharge Plan     Row Name 03/13/19 1606       Plan    Plan  Plan [facility].  JARON Juarez RN    Patient/Family in Agreement with Plan  yes    Plan Comments  Erica ( [phone]) continues to follow for [facility].  Erica states O2 flow remains high for acceptance into skilled care.  Plan [facility] for skilled care.   JARON Juarez RN    Row Name 03/13/19 6038       Plan    Plan  --    Plan Comments  --        Discharge Codes    No documentation.             Mahsa Juarez RN

## 2019-03-13 NOTE — PLAN OF CARE
Problem: Patient Care Overview  Goal: Plan of Care Review  Outcome: Outcome(s) achieved Date Met: 03/13/19 03/13/19 0415   Coping/Psychosocial   Plan of Care Reviewed With patient   Plan of Care Review   Progress improving   OTHER   Outcome Summary Slept fine, still having shortness of air even at rest, no pain complaints, on hi- jason 8-9 L NC, BIPAP placed at around 4am, PEG tube not leaking for now, no residuals, for replacement of PEG tube in AM c/o Dr. Nickerson-- supplies at bedside, will monitor closely     Goal: Individualization and Mutuality  Outcome: Ongoing (interventions implemented as appropriate)   03/13/19 0415   Individualization   Patient Specific Preferences none   Patient Specific Goals (Include Timeframe) breathe better, monitor O2 status, prevent fall, keep safe   Patient Specific Interventions O2 at 9Lhi-jason, Bipap as needed, bed alarm on, urinal within reach       Problem: Fall Risk (Adult)  Goal: Absence of Fall  Outcome: Ongoing (interventions implemented as appropriate)   03/13/19 0415   Fall Risk (Adult)   Absence of Fall making progress toward outcome       Problem: Breathing Pattern Ineffective (Adult)  Goal: Effective Oxygenation/Ventilation  Outcome: Ongoing (interventions implemented as appropriate)   03/13/19 0415   Breathing Pattern Ineffective (Adult)   Effective Oxygenation/Ventilation making progress toward outcome       Problem: Nutrition, Enteral (Adult)  Goal: Signs and Symptoms of Listed Potential Problems Will be Absent, Minimized or Managed (Nutrition, Enteral)  Outcome: Ongoing (interventions implemented as appropriate)   03/13/19 0415   Goal/Outcome Evaluation   Problems Assessed (Enteral Nutrition) all   Problems Present (Enteral Nutrition) aspiration       Problem: Skin Injury Risk (Adult)  Goal: Skin Health and Integrity  Outcome: Ongoing (interventions implemented as appropriate)   03/13/19 0415   Skin Injury Risk (Adult)   Skin Health and Integrity making progress  toward outcome

## 2019-03-13 NOTE — THERAPY TREATMENT NOTE
Acute Care - Physical Therapy Progress Note  [facility]     Patient Name: Alessio Allen  : 1940  MRN: [id number]  Today's Date: 3/13/2019  Onset of Illness/Injury or Date of Surgery: 19  Date of Referral to PT: 19  Referring Physician: David    Admit Date: 2019    Visit Dx:    ICD-10-CM ICD-9-CM   1. Acute respiratory failure with hypoxia (CMS/[facility]) J96.01 518.81   2. Impaired mobility Z74.09 799.89   3. AF (paroxysmal atrial fibrillation) (CMS/[facility]) I48.0 427.31     Patient Active Problem List   Diagnosis   • A-fib (CMS/[facility])   • Dyslipidemia   • BP (high blood pressure)   • Neuropathy   • Hypertension   • COPD (chronic obstructive pulmonary disease) (CMS/[facility])   • BPH (benign prostatic hypertrophy)   • Atrial fibrillation (CMS/[facility])   • Asthma   • Hypoxia   • Pneumonia   • Chronic anticoagulation   • Pneumonia of both lungs due to infectious organism   • Hyponatremia   • COPD exacerbation (CMS/[facility])   • Acute on chronic respiratory failure with hypoxia (CMS/[facility])   • Pneumonia of both lower lobes due to infectious organism (CMS/[facility])   • Acute on chronic diastolic heart failure (CMS/[facility])   • IPF (idiopathic pulmonary fibrosis) (CMS/[facility])   • Acute respiratory failure with hypoxia (CMS/[facility])   • Attention to G-tube (CMS/[facility])       Therapy Treatment    Rehabilitation Treatment Summary     Row Name 19 1448             Treatment Time/Intention    Discipline  physical therapist  -AR      Document Type  therapy note (daily note);progress note/recertification  -AR2      Subjective Information  no complaints  -AR      Mode of Treatment  physical therapy  -AR      Therapy Frequency (PT Clinical Impression)  daily  -AR      Patient Effort  good  -AR      Existing Precautions/Restrictions  fall;oxygen therapy device and L/min  -AR      Treatment Considerations/Comments  amb on 8L NC  -AR      Recorded by [AR] Iris Irby, PT 19 1451  [AR2] Iris Irby, PT 19 1453      Row Name  03/13/19 1448             Vital Signs    Pre SpO2 (%)  90  -AR      O2 Delivery Pre Treatment  hi-flow  -AR      Post SpO2 (%)  -- not on monitor, in bathroom  -AR      Recorded by [AR] Iris Irby, PT 03/13/19 1451      Row Name 03/13/19 1448             Cognitive Assessment/Intervention- PT/OT    Orientation Status (Cognition)  oriented to;person;place  -AR      Follows Commands (Cognition)  WNL  -AR      Safety Deficit (Cognitive)  mild deficit  -AR      Personal Safety Interventions  muscle strengthening facilitated;gait belt;fall prevention program maintained  -AR      Recorded by [AR] Iris Irby, PT 03/13/19 1451      Row Name 03/13/19 1448             Bed Mobility Assessment/Treatment    Supine-Sit [place] (Bed Mobility)  not tested  -AR      Sit-Supine [place] (Bed Mobility)  not tested  -AR      Comment (Bed Mobility)  up in chair  -AR      Recorded by [AR] Iris Irby, PT 03/13/19 1451      Row Name 03/13/19 1448             Sit-Stand Transfer    Sit-Stand [place] (Transfers)  minimum assist (75% patient effort)  -AR      Assistive Device (Sit-Stand Transfers)  walker, front-wheeled  -AR      Recorded by [AR] Iris Irby, PT 03/13/19 1451      Row Name 03/13/19 1448             Stand-Sit Transfer    Stand-Sit [place] (Transfers)  contact guard  -AR      Assistive Device (Stand-Sit Transfers)  walker, front-wheeled  -AR      Recorded by [AR] Iris Irby, PT 03/13/19 1451      Row Name 03/13/19 1448             Gait/Stairs Assessment/Training    [place] Level (Gait)  contact guard  -AR      Assistive Device (Gait)  walker, front-wheeled  -AR      Distance in Feet (Gait)  75  -AR      Pattern (Gait)  swing-through  -AR      Deviations/Abnormal Patterns (Gait)  gait speed decreased;festinating/shuffling  -AR      Bilateral Gait Deviations  forward flexed posture;heel strike decreased  -AR      Comment (Gait/Stairs)  slow shuffling pace, amb on 8L NC  -AR       Recorded by [AR] Iris Irby, PT 03/13/19 1451      Row Name 03/13/19 1448             Positioning and Restraints    Pre-Treatment Position  sitting in chair/recliner  -AR      Post Treatment Position  bathroom  -AR      Bathroom  notified nsg;sitting;call light within reach;encouraged to call for assist;with family/caregiver  -AR      Recorded by [AR] Iris Irby, PT 03/13/19 1451      Row Name 03/13/19 1448             Pain Scale: Numbers Pre/Post-Treatment    Pain Scale: Numbers, Pretreatment  0/10 - no pain  -AR      Pain Scale: Numbers, Post-Treatment  0/10 - no pain  -AR      Recorded by [AR] Iris Irby, PT 03/13/19 1451      Row Name                Wound 03/06/19 1003 other (see notes) incision    Wound - Properties Group Date first assessed: 03/06/19 [MONTANA] Time first assessed: 1003 [MONTANA] Location: other (see notes) [MONTANA] Type: incision [MONTANA] Recorded by:  [MONTANA] Sanket Jaimes RN 03/06/19 1003    Row Name 03/13/19 1448             Outcome Summary/Treatment Plan (PT)    Anticipated Discharge Disposition (PT)  skilled nursing facility;home with home health;home with assist  -AR      Recorded by [AR] Iris Irby, PT 03/13/19 1451        User Key  (r) = Recorded By, (t) = Taken By, (c) = Cosigned By    Initials Name Effective Dates Discipline    MONTANA Sanket Jaimes RN 06/16/16 -  Nurse    AR Iris Irby, PT 04/03/18 -  PT          Wound 03/06/19 1003 other (see notes) incision (Active)   Closure Open to air 3/13/2019  2:15 PM   Dressing Care, Wound open to air 3/12/2019  7:40 PM       Rehab Goal Summary     Row Name 03/13/19 1400             Physical Therapy Goals    Bed Mobility Goal Selection (PT)  bed mobility, PT goal 1  -AR      Transfer Goal Selection (PT)  transfer, PT goal 1  -AR      Gait Training Goal Selection (PT)  gait training, PT goal 1  -AR         Bed Mobility Goal 1 (PT)    Progress/Outcomes (Bed Mobility Goal 1, PT)  goal ongoing  -AR         Transfer Goal 1 (PT)     Progress/Outcome (Transfer Goal 1, PT)  goal ongoing  -AR         Gait Training Goal 1 (PT)    Progress/Outcome (Gait Training Goal 1, PT)  goal ongoing  -AR        User Key  (r) = Recorded By, (t) = Taken By, (c) = Cosigned By    Initials Name Provider Type Discipline    Iris Wilburn, PT Physical Therapist PT          Physical Therapy Education     Title: PT OT SLP Therapies (In Progress)     Topic: Physical Therapy (In Progress)     Point: Mobility training (In Progress)     Learning Progress Summary           Patient Acceptance, E, NR by AR at 3/13/2019  2:52 PM    Acceptance, E,TB, VU by MS at 3/12/2019 12:58 PM    Acceptance, E,TB, VU,DU by CW at 3/12/2019  9:58 AM    Acceptance, TB,E, VU,DU by CW at 3/11/2019  2:02 PM    Acceptance, E,TB, VU,DU by CW at 3/10/2019 11:14 AM    Acceptance, E,TB, VU,DU by CW at 3/9/2019 11:13 AM    Acceptance, E,TB, VU,DU by CW at 3/8/2019 11:21 AM    Acceptance, E, VU by WANDER at 3/7/2019 11:47 AM    Acceptance, D,E, NR by CW at 3/6/2019  4:52 PM    Acceptance, E, NR by  at 3/5/2019  1:08 PM                   Point: Home exercise program (In Progress)     Learning Progress Summary           Patient Acceptance, E, NR by AR at 3/13/2019  2:52 PM    Acceptance, E,TB, VU by MS at 3/12/2019 12:58 PM    Acceptance, E,TB, VU,DU by CW at 3/12/2019  9:58 AM    Acceptance, TB,E, VU,DU by CW at 3/11/2019  2:02 PM    Acceptance, E,TB, VU,DU by CW at 3/10/2019 11:14 AM    Acceptance, E,TB, VU,DU by CW at 3/9/2019 11:13 AM    Acceptance, E,TB, VU,DU by CW at 3/8/2019 11:21 AM    Acceptance, E, VU by WANDER at 3/7/2019 11:47 AM    Acceptance, D,E, NR by CW at 3/6/2019  4:52 PM    Acceptance, E, NR by  at 3/5/2019  1:08 PM                   Point: Body mechanics (In Progress)     Learning Progress Summary           Patient Acceptance, E, NR by AR at 3/13/2019  2:52 PM    Acceptance, E,TERELL BRIGHT DU by CW at 3/12/2019  9:58 AM    Acceptance, CONNER BRIGHT VU, DU by CW at 3/11/2019  2:02 PM    Acceptance,  E,TB, VU,DU by CW at 3/10/2019 11:14 AM    Acceptance, E,TB, VU,DU by CW at 3/9/2019 11:13 AM    Acceptance, E,TB, VU,DU by CW at 3/8/2019 11:21 AM    Acceptance, E, VU by KH at 3/7/2019 11:47 AM    Acceptance, D,E, NR by CW at 3/6/2019  4:52 PM    Acceptance, E, NR by LH at 3/5/2019  1:08 PM                   Point: Precautions (In Progress)     Learning Progress Summary           Patient Acceptance, E, NR by AR at 3/13/2019  2:52 PM    Acceptance, E,TB, VU by MS at 3/12/2019 12:58 PM    Acceptance, E,TB, VU,DU by CW at 3/12/2019  9:58 AM    Acceptance, TB,E, VU,DU by CW at 3/11/2019  2:02 PM    Acceptance, E,TB, VU,DU by CW at 3/10/2019 11:14 AM    Acceptance, E,TB, VU,DU by CW at 3/9/2019 11:13 AM    Acceptance, E,TB, VU,DU by CW at 3/8/2019 11:21 AM    Acceptance, E, VU by KH at 3/7/2019 11:47 AM    Acceptance, D,E, NR by CW at 3/6/2019  4:52 PM    Acceptance, E, NR by  at 3/5/2019  1:08 PM                               User Key     Initials Effective Dates Name Provider Type Discipline     02/05/19 -  Leigh Woodard, PT Physical Therapist PT     04/03/18 -  Jaida Porter, PT Physical Therapist PT    MS 02/23/17 -  Juventino Contreras, RN Registered Nurse Nurse    AR 04/03/18 -  Iris Irby, PT Physical Therapist PT     03/07/18 -  Cesario Kwon, PTA Physical Therapy Assistant PT                PT Recommendation and Plan  Anticipated Discharge Disposition (PT): skilled nursing facility, home with home health, home with assist  Therapy Frequency (PT Clinical Impression): daily  Outcome Summary/Treatment Plan (PT)  Anticipated Discharge Disposition (PT): skilled nursing facility, home with home health, home with assist  Plan of Care Reviewed With: patient  Outcome Summary: Slow progress towards goals during PT today.  Ambulated 75' w/ min A using RW on 8L hi flow.  Slow shuffling gait pattern with occasional cues for safety.    Outcome Measures     Row Name 03/13/19 1400 03/12/19 0900  03/11/19 1400       How much help from another person do you currently need...    Turning from your back to your side while in flat bed without using bedrails?  4  -AR  3  -CW  3  -CW    Moving from lying on back to sitting on the side of a flat bed without bedrails?  3  -AR  3  -CW  3  -CW    Moving to and from a bed to a chair (including a wheelchair)?  3  -AR  3  -CW  3  -CW    Standing up from a chair using your arms (e.g., wheelchair, bedside chair)?  3  -AR  3  -CW  3  -CW    Climbing 3-5 steps with a railing?  2  -AR  2  -CW  2  -CW    To walk in hospital room?  3  -AR  3  -CW  3  -CW    AM-PAC 6 Clicks Score  18  -AR  17  -CW  17  -CW       Functional Assessment    Outcome Measure Options  --  AM-PAC 6 Clicks Basic Mobility (PT)  -CW  AM-PAC 6 Clicks Basic Mobility (PT)  -CW      User Key  (r) = Recorded By, (t) = Taken By, (c) = Cosigned By    Initials Name Provider Type    Iris Wilburn, PT Physical Therapist    CW Cesario Kwon, PTA Physical Therapy Assistant         Time Calculation:   PT Charges     Row Name 03/13/19 1453             Time Calculation    Start Time  1439  -AR      Stop Time  1453  -AR      Time Calculation (min)  14 min  -AR      PT Received On  03/13/19  -AR      PT - Next Appointment  03/14/19  -AR      PT Goal Re-Cert Due Date  03/20/19  -AR        User Key  (r) = Recorded By, (t) = Taken By, (c) = Cosigned By    Initials Name Provider Type    Iris Wilburn PT Physical Therapist        Therapy Suggested Charges     Code   Minutes Charges    None           Therapy Charges for Today     Code Description Service Date Service Provider Modifiers Qty    [id number] HC PT THER PROC EA 15 MIN 3/13/2019 Iris Irby, PT GP 1          PT G-Codes  Outcome Measure Options: AM-PAC 6 Clicks Basic Mobility (PT)  AM-PAC 6 Clicks Score: 18    Iris Irby PT  3/13/2019

## 2019-03-13 NOTE — PLAN OF CARE
Problem: Patient Care Overview  Goal: Plan of Care Review  Outcome: Ongoing (interventions implemented as appropriate)   03/13/19 7305   Coping/Psychosocial   Plan of Care Reviewed With patient;spouse   Plan of Care Review   Progress improving   OTHER   Outcome Summary Patient remains on 8L HFNC with sats in the low 90's. Changed trilogy bipap to auto bipap for tonight per Dr.Anaya martin.

## 2019-03-13 NOTE — PLAN OF CARE
Problem: Patient Care Overview  Goal: Plan of Care Review   03/13/19 1289   Coping/Psychosocial   Plan of Care Reviewed With patient   OTHER   Outcome Summary Slow progress towards goals during PT today. Ambulated 75' w/ min A using RW on 8L hi flow. Slow shuffling gait pattern with occasional cues for safety.

## 2019-03-13 NOTE — SIGNIFICANT NOTE
03/13/19 1134   Rehab Time/Intention   Evaluation Not Performed other (see comments)  (ST discussed with RN. Pt tolerating ice chips, remains on high flow O2. ST to continue to follow. )   Rehab Treatment   Discipline speech language pathologist

## 2019-03-13 NOTE — PLAN OF CARE
Problem: Patient Care Overview  Goal: Plan of Care Review  Outcome: Ongoing (interventions implemented as appropriate)   03/13/19 1526   OTHER   Outcome Summary Pt is alert and oriented. Remains on 8-9L high flow NC. VSS. Feeding tube replaced at bedside by Dr. Nickerson at bedside today. Tolerating tube feeds. Hoping to discharge to rehab tomorrow.     Goal: Individualization and Mutuality  Outcome: Ongoing (interventions implemented as appropriate)    Goal: Interprofessional Rounds/Family Conf  Outcome: Ongoing (interventions implemented as appropriate)      Problem: Fall Risk (Adult)  Goal: Absence of Fall  Outcome: Ongoing (interventions implemented as appropriate)      Problem: Breathing Pattern Ineffective (Adult)  Goal: Effective Oxygenation/Ventilation  Outcome: Ongoing (interventions implemented as appropriate)      Problem: Nutrition, Enteral (Adult)  Goal: Signs and Symptoms of Listed Potential Problems Will be Absent, Minimized or Managed (Nutrition, Enteral)  Outcome: Ongoing (interventions implemented as appropriate)      Problem: Skin Injury Risk (Adult)  Goal: Skin Health and Integrity  Outcome: Ongoing (interventions implemented as appropriate)

## 2019-03-13 NOTE — PROGRESS NOTES
Dr. YVROSE Jimenez    [address]    3/13/2019    Patient ID:  Name:  Alessio Allen  MRN:  [id number]  1940  78 y.o.  male            CC/Reason for visit: Acute on chronic respiratory failure and other medical problems listed below    HPI: Patient refuses to use trilogy machine.  He says he cannot get adapted to the trilogy machine and he wants to try something else.  Still short of breath with minimal exertion.    ROS: Negative for fever or cough    Vitals:  Vitals:    03/13/19 0646 03/13/19 0731 03/13/19 1029 03/13/19 1036   BP:  112/73     BP Location:  Left arm     Patient Position:  Lying     Pulse:  83 83 93   Resp:  22 20 20   Temp:  97.3 °F (36.3 °C)     TempSrc:  Oral     SpO2:  90% 91%    Weight: 103 kg (227 lb 11.8 oz)      Height:         FiO2 (%): 65 %     Body mass index is 29.24 kg/m².    Intake/Output Summary (Last 24 hours) at 3/13/2019 1228  Last data filed at 3/13/2019 0930  Gross per 24 hour   Intake 931 ml   Output 970 ml   Net -39 ml       Exam:  GEN:               No distress  Alert, oriented x 3.   LUNGS:           Barrel chest, distant and diminished breath sounds bilaterally but overall clear breath sounds bilat, nonlabored breathing  CV:                  Normal S1S2, without murmur, no edema  ABD:                Non tender, no enlarged liver or masses      Scheduled meds:    aspirin 81 mg Oral Daily   atorvastatin 10 mg Oral Daily   furosemide 80 mg Oral Daily With Breakfast   hydrocortisone  Topical Q12H   insulin lispro 0-9 Units Subcutaneous 4x Daily With Meals & Nightly   ipratropium-albuterol 3 mL Nebulization 4x Daily - RT   ketotifen 1 drop Both Eyes BID   lansoprazole 30 mg Per PEG Tube Q AM   potassium chloride 20 mEq Oral Daily   pregabalin 75 mg Oral Q12H   rivaroxaban 20 mg Oral Daily With Dinner   sodium chloride 10 mL Intravenous Q12H   pyridoxine 50 mg Oral Daily     IV meds:                        lactated ringers 9 mL/hr Last Rate: Stopped (03/06/19  1042)       Data Review:   I reviewed the patient's medications and new clinical results.  Lab Results   Component Value Date    CALCIUM 9.5 03/12/2019    PHOS 3.4 03/12/2019    MG 2.1 03/12/2019    MG 2.1 03/11/2019    MG 2.3 03/05/2019     Results from last 7 days   Lab Units 03/12/19  0414 03/11/19  0515 03/10/19  0432 03/09/19  0504   SODIUM mmol/L 150* 149* 150* 150*   POTASSIUM mmol/L 4.5 3.7 3.5 3.7   CHLORIDE mmol/L 106 105 106 106   CO2 mmol/L 30.8* 33.6* 33.7* 33.8*   BUN mg/dL 34* 33* 35* 36*   CREATININE mg/dL 1.11 1.01 0.95 0.89   CALCIUM mg/dL 9.5 9.7 9.7 10.1   GLUCOSE mg/dL 141* 107* 105* 134*   WBC 10*3/mm3  --  11.61* 12.02* 12.49*   HEMOGLOBIN g/dL  --  13.9 13.7 14.1   PLATELETS 10*3/mm3  --  244 241 227   INR   --   --  1.63*  --    PROBNP pg/mL 1,024.0 1,004.0 1,148.0  --    PROCALCITONIN ng/mL 0.09* 0.04*  --   --          ASSESSMENT:   Hypernatremia  Acute respiratory failure on chronic respiratory failure  Acute decompensated diastolic HF  COPD, no obvious exacerbation  Throat cancer s/p chemo RT  Dysphagia - PEG  Afib, on AC  CAD  Obesity    Attention to G-tube (CMS/HCC)      PLAN:  Stop trilogy trial tonight, switch over to auto BiPAP trial tonight with download.  Continue the rest of the treatment the same.  Hope we can discharge him tomorrow if he has a good night on BiPAP tonight        Kalpesh Jimenez MD  3/13/2019

## 2019-03-13 NOTE — PROGRESS NOTES
I assessed the trilogy bip & it seemed to be working fine. Patient refused to wear it & refused to let me make changes to see if it would be more comfortable. Thanks

## 2019-03-13 NOTE — PROGRESS NOTES
Chief Complaint:    Leaking G-tube    Subjective:    The patient is clinically stable with no abdominal pain.    Objective:    Temp:  [97.2 °F (36.2 °C)-98.1 °F (36.7 °C)] 97.3 °F (36.3 °C)  Heart Rate:  [81-94] 83  Resp:  [18-24] 22  BP: (112-126)/(51-74) 112/73  FiO2 (%):  [65 %] 65 %    Physical Exam   Constitutional: He is cooperative. He appears ill. No distress.   Abdominal: Soft. There is no tenderness.   Neurological: He is alert.       Results:    There are no new labs today.    Assessment/Plan:    The patient has leaking from the tubing of the G-tube.  The G-tube was removed and a replacement G-tube was placed at the bedside.  The patient tolerated the procedure well.  The G-tube can be used.    Joseph Nickerson Jr., M.D.

## 2019-03-13 NOTE — PLAN OF CARE
Problem: Patient Care Overview  Goal: Interprofessional Rounds/Family Conf  Outcome: Ongoing (interventions implemented as appropriate)   03/13/19 0033   Interdisciplinary Rounds/Family Conf   Participants respiratory therapy   Trying to work with patient on wearing bipap

## 2019-03-14 ENCOUNTER — APPOINTMENT (OUTPATIENT)
Dept: GENERAL RADIOLOGY | Facility: HOSPITAL | Age: 79
End: 2019-03-14

## 2019-03-14 LAB
ARTERIAL PATENCY WRIST A: POSITIVE
ATMOSPHERIC PRESS: 747.5 MMHG
BASE EXCESS BLDA CALC-SCNC: 10.6 MMOL/L (ref 0–2)
BDY SITE: ABNORMAL
DEPRECATED RDW RBC AUTO: 52.7 FL (ref 37–54)
ERYTHROCYTE [DISTWIDTH] IN BLOOD BY AUTOMATED COUNT: 14.5 % (ref 12.3–15.4)
GAS FLOW AIRWAY: 10 LPM
GLUCOSE BLDC GLUCOMTR-MCNC: 122 MG/DL (ref 70–130)
GLUCOSE BLDC GLUCOMTR-MCNC: 159 MG/DL (ref 70–130)
GLUCOSE BLDC GLUCOMTR-MCNC: 85 MG/DL (ref 70–130)
GLUCOSE BLDC GLUCOMTR-MCNC: 88 MG/DL (ref 70–130)
HCO3 BLDA-SCNC: 38.1 MMOL/L (ref 22–28)
HCT VFR BLD AUTO: 44.6 % (ref 37.5–51)
HGB BLD-MCNC: 13.4 G/DL (ref 13–17.7)
MAGNESIUM SERPL-MCNC: 2.2 MG/DL (ref 1.6–2.4)
MCH RBC QN AUTO: 29.6 PG (ref 26.6–33)
MCHC RBC AUTO-ENTMCNC: 30 G/DL (ref 31.5–35.7)
MCV RBC AUTO: 98.7 FL (ref 79–97)
MODALITY: ABNORMAL
PCO2 BLDA: 59.7 MM HG (ref 35–45)
PH BLDA: 7.41 PH UNITS (ref 7.35–7.45)
PLATELET # BLD AUTO: 242 10*3/MM3 (ref 140–450)
PMV BLD AUTO: 10.6 FL (ref 6–12)
PO2 BLDA: 60.9 MM HG (ref 80–100)
POTASSIUM BLD-SCNC: 3.7 MMOL/L (ref 3.5–5.2)
PROCALCITONIN SERPL-MCNC: 0.04 NG/ML (ref 0.1–0.25)
RBC # BLD AUTO: 4.52 10*6/MM3 (ref 4.14–5.8)
SAO2 % BLDCOA: 90.3 % (ref 92–99)
TOTAL RATE: 20 BREATHS/MINUTE
TROPONIN T SERPL-MCNC: <0.01 NG/ML (ref 0–0.03)
TSH SERPL DL<=0.05 MIU/L-ACNC: 4.55 MIU/ML (ref 0.27–4.2)
WBC NRBC COR # BLD: 9.04 10*3/MM3 (ref 3.4–10.8)

## 2019-03-14 PROCEDURE — 84132 ASSAY OF SERUM POTASSIUM: CPT | Performed by: INTERNAL MEDICINE

## 2019-03-14 PROCEDURE — 63710000001 INSULIN LISPRO (HUMAN) PER 5 UNITS: Performed by: INTERNAL MEDICINE

## 2019-03-14 PROCEDURE — 25010000002 CEFEPIME PER 500 MG: Performed by: INTERNAL MEDICINE

## 2019-03-14 PROCEDURE — 94799 UNLISTED PULMONARY SVC/PX: CPT

## 2019-03-14 PROCEDURE — 87070 CULTURE OTHR SPECIMN AEROBIC: CPT | Performed by: INTERNAL MEDICINE

## 2019-03-14 PROCEDURE — 87081 CULTURE SCREEN ONLY: CPT | Performed by: INTERNAL MEDICINE

## 2019-03-14 PROCEDURE — 82962 GLUCOSE BLOOD TEST: CPT

## 2019-03-14 PROCEDURE — 25010000002 VANCOMYCIN 10 G RECONSTITUTED SOLUTION: Performed by: INTERNAL MEDICINE

## 2019-03-14 PROCEDURE — 87147 CULTURE TYPE IMMUNOLOGIC: CPT | Performed by: INTERNAL MEDICINE

## 2019-03-14 PROCEDURE — 85027 COMPLETE CBC AUTOMATED: CPT | Performed by: INTERNAL MEDICINE

## 2019-03-14 PROCEDURE — 83735 ASSAY OF MAGNESIUM: CPT | Performed by: INTERNAL MEDICINE

## 2019-03-14 PROCEDURE — 87186 SC STD MICRODIL/AGAR DIL: CPT | Performed by: INTERNAL MEDICINE

## 2019-03-14 PROCEDURE — 82803 BLOOD GASES ANY COMBINATION: CPT

## 2019-03-14 PROCEDURE — 71045 X-RAY EXAM CHEST 1 VIEW: CPT

## 2019-03-14 PROCEDURE — 36600 WITHDRAWAL OF ARTERIAL BLOOD: CPT

## 2019-03-14 PROCEDURE — 84443 ASSAY THYROID STIM HORMONE: CPT | Performed by: INTERNAL MEDICINE

## 2019-03-14 PROCEDURE — 84484 ASSAY OF TROPONIN QUANT: CPT | Performed by: INTERNAL MEDICINE

## 2019-03-14 PROCEDURE — 97110 THERAPEUTIC EXERCISES: CPT

## 2019-03-14 PROCEDURE — 87205 SMEAR GRAM STAIN: CPT | Performed by: INTERNAL MEDICINE

## 2019-03-14 PROCEDURE — 84145 PROCALCITONIN (PCT): CPT | Performed by: INTERNAL MEDICINE

## 2019-03-14 RX ADMIN — HYDROCORTISONE: 1 CREAM TOPICAL at 10:12

## 2019-03-14 RX ADMIN — IPRATROPIUM BROMIDE AND ALBUTEROL SULFATE 3 ML: 2.5; .5 SOLUTION RESPIRATORY (INHALATION) at 08:30

## 2019-03-14 RX ADMIN — IPRATROPIUM BROMIDE AND ALBUTEROL SULFATE 3 ML: 2.5; .5 SOLUTION RESPIRATORY (INHALATION) at 12:55

## 2019-03-14 RX ADMIN — LANSOPRAZOLE 30 MG: KIT at 10:08

## 2019-03-14 RX ADMIN — RIVAROXABAN 20 MG: 20 TABLET, FILM COATED ORAL at 17:32

## 2019-03-14 RX ADMIN — VANCOMYCIN HYDROCHLORIDE 1500 MG: 10 INJECTION, POWDER, LYOPHILIZED, FOR SOLUTION INTRAVENOUS at 18:25

## 2019-03-14 RX ADMIN — KETOTIFEN FUMARATE 1 DROP: 0.35 SOLUTION/ DROPS OPHTHALMIC at 10:12

## 2019-03-14 RX ADMIN — HYDROCORTISONE: 1 CREAM TOPICAL at 21:31

## 2019-03-14 RX ADMIN — SODIUM CHLORIDE, PRESERVATIVE FREE 10 ML: 5 INJECTION INTRAVENOUS at 10:13

## 2019-03-14 RX ADMIN — ASPIRIN 81 MG: 81 TABLET, DELAYED RELEASE ORAL at 10:07

## 2019-03-14 RX ADMIN — FUROSEMIDE 80 MG: 80 TABLET ORAL at 10:07

## 2019-03-14 RX ADMIN — INSULIN LISPRO 2 UNITS: 100 INJECTION, SOLUTION INTRAVENOUS; SUBCUTANEOUS at 14:42

## 2019-03-14 RX ADMIN — KETOTIFEN FUMARATE 1 DROP: 0.35 SOLUTION/ DROPS OPHTHALMIC at 21:31

## 2019-03-14 RX ADMIN — PREGABALIN 75 MG: 75 CAPSULE ORAL at 21:31

## 2019-03-14 RX ADMIN — POTASSIUM CHLORIDE 20 MEQ: 750 CAPSULE, EXTENDED RELEASE ORAL at 10:13

## 2019-03-14 RX ADMIN — Medication 50 MG: at 10:07

## 2019-03-14 RX ADMIN — SODIUM CHLORIDE, PRESERVATIVE FREE 10 ML: 5 INJECTION INTRAVENOUS at 21:32

## 2019-03-14 RX ADMIN — CEFEPIME HYDROCHLORIDE 2 G: 2 INJECTION, POWDER, FOR SOLUTION INTRAVENOUS at 10:08

## 2019-03-14 RX ADMIN — PREGABALIN 75 MG: 75 CAPSULE ORAL at 10:06

## 2019-03-14 RX ADMIN — VANCOMYCIN HYDROCHLORIDE 2000 MG: 10 INJECTION, POWDER, LYOPHILIZED, FOR SOLUTION INTRAVENOUS at 07:03

## 2019-03-14 RX ADMIN — ATORVASTATIN CALCIUM 10 MG: 10 TABLET, FILM COATED ORAL at 10:11

## 2019-03-14 RX ADMIN — IPRATROPIUM BROMIDE AND ALBUTEROL SULFATE 3 ML: 2.5; .5 SOLUTION RESPIRATORY (INHALATION) at 20:11

## 2019-03-14 RX ADMIN — IPRATROPIUM BROMIDE AND ALBUTEROL SULFATE 3 ML: 2.5; .5 SOLUTION RESPIRATORY (INHALATION) at 17:13

## 2019-03-14 RX ADMIN — CEFEPIME HYDROCHLORIDE 2 G: 2 INJECTION, POWDER, FOR SOLUTION INTRAVENOUS at 17:32

## 2019-03-14 NOTE — PLAN OF CARE
Problem: Patient Care Overview  Goal: Plan of Care Review  Outcome: Ongoing (interventions implemented as appropriate)   03/14/19 8179   Coping/Psychosocial   Plan of Care Reviewed With patient   Plan of Care Review   Progress no change   OTHER   Outcome Summary Patient alert. Patient on tube feedings boluses. Peg tube dressing changed. Dr. Merrill consulted per family request. He verbalized that he would not be seeing patient in the hospital to do any interventions, that Dr. Jimenez could call him. Vital signs are stable. No s/s of distress at this time. IV antibiotics given. Will continue to monitor.        Problem: Fall Risk (Adult)  Goal: Absence of Fall  Outcome: Ongoing (interventions implemented as appropriate)      Problem: Breathing Pattern Ineffective (Adult)  Goal: Effective Oxygenation/Ventilation  Outcome: Ongoing (interventions implemented as appropriate)      Problem: Nutrition, Enteral (Adult)  Goal: Signs and Symptoms of Listed Potential Problems Will be Absent, Minimized or Managed (Nutrition, Enteral)  Outcome: Ongoing (interventions implemented as appropriate)      Problem: Skin Injury Risk (Adult)  Goal: Skin Health and Integrity  Outcome: Ongoing (interventions implemented as appropriate)

## 2019-03-14 NOTE — THERAPY TREATMENT NOTE
Acute Care - Physical Therapy Treatment Note  [facility]     Patient Name: Alessio Allen  : 1940  MRN: [id number]  Today's Date: 3/14/2019  Onset of Illness/Injury or Date of Surgery: 19  Date of Referral to PT: 19  Referring Physician: David    Admit Date: 2019    Visit Dx:    ICD-10-CM ICD-9-CM   1. Acute respiratory failure with hypoxia (CMS/[facility]) J96.01 518.81   2. Impaired mobility Z74.09 799.89   3. AF (paroxysmal atrial fibrillation) (CMS/[facility]) I48.0 427.31     Patient Active Problem List   Diagnosis   • A-fib (CMS/[facility])   • Dyslipidemia   • BP (high blood pressure)   • Neuropathy   • Hypertension   • COPD (chronic obstructive pulmonary disease) (CMS/[facility])   • BPH (benign prostatic hypertrophy)   • Atrial fibrillation (CMS/[facility])   • Asthma   • Hypoxia   • Pneumonia   • Chronic anticoagulation   • Pneumonia of both lungs due to infectious organism   • Hyponatremia   • COPD exacerbation (CMS/[facility])   • Acute on chronic respiratory failure with hypoxia (CMS/[facility])   • Pneumonia of both lower lobes due to infectious organism (CMS/[facility])   • Acute on chronic diastolic heart failure (CMS/[facility])   • IPF (idiopathic pulmonary fibrosis) (CMS/[facility])   • Acute respiratory failure with hypoxia (CMS/[facility])   • Attention to G-tube (CMS/[facility])       Therapy Treatment    Rehabilitation Treatment Summary     Row Name 19 1448             Treatment Time/Intention    Discipline  physical therapy assistant  -CW      Document Type  therapy note (daily note)  -CW      Subjective Information  no complaints  -CW      Mode of Treatment  physical therapy  -CW      Therapy Frequency (PT Clinical Impression)  daily  -CW      Patient Effort  good  -CW      Existing Precautions/Restrictions  fall;oxygen therapy device and L/min  -CW      Recorded by [CW] Cesario Kwon PTA 19 1353      Row Name 19 1303             Vital Signs    O2 Delivery Pre Treatment  hi-flow  -CW      O2 Delivery Intra Treatment   hi-flow  -CW      O2 Delivery Post Treatment  hi-flow  -CW      Recorded by [CW] Cesario Kwon, PTA 03/14/19 1353      Row Name 03/14/19 1303             Cognitive Assessment/Intervention- PT/OT    Orientation Status (Cognition)  oriented to;person;place  -CW      Follows Commands (Cognition)  WNL  -CW      Safety Deficit (Cognitive)  mild deficit  -CW      Personal Safety Interventions  fall prevention program maintained;gait belt;muscle strengthening facilitated;nonskid shoes/slippers when out of bed  -CW      Recorded by [CW] Cesario Kwon, PTA 03/14/19 1353      Row Name 03/14/19 1303             Bed Mobility Assessment/Treatment    Bed Mobility Assessment/Treatment  supine-sit  -CW      Supine-Sit Davison (Bed Mobility)  supervision  -CW      Comment (Bed Mobility)  up to commode  -CW      Recorded by [CW] Cesario Kwon, PTA 03/14/19 1353      Row Name 03/14/19 1303             Transfer Assessment/Treatment    Transfer Assessment/Treatment  sit-stand transfer;stand-sit transfer  -CW      Recorded by [CW] Cesario Kwon, PTA 03/14/19 1353      Row Name 03/14/19 1303             Sit-Stand Transfer    Sit-Stand Davison (Transfers)  stand by assist  -CW      Assistive Device (Sit-Stand Transfers)  walker, front-wheeled  -CW      Recorded by [CW] Cesario Kwon, PTA 03/14/19 1353      Row Name 03/14/19 1303             Stand-Sit Transfer    Stand-Sit Davison (Transfers)  stand by assist  -CW      Assistive Device (Stand-Sit Transfers)  walker, front-wheeled  -CW      Recorded by [CW] Cesario Kwon, PTA 03/14/19 1353      Row Name 03/14/19 1303             Gait/Stairs Assessment/Training    Davison Level (Gait)  contact guard  -CW      Assistive Device (Gait)  walker, front-wheeled  -CW      Distance in Feet (Gait)  80  -CW      Pattern (Gait)  step-through  -CW      Deviations/Abnormal Patterns (Gait)  gait speed decreased;festinating/shuffling  -CW       Bilateral Gait Deviations  forward flexed posture;heel strike decreased  -CW      Recorded by [CW] Cesario Kwon, PTA 03/14/19 1353      Row Name 03/14/19 1303             Positioning and Restraints    Pre-Treatment Position  in bed  -CW      Post Treatment Position  bathroom  -CW      Bathroom  notified nsg;sitting;call light within reach;encouraged to call for assist;with family/caregiver  -CW      Recorded by [CW] Cesario Kwon, PTA 03/14/19 1353      Row Name                Wound 03/06/19 1003 other (see notes) incision    Wound - Properties Group Date first assessed: 03/06/19 [MONTANA] Time first assessed: 1003 [MONTANA] Location: other (see notes) [MONTANA] Type: incision [MONTANA] Recorded by:  [MONTANA] Sanket Jaimes RN 03/06/19 1003    Row Name 03/14/19 1303             Outcome Summary/Treatment Plan (PT)    Anticipated Discharge Disposition (PT)  skilled nursing facility  -CW      Recorded by [CW] Cesario Kwon, PTA 03/14/19 1353        User Key  (r) = Recorded By, (t) = Taken By, (c) = Cosigned By    Initials Name Effective Dates Discipline    MONTANA Sanket Jaimes RN 06/16/16 -  Nurse    CW Cesario Kwon, PTA 03/07/18 -  PT          Rash 03/08/19 2000 Right arm (Active)   Distribution localized 3/14/2019 10:00 AM   Borders raised 3/14/2019 10:00 AM   Characteristics itching 3/14/2019 10:00 AM   Color pink;red 3/14/2019 10:00 AM   Care, Rash other (see comments) 3/14/2019 12:43 AM       Wound 03/06/19 1003 other (see notes) incision (Active)   Closure Open to air 3/14/2019 10:00 AM   Drainage Amount none 3/14/2019 10:00 AM   Dressing Care, Wound open to air 3/14/2019 10:00 AM           Physical Therapy Education     Title: PT OT SLP Therapies (Done)     Topic: Physical Therapy (Done)     Point: Mobility training (Done)     Learning Progress Summary           Patient Acceptance, E,TB, VU,DU by CW at 3/14/2019  1:53 PM    Acceptance, E, NR by AR at 3/13/2019  2:52 PM    Acceptance, E,TB, VU by MS at  3/12/2019 12:58 PM    Acceptance, E,TB, VU,DU by CW at 3/12/2019  9:58 AM    Acceptance, TB,E, VU,DU by CW at 3/11/2019  2:02 PM    Acceptance, E,TB, VU,DU by CW at 3/10/2019 11:14 AM    Acceptance, E,TB, VU,DU by CW at 3/9/2019 11:13 AM    Acceptance, E,TB, VU,DU by CW at 3/8/2019 11:21 AM    Acceptance, E, VU by WANDER at 3/7/2019 11:47 AM    Acceptance, D,E, NR by CW at 3/6/2019  4:52 PM    Acceptance, E, NR by LH at 3/5/2019  1:08 PM                   Point: Home exercise program (Done)     Learning Progress Summary           Patient Acceptance, E,TB, VU,DU by CW at 3/14/2019  1:53 PM    Acceptance, E, NR by AR at 3/13/2019  2:52 PM    Acceptance, E,TB, VU by MS at 3/12/2019 12:58 PM    Acceptance, E,TB, VU,DU by CW at 3/12/2019  9:58 AM    Acceptance, TB,E, VU,DU by CW at 3/11/2019  2:02 PM    Acceptance, E,TB, VU,DU by CW at 3/10/2019 11:14 AM    Acceptance, E,TB, VU,DU by CW at 3/9/2019 11:13 AM    Acceptance, E,TB, VU,DU by CW at 3/8/2019 11:21 AM    Acceptance, E, VU by WANDER at 3/7/2019 11:47 AM    Acceptance, D,E, NR by CW at 3/6/2019  4:52 PM    Acceptance, E, NR by LH at 3/5/2019  1:08 PM                   Point: Body mechanics (Done)     Learning Progress Summary           Patient Acceptance, E,TB, VU,DU by CW at 3/14/2019  1:53 PM    Acceptance, E, NR by AR at 3/13/2019  2:52 PM    Acceptance, E,TB, VU,DU by CW at 3/12/2019  9:58 AM    Acceptance, TB,E, VU,DU by CW at 3/11/2019  2:02 PM    Acceptance, E,TB, VU,DU by CW at 3/10/2019 11:14 AM    Acceptance, E,TB, VU,DU by CW at 3/9/2019 11:13 AM    Acceptance, E,TB, VU,DU by CW at 3/8/2019 11:21 AM    Acceptance, E, VU by KH at 3/7/2019 11:47 AM    Acceptance, D,E, NR by CW at 3/6/2019  4:52 PM    Acceptance, E, NR by  at 3/5/2019  1:08 PM                   Point: Precautions (Done)     Learning Progress Summary           Patient Acceptance, E,TB, VU,DU by CW at 3/14/2019  1:53 PM    Acceptance, E, NR by AR at 3/13/2019  2:52 PM    Acceptance, E,TB, VU by MS  at 3/12/2019 12:58 PM    Acceptance, E,TB, VU,DU by CW at 3/12/2019  9:58 AM    Acceptance, TB,E, VU,DU by CW at 3/11/2019  2:02 PM    Acceptance, E,TB, VU,DU by CW at 3/10/2019 11:14 AM    Acceptance, E,TB, VU,DU by CW at 3/9/2019 11:13 AM    Acceptance, E,TB, VU,DU by CW at 3/8/2019 11:21 AM    Acceptance, E, VU by  at 3/7/2019 11:47 AM    Acceptance, D,E, NR by CW at 3/6/2019  4:52 PM    Acceptance, E, NR by  at 3/5/2019  1:08 PM                               User Key     Initials Effective Dates Name Provider Type Discipline     02/05/19 -  Leigh Woodard, PT Physical Therapist PT     04/03/18 -  Jaida Porter, PT Physical Therapist PT    MS 02/23/17 -  Juventino Contreras, RN Registered Nurse Nurse    AR 04/03/18 -  Iris Irby, PT Physical Therapist PT     03/07/18 -  Cesario Kwon, PTA Physical Therapy Assistant PT                PT Recommendation and Plan  Anticipated Discharge Disposition (PT): skilled nursing facility  Therapy Frequency (PT Clinical Impression): daily  Outcome Summary/Treatment Plan (PT)  Anticipated Discharge Disposition (PT): skilled nursing facility  Plan of Care Reviewed With: patient  Progress: improving  Outcome Summary: Pt increasing with strength and balance with use of bed mobility and transfer to [facility] and increased amb safety  Outcome Measures     Row Name 03/14/19 1300 03/13/19 1400 03/12/19 0900       How much help from another person do you currently need...    Turning from your back to your side while in flat bed without using bedrails?  4  -CW  4  -AR  3  -CW    Moving from lying on back to sitting on the side of a flat bed without bedrails?  4  -CW  3  -AR  3  -CW    Moving to and from a bed to a chair (including a wheelchair)?  3  -CW  3  -AR  3  -CW    Standing up from a chair using your arms (e.g., wheelchair, bedside chair)?  3  -CW  3  -AR  3  -CW    Climbing 3-5 steps with a railing?  2  -CW  2  -AR  2  -CW    To walk in hospital room?  3   -CW  3  -AR  3  -CW    AM-PAC 6 Clicks Score  19  -CW  18  -AR  17  -CW       Functional Assessment    Outcome Measure Options  AM-PAC 6 Clicks Basic Mobility (PT)  -CW  --  AM-PAC 6 Clicks Basic Mobility (PT)  -CW    Row Name 03/11/19 1400             How much help from another person do you currently need...    Turning from your back to your side while in flat bed without using bedrails?  3  -CW      Moving from lying on back to sitting on the side of a flat bed without bedrails?  3  -CW      Moving to and from a bed to a chair (including a wheelchair)?  3  -CW      Standing up from a chair using your arms (e.g., wheelchair, bedside chair)?  3  -CW      Climbing 3-5 steps with a railing?  2  -CW      To walk in hospital room?  3  -CW      AM-PAC 6 Clicks Score  17  -CW         Functional Assessment    Outcome Measure Options  AM-PAC 6 Clicks Basic Mobility (PT)  -CW        User Key  (r) = Recorded By, (t) = Taken By, (c) = Cosigned By    Initials Name Provider Type    AR Iris Irby, PT Physical Therapist    CW Cesario Kwon PTA Physical Therapy Assistant         Time Calculation:   PT Charges     Row Name 03/14/19 1354             Time Calculation    Start Time  1330  -CW      Stop Time  1354  -CW      Time Calculation (min)  24 min  -CW      PT Received On  03/14/19  -CW      PT - Next Appointment  03/15/19  -CW        User Key  (r) = Recorded By, (t) = Taken By, (c) = Cosigned By    Initials Name Provider Type    Cesario Rivera PTA Physical Therapy Assistant        Therapy Suggested Charges     Code   Minutes Charges    None           Therapy Charges for Today     Code Description Service Date Service Provider Modifiers Qty    77511899178 HC PT THER PROC EA 15 MIN 3/14/2019 Cesario Kwon PTA GP 2          PT G-Codes  Outcome Measure Options: AM-PAC 6 Clicks Basic Mobility (PT)  AM-PAC 6 Clicks Score: 19    Cesario Kwon PTA  3/14/2019

## 2019-03-14 NOTE — PROGRESS NOTES
Dr. YVROSE Jimenez    [address]    3/14/2019    Patient ID:  Name:  Alessio Allen  MRN:  [id number]  1940  78 y.o.  male            CC/Reason for visit: Acute on chronic respiratory failure and other medical problems listed below    HPI: pt is not accepting diagnosis of throat cancer from Dr Merirll, his ENT. He has stated nothing to do during this hospital stay. Pt tried BIPAP last night and liked it better than Trilogy ventilator and wants BIPAP now. Still very SOA at baseline, but this is chronic. On 8 liters O2 at home usually    ROS: No fever, no sputum    Vitals:  Vitals:    03/14/19 1303 03/14/19 1500 03/14/19 1713 03/14/19 1720   BP:  113/72     BP Location:       Patient Position:       Pulse: 87 79  80   Resp: 18 18 16 16   Temp:  98 °F (36.7 °C)     TempSrc:  Oral     SpO2: 93% 91% (!) 88% 90%   Weight:       Height:         FiO2 (%): 65 %     Body mass index is 28.98 kg/m².    Intake/Output Summary (Last 24 hours) at 3/14/2019 1824  Last data filed at 3/14/2019 1751  Gross per 24 hour   Intake 2316 ml   Output 1600 ml   Net 716 ml       Exam:  GEN:               No distress  Alert, oriented x 3.   LUNGS:           Barrel chest, distant and diminished breath sounds bilaterally but overall clear breath sounds bilat, nonlabored breathing  CV:                  Normal S1S2, without murmur, no edema  ABD:                Non tender, no enlarged liver or masses      Scheduled meds:    aspirin 81 mg Oral Daily   atorvastatin 10 mg Oral Daily   cefepime 2 g Intravenous Q8H   furosemide 80 mg Oral Daily With Breakfast   hydrocortisone  Topical Q12H   insulin lispro 0-9 Units Subcutaneous 4x Daily With Meals & Nightly   ipratropium-albuterol 3 mL Nebulization 4x Daily - RT   ketotifen 1 drop Both Eyes BID   lansoprazole 30 mg Per PEG Tube Q AM   potassium chloride 20 mEq Oral Daily   pregabalin 75 mg Oral Q12H   rivaroxaban 20 mg Oral Daily With Dinner   sodium chloride 10 mL Intravenous Q12H    vancomycin 15 mg/kg Intravenous Q12H   pyridoxine 50 mg Oral Daily     IV meds:                        lactated ringers 9 mL/hr Last Rate: Stopped (03/06/19 1042)   Pharmacy to dose vancomycin         Data Review:   I reviewed the patient's medications and new clinical results.  Lab Results   Component Value Date    CALCIUM 9.5 03/12/2019    PHOS 3.4 03/12/2019    MG 2.2 03/14/2019    MG 2.1 03/12/2019    MG 2.1 03/11/2019     Results from last 7 days   Lab Units 03/14/19  0617 03/14/19  0350 03/12/19  0414 03/11/19  0515 03/10/19  0432   SODIUM mmol/L  --   --  150* 149* 150*   POTASSIUM mmol/L  --  3.7 4.5 3.7 3.5   CHLORIDE mmol/L  --   --  106 105 106   CO2 mmol/L  --   --  30.8* 33.6* 33.7*   BUN mg/dL  --   --  34* 33* 35*   CREATININE mg/dL  --   --  1.11 1.01 0.95   CALCIUM mg/dL  --   --  9.5 9.7 9.7   GLUCOSE mg/dL  --   --  141* 107* 105*   WBC 10*3/mm3 9.04  --   --  11.61* 12.02*   HEMOGLOBIN g/dL 13.4  --   --  13.9 13.7   PLATELETS 10*3/mm3 242  --   --  244 241   INR   --   --   --   --  1.63*   PROBNP pg/mL  --   --  1,024.0 1,004.0 1,148.0   PROCALCITONIN ng/mL  --  0.04* 0.09* 0.04*  --                Results from last 7 days   Lab Units 03/14/19  0350   TROPONIN T ng/mL <0.010     Results from last 7 days   Lab Units 03/14/19  0413 03/10/19  1019   PH, ARTERIAL pH units 7.412 7.448   PCO2, ARTERIAL mm Hg 59.7* 49.5*   PO2 ART mm Hg 60.9* 56.2*   FLOW RATE lpm 10 9   MODALITY  HFNC HFNC   O2 SATURATION CALC % 90.3* 89.5*           ASSESSMENT:   Hypernatremia  Acute respiratory failure on chronic respiratory failure  Acute decompensated diastolic HF  COPD, no obvious exacerbation  Throat cancer s/p chemo RT  Dysphagia - PEG  Afib, on AC  CAD  Obesity    Attention to G-tube (CMS/HCC)        PLAN:  Difficult social situation. Pt is in denial about cancer diagnosis and seems to be driven by his girlfriend questioning the diagnosis. They want a second opinion other than Dr Merrill from ENT who states  he will not address it during this hospital stay.  From the pulmonary standpoint he is too high risk for undergoing general anesthesia and cannot have any surgical intervention at this time, and I doubt at any time in the future due to his severe COPD and respiratory failure.  Continue BIPAP which he likes better than Trilogy and send him to SNF tomorrow.   I advised him and girlfriend to consult a separate ENT physician as an outpatient for a second opinion about his recently diagnosed throat cancer.  Spent 40 min of total care, 30 min of which was spent coordinating care and counseling pt and sig other.      Kalpesh Jimenez MD  3/14/2019

## 2019-03-14 NOTE — PROGRESS NOTES
Continued Stay Note  [facility]     Patient Name: Alessio Allen  MRN: [id number]  Today's Date: 3/14/2019    Admit Date: 2/28/2019    Discharge Plan     Row Name 03/14/19 1037       Plan    Plan  Plan [facility] for [facility].   JARON Juarez RN    Patient/Family in Agreement with Plan  yes    Plan Comments  Pt currently on 6 L O2.  Per Erica  ( [phone]) [facility] would [facility] bed on 3/15.  Plan [facility] for skilled care.  JARON Juarez RN        Discharge Codes    No documentation.             Mahsa Juarez, RN

## 2019-03-14 NOTE — PLAN OF CARE
Problem: Patient Care Overview  Goal: Plan of Care Review  Outcome: Ongoing (interventions implemented as appropriate)   03/14/19 0723   Coping/Psychosocial   Plan of Care Reviewed With patient   Plan of Care Review   Progress improving   OTHER   Outcome Summary Pt. VS WNL. Pt. sats drop with excertion has been anywhere from 8 to 10L O2 this shift. Pt. became increasingly SOA and had an 11 beat run of V-tach this a.m. MD notified, STAT labs, ABGs, and Cxray obtained, will notify MD of results. Pt. denies chest pain. Pt. becomes more confused when Sats drop. Pt. Sats at 90% on 8L NC at present. Pt. only wore BIpap for 2 hours tonight, states makes his mouth dry. Pt. had BM last night, with adequate UOP. Pt. resting comfortably at present, will continue to monitor closely.     Goal: Individualization and Mutuality  Outcome: Ongoing (interventions implemented as appropriate)    Goal: Discharge Needs Assessment  Outcome: Ongoing (interventions implemented as appropriate)      Problem: Fall Risk (Adult)  Goal: Absence of Fall  Outcome: Ongoing (interventions implemented as appropriate)      Problem: Breathing Pattern Ineffective (Adult)  Goal: Effective Oxygenation/Ventilation  Outcome: Ongoing (interventions implemented as appropriate)      Problem: Nutrition, Enteral (Adult)  Goal: Signs and Symptoms of Listed Potential Problems Will be Absent, Minimized or Managed (Nutrition, Enteral)  Outcome: Ongoing (interventions implemented as appropriate)      Problem: Skin Injury Risk (Adult)  Goal: Skin Health and Integrity  Outcome: Ongoing (interventions implemented as appropriate)

## 2019-03-14 NOTE — PLAN OF CARE
Problem: Patient Care Overview  Goal: Plan of Care Review  Outcome: Ongoing (interventions implemented as appropriate)   03/14/19 7996   Coping/Psychosocial   Plan of Care Reviewed With patient   Plan of Care Review   Progress improving   OTHER   Outcome Summary Pt increasing with strength and balance with use of bed mobility and transfer to [facility] and increased amb safety

## 2019-03-14 NOTE — PROGRESS NOTES
"Pharmacokinetic Consult - Vancomycin Dosing (Initial Note)    Alessio Allen has a consult for pharmacy to dose vancomycin x 10 days for pneumonia.  Pharmacy dosing vancomycin per Dr. Kong's request.   Goal trough: 15-20 mg/L     Other ABX: cefepime    Relevant clinical data and objective history reviewed:  78 y.o. male 188 cm (74\") 103 kg (227 lb 11.8 oz)    Past Medical History:   Diagnosis Date   • Acute on chronic diastolic heart failure (CMS/HCC)    • Arthritis    • Asthma    • Atrial fibrillation (CMS/HCC)    • BPH (benign prostatic hypertrophy)    • Cancer (CMS/HCC)     throat CA   • COPD (chronic obstructive pulmonary disease) (CMS/[facility])    • H/O Abnormal CBC 2017   • History of heart artery stent 03/2017   • Hyperlipidemia    • Hypertension    • Neuropathy     feet and hands    • Pneumonia      Creatinine   Date Value Ref Range Status   03/12/2019 1.11 0.76 - 1.27 mg/dL Final   03/11/2019 1.01 0.76 - 1.27 mg/dL Final   03/10/2019 0.95 0.76 - 1.27 mg/dL Final   05/31/2018 0.90 0.60 - 1.30 mg/dL Final     Comment:     Serial Number: [id number]:  [id number]     BUN   Date Value Ref Range Status   03/12/2019 34 (H) 8 - 23 mg/dL Final     Estimated Creatinine Clearance: 71.3 mL/min (by C-G formula based on SCr of 1.11 mg/dL).    Lab Results   Component Value Date    WBC 11.61 (H) 03/11/2019     Temp Readings from Last 3 Encounters:   03/13/19 96.6 °F (35.9 °C) (Oral)   09/28/18 98.2 °F (36.8 °C) (Oral)   06/01/18 97.7 °F (36.5 °C) (Oral)         Assessment/Plan  Will start vancomycin 1500mg IV q12h. Vancomycin level to be drawn with 4th dose. Pharmacy will continue to follow daily while on vancomycin and adjust as needed.     Erlinda Vidal, LT[facility], located within [facility]    "

## 2019-03-15 LAB
ANION GAP SERPL CALCULATED.3IONS-SCNC: 10.7 MMOL/L
B PARAPERT DNA SPEC QL NAA+PROBE: NOT DETECTED
B PERT DNA SPEC QL NAA+PROBE: NOT DETECTED
BASOPHILS # BLD AUTO: 0.03 10*3/MM3 (ref 0–0.2)
BASOPHILS NFR BLD AUTO: 0.3 % (ref 0–1.5)
BUN BLD-MCNC: 25 MG/DL (ref 8–23)
BUN/CREAT SERPL: 26.6 (ref 7–25)
C PNEUM DNA NPH QL NAA+NON-PROBE: NOT DETECTED
CALCIUM SPEC-SCNC: 8.7 MG/DL (ref 8.6–10.5)
CHLORIDE SERPL-SCNC: 107 MMOL/L (ref 98–107)
CO2 SERPL-SCNC: 32.3 MMOL/L (ref 22–29)
CREAT BLD-MCNC: 0.94 MG/DL (ref 0.76–1.27)
DEPRECATED RDW RBC AUTO: 53.9 FL (ref 37–54)
EOSINOPHIL # BLD AUTO: 0.4 10*3/MM3 (ref 0–0.4)
EOSINOPHIL NFR BLD AUTO: 3.4 % (ref 0.3–6.2)
ERYTHROCYTE [DISTWIDTH] IN BLOOD BY AUTOMATED COUNT: 14.6 % (ref 12.3–15.4)
FLUAV H1 2009 PAND RNA NPH QL NAA+PROBE: NOT DETECTED
FLUAV H1 HA GENE NPH QL NAA+PROBE: NOT DETECTED
FLUAV H3 RNA NPH QL NAA+PROBE: NOT DETECTED
FLUAV SUBTYP SPEC NAA+PROBE: NOT DETECTED
FLUBV RNA ISLT QL NAA+PROBE: NOT DETECTED
GFR SERPL CREATININE-BSD FRML MDRD: 78 ML/MIN/1.73
GLUCOSE BLD-MCNC: 132 MG/DL (ref 65–99)
GLUCOSE BLDC GLUCOMTR-MCNC: 118 MG/DL (ref 70–130)
GLUCOSE BLDC GLUCOMTR-MCNC: 125 MG/DL (ref 70–130)
GLUCOSE BLDC GLUCOMTR-MCNC: 139 MG/DL (ref 70–130)
GLUCOSE BLDC GLUCOMTR-MCNC: 99 MG/DL (ref 70–130)
HADV DNA SPEC NAA+PROBE: NOT DETECTED
HCOV 229E RNA SPEC QL NAA+PROBE: NOT DETECTED
HCOV HKU1 RNA SPEC QL NAA+PROBE: NOT DETECTED
HCOV NL63 RNA SPEC QL NAA+PROBE: NOT DETECTED
HCOV OC43 RNA SPEC QL NAA+PROBE: NOT DETECTED
HCT VFR BLD AUTO: 45.8 % (ref 37.5–51)
HGB BLD-MCNC: 13.4 G/DL (ref 13–17.7)
HMPV RNA NPH QL NAA+NON-PROBE: NOT DETECTED
HPIV1 RNA SPEC QL NAA+PROBE: NOT DETECTED
HPIV2 RNA SPEC QL NAA+PROBE: NOT DETECTED
HPIV3 RNA NPH QL NAA+PROBE: NOT DETECTED
HPIV4 P GENE NPH QL NAA+PROBE: NOT DETECTED
IMM GRANULOCYTES # BLD AUTO: 0.05 10*3/MM3 (ref 0–0.05)
IMM GRANULOCYTES NFR BLD AUTO: 0.4 % (ref 0–0.5)
LYMPHOCYTES # BLD AUTO: 0.46 10*3/MM3 (ref 0.7–3.1)
LYMPHOCYTES NFR BLD AUTO: 3.9 % (ref 19.6–45.3)
M PNEUMO IGG SER IA-ACNC: NOT DETECTED
MCH RBC QN AUTO: 29.4 PG (ref 26.6–33)
MCHC RBC AUTO-ENTMCNC: 29.3 G/DL (ref 31.5–35.7)
MCV RBC AUTO: 100.4 FL (ref 79–97)
MONOCYTES # BLD AUTO: 0.45 10*3/MM3 (ref 0.1–0.9)
MONOCYTES NFR BLD AUTO: 3.8 % (ref 5–12)
MRSA SPEC QL CULT: ABNORMAL
NEUTROPHILS # BLD AUTO: 10.37 10*3/MM3 (ref 1.4–7)
NEUTROPHILS NFR BLD AUTO: 88.2 % (ref 42.7–76)
NRBC BLD AUTO-RTO: 0 /100 WBC (ref 0–0)
NT-PROBNP SERPL-MCNC: 1298 PG/ML (ref 5–1800)
PHOSPHATE SERPL-MCNC: 2 MG/DL (ref 2.5–4.5)
PLATELET # BLD AUTO: 249 10*3/MM3 (ref 140–450)
PMV BLD AUTO: 10.9 FL (ref 6–12)
POTASSIUM BLD-SCNC: 3.5 MMOL/L (ref 3.5–5.2)
PROCALCITONIN SERPL-MCNC: 0.08 NG/ML (ref 0.1–0.25)
RBC # BLD AUTO: 4.56 10*6/MM3 (ref 4.14–5.8)
RHINOVIRUS RNA SPEC NAA+PROBE: NOT DETECTED
RSV RNA NPH QL NAA+NON-PROBE: NOT DETECTED
SODIUM BLD-SCNC: 150 MMOL/L (ref 136–145)
WBC NRBC COR # BLD: 11.76 10*3/MM3 (ref 3.4–10.8)

## 2019-03-15 PROCEDURE — 87798 DETECT AGENT NOS DNA AMP: CPT | Performed by: INTERNAL MEDICINE

## 2019-03-15 PROCEDURE — 92526 ORAL FUNCTION THERAPY: CPT

## 2019-03-15 PROCEDURE — 94799 UNLISTED PULMONARY SVC/PX: CPT

## 2019-03-15 PROCEDURE — 83880 ASSAY OF NATRIURETIC PEPTIDE: CPT | Performed by: INTERNAL MEDICINE

## 2019-03-15 PROCEDURE — 87581 M.PNEUMON DNA AMP PROBE: CPT | Performed by: INTERNAL MEDICINE

## 2019-03-15 PROCEDURE — 80048 BASIC METABOLIC PNL TOTAL CA: CPT | Performed by: INTERNAL MEDICINE

## 2019-03-15 PROCEDURE — 84145 PROCALCITONIN (PCT): CPT | Performed by: INTERNAL MEDICINE

## 2019-03-15 PROCEDURE — 25010000002 CEFEPIME PER 500 MG: Performed by: INTERNAL MEDICINE

## 2019-03-15 PROCEDURE — 87486 CHLMYD PNEUM DNA AMP PROBE: CPT | Performed by: INTERNAL MEDICINE

## 2019-03-15 PROCEDURE — 25010000002 VANCOMYCIN 10 G RECONSTITUTED SOLUTION: Performed by: INTERNAL MEDICINE

## 2019-03-15 PROCEDURE — 97110 THERAPEUTIC EXERCISES: CPT

## 2019-03-15 PROCEDURE — 84100 ASSAY OF PHOSPHORUS: CPT | Performed by: INTERNAL MEDICINE

## 2019-03-15 PROCEDURE — 82962 GLUCOSE BLOOD TEST: CPT

## 2019-03-15 PROCEDURE — 87633 RESP VIRUS 12-25 TARGETS: CPT | Performed by: INTERNAL MEDICINE

## 2019-03-15 PROCEDURE — 85025 COMPLETE CBC W/AUTO DIFF WBC: CPT | Performed by: INTERNAL MEDICINE

## 2019-03-15 PROCEDURE — 99223 1ST HOSP IP/OBS HIGH 75: CPT | Performed by: INTERNAL MEDICINE

## 2019-03-15 RX ORDER — FUROSEMIDE 20 MG/1
20 TABLET ORAL
Status: DISCONTINUED | OUTPATIENT
Start: 2019-03-16 | End: 2019-03-20 | Stop reason: HOSPADM

## 2019-03-15 RX ADMIN — LANSOPRAZOLE 30 MG: KIT at 05:52

## 2019-03-15 RX ADMIN — IPRATROPIUM BROMIDE AND ALBUTEROL SULFATE 3 ML: 2.5; .5 SOLUTION RESPIRATORY (INHALATION) at 19:54

## 2019-03-15 RX ADMIN — KETOTIFEN FUMARATE 1 DROP: 0.35 SOLUTION/ DROPS OPHTHALMIC at 10:08

## 2019-03-15 RX ADMIN — IPRATROPIUM BROMIDE AND ALBUTEROL SULFATE 3 ML: 2.5; .5 SOLUTION RESPIRATORY (INHALATION) at 11:21

## 2019-03-15 RX ADMIN — ATORVASTATIN CALCIUM 10 MG: 10 TABLET, FILM COATED ORAL at 10:07

## 2019-03-15 RX ADMIN — HYDROCORTISONE: 1 CREAM TOPICAL at 21:27

## 2019-03-15 RX ADMIN — IPRATROPIUM BROMIDE AND ALBUTEROL SULFATE 3 ML: 2.5; .5 SOLUTION RESPIRATORY (INHALATION) at 15:38

## 2019-03-15 RX ADMIN — ASPIRIN 81 MG: 81 TABLET, DELAYED RELEASE ORAL at 10:08

## 2019-03-15 RX ADMIN — POTASSIUM CHLORIDE 20 MEQ: 750 CAPSULE, EXTENDED RELEASE ORAL at 10:07

## 2019-03-15 RX ADMIN — RIVAROXABAN 20 MG: 20 TABLET, FILM COATED ORAL at 20:10

## 2019-03-15 RX ADMIN — PREGABALIN 75 MG: 75 CAPSULE ORAL at 10:07

## 2019-03-15 RX ADMIN — SODIUM CHLORIDE, PRESERVATIVE FREE 10 ML: 5 INJECTION INTRAVENOUS at 10:08

## 2019-03-15 RX ADMIN — KETOTIFEN FUMARATE 1 DROP: 0.35 SOLUTION/ DROPS OPHTHALMIC at 21:27

## 2019-03-15 RX ADMIN — PREGABALIN 75 MG: 75 CAPSULE ORAL at 21:27

## 2019-03-15 RX ADMIN — CEFEPIME HYDROCHLORIDE 2 G: 2 INJECTION, POWDER, FOR SOLUTION INTRAVENOUS at 11:30

## 2019-03-15 RX ADMIN — IPRATROPIUM BROMIDE AND ALBUTEROL SULFATE 3 ML: 2.5; .5 SOLUTION RESPIRATORY (INHALATION) at 08:12

## 2019-03-15 RX ADMIN — VANCOMYCIN HYDROCHLORIDE 1500 MG: 10 INJECTION, POWDER, LYOPHILIZED, FOR SOLUTION INTRAVENOUS at 05:52

## 2019-03-15 RX ADMIN — CEFEPIME HYDROCHLORIDE 2 G: 2 INJECTION, POWDER, FOR SOLUTION INTRAVENOUS at 01:33

## 2019-03-15 RX ADMIN — HYDROCORTISONE: 1 CREAM TOPICAL at 10:08

## 2019-03-15 RX ADMIN — FUROSEMIDE 80 MG: 80 TABLET ORAL at 10:08

## 2019-03-15 RX ADMIN — Medication 50 MG: at 10:07

## 2019-03-15 RX ADMIN — SODIUM CHLORIDE, PRESERVATIVE FREE 10 ML: 5 INJECTION INTRAVENOUS at 21:30

## 2019-03-15 NOTE — CONSULTS
Referring Provider: Chani Kong MD  [address] [place] [address]  Reason for Consultation: Concern for pneumonia    Subjective   History of present illness: This is a 78-year-old male with severe COPD on chronic oxygen, throat cancer, hypertension, and heart failure who was admitted on February 28 with acute on chronic hypoxic respiratory failure.  Patient only uses 4 L of oxygen at home and up to 8 L on exertion.  There is concern for possible underlying interstitial lung disease.  Admission labs showed an elevated white blood cell count of 12,000 and a negative procalcitonin.  Chest x-ray revealed possible edema.  Despite diuresis he continued to be severely hypoxic.  He underwent CAT scan of the chest which showed bibasilar infiltrates.  The concern was for possible aspiration.  He underwent bronchoscopy on March 6 with cultures growing corynebacterium and group B strep.  Thick mucoid secretions were noted on bronchoscopy and the patient was empirically started on Levaquin.  Cytology revealed necrotizing inflammation and scattered eosinophils.  Pathology with suggestion of possible organizing pneumonia.  He underwent cardiac catheterization on March 7 with evidence of pulmonary hypertension most likely pulmonary in origin.  Patient's Levaquin was stopped on March 10.  He was empirically started on vancomycin and cefepime yesterday with concerns for recurrent pneumonia.  MRSA nasal swab was done and came back positive.  Sputum culture is negative to date     Throughout his hospital stay the patient has remained afebrile.  He    Patient has a history of throat cancer concerns about recurrent cancer.  He was to undergo radical neck dissection prior to his admission.    Currently the patient states his breathing is about the same.  He continues to have a cough which is unchanged from his baseline.  He had some congestion initially but this is now resolved.  No sore throat.  No abdominal pain nausea  vomiting or diarrhea.  No rashes or skin lesions.    Past Medical History:   Diagnosis Date   • Acute on chronic diastolic heart failure (CMS/HCC)    • Atrial fibrillation (CMS/HCC)    • BPH (benign prostatic hypertrophy)    • Cancer (CMS/HCC)     throat CA   • COPD (chronic obstructive pulmonary disease) (CMS/HCC)    • Hyperlipidemia    • Hypertension    • Neuropathy        Past Surgical History:   Procedure Laterality Date   • FRACTURE SURGERY     • PACEMAKER IMPLANTATION          reports that he quit smoking about 18 years ago. His smoking use included cigarettes. He has a 67.50 pack-year smoking history. he has never used smokeless tobacco. He reports that he does not drink alcohol or use drugs.    family history includes Heart attack in his father; Hypertension in his mother.    Allergies   Allergen Reactions   • Lisinopril Shortness Of Breath   • Penicillins Shortness Of Breath   • Sulfa Antibiotics Shortness Of Breath   • Atenolol Other (See Comments)     drowsiness   • Coreg [Carvedilol] Cough     SOA,   • Ibuprofen    • Celebrex [Celecoxib] Rash       Medication:  Antibiotics:  Vancomycin 1500 mg IV every 12 hours  Cefepime 2 g IV every 8 hours    Please refer to the medical record for a full medication list    Review of Systems  Pertinent items are noted in HPI, all other systems reviewed and negative    Objective   Vital Signs   Temp:  [96.5 °F (35.8 °C)-98.3 °F (36.8 °C)] 98.3 °F (36.8 °C)  Heart Rate:  [79-87] 82  Resp:  [16-21] 21  BP: (113-116)/(64-72) 113/72  94% on 8L HF    Physical Exam:   General: In no acute distress  HEENT: Normocephalic, atraumatic, PERRL, EOMI, no scleral icterus. Oropharynx is clear and moist  Neck: Supple, trachea is midline  Cardiovascular: Normal rate, regular rhythm, freedom S1 and S2, no murmurs, rubs, or gallops    Respiratory: Coarse breath sounds bilaterally worse at the bases, mild expiratory wheeze   GI: Abdomen is soft, non-tender, non-distended, positive bowel  sounds bilaterally, no masses  Musculoskeletal: Normal range of motion, no edema, tenderness or deformity  Skin: No rashes   Extremities: no E/C/C  Neurological: Alert and oriented, cranial nerves 2-12 intact, motor strength 5/5 in all four extremities  Psychiatric: Normal mood and affect     Results Review:   I reviewed the patient's new clinical results.  I reviewed the patient's new imaging results and agree with the interpretation.    Lab Results   Component Value Date    WBC 9.04 03/14/2019    HGB 13.4 03/14/2019    HCT 44.6 03/14/2019    MCV 98.7 (H) 03/14/2019     03/14/2019       Lab Results   Component Value Date    GLUCOSE 141 (H) 03/12/2019    BUN 34 (H) 03/12/2019    CREATININE 1.11 03/12/2019    EGFRIFNONA 64 03/12/2019    BCR 30.6 (H) 03/12/2019    CO2 30.8 (H) 03/12/2019    CALCIUM 9.5 03/12/2019    ALBUMIN 3.80 02/28/2019    AST 20 02/28/2019    ALT 12 02/28/2019     Procal 0.07 -> 0.04    Microbiology:  3/14 SCx ngtd  3/14 MRSA nasal swab positive  3/6 bronchoscopy bacterial cultures with skin growth of group B strep and corynebacterium        Fungal cultures negative to date        AFB cultures negative to date  2/28 BCx Neg x 2    Radiology:  Admission CAT scan personally reviewed by me shows right midlung opacity edema versus infection     3/2 CXR decreased density of the right mid lung opacity    3/3 CT of the soft tissues of the neck shows stable radiation changes in the hypopharynx suggestion of some subtle inflammation involving the right nasopharynx.  Near complete opacification of the left sphenoid sinus    3/3 Chest CT -CT of the chest shows development of bilateral lower lobe infiltrates, nodularity in the right upper lobe.  Findings suggestive of pulmonary hypertension    3/6 CXR unchanged bilateral lower lobe infiltrates    3/11 CXR decreased basilar infiltrate    3/14 CXR increased bibasilar opacities    Assessment/Plan   1.  Positive MRSA nasal swab  -Represents MRSA  colonization is not indicative of infection.  -The patient is an MRSA carrier which certainly puts him at a greater risk for MRSA infection    2.  Acute on chronic hypoxic respiratory failure patient seems to be more or less at his baseline  -Bronchoscopy cultures not growing significant pathogens  -At this time given the stability of his symptoms I doubt that he has pneumonia.  X-ray does not support this either.  His sputum culture is negative today.  Even if it is positive I will be cautious to treat him especially given a negative procalcitonin  -Pathology from bronchoscopy showing possible organizing pneumonia.  We will leave the decision whether to treat him with steroids up to pulmonary medicine  -Decision to initiate treatment for pneumonia should be based on the patient's worsening symptoms and consistent chest x-ray findings  -We will discontinue vancomycin and cefepime at this time    3.  Throat cancer with concern for recurrent disease    Thank you for this consult.  ID will sign off.  Please do not hesitate to call us with further questions or concerns    I discussed the patients findings and my recommendations with patient, family and consulting provider

## 2019-03-15 NOTE — PLAN OF CARE
Problem: Patient Care Overview  Goal: Plan of Care Review  Outcome: Ongoing (interventions implemented as appropriate)   03/15/19 1600   Coping/Psychosocial   Plan of Care Reviewed With patient   OTHER   Outcome Summary Reviewed swallowing recommendations and precautions with pt. Water protocol, thin water and ice chips sparingly for oral comfort. Pt verablized understanding of dysphagia and water protocol recommendations. ST to continue to follow for plan of care and education. Re-eval of swallow not warranted at this time secondary to severity of dysphagia.

## 2019-03-15 NOTE — THERAPY TREATMENT NOTE
Acute Care - Physical Therapy Treatment Note  [facility]     Patient Name: Alessio Allen  : 1940  MRN: [id number]  Today's Date: 3/15/2019  Onset of Illness/Injury or Date of Surgery: 19  Date of Referral to PT: 19  Referring Physician: David    Admit Date: 2019    Visit Dx:    ICD-10-CM ICD-9-CM   1. Acute respiratory failure with hypoxia (CMS/[facility]) J96.01 518.81   2. Impaired mobility Z74.09 799.89   3. AF (paroxysmal atrial fibrillation) (CMS/[facility]) I48.0 427.31     Patient Active Problem List   Diagnosis   • A-fib (CMS/[facility])   • Dyslipidemia   • BP (high blood pressure)   • Neuropathy   • Hypertension   • COPD (chronic obstructive pulmonary disease) (CMS/[facility])   • BPH (benign prostatic hypertrophy)   • Atrial fibrillation (CMS/[facility])   • Asthma   • Hypoxia   • Pneumonia   • Chronic anticoagulation   • Pneumonia of both lungs due to infectious organism   • Hyponatremia   • COPD exacerbation (CMS/[facility])   • Acute on chronic respiratory failure with hypoxia (CMS/[facility])   • Pneumonia of both lower lobes due to infectious organism (CMS/[facility])   • Acute on chronic diastolic heart failure (CMS/[facility])   • IPF (idiopathic pulmonary fibrosis) (CMS/[facility])   • Acute respiratory failure with hypoxia (CMS/[facility])   • Attention to G-tube (CMS/[facility])       Therapy Treatment    Rehabilitation Treatment Summary     Row Name 03/15/19 1602             Treatment Time/Intention    Discipline  physical therapist  -EM      Document Type  therapy note (daily note)  -EM      Subjective Information  no complaints  -EM      Mode of Treatment  physical therapy  -EM      Patient/Family Observations  girlfriend at bedside, patient sitting up in bed, awake and alert   -EM      Patient Effort  good  -EM      Existing Precautions/Restrictions  fall;oxygen therapy device and L/min 8L high flow   -EM      Recorded by [EM] Edna Guerrero, PT 03/15/19 160      Row Name 03/15/19 1604             Bed Mobility Assessment/Treatment     Supine-Sit [place] (Bed Mobility)  supervision  -EM      Assistive Device (Bed Mobility)  head of bed elevated  -EM      Recorded by [EM] Edna Guerrero, PT 03/15/19 1609      Row Name 03/15/19 1606             Sit-Stand Transfer    Sit-Stand [place] (Transfers)  supervision  -EM      Assistive Device (Sit-Stand Transfers)  walker, front-wheeled  -EM      Recorded by [EM] Edna Guerrero, PT 03/15/19 1609      Row Name 03/15/19 1606             Stand-Sit Transfer    Stand-Sit [place] (Transfers)  supervision  -EM      Assistive Device (Stand-Sit Transfers)  walker, front-wheeled  -EM      Recorded by [EM] Edna Guerrero, PT 03/15/19 1609      Row Name 03/15/19 1606             Gait/Stairs Assessment/Training    McLean Level (Gait)  contact guard  -EM      Assistive Device (Gait)  walker, front-wheeled  -EM      Distance in Feet (Gait)  100  -EM      Deviations/Abnormal Patterns (Gait)  stride length decreased;gait speed decreased  -EM      Bilateral Gait Deviations  forward flexed posture  -EM      Recorded by [EM] Edna Guerrero, PT 03/15/19 1609      Row Name 03/15/19 1606             Positioning and Restraints    Pre-Treatment Position  in bed  -EM      Post Treatment Position  bathroom  -EM      Bathroom  sitting;call light within reach;encouraged to call for assist;notified nsg;with family/caregiver  -EM      Recorded by [EM] Edna Guerrero, PT 03/15/19 1609      Row Name 03/15/19 1606             Pain Scale: Numbers Pre/Post-Treatment    Pain Scale: Numbers, Pretreatment  0/10 - no pain  -EM      Recorded by [EM] Edna Guerrero, PT 03/15/19 1609      Row Name                Wound 03/06/19 1003 other (see notes) incision    Wound - Properties Group Date first assessed: 03/06/19 [MONTANA] Time first assessed: 1003 [MONTANA] Location: other (see notes) [MONTANA] Type: incision [MONTANA] Recorded by:  [MONTANA] Sanket Jaimes RN 03/06/19 1003    Row Name 03/15/19 1606              Outcome Summary/Treatment Plan (PT)    Anticipated Discharge Disposition (PT)  skilled nursing facility  -EM      Recorded by [EM] Edna Guerrero, PT 03/15/19 1609        User Key  (r) = Recorded By, (t) = Taken By, (c) = Cosigned By    Initials Name Effective Dates Discipline    Sanket Evans RN 06/16/16 -  Nurse    EM Edna Guerrero, PT 04/03/18 -  PT          Rash 03/08/19 2000 Right arm (Active)   Color pink;red 3/15/2019 12:14 AM       Wound 03/06/19 1003 other (see notes) incision (Active)   Closure Open to air 3/15/2019  2:00 PM   Drainage Amount none 3/15/2019 12:14 AM   Dressing Care, Wound open to air 3/15/2019 12:14 AM       Rehab Goal Summary     Row Name 03/15/19 1600             Oral Nutrition/Hydration Goal 1 (SLP)    Progress/Outcomes (Oral Nutrition/Hydration Goal 1, SLP)  continuing progress toward goal;other (see comments) Recommend continue small sips water and ice chips sparingly  -OC        User Key  (r) = Recorded By, (t) = Taken By, (c) = Cosigned By    Initials Name Provider Type Discipline    Roxana Gabriel MA,CCC-SLP Speech and Language Pathologist SLP          Physical Therapy Education     Title: PT OT SLP Therapies (In Progress)     Topic: Physical Therapy (In Progress)     Point: Mobility training (In Progress)     Learning Progress Summary           Patient Acceptance, E, NR by EM at 3/15/2019  4:10 PM    Acceptance, E,TB, VU,DU by CW at 3/14/2019  1:53 PM    Acceptance, E, NR by AR at 3/13/2019  2:52 PM    Acceptance, E,TB, VU by MS at 3/12/2019 12:58 PM    Acceptance, E,TB, VU,DU by CW at 3/12/2019  9:58 AM    Acceptance, TB,E, VU,DU by CW at 3/11/2019  2:02 PM    Acceptance, E,TB, VU,DU by CW at 3/10/2019 11:14 AM    Acceptance, E,TB, VU,DU by CW at 3/9/2019 11:13 AM    Acceptance, E,TB, VU,DU by CW at 3/8/2019 11:21 AM    Acceptance, E, VU by KH at 3/7/2019 11:47 AM    Acceptance, LENOREE, NR by NIKITA at 3/6/2019  4:52 PM    Acceptance, CONNER NR by REESE at 3/5/2019  1:08  PM                   Point: Home exercise program (Done)     Learning Progress Summary           Patient Acceptance, E,TB, VU,DU by CW at 3/14/2019  1:53 PM    Acceptance, E, NR by AR at 3/13/2019  2:52 PM    Acceptance, E,TB, VU by MS at 3/12/2019 12:58 PM    Acceptance, E,TB, VU,DU by CW at 3/12/2019  9:58 AM    Acceptance, TB,E, VU,DU by CW at 3/11/2019  2:02 PM    Acceptance, E,TB, VU,DU by CW at 3/10/2019 11:14 AM    Acceptance, E,TB, VU,DU by CW at 3/9/2019 11:13 AM    Acceptance, E,TB, VU,DU by CW at 3/8/2019 11:21 AM    Acceptance, E, VU by WANDER at 3/7/2019 11:47 AM    Acceptance, D,E, NR by CW at 3/6/2019  4:52 PM    Acceptance, E, NR by LH at 3/5/2019  1:08 PM                   Point: Body mechanics (Done)     Learning Progress Summary           Patient Acceptance, E,TB, VU,DU by CW at 3/14/2019  1:53 PM    Acceptance, E, NR by AR at 3/13/2019  2:52 PM    Acceptance, E,TB, VU,DU by CW at 3/12/2019  9:58 AM    Acceptance, TB,E, VU,DU by CW at 3/11/2019  2:02 PM    Acceptance, E,TB, VU,DU by CW at 3/10/2019 11:14 AM    Acceptance, E,TB, VU,DU by CW at 3/9/2019 11:13 AM    Acceptance, E,TB, VU,DU by CW at 3/8/2019 11:21 AM    Acceptance, E, VU by WANDER at 3/7/2019 11:47 AM    Acceptance, D,E, NR by CW at 3/6/2019  4:52 PM    Acceptance, E, NR by  at 3/5/2019  1:08 PM                   Point: Precautions (Done)     Learning Progress Summary           Patient Acceptance, E,TB, VU,DU by CW at 3/14/2019  1:53 PM    Acceptance, E, NR by AR at 3/13/2019  2:52 PM    Acceptance, E,TB, VU by MS at 3/12/2019 12:58 PM    Acceptance, E,TB, VU,DU by CW at 3/12/2019  9:58 AM    Acceptance, TB,E, VU,DU by CW at 3/11/2019  2:02 PM    Acceptance, E,TB, VU,DU by CW at 3/10/2019 11:14 AM    Acceptance, E,TB, VU,DU by CW at 3/9/2019 11:13 AM    Acceptance, E,TB, VU,DU by CW at 3/8/2019 11:21 AM    Acceptance, E, VU by  at 3/7/2019 11:47 AM    Acceptance, D,E, NR by CW at 3/6/2019  4:52 PM    Acceptance, E, NR by  at 3/5/2019   1:08 PM                               User Key     Initials Effective Dates Name Provider Type [facility] 02/05/19 -  Leigh Woodard, PT Physical Therapist PT     04/03/18 -  Jaida Porter, PT Physical Therapist PT    EM 04/03/18 -  Edna Guerrero, PT Physical Therapist PT    MS 02/23/17 -  Juventino Contreras, RN Registered Nurse Nurse    AR 04/03/18 -  Iris Irby, PT Physical Therapist PT    CW 03/07/18 -  Cesario Kwon, PTA Physical Therapy Assistant PT                PT Recommendation and Plan  Anticipated Discharge Disposition (PT): skilled nursing facility  Outcome Summary/Treatment Plan (PT)  Anticipated Discharge Disposition (PT): skilled nursing facility  Outcome Summary: patient agreeable to ambulate, up to edge of bed and sit to stand with supervision only, ambulated 100 feet with rwx and CGA, on 8L high flow o2.   Outcome Measures     Row Name 03/15/19 1600 03/14/19 1300 03/13/19 1400       How much help from another person do you currently need...    Turning from your back to your side while in flat bed without using bedrails?  4  -EM  4  -CW  4  -AR    Moving from lying on back to sitting on the side of a flat bed without bedrails?  4  -EM  4  -CW  3  -AR    Moving to and from a bed to a chair (including a wheelchair)?  3  -EM  3  -CW  3  -AR    Standing up from a chair using your arms (e.g., wheelchair, bedside chair)?  3  -EM  3  -CW  3  -AR    Climbing 3-5 steps with a railing?  2  -EM  2  -CW  2  -AR    To walk in hospital room?  3  -EM  3  -CW  3  -AR    AM-PAC 6 Clicks Score  19  -EM  19  -CW  18  -AR       Functional Assessment    Outcome Measure Options  --  AM-PAC 6 Clicks Basic Mobility (PT)  -CW  --      User Key  (r) = Recorded By, (t) = Taken By, (c) = Cosigned By    Initials Name Provider Type    EM Edna Guerrero, PT Physical Therapist    AR Iris Irby, PT Physical Therapist    CW Cesario Kwon, PTA Physical Therapy Assistant         Time  Calculation:   PT Charges     Row Name 03/15/19 1611             Time Calculation    Start Time  1550  -EM      Stop Time  1605  -EM      Time Calculation (min)  15 min  -EM      PT Received On  03/15/19  -EM      PT - Next Appointment  03/16/19  -EM         Time Calculation- PT    Total Timed Code Minutes- PT  15 minute(s)  -EM        User Key  (r) = Recorded By, (t) = Taken By, (c) = Cosigned By    Initials Name Provider Type    EM Edna Guerrero PT Physical Therapist        Therapy Suggested Charges     Code   Minutes Charges    None           Therapy Charges for Today     Code Description Service Date Service Provider Modifiers Qty    63308330835 HC PT THER PROC EA 15 MIN 3/15/2019 Edna Guerrero PT GP 1          PT G-Codes  Outcome Measure Options: AM-PAC 6 Clicks Basic Mobility (PT)  AM-PAC 6 Clicks Score: 19    Edna Guerrero PT  3/15/2019

## 2019-03-15 NOTE — THERAPY TREATMENT NOTE
Acute Care - Speech Language Pathology   Swallow Treatment Note [facility]     Patient Name: Alessio Allen  : 1940  MRN: [id number]  Today's Date: 3/15/2019  Onset of Illness/Injury or Date of Surgery: 19     Referring Physician: David      Admit Date: 2019    Visit Dx:      ICD-10-CM ICD-9-CM   1. Acute respiratory failure with hypoxia (CMS/[facility]) J96.01 518.81   2. Impaired mobility Z74.09 799.89   3. AF (paroxysmal atrial fibrillation) (CMS/[facility]) I48.0 427.31     Patient Active Problem List   Diagnosis   • A-fib (CMS/[facility])   • Dyslipidemia   • BP (high blood pressure)   • Neuropathy   • Hypertension   • COPD (chronic obstructive pulmonary disease) (CMS/[facility])   • BPH (benign prostatic hypertrophy)   • Atrial fibrillation (CMS/[facility])   • Asthma   • Hypoxia   • Pneumonia   • Chronic anticoagulation   • Pneumonia of both lungs due to infectious organism   • Hyponatremia   • COPD exacerbation (CMS/[facility])   • Acute on chronic respiratory failure with hypoxia (CMS/HCC)   • Pneumonia of both lower lobes due to infectious organism (CMS/HCC)   • Acute on chronic diastolic heart failure (CMS/HCC)   • IPF (idiopathic pulmonary fibrosis) (CMS/[facility])   • Acute respiratory failure with hypoxia (CMS/[facility])   • Attention to G-tube (CMS/[facility])       Therapy Treatment  Rehabilitation Treatment Summary     Row Name                Wound 19 1003 other (see notes) incision    Wound - Properties Group Date first assessed: 19 [MONTANA] Time first assessed: 1003 [MONTANA] Location: other (see notes) [MONTANA] Type: incision [MONTANA] Recorded by:  [MONTANA] Sanket Jaimes RN 19 1003      User Key  (r) = Recorded By, (t) = Taken By, (c) = Cosigned By    Initials Name Effective Dates Discipline    Sanket Evans RN 16 -  Nurse          Outcome Summary         SLP GOALS     Row Name 03/15/19 1600          Progress/Outcomes (Oral Nutrition/Hydration Goal 1, SLP)  continuing progress toward goal;other (see comments)  New consult  received. Pt requesting clarification of water/ice recs. Recommend small sips of water sparingly and/or ice chips sparingly. Reviewed importance of oral care. Pt and pt's spouse verbalized understanding. ST to follow for edu PRN. Recommend continue small sips water and ice chips sparingly  -OC      User Key  (r) = Recorded By, (t) = Taken By, (c) = Cosigned By    Initials Name Provider Type    Roxana Gabriel MA,CCC-SLP Speech and Language Pathologist          EDUCATION  The patient has been educated in the following areas:   Dysphagia (Swallowing Impairment).    SLP Recommendation and Plan   ST to follow for edu PRN.        Time Calculation:   Time Calculation- SLP     Row Name 03/15/19 1602             Time Calculation- SLP    SLP Start Time  1530  -OC      SLP Received On  03/15/19  -OC        User Key  (r) = Recorded By, (t) = Taken By, (c) = Cosigned By    Initials Name Provider Type    Roxana Gabriel MA,CCC-SLP Speech and Language Pathologist          Therapy Charges for Today     Code Description Service Date Service Provider Modifiers Qty    [id number]  ST TREATMENT SWALLOW 2 3/15/2019 Roxana Aguayo MA,CCC-SLP GN 1                 Roxana Aguayo MA,GLYNN-SLP  3/15/2019

## 2019-03-15 NOTE — PLAN OF CARE
Problem: Patient Care Overview  Goal: Plan of Care Review  Outcome: Ongoing (interventions implemented as appropriate)   03/15/19 0443   Coping/Psychosocial   Plan of Care Reviewed With patient   Plan of Care Review   Progress no change   OTHER   Outcome Summary Patient alert and oriented. VSS on 8L high flow. Afib on monitor. Tube feedings given. IV abx continued. Denies pain. No signs of acute distress noted. Will continue to monitor.        Problem: Fall Risk (Adult)  Goal: Absence of Fall  Outcome: Ongoing (interventions implemented as appropriate)   03/15/19 0443   Fall Risk (Adult)   Absence of Fall making progress toward outcome       Problem: Breathing Pattern Ineffective (Adult)  Goal: Effective Oxygenation/Ventilation  Outcome: Ongoing (interventions implemented as appropriate)      Problem: Nutrition, Enteral (Adult)  Goal: Signs and Symptoms of Listed Potential Problems Will be Absent, Minimized or Managed (Nutrition, Enteral)  Outcome: Ongoing (interventions implemented as appropriate)   03/15/19 0443   Goal/Outcome Evaluation   Problems Assessed (Enteral Nutrition) all   Problems Present (Enteral Nutrition) aspiration       Problem: Skin Injury Risk (Adult)  Goal: Skin Health and Integrity  Outcome: Ongoing (interventions implemented as appropriate)   03/15/19 0443   Skin Injury Risk (Adult)   Skin Health and Integrity making progress toward outcome

## 2019-03-15 NOTE — PLAN OF CARE
Problem: Patient Care Overview  Goal: Plan of Care Review  Outcome: Ongoing (interventions implemented as appropriate)   03/15/19 1610   OTHER   Outcome Summary patient agreeable to ambulate, up to edge of bed and sit to stand with supervision only, ambulated 100 feet with rwx and CGA, on 8L high flow o2.

## 2019-03-15 NOTE — PROGRESS NOTES
Dr. YVROSE Jimenez    [address]    3/15/2019    Patient ID:  Name:  Alessio Allen  MRN:  [id number]  1940  78 y.o.  male            CC/Reason for visit: Acute on chronic respiratory failure and other medical problems listed below    Interval hx: The patient says he would like to eat.  His significant other insists that we get a speech evaluation.  They have also insisted I call and speak to Dr. Gaurav Moore, his ENT specialist to ask him when he will perform surgery on his neck for radical lymph node dissection and to ask him if the patient can eat.    Otherwise the patient has tolerated BiPAP at night much better than trilogy volume ventilator.  He wants to stick to BiPAP machine.  He is at his baseline now after several days of trying to wean his oxygen from 11 L high flow down to 8 L.  At home he has a concentrator that goes up to 10 L and he usually is at 8 L.    ROS: Negative for fever.  Negative for cough.  Negative for sputum    Vitals:  Vitals:    03/15/19 1007 03/15/19 1121 03/15/19 1406 03/15/19 1538   BP:   109/76    BP Location:   Left arm    Patient Position:   Lying    Pulse: 81 82 81 91   Resp:  21 20 18   Temp:   97.5 °F (36.4 °C)    TempSrc:   Oral    SpO2:  94% 92% 91%   Weight:       Height:         FiO2 (%): 65 %     Body mass index is 29.34 kg/m².    Intake/Output Summary (Last 24 hours) at 3/15/2019 1614  Last data filed at 3/15/2019 1406  Gross per 24 hour   Intake 2794 ml   Output 1550 ml   Net 1244 ml       Exam:  GEN:  No distress  Alert, oriented x 3.   LUNGS: Barrel chest, distant and diminished breath sounds but no rhonchi or wheezing, nonlabored breathing  CV:  Normal S1S2, without murmur, trace ankle edema much better  ABD:  Non tender, no enlarged liver or masses      Scheduled meds:    aspirin 81 mg Oral Daily   atorvastatin 10 mg Oral Daily   furosemide 80 mg Oral Daily With Breakfast   hydrocortisone  Topical Q12H   insulin lispro 0-9 Units Subcutaneous 4x  Daily With Meals & Nightly   ipratropium-albuterol 3 mL Nebulization 4x Daily - RT   ketotifen 1 drop Both Eyes BID   lansoprazole 30 mg Per PEG Tube Q AM   potassium chloride 20 mEq Oral Daily   pregabalin 75 mg Oral Q12H   rivaroxaban 20 mg Oral Daily With Dinner   sodium chloride 10 mL Intravenous Q12H   pyridoxine 50 mg Oral Daily     IV meds:                        lactated ringers 9 mL/hr Last Rate: Stopped (03/06/19 1042)       Data Review:   I reviewed the patient's medications and new clinical results.  Lab Results   Component Value Date    CALCIUM 9.5 03/12/2019    PHOS 3.4 03/12/2019    MG 2.2 03/14/2019    MG 2.1 03/12/2019    MG 2.1 03/11/2019     Results from last 7 days   Lab Units 03/14/19  0617 03/14/19  0350 03/12/19  0414 03/11/19  0515 03/10/19  0432   SODIUM mmol/L  --   --  150* 149* 150*   POTASSIUM mmol/L  --  3.7 4.5 3.7 3.5   CHLORIDE mmol/L  --   --  106 105 106   CO2 mmol/L  --   --  30.8* 33.6* 33.7*   BUN mg/dL  --   --  34* 33* 35*   CREATININE mg/dL  --   --  1.11 1.01 0.95   CALCIUM mg/dL  --   --  9.5 9.7 9.7   GLUCOSE mg/dL  --   --  141* 107* 105*   WBC 10*3/mm3 9.04  --   --  11.61* 12.02*   HEMOGLOBIN g/dL 13.4  --   --  13.9 13.7   PLATELETS 10*3/mm3 242  --   --  244 241   INR   --   --   --   --  1.63*   PROBNP pg/mL  --   --  1,024.0 1,004.0 1,148.0   PROCALCITONIN ng/mL  --  0.04* 0.09* 0.04*  --      Results from last 7 days   Lab Units 03/14/19  1247 03/14/19  0622   RESPCX  Heavy growth (4+) Staphylococcus aureus, MRSA*  Heavy growth (4+) Normal Respiratory Magdalena  --    MRSA SCREEN CX   --  Staphylococcus aureus, MRSA*             ASSESSMENT:   Hypernatremia  Acute respiratory failure on chronic respiratory failure  Acute decompensated diastolic HF  Small foci of organizing pneumonia on lung biopsy  COPD, no obvious exacerbation  Throat cancer s/p chemo RT  Dysphagia - PEG  Afib, on AC  CAD  Obesity  Attention to G-tube (CMS/HCC)        PLAN:  The patient has done quite  well.  He does not have pneumonia clinically.  Biopsy only showed small foci of organizing pneumonia bronchoscopically, which was treated with 10 days of steroids.  He does not need additional steroids.    He does not have any active infection.  I discussed the case with Dr. Sonia Guadarrama.  Stop all antibiotics.  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  Bronchoscopy revealed oral nehemias and received 5 days of Levaquin initially upon admission.    I discussed the case with Dr. Gaurav Moore, ENT physician.  The plan is to discharge the patient on Monday when he receives a bed at [facility], and then follow-up with Dr. 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    He has done well on BiPAP.  He tried trilogy volume ventilator for 4 nights but could not get used to it.  He prefers BiPAP.  He is usually on 7-8 L of high flow oxygen system at home.  His concentrator goes up to 10 L at home.  He has been on oxygen for 22 years.  His respiratory status is not going to get any better than what it is today.  He is ready for discharge once case management can find a bed for him.    Continue oral anticoagulation for atrial fibrillation.    Continue ambulating with physical therapy and with walker    Kalpesh Jimenez MD  3/15/2019

## 2019-03-15 NOTE — PLAN OF CARE
Problem: Patient Care Overview  Goal: Plan of Care Review  Outcome: Ongoing (interventions implemented as appropriate)   03/15/19 7506   Coping/Psychosocial   Plan of Care Reviewed With patient   Plan of Care Review   Progress no change   OTHER   Outcome Summary pt still on 8L highflow nc. consult to ID. MRSA in the nares, contact isolation initiated. resp panel sent to lab. bs monitoring, bolus feedings and iv abx continued. speech consulted for repeat vss. will continue to monitor.       Problem: Breathing Pattern Ineffective (Adult)  Goal: Effective Oxygenation/Ventilation  Outcome: Ongoing (interventions implemented as appropriate)      Problem: Nutrition, Enteral (Adult)  Goal: Signs and Symptoms of Listed Potential Problems Will be Absent, Minimized or Managed (Nutrition, Enteral)  Outcome: Ongoing (interventions implemented as appropriate)      Problem: Skin Injury Risk (Adult)  Goal: Skin Health and Integrity  Outcome: Ongoing (interventions implemented as appropriate)

## 2019-03-16 LAB
ANION GAP SERPL CALCULATED.3IONS-SCNC: 8.2 MMOL/L
BACTERIA SPEC RESP CULT: ABNORMAL
BACTERIA SPEC RESP CULT: ABNORMAL
BASOPHILS # BLD AUTO: 0.01 10*3/MM3 (ref 0–0.2)
BASOPHILS NFR BLD AUTO: 0.1 % (ref 0–1.5)
BUN BLD-MCNC: 20 MG/DL (ref 8–23)
BUN/CREAT SERPL: 21.3 (ref 7–25)
CALCIUM SPEC-SCNC: 9 MG/DL (ref 8.6–10.5)
CHLORIDE SERPL-SCNC: 104 MMOL/L (ref 98–107)
CO2 SERPL-SCNC: 35.8 MMOL/L (ref 22–29)
CREAT BLD-MCNC: 0.94 MG/DL (ref 0.76–1.27)
DEPRECATED RDW RBC AUTO: 52.9 FL (ref 37–54)
EOSINOPHIL # BLD AUTO: 0.55 10*3/MM3 (ref 0–0.4)
EOSINOPHIL NFR BLD AUTO: 5 % (ref 0.3–6.2)
ERYTHROCYTE [DISTWIDTH] IN BLOOD BY AUTOMATED COUNT: 14.6 % (ref 12.3–15.4)
GFR SERPL CREATININE-BSD FRML MDRD: 78 ML/MIN/1.73
GLUCOSE BLD-MCNC: 116 MG/DL (ref 65–99)
GLUCOSE BLDC GLUCOMTR-MCNC: 103 MG/DL (ref 70–130)
GLUCOSE BLDC GLUCOMTR-MCNC: 124 MG/DL (ref 70–130)
GLUCOSE BLDC GLUCOMTR-MCNC: 134 MG/DL (ref 70–130)
GLUCOSE BLDC GLUCOMTR-MCNC: 149 MG/DL (ref 70–130)
GRAM STN SPEC: ABNORMAL
HCT VFR BLD AUTO: 44.4 % (ref 37.5–51)
HGB BLD-MCNC: 13.1 G/DL (ref 13–17.7)
IMM GRANULOCYTES # BLD AUTO: 0.04 10*3/MM3 (ref 0–0.05)
IMM GRANULOCYTES NFR BLD AUTO: 0.4 % (ref 0–0.5)
INR PPP: 1.22 (ref 0.9–1.1)
LYMPHOCYTES # BLD AUTO: 0.34 10*3/MM3 (ref 0.7–3.1)
LYMPHOCYTES NFR BLD AUTO: 3.1 % (ref 19.6–45.3)
MAGNESIUM SERPL-MCNC: 2 MG/DL (ref 1.6–2.4)
MCH RBC QN AUTO: 29.4 PG (ref 26.6–33)
MCHC RBC AUTO-ENTMCNC: 29.5 G/DL (ref 31.5–35.7)
MCV RBC AUTO: 99.8 FL (ref 79–97)
MONOCYTES # BLD AUTO: 0.51 10*3/MM3 (ref 0.1–0.9)
MONOCYTES NFR BLD AUTO: 4.7 % (ref 5–12)
NEUTROPHILS # BLD AUTO: 9.51 10*3/MM3 (ref 1.4–7)
NEUTROPHILS NFR BLD AUTO: 86.7 % (ref 42.7–76)
NRBC BLD AUTO-RTO: 0 /100 WBC (ref 0–0)
NT-PROBNP SERPL-MCNC: 1298 PG/ML (ref 5–1800)
PHOSPHATE SERPL-MCNC: 3.8 MG/DL (ref 2.5–4.5)
PLATELET # BLD AUTO: 223 10*3/MM3 (ref 140–450)
PMV BLD AUTO: 10.6 FL (ref 6–12)
POTASSIUM BLD-SCNC: 3.8 MMOL/L (ref 3.5–5.2)
PROCALCITONIN SERPL-MCNC: 0.03 NG/ML (ref 0.1–0.25)
PROTHROMBIN TIME: 15.2 SECONDS (ref 11.7–14.2)
RBC # BLD AUTO: 4.45 10*6/MM3 (ref 4.14–5.8)
SODIUM BLD-SCNC: 148 MMOL/L (ref 136–145)
WBC NRBC COR # BLD: 10.96 10*3/MM3 (ref 3.4–10.8)

## 2019-03-16 PROCEDURE — 99232 SBSQ HOSP IP/OBS MODERATE 35: CPT | Performed by: INTERNAL MEDICINE

## 2019-03-16 PROCEDURE — 84100 ASSAY OF PHOSPHORUS: CPT | Performed by: INTERNAL MEDICINE

## 2019-03-16 PROCEDURE — 84145 PROCALCITONIN (PCT): CPT | Performed by: INTERNAL MEDICINE

## 2019-03-16 PROCEDURE — 94799 UNLISTED PULMONARY SVC/PX: CPT

## 2019-03-16 PROCEDURE — 85610 PROTHROMBIN TIME: CPT | Performed by: INTERNAL MEDICINE

## 2019-03-16 PROCEDURE — 97110 THERAPEUTIC EXERCISES: CPT

## 2019-03-16 PROCEDURE — 83880 ASSAY OF NATRIURETIC PEPTIDE: CPT | Performed by: INTERNAL MEDICINE

## 2019-03-16 PROCEDURE — 82962 GLUCOSE BLOOD TEST: CPT

## 2019-03-16 PROCEDURE — 83735 ASSAY OF MAGNESIUM: CPT | Performed by: INTERNAL MEDICINE

## 2019-03-16 PROCEDURE — 80048 BASIC METABOLIC PNL TOTAL CA: CPT | Performed by: INTERNAL MEDICINE

## 2019-03-16 PROCEDURE — 85025 COMPLETE CBC W/AUTO DIFF WBC: CPT | Performed by: INTERNAL MEDICINE

## 2019-03-16 RX ORDER — IPRATROPIUM BROMIDE AND ALBUTEROL SULFATE 2.5; .5 MG/3ML; MG/3ML
3 SOLUTION RESPIRATORY (INHALATION)
Status: DISCONTINUED | OUTPATIENT
Start: 2019-03-16 | End: 2019-03-16

## 2019-03-16 RX ORDER — IPRATROPIUM BROMIDE AND ALBUTEROL SULFATE 2.5; .5 MG/3ML; MG/3ML
3 SOLUTION RESPIRATORY (INHALATION) EVERY 6 HOURS PRN
Status: DISCONTINUED | OUTPATIENT
Start: 2019-03-16 | End: 2019-03-20 | Stop reason: HOSPADM

## 2019-03-16 RX ADMIN — KETOTIFEN FUMARATE 1 DROP: 0.35 SOLUTION/ DROPS OPHTHALMIC at 08:37

## 2019-03-16 RX ADMIN — Medication 50 MG: at 08:09

## 2019-03-16 RX ADMIN — HYDROCORTISONE: 1 CREAM TOPICAL at 08:37

## 2019-03-16 RX ADMIN — IPRATROPIUM BROMIDE AND ALBUTEROL SULFATE 3 ML: 2.5; .5 SOLUTION RESPIRATORY (INHALATION) at 11:28

## 2019-03-16 RX ADMIN — SODIUM CHLORIDE, PRESERVATIVE FREE 10 ML: 5 INJECTION INTRAVENOUS at 20:37

## 2019-03-16 RX ADMIN — PREGABALIN 75 MG: 75 CAPSULE ORAL at 08:09

## 2019-03-16 RX ADMIN — HYDROCORTISONE: 1 CREAM TOPICAL at 20:36

## 2019-03-16 RX ADMIN — LANSOPRAZOLE 30 MG: KIT at 07:12

## 2019-03-16 RX ADMIN — IPRATROPIUM BROMIDE AND ALBUTEROL SULFATE 3 ML: 2.5; .5 SOLUTION RESPIRATORY (INHALATION) at 01:48

## 2019-03-16 RX ADMIN — PREGABALIN 75 MG: 75 CAPSULE ORAL at 20:36

## 2019-03-16 RX ADMIN — ATORVASTATIN CALCIUM 10 MG: 10 TABLET, FILM COATED ORAL at 08:09

## 2019-03-16 RX ADMIN — KETOTIFEN FUMARATE 1 DROP: 0.35 SOLUTION/ DROPS OPHTHALMIC at 20:36

## 2019-03-16 RX ADMIN — IPRATROPIUM BROMIDE AND ALBUTEROL SULFATE 3 ML: 2.5; .5 SOLUTION RESPIRATORY (INHALATION) at 16:19

## 2019-03-16 RX ADMIN — RIVAROXABAN 20 MG: 20 TABLET, FILM COATED ORAL at 17:29

## 2019-03-16 RX ADMIN — FUROSEMIDE 20 MG: 20 TABLET ORAL at 08:09

## 2019-03-16 RX ADMIN — POTASSIUM CHLORIDE 20 MEQ: 750 CAPSULE, EXTENDED RELEASE ORAL at 08:09

## 2019-03-16 RX ADMIN — SODIUM CHLORIDE, PRESERVATIVE FREE 10 ML: 5 INJECTION INTRAVENOUS at 08:37

## 2019-03-16 RX ADMIN — IPRATROPIUM BROMIDE AND ALBUTEROL SULFATE 3 ML: 2.5; .5 SOLUTION RESPIRATORY (INHALATION) at 19:51

## 2019-03-16 NOTE — PLAN OF CARE
Problem: Patient Care Overview  Goal: Plan of Care Review  Outcome: Ongoing (interventions implemented as appropriate)   03/16/19 7063   Coping/Psychosocial   Plan of Care Reviewed With patient   Plan of Care Review   Progress improving   OTHER   Outcome Summary pt improving with toleration of activity, able to walk farther today, he does get SOA with activity, on 8L high flow, o2 sats dec with activity

## 2019-03-16 NOTE — PROGRESS NOTES
[address]    3/16/2019    Patient ID:  Name:  Alessio Allen  MRN:  [id number]  1940  78 y.o.  male            CC/Reason for visit: Acute on chronic respiratory failure and other medical problems listed below    Interval hx: Remains on high flow oxygen 10 L.  His sats were 96% on the monitor and I reduced it to 8 L high flow.  He still weak and improving slowly.  He is working with physical therapy.  He is tolerating the BiPAP better than his home trilogy.  Wants to stick to BiPAP machine.  Apparently at home he uses 8 L oxygen high flow.    ROS: Negative for fever.  Negative for cough.  Negative for sputum    Vitals:  Vitals:    03/16/19 0153 03/16/19 0457 03/16/19 0836 03/16/19 1128   BP:   115/80    BP Location:   Left arm    Patient Position:   Sitting    Pulse: 83  84 79   Resp: 18  20 18   Temp:       TempSrc:       SpO2: 91%  92% 92%   Weight:  103 kg (227 lb 1.2 oz)     Height:         FiO2 (%): 65 %     Body mass index is 29.15 kg/m².    Intake/Output Summary (Last 24 hours) at 3/16/2019 1443  Last data filed at 3/16/2019 1345  Gross per 24 hour   Intake 1245 ml   Output 475 ml   Net 770 ml       Exam:  GEN:  No distress  Alert, oriented x 3.   LUNGS: Barrel chest, distant and diminished breath sounds but no rhonchi or wheezing, nonlabored breathing  CV:  Normal S1S2, without murmur, trace ankle edema much better  ABD:  Non tender, no enlarged liver or masses      Scheduled meds:      atorvastatin 10 mg Oral Daily   furosemide 20 mg Oral Daily With Breakfast   hydrocortisone  Topical Q12H   insulin lispro 0-9 Units Subcutaneous 4x Daily With Meals & Nightly   ipratropium-albuterol 3 mL Nebulization 4x Daily - RT   ketotifen 1 drop Both Eyes BID   lansoprazole 30 mg Per PEG Tube Q AM   potassium chloride 20 mEq Oral Daily   pregabalin 75 mg Oral Q12H   rivaroxaban 20 mg Oral Daily With Dinner   sodium chloride 10 mL Intravenous Q12H   pyridoxine 50 mg Oral Daily     IV meds:                           lactated ringers 9 mL/hr Last Rate: Stopped (03/06/19 1042)       Data Review:   I reviewed the patient's medications and new clinical results.  Lab Results   Component Value Date    CALCIUM 9.0 03/16/2019    PHOS 3.8 03/16/2019    MG 2.0 03/16/2019    MG 2.2 03/14/2019    MG 2.1 03/12/2019     Results from last 7 days   Lab Units 03/16/19  0614 03/16/19  0613 03/16/19  0612 03/15/19  1646 03/14/19  0617 03/14/19  0350 03/12/19  0414  03/10/19  0432   SODIUM mmol/L 148*  --   --  150*  --   --  150*   < > 150*   POTASSIUM mmol/L 3.8  --   --  3.5  --  3.7 4.5   < > 3.5   CHLORIDE mmol/L 104  --   --  107  --   --  106   < > 106   CO2 mmol/L 35.8*  --   --  32.3*  --   --  30.8*   < > 33.7*   BUN mg/dL 20  --   --  25*  --   --  34*   < > 35*   CREATININE mg/dL 0.94  --   --  0.94  --   --  1.11   < > 0.95   CALCIUM mg/dL 9.0  --   --  8.7  --   --  9.5   < > 9.7   GLUCOSE mg/dL 116*  --   --  132*  --   --  141*   < > 105*   WBC 10*3/mm3  --   --  10.96* 11.76* 9.04  --   --    < > 12.02*   HEMOGLOBIN g/dL  --   --  13.1 13.4 13.4  --   --    < > 13.7   PLATELETS 10*3/mm3  --   --  223 249 242  --   --    < > 241   INR   --  1.22*  --   --   --   --   --   --  1.63*   PROBNP pg/mL 1,298.0  --   --  1,298.0  --   --  1,024.0   < > 1,148.0   PROCALCITONIN ng/mL 0.03*  --   --  0.08*  --  0.04* 0.09*   < >  --     < > = values in this interval not displayed.     Results from last 7 days   Lab Units 03/14/19  1247 03/14/19  0622   RESPCX  Heavy growth (4+) Staphylococcus aureus, MRSA*  Heavy growth (4+) Normal Respiratory Magdalena  --    MRSA SCREEN CX   --  Staphylococcus aureus, MRSA*             ASSESSMENT:   Hypernatremia  Acute respiratory failure on chronic respiratory failure  Acute decompensated diastolic HF  Small foci of organizing pneumonia on lung biopsy  COPD, no obvious exacerbation  Throat cancer s/p chemo RT  Dysphagia - PEG  Afib, on AC  CAD  Obesity  Attention to G-tube  (CMS/[facility])        PLAN:  Continue aggressive pulmonary toilet.  Wean down oxygen as tolerated  Is completed course of steroids for biopsy-proven organizing pneumonia  All antibiotics have been stopped and currently he is being observed off antibiotics  Apparently Dr. Jimenez had discussed it further with Dr. Moore his ENT physician.The plan is to discharge the patient on Monday when he receives a bed at [facility], and then follow-up with Dr. 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  Continue BiPAP for now.  Apparently he was desaturating last night and had to be switched back to oxygen  Continue oral and correlation for A. fib  Physical therapy  Discharge once bed available              Nancy Sarabia MD  3/16/2019

## 2019-03-16 NOTE — PLAN OF CARE
Problem: Patient Care Overview  Goal: Plan of Care Review  Outcome: Ongoing (interventions implemented as appropriate)  Remains on 8L high flow. Pt wore Bipap for few hours. Continue breathing treatments and promote pulmonary hygiene.   03/16/19 1475   Plan of Care Review   Progress no change          None

## 2019-03-16 NOTE — THERAPY TREATMENT NOTE
Acute Care - Physical Therapy Treatment Note  [facility]     Patient Name: Alessio Allen  : 1940  MRN: [id number]  Today's Date: 3/16/2019  Onset of Illness/Injury or Date of Surgery: 19  Date of Referral to PT: 19  Referring Physician: David    Admit Date: 2019    Visit Dx:    ICD-10-CM ICD-9-CM   1. Acute respiratory failure with hypoxia (CMS/[facility], located within [facility]) J96.01 518.81   2. Impaired mobility Z74.09 799.89   3. AF (paroxysmal atrial fibrillation) (CMS/[facility], located within [facility]) I48.0 427.31     Patient Active Problem List   Diagnosis   • A-fib (CMS/[facility], located within [facility])   • Dyslipidemia   • BP (high blood pressure)   • Neuropathy   • Hypertension   • COPD (chronic obstructive pulmonary disease) (CMS/[facility], located within [facility])   • BPH (benign prostatic hypertrophy)   • Atrial fibrillation (CMS/[facility], located within [facility])   • Asthma   • Hypoxia   • Pneumonia   • Chronic anticoagulation   • Pneumonia of both lungs due to infectious organism   • Hyponatremia   • COPD exacerbation (CMS/[facility], located within [facility])   • Acute on chronic respiratory failure with hypoxia (CMS/[facility], located within [facility])   • Pneumonia of both lower lobes due to infectious organism (CMS/[facility], located within [facility])   • Acute on chronic diastolic heart failure (CMS/[facility], located within [facility])   • IPF (idiopathic pulmonary fibrosis) (CMS/[facility], located within [facility])   • Acute respiratory failure with hypoxia (CMS/[facility], located within [facility])   • Attention to G-tube (CMS/[facility], located within [facility])       Therapy Treatment    Rehabilitation Treatment Summary     Row Name 19 [id number]             Treatment Time/Intention    Discipline  physical therapist  -PC      Document Type  therapy note (daily note)  -PC      Subjective Information  no complaints  -PC      Mode of Treatment  physical therapy  -PC      Patient/Family Observations  pt is in bed, no acute distress, on 8L o2  -PC      Patient Effort  good  -PC      Existing Precautions/Restrictions  fall;oxygen therapy device and L/min 8L high flow   -PC      Recorded by [PC] Dara Fishman, PT 19 3365      Row Name 19 1553             Vital Signs    Pre SpO2 (%)  92  -PC      O2 Delivery Pre Treatment   supplemental O2  -PC      Intra SpO2 (%)  82  -PC      O2 Delivery Intra Treatment  supplemental O2  -PC      Post SpO2 (%)  88  -PC      O2 Delivery Post Treatment  supplemental O2  -PC      Recorded by [PC] Dara Fishman, PT 03/16/19 1553      Row Name 03/16/19 1550             Cognitive Assessment/Intervention- PT/OT    Orientation Status (Cognition)  oriented x 4  -PC      Follows Commands (Cognition)  WNL  -PC      Personal Safety Interventions  fall prevention program maintained;gait belt  -PC      Recorded by [PC] Dara Fishman, PT 03/16/19 1553      Row Name 03/16/19 1550             Bed Mobility Assessment/Treatment    Supine-Sit [place] (Bed Mobility)  supervision  -PC      Assistive Device (Bed Mobility)  head of bed elevated  -PC      Recorded by [PC] Dara Fishman, PT 03/16/19 1553      Row Name 03/16/19 1550             Sit-Stand Transfer    Sit-Stand [place] (Transfers)  supervision  -PC      Assistive Device (Sit-Stand Transfers)  walker, front-wheeled  -PC      Recorded by [PC] Dara Fishman, PT 03/16/19 1553      Row Name 03/16/19 1550             Stand-Sit Transfer    Stand-Sit [place] (Transfers)  supervision  -PC      Assistive Device (Stand-Sit Transfers)  walker, front-wheeled  -PC      Recorded by [PC] Dara Fishman, PT 03/16/19 1553      Row Name 03/16/19 1559             Gait/Stairs Assessment/Training    [place] Level (Gait)  contact guard  -PC      Assistive Device (Gait)  walker, front-wheeled  -PC      Distance in Feet (Gait)  150 ft  -PC      Pattern (Gait)  step-through  -PC      Deviations/Abnormal Patterns (Gait)  stride length decreased;gait speed decreased  -PC      Bilateral Gait Deviations  forward flexed posture  -PC      Recorded by [PC] Dara Fishman, PT 03/16/19 1553      Row Name 03/16/19 155             Positioning and Restraints    Pre-Treatment Position  in bed  -PC      Post Treatment Position  bed  -PC      In Bed  supine;call light  within reach;encouraged to call for assist;with family/caregiver  -PC      Recorded by [PC] Dara Fishman, PT 03/16/19 1553      Row Name 03/16/19 1550             Pain Scale: Numbers Pre/Post-Treatment    Pain Scale: Numbers, Pretreatment  0/10 - no pain  -PC      Recorded by [PC] Dara Fishman, PT 03/16/19 1553      Row Name                Wound 03/06/19 1003 other (see notes) incision    Wound - Properties Group Date first assessed: 03/06/19 [MONTANA] Time first assessed: 1003 [MONTANA] Location: other (see notes) [MONTANA] Type: incision [MONTANA] Recorded by:  [MONTANA] Sanket Jaimes RN 03/06/19 1003    Row Name 03/16/19 1550             Plan of Care Review    Plan of Care Reviewed With  patient  -PC      Recorded by [PC] Dara Fishman, PT 03/16/19 1553      Row Name 03/16/19 1550             Outcome Summary/Treatment Plan (PT)    Anticipated Discharge Disposition (PT)  home with assist;skilled nursing facility depends on progress  -PC      Recorded by [PC] Dara Fishman, PT 03/16/19 1553        User Key  (r) = Recorded By, (t) = Taken By, (c) = Cosigned By    Initials Name Effective Dates Discipline    MONTANA Sanket Jaimes RN 06/16/16 -  Nurse    PC Dara Fishman, PT 04/03/18 -  PT          Rash 03/08/19 2000 Right arm (Active)   Distribution localized 3/16/2019  1:45 PM   Borders raised 3/16/2019  1:45 PM   Characteristics itching 3/16/2019  1:45 PM   Color pink;red 3/16/2019  1:45 PM       Wound 03/06/19 1003 other (see notes) incision (Active)   Closure Open to air 3/16/2019  1:45 PM   Drainage Amount none 3/16/2019  1:45 PM   Dressing Care, Wound open to air 3/16/2019  1:45 PM           Physical Therapy Education     Title: PT OT SLP Therapies (Done)     Topic: Physical Therapy (Done)     Point: Mobility training (Done)     Learning Progress Summary           Patient Acceptance, E,D, DU by PC at 3/16/2019  3:53 PM    Acceptance, E,TB, VU by MS at 3/16/2019 12:14 PM    Acceptance, E, VU,NR by MS1 at 3/16/2019  6:22 AM     Acceptance, E, NR by EM at 3/15/2019  4:10 PM    Acceptance, E,TB, VU,DU by CW at 3/14/2019  1:53 PM    Acceptance, E, NR by AR at 3/13/2019  2:52 PM    Acceptance, E,TB, VU by MS at 3/12/2019 12:58 PM    Acceptance, E,TB, VU,DU by CW at 3/12/2019  9:58 AM    Acceptance, TB,E, VU,DU by CW at 3/11/2019  2:02 PM    Acceptance, E,TB, VU,DU by CW at 3/10/2019 11:14 AM    Acceptance, E,TB, VU,DU by CW at 3/9/2019 11:13 AM    Acceptance, E,TB, VU,DU by CW at 3/8/2019 11:21 AM    Acceptance, E, VU by KH at 3/7/2019 11:47 AM    Acceptance, D,E, NR by CW at 3/6/2019  4:52 PM    Acceptance, E, NR by LH at 3/5/2019  1:08 PM   Significant Other Acceptance, E,TB, VU by MS at 3/16/2019 12:14 PM                   Point: Home exercise program (Done)     Learning Progress Summary           Patient Acceptance, E,TB, VU by MS at 3/16/2019 12:14 PM    Acceptance, E,TB, VU,DU by CW at 3/14/2019  1:53 PM    Acceptance, E, NR by AR at 3/13/2019  2:52 PM    Acceptance, E,TB, VU by MS at 3/12/2019 12:58 PM    Acceptance, E,TB, VU,DU by CW at 3/12/2019  9:58 AM    Acceptance, TB,E, VU,DU by CW at 3/11/2019  2:02 PM    Acceptance, E,TB, VU,DU by CW at 3/10/2019 11:14 AM    Acceptance, E,TB, VU,DU by CW at 3/9/2019 11:13 AM    Acceptance, E,TB, VU,DU by CW at 3/8/2019 11:21 AM    Acceptance, E, VU by KH at 3/7/2019 11:47 AM    Acceptance, D,E, NR by CW at 3/6/2019  4:52 PM    Acceptance, E, NR by LH at 3/5/2019  1:08 PM   Significant Other Acceptance, E,TB, VU by MS at 3/16/2019 12:14 PM                   Point: Body mechanics (Done)     Learning Progress Summary           Patient Acceptance, E,D, DU by PC at 3/16/2019  3:53 PM    Acceptance, E, VU,NR by MS1 at 3/16/2019  6:22 AM    Acceptance, E,TB, VU,DU by CW at 3/14/2019  1:53 PM    Acceptance, E, NR by AR at 3/13/2019  2:52 PM    Acceptance, E,TB, VU,DU by CW at 3/12/2019  9:58 AM    Acceptance, TB,E, VU,DU by CW at 3/11/2019  2:02 PM    Acceptance, E,TB, VU,DU by CW at 3/10/2019 11:14  AM    Acceptance, E,TB, VU,DU by CW at 3/9/2019 11:13 AM    Acceptance, E,TB, VU,DU by CW at 3/8/2019 11:21 AM    Acceptance, E, VU by KH at 3/7/2019 11:47 AM    Acceptance, D,E, NR by CW at 3/6/2019  4:52 PM    Acceptance, E, NR by LH at 3/5/2019  1:08 PM                   Point: Precautions (Done)     Learning Progress Summary           Patient Acceptance, E,D, DU by PC at 3/16/2019  3:53 PM    Acceptance, E,TB, VU by MS at 3/16/2019 12:14 PM    Acceptance, E, VU,NR by MS1 at 3/16/2019  6:22 AM    Acceptance, E,TB, VU,DU by CW at 3/14/2019  1:53 PM    Acceptance, E, NR by AR at 3/13/2019  2:52 PM    Acceptance, E,TB, VU by MS at 3/12/2019 12:58 PM    Acceptance, E,TB, VU,DU by CW at 3/12/2019  9:58 AM    Acceptance, TB,E, VU,DU by CW at 3/11/2019  2:02 PM    Acceptance, E,TB, VU,DU by CW at 3/10/2019 11:14 AM    Acceptance, E,TB, VU,DU by CW at 3/9/2019 11:13 AM    Acceptance, E,TB, VU,DU by CW at 3/8/2019 11:21 AM    Acceptance, E, VU by  at 3/7/2019 11:47 AM    Acceptance, D,E, NR by CW at 3/6/2019  4:52 PM    Acceptance, E, NR by  at 3/5/2019  1:08 PM   Significant Other Acceptance, E,TB, VU by MS at 3/16/2019 12:14 PM                               User Key     Initials Effective Dates Name Provider Type Discipline     02/05/19 -  Leigh Woodard, PT Physical Therapist PT    LH 04/03/18 -  Jaida Porter, PT Physical Therapist PT    PC 04/03/18 -  Dara Fishman, PT Physical Therapist PT    EM 04/03/18 -  Edna Guerrero, PT Physical Therapist PT    MS 02/23/17 -  Juventino Contreras, RN Registered Nurse Nurse    AR 04/03/18 -  Iris Irby, PT Physical Therapist PT    CW 03/07/18 -  Cesario Kwon PTA Physical Therapy Assistant PT    MS1 10/30/17 -  Gisele Stern, RN Registered Nurse Nurse                PT Recommendation and Plan  Anticipated Discharge Disposition (PT): home with assist, skilled nursing facility(depends on progress)  Outcome Summary/Treatment Plan (PT)  Anticipated  Discharge Disposition (PT): home with assist, skilled nursing facility(depends on progress)  Plan of Care Reviewed With: patient  Progress: improving  Outcome Summary: pt improving with toleration of activity, able to walk farther today, he does get SOA with activity, on 8L high flow, o2 sats dec with activity  Outcome Measures     Row Name 03/16/19 1500 03/15/19 1600 03/14/19 1300       How much help from another person do you currently need...    Turning from your back to your side while in flat bed without using bedrails?  4  -PC  4  -EM  4  -CW    Moving from lying on back to sitting on the side of a flat bed without bedrails?  4  -PC  4  -EM  4  -CW    Moving to and from a bed to a chair (including a wheelchair)?  3  -PC  3  -EM  3  -CW    Standing up from a chair using your arms (e.g., wheelchair, bedside chair)?  3  -PC  3  -EM  3  -CW    Climbing 3-5 steps with a railing?  2  -PC  2  -EM  2  -CW    To walk in hospital room?  3  -PC  3  -EM  3  -CW    AM-PAC 6 Clicks Score  19  -PC  19  -EM  19  -CW       Functional Assessment    Outcome Measure Options  --  --  AM-PAC 6 Clicks Basic Mobility (PT)  -CW      User Key  (r) = Recorded By, (t) = Taken By, (c) = Cosigned By    Initials Name Provider Type    PC Dara Fishman, PT Physical Therapist    EM Edna Guerrero, PT Physical Therapist    CW Cesario Kwon, PTA Physical Therapy Assistant         Time Calculation:   PT Charges     Row Name 03/16/19 1555             Time Calculation    Start Time  1532  -PC      Stop Time  1549  -PC      Time Calculation (min)  17 min  -PC      PT Received On  03/16/19  -PC      PT - Next Appointment  03/17/19  -PC        User Key  (r) = Recorded By, (t) = Taken By, (c) = Cosigned By    Initials Name Provider Type    Dara Muller, PT Physical Therapist        Therapy Suggested Charges     Code   Minutes Charges    None           Therapy Charges for Today     Code Description Service Date Service Provider  Modifiers Qty    [id number] HC PT THER PROC EA 15 MIN 3/16/2019 Dara Fishman, PT GP 1          PT G-Codes  Outcome Measure Options: AM-PAC 6 Clicks Basic Mobility (PT)  AM-PAC 6 Clicks Score: 19    Dara Fishman, PT  3/16/2019

## 2019-03-16 NOTE — PROGRESS NOTES
LOS: 16 days     Chief Complaint:  Follow-up MRSA colonization    Interval History:  No acute events. No fevers. He says that his breathing is stable. He is still trying to get used to is CPAP mask. WBC coming down and procal is negative. SpCx with MRSA.    ROS:  No n/v; no rash    Vital Signs  Temp:  [97.4 °F (36.3 °C)-97.6 °F (36.4 °C)] 97.4 °F (36.3 °C)  Heart Rate:  [81-91] 84  Resp:  [18-21] 20  BP: (106-126)/(69-80) 115/80    Physical Exam:  General: awake, alert, chronically ill appearing, wearing CPAP  Head: Normocephalic, atraumatic  Eyes: EOMI, no scleral icterus.   ENT: MMM, no upper teeth, dentures on bottom  Neck: Supple  Cardiovascular: NR, RR   Respiratory: few rales at the bases; no wheezing; on nasal cannula   GI: Abdomen is soft, non-tender, non-distended  Skin: No rashes   Neurological: Alert and oriented,  motor strength 5/5 in all four extremities  Psychiatric: Normal mood and affect     Meds:    Current Facility-Administered Medications:   •  atorvastatin (LIPITOR) tablet 10 mg, 10 mg, Oral, Daily, Suresh Hernandez MD, 10 mg at 03/16/19 0809  •  dextrose (D50W) 25 g/ 50mL Intravenous Solution 25 g, 25 g, Intravenous, Q15 Min PRN, Chani Kong MD  •  dextrose (GLUTOSE) oral gel 15 g, 15 g, Oral, Q15 Min PRN, Chani Kong MD  •  diphenhydrAMINE (BENADRYL) capsule 25 mg, 25 mg, Oral, Q6H PRN, Chani Kong MD, 25 mg at 03/11/19 0538  •  furosemide (LASIX) tablet 20 mg, 20 mg, Oral, Daily With Breakfast, Kalpesh Jimenez MD, 20 mg at 03/16/19 0809  •  glucagon (human recombinant) (GLUCAGEN DIAGNOSTIC) injection 1 mg, 1 mg, Subcutaneous, PRN, Chani Kong MD  •  heparin flush (porcine) 100 UNIT/ML injection 500 Units, 5 mL, Intravenous, PRN, Nidadavolu, Jameson Frandy, MD  •  hydrocortisone 1 % cream, , Topical, Q12H, Kalpesh Jimenez MD  •  insulin lispro (humaLOG) injection 0-9 Units, 0-9 Units, Subcutaneous, 4x Daily With Meals & Nightly, Chani Kong MD, 2 Units at 03/14/19 1442  •   ipratropium-albuterol (DUO-NEB) nebulizer solution 3 mL, 3 mL, Nebulization, 4x Daily - RT, Chani Kong MD, 3 mL at 03/15/19 1954  •  ipratropium-albuterol (DUO-NEB) nebulizer solution 3 mL, 3 mL, Nebulization, Q6H PRN, Kalpesh Jimenez MD, 3 mL at 03/16/19 0148  •  ketotifen (ZADITOR) 0.025 % ophthalmic solution 1 drop, 1 drop, Both Eyes, BID, Suresh Hernandez MD, 1 drop at 03/16/19 0837  •  lactated ringers infusion, 9 mL/hr, Intravenous, Continuous, Amor Barros MD, Stopped at 03/06/19 1042  •  lansoprazole (FIRST) oral suspension 30 mg, 30 mg, Per PEG Tube, Q AM, Suresh Hernandez MD, 30 mg at 03/16/19 0712  •  magic mouthwash oral supsension 10 mL, 10 mL, Swish & Spit, Q4H PRN, Chani Kong MD, 10 mL at 03/13/19 0512  •  potassium chloride (MICRO-K) CR capsule 40 mEq, 40 mEq, Oral, PRN, 40 mEq at 03/10/19 1945 **OR** potassium chloride (KLOR-CON) packet 40 mEq, 40 mEq, Oral, PRN **OR** potassium chloride 10 mEq in 100 mL IVPB, 10 mEq, Intravenous, Q1H PRN, Suresh Hernandez MD  •  potassium chloride (MICRO-K) CR capsule 20 mEq, 20 mEq, Oral, Daily, Suresh Hernandez MD, 20 mEq at 03/16/19 0809  •  pregabalin (LYRICA) capsule 75 mg, 75 mg, Oral, Q12H, Suresh Hernandez MD, 75 mg at 03/16/19 0809  •  rivaroxaban (XARELTO) tablet 20 mg, 20 mg, Oral, Daily With Dinner, Marizol Holt MD, 20 mg at 03/15/19 2010  •  sodium chloride 0.9 % flush 10 mL, 10 mL, Intravenous, Q12H, Jameson Corrales MD, 10 mL at 03/16/19 0837  •  sodium chloride 0.9 % flush 10 mL, 10 mL, Intravenous, PRN, Jameson Corrales MD  •  sodium chloride 0.9 % flush 20 mL, 20 mL, Intravenous, PRN, Jameson Corrales MD  •  vitamin B-6 (PYRIDOXINE) tablet 50 mg, 50 mg, Oral, Daily, Suresh Hernandez MD, 50 mg at 03/16/19 0809    LABS:  CBC, BMP, procal, micro reviewed today  Lab Results   Component Value Date    WBC 10.96 (H) 03/16/2019    HGB 13.1 03/16/2019    HCT 44.4 03/16/2019    MCV 99.8 (H) 03/16/2019     03/16/2019     Lab Results    Component Value Date    GLUCOSE 116 (H) 03/16/2019    BUN 20 03/16/2019    CREATININE 0.94 03/16/2019    EGFRIFNONA 78 03/16/2019    BCR 21.3 03/16/2019    CO2 35.8 (H) 03/16/2019    CALCIUM 9.0 03/16/2019    ALBUMIN 3.80 02/28/2019    AST 20 02/28/2019    ALT 12 02/28/2019    CRP 0.29 04/19/2018     Procal 0.03    Microbiology:  3/14 SCx: heavy growth MRSA  3/14 MRSA nasal swab positive  3/6 bronchoscopy bacterial cultures with skin growth of group B strep and corynebacterium        Fungal cultures negative to date        AFB cultures negative to date  2/28 BCx Neg x 2    Radiology (personally reviewed):   No new imaging today    Assessment/Plan   1.  MRSA colonization  2.  Acute on chronic hypoxic respiratory failure   3.  Throat cancer with concern for recurrent disease    I think his MRSA nares screen and even his sputum culture represent MRSA colonization which is present in at least 3% of Americans. The fact that he has a negative procalcitonin, improving WBC, and is afebrile with a stable breathing status argues against an active MRSA which would be a progressive and not so subtle infection. I recommend that he remain off of antibiotics at this time. I will continue to follow his clinical course. If he were to have fevers, worsening sputum production, rising WBC, or rising procal then I would recommend repeating a CXR and start antibiotics if new evidence of infiltrate.     Thank you for this consult. ID will follow.

## 2019-03-16 NOTE — PLAN OF CARE
Problem: Patient Care Overview  Goal: Plan of Care Review  Outcome: Ongoing (interventions implemented as appropriate)   03/16/19 2332   Coping/Psychosocial   Plan of Care Reviewed With patient   Plan of Care Review   Progress improving   OTHER   Outcome Summary Patient has been pleasant and cooperative during shift. Patient being bolus fed through Gtube. Plan for D/C Monday when bed is available at [facility]. PT walked patient 150 feet. No complaints of pain or nausea. SOA noted and patient is still on 8L O2 high flow. Will continue to monitor and assit patient as needed.       Problem: Fall Risk (Adult)  Goal: Absence of Fall  Outcome: Ongoing (interventions implemented as appropriate)      Problem: Breathing Pattern Ineffective (Adult)  Goal: Effective Oxygenation/Ventilation  Outcome: Ongoing (interventions implemented as appropriate)      Problem: Nutrition, Enteral (Adult)  Goal: Signs and Symptoms of Listed Potential Problems Will be Absent, Minimized or Managed (Nutrition, Enteral)  Outcome: Ongoing (interventions implemented as appropriate)      Problem: Skin Injury Risk (Adult)  Goal: Skin Health and Integrity  Outcome: Ongoing (interventions implemented as appropriate)

## 2019-03-16 NOTE — PLAN OF CARE
Problem: Patient Care Overview  Goal: Discharge Needs Assessment  Outcome: Ongoing (interventions implemented as appropriate)   03/01/19 1601 03/07/19 0412 03/08/19 0509   Discharge Needs Assessment   Readmission Within the Last 30 Days --  --  --    Concerns to be Addressed --  --  denies needs/concerns at this time   Patient/Family Anticipates Transition to --  home with family;home with help/services --    Patient/Family Anticipated Services at Transition home health care --  --    Transportation Anticipated family or friend will provide --  --    Outpatient/Agency/Support Group Needs homecare agency --  --    Discharge Facility/Level of Care Needs home with home health --  --    Discharge Coordination/Progress Referral to A  --  --    Disability   Equipment Currently Used at Home oxygen;nebulizer;cane, straight --  --     03/09/19 0532   Discharge Needs Assessment   Readmission Within the Last 30 Days no previous admission in last 30 days   Concerns to be Addressed --    Patient/Family Anticipates Transition to --    Patient/Family Anticipated Services at Transition --    Transportation Anticipated --    Outpatient/Agency/Support Group Needs --    Discharge Facility/Level of Care Needs --    Discharge Coordination/Progress --    Disability   Equipment Currently Used at Home --

## 2019-03-17 ENCOUNTER — APPOINTMENT (OUTPATIENT)
Dept: GENERAL RADIOLOGY | Facility: HOSPITAL | Age: 79
End: 2019-03-17

## 2019-03-17 LAB
ANION GAP SERPL CALCULATED.3IONS-SCNC: 8.1 MMOL/L
BUN BLD-MCNC: 20 MG/DL (ref 8–23)
BUN/CREAT SERPL: 22.5 (ref 7–25)
CALCIUM SPEC-SCNC: 9 MG/DL (ref 8.6–10.5)
CHLORIDE SERPL-SCNC: 105 MMOL/L (ref 98–107)
CO2 SERPL-SCNC: 36.9 MMOL/L (ref 22–29)
CREAT BLD-MCNC: 0.89 MG/DL (ref 0.76–1.27)
DEPRECATED RDW RBC AUTO: 54.6 FL (ref 37–54)
ERYTHROCYTE [DISTWIDTH] IN BLOOD BY AUTOMATED COUNT: 14.7 % (ref 12.3–15.4)
GFR SERPL CREATININE-BSD FRML MDRD: 83 ML/MIN/1.73
GLUCOSE BLD-MCNC: 127 MG/DL (ref 65–99)
GLUCOSE BLDC GLUCOMTR-MCNC: 112 MG/DL (ref 70–130)
GLUCOSE BLDC GLUCOMTR-MCNC: 122 MG/DL (ref 70–130)
GLUCOSE BLDC GLUCOMTR-MCNC: 142 MG/DL (ref 70–130)
HCT VFR BLD AUTO: 43.6 % (ref 37.5–51)
HGB BLD-MCNC: 12.7 G/DL (ref 13–17.7)
MCH RBC QN AUTO: 29.3 PG (ref 26.6–33)
MCHC RBC AUTO-ENTMCNC: 29.1 G/DL (ref 31.5–35.7)
MCV RBC AUTO: 100.5 FL (ref 79–97)
PLATELET # BLD AUTO: 215 10*3/MM3 (ref 140–450)
PMV BLD AUTO: 10.9 FL (ref 6–12)
POTASSIUM BLD-SCNC: 4.1 MMOL/L (ref 3.5–5.2)
RBC # BLD AUTO: 4.34 10*6/MM3 (ref 4.14–5.8)
SODIUM BLD-SCNC: 150 MMOL/L (ref 136–145)
WBC NRBC COR # BLD: 10.94 10*3/MM3 (ref 3.4–10.8)

## 2019-03-17 PROCEDURE — 94799 UNLISTED PULMONARY SVC/PX: CPT

## 2019-03-17 PROCEDURE — 63710000001 DIPHENHYDRAMINE PER 50 MG: Performed by: INTERNAL MEDICINE

## 2019-03-17 PROCEDURE — 99232 SBSQ HOSP IP/OBS MODERATE 35: CPT | Performed by: INTERNAL MEDICINE

## 2019-03-17 PROCEDURE — 82962 GLUCOSE BLOOD TEST: CPT

## 2019-03-17 PROCEDURE — 80048 BASIC METABOLIC PNL TOTAL CA: CPT | Performed by: INTERNAL MEDICINE

## 2019-03-17 PROCEDURE — 71046 X-RAY EXAM CHEST 2 VIEWS: CPT

## 2019-03-17 PROCEDURE — 97110 THERAPEUTIC EXERCISES: CPT

## 2019-03-17 PROCEDURE — 85027 COMPLETE CBC AUTOMATED: CPT | Performed by: INTERNAL MEDICINE

## 2019-03-17 RX ADMIN — RIVAROXABAN 20 MG: 20 TABLET, FILM COATED ORAL at 17:23

## 2019-03-17 RX ADMIN — Medication 50 MG: at 08:50

## 2019-03-17 RX ADMIN — SODIUM CHLORIDE, PRESERVATIVE FREE 10 ML: 5 INJECTION INTRAVENOUS at 21:25

## 2019-03-17 RX ADMIN — POTASSIUM CHLORIDE 20 MEQ: 750 CAPSULE, EXTENDED RELEASE ORAL at 08:49

## 2019-03-17 RX ADMIN — PREGABALIN 75 MG: 75 CAPSULE ORAL at 21:25

## 2019-03-17 RX ADMIN — IPRATROPIUM BROMIDE AND ALBUTEROL SULFATE 3 ML: 2.5; .5 SOLUTION RESPIRATORY (INHALATION) at 07:10

## 2019-03-17 RX ADMIN — HYDROCORTISONE: 1 CREAM TOPICAL at 08:50

## 2019-03-17 RX ADMIN — ATORVASTATIN CALCIUM 10 MG: 10 TABLET, FILM COATED ORAL at 08:49

## 2019-03-17 RX ADMIN — DIPHENHYDRAMINE HYDROCHLORIDE 25 MG: 25 CAPSULE ORAL at 17:23

## 2019-03-17 RX ADMIN — FUROSEMIDE 20 MG: 20 TABLET ORAL at 08:49

## 2019-03-17 RX ADMIN — LANSOPRAZOLE 30 MG: KIT at 06:40

## 2019-03-17 RX ADMIN — PREGABALIN 75 MG: 75 CAPSULE ORAL at 08:49

## 2019-03-17 RX ADMIN — IPRATROPIUM BROMIDE AND ALBUTEROL SULFATE 3 ML: 2.5; .5 SOLUTION RESPIRATORY (INHALATION) at 15:58

## 2019-03-17 RX ADMIN — KETOTIFEN FUMARATE 1 DROP: 0.35 SOLUTION/ DROPS OPHTHALMIC at 21:25

## 2019-03-17 RX ADMIN — HYDROCORTISONE: 1 CREAM TOPICAL at 21:25

## 2019-03-17 RX ADMIN — IPRATROPIUM BROMIDE AND ALBUTEROL SULFATE 3 ML: 2.5; .5 SOLUTION RESPIRATORY (INHALATION) at 11:44

## 2019-03-17 RX ADMIN — KETOTIFEN FUMARATE 1 DROP: 0.35 SOLUTION/ DROPS OPHTHALMIC at 08:50

## 2019-03-17 RX ADMIN — IPRATROPIUM BROMIDE AND ALBUTEROL SULFATE 3 ML: 2.5; .5 SOLUTION RESPIRATORY (INHALATION) at 20:24

## 2019-03-17 NOTE — THERAPY TREATMENT NOTE
Acute Care - Physical Therapy Treatment Note  [facility]     Patient Name: Alessio Allen  : 1940  MRN: [id number]  Today's Date: 3/17/2019  Onset of Illness/Injury or Date of Surgery: 19  Date of Referral to PT: 19  Referring Physician: David    Admit Date: 2019    Visit Dx:    ICD-10-CM ICD-9-CM   1. Acute respiratory failure with hypoxia (CMS/[facility]) J96.01 518.81   2. Impaired mobility Z74.09 799.89   3. AF (paroxysmal atrial fibrillation) (CMS/[facility]) I48.0 427.31     Patient Active Problem List   Diagnosis   • A-fib (CMS/[facility])   • Dyslipidemia   • BP (high blood pressure)   • Neuropathy   • Hypertension   • COPD (chronic obstructive pulmonary disease) (CMS/[facility])   • BPH (benign prostatic hypertrophy)   • Atrial fibrillation (CMS/[facility])   • Asthma   • Hypoxia   • Pneumonia   • Chronic anticoagulation   • Pneumonia of both lungs due to infectious organism   • Hyponatremia   • COPD exacerbation (CMS/[facility])   • Acute on chronic respiratory failure with hypoxia (CMS/[facility])   • Pneumonia of both lower lobes due to infectious organism (CMS/[facility])   • Acute on chronic diastolic heart failure (CMS/[facility])   • IPF (idiopathic pulmonary fibrosis) (CMS/[facility])   • Acute respiratory failure with hypoxia (CMS/[facility])   • Attention to G-tube (CMS/[facility])       Therapy Treatment    Rehabilitation Treatment Summary     Row Name 19 3945             Treatment Time/Intention    Discipline  physical therapist  -CS      Document Type  therapy note (daily note)  -CS      Subjective Information  no complaints  -CS      Mode of Treatment  physical therapy  -CS      Patient/Family Observations  Pt in bed, no acute distress  -CS      Therapy Frequency (PT Clinical Impression)  daily  -CS      Patient Effort  good  -CS      Existing Precautions/Restrictions  fall;oxygen therapy device and L/min  -CS      Recorded by [CS] Alok Mensah, PT 19 6293      Row Name 19 1626             Vital Signs    O2 Delivery Pre  Treatment  supplemental O2  -CS      O2 Delivery Intra Treatment  supplemental O2  -CS      O2 Delivery Post Treatment  supplemental O2  -CS      Recorded by [CS] Alok Mensah, PT 03/17/19 1628      Row Name 03/17/19 1625             Cognitive Assessment/Intervention- PT/OT    Orientation Status (Cognition)  oriented x 4  -CS      Follows Commands (Cognition)  WNL  -CS      Safety Deficit (Cognitive)  mild deficit  -CS      Personal Safety Interventions  fall prevention program maintained;gait belt;nonskid shoes/slippers when out of bed;supervised activity  -CS      Recorded by [CS] Alok Mensah, PT 03/17/19 1628      Row Name 03/17/19 1625             Bed Mobility Assessment/Treatment    Bed Mobility Assessment/Treatment  supine-sit  -CS      Supine-Sit Spink (Bed Mobility)  supervision  -CS      Sit-Supine Spink (Bed Mobility)  not tested  -CS      Recorded by [CS] Alok Mensah, PT 03/17/19 1628      Row Name 03/17/19 1625             Transfer Assessment/Treatment    Transfer Assessment/Treatment  sit-stand transfer;stand-sit transfer  -CS      Recorded by [CS] Alok Mensah, PT 03/17/19 1628      Row Name 03/17/19 1625             Sit-Stand Transfer    Sit-Stand Spink (Transfers)  supervision  -CS      Assistive Device (Sit-Stand Transfers)  walker, front-wheeled  -CS      Recorded by [CS] Alok Mensah, PT 03/17/19 1628      Row Name 03/17/19 1625             Stand-Sit Transfer    Stand-Sit Spink (Transfers)  supervision  -CS      Assistive Device (Stand-Sit Transfers)  walker, front-wheeled  -CS      Recorded by [CS] Alok Mensah, PT 03/17/19 1628      Row Name 03/17/19 1625             Gait/Stairs Assessment/Training    Spink Level (Gait)  contact guard  -CS      Assistive Device (Gait)  walker, front-wheeled  -CS      Distance in Feet (Gait)  150  -CS      Pattern (Gait)  step-through  -CS      Deviations/Abnormal Patterns (Gait)  stride length  decreased;gait speed decreased  -CS      Bilateral Gait Deviations  forward flexed posture  -CS      Recorded by [CS] Alok Mensah, PT 03/17/19 1628      Row Name 03/17/19 1625             Positioning and Restraints    Pre-Treatment Position  in bed  -CS      Post Treatment Position  bathroom  -CS      Bathroom  sitting;encouraged to call for assist;with other staff  -CS      Recorded by [CS] Alok Mensah, PT 03/17/19 1628      Row Name 03/17/19 1625             Pain Scale: Numbers Pre/Post-Treatment    Pain Scale: Numbers, Pretreatment  0/10 - no pain  -CS      Pain Scale: Numbers, Post-Treatment  0/10 - no pain  -CS      Recorded by [CS] Alok Mensah, PT 03/17/19 1628      Row Name                Wound 03/06/19 1003 other (see notes) incision    Wound - Properties Group Date first assessed: 03/06/19 [MONTANA] Time first assessed: 1003 [MONTANA] Location: other (see notes) [MONTANA] Type: incision [MONTANA] Recorded by:  [MONTANA] Sanket Jaimes RN 03/06/19 1003    Row Name 03/17/19 1625             Coping    Observed Emotional State  accepting;calm;cooperative  -CS      Verbalized Emotional State  acceptance  -CS      Recorded by [CS] Alok Mensah, PT 03/17/19 1628      Row Name 03/17/19 1625             Plan of Care Review    Plan of Care Reviewed With  patient  -CS      Recorded by [CS] Alok Mensah, PT 03/17/19 1628      Row Name 03/17/19 1625             Outcome Summary/Treatment Plan (PT)    Anticipated Discharge Disposition (PT)  home with assist;skilled nursing facility  -CS      Recorded by [CS] Alok Mensah, PT 03/17/19 1628        User Key  (r) = Recorded By, (t) = Taken By, (c) = Cosigned By    Initials Name Effective Dates Discipline    Sanket Evans RN 06/16/16 -  Nurse    Alok Reno, PT 05/14/18 -  PT          Rash 03/08/19 2000 Right arm (Active)   Distribution localized 3/17/2019 12:28 PM   Borders raised 3/17/2019 12:28 PM   Characteristics itching 3/17/2019 12:28 PM   Color  pink;red 3/17/2019 12:28 PM       Wound 03/06/19 1003 other (see notes) incision (Active)   Closure Open to air 3/17/2019 12:28 PM   Drainage Amount none 3/17/2019 12:28 PM   Dressing Care, Wound open to air 3/17/2019  8:52 AM           Physical Therapy Education     Title: PT OT SLP Therapies (Done)     Topic: Physical Therapy (Done)     Point: Mobility training (Done)     Learning Progress Summary           Patient Acceptance, E,TB, VU by CS at 3/17/2019  4:28 PM    Acceptance, E, VU,NR by MS at 3/17/2019  4:16 AM    Acceptance, E,D, DU by PC at 3/16/2019  3:53 PM    Acceptance, E,TB, VU by MS1 at 3/16/2019 12:14 PM    Acceptance, E, VU,NR by MS at 3/16/2019  6:22 AM    Acceptance, E, NR by EM at 3/15/2019  4:10 PM    Acceptance, E,TB, VU,DU by CW at 3/14/2019  1:53 PM    Acceptance, E, NR by AR at 3/13/2019  2:52 PM    Acceptance, E,TB, VU by MS1 at 3/12/2019 12:58 PM    Acceptance, E,TB, VU,DU by CW at 3/12/2019  9:58 AM    Acceptance, TB,E, VU,DU by CW at 3/11/2019  2:02 PM    Acceptance, E,TB, VU,DU by CW at 3/10/2019 11:14 AM    Acceptance, E,TB, VU,DU by CW at 3/9/2019 11:13 AM    Acceptance, E,TB, VU,DU by CW at 3/8/2019 11:21 AM    Acceptance, E, VU by KH at 3/7/2019 11:47 AM    Acceptance, D,E, NR by CW at 3/6/2019  4:52 PM    Acceptance, E, NR by LH at 3/5/2019  1:08 PM   Family Acceptance, E, VU,NR by MS at 3/17/2019  4:16 AM   Significant Other Acceptance, E,TB, VU by MS1 at 3/16/2019 12:14 PM                   Point: Home exercise program (Done)     Learning Progress Summary           Patient Acceptance, E,TB, VU by CS at 3/17/2019  4:28 PM    Acceptance, E, VU,NR by MS at 3/17/2019  4:16 AM    Acceptance, E,TB, VU by MS1 at 3/16/2019 12:14 PM    Acceptance, E,TB, VU,DU by CW at 3/14/2019  1:53 PM    Acceptance, E, NR by AR at 3/13/2019  2:52 PM    Acceptance, E,TB, VU by MS1 at 3/12/2019 12:58 PM    Acceptance, E,TB, VU,DU by CW at 3/12/2019  9:58 AM    Acceptance, TB,E, VU,DU by CW at 3/11/2019   2:02 PM    Acceptance, E,TB, VU,DU by CW at 3/10/2019 11:14 AM    Acceptance, E,TB, VU,DU by CW at 3/9/2019 11:13 AM    Acceptance, E,TB, VU,DU by CW at 3/8/2019 11:21 AM    Acceptance, E, VU by KH at 3/7/2019 11:47 AM    Acceptance, D,E, NR by CW at 3/6/2019  4:52 PM    Acceptance, E, NR by LH at 3/5/2019  1:08 PM   Family Acceptance, E, VU,NR by MS at 3/17/2019  4:16 AM   Significant Other Acceptance, E,TB, VU by MS1 at 3/16/2019 12:14 PM                   Point: Body mechanics (Done)     Learning Progress Summary           Patient Acceptance, E,TB, VU by CS at 3/17/2019  4:28 PM    Acceptance, E, VU,NR by MS at 3/17/2019  4:16 AM    Acceptance, E,D, DU by  at 3/16/2019  3:53 PM    Acceptance, E, VU,NR by MS at 3/16/2019  6:22 AM    Acceptance, E,TB, VU,DU by CW at 3/14/2019  1:53 PM    Acceptance, E, NR by AR at 3/13/2019  2:52 PM    Acceptance, E,TB, VU,DU by CW at 3/12/2019  9:58 AM    Acceptance, TB,E, VU,DU by CW at 3/11/2019  2:02 PM    Acceptance, E,TB, VU,DU by CW at 3/10/2019 11:14 AM    Acceptance, E,TB, VU,DU by CW at 3/9/2019 11:13 AM    Acceptance, E,TB, VU,DU by CW at 3/8/2019 11:21 AM    Acceptance, E, VU by KH at 3/7/2019 11:47 AM    Acceptance, D,E, NR by CW at 3/6/2019  4:52 PM    Acceptance, E, NR by LH at 3/5/2019  1:08 PM   Family Acceptance, E, VU,NR by MS at 3/17/2019  4:16 AM                   Point: Precautions (Done)     Learning Progress Summary           Patient Acceptance, E,TB, VU by CS at 3/17/2019  4:28 PM    Acceptance, E, VU,NR by MS at 3/17/2019  4:16 AM    Acceptance, E,D, DU by PC at 3/16/2019  3:53 PM    Acceptance, E,TB, VU by MS1 at 3/16/2019 12:14 PM    Acceptance, E, VU,NR by MS at 3/16/2019  6:22 AM    Acceptance, E,TB, VU,DU by CW at 3/14/2019  1:53 PM    Acceptance, E, NR by AR at 3/13/2019  2:52 PM    Acceptance, E,TB, VU by MS1 at 3/12/2019 12:58 PM    Acceptance, E,TB, VU,DU by CW at 3/12/2019  9:58 AM    Acceptance, TB,E, VU,DU by CW at 3/11/2019  2:02 PM     Acceptance, E,TB, VU,DU by CW at 3/10/2019 11:14 AM    Acceptance, E,TB, VU,DU by CW at 3/9/2019 11:13 AM    Acceptance, E,TB, VU,DU by CW at 3/8/2019 11:21 AM    Acceptance, E, VU by  at 3/7/2019 11:47 AM    Acceptance, D,E, NR by CW at 3/6/2019  4:52 PM    Acceptance, E, NR by  at 3/5/2019  1:08 PM   Family Acceptance, E, VU,NR by MS at 3/17/2019  4:16 AM   Significant Other Acceptance, E,TB, VU by MS1 at 3/16/2019 12:14 PM                               User Key     Initials Effective Dates Name Provider Type Discipline     02/05/19 -  Leigh Woodard, PT Physical Therapist PT    LH 04/03/18 -  Jaida Porter, PT Physical Therapist PT    PC 04/03/18 -  Dara Fishman, PT Physical Therapist PT    EM 04/03/18 -  Edna Guerrero, PT Physical Therapist PT    MS1 02/23/17 -  Juventino Contreras, RN Registered Nurse Nurse    AR 04/03/18 -  Iris Irby, PT Physical Therapist PT    CW 03/07/18 -  Cesario Kwon, PTA Physical Therapy Assistant PT    MS 10/30/17 -  Gisele Stern, RN Registered Nurse Nurse     05/14/18 -  Alok Mensah, PT Physical Therapist PT                PT Recommendation and Plan  Anticipated Discharge Disposition (PT): home with assist, skilled nursing facility  Therapy Frequency (PT Clinical Impression): daily  Outcome Summary/Treatment Plan (PT)  Anticipated Discharge Disposition (PT): home with assist, skilled nursing facility  Plan of Care Reviewed With: patient  Outcome Summary: Pt doing well, walked around RN station with 8L O2 NC and FRW. Tolerated well.   Outcome Measures     Row Name 03/17/19 1600 03/16/19 1500 03/15/19 1600       How much help from another person do you currently need...    Turning from your back to your side while in flat bed without using bedrails?  4  -CS  4  -PC  4  -EM    Moving from lying on back to sitting on the side of a flat bed without bedrails?  4  -CS  4  -PC  4  -EM    Moving to and from a bed to a chair (including a wheelchair)?   3  -CS  3  -PC  3  -EM    Standing up from a chair using your arms (e.g., wheelchair, bedside chair)?  3  -CS  3  -PC  3  -EM    Climbing 3-5 steps with a railing?  2  -CS  2  -PC  2  -EM    To walk in hospital room?  3  -CS  3  -PC  3  -EM    AM-PAC 6 Clicks Score  19  -CS  19  -PC  19  -EM       Functional Assessment    Outcome Measure Options  AM-PAC 6 Clicks Basic Mobility (PT)  -CS  --  --      User Key  (r) = Recorded By, (t) = Taken By, (c) = Cosigned By    Initials Name Provider Type    PC Dara Fishman, PT Physical Therapist    EM Edna Guerrero, PT Physical Therapist    CS Alok Mensah, PT Physical Therapist         Time Calculation:   PT Charges     Row Name 03/17/19 1628             Time Calculation    Start Time  1604  -CS      Stop Time  1620  -CS      Time Calculation (min)  16 min  -CS      PT Received On  03/17/19  -CS      PT - Next Appointment  03/18/19  -        User Key  (r) = Recorded By, (t) = Taken By, (c) = Cosigned By    Initials Name Provider Type    CS Alok Mensah, PT Physical Therapist        Therapy Suggested Charges     Code   Minutes Charges    None           Therapy Charges for Today     Code Description Service Date Service Provider Modifiers Qty    22409852149 HC PT THER PROC EA 15 MIN 3/17/2019 Alok Mensah, PT GP 1    28211816550 HC PT THER SUPP EA 15 MIN 3/17/2019 Alok Mensah, PT GP 1          PT G-Codes  Outcome Measure Options: AM-PAC 6 Clicks Basic Mobility (PT)  AM-PAC 6 Clicks Score: 19    Alok Mensah, PT  3/17/2019

## 2019-03-17 NOTE — PLAN OF CARE
Problem: Patient Care Overview  Goal: Plan of Care Review  Outcome: Ongoing (interventions implemented as appropriate)  Remains on 8L high flow. Continue breathing treatments and promote pulmonary hygiene.   03/17/19 9062   Plan of Care Review   Progress no change

## 2019-03-17 NOTE — PROGRESS NOTES
[address]    3/17/2019    Patient ID:  Name:  Alessio Allen  MRN:  [id number]  1940  78 y.o.  male            CC/Reason for visit: Acute on chronic respiratory failure and other medical problems listed below    Interval hx: Remains on high flow oxygen 8 L nasal cannula.  Sats 94%.  Still remains weak and tired and working with physical therapy.    ROS: Negative for fever.  Negative for cough.  Negative for sputum    Vitals:  Vitals:    03/16/19 2330 03/17/19 0710 03/17/19 0741 03/17/19 1144   BP:   112/79    BP Location:   Left arm    Patient Position:   Lying    Pulse: 84 84 83 82   Resp: 20 20 16 18   Temp:   98 °F (36.7 °C)    TempSrc:   Oral    SpO2: 90% 98% 90% 94%   Weight:       Height:         FiO2 (%): 65 %     Body mass index is 29.15 kg/m².    Intake/Output Summary (Last 24 hours) at 3/17/2019 1416  Last data filed at 3/17/2019 0900  Gross per 24 hour   Intake 60 ml   Output 500 ml   Net -440 ml       Exam:  GEN:  No distress  Alert, oriented x 3.   LUNGS: Barrel chest, distant and diminished breath sounds but no rhonchi or wheezing, nonlabored breathing  CV:  Normal S1S2, without murmur, trace ankle edema much better  ABD:  Non tender, no enlarged liver or masses      Scheduled meds:      atorvastatin 10 mg Oral Daily   furosemide 20 mg Oral Daily With Breakfast   hydrocortisone  Topical Q12H   insulin lispro 0-9 Units Subcutaneous 4x Daily With Meals & Nightly   ipratropium-albuterol 3 mL Nebulization 4x Daily - RT   ketotifen 1 drop Both Eyes BID   lansoprazole 30 mg Per PEG Tube Q AM   potassium chloride 20 mEq Oral Daily   pregabalin 75 mg Oral Q12H   rivaroxaban 20 mg Oral Daily With Dinner   sodium chloride 10 mL Intravenous Q12H   pyridoxine 50 mg Oral Daily     IV meds:                          lactated ringers 9 mL/hr Last Rate: Stopped (03/06/19 1042)       Data Review:   I reviewed the patient's medications and new clinical results.  Lab Results   Component  Value Date    CALCIUM 9.0 03/17/2019    PHOS 3.8 03/16/2019    MG 2.0 03/16/2019    MG 2.2 03/14/2019    MG 2.1 03/12/2019     Results from last 7 days   Lab Units 03/17/19  0546 03/16/19  0614 03/16/19  0613 03/16/19  0612 03/15/19  1646  03/14/19  0350 03/12/19  0414   SODIUM mmol/L 150* 148*  --   --  150*  --   --  150*   POTASSIUM mmol/L 4.1 3.8  --   --  3.5  --  3.7 4.5   CHLORIDE mmol/L 105 104  --   --  107  --   --  106   CO2 mmol/L 36.9* 35.8*  --   --  32.3*  --   --  30.8*   BUN mg/dL 20 20  --   --  25*  --   --  34*   CREATININE mg/dL 0.89 0.94  --   --  0.94  --   --  1.11   CALCIUM mg/dL 9.0 9.0  --   --  8.7  --   --  9.5   GLUCOSE mg/dL 127* 116*  --   --  132*  --   --  141*   WBC 10*3/mm3 10.94*  --   --  10.96* 11.76*   < >  --   --    HEMOGLOBIN g/dL 12.7*  --   --  13.1 13.4   < >  --   --    PLATELETS 10*3/mm3 215  --   --  223 249   < >  --   --    INR   --   --  1.22*  --   --   --   --   --    PROBNP pg/mL  --  1,298.0  --   --  1,298.0  --   --  1,024.0   PROCALCITONIN ng/mL  --  0.03*  --   --  0.08*  --  0.04* 0.09*    < > = values in this interval not displayed.     Results from last 7 days   Lab Units 03/14/19  1247 03/14/19  0622   RESPCX  Heavy growth (4+) Staphylococcus aureus, MRSA*  Heavy growth (4+) Normal Respiratory Magdalena  --    MRSA SCREEN CX   --  Staphylococcus aureus, MRSA*             ASSESSMENT:   Hypernatremia  Acute respiratory failure on chronic respiratory failure  Acute decompensated diastolic HF  Small foci of organizing pneumonia on lung biopsy  COPD, no obvious exacerbation  Throat cancer s/p chemo RT  Dysphagia - PEG  Afib, on AC  CAD  Obesity  Attention to G-tube (CMS/HCC)        PLAN:  Continue aggressive pulmonary toilet.  Wean down oxygen as tolerated  Patient has completed course of steroids for biopsy-proven organizing pneumonia  All antibiotics have been stopped and currently he is being observed off antibiotics which I think is reasonable.  Apparently  Dr. Jimenez had discussed it further with Dr. Moore his ENT physician.The plan is to discharge the patient on Monday when he receives a bed at [facility], and then follow-up with Dr. 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  Continue BiPAP with sleep only.   Continue oral anti-coagulation for A. fib.  Physical therapy  Discharge once bed available and patient on acceptable oxygen requirement.              Nancy Sarabia MD  3/17/2019

## 2019-03-17 NOTE — PLAN OF CARE
Problem: Patient Care Overview  Goal: Plan of Care Review  Outcome: Ongoing (interventions implemented as appropriate)   03/17/19 2307   Coping/Psychosocial   Plan of Care Reviewed With patient   OTHER   Outcome Summary Pt doing well, walked around RN station with 8L O2 NC and FRW. Tolerated well.

## 2019-03-17 NOTE — PROGRESS NOTES
LOS: 17 days     Chief Complaint:  Follow-up MRSA colonization    Interval History:  No acute events. He says that his breathing is stable. Currently on 8-9 L NC. WBC stable. No fevers.    ROS:  No n/v; no rash    Vital Signs  Temp:  [97.8 °F (36.6 °C)-98 °F (36.7 °C)] 98 °F (36.7 °C)  Heart Rate:  [78-86] 83  Resp:  [16-20] 16  BP: (108-114)/(60-79) 112/79    Physical Exam:  General: awake, alert, chronically ill appearing  Eyes: no scleral icterus.   ENT: MMM, no upper teeth, dentures on bottom  Neck: Supple  Cardiovascular: NR, RR   Respiratory: few rales at the bases; no wheezing; on 8-9 L via NC    GI: Abdomen is soft, non-tender, non-distended  Skin: No rashes   Neurological: Alert and oriented x 3,  motor strength 5/5 in all four extremities  Psychiatric: Normal mood and affect     Meds:    Current Facility-Administered Medications:   •  atorvastatin (LIPITOR) tablet 10 mg, 10 mg, Oral, Daily, Suresh Hernandez MD, 10 mg at 03/17/19 0849  •  dextrose (D50W) 25 g/ 50mL Intravenous Solution 25 g, 25 g, Intravenous, Q15 Min PRN, Chani Kong MD  •  dextrose (GLUTOSE) oral gel 15 g, 15 g, Oral, Q15 Min PRN, Chani Kong MD  •  diphenhydrAMINE (BENADRYL) capsule 25 mg, 25 mg, Oral, Q6H PRN, Chani Kong MD, 25 mg at 03/11/19 0538  •  furosemide (LASIX) tablet 20 mg, 20 mg, Oral, Daily With Breakfast, Kalpesh Jimenez MD, 20 mg at 03/17/19 0849  •  glucagon (human recombinant) (GLUCAGEN DIAGNOSTIC) injection 1 mg, 1 mg, Subcutaneous, PRN, Chani Kong MD  •  heparin flush (porcine) 100 UNIT/ML injection 500 Units, 5 mL, Intravenous, PRN, Jameson Corrales MD  •  hydrocortisone 1 % cream, , Topical, Q12H, Kalpesh Jimenez MD  •  insulin lispro (humaLOG) injection 0-9 Units, 0-9 Units, Subcutaneous, 4x Daily With Meals & Nightly, Chani Kong MD, 2 Units at 03/14/19 1442  •  ipratropium-albuterol (DUO-NEB) nebulizer solution 3 mL, 3 mL, Nebulization, 4x Daily - RT, Chani Kong MD, 3 mL at  03/17/19 0710  •  ipratropium-albuterol (DUO-NEB) nebulizer solution 3 mL, 3 mL, Nebulization, Q6H PRN, Kalpesh Jimenez MD, 3 mL at 03/16/19 0148  •  ketotifen (ZADITOR) 0.025 % ophthalmic solution 1 drop, 1 drop, Both Eyes, BID, Suresh Hernandez MD, 1 drop at 03/17/19 0850  •  lactated ringers infusion, 9 mL/hr, Intravenous, Continuous, Amor Barros MD, Stopped at 03/06/19 1042  •  lansoprazole (FIRST) oral suspension 30 mg, 30 mg, Per PEG Tube, Q AM, Suresh Hernandez MD, 30 mg at 03/17/19 0640  •  magic mouthwash oral supsension 10 mL, 10 mL, Swish & Spit, Q4H PRN, Chani Kong MD, 10 mL at 03/13/19 0512  •  potassium chloride (MICRO-K) CR capsule 40 mEq, 40 mEq, Oral, PRN, 40 mEq at 03/10/19 1945 **OR** potassium chloride (KLOR-CON) packet 40 mEq, 40 mEq, Oral, PRN **OR** potassium chloride 10 mEq in 100 mL IVPB, 10 mEq, Intravenous, Q1H PRN, Suresh Hernandez MD  •  potassium chloride (MICRO-K) CR capsule 20 mEq, 20 mEq, Oral, Daily, Suresh Hernandez MD, 20 mEq at 03/17/19 0849  •  pregabalin (LYRICA) capsule 75 mg, 75 mg, Oral, Q12H, Suresh Hernandez MD, 75 mg at 03/17/19 0849  •  rivaroxaban (XARELTO) tablet 20 mg, 20 mg, Oral, Daily With Dinner, Marizol Holt MD, 20 mg at 03/16/19 1729  •  sodium chloride 0.9 % flush 10 mL, 10 mL, Intravenous, Q12H, Jameson Corrales MD, 10 mL at 03/16/19 2037  •  sodium chloride 0.9 % flush 10 mL, 10 mL, Intravenous, PRN, Jameson Corrales MD  •  sodium chloride 0.9 % flush 20 mL, 20 mL, Intravenous, PRN, Jameson Corrales MD  •  vitamin B-6 (PYRIDOXINE) tablet 50 mg, 50 mg, Oral, Daily, Suresh Hernandez MD, 50 mg at 03/17/19 0850    LABS:  CBC, BMP, micro reviewed today  Lab Results   Component Value Date    WBC 10.94 (H) 03/17/2019    HGB 12.7 (L) 03/17/2019    HCT 43.6 03/17/2019    .5 (H) 03/17/2019     03/17/2019     Lab Results   Component Value Date    GLUCOSE 127 (H) 03/17/2019    BUN 20 03/17/2019    CREATININE 0.89 03/17/2019    EGFRIFNONA 83  03/17/2019    BCR 22.5 03/17/2019    CO2 36.9 (H) 03/17/2019    CALCIUM 9.0 03/17/2019    ALBUMIN 3.80 02/28/2019    AST 20 02/28/2019    ALT 12 02/28/2019    CRP 0.29 04/19/2018     Procal 0.03    Microbiology:  3/14 SCx: heavy growth MRSA  3/14 MRSA nasal swab positive  3/6 bronchoscopy bacterial cultures with skin growth of group B strep and corynebacterium        Fungal cultures negative to date        AFB cultures negative to date  2/28 BCx Neg x 2    Radiology (personally reviewed):   No new imaging today    Assessment/Plan   1.  MRSA colonization  2.  Acute on chronic hypoxic respiratory failure   3.  Throat cancer with concern for recurrent disease    I think his MRSA nares screen and even his sputum culture represent MRSA colonization which is present in at least 3% of Americans. The fact that he has a negative procalcitonin, improving WBC, and is afebrile with a stable breathing status argues against an active MRSA which would be a progressive and not so subtle infection. I recommend that he remain off of antibiotics at this time. ID will continue to follow his clinical course. If he were to have fevers, worsening sputum production, rising WBC, or rising procal then I would recommend repeating a CXR and starting anti-MRSA antibiotics if new evidence of infiltrate.     Thank you for this consult. ID will follow.

## 2019-03-18 LAB
ANION GAP SERPL CALCULATED.3IONS-SCNC: 9.6 MMOL/L
BUN BLD-MCNC: 18 MG/DL (ref 8–23)
BUN/CREAT SERPL: 23.7 (ref 7–25)
CALCIUM SPEC-SCNC: 8.9 MG/DL (ref 8.6–10.5)
CHLORIDE SERPL-SCNC: 106 MMOL/L (ref 98–107)
CO2 SERPL-SCNC: 35.4 MMOL/L (ref 22–29)
CREAT BLD-MCNC: 0.76 MG/DL (ref 0.76–1.27)
GFR SERPL CREATININE-BSD FRML MDRD: 99 ML/MIN/1.73
GLUCOSE BLD-MCNC: 115 MG/DL (ref 65–99)
GLUCOSE BLDC GLUCOMTR-MCNC: 104 MG/DL (ref 70–130)
GLUCOSE BLDC GLUCOMTR-MCNC: 131 MG/DL (ref 70–130)
GLUCOSE BLDC GLUCOMTR-MCNC: 138 MG/DL (ref 70–130)
GLUCOSE BLDC GLUCOMTR-MCNC: 143 MG/DL (ref 70–130)
POTASSIUM BLD-SCNC: 4.1 MMOL/L (ref 3.5–5.2)
SODIUM BLD-SCNC: 151 MMOL/L (ref 136–145)

## 2019-03-18 PROCEDURE — 82962 GLUCOSE BLOOD TEST: CPT

## 2019-03-18 PROCEDURE — 80048 BASIC METABOLIC PNL TOTAL CA: CPT | Performed by: INTERNAL MEDICINE

## 2019-03-18 PROCEDURE — 63710000001 DIPHENHYDRAMINE PER 50 MG: Performed by: INTERNAL MEDICINE

## 2019-03-18 PROCEDURE — 94799 UNLISTED PULMONARY SVC/PX: CPT

## 2019-03-18 PROCEDURE — 97110 THERAPEUTIC EXERCISES: CPT

## 2019-03-18 RX ORDER — POLYETHYLENE GLYCOL 3350 17 G/17G
17 POWDER, FOR SOLUTION ORAL DAILY PRN
Status: DISCONTINUED | OUTPATIENT
Start: 2019-03-18 | End: 2019-03-20 | Stop reason: HOSPADM

## 2019-03-18 RX ADMIN — IPRATROPIUM BROMIDE AND ALBUTEROL SULFATE 3 ML: 2.5; .5 SOLUTION RESPIRATORY (INHALATION) at 11:17

## 2019-03-18 RX ADMIN — IPRATROPIUM BROMIDE AND ALBUTEROL SULFATE 3 ML: 2.5; .5 SOLUTION RESPIRATORY (INHALATION) at 07:25

## 2019-03-18 RX ADMIN — FUROSEMIDE 20 MG: 20 TABLET ORAL at 08:04

## 2019-03-18 RX ADMIN — LANSOPRAZOLE 30 MG: KIT at 06:49

## 2019-03-18 RX ADMIN — IPRATROPIUM BROMIDE AND ALBUTEROL SULFATE 3 ML: 2.5; .5 SOLUTION RESPIRATORY (INHALATION) at 20:29

## 2019-03-18 RX ADMIN — KETOTIFEN FUMARATE 1 DROP: 0.35 SOLUTION/ DROPS OPHTHALMIC at 21:47

## 2019-03-18 RX ADMIN — PREGABALIN 75 MG: 75 CAPSULE ORAL at 21:47

## 2019-03-18 RX ADMIN — DIPHENHYDRAMINE HYDROCHLORIDE 25 MG: 25 CAPSULE ORAL at 21:47

## 2019-03-18 RX ADMIN — KETOTIFEN FUMARATE 1 DROP: 0.35 SOLUTION/ DROPS OPHTHALMIC at 08:04

## 2019-03-18 RX ADMIN — HYDROCORTISONE: 1 CREAM TOPICAL at 21:47

## 2019-03-18 RX ADMIN — Medication 50 MG: at 08:04

## 2019-03-18 RX ADMIN — SODIUM CHLORIDE, PRESERVATIVE FREE 10 ML: 5 INJECTION INTRAVENOUS at 21:48

## 2019-03-18 RX ADMIN — HYDROCORTISONE: 1 CREAM TOPICAL at 08:04

## 2019-03-18 RX ADMIN — RIVAROXABAN 20 MG: 20 TABLET, FILM COATED ORAL at 18:47

## 2019-03-18 RX ADMIN — ATORVASTATIN CALCIUM 10 MG: 10 TABLET, FILM COATED ORAL at 08:04

## 2019-03-18 RX ADMIN — IPRATROPIUM BROMIDE AND ALBUTEROL SULFATE 3 ML: 2.5; .5 SOLUTION RESPIRATORY (INHALATION) at 15:51

## 2019-03-18 RX ADMIN — PREGABALIN 75 MG: 75 CAPSULE ORAL at 08:04

## 2019-03-18 RX ADMIN — POTASSIUM CHLORIDE 20 MEQ: 750 CAPSULE, EXTENDED RELEASE ORAL at 08:04

## 2019-03-18 RX ADMIN — POLYETHYLENE GLYCOL 3350 17 G: 17 POWDER, FOR SOLUTION ORAL at 18:47

## 2019-03-18 NOTE — PLAN OF CARE
Problem: Patient Care Overview  Goal: Plan of Care Review  Outcome: Ongoing (interventions implemented as appropriate)   03/18/19 0626   Coping/Psychosocial   Plan of Care Reviewed With patient   Plan of Care Review   Progress no change   OTHER   Outcome Summary Patient high-flow oxygen decreased to 7l/m. Doing well. Still red and itching, Hydrocortisone cream applied. Nursing will continue to monitor.      Goal: Individualization and Mutuality  Outcome: Ongoing (interventions implemented as appropriate)    Goal: Discharge Needs Assessment  Outcome: Ongoing (interventions implemented as appropriate)    Goal: Interprofessional Rounds/Family Conf  Outcome: Ongoing (interventions implemented as appropriate)      Problem: Fall Risk (Adult)  Goal: Absence of Fall  Outcome: Ongoing (interventions implemented as appropriate)      Problem: Breathing Pattern Ineffective (Adult)  Goal: Effective Oxygenation/Ventilation  Outcome: Ongoing (interventions implemented as appropriate)      Problem: Nutrition, Enteral (Adult)  Goal: Signs and Symptoms of Listed Potential Problems Will be Absent, Minimized or Managed (Nutrition, Enteral)  Outcome: Ongoing (interventions implemented as appropriate)      Problem: Skin Injury Risk (Adult)  Goal: Skin Health and Integrity  Outcome: Ongoing (interventions implemented as appropriate)

## 2019-03-18 NOTE — PLAN OF CARE
Problem: Patient Care Overview  Goal: Plan of Care Review  Outcome: Ongoing (interventions implemented as appropriate)   03/18/19 0844   Coping/Psychosocial   Plan of Care Reviewed With patient   Plan of Care Review   Progress improving   OTHER   Outcome Summary Pt increasing with bed mobility but limited amb due to safety and SOA with high flow 10L when out of bed

## 2019-03-18 NOTE — PROGRESS NOTES
Continued Stay Note  [facility]     Patient Name: Alessio Allen  MRN: [id number]  Today's Date: 3/18/2019    Admit Date: 2/28/2019    Discharge Plan     Row Name 03/18/19 1432       Plan    Plan  Plan [facility] for [facility] care- waiting Bi PAP.   JARON Juarez, GRETEL    Patient/Family in Agreement with Plan  yes    Plan Comments  Spoke to Erica  ( [phone]) and notified her pt would need  auto  BiPAP 10-20 cm with delta of 6 at [facility] per Dr. Sarabia.  Per Erica [facility] should have in 24-48 hours.  Plan [facility] for skilled care- awaiting Bi PAP.   JARON Juarez, RN        Discharge Codes    No documentation.             Mahsa Juarez, RN

## 2019-03-18 NOTE — THERAPY TREATMENT NOTE
Acute Care - Physical Therapy Treatment Note  [facility]     Patient Name: Alessio Allen  : 1940  MRN: [id number]  Today's Date: 3/18/2019  Onset of Illness/Injury or Date of Surgery: 19  Date of Referral to PT: 19  Referring Physician: David    Admit Date: 2019    Visit Dx:    ICD-10-CM ICD-9-CM   1. Acute respiratory failure with hypoxia (CMS/[facility]) J96.01 518.81   2. Impaired mobility Z74.09 799.89   3. AF (paroxysmal atrial fibrillation) (CMS/[facility]) I48.0 427.31     Patient Active Problem List   Diagnosis   • A-fib (CMS/[facility])   • Dyslipidemia   • BP (high blood pressure)   • Neuropathy   • Hypertension   • COPD (chronic obstructive pulmonary disease) (CMS/[facility])   • BPH (benign prostatic hypertrophy)   • Atrial fibrillation (CMS/[facility])   • Asthma   • Hypoxia   • Pneumonia   • Chronic anticoagulation   • Pneumonia of both lungs due to infectious organism   • Hyponatremia   • COPD exacerbation (CMS/[facility])   • Acute on chronic respiratory failure with hypoxia (CMS/[facility])   • Pneumonia of both lower lobes due to infectious organism (CMS/[facility])   • Acute on chronic diastolic heart failure (CMS/[facility])   • IPF (idiopathic pulmonary fibrosis) (CMS/[facility])   • Acute respiratory failure with hypoxia (CMS/[facility])   • Attention to G-tube (CMS/[facility])       Therapy Treatment    Rehabilitation Treatment Summary     Row Name 19 0800             Treatment Time/Intention    Discipline  physical therapy assistant  -CW      Document Type  therapy note (daily note)  -CW      Subjective Information  complains of;dyspnea  -CW      Mode of Treatment  physical therapy  -CW      Therapy Frequency (PT Clinical Impression)  daily  -CW      Patient Effort  good  -CW      Existing Precautions/Restrictions  fall;oxygen therapy device and L/min  -CW      Recorded by [CW] Cesario Kwon PTA 19 0829      Row Name 19 0800             Vital Signs    O2 Delivery Pre Treatment  supplemental O2  -CW      O2 Delivery Intra  Treatment  supplemental O2  -CW      O2 Delivery Post Treatment  supplemental O2  -CW      Recorded by [CW] Cesario Kwon, PTA 03/18/19 0843      Row Name 03/18/19 0800             Cognitive Assessment/Intervention- PT/OT    Orientation Status (Cognition)  oriented x 4  -CW      Follows Commands (Cognition)  WNL  -CW      Safety Deficit (Cognitive)  mild deficit  -CW      Personal Safety Interventions  fall prevention program maintained;gait belt;muscle strengthening facilitated;nonskid shoes/slippers when out of bed  -CW      Recorded by [CW] Cesario Kwon, PTA 03/18/19 0843      Row Name 03/18/19 0800             Bed Mobility Assessment/Treatment    Bed Mobility Assessment/Treatment  supine-sit  -CW      Supine-Sit [place] (Bed Mobility)  supervision  -CW      Sit-Supine [place] (Bed Mobility)  not tested  -CW      Comment (Bed Mobility)  up to commode  -CW      Recorded by [CW] Cesario Kwon, PTA 03/18/19 0843      Row Name 03/18/19 0800             Transfer Assessment/Treatment    Transfer Assessment/Treatment  sit-stand transfer;stand-sit transfer  -CW      Recorded by [CW] Cesario Kwon, PTA 03/18/19 0843      Row Name 03/18/19 0800             Sit-Stand Transfer    Sit-Stand [place] (Transfers)  supervision  -CW      Assistive Device (Sit-Stand Transfers)  walker, front-wheeled  -CW      Recorded by [CW] Cesario Kwon, PTA 03/18/19 0843      Row Name 03/18/19 0800             Stand-Sit Transfer    Stand-Sit [place] (Transfers)  supervision  -CW      Assistive Device (Stand-Sit Transfers)  walker, front-wheeled  -CW      Recorded by [CW] Cesario Kwon, PTA 03/18/19 0843      Row Name 03/18/19 0800             Gait/Stairs Assessment/Training    [place] Level (Gait)  contact guard  -CW      Assistive Device (Gait)  walker, front-wheeled  -CW      Distance in Feet (Gait)  100  -CW      Pattern (Gait)  step-through  -CW      Deviations/Abnormal Patterns  (Gait)  stride length decreased;gait speed decreased  -CW      Bilateral Gait Deviations  forward flexed posture  -CW      Comment (Gait/Stairs)  pt ab limited due to fatigue and SOA  -CW      Recorded by [CW] Cesario Kwon, PTA 03/18/19 0843      Row Name 03/18/19 0800             Positioning and Restraints    Pre-Treatment Position  in bed  -CW      Post Treatment Position  bathroom  -CW      Bathroom  notified nsg;sitting;call light within reach;encouraged to call for assist;with family/caregiver  -CW      Recorded by [CW] Cesario Kwon, PTA 03/18/19 0843      Row Name 03/18/19 0800             Pain Scale: Numbers Pre/Post-Treatment    Pain Scale: Numbers, Pretreatment  0/10 - no pain  -CW      Pain Scale: Numbers, Post-Treatment  0/10 - no pain  -CW      Recorded by [CW] Cesario Kwon, PTA 03/18/19 0843      Row Name                Wound 03/06/19 1003 other (see notes) incision    Wound - Properties Group Date first assessed: 03/06/19 [MONTANA] Time first assessed: 1003 [MONTANA] Location: other (see notes) [MONTANA] Type: incision [MONTANA] Recorded by:  [MONTANA] Sanket Jaimes RN 03/06/19 1003    Row Name 03/18/19 0800             Outcome Summary/Treatment Plan (PT)    Anticipated Discharge Disposition (PT)  home with assist;skilled nursing facility  -CW      Recorded by [CW] Cesario Kwon, PTA 03/18/19 0843        User Key  (r) = Recorded By, (t) = Taken By, (c) = Cosigned By    Initials Name Effective Dates Discipline    MONTANA Sanket Jaimes RN 06/16/16 -  Nurse    CW Cesario Kwon, PTA 03/07/18 -  PT          Rash 03/08/19 2000 Right arm (Active)   Distribution localized 3/18/2019  1:58 AM   Borders raised 3/17/2019 12:28 PM   Characteristics itching 3/18/2019  1:58 AM   Color pink;red 3/18/2019  1:58 AM       Wound 03/06/19 1003 other (see notes) incision (Active)   Closure Open to air 3/17/2019 12:28 PM   Drainage Amount none 3/17/2019 12:28 PM   Dressing Care, Wound open to air 3/17/2019  8:52 AM            Physical Therapy Education     Title: PT OT SLP Therapies (Done)     Topic: Physical Therapy (Done)     Point: Mobility training (Done)     Learning Progress Summary           Patient Acceptance, E,TB, VU,DU by CW at 3/18/2019  8:43 AM    Acceptance, E,TB, VU by CS at 3/17/2019  4:28 PM    Acceptance, E, VU,NR by MS at 3/17/2019  4:16 AM    Acceptance, E,D, DU by PC at 3/16/2019  3:53 PM    Acceptance, E,TB, VU by MS1 at 3/16/2019 12:14 PM    Acceptance, E, VU,NR by MS at 3/16/2019  6:22 AM    Acceptance, E, NR by EM at 3/15/2019  4:10 PM    Acceptance, E,TB, VU,DU by CW at 3/14/2019  1:53 PM    Acceptance, E, NR by AR at 3/13/2019  2:52 PM    Acceptance, E,TB, VU by MS1 at 3/12/2019 12:58 PM    Acceptance, E,TB, VU,DU by CW at 3/12/2019  9:58 AM    Acceptance, TB,E, VU,DU by CW at 3/11/2019  2:02 PM    Acceptance, E,TB, VU,DU by CW at 3/10/2019 11:14 AM    Acceptance, E,TB, VU,DU by CW at 3/9/2019 11:13 AM    Acceptance, E,TB, VU,DU by CW at 3/8/2019 11:21 AM    Acceptance, E, VU by KH at 3/7/2019 11:47 AM    Acceptance, D,E, NR by CW at 3/6/2019  4:52 PM    Acceptance, E, NR by  at 3/5/2019  1:08 PM   Family Acceptance, E,TB, VU,DU by CW at 3/18/2019  8:43 AM    Acceptance, E, VU,NR by MS at 3/17/2019  4:16 AM   Significant Other Acceptance, E,TB, VU by MS1 at 3/16/2019 12:14 PM                   Point: Home exercise program (Done)     Learning Progress Summary           Patient Acceptance, E,TB, VU,DU by CW at 3/18/2019  8:43 AM    Acceptance, E,TB, VU by CS at 3/17/2019  4:28 PM    Acceptance, E, VU,NR by MS at 3/17/2019  4:16 AM    Acceptance, E,TB, VU by MS1 at 3/16/2019 12:14 PM    Acceptance, E,TB, VU,DU by CW at 3/14/2019  1:53 PM    Acceptance, E, NR by AR at 3/13/2019  2:52 PM    Acceptance, E,TB, VU by MS1 at 3/12/2019 12:58 PM    Acceptance, E,TB, VU,DU by CW at 3/12/2019  9:58 AM    Acceptance, TB,E, VU,DU by CW at 3/11/2019  2:02 PM    Acceptance, E,TB, VU,DU by CW at 3/10/2019 11:14 AM     Acceptance, E,TB, VU,DU by CW at 3/9/2019 11:13 AM    Acceptance, E,TB, VU,DU by CW at 3/8/2019 11:21 AM    Acceptance, E, VU by KH at 3/7/2019 11:47 AM    Acceptance, D,E, NR by CW at 3/6/2019  4:52 PM    Acceptance, E, NR by LH at 3/5/2019  1:08 PM   Family Acceptance, E,TB, VU,DU by CW at 3/18/2019  8:43 AM    Acceptance, E, VU,NR by MS at 3/17/2019  4:16 AM   Significant Other Acceptance, E,TB, VU by MS1 at 3/16/2019 12:14 PM                   Point: Body mechanics (Done)     Learning Progress Summary           Patient Acceptance, E,TB, VU,DU by CW at 3/18/2019  8:43 AM    Acceptance, E,TB, VU by CS at 3/17/2019  4:28 PM    Acceptance, E, VU,NR by MS at 3/17/2019  4:16 AM    Acceptance, E,D, DU by PC at 3/16/2019  3:53 PM    Acceptance, E, VU,NR by MS at 3/16/2019  6:22 AM    Acceptance, E,TB, VU,DU by CW at 3/14/2019  1:53 PM    Acceptance, E, NR by AR at 3/13/2019  2:52 PM    Acceptance, E,TB, VU,DU by CW at 3/12/2019  9:58 AM    Acceptance, TB,E, VU,DU by CW at 3/11/2019  2:02 PM    Acceptance, E,TB, VU,DU by CW at 3/10/2019 11:14 AM    Acceptance, E,TB, VU,DU by CW at 3/9/2019 11:13 AM    Acceptance, E,TB, VU,DU by CW at 3/8/2019 11:21 AM    Acceptance, E, VU by KH at 3/7/2019 11:47 AM    Acceptance, D,E, NR by CW at 3/6/2019  4:52 PM    Acceptance, E, NR by LH at 3/5/2019  1:08 PM   Family Acceptance, E,TB, VU,DU by CW at 3/18/2019  8:43 AM    Acceptance, E, VU,NR by MS at 3/17/2019  4:16 AM                   Point: Precautions (Done)     Learning Progress Summary           Patient Acceptance, E,TB, VU,DU by CW at 3/18/2019  8:43 AM    Acceptance, E,TB, VU by CS at 3/17/2019  4:28 PM    Acceptance, E, VU,NR by MS at 3/17/2019  4:16 AM    Acceptance, E,D, DU by PC at 3/16/2019  3:53 PM    Acceptance, E,TB, VU by MS1 at 3/16/2019 12:14 PM    Acceptance, E, VU,NR by MS at 3/16/2019  6:22 AM    Acceptance, E,TB, VU,DU by CW at 3/14/2019  1:53 PM    Acceptance, E, NR by AR at 3/13/2019  2:52 PM    Acceptance,  E,TB, VU by MS1 at 3/12/2019 12:58 PM    Acceptance, E,TB, VU,DU by CW at 3/12/2019  9:58 AM    Acceptance, TB,E, VU,DU by CW at 3/11/2019  2:02 PM    Acceptance, E,TB, VU,DU by CW at 3/10/2019 11:14 AM    Acceptance, E,TB, VU,DU by CW at 3/9/2019 11:13 AM    Acceptance, E,TB, VU,DU by CW at 3/8/2019 11:21 AM    Acceptance, E, VU by KH at 3/7/2019 11:47 AM    Acceptance, D,E, NR by CW at 3/6/2019  4:52 PM    Acceptance, E, NR by  at 3/5/2019  1:08 PM   Family Acceptance, E,TB, VU,DU by CW at 3/18/2019  8:43 AM    Acceptance, E, VU,NR by MS at 3/17/2019  4:16 AM   Significant Other Acceptance, E,TB, VU by MS1 at 3/16/2019 12:14 PM                               User Key     Initials Effective Dates Name Provider Type Discipline     02/05/19 -  Leigh Woodard, PT Physical Therapist PT     04/03/18 -  Jaida Porter, PT Physical Therapist PT    PC 04/03/18 -  Dara Fishman, PT Physical Therapist PT    EM 04/03/18 -  Edna Guerrero, PT Physical Therapist PT    MS1 02/23/17 -  Juventino Contreras, RN Registered Nurse Nurse    AR 04/03/18 -  Iris Irby, PT Physical Therapist PT    CW 03/07/18 -  Cesario Kwon, PTA Physical Therapy Assistant PT    MS 10/30/17 -  Gisele Stern, RN Registered Nurse Nurse     05/14/18 -  Alok Mensah, PT Physical Therapist PT                PT Recommendation and Plan  Anticipated Discharge Disposition (PT): home with assist, skilled nursing facility  Therapy Frequency (PT Clinical Impression): daily  Outcome Summary/Treatment Plan (PT)  Anticipated Discharge Disposition (PT): home with assist, skilled nursing facility  Plan of Care Reviewed With: patient  Progress: improving  Outcome Summary: Pt increasing withbed mobility but limited amb due to safety and SOA with high flow 10L when out of bed  Outcome Measures     Row Name 03/18/19 0800 03/17/19 1600 03/16/19 1500       How much help from another person do you currently need...    Turning from your back  to your side while in flat bed without using bedrails?  4  -CW  4  -CS  4  -PC    Moving from lying on back to sitting on the side of a flat bed without bedrails?  4  -CW  4  -CS  4  -PC    Moving to and from a bed to a chair (including a wheelchair)?  3  -CW  3  -CS  3  -PC    Standing up from a chair using your arms (e.g., wheelchair, bedside chair)?  3  -CW  3  -CS  3  -PC    Climbing 3-5 steps with a railing?  2  -CW  2  -CS  2  -PC    To walk in hospital room?  3  -CW  3  -CS  3  -PC    AM-PAC 6 Clicks Score  19  -CW  19  -CS  19  -PC       Functional Assessment    Outcome Measure Options  AM-PAC 6 Clicks Basic Mobility (PT)  -CW  AM-PAC 6 Clicks Basic Mobility (PT)  -CS  --    Row Name 03/15/19 1600             How much help from another person do you currently need...    Turning from your back to your side while in flat bed without using bedrails?  4  -EM      Moving from lying on back to sitting on the side of a flat bed without bedrails?  4  -EM      Moving to and from a bed to a chair (including a wheelchair)?  3  -EM      Standing up from a chair using your arms (e.g., wheelchair, bedside chair)?  3  -EM      Climbing 3-5 steps with a railing?  2  -EM      To walk in hospital room?  3  -EM      AM-PAC 6 Clicks Score  19  -EM        User Key  (r) = Recorded By, (t) = Taken By, (c) = Cosigned By    Initials Name Provider Type    PC Dara Fishman, PT Physical Therapist    EM Edna Guerrero, PT Physical Therapist    CW Cesario Kwon, PTA Physical Therapy Assistant    CS Alok Mensah, PT Physical Therapist         Time Calculation:   PT Charges     Row Name 03/18/19 0845             Time Calculation    Start Time  0820  -CW      Stop Time  0845  -CW      Time Calculation (min)  25 min  -CW      PT Received On  03/18/19  -CW      PT - Next Appointment  03/19/19  -CW        User Key  (r) = Recorded By, (t) = Taken By, (c) = Cosigned By    Initials Name Provider Type    CW Cesario Kwon,  PTA Physical Therapy Assistant        Therapy Suggested Charges     Code   Minutes Charges    None           Therapy Charges for Today     Code Description Service Date Service Provider Modifiers Qty    93784100336 HC PT THER PROC EA 15 MIN 3/18/2019 Cesario Kwon, PTA GP 2          PT G-Codes  Outcome Measure Options: AM-PAC 6 Clicks Basic Mobility (PT)  AM-PAC 6 Clicks Score: 19    Cesario Kwon PTA  3/18/2019

## 2019-03-18 NOTE — PLAN OF CARE
Problem: Patient Care Overview  Goal: Plan of Care Review  Outcome: Ongoing (interventions implemented as appropriate)   03/18/19 0422   Coping/Psychosocial   Plan of Care Reviewed With patient   OTHER   Outcome Summary High flow nasal cannula weaned to 5lpm throughout day from 7lpm. Encourage use of aerobika with and without RT. Continue with use of bronchodilators.

## 2019-03-18 NOTE — PROGRESS NOTES
[address]    3/18/2019    Patient ID:  Name:  Alessio Allen  MRN:  [id number]  1940  78 y.o.  male            CC/Reason for visit: Acute on chronic respiratory failure and other medical problems listed below    Interval hx: Oxygen requirements down to 6-8 L per but still desaturates with minimal exertion.  Still remains weak and tired and working with physical therapy.    ROS: Negative for fever.  Negative for cough.  Negative for sputum    Vitals:  Vitals:    03/18/19 0725 03/18/19 0741 03/18/19 1117 03/18/19 1124   BP:  125/73     BP Location:  Left arm     Patient Position:  Lying     Pulse: 84 85 79    Resp: 20 20 20    Temp:  96.6 °F (35.9 °C)     TempSrc:  Oral     SpO2: 92% 93% 95% 95%   Weight:       Height:         FiO2 (%): 65 %     Body mass index is 29.61 kg/m².    Intake/Output Summary (Last 24 hours) at 3/18/2019 1359  Last data filed at 3/18/2019 0209  Gross per 24 hour   Intake 60 ml   Output 800 ml   Net -740 ml       Exam:  GEN:  No distress  Alert, oriented x 3.   LUNGS: Barrel chest, distant and diminished breath sounds but no rhonchi or wheezing, nonlabored breathing  CV:  Normal S1S2, without murmur, trace ankle edema much better  ABD:  Non tender, no enlarged liver or masses      Scheduled meds:      atorvastatin 10 mg Oral Daily   furosemide 20 mg Oral Daily With Breakfast   hydrocortisone  Topical Q12H   insulin lispro 0-9 Units Subcutaneous 4x Daily With Meals & Nightly   ipratropium-albuterol 3 mL Nebulization 4x Daily - RT   ketotifen 1 drop Both Eyes BID   lansoprazole 30 mg Per PEG Tube Q AM   potassium chloride 20 mEq Oral Daily   pregabalin 75 mg Oral Q12H   rivaroxaban 20 mg Oral Daily With Dinner   sodium chloride 10 mL Intravenous Q12H   pyridoxine 50 mg Oral Daily     IV meds:                          lactated ringers 9 mL/hr Last Rate: Stopped (03/06/19 1042)       Data Review:   I reviewed the patient's medications and new clinical results.  Lab  Results   Component Value Date    CALCIUM 8.9 03/18/2019    PHOS 3.8 03/16/2019    MG 2.0 03/16/2019    MG 2.2 03/14/2019    MG 2.1 03/12/2019     Results from last 7 days   Lab Units 03/18/19  0654 03/17/19  0546 03/16/19  0614 03/16/19  0613 03/16/19  0612 03/15/19  1646  03/14/19  0350 03/12/19  0414   SODIUM mmol/L 151* 150* 148*  --   --  150*  --   --  150*   POTASSIUM mmol/L 4.1 4.1 3.8  --   --  3.5  --  3.7 4.5   CHLORIDE mmol/L 106 105 104  --   --  107  --   --  106   CO2 mmol/L 35.4* 36.9* 35.8*  --   --  32.3*  --   --  30.8*   BUN mg/dL 18 20 20  --   --  25*  --   --  34*   CREATININE mg/dL 0.76 0.89 0.94  --   --  0.94  --   --  1.11   CALCIUM mg/dL 8.9 9.0 9.0  --   --  8.7  --   --  9.5   GLUCOSE mg/dL 115* 127* 116*  --   --  132*  --   --  141*   WBC 10*3/mm3  --  10.94*  --   --  10.96* 11.76*   < >  --   --    HEMOGLOBIN g/dL  --  12.7*  --   --  13.1 13.4   < >  --   --    PLATELETS 10*3/mm3  --  215  --   --  223 249   < >  --   --    INR   --   --   --  1.22*  --   --   --   --   --    PROBNP pg/mL  --   --  1,298.0  --   --  1,298.0  --   --  1,024.0   PROCALCITONIN ng/mL  --   --  0.03*  --   --  0.08*  --  0.04* 0.09*    < > = values in this interval not displayed.     Results from last 7 days   Lab Units 03/14/19  1247 03/14/19  0622   RESPCX  Heavy growth (4+) Staphylococcus aureus, MRSA*  Heavy growth (4+) Normal Respiratory [place]  --    MRSA SCREEN CX   --  Staphylococcus aureus, MRSA*             ASSESSMENT:   Hypernatremia  Acute respiratory failure on chronic respiratory failure  Acute decompensated diastolic HF  Small foci of organizing pneumonia on lung biopsy  COPD, no obvious exacerbation  Throat cancer s/p chemo RT  Dysphagia - PEG  Afib, on AC  CAD  Obesity  Attention to G-tube (CMS/HCC)        PLAN:  Continue aggressive pulmonary toilet.  Wean down oxygen as tolerated  Patient has completed course of steroids for biopsy-proven organizing pneumonia  All antibiotics have been  stopped and currently he is being observed off antibiotics which I think is reasonable.  Apparently Dr. Jimenez had discussed it further with Dr. Moore his ENT physician.The plan is to discharge the patient  when he receives a bed at [facility], and then follow-up with Dr. 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  Discussed with .  We will have to arrange for BiPAP at the nursing home prior to discharge.  Continue BiPAP with sleep only.  Patient has been using it off and on in the hospital.  Continue oral anti-coagulation for A. fib.  Physical therapy  Discharge once bed available and BiPAP arranged.              Nancy Sarabia MD  3/18/2019

## 2019-03-18 NOTE — PROGRESS NOTES
Adult Nutrition  Assessment/PES    Patient Name:  Alessio Allen  YOB: 1940  MRN: [id number]  Admit Date:  2/28/2019    Assessment Date:  3/18/2019    Comments:  Follow up.  Patient continues with TFs of Boost Plus - 8 cartons/day via bolus feeds.  Patient continues to tolerate TFs well.    Will continue to follow.    Reason for Assessment     Row Name 03/18/19 1633          Reason for Assessment    Reason For Assessment  TF/PN;follow-up protocol           Anthropometrics     Row Name 03/18/19 1633          Admit Weight    Admit Weight  -- 230# 3/18        Body Mass Index (BMI)    BMI Assessment  BMI 25-29.9: overweight         Labs/Tests/Procedures/Meds     Row Name 03/18/19 1642          Labs/Procedures/Meds    Lab Results Reviewed  reviewed, pertinent        Diagnostic Tests/Procedures    Diagnostic Test/Procedure Reviewed  reviewed, pertinent        Medications    Pertinent Medications Reviewed  reviewed, pertinent         Physical Findings     Row Name 03/18/19 1642          Physical Findings    Overall Physical Appearance  overweight     Gastrointestinal  feeding tube     Tubes  PEG           Nutrition Prescription Ordered     Row Name 03/18/19 1642          Nutrition Prescription PO    Current PO Diet  NPO        Nutrition Prescription EN    Enteral Route  PEG     Product  -- Boost Plus 8 cartons/day     TF Delivery Method  Bolus     TF Bolus Goal Volume (mL)  711 mL     TF Bolus Frequency  3 times a day     Water flush (mL)   120 mL     Water Flush Frequency  Every feeding         Evaluation of Received Nutrient/Fluid Intake     Row Name 03/18/19 1643          Nutrient/Fluid Evaluation    Additional Documentation  Calories Evaluation (Group);Protein Evaluation (Group)        Calories Evaluation    Enteral Calories (kcal)  2880     % of Kcal Needs  100        Protein Evaluation    Enteral Protein (gm)  112     % of Protein Needs  100                Problem/Interventions:            Intervention Goal     Row Name 03/18/19 1643          Intervention Goal    General  Maintain nutrition;Nutrition support treatment;Disease management/therapy;Meet nutritional needs for age/condition;Reduce/improve symptoms     TF/PN  Maintain TF/PN     Weight  No significant weight loss         Nutrition Intervention     Row Name 03/18/19 1643          Nutrition Intervention    RD/Tech Action  Follow Tx progress;Care plan reviewd     Recommended/Ordered  EN         Nutrition Prescription     Row Name 03/18/19 1643          Nutrition Prescription EN    Enteral Prescription  Continue same protocol         Education/Evaluation     Row Name 03/18/19 1643          Education    Education  Will Instruct as appropriate        Monitor/Evaluation    Monitor  Per protocol;I&O;Pertinent labs;TF delivery/tolerance;Weight;Symptoms           Electronically signed by:  Makayla Mccollum RD  03/18/19 4:44 PM

## 2019-03-19 LAB
ANION GAP SERPL CALCULATED.3IONS-SCNC: 9.6 MMOL/L
BUN BLD-MCNC: 18 MG/DL (ref 8–23)
BUN/CREAT SERPL: 22.2 (ref 7–25)
CALCIUM SPEC-SCNC: 9.3 MG/DL (ref 8.6–10.5)
CHLORIDE SERPL-SCNC: 103 MMOL/L (ref 98–107)
CO2 SERPL-SCNC: 33.4 MMOL/L (ref 22–29)
CREAT BLD-MCNC: 0.81 MG/DL (ref 0.76–1.27)
DEPRECATED RDW RBC AUTO: 52.7 FL (ref 37–54)
ERYTHROCYTE [DISTWIDTH] IN BLOOD BY AUTOMATED COUNT: 14.7 % (ref 12.3–15.4)
GFR SERPL CREATININE-BSD FRML MDRD: 92 ML/MIN/1.73
GLUCOSE BLD-MCNC: 126 MG/DL (ref 65–99)
GLUCOSE BLDC GLUCOMTR-MCNC: 117 MG/DL (ref 70–130)
GLUCOSE BLDC GLUCOMTR-MCNC: 131 MG/DL (ref 70–130)
GLUCOSE BLDC GLUCOMTR-MCNC: 135 MG/DL (ref 70–130)
GLUCOSE BLDC GLUCOMTR-MCNC: 136 MG/DL (ref 70–130)
HCT VFR BLD AUTO: 43 % (ref 37.5–51)
HGB BLD-MCNC: 13.1 G/DL (ref 13–17.7)
MCH RBC QN AUTO: 29.8 PG (ref 26.6–33)
MCHC RBC AUTO-ENTMCNC: 30.5 G/DL (ref 31.5–35.7)
MCV RBC AUTO: 97.9 FL (ref 79–97)
PLATELET # BLD AUTO: 188 10*3/MM3 (ref 140–450)
PMV BLD AUTO: 11.1 FL (ref 6–12)
POTASSIUM BLD-SCNC: 4 MMOL/L (ref 3.5–5.2)
RBC # BLD AUTO: 4.39 10*6/MM3 (ref 4.14–5.8)
SODIUM BLD-SCNC: 146 MMOL/L (ref 136–145)
WBC NRBC COR # BLD: 11.56 10*3/MM3 (ref 3.4–10.8)

## 2019-03-19 PROCEDURE — 92526 ORAL FUNCTION THERAPY: CPT

## 2019-03-19 PROCEDURE — 94799 UNLISTED PULMONARY SVC/PX: CPT

## 2019-03-19 PROCEDURE — 82962 GLUCOSE BLOOD TEST: CPT

## 2019-03-19 PROCEDURE — 97110 THERAPEUTIC EXERCISES: CPT

## 2019-03-19 PROCEDURE — 85027 COMPLETE CBC AUTOMATED: CPT | Performed by: INTERNAL MEDICINE

## 2019-03-19 PROCEDURE — 80048 BASIC METABOLIC PNL TOTAL CA: CPT | Performed by: INTERNAL MEDICINE

## 2019-03-19 RX ADMIN — HYDROCORTISONE: 1 CREAM TOPICAL at 20:26

## 2019-03-19 RX ADMIN — RIVAROXABAN 20 MG: 20 TABLET, FILM COATED ORAL at 17:53

## 2019-03-19 RX ADMIN — IPRATROPIUM BROMIDE AND ALBUTEROL SULFATE 3 ML: 2.5; .5 SOLUTION RESPIRATORY (INHALATION) at 11:18

## 2019-03-19 RX ADMIN — POTASSIUM CHLORIDE 20 MEQ: 750 CAPSULE, EXTENDED RELEASE ORAL at 09:33

## 2019-03-19 RX ADMIN — SODIUM CHLORIDE, PRESERVATIVE FREE 10 ML: 5 INJECTION INTRAVENOUS at 21:00

## 2019-03-19 RX ADMIN — SODIUM CHLORIDE, PRESERVATIVE FREE 10 ML: 5 INJECTION INTRAVENOUS at 09:33

## 2019-03-19 RX ADMIN — Medication 50 MG: at 09:32

## 2019-03-19 RX ADMIN — PREGABALIN 75 MG: 75 CAPSULE ORAL at 09:32

## 2019-03-19 RX ADMIN — PREGABALIN 75 MG: 75 CAPSULE ORAL at 20:25

## 2019-03-19 RX ADMIN — IPRATROPIUM BROMIDE AND ALBUTEROL SULFATE 3 ML: 2.5; .5 SOLUTION RESPIRATORY (INHALATION) at 06:41

## 2019-03-19 RX ADMIN — IPRATROPIUM BROMIDE AND ALBUTEROL SULFATE 3 ML: 2.5; .5 SOLUTION RESPIRATORY (INHALATION) at 19:48

## 2019-03-19 RX ADMIN — HYDROCORTISONE: 1 CREAM TOPICAL at 09:34

## 2019-03-19 RX ADMIN — FUROSEMIDE 20 MG: 20 TABLET ORAL at 09:32

## 2019-03-19 RX ADMIN — KETOTIFEN FUMARATE 1 DROP: 0.35 SOLUTION/ DROPS OPHTHALMIC at 20:25

## 2019-03-19 RX ADMIN — LANSOPRAZOLE 30 MG: KIT at 05:32

## 2019-03-19 RX ADMIN — IPRATROPIUM BROMIDE AND ALBUTEROL SULFATE 3 ML: 2.5; .5 SOLUTION RESPIRATORY (INHALATION) at 15:16

## 2019-03-19 RX ADMIN — KETOTIFEN FUMARATE 1 DROP: 0.35 SOLUTION/ DROPS OPHTHALMIC at 09:33

## 2019-03-19 RX ADMIN — ATORVASTATIN CALCIUM 10 MG: 10 TABLET, FILM COATED ORAL at 09:32

## 2019-03-19 NOTE — PLAN OF CARE
Problem: Patient Care Overview  Goal: Plan of Care Review  Outcome: Ongoing (interventions implemented as appropriate)   03/19/19 1970   Coping/Psychosocial   Plan of Care Reviewed With patient;family   Plan of Care Review   Progress improving   OTHER   Outcome Summary Weaned O2 from 6L HFNC to 5L NC, tolerating txs and aerobika well. Continue treatment plan, encourage pulmonary hygiene, and encourage bipap use at HS/PRN.

## 2019-03-19 NOTE — PLAN OF CARE
Problem: Patient Care Overview  Goal: Plan of Care Review  Outcome: Ongoing (interventions implemented as appropriate)   03/19/19 1055   Coping/Psychosocial   Plan of Care Reviewed With patient   Plan of Care Review   Progress improving   OTHER   Outcome Summary Pt increasing with bed mobility and transfer safety amb limited due to fatigue and SOA with 8L o2

## 2019-03-19 NOTE — PLAN OF CARE
Problem: Patient Care Overview  Goal: Plan of Care Review  Outcome: Ongoing (interventions implemented as appropriate)   03/19/19 2467   Coping/Psychosocial   Plan of Care Reviewed With patient   Plan of Care Review   Progress improving   OTHER   Outcome Summary Patient remain on high flow nasal cannula at 7l/m this shift. Was not ablet to tolerate NPPV, only wore for 1 hour. Had large b/m this shift. Denies any pain. Remain POD on monitor. nursing will continue to monitor.     Goal: Individualization and Mutuality  Outcome: Ongoing (interventions implemented as appropriate)    Goal: Discharge Needs Assessment  Outcome: Ongoing (interventions implemented as appropriate)    Goal: Interprofessional Rounds/Family Conf  Outcome: Ongoing (interventions implemented as appropriate)      Problem: Fall Risk (Adult)  Goal: Absence of Fall  Outcome: Ongoing (interventions implemented as appropriate)      Problem: Breathing Pattern Ineffective (Adult)  Goal: Effective Oxygenation/Ventilation  Outcome: Ongoing (interventions implemented as appropriate)      Problem: Nutrition, Enteral (Adult)  Goal: Signs and Symptoms of Listed Potential Problems Will be Absent, Minimized or Managed (Nutrition, Enteral)  Outcome: Ongoing (interventions implemented as appropriate)      Problem: Skin Injury Risk (Adult)  Goal: Skin Health and Integrity  Outcome: Ongoing (interventions implemented as appropriate)         TELE

## 2019-03-19 NOTE — PROGRESS NOTES
Continued Stay Note  [facility]     Patient Name: Alessio Allen  MRN: [id number]  Today's Date: 3/19/2019    Admit Date: 2/28/2019    Discharge Plan     Row Name 03/19/19 1523       Plan    Plan  Plan [facility].  JARON Juarez RN    Plan Comments  Per Erica  [phone]) [facility] and bed for pt .  Plan [facility].   JARON Juarez RN        Discharge Codes    No documentation.             Mahsa Juarez, RN

## 2019-03-19 NOTE — PROGRESS NOTES
[address]    3/19/2019    Patient ID:  Name:  Alessio Allen  MRN:  [id number]  1940  78 y.o.  male            CC/Reason for visit: Acute on chronic respiratory failure and other medical problems listed below    Interval hx: Oxygen requirements stable at 6-8 L per but still desaturates with minimal exertion.  Still remains weak and tired and working with physical therapy.    ROS: Negative for fever.  Negative for cough.  Negative for sputum    Vitals:  Vitals:    03/19/19 0730 03/19/19 1045 03/19/19 1118 03/19/19 1354   BP: 111/68   107/71   BP Location: Left arm   Left arm   Patient Position: Lying   Sitting   Pulse:  84 84    Resp:   20 18   Temp: 96.6 °F (35.9 °C)      TempSrc: Oral   Oral   SpO2:  93% 94%    Weight:       Height:         FiO2 (%): 65 %     Body mass index is 29.81 kg/m².    Intake/Output Summary (Last 24 hours) at 3/19/2019 1457  Last data filed at 3/19/2019 1005  Gross per 24 hour   Intake 711 ml   Output 650 ml   Net 61 ml       Exam:  GEN:  No distress  Alert, oriented x 3.   LUNGS: Barrel chest, distant and diminished breath sounds but no rhonchi or wheezing, nonlabored breathing  CV:  Normal S1S2, without murmur, trace ankle edema much better  ABD:  Non tender, no enlarged liver or masses      Scheduled meds:      atorvastatin 10 mg Oral Daily   furosemide 20 mg Oral Daily With Breakfast   hydrocortisone  Topical Q12H   insulin lispro 0-9 Units Subcutaneous 4x Daily With Meals & Nightly   ipratropium-albuterol 3 mL Nebulization 4x Daily - RT   ketotifen 1 drop Both Eyes BID   lansoprazole 30 mg Per PEG Tube Q AM   potassium chloride 20 mEq Oral Daily   pregabalin 75 mg Oral Q12H   rivaroxaban 20 mg Oral Daily With Dinner   sodium chloride 10 mL Intravenous Q12H   pyridoxine 50 mg Oral Daily     IV meds:                          lactated ringers 9 mL/hr Last Rate: Stopped (03/06/19 1042)       Data Review:   I reviewed the patient's medications and new  clinical results.  Lab Results   Component Value Date    CALCIUM 9.3 03/19/2019    PHOS 3.8 03/16/2019    MG 2.0 03/16/2019    MG 2.2 03/14/2019    MG 2.1 03/12/2019     Results from last 7 days   Lab Units 03/19/19  0537 03/18/19  0654 03/17/19  0546 03/16/19  0614 03/16/19  0613 03/16/19  0612 03/15/19  1646  03/14/19  0350   SODIUM mmol/L 146* 151* 150* 148*  --   --  150*   < >  --    POTASSIUM mmol/L 4.0 4.1 4.1 3.8  --   --  3.5  --  3.7   CHLORIDE mmol/L 103 106 105 104  --   --  107   < >  --    CO2 mmol/L 33.4* 35.4* 36.9* 35.8*  --   --  32.3*   < >  --    BUN mg/dL 18 18 20 20  --   --  25*   < >  --    CREATININE mg/dL 0.81 0.76 0.89 0.94  --   --  0.94   < >  --    CALCIUM mg/dL 9.3 8.9 9.0 9.0  --   --  8.7   < >  --    GLUCOSE mg/dL 126* 115* 127* 116*  --   --  132*   < >  --    WBC 10*3/mm3 11.56*  --  10.94*  --   --  10.96* 11.76*   < >  --    HEMOGLOBIN g/dL 13.1  --  12.7*  --   --  13.1 13.4   < >  --    PLATELETS 10*3/mm3 188  --  215  --   --  223 249   < >  --    INR   --   --   --   --  1.22*  --   --   --   --    PROBNP pg/mL  --   --   --  1,298.0  --   --  1,298.0  --   --    PROCALCITONIN ng/mL  --   --   --  0.03*  --   --  0.08*  --  0.04*    < > = values in this interval not displayed.     Results from last 7 days   Lab Units 03/14/19  1247 03/14/19  0622   RESPCX  Heavy growth (4+) Staphylococcus aureus, MRSA*  Heavy growth (4+) Normal Respiratory [place]  --    MRSA SCREEN CX   --  Staphylococcus aureus, MRSA*             ASSESSMENT:   Hypernatremia  Acute respiratory failure on chronic respiratory failure  Acute decompensated diastolic HF  Small foci of organizing pneumonia on lung biopsy  COPD, no obvious exacerbation  Throat cancer s/p chemo RT  Dysphagia - PEG  Afib, on AC  CAD  Obesity  Attention to G-tube (CMS/HCC)        PLAN:  Continue aggressive pulmonary toilet.  Wean down oxygen as tolerated  Patient has completed course of steroids for biopsy-proven organizing  pneumonia  All antibiotics have been stopped and currently he is being observed off antibiotics which I think is reasonable.  Apparently Dr. Jimenez had discussed it further with Dr. Moore his ENT physician.The plan is to discharge the patient  when he receives a bed at [facility], and then follow-up with Dr. 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  Discussed with .  We will have to arrange for BiPAP at the nursing home prior to discharge.  Continue BiPAP with sleep only.  Patient has been using it off and on in the hospital.  Continue oral anti-coagulation for A. fib.  Physical therapy  Discharge once bed available and BiPAP arranged.  Discussed with wife              Nancy Sarabia MD  3/19/2019

## 2019-03-19 NOTE — PLAN OF CARE
Problem: Patient Care Overview  Goal: Plan of Care Review  Outcome: Ongoing (interventions implemented as appropriate)   03/19/19 8530   Coping/Psychosocial   Plan of Care Reviewed With patient   Plan of Care Review   Progress improving   OTHER   Outcome Summary Patient has been pleasant and cooperative during shift. Trilogy being set up at [facility] and patient planned for discharge tomorrow. SLP saw patient again and they suggest continuing NPO with Ice chips. No complaints of pain or nausea. SOA noted but this is baseline. Patient currently on 6L O2. Patient able to ambulate with PT. Will continue to monitor and assist patient as needed       Problem: Fall Risk (Adult)  Goal: Absence of Fall  Outcome: Ongoing (interventions implemented as appropriate)      Problem: Breathing Pattern Ineffective (Adult)  Goal: Effective Oxygenation/Ventilation  Outcome: Ongoing (interventions implemented as appropriate)      Problem: Nutrition, Enteral (Adult)  Goal: Signs and Symptoms of Listed Potential Problems Will be Absent, Minimized or Managed (Nutrition, Enteral)  Outcome: Ongoing (interventions implemented as appropriate)      Problem: Skin Injury Risk (Adult)  Goal: Skin Health and Integrity  Outcome: Ongoing (interventions implemented as appropriate)

## 2019-03-19 NOTE — THERAPY TREATMENT NOTE
Acute Care - Physical Therapy Treatment Note  [facility]     Patient Name: Alessio Allen  : 1940  MRN: [id number]  Today's Date: 3/19/2019  Onset of Illness/Injury or Date of Surgery: 19  Date of Referral to PT: 19  Referring Physician: David    Admit Date: 2019    Visit Dx:    ICD-10-CM ICD-9-CM   1. Acute respiratory failure with hypoxia (CMS/[facility]) J96.01 518.81   2. Impaired mobility Z74.09 799.89   3. AF (paroxysmal atrial fibrillation) (CMS/[facility]) I48.0 427.31     Patient Active Problem List   Diagnosis   • A-fib (CMS/[facility])   • Dyslipidemia   • BP (high blood pressure)   • Neuropathy   • Hypertension   • COPD (chronic obstructive pulmonary disease) (CMS/[facility])   • BPH (benign prostatic hypertrophy)   • Atrial fibrillation (CMS/[facility])   • Asthma   • Hypoxia   • Pneumonia   • Chronic anticoagulation   • Pneumonia of both lungs due to infectious organism   • Hyponatremia   • COPD exacerbation (CMS/[facility])   • Acute on chronic respiratory failure with hypoxia (CMS/[facility])   • Pneumonia of both lower lobes due to infectious organism (CMS/[facility])   • Acute on chronic diastolic heart failure (CMS/[facility])   • IPF (idiopathic pulmonary fibrosis) (CMS/[facility])   • Acute respiratory failure with hypoxia (CMS/[facility])   • Attention to G-tube (CMS/[facility])       Therapy Treatment    Rehabilitation Treatment Summary     Row Name 19 1026             Treatment Time/Intention    Discipline  physical therapy assistant  -CW      Document Type  therapy note (daily note)  -CW      Subjective Information  complains of;dyspnea  -CW      Mode of Treatment  physical therapy  -CW      Therapy Frequency (PT Clinical Impression)  daily  -CW      Patient Effort  good  -CW      Existing Precautions/Restrictions  fall;oxygen therapy device and L/min  -CW      Treatment Considerations/Comments  Amb on 8L o2  -CW      Recorded by [CW] Cesario Kwon, HARDEEP 19 1058      Row Name 19 1026             Vital Signs    O2 Delivery  Pre Treatment  supplemental O2  -CW      O2 Delivery Intra Treatment  supplemental O2  -CW      O2 Delivery Post Treatment  supplemental O2  -CW      Recorded by [CW] Cesario Kwon, PTA 03/19/19 1058      Row Name 03/19/19 1026             Cognitive Assessment/Intervention- PT/OT    Orientation Status (Cognition)  oriented x 4  -CW      Follows Commands (Cognition)  WNL  -CW      Safety Deficit (Cognitive)  mild deficit  -CW      Personal Safety Interventions  fall prevention program maintained;gait belt;muscle strengthening facilitated;nonskid shoes/slippers when out of bed  -CW      Recorded by [CW] Cesario Kwon, PTA 03/19/19 1058      Row Name 03/19/19 1026             Bed Mobility Assessment/Treatment    Bed Mobility Assessment/Treatment  supine-sit  -CW      Supine-Sit [place] (Bed Mobility)  supervision  -CW      Sit-Supine [place] (Bed Mobility)  not tested  -CW      Comment (Bed Mobility)  up to chair  -CW      Recorded by [CW] Cesario Kwon, PTA 03/19/19 1058      Row Name 03/19/19 1026             Transfer Assessment/Treatment    Transfer Assessment/Treatment  sit-stand transfer;stand-sit transfer  -CW      Recorded by [CW] Cesario Kwon, PTA 03/19/19 1058      Row Name 03/19/19 1026             Sit-Stand Transfer    Sit-Stand [place] (Transfers)  supervision  -CW      Assistive Device (Sit-Stand Transfers)  walker, front-wheeled  -CW      Recorded by [CW] Cesario Kwon, PTA 03/19/19 1058      Row Name 03/19/19 1026             Stand-Sit Transfer    Stand-Sit [place] (Transfers)  supervision  -CW      Assistive Device (Stand-Sit Transfers)  walker, front-wheeled  -CW      Recorded by [CW] Cesario Kwon, PTA 03/19/19 1058      Row Name 03/19/19 1026             Gait/Stairs Assessment/Training    [place] Level (Gait)  contact guard  -CW      Assistive Device (Gait)  walker, front-wheeled  -CW      Distance in Feet (Gait)  100  -CW      Pattern  (Gait)  step-through  -CW      Deviations/Abnormal Patterns (Gait)  stride length decreased;gait speed decreased  -CW      Bilateral Gait Deviations  forward flexed posture  -CW      Recorded by [CW] Cesario Kwon PTA 03/19/19 1058      Row Name 03/19/19 1026             Positioning and Restraints    Pre-Treatment Position  in bed  -CW      Post Treatment Position  chair  -CW      In Chair  notified nsg;sitting;call light within reach;encouraged to call for assist;with family/caregiver  -CW      Recorded by [CW] Cesario Kwon, HARDEEP 03/19/19 1058      Row Name 03/19/19 1026             Pain Scale: Numbers Pre/Post-Treatment    Pain Scale: Numbers, Pretreatment  0/10 - no pain  -CW      Pain Scale: Numbers, Post-Treatment  0/10 - no pain  -CW      Recorded by [CW] Cesario Kwon, PTA 03/19/19 1058      Row Name                Wound 03/06/19 1003 other (see notes) incision    Wound - Properties Group Date first assessed: 03/06/19 [MONTANA] Time first assessed: 1003 [MONTANA] Location: other (see notes) [MONTANA] Type: incision [MONTANA] Recorded by:  [MONTANA] Sanket Jaimes RN 03/06/19 1003    Row Name 03/19/19 1026             Outcome Summary/Treatment Plan (PT)    Anticipated Discharge Disposition (PT)  home with assist;skilled nursing facility  -CW      Recorded by [CW] Cesario Kwon, PTA 03/19/19 1058        User Key  (r) = Recorded By, (t) = Taken By, (c) = Cosigned By    Initials Name Effective Dates Discipline    MONTANA Sanket Jaimes RN 06/16/16 -  Nurse     Cesario Kwon, PTA 03/07/18 -  PT          Rash 03/08/19 2000 Right arm (Active)   Distribution localized 3/18/2019 12:31 PM   Borders raised 3/18/2019 12:31 PM   Characteristics itching 3/18/2019 12:31 PM   Color pink;red 3/18/2019 12:31 PM       Wound 03/06/19 1003 other (see notes) incision (Active)   Closure Open to air 3/19/2019 10:05 AM   Drainage Amount none 3/18/2019 12:31 PM           Physical Therapy Education     Title: PT OT SLP Therapies  (Done)     Topic: Physical Therapy (Done)     Point: Mobility training (Done)     Learning Progress Summary           Patient Acceptance, E,TB, VU,DU by CW at 3/19/2019 10:58 AM    Acceptance, E,TB, VU,DU by CW at 3/18/2019  8:43 AM    Acceptance, E,TB, VU by CS at 3/17/2019  4:28 PM    Acceptance, E, VU,NR by MS at 3/17/2019  4:16 AM    Acceptance, E,D, DU by PC at 3/16/2019  3:53 PM    Acceptance, E,TB, VU by MS1 at 3/16/2019 12:14 PM    Acceptance, E, VU,NR by MS at 3/16/2019  6:22 AM    Acceptance, E, NR by EM at 3/15/2019  4:10 PM    Acceptance, E,TB, VU,DU by CW at 3/14/2019  1:53 PM    Acceptance, E, NR by AR at 3/13/2019  2:52 PM    Acceptance, E,TB, VU by MS1 at 3/12/2019 12:58 PM    Acceptance, E,TB, VU,DU by CW at 3/12/2019  9:58 AM    Acceptance, TB,E, VU,DU by CW at 3/11/2019  2:02 PM    Acceptance, E,TB, VU,DU by CW at 3/10/2019 11:14 AM    Acceptance, E,TB, VU,DU by CW at 3/9/2019 11:13 AM    Acceptance, E,TB, VU,DU by CW at 3/8/2019 11:21 AM    Acceptance, E, VU by KH at 3/7/2019 11:47 AM    Acceptance, D,E, NR by CW at 3/6/2019  4:52 PM    Acceptance, E, NR by  at 3/5/2019  1:08 PM   Family Acceptance, E,TB, VU,DU by CW at 3/18/2019  8:43 AM    Acceptance, E, VU,NR by MS at 3/17/2019  4:16 AM   Significant Other Acceptance, E,TB, VU by MS1 at 3/16/2019 12:14 PM                   Point: Home exercise program (Done)     Learning Progress Summary           Patient Acceptance, E,TB, VU,DU by CW at 3/19/2019 10:58 AM    Acceptance, E,TB, VU,DU by CW at 3/18/2019  8:43 AM    Acceptance, E,TB, VU by CS at 3/17/2019  4:28 PM    Acceptance, E, VU,NR by MS at 3/17/2019  4:16 AM    Acceptance, E,TB, VU by MS1 at 3/16/2019 12:14 PM    Acceptance, E,TB, VU,DU by CW at 3/14/2019  1:53 PM    Acceptance, E, NR by AR at 3/13/2019  2:52 PM    Acceptance, E,TB, VU by MS1 at 3/12/2019 12:58 PM    Acceptance, E,TB, VU,DU by CW at 3/12/2019  9:58 AM    Acceptance, TB,E, VU,DU by CW at 3/11/2019  2:02 PM    Acceptance,  E,TB, VU,DU by CW at 3/10/2019 11:14 AM    Acceptance, E,TB, VU,DU by CW at 3/9/2019 11:13 AM    Acceptance, E,TB, VU,DU by CW at 3/8/2019 11:21 AM    Acceptance, E, VU by KH at 3/7/2019 11:47 AM    Acceptance, D,E, NR by CW at 3/6/2019  4:52 PM    Acceptance, E, NR by LH at 3/5/2019  1:08 PM   Family Acceptance, E,TB, VU,DU by CW at 3/18/2019  8:43 AM    Acceptance, E, VU,NR by MS at 3/17/2019  4:16 AM   Significant Other Acceptance, E,TB, VU by MS1 at 3/16/2019 12:14 PM                   Point: Body mechanics (Done)     Learning Progress Summary           Patient Acceptance, E,TB, VU,DU by CW at 3/19/2019 10:58 AM    Acceptance, E,TB, VU,DU by CW at 3/18/2019  8:43 AM    Acceptance, E,TB, VU by CS at 3/17/2019  4:28 PM    Acceptance, E, VU,NR by MS at 3/17/2019  4:16 AM    Acceptance, E,D, DU by PC at 3/16/2019  3:53 PM    Acceptance, E, VU,NR by MS at 3/16/2019  6:22 AM    Acceptance, E,TB, VU,DU by CW at 3/14/2019  1:53 PM    Acceptance, E, NR by AR at 3/13/2019  2:52 PM    Acceptance, E,TB, VU,DU by CW at 3/12/2019  9:58 AM    Acceptance, TB,E, VU,DU by CW at 3/11/2019  2:02 PM    Acceptance, E,TB, VU,DU by CW at 3/10/2019 11:14 AM    Acceptance, E,TB, VU,DU by CW at 3/9/2019 11:13 AM    Acceptance, E,TB, VU,DU by CW at 3/8/2019 11:21 AM    Acceptance, E, VU by KH at 3/7/2019 11:47 AM    Acceptance, D,E, NR by CW at 3/6/2019  4:52 PM    Acceptance, E, NR by LH at 3/5/2019  1:08 PM   Family Acceptance, E,TB, VU,DU by CW at 3/18/2019  8:43 AM    Acceptance, E, VU,NR by MS at 3/17/2019  4:16 AM                   Point: Precautions (Done)     Learning Progress Summary           Patient Acceptance, E,TB, VU,DU by CW at 3/19/2019 10:58 AM    Acceptance, E,TB, VU,DU by CW at 3/18/2019  8:43 AM    Acceptance, E,TB, VU by CS at 3/17/2019  4:28 PM    Acceptance, E, VU,NR by MS at 3/17/2019  4:16 AM    Acceptance, E,D, DU by PC at 3/16/2019  3:53 PM    Acceptance, E,TB, VU by MS1 at 3/16/2019 12:14 PM    Acceptance, E,  VU,NR by MS at 3/16/2019  6:22 AM    Acceptance, E,TB, VU,DU by CW at 3/14/2019  1:53 PM    Acceptance, E, NR by AR at 3/13/2019  2:52 PM    Acceptance, E,TB, VU by MS1 at 3/12/2019 12:58 PM    Acceptance, E,TB, VU,DU by CW at 3/12/2019  9:58 AM    Acceptance, TB,E, VU,DU by CW at 3/11/2019  2:02 PM    Acceptance, E,TB, VU,DU by CW at 3/10/2019 11:14 AM    Acceptance, E,TB, VU,DU by CW at 3/9/2019 11:13 AM    Acceptance, E,TB, VU,DU by CW at 3/8/2019 11:21 AM    Acceptance, E, VU by KH at 3/7/2019 11:47 AM    Acceptance, D,E, NR by CW at 3/6/2019  4:52 PM    Acceptance, E, NR by LH at 3/5/2019  1:08 PM   Family Acceptance, E,TB, VU,DU by CW at 3/18/2019  8:43 AM    Acceptance, E, VU,NR by MS at 3/17/2019  4:16 AM   Significant Other Acceptance, E,TB, VU by MS1 at 3/16/2019 12:14 PM                               User Key     Initials Effective Dates Name Provider Type Discipline     02/05/19 -  Leigh Woodard, PT Physical Therapist PT    LH 04/03/18 -  Jaida Porter, PT Physical Therapist PT    PC 04/03/18 -  Dara Fishman, PT Physical Therapist PT    EM 04/03/18 -  Edna Guerrero, PT Physical Therapist PT    MS1 02/23/17 -  Juventino Contreras, RN Registered Nurse Nurse    AR 04/03/18 -  Iris Irby, PT Physical Therapist PT    CW 03/07/18 -  Cesario Kwon, PTA Physical Therapy Assistant PT    MS 10/30/17 -  Gisele Stern, RN Registered Nurse Nurse     05/14/18 -  Alok Mensah, PT Physical Therapist PT                PT Recommendation and Plan  Anticipated Discharge Disposition (PT): home with assist, skilled nursing facility  Therapy Frequency (PT Clinical Impression): daily  Outcome Summary/Treatment Plan (PT)  Anticipated Discharge Disposition (PT): home with assist, skilled nursing facility  Plan of Care Reviewed With: patient  Progress: improving  Outcome Summary: Pt increasing with bed mobility and transfer safety amb limited due to fatigue and SOA with 8L o2  Outcome Measures      Row Name 03/19/19 1000 03/18/19 0800 03/17/19 1600       How much help from another person do you currently need...    Turning from your back to your side while in flat bed without using bedrails?  4  -CW  4  -CW  4  -CS    Moving from lying on back to sitting on the side of a flat bed without bedrails?  4  -CW  4  -CW  4  -CS    Moving to and from a bed to a chair (including a wheelchair)?  3  -CW  3  -CW  3  -CS    Standing up from a chair using your arms (e.g., wheelchair, bedside chair)?  3  -CW  3  -CW  3  -CS    Climbing 3-5 steps with a railing?  2  -CW  2  -CW  2  -CS    To walk in hospital room?  3  -CW  3  -CW  3  -CS    AM-PAC 6 Clicks Score  19  -CW  19  -CW  19  -CS       Functional Assessment    Outcome Measure Options  AM-PAC 6 Clicks Basic Mobility (PT)  -CW  AM-PAC 6 Clicks Basic Mobility (PT)  -CW  AM-PAC 6 Clicks Basic Mobility (PT)  -CS    Row Name 03/16/19 1500             How much help from another person do you currently need...    Turning from your back to your side while in flat bed without using bedrails?  4  -PC      Moving from lying on back to sitting on the side of a flat bed without bedrails?  4  -PC      Moving to and from a bed to a chair (including a wheelchair)?  3  -PC      Standing up from a chair using your arms (e.g., wheelchair, bedside chair)?  3  -PC      Climbing 3-5 steps with a railing?  2  -PC      To walk in hospital room?  3  -PC      AM-PAC 6 Clicks Score  19  -PC        User Key  (r) = Recorded By, (t) = Taken By, (c) = Cosigned By    Initials Name Provider Type    PC Dara Fishman, PT Physical Therapist    CW Cesario Kwon, PTA Physical Therapy Assistant    Alok Reno, PT Physical Therapist         Time Calculation:   PT Charges     Row Name 03/19/19 1059             Time Calculation    Start Time  1034  -CW      Stop Time  1059  -CW      Time Calculation (min)  25 min  -CW      PT Received On  03/19/19  -CW      PT - Next Appointment  03/20/19   -CW        User Key  (r) = Recorded By, (t) = Taken By, (c) = Cosigned By    Initials Name Provider Type    CW Cesario Kwon, PTA Physical Therapy Assistant        Therapy Charges for Today     Code Description Service Date Service Provider Modifiers Qty    62447603676 HC PT THER PROC EA 15 MIN 3/18/2019 Cesario Kwon, HARDEEP GP 2    37024660811 HC PT THER PROC EA 15 MIN 3/19/2019 Cesario Kwon, HARDEEP GP 2    48557736729 HC PT THER SUPP EA 15 MIN 3/19/2019 Cesario Kwon, HARDEEP GP 2          PT G-Codes  Outcome Measure Options: AM-PAC 6 Clicks Basic Mobility (PT)  AM-PAC 6 Clicks Score: 19    Cesario Kwon PTA  3/19/2019

## 2019-03-19 NOTE — PLAN OF CARE
Problem: Patient Care Overview  Goal: Plan of Care Review   03/19/19 1026   Coping/Psychosocial   Plan of Care Reviewed With patient   Plan of Care Review   Progress declining   OTHER   Outcome Summary Pt seen for dysphagia therapy. RN reports patient confusion. When SLP arrived 02 monitor and supplemental oxygen was off the patient. Pt with increased WOB. Oxygen tubing placed in patient's nares by SLP, WOB persisted. 02 read 87%, then julio to 90-91% quickly. Water present at bedside. Oral care completed. Pt completed effortful swallows with MAX cues. Coughing noted consistently with ice. Pt was able to expectorate some mucous. SLP recs ice chips after oral care with nursing staff only. SLP updated white board in room with these recs and encouraged RN to cue patient to complete hard swallows with access to ice. Results discussed with patient, no family in room. Water cups removed from bedside.

## 2019-03-19 NOTE — THERAPY TREATMENT NOTE
Acute Care - Speech Language Pathology   Swallow Treatment Note [facility]     Patient Name: Alessio Allen  : 1940  MRN: [id number]  Today's Date: 3/19/2019  Onset of Illness/Injury or Date of Surgery: 19     Referring Physician: David      Admit Date: 2019    Visit Dx:      ICD-10-CM ICD-9-CM   1. Acute respiratory failure with hypoxia (CMS/[facility]) J96.01 518.81   2. Impaired mobility Z74.09 799.89   3. AF (paroxysmal atrial fibrillation) (CMS/[facility]) I48.0 427.31     Patient Active Problem List   Diagnosis   • A-fib (CMS/[facility])   • Dyslipidemia   • BP (high blood pressure)   • Neuropathy   • Hypertension   • COPD (chronic obstructive pulmonary disease) (CMS/[facility])   • BPH (benign prostatic hypertrophy)   • Atrial fibrillation (CMS/[facility])   • Asthma   • Hypoxia   • Pneumonia   • Chronic anticoagulation   • Pneumonia of both lungs due to infectious organism   • Hyponatremia   • COPD exacerbation (CMS/[facility])   • Acute on chronic respiratory failure with hypoxia (CMS/[facility])   • Pneumonia of both lower lobes due to infectious organism (CMS/[facility])   • Acute on chronic diastolic heart failure (CMS/[facility])   • IPF (idiopathic pulmonary fibrosis) (CMS/[facility])   • Acute respiratory failure with hypoxia (CMS/[facility])   • Attention to G-tube (CMS/[facility])       Therapy Treatment  Rehabilitation Treatment Summary     Row Name 19 1100 19 1026          Treatment Time/Intention    Discipline  speech language pathologist  -  physical therapy assistant  -CW     Document Type  therapy note (daily note)  -  therapy note (daily note)  -     Subjective Information  --  complains of;dyspnea  -     Mode of Treatment  speech-language pathology  -  physical therapy  -CW     Patient/Family Observations  Pt SOA  -  --     Care Plan Review  evaluation/treatment results reviewed;care plan/treatment goals reviewed;risks/benefits reviewed  -  --     Therapy Frequency (PT Clinical Impression)  --  daily  -CW     Patient Effort   adequate  -SH  good  -CW     Existing Precautions/Restrictions  --  fall;oxygen therapy device and L/min  -CW     Treatment Considerations/Comments  --  Amb on 8L o2  -CW     Recorded by [SH] Shraddha Canada MS CCC-SLP 03/19/19 1118 [CW] Cesario Kwon, PTA 03/19/19 1058     Row Name 03/19/19 1026             Vital Signs    O2 Delivery Pre Treatment  supplemental O2  -CW      O2 Delivery Intra Treatment  supplemental O2  -CW      O2 Delivery Post Treatment  supplemental O2  -CW      Recorded by [CW] Cesario Kwon, PTA 03/19/19 1058      Row Name 03/19/19 1026             Cognitive Assessment/Intervention- PT/OT    Orientation Status (Cognition)  oriented x 4  -CW      Follows Commands (Cognition)  WNL  -CW      Safety Deficit (Cognitive)  mild deficit  -CW      Personal Safety Interventions  fall prevention program maintained;gait belt;muscle strengthening facilitated;nonskid shoes/slippers when out of bed  -CW      Recorded by [CW] Cesario Kwon, PTA 03/19/19 1058      Row Name 03/19/19 1026             Bed Mobility Assessment/Treatment    Bed Mobility Assessment/Treatment  supine-sit  -CW      Supine-Sit [place] (Bed Mobility)  supervision  -CW      Sit-Supine [place] (Bed Mobility)  not tested  -CW      Comment (Bed Mobility)  up to chair  -CW      Recorded by [CW] Cesario Kwon, PTA 03/19/19 1058      Row Name 03/19/19 1026             Transfer Assessment/Treatment    Transfer Assessment/Treatment  sit-stand transfer;stand-sit transfer  -CW      Recorded by [CW] Cesario Kwon, PTA 03/19/19 1058      Row Name 03/19/19 1026             Sit-Stand Transfer    Sit-Stand [place] (Transfers)  supervision  -CW      Assistive Device (Sit-Stand Transfers)  walker, front-wheeled  -CW      Recorded by [CW] Cesario Kwon, PTA 03/19/19 1058      Row Name 03/19/19 1026             Stand-Sit Transfer    Stand-Sit [place] (Transfers)  supervision  -CW      Assistive Device  (Stand-Sit Transfers)  walker, front-wheeled  -CW      Recorded by [CW] Cesario Kwon, PTA 03/19/19 1058      Row Name 03/19/19 1026             Gait/Stairs Assessment/Training    [place] Level (Gait)  contact guard  -CW      Assistive Device (Gait)  walker, front-wheeled  -CW      Distance in Feet (Gait)  100  -CW      Pattern (Gait)  step-through  -CW      Deviations/Abnormal Patterns (Gait)  stride length decreased;gait speed decreased  -CW      Bilateral Gait Deviations  forward flexed posture  -CW      Recorded by [CW] Cesario Kwon, PTA 03/19/19 1058      Row Name 03/19/19 1026             Positioning and Restraints    Pre-Treatment Position  in bed  -CW      Post Treatment Position  chair  -CW      In Chair  notified nsg;sitting;call light within reach;encouraged to call for assist;with family/caregiver  -CW      Recorded by [CW] Cesario Kwon, PTA 03/19/19 1058      Row Name 03/19/19 1100 03/19/19 1026          Pain Scale: Numbers Pre/Post-Treatment    Pain Scale: Numbers, Pretreatment  0/10 - no pain  -SH  0/10 - no pain  -CW     Pain Scale: Numbers, Post-Treatment  0/10 - no pain  -SH  0/10 - no pain  -CW     Recorded by [SH] Shraddha Canada MS CCC-SLP 03/19/19 1118 [CW] Cesario Kwon, PTA 03/19/19 1058     Row Name                Wound 03/06/19 1003 other (see notes) incision    Wound - Properties Group Date first assessed: 03/06/19 [MONTANA] Time first assessed: 1003 [MONTANA] Location: other (see notes) [MONTANA] Type: incision [MONTANA] Recorded by:  [MONTANA] Sanket Jaimes RN 03/06/19 1003    Row Name 03/19/19 1026             Outcome Summary/Treatment Plan (PT)    Anticipated Discharge Disposition (PT)  home with assist;skilled nursing facility  -CW      Recorded by [CW] Cesario Kwon, PTA 03/19/19 1058        User Key  (r) = Recorded By, (t) = Taken By, (c) = Cosigned By    Initials Name Effective Dates Discipline    Sanket Evans RN 06/16/16 -  Nurse     Shraddha Canada MS  [facility]-SLP 03/07/18 -  SLP    Cesario Rivera P, PTA 03/07/18 -  PT          Outcome Summary         SLP GOALS     Row Name 03/19/19 1100             Oral Nutrition/Hydration Goal 1 (SLP)    Oral Nutrition/Hydration Goal 1, SLP  Tolerate small sips of water without overt s/s aspiration.   -      Time Frame (Oral Nutrition/Hydration Goal 1, SLP)  by discharge  -      Barriers (Oral Nutrition/Hydration Goal 1, SLP)  Progress: Pt seen for dysphagia therapy. RN reports patient confusion. When SLP arrived 02 monitor and supplemental oxygen was off the patient. Pt with increased WOB. Oxygen tubing placed in patient's nares by SLP, WOB persisted. 02 read 87%, then julio to 90-91% quickly. Water present at bedside. Oral care completed. Pt completed effortful swallows with MAX cues. Coughing noted consistently with ice. Pt was able to expectorate some mucous. SLP recs ice chips after oral care with nursing staff only. SLP updated white board in room with these recs and encouraged RN to cue patient to complete hard swallows with access to ice. Results discussed with patient, no family in room. Water cups removed from bedside.   -      Progress/Outcomes (Oral Nutrition/Hydration Goal 1, SLP)  progress slower than expected  -        User Key  (r) = Recorded By, (t) = Taken By, (c) = Cosigned By    Initials Name Provider Type    Shraddha Quigley MS CCC-SLP Speech and Language Pathologist          EDUCATION  The patient has been educated in the following areas:   Dysphagia (Swallowing Impairment).    SLP Recommendation and Plan                                Time Calculation:   Time Calculation- SLP     Row Name 03/19/19 1118             Time Calculation- [facility]    SLP Start Time  0945  -      SLP Received On  03/19/19  -        User Key  (r) = Recorded By, (t) = Taken By, (c) = Cosigned By    Initials Name Provider Type    Shraddha Quigley MS CCC-SLP Speech and Language Pathologist          Therapy Charges for Today      Code Description Service Date Service Provider Modifiers Qty    [id number] HC ST TREATMENT SWALLOW 4 3/19/2019 Shraddha Canada, MS CCC-SLP GN 1                 Shraddha Canada MS CCC-SLP  3/19/2019

## 2019-03-20 VITALS
SYSTOLIC BLOOD PRESSURE: 117 MMHG | WEIGHT: 238.98 LBS | OXYGEN SATURATION: 97 % | BODY MASS INDEX: 30.67 KG/M2 | HEART RATE: 82 BPM | DIASTOLIC BLOOD PRESSURE: 73 MMHG | HEIGHT: 74 IN | RESPIRATION RATE: 22 BRPM | TEMPERATURE: 98.7 F

## 2019-03-20 LAB
ANION GAP SERPL CALCULATED.3IONS-SCNC: 8.3 MMOL/L
BUN BLD-MCNC: 17 MG/DL (ref 8–23)
BUN/CREAT SERPL: 20.2 (ref 7–25)
CALCIUM SPEC-SCNC: 9.3 MG/DL (ref 8.6–10.5)
CHLORIDE SERPL-SCNC: 104 MMOL/L (ref 98–107)
CO2 SERPL-SCNC: 35.7 MMOL/L (ref 22–29)
CREAT BLD-MCNC: 0.84 MG/DL (ref 0.76–1.27)
GFR SERPL CREATININE-BSD FRML MDRD: 89 ML/MIN/1.73
GLUCOSE BLD-MCNC: 134 MG/DL (ref 65–99)
GLUCOSE BLDC GLUCOMTR-MCNC: 136 MG/DL (ref 70–130)
GLUCOSE BLDC GLUCOMTR-MCNC: 161 MG/DL (ref 70–130)
POTASSIUM BLD-SCNC: 4.3 MMOL/L (ref 3.5–5.2)
SODIUM BLD-SCNC: 148 MMOL/L (ref 136–145)

## 2019-03-20 PROCEDURE — 82962 GLUCOSE BLOOD TEST: CPT

## 2019-03-20 PROCEDURE — 94799 UNLISTED PULMONARY SVC/PX: CPT

## 2019-03-20 PROCEDURE — 80048 BASIC METABOLIC PNL TOTAL CA: CPT | Performed by: INTERNAL MEDICINE

## 2019-03-20 PROCEDURE — 97110 THERAPEUTIC EXERCISES: CPT

## 2019-03-20 PROCEDURE — 63710000001 DIPHENHYDRAMINE PER 50 MG: Performed by: INTERNAL MEDICINE

## 2019-03-20 RX ORDER — PREGABALIN 75 MG/1
75 CAPSULE ORAL EVERY 12 HOURS SCHEDULED
Qty: 30 CAPSULE | Refills: 0 | Status: SHIPPED | OUTPATIENT
Start: 2019-03-20 | End: 2019-06-07

## 2019-03-20 RX ADMIN — PREGABALIN 75 MG: 75 CAPSULE ORAL at 10:06

## 2019-03-20 RX ADMIN — IPRATROPIUM BROMIDE AND ALBUTEROL SULFATE 3 ML: 2.5; .5 SOLUTION RESPIRATORY (INHALATION) at 11:21

## 2019-03-20 RX ADMIN — IPRATROPIUM BROMIDE AND ALBUTEROL SULFATE 3 ML: 2.5; .5 SOLUTION RESPIRATORY (INHALATION) at 07:35

## 2019-03-20 RX ADMIN — KETOTIFEN FUMARATE 1 DROP: 0.35 SOLUTION/ DROPS OPHTHALMIC at 10:07

## 2019-03-20 RX ADMIN — LANSOPRAZOLE 30 MG: KIT at 06:17

## 2019-03-20 RX ADMIN — Medication 500 UNITS: at 14:30

## 2019-03-20 RX ADMIN — SODIUM CHLORIDE, PRESERVATIVE FREE 10 ML: 5 INJECTION INTRAVENOUS at 10:06

## 2019-03-20 RX ADMIN — DIPHENHYDRAMINE HYDROCHLORIDE 25 MG: 25 CAPSULE ORAL at 10:05

## 2019-03-20 RX ADMIN — ATORVASTATIN CALCIUM 10 MG: 10 TABLET, FILM COATED ORAL at 10:07

## 2019-03-20 RX ADMIN — HYDROCORTISONE: 1 CREAM TOPICAL at 10:07

## 2019-03-20 RX ADMIN — FUROSEMIDE 20 MG: 20 TABLET ORAL at 10:06

## 2019-03-20 RX ADMIN — POTASSIUM CHLORIDE 20 MEQ: 750 CAPSULE, EXTENDED RELEASE ORAL at 10:06

## 2019-03-20 RX ADMIN — Medication 50 MG: at 10:06

## 2019-03-20 NOTE — PROGRESS NOTES
Case Management Discharge Note    Final Note: Pt discharged to [facility].   JARON Juarez RN    Destination - Selection Complete      Service Provider Request Status Selected Services Address Phone Number Fax Number    [facility] Selected Skilled Nursing [address] [address] [phone]      Durable Medical Equipment      No service has been selected for the patient.      Dialysis/Infusion      No service has been selected for the patient.      Home Medical Care      No service has been selected for the patient.      Community Resources      No service has been selected for the patient.        Transportation Services  Other: Other(Private Auto)    Final Discharge Disposition Code: 03 - skilled nursing facility (SNF)

## 2019-03-20 NOTE — THERAPY TREATMENT NOTE
Acute Care - Physical Therapy Treatment Note  [facility]     Patient Name: Alessio Allen  : 1940  MRN: [id number]  Today's Date: 3/20/2019  Onset of Illness/Injury or Date of Surgery: 19  Date of Referral to PT: 19  Referring Physician: David    Admit Date: 2019    Visit Dx:    ICD-10-CM ICD-9-CM   1. Acute respiratory failure with hypoxia (CMS/[facility]) J96.01 518.81   2. Impaired mobility Z74.09 799.89   3. AF (paroxysmal atrial fibrillation) (CMS/[facility]) I48.0 427.31     Patient Active Problem List   Diagnosis   • A-fib (CMS/[facility])   • Dyslipidemia   • BP (high blood pressure)   • Neuropathy   • Hypertension   • COPD (chronic obstructive pulmonary disease) (CMS/[facility])   • BPH (benign prostatic hypertrophy)   • Atrial fibrillation (CMS/[facility])   • Asthma   • Hypoxia   • Pneumonia   • Chronic anticoagulation   • Pneumonia of both lungs due to infectious organism   • Hyponatremia   • COPD exacerbation (CMS/[facility])   • Acute on chronic respiratory failure with hypoxia (CMS/[facility])   • Pneumonia of both lower lobes due to infectious organism (CMS/[facility])   • Acute on chronic diastolic heart failure (CMS/[facility])   • IPF (idiopathic pulmonary fibrosis) (CMS/[facility])   • Acute respiratory failure with hypoxia (CMS/[facility])   • Attention to G-tube (CMS/[facility])       Therapy Treatment    Rehabilitation Treatment Summary     Row Name 19 1000             Treatment Time/Intention    Discipline  physical therapy assistant  -CW      Document Type  therapy note (daily note)  -CW      Subjective Information  complains of;dyspnea  -CW      Mode of Treatment  physical therapy  -CW      Therapy Frequency (PT Clinical Impression)  daily  -CW      Patient Effort  good  -CW      Existing Precautions/Restrictions  fall;oxygen therapy device and L/min  -CW      Recorded by [CW] Cesario Kwon PTA 19 1013      Row Name 19 1000             Vital Signs    O2 Delivery Pre Treatment  supplemental O2  -CW      O2 Delivery Intra  Treatment  supplemental O2  -CW      O2 Delivery Post Treatment  supplemental O2  -CW      Recorded by [CW] Cesario Kwon, PTA 03/20/19 1013      Row Name 03/20/19 1000             Cognitive Assessment/Intervention- PT/OT    Orientation Status (Cognition)  oriented x 4  -CW      Follows Commands (Cognition)  WNL  -CW      Safety Deficit (Cognitive)  mild deficit  -CW      Personal Safety Interventions  fall prevention program maintained;gait belt;muscle strengthening facilitated;nonskid shoes/slippers when out of bed  -CW      Recorded by [CW] Cesario Kwon, PTA 03/20/19 1013      Row Name 03/20/19 1000             Bed Mobility Assessment/Treatment    Bed Mobility Assessment/Treatment  supine-sit  -CW      Supine-Sit [place] (Bed Mobility)  supervision  -CW      Sit-Supine [place] (Bed Mobility)  not tested  -CW      Comment (Bed Mobility)  up to commode  -CW      Recorded by [CW] Cesario Kwon, PTA 03/20/19 1013      Row Name 03/20/19 1000             Transfer Assessment/Treatment    Transfer Assessment/Treatment  sit-stand transfer;stand-sit transfer  -CW      Recorded by [CW] eCsario Kwon, PTA 03/20/19 1013      Row Name 03/20/19 1000             Sit-Stand Transfer    Sit-Stand [place] (Transfers)  supervision  -CW      Assistive Device (Sit-Stand Transfers)  walker, front-wheeled  -CW      Recorded by [CW] Cesario Kwon, PTA 03/20/19 1013      Row Name 03/20/19 1000             Stand-Sit Transfer    Stand-Sit [place] (Transfers)  supervision  -CW      Assistive Device (Stand-Sit Transfers)  walker, front-wheeled  -CW      Recorded by [CW] Cesario Kwon, PTA 03/20/19 1013      Row Name 03/20/19 1000             Gait/Stairs Assessment/Training    [place] Level (Gait)  contact guard  -CW      Assistive Device (Gait)  walker, front-wheeled  -CW      Distance in Feet (Gait)  100  -CW      Pattern (Gait)  step-through  -CW      Deviations/Abnormal Patterns  (Gait)  stride length decreased;gait speed decreased  -CW      Bilateral Gait Deviations  forward flexed posture  -CW      Comment (Gait/Stairs)  pt amb limited due to SOA  -CW      Recorded by [CW] Cesario Kwon PTA 03/20/19 1013      Row Name 03/20/19 1000             Positioning and Restraints    Pre-Treatment Position  in bed  -CW      Post Treatment Position  bathroom  -CW      Bathroom  notified nsg;sitting;call light within reach;encouraged to call for assist;with family/caregiver  -CW      Recorded by [CW] Cesario Kwon, PTA 03/20/19 1013      Row Name 03/20/19 1000             Pain Scale: Numbers Pre/Post-Treatment    Pain Scale: Numbers, Pretreatment  0/10 - no pain  -CW      Pain Scale: Numbers, Post-Treatment  0/10 - no pain  -CW      Recorded by [CW] Cesario Kwon, PTA 03/20/19 1013      Row Name                Wound 03/06/19 1003 other (see notes) incision    Wound - Properties Group Date first assessed: 03/06/19 [MONTANA] Time first assessed: 1003 [MONTANA] Location: other (see notes) [MONTANA] Type: incision [MONTANA] Recorded by:  [MONTANA] Sanket Jaimes RN 03/06/19 1003    Row Name 03/20/19 1000             Outcome Summary/Treatment Plan (PT)    Anticipated Discharge Disposition (PT)  home with assist;skilled nursing facility  -CW      Recorded by [CW] Cesario Kwon, PTA 03/20/19 1013        User Key  (r) = Recorded By, (t) = Taken By, (c) = Cosigned By    Initials Name Effective Dates Discipline    MONTANA Sanket Jaimes RN 06/16/16 -  Nurse     Cesario Kwon, PTA 03/07/18 -  PT          Wound 03/06/19 1003 other (see notes) incision (Active)   Closure Open to air 3/20/2019 12:30 AM   Drainage Amount none 3/20/2019 12:30 AM   Dressing Care, Wound open to air 3/20/2019 12:30 AM           Physical Therapy Education     Title: PT OT SLP Therapies (Resolved)     Topic: Physical Therapy (Resolved)     Point: Mobility training (Resolved)     Learning Progress Summary           Patient  Acceptance, E,TB, VU by MS at 3/19/2019  1:30 PM    Acceptance, E,TB, VU,DU by CW at 3/19/2019 10:58 AM    Acceptance, E,TB, VU,DU by CW at 3/18/2019  8:43 AM    Acceptance, E,TB, VU by CS at 3/17/2019  4:28 PM    Acceptance, E, VU,NR by MS1 at 3/17/2019  4:16 AM    Acceptance, E,D, DU by PC at 3/16/2019  3:53 PM    Acceptance, E,TB, VU by MS at 3/16/2019 12:14 PM    Acceptance, E, VU,NR by MS1 at 3/16/2019  6:22 AM    Acceptance, E, NR by EM at 3/15/2019  4:10 PM    Acceptance, E,TB, VU,DU by CW at 3/14/2019  1:53 PM    Acceptance, E, NR by AR at 3/13/2019  2:52 PM    Acceptance, E,TB, VU by MS at 3/12/2019 12:58 PM    Acceptance, E,TB, VU,DU by CW at 3/12/2019  9:58 AM    Acceptance, TB,E, VU,DU by CW at 3/11/2019  2:02 PM    Acceptance, E,TB, VU,DU by CW at 3/10/2019 11:14 AM    Acceptance, E,TB, VU,DU by CW at 3/9/2019 11:13 AM    Acceptance, E,TB, VU,DU by CW at 3/8/2019 11:21 AM    Acceptance, E, VU by KH at 3/7/2019 11:47 AM    Acceptance, D,E, NR by CW at 3/6/2019  4:52 PM    Acceptance, E, NR by  at 3/5/2019  1:08 PM   Family Acceptance, E,TB, VU by MS at 3/19/2019  1:30 PM    Acceptance, E,TB, VU,DU by CW at 3/18/2019  8:43 AM    Acceptance, E, VU,NR by MS1 at 3/17/2019  4:16 AM   Significant Other Acceptance, E,TB, VU by MS at 3/16/2019 12:14 PM                   Point: Home exercise program (Resolved)     Learning Progress Summary           Patient Acceptance, E,TB, VU by MS at 3/19/2019  1:30 PM    Acceptance, E,TB, VU,DU by CW at 3/19/2019 10:58 AM    Acceptance, E,TB, VU,DU by CW at 3/18/2019  8:43 AM    Acceptance, E,TB, VU by CS at 3/17/2019  4:28 PM    Acceptance, E, VU,NR by MS1 at 3/17/2019  4:16 AM    Acceptance, E,TB, VU by MS at 3/16/2019 12:14 PM    Acceptance, E,TB, VU,DU by CW at 3/14/2019  1:53 PM    Acceptance, E, NR by AR at 3/13/2019  2:52 PM    Acceptance, E,TB, VU by MS at 3/12/2019 12:58 PM    Acceptance, E,TB, VU,DU by CW at 3/12/2019  9:58 AM    Acceptance, TB,E, VU,DU by CW at  3/11/2019  2:02 PM    Acceptance, E,TB, VU,DU by CW at 3/10/2019 11:14 AM    Acceptance, E,TB, VU,DU by CW at 3/9/2019 11:13 AM    Acceptance, E,TB, VU,DU by CW at 3/8/2019 11:21 AM    Acceptance, E, VU by KH at 3/7/2019 11:47 AM    Acceptance, D,E, NR by CW at 3/6/2019  4:52 PM    Acceptance, E, NR by LH at 3/5/2019  1:08 PM   Family Acceptance, E,TB, VU by MS at 3/19/2019  1:30 PM    Acceptance, E,TB, VU,DU by CW at 3/18/2019  8:43 AM    Acceptance, E, VU,NR by MS1 at 3/17/2019  4:16 AM   Significant Other Acceptance, E,TB, VU by MS at 3/16/2019 12:14 PM                   Point: Body mechanics (Resolved)     Learning Progress Summary           Patient Acceptance, E,TB, VU,DU by CW at 3/19/2019 10:58 AM    Acceptance, E,TB, VU,DU by CW at 3/18/2019  8:43 AM    Acceptance, E,TB, VU by CS at 3/17/2019  4:28 PM    Acceptance, E, VU,NR by MS1 at 3/17/2019  4:16 AM    Acceptance, E,D, DU by PC at 3/16/2019  3:53 PM    Acceptance, E, VU,NR by MS1 at 3/16/2019  6:22 AM    Acceptance, E,TB, VU,DU by CW at 3/14/2019  1:53 PM    Acceptance, E, NR by AR at 3/13/2019  2:52 PM    Acceptance, E,TB, VU,DU by CW at 3/12/2019  9:58 AM    Acceptance, TB,E, VU,DU by CW at 3/11/2019  2:02 PM    Acceptance, E,TB, VU,DU by CW at 3/10/2019 11:14 AM    Acceptance, E,TB, VU,DU by CW at 3/9/2019 11:13 AM    Acceptance, E,TB, VU,DU by CW at 3/8/2019 11:21 AM    Acceptance, E, VU by KH at 3/7/2019 11:47 AM    Acceptance, D,E, NR by CW at 3/6/2019  4:52 PM    Acceptance, E, NR by  at 3/5/2019  1:08 PM   Family Acceptance, E,TB, VU,DU by CW at 3/18/2019  8:43 AM    Acceptance, E, VU,NR by MS1 at 3/17/2019  4:16 AM                   Point: Precautions (Resolved)     Learning Progress Summary           Patient Acceptance, E,TB, VU by MS at 3/19/2019  1:30 PM    Acceptance, E,TB, VU,DU by CW at 3/19/2019 10:58 AM    Acceptance, E,TB, VU,DU by CW at 3/18/2019  8:43 AM    Acceptance, E,TB, VU by  at 3/17/2019  4:28 PM    Acceptance, E, VU,NR by  MS1 at 3/17/2019  4:16 AM    Acceptance, E,D, DU by PC at 3/16/2019  3:53 PM    Acceptance, E,TB, VU by MS at 3/16/2019 12:14 PM    Acceptance, E, VU,NR by MS1 at 3/16/2019  6:22 AM    Acceptance, E,TB, VU,DU by CW at 3/14/2019  1:53 PM    Acceptance, E, NR by AR at 3/13/2019  2:52 PM    Acceptance, E,TB, VU by MS at 3/12/2019 12:58 PM    Acceptance, E,TB, VU,DU by CW at 3/12/2019  9:58 AM    Acceptance, TB,E, VU,DU by CW at 3/11/2019  2:02 PM    Acceptance, E,TB, VU,DU by CW at 3/10/2019 11:14 AM    Acceptance, E,TB, VU,DU by CW at 3/9/2019 11:13 AM    Acceptance, E,TB, VU,DU by CW at 3/8/2019 11:21 AM    Acceptance, E, VU by KH at 3/7/2019 11:47 AM    Acceptance, D,E, NR by CW at 3/6/2019  4:52 PM    Acceptance, E, NR by  at 3/5/2019  1:08 PM   Family Acceptance, E,TB, VU by MS at 3/19/2019  1:30 PM    Acceptance, E,TB, VU,DU by CW at 3/18/2019  8:43 AM    Acceptance, E, VU,NR by MS1 at 3/17/2019  4:16 AM   Significant Other Acceptance, E,TB, VU by MS at 3/16/2019 12:14 PM                               User Key     Initials Effective Dates Name Provider Type Discipline     02/05/19 -  Leigh Woodard, PT Physical Therapist PT    LH 04/03/18 -  Jaida Porter, PT Physical Therapist PT    PC 04/03/18 -  Dara Fishman, PT Physical Therapist PT    EM 04/03/18 -  Edna Guerrero, PT Physical Therapist PT    MS 02/23/17 -  Juventino Contreras, RN Registered Nurse Nurse    AR 04/03/18 -  Iris Irby, PT Physical Therapist PT    CW 03/07/18 -  Cesario Kwon, PTA Physical Therapy Assistant PT    MS1 10/30/17 -  Gisele Stern RN Registered Nurse Nurse     05/14/18 -  Alok Mensah, PT Physical Therapist PT                PT Recommendation and Plan  Anticipated Discharge Disposition (PT): home with assist, skilled nursing facility  Therapy Frequency (PT Clinical Impression): daily  Outcome Summary/Treatment Plan (PT)  Anticipated Discharge Disposition (PT): home with assist, skilled nursing  facility  Plan of Care Reviewed With: patient  Progress: no change  Outcome Summary: Pt conitnues to work on be dmobility and transfer but amb limited due to SOA and weakness impacting safety  Outcome Measures     Row Name 03/20/19 1000 03/19/19 1000 03/18/19 0800       How much help from another person do you currently need...    Turning from your back to your side while in flat bed without using bedrails?  4  -CW  4  -CW  4  -CW    Moving from lying on back to sitting on the side of a flat bed without bedrails?  4  -CW  4  -CW  4  -CW    Moving to and from a bed to a chair (including a wheelchair)?  3  -CW  3  -CW  3  -CW    Standing up from a chair using your arms (e.g., wheelchair, bedside chair)?  3  -CW  3  -CW  3  -CW    Climbing 3-5 steps with a railing?  2  -CW  2  -CW  2  -CW    To walk in hospital room?  3  -CW  3  -CW  3  -CW    AM-PAC 6 Clicks Score  19  -CW  19  -CW  19  -CW       Functional Assessment    Outcome Measure Options  AM-PAC 6 Clicks Basic Mobility (PT)  -CW  AM-PAC 6 Clicks Basic Mobility (PT)  -CW  AM-PAC 6 Clicks Basic Mobility (PT)  -CW    Row Name 03/17/19 1600             How much help from another person do you currently need...    Turning from your back to your side while in flat bed without using bedrails?  4  -CS      Moving from lying on back to sitting on the side of a flat bed without bedrails?  4  -CS      Moving to and from a bed to a chair (including a wheelchair)?  3  -CS      Standing up from a chair using your arms (e.g., wheelchair, bedside chair)?  3  -CS      Climbing 3-5 steps with a railing?  2  -CS      To walk in hospital room?  3  -CS      AM-PAC 6 Clicks Score  19  -CS         Functional Assessment    Outcome Measure Options  AM-PAC 6 Clicks Basic Mobility (PT)  -CS        User Key  (r) = Recorded By, (t) = Taken By, (c) = Cosigned By    Initials Name Provider Type    CW Cesario Kwon, PTA Physical Therapy Assistant    Alok Reno, PT Physical  Therapist         Time Calculation:   PT Charges     Row Name 03/20/19 1015             Time Calculation    Start Time  0950  -CW      Stop Time  1015  -CW      Time Calculation (min)  25 min  -CW      PT Received On  03/20/19  -CW      PT - Next Appointment  03/21/19  -CW        User Key  (r) = Recorded By, (t) = Taken By, (c) = Cosigned By    Initials Name Provider Type    CW Cesario Kwon, HARDEEP Physical Therapy Assistant        Therapy Charges for Today     Code Description Service Date Service Provider Modifiers Qty    60176527871 HC PT THER PROC EA 15 MIN 3/19/2019 Cesario Kwon, HARDEEP GP 2    69484961543 HC PT THER SUPP EA 15 MIN 3/19/2019 Cesario Kwon, HARDEEP GP 2    37938835048 HC PT THER PROC EA 15 MIN 3/20/2019 Cesario Kwon, HARDEEP GP 2          PT G-Codes  Outcome Measure Options: AM-PAC 6 Clicks Basic Mobility (PT)  AM-PAC 6 Clicks Score: 19    Cesario Kwon PTA  3/20/2019

## 2019-03-20 NOTE — DISCHARGE SUMMARY
PHYSICIAN DISCHARGE SUMMARY                                                                        [facility]    Patient Identification:  Name: Alessio Allen  Age: 78 y.o.  Sex: male  :  1940  MRN: [id number]  Primary Care Physician: Alan Santana MD    Admit date: 2019  Discharge date and time: No discharge date for patient encounter.   Discharged Condition: stable    Discharge Diagnoses:  Acute respiratory failure with hypoxia (CMS/HCC)    Attention to G-tube (CMS/HCC)   Hypernatremia  Acute respiratory failure on chronic respiratory failure  Acute decompensated diastolic HF  Small foci of organizing pneumonia on lung biopsy  COPD, no obvious exacerbation  Throat cancer s/p chemo RT  Dysphagia - PEG  Afib, on AC  CAD  Obesity  Attention to G-tube (CMS/HCC)           Hospital Course: Alessio Allen presented to [facility]    Patient admitted with acute respiratory failure likely due to decompensated CHF.  Patient was started on diuretics and patient improved significantly.  Patient was noted to have small foci of organizing pneumonia on lung biopsy which was treated with steroids with improvement.  Currently chest x-ray is improved significantly.  Initially was on antibiotics which have been stopped and patient did reasonably well off antibiotics.  Dr. Jimenez had further discussed it with Dr. Merrill his ENT physician and the plan was for him to follow-up with his primary pulmonologist Dr. 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  At this time patient is stable for discharge.  He was not started on his blood pressure medications during hospitalization and his blood pressure remained within normal limits.  If his blood pressure increases it  may be wise to restart some of his blood pressure medications down the road.  Discussed plan of care in detail with the patient and his wife in detail.  Consults:   IP CONSULT TO CASE MANAGEMENT   IP CONSULT TO NUTRITION SERVICES  IP CONSULT TO IV TEAM  IP CONSULT TO NUTRITION SERVICES  IP CONSULT TO NUTRITION SERVICES  IP CONSULT TO GENERAL SURGERY  IP CONSULT TO CARDIOLOGY  IP CONSULT TO NUTRITION SERVICES  IP CONSULT TO CASE MANAGEMENT   IP CONSULT TO GENERAL SURGERY  IP CONSULT TO ENT  IP CONSULT TO INFECTIOUS DISEASES  IP CONSULT TO CASE MANAGEMENT     Significant Diagnostic Studies:   [unfilled]    Discharge Exam:  Alert and oriented x 4, in NAD  Supple neck, midline trach  RRR, no m/r/g, no edema  Clear bilaterally, no wheezing, nonlabored  No clubbing or cyanosis     Disposition:  Skilled nursing facility    Patient Instructions:   [unfilled]  Follow-up Information     Suresh Hernandez MD Follow up in 3 week(s).    Specialties:  Pulmonary Disease, Sleep Medicine  Contact information:  [address] [place] [id number] [phone]             Alan Santana MD .    Specialty:  Family Medicine  Contact information:  [address] [facility] [id number] [phone]                 [unfilled]     Medication Reconciliation: Please see electronically completed Med Rec.    Total time spent discharging patient including evaluation, medication reconciliation, arranging follow up, and post hospitalization instructions and education total time exceeds 30 minutes.    Signed:  Nancy Sarabia MD  3/20/2019  1:03 PM

## 2019-03-20 NOTE — PLAN OF CARE
Problem: Patient Care Overview  Goal: Plan of Care Review  Outcome: Ongoing (interventions implemented as appropriate)   03/20/19 1013   Coping/Psychosocial   Plan of Care Reviewed With patient   Plan of Care Review   Progress no change   OTHER   Outcome Summary Pt continues to work on bed mobility and transfer but amb limited due to SOA and weakness impacting safety

## 2019-03-20 NOTE — PLAN OF CARE
Problem: Patient Care Overview  Goal: Plan of Care Review  Outcome: Ongoing (interventions implemented as appropriate)   03/20/19 0413   Coping/Psychosocial   Plan of Care Reviewed With patient   Plan of Care Review   Progress no change   OTHER   Outcome Summary Pt has no c/o pain, 6L HFNC, d/c to nursing home today, no s/s of distress noted will continue to monitor at 0414 3/20/19       Problem: Fall Risk (Adult)  Goal: Absence of Fall  Outcome: Ongoing (interventions implemented as appropriate)      Problem: Skin Injury Risk (Adult)  Goal: Skin Health and Integrity  Outcome: Ongoing (interventions implemented as appropriate)

## 2019-03-20 NOTE — PROGRESS NOTES
Continued Stay Note  [facility]     Patient Name: Alessio Allen  MRN: [id number]  Today's Date: 3/20/2019    Admit Date: 2/28/2019    Discharge Plan     Row Name 03/20/19 1339       Plan    Plan  Plan [facility] for skilled care.  JARON Juarez RN    Patient/Family in Agreement with Plan  yes    Plan Comments  Pt discharged.  Per Erica  ( [phone]) bed is available and auto Bi PAP is available for pt today at [facility].  Discharge summary faxed to [facility]. Pt's significant other to transport pt.  Packet to RN.  Plan [facility] for skilled care.    JARON Juarez RN         Discharge Codes    No documentation.       Expected Discharge Date and Time     Expected Discharge Date Expected Discharge Time    Mar 20, 2019             Mahsa Juarez, RN

## 2019-03-21 ENCOUNTER — READMISSION MANAGEMENT (OUTPATIENT)
Dept: CALL CENTER | Facility: HOSPITAL | Age: 79
End: 2019-03-21

## 2019-03-21 ENCOUNTER — EPISODE CHANGES (OUTPATIENT)
Dept: CASE MANAGEMENT | Facility: OTHER | Age: 79
End: 2019-03-21

## 2019-03-21 NOTE — OUTREACH NOTE
Prep Survey      Responses   Facility patient discharged from?  [facility]   Is patient eligible?  No   What are the reasons patient is not eligible?  [facility]   Does the patient have one of the following disease processes/diagnoses(primary or secondary)?  Other   Prep survey completed?  Yes          Linsdey Dixon RN

## 2019-03-27 ENCOUNTER — PATIENT OUTREACH (OUTPATIENT)
Dept: CASE MANAGEMENT | Facility: OTHER | Age: 79
End: 2019-03-27

## 2019-04-03 LAB
FUNGUS WND CULT: NORMAL
FUNGUS WND CULT: NORMAL

## 2019-04-13 ENCOUNTER — OFFICE VISIT (OUTPATIENT)
Dept: CARDIAC REHAB | Facility: HOSPITAL | Age: 79
End: 2019-04-13

## 2019-04-13 DIAGNOSIS — J44.9 COPD, SEVERE (HCC): Primary | ICD-10-CM

## 2019-04-16 ENCOUNTER — OFFICE VISIT (OUTPATIENT)
Dept: CARDIAC REHAB | Facility: HOSPITAL | Age: 79
End: 2019-04-16

## 2019-04-16 DIAGNOSIS — J44.9 COPD, SEVERE (HCC): Primary | ICD-10-CM

## 2019-04-17 LAB
MYCOBACTERIUM SPEC CULT: NORMAL
MYCOBACTERIUM SPEC CULT: NORMAL
NIGHT BLUE STAIN TISS: NORMAL
NIGHT BLUE STAIN TISS: NORMAL

## 2019-04-18 ENCOUNTER — OFFICE VISIT (OUTPATIENT)
Dept: CARDIAC REHAB | Facility: HOSPITAL | Age: 79
End: 2019-04-18

## 2019-04-18 DIAGNOSIS — J44.9 COPD, SEVERE (HCC): Primary | ICD-10-CM

## 2019-04-23 ENCOUNTER — OFFICE VISIT (OUTPATIENT)
Dept: CARDIAC REHAB | Facility: HOSPITAL | Age: 79
End: 2019-04-23

## 2019-04-23 ENCOUNTER — TELEPHONE (OUTPATIENT)
Dept: FAMILY MEDICINE CLINIC | Facility: CLINIC | Age: 79
End: 2019-04-23

## 2019-04-23 DIAGNOSIS — J44.9 COPD, SEVERE (HCC): Primary | ICD-10-CM

## 2019-04-23 RX ORDER — OLOPATADINE HYDROCHLORIDE 1 MG/ML
1 SOLUTION/ DROPS OPHTHALMIC DAILY
Qty: 3 EACH | Refills: 12 | Status: SHIPPED | OUTPATIENT
Start: 2019-04-23 | End: 2020-03-23 | Stop reason: SDUPTHER

## 2019-04-25 ENCOUNTER — OFFICE VISIT (OUTPATIENT)
Dept: CARDIAC REHAB | Facility: HOSPITAL | Age: 79
End: 2019-04-25

## 2019-04-25 DIAGNOSIS — J44.9 COPD, SEVERE (HCC): Primary | ICD-10-CM

## 2019-04-27 ENCOUNTER — OFFICE VISIT (OUTPATIENT)
Dept: CARDIAC REHAB | Facility: HOSPITAL | Age: 79
End: 2019-04-27

## 2019-04-27 DIAGNOSIS — J44.9 COPD, SEVERE (HCC): Primary | ICD-10-CM

## 2019-05-01 ENCOUNTER — EPISODE CHANGES (OUTPATIENT)
Dept: CASE MANAGEMENT | Facility: OTHER | Age: 79
End: 2019-05-01

## 2019-05-01 ENCOUNTER — PATIENT OUTREACH (OUTPATIENT)
Dept: CASE MANAGEMENT | Facility: OTHER | Age: 79
End: 2019-05-01

## 2019-05-01 NOTE — OUTREACH NOTE
Care Plan Note    Care Management Plan 5/1/2019   Lifestyle Goals Avoid respiratory irritants;Less shortness of air;Medication management;Routine follow-up with doctor(s)   Barriers Other (See Comment)   Barriers High flow oxygen use at 9l per nc continuous   Self Management Breathing techniques;Medication Adherence;Use oxygen   Suggested Appointments Other (See Comment)   Suggested Appointments PCP   Annual Wellness Visit:  Patient Refuses at This Time   Annual Wellness Visit:  Prioritizes appts and difficulty with appts due to high flow oxygen use.   Specific Disease Process Teaching COPD   Does patient have depression diagnosis? No   Advanced Directives: (No Data)   Advanced Directives: [facility] designated HCS   Discharged From: [facility]   Discharged to: Home   Admit Date: 3/20/19   Discharge Date: 4/10/19   Discharge destination: Home   Agency: Did not qualify per [facility]-not homebound   Medication Adherence Medications understood   Health Literacy Moderate     The main concerns and/or symptoms the patient would like to address are: Currently on high flow O2 use at 9l per nc continuous.  Trilogy use and mask/gel pads use.     Education/instruction provided by Care Coordinator: Prioritizing appts as difficulty with running out of portable oxygen tanks when out for appts and /or availability of oxygen in MD offices. Has had follow-up appt with Dr. Hernandez, pulmonologist, in follow-up from rehab discharge.Will see Dr. Santana when necessary as deferring AWV for now.   Participating in [facility] pulmonary Rehab Program. Adherent to Trilogy use provided by [employer] and has contact information for vendor.  Working with them on mask and gel pads.  O2 is provided by [facility].  Second ENT consult/ opinion for head and neck cancer.  S/P chemo and XRT treatments.  Tube feedings via G tube for dysphagia. Interviewing a caregiver tomorrow for additional assistance with transportation issues.  Shortness of air  with exertion but feels manages satisfactory with ADL's. Recovers with rest and instructed on conserving energy.      Follow Up Outreach Due: As needed.     Wayne Robledo RN    5/1/2019, 1:34 PM

## 2019-05-09 ENCOUNTER — OFFICE VISIT (OUTPATIENT)
Dept: CARDIAC REHAB | Facility: HOSPITAL | Age: 79
End: 2019-05-09

## 2019-05-09 DIAGNOSIS — J44.9 COPD, SEVERE (HCC): Primary | ICD-10-CM

## 2019-05-11 ENCOUNTER — OFFICE VISIT (OUTPATIENT)
Dept: CARDIAC REHAB | Facility: HOSPITAL | Age: 79
End: 2019-05-11

## 2019-05-11 DIAGNOSIS — J44.9 COPD, SEVERE (HCC): Primary | ICD-10-CM

## 2019-05-14 ENCOUNTER — OFFICE VISIT (OUTPATIENT)
Dept: CARDIAC REHAB | Facility: HOSPITAL | Age: 79
End: 2019-05-14

## 2019-05-14 DIAGNOSIS — J44.9 COPD, SEVERE (HCC): Primary | ICD-10-CM

## 2019-05-16 ENCOUNTER — OFFICE VISIT (OUTPATIENT)
Dept: CARDIAC REHAB | Facility: HOSPITAL | Age: 79
End: 2019-05-16

## 2019-05-16 DIAGNOSIS — J44.9 COPD, SEVERE (HCC): Primary | ICD-10-CM

## 2019-05-18 ENCOUNTER — OFFICE VISIT (OUTPATIENT)
Dept: CARDIAC REHAB | Facility: HOSPITAL | Age: 79
End: 2019-05-18

## 2019-05-18 DIAGNOSIS — J44.9 COPD, SEVERE (HCC): Primary | ICD-10-CM

## 2019-05-21 ENCOUNTER — OFFICE VISIT (OUTPATIENT)
Dept: CARDIAC REHAB | Facility: HOSPITAL | Age: 79
End: 2019-05-21

## 2019-05-21 DIAGNOSIS — J44.9 COPD, SEVERE (HCC): Primary | ICD-10-CM

## 2019-05-25 ENCOUNTER — OFFICE VISIT (OUTPATIENT)
Dept: CARDIAC REHAB | Facility: HOSPITAL | Age: 79
End: 2019-05-25

## 2019-05-25 DIAGNOSIS — J44.9 COPD, SEVERE (HCC): Primary | ICD-10-CM

## 2019-05-28 ENCOUNTER — OFFICE VISIT (OUTPATIENT)
Dept: CARDIAC REHAB | Facility: HOSPITAL | Age: 79
End: 2019-05-28

## 2019-05-28 DIAGNOSIS — J44.9 COPD, SEVERE (HCC): Primary | ICD-10-CM

## 2019-05-30 ENCOUNTER — TELEPHONE (OUTPATIENT)
Dept: FAMILY MEDICINE CLINIC | Facility: CLINIC | Age: 79
End: 2019-05-30

## 2019-05-30 ENCOUNTER — TRANSCRIBE ORDERS (OUTPATIENT)
Dept: ADMINISTRATIVE | Facility: HOSPITAL | Age: 79
End: 2019-05-30

## 2019-05-30 ENCOUNTER — OFFICE VISIT (OUTPATIENT)
Dept: CARDIAC REHAB | Facility: HOSPITAL | Age: 79
End: 2019-05-30

## 2019-05-30 DIAGNOSIS — J44.9 COPD, SEVERE (HCC): Primary | ICD-10-CM

## 2019-05-30 DIAGNOSIS — R04.2 COUGHING UP BLOOD: Primary | ICD-10-CM

## 2019-05-31 ENCOUNTER — HOSPITAL ENCOUNTER (OUTPATIENT)
Dept: CT IMAGING | Facility: HOSPITAL | Age: 79
Discharge: HOME | End: 2019-05-31
Admitting: INTERNAL MEDICINE

## 2019-05-31 DIAGNOSIS — R04.2 COUGHING UP BLOOD: ICD-10-CM

## 2019-05-31 PROCEDURE — 71250 CT THORAX DX C-: CPT

## 2019-06-01 ENCOUNTER — OFFICE VISIT (OUTPATIENT)
Dept: CARDIAC REHAB | Facility: HOSPITAL | Age: 79
End: 2019-06-01

## 2019-06-01 DIAGNOSIS — J44.9 COPD, SEVERE (HCC): Primary | ICD-10-CM

## 2019-06-04 ENCOUNTER — OFFICE VISIT (OUTPATIENT)
Dept: CARDIAC REHAB | Facility: HOSPITAL | Age: 79
End: 2019-06-04

## 2019-06-04 DIAGNOSIS — J44.9 COPD, SEVERE (HCC): Primary | ICD-10-CM

## 2019-06-06 ENCOUNTER — OFFICE VISIT (OUTPATIENT)
Dept: CARDIAC REHAB | Facility: HOSPITAL | Age: 79
End: 2019-06-06

## 2019-06-06 DIAGNOSIS — J44.9 COPD, SEVERE (HCC): Primary | ICD-10-CM

## 2019-06-07 ENCOUNTER — APPOINTMENT (OUTPATIENT)
Dept: PREADMISSION TESTING | Facility: HOSPITAL | Age: 79
End: 2019-06-07

## 2019-06-07 VITALS
OXYGEN SATURATION: 90 % | DIASTOLIC BLOOD PRESSURE: 73 MMHG | TEMPERATURE: 97.6 F | BODY MASS INDEX: 31.18 KG/M2 | HEART RATE: 93 BPM | RESPIRATION RATE: 20 BRPM | SYSTOLIC BLOOD PRESSURE: 113 MMHG | WEIGHT: 243 LBS | HEIGHT: 74 IN

## 2019-06-07 LAB
ALBUMIN SERPL-MCNC: 3.8 G/DL (ref 3.5–5.2)
ALBUMIN/GLOB SERPL: 1 G/DL
ALP SERPL-CCNC: 81 U/L (ref 39–117)
ALT SERPL W P-5'-P-CCNC: 10 U/L (ref 1–41)
ANION GAP SERPL CALCULATED.3IONS-SCNC: 10.8 MMOL/L
AST SERPL-CCNC: 21 U/L (ref 1–40)
BASOPHILS # BLD AUTO: 0.03 10*3/MM3 (ref 0–0.2)
BASOPHILS NFR BLD AUTO: 0.6 % (ref 0–1.5)
BILIRUB SERPL-MCNC: 0.7 MG/DL (ref 0.2–1.2)
BUN BLD-MCNC: 17 MG/DL (ref 8–23)
BUN/CREAT SERPL: 16.2 (ref 7–25)
CALCIUM SPEC-SCNC: 9.2 MG/DL (ref 8.6–10.5)
CHLORIDE SERPL-SCNC: 99 MMOL/L (ref 98–107)
CO2 SERPL-SCNC: 31.2 MMOL/L (ref 22–29)
CREAT BLD-MCNC: 1.05 MG/DL (ref 0.76–1.27)
DEPRECATED RDW RBC AUTO: 59.6 FL (ref 37–54)
EOSINOPHIL # BLD AUTO: 0.21 10*3/MM3 (ref 0–0.4)
EOSINOPHIL NFR BLD AUTO: 3.9 % (ref 0.3–6.2)
ERYTHROCYTE [DISTWIDTH] IN BLOOD BY AUTOMATED COUNT: 17 % (ref 12.3–15.4)
GFR SERPL CREATININE-BSD FRML MDRD: 68 ML/MIN/1.73
GLOBULIN UR ELPH-MCNC: 3.9 GM/DL
GLUCOSE BLD-MCNC: 89 MG/DL (ref 65–99)
HCT VFR BLD AUTO: 45.8 % (ref 37.5–51)
HGB BLD-MCNC: 13.9 G/DL (ref 13–17.7)
LYMPHOCYTES # BLD AUTO: 0.93 10*3/MM3 (ref 0.7–3.1)
LYMPHOCYTES NFR BLD AUTO: 17.2 % (ref 19.6–45.3)
MCH RBC QN AUTO: 29.1 PG (ref 26.6–33)
MCHC RBC AUTO-ENTMCNC: 30.3 G/DL (ref 31.5–35.7)
MCV RBC AUTO: 95.8 FL (ref 79–97)
MONOCYTES # BLD AUTO: 0.61 10*3/MM3 (ref 0.1–0.9)
MONOCYTES NFR BLD AUTO: 11.3 % (ref 5–12)
NEUTROPHILS # BLD AUTO: 3.62 10*3/MM3 (ref 1.7–7)
NEUTROPHILS NFR BLD AUTO: 66.8 % (ref 42.7–76)
PLATELET # BLD AUTO: 150 10*3/MM3 (ref 140–450)
PMV BLD AUTO: 10.4 FL (ref 6–12)
POTASSIUM BLD-SCNC: 3.9 MMOL/L (ref 3.5–5.2)
PROT SERPL-MCNC: 7.7 G/DL (ref 6–8.5)
RBC # BLD AUTO: 4.78 10*6/MM3 (ref 4.14–5.8)
SODIUM BLD-SCNC: 141 MMOL/L (ref 136–145)
WBC NRBC COR # BLD: 5.41 10*3/MM3 (ref 3.4–10.8)

## 2019-06-07 PROCEDURE — 85025 COMPLETE CBC W/AUTO DIFF WBC: CPT | Performed by: INTERNAL MEDICINE

## 2019-06-07 PROCEDURE — 80053 COMPREHEN METABOLIC PANEL: CPT | Performed by: INTERNAL MEDICINE

## 2019-06-07 PROCEDURE — 36415 COLL VENOUS BLD VENIPUNCTURE: CPT

## 2019-06-07 RX ORDER — AMLODIPINE BESYLATE 5 MG/1
5 TABLET ORAL DAILY
COMMUNITY
End: 2019-06-21 | Stop reason: HOSPADM

## 2019-06-07 RX ORDER — DOXAZOSIN 2 MG/1
2 TABLET ORAL NIGHTLY
COMMUNITY

## 2019-06-07 NOTE — DISCHARGE INSTRUCTIONS
Take the following medications the morning of surgery with a small sip of water:  PROTONIX  AMLODIPINE  NEBULIZER  TRELEGY    ARRIVE AT 0600.        General Instructions:  • Do not eat solid food after midnight the night before surgery.  • You may drink clear liquids day of surgery but must stop at least one hour before your hospital arrival time.  • It is beneficial for you to have a clear drink that contains carbohydrates the day of surgery.  We suggest a 12 to 20 ounce bottle of Gatorade or Powerade for non-diabetic patients or a 12 to 20 ounce bottle of G2 or Powerade Zero for diabetic patients. (Pediatric patients, are not advised to drink a 12 to 20 ounce carbohydrate drink)    Clear liquids are liquids you can see through.  Nothing red in color.     Plain water                               Sports drinks  Sodas                                   Gelatin (Jell-O)  Fruit juices without pulp such as white grape juice and apple juice  Popsicles that contain no fruit or yogurt  Tea or coffee (no cream or milk added)  Gatorade / Powerade  G2 / Powerade Zero    • Infants may have breast milk up to four hours before surgery.  • Infants drinking formula may drink formula up to six hours before surgery.   • Patients who avoid smoking, chewing tobacco and alcohol for 4 weeks prior to surgery have a reduced risk of post-operative complications.  Quit smoking as many days before surgery as you can.  • Do not smoke, use chewing tobacco or drink alcohol the day of surgery.   • If applicable bring your C-PAP/ BI-PAP machine.  • Bring any papers given to you in the doctor’s office.  • Wear clean comfortable clothes and socks.  • Do not wear contact lenses, false eyelashes or make-up.  Bring a case for your glasses.   • Bring crutches or walker if applicable.  • Remove all piercings.  Leave jewelry and any other valuables at home.  • Hair extensions with metal clips must be removed prior to surgery.  • The Pre-Admission Testing  nurse will instruct you to bring medications if unable to obtain an accurate list in Pre-Admission Testing.        If you were given a blood bank ID arm band remember to bring it with you the day of surgery.    Preventing a Surgical Site Infection:  • For 2 to 3 days before surgery, avoid shaving with a razor because the razor can irritate skin and make it easier to develop an infection.    • Any areas of open skin can increase the risk of a post-operative wound infection by allowing bacteria to enter and travel throughout the body.  Notify your surgeon if you have any skin wounds / rashes even if it is not near the expected surgical site.  The area will need assessed to determine if surgery should be delayed until it is healed.  • The night prior to surgery sleep in a clean bed with clean clothing.  Do not allow pets to sleep with you.  • Shower on the morning of surgery using a fresh bar of anti-bacterial soap (such as Dial) and clean washcloth.  Dry with a clean towel and dress in clean clothing.  • Ask your surgeon if you will be receiving antibiotics prior to surgery.  • Make sure you, your family, and all healthcare providers clean their hands with soap and water or an alcohol based hand  before caring for you or your wound.    Day of surgery:  Upon arrival, a Pre-op nurse and Anesthesiologist will review your health history, obtain vital signs, and answer questions you may have.  The only belongings needed at this time will be a list of your home medications and if applicable your C-PAP/BI-PAP machine.  If you are staying overnight your family can leave the rest of your belongings in the car and bring them to your room later.  A Pre-op nurse will start an IV and you may receive medication in preparation for surgery, including something to help you relax.  Your family will be able to see you in the Pre-op area.  While you are in surgery your family should notify the waiting room  if they  leave the waiting room area and provide a contact phone number.    Please be aware that surgery does come with discomfort.  We want to make every effort to control your discomfort so please discuss any uncontrolled symptoms with your nurse.   Your doctor will most likely have prescribed pain medications.      If you are going home after surgery you will receive individualized written care instructions before being discharged.  A responsible adult must drive you to and from the hospital on the day of your surgery and stay with you for 24 hours.    If you are staying overnight following surgery, you will be transported to your hospital room following the recovery period.  [facility] has all private rooms.    You have received a list of surgical assistants for your reference.  If you have any questions please call Pre-Admission Testing at [phone].  Deductibles and co-payments are collected on the day of service. Please be prepared to pay the required co-pay, deductible or deposit on the day of service as defined by your plan.

## 2019-06-08 ENCOUNTER — OFFICE VISIT (OUTPATIENT)
Dept: CARDIAC REHAB | Facility: HOSPITAL | Age: 79
End: 2019-06-08

## 2019-06-08 DIAGNOSIS — J44.9 COPD, SEVERE (HCC): Primary | ICD-10-CM

## 2019-06-11 ENCOUNTER — OFFICE VISIT (OUTPATIENT)
Dept: CARDIAC REHAB | Facility: HOSPITAL | Age: 79
End: 2019-06-11

## 2019-06-11 DIAGNOSIS — J44.9 COPD, SEVERE (HCC): Primary | ICD-10-CM

## 2019-06-12 ENCOUNTER — ANESTHESIA EVENT (OUTPATIENT)
Dept: PERIOP | Facility: HOSPITAL | Age: 79
End: 2019-06-12

## 2019-06-12 ENCOUNTER — PATIENT OUTREACH (OUTPATIENT)
Dept: CASE MANAGEMENT | Facility: OTHER | Age: 79
End: 2019-06-12

## 2019-06-12 NOTE — OUTREACH NOTE
Declined CA intervention today.  Commended on participation with Pulmonary Rehab and noted plans for Bronch tomorrow.  No concerns or issues voiced.

## 2019-06-13 ENCOUNTER — ANESTHESIA (OUTPATIENT)
Dept: PERIOP | Facility: HOSPITAL | Age: 79
End: 2019-06-13

## 2019-06-13 ENCOUNTER — HOSPITAL ENCOUNTER (INPATIENT)
Facility: HOSPITAL | Age: 79
LOS: 8 days | Discharge: NURSING, REHAB OR LONG-TERM CARE | End: 2019-06-21
Attending: EMERGENCY MEDICINE | Admitting: INTERNAL MEDICINE

## 2019-06-13 ENCOUNTER — HOSPITAL ENCOUNTER (OUTPATIENT)
Facility: HOSPITAL | Age: 79
Setting detail: HOSPITAL OUTPATIENT SURGERY
Discharge: HOME | End: 2019-06-13
Attending: INTERNAL MEDICINE | Admitting: ANESTHESIOLOGY

## 2019-06-13 ENCOUNTER — APPOINTMENT (OUTPATIENT)
Dept: GENERAL RADIOLOGY | Facility: HOSPITAL | Age: 79
End: 2019-06-13

## 2019-06-13 VITALS
RESPIRATION RATE: 18 BRPM | TEMPERATURE: 97.4 F | SYSTOLIC BLOOD PRESSURE: 112 MMHG | DIASTOLIC BLOOD PRESSURE: 69 MMHG | HEIGHT: 74 IN | WEIGHT: 239.64 LBS | BODY MASS INDEX: 30.75 KG/M2 | HEART RATE: 80 BPM | OXYGEN SATURATION: 95 %

## 2019-06-13 DIAGNOSIS — R04.2 HEMOPTYSIS: Primary | ICD-10-CM

## 2019-06-13 DIAGNOSIS — R06.03 RESPIRATORY DISTRESS: ICD-10-CM

## 2019-06-13 DIAGNOSIS — Z74.09 IMPAIRED FUNCTIONAL MOBILITY, BALANCE, GAIT, AND ENDURANCE: ICD-10-CM

## 2019-06-13 DIAGNOSIS — R04.2 HEMOPTYSIS: ICD-10-CM

## 2019-06-13 DIAGNOSIS — R09.02 HYPOXIA: ICD-10-CM

## 2019-06-13 LAB
ABO GROUP BLD: NORMAL
ALBUMIN SERPL-MCNC: 4.1 G/DL (ref 3.5–5.2)
ALBUMIN/GLOB SERPL: 0.9 G/DL
ALP SERPL-CCNC: 88 U/L (ref 39–117)
ALT SERPL W P-5'-P-CCNC: 13 U/L (ref 1–41)
ANION GAP SERPL CALCULATED.3IONS-SCNC: 10.8 MMOL/L
APPEARANCE FLD: ABNORMAL
APPEARANCE FLD: ABNORMAL
ARTERIAL PATENCY WRIST A: POSITIVE
ARTERIAL PATENCY WRIST A: POSITIVE
AST SERPL-CCNC: 21 U/L (ref 1–40)
ATMOSPHERIC PRESS: 751.3 MMHG
ATMOSPHERIC PRESS: 757.1 MMHG
BASE EXCESS BLDA CALC-SCNC: 3 MMOL/L (ref 0–2)
BASE EXCESS BLDA CALC-SCNC: 4.9 MMOL/L (ref 0–2)
BASOPHILS # BLD AUTO: 0.02 10*3/MM3 (ref 0–0.2)
BASOPHILS NFR BLD AUTO: 0.2 % (ref 0–1.5)
BDY SITE: ABNORMAL
BDY SITE: ABNORMAL
BILIRUB SERPL-MCNC: 0.7 MG/DL (ref 0.2–1.2)
BLD GP AB SCN SERPL QL: NEGATIVE
BUN BLD-MCNC: 21 MG/DL (ref 8–23)
BUN/CREAT SERPL: 17.1 (ref 7–25)
CALCIUM SPEC-SCNC: 10 MG/DL (ref 8.6–10.5)
CHLORIDE SERPL-SCNC: 95 MMOL/L (ref 98–107)
CO2 SERPL-SCNC: 33.2 MMOL/L (ref 22–29)
COLOR FLD: COLORLESS
COLOR FLD: COLORLESS
CREAT BLD-MCNC: 1.23 MG/DL (ref 0.76–1.27)
D-LACTATE SERPL-SCNC: 1.3 MMOL/L (ref 0.5–2)
DEPRECATED RDW RBC AUTO: 62 FL (ref 37–54)
EOSINOPHIL # BLD AUTO: 0.04 10*3/MM3 (ref 0–0.4)
EOSINOPHIL NFR BLD AUTO: 0.5 % (ref 0.3–6.2)
EOSINOPHIL NFR FLD MANUAL: 2 %
ERYTHROCYTE [DISTWIDTH] IN BLOOD BY AUTOMATED COUNT: 16.9 % (ref 12.3–15.4)
GFR SERPL CREATININE-BSD FRML MDRD: 57 ML/MIN/1.73
GLOBULIN UR ELPH-MCNC: 4.5 GM/DL
GLUCOSE BLD-MCNC: 143 MG/DL (ref 65–99)
GLUCOSE BLDC GLUCOMTR-MCNC: 159 MG/DL (ref 70–130)
HCO3 BLDA-SCNC: 33.5 MMOL/L (ref 22–28)
HCO3 BLDA-SCNC: 33.5 MMOL/L (ref 22–28)
HCT VFR BLD AUTO: 47.5 % (ref 37.5–51)
HGB BLD-MCNC: 14.2 G/DL (ref 13–17.7)
HOROWITZ INDEX BLD+IHG-RTO: 100 %
HOROWITZ INDEX BLD+IHG-RTO: 100 %
INR PPP: 1.23 (ref 0.9–1.1)
LYMPHOCYTES # BLD AUTO: 0.56 10*3/MM3 (ref 0.7–3.1)
LYMPHOCYTES NFR BLD AUTO: 6.8 % (ref 19.6–45.3)
LYMPHOCYTES NFR FLD MANUAL: 6 %
MCH RBC QN AUTO: 29.5 PG (ref 26.6–33)
MCHC RBC AUTO-ENTMCNC: 29.9 G/DL (ref 31.5–35.7)
MCV RBC AUTO: 98.8 FL (ref 79–97)
MODALITY: ABNORMAL
MODALITY: ABNORMAL
MONOCYTES # BLD AUTO: 0.77 10*3/MM3 (ref 0.1–0.9)
MONOCYTES NFR BLD AUTO: 9.3 % (ref 5–12)
MONOS+MACROS NFR FLD: 24 %
MONOS+MACROS NFR FLD: 3 %
NEUTROPHILS # BLD AUTO: 6.86 10*3/MM3 (ref 1.7–7)
NEUTROPHILS NFR BLD AUTO: 83 % (ref 42.7–76)
NEUTROPHILS NFR FLD MANUAL: 76 %
NEUTROPHILS NFR FLD MANUAL: 89 %
O2 A-A PPRESDIFF RESPIRATORY: 0.1 MMHG
O2 A-A PPRESDIFF RESPIRATORY: 0.1 MMHG
OTHER CELLS FLUID PER 100/WBCS: 147 /100 WBCS
OTHER CELLS FLUID PER 100/WBCS: 22 /100 WBCS
PCO2 BLDA: 66.9 MM HG (ref 35–45)
PCO2 BLDA: 81.1 MM HG (ref 35–45)
PEEP RESPIRATORY: 12 CM[H2O]
PEEP RESPIRATORY: 12 CM[H2O]
PH BLDA: 7.22 PH UNITS (ref 7.35–7.45)
PH BLDA: 7.31 PH UNITS (ref 7.35–7.45)
PLATELET # BLD AUTO: 135 10*3/MM3 (ref 140–450)
PMV BLD AUTO: 10.6 FL (ref 6–12)
PO2 BLDA: 81.7 MM HG (ref 80–100)
PO2 BLDA: 83.2 MM HG (ref 80–100)
POTASSIUM BLD-SCNC: 4.7 MMOL/L (ref 3.5–5.2)
PROT SERPL-MCNC: 8.6 G/DL (ref 6–8.5)
PROTHROMBIN TIME: 15.2 SECONDS (ref 11.7–14.2)
RBC # BLD AUTO: 4.81 10*6/MM3 (ref 4.14–5.8)
RBC # FLD AUTO: 202 /MM3
RBC # FLD AUTO: 313 /MM3
RH BLD: NEGATIVE
SAO2 % BLDCOA: 92.6 % (ref 92–99)
SAO2 % BLDCOA: 94.5 % (ref 92–99)
SET MECH RESP RATE: 16
SET MECH RESP RATE: 24
SODIUM BLD-SCNC: 139 MMOL/L (ref 136–145)
T&S EXPIRATION DATE: NORMAL
TOTAL RATE: 16 BREATHS/MINUTE
TOTAL RATE: 24 BREATHS/MINUTE
VENTILATOR MODE: AC
VENTILATOR MODE: AC
VT ON VENT VENT: 550 ML
VT ON VENT VENT: 568 ML
WBC # FLD AUTO: 118 /MM3
WBC # FLD AUTO: 64 /MM3
WBC NRBC COR # BLD: 8.27 10*3/MM3 (ref 3.4–10.8)

## 2019-06-13 PROCEDURE — 82962 GLUCOSE BLOOD TEST: CPT

## 2019-06-13 PROCEDURE — 85610 PROTHROMBIN TIME: CPT | Performed by: EMERGENCY MEDICINE

## 2019-06-13 PROCEDURE — 89051 BODY FLUID CELL COUNT: CPT | Performed by: INTERNAL MEDICINE

## 2019-06-13 PROCEDURE — 0B968ZZ DRAINAGE OF RIGHT LOWER LOBE BRONCHUS, VIA NATURAL OR ARTIFICIAL OPENING ENDOSCOPIC: ICD-10-PCS | Performed by: INTERNAL MEDICINE

## 2019-06-13 PROCEDURE — 71045 X-RAY EXAM CHEST 1 VIEW: CPT

## 2019-06-13 PROCEDURE — 25010000002 PROPOFOL 10 MG/ML EMULSION: Performed by: EMERGENCY MEDICINE

## 2019-06-13 PROCEDURE — 86850 RBC ANTIBODY SCREEN: CPT | Performed by: EMERGENCY MEDICINE

## 2019-06-13 PROCEDURE — 0B9B8ZZ DRAINAGE OF LEFT LOWER LOBE BRONCHUS, VIA NATURAL OR ARTIFICIAL OPENING ENDOSCOPIC: ICD-10-PCS | Performed by: INTERNAL MEDICINE

## 2019-06-13 PROCEDURE — 0BH17EZ INSERTION OF ENDOTRACHEAL AIRWAY INTO TRACHEA, VIA NATURAL OR ARTIFICIAL OPENING: ICD-10-PCS | Performed by: INTERNAL MEDICINE

## 2019-06-13 PROCEDURE — 87205 SMEAR GRAM STAIN: CPT | Performed by: INTERNAL MEDICINE

## 2019-06-13 PROCEDURE — 0BCB8ZZ EXTIRPATION OF MATTER FROM LEFT LOWER LOBE BRONCHUS, VIA NATURAL OR ARTIFICIAL OPENING ENDOSCOPIC: ICD-10-PCS | Performed by: INTERNAL MEDICINE

## 2019-06-13 PROCEDURE — 0BC78ZZ EXTIRPATION OF MATTER FROM LEFT MAIN BRONCHUS, VIA NATURAL OR ARTIFICIAL OPENING ENDOSCOPIC: ICD-10-PCS | Performed by: INTERNAL MEDICINE

## 2019-06-13 PROCEDURE — 36600 WITHDRAWAL OF ARTERIAL BLOOD: CPT

## 2019-06-13 PROCEDURE — 25010000002 FENTANYL CITRATE (PF) 100 MCG/2ML SOLUTION: Performed by: INTERNAL MEDICINE

## 2019-06-13 PROCEDURE — 88112 CYTOPATH CELL ENHANCE TECH: CPT | Performed by: INTERNAL MEDICINE

## 2019-06-13 PROCEDURE — 94799 UNLISTED PULMONARY SVC/PX: CPT

## 2019-06-13 PROCEDURE — 0BC38ZZ EXTIRPATION OF MATTER FROM RIGHT MAIN BRONCHUS, VIA NATURAL OR ARTIFICIAL OPENING ENDOSCOPIC: ICD-10-PCS | Performed by: INTERNAL MEDICINE

## 2019-06-13 PROCEDURE — 83605 ASSAY OF LACTIC ACID: CPT | Performed by: INTERNAL MEDICINE

## 2019-06-13 PROCEDURE — 31500 INSERT EMERGENCY AIRWAY: CPT

## 2019-06-13 PROCEDURE — 86900 BLOOD TYPING SEROLOGIC ABO: CPT | Performed by: EMERGENCY MEDICINE

## 2019-06-13 PROCEDURE — 0B938ZZ DRAINAGE OF RIGHT MAIN BRONCHUS, VIA NATURAL OR ARTIFICIAL OPENING ENDOSCOPIC: ICD-10-PCS | Performed by: INTERNAL MEDICINE

## 2019-06-13 PROCEDURE — 25010000002 PROPOFOL 1000 MG/ML EMULSION: Performed by: INTERNAL MEDICINE

## 2019-06-13 PROCEDURE — 86901 BLOOD TYPING SEROLOGIC RH(D): CPT | Performed by: EMERGENCY MEDICINE

## 2019-06-13 PROCEDURE — 25010000002 PROPOFOL 10 MG/ML EMULSION: Performed by: NURSE ANESTHETIST, CERTIFIED REGISTERED

## 2019-06-13 PROCEDURE — 82803 BLOOD GASES ANY COMBINATION: CPT

## 2019-06-13 PROCEDURE — 99291 CRITICAL CARE FIRST HOUR: CPT

## 2019-06-13 PROCEDURE — 25010000002 MIDAZOLAM PER 1 MG: Performed by: ANESTHESIOLOGY

## 2019-06-13 PROCEDURE — 87186 SC STD MICRODIL/AGAR DIL: CPT | Performed by: INTERNAL MEDICINE

## 2019-06-13 PROCEDURE — 85025 COMPLETE CBC W/AUTO DIFF WBC: CPT | Performed by: EMERGENCY MEDICINE

## 2019-06-13 PROCEDURE — 0B978ZZ DRAINAGE OF LEFT MAIN BRONCHUS, VIA NATURAL OR ARTIFICIAL OPENING ENDOSCOPIC: ICD-10-PCS | Performed by: INTERNAL MEDICINE

## 2019-06-13 PROCEDURE — 5A1945Z RESPIRATORY VENTILATION, 24-96 CONSECUTIVE HOURS: ICD-10-PCS | Performed by: INTERNAL MEDICINE

## 2019-06-13 PROCEDURE — 88305 TISSUE EXAM BY PATHOLOGIST: CPT | Performed by: INTERNAL MEDICINE

## 2019-06-13 PROCEDURE — 0BC68ZZ EXTIRPATION OF MATTER FROM RIGHT LOWER LOBE BRONCHUS, VIA NATURAL OR ARTIFICIAL OPENING ENDOSCOPIC: ICD-10-PCS | Performed by: INTERNAL MEDICINE

## 2019-06-13 PROCEDURE — 25010000002 MIDAZOLAM PER 1 MG: Performed by: EMERGENCY MEDICINE

## 2019-06-13 PROCEDURE — 80053 COMPREHEN METABOLIC PANEL: CPT | Performed by: EMERGENCY MEDICINE

## 2019-06-13 PROCEDURE — 87070 CULTURE OTHR SPECIMN AEROBIC: CPT | Performed by: INTERNAL MEDICINE

## 2019-06-13 PROCEDURE — 94002 VENT MGMT INPAT INIT DAY: CPT

## 2019-06-13 PROCEDURE — 25010000002 SUCCINYLCHOLINE PER 20 MG: Performed by: NURSE ANESTHETIST, CERTIFIED REGISTERED

## 2019-06-13 PROCEDURE — 25010000002 PROPOFOL 1000 MG/ML EMULSION: Performed by: EMERGENCY MEDICINE

## 2019-06-13 PROCEDURE — 25010000002 ONDANSETRON PER 1 MG: Performed by: NURSE ANESTHETIST, CERTIFIED REGISTERED

## 2019-06-13 RX ORDER — ONDANSETRON 2 MG/ML
4 INJECTION INTRAMUSCULAR; INTRAVENOUS EVERY 6 HOURS PRN
Status: DISCONTINUED | OUTPATIENT
Start: 2019-06-13 | End: 2019-06-21 | Stop reason: HOSPADM

## 2019-06-13 RX ORDER — SODIUM CHLORIDE 0.9 % (FLUSH) 0.9 %
3 SYRINGE (ML) INJECTION EVERY 12 HOURS SCHEDULED
Status: DISCONTINUED | OUTPATIENT
Start: 2019-06-13 | End: 2019-06-13 | Stop reason: HOSPADM

## 2019-06-13 RX ORDER — SODIUM CHLORIDE 0.9 % (FLUSH) 0.9 %
3-10 SYRINGE (ML) INJECTION AS NEEDED
Status: DISCONTINUED | OUTPATIENT
Start: 2019-06-13 | End: 2019-06-21 | Stop reason: HOSPADM

## 2019-06-13 RX ORDER — MAGNESIUM SULFATE HEPTAHYDRATE 40 MG/ML
2 INJECTION, SOLUTION INTRAVENOUS AS NEEDED
Status: DISCONTINUED | OUTPATIENT
Start: 2019-06-13 | End: 2019-06-21 | Stop reason: HOSPADM

## 2019-06-13 RX ORDER — HYDRALAZINE HYDROCHLORIDE 20 MG/ML
20 INJECTION INTRAMUSCULAR; INTRAVENOUS EVERY 4 HOURS PRN
Status: DISCONTINUED | OUTPATIENT
Start: 2019-06-13 | End: 2019-06-21 | Stop reason: HOSPADM

## 2019-06-13 RX ORDER — POTASSIUM CHLORIDE 750 MG/1
40 CAPSULE, EXTENDED RELEASE ORAL AS NEEDED
Status: DISCONTINUED | OUTPATIENT
Start: 2019-06-13 | End: 2019-06-21 | Stop reason: HOSPADM

## 2019-06-13 RX ORDER — PROPOFOL 10 MG/ML
120 VIAL (ML) INTRAVENOUS ONCE
Status: COMPLETED | OUTPATIENT
Start: 2019-06-13 | End: 2019-06-13

## 2019-06-13 RX ORDER — PROMETHAZINE HYDROCHLORIDE 25 MG/1
25 TABLET ORAL ONCE AS NEEDED
Status: DISCONTINUED | OUTPATIENT
Start: 2019-06-13 | End: 2019-06-13 | Stop reason: HOSPADM

## 2019-06-13 RX ORDER — FENTANYL CITRATE 50 UG/ML
50 INJECTION, SOLUTION INTRAMUSCULAR; INTRAVENOUS
Status: DISCONTINUED | OUTPATIENT
Start: 2019-06-13 | End: 2019-06-16

## 2019-06-13 RX ORDER — MAGNESIUM SULFATE HEPTAHYDRATE 40 MG/ML
4 INJECTION, SOLUTION INTRAVENOUS AS NEEDED
Status: DISCONTINUED | OUTPATIENT
Start: 2019-06-13 | End: 2019-06-21 | Stop reason: HOSPADM

## 2019-06-13 RX ORDER — FLUMAZENIL 0.1 MG/ML
0.2 INJECTION INTRAVENOUS AS NEEDED
Status: DISCONTINUED | OUTPATIENT
Start: 2019-06-13 | End: 2019-06-13 | Stop reason: HOSPADM

## 2019-06-13 RX ORDER — SODIUM CHLORIDE FOR INHALATION 7 %
4 VIAL, NEBULIZER (ML) INHALATION
Qty: 480 ML | Refills: 5 | Status: SHIPPED | OUTPATIENT
Start: 2019-06-13 | End: 2019-06-13 | Stop reason: SDUPTHER

## 2019-06-13 RX ORDER — FAMOTIDINE 10 MG/ML
20 INJECTION, SOLUTION INTRAVENOUS ONCE
Status: COMPLETED | OUTPATIENT
Start: 2019-06-13 | End: 2019-06-13

## 2019-06-13 RX ORDER — SODIUM CHLORIDE 0.9 % (FLUSH) 0.9 %
3 SYRINGE (ML) INJECTION EVERY 12 HOURS SCHEDULED
Status: DISCONTINUED | OUTPATIENT
Start: 2019-06-13 | End: 2019-06-21 | Stop reason: HOSPADM

## 2019-06-13 RX ORDER — OXYCODONE AND ACETAMINOPHEN 7.5; 325 MG/1; MG/1
1 TABLET ORAL ONCE AS NEEDED
Status: DISCONTINUED | OUTPATIENT
Start: 2019-06-13 | End: 2019-06-13 | Stop reason: HOSPADM

## 2019-06-13 RX ORDER — NALOXONE HCL 0.4 MG/ML
0.2 VIAL (ML) INJECTION AS NEEDED
Status: DISCONTINUED | OUTPATIENT
Start: 2019-06-13 | End: 2019-06-13 | Stop reason: HOSPADM

## 2019-06-13 RX ORDER — PROPOFOL 10 MG/ML
VIAL (ML) INTRAVENOUS AS NEEDED
Status: DISCONTINUED | OUTPATIENT
Start: 2019-06-13 | End: 2019-06-13 | Stop reason: SURG

## 2019-06-13 RX ORDER — MIDAZOLAM HYDROCHLORIDE 1 MG/ML
0.5 INJECTION INTRAMUSCULAR; INTRAVENOUS
Status: DISCONTINUED | OUTPATIENT
Start: 2019-06-13 | End: 2019-06-13 | Stop reason: HOSPADM

## 2019-06-13 RX ORDER — HYDRALAZINE HYDROCHLORIDE 20 MG/ML
5 INJECTION INTRAMUSCULAR; INTRAVENOUS
Status: DISCONTINUED | OUTPATIENT
Start: 2019-06-13 | End: 2019-06-13 | Stop reason: HOSPADM

## 2019-06-13 RX ORDER — MIDAZOLAM HYDROCHLORIDE 1 MG/ML
2 INJECTION INTRAMUSCULAR; INTRAVENOUS
Status: DISCONTINUED | OUTPATIENT
Start: 2019-06-13 | End: 2019-06-13 | Stop reason: HOSPADM

## 2019-06-13 RX ORDER — ACETAMINOPHEN 325 MG/1
650 TABLET ORAL EVERY 4 HOURS PRN
Status: DISCONTINUED | OUTPATIENT
Start: 2019-06-13 | End: 2019-06-21 | Stop reason: HOSPADM

## 2019-06-13 RX ORDER — ACETYLCYSTEINE 200 MG/ML
4 SOLUTION ORAL; RESPIRATORY (INHALATION)
Qty: 480 ML | Refills: 5 | Status: SHIPPED | OUTPATIENT
Start: 2019-06-13 | End: 2019-06-13 | Stop reason: SDUPTHER

## 2019-06-13 RX ORDER — SODIUM CHLORIDE, SODIUM LACTATE, POTASSIUM CHLORIDE, CALCIUM CHLORIDE 600; 310; 30; 20 MG/100ML; MG/100ML; MG/100ML; MG/100ML
50 INJECTION, SOLUTION INTRAVENOUS CONTINUOUS
Status: DISCONTINUED | OUTPATIENT
Start: 2019-06-13 | End: 2019-06-16

## 2019-06-13 RX ORDER — PANTOPRAZOLE SODIUM 40 MG/10ML
40 INJECTION, POWDER, LYOPHILIZED, FOR SOLUTION INTRAVENOUS
Status: DISCONTINUED | OUTPATIENT
Start: 2019-06-14 | End: 2019-06-21 | Stop reason: HOSPADM

## 2019-06-13 RX ORDER — ONDANSETRON 2 MG/ML
INJECTION INTRAMUSCULAR; INTRAVENOUS AS NEEDED
Status: DISCONTINUED | OUTPATIENT
Start: 2019-06-13 | End: 2019-06-13 | Stop reason: SURG

## 2019-06-13 RX ORDER — ACETAMINOPHEN 650 MG/1
650 SUPPOSITORY RECTAL EVERY 4 HOURS PRN
Status: DISCONTINUED | OUTPATIENT
Start: 2019-06-13 | End: 2019-06-21 | Stop reason: HOSPADM

## 2019-06-13 RX ORDER — POTASSIUM CHLORIDE 7.45 MG/ML
10 INJECTION INTRAVENOUS
Status: DISCONTINUED | OUTPATIENT
Start: 2019-06-13 | End: 2019-06-21 | Stop reason: HOSPADM

## 2019-06-13 RX ORDER — POTASSIUM CHLORIDE 1.5 G/1.77G
40 POWDER, FOR SOLUTION ORAL AS NEEDED
Status: DISCONTINUED | OUTPATIENT
Start: 2019-06-13 | End: 2019-06-21 | Stop reason: HOSPADM

## 2019-06-13 RX ORDER — DIPHENHYDRAMINE HCL 25 MG
25 CAPSULE ORAL
Status: DISCONTINUED | OUTPATIENT
Start: 2019-06-13 | End: 2019-06-13 | Stop reason: HOSPADM

## 2019-06-13 RX ORDER — EPHEDRINE SULFATE 50 MG/ML
5 INJECTION, SOLUTION INTRAVENOUS ONCE AS NEEDED
Status: DISCONTINUED | OUTPATIENT
Start: 2019-06-13 | End: 2019-06-13 | Stop reason: HOSPADM

## 2019-06-13 RX ORDER — SODIUM CHLORIDE 0.9 % (FLUSH) 0.9 %
3-10 SYRINGE (ML) INJECTION AS NEEDED
Status: DISCONTINUED | OUTPATIENT
Start: 2019-06-13 | End: 2019-06-13 | Stop reason: HOSPADM

## 2019-06-13 RX ORDER — FENTANYL CITRATE 50 UG/ML
50 INJECTION, SOLUTION INTRAMUSCULAR; INTRAVENOUS
Status: DISCONTINUED | OUTPATIENT
Start: 2019-06-13 | End: 2019-06-13 | Stop reason: HOSPADM

## 2019-06-13 RX ORDER — SUCCINYLCHOLINE CHLORIDE 20 MG/ML
INJECTION INTRAMUSCULAR; INTRAVENOUS AS NEEDED
Status: DISCONTINUED | OUTPATIENT
Start: 2019-06-13 | End: 2019-06-13 | Stop reason: SURG

## 2019-06-13 RX ORDER — ACETAMINOPHEN 325 MG/1
650 TABLET ORAL ONCE AS NEEDED
Status: DISCONTINUED | OUTPATIENT
Start: 2019-06-13 | End: 2019-06-13 | Stop reason: HOSPADM

## 2019-06-13 RX ORDER — SODIUM CHLORIDE, SODIUM LACTATE, POTASSIUM CHLORIDE, CALCIUM CHLORIDE 600; 310; 30; 20 MG/100ML; MG/100ML; MG/100ML; MG/100ML
9 INJECTION, SOLUTION INTRAVENOUS CONTINUOUS
Status: DISCONTINUED | OUTPATIENT
Start: 2019-06-13 | End: 2019-06-13 | Stop reason: HOSPADM

## 2019-06-13 RX ORDER — SODIUM CHLORIDE FOR INHALATION 7 %
4 VIAL, NEBULIZER (ML) INHALATION
Qty: 480 ML | Refills: 5 | Status: SHIPPED | OUTPATIENT
Start: 2019-06-13 | End: 2019-12-10

## 2019-06-13 RX ORDER — ONDANSETRON 2 MG/ML
4 INJECTION INTRAMUSCULAR; INTRAVENOUS ONCE AS NEEDED
Status: DISCONTINUED | OUTPATIENT
Start: 2019-06-13 | End: 2019-06-13 | Stop reason: HOSPADM

## 2019-06-13 RX ORDER — ACETYLCYSTEINE 200 MG/ML
4 SOLUTION ORAL; RESPIRATORY (INHALATION)
Qty: 480 ML | Refills: 5 | Status: ON HOLD | OUTPATIENT
Start: 2019-06-13 | End: 2019-06-16

## 2019-06-13 RX ORDER — KETOTIFEN FUMARATE 0.35 MG/ML
1 SOLUTION/ DROPS OPHTHALMIC 2 TIMES DAILY
Status: DISCONTINUED | OUTPATIENT
Start: 2019-06-13 | End: 2019-06-21 | Stop reason: HOSPADM

## 2019-06-13 RX ORDER — PROMETHAZINE HYDROCHLORIDE 25 MG/1
25 SUPPOSITORY RECTAL ONCE AS NEEDED
Status: DISCONTINUED | OUTPATIENT
Start: 2019-06-13 | End: 2019-06-13 | Stop reason: HOSPADM

## 2019-06-13 RX ORDER — MIDAZOLAM HYDROCHLORIDE 1 MG/ML
5 INJECTION INTRAMUSCULAR; INTRAVENOUS ONCE
Status: COMPLETED | OUTPATIENT
Start: 2019-06-13 | End: 2019-06-13

## 2019-06-13 RX ORDER — CHLORHEXIDINE GLUCONATE 0.12 MG/ML
15 RINSE ORAL EVERY 12 HOURS SCHEDULED
Status: DISCONTINUED | OUTPATIENT
Start: 2019-06-13 | End: 2019-06-21 | Stop reason: HOSPADM

## 2019-06-13 RX ORDER — PROMETHAZINE HYDROCHLORIDE 25 MG/ML
6.25 INJECTION, SOLUTION INTRAMUSCULAR; INTRAVENOUS
Status: DISCONTINUED | OUTPATIENT
Start: 2019-06-13 | End: 2019-06-13 | Stop reason: HOSPADM

## 2019-06-13 RX ORDER — PROMETHAZINE HYDROCHLORIDE 25 MG/ML
12.5 INJECTION, SOLUTION INTRAMUSCULAR; INTRAVENOUS ONCE AS NEEDED
Status: DISCONTINUED | OUTPATIENT
Start: 2019-06-13 | End: 2019-06-13 | Stop reason: HOSPADM

## 2019-06-13 RX ORDER — HYDROCODONE BITARTRATE AND ACETAMINOPHEN 7.5; 325 MG/1; MG/1
1 TABLET ORAL ONCE AS NEEDED
Status: DISCONTINUED | OUTPATIENT
Start: 2019-06-13 | End: 2019-06-13 | Stop reason: HOSPADM

## 2019-06-13 RX ORDER — ALBUTEROL SULFATE 90 UG/1
6 AEROSOL, METERED RESPIRATORY (INHALATION)
Status: DISCONTINUED | OUTPATIENT
Start: 2019-06-13 | End: 2019-06-15

## 2019-06-13 RX ORDER — VECURONIUM BROMIDE 1 MG/ML
10 INJECTION, POWDER, LYOPHILIZED, FOR SOLUTION INTRAVENOUS ONCE
Status: COMPLETED | OUTPATIENT
Start: 2019-06-13 | End: 2019-06-13

## 2019-06-13 RX ADMIN — SODIUM CHLORIDE 1000 ML: 9 INJECTION, SOLUTION INTRAVENOUS at 19:17

## 2019-06-13 RX ADMIN — SUCCINYLCHOLINE CHLORIDE 100 MG: 20 INJECTION, SOLUTION INTRAMUSCULAR; INTRAVENOUS; PARENTERAL at 07:36

## 2019-06-13 RX ADMIN — VECURONIUM BROMIDE 10 MG: 1 INJECTION, POWDER, LYOPHILIZED, FOR SOLUTION INTRAVENOUS at 19:19

## 2019-06-13 RX ADMIN — SODIUM CHLORIDE, POTASSIUM CHLORIDE, SODIUM LACTATE AND CALCIUM CHLORIDE 9 ML/HR: 600; 310; 30; 20 INJECTION, SOLUTION INTRAVENOUS at 06:46

## 2019-06-13 RX ADMIN — PROPOFOL 50 MCG/KG/MIN: 10 INJECTION, EMULSION INTRAVENOUS at 20:39

## 2019-06-13 RX ADMIN — IPRATROPIUM BROMIDE 6 PUFF: 17 AEROSOL, METERED RESPIRATORY (INHALATION) at 23:24

## 2019-06-13 RX ADMIN — SODIUM CHLORIDE, POTASSIUM CHLORIDE, SODIUM LACTATE AND CALCIUM CHLORIDE 100 ML/HR: 600; 310; 30; 20 INJECTION, SOLUTION INTRAVENOUS at 21:30

## 2019-06-13 RX ADMIN — FAMOTIDINE 20 MG: 10 INJECTION INTRAVENOUS at 06:47

## 2019-06-13 RX ADMIN — ALBUTEROL SULFATE 6 PUFF: 90 AEROSOL, METERED RESPIRATORY (INHALATION) at 21:20

## 2019-06-13 RX ADMIN — PROPOFOL 120 MG: 10 INJECTION, EMULSION INTRAVENOUS at 19:11

## 2019-06-13 RX ADMIN — SODIUM CHLORIDE 1000 ML: 9 INJECTION, SOLUTION INTRAVENOUS at 19:18

## 2019-06-13 RX ADMIN — ONDANSETRON 4 MG: 2 INJECTION INTRAMUSCULAR; INTRAVENOUS at 07:55

## 2019-06-13 RX ADMIN — MIDAZOLAM 5 MG: 1 INJECTION INTRAMUSCULAR; INTRAVENOUS at 19:19

## 2019-06-13 RX ADMIN — PROPOFOL 20 MCG/KG/MIN: 10 INJECTION, EMULSION INTRAVENOUS at 19:23

## 2019-06-13 RX ADMIN — ALBUTEROL SULFATE 6 PUFF: 90 AEROSOL, METERED RESPIRATORY (INHALATION) at 23:24

## 2019-06-13 RX ADMIN — MIDAZOLAM 0.5 MG: 1 INJECTION INTRAMUSCULAR; INTRAVENOUS at 06:48

## 2019-06-13 RX ADMIN — FENTANYL CITRATE 50 MCG: 50 INJECTION INTRAMUSCULAR; INTRAVENOUS at 20:54

## 2019-06-13 RX ADMIN — FENTANYL CITRATE 50 MCG: 50 INJECTION INTRAMUSCULAR; INTRAVENOUS at 22:01

## 2019-06-13 RX ADMIN — PROPOFOL 160 MG: 10 INJECTION, EMULSION INTRAVENOUS at 07:32

## 2019-06-13 RX ADMIN — PROPOFOL 40 MG: 10 INJECTION, EMULSION INTRAVENOUS at 07:47

## 2019-06-13 RX ADMIN — IPRATROPIUM BROMIDE 6 PUFF: 17 AEROSOL, METERED RESPIRATORY (INHALATION) at 21:20

## 2019-06-13 NOTE — POST-PROCEDURE NOTE
Procedure emergent flexible bronchoscopy.  Indication: Patient with massive hemoptysis and respiratory failure.  On the ventilator airway full of clots and difficulty oxygenating and ventilating had bronchoscopy to clear airway and look for source of bleeding.  Cpnsent: No consent this was done emergently  Procedure patient is intubated with an 8 oh endotracheal tube for massive hemoptysis and respiratory failure.  Copious blood and clots coming out endotracheal tube and difficulty ventilating.  Portex adapter was used to allow continued bag ventilation and introduction of a flexible bronchoscope.  This was done there were some clots in both main stems and lower lobes and a lot of blood.  This was washed and suctioned however there did not appear to be any active bleeding in the lower airways I did have to wash about 200 cc worth of saline in both lower lobes to clear clot and blood.  His oxygenation actually was improving as the procedure progressed.  We got his oxygen saturations up into the 90s.  Third not did not appear to be any active bleeding in the lower respiratory tract.  We did reposition the endotracheal tube it was about 28 cm at the lips the tip of the endotracheal tube is about 2-1/2 cm above the main brittany.  I did take the flexible bronchoscope and actually look at his posterior pharynx because he was continuing to fill up with blood.  There seemed to be a bleeding source just a little pin hole with blood in the posterior pharynx.  We escorted epinephrine on it and after talking with ENT packed the oropharynx with seven 4 x 4 gauze pads.  At least temporarily this seems to have stopped or slowed the bleeding..

## 2019-06-13 NOTE — PERIOPERATIVE NURSING NOTE
Two prescriptions were e scribed to [place] [facility] and patein and wife state they should have been called in to their [facility] pharmacy.  Called [place] pharmacy and was unable to reach anyone. Dr. David lai.

## 2019-06-13 NOTE — ANESTHESIA PREPROCEDURE EVALUATION
Anesthesia Evaluation     history of anesthetic complications (hypoxia):               Airway   Mallampati: II  TM distance: >3 FB  Neck ROM: full  Dental    (+) upper dentures    Comment: Lower bridge    Pulmonary    (+) pneumonia , a smoker Former, COPD, asthma, shortness of breath,     ROS comment: Hx of respiratory failure, pulmonary fibrosis, home O2  Cardiovascular     (+) hypertension, cardiac stents dysrhythmias Atrial Fib, hyperlipidemia,       Neuro/Psych  GI/Hepatic/Renal/Endo    (-) liver disease, no renal disease, diabetes    Musculoskeletal     Abdominal    Substance History      OB/GYN          Other                      Anesthesia Plan    ASA 3     general     intravenous induction   Anesthetic plan, all risks, benefits, and alternatives have been provided, discussed and informed consent has been obtained with: patient.

## 2019-06-13 NOTE — ED NOTES
Per Dr Sotelo, lungs appear clear with no active bleeding. Active bleeding visualized at posterior oropharynx.     Cosme Bravo, GRETEL  06/13/19 1940

## 2019-06-13 NOTE — ED PROVIDER NOTES
" EMERGENCY DEPARTMENT ENCOUNTER    Room Number:  16/16  Date of encounter:  6/13/2019  PCP: Alan Santana MD  Historian: pt and EMS      HPI:  Chief Complaint: SOA  A complete HPI/ROS/PMH/PSH/SH/FH are unobtainable due to: acuity of situation    Context: Alessio Allen is a 79 y.o. male who presents with hx of throat CA via EMS to the ED c/o SOA and productive cough with large blood clots which began \"couple of hours ago\" and has been progressively worsening. Pt states he has a bronchoscopy earlier today with  (pulmonology). EMS states upon their arrival, pt's girlfriend had \"cranked up his O2\" to 10L and sats were 64%. EMS states they placed pt on nonrebreather with O2 sats 81%. Pt states that he took his Xarelto last night. Pt is followed by  (ENT).       PAST MEDICAL HISTORY  Active Ambulatory Problems     Diagnosis Date Noted   • A-fib (CMS/Formerly [facility]) 03/02/2016   • Dyslipidemia 03/02/2016   • BP (high blood pressure) 03/02/2016   • Neuropathy    • Hypertension    • COPD (chronic obstructive pulmonary disease) (CMS/Formerly [facility])    • BPH (benign prostatic hypertrophy)    • Atrial fibrillation (CMS/Formerly [facility])    • Asthma    • Hypoxia 02/07/2018   • Pneumonia 02/07/2018   • Chronic anticoagulation 02/07/2018   • Pneumonia of both lungs due to infectious organism 02/07/2018   • Hyponatremia 02/14/2018   • COPD exacerbation (CMS/Formerly [facility]) 02/26/2018   • Acute on chronic respiratory failure with hypoxia (CMS/Formerly [facility]) 03/17/2018   • Pneumonia of both lower lobes due to infectious organism (CMS/Formerly [facility]) 04/05/2018   • Acute on chronic diastolic heart failure (CMS/Formerly [facility]) 04/19/2018   • IPF (idiopathic pulmonary fibrosis) (CMS/Formerly [facility]) 04/19/2018   • Acute respiratory failure with hypoxia (CMS/Formerly [facility]) 02/28/2019   • Attention to G-tube (CMS/Formerly [facility]) 03/03/2019     Resolved Ambulatory Problems     Diagnosis Date Noted   • No Resolved Ambulatory Problems     Past Medical History:   Diagnosis Date   • Acute on chronic diastolic heart failure (CMS/Formerly [facility])  "   • Arthritis    • Asthma    • Atrial fibrillation (CMS/HCC)    • BPH (benign prostatic hypertrophy)    • Cancer (CMS/HCC)    • COPD (chronic obstructive pulmonary disease) (CMS/HCC)    • H/O Abnormal CBC 2017   • History of heart artery stent 03/2017   • Hyperlipidemia    • Hypertension    • Neuropathy    • Pneumonia          PAST SURGICAL HISTORY  Past Surgical History:   Procedure Laterality Date   • BRONCHOSCOPY N/A 4/7/2018    Procedure: BRONCHOSCOPY with specimens;  Surgeon: Suresh Hernandez MD;  Location: [facility] MAIN OR;  Service: Pulmonary   • BRONCHOSCOPY N/A 3/6/2019    Procedure: BRONCHOSCOPY WITH BAL AND BIOPSY UNDER FLUORO AND ANESTHESIA;  Surgeon: Suresh Hernandez MD;  [facility];  Service: Pulmonary   • CARDIAC CATHETERIZATION N/A 4/18/2018    Procedure: Right Heart Cath with possible vasodilator study;  Surgeon: Torey Schuler MD;  Location: [facility] CATH INVASIVE LOCATION;  Service: Cardiovascular   • CARDIAC CATHETERIZATION N/A 3/7/2019    Procedure: RIGHT AND LEFT HEART CATH;  Surgeon: Marizol Holt MD;  Location: [facility] CATH INVASIVE LOCATION;  Service: Cardiology   • CARDIAC CATHETERIZATION N/A 3/7/2019    Procedure: CORONARY ANGIOGRAPHY;  Surgeon: Marizol Holt MD;  Location: [facility] CATH INVASIVE LOCATION;  Service: Cardiology   • COLONOSCOPY  12/05/2016    polyps   • FRACTURE SURGERY     • INSERT / REPLACE / VENOUS ACCESS CATHETER Right    • PACEMAKER IMPLANTATION           FAMILY HISTORY  Family History   Problem Relation Age of Onset   • Hypertension Mother    • Heart attack Father    • Malig Hyperthermia Neg Hx          SOCIAL HISTORY  Social History     Socioeconomic History   • Marital status:      Spouse name: Not on file   • Number of children: 2   • Years of education: Not on file   • Highest education level: Not on file   Occupational History     Employer: RETIRED   Tobacco Use   • Smoking status: Former Smoker     Packs/day: 1.50     Years: 45.00     Pack  years: 67.50     Types: Cigarettes     Last attempt to quit: 1/3/2001     Years since quittin.4   • Smokeless tobacco: Never Used   Substance and Sexual Activity   • Alcohol use: No   • Drug use: No   • Sexual activity: Defer         ALLERGIES  Lisinopril; Penicillins; Sulfa antibiotics; Atenolol; Coreg [carvedilol]; Ibuprofen; and Celebrex [celecoxib]        REVIEW OF SYSTEMS  Review of Systems   Unable to perform ROS: Acuity of condition            PHYSICAL EXAM    I have reviewed the triage vital signs and nursing notes.    EXAM LIMITED DUE TO ACUITY    GENERAL: severe distress, lethargic  CV: regular rhythm, tachycardia  RESPIRATORY: severe respiratory distress, coughing up blood clots, rhonchi present throughout  MUSCULOSKELETAL: no BLE edema  NEURO: alert, alert, oriented  Large amount of blood is seen in the posterior oropharynx, but the source is not clear.      Vital signs and nursing notes reviewed.          LAB RESULTS  Recent Results (from the past 24 hour(s))   Body fluid differential - Wash, Bronchus    Collection Time: 19  7:50 AM   Result Value Ref Range    Neutrophils, Fluid 89 %    Lymphocytes, Fluid 6 %    Eosinophils, Fluid 2 %    Mononuclear, Fluid 3 %    Other Cells/100 WBCs, Fluid 22 /100 WBCs   Respiratory Culture - Wash, Bronchus    Collection Time: 19  7:50 AM   Result Value Ref Range    Gram Stain No organisms seen    Body fluid cell count - Wash, Bronchus    Collection Time: 19  7:50 AM   Result Value Ref Range    Color, Fluid Colorless     Appearance, Fluid Hazy (A) Clear    WBC, Fluid 64 /mm3    RBC, Fluid 202 /mm3   Body fluid differential - Wash, Bronchus    Collection Time: 19  7:51 AM   Result Value Ref Range    Neutrophils, Fluid 76 %    Mononuclear, Fluid 24 %    Other Cells/100 WBCs, Fluid 147 /100 WBCs   Respiratory Culture - Wash, Bronchus    Collection Time: 19  7:51 AM   Result Value Ref Range    Gram Stain No organisms seen     Gram Stain  Rare (1+) WBCs per low power field    Body fluid cell count - Wash, Bronchus    Collection Time: 06/13/19  7:51 AM   Result Value Ref Range    Color, Fluid Colorless     Appearance, Fluid Hazy (A) Clear    WBC, Fluid 118 /mm3    RBC, Fluid 313 /mm3   CBC Auto Differential    Collection Time: 06/13/19  7:12 PM   Result Value Ref Range    WBC 8.27 3.40 - 10.80 10*3/mm3    RBC 4.81 4.14 - 5.80 10*6/mm3    Hemoglobin 14.2 13.0 - 17.7 g/dL    Hematocrit 47.5 37.5 - 51.0 %    MCV 98.8 (H) 79.0 - 97.0 fL    MCH 29.5 26.6 - 33.0 pg    MCHC 29.9 (L) 31.5 - 35.7 g/dL    RDW 16.9 (H) 12.3 - 15.4 %    RDW-SD 62.0 (H) 37.0 - 54.0 fl    MPV 10.6 6.0 - 12.0 fL    Platelets 135 (L) 140 - 450 10*3/mm3    Neutrophil % 83.0 (H) 42.7 - 76.0 %    Lymphocyte % 6.8 (L) 19.6 - 45.3 %    Monocyte % 9.3 5.0 - 12.0 %    Eosinophil % 0.5 0.3 - 6.2 %    Basophil % 0.2 0.0 - 1.5 %    Neutrophils, Absolute 6.86 1.70 - 7.00 10*3/mm3    Lymphocytes, Absolute 0.56 (L) 0.70 - 3.10 10*3/mm3    Monocytes, Absolute 0.77 0.10 - 0.90 10*3/mm3    Eosinophils, Absolute 0.04 0.00 - 0.40 10*3/mm3    Basophils, Absolute 0.02 0.00 - 0.20 10*3/mm3       Ordered the above labs and reviewed the results.        RADIOLOGY  Xr Chest 1 View    Result Date: 6/13/2019  Portable chest radiograph  HISTORY:Intubation  TECHNIQUE: Single AP portable radiograph of the chest  COMPARISON:Chest radiograph 03/17/2019 and chest CT 06/13/2019      FINDINGS AND IMPRESSION: The endotracheal tube terminates approximately 6 cm above the brittany. Right-sided Port-A-Cath tip overlies the superior vena cava. There is a left-sided pacemaker.  Bibasilar pulmonary opacification is present, left greater than right, most concerning for atelectasis and/or developing pneumonia and correlation with patient history is recommended. No pleural effusion or pneumothorax is seen. The heart is at least moderately enlarged.  This report was finalized on 6/13/2019 7:45 PM by Dr. Casey Blackwood M.D.         I ordered the above noted radiological studies. Interpreted by radiologist. Reviewed by me in PACS.      PROCEDURES    Intubation  Date/Time: 6/13/2019 7:16 PM  Performed by: Shiraz Delacruz MD  Authorized by: Shiraz Delacruz MD     Consent:     Consent obtained:  Verbal    Consent given by:  Patient  Pre-procedure details:     Patient status:  Awake    Pretreatment meds: propofol.  Procedure details:     Preoxygenation:  Nonrebreather mask    CPR in progress: no      Intubation method:  Oral    Oral intubation technique: glidescope.    Tube size (mm):  8.0    Number of attempts:  2    Ventilation between attempts: bagging.    Placement assessment:     ETT to lip:  26    Tube secured with:  ETT benjamin    Breath sounds:  Equal    Placement verification: CXR verification      Placement verification comment:  Entitle positive, rising O2 sats    CXR findings:  ETT in proper place  Post-procedure details:     Patient tolerance of procedure:  Tolerated well, no immediate complications  Comments:      Copious amounts of blood in posterior oropharynx and in ET tube.     Critical Care  Performed by: Shiraz Delacruz MD  Authorized by: Shiraz Delacruz MD     Critical care provider statement:     Critical care time (minutes):  30    Critical care time was exclusive of:  Separately billable procedures and treating other patients    Critical care was necessary to treat or prevent imminent or life-threatening deterioration of the following conditions:  Respiratory failure    Critical care was time spent personally by me on the following activities:  Development of treatment plan with patient or surrogate, discussions with consultants, evaluation of patient's response to treatment, examination of patient, obtaining history from patient or surrogate, ordering and performing treatments and interventions, ordering and review of laboratory studies, ordering and review of radiographic studies, pulse oximetry, re-evaluation of  patient's condition, review of old charts and ventilator management            MEDICATIONS GIVEN IN ER    Medications   propofol (DIPRIVAN) infusion 10 mg/mL 100 mL (20 mcg/kg/min × 116 kg Intravenous New Bag 6/13/19 1923)   Propofol (DIPRIVAN) injection 120 mg (120 mg Intravenous Given 6/13/19 1911)   midazolam (VERSED) injection 5 mg (5 mg Intravenous Given by Other 6/13/19 1919)   vecuronium (NORCURON) injection 10 mg (10 mg Intravenous Given by Other 6/13/19 1919)   sodium chloride 0.9 % bolus 1,000 mL (0 mL Intravenous Stopped 6/13/19 1942)   sodium chloride 0.9 % bolus 1,000 mL (0 mL Intravenous Stopped 6/13/19 1942)       PROGRESS AND CONSULTS         1855-Received call from EMS. Call placed to pulmonology at this time.    1904-Discussed with pt the need for emergent intubation due to hypoxia. Pt agrees with the plan.     1905-  (pulmonology) in pt's room.    1914-BP-85/70. Verbal order for IVF.       1919-Bedside CXR obtained at this time.     1921-O2 sats 91% and rising after intubation.   (pulmonology) performing emergent bedside bronchoscopy at this time.     1729-After bronchoscopy and irrigation, O2 natalie 94% and the ET tube was advanced 2cm.     1741-Emergent bedside bronchoscopy finished by .  placed EPI on the area of the bleeding vessel.     1742-Discussed pt case with  (ENT) who agrees to consult. He ask we place packing in the part of the throat in area of bleeding vessel.    1948-Packing placed at this time by  (pulmonology).  will admit pt to the ICU for further evaluation and care.         MEDICAL DECISION MAKING  MDM  Number of Diagnoses or Management Options  Critical Care  Total time providing critical care: 30-74 minutes             DIAGNOSIS  Final diagnoses:   Hemoptysis   Hypoxia   Respiratory distress         DISPOSITION  ADMISSION    Discussed treatment plan and reason for admission with pt/family and admitting physician.   Pt/family voiced understanding of the plan for admission for further testing/treatment as needed.               --  Documentation assistance provided by tomasa Shah for MD Amanda.  Information recorded by the scribe was done at my direction and has been verified and validated by me.         Vivi Shah  06/13/19 1954       Shiraz Delacruz MD  06/13/19 2021

## 2019-06-13 NOTE — PERIOPERATIVE NURSING NOTE
Dr. Hernandez returned call and updated on the issue with the pharmacy.  Dr. Hernandez states he will resend the prescriptions to their [facility] pharmacy.  Patient and family updated.

## 2019-06-13 NOTE — ED NOTES
Pt had broncoscopy performed today at this hospital.Started having shortness of breath and coughing up blood.      Debora Randolph, RN  06/13/19 7425

## 2019-06-13 NOTE — ED NOTES
Epinephrine 10ml/1mg, 5ml injected topically over site of bleeding by Dr Sotelo.     Cosme Bravo, GRETEL  06/13/19 1942

## 2019-06-13 NOTE — ANESTHESIA POSTPROCEDURE EVALUATION
"Patient: Alessio Allen    Procedure Summary     Date:  06/13/19 Room / Location:  [facility] OR  / [facility] MAIN OR    Anesthesia Start:  0720 Anesthesia Stop:  0811    Procedure:  BRONCHOSCOPY (N/A Bronchus) Diagnosis:      Surgeon:  Suresh Hernandez MD Provider:  Kendy Britton MD    Anesthesia Type:  general ASA Status:  3          Anesthesia Type: general  Last vitals  BP   110/70 (06/13/19 0845)   Temp   36.3 °C (97.4 °F) (06/13/19 0845)   Pulse   79 (06/13/19 0845)   Resp   18 (06/13/19 0845)     SpO2   91 % (06/13/19 0845)     Post Anesthesia Care and Evaluation    Patient location during evaluation: bedside  Patient participation: complete - patient participated  Level of consciousness: awake and alert  Pain management: adequate  Airway patency: patent  Anesthetic complications: No anesthetic complications  PONV Status: none  Cardiovascular status: acceptable  Respiratory status: acceptable  Hydration status: acceptable    Comments: /70   Pulse 79   Temp 36.3 °C (97.4 °F) (Oral)   Resp 18   Ht 188 cm (74\")   Wt 109 kg (239 lb 10.2 oz)   SpO2 91%   BMI 30.77 kg/m²         "

## 2019-06-14 ENCOUNTER — APPOINTMENT (OUTPATIENT)
Dept: GENERAL RADIOLOGY | Facility: HOSPITAL | Age: 79
End: 2019-06-14

## 2019-06-14 ENCOUNTER — APPOINTMENT (OUTPATIENT)
Dept: CT IMAGING | Facility: HOSPITAL | Age: 79
End: 2019-06-14

## 2019-06-14 LAB
ANION GAP SERPL CALCULATED.3IONS-SCNC: 12.1 MMOL/L
ANION GAP SERPL CALCULATED.3IONS-SCNC: 8.5 MMOL/L
ARTERIAL PATENCY WRIST A: POSITIVE
ATMOSPHERIC PRESS: 755.5 MMHG
BASE EXCESS BLDA CALC-SCNC: 5.5 MMOL/L (ref 0–2)
BDY SITE: ABNORMAL
BUN BLD-MCNC: 18 MG/DL (ref 8–23)
BUN BLD-MCNC: 18 MG/DL (ref 8–23)
BUN/CREAT SERPL: 15 (ref 7–25)
BUN/CREAT SERPL: 17.3 (ref 7–25)
CALCIUM SPEC-SCNC: 8.2 MG/DL (ref 8.6–10.5)
CALCIUM SPEC-SCNC: 8.6 MG/DL (ref 8.6–10.5)
CHLORIDE SERPL-SCNC: 98 MMOL/L (ref 98–107)
CHLORIDE SERPL-SCNC: 98 MMOL/L (ref 98–107)
CO2 SERPL-SCNC: 30.5 MMOL/L (ref 22–29)
CO2 SERPL-SCNC: 30.9 MMOL/L (ref 22–29)
CREAT BLD-MCNC: 1.04 MG/DL (ref 0.76–1.27)
CREAT BLD-MCNC: 1.2 MG/DL (ref 0.76–1.27)
CYTO UR: NORMAL
DEPRECATED RDW RBC AUTO: 62.1 FL (ref 37–54)
ERYTHROCYTE [DISTWIDTH] IN BLOOD BY AUTOMATED COUNT: 16.9 % (ref 12.3–15.4)
GFR SERPL CREATININE-BSD FRML MDRD: 59 ML/MIN/1.73
GFR SERPL CREATININE-BSD FRML MDRD: 69 ML/MIN/1.73
GLUCOSE BLD-MCNC: 115 MG/DL (ref 65–99)
GLUCOSE BLD-MCNC: 125 MG/DL (ref 65–99)
GLUCOSE BLDC GLUCOMTR-MCNC: 101 MG/DL (ref 70–130)
GLUCOSE BLDC GLUCOMTR-MCNC: 108 MG/DL (ref 70–130)
GLUCOSE BLDC GLUCOMTR-MCNC: 118 MG/DL (ref 70–130)
GLUCOSE BLDC GLUCOMTR-MCNC: 91 MG/DL (ref 70–130)
HCO3 BLDA-SCNC: 34.2 MMOL/L (ref 22–28)
HCT VFR BLD AUTO: 38.8 % (ref 37.5–51)
HGB BLD-MCNC: 11.6 G/DL (ref 13–17.7)
HOROWITZ INDEX BLD+IHG-RTO: 100 %
LAB AP CASE REPORT: NORMAL
MCH RBC QN AUTO: 29.7 PG (ref 26.6–33)
MCHC RBC AUTO-ENTMCNC: 29.9 G/DL (ref 31.5–35.7)
MCV RBC AUTO: 99.2 FL (ref 79–97)
MODALITY: ABNORMAL
O2 A-A PPRESDIFF RESPIRATORY: 0.5 MMHG
PATH REPORT.FINAL DX SPEC: NORMAL
PATH REPORT.GROSS SPEC: NORMAL
PCO2 BLDA: 68.2 MM HG (ref 35–45)
PEEP RESPIRATORY: 18 CM[H2O]
PH BLDA: 7.31 PH UNITS (ref 7.35–7.45)
PLATELET # BLD AUTO: 108 10*3/MM3 (ref 140–450)
PMV BLD AUTO: 10.1 FL (ref 6–12)
PO2 BLDA: 315.7 MM HG (ref 80–100)
POTASSIUM BLD-SCNC: 4.2 MMOL/L (ref 3.5–5.2)
POTASSIUM BLD-SCNC: 4.8 MMOL/L (ref 3.5–5.2)
RBC # BLD AUTO: 3.91 10*6/MM3 (ref 4.14–5.8)
SAO2 % BLDCOA: 99.9 % (ref 92–99)
SET MECH RESP RATE: 24
SODIUM BLD-SCNC: 137 MMOL/L (ref 136–145)
SODIUM BLD-SCNC: 141 MMOL/L (ref 136–145)
TOTAL RATE: 24 BREATHS/MINUTE
VENTILATOR MODE: AC
VT ON VENT VENT: 550 ML
WBC NRBC COR # BLD: 7.52 10*3/MM3 (ref 3.4–10.8)

## 2019-06-14 PROCEDURE — 94799 UNLISTED PULMONARY SVC/PX: CPT

## 2019-06-14 PROCEDURE — 36600 WITHDRAWAL OF ARTERIAL BLOOD: CPT

## 2019-06-14 PROCEDURE — 25010000002 FENTANYL CITRATE (PF) 100 MCG/2ML SOLUTION: Performed by: INTERNAL MEDICINE

## 2019-06-14 PROCEDURE — 80048 BASIC METABOLIC PNL TOTAL CA: CPT | Performed by: INTERNAL MEDICINE

## 2019-06-14 PROCEDURE — 71045 X-RAY EXAM CHEST 1 VIEW: CPT

## 2019-06-14 PROCEDURE — 82803 BLOOD GASES ANY COMBINATION: CPT

## 2019-06-14 PROCEDURE — 25010000002 PROPOFOL 1000 MG/ML EMULSION: Performed by: INTERNAL MEDICINE

## 2019-06-14 PROCEDURE — 85027 COMPLETE CBC AUTOMATED: CPT | Performed by: INTERNAL MEDICINE

## 2019-06-14 PROCEDURE — 70491 CT SOFT TISSUE NECK W/DYE: CPT

## 2019-06-14 PROCEDURE — 25010000002 IOPAMIDOL 61 % SOLUTION: Performed by: INTERNAL MEDICINE

## 2019-06-14 PROCEDURE — 93010 ELECTROCARDIOGRAM REPORT: CPT | Performed by: INTERNAL MEDICINE

## 2019-06-14 PROCEDURE — 82962 GLUCOSE BLOOD TEST: CPT

## 2019-06-14 PROCEDURE — 93005 ELECTROCARDIOGRAM TRACING: CPT | Performed by: INTERNAL MEDICINE

## 2019-06-14 RX ADMIN — IPRATROPIUM BROMIDE 6 PUFF: 17 AEROSOL, METERED RESPIRATORY (INHALATION) at 19:21

## 2019-06-14 RX ADMIN — ALBUTEROL SULFATE 6 PUFF: 90 AEROSOL, METERED RESPIRATORY (INHALATION) at 11:16

## 2019-06-14 RX ADMIN — IPRATROPIUM BROMIDE 6 PUFF: 17 AEROSOL, METERED RESPIRATORY (INHALATION) at 07:15

## 2019-06-14 RX ADMIN — ALBUTEROL SULFATE 6 PUFF: 90 AEROSOL, METERED RESPIRATORY (INHALATION) at 19:21

## 2019-06-14 RX ADMIN — ALBUTEROL SULFATE 6 PUFF: 90 AEROSOL, METERED RESPIRATORY (INHALATION) at 15:49

## 2019-06-14 RX ADMIN — SODIUM CHLORIDE, PRESERVATIVE FREE 3 ML: 5 INJECTION INTRAVENOUS at 08:06

## 2019-06-14 RX ADMIN — IPRATROPIUM BROMIDE 6 PUFF: 17 AEROSOL, METERED RESPIRATORY (INHALATION) at 11:16

## 2019-06-14 RX ADMIN — ALBUTEROL SULFATE 6 PUFF: 90 AEROSOL, METERED RESPIRATORY (INHALATION) at 23:17

## 2019-06-14 RX ADMIN — SODIUM CHLORIDE 500 ML: 9 INJECTION, SOLUTION INTRAVENOUS at 03:59

## 2019-06-14 RX ADMIN — IPRATROPIUM BROMIDE 6 PUFF: 17 AEROSOL, METERED RESPIRATORY (INHALATION) at 15:49

## 2019-06-14 RX ADMIN — IPRATROPIUM BROMIDE 6 PUFF: 17 AEROSOL, METERED RESPIRATORY (INHALATION) at 23:17

## 2019-06-14 RX ADMIN — ALBUTEROL SULFATE 6 PUFF: 90 AEROSOL, METERED RESPIRATORY (INHALATION) at 07:15

## 2019-06-14 RX ADMIN — CHLORHEXIDINE GLUCONATE 15 ML: 1.2 RINSE ORAL at 08:05

## 2019-06-14 RX ADMIN — CHLORHEXIDINE GLUCONATE 15 ML: 1.2 RINSE ORAL at 20:08

## 2019-06-14 RX ADMIN — SODIUM CHLORIDE, POTASSIUM CHLORIDE, SODIUM LACTATE AND CALCIUM CHLORIDE 100 ML/HR: 600; 310; 30; 20 INJECTION, SOLUTION INTRAVENOUS at 08:06

## 2019-06-14 RX ADMIN — CHLORHEXIDINE GLUCONATE 15 ML: 1.2 RINSE ORAL at 00:02

## 2019-06-14 RX ADMIN — PROPOFOL 15 MCG/KG/MIN: 10 INJECTION, EMULSION INTRAVENOUS at 00:01

## 2019-06-14 RX ADMIN — KETOTIFEN FUMARATE 1 DROP: 0.35 SOLUTION/ DROPS OPHTHALMIC at 08:05

## 2019-06-14 RX ADMIN — IPRATROPIUM BROMIDE 6 PUFF: 17 AEROSOL, METERED RESPIRATORY (INHALATION) at 03:57

## 2019-06-14 RX ADMIN — ALBUTEROL SULFATE 6 PUFF: 90 AEROSOL, METERED RESPIRATORY (INHALATION) at 03:57

## 2019-06-14 RX ADMIN — FENTANYL CITRATE 50 MCG: 50 INJECTION INTRAMUSCULAR; INTRAVENOUS at 06:49

## 2019-06-14 RX ADMIN — PANTOPRAZOLE SODIUM 40 MG: 40 INJECTION, POWDER, FOR SOLUTION INTRAVENOUS at 05:46

## 2019-06-14 RX ADMIN — KETOTIFEN FUMARATE 1 DROP: 0.35 SOLUTION/ DROPS OPHTHALMIC at 20:08

## 2019-06-14 RX ADMIN — IOPAMIDOL 75 ML: 612 INJECTION, SOLUTION INTRAVENOUS at 11:30

## 2019-06-14 RX ADMIN — KETOTIFEN FUMARATE 1 DROP: 0.35 SOLUTION/ DROPS OPHTHALMIC at 00:02

## 2019-06-14 NOTE — PLAN OF CARE
Problem: Patient Care Overview  Goal: Plan of Care Review  Outcome: Ongoing (interventions implemented as appropriate)   06/14/19 0523   Coping/Psychosocial   Plan of Care Reviewed With patient   Plan of Care Review   Progress no change   OTHER   Outcome Summary Pt admitted to CCU overnight. Propofol and fent PRN for RASS -2 to -3. Withdraws to pain, grimaces intermittent; not following any commands. ENT at bedside, gauze packing removed and currently 4 tonsil sponges in place. Dr. Sotelo notified overnight of hypotension, 500cc bolus given. Straight cath x1. Partner of 15 years is only family in [place], next of kin/healthcare surrogate is son from [place]. Awaiting CT this AM. Will CTM.        Problem: Restraint, Nonbehavioral (Nonviolent)  Goal: Rationale and Justification  Outcome: Ongoing (interventions implemented as appropriate)    Goal: Nonbehavioral (Nonviolent) Restraint: Absence of Injury/Harm  Outcome: Ongoing (interventions implemented as appropriate)    Goal: Nonbehavioral (Nonviolent) Restraint: Achievement of Discontinuation Criteria  Outcome: Ongoing (interventions implemented as appropriate)    Goal: Nonbehavioral (Nonviolent) Restraint: Preservation of Dignity and Wellbeing  Outcome: Ongoing (interventions implemented as appropriate)      Problem: Fall Risk (Adult)  Goal: Identify Related Risk Factors and Signs and Symptoms  Outcome: Ongoing (interventions implemented as appropriate)    Goal: Absence of Fall  Outcome: Ongoing (interventions implemented as appropriate)      Problem: Skin Injury Risk (Adult)  Goal: Identify Related Risk Factors and Signs and Symptoms  Outcome: Ongoing (interventions implemented as appropriate)    Goal: Skin Health and Integrity  Outcome: Ongoing (interventions implemented as appropriate)      Problem: Pain, Acute (Adult)  Goal: Identify Related Risk Factors and Signs and Symptoms  Outcome: Ongoing (interventions implemented as appropriate)    Goal: Acceptable  Pain Control/Comfort Level  Outcome: Ongoing (interventions implemented as appropriate)      Problem: Breathing Pattern Ineffective (Adult)  Goal: Identify Related Risk Factors and Signs and Symptoms  Outcome: Ongoing (interventions implemented as appropriate)    Goal: Effective Oxygenation/Ventilation  Outcome: Ongoing (interventions implemented as appropriate)    Goal: Anxiety/Fear Reduction  Outcome: Ongoing (interventions implemented as appropriate)

## 2019-06-14 NOTE — NURSING NOTE
Notified Dr. Sotelo of patient EKG and bedside monitor. Three RNs reviewed EKGs and are unable to determine rhythm. Per MD, the rhythm is a V-paced with pacer spikes falling within the QRS complex. No orders to obtain a pacer interrogation obtained. Patient hemodynamically stable.

## 2019-06-14 NOTE — NURSING NOTE
10:17 am: Pt. Transported to CT scan with this RN, RT, and transport technician.     10:46 am: Pt. Returned to unit from CT. VSS.

## 2019-06-14 NOTE — PLAN OF CARE
Problem: Patient Care Overview  Goal: Plan of Care Review  Outcome: Ongoing (interventions implemented as appropriate)   06/14/19 2844   Coping/Psychosocial   Plan of Care Reviewed With patient;spouse   Plan of Care Review   Progress improving   OTHER   Outcome Summary CT of neck completed this am. Packing removed from throat without any signs of bleeding noted. Plan is to remain on vent overnight and extubate in am. VSS. Straight cath x1. Continuing to monitor.        Problem: Restraint, Nonbehavioral (Nonviolent)  Goal: Rationale and Justification  Outcome: Ongoing (interventions implemented as appropriate)    Goal: Nonbehavioral (Nonviolent) Restraint: Absence of Injury/Harm  Outcome: Ongoing (interventions implemented as appropriate)    Goal: Nonbehavioral (Nonviolent) Restraint: Achievement of Discontinuation Criteria  Outcome: Ongoing (interventions implemented as appropriate)    Goal: Nonbehavioral (Nonviolent) Restraint: Preservation of Dignity and Wellbeing  Outcome: Ongoing (interventions implemented as appropriate)         10

## 2019-06-14 NOTE — CONSULTS
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 He was treated with radiation and chemotherapy and was subsequently followed up by Gaurav Merrill MD, my partner. He had persistent right-sided neck disease and it was recommended he have a right-sided neck dissection. Unfortunately, his health has deteriorated and his pulmonary status has not cooperated. He also has recent heart stents and is on Xarelto. I was urgently consulted tonight after the patient underwent bronchoscopy which resulted in intubated. He subsequently developed what was described as pharyngeal bleeding. Apparently it was quite brisk according to GLORIA Sotelo MD; 200-300 mL of bleeding was noted. He was subsequently packed with epinephrine-soaked gauze sponges and bleeding was controlled. I evaluated the patient at the bedside in the ICU and GlideScope was used to thoroughly examine the pharynx. All packing was removed and I was not able to identify any new bleeding. He did have some old blood within the tube which was suctioned. He had some stomach contents coming up into the oropharynx so we put his G-tube on suction. After extensive looking I was not able to identify any bleeding. As a precautionary matter and given the late hour I placed 4 tonsil sponges within the pharynx at the level of the base of tongue for added pressure. These can be removed tomorrow. I discussed the case with Dr. Merrill who will subsequently follow up. Overall I am not optimistic about this gentleman's prognosis given his many comorbidities. It would be prudent to discontinue Xarelto at this juncture.

## 2019-06-14 NOTE — H&P
Patient Care Team:  Alan Santana MD as PCP - General (Family Medicine)  Isael Beltre MD as PCP - Claims Attributed  Rao Maguire MD as Consulting Physician (Hematology and Oncology)  Alan Santana MD as Referring Physician (Family Medicine)  Wayne Robledo RN as Care Coordinator (Population Health)  Gaurav Merrill MD as Consulting Physician (Otolaryngology)      Subjective     Patient is a 79 y.o. male.  Whom presented to the emergency room via EMS with respiratory failure and coughing up large amounts of blood.  Patient had had a bronchoscopy earlier today for hemoptysis.  Currently has been coughing up blood for some time and they have been unable to find the source.  They actually did a diagnostic bronchoscopy with the patient taking his normal Eliquis yesterday evening so that if there was bleeding they could see a source..  This was done in the OR with general anesthesia patient was intubated there was no lower bleeding source found.  He apparently was coughing a little bit of blood up post procedure and then this progressively worsened at home.  Patient was emergently intubated in the emergency room emergently bronchoscope to clear blood from his airways to allow ventilation.  There was no evidence of lower bleeding.  But there was blood that continued to pull up in the back of his throat even after he been suctioned suggesting ongoing bleeding above the endotracheal tube cuff.  We suctioned out the bleeding actually use the bronchoscope to look at the back of his throat and it looked like the posterior wall there is a little pinhole that was bleeding.  I did not see any tumor or lesion in the area we escorted some epinephrine on it and after talking with ENT we packed the back of his mouth with 4 x 4 gauzes and currently he is bleeding seems to have stopped.  Apparently the patient has a history of head neck cancer I can find very little information about this.  He is on  Xarelto for chronic atrial fibrillation he has some coronary artery disease and had a prior stent he has severe COPD and chronic respiratory failure uses 10 L O2 to exercise and uses a trilogy noninvasive ventilator with sleep and PRN.  He additionally has a history of some hypertension hyperlipidemia.  He has a PEG tube and has some dysphasia I could not clearly figure out why the dysphasia      Review of Systems:  Unable to provide      History  Past Medical History:   Diagnosis Date   • Acute on chronic diastolic heart failure (CMS/[facility])    • Arthritis    • Asthma    • Atrial fibrillation (CMS/[facility])    • BPH (benign prostatic hypertrophy)    • Cancer (CMS/HCC)     throat CA   • COPD (chronic obstructive pulmonary disease) (CMS/[facility])    • H/O Abnormal CBC 2017   • History of heart artery stent 03/2017   • Hyperlipidemia    • Hypertension    • Neuropathy     feet and hands    • Pneumonia      Past Surgical History:   Procedure Laterality Date   • BRONCHOSCOPY N/A 4/7/2018    Procedure: BRONCHOSCOPY with specimens;  Surgeon: Suresh Hernandez MD;  Location: [facility] OR;  Service: Pulmonary   • BRONCHOSCOPY N/A 3/6/2019    Procedure: BRONCHOSCOPY WITH BAL AND BIOPSY UNDER FLUORO AND ANESTHESIA;  Surgeon: Suresh Hernandez MD;  Location: [facility] MAIN OR;  Service: Pulmonary   • CARDIAC CATHETERIZATION N/A 4/18/2018    Procedure: Right Heart Cath with possible vasodilator study;  Surgeon: Torey Schuler MD;  Location: [facility] CATH INVASIVE LOCATION;  Service: Cardiovascular   • CARDIAC CATHETERIZATION N/A 3/7/2019    Procedure: RIGHT AND LEFT HEART CATH;  Surgeon: Marizol Holt MD;  Location: [facility] CATH INVASIVE LOCATION;  Service: Cardiology   • CARDIAC CATHETERIZATION N/A 3/7/2019    Procedure: CORONARY ANGIOGRAPHY;  Surgeon: Marizol Holt MD;  Location: [facility] CATH INVASIVE LOCATION;  Service: Cardiology   • COLONOSCOPY  12/05/2016    polyps   • FRACTURE SURGERY     • INSERT / REPLACE / VENOUS ACCESS CATHETER Right     • PACEMAKER IMPLANTATION       Social History     Socioeconomic History   • Marital status:      Spouse name: Not on file   • Number of children: 2   • Years of education: Not on file   • Highest education level: Not on file   Occupational History     Employer: RETIRED   Tobacco Use   • Smoking status: Former Smoker     Packs/day: 1.50     Years: 45.00     Pack years: 67.50     Types: Cigarettes     Last attempt to quit: 1/3/2001     Years since quittin.4   • Smokeless tobacco: Never Used   Substance and Sexual Activity   • Alcohol use: No   • Drug use: No   • Sexual activity: Defer     Family History   Problem Relation Age of Onset   • Hypertension Mother    • Heart attack Father    • Malig Hyperthermia Neg Hx          Allergies:  Lisinopril; Penicillins; Sulfa antibiotics; Atenolol; Coreg [carvedilol]; Ibuprofen; and Celebrex [celecoxib]    Medications:  Prior to Admission medications    Medication Sig Start Date End Date Taking? Authorizing Provider   acetaminophen (TYLENOL) 325 MG tablet Take 2 tablets by mouth Every 6 (Six) Hours As Needed (prn for pain). 17   Alan Santana MD   acetylcysteine (MUCOMYST) 20 % nebulizer solution Take 4 mL by nebulization 4 (Four) Times a Day for 180 days. 6/13/19 12/10/19  Suresh Hernandez MD   albuterol (PROVENTIL HFA;VENTOLIN HFA) 108 (90 Base) MCG/ACT inhaler Inhale 2 puffs Every 6 (Six) Hours As Needed for Wheezing or Shortness of Air. 3/24/18   Kalpesh Jimenez MD   amLODIPine (NORVASC) 5 MG tablet Take 5 mg by mouth Daily.    ProviderNorberto MD   aspirin 81 MG tablet Take 81 mg by mouth Daily. HOLD PER MD INSTR    Norberto Zazueta MD   Cetirizine HCl (ZYRTEC ALLERGY) 10 MG capsule Take 1 capsule by mouth Every Night.    Norberto Zazueta MD   doxazosin (CARDURA) 2 MG tablet Take 2 mg by mouth Every Night.    Norberto Zazueta MD   ferrous sulfate 325 (65 FE) MG tablet Take 1 tablet by mouth Daily With Breakfast. 18   Alan Santana,  MD   fluocinolone (SYNALAR) 0.01 % external solution Apply  topically As Needed (prn). 7/16/18   Alan Santana MD   Fluticasone-Umeclidin-Vilant (TRELEGY ELLIPTA) 100-62.5-25 MCG/INH aerosol powder  Inhale 1 each Daily. 1/18/19   Alan Santana MD   furosemide (LASIX) 20 MG tablet Take 2 tablets by mouth 2 (Two) Times a Day. 5/3/18   Luly Nickerson APRN   hydrocortisone 1 % cream Apply 1 application topically to the appropriate area as directed 2 (Two) Times a Day As Needed for Rash. Chest    ProviderNorberto MD   ipratropium-albuterol (DUO-NEB) 0.5-2.5 mg/3 ml nebulizer Take 3 mL by nebulization Every 4 (Four) Hours As Needed for Wheezing.    Norberto Zazueta MD   montelukast (SINGULAIR) 10 MG tablet Take 10 mg by mouth Every Night.    Norberto Zazueta MD   olopatadine (PATANOL) 0.1 % ophthalmic solution Administer 1 drop to both eyes Daily. 4/23/19   Alan Santana MD   pantoprazole (PROTONIX) 40 MG pack packet Take 40 mg by mouth Every Morning Before Breakfast. PEG tube    Norberto Zazueta MD   pilocarpine (SALAGEN) 5 MG tablet Take 5 mg by mouth 3 (Three) Times a Day.    Norberto Zazueta MD   polyvinyl alcohol (LIQUIFILM) 1.4 % ophthalmic solution 1 drop As Needed for Dry Eyes.    Norberto Zazueta MD   potassium chloride (K-DUR) 10 MEQ CR tablet Take 2 tablets by mouth Daily. 2/23/18   Alan Santana MD   pramoxine-hydrocortisone 1-1 % rectal cream Insert 1 application into the rectum 2 (Two) Times a Day As Needed for Hemorrhoids.    Norberto Zazueta MD   pregabalin (LYRICA) 75 MG capsule Take 75 mg by mouth 2 (two) times a day.    Norberto Zazueta MD   pyridoxine (VITAMIN B-6) 50 MG tablet Take  by mouth daily. 6/4/14   Norberto Zazueta MD   sildenafil (VIAGRA) 100 MG tablet Take 1 tablet by mouth Daily As Needed for erectile dysfunction. 6/17/18   Alan Santana MD   simvastatin (ZOCOR) 20 MG tablet Take 0.5 tablets by mouth daily. 6/4/14   Provider, Historical,  MD   sodium chloride (OCEAN NASAL SPRAY) 0.65 % nasal spray 2 sprays into each nostril As Needed for Congestion (qid prn). May refill q 21 days 12/14/17   Alan Santana MD   sodium chloride 7 % nebulizer solution nebulizer solution Take 4 mL by nebulization 4 (Four) Times a Day for 180 days. 6/13/19 12/10/19  Suresh Hernandez MD   Spacer/Aero-Holding Chambers device Give spacer to use with his inhalers whichever brand he has received is fine 8/3/17   Alan Santana MD   XARELTO 20 MG tablet Take 20 mg by mouth Daily. 11/20/17   Provider, MD Norberto   acetylcysteine (MUCOMYST) 20 % nebulizer solution Take 4 mL by nebulization 4 (Four) Times a Day for 180 days. 6/13/19 6/13/19  Suresh Hernandez MD   sodium chloride 7 % nebulizer solution nebulizer solution Take 4 mL by nebulization 4 (Four) Times a Day for 180 days. 6/13/19 6/13/19  Suresh Hernandez MD       albuterol sulfate HFA 6 puff Inhalation Q4H - RT   chlorhexidine 15 mL Mouth/Throat Q12H   ipratropium 6 puff Inhalation Q4H - RT   ketotifen 1 drop Both Eyes BID   [START ON 6/14/2019] pantoprazole 40 mg Intravenous Q AM   sodium chloride 3 mL Intravenous Q12H       lactated ringers 100 mL/hr   propofol 5-50 mcg/kg/min       Objective     Vital Signs  Vital Sign Min/Max for last 24 hours  Temp  Min: 97.4 °F (36.3 °C)  Max: 97.9 °F (36.6 °C)   BP  Min: 99/59  Max: 136/80   Pulse  Min: 79  Max: 84   Resp  Min: 10  Max: 28   SpO2  Min: 77 %  Max: 96 %   Flow (L/min)  Min: 6  Max: 9   Weight  Min: 109 kg (239 lb 10.2 oz)  Max: 116 kg (255 lb 11.7 oz)       Intake/Output Summary (Last 24 hours) at 6/13/2019 2008  Last data filed at 6/13/2019 1942  Gross per 24 hour   Intake 2000 ml   Output --   Net 2000 ml     I/O this shift:  In: 2000 [IV Piggyback:2000]  Out: -   Last Weight and Admission Weight        06/13/19 1908   Weight: 116 kg (255 lb 11.7 oz)     Flowsheet Rows      First Filed Value   Admission Height  --   Admission Weight  116 kg (255 lb 11.7 oz) Documented at  06/13/2019 1908          Body mass index is 32.83 kg/m².           Physical Exam:  General Appearance: Well-developed white male he is now orally intubated with an 8.0 endotracheal tube at about 28 cm at the lips he is heavily sedated on propofol  Eyes: Conjunctiva are clear and pupils look to be equal about 4 mm  ENT: He has the endotracheal tube and now has his mouth packed with 4 x 4 gauze  Neck: No adenopathy or thyromegaly no jugular venous distention trachea is midline neck is for fairly large  Lungs: Coarse breath sounds bilaterally sound much better after he had the bronchoscopy and removed blood and clots.  He is on the ventilator assist control 16 tidal volume 550 PEEP of 12 FiO2 100% SPO2 is about 92%  Cardiac: Irregularly irregular  Abdomen: Obese soft no palpable organomegaly does have a left upper quadrant PEG type tube site looks okay  : Not examined  Musculoskeletal: Grossly normal  Skin: No jaundice no petechiae.  He was definitely cyanotic on arrival he has pinked up considerably now  Neuro: Patient is heavily sedated  Extremities/P Vascular: No clubbing, he does have trace pretibial edema bilaterally palpable radial dorsalis pedis pulses  MSE: Unable to assess      Labs:  Results from last 7 days   Lab Units 06/07/19  0825   GLUCOSE mg/dL 89   SODIUM mmol/L 141   POTASSIUM mmol/L 3.9   CO2 mmol/L 31.2*   CHLORIDE mmol/L 99   ANION GAP mmol/L 10.8   CREATININE mg/dL 1.05   BUN mg/dL 17   BUN / CREAT RATIO  16.2   CALCIUM mg/dL 9.2   EGFR IF NONAFRICN AM mL/min/1.73 68   ALK PHOS U/L 81   TOTAL PROTEIN g/dL 7.7   ALT (SGPT) U/L 10   AST (SGOT) U/L 21   BILIRUBIN mg/dL 0.7   ALBUMIN g/dL 3.80   GLOBULIN gm/dL 3.9     Estimated Creatinine Clearance: 78.5 mL/min (by C-G formula based on SCr of 1.05 mg/dL).      Results from last 7 days   Lab Units 06/13/19  1912 06/07/19  0825   WBC 10*3/mm3 8.27 5.41   RBC 10*6/mm3 4.81 4.78   HEMOGLOBIN g/dL 14.2 13.9   HEMATOCRIT % 47.5 45.8   MCV fL 98.8* 95.8    MCH pg 29.5 29.1   MCHC g/dL 29.9* 30.3*   RDW % 16.9* 17.0*   RDW-SD fl 62.0* 59.6*   MPV fL 10.6 10.4   PLATELETS 10*3/mm3 135* 150   NEUTROPHIL % % 83.0* 66.8   LYMPHOCYTE % % 6.8* 17.2*   MONOCYTES % % 9.3 11.3   EOSINOPHIL % % 0.5 3.9   BASOPHIL % % 0.2 0.6   NEUTROS ABS 10*3/mm3 6.86 3.62   LYMPHS ABS 10*3/mm3 0.56* 0.93   MONOS ABS 10*3/mm3 0.77 0.61   EOS ABS 10*3/mm3 0.04 0.21   BASOS ABS 10*3/mm3 0.02 0.03                         Results from last 7 days   Lab Units 06/13/19  1912   INR  1.23*     Microbiology Results (last 10 days)     Procedure Component Value - Date/Time    Respiratory Culture - Wash, Bronchus [[id number]] Collected:  06/13/19 0751    Lab Status:  Preliminary result Specimen:  Wash from Bronchus Updated:  06/13/19 1541     Gram Stain No organisms seen      Rare (1+) WBCs per low power field    Respiratory Culture - Wash, Bronchus [[id number]] Collected:  06/13/19 0750    Lab Status:  Preliminary result Specimen:  Wash from Bronchus Updated:  06/13/19 1538     Gram Stain No organisms seen            Diagnostics:  Ct Chest Without Contrast    Result Date: 6/3/2019  CT CHEST WO CONTRAST-  CLINICAL HISTORY: Hemoptysis. Dyspnea. History of squamous cell laryngeal carcinoma.  TECHNIQUE: Spiral CT images were acquired through the chest without IV contrast and were reconstructed in 3 mm thick axial slices.  Radiation dose reduction techniques were utilized, including automated exposure control and exposure modulation based on body size.  COMPARISON: CT chest dated 03/03/2019.  FINDINGS: There is moderately extensive emphysema that is most prominent in the upper lung zones. A focal nodular area of infiltrate in the posterior aspect of the right upper lobe has resolved completely in the interval. Interstitial infiltrate in both lower lobes is nearly completely resolved. Mild infiltrate persists, primarily in the posterior and inferior aspect of the left lower lobe. No new infiltrates are  identified. There are no discrete lung masses. There is no mediastinal or hilar or axillary lymphadenopathy. There are no pleural effusions. Images through the upper abdomen demonstrate no significant abnormality      Moderately extensive emphysema. Significant improvement in bilateral infiltrates since the preceding CT dated 03/03/2019. Only mild interstitial infiltrate persists within both lower lobes posteriorly and inferiorly, primarily on the left. No new abnormalities are identified.  This report was finalized on 6/3/2019 1:24 PM by Dr. Alden Spencer M.D.      Xr Chest 1 View    Result Date: 6/13/2019  Portable chest radiograph  HISTORY:Intubation  TECHNIQUE: Single AP portable radiograph of the chest  COMPARISON:Chest radiograph 03/17/2019 and chest CT 06/13/2019      FINDINGS AND IMPRESSION: The endotracheal tube terminates approximately 6 cm above the brittany. Right-sided Port-A-Cath tip overlies the superior vena cava. There is a left-sided pacemaker.  Bibasilar pulmonary opacification is present, left greater than right, most concerning for atelectasis and/or developing pneumonia and correlation with patient history is recommended. No pleural effusion or pneumothorax is seen. The heart is at least moderately enlarged.  This report was finalized on 6/13/2019 7:45 PM by Dr. Casey Blackwood M.D.      Results for orders placed during the hospital encounter of 02/28/19   Adult Transthoracic Echo Complete W/ Cont if Necessary Per Protocol    Narrative · Left ventricular systolic function is normal. Calculated EF = 58.0%.   Estimated EF was in agreement with the calculated EF. Normal left   ventricular cavity size noted. Left ventricular wall thickness is   consistent with mild concentric hypertrophy. Left ventricular diastolic   function was indeterminate.  · Left atrial cavity size is severely dilated.  · Right atrial cavity size is moderately dilated.  · The aortic valve is abnormal in structure. There is  moderate   calcification of the aortic valve.No significant aortic valve   regurgitation is present. No hemodynamically significant aortic valve   stenosis is present.  · Moderate mitral valve regurgitation is present. In some views it appears   moderately severe. No significant mitral valve stenosis is present.  · Moderate tricuspid valve regurgitation is present. Moderate to severe   pulmonary hypertension is present.  · Moderate dilation of the ascending aorta is present. 4.4 cm.          Chest x-ray the endotracheal tube in adequate position a little high we actually advanced it from that position and confirmed it with the bronchoscope was subsequently.  He had some bibasilar atelectasis    Assessment/Plan     1. Acute respiratory failure on chronic hypoxemic and hypercapnic respiratory failure.  His acute respiratory failure is due to hemorrhage.  We will continue ventilator management he may need a repeat bronchoscopy to mobilize smaller more distal clots depending on how his oxygenation does how well his x-ray clears up etc.  He will need his noninvasive ventilator when he is extubated.  2. Upper airway hemorrhage.  Patient is clearly bleeding from his upper airway there seemed to be a small pinhole type bleed in the posterior pharynx.  ENT has been consulted and recommended packing the mouth which was done with seven 4 x 4 gauzes.  I am going to get a CT of the neck with and without contrast.  3. COPD continue bronchodilators  4. Chronic atrial fibrillation rate looks to be controlled obviously with his active bleeding I will hold Xarelto tonight  5. Coronary artery disease with prior stent  6. Hypertension we will hold any antihypertensives now until his blood pressure improves  7. Oropharyngeal dysphasia I am not exactly sure the origin whether this goes to his history of head neck tumor or what I do not have any details on that look through multiple hospital notes    Addendum: Dr. Awan ENT is here he has  more history the patient has stage IV laryngeal carcinoma he had involvement of both sides he has had radiation therapy and has had progressive disease.  Back in February they had talked about right radical neck dissection.  They were awaiting pulmonary clearance but patient is continued to have these problems.  He may be should be off anticoagulation with these recurrent bleeds and his extremely poor prognosis      Ismael Sotelo MD  06/13/19  8:08 PM    Time: I spent over 90 minutes in critical care time with the patient today excluding procedure

## 2019-06-14 NOTE — ED NOTES
Nursing report ED to floor  Alessio Allen  79 y.o.  male    HPI (triage note):   Chief Complaint   Patient presents with   • Shortness of Breath       Admitting doctor:   Ismael Sotelo MD    Admitting diagnosis:   The primary encounter diagnosis was Hemoptysis. Diagnoses of Hypoxia and Respiratory distress were also pertinent to this visit.    Code status:   Current Code Status     Date Active Code Status Order ID Comments User Context       Prior          Allergies:   Lisinopril; Penicillins; Sulfa antibiotics; Atenolol; Coreg [carvedilol]; Ibuprofen; and Celebrex [celecoxib]    Weight:       06/13/19 1908   Weight: 116 kg (255 lb 11.7 oz)       Most recent vitals:   Vitals:    06/13/19 1937 06/13/19 1943 06/13/19 1953 06/13/19 1958   BP: 120/72 117/73 123/72 113/70   Pulse: 83 79 79 79   Resp: 10 16     SpO2: 93% 93% 92% 93%   Weight:           Active LDAs/IV Access:   Lines, Drains & Airways    Active LDAs     Name:   Placement date:   Placement time:   Site:   Days:    Peripheral IV 06/13/19 1907 Right Antecubital   06/13/19 1907    Antecubital   less than 1    Peripheral IV 06/13/19 1907 Left Antecubital   06/13/19 1907    Antecubital   less than 1    ETT    06/13/19 1912     less than 1                Labs (abnormal labs have a star):   Labs Reviewed   PROTIME-INR - Abnormal; Notable for the following components:       Result Value    Protime 15.2 (*)     INR 1.23 (*)     All other components within normal limits   CBC WITH AUTO DIFFERENTIAL - Abnormal; Notable for the following components:    MCV 98.8 (*)     MCHC 29.9 (*)     RDW 16.9 (*)     RDW-SD 62.0 (*)     Platelets 135 (*)     Neutrophil % 83.0 (*)     Lymphocyte % 6.8 (*)     Lymphocytes, Absolute 0.56 (*)     All other components within normal limits   COMPREHENSIVE METABOLIC PANEL   TYPE AND SCREEN   CBC AND DIFFERENTIAL    Narrative:     The following orders were created for panel order CBC & Differential.  Procedure                                Abnormality         Status                     ---------                               -----------         ------                     CBC Auto Differential[[id number]]        Abnormal            Final result                 Please view results for these tests on the individual orders.       EKG:   No orders to display       Meds given in ED:   Medications   propofol (DIPRIVAN) infusion 10 mg/mL 100 mL (20 mcg/kg/min × 116 kg Intravenous New Bag 6/13/19 1923)   Propofol (DIPRIVAN) injection 120 mg (120 mg Intravenous Given 6/13/19 1911)   midazolam (VERSED) injection 5 mg (5 mg Intravenous Given by Other 6/13/19 1919)   vecuronium (NORCURON) injection 10 mg (10 mg Intravenous Given by Other 6/13/19 1919)   sodium chloride 0.9 % bolus 1,000 mL (0 mL Intravenous Stopped 6/13/19 1942)   sodium chloride 0.9 % bolus 1,000 mL (0 mL Intravenous Stopped 6/13/19 1942)       Imaging results:  Xr Chest 1 View    Result Date: 6/13/2019  FINDINGS AND IMPRESSION: The endotracheal tube terminates approximately 6 cm above the brittany. Right-sided Port-A-Cath tip overlies the superior vena cava. There is a left-sided pacemaker.  Bibasilar pulmonary opacification is present, left greater than right, most concerning for atelectasis and/or developing pneumonia and correlation with patient history is recommended. No pleural effusion or pneumothorax is seen. The heart is at least moderately enlarged.  This report was finalized on 6/13/2019 7:45 PM by Dr. Casey Blackwood M.D.        Ambulatory status:   Bedrest      Social issues:   Social History     Socioeconomic History   • Marital status:      Spouse name: Not on file   • Number of children: 2   • Years of education: Not on file   • Highest education level: Not on file   Occupational History     Employer: RETIRED   Tobacco Use   • Smoking status: Former Smoker     Packs/day: 1.50     Years: 45.00     Pack years: 67.50     Types: Cigarettes     Last attempt to quit:  1/3/2001     Years since quittin.4   • Smokeless tobacco: Never Used   Substance and Sexual Activity   • Alcohol use: No   • Drug use: No   • Sexual activity: Marisela Agustin RN  19

## 2019-06-14 NOTE — DISCHARGE PLACEMENT REQUEST
"Chioma Allen (79 y.o. Male)     Date of Birth Social Security Number Address Home Phone MRN    1940  [id number] [facility] [address] [phone] [id number]    Judaism Marital Status          Hinduism        Admission Date Admission Type Admitting Provider Attending Provider Department, Room/Bed    6/13/19 Emergency Ismael Sotelo MD Haller, Harold [place], MD [facility] CORONARY CARE, N331/1    Discharge Date Discharge Disposition Discharge Destination                       Attending Provider:  Ismael Sotelo MD    Allergies:  Lisinopril, Penicillins, Sulfa Antibiotics, Atenolol, Coreg [Carvedilol], Ibuprofen, Celebrex [Celecoxib]    Isolation:  Contact   Infection:  MRSA (03/15/19)   Code Status:  CPR    Ht:  188 cm (74\")   Wt:  108 kg (238 lb 5.1 oz)    Admission Cmt:  None   Principal Problem:  None                Active Insurance as of 6/13/2019     Primary Coverage     Payor Plan Insurance Group Employer/Plan Group    MEDICARE MEDICARE A & B      Payor Plan Address Payor Plan Phone Number Payor Plan Fax Number Effective Dates    [address] [phone]  5/1/2000 - None Entered    [facility] [id number]       Subscriber Name Subscriber Birth Date Member ID       CHIOMA ALLEN 1940 [id number]           Secondary Coverage     Payor Plan Insurance Group Employer/Plan Group     FOR LIFE  FOR LIFE MC SUPP      Payor Plan Address Payor Plan Phone Number Payor Plan Fax Number Effective Dates    [address] [phone]  1/30/2018 - None Entered    [facility] [address]       Subscriber Name Subscriber Birth Date Member ID       CHIOMA ALLEN 1940 [id number]                 Emergency Contacts      (Rel.) Home Phone Work Phone Mobile Phone    Dulce Allen (Friend) [phone] -- --    AlejandroEllis (Son) -- -- [phone]              "

## 2019-06-14 NOTE — PROGRESS NOTES
[facility]  [phone]  Suresh Hernandez MD    Subjective:  LOS: 1    He looks very comfortable.  On the ventilator.  Settings were adjusted.  No evidence of bleeding in the endotracheal tube.  Posterior pharynx is packed at the moment.    Objective   Vital Signs past 24hrs    Temp range: Temp (24hrs), Av.4 °F (36.9 °C), Min:98.2 °F (36.8 °C), Max:98.6 °F (37 °C)    BP range: BP: ()/(53-80) 95/60  Pulse range: Heart Rate:  [58-84] 80  Resp rate range: Resp:  [10-28] 16    Device (Oxygen Therapy): ventilator   Oxygen range:SpO2:  [77 %-100 %] 93 %   FiO2 (%):  [50 %-100 %] 50 %  S RR:  [16] 16  PEEP/CPAP (cm H2O):  [7.5 cm H20-18 cm H20] 10 cm H20  MAP (cm H2O):  [13-24] 13  108 kg (238 lb 5.1 oz); Body mass index is 30.6 kg/m².    Intake/Output Summary (Last 24 hours) at 2019 1126  Last data filed at 2019 0806  Gross per 24 hour   Intake 3694.6 ml   Output 550 ml   Net 3144.6 ml       Physical Exam   Constitutional: He is intubated and restrained.   Eyes: Conjunctivae are normal. Pupils are equal, round, and reactive to light. No scleral icterus.   Cardiovascular: Normal rate, regular rhythm and normal heart sounds.   No murmur heard.  Pulmonary/Chest: He is intubated. No respiratory distress. He has decreased breath sounds (coarse breath sounds). He has no rhonchi. He has no rales.   Abdominal: Soft. Bowel sounds are normal. He exhibits no mass. There is no tenderness.   PEG +   Musculoskeletal: He exhibits no edema.   Neurological: He is alert.   Skin: Skin is warm and dry.   Nursing note and vitals reviewed.    Results Review:    I have reviewed the laboratory and imaging data since the last note by [facility] physician.  My annotations are noted in assessment and plan.    Medication Review:  I have reviewed the current MAR.  My annotations are noted in assessment and plan.      lactated ringers 100 mL/hr Last Rate: 100 mL/hr (19 0806)   propofol 5-50 mcg/kg/min Last Rate: 5  mcg/kg/min (06/14/19 0632)     Plan   PCCM Problems  Acute resp failure, vent  Large bleeding, likely upper airway source, after elective bronch  Manipulation of upper airway with ETT 6/13 AM for elective bronch  Current anticoagulation with Xarelto, on hold  Afib  Relevant Medical Diagnoses  Chronic resp failure, 7L NC and Trilogy at home  Chronic diastolic CHF, (with NO evidence LE edema usually)  COPD, currently without exacerbaion  Throat cancer s/p chemo RT  Dysphagia with PEG  CAD  Obesity    Plan of Treatment  Vent adjusted.    Discussed CT neck with radiology. Nothing obvious except for superficial mucosal ulceration. Await ENT.    Current AC on hold. May need to discuss further use of anticoagulation with cardiology. Has afib.    Respiratory status at baseline is high O2 flows and Trilogy at night.  - suggest attempt diuresis if oxygenation worsens, it usually works if no overt signs of edema such as LE swelling etc.    Spoke with patient's partner at bedside.    I spent +45 mins critical care time in care of this patient outside of any procedures.     Suresh Hernandez MD  06/14/19  11:26 AM    Part of this note may be an electronic transcription/translation of spoken language to printed text using the Dragon Dictation System.

## 2019-06-15 ENCOUNTER — APPOINTMENT (OUTPATIENT)
Dept: GENERAL RADIOLOGY | Facility: HOSPITAL | Age: 79
End: 2019-06-15

## 2019-06-15 LAB
ALBUMIN SERPL-MCNC: 3.2 G/DL (ref 3.5–5.2)
ALBUMIN/GLOB SERPL: 1 G/DL
ALP SERPL-CCNC: 61 U/L (ref 39–117)
ALT SERPL W P-5'-P-CCNC: 8 U/L (ref 1–41)
ANION GAP SERPL CALCULATED.3IONS-SCNC: 7.5 MMOL/L
AST SERPL-CCNC: 15 U/L (ref 1–40)
BACTERIA SPEC RESP CULT: ABNORMAL
BILIRUB SERPL-MCNC: 1 MG/DL (ref 0.2–1.2)
BUN BLD-MCNC: 17 MG/DL (ref 8–23)
BUN/CREAT SERPL: 19.1 (ref 7–25)
CALCIUM SPEC-SCNC: 8.8 MG/DL (ref 8.6–10.5)
CHLORIDE SERPL-SCNC: 104 MMOL/L (ref 98–107)
CO2 SERPL-SCNC: 31.5 MMOL/L (ref 22–29)
CREAT BLD-MCNC: 0.89 MG/DL (ref 0.76–1.27)
DEPRECATED RDW RBC AUTO: 62.4 FL (ref 37–54)
ERYTHROCYTE [DISTWIDTH] IN BLOOD BY AUTOMATED COUNT: 17.1 % (ref 12.3–15.4)
GFR SERPL CREATININE-BSD FRML MDRD: 83 ML/MIN/1.73
GLOBULIN UR ELPH-MCNC: 3.1 GM/DL
GLUCOSE BLD-MCNC: 91 MG/DL (ref 65–99)
GRAM STN SPEC: ABNORMAL
HCT VFR BLD AUTO: 38.4 % (ref 37.5–51)
HGB BLD-MCNC: 11.7 G/DL (ref 13–17.7)
MCH RBC QN AUTO: 29.8 PG (ref 26.6–33)
MCHC RBC AUTO-ENTMCNC: 30.5 G/DL (ref 31.5–35.7)
MCV RBC AUTO: 98 FL (ref 79–97)
PLATELET # BLD AUTO: 103 10*3/MM3 (ref 140–450)
PMV BLD AUTO: 10.8 FL (ref 6–12)
POTASSIUM BLD-SCNC: 4.3 MMOL/L (ref 3.5–5.2)
PROT SERPL-MCNC: 6.3 G/DL (ref 6–8.5)
RBC # BLD AUTO: 3.92 10*6/MM3 (ref 4.14–5.8)
SODIUM BLD-SCNC: 143 MMOL/L (ref 136–145)
WBC NRBC COR # BLD: 6.45 10*3/MM3 (ref 3.4–10.8)

## 2019-06-15 PROCEDURE — 94799 UNLISTED PULMONARY SVC/PX: CPT

## 2019-06-15 PROCEDURE — 94660 CPAP INITIATION&MGMT: CPT

## 2019-06-15 PROCEDURE — 71045 X-RAY EXAM CHEST 1 VIEW: CPT

## 2019-06-15 PROCEDURE — 94640 AIRWAY INHALATION TREATMENT: CPT

## 2019-06-15 PROCEDURE — 80053 COMPREHEN METABOLIC PANEL: CPT | Performed by: INTERNAL MEDICINE

## 2019-06-15 PROCEDURE — 85027 COMPLETE CBC AUTOMATED: CPT | Performed by: INTERNAL MEDICINE

## 2019-06-15 RX ORDER — PREGABALIN 75 MG/1
75 CAPSULE ORAL EVERY 12 HOURS SCHEDULED
Status: DISCONTINUED | OUTPATIENT
Start: 2019-06-15 | End: 2019-06-21 | Stop reason: HOSPADM

## 2019-06-15 RX ORDER — IPRATROPIUM BROMIDE AND ALBUTEROL SULFATE 2.5; .5 MG/3ML; MG/3ML
3 SOLUTION RESPIRATORY (INHALATION)
Status: DISCONTINUED | OUTPATIENT
Start: 2019-06-15 | End: 2019-06-21 | Stop reason: HOSPADM

## 2019-06-15 RX ADMIN — SODIUM CHLORIDE, PRESERVATIVE FREE 3 ML: 5 INJECTION INTRAVENOUS at 20:53

## 2019-06-15 RX ADMIN — SODIUM CHLORIDE, PRESERVATIVE FREE 3 ML: 5 INJECTION INTRAVENOUS at 08:03

## 2019-06-15 RX ADMIN — IPRATROPIUM BROMIDE 6 PUFF: 17 AEROSOL, METERED RESPIRATORY (INHALATION) at 03:31

## 2019-06-15 RX ADMIN — PANTOPRAZOLE SODIUM 40 MG: 40 INJECTION, POWDER, FOR SOLUTION INTRAVENOUS at 06:44

## 2019-06-15 RX ADMIN — PREGABALIN 75 MG: 75 CAPSULE ORAL at 20:53

## 2019-06-15 RX ADMIN — ALBUTEROL SULFATE 6 PUFF: 90 AEROSOL, METERED RESPIRATORY (INHALATION) at 03:31

## 2019-06-15 RX ADMIN — IPRATROPIUM BROMIDE AND ALBUTEROL SULFATE 3 ML: 2.5; .5 SOLUTION RESPIRATORY (INHALATION) at 15:44

## 2019-06-15 RX ADMIN — IPRATROPIUM BROMIDE AND ALBUTEROL SULFATE 3 ML: 2.5; .5 SOLUTION RESPIRATORY (INHALATION) at 19:38

## 2019-06-15 RX ADMIN — KETOTIFEN FUMARATE 1 DROP: 0.35 SOLUTION/ DROPS OPHTHALMIC at 08:03

## 2019-06-15 RX ADMIN — KETOTIFEN FUMARATE 1 DROP: 0.35 SOLUTION/ DROPS OPHTHALMIC at 20:55

## 2019-06-15 RX ADMIN — ALBUTEROL SULFATE 6 PUFF: 90 AEROSOL, METERED RESPIRATORY (INHALATION) at 11:52

## 2019-06-15 RX ADMIN — ALBUTEROL SULFATE 6 PUFF: 90 AEROSOL, METERED RESPIRATORY (INHALATION) at 07:29

## 2019-06-15 RX ADMIN — Medication 1 DROP: at 20:53

## 2019-06-15 RX ADMIN — CHLORHEXIDINE GLUCONATE 15 ML: 1.2 RINSE ORAL at 20:53

## 2019-06-15 RX ADMIN — IPRATROPIUM BROMIDE 6 PUFF: 17 AEROSOL, METERED RESPIRATORY (INHALATION) at 11:52

## 2019-06-15 RX ADMIN — Medication 1 DROP: at 04:38

## 2019-06-15 RX ADMIN — IPRATROPIUM BROMIDE 6 PUFF: 17 AEROSOL, METERED RESPIRATORY (INHALATION) at 07:29

## 2019-06-15 RX ADMIN — CHLORHEXIDINE GLUCONATE 15 ML: 1.2 RINSE ORAL at 08:03

## 2019-06-15 RX ADMIN — Medication 1 DROP: at 08:03

## 2019-06-15 RX ADMIN — Medication 1 DROP: at 06:25

## 2019-06-15 NOTE — PLAN OF CARE
Problem: Patient Care Overview  Goal: Plan of Care Review  Outcome: Ongoing (interventions implemented as appropriate)  Extubated pt today and placed on home trilogy with 8L of o2 bled in. This is what pt wears at home. Pt started on duoneb q4. Pt is doing well   06/15/19 6140   Coping/Psychosocial   Plan of Care Reviewed With patient

## 2019-06-15 NOTE — PLAN OF CARE
Problem: Patient Care Overview  Goal: Plan of Care Review  Outcome: Ongoing (interventions implemented as appropriate)   06/15/19 4401   Coping/Psychosocial   Plan of Care Reviewed With patient   Plan of Care Review   Progress improving   OTHER   Outcome Summary VSS. Pt. extubated this afternoon at 12:30. Immediately placed on home trilogy machine and tolerated well. Frequent coughing with intermittent tan/ red-streaked secretions noted. Tube feeding boluses started per home administration. Currently tolerating high-flow nasal cannula. Continuing to monitor.        Problem: Restraint, Nonbehavioral (Nonviolent)  Goal: Rationale and Justification  Outcome: Outcome(s) achieved Date Met: 06/15/19    Goal: Nonbehavioral (Nonviolent) Restraint: Absence of Injury/Harm  Outcome: Outcome(s) achieved Date Met: 06/15/19    Goal: Nonbehavioral (Nonviolent) Restraint: Achievement of Discontinuation Criteria  Outcome: Outcome(s) achieved Date Met: 06/15/19    Goal: Nonbehavioral (Nonviolent) Restraint: Preservation of Dignity and Wellbeing  Outcome: Outcome(s) achieved Date Met: 06/15/19      Problem: Fall Risk (Adult)  Goal: Absence of Fall  Outcome: Ongoing (interventions implemented as appropriate)      Problem: Skin Injury Risk (Adult)  Goal: Skin Health and Integrity  Outcome: Ongoing (interventions implemented as appropriate)      Problem: Pain, Acute (Adult)  Goal: Acceptable Pain Control/Comfort Level  Outcome: Ongoing (interventions implemented as appropriate)      Problem: Breathing Pattern Ineffective (Adult)  Goal: Effective Oxygenation/Ventilation  Outcome: Ongoing (interventions implemented as appropriate)

## 2019-06-15 NOTE — NURSING NOTE
Per patient spouse, patient takes Ensure plus at home. Daily intake of 8 cartons (containers) per day. Per Dr. DURAN, resume home nutrition regimen.

## 2019-06-15 NOTE — PLAN OF CARE
Problem: Patient Care Overview  Goal: Plan of Care Review  Outcome: Ongoing (interventions implemented as appropriate)   06/15/19 0555   Coping/Psychosocial   Plan of Care Reviewed With patient   Plan of Care Review   Progress improving   OTHER   Outcome Summary VSS. No sedation or pain medication overnight. Tmax 101.4, however pt had several heavy blankets in place. Recheck was 98.8. 500cc UO. Subglottic suction began tan/light red and has become more clear overnight. Pt's oral secretions have been clear. Pt has strong cough when laying flat and began having bright red secretions from his ETT with small clots. Dr. Hernandez notified and ordered to limit suctioning. Restraints resumed for patient safety, pt's SO states he frequently pulls his Trilogy off in his sleep at home. Plan to extubate this AM. Pt and SO updated with plan of care. Will CTM.        Problem: Restraint, Nonbehavioral (Nonviolent)  Goal: Rationale and Justification  Outcome: Ongoing (interventions implemented as appropriate)    Goal: Nonbehavioral (Nonviolent) Restraint: Absence of Injury/Harm  Outcome: Ongoing (interventions implemented as appropriate)    Goal: Nonbehavioral (Nonviolent) Restraint: Achievement of Discontinuation Criteria  Outcome: Ongoing (interventions implemented as appropriate)    Goal: Nonbehavioral (Nonviolent) Restraint: Preservation of Dignity and Wellbeing  Outcome: Ongoing (interventions implemented as appropriate)      Problem: Fall Risk (Adult)  Goal: Identify Related Risk Factors and Signs and Symptoms  Outcome: Ongoing (interventions implemented as appropriate)    Goal: Absence of Fall  Outcome: Ongoing (interventions implemented as appropriate)      Problem: Skin Injury Risk (Adult)  Goal: Identify Related Risk Factors and Signs and Symptoms  Outcome: Ongoing (interventions implemented as appropriate)    Goal: Skin Health and Integrity  Outcome: Ongoing (interventions implemented as appropriate)      Problem: Pain,  Acute (Adult)  Goal: Identify Related Risk Factors and Signs and Symptoms  Outcome: Ongoing (interventions implemented as appropriate)    Goal: Acceptable Pain Control/Comfort Level  Outcome: Ongoing (interventions implemented as appropriate)      Problem: Breathing Pattern Ineffective (Adult)  Goal: Identify Related Risk Factors and Signs and Symptoms  Outcome: Ongoing (interventions implemented as appropriate)    Goal: Effective Oxygenation/Ventilation  Outcome: Ongoing (interventions implemented as appropriate)    Goal: Anxiety/Fear Reduction  Outcome: Ongoing (interventions implemented as appropriate)

## 2019-06-15 NOTE — PROGRESS NOTES
I spoke with Dr. Merrill late on Friday.  I discussed patient's situation.    He suggests extubation on Saturday morning provided patient is stable.  If patient starts to have bleeding again then he may require reintubation and further evaluation in the operating room by ENT physician.  Physician on-call for Dr. Leonard this weekend is Dr. James Awan.

## 2019-06-15 NOTE — PROGRESS NOTES
[facility]  [phone]  Jameson Corrales MD    Subjective:  LOS: 2  Patient seen and examined this a.m.  While awake on the ventilator.  Does not appear to be in distress.  Decreased PEEP and FiO2.  No fresh pharyngeal or subglottic blood aspirated.  Some fresh blood with suctioning of the ET tube noted.  Notified that ENT is okay to extubate. Gauzes is in the posterior pharynx have been removed as well per nurse report.     Vital Signs past 24hrs    Temp range: Temp (24hrs), Av.4 °F (37.4 °C), Min:98.8 °F (37.1 °C), Max:101.4 °F (38.6 °C)    BP range: BP: ()/(55-83) 105/64  Pulse range: Heart Rate:  [79-84] 80  Resp rate range: Resp:  [16-21] 19    Device (Oxygen Therapy): ventilator   Oxygen range:SpO2:  [90 %-98 %] 95 %   FiO2 (%):  [49 %-50 %] 50 %  S RR:  [8] 8  PEEP/CPAP (cm H2O):  [10 cm H20] 10 cm H20  MAP (cm H2O):  [13-14] 14  110 kg (241 lb 10 oz); Body mass index is 31.02 kg/m².    Intake/Output Summary (Last 24 hours) at 6/15/2019 1045  Last data filed at 6/15/2019 0803  Gross per 24 hour   Intake 3382.21 ml   Output 1050 ml   Net 2332.21 ml       Physical Exam   Constitutional: He is intubated and restrained.   Eyes: Conjunctivae are normal. Pupils are equal, round, and reactive to light. No scleral icterus.   Cardiovascular: Normal rate, regular rhythm and normal heart sounds.   No murmur heard.  Pulmonary/Chest: He is intubated. No respiratory distress. He has decreased breath sounds (coarse breath sounds). He has no rhonchi. He has no rales.   Abdominal: Soft. Bowel sounds are normal. He exhibits no mass. There is no tenderness.   PEG +   Musculoskeletal: He exhibits no edema.   Neurological: He is alert.   Skin: Skin is warm and dry.   Nursing note and vitals reviewed.    Results Review:    I have reviewed the laboratory and imaging data since the last note by [facility] physician.  My annotations are noted in assessment and plan.    Medication Review:  I have reviewed  the current MAR.  My annotations are noted in assessment and plan.      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  Would extubate to noninvasive ventilator if successful.  X-ray shows some left lower lobe atelectasis but good oxygenation noted.  Noted that ENT is okay to do extubate.  Gauze pieces have been removed per nurse report.  Agree with holding anticoagulation indefinitely.  Continue with pain medications  Restart home medications   Full Code     Confirmed code status with pt and wife @ bedside again and they want to get re intubated if he fails.     I spent +45 mins critical care time in care of this patient outside of any procedures.     Jameson Corrales MD  06/15/19  10:45 AM

## 2019-06-16 ENCOUNTER — APPOINTMENT (OUTPATIENT)
Dept: GENERAL RADIOLOGY | Facility: HOSPITAL | Age: 79
End: 2019-06-16

## 2019-06-16 LAB
ANION GAP SERPL CALCULATED.3IONS-SCNC: 11.9 MMOL/L
BUN BLD-MCNC: 15 MG/DL (ref 8–23)
BUN/CREAT SERPL: 17.2 (ref 7–25)
CALCIUM SPEC-SCNC: 8.9 MG/DL (ref 8.6–10.5)
CHLORIDE SERPL-SCNC: 106 MMOL/L (ref 98–107)
CO2 SERPL-SCNC: 28.1 MMOL/L (ref 22–29)
CREAT BLD-MCNC: 0.87 MG/DL (ref 0.76–1.27)
DEPRECATED RDW RBC AUTO: 59.8 FL (ref 37–54)
ERYTHROCYTE [DISTWIDTH] IN BLOOD BY AUTOMATED COUNT: 16.6 % (ref 12.3–15.4)
GFR SERPL CREATININE-BSD FRML MDRD: 85 ML/MIN/1.73
GLUCOSE BLD-MCNC: 79 MG/DL (ref 65–99)
HCT VFR BLD AUTO: 39.3 % (ref 37.5–51)
HGB BLD-MCNC: 11.8 G/DL (ref 13–17.7)
MCH RBC QN AUTO: 29.3 PG (ref 26.6–33)
MCHC RBC AUTO-ENTMCNC: 30 G/DL (ref 31.5–35.7)
MCV RBC AUTO: 97.5 FL (ref 79–97)
PLATELET # BLD AUTO: 106 10*3/MM3 (ref 140–450)
PMV BLD AUTO: 10.5 FL (ref 6–12)
POTASSIUM BLD-SCNC: 3.9 MMOL/L (ref 3.5–5.2)
RBC # BLD AUTO: 4.03 10*6/MM3 (ref 4.14–5.8)
SODIUM BLD-SCNC: 146 MMOL/L (ref 136–145)
WBC NRBC COR # BLD: 6.25 10*3/MM3 (ref 3.4–10.8)

## 2019-06-16 PROCEDURE — 94799 UNLISTED PULMONARY SVC/PX: CPT

## 2019-06-16 PROCEDURE — 93010 ELECTROCARDIOGRAM REPORT: CPT | Performed by: INTERNAL MEDICINE

## 2019-06-16 PROCEDURE — 94669 MECHANICAL CHEST WALL OSCILL: CPT

## 2019-06-16 PROCEDURE — 94667 MNPJ CHEST WALL 1ST: CPT

## 2019-06-16 PROCEDURE — 71045 X-RAY EXAM CHEST 1 VIEW: CPT

## 2019-06-16 PROCEDURE — 94640 AIRWAY INHALATION TREATMENT: CPT

## 2019-06-16 PROCEDURE — 93005 ELECTROCARDIOGRAM TRACING: CPT | Performed by: INTERNAL MEDICINE

## 2019-06-16 PROCEDURE — 80048 BASIC METABOLIC PNL TOTAL CA: CPT | Performed by: INTERNAL MEDICINE

## 2019-06-16 PROCEDURE — 85027 COMPLETE CBC AUTOMATED: CPT | Performed by: INTERNAL MEDICINE

## 2019-06-16 RX ORDER — SODIUM CHLORIDE FOR INHALATION 7 %
4 VIAL, NEBULIZER (ML) INHALATION
Status: DISCONTINUED | OUTPATIENT
Start: 2019-06-16 | End: 2019-06-21 | Stop reason: HOSPADM

## 2019-06-16 RX ORDER — TERAZOSIN 2 MG/1
2 CAPSULE ORAL NIGHTLY
Status: DISCONTINUED | OUTPATIENT
Start: 2019-06-16 | End: 2019-06-21 | Stop reason: HOSPADM

## 2019-06-16 RX ORDER — FUROSEMIDE 40 MG/1
40 TABLET ORAL 2 TIMES DAILY
Status: DISCONTINUED | OUTPATIENT
Start: 2019-06-16 | End: 2019-06-20

## 2019-06-16 RX ORDER — NITROGLYCERIN 0.4 MG/1
0.4 TABLET SUBLINGUAL
Status: DISCONTINUED | OUTPATIENT
Start: 2019-06-16 | End: 2019-06-21 | Stop reason: HOSPADM

## 2019-06-16 RX ORDER — ASPIRIN 81 MG/1
81 TABLET, CHEWABLE ORAL DAILY
Status: DISCONTINUED | OUTPATIENT
Start: 2019-06-16 | End: 2019-06-21 | Stop reason: HOSPADM

## 2019-06-16 RX ORDER — ECHINACEA PURPUREA EXTRACT 125 MG
2 TABLET ORAL AS NEEDED
Status: DISCONTINUED | OUTPATIENT
Start: 2019-06-16 | End: 2019-06-21 | Stop reason: HOSPADM

## 2019-06-16 RX ORDER — MONTELUKAST SODIUM 10 MG/1
10 TABLET ORAL NIGHTLY
Status: DISCONTINUED | OUTPATIENT
Start: 2019-06-16 | End: 2019-06-21 | Stop reason: HOSPADM

## 2019-06-16 RX ORDER — PILOCARPINE HYDROCHLORIDE 5 MG/1
5 TABLET, FILM COATED ORAL 3 TIMES DAILY
Status: DISCONTINUED | OUTPATIENT
Start: 2019-06-16 | End: 2019-06-21 | Stop reason: HOSPADM

## 2019-06-16 RX ORDER — AMLODIPINE BESYLATE 5 MG/1
5 TABLET ORAL DAILY
Status: DISCONTINUED | OUTPATIENT
Start: 2019-06-16 | End: 2019-06-18

## 2019-06-16 RX ORDER — ATORVASTATIN CALCIUM 10 MG/1
10 TABLET, FILM COATED ORAL DAILY
Status: DISCONTINUED | OUTPATIENT
Start: 2019-06-16 | End: 2019-06-21 | Stop reason: HOSPADM

## 2019-06-16 RX ORDER — PANTOPRAZOLE SODIUM 40 MG/1
40 TABLET, DELAYED RELEASE ORAL
Status: DISCONTINUED | OUTPATIENT
Start: 2019-06-16 | End: 2019-06-16

## 2019-06-16 RX ADMIN — IPRATROPIUM BROMIDE AND ALBUTEROL SULFATE 3 ML: 2.5; .5 SOLUTION RESPIRATORY (INHALATION) at 11:16

## 2019-06-16 RX ADMIN — SODIUM CHLORIDE, PRESERVATIVE FREE 3 ML: 5 INJECTION INTRAVENOUS at 21:47

## 2019-06-16 RX ADMIN — PILOCARPINE HYROCHLORIDE 5 MG: 5 TABLET, FILM COATED ORAL at 16:30

## 2019-06-16 RX ADMIN — IPRATROPIUM BROMIDE AND ALBUTEROL SULFATE 3 ML: 2.5; .5 SOLUTION RESPIRATORY (INHALATION) at 08:04

## 2019-06-16 RX ADMIN — PREGABALIN 75 MG: 75 CAPSULE ORAL at 08:40

## 2019-06-16 RX ADMIN — SODIUM CHLORIDE 4 ML: 7 NEBU SOLN,3 % NEBU at 15:15

## 2019-06-16 RX ADMIN — Medication 1 DROP: at 08:41

## 2019-06-16 RX ADMIN — IPRATROPIUM BROMIDE AND ALBUTEROL SULFATE 3 ML: 2.5; .5 SOLUTION RESPIRATORY (INHALATION) at 15:15

## 2019-06-16 RX ADMIN — MONTELUKAST SODIUM 10 MG: 10 TABLET, FILM COATED ORAL at 21:43

## 2019-06-16 RX ADMIN — Medication 1 DROP: at 18:09

## 2019-06-16 RX ADMIN — PREGABALIN 75 MG: 75 CAPSULE ORAL at 21:47

## 2019-06-16 RX ADMIN — AMLODIPINE BESYLATE 5 MG: 5 TABLET ORAL at 11:41

## 2019-06-16 RX ADMIN — PILOCARPINE HYROCHLORIDE 5 MG: 5 TABLET, FILM COATED ORAL at 11:40

## 2019-06-16 RX ADMIN — SODIUM CHLORIDE, PRESERVATIVE FREE 3 ML: 5 INJECTION INTRAVENOUS at 08:49

## 2019-06-16 RX ADMIN — CHLORHEXIDINE GLUCONATE 15 ML: 1.2 RINSE ORAL at 21:47

## 2019-06-16 RX ADMIN — FUROSEMIDE 40 MG: 40 TABLET ORAL at 11:40

## 2019-06-16 RX ADMIN — PANTOPRAZOLE SODIUM 40 MG: 40 INJECTION, POWDER, FOR SOLUTION INTRAVENOUS at 05:00

## 2019-06-16 RX ADMIN — KETOTIFEN FUMARATE 1 DROP: 0.35 SOLUTION/ DROPS OPHTHALMIC at 21:43

## 2019-06-16 RX ADMIN — SODIUM CHLORIDE 4 ML: 7 NEBU SOLN,3 % NEBU at 11:17

## 2019-06-16 RX ADMIN — FUROSEMIDE 40 MG: 40 TABLET ORAL at 21:43

## 2019-06-16 RX ADMIN — ATORVASTATIN CALCIUM 10 MG: 10 TABLET, FILM COATED ORAL at 11:41

## 2019-06-16 RX ADMIN — TERAZOSIN HYDROCHLORIDE 2 MG: 2 CAPSULE ORAL at 21:43

## 2019-06-16 RX ADMIN — ASPIRIN 81 MG: 81 TABLET, CHEWABLE ORAL at 11:40

## 2019-06-16 RX ADMIN — KETOTIFEN FUMARATE 1 DROP: 0.35 SOLUTION/ DROPS OPHTHALMIC at 11:42

## 2019-06-16 RX ADMIN — CHLORHEXIDINE GLUCONATE 15 ML: 1.2 RINSE ORAL at 08:40

## 2019-06-16 RX ADMIN — IPRATROPIUM BROMIDE AND ALBUTEROL SULFATE 3 ML: 2.5; .5 SOLUTION RESPIRATORY (INHALATION) at 19:32

## 2019-06-16 RX ADMIN — SODIUM CHLORIDE 4 ML: 7 NEBU SOLN,3 % NEBU at 19:32

## 2019-06-16 NOTE — PLAN OF CARE
Problem: Patient Care Overview  Goal: Plan of Care Review  Outcome: Ongoing (interventions implemented as appropriate)   06/16/19 0927   Coping/Psychosocial   Plan of Care Reviewed With patient   Plan of Care Review   Progress improving   OTHER   Outcome Summary VSS. Transfer from CCU. EKG ordered to confirm rhythm. 15 LPM via high flow nc. Up with assist x1. Bolus tube feedings tid. Suction at bedside. Will continue to monitor.

## 2019-06-16 NOTE — PROGRESS NOTES
[facility]  [phone]  Jameson Corrales MD    Subjective:  LOS: 3  Patient seen and examined this a.m. did well to get extubated yesterday but placed on his home noninvasive ventilator right away.  Tolerated the plan well and is on high flow nasal cannula in between noninvasive ventilator use.  Minimal bloodstained tan secretions with cough.     Vital Signs past 24hrs    Temp range: Temp (24hrs), Av.5 °F (36.9 °C), Min:98 °F (36.7 °C), Max:98.9 °F (37.2 °C)    BP range: BP: (104-133)/(58-81) 113/58  Pulse range: Heart Rate:  [79-93] 79  Resp rate range: Resp:  [16-20] 20    Device (Oxygen Therapy): high-flow nasal cannulaFlow (L/min):  [6-13] 13  Oxygen range:SpO2:  [83 %-99 %] 93 %   FiO2 (%):  [40 %] 40 %  S RR:  [5] 5  PEEP/CPAP (cm H2O):  [7 cm H20] 7 cm H20  VT SUP:  [5 cm H20] 5 cm H20  MAP (cm H2O):  [9.2-9.4] 9.4  108 kg (238 lb 12.1 oz); Body mass index is 30.65 kg/m².    Intake/Output Summary (Last 24 hours) at 2019 1008  Last data filed at 2019 0504  Gross per 24 hour   Intake 1326.33 ml   Output 1275 ml   Net 51.33 ml       Physical Exam   Constitutional: He appears well-developed and well-nourished. He is cooperative.  Non-toxic appearance.   Eyes: Conjunctivae are normal. Pupils are equal, round, and reactive to light. No scleral icterus.   Cardiovascular: Normal rate, regular rhythm and normal heart sounds.   No murmur heard.  Pulmonary/Chest: Accessory muscle usage present. Decreased breath sounds: coarse breath sounds. He has no rhonchi. He has no rales.   Abdominal: Soft. Bowel sounds are normal. He exhibits no mass. There is no tenderness.   PEG +   Musculoskeletal: He exhibits no edema.   Neurological: He is alert.   Skin: Skin is warm and dry.   Nursing note and vitals reviewed.    Results Review:    I have reviewed the laboratory and imaging data since the last note by [facility] physician.  My annotations are noted in assessment and plan.    Medication  Review:  I have reviewed the current MAR.  My annotations are noted in assessment and plan.      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  More oxygen requirements than baseline likely from atelectasis  Will repeat chest x-ray.  Will need out of bed as well as physical therapy and mucus clearance  Awaiting further plan from ENT  Agree with holding anticoagulation indefinitely.  Continue with pain medications  Restart home medications   Full Code     Will tx to floor/ tele    Jameson Corrales MD  06/16/19  10:08 AM

## 2019-06-17 ENCOUNTER — APPOINTMENT (OUTPATIENT)
Dept: GENERAL RADIOLOGY | Facility: HOSPITAL | Age: 79
End: 2019-06-17

## 2019-06-17 LAB
ANION GAP SERPL CALCULATED.3IONS-SCNC: 11.2 MMOL/L
BUN BLD-MCNC: 14 MG/DL (ref 8–23)
BUN/CREAT SERPL: 17.5 (ref 7–25)
CALCIUM SPEC-SCNC: 9.2 MG/DL (ref 8.6–10.5)
CHLORIDE SERPL-SCNC: 99 MMOL/L (ref 98–107)
CO2 SERPL-SCNC: 30.8 MMOL/L (ref 22–29)
CREAT BLD-MCNC: 0.8 MG/DL (ref 0.76–1.27)
DEPRECATED RDW RBC AUTO: 58.1 FL (ref 37–54)
ERYTHROCYTE [DISTWIDTH] IN BLOOD BY AUTOMATED COUNT: 16.5 % (ref 12.3–15.4)
GFR SERPL CREATININE-BSD FRML MDRD: 93 ML/MIN/1.73
GLUCOSE BLD-MCNC: 103 MG/DL (ref 65–99)
HCT VFR BLD AUTO: 41.5 % (ref 37.5–51)
HGB BLD-MCNC: 12.8 G/DL (ref 13–17.7)
MCH RBC QN AUTO: 29.4 PG (ref 26.6–33)
MCHC RBC AUTO-ENTMCNC: 30.8 G/DL (ref 31.5–35.7)
MCV RBC AUTO: 95.2 FL (ref 79–97)
PLATELET # BLD AUTO: 118 10*3/MM3 (ref 140–450)
PMV BLD AUTO: 10.4 FL (ref 6–12)
POTASSIUM BLD-SCNC: 3.1 MMOL/L (ref 3.5–5.2)
RBC # BLD AUTO: 4.36 10*6/MM3 (ref 4.14–5.8)
SODIUM BLD-SCNC: 141 MMOL/L (ref 136–145)
WBC NRBC COR # BLD: 6.57 10*3/MM3 (ref 3.4–10.8)

## 2019-06-17 PROCEDURE — 94668 MNPJ CHEST WALL SBSQ: CPT

## 2019-06-17 PROCEDURE — 94799 UNLISTED PULMONARY SVC/PX: CPT

## 2019-06-17 PROCEDURE — 94669 MECHANICAL CHEST WALL OSCILL: CPT

## 2019-06-17 PROCEDURE — 80048 BASIC METABOLIC PNL TOTAL CA: CPT | Performed by: INTERNAL MEDICINE

## 2019-06-17 PROCEDURE — 71046 X-RAY EXAM CHEST 2 VIEWS: CPT

## 2019-06-17 PROCEDURE — 85027 COMPLETE CBC AUTOMATED: CPT | Performed by: INTERNAL MEDICINE

## 2019-06-17 RX ADMIN — ASPIRIN 81 MG: 81 TABLET, CHEWABLE ORAL at 08:39

## 2019-06-17 RX ADMIN — FUROSEMIDE 40 MG: 40 TABLET ORAL at 08:39

## 2019-06-17 RX ADMIN — POTASSIUM CHLORIDE 40 MEQ: 750 CAPSULE, EXTENDED RELEASE ORAL at 06:12

## 2019-06-17 RX ADMIN — CHLORHEXIDINE GLUCONATE 15 ML: 1.2 RINSE ORAL at 23:26

## 2019-06-17 RX ADMIN — PREGABALIN 75 MG: 75 CAPSULE ORAL at 08:39

## 2019-06-17 RX ADMIN — KETOTIFEN FUMARATE 1 DROP: 0.35 SOLUTION/ DROPS OPHTHALMIC at 08:40

## 2019-06-17 RX ADMIN — MONTELUKAST SODIUM 10 MG: 10 TABLET, FILM COATED ORAL at 22:04

## 2019-06-17 RX ADMIN — PILOCARPINE HYROCHLORIDE 5 MG: 5 TABLET, FILM COATED ORAL at 08:39

## 2019-06-17 RX ADMIN — AMLODIPINE BESYLATE 5 MG: 5 TABLET ORAL at 08:39

## 2019-06-17 RX ADMIN — ATORVASTATIN CALCIUM 10 MG: 10 TABLET, FILM COATED ORAL at 08:39

## 2019-06-17 RX ADMIN — PANTOPRAZOLE SODIUM 40 MG: 40 INJECTION, POWDER, FOR SOLUTION INTRAVENOUS at 06:12

## 2019-06-17 RX ADMIN — KETOTIFEN FUMARATE 1 DROP: 0.35 SOLUTION/ DROPS OPHTHALMIC at 22:05

## 2019-06-17 RX ADMIN — CHLORHEXIDINE GLUCONATE 15 ML: 1.2 RINSE ORAL at 08:40

## 2019-06-17 RX ADMIN — PILOCARPINE HYROCHLORIDE 5 MG: 5 TABLET, FILM COATED ORAL at 00:13

## 2019-06-17 RX ADMIN — IPRATROPIUM BROMIDE AND ALBUTEROL SULFATE 3 ML: 2.5; .5 SOLUTION RESPIRATORY (INHALATION) at 10:37

## 2019-06-17 RX ADMIN — PREGABALIN 75 MG: 75 CAPSULE ORAL at 22:07

## 2019-06-17 RX ADMIN — IPRATROPIUM BROMIDE AND ALBUTEROL SULFATE 3 ML: 2.5; .5 SOLUTION RESPIRATORY (INHALATION) at 14:26

## 2019-06-17 RX ADMIN — PILOCARPINE HYROCHLORIDE 5 MG: 5 TABLET, FILM COATED ORAL at 22:04

## 2019-06-17 RX ADMIN — PILOCARPINE HYROCHLORIDE 5 MG: 5 TABLET, FILM COATED ORAL at 18:02

## 2019-06-17 RX ADMIN — POTASSIUM CHLORIDE 40 MEQ: 750 CAPSULE, EXTENDED RELEASE ORAL at 18:02

## 2019-06-17 RX ADMIN — IPRATROPIUM BROMIDE AND ALBUTEROL SULFATE 3 ML: 2.5; .5 SOLUTION RESPIRATORY (INHALATION) at 18:56

## 2019-06-17 RX ADMIN — SODIUM CHLORIDE, PRESERVATIVE FREE 3 ML: 5 INJECTION INTRAVENOUS at 22:07

## 2019-06-17 RX ADMIN — TERAZOSIN HYDROCHLORIDE 2 MG: 2 CAPSULE ORAL at 22:04

## 2019-06-17 RX ADMIN — SODIUM CHLORIDE 4 ML: 7 NEBU SOLN,3 % NEBU at 10:37

## 2019-06-17 RX ADMIN — SODIUM CHLORIDE 4 ML: 7 NEBU SOLN,3 % NEBU at 18:57

## 2019-06-17 RX ADMIN — POTASSIUM CHLORIDE 40 MEQ: 750 CAPSULE, EXTENDED RELEASE ORAL at 22:05

## 2019-06-17 RX ADMIN — FUROSEMIDE 40 MG: 40 TABLET ORAL at 22:04

## 2019-06-17 NOTE — PLAN OF CARE
Problem: Patient Care Overview  Goal: Plan of Care Review  Outcome: Ongoing (interventions implemented as appropriate)   06/17/19 5486   Coping/Psychosocial   Plan of Care Reviewed With patient   Plan of Care Review   Progress improving   OTHER   Outcome Summary VSS. Telemetry monitor, paced. Up with assist, up to chair today. 8 lpm via nc high flow. Bolus feedings through g tube. Will continue to monitor.

## 2019-06-17 NOTE — PLAN OF CARE
Problem: Patient Care Overview  Goal: Plan of Care Review  Outcome: Ongoing (interventions implemented as appropriate)   06/17/19 0802   Coping/Psychosocial   Plan of Care Reviewed With patient   Plan of Care Review   Progress improving   OTHER   Outcome Summary pt denies chest pain and discomfort, SOA, N/V and Headache. VSS.        Problem: Fall Risk (Adult)  Goal: Identify Related Risk Factors and Signs and Symptoms  Outcome: Ongoing (interventions implemented as appropriate)    Goal: Absence of Fall  Outcome: Ongoing (interventions implemented as appropriate)      Problem: Skin Injury Risk (Adult)  Goal: Identify Related Risk Factors and Signs and Symptoms  Outcome: Ongoing (interventions implemented as appropriate)    Goal: Skin Health and Integrity  Outcome: Ongoing (interventions implemented as appropriate)      Problem: Pain, Acute (Adult)  Goal: Identify Related Risk Factors and Signs and Symptoms  Outcome: Ongoing (interventions implemented as appropriate)    Goal: Acceptable Pain Control/Comfort Level  Outcome: Ongoing (interventions implemented as appropriate)      Problem: Breathing Pattern Ineffective (Adult)  Goal: Identify Related Risk Factors and Signs and Symptoms  Outcome: Ongoing (interventions implemented as appropriate)    Goal: Effective Oxygenation/Ventilation  Outcome: Ongoing (interventions implemented as appropriate)    Goal: Anxiety/Fear Reduction  Outcome: Ongoing (interventions implemented as appropriate)

## 2019-06-17 NOTE — PLAN OF CARE
Problem: Patient Care Overview  Goal: Plan of Care Review   06/17/19 1197   Coping/Psychosocial   Plan of Care Reviewed With patient   Plan of Care Review   Progress improving   OTHER   Outcome Summary Weaned o 7 lpm/90%.

## 2019-06-17 NOTE — PROGRESS NOTES
[facility]  [phone]  Jameson Corrales MD    Subjective:  LOS: 4  Patient seen and examined this a.m. Still on high flow nasal cannula in between noninvasive ventilator use. Hoarseness +     Vital Signs past 24hrs    Temp range: Temp (24hrs), Av.1 °F (36.7 °C), Min:97.8 °F (36.6 °C), Max:98.3 °F (36.8 °C)    BP range: BP: (113-131)/(66-87) 114/75  Pulse range: Heart Rate:  [79-90] 90  Resp rate range: Resp:  [16-24] 20    Device (Oxygen Therapy): high-flow nasal cannulaFlow (L/min):  [8-12] 8  Oxygen range:SpO2:  [90 %-97 %] 90 %      108 kg (238 lb 12.1 oz); Body mass index is 30.65 kg/m².    Intake/Output Summary (Last 24 hours) at 2019 1548  Last data filed at 2019 1300  Gross per 24 hour   Intake 1530 ml   Output 2750 ml   Net -1220 ml       Physical Exam   Constitutional: He appears well-developed and well-nourished. He is cooperative.  Non-toxic appearance.   Eyes: Conjunctivae are normal. Pupils are equal, round, and reactive to light. No scleral icterus.   Cardiovascular: Normal rate, regular rhythm and normal heart sounds.   No murmur heard.  Pulmonary/Chest: Accessory muscle usage present. Decreased breath sounds: coarse breath sounds. He has no rhonchi. He has no rales.   Abdominal: Soft. Bowel sounds are normal. He exhibits no mass. There is no tenderness.   PEG +   Musculoskeletal: He exhibits no edema.   Neurological: He is alert.   Skin: Skin is warm and dry.   Nursing note and vitals reviewed.    Results Review:    I have reviewed the laboratory and imaging data since the last note by [facility] physician.  My annotations are noted in assessment and plan.    Medication Review:  I have reviewed the current MAR.  My annotations are noted in assessment and plan.       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  More oxygen requirements than baseline likely from atelectasis   Will repeat chest x-ray.  C/w physical therapy and mucus clearance  Awaiting further plan from ENT  Agree with holding anticoagulation indefinitely.    Full Code    Jameson Corrales MD  06/17/19  3:48 PM

## 2019-06-18 LAB
ANION GAP SERPL CALCULATED.3IONS-SCNC: 10.5 MMOL/L
BUN BLD-MCNC: 17 MG/DL (ref 8–23)
BUN/CREAT SERPL: 21.3 (ref 7–25)
CALCIUM SPEC-SCNC: 9.4 MG/DL (ref 8.6–10.5)
CHLORIDE SERPL-SCNC: 100 MMOL/L (ref 98–107)
CO2 SERPL-SCNC: 29.5 MMOL/L (ref 22–29)
CREAT BLD-MCNC: 0.8 MG/DL (ref 0.76–1.27)
DEPRECATED RDW RBC AUTO: 57.9 FL (ref 37–54)
ERYTHROCYTE [DISTWIDTH] IN BLOOD BY AUTOMATED COUNT: 16.3 % (ref 12.3–15.4)
GFR SERPL CREATININE-BSD FRML MDRD: 93 ML/MIN/1.73
GLUCOSE BLD-MCNC: 98 MG/DL (ref 65–99)
HCT VFR BLD AUTO: 41.8 % (ref 37.5–51)
HGB BLD-MCNC: 13 G/DL (ref 13–17.7)
MCH RBC QN AUTO: 29.5 PG (ref 26.6–33)
MCHC RBC AUTO-ENTMCNC: 31.1 G/DL (ref 31.5–35.7)
MCV RBC AUTO: 95 FL (ref 79–97)
PLATELET # BLD AUTO: 128 10*3/MM3 (ref 140–450)
PMV BLD AUTO: 10.4 FL (ref 6–12)
POTASSIUM BLD-SCNC: 3.7 MMOL/L (ref 3.5–5.2)
RBC # BLD AUTO: 4.4 10*6/MM3 (ref 4.14–5.8)
SODIUM BLD-SCNC: 140 MMOL/L (ref 136–145)
WBC NRBC COR # BLD: 6.13 10*3/MM3 (ref 3.4–10.8)

## 2019-06-18 PROCEDURE — 80048 BASIC METABOLIC PNL TOTAL CA: CPT | Performed by: INTERNAL MEDICINE

## 2019-06-18 PROCEDURE — 94799 UNLISTED PULMONARY SVC/PX: CPT

## 2019-06-18 PROCEDURE — 85027 COMPLETE CBC AUTOMATED: CPT | Performed by: INTERNAL MEDICINE

## 2019-06-18 PROCEDURE — 25010000002 FUROSEMIDE PER 20 MG: Performed by: INTERNAL MEDICINE

## 2019-06-18 PROCEDURE — 94669 MECHANICAL CHEST WALL OSCILL: CPT

## 2019-06-18 RX ORDER — GUAIFENESIN 600 MG/1
1200 TABLET, EXTENDED RELEASE ORAL EVERY 12 HOURS SCHEDULED
Status: DISCONTINUED | OUTPATIENT
Start: 2019-06-18 | End: 2019-06-21 | Stop reason: HOSPADM

## 2019-06-18 RX ORDER — FUROSEMIDE 10 MG/ML
40 INJECTION INTRAMUSCULAR; INTRAVENOUS ONCE
Status: COMPLETED | OUTPATIENT
Start: 2019-06-18 | End: 2019-06-18

## 2019-06-18 RX ADMIN — PILOCARPINE HYROCHLORIDE 5 MG: 5 TABLET, FILM COATED ORAL at 20:44

## 2019-06-18 RX ADMIN — KETOTIFEN FUMARATE 1 DROP: 0.35 SOLUTION/ DROPS OPHTHALMIC at 09:47

## 2019-06-18 RX ADMIN — KETOTIFEN FUMARATE 1 DROP: 0.35 SOLUTION/ DROPS OPHTHALMIC at 20:45

## 2019-06-18 RX ADMIN — ATORVASTATIN CALCIUM 10 MG: 10 TABLET, FILM COATED ORAL at 09:46

## 2019-06-18 RX ADMIN — ASPIRIN 81 MG: 81 TABLET, CHEWABLE ORAL at 09:46

## 2019-06-18 RX ADMIN — FUROSEMIDE 40 MG: 10 INJECTION, SOLUTION INTRAMUSCULAR; INTRAVENOUS at 18:53

## 2019-06-18 RX ADMIN — FUROSEMIDE 40 MG: 40 TABLET ORAL at 20:44

## 2019-06-18 RX ADMIN — TERAZOSIN HYDROCHLORIDE 2 MG: 2 CAPSULE ORAL at 20:44

## 2019-06-18 RX ADMIN — IPRATROPIUM BROMIDE AND ALBUTEROL SULFATE 3 ML: 2.5; .5 SOLUTION RESPIRATORY (INHALATION) at 16:50

## 2019-06-18 RX ADMIN — SODIUM CHLORIDE 4 ML: 7 NEBU SOLN,3 % NEBU at 07:04

## 2019-06-18 RX ADMIN — PILOCARPINE HYROCHLORIDE 5 MG: 5 TABLET, FILM COATED ORAL at 09:46

## 2019-06-18 RX ADMIN — SODIUM CHLORIDE, PRESERVATIVE FREE 3 ML: 5 INJECTION INTRAVENOUS at 09:47

## 2019-06-18 RX ADMIN — IPRATROPIUM BROMIDE AND ALBUTEROL SULFATE 3 ML: 2.5; .5 SOLUTION RESPIRATORY (INHALATION) at 11:22

## 2019-06-18 RX ADMIN — PANTOPRAZOLE SODIUM 40 MG: 40 INJECTION, POWDER, FOR SOLUTION INTRAVENOUS at 06:04

## 2019-06-18 RX ADMIN — PREGABALIN 75 MG: 75 CAPSULE ORAL at 20:44

## 2019-06-18 RX ADMIN — SODIUM CHLORIDE, PRESERVATIVE FREE 3 ML: 5 INJECTION INTRAVENOUS at 20:44

## 2019-06-18 RX ADMIN — IPRATROPIUM BROMIDE AND ALBUTEROL SULFATE 3 ML: 2.5; .5 SOLUTION RESPIRATORY (INHALATION) at 06:55

## 2019-06-18 RX ADMIN — CHLORHEXIDINE GLUCONATE 15 ML: 1.2 RINSE ORAL at 21:00

## 2019-06-18 RX ADMIN — AMLODIPINE BESYLATE 5 MG: 5 TABLET ORAL at 09:46

## 2019-06-18 RX ADMIN — IPRATROPIUM BROMIDE AND ALBUTEROL SULFATE 3 ML: 2.5; .5 SOLUTION RESPIRATORY (INHALATION) at 19:29

## 2019-06-18 RX ADMIN — GUAIFENESIN 1200 MG: 600 TABLET, EXTENDED RELEASE ORAL at 20:44

## 2019-06-18 RX ADMIN — FUROSEMIDE 40 MG: 40 TABLET ORAL at 09:46

## 2019-06-18 RX ADMIN — SODIUM CHLORIDE 4 ML: 7 NEBU SOLN,3 % NEBU at 11:22

## 2019-06-18 RX ADMIN — PILOCARPINE HYROCHLORIDE 5 MG: 5 TABLET, FILM COATED ORAL at 18:53

## 2019-06-18 RX ADMIN — PREGABALIN 75 MG: 75 CAPSULE ORAL at 09:50

## 2019-06-18 RX ADMIN — SODIUM CHLORIDE 4 ML: 7 NEBU SOLN,3 % NEBU at 16:50

## 2019-06-18 RX ADMIN — MONTELUKAST SODIUM 10 MG: 10 TABLET, FILM COATED ORAL at 20:44

## 2019-06-18 NOTE — PROGRESS NOTES
[facility]  [phone]  Jameson Corrales MD    Subjective:  LOS: 5  Patient seen and examined this a.m. Still on high flow nasal cannula in between noninvasive ventilator use. Hoarseness improved.  Stable oxygen requirements at 8 to 10 L nasal cannula.  Wanted to see if he can eat something.    ROS -no new fevers, hemoptysis, nausea vomiting or abdominal pain    Vital Signs past 24hrs    Temp range: Temp (24hrs), Av.3 °F (36.8 °C), Min:98 °F (36.7 °C), Max:98.7 °F (37.1 °C)    BP range: BP: (105-121)/(68-77) 109/70  Pulse range: Heart Rate:  [] 110  Resp rate range: Resp:  [16-22] 20    Device (Oxygen Therapy): high-flow nasal cannulaFlow (L/min):  [7-10] 10  Oxygen range:SpO2:  [88 %-95 %] 93 %      109 kg (240 lb 11.9 oz); Body mass index is 30.91 kg/m².    Intake/Output Summary (Last 24 hours) at 2019 1455  Last data filed at 2019 0945  Gross per 24 hour   Intake 1500 ml   Output 770 ml   Net 730 ml       Physical Exam   Constitutional: He appears well-developed and well-nourished. He is cooperative.  Non-toxic appearance.   Eyes: Conjunctivae are normal. Pupils are equal, round, and reactive to light. No scleral icterus.   Cardiovascular: Normal rate, regular rhythm and normal heart sounds.   No murmur heard.  Pulmonary/Chest: Accessory muscle usage present. Decreased breath sounds: coarse breath sounds. He has no rhonchi. He has no rales.   Abdominal: Soft. Bowel sounds are normal. He exhibits no mass. There is no tenderness.   PEG +   Musculoskeletal: He exhibits no edema.   Neurological: He is alert.   Skin: Skin is warm and dry.   Nursing note and vitals reviewed.    Results Review:    I have reviewed the laboratory and imaging data since the last note by [facility] physician.  My annotations are noted in assessment and plan.    Medication Review:  I have reviewed the current MAR.  My annotations are noted in assessment and plan.       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  More oxygen requirements than baseline.  Repeat chest x-ray reviewed which shows mild vascular congestion and small left effusion  ENT recommends discharge and follow-up as an outpatient.  We will get speech therapy evaluation to advance diet.  G-tube feeding for now.   Agree with holding anticoagulation indefinitely - D/w wife   PT Eval. DC planning accordingly.     Full Code    Jameson Corrales MD  06/18/19  2:55 PM

## 2019-06-18 NOTE — PLAN OF CARE
Problem: Patient Care Overview  Goal: Plan of Care Review  Outcome: Ongoing (interventions implemented as appropriate)   06/18/19 0657   Coping/Psychosocial   Plan of Care Reviewed With patient   Plan of Care Review   Progress no change   OTHER   Outcome Summary Pt denies pain, soa, n/v. Trilogy when sleeping. Continues with productive cough.        Problem: Fall Risk (Adult)  Goal: Absence of Fall  Outcome: Ongoing (interventions implemented as appropriate)      Problem: Skin Injury Risk (Adult)  Goal: Skin Health and Integrity  Outcome: Ongoing (interventions implemented as appropriate)      Problem: Pain, Acute (Adult)  Goal: Acceptable Pain Control/Comfort Level  Outcome: Ongoing (interventions implemented as appropriate)      Problem: Breathing Pattern Ineffective (Adult)  Goal: Effective Oxygenation/Ventilation  Outcome: Ongoing (interventions implemented as appropriate)    Goal: Anxiety/Fear Reduction  Outcome: Ongoing (interventions implemented as appropriate)

## 2019-06-18 NOTE — PROGRESS NOTES
Continued Stay Note  [facility]     Patient Name: Alessio Allen  MRN: [id number]  Today's Date: 6/18/2019    Admit Date: 6/13/2019    Discharge Plan     Row Name 06/18/19 1447       Plan    Plan  To be determined, pending PT eval     Plan Comments  Spoke with pt and his S/O Nkechi at bedside. Pt wants to return home at dc. Per Nkechi, PT has been ordered and pts plan for dc will be determined on how well he does with PT. Nkechi states she is the only one to help take care of him at home. She also wants to ask the MD as to whether or not he would be able to continue ST with [facility] at [place]. CCP to follow. If rehab needed pt wants to go to [facility]. CCP to follow. JChasteenRN/CCP          Discharge Codes    No documentation.             Korin Damon RN

## 2019-06-18 NOTE — PROGRESS NOTES
Adult Nutrition  Assessment/PES    Patient Name:  Alessio Allen  YOB: 1940  MRN: [id number]  Admit Date:  6/13/2019    Assessment Date:  6/18/2019    Comments:  Nutrition assessment per screening.  Admitted with massive hemoptysis.  History of laryngeal cancer, s/p chemo/XRT.  Dysphagia.  PEG tube is in place.      This RD is familiar with patient from previous admission in March 2019.  Patient continues on same home TF regimen of Ensure Plus 8 cartons/day.  Family at bedside reports 2-3 cartons at each meal time (breakfast, lunch and dinner).  60 ml free water flushes before and after each bolus.  20-30 ml free water flushes with medications.  Agreeable to Boost Plus during admission (this is what he did last admission as well).    Added orders.    Will continue to follow.    Reason for Assessment     Row Name 06/18/19 1515          Reason for Assessment    Reason For Assessment  identified at risk by screening criteria     Diagnosis  other (see comments);cancer diagnosis/related complications;pulmonary disease;cardiac disease     Identified At Risk by Screening Criteria  other (see comments);tube feeding or parenteral nutrition NPO screen         Nutrition/Diet History     Row Name 06/18/19 1516          Nutrition/Diet History    Typical Food/Fluid Intake  receives 8 cartons/day of Ensure Plus per PEG, same regimen as previous admission, verified with wife         Anthropometrics     Row Name 06/18/19 1516          Admit Weight    Admit Weight  -- 240# 6/18        Usual Body Weight (UBW)    Weight Loss Time Frame  weight appears stable per chart weight history        Body Mass Index (BMI)    BMI Assessment  BMI 30-34.9: obesity grade I         Labs/Tests/Procedures/Meds     Row Name 06/18/19 1517          Labs/Procedures/Meds    Lab Results Reviewed  reviewed, pertinent        Diagnostic Tests/Procedures    Diagnostic Test/Procedure Reviewed  reviewed, pertinent        Medications    Pertinent  Medications Reviewed  reviewed, pertinent     Pertinent Medications Comments  lasix, protonix         Physical Findings     Row Name 06/18/19 1517          Physical Findings    Overall Physical Appearance  obese     Gastrointestinal  feeding tube     Tubes  PEG     Skin  -- B=16, bruised         Estimated/Assessed Needs     Row Name 06/18/19 1518          Calculation Measurements    Weight Used For Calculations  86.2 kg (190 lb) IBW        Estimated/Assessed Needs       KCAL/KG    KCAL/KG; used IBW  30 Kcal/Kg (kcal)     30 Kcal/Kg (kcal)  2585.49        Protein Requirements    Est Protein Requirement Amount (gms/kg); used 1.2-1.5 g/kg IBW  103-129        Fluid Requirements    Estimated Fluid Requirements (mL/day)  2500         Nutrition Prescription Ordered     Row Name 06/18/19 1519          Nutrition Prescription PO    Current PO Diet  NPO         Evaluation of Received Nutrient/Fluid Intake     Row Name 06/18/19 1518          Calculation Measurements    Weight Used For Calculations  86.2 kg (190 lb) IBW         Evaluation of Prescribed Nutrient/Fluid Intake     Row Name 06/18/19 1518          Calculation Measurements    Weight Used For Calculations  86.2 kg (190 lb) IBW         Problem/Interventions:  Problem 1     Row Name 06/18/19 1519          Nutrition Diagnoses Problem 1    Problem 1  Needs Alternate Route     Etiology (related to)  Medical Diagnosis;Functional Diagnosis     Oncology  Head/neck cancer stage IV laryngeal cancer s/p chemo/XRT      Functional Diagnosis  Dysphagia     Signs/Symptoms (evidenced by)  Report/Observation         Intervention Goal     Row Name 06/18/19 152          Intervention Goal    General  Maintain nutrition;Nutrition support treatment;Meet nutritional needs for age/condition;Disease management/therapy;Reduce/improve symptoms     PO  Initiate feeding     TF/PN  Maintain TF/PN     Weight  No significant weight loss         Nutrition Intervention     Row Name 06/18/19 7931           Nutrition Intervention    RD/Tech Action  Follow Tx progress;Care plan reviewd;Recommend/ordered     Recommended/Ordered  EN         Nutrition Prescription     Row Name 06/18/19 1522          Nutrition Prescription EN    Enteral Prescription  Enteral begin/change     Enteral Route  PEG     Product  -- Boost Plus 8 cartons/day     TF Delivery Method  Bolus     TF Bolus Frequency  3 times a day 2-3 cans Boost at each meal time     Water flush (mL)   120 mL     Water Flush Frequency  Every feeding     New EN Prescription Ordered?  Yes         Education/Evaluation     Row Name 06/18/19 1523          Education    Education  Will Instruct as appropriate        Monitor/Evaluation    Monitor  Per protocol;I&O;Pertinent labs;TF delivery/tolerance;Weight;Symptoms           Electronically signed by:  Makayla Mccollum RD  06/18/19 3:28 PM

## 2019-06-18 NOTE — NURSING NOTE
6/18/19 @ 1445    Spoke w/Dr. Merrill's office and nurse-pt has been seen by Dr. Awan-per Dr. Merrill pt is ok for d/c-follow up w/Dr. Merrill in 2 days.    Sandra RN

## 2019-06-19 LAB
ANION GAP SERPL CALCULATED.3IONS-SCNC: 12.1 MMOL/L
BUN BLD-MCNC: 24 MG/DL (ref 8–23)
BUN/CREAT SERPL: 25.3 (ref 7–25)
CALCIUM SPEC-SCNC: 9.5 MG/DL (ref 8.6–10.5)
CHLORIDE SERPL-SCNC: 99 MMOL/L (ref 98–107)
CO2 SERPL-SCNC: 32.9 MMOL/L (ref 22–29)
CREAT BLD-MCNC: 0.95 MG/DL (ref 0.76–1.27)
DEPRECATED RDW RBC AUTO: 57.1 FL (ref 37–54)
ERYTHROCYTE [DISTWIDTH] IN BLOOD BY AUTOMATED COUNT: 16.5 % (ref 12.3–15.4)
GFR SERPL CREATININE-BSD FRML MDRD: 77 ML/MIN/1.73
GLUCOSE BLD-MCNC: 99 MG/DL (ref 65–99)
HCT VFR BLD AUTO: 43.6 % (ref 37.5–51)
HGB BLD-MCNC: 13.6 G/DL (ref 13–17.7)
MCH RBC QN AUTO: 29.3 PG (ref 26.6–33)
MCHC RBC AUTO-ENTMCNC: 31.2 G/DL (ref 31.5–35.7)
MCV RBC AUTO: 94 FL (ref 79–97)
PLATELET # BLD AUTO: 143 10*3/MM3 (ref 140–450)
PMV BLD AUTO: 10.1 FL (ref 6–12)
POTASSIUM BLD-SCNC: 3.8 MMOL/L (ref 3.5–5.2)
RBC # BLD AUTO: 4.64 10*6/MM3 (ref 4.14–5.8)
SODIUM BLD-SCNC: 144 MMOL/L (ref 136–145)
WBC NRBC COR # BLD: 7.36 10*3/MM3 (ref 3.4–10.8)

## 2019-06-19 PROCEDURE — 94799 UNLISTED PULMONARY SVC/PX: CPT

## 2019-06-19 PROCEDURE — 97162 PT EVAL MOD COMPLEX 30 MIN: CPT

## 2019-06-19 PROCEDURE — 92610 EVALUATE SWALLOWING FUNCTION: CPT

## 2019-06-19 PROCEDURE — 97110 THERAPEUTIC EXERCISES: CPT

## 2019-06-19 PROCEDURE — 94668 MNPJ CHEST WALL SBSQ: CPT

## 2019-06-19 PROCEDURE — 85027 COMPLETE CBC AUTOMATED: CPT | Performed by: INTERNAL MEDICINE

## 2019-06-19 PROCEDURE — 94669 MECHANICAL CHEST WALL OSCILL: CPT

## 2019-06-19 PROCEDURE — 80048 BASIC METABOLIC PNL TOTAL CA: CPT | Performed by: INTERNAL MEDICINE

## 2019-06-19 RX ADMIN — SODIUM CHLORIDE, PRESERVATIVE FREE 3 ML: 5 INJECTION INTRAVENOUS at 21:35

## 2019-06-19 RX ADMIN — IPRATROPIUM BROMIDE AND ALBUTEROL SULFATE 3 ML: 2.5; .5 SOLUTION RESPIRATORY (INHALATION) at 23:41

## 2019-06-19 RX ADMIN — KETOTIFEN FUMARATE 1 DROP: 0.35 SOLUTION/ DROPS OPHTHALMIC at 09:37

## 2019-06-19 RX ADMIN — TERAZOSIN HYDROCHLORIDE 2 MG: 2 CAPSULE ORAL at 21:34

## 2019-06-19 RX ADMIN — PILOCARPINE HYROCHLORIDE 5 MG: 5 TABLET, FILM COATED ORAL at 18:41

## 2019-06-19 RX ADMIN — IPRATROPIUM BROMIDE AND ALBUTEROL SULFATE 3 ML: 2.5; .5 SOLUTION RESPIRATORY (INHALATION) at 21:19

## 2019-06-19 RX ADMIN — SODIUM CHLORIDE 4 ML: 7 NEBU SOLN,3 % NEBU at 06:47

## 2019-06-19 RX ADMIN — SODIUM CHLORIDE 4 ML: 7 NEBU SOLN,3 % NEBU at 16:13

## 2019-06-19 RX ADMIN — PREGABALIN 75 MG: 75 CAPSULE ORAL at 09:37

## 2019-06-19 RX ADMIN — CHLORHEXIDINE GLUCONATE 15 ML: 1.2 RINSE ORAL at 21:00

## 2019-06-19 RX ADMIN — PILOCARPINE HYROCHLORIDE 5 MG: 5 TABLET, FILM COATED ORAL at 21:34

## 2019-06-19 RX ADMIN — PILOCARPINE HYROCHLORIDE 5 MG: 5 TABLET, FILM COATED ORAL at 09:37

## 2019-06-19 RX ADMIN — FUROSEMIDE 40 MG: 40 TABLET ORAL at 09:37

## 2019-06-19 RX ADMIN — ASPIRIN 81 MG: 81 TABLET, CHEWABLE ORAL at 09:37

## 2019-06-19 RX ADMIN — MONTELUKAST SODIUM 10 MG: 10 TABLET, FILM COATED ORAL at 21:34

## 2019-06-19 RX ADMIN — GUAIFENESIN 1200 MG: 600 TABLET, EXTENDED RELEASE ORAL at 09:37

## 2019-06-19 RX ADMIN — SODIUM CHLORIDE 4 ML: 7 NEBU SOLN,3 % NEBU at 21:19

## 2019-06-19 RX ADMIN — IPRATROPIUM BROMIDE AND ALBUTEROL SULFATE 3 ML: 2.5; .5 SOLUTION RESPIRATORY (INHALATION) at 16:13

## 2019-06-19 RX ADMIN — FUROSEMIDE 40 MG: 40 TABLET ORAL at 21:34

## 2019-06-19 RX ADMIN — SODIUM CHLORIDE 4 ML: 7 NEBU SOLN,3 % NEBU at 10:18

## 2019-06-19 RX ADMIN — SODIUM CHLORIDE, PRESERVATIVE FREE 3 ML: 5 INJECTION INTRAVENOUS at 09:38

## 2019-06-19 RX ADMIN — IPRATROPIUM BROMIDE AND ALBUTEROL SULFATE 3 ML: 2.5; .5 SOLUTION RESPIRATORY (INHALATION) at 06:47

## 2019-06-19 RX ADMIN — IPRATROPIUM BROMIDE AND ALBUTEROL SULFATE 3 ML: 2.5; .5 SOLUTION RESPIRATORY (INHALATION) at 10:18

## 2019-06-19 RX ADMIN — CHLORHEXIDINE GLUCONATE 15 ML: 1.2 RINSE ORAL at 10:14

## 2019-06-19 RX ADMIN — PANTOPRAZOLE SODIUM 40 MG: 40 INJECTION, POWDER, FOR SOLUTION INTRAVENOUS at 06:04

## 2019-06-19 RX ADMIN — GUAIFENESIN 1200 MG: 600 TABLET, EXTENDED RELEASE ORAL at 21:34

## 2019-06-19 RX ADMIN — Medication 1 DROP: at 21:35

## 2019-06-19 RX ADMIN — PREGABALIN 75 MG: 75 CAPSULE ORAL at 21:34

## 2019-06-19 RX ADMIN — KETOTIFEN FUMARATE 1 DROP: 0.35 SOLUTION/ DROPS OPHTHALMIC at 21:00

## 2019-06-19 RX ADMIN — ATORVASTATIN CALCIUM 10 MG: 10 TABLET, FILM COATED ORAL at 09:37

## 2019-06-19 NOTE — DISCHARGE PLACEMENT REQUEST
"Chioma Rushing (79 y.o. Male)     Date of Birth Social Security Number Address Home Phone MRN    1940  [address] [facility] [address] [phone] [id number]    [employer] Marital Status          Lutheran        Admission Date Admission Type Admitting Provider Attending Provider Department, Room/Bed    6/13/19 Emergency Ismael Sotelo MD Nidadavolu, Vinay Gopal, MD [address], [address]    Discharge Date Discharge Disposition Discharge Destination                       Attending Provider:  Jameson Corrales MD    Allergies:  Lisinopril, Penicillins, Sulfa Antibiotics, Atenolol, Coreg [Carvedilol], Ibuprofen, Celebrex [Celecoxib]    Isolation:  Contact   Infection:  MRSA (03/15/19)   Code Status:  CPR    Ht:  188 cm (74\")   Wt:  106 kg (233 lb 0.4 oz)    Admission Cmt:  None   Principal Problem:  None                Active Insurance as of 6/13/2019     Primary Coverage     Payor Plan Insurance Group Employer/Plan Group    MEDICARE MEDICARE A & B      Payor Plan Address Payor Plan Phone Number Payor Plan Fax Number Effective Dates    [address] [phone]  5/1/2000 - None Entered    [facility] [id number]       Subscriber Name Subscriber Birth Date Member ID       CHIOMA RUSHING 1940 [id number]           Secondary Coverage     Payor Plan Insurance Group Employer/Plan Group     FOR LIFE  FOR LIFE  SUPP      Payor Plan Address Payor Plan Phone Number Payor Plan Fax Number Effective Dates    [address] [phone]  1/30/2018 - None Entered    [facility] [address]       Subscriber Name Subscriber Birth Date Member ID       CHIOMA RUSHING 1940 [id number]                 Emergency Contacts      (Rel.) Home Phone Work Phone Mobile Phone    Alejandro,Pat (Friend) [phone] -- --    AlejandroEllis (Son) -- -- [phone]              "

## 2019-06-19 NOTE — PLAN OF CARE
Problem: Patient Care Overview  Goal: Plan of Care Review  Outcome: Ongoing (interventions implemented as appropriate)   06/19/19 0534   Coping/Psychosocial   Plan of Care Reviewed With patient   Plan of Care Review   Progress no change   OTHER   Outcome Summary No c/o pain, trilogy when sleeping, productive cough no s/s of distress noted will continue to monitor at 0536 6/19/19       Problem: Fall Risk (Adult)  Goal: Absence of Fall  Outcome: Ongoing (interventions implemented as appropriate)      Problem: Skin Injury Risk (Adult)  Goal: Skin Health and Integrity  Outcome: Ongoing (interventions implemented as appropriate)      Problem: Pain, Acute (Adult)  Goal: Acceptable Pain Control/Comfort Level  Outcome: Ongoing (interventions implemented as appropriate)

## 2019-06-19 NOTE — THERAPY EVALUATION
Acute Care - Physical Therapy Initial Evaluation  [facility]     Patient Name: Alessio Allen  : 1940  MRN: [id number]  Today's Date: 2019      Date of Referral to PT: 19  Referring Physician: Dr. Corrales      Admit Date: 2019    Visit Dx:     ICD-10-CM ICD-9-CM   1. Hemoptysis R04.2 786.30   2. Hypoxia R09.02 799.02   3. Respiratory distress R06.03 786.09   4. Impaired functional mobility, balance, gait, and endurance Z74.09 V49.89     Patient Active Problem List   Diagnosis   • A-fib (CMS/HCC)   • Dyslipidemia   • BP (high blood pressure)   • Neuropathy   • Hypertension   • COPD (chronic obstructive pulmonary disease) (CMS/HCC)   • BPH (benign prostatic hypertrophy)   • Atrial fibrillation (CMS/HCC)   • Asthma   • Hypoxia   • Pneumonia   • Chronic anticoagulation   • Pneumonia of both lungs due to infectious organism   • Hyponatremia   • COPD exacerbation (CMS/HCC)   • Acute on chronic respiratory failure with hypoxia (CMS/HCC)   • Pneumonia of both lower lobes due to infectious organism (CMS/HCC)   • Acute on chronic diastolic heart failure (CMS/HCC)   • IPF (idiopathic pulmonary fibrosis) (CMS/HCC)   • Acute respiratory failure with hypoxia (CMS/HCC)   • Attention to G-tube (CMS/HCC)   • Massive hemoptysis     Past Medical History:   Diagnosis Date   • Acute on chronic diastolic heart failure (CMS/HCC)    • Arthritis    • Asthma    • Atrial fibrillation (CMS/HCC)    • BPH (benign prostatic hypertrophy)    • Cancer (CMS/HCC)     throat CA   • COPD (chronic obstructive pulmonary disease) (CMS/HCC)    • H/O Abnormal CBC    • History of heart artery stent 2017   • Hyperlipidemia    • Hypertension    • Neuropathy     feet and hands    • Pneumonia      Past Surgical History:   Procedure Laterality Date   • BRONCHOSCOPY N/A 2018    Procedure: BRONCHOSCOPY with specimens;  Surgeon: Suresh Hernandez MD;  Location: [facility] MAIN OR;  Service: Pulmonary   • BRONCHOSCOPY N/A 3/6/2019     Procedure: BRONCHOSCOPY WITH BAL AND BIOPSY UNDER FLUORO AND ANESTHESIA;  Surgeon: Suresh Hernandez MD;  Location: [facility] MAIN OR;  Service: Pulmonary   • BRONCHOSCOPY N/A 6/13/2019    Procedure: BRONCHOSCOPY;  Surgeon: Suresh Hernandez MD;  Location: [facility] MAIN OR;  Service: Pulmonary   • CARDIAC CATHETERIZATION N/A 4/18/2018    Procedure: Right Heart Cath with possible vasodilator study;  Surgeon: Torey Schuler MD;  [facility];  Service: Cardiovascular   • CARDIAC CATHETERIZATION N/A 3/7/2019    Procedure: RIGHT AND LEFT HEART CATH;  Surgeon: Marizol Holt MD;  Location:  [facility] CATH INVASIVE LOCATION;  Service: Cardiology   • CARDIAC CATHETERIZATION N/A 3/7/2019    Procedure: CORONARY ANGIOGRAPHY;  Surgeon: Marizol Holt MD;  Location: [facility] CATH INVASIVE LOCATION;  Service: Cardiology   • COLONOSCOPY  12/05/2016    polyps   • FRACTURE SURGERY     • INSERT / REPLACE / VENOUS ACCESS CATHETER Right    • PACEMAKER IMPLANTATION          PT ASSESSMENT (last 12 hours)      Physical Therapy Evaluation     Row Name 06/19/19 1418          PT Evaluation Time/Intention    Subjective Information  no complaints  -MS     Document Type  evaluation  -MS     Mode of Treatment  physical therapy  -MS     Patient Effort  good  -MS     Symptoms Noted During/After Treatment  fatigue;shortness of breath  -MS     Row Name 06/19/19 1418          General Information    Patient Profile Reviewed?  yes  -MS     Referring Physician  Dr. Corrales  -MS     Patient Observations  alert;cooperative;agree to therapy  -MS     Patient/Family Observations  Resting in bed with HOB elevated, NAD, O2 donned, S.O. at bedside  -MS     Prior Level of Function  independent:;all household mobility  -MS     Equipment Currently Used at Home  walker, rolling access to RW but does not use at baseline  -MS     Pertinent History of Current Functional Problem  Admission from home for SOA- massive hemoptysis. Wears 8L O2 at  baseline- denies any recent falls.  -MS     Existing Precautions/Restrictions  fall;oxygen therapy device and L/min  -MS     Limitations/Impairments  safety/cognitive  -MS     Risks Reviewed  patient:  -MS     Benefits Reviewed  patient:  -MS     Barriers to Rehab  medically complex;previous functional deficit  -MS     Row Name 06/19/19 1418          Cognitive Assessment/Intervention- PT/OT    Orientation Status (Cognition)  oriented x 3  -MS     Follows Commands (Cognition)  WFL  -MS     Safety Deficit (Cognitive)  moderate deficit;at risk behavior observed;awareness of need for assistance  -MS     Personal Safety Interventions  fall prevention program maintained;gait belt;nonskid shoes/slippers when out of bed  -MS     Row Name 06/19/19 1418          Safety Issues, Functional Mobility    Impairments Affecting Function (Mobility)  strength;shortness of breath;motor control;endurance/activity tolerance;balance  -MS     Row Name 06/19/19 1418          Bed Mobility Assessment/Treatment    Bed Mobility Assessment/Treatment  supine-sit;sit-supine  -MS     Supine-Sit Rains (Bed Mobility)  supervision;verbal cues  -MS     Sit-Supine Rains (Bed Mobility)  not tested  -MS     Assistive Device (Bed Mobility)  bed rails;head of bed elevated  -MS     Row Name 06/19/19 1418          Transfer Assessment/Treatment    Transfer Assessment/Treatment  sit-stand transfer;stand-sit transfer  -MS     Comment (Transfers)  Sequencing cues  -MS     Sit-Stand Rains (Transfers)  contact guard;verbal cues;nonverbal cues (demo/gesture)  -MS     Stand-Sit Rains (Transfers)  contact guard;verbal cues;nonverbal cues (demo/gesture)  -MS     Row Name 06/19/19 1418          Sit-Stand Transfer    Assistive Device (Sit-Stand Transfers)  walker, front-wheeled  -MS     Row Name 06/19/19 1418          Stand-Sit Transfer    Assistive Device (Stand-Sit Transfers)  walker, front-wheeled  -MS     Row Name 06/19/19 1418           Gait/Stairs Assessment/Training    [place] Level (Gait)  contact guard;verbal cues;nonverbal cues (demo/gesture)  -MS     Assistive Device (Gait)  walker, front-wheeled  -MS     Distance in Feet (Gait)  75  -MS     Pattern (Gait)  step-to  -MS     Deviations/Abnormal Patterns (Gait)  base of support, narrow;crystal decreased;gait speed decreased  -MS     Comment (Gait/Stairs)  Limited 2/2 fatigue and SOA  -MS     Row Name 06/19/19 1418          General ROM    GENERAL ROM COMMENTS  B LE WFL  -MS     Row Name 06/19/19 1418          MMT (Manual Muscle Testing)    General MMT Comments  B LEs grossly 4/5  -MS     [facility] Name 06/19/19 1418          Motor Assessment/Intervention    Additional Documentation  Balance (Group);Balance Interventions (Group)  -MS     [facility] Name 06/19/19 1418          Balance    Balance  static sitting balance;static standing balance  -MS     Row Name 06/19/19 1418          Static Sitting Balance    Level of [place] (Unsupported Sitting, Static Balance)  supervision  -MS     Sitting Position (Unsupported Sitting, Static Balance)  sitting on edge of bed  -MS     [facility] Name 06/19/19 1418          Static Standing Balance    Level of [place] (Supported Standing, Static Balance)  contact guard assist;minimal assist, 75% patient effort  -MS     Assistive Device Utilized (Supported Standing, Static Balance)  walker, rolling  -MS     [facility] Name 06/19/19 1418          Sensory Assessment/Intervention    Sensory General Assessment  no sensation deficits identified  -MS     Row Name 06/19/19 1418          Vision Assessment/Intervention    Visual Impairment/Limitations  WFL  -MS     [facility] Name 06/19/19 1418          Pain Scale: Word Pre/Post-Treatment    Pain: Word Scale, Pretreatment  0 - no pain  -MS     Pain: Word Scale, Post-Treatment  0 - no pain  -MS     [facility] Name 06/19/19 1418          Plan of Care Review    Plan of Care Reviewed With  patient  -MS     [facility] Name 06/19/19 1418          Physical  Therapy Clinical Impression    Date of Referral to PT  06/19/19  -MS     PT Diagnosis (PT Clinical Impression)  impaired functional mobility and endurance  -MS     Criteria for Skilled Interventions Met (PT Clinical Impression)  yes;treatment indicated  -MS     Pathology/Pathophysiology Noted (Describe Specifically for Each System)  musculoskeletal  -MS     Impairments Found (describe specific impairments)  aerobic capacity/endurance;ergonomics and body mechanics;gait, locomotion, and balance;posture;ROM  -MS     Rehab Potential (PT Clinical Summary)  fair, will monitor progress closely  -MS     Care Plan Review (PT)  patient/other agree to care plan  -MS     Patient/Family Concerns, Equipment Needs at Discharge (PT)  S.O. voiced concern if patient were to DC home as he is deconditioned and doesn't typically require a RW for ambulation. CCP aware.  -MS     Row Name 06/19/19 1418          Vital Signs    Pre SpO2 (%)  95  -MS     O2 Delivery Pre Treatment  hi-flow  -MS     O2 Delivery Post Treatment  hi-flow  -MS     Row Name 06/19/19 1418          Physical Therapy Goals    Bed Mobility Goal Selection (PT)  bed mobility, PT goal 1  -MS     Transfer Goal Selection (PT)  transfer, PT goal 1  -MS     Gait Training Goal Selection (PT)  gait training, PT goal 1  -MS     Row Name 06/19/19 1418          Bed Mobility Goal 1 (PT)    Activity/Assistive Device (Bed Mobility Goal 1, PT)  bed mobility activities, all  -MS     [place] Level/Cues Needed (Bed Mobility Goal 1, PT)  independent  -MS     Time Frame (Bed Mobility Goal 1, PT)  1 week  -MS     Progress/Outcomes (Bed Mobility Goal 1, PT)  goal ongoing  -MS     Row Name 06/19/19 1418          Transfer Goal 1 (PT)    Activity/Assistive Device (Transfer Goal 1, PT)  transfers, all  -MS     [place] Level/Cues Needed (Transfer Goal 1, PT)  supervision required  -MS     Time Frame (Transfer Goal 1, PT)  1 week  -MS     Progress/Outcome (Transfer Goal 1, PT)  goal  ongoing  -MS     Row Name 06/19/19 1418          Gait Training Goal 1 (PT)    Activity/Assistive Device (Gait Training Goal 1, PT)  gait (walking locomotion)  -MS     [place] Level (Gait Training Goal 1, PT)  supervision required  -MS     Distance (Gait Goal 1, PT)  150  -MS     Time Frame (Gait Training Goal 1, PT)  1 week  -MS     Progress/Outcome (Gait Training Goal 1, PT)  goal ongoing  -MS     Row Name 06/19/19 1418          Positioning and Restraints    Pre-Treatment Position  in bed  -MS     Post Treatment Position  chair  -MS     In Chair  notified nsg;sitting;call light within reach;encouraged to call for assist;with family/caregiver;exit alarm on;legs elevated  -MS     Row Name 06/19/19 1418          Living Environment    Home Accessibility  -- no stairs.  -MS       User Key  (r) = Recorded By, (t) = Taken By, (c) = Cosigned By    Initials Name Provider Type    Hermila Hitchcock PT Physical Therapist        Physical Therapy Education     Title: PT OT SLP Therapies (In Progress)     Topic: Physical Therapy (In Progress)     Point: Mobility training (Done)     Learning Progress Summary           Patient Acceptance, E, VU,NR by MS at 6/19/2019  2:33 PM   Significant Other Acceptance, E, VU,NR by MS at 6/19/2019  2:33 PM                   Point: Body mechanics (Done)     Learning Progress Summary           Patient Acceptance, E, VU,NR by MS at 6/19/2019  2:33 PM   Significant Other Acceptance, E, VU,NR by MS at 6/19/2019  2:33 PM                   Point: Precautions (Done)     Learning Progress Summary           Patient Acceptance, E, VU,NR by MS at 6/19/2019  2:33 PM   Significant Other Acceptance, E, VU,NR by MS at 6/19/2019  2:33 PM                               User Key     Initials Effective Dates Name Provider Type Discipline    MS 03/04/19 -  Hermila Gonzalez PT Physical Therapist PT              PT Recommendation and Plan  Anticipated Discharge Disposition (PT): home with home health,  skilled nursing facility(pending patient progress made)  Planned Therapy Interventions (PT Eval): balance training, bed mobility training, gait training, home exercise program, patient/family education, postural re-education, strengthening, transfer training  Therapy Frequency (PT Clinical Impression): daily  Outcome Summary/Treatment Plan (PT)  Patient/Family Concerns, Equipment Needs at Discharge (PT): S.O. voiced concern if patient were to DC home as he is deconditioned and doesn't typically require a RW for ambulation. CCP aware.  Anticipated Discharge Disposition (PT): home with home health, skilled nursing facility(pending patient progress made)  Plan of Care Reviewed With: patient  Outcome Summary: Patient is a79 y.o. male admitted to [facility] for massive hemoptysis on 6/13/2019. Patient is independent and ambulatory at baseline and lives at home with S.O.- wears O2 at baseline. Denies any recent falls. Today, patient performed bed mobility with SV, required CGA for transfers, and ambulated 75' CGA. Mobility limited primarily due to SOA and dyspnea. Patient may benefit from skilled PT services acutely to address functional deficits as well as improve level of independence prior to discharge. Anticipate home with  PT vs. SNF upon DC- S.O. at bedside voiced concern if patient were to DC home.  Outcome Measures     Row Name 06/19/19 1400             How much help from another person do you currently need...    Turning from your back to your side while in flat bed without using bedrails?  4  -MS      Moving from lying on back to sitting on the side of a flat bed without bedrails?  3  -MS      Moving to and from a bed to a chair (including a wheelchair)?  3  -MS      Standing up from a chair using your arms (e.g., wheelchair, bedside chair)?  3  -MS      Climbing 3-5 steps with a railing?  2  -MS      To walk in hospital room?  3  -MS      AM-PAC 6 Clicks Score  18  -MS         Functional Assessment    Outcome  Measure Options  AM-PAC 6 Clicks Basic Mobility (PT)  -MS        User Key  (r) = Recorded By, (t) = Taken By, (c) = Cosigned By    Initials Name Provider Type    MS Gonzalez Hermila LIMA, PT Physical Therapist         Time Calculation:   PT Charges     Row Name 06/19/19 1417             Time Calculation    Start Time  1307  -MS      Stop Time  1337  -MS      Time Calculation (min)  30 min  -MS      PT Received On  06/19/19  -MS      PT - Next Appointment  06/20/19  -MS      PT Goal Re-Cert Due Date  06/26/19  -MS        User Key  (r) = Recorded By, (t) = Taken By, (c) = Cosigned By    Initials Name Provider Type    MS Gonzalez Hermila LIMA, PT Physical Therapist        Therapy Charges for Today     Code Description Service Date Service Provider Modifiers Qty    22702649338  PT EVAL MOD COMPLEXITY 2 6/19/2019 Hermila Gonzalez, PT GP 1    56585529761 HC PT THER PROC EA 15 MIN 6/19/2019 Hermila Gonzalez, PT GP 2    15739063211 HC PT THER SUPP EA 15 MIN 6/19/2019 Hermila Gonzalez, PT GP 1          PT G-Codes  Outcome Measure Options: AM-PAC 6 Clicks Basic Mobility (PT)  AM-PAC 6 Clicks Score: 18      Hermila Gonzalez PT  6/19/2019

## 2019-06-19 NOTE — PLAN OF CARE
Problem: Patient Care Overview  Goal: Plan of Care Review   06/19/19 1428   Coping/Psychosocial   Plan of Care Reviewed With patient   OTHER   Outcome Summary Patient is a79 y.o. male admitted to [facility] for massive hemoptysis on 6/13/2019. Patient is independent and ambulatory at baseline and lives at home with S.O.- wears O2 at baseline. Denies any recent falls. Today, patient performed bed mobility with SV, required CGA for transfers, and ambulated 75' CGA. Mobility limited primarily due to SOA and dyspnea. Patient may benefit from skilled PT services acutely to address functional deficits as well as improve level of independence prior to discharge. Anticipate home with HH PT vs. SNF upon DC- S.O. at bedside voiced concern if patient were to DC home.

## 2019-06-19 NOTE — PROGRESS NOTES
Continued Stay Note  [facility]     Patient Name: Alessio Allen  MRN: [id number]  Today's Date: 6/19/2019    Admit Date: 6/13/2019    Discharge Plan     Row Name 06/19/19 1634       Plan    Plan  [facility], referral pending vs home with spouse and [facility]     Plan Comments  Spoke with PT. Spoke with pt and spouse at bedside. They would like a referral made to [facility] notified, referral pending. CCP to follow. JChasteenRN/CCP        Discharge Codes    No documentation.             Korin Damon RN

## 2019-06-19 NOTE — PROGRESS NOTES
[facility]  [phone]  Jameson Corrales MD    Subjective:  LOS: 6  Patient seen and examined this a.m. Still on high flow nasal cannula in between noninvasive ventilator use. Hoarseness improved.  Stable oxygen requirements at 8 to 10 L nasal cannula.  Status post speech therapy evaluation    ROS - No new fevers, hemoptysis, nausea vomiting or abdominal pain    Vital Signs past 24hrs    Temp range: Temp (24hrs), Av.5 °F (36.9 °C), Min:98 °F (36.7 °C), Max:99 °F (37.2 °C)    BP range: BP: (104-121)/(69-78) 116/76  Pulse range: Heart Rate:  [] 87  Resp rate range: Resp:  [20-24] 20    Device (Oxygen Therapy): humidified;high-flow nasal cannulaFlow (L/min):  [7-10] 10  Oxygen range:SpO2:  [90 %-93 %] 92 %      106 kg (233 lb 0.4 oz); Body mass index is 29.92 kg/m².    Intake/Output Summary (Last 24 hours) at 2019 1836  Last data filed at 2019 1317  Gross per 24 hour   Intake --   Output 2200 ml   Net -2200 ml       Physical Exam   Constitutional: He appears well-developed and well-nourished. He is cooperative.  Non-toxic appearance.   Eyes: Conjunctivae are normal. Pupils are equal, round, and reactive to light. No scleral icterus.   Cardiovascular: Normal rate, regular rhythm and normal heart sounds.   No murmur heard.  Pulmonary/Chest: Accessory muscle usage present. Decreased breath sounds: coarse breath sounds. He has no rhonchi. He has no rales.   Abdominal: Soft. Bowel sounds are normal. He exhibits no mass. There is no tenderness.   PEG +   Musculoskeletal: He exhibits no edema.   Neurological: He is alert.   Skin: Skin is warm and dry.   Nursing note and vitals reviewed.    Results Review:    I have reviewed the laboratory and imaging data since the last note by [facility] physician.  My annotations are noted in assessment and plan.    Medication Review:  I have reviewed the current MAR.  My annotations are noted in assessment and plan.       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  Little more oxygen requirements than baseline.  ENT recommends discharge and follow-up as an outpatient.  Noted to be separate recommendations to remain n.p.o. and continue with tube feeds.  Agree with holding anticoagulation indefinitely -   D/w wife   PT Gus. DC planning accordingly.     Full Code    Jameson Corrales MD  06/19/19  6:36 PM

## 2019-06-19 NOTE — PLAN OF CARE
Problem: Patient Care Overview  Goal: Plan of Care Review  Outcome: Ongoing (interventions implemented as appropriate)   06/19/19 5111   Coping/Psychosocial   Plan of Care Reviewed With patient   OTHER   Outcome Summary Swallow eval completed. Recommend continue NPO with alternate means of nutrition and meds alternate route. Pt admits swallow has had increased difficulty recently. Pt demonstrated cough and immediately self suctioned with yankauer s/p single trial teaspoon water. Recommend continue with moist swabs, small ice chips sparingly if pt requesting. Per ENT report 6/3/19, pooling secretions in pharynx. Pt will need longer term dysphagia therapy prior to re-eval. Do not feel pt appropriate for dysphagia exercises at this time secondary to respiratory status, 10L and breathing patten. Recommend dysphagia therapy at next level of care as respiratory status returns to baseline.

## 2019-06-19 NOTE — THERAPY EVALUATION
Acute Care - Speech Language Pathology   Swallow Initial Evaluation [facility]     Patient Name: Alessio Allen  : 1940  MRN: [id number]  Today's Date: 2019        Referring Physician: Dr. Corrales      Admit Date: 2019    Visit Dx:     ICD-10-CM ICD-9-CM   1. Hemoptysis R04.2 786.30   2. Hypoxia R09.02 799.02   3. Respiratory distress R06.03 786.09   4. Impaired functional mobility, balance, gait, and endurance Z74.09 V49.89     Patient Active Problem List   Diagnosis   • A-fib (CMS/HCC)   • Dyslipidemia   • BP (high blood pressure)   • Neuropathy   • Hypertension   • COPD (chronic obstructive pulmonary disease) (CMS/HCC)   • BPH (benign prostatic hypertrophy)   • Atrial fibrillation (CMS/HCC)   • Asthma   • Hypoxia   • Pneumonia   • Chronic anticoagulation   • Pneumonia of both lungs due to infectious organism   • Hyponatremia   • COPD exacerbation (CMS/HCC)   • Acute on chronic respiratory failure with hypoxia (CMS/HCC)   • Pneumonia of both lower lobes due to infectious organism (CMS/HCC)   • Acute on chronic diastolic heart failure (CMS/HCC)   • IPF (idiopathic pulmonary fibrosis) (CMS/HCC)   • Acute respiratory failure with hypoxia (CMS/HCC)   • Attention to G-tube (CMS/HCC)   • Massive hemoptysis     Past Medical History:   Diagnosis Date   • Acute on chronic diastolic heart failure (CMS/HCC)    • Arthritis    • Asthma    • Atrial fibrillation (CMS/HCC)    • BPH (benign prostatic hypertrophy)    • Cancer (CMS/HCC)     throat CA   • COPD (chronic obstructive pulmonary disease) (CMS/HCC)    • H/O Abnormal CBC    • History of heart artery stent 2017   • Hyperlipidemia    • Hypertension    • Neuropathy     feet and hands    • Pneumonia      Past Surgical History:   Procedure Laterality Date   • BRONCHOSCOPY N/A 2018    Procedure: BRONCHOSCOPY with specimens;  Surgeon: Suresh Hernandez MD;  Location: [facility] OR;  Service: Pulmonary   • BRONCHOSCOPY N/A 3/6/2019    Procedure:  BRONCHOSCOPY WITH BAL AND BIOPSY UNDER FLUORO AND ANESTHESIA;  Surgeon: Suresh Hernandez MD;  Location[facility] MA[facility];  Service: Pulmonary   • BRONCHOSCOPY N/A 6/13/2019    Procedure: BRONCHOSCOPY;  Surgeon: Suresh Hernandez MD;  [facility];  Service: Pulmonary   • CARDIAC CATHETERIZATION N/A 4/18/2018    Procedure: Right Heart Cath with possible vasodilator study;  Surgeon: Torey Schuler MD;  Location:  [facility];  Service: Cardiovascular   • CARDIAC CATHETERIZATION N/A 3/7/2019    Procedure: RIGHT AND LEFT HEART CATH;  Surgeon: Marizol Holt MD;  Location:  [facility];  Service: Cardiology   • CARDIAC CATHETERIZATION N/A 3/7/2019    Procedure: CORONARY ANGIOGRAPHY;  Surgeon: Marizol Holt MD;  Location:  [facility];  Service: Cardiology   • COLONOSCOPY  12/05/2016    polyps   • FRACTURE SURGERY     • INSERT / REPLACE / VENOUS ACCESS CATHETER Right    • PACEMAKER IMPLANTATION          SWALLOW EVALUATION (last 72 hours)      [facility] Adult Swallow Evaluation     Row Name 06/19/19 0921          Document Type  evaluation  -OC    Subjective Information  no complaints  -OC    Patient Observations  alert;cooperative;agree to therapy  -OC    Patient Effort  good  -OC    Symptoms Noted During/After Treatment  none  -OC          Patient Profile Reviewed  yes  -OC    Pertinent History Of Current Problem  Pt admitted for massive hemoptysis, intubated 6/13-6/15/19. Hx laryngeal CA, NPO with PEG.   -OC    Current Method of Nutrition  gastrostomy feedings  -OC    Precautions/Limitations, Vision  WFL  -OC    Precautions/Limitations, Hearing  WFL  -OC    Prior Level of Function-Communication  WFL  -OC    Prior Level of Function-Swallowing  NPO;other (see comments) BSE 03/19 NPO with water protocol  -OC    Plans/Goals Discussed with  patient;agreed upon  -OC    Barriers to Rehab  medically complex  -OC    Patient's Goals for Discharge  patient did not state  -OC           Additional Documentation  Pain Scale: Word Pre/Post-Treatment (Group)  -OC          Pain: Word Scale, Pretreatment  0 - no pain  -OC    Pain: Word Scale, Post-Treatment  0 - no pain  -OC          Dentition Assessment  natural, present and adequate  -OC    Secretion Management  requires suctioning to control secretions;problems swallowing secretions  -OC    Mucosal Quality  dry  -OC    Volitional Swallow  unable to elicit  -OC    Volitional Cough  weak;non-productive  -OC          Oral Motor General Assessment  generalized oral motor weakness  -OC          Oral Prep Phase  --  -OC    Oral Transit  --  -OC    Oral Residue  --  -OC    Pharyngeal Phase  suspected pharyngeal impairment  -OC    Clinical Swallow Evaluation Summary  Pt demonstrated hoarse vocal quality and frequent use of suction prior to PO trials. Pt with known severe dysphagia, NPO with PEG. Pt reports he has not consistently participated in speech therapy since last admission 03/2019. Pt demonstrated no overt s/s aspiration with single small ice chip. Decreased laryngeal elevation upon palpation. Pt demonstrated audible swallow, cough, and immediate self suction after the swallow with teaspoon thin liquids.   -OC          SLP Swallowing Diagnosis  severe;oral dysfunction;pharyngeal dysfunction  -OC    Functional Impact  risk of aspiration/pneumonia  -OC    Rehab Potential/Prognosis, Swallowing  adequate, monitor progress closely  -OC    Swallow Criteria for Skilled Therapeutic Interventions Met  not appropriate;other (see comments) 2/2 to current respiratory status, ST to follow edu PRN  -OC          Therapy Frequency (Swallow)  PRN;other (see comments) education as warranted  -OC    Predicted Duration Therapy Intervention (Days)  other (see comments) long term, continued ST next level of care  -OC    SLP Diet Recommendation  NPO;other (see comments);long term alternate methods of nutrition/hydration moist swab, ice chips sparingly  -OC     Recommended Precautions and Strategies  upright posture during/after eating  -OC    SLP Rec. for Method of Medication Administration  meds via alternate route  -OC    Monitor for Signs of Aspiration  yes;notify SLP if any concerns  -OC    Anticipated Dischage Disposition  anticipate therapy at next level of care  -OC      User Key  (r) = Recorded By, (t) = Taken By, (c) = Cosigned By    Initials Name Effective Dates    OC Roxana Aguayo MA,[facility]-SLP 06/08/18 -           EDUCATION  The patient has been educated in the following areas:   Dysphagia (Swallowing Impairment).    SLP Recommendation and Plan  SLP Swallowing Diagnosis: severe, oral dysfunction, pharyngeal dysfunction  SLP Diet Recommendation: NPO, other (see comments), long term alternate methods of nutrition/hydration(moist swab, ice chips sparingly)  Recommended Precautions and Strategies: upright posture during/after eating  SLP Rec. for Method of Medication Administration: meds via alternate route     Monitor for Signs of Aspiration: yes, notify SLP if any concerns     Swallow Criteria for Skilled Therapeutic Interventions Met: not appropriate, other (see comments)(2/2 to current respiratory status, ST to follow edu PRN)  Anticipated Dischage Disposition: anticipate therapy at next level of care  Rehab Potential/Prognosis, Swallowing: adequate, monitor progress closely  Therapy Frequency (Swallow): PRN, other (see comments)(education as warranted)  Predicted Duration Therapy Intervention (Days): other (see comments)(long term, continued ST next level of care)       Plan of Care Reviewed With: patient  Plan of Care Review  Plan of Care Reviewed With: patient  Outcome Summary: Swallow eval completed. Recommend continue NPO with alternate means of nutrition and meds alternate route. Pt admits swallow has had increased difficulty recently. Pt demonstrated cough and immediately self suctioned with yankauer s/p single trial teaspoon water. Recommend continue with  moist swabs, small ice chips sparingly if pt requesting. Pt will need longer term dysphagia therapy prior to re-eval. Do not feel pt appropriate for dysphagia exercises at this time secondary to respiratory status, 10L and breathing patten. Recommend dysphagia therapy at next level of care as respiratory status returns to baseline.         SLP Outcome Measures (last 72 hours)      SLP Outcome Measures     Row Name 06/19/19 0930             SLP Outcome Measures    Outcome Measure Used?  Adult NOMS  -OC         Adult FCM Scores    FCM Chosen  Swallowing  -OC      Swallowing FCM Score  1  -OC        User Key  (r) = Recorded By, (t) = Taken By, (c) = Cosigned By    Initials Name Effective Dates    Roxana Gabriel MA,[facility]YNN-SLP 06/08/18 -            Time Calculation:   Time Calculation- SLP     Row Name 06/19/19 1458             Time Calculation- SLP    SLP Start Time  0830  -OC      SLP Received On  06/19/19  -OC        User Key  (r) = Recorded By, (t) = Taken By, (c) = Cosigned By    Initials Name Provider Type    Roxana Gabriel MA,CCC-SLP Speech and Language Pathologist          Therapy Charges for Today     Code Description Service Date Service Provider Modifiers Qty    [id number]  ST EVAL ORAL PHARYNG SWALLOW 4 6/19/2019 Roxana Aguayo MA,CCC-SLP GN 1               Roxana Aguayo MA,GLYNN-SLP  6/19/2019

## 2019-06-20 LAB
ANION GAP SERPL CALCULATED.3IONS-SCNC: 10.6 MMOL/L
BUN BLD-MCNC: 31 MG/DL (ref 8–23)
BUN/CREAT SERPL: 30.7 (ref 7–25)
CALCIUM SPEC-SCNC: 9.1 MG/DL (ref 8.6–10.5)
CHLORIDE SERPL-SCNC: 95 MMOL/L (ref 98–107)
CO2 SERPL-SCNC: 31.4 MMOL/L (ref 22–29)
CREAT BLD-MCNC: 1.01 MG/DL (ref 0.76–1.27)
DEPRECATED RDW RBC AUTO: 58.2 FL (ref 37–54)
ERYTHROCYTE [DISTWIDTH] IN BLOOD BY AUTOMATED COUNT: 16.4 % (ref 12.3–15.4)
GFR SERPL CREATININE-BSD FRML MDRD: 71 ML/MIN/1.73
GLUCOSE BLD-MCNC: 102 MG/DL (ref 65–99)
HCT VFR BLD AUTO: 42.9 % (ref 37.5–51)
HGB BLD-MCNC: 13.1 G/DL (ref 13–17.7)
MCH RBC QN AUTO: 29.3 PG (ref 26.6–33)
MCHC RBC AUTO-ENTMCNC: 30.5 G/DL (ref 31.5–35.7)
MCV RBC AUTO: 96 FL (ref 79–97)
PLATELET # BLD AUTO: 145 10*3/MM3 (ref 140–450)
PMV BLD AUTO: 9.7 FL (ref 6–12)
POTASSIUM BLD-SCNC: 3.7 MMOL/L (ref 3.5–5.2)
RBC # BLD AUTO: 4.47 10*6/MM3 (ref 4.14–5.8)
SODIUM BLD-SCNC: 137 MMOL/L (ref 136–145)
WBC NRBC COR # BLD: 8.63 10*3/MM3 (ref 3.4–10.8)

## 2019-06-20 PROCEDURE — 97110 THERAPEUTIC EXERCISES: CPT

## 2019-06-20 PROCEDURE — 94669 MECHANICAL CHEST WALL OSCILL: CPT

## 2019-06-20 PROCEDURE — 94799 UNLISTED PULMONARY SVC/PX: CPT

## 2019-06-20 PROCEDURE — 85027 COMPLETE CBC AUTOMATED: CPT | Performed by: INTERNAL MEDICINE

## 2019-06-20 PROCEDURE — 94668 MNPJ CHEST WALL SBSQ: CPT

## 2019-06-20 PROCEDURE — 80048 BASIC METABOLIC PNL TOTAL CA: CPT | Performed by: INTERNAL MEDICINE

## 2019-06-20 RX ORDER — FUROSEMIDE 40 MG/1
40 TABLET ORAL DAILY
Status: DISCONTINUED | OUTPATIENT
Start: 2019-06-21 | End: 2019-06-21 | Stop reason: HOSPADM

## 2019-06-20 RX ADMIN — CHLORHEXIDINE GLUCONATE 15 ML: 1.2 RINSE ORAL at 21:00

## 2019-06-20 RX ADMIN — IPRATROPIUM BROMIDE AND ALBUTEROL SULFATE 3 ML: 2.5; .5 SOLUTION RESPIRATORY (INHALATION) at 16:42

## 2019-06-20 RX ADMIN — PILOCARPINE HYROCHLORIDE 5 MG: 5 TABLET, FILM COATED ORAL at 09:29

## 2019-06-20 RX ADMIN — PILOCARPINE HYROCHLORIDE 5 MG: 5 TABLET, FILM COATED ORAL at 18:06

## 2019-06-20 RX ADMIN — PREGABALIN 75 MG: 75 CAPSULE ORAL at 09:29

## 2019-06-20 RX ADMIN — SODIUM CHLORIDE, PRESERVATIVE FREE 3 ML: 5 INJECTION INTRAVENOUS at 20:29

## 2019-06-20 RX ADMIN — Medication 1 DROP: at 20:30

## 2019-06-20 RX ADMIN — FUROSEMIDE 40 MG: 40 TABLET ORAL at 09:30

## 2019-06-20 RX ADMIN — KETOTIFEN FUMARATE 1 DROP: 0.35 SOLUTION/ DROPS OPHTHALMIC at 21:00

## 2019-06-20 RX ADMIN — KETOTIFEN FUMARATE 1 DROP: 0.35 SOLUTION/ DROPS OPHTHALMIC at 09:29

## 2019-06-20 RX ADMIN — IPRATROPIUM BROMIDE AND ALBUTEROL SULFATE 3 ML: 2.5; .5 SOLUTION RESPIRATORY (INHALATION) at 19:26

## 2019-06-20 RX ADMIN — IPRATROPIUM BROMIDE AND ALBUTEROL SULFATE 3 ML: 2.5; .5 SOLUTION RESPIRATORY (INHALATION) at 13:04

## 2019-06-20 RX ADMIN — TERAZOSIN HYDROCHLORIDE 2 MG: 2 CAPSULE ORAL at 20:29

## 2019-06-20 RX ADMIN — PREGABALIN 75 MG: 75 CAPSULE ORAL at 20:29

## 2019-06-20 RX ADMIN — SODIUM CHLORIDE 4 ML: 7 NEBU SOLN,3 % NEBU at 13:09

## 2019-06-20 RX ADMIN — GUAIFENESIN 1200 MG: 600 TABLET, EXTENDED RELEASE ORAL at 20:29

## 2019-06-20 RX ADMIN — IPRATROPIUM BROMIDE AND ALBUTEROL SULFATE 3 ML: 2.5; .5 SOLUTION RESPIRATORY (INHALATION) at 08:53

## 2019-06-20 RX ADMIN — PILOCARPINE HYROCHLORIDE 5 MG: 5 TABLET, FILM COATED ORAL at 20:29

## 2019-06-20 RX ADMIN — MONTELUKAST SODIUM 10 MG: 10 TABLET, FILM COATED ORAL at 20:29

## 2019-06-20 RX ADMIN — ATORVASTATIN CALCIUM 10 MG: 10 TABLET, FILM COATED ORAL at 09:30

## 2019-06-20 RX ADMIN — PANTOPRAZOLE SODIUM 40 MG: 40 INJECTION, POWDER, FOR SOLUTION INTRAVENOUS at 09:29

## 2019-06-20 RX ADMIN — SODIUM CHLORIDE 4 ML: 7 NEBU SOLN,3 % NEBU at 08:53

## 2019-06-20 RX ADMIN — GUAIFENESIN 1200 MG: 600 TABLET, EXTENDED RELEASE ORAL at 09:30

## 2019-06-20 RX ADMIN — SODIUM CHLORIDE 4 ML: 7 NEBU SOLN,3 % NEBU at 19:26

## 2019-06-20 RX ADMIN — SODIUM CHLORIDE, PRESERVATIVE FREE 3 ML: 5 INJECTION INTRAVENOUS at 09:29

## 2019-06-20 RX ADMIN — ASPIRIN 81 MG: 81 TABLET, CHEWABLE ORAL at 09:29

## 2019-06-20 RX ADMIN — SODIUM CHLORIDE 4 ML: 7 NEBU SOLN,3 % NEBU at 16:42

## 2019-06-20 NOTE — PROGRESS NOTES
[facility]  [phone]  Jameson Corrales MD    Subjective:  LOS: 7  Patient seen and examined this a.m. Still on high flow nasal cannula in between noninvasive ventilator use. Hoarseness improved.  Stable oxygen requirements at 8 to 10 L nasal cannula.      ROS - No new fevers, hemoptysis, nausea vomiting or abdominal pain    Vital Signs past 24hrs    Temp range: Temp (24hrs), Av.3 °F (36.8 °C), Min:97.6 °F (36.4 °C), Max:99.4 °F (37.4 °C)    BP range: BP: (113-118)/(68-81) 113/68  Pulse range: Heart Rate:  [] 80  Resp rate range: Resp:  [16-24] 16    Device (Oxygen Therapy): high-flow nasal cannula;humidifiedFlow (L/min):  [7-10] 10  Oxygen range:SpO2:  [89 %-94 %] 93 %      106 kg (233 lb 0.4 oz); Body mass index is 29.92 kg/m².    Intake/Output Summary (Last 24 hours) at 2019 1535  Last data filed at 2019 1400  Gross per 24 hour   Intake 2271 ml   Output 650 ml   Net 1621 ml       Physical Exam   Constitutional: He appears well-developed and well-nourished. He is cooperative.  Non-toxic appearance.   Eyes: Conjunctivae are normal. Pupils are equal, round, and reactive to light. No scleral icterus.   Cardiovascular: Normal rate, regular rhythm and normal heart sounds.   No murmur heard.  Pulmonary/Chest: Accessory muscle usage present. Decreased breath sounds: coarse breath sounds. He has no rhonchi. He has no rales.   Abdominal: Soft. Bowel sounds are normal. He exhibits no mass. There is no tenderness.   PEG +   Musculoskeletal: He exhibits no edema.   Neurological: He is alert.   Skin: Skin is warm and dry.   Nursing note and vitals reviewed.    Results Review:    I have reviewed the laboratory and imaging data since the last note by [facility] physician.  My annotations are noted in assessment and plan.    Medication Review:  I have reviewed the current MAR.  My annotations are noted in assessment and plan.       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  Little more oxygen requirements than baseline.  ENT recommends discharge and follow-up as an outpatient.  Noted to be SLP recommendations to remain n.p.o. and continue with tube feeds.  Agree with holding anticoagulation indefinitely - D/w wife   PT Eval noted  DC in am if bed still available.     Full Code    Jameson Corrales MD  06/20/19  3:35 PM

## 2019-06-20 NOTE — PROGRESS NOTES
Continued Stay Note  [facility]     Patient Name: Alessio Allen  MRN: [id number]  Today's Date: 6/20/2019    Admit Date: 6/13/2019    Discharge Plan     Row Name 06/20/19 1457       Plan    Plan  [facility], bed available tomorrow; [facility]  following     Plan Comments  Spoke with pt and his spouse at bedside. Pt [facility] to [facility] to [facility]. Spoke with Erica/[facility], [facility] accepted and has a bed available tomorrow. CCP to follow. JChastermiasRN/CCP         Discharge Codes    No documentation.             Korin Damon RN

## 2019-06-20 NOTE — THERAPY TREATMENT NOTE
Acute Care - Physical Therapy Treatment Note  [facility]     Patient Name: Alessio Allen  : 1940  MRN: [id number]  Today's Date: 2019     Date of Referral to PT: 19  Referring Physician: Dr. Corrales    Admit Date: 2019    Visit Dx:    ICD-10-CM ICD-9-CM   1. Hemoptysis R04.2 786.30   2. Hypoxia R09.02 799.02   3. Respiratory distress R06.03 786.09   4. Impaired functional mobility, balance, gait, and endurance Z74.09 V49.89     Patient Active Problem List   Diagnosis   • A-fib (CMS/[facility])   • Dyslipidemia   • BP (high blood pressure)   • Neuropathy   • Hypertension   • COPD (chronic obstructive pulmonary disease) (CMS/HCC)   • BPH (benign prostatic hypertrophy)   • Atrial fibrillation (CMS/HCC)   • Asthma   • Hypoxia   • Pneumonia   • Chronic anticoagulation   • Pneumonia of both lungs due to infectious organism   • Hyponatremia   • COPD exacerbation (CMS/HCC)   • Acute on chronic respiratory failure with hypoxia (CMS/HCC)   • Pneumonia of both lower lobes due to infectious organism (CMS/HCC)   • Acute on chronic diastolic heart failure (CMS/HCC)   • IPF (idiopathic pulmonary fibrosis) (CMS/[facility])   • Acute respiratory failure with hypoxia (CMS/HCC)   • Attention to G-tube (CMS/HCC)   • Massive hemoptysis       Therapy Treatment    Rehabilitation Treatment Summary     Row Name 19 1616             Treatment Time/Intention    Discipline  physical therapist  -AR      Document Type  therapy note (daily note)  -AR      Subjective Information  complains of;fatigue  -AR      Mode of Treatment  physical therapy  -AR      Therapy Frequency (PT Clinical Impression)  daily  -AR      Patient Effort  good  -AR      Existing Precautions/Restrictions  fall;oxygen therapy device and L/min high flow  -AR      Recorded by [AR] Iris Irby, PT 19 1624      Row Name 19 1616             Vital Signs    Pre SpO2 (%)  94  -AR      O2 Delivery Pre Treatment  hi-flow  -AR      Post SpO2 (%)   -- in bathroom - GRETEL Rao notified  -AR      O2 Delivery Post Treatment  hi-flow  -AR      Recorded by [AR] Iris Irby, PT 06/20/19 1624      Row Name 06/20/19 1616             Cognitive Assessment/Intervention- PT/OT    Orientation Status (Cognition)  oriented x 3  -AR      Follows Commands (Cognition)  WNL  -AR      Recorded by [AR] Iris Irby, PT 06/20/19 1624      Row Name 06/20/19 1616             Bed Mobility Assessment/Treatment    Supine-Sit Hayes (Bed Mobility)  not tested  -AR      Sit-Supine Hayes (Bed Mobility)  not tested  -AR      Recorded by [AR] Iris Irby, PT 06/20/19 1624      Row Name 06/20/19 1616             Sit-Stand Transfer    Sit-Stand Hayes (Transfers)  minimum assist (75% patient effort)  -AR      Assistive Device (Sit-Stand Transfers)  walker, front-wheeled  -AR      Recorded by [AR] Iris Irby, PT 06/20/19 1624      Row Name 06/20/19 1616             Stand-Sit Transfer    Stand-Sit Hayes (Transfers)  contact guard  -AR      Assistive Device (Stand-Sit Transfers)  walker, front-wheeled  -AR      Recorded by [AR] Iris Irby, PT 06/20/19 1624      Row Name 06/20/19 1616             Gait/Stairs Assessment/Training    Hayes Level (Gait)  contact guard  -AR      Assistive Device (Gait)  walker, front-wheeled  -AR      Distance in Feet (Gait)  120  -AR      Pattern (Gait)  step-to  -AR      Deviations/Abnormal Patterns (Gait)  gait speed decreased;festinating/shuffling  -AR      Bilateral Gait Deviations  heel strike decreased;forward flexed posture  -AR      Comment (Gait/Stairs)  2 standing rest breaks  -AR      Recorded by [AR] Iris Irby, PT 06/20/19 1624      Row Name 06/20/19 1616             Positioning and Restraints    Pre-Treatment Position  sitting in chair/recliner  -AR      Post Treatment Position  bathroom  -AR      Bathroom  notified nsg;sitting;call light within reach;encouraged to call for assist;with  family/caregiver  -AR      Recorded by [AR] Iris Irby, PT 06/20/19 1624      Row Name 06/20/19 1616             Pain Scale: Word Pre/Post-Treatment    Pain: Word Scale, Pretreatment  0 - no pain  -AR      Pain: Word Scale, Post-Treatment  0 - no pain  -AR      Recorded by [AR] Iris Irby, PT 06/20/19 1624      Row Name 06/20/19 1616             Outcome Summary/Treatment Plan (PT)    Anticipated Discharge Disposition (PT)  skilled nursing facility  -AR      Recorded by [AR] Iris Irby, PT 06/20/19 1624        User Key  (r) = Recorded By, (t) = Taken By, (c) = Cosigned By    Initials Name Effective Dates Discipline    AR Iris Irby, PT 04/03/18 -  PT                   Physical Therapy Education     Title: PT OT SLP Therapies (In Progress)     Topic: Physical Therapy (In Progress)     Point: Mobility training (In Progress)     Learning Progress Summary           Patient Acceptance, E, NR by AR at 6/20/2019  4:25 PM    Acceptance, E, VU,NR by MS at 6/19/2019  2:33 PM   Significant Other Acceptance, E, VU,NR by MS at 6/19/2019  2:33 PM                   Point: Home exercise program (In Progress)     Learning Progress Summary           Patient Acceptance, E, NR by AR at 6/20/2019  4:25 PM                   Point: Body mechanics (In Progress)     Learning Progress Summary           Patient Acceptance, E, NR by AR at 6/20/2019  4:25 PM    Acceptance, E, VU,NR by MS at 6/19/2019  2:33 PM   Significant Other Acceptance, E, VU,NR by MS at 6/19/2019  2:33 PM                   Point: Precautions (In Progress)     Learning Progress Summary           Patient Acceptance, E, NR by AR at 6/20/2019  4:25 PM    Acceptance, E, VU,NR by MS at 6/19/2019  2:33 PM   Significant Other Acceptance, E, VU,NR by MS at 6/19/2019  2:33 PM                               User Key     Initials Effective Dates Name Provider Type Discipline    AR 04/03/18 -  Iris Irby, PT Physical Therapist PT    MS 03/04/19 -  Lisa  Hermila LIMA PT Physical Therapist PT                PT Recommendation and Plan  Anticipated Discharge Disposition (PT): skilled nursing facility  Therapy Frequency (PT Clinical Impression): daily  Outcome Summary/Treatment Plan (PT)  Anticipated Discharge Disposition (PT): skilled nursing facility  Plan of Care Reviewed With: patient  Outcome Summary: Slowly improving endurance and independence w/ mobility during PT today.  Able to ambulate 120' w/ contact to min A using RW, on 10L high flow 02.  2 standing rest breaks.  Reviewed activity recommendations and PT POC.  Currently recommend DC to SNU.    Outcome Measures     Row Name 06/20/19 1600 06/19/19 1400          How much help from another person do you currently need...    Turning from your back to your side while in flat bed without using bedrails?  4  -AR  4  -MS     Moving from lying on back to sitting on the side of a flat bed without bedrails?  2  -AR  3  -MS     Moving to and from a bed to a chair (including a wheelchair)?  3  -AR  3  -MS     Standing up from a chair using your arms (e.g., wheelchair, bedside chair)?  3  -AR  3  -MS     Climbing 3-5 steps with a railing?  2  -AR  2  -MS     To walk in hospital room?  3  -AR  3  -MS     AM-PAC 6 Clicks Score  17  -AR  18  -MS        Functional Assessment    Outcome Measure Options  --  AM-PAC 6 Clicks Basic Mobility (PT)  -MS       User Key  (r) = Recorded By, (t) = Taken By, (c) = Cosigned By    Initials Name Provider Type    Iris Wilburn, PT Physical Therapist    MS XiongsHermila, PT Physical Therapist         Time Calculation:   PT Charges     Row Name 06/20/19 1626             Time Calculation    Start Time  1611  -AR      Stop Time  1626  -AR      Time Calculation (min)  15 min  -AR      PT Received On  06/20/19  -AR      PT - Next Appointment  06/21/19  -AR        User Key  (r) = Recorded By, (t) = Taken By, (c) = Cosigned By    Initials Name Provider Type    Iris Wilburn, PT  Physical Therapist        Therapy Charges for Today     Code Description Service Date Service Provider Modifiers Qty    05654883354 HC PT THER PROC EA 15 MIN 6/20/2019 Iris Irby, PT GP 1          PT G-Codes  Outcome Measure Options: AM-PAC 6 Clicks Basic Mobility (PT)  AM-PAC 6 Clicks Score: 17    Iris Irby PT  6/20/2019

## 2019-06-20 NOTE — PLAN OF CARE
Problem: Patient Care Overview  Goal: Plan of Care Review   06/20/19 3720   Coping/Psychosocial   Plan of Care Reviewed With patient   OTHER   Outcome Summary Slowly improving endurance and independence w/ mobility during PT today. Able to ambulate 120' w/ contact to min A using RW, on 10L high flow 02. 2 standing rest breaks. Reviewed activity recommendations and PT POC. Currently recommend DC to SNU.

## 2019-06-20 NOTE — PLAN OF CARE
Problem: Patient Care Overview  Goal: Plan of Care Review  Outcome: Ongoing (interventions implemented as appropriate)   06/20/19 4555   Coping/Psychosocial   Plan of Care Reviewed With patient;spouse   Plan of Care Review   Progress no change   OTHER   Outcome Summary Patient denies complaints of pain. Receiving boost through his PEG. Ambulated in hallway today. VSS on 10L. No s/s of acute distress. Will continue to monitor.

## 2019-06-20 NOTE — PLAN OF CARE
Problem: Patient Care Overview  Goal: Plan of Care Review  Outcome: Ongoing (interventions implemented as appropriate)   06/20/19 0428   Coping/Psychosocial   Plan of Care Reviewed With patient   Plan of Care Review   Progress no change   OTHER   Outcome Summary No c/o pain, trilogy at night, NPO, no s/s of distress noted will continue to monitor at 0429 6/20/19       Problem: Fall Risk (Adult)  Goal: Absence of Fall  Outcome: Ongoing (interventions implemented as appropriate)      Problem: Skin Injury Risk (Adult)  Goal: Skin Health and Integrity  Outcome: Ongoing (interventions implemented as appropriate)      Problem: Pain, Acute (Adult)  Goal: Acceptable Pain Control/Comfort Level  Outcome: Ongoing (interventions implemented as appropriate)

## 2019-06-21 VITALS
HEIGHT: 74 IN | HEART RATE: 79 BPM | WEIGHT: 233.1 LBS | DIASTOLIC BLOOD PRESSURE: 65 MMHG | TEMPERATURE: 98.4 F | BODY MASS INDEX: 29.91 KG/M2 | RESPIRATION RATE: 20 BRPM | SYSTOLIC BLOOD PRESSURE: 119 MMHG | OXYGEN SATURATION: 91 %

## 2019-06-21 LAB
ANION GAP SERPL CALCULATED.3IONS-SCNC: 11.6 MMOL/L
BUN BLD-MCNC: 30 MG/DL (ref 8–23)
BUN/CREAT SERPL: 33 (ref 7–25)
CALCIUM SPEC-SCNC: 9.3 MG/DL (ref 8.6–10.5)
CHLORIDE SERPL-SCNC: 99 MMOL/L (ref 98–107)
CO2 SERPL-SCNC: 33.4 MMOL/L (ref 22–29)
CREAT BLD-MCNC: 0.91 MG/DL (ref 0.76–1.27)
DEPRECATED RDW RBC AUTO: 57.5 FL (ref 37–54)
ERYTHROCYTE [DISTWIDTH] IN BLOOD BY AUTOMATED COUNT: 16.2 % (ref 12.3–15.4)
GFR SERPL CREATININE-BSD FRML MDRD: 81 ML/MIN/1.73
GLUCOSE BLD-MCNC: 105 MG/DL (ref 65–99)
HCT VFR BLD AUTO: 43.7 % (ref 37.5–51)
HGB BLD-MCNC: 13.3 G/DL (ref 13–17.7)
MCH RBC QN AUTO: 28.9 PG (ref 26.6–33)
MCHC RBC AUTO-ENTMCNC: 30.4 G/DL (ref 31.5–35.7)
MCV RBC AUTO: 95 FL (ref 79–97)
PLATELET # BLD AUTO: 161 10*3/MM3 (ref 140–450)
PMV BLD AUTO: 10.9 FL (ref 6–12)
POTASSIUM BLD-SCNC: 3.6 MMOL/L (ref 3.5–5.2)
RBC # BLD AUTO: 4.6 10*6/MM3 (ref 4.14–5.8)
SODIUM BLD-SCNC: 144 MMOL/L (ref 136–145)
WBC NRBC COR # BLD: 8.07 10*3/MM3 (ref 3.4–10.8)

## 2019-06-21 PROCEDURE — 85027 COMPLETE CBC AUTOMATED: CPT | Performed by: INTERNAL MEDICINE

## 2019-06-21 PROCEDURE — 94799 UNLISTED PULMONARY SVC/PX: CPT

## 2019-06-21 PROCEDURE — 80048 BASIC METABOLIC PNL TOTAL CA: CPT | Performed by: INTERNAL MEDICINE

## 2019-06-21 PROCEDURE — 94669 MECHANICAL CHEST WALL OSCILL: CPT

## 2019-06-21 RX ORDER — GUAIFENESIN 600 MG/1
1200 TABLET, EXTENDED RELEASE ORAL EVERY 12 HOURS SCHEDULED
Qty: 30 TABLET | Refills: 0 | Status: SHIPPED | OUTPATIENT
Start: 2019-06-21 | End: 2019-10-16

## 2019-06-21 RX ORDER — BUDESONIDE 0.5 MG/2ML
0.5 INHALANT ORAL
Qty: 30 ML | Refills: 0 | Status: SHIPPED | OUTPATIENT
Start: 2019-06-21 | End: 2021-02-26

## 2019-06-21 RX ORDER — IPRATROPIUM BROMIDE AND ALBUTEROL SULFATE 2.5; .5 MG/3ML; MG/3ML
3 SOLUTION RESPIRATORY (INHALATION)
Qty: 360 ML | Refills: 0 | Status: SHIPPED | OUTPATIENT
Start: 2019-06-21 | End: 2021-02-26

## 2019-06-21 RX ADMIN — IPRATROPIUM BROMIDE AND ALBUTEROL SULFATE 3 ML: 2.5; .5 SOLUTION RESPIRATORY (INHALATION) at 11:33

## 2019-06-21 RX ADMIN — PREGABALIN 75 MG: 75 CAPSULE ORAL at 10:38

## 2019-06-21 RX ADMIN — CHLORHEXIDINE GLUCONATE 15 ML: 1.2 RINSE ORAL at 10:41

## 2019-06-21 RX ADMIN — PILOCARPINE HYROCHLORIDE 5 MG: 5 TABLET, FILM COATED ORAL at 16:23

## 2019-06-21 RX ADMIN — PANTOPRAZOLE SODIUM 40 MG: 40 INJECTION, POWDER, FOR SOLUTION INTRAVENOUS at 05:55

## 2019-06-21 RX ADMIN — KETOTIFEN FUMARATE 1 DROP: 0.35 SOLUTION/ DROPS OPHTHALMIC at 10:40

## 2019-06-21 RX ADMIN — FUROSEMIDE 40 MG: 40 TABLET ORAL at 10:38

## 2019-06-21 RX ADMIN — ATORVASTATIN CALCIUM 10 MG: 10 TABLET, FILM COATED ORAL at 10:38

## 2019-06-21 RX ADMIN — PILOCARPINE HYROCHLORIDE 5 MG: 5 TABLET, FILM COATED ORAL at 10:38

## 2019-06-21 RX ADMIN — SODIUM CHLORIDE 4 ML: 7 NEBU SOLN,3 % NEBU at 11:39

## 2019-06-21 RX ADMIN — IPRATROPIUM BROMIDE AND ALBUTEROL SULFATE 3 ML: 2.5; .5 SOLUTION RESPIRATORY (INHALATION) at 07:32

## 2019-06-21 RX ADMIN — IPRATROPIUM BROMIDE AND ALBUTEROL SULFATE 3 ML: 2.5; .5 SOLUTION RESPIRATORY (INHALATION) at 15:58

## 2019-06-21 RX ADMIN — GUAIFENESIN 1200 MG: 600 TABLET, EXTENDED RELEASE ORAL at 10:38

## 2019-06-21 RX ADMIN — SODIUM CHLORIDE 4 ML: 7 NEBU SOLN,3 % NEBU at 16:03

## 2019-06-21 RX ADMIN — ASPIRIN 81 MG: 81 TABLET, CHEWABLE ORAL at 10:38

## 2019-06-21 RX ADMIN — SODIUM CHLORIDE, PRESERVATIVE FREE 3 ML: 5 INJECTION INTRAVENOUS at 10:39

## 2019-06-21 RX ADMIN — SODIUM CHLORIDE 4 ML: 7 NEBU SOLN,3 % NEBU at 07:37

## 2019-06-21 NOTE — PLAN OF CARE
Problem: Patient Care Overview  Goal: Plan of Care Review  Outcome: Ongoing (interventions implemented as appropriate)   06/21/19 0505   Coping/Psychosocial   Plan of Care Reviewed With patient   Plan of Care Review   Progress no change   OTHER   Outcome Summary No c/o pain, possible d/c to rehab today, trilogy on at night. No s/s of distress noted will continue to monitor at 0506 6/21/19       Problem: Fall Risk (Adult)  Goal: Absence of Fall  Outcome: Ongoing (interventions implemented as appropriate)      Problem: Skin Injury Risk (Adult)  Goal: Skin Health and Integrity  Outcome: Ongoing (interventions implemented as appropriate)      Problem: Pain, Acute (Adult)  Goal: Acceptable Pain Control/Comfort Level  Outcome: Ongoing (interventions implemented as appropriate)

## 2019-06-21 NOTE — DISCHARGE SUMMARY
"                                                                  PHYSICIAN DISCHARGE SUMMARY                                                                          [facility]      Patient Identification:    Name: Alessio Allen  Age: 79 y.o.  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 male.  Whom presented to the emergency room via EMS with respiratory failure and coughing up large amounts of blood.  Patient had had a bronchoscopy earlier today for hemoptysis.  Currently has been coughing up blood for some time and they have been unable to find the source.  They actually did a diagnostic bronchoscopy with the patient taking his normal Eliquis yesterday evening so that if there was bleeding they could see a source..  This was done in the OR with general anesthesia patient was intubated there was no lower bleeding source found.  He apparently was coughing a little bit of blood up post procedure and then this progressively worsened at home.  Patient was emergently intubated in the emergency room emergently bronchoscope to clear blood from his airways to allow ventilation.  There was no evidence of lower bleeding.  But there was blood that continued to pull up in the back of his throat even after he been suctioned suggesting ongoing bleeding above the endotracheal tube cuff.  We suctioned out the bleeding actually use the bronchoscope to look at the back of his throat and it looked like the posterior wall " "there is a little pinhole that was bleeding.  I did not see any tumor or lesion in the area we escorted some epinephrine on it and after talking with ENT we packed the back of his mouth with 4 x 4 gauzes and currently he is bleeding seems to have stopped.  Apparently the patient has a history of head neck cancer I can find very little information about this.  He is on Xarelto for chronic atrial fibrillation he has some coronary artery disease and had a prior stent he has severe COPD and chronic respiratory failure uses 10 L O2 to exercise and uses a trilogy noninvasive ventilator with sleep and PRN.  He additionally has a history of some hypertension hyperlipidemia.  He has a PEG tube and has some dysphasia I could not clearly figure out why the dysphasia\" per      Consults:   Patient Care Team:  Alan Santana MD as PCP - General (Family Medicine)  Isael Beltre MD as PCP - Claims Attributed  Rao Maguire MD as Consulting Physician (Hematology and Oncology)  Alan Santana MD as Referring Physician (Family Medicine)  Wayne Robledo RN as Care Coordinator (Population Health)  Gaurav Merrill MD as Consulting Physician (Otolaryngology)    Procedures Performed:         Hospital Course: Patient was admitted to the ICU due to massive hemoptysis post intubation for bronchoscopy after discharge.  Patient came back to the ER with the same and had to be intubated for airway protection and urgent bronchoscopy was done with multiple blood clots removed from the airways.  Also noted some concern for posterior pharyngeal bleeding requiring packing.  ENT was asked to see the patient and they have noted some old blood but no fresh bleeding and hence they recommended to extubate the patient once reading was stabilized.  They decided that they would not need any further inpatient work-up or evaluation and wanted to discharge the patient and see him in the clinic.  Patient was recommended to hold " anticoagulation indefinitely for now given unclear etiology of this bleed and relation with his known cancer.  Patient reportedly underwent PET scan recently at [facility] [place] and I have reviewed the report which showed no significant activity in the larynx or cervical lymph node chain but with some activity in the paratracheal lymph node.  Family had several questions regarding the cancer staging which I have asked him to discuss with her ENT when they see him in the clinic.  He required some increased oxygen which we think is atelectasis and there is no current concern for pneumonia even though is a high risk for recurrent pneumonias given significant chronic respiratory failure and poor airway clearance.  Patient will be discharged to rehab and was asked to follow-up with his ENT early next week.      Objective:   Temp:  [97.8 °F (36.6 °C)-98.4 °F (36.9 °C)] 98.4 °F (36.9 °C)  Heart Rate:  [75-98] 89  Resp:  [20] 20  BP: (108-119)/(64-71) 119/65   SpO2:  [90 %-97 %] 97 %  on  Flow (L/min):  [8-10] 8 Device (Oxygen Therapy): high-flow nasal cannula    Intake/Output Summary (Last 24 hours) at 6/21/2019 1516  Last data filed at 6/21/2019 0300  Gross per 24 hour   Intake --   Output 925 ml   Net -925 ml     Body mass index is 29.93 kg/m².      06/18/19  0545 06/19/19  0352 06/21/19  0509   Weight: 109 kg (240 lb 11.9 oz) 106 kg (233 lb 0.4 oz) 106 kg (233 lb 1.6 oz)     Weight change:       Physical Exam:  Constitutional: He appears well-developed and well-nourished. He is cooperative.  Non-toxic appearance. Hoarseness +  Eyes: Conjunctivae are normal. Pupils are equal, round, and reactive to light. No scleral icterus.   Cardiovascular: Normal rate, regular rhythm and normal heart sounds.   No murmur heard.  Pulmonary/Chest: Accessory muscle usage present. Decreased breath sounds: coarse breath sounds. He has no rhonchi. He has no rales.   Abdominal: Soft. Bowel sounds are normal. He exhibits no mass. There is  no tenderness.   PEG +   Musculoskeletal: He exhibits no edema. Gen weakness +  Neurological: He is alert.   Skin: Skin is warm and dry.     Significant Discharge Diagnostics     Pertinent Lab Results:  Results from last 7 days   Lab Units 06/21/19  0525 06/20/19  0431 06/19/19  0530 06/18/19  0546 06/17/19  0516 06/16/19  0459 06/15/19  0440   SODIUM mmol/L 144 137 144 140 141 146* 143   POTASSIUM mmol/L 3.6 3.7 3.8 3.7 3.1* 3.9 4.3   CHLORIDE mmol/L 99 95* 99 100 99 106 104   CO2 mmol/L 33.4* 31.4* 32.9* 29.5* 30.8* 28.1 31.5*   BUN mg/dL 30* 31* 24* 17 14 15 17   CREATININE mg/dL 0.91 1.01 0.95 0.80 0.80 0.87 0.89   GLUCOSE mg/dL 105* 102* 99 98 103* 79 91   CALCIUM mg/dL 9.3 9.1 9.5 9.4 9.2 8.9 8.8   AST (SGOT) U/L  --   --   --   --   --   --  15   ALT (SGPT) U/L  --   --   --   --   --   --  8         Results from last 7 days   Lab Units 06/21/19  0525 06/20/19  0431 06/19/19  0530 06/18/19  0546 06/17/19  0516 06/16/19  0459 06/15/19  0440   WBC 10*3/mm3 8.07 8.63 7.36 6.13 6.57 6.25 6.45   HEMOGLOBIN g/dL 13.3 13.1 13.6 13.0 12.8* 11.8* 11.7*   HEMATOCRIT % 43.7 42.9 43.6 41.8 41.5 39.3 38.4   PLATELETS 10*3/mm3 161 145 143 128* 118* 106* 103*   MCV fL 95.0 96.0 94.0 95.0 95.2 97.5* 98.0*   MCH pg 28.9 29.3 29.3 29.5 29.4 29.3 29.8   MCHC g/dL 30.4* 30.5* 31.2* 31.1* 30.8* 30.0* 30.5*   RDW % 16.2* 16.4* 16.5* 16.3* 16.5* 16.6* 17.1*   RDW-SD fl 57.5* 58.2* 57.1* 57.9* 58.1* 59.8* 62.4*   MPV fL 10.9 9.7 10.1 10.4 10.4 10.5 10.8                                               Imaging Results:  Imaging Results (all)     Procedure Component Value Units Date/Time    XR Chest PA & Lateral [[id number]] Collected:  06/17/19 1712     Updated:  06/17/19 1719    Narrative:       PA AND LATERAL CHEST     CLINICAL HISTORY: Hypoxia.     Compared to the previous chest dated 06/16/2019.     The lungs are fairly well-expanded and appear free of focal infiltrates.  There is mild vascular engorgement. There is no shashi  pulmonary edema. A  tiny left pleural effusion is suspected. A dual-chamber pacemaker is in  place in satisfactory position in the left subclavian vein without  change. The heart is mildly enlarged, and unchanged. A right internal  jugular Mediport remains in good position.     IMPRESSIONS: Mild cardiomegaly and vascular engorgement. Probable tiny  left pleural effusion.     This report was finalized on 6/17/2019 5:16 PM by Dr. Alden Spencer M.D.       XR Chest 1 View [[id number]] Collected:  06/16/19 0714     Updated:  06/16/19 2014    Narrative:       PORTABLE CHEST X-RAY     CLINICAL HISTORY: Intubated Patient; R04.2-Hemoptysis; R09.02-Hypoxemia;  R06.03-Acute respiratory distress.     COMPARISON: 06/15/2019     FINDINGS: Portable AP view of the chest was obtained with overlying  monitor leads in place. ET tube removed. Port-A-Cath and left pacemaker  unchanged. Lungs are fairly well-inflated. There is underlying emphysema  and fibrosis. Right lung base opacity is unchanged. Stable cardiomegaly.  No edema or significant pleural fluid.       Impression:       Interval extubation, stable appearance of the chest  otherwise.        This report was finalized on 6/16/2019 8:10 PM by Killian Smiley M.D.       XR Chest 1 View [[id number]] Collected:  06/15/19 0743     Updated:  06/15/19 1906    Narrative:       PORTABLE CHEST X-RAY     CLINICAL HISTORY: Intubated Patient; R04.2-Hemoptysis; R09.02-Hypoxemia;  R06.03-Acute respiratory distress.     COMPARISON: 06/14/2019     FINDINGS: Portable AP view of the chest was obtained with overlying  monitor leads in place. Life support lines are unchanged. No  pneumothorax. Lungs are fairly well inflated. There is increasing right  lung base atelectasis. Stable cardiomegaly. No edema or significant  pleural fluid.       Impression:       Increasing right lung base atelectasis.        This report was finalized on 6/15/2019 7:03 PM by Killian Smiley M.D.       XR Chest 1 View  [[id number]] Collected:  06/14/19 0637     Updated:  06/15/19 0316    Narrative:       PORTABLE CHEST X-RAY     CLINICAL HISTORY: Intubated Patient; R04.2-Hemoptysis; R09.02-Hypoxemia;  R06.03-Acute respiratory distress     COMPARISON: 06/13/2019.     FINDINGS: Portable AP view of the chest was obtained with overlying  monitor leads in place. Life support lines are unchanged. The lungs are  well inflated. Improving basilar opacities, right greater than left,  likely atelectasis. Stable cardiomegaly. No edema or significant pleural  fluid.             Impression:       Improving basilar opacities, likely atelectasis.        This report was finalized on 6/15/2019 3:13 AM by Killian Smiley M.D.       CT Soft Tissue Neck With Contrast [[id number]] Collected:  06/14/19 1214     Updated:  06/14/19 1450    Narrative:       CT SCAN OF THE SOFT TISSUES OF NECK WITH CONTRAST     CLINICAL HISTORY: Upper airway hemorrhage. History of head and neck  cancer.     TECHNIQUE: CT scan of the soft tissues of neck was obtained with 3 mm  axial images.     Comparison is made to prior CT scan of the head dated 03/03/2019.     FINDINGS:     The visualized contents of the cranial vault are essentially  unremarkable. Some intracranial atherosclerotic changes are incidentally  noted. The orbits are within normal limits. The parotid glands and  submandibular glands are within normal limits. An endotracheal tube is  in place and there is a surgical sponge or sponges packing the oral  cavity and hypopharynx. At the site of the packing material, there is  some irregularity of the mucosa which may represent sites of ulceration.  There is adenopathy within the right side of the neck. The previously  measured 15 x 16 mm right level 3 lymph node measures very similar  dimensions on the current exam. Additional level 3 and level 5  adenopathy is noted again having a similar size and appearance when  compared to the prior study. The thyroid gland is  unremarkable in  appearance. The visualized lung apices are clear.     There is some injection of the fat planes superficial to the right  platysma and just deep to the right platysma at the level of the body of  the mandible and just inferior to the level of the body of the mandible.  There may be some localized inflammatory change in this area.     There is patchy mucosal thickening identified within the ethmoid air  cells and the sphenoid sinus. Mucous retention cysts are noted within  the left maxillary sinus.       Impression:          The patient is apparently experiencing hemoptysis. There is surgical  packing with a sponge or sponges within the oral cavity and hypopharynx.  At the site of the packing material, there is some irregularity of the  mucosa which may represent some sites of ulceration although there is no  specific mass or large vessel pseudoaneurysm identified as a cause of  this hemorrhage.     The previously identified right level 3 and level 5 lymphadenopathy is  again noted and has a very similar appearance.     There is some injection of the fat planes superficial and deep to the  right platysma, at the level of the body of the mandible and just  inferior to this level which may represent some localized inflammatory  change in this area.     Mild changes of inflammatory paranasal sinus disease are incidentally  noted.     Radiation dose reduction techniques were utilized, including automated  exposure control and exposure modulation based on body size.     This report was finalized on 6/14/2019 2:47 PM by Dr. Tor Sandoval M.D.       XR Chest 1 View [[id number]] Collected:  06/13/19 1943     Updated:  06/13/19 1949    Narrative:       Portable chest radiograph     HISTORY:Intubation     TECHNIQUE: Single AP portable radiograph of the chest     COMPARISON:Chest radiograph 03/17/2019 and chest CT 06/13/2019       Impression:       FINDINGS AND IMPRESSION:  The endotracheal tube terminates  approximately 6 cm above the brittany.  Right-sided Port-A-Cath tip overlies the superior vena cava. There is a  left-sided pacemaker.     Bibasilar pulmonary opacification is present, left greater than right,  most concerning for atelectasis and/or developing pneumonia and  correlation with patient history is recommended. No pleural effusion or  pneumothorax is seen. The heart is at least moderately enlarged.     This report was finalized on 6/13/2019 7:45 PM by Dr. Casey Blackwood M.D.             Discharge Medications and Instructions:     Discharge Medications     Discharge Medications      New Medications      Instructions Start Date   budesonide 0.5 MG/2ML nebulizer solution  Commonly known as:  PULMICORT   0.5 mg, Nebulization, Daily - RT      guaiFENesin 600 MG 12 hr tablet  Commonly known as:  MUCINEX   1,200 mg, Oral, Every 12 Hours Scheduled         Changes to Medications      Instructions Start Date   ipratropium-albuterol 0.5-2.5 mg/3 ml nebulizer  Commonly known as:  DUO-NEB  What changed:    · when to take this  · reasons to take this   3 mL, Nebulization, 4 Times Daily - RT         Continue These Medications      Instructions Start Date   acetaminophen 325 MG tablet  Commonly known as:  TYLENOL   650 mg, Oral, Every 6 Hours PRN      albuterol sulfate  (90 Base) MCG/ACT inhaler  Commonly known as:  PROVENTIL HFA;VENTOLIN HFA;PROAIR HFA   2 puffs, Inhalation, Every 6 Hours PRN      aspirin 81 MG tablet   81 mg, Oral, Daily, HOLD PER MD INSTR      doxazosin 2 MG tablet  Commonly known as:  CARDURA   2 mg, Oral, Nightly      ferrous sulfate 325 (65 FE) MG tablet   325 mg, Oral, Daily With Breakfast      fluocinolone 0.01 % external solution  Commonly known as:  SYNALAR   Topical, As Needed      furosemide 20 MG tablet  Commonly known as:  LASIX   40 mg, Oral, 2 Times Daily      hydrocortisone 1 % cream   1 application, Topical, 2 Times Daily PRN, Chest       montelukast 10 MG tablet  Commonly known  as:  SINGULAIR   10 mg, Oral, Nightly      olopatadine 0.1 % ophthalmic solution  Commonly known as:  PATANOL   1 drop, Both Eyes, Daily      pantoprazole 40 MG pack packet  Commonly known as:  PROTONIX   40 mg, Oral, Every Morning Before Breakfast, PEG tube       pilocarpine 5 MG tablet  Commonly known as:  SALAGEN   5 mg, Oral, 3 Times Daily      polyvinyl alcohol 1.4 % ophthalmic solution  Commonly known as:  LIQUIFILM   1 drop, As Needed      potassium chloride 10 MEQ CR tablet  Commonly known as:  K-DUR   20 mEq, Oral, Daily      pramoxine-hydrocortisone 1-1 % rectal cream   1 application, Rectal, 2 Times Daily PRN      pregabalin 75 MG capsule  Commonly known as:  LYRICA   75 mg, Oral, 2 Times Daily      pyridoxine 50 MG tablet  Commonly known as:  VITAMIN B-6   Oral, Daily      simvastatin 20 MG tablet  Commonly known as:  ZOCOR   0.5 tablets, Oral, Daily      sodium chloride 0.65 % nasal spray  Commonly known as:  OCEAN NASAL SPRAY   2 sprays, Nasal, As Needed, May refill q 21 days      sodium chloride 7 % nebulizer solution nebulizer solution   4 mL, Nebulization, 4 Times Daily - RT      Spacer/Aero-Holding Chambers device   Give spacer to use with his inhalers whichever brand he has received is fine      ZYRTEC ALLERGY 10 MG capsule  Generic drug:  Cetirizine HCl   1 capsule, Oral, Nightly         Stop These Medications    amLODIPine 5 MG tablet  Commonly known as:  NORVASC     Fluticasone-Umeclidin-Vilant 100-62.5-25 MCG/INH aerosol powder   Commonly known as:  TRELEGY ELLIPTA     sildenafil 100 MG tablet  Commonly known as:  VIAGRA     XARELTO 20 MG tablet  Generic drug:  rivaroxaban            Disposition:  Rehab     Contact information for follow-up providers     Suresh Hernandez MD. Schedule an appointment as soon as possible for a visit in 1 week(s).    Specialties:  Pulmonary Disease, Sleep Medicine  Contact information:  [facility] [place] [id number] [phone]             [place]  Gaurav WHITFIELD MD. Schedule an appointment as soon as possible for a visit in 3 day(s).    Specialty:  Otolaryngology  Contact information:  [address]  [facility] [id number] [phone]             Alan Santana MD .    Specialty:  Family Medicine  Contact information:  [address]  [facility] [id number] [phone]                   Contact information for after-discharge care     Destination     [facility] Follow up.    Service:  Skilled Nursing  Contact information:  [address] [place] [address] [phone]                             Total time spent discharging patient including evaluation, medication reconciliation, arranging follow up, and post hospitalization instructions and education total time exceeds 30 minutes.    Signed:  Jameson Corrales MD  6/21/2019  3:16 PM

## 2019-06-21 NOTE — PROGRESS NOTES
Continued Stay Note  [facility]     Patient Name: Alessio Allen  MRN: [id number]  Today's Date: 6/21/2019    Admit Date: 6/13/2019    Discharge Plan     Row Name 06/21/19 1034       Plan    Plan  [facility] bed available today, [facility]  following     Plan Comments  Spoke with Erica/Lit, [facility] a bed available today. CCP to follow. JChasteenRN/CCP         Discharge Codes    No documentation.             Korin Damon RN

## 2019-06-24 NOTE — PROGRESS NOTES
Case Management Discharge Note    Final Note: Pt dc'd to [facility]     Destination - Selection Complete      Service Provider Request Status Selected Services Address Phone Number Fax Number    [facility] Selected Skilled Nursing [address] [address] [phone]      Durable Medical Equipment      No service has been selected for the patient.      Dialysis/Infusion      No service has been selected for the patient.      Home Medical Care      No service has been selected for the patient.      Therapy      No service has been selected for the patient.      Community Resources      No service has been selected for the patient.        Transportation Services  Private: Car    Final Discharge Disposition Code: 03 - skilled nursing facility (SNF)

## 2019-07-12 ENCOUNTER — PATIENT OUTREACH (OUTPATIENT)
Dept: CASE MANAGEMENT | Facility: OTHER | Age: 79
End: 2019-07-12

## 2019-07-12 NOTE — OUTREACH NOTE
Patient Outreach Note  Briefly discussed rehab stay at [facility] and reports discharged home on 7/10/19 with [facility] Speech Therapist following.  Declined further discussion today.      Wayne Robledo RN    7/12/2019, 4:20 PM

## 2019-07-16 ENCOUNTER — OFFICE VISIT (OUTPATIENT)
Dept: CARDIAC REHAB | Facility: HOSPITAL | Age: 79
End: 2019-07-16

## 2019-07-16 DIAGNOSIS — J44.9 COPD, SEVERE (HCC): Primary | ICD-10-CM

## 2019-07-18 ENCOUNTER — OFFICE VISIT (OUTPATIENT)
Dept: CARDIAC REHAB | Facility: HOSPITAL | Age: 79
End: 2019-07-18

## 2019-07-18 DIAGNOSIS — J44.9 COPD, SEVERE (HCC): Primary | ICD-10-CM

## 2019-07-20 ENCOUNTER — OFFICE VISIT (OUTPATIENT)
Dept: CARDIAC REHAB | Facility: HOSPITAL | Age: 79
End: 2019-07-20

## 2019-07-20 DIAGNOSIS — J44.9 COPD, SEVERE (HCC): Primary | ICD-10-CM

## 2019-07-23 ENCOUNTER — OFFICE VISIT (OUTPATIENT)
Dept: CARDIAC REHAB | Facility: HOSPITAL | Age: 79
End: 2019-07-23

## 2019-07-23 DIAGNOSIS — J44.9 COPD, SEVERE (HCC): Primary | ICD-10-CM

## 2019-07-25 ENCOUNTER — EPISODE CHANGES (OUTPATIENT)
Dept: CASE MANAGEMENT | Facility: OTHER | Age: 79
End: 2019-07-25

## 2019-07-25 ENCOUNTER — PATIENT OUTREACH (OUTPATIENT)
Dept: CASE MANAGEMENT | Facility: OTHER | Age: 79
End: 2019-07-25

## 2019-07-25 ENCOUNTER — OFFICE VISIT (OUTPATIENT)
Dept: CARDIAC REHAB | Facility: HOSPITAL | Age: 79
End: 2019-07-25

## 2019-07-25 DIAGNOSIS — J44.9 COPD, SEVERE (HCC): Primary | ICD-10-CM

## 2019-07-25 NOTE — OUTREACH NOTE
Patient Outreach Note    Declined [facility] services today.  [facility] continues services twice weekly.    Wayne Robledo RN    7/25/2019, 9:16 AM

## 2019-07-27 ENCOUNTER — OFFICE VISIT (OUTPATIENT)
Dept: CARDIAC REHAB | Facility: HOSPITAL | Age: 79
End: 2019-07-27

## 2019-07-27 DIAGNOSIS — J44.9 COPD, SEVERE (HCC): Primary | ICD-10-CM

## 2019-07-30 ENCOUNTER — OFFICE VISIT (OUTPATIENT)
Dept: CARDIAC REHAB | Facility: HOSPITAL | Age: 79
End: 2019-07-30

## 2019-07-30 DIAGNOSIS — J44.9 COPD, SEVERE (HCC): Primary | ICD-10-CM

## 2019-08-01 ENCOUNTER — OFFICE VISIT (OUTPATIENT)
Dept: CARDIAC REHAB | Facility: HOSPITAL | Age: 79
End: 2019-08-01

## 2019-08-01 DIAGNOSIS — J44.9 COPD, SEVERE (HCC): Primary | ICD-10-CM

## 2019-08-03 ENCOUNTER — OFFICE VISIT (OUTPATIENT)
Dept: CARDIAC REHAB | Facility: HOSPITAL | Age: 79
End: 2019-08-03

## 2019-08-03 DIAGNOSIS — J44.9 COPD, SEVERE (HCC): Primary | ICD-10-CM

## 2019-08-05 ENCOUNTER — TELEPHONE (OUTPATIENT)
Dept: FAMILY MEDICINE CLINIC | Facility: CLINIC | Age: 79
End: 2019-08-05

## 2019-08-05 NOTE — TELEPHONE ENCOUNTER
Pt returned call, and he said SUNDEEP Santana was the dr that prescribed it. I informed him that it wasn't us, and he said well it was Dr. Santana that rx it and maybe they made a mistake on the bottle. I asked him to tell me the phone number on it, and they said it was a dermatologist. He will call them.

## 2019-08-05 NOTE — TELEPHONE ENCOUNTER
Pt needs notes where Dr Santana pres Triamcinolone sent to [facility].  States this was back in July 2016     Fax # [phone]

## 2019-08-06 ENCOUNTER — OFFICE VISIT (OUTPATIENT)
Dept: CARDIAC REHAB | Facility: HOSPITAL | Age: 79
End: 2019-08-06

## 2019-08-06 DIAGNOSIS — J44.9 COPD, SEVERE (HCC): Primary | ICD-10-CM

## 2019-08-10 ENCOUNTER — OFFICE VISIT (OUTPATIENT)
Dept: CARDIAC REHAB | Facility: HOSPITAL | Age: 79
End: 2019-08-10

## 2019-08-10 DIAGNOSIS — J44.9 COPD, SEVERE (HCC): Primary | ICD-10-CM

## 2019-08-13 ENCOUNTER — OFFICE VISIT (OUTPATIENT)
Dept: CARDIAC REHAB | Facility: HOSPITAL | Age: 79
End: 2019-08-13

## 2019-08-13 DIAGNOSIS — J44.9 COPD, SEVERE (HCC): Primary | ICD-10-CM

## 2019-08-15 ENCOUNTER — OFFICE VISIT (OUTPATIENT)
Dept: CARDIAC REHAB | Facility: HOSPITAL | Age: 79
End: 2019-08-15

## 2019-08-15 DIAGNOSIS — J44.9 COPD, SEVERE (HCC): Primary | ICD-10-CM

## 2019-08-17 ENCOUNTER — OFFICE VISIT (OUTPATIENT)
Dept: CARDIAC REHAB | Facility: HOSPITAL | Age: 79
End: 2019-08-17

## 2019-08-17 DIAGNOSIS — J44.9 COPD, SEVERE (HCC): Primary | ICD-10-CM

## 2019-08-20 ENCOUNTER — OFFICE VISIT (OUTPATIENT)
Dept: CARDIAC REHAB | Facility: HOSPITAL | Age: 79
End: 2019-08-20

## 2019-08-20 DIAGNOSIS — J44.9 COPD, SEVERE (HCC): Primary | ICD-10-CM

## 2019-08-22 ENCOUNTER — OFFICE VISIT (OUTPATIENT)
Dept: CARDIAC REHAB | Facility: HOSPITAL | Age: 79
End: 2019-08-22

## 2019-08-22 DIAGNOSIS — J44.9 COPD, SEVERE (HCC): Primary | ICD-10-CM

## 2019-08-24 ENCOUNTER — OFFICE VISIT (OUTPATIENT)
Dept: CARDIAC REHAB | Facility: HOSPITAL | Age: 79
End: 2019-08-24

## 2019-08-24 DIAGNOSIS — J44.9 COPD, SEVERE (HCC): Primary | ICD-10-CM

## 2019-08-26 ENCOUNTER — TELEPHONE (OUTPATIENT)
Dept: FAMILY MEDICINE CLINIC | Facility: CLINIC | Age: 79
End: 2019-08-26

## 2019-08-26 RX ORDER — SILDENAFIL 100 MG/1
100 TABLET, FILM COATED ORAL DAILY PRN
Qty: 9 TABLET | Refills: 1 | Status: SHIPPED | OUTPATIENT
Start: 2019-08-26 | End: 2019-10-21

## 2019-08-27 ENCOUNTER — OFFICE VISIT (OUTPATIENT)
Dept: CARDIAC REHAB | Facility: HOSPITAL | Age: 79
End: 2019-08-27

## 2019-08-27 DIAGNOSIS — J44.9 COPD, SEVERE (HCC): Primary | ICD-10-CM

## 2019-08-29 ENCOUNTER — OFFICE VISIT (OUTPATIENT)
Dept: CARDIAC REHAB | Facility: HOSPITAL | Age: 79
End: 2019-08-29

## 2019-08-29 DIAGNOSIS — J44.9 COPD, SEVERE (HCC): Primary | ICD-10-CM

## 2019-08-31 ENCOUNTER — OFFICE VISIT (OUTPATIENT)
Dept: CARDIAC REHAB | Facility: HOSPITAL | Age: 79
End: 2019-08-31

## 2019-08-31 DIAGNOSIS — J44.9 COPD, SEVERE (HCC): Primary | ICD-10-CM

## 2019-09-03 ENCOUNTER — OFFICE VISIT (OUTPATIENT)
Dept: CARDIAC REHAB | Facility: HOSPITAL | Age: 79
End: 2019-09-03

## 2019-09-03 DIAGNOSIS — J44.9 COPD, SEVERE (HCC): Primary | ICD-10-CM

## 2019-09-05 ENCOUNTER — OFFICE VISIT (OUTPATIENT)
Dept: CARDIAC REHAB | Facility: HOSPITAL | Age: 79
End: 2019-09-05

## 2019-09-05 DIAGNOSIS — J44.9 COPD, SEVERE (HCC): Primary | ICD-10-CM

## 2019-09-07 ENCOUNTER — OFFICE VISIT (OUTPATIENT)
Dept: CARDIAC REHAB | Facility: HOSPITAL | Age: 79
End: 2019-09-07

## 2019-09-07 DIAGNOSIS — J44.9 COPD, SEVERE (HCC): Primary | ICD-10-CM

## 2019-09-10 ENCOUNTER — OFFICE VISIT (OUTPATIENT)
Dept: CARDIAC REHAB | Facility: HOSPITAL | Age: 79
End: 2019-09-10

## 2019-09-10 DIAGNOSIS — J44.9 COPD, SEVERE (HCC): Primary | ICD-10-CM

## 2019-09-12 ENCOUNTER — OFFICE VISIT (OUTPATIENT)
Dept: CARDIAC REHAB | Facility: HOSPITAL | Age: 79
End: 2019-09-12

## 2019-09-12 DIAGNOSIS — J44.9 COPD, SEVERE (HCC): Primary | ICD-10-CM

## 2019-09-14 ENCOUNTER — OFFICE VISIT (OUTPATIENT)
Dept: CARDIAC REHAB | Facility: HOSPITAL | Age: 79
End: 2019-09-14

## 2019-09-14 DIAGNOSIS — J44.9 COPD, SEVERE (HCC): Primary | ICD-10-CM

## 2019-09-19 ENCOUNTER — OFFICE VISIT (OUTPATIENT)
Dept: CARDIAC REHAB | Facility: HOSPITAL | Age: 79
End: 2019-09-19

## 2019-09-19 DIAGNOSIS — J44.9 COPD, SEVERE (HCC): Primary | ICD-10-CM

## 2019-09-21 ENCOUNTER — OFFICE VISIT (OUTPATIENT)
Dept: CARDIAC REHAB | Facility: HOSPITAL | Age: 79
End: 2019-09-21

## 2019-09-21 DIAGNOSIS — J44.9 COPD, SEVERE (HCC): Primary | ICD-10-CM

## 2019-09-24 ENCOUNTER — OFFICE VISIT (OUTPATIENT)
Dept: CARDIAC REHAB | Facility: HOSPITAL | Age: 79
End: 2019-09-24

## 2019-09-24 DIAGNOSIS — J44.9 COPD, SEVERE (HCC): Primary | ICD-10-CM

## 2019-09-25 ENCOUNTER — TRANSCRIBE ORDERS (OUTPATIENT)
Dept: CARDIAC REHAB | Facility: HOSPITAL | Age: 79
End: 2019-09-25

## 2019-09-25 DIAGNOSIS — J44.9 COPD, SEVERE (HCC): Primary | ICD-10-CM

## 2019-09-26 ENCOUNTER — OFFICE VISIT (OUTPATIENT)
Dept: CARDIAC REHAB | Facility: HOSPITAL | Age: 79
End: 2019-09-26

## 2019-09-26 DIAGNOSIS — J44.9 COPD, SEVERE (HCC): Primary | ICD-10-CM

## 2019-09-27 ENCOUNTER — TELEPHONE (OUTPATIENT)
Dept: FAMILY MEDICINE CLINIC | Facility: CLINIC | Age: 79
End: 2019-09-27

## 2019-09-27 ENCOUNTER — OFFICE VISIT (OUTPATIENT)
Dept: FAMILY MEDICINE CLINIC | Facility: CLINIC | Age: 79
End: 2019-09-27

## 2019-09-27 VITALS
SYSTOLIC BLOOD PRESSURE: 118 MMHG | BODY MASS INDEX: 30.8 KG/M2 | WEIGHT: 240 LBS | DIASTOLIC BLOOD PRESSURE: 62 MMHG | OXYGEN SATURATION: 95 % | HEIGHT: 74 IN | HEART RATE: 77 BPM | TEMPERATURE: 98.3 F

## 2019-09-27 DIAGNOSIS — J44.9 CHRONIC OBSTRUCTIVE PULMONARY DISEASE, UNSPECIFIED COPD TYPE (HCC): ICD-10-CM

## 2019-09-27 DIAGNOSIS — C32.9 LARYNGEAL CANCER (HCC): ICD-10-CM

## 2019-09-27 DIAGNOSIS — J30.9 ALLERGIC RHINITIS, UNSPECIFIED SEASONALITY, UNSPECIFIED TRIGGER: ICD-10-CM

## 2019-09-27 DIAGNOSIS — J45.20 MILD INTERMITTENT ASTHMA, UNSPECIFIED WHETHER COMPLICATED: ICD-10-CM

## 2019-09-27 DIAGNOSIS — Z12.5 PROSTATE CANCER SCREENING: ICD-10-CM

## 2019-09-27 DIAGNOSIS — J01.00 ACUTE MAXILLARY SINUSITIS, RECURRENCE NOT SPECIFIED: Primary | ICD-10-CM

## 2019-09-27 LAB
DEVELOPER EXPIRATION DATE: NORMAL
DEVELOPER LOT NUMBER: NORMAL
EXPIRATION DATE: NORMAL
FECAL OCCULT BLOOD SCREEN, POC: NEGATIVE
Lab: 2271
NEGATIVE CONTROL: NEGATIVE
POSITIVE CONTROL: POSITIVE
PSA SERPL-MCNC: 0.43 NG/ML (ref 0–4)

## 2019-09-27 PROCEDURE — 99214 OFFICE O/P EST MOD 30 MIN: CPT | Performed by: NURSE PRACTITIONER

## 2019-09-27 PROCEDURE — G0103 PSA SCREENING: HCPCS | Performed by: NURSE PRACTITIONER

## 2019-09-27 PROCEDURE — 36415 COLL VENOUS BLD VENIPUNCTURE: CPT | Performed by: NURSE PRACTITIONER

## 2019-09-27 RX ORDER — AZITHROMYCIN 250 MG/1
TABLET, FILM COATED ORAL
Qty: 6 TABLET | Refills: 0 | Status: SHIPPED | OUTPATIENT
Start: 2019-09-27 | End: 2020-07-14

## 2019-09-27 RX ORDER — ECHINACEA PURPUREA EXTRACT 125 MG
2 TABLET ORAL AS NEEDED
Qty: 4 EACH | Refills: 3 | Status: SHIPPED | OUTPATIENT
Start: 2019-09-27 | End: 2020-08-20 | Stop reason: SDUPTHER

## 2019-09-27 RX ORDER — RIVAROXABAN 20 MG/1
20 TABLET, FILM COATED ORAL
COMMUNITY
Start: 2019-09-01

## 2019-09-27 NOTE — PATIENT INSTRUCTIONS
Start zpack take as directed.   Cont inhalers, allergy meds,   papa and psa today, will call with results.   Cont f/u with specialists as scheduled.   [facility] will [facility] for review,   If symptoms persist 5-7 days call office.   Increase fluid intake, get plenty of rest.   Patient agrees with plan of care and understands instructions. Call if worsening symptoms or any problems or concerns.

## 2019-09-27 NOTE — PROGRESS NOTES
Subjective   Alessio Allen is a 79 y.o. male.     History of Present Illness   C/o sinus pressure, states symptoms started about 3 days ago, he has drainage, cough, drainage, denies fever at home, did notice some wheezing. Denies ear pain, sore throat. Uses trelegy daily, has not used nebulizer or albuterol, taking singular and zyrtec daily. Also uses saline nasal spray.   With hx of malignancy of supraglottis, seeing ENT at [facility], sees oncology Dr. Beltre [place], had chemo and radiation for laryngeal cancer, now finished with treatment. Sees oncology every 6 months, has repeat CT in about  1 week,   Sees pulm Dr. galicia, wearing home O2, 5-6L. He does not smoke. Hx of COPD,   Due for prostate exam, he would like psa today, UTD on labs. Recent labs reviewed from [facility].     The following portions of the patient's history were reviewed and updated as appropriate: allergies, current medications, past family history, past medical history, past social history, past surgical history and problem list.    Review of Systems   Constitutional: Negative for chills, diaphoresis and fever.   HENT: Positive for congestion, postnasal drip, rhinorrhea and sinus pressure. Negative for ear pain and sore throat.    Respiratory: Positive for cough. Negative for shortness of breath and wheezing.    Cardiovascular: Negative for chest pain.   Musculoskeletal: Negative for arthralgias and myalgias.   Allergic/Immunologic: Positive for environmental allergies.   Neurological: Negative for dizziness, light-headedness and headache.   All other systems reviewed and are negative.      Objective   Physical Exam   Constitutional: He is oriented to person, place, and time. He appears well-developed and well-nourished.   HENT:   Head: Normocephalic.   Right Ear: Tympanic membrane and ear canal normal.   Left Ear: Tympanic membrane and ear canal normal.   Nose: Mucosal edema present. Right sinus exhibits maxillary sinus tenderness. Left sinus  exhibits maxillary sinus tenderness.   Mouth/Throat: Uvula is midline and mucous membranes are normal. Posterior oropharyngeal erythema present.   Eyes: Pupils are equal, round, and reactive to light.   Neck: Normal range of motion.   Cardiovascular: Normal rate, regular rhythm and normal heart sounds.   Pulmonary/Chest: Breath sounds normal. No respiratory distress. He has no decreased breath sounds. He has no wheezes. He has no rhonchi. He has no rales.   Genitourinary: Prostate normal. Rectal exam shows no mass and guaiac negative stool.   Musculoskeletal: Normal range of motion.   Lymphadenopathy:     He has cervical adenopathy.   Neurological: He is alert and oriented to person, place, and time.   Skin: Skin is warm and dry.   Psychiatric: He has a normal mood and affect. His behavior is normal.   Nursing note and vitals reviewed.        Assessment/Plan   Alessio was seen today for sinus problem and prostate check.    Diagnoses and all orders for this visit:    Acute maxillary sinusitis, recurrence not specified  -     POCT occult blood x 1 stool    Chronic obstructive pulmonary disease, unspecified COPD type (CMS/HCC)  -     POCT occult blood x 1 stool    Allergic rhinitis, unspecified seasonality, unspecified trigger  -     POCT occult blood x 1 stool    Mild intermittent asthma, unspecified whether complicated  -     POCT occult blood x 1 stool    Laryngeal cancer (CMS/HCC)  -     POCT occult blood x 1 stool    Prostate cancer screening  -     PSA Screen  -     POCT occult blood x 1 stool    Other orders  -     azithromycin (ZITHROMAX) 250 MG tablet; Take 2 tablets the first day, then 1 tablet daily for 4 days.      Start zpack take as directed.   Cont inhalers, allergy meds,   papa and psa today, will call with results.   Cont f/u with specialists as scheduled.   [facility],   If symptoms persist 5-7 days call office.   Increase fluid intake, get plenty of rest.   Patient  agrees with plan of care and understands instructions. Call if worsening symptoms or any problems or concerns.

## 2019-09-28 ENCOUNTER — OFFICE VISIT (OUTPATIENT)
Dept: CARDIAC REHAB | Facility: HOSPITAL | Age: 79
End: 2019-09-28

## 2019-09-28 DIAGNOSIS — J44.9 COPD, SEVERE (HCC): Primary | ICD-10-CM

## 2019-10-01 ENCOUNTER — OFFICE VISIT (OUTPATIENT)
Dept: CARDIAC REHAB | Facility: HOSPITAL | Age: 79
End: 2019-10-01

## 2019-10-01 DIAGNOSIS — J44.9 COPD, SEVERE (HCC): Primary | ICD-10-CM

## 2019-10-03 ENCOUNTER — OFFICE VISIT (OUTPATIENT)
Dept: CARDIAC REHAB | Facility: HOSPITAL | Age: 79
End: 2019-10-03

## 2019-10-03 DIAGNOSIS — J44.9 COPD, SEVERE (HCC): Primary | ICD-10-CM

## 2019-10-05 ENCOUNTER — OFFICE VISIT (OUTPATIENT)
Dept: CARDIAC REHAB | Facility: HOSPITAL | Age: 79
End: 2019-10-05

## 2019-10-05 DIAGNOSIS — J44.9 COPD, SEVERE (HCC): Primary | ICD-10-CM

## 2019-10-08 ENCOUNTER — OFFICE VISIT (OUTPATIENT)
Dept: CARDIAC REHAB | Facility: HOSPITAL | Age: 79
End: 2019-10-08

## 2019-10-08 DIAGNOSIS — J44.9 COPD, SEVERE (HCC): Primary | ICD-10-CM

## 2019-10-10 ENCOUNTER — OFFICE VISIT (OUTPATIENT)
Dept: CARDIAC REHAB | Facility: HOSPITAL | Age: 79
End: 2019-10-10

## 2019-10-10 DIAGNOSIS — J44.9 COPD, SEVERE (HCC): Primary | ICD-10-CM

## 2019-10-12 ENCOUNTER — OFFICE VISIT (OUTPATIENT)
Dept: CARDIAC REHAB | Facility: HOSPITAL | Age: 79
End: 2019-10-12

## 2019-10-12 DIAGNOSIS — J44.9 COPD, SEVERE (HCC): Primary | ICD-10-CM

## 2019-10-15 ENCOUNTER — OFFICE VISIT (OUTPATIENT)
Dept: CARDIAC REHAB | Facility: HOSPITAL | Age: 79
End: 2019-10-15

## 2019-10-15 DIAGNOSIS — J44.9 COPD, SEVERE (HCC): Primary | ICD-10-CM

## 2019-10-16 ENCOUNTER — OFFICE VISIT (OUTPATIENT)
Dept: FAMILY MEDICINE CLINIC | Facility: CLINIC | Age: 79
End: 2019-10-16

## 2019-10-16 VITALS
SYSTOLIC BLOOD PRESSURE: 120 MMHG | OXYGEN SATURATION: 92 % | WEIGHT: 241 LBS | HEIGHT: 74 IN | TEMPERATURE: 97.9 F | BODY MASS INDEX: 30.93 KG/M2 | HEART RATE: 86 BPM | DIASTOLIC BLOOD PRESSURE: 68 MMHG

## 2019-10-16 DIAGNOSIS — H61.22 LEFT EAR IMPACTED CERUMEN: Primary | ICD-10-CM

## 2019-10-16 DIAGNOSIS — R42 VERTIGO: ICD-10-CM

## 2019-10-16 PROCEDURE — 99213 OFFICE O/P EST LOW 20 MIN: CPT | Performed by: NURSE PRACTITIONER

## 2019-10-16 PROCEDURE — 69209 REMOVE IMPACTED EAR WAX UNI: CPT | Performed by: NURSE PRACTITIONER

## 2019-10-16 NOTE — PROGRESS NOTES
Leyda Allen is a 79 y.o. male.     History of Present Illness   C/o bilat ear pressure, decreased hearing, started about 2 days ago, has hx of vertigo, felt room spinning yesterday when lying down for pet scan, he does not have meclizine. He has had epley before, sees uofl ent. Sees Friday. Does have hx of allergies, taking zyrtec. Denies sinus pressure, does have cough, drainage. Given z[ack 2 weeks ago states sinus pressure improved.       The following portions of the patient's history were reviewed and updated as appropriate: allergies, current medications, past family history, past medical history, past social history, past surgical history and problem list.    Review of Systems   Constitutional: Negative for chills, diaphoresis and fever.   HENT: Positive for ear pain, hearing loss, postnasal drip and rhinorrhea. Negative for sinus pressure and sore throat.    Respiratory: Negative for cough and shortness of breath.    Cardiovascular: Negative for chest pain.   Musculoskeletal: Negative for arthralgias and myalgias.   Neurological: Negative for dizziness, light-headedness and headache.   All other systems reviewed and are negative.      Objective   Physical Exam   Constitutional: He is oriented to person, place, and time. He appears well-developed and well-nourished.   HENT:   Head: Normocephalic.   Right Ear: Tympanic membrane and ear canal normal.   Left Ear: An impacted cerumen is present.  Nose: Mucosal edema present.   Mouth/Throat: Uvula is midline and mucous membranes are normal. Posterior oropharyngeal erythema present.   Eyes: Pupils are equal, round, and reactive to light.   Neck: Normal range of motion.   Cardiovascular: Normal rate, regular rhythm and normal heart sounds.   Pulmonary/Chest: Effort normal and breath sounds normal.   Musculoskeletal: Normal range of motion.   Lymphadenopathy:     He has no cervical adenopathy.   Neurological: He is alert and oriented to person,  place, and time.   Skin: Skin is warm and dry.   Psychiatric: He has a normal mood and affect. His behavior is normal.   Nursing note and vitals reviewed.    Left TM WNL after lavage.     Assessment/Plan   Alessio was seen today for ear fullness and follow-up.    Diagnoses and all orders for this visit:    Left ear impacted cerumen    Vertigo        Left ear lavage today,   He has ent appt Friday and will f/u for possible epley.   He was advised to make f/u with oncology for feeding tube, keep clean and dry, may try neosporin to area.   Increase fluid intake, get plenty of rest.   Patient agrees with plan of care and understands instructions. Call if worsening symptoms or any problems or concerns.

## 2019-10-16 NOTE — PATIENT INSTRUCTIONS
Polycythemia Vera  Polycythemia vera (PV), or myeloproliferative disease, is a form of blood cancer in which the bone marrow makes too many (overproduces) red blood cells. The bone marrow may also make too many clotting cells (platelets) and white blood cells. Bone marrow is the spongy center of bones where blood cells are produced. Sometimes, there may be an overproduction of blood cells in the liver and spleen, causing those organs to become enlarged. Additionally, people who have PV are at a higher risk for stroke or heart attack because their blood may clot more easily. PV is a long-term disease.  What are the causes?  Almost all people who have PV have an abnormal gene (genetic mutation) that causes changes in the way that the bone marrow makes blood cells. This gene, which is called JAK2, is not passed along from parent to child (is not hereditary). It is not known what triggers the genetic mutation that causes the body to produce too many red blood cells.  What increases the risk?  This condition is more likely to develop in:  · Males.  · People who are 60 years of age or older.  What are the signs or symptoms?  You may not have any symptoms in the early stage of PV. When symptoms develop, they may include:  · Shortness of breath.  · Dizziness.  · Hot and flushed skin.  · Itchy skin.  · Sweats, especially night sweats.  · Headache.  · Tiredness.  · Ringing in the ears.  · Blurred vision or blind spots.  · Bone pain.  · Weight loss.  · Fever.  · Blood-tinged vomit or bowel movements.  How is this diagnosed?  This condition may be diagnosed during a routine physical exam if you have a blood test called a complete blood count (CBC). Your health care provider also may suspect PV if you have symptoms. During the physical exam, your provider may find that you have an enlarged liver or spleen. You may also have tests to confirm the diagnosis. These may include:  · A procedure to remove a sample of bone marrow for  testing (bone marrow biopsy).  · Blood tests to check for:  ? The JAK2 gene.  ? Low levels of a hormone that helps to regulate blood production (erythropoietin).  How is this treated?  There is no cure for PV, but treatment can help to control the disease. There are several types of treatment. No single treatment works for everyone. You will need to work with a blood cancer specialist (hematologist) to find the treatment that is best for you. Options include:  · Periodically having some blood removed with a needle (drawn) to lower the number of red blood cells (phlebotomy).  · Medicine. Your health care provider may recommend:  ? Low-dose aspirin to lower your risk for blood clots.  ? A medicine to reduce red blood cell production (hydroxyurea).  ? A medicine to lower the number of red blood cells (interferon).  ? A medicine that slows down the effects of JAK2 (ruxolitinib).    Follow these instructions at home:  · Take over-the-counter and prescription medicines only as told by your health care provider.  · Return to your normal activities as told by your health care provider. Ask your health care provider what activities are safe for you.  · Do not use tobacco products, including cigarettes, chewing tobacco, or e-cigarettes. If you need help quitting, ask your health care provider.  · Keep all follow-up visits as told by your health care provider. This is important.  Contact a health care provider if:  · You have side effects from your medicines.  · Your symptoms change or get worse at home.  · You have blood in your stool or you vomit blood.  Get help right away if:  · You have sudden and severe pain in your abdomen.  · You have chest pain or difficulty breathing.  · You have signs of stroke, such as:  ? Sudden numbness.  ? Weakness of your face or arm.  ? Confusion.  ? Difficulty speaking or understanding speech.  These symptoms may represent a serious problem that is an emergency. Do not wait to see if the  symptoms will go away. Get medical help right away. Call your local emergency services (911 in the [place] Do not drive yourself to the hospital.  This information is not intended to replace advice given to you by your health care provider. Make sure you discuss any questions you have with your health care provider.  Document Released: 09/12/2002 Document Revised: 05/25/2017 Document Reviewed: 06/29/2016  @[employer] Patient Education © 2019 @[employer]          Left ear lavage today,   He has ent appt Friday and will f/u for possible epley.   He was advised to make f/u with oncology for feeding tube, keep clean and dry, may try neosporin to area.   Increase fluid intake, get plenty of rest.   Patient agrees with plan of care and understands instructions. Call if worsening symptoms or any problems or concerns.

## 2019-10-17 ENCOUNTER — OFFICE VISIT (OUTPATIENT)
Dept: CARDIAC REHAB | Facility: HOSPITAL | Age: 79
End: 2019-10-17

## 2019-10-17 DIAGNOSIS — J44.9 COPD, SEVERE (HCC): Primary | ICD-10-CM

## 2019-10-19 ENCOUNTER — OFFICE VISIT (OUTPATIENT)
Dept: CARDIAC REHAB | Facility: HOSPITAL | Age: 79
End: 2019-10-19

## 2019-10-19 DIAGNOSIS — J44.9 COPD, SEVERE (HCC): Primary | ICD-10-CM

## 2019-10-21 ENCOUNTER — TELEPHONE (OUTPATIENT)
Dept: FAMILY MEDICINE CLINIC | Facility: CLINIC | Age: 79
End: 2019-10-21

## 2019-10-21 RX ORDER — TADALAFIL 20 MG/1
40 TABLET ORAL
COMMUNITY

## 2019-10-21 NOTE — TELEPHONE ENCOUNTER
Spoke with patient, now taking adcirca for pulm HTN per pulm, added to med list today,getting cardura [facility], he states he took both medications yesterday and /65, he will space out medications tonight, monitor BP, if BP low he will f/u with pulm or call [facility], call with problems. Can also consider stopped cardura.

## 2019-10-21 NOTE — TELEPHONE ENCOUNTER
Called pt to get more info, really did not understand what he was talking about. He wants to know if he can take the cialis 4 hours before or after his other bp med said together bp is 102/65.

## 2019-10-21 NOTE — TELEPHONE ENCOUNTER
He is taking that for pulmonary hypertension and should call his pulmonologist about this medication.

## 2019-10-21 NOTE — TELEPHONE ENCOUNTER
Pt wants to know if it is ok to take the doxazosin and the adcirca 4 hours apart, they are both taken at night time but he wants to do one at 530 and one at 930 but needs to know if that's ok, he spoke to pulmonary office they told him to contact you

## 2019-10-21 NOTE — TELEPHONE ENCOUNTER
Pt states its not cialis it is adcirca 20 mg and wants to know if it treats BPH if so can he stop taking the other med for BPH???

## 2019-10-22 ENCOUNTER — OFFICE VISIT (OUTPATIENT)
Dept: CARDIAC REHAB | Facility: HOSPITAL | Age: 79
End: 2019-10-22

## 2019-10-22 DIAGNOSIS — J44.9 COPD, SEVERE (HCC): Primary | ICD-10-CM

## 2019-10-24 ENCOUNTER — OFFICE VISIT (OUTPATIENT)
Dept: CARDIAC REHAB | Facility: HOSPITAL | Age: 79
End: 2019-10-24

## 2019-10-24 DIAGNOSIS — J44.9 COPD, SEVERE (HCC): Primary | ICD-10-CM

## 2019-10-26 ENCOUNTER — OFFICE VISIT (OUTPATIENT)
Dept: CARDIAC REHAB | Facility: HOSPITAL | Age: 79
End: 2019-10-26

## 2019-10-26 DIAGNOSIS — J44.9 COPD, SEVERE (HCC): Primary | ICD-10-CM

## 2019-10-29 ENCOUNTER — OFFICE VISIT (OUTPATIENT)
Dept: CARDIAC REHAB | Facility: HOSPITAL | Age: 79
End: 2019-10-29

## 2019-10-29 DIAGNOSIS — J44.9 COPD, SEVERE (HCC): Primary | ICD-10-CM

## 2019-10-31 ENCOUNTER — OFFICE VISIT (OUTPATIENT)
Dept: CARDIAC REHAB | Facility: HOSPITAL | Age: 79
End: 2019-10-31

## 2019-10-31 DIAGNOSIS — J44.9 COPD, SEVERE (HCC): Primary | ICD-10-CM

## 2019-11-02 ENCOUNTER — OFFICE VISIT (OUTPATIENT)
Dept: CARDIAC REHAB | Facility: HOSPITAL | Age: 79
End: 2019-11-02

## 2019-11-02 DIAGNOSIS — J44.9 COPD, SEVERE (HCC): Primary | ICD-10-CM

## 2019-11-05 ENCOUNTER — OFFICE VISIT (OUTPATIENT)
Dept: CARDIAC REHAB | Facility: HOSPITAL | Age: 79
End: 2019-11-05

## 2019-11-05 DIAGNOSIS — J44.9 COPD, SEVERE (HCC): Primary | ICD-10-CM

## 2019-11-07 ENCOUNTER — OFFICE VISIT (OUTPATIENT)
Dept: CARDIAC REHAB | Facility: HOSPITAL | Age: 79
End: 2019-11-07

## 2019-11-07 DIAGNOSIS — J44.9 COPD, SEVERE (HCC): Primary | ICD-10-CM

## 2019-11-09 ENCOUNTER — OFFICE VISIT (OUTPATIENT)
Dept: CARDIAC REHAB | Facility: HOSPITAL | Age: 79
End: 2019-11-09

## 2019-11-09 DIAGNOSIS — J44.9 COPD, SEVERE (HCC): Primary | ICD-10-CM

## 2019-11-13 ENCOUNTER — TELEPHONE (OUTPATIENT)
Dept: FAMILY MEDICINE CLINIC | Facility: CLINIC | Age: 79
End: 2019-11-13

## 2019-11-14 ENCOUNTER — TELEPHONE (OUTPATIENT)
Dept: FAMILY MEDICINE CLINIC | Facility: CLINIC | Age: 79
End: 2019-11-14

## 2019-11-14 ENCOUNTER — OFFICE VISIT (OUTPATIENT)
Dept: FAMILY MEDICINE CLINIC | Facility: CLINIC | Age: 79
End: 2019-11-14

## 2019-11-14 VITALS
SYSTOLIC BLOOD PRESSURE: 118 MMHG | OXYGEN SATURATION: 95 % | WEIGHT: 240 LBS | HEIGHT: 74 IN | DIASTOLIC BLOOD PRESSURE: 62 MMHG | TEMPERATURE: 97.8 F | BODY MASS INDEX: 30.8 KG/M2 | HEART RATE: 82 BPM

## 2019-11-14 DIAGNOSIS — Z00.00 MEDICARE ANNUAL WELLNESS VISIT, SUBSEQUENT: Primary | ICD-10-CM

## 2019-11-14 DIAGNOSIS — M54.50 ACUTE BILATERAL LOW BACK PAIN WITHOUT SCIATICA: ICD-10-CM

## 2019-11-14 PROCEDURE — G0439 PPPS, SUBSEQ VISIT: HCPCS | Performed by: NURSE PRACTITIONER

## 2019-11-14 PROCEDURE — 99213 OFFICE O/P EST LOW 20 MIN: CPT | Performed by: NURSE PRACTITIONER

## 2019-11-14 RX ORDER — METHYLPREDNISOLONE 4 MG/1
TABLET ORAL
Qty: 1 EACH | Refills: 0 | Status: SHIPPED | OUTPATIENT
Start: 2019-11-14 | End: 2019-11-15 | Stop reason: SDUPTHER

## 2019-11-14 NOTE — TELEPHONE ENCOUNTER
PT WANTS HIS RX THAT YOU SENT OVER CANCELLED AND SENT TO [facility] ON [address]. PLEASE CALL PT WHEN THIS HAS BEEN DONE

## 2019-11-14 NOTE — PATIENT INSTRUCTIONS
Medicare Wellness  Personal Prevention Plan of Service     Date of Office Visit:  2019  Encounter Provider:  DERRELL Rose  Place of Service:  [facility]  Patient Name: Alessio Allen  :  1940    As part of the Medicare Wellness portion of your visit today, we are providing you with this personalized preventive plan of services (PPPS). This plan is based upon recommendations of the [place] Preventive Services Task Force (USPSTF) and the Advisory Committee on Immunization Practices (ACIP).    This lists the preventive care services that should be considered, and provides dates of when you are due. Items listed as completed are up-to-date and do not require any further intervention.    Health Maintenance   Topic Date Due   • URINE MICROALBUMIN  1940   • TDAP/TD VACCINES (1 - Tdap) 1960   • ZOSTER VACCINE (2 of 3) 2012   • HEMOGLOBIN A1C  2018   • LIPID PANEL  2019   • DIABETIC EYE EXAM  2020   • MEDICARE ANNUAL WELLNESS  2020   • COLONOSCOPY  2026   • INFLUENZA VACCINE  Completed   • PNEUMOCOCCAL VACCINES (65+ LOW/MEDIUM RISK)  Completed       No orders of the defined types were placed in this encounter.      Return if symptoms worsen or fail to improve.      Trial medrol pack take as directed, prev tolerated, SE explained, he will check with his ENT before taking,   He will check with pulm rehab about exercises, he will let me know if he needs new PT referral.   encouraged heat and ice alternating on 20 min off 1 hour.   Deferred xray today   If symptoms persist 5-7 days call office   Increase fluid intake, get plenty of rest.   Patient agrees with plan of care and understands instructions. Call if worsening symptoms or any problems or concerns.

## 2019-11-14 NOTE — PROGRESS NOTES
The ABCs of the Annual Wellness Visit  Subsequent Medicare Wellness Visit    Chief Complaint   Patient presents with   • Back Pain     lower back at the hip area all the way across       Subjective   History of Present Illness:  Alessio Allen is a 79 y.o. male who presents for a Subsequent Medicare Wellness Visit.    HEALTH RISK ASSESSMENT    Recent Hospitalizations:  Recently treated at the following:  [facility]    Current Medical Providers:  Patient Care Team:  Alan Santana MD as PCP - General (Family Medicine)  Isael Beltre MD as PCP - Claims Attributed  Rao Maguire MD as Consulting Physician (Hematology and Oncology)  Alan Santana MD as Referring Physician (Family Medicine)  Gaurav Merrill MD as Consulting Physician (Otolaryngology)  Edgardo Brown MD (Otolaryngology)  Isael Beltre MD as Consulting Physician (Hematology and Oncology)  Herminia Salas MD as Consulting Physician (Internal Medicine)    Smoking Status:  Social History     Tobacco Use   Smoking Status Former Smoker   • Packs/day: 1.50   • Years: 45.00   • Pack years: 67.50   • Types: Cigarettes   • Last attempt to quit: 1/3/2001   • Years since quittin.8   Smokeless Tobacco Never Used       Alcohol Consumption:  Social History     Substance and Sexual Activity   Alcohol Use No       Depression Screen:   PHQ-2/PHQ-9 Depression Screening 2019   Little interest or pleasure in doing things 0   Feeling down, depressed, or hopeless 0   Trouble falling or staying asleep, or sleeping too much 0   Feeling tired or having little energy 0   Poor appetite or overeating 0   Feeling bad about yourself - or that you are a failure or have let yourself or your family down 0   Trouble concentrating on things, such as reading the newspaper or watching television 0   Moving or speaking so slowly that other people could have noticed. Or the opposite - being so fidgety or restless that you have been  moving around a lot more than usual 0   Thoughts that you would be better off dead, or of hurting yourself in some way 0   Total Score 0   If you checked off any problems, how difficult have these problems made it for you to do your work, take care of things at home, or get along with other people? Not difficult at all       Fall Risk Screen:  [place] Fall Risk Assessment has not been completed.    Health Habits and Functional and Cognitive Screening:  Functional & Cognitive Status 11/14/2019   Do you have difficulty preparing food and eating? No   Do you have difficulty bathing yourself, getting dressed or grooming yourself? No   Do you have difficulty using the toilet? No   Do you have difficulty moving around from place to place? No   Do you have trouble with steps or getting out of a bed or a chair? No   Current Diet Well Balanced Diet   Dental Exam Up to date   Eye Exam Up to date   Exercise (times per week) 3 times per week   Current Exercise Activities Include Cardiovasular Workout on Exercise Equipment   Do you need help using the phone?  No   Are you deaf or do you have serious difficulty hearing?  No   Do you need help with transportation? No   Do you need help shopping? No   Do you need help preparing meals?  No   Do you need help with housework?  Yes   Do you need help with laundry? Yes   Do you need help taking your medications? No   Do you need help managing money? No   Do you ever drive or ride in a car without wearing a seat belt? No   Have you felt unusual stress, anger or loneliness in the last month? No   Who do you live with? Spouse   If you need help, do you have trouble finding someone available to you? No   Have you been bothered in the last four weeks by sexual problems? No   Do you have difficulty concentrating, remembering or making decisions? No         Does the patient have evidence of cognitive impairment? No    Asprin use counseling:Contraindicated from taking ASA    Age-appropriate  Screening Schedule:  Refer to the list below for future screening recommendations based on patient's age, sex and/or medical conditions. Orders for these recommended tests are listed in the plan section. The patient has been provided with a written plan.    Health Maintenance   Topic Date Due   • URINE MICROALBUMIN  1940   • TDAP/TD VACCINES (1 - Tdap) 06/12/1960   • ZOSTER VACCINE (2 of 3) 02/28/2012   • HEMOGLOBIN A1C  07/03/2018   • LIPID PANEL  01/03/2019   • DIABETIC EYE EXAM  04/30/2020   • COLONOSCOPY  12/05/2026   • INFLUENZA VACCINE  Completed   • PNEUMOCOCCAL VACCINES (65+ LOW/MEDIUM RISK)  Completed          The following portions of the patient's history were reviewed and updated as appropriate: allergies, current medications, past family history, past medical history, past social history, past surgical history and problem list.    Outpatient Medications Prior to Visit   Medication Sig Dispense Refill   • acetaminophen (TYLENOL) 325 MG tablet Take 2 tablets by mouth Every 6 (Six) Hours As Needed (prn for pain). 720 tablet 2   • albuterol (PROVENTIL HFA;VENTOLIN HFA) 108 (90 Base) MCG/ACT inhaler Inhale 2 puffs Every 6 (Six) Hours As Needed for Wheezing or Shortness of Air. 1 inhaler 11   • aspirin 81 MG tablet Take 81 mg by mouth Daily. HOLD PER MD INSTR     • budesonide (PULMICORT) 0.5 MG/2ML nebulizer solution Take 2 mL by nebulization Daily. 30 mL 0   • Cetirizine HCl (ZYRTEC ALLERGY) 10 MG capsule Take 1 capsule by mouth Every Night.     • doxazosin (CARDURA) 2 MG tablet Take 2 mg by mouth Every Night.     • ferrous sulfate 325 (65 FE) MG tablet Take 1 tablet by mouth Daily With Breakfast. 90 tablet 3   • fluocinolone (SYNALAR) 0.01 % external solution Apply  topically As Needed (prn). 60 mL 1   • furosemide (LASIX) 20 MG tablet Take 2 tablets by mouth 2 (Two) Times a Day. 120 tablet 3   • hydrocortisone 1 % cream Apply 1 application topically to the appropriate area as directed 2 (Two) Times a  Day As Needed for Rash. Chest     • ipratropium-albuterol (DUO-NEB) 0.5-2.5 mg/3 ml nebulizer Take 3 mL by nebulization 4 (Four) Times a Day. 360 mL 0   • montelukast (SINGULAIR) 10 MG tablet Take 10 mg by mouth Every Night.     • olopatadine (PATANOL) 0.1 % ophthalmic solution Administer 1 drop to both eyes Daily. 3 each 12   • pantoprazole (PROTONIX) 40 MG pack packet Take 40 mg by mouth Every Morning Before Breakfast. PEG tube     • pilocarpine (SALAGEN) 5 MG tablet Take 5 mg by mouth 3 (Three) Times a Day.     • polyvinyl alcohol (LIQUIFILM) 1.4 % ophthalmic solution 1 drop As Needed for Dry Eyes.     • potassium chloride (K-DUR) 10 MEQ CR tablet Take 2 tablets by mouth Daily. 180 tablet 3   • pramoxine-hydrocortisone 1-1 % rectal cream Insert 1 application into the rectum 2 (Two) Times a Day As Needed for Hemorrhoids.     • pregabalin (LYRICA) 75 MG capsule Take 75 mg by mouth 2 (two) times a day.     • pyridoxine (VITAMIN B-6) 50 MG tablet Take  by mouth daily.     • simvastatin (ZOCOR) 20 MG tablet Take 0.5 tablets by mouth daily.     • sodium chloride (OCEAN NASAL SPRAY) 0.65 % nasal spray 2 sprays into the nostril(s) as directed by provider As Needed for Congestion (qid prn). May refill q 21 days 4 each 3   • sodium chloride 7 % nebulizer solution nebulizer solution Take 4 mL by nebulization 4 (Four) Times a Day for 180 days. 480 mL 5   • Spacer/Aero-Holding Chambers device Give spacer to use with his inhalers whichever brand he has received is fine 2 each 3   • tadalafil (ADCIRCA) 20 MG tablet tablet Take 40 mg by mouth Daily.     • TRELEGY ELLIPTA 100-62.5-25 MCG/INH aerosol powder       • XARELTO 20 MG tablet      • azithromycin (ZITHROMAX) 250 MG tablet Take 2 tablets the first day, then 1 tablet daily for 4 days. 6 tablet 0     No facility-administered medications prior to visit.        Patient Active Problem List   Diagnosis   • A-fib (CMS/HCC)   • Dyslipidemia   • BP (high blood pressure)   •  "Neuropathy   • Hypertension   • COPD (chronic obstructive pulmonary disease) (CMS/HCC)   • BPH (benign prostatic hypertrophy)   • Atrial fibrillation (CMS/HCC)   • Asthma   • Hypoxia   • Pneumonia   • Chronic anticoagulation   • Pneumonia of both lungs due to infectious organism   • Hyponatremia   • COPD exacerbation (CMS/HCC)   • Acute on chronic respiratory failure with hypoxia (CMS/HCC)   • Pneumonia of both lower lobes due to infectious organism (CMS/HCC)   • Acute on chronic diastolic heart failure (CMS/HCC)   • IPF (idiopathic pulmonary fibrosis) (CMS/HCC)   • Acute respiratory failure with hypoxia (CMS/HCC)   • Attention to G-tube (CMS/HCC)   • Massive hemoptysis   • Laryngeal cancer (CMS/HCC)       Advanced Care Planning:  Patient has an advance directive - a copy has not been provided. Have asked the patient to send this to us to add to record    Review of Systems    Compared to one year ago, the patient feels his physical health is worse.  Compared to one year ago, the patient feels his mental health is the same.    Reviewed chart for potential of high risk medication in the elderly: yes  Reviewed chart for potential of harmful drug interactions in the elderly:yes    Objective         Vitals:    11/14/19 1326   BP: 118/62   BP Location: Left arm   Patient Position: Sitting   Cuff Size: Adult   Pulse: 82   Temp: 97.8 °F (36.6 °C)   TempSrc: Oral   SpO2: 95%   Weight: 109 kg (240 lb)   Height: 188 cm (74\")   PainSc: 10-Worst pain ever  Comment: when moving       Body mass index is 30.81 kg/m².  Discussed the patient's BMI with him. The BMI is above average; BMI management plan is completed.    Physical Exam          Assessment/Plan   Medicare Risks and Personalized Health Plan  CMS Preventative Services Quick Reference  Advance Directive Discussion  Colon Cancer Screening  Polypharmacy  Prostate Cancer Screening     The above risks/problems have been discussed with the patient.  Pertinent information has " been shared with the patient in the After Visit Summary.  Follow up plans and orders are seen below in the Assessment/Plan Section.    Diagnoses and all orders for this visit:    1. Acute bilateral low back pain without sciatica (Primary)    Other orders  -     methylPREDNISolone (MEDROL, WINTER,) 4 MG tablet; Take as directed on package instructions.  Dispense: 1 each; Refill: 0      Follow Up:  No Follow-up on file.     An After Visit Summary and PPPS were given to the patient.      [facility], he has requested for records to sent here for review.

## 2019-11-14 NOTE — PROGRESS NOTES
Subjective   Alessio Allen is a 79 y.o. male.     History of Present Illness   C/o low back pain, started about 5-6 days ago, he went to pulmonary rehab, denies any injury. States he felt pain across low back when he got home, getting worse, states pain comes and goes, worse with certain movements, taking tylenol for pain. Denies sciatica, denies bladder and bowel incontinence. He does not see back specialist. Denies any lifting injury. He states breathing is ok. He has pain with ambulation. Denies dysuria, frequency, hematuria. States he does upper body weight exercises, nustep, treadmill, and sitting to stand exercises at [facility] rehab.     The following portions of the patient's history were reviewed and updated as appropriate: allergies, current medications, past family history, past medical history, past social history, past surgical history and problem list.    Review of Systems   Constitutional: Negative for chills, diaphoresis and fever.   Respiratory: Negative for cough and shortness of breath.    Cardiovascular: Negative for chest pain.   Musculoskeletal: Positive for back pain and myalgias. Negative for arthralgias.   Neurological: Negative for dizziness, light-headedness and headache.   All other systems reviewed and are negative.      Objective   Physical Exam   Constitutional: He is oriented to person, place, and time. He appears well-developed and well-nourished.   HENT:   Head: Normocephalic.   Eyes: Pupils are equal, round, and reactive to light.   Cardiovascular: Normal rate, regular rhythm, normal heart sounds and intact distal pulses.   Pulmonary/Chest: Effort normal and breath sounds normal.   Musculoskeletal:        Lumbar back: He exhibits decreased range of motion. He exhibits no tenderness, no bony tenderness, no pain and no spasm.   Neurological: He is alert and oriented to person, place, and time.   Skin: Skin is warm and dry.   Psychiatric: He has a normal mood and affect. His behavior is  normal.   Nursing note and vitals reviewed.        Assessment/Plan   Alessio was seen today for back pain.    Diagnoses and all orders for this visit:    Medicare annual wellness visit, subsequent    Acute bilateral low back pain without sciatica    Other orders  -     methylPREDNISolone (MEDROL, WINTER,) 4 MG tablet; Take as directed on package instructions.    Trial medrol pack take as directed, prev tolerated, SE explained, he will check with his ENT before taking,   He will check with pulm rehab about exercises, he will let me know if he needs new PT referral.   encouraged heat and ice alternating on 20 min off 1 hour.   Deferred xray today   If symptoms persist 5-7 days call office   Increase fluid intake, get plenty of rest.   Patient agrees with plan of care and understands instructions. Call if worsening symptoms or any problems or concerns.

## 2019-11-15 RX ORDER — METHYLPREDNISOLONE 4 MG/1
TABLET ORAL
Qty: 1 EACH | Refills: 0 | Status: SHIPPED | OUTPATIENT
Start: 2019-11-15 | End: 2020-07-14

## 2019-11-20 ENCOUNTER — TELEPHONE (OUTPATIENT)
Dept: FAMILY MEDICINE CLINIC | Facility: CLINIC | Age: 79
End: 2019-11-20

## 2019-11-20 DIAGNOSIS — E03.9 HYPOTHYROIDISM, UNSPECIFIED TYPE: Primary | ICD-10-CM

## 2019-11-20 NOTE — TELEPHONE ENCOUNTER
Patient informed he will come by for TSH. Previous labs were done in August, he will come by for TSH

## 2019-11-21 ENCOUNTER — LAB (OUTPATIENT)
Dept: FAMILY MEDICINE CLINIC | Facility: CLINIC | Age: 79
End: 2019-11-21

## 2019-11-21 DIAGNOSIS — E03.9 HYPOTHYROIDISM, UNSPECIFIED TYPE: ICD-10-CM

## 2019-11-21 LAB — TSH SERPL DL<=0.05 MIU/L-ACNC: 3.74 UIU/ML (ref 0.27–4.2)

## 2019-11-21 PROCEDURE — 84443 ASSAY THYROID STIM HORMONE: CPT | Performed by: FAMILY MEDICINE

## 2019-11-21 PROCEDURE — 36415 COLL VENOUS BLD VENIPUNCTURE: CPT | Performed by: FAMILY MEDICINE

## 2019-11-23 ENCOUNTER — OFFICE VISIT (OUTPATIENT)
Dept: CARDIAC REHAB | Facility: HOSPITAL | Age: 79
End: 2019-11-23

## 2019-11-23 DIAGNOSIS — J44.9 COPD, SEVERE (HCC): Primary | ICD-10-CM

## 2019-11-26 ENCOUNTER — OFFICE VISIT (OUTPATIENT)
Dept: CARDIAC REHAB | Facility: HOSPITAL | Age: 79
End: 2019-11-26

## 2019-11-26 DIAGNOSIS — J44.9 COPD, SEVERE (HCC): Primary | ICD-10-CM

## 2019-11-27 NOTE — PROGRESS NOTES
Discharge Planning Assessment  [facility]     Patient Name: Alessio Allen  MRN: [id number]  Today's Date: 2/8/2018    Admit Date: 2/7/2018          Discharge Needs Assessment       02/08/18 1053    Living Environment    Lives With significant other    Living Arrangements house    Home Accessibility no concerns    Type of Financial/Environmental Concern none    Transportation Available car;family or friend will provide    Living Environment    Provides Primary Care For no one    Quality Of Family Relationships supportive    Discharge Needs Assessment    Readmission Within The Last 30 Days no previous admission in last 30 days    Equipment Currently Used at Home wheelchair;cane, straight            Discharge Plan       02/08/18 1101    Case Management/Social Work Plan    Plan Home with [facility]    Patient/Family In Agreement With Plan yes    Additional Comments IMM 2/7/2018.  Met with patient at bedside, face sheet verified.  Prior to admission patient was independent with ADL's, with some assistance from her significant other Allison ([phone]).  Patient denies issues obtaining or affording medications.  Patient is on continuous oxygen at home, supplier is [employer].  Prior to admission, he has been having [facility] home [facility], to do twice-weekly dressing changes, and anticipate therapy starting soon.  Discussed discharge plans, plan to return home with [facility].  Call to [facility], left message regarding patient request to resume services. Will continue to monitor for discharge needs.         Discharge Placement     Facility/Agency Request Status Selected? Address Phone Number Fax Number    [facility] Pending - Request Sent     [facility] [address] [place] [id number] [phone]                Demographic Summary       02/08/18 1050    Referral Information    Admission Type inpatient    Arrived From admitted as an inpatient    Referral Source admission list    Reason For Consult discharge planning     Record Reviewed clinical discipline documentation;history and physical;medical record;patient profile    Primary Care Physician Information    Name Alan Santana MD     Phone [phone]            Functional Status       02/08/18 1051    Functional Status Current    Ambulation 1-->assistive equipment    Transferring 1-->assistive equipment    Toileting 0-->independent    Bathing 0-->independent    Dressing 0-->independent    Eating 0-->independent    Communication 0-->understands/communicates without difficulty    Functional Status Prior    Ambulation 1-->assistive equipment    Transferring 0-->independent    Toileting 0-->independent    Bathing 0-->independent    Dressing 0-->independent    Eating 0-->independent    Communication 0-->understands/communicates without difficulty    Activity Tolerance    Current Activity Limitations none    Usual Activity Tolerance good    Current Activity Tolerance moderate            Psychosocial     None            Abuse/Neglect     None            Legal       02/08/18 1052    Legal    Legal Comments Partner Allison felix [phone], knows his wishes.            Substance Abuse     None            Patient Forms     None          Lindsey Diaz RN     [Negative] : Heme/Lymph

## 2019-12-02 ENCOUNTER — TELEPHONE (OUTPATIENT)
Dept: FAMILY MEDICINE CLINIC | Facility: CLINIC | Age: 79
End: 2019-12-02

## 2019-12-17 ENCOUNTER — OFFICE VISIT (OUTPATIENT)
Dept: CARDIAC REHAB | Facility: HOSPITAL | Age: 79
End: 2019-12-17

## 2019-12-17 DIAGNOSIS — J44.9 COPD, SEVERE (HCC): Primary | ICD-10-CM

## 2019-12-19 ENCOUNTER — OFFICE VISIT (OUTPATIENT)
Dept: CARDIAC REHAB | Facility: HOSPITAL | Age: 79
End: 2019-12-19

## 2019-12-19 DIAGNOSIS — J44.9 COPD, SEVERE (HCC): Primary | ICD-10-CM

## 2019-12-21 ENCOUNTER — OFFICE VISIT (OUTPATIENT)
Dept: CARDIAC REHAB | Facility: HOSPITAL | Age: 79
End: 2019-12-21

## 2019-12-21 DIAGNOSIS — J44.9 COPD, SEVERE (HCC): Primary | ICD-10-CM

## 2019-12-27 ENCOUNTER — TELEPHONE (OUTPATIENT)
Dept: FAMILY MEDICINE CLINIC | Facility: CLINIC | Age: 79
End: 2019-12-27

## 2019-12-27 NOTE — TELEPHONE ENCOUNTER
Patient has arthritis in her back and has been using heating pad. Patient is on blood thinners so can't take certain otc meds. Patient was wanting to know about tramadol if that would help. Patient had this in the hospital a long time again.

## 2019-12-27 NOTE — TELEPHONE ENCOUNTER
I can put in ortho of pain management referral for his back pain if he would like, tramadol is controlled med. dont recommend for long term use.

## 2019-12-28 ENCOUNTER — OFFICE VISIT (OUTPATIENT)
Dept: CARDIAC REHAB | Facility: HOSPITAL | Age: 79
End: 2019-12-28

## 2019-12-28 DIAGNOSIS — J44.9 COPD, SEVERE (HCC): Primary | ICD-10-CM

## 2019-12-31 ENCOUNTER — OFFICE VISIT (OUTPATIENT)
Dept: CARDIAC REHAB | Facility: HOSPITAL | Age: 79
End: 2019-12-31

## 2019-12-31 DIAGNOSIS — J44.9 COPD, SEVERE (HCC): Primary | ICD-10-CM

## 2020-01-02 ENCOUNTER — OFFICE VISIT (OUTPATIENT)
Dept: CARDIAC REHAB | Facility: HOSPITAL | Age: 80
End: 2020-01-02

## 2020-01-02 DIAGNOSIS — J44.9 COPD, SEVERE (HCC): Primary | ICD-10-CM

## 2020-01-04 ENCOUNTER — OFFICE VISIT (OUTPATIENT)
Dept: CARDIAC REHAB | Facility: HOSPITAL | Age: 80
End: 2020-01-04

## 2020-01-04 DIAGNOSIS — J44.9 COPD, SEVERE (HCC): Primary | ICD-10-CM

## 2020-01-07 ENCOUNTER — OFFICE VISIT (OUTPATIENT)
Dept: CARDIAC REHAB | Facility: HOSPITAL | Age: 80
End: 2020-01-07

## 2020-01-07 DIAGNOSIS — J44.9 COPD, SEVERE (HCC): Primary | ICD-10-CM

## 2020-01-09 ENCOUNTER — OFFICE VISIT (OUTPATIENT)
Dept: CARDIAC REHAB | Facility: HOSPITAL | Age: 80
End: 2020-01-09

## 2020-01-09 DIAGNOSIS — J44.9 COPD, SEVERE (HCC): Primary | ICD-10-CM

## 2020-01-10 RX ORDER — ACETAMINOPHEN 325 MG/1
650 TABLET ORAL EVERY 6 HOURS PRN
Qty: 720 TABLET | Refills: 2 | Status: SHIPPED | OUTPATIENT
Start: 2020-01-10 | End: 2020-08-20 | Stop reason: SDUPTHER

## 2020-01-11 ENCOUNTER — OFFICE VISIT (OUTPATIENT)
Dept: CARDIAC REHAB | Facility: HOSPITAL | Age: 80
End: 2020-01-11

## 2020-01-11 DIAGNOSIS — J44.9 COPD, SEVERE (HCC): Primary | ICD-10-CM

## 2020-01-13 ENCOUNTER — OFFICE VISIT (OUTPATIENT)
Dept: FAMILY MEDICINE CLINIC | Facility: CLINIC | Age: 80
End: 2020-01-13

## 2020-01-13 VITALS
SYSTOLIC BLOOD PRESSURE: 108 MMHG | HEART RATE: 86 BPM | WEIGHT: 231 LBS | BODY MASS INDEX: 29.65 KG/M2 | DIASTOLIC BLOOD PRESSURE: 64 MMHG | HEIGHT: 74 IN | OXYGEN SATURATION: 98 % | TEMPERATURE: 97.5 F

## 2020-01-13 DIAGNOSIS — M54.42 BILATERAL LOW BACK PAIN WITH BILATERAL SCIATICA, UNSPECIFIED CHRONICITY: Primary | ICD-10-CM

## 2020-01-13 DIAGNOSIS — M54.41 BILATERAL LOW BACK PAIN WITH BILATERAL SCIATICA, UNSPECIFIED CHRONICITY: Primary | ICD-10-CM

## 2020-01-13 PROCEDURE — 72100 X-RAY EXAM L-S SPINE 2/3 VWS: CPT | Performed by: NURSE PRACTITIONER

## 2020-01-13 PROCEDURE — 99213 OFFICE O/P EST LOW 20 MIN: CPT | Performed by: NURSE PRACTITIONER

## 2020-01-13 RX ORDER — BACLOFEN 10 MG/1
10 TABLET ORAL 2 TIMES DAILY
Qty: 60 TABLET | Refills: 0 | Status: SHIPPED | OUTPATIENT
Start: 2020-01-13 | End: 2020-02-05 | Stop reason: SDUPTHER

## 2020-01-13 NOTE — PROGRESS NOTES
Subjective   Alessio Allen is a 79 y.o. male.     History of Present Illness   C/o back pain, he was seen in 11/19 for back pain, given medrol pack, he did not yet take this, he states he has numbness in bilat feet, he has had this for years, wondering about SE from adcirca, hx of pulm HTN, he is wondering about interaction with simvastatin, he states back pain started about 3 months ago when he started adcirca. Sees pulm Dr. Hernandez, has appt in 2 weeks, he has pain with certain positions, moving, he takes tylenol. He would like xray today. He states pain across low back, pain down bilat legs. He is in pulm rehab but not in PT for back.       The following portions of the patient's history were reviewed and updated as appropriate: allergies, current medications, past family history, past medical history, past social history, past surgical history and problem list.    Review of Systems   Constitutional: Negative for chills, diaphoresis and fever.   Respiratory: Negative for cough and shortness of breath.    Cardiovascular: Negative for chest pain.   Musculoskeletal: Positive for arthralgias, back pain and myalgias.   Neurological: Negative for dizziness, light-headedness and headache.   All other systems reviewed and are negative.      Objective   Physical Exam   Constitutional: He is oriented to person, place, and time. He appears well-developed and well-nourished.   HENT:   Head: Normocephalic.   Eyes: Pupils are equal, round, and reactive to light.   Cardiovascular: Normal rate, regular rhythm, normal heart sounds and intact distal pulses.   Pulmonary/Chest: Effort normal and breath sounds normal.   Musculoskeletal:        Lumbar back: He exhibits decreased range of motion, tenderness, pain and spasm.   Neurological: He is alert and oriented to person, place, and time.   Skin: Skin is warm and dry.   Psychiatric: He has a normal mood and affect. His behavior is normal.   Nursing note and vitals reviewed.    Lumbar  xray today for pain, no comparison shows NAD,awaiting radiology over read,.     Assessment/Plan   Alessio was seen today for back pain.    Diagnoses and all orders for this visit:    Bilateral low back pain with bilateral sciatica, unspecified chronicity  -     XR Spine Lumbar 2 or 3 View (In Office)    Other orders  -     baclofen (LIORESAL) 10 MG tablet; Take 1 tablet by mouth 2 (Two) Times a Day.        Lumbar xray today will call with results.   Cont f/u with pulm. Advised patient I am not sure about SE of adcirca, he will cont f/u with prescriber of this.   He would like to try holding simvastatin for 2-4 weeks to see if improves muscle pain, if symptoms persist call office will consider ortho or PT.   Can also consider different statin such as crestor if needed.   Encouraged heat as needed, tylenol, trial baclofen 10mg bid as needed for pain or spasm, educated about sedation.   If symptoms persist call office   Increase fluid intake, get plenty of rest.   Patient agrees with plan of care and understands instructions. Call if worsening symptoms or any problems or concerns.

## 2020-01-13 NOTE — PATIENT INSTRUCTIONS
Lumbar xray today willcall with results.   Cont f/u with pulm. Advised patient I am not sure about SE of adcirca, he will cont f/u with prescriber of this.   He would like to try holding simvastatin for 2-4 weeks to see if improves muscle pain, if symptoms persist call office will consider ortho or PT.   Can also consider different statin such as crestor if needed.   Encouraged heat as needed, tylenol, trial baclofen 10mg bid as needed for pain or spasm, educated about sedation.   If symptoms persist call office   Increase fluid intake, get plenty of rest.   Patient agrees with plan of care and understands instructions. Call if worsening symptoms or any problems or concerns.

## 2020-01-14 ENCOUNTER — TELEPHONE (OUTPATIENT)
Dept: FAMILY MEDICINE CLINIC | Facility: CLINIC | Age: 80
End: 2020-01-14

## 2020-01-14 NOTE — TELEPHONE ENCOUNTER
Was seen yesterday and thought you were going to call in prednisone but it wasn't sent in. Please advise

## 2020-01-16 DIAGNOSIS — M43.9 COMPRESSION DEFORMITY OF VERTEBRA: Primary | ICD-10-CM

## 2020-01-16 DIAGNOSIS — M54.50 BILATERAL LOW BACK PAIN WITHOUT SCIATICA, UNSPECIFIED CHRONICITY: ICD-10-CM

## 2020-02-03 ENCOUNTER — TELEPHONE (OUTPATIENT)
Dept: FAMILY MEDICINE CLINIC | Facility: CLINIC | Age: 80
End: 2020-02-03

## 2020-02-03 NOTE — TELEPHONE ENCOUNTER
Since he can not get it done till the 26th he wants to know if you can schedule this somewhere else like [facility] or anywhere that can do it ASAP.

## 2020-02-05 ENCOUNTER — TELEPHONE (OUTPATIENT)
Dept: FAMILY MEDICINE CLINIC | Facility: CLINIC | Age: 80
End: 2020-02-05

## 2020-02-05 RX ORDER — BACLOFEN 10 MG/1
10 TABLET ORAL 2 TIMES DAILY
Qty: 60 TABLET | Refills: 3 | Status: SHIPPED | OUTPATIENT
Start: 2020-02-05 | End: 2021-02-26

## 2020-02-26 ENCOUNTER — HOSPITAL ENCOUNTER (OUTPATIENT)
Dept: MRI IMAGING | Facility: HOSPITAL | Age: 80
Discharge: HOME | End: 2020-02-26
Admitting: NURSE PRACTITIONER

## 2020-02-26 VITALS
DIASTOLIC BLOOD PRESSURE: 84 MMHG | RESPIRATION RATE: 16 BRPM | OXYGEN SATURATION: 93 % | TEMPERATURE: 97 F | SYSTOLIC BLOOD PRESSURE: 131 MMHG | HEART RATE: 80 BPM

## 2020-02-26 DIAGNOSIS — M54.50 BILATERAL LOW BACK PAIN WITHOUT SCIATICA, UNSPECIFIED CHRONICITY: ICD-10-CM

## 2020-02-26 DIAGNOSIS — M43.9 COMPRESSION DEFORMITY OF VERTEBRA: ICD-10-CM

## 2020-02-26 LAB — CREAT BLDA-MCNC: 1 MG/DL (ref 0.6–1.3)

## 2020-02-26 PROCEDURE — A9577 INJ MULTIHANCE: HCPCS | Performed by: NURSE PRACTITIONER

## 2020-02-26 PROCEDURE — 0 GADOBENATE DIMEGLUMINE 529 MG/ML SOLUTION: Performed by: NURSE PRACTITIONER

## 2020-02-26 PROCEDURE — 82565 ASSAY OF CREATININE: CPT

## 2020-02-26 PROCEDURE — 72158 MRI LUMBAR SPINE W/O & W/DYE: CPT

## 2020-02-26 PROCEDURE — 25010000003 HEPARIN LOCK FLUCH PER 10 UNITS: Performed by: NURSE PRACTITIONER

## 2020-02-26 RX ORDER — HEPARIN SODIUM (PORCINE) LOCK FLUSH IV SOLN 100 UNIT/ML 100 UNIT/ML
500 SOLUTION INTRAVENOUS AS NEEDED
Status: DISCONTINUED | OUTPATIENT
Start: 2020-02-26 | End: 2020-02-27 | Stop reason: HOSPADM

## 2020-02-26 RX ORDER — SODIUM CHLORIDE 9 MG/ML
10 INJECTION INTRAVENOUS ONCE
Status: COMPLETED | OUTPATIENT
Start: 2020-02-26 | End: 2020-02-26

## 2020-02-26 RX ADMIN — Medication 500 UNITS: at 15:10

## 2020-02-26 RX ADMIN — SODIUM CHLORIDE 10 ML: 9 INJECTION, SOLUTION INTRAMUSCULAR; INTRAVENOUS; SUBCUTANEOUS at 15:10

## 2020-02-26 RX ADMIN — GADOBENATE DIMEGLUMINE 20 ML: 529 INJECTION, SOLUTION INTRAVENOUS at 14:39

## 2020-02-26 NOTE — NURSING NOTE
Arianna from [employer] here and placed pacemaker out of MRI mode. Patient taken to main entrance per wheelchair by NA. No distress noted.

## 2020-02-27 ENCOUNTER — TELEPHONE (OUTPATIENT)
Dept: PEDIATRICS | Facility: OTHER | Age: 80
End: 2020-02-27

## 2020-02-27 NOTE — TELEPHONE ENCOUNTER
PATIENT CALLED FOR MRI RESULTS AND WANTS TO KNOW IF HE CAN GO TO THE PULMONARY REHAB ON Saturday. PLEASE CALL AND ADVISE [phone]. A MESSAGE CAN BE LEFT IN REGARDS TO PERMISSION ON REHAB

## 2020-02-27 NOTE — TELEPHONE ENCOUNTER
I do not have his final MRI report yet. He should check with pulm about rehab but pending results he may want to avoid strenuous movements depending on back pain

## 2020-02-28 DIAGNOSIS — M51.36 DDD (DEGENERATIVE DISC DISEASE), LUMBAR: ICD-10-CM

## 2020-02-28 DIAGNOSIS — S32.000A COMPRESSION FRACTURE OF LUMBAR VERTEBRA, INITIAL ENCOUNTER, UNSPECIFIED LUMBAR VERTEBRAL LEVEL: Primary | ICD-10-CM

## 2020-03-03 ENCOUNTER — TELEPHONE (OUTPATIENT)
Dept: FAMILY MEDICINE CLINIC | Facility: CLINIC | Age: 80
End: 2020-03-03

## 2020-03-03 NOTE — TELEPHONE ENCOUNTER
PT WOULD LIKE TO GET A CALL BACK FROM KYLIE OR HER NURSE IN REGARDS TO HIM GETTING SOME DIRECTIONS ON HIS FEEDING TUBE THAT HE HAD TAKEN OUT FIVE WEEKS AGO BUT HE STARES THAT IT HASN'T DONE THAT YET. PLEASE ADVISE AND CALL PT BACK [phone].

## 2020-03-04 ENCOUNTER — OFFICE VISIT (OUTPATIENT)
Dept: FAMILY MEDICINE CLINIC | Facility: CLINIC | Age: 80
End: 2020-03-04

## 2020-03-04 VITALS
HEART RATE: 80 BPM | TEMPERATURE: 96.8 F | BODY MASS INDEX: 29.52 KG/M2 | OXYGEN SATURATION: 95 % | HEIGHT: 74 IN | DIASTOLIC BLOOD PRESSURE: 70 MMHG | WEIGHT: 230 LBS | SYSTOLIC BLOOD PRESSURE: 118 MMHG

## 2020-03-04 DIAGNOSIS — L98.9 SKIN LESION: Primary | ICD-10-CM

## 2020-03-04 PROCEDURE — 99213 OFFICE O/P EST LOW 20 MIN: CPT | Performed by: NURSE PRACTITIONER

## 2020-03-04 NOTE — PROGRESS NOTES
Subjective   Alessio Allen is a 79 y.o. male.     History of Present Illness   C/o wound drainage, he states he had feeding tube removed about 6 weeks ago, states skin has not healed all the way, noticed drainage at times. States ENT Dr. Villar at [facility] removed feeding tube. He states he had placed at surgery center in [place]. He states he passed his swallow study, eating ok and had removed.       The following portions of the patient's history were reviewed and updated as appropriate: allergies, current medications, past family history, past medical history, past social history, past surgical history and problem list.    Review of Systems   Constitutional: Negative for chills, diaphoresis and fever.   Respiratory: Negative for cough and shortness of breath.    Cardiovascular: Negative for chest pain.   Musculoskeletal: Negative for arthralgias and myalgias.   Skin: Positive for skin lesions.   Neurological: Negative for dizziness, light-headedness and headache.   All other systems reviewed and are negative.      Objective   Physical Exam   Constitutional: He is oriented to person, place, and time. He appears well-developed and well-nourished.   HENT:   Head: Normocephalic.   Eyes: Pupils are equal, round, and reactive to light.   Cardiovascular: Normal rate, regular rhythm and normal heart sounds.   Pulmonary/Chest: Effort normal and breath sounds normal.   Abdominal:       Musculoskeletal: Normal range of motion.   Neurological: He is alert and oriented to person, place, and time.   Skin: Skin is warm and dry.   Psychiatric: He has a normal mood and affect. His behavior is normal.   Nursing note and vitals reviewed.        Assessment/Plan   Alessio was seen today for drainage from incision.    Diagnoses and all orders for this visit:    Skin lesion    Wound clean dry and intact today, no drainage noted,   May apply bacitracin or vaseline OTC as needed keep clean and dry, cover with bandage, if symptoms  persist advised to f/u with surgery as scheduled.   Increase fluid intake, get plenty of rest.   Patient agrees with plan of care and understands instructions. Call if worsening symptoms or any problems or concerns.

## 2020-03-04 NOTE — PATIENT INSTRUCTIONS
Wound clean dry and intact today, no drainage noted,   May apply bacitracin or vaseline OTC as needed keep clean and dry, cover with bandage, if symptoms persist advised to f/u with surgery as scheduled.   Increase fluid intake, get plenty of rest.   Patient agrees with plan of care and understands instructions. Call if worsening symptoms or any problems or concerns.

## 2020-03-05 ENCOUNTER — OFFICE VISIT (OUTPATIENT)
Dept: CARDIAC REHAB | Facility: HOSPITAL | Age: 80
End: 2020-03-05

## 2020-03-05 DIAGNOSIS — J44.9 COPD, SEVERE (HCC): Primary | ICD-10-CM

## 2020-03-07 ENCOUNTER — OFFICE VISIT (OUTPATIENT)
Dept: CARDIAC REHAB | Facility: HOSPITAL | Age: 80
End: 2020-03-07

## 2020-03-07 DIAGNOSIS — J44.9 COPD, SEVERE (HCC): Primary | ICD-10-CM

## 2020-03-10 ENCOUNTER — TELEPHONE (OUTPATIENT)
Dept: FAMILY MEDICINE CLINIC | Facility: CLINIC | Age: 80
End: 2020-03-10

## 2020-03-10 NOTE — TELEPHONE ENCOUNTER
Pt would like to speak about his bone density scan. Pt is concerned about  the necessity and the coronavirus     Please call patient and advise   [phone]

## 2020-03-10 NOTE — TELEPHONE ENCOUNTER
Pt scheduled for Thursday he wants to know if this is necessary to have done with the coronavirus pandemic

## 2020-03-12 ENCOUNTER — APPOINTMENT (OUTPATIENT)
Dept: BONE DENSITY | Facility: HOSPITAL | Age: 80
End: 2020-03-12

## 2020-03-23 RX ORDER — OLOPATADINE HYDROCHLORIDE 1 MG/ML
1 SOLUTION/ DROPS OPHTHALMIC DAILY
Qty: 3 EACH | Refills: 12 | Status: SHIPPED | OUTPATIENT
Start: 2020-03-23 | End: 2020-03-27 | Stop reason: SDUPTHER

## 2020-03-27 ENCOUNTER — TELEPHONE (OUTPATIENT)
Dept: FAMILY MEDICINE CLINIC | Facility: CLINIC | Age: 80
End: 2020-03-27

## 2020-03-27 RX ORDER — OLOPATADINE HYDROCHLORIDE 1 MG/ML
1 SOLUTION/ DROPS OPHTHALMIC 2 TIMES DAILY
Qty: 3 EACH | Refills: 12 | Status: SHIPPED | OUTPATIENT
Start: 2020-03-27 | End: 2020-08-20 | Stop reason: SDUPTHER

## 2020-03-27 NOTE — TELEPHONE ENCOUNTER
Patanol 0.1% is recommended to be dosed 1 drop twice a day   Pataday 0.2% is dosed 1 drop once a day  Pharmacy needs clarification on the inteded medication, dose, and directions    Since you are on call can you please advise this??

## 2020-03-31 ENCOUNTER — TELEPHONE (OUTPATIENT)
Dept: FAMILY MEDICINE CLINIC | Facility: CLINIC | Age: 80
End: 2020-03-31

## 2020-05-22 ENCOUNTER — TELEPHONE (OUTPATIENT)
Dept: FAMILY MEDICINE CLINIC | Facility: CLINIC | Age: 80
End: 2020-05-22

## 2020-05-22 NOTE — TELEPHONE ENCOUNTER
PATIENT CALLED AND STATED THAT HE DOESN'T REMEMBER WHEN AND IF HE EVER HAD A BONE DENSITY TEST DONE. HE WOULD LIKE A CALL BACK. PLEASE ADVISE.    PATIENT CALLBACK # [phone]

## 2020-06-17 ENCOUNTER — HOSPITAL ENCOUNTER (OUTPATIENT)
Dept: BONE DENSITY | Facility: HOSPITAL | Age: 80
Discharge: HOME | End: 2020-06-17
Admitting: NURSE PRACTITIONER

## 2020-06-17 DIAGNOSIS — M51.36 DDD (DEGENERATIVE DISC DISEASE), LUMBAR: ICD-10-CM

## 2020-06-17 DIAGNOSIS — S32.000A COMPRESSION FRACTURE OF LUMBAR VERTEBRA, INITIAL ENCOUNTER, UNSPECIFIED LUMBAR VERTEBRAL LEVEL: ICD-10-CM

## 2020-06-17 PROCEDURE — 77080 DXA BONE DENSITY AXIAL: CPT

## 2020-06-18 ENCOUNTER — TELEPHONE (OUTPATIENT)
Dept: FAMILY MEDICINE CLINIC | Facility: CLINIC | Age: 80
End: 2020-06-18

## 2020-06-18 NOTE — TELEPHONE ENCOUNTER
Patient calling and states he would like a copy of the written report of his bone density scan mailed to the home address on file. He said it states he has osteopenia in his right hip.    Patient call back is [phone](H) or [phone](C).

## 2020-06-18 NOTE — TELEPHONE ENCOUNTER
If it's ok to mail copy that is ok, does he have to sign a release? Or does medical records have to send?

## 2020-07-01 ENCOUNTER — TELEPHONE (OUTPATIENT)
Dept: FAMILY MEDICINE CLINIC | Facility: CLINIC | Age: 80
End: 2020-07-01

## 2020-07-01 DIAGNOSIS — Z43.1 ATTENTION TO G-TUBE (HCC): ICD-10-CM

## 2020-07-14 ENCOUNTER — OFFICE VISIT (OUTPATIENT)
Dept: SURGERY | Facility: CLINIC | Age: 80
End: 2020-07-14

## 2020-07-14 VITALS — BODY MASS INDEX: 29.95 KG/M2 | WEIGHT: 233.4 LBS | HEIGHT: 74 IN

## 2020-07-14 DIAGNOSIS — Z43.1 ATTENTION TO G-TUBE (HCC): Primary | ICD-10-CM

## 2020-07-14 PROCEDURE — 99214 OFFICE O/P EST MOD 30 MIN: CPT | Performed by: SURGERY

## 2020-07-14 RX ORDER — CARBOXYMETHYLCELLULOSE SODIUM 5 MG/ML
1 SOLUTION/ DROPS OPHTHALMIC AS NEEDED
COMMUNITY
Start: 2020-06-23

## 2020-07-17 NOTE — PROGRESS NOTES
Leyda Allen is a 80 y.o. male who returns to the office for drainage from the G-tube site.    History of Present Illness     The patient had a PEG that was removed several months ago and continues to have drainage from the site.  It slowly closed but then seemed to stop and there is small quantities of drainage.  The drainage is nonbilious and nonpurulent.  There is no blood.  Every once a while will seem to completely heal and then open up with more drainage.  He is able to tolerate a diet.  He is not having any breakdown of the skin.    Review of Systems   Constitutional: Negative for fatigue and fever.   Respiratory: Negative for chest tightness and shortness of breath.    Cardiovascular: Negative for chest pain and palpitations.   Gastrointestinal: Negative for abdominal pain, blood in stool, constipation, diarrhea, nausea and vomiting.     Past Medical History:   Diagnosis Date   • Acute on chronic diastolic heart failure (CMS/HCC)    • Arthritis    • Asthma    • Atrial fibrillation (CMS/HCC)    • BPH (benign prostatic hypertrophy)    • Cancer (CMS/HCC)     throat CA   • COPD (chronic obstructive pulmonary disease) (CMS/HCC)    • H/O Abnormal CBC 2017   • History of heart artery stent 03/2017   • Hyperlipidemia    • Hypertension    • Neuropathy     feet and hands    • Pneumonia      Past Surgical History:   Procedure Laterality Date   • BRONCHOSCOPY N/A 4/7/2018    Procedure: BRONCHOSCOPY with specimens;  Surgeon: Suresh Hernandez MD;  [facility];  Service: Pulmonary   • BRONCHOSCOPY N/A 3/6/2019    Procedure: BRONCHOSCOPY WITH BAL AND BIOPSY UNDER FLUORO AND ANESTHESIA;  Surgeon: Suresh Hernandez MD;  Location: [facility] OR;  Service: Pulmonary   • BRONCHOSCOPY N/A 6/13/2019    Procedure: BRONCHOSCOPY;  Surgeon: Suresh Hernandez MD;  Location: [facility] OR;  Service: Pulmonary   • CARDIAC CATHETERIZATION N/A 4/18/2018    Procedure: Right Heart Cath with possible vasodilator study;   Surgeon: Torey Schuler MD;  Location:  [facility];  Service: Cardiovascular   • CARDIAC CATHETERIZATION N/A 3/7/2019    Procedure: RIGHT AND LEFT HEART CATH;  Surgeon: Marizol Holt MD;  Location:  [facility];  Service: Cardiology   • CARDIAC CATHETERIZATION N/A 3/7/2019    Procedure: CORONARY ANGIOGRAPHY;  Surgeon: Marizol Holt MD;  Location:  [facility];  Service: Cardiology   • COLONOSCOPY  2016    polyps   • FRACTURE SURGERY     • INSERT / REPLACE / VENOUS ACCESS CATHETER Right    • PACEMAKER IMPLANTATION       Family History   Problem Relation Age of Onset   • Hypertension Mother    • Heart attack Father    • Malig Hyperthermia Neg Hx      Social History     Socioeconomic History   • Marital status:      Spouse name: Not on file   • Number of children: 2   • Years of education: Not on file   • Highest education level: Not on file   Occupational History     Employer: RETIRED   Tobacco Use   • Smoking status: Former Smoker     Packs/day: 1.50     Years: 45.00     Pack years: 67.50     Types: Cigarettes     Last attempt to quit: 1/3/2001     Years since quittin.5   • Smokeless tobacco: Never Used   Substance and Sexual Activity   • Alcohol use: No   • Drug use: No   • Sexual activity: Defer       Objective   Physical Exam   Constitutional: He is oriented to person, place, and time. He appears well-developed and well-nourished.  Non-toxic appearance.   Eyes: EOM are normal. No scleral icterus.   Pulmonary/Chest: Effort normal. No respiratory distress.   Abdominal: Soft. Normal appearance. There is no tenderness.   Left upper quadrant G-tube site has a small opening with no active drainage at this time.  There is no excoriation of the skin.  There is no evidence of infection.   Neurological: He is alert and oriented to person, place, and time.   Skin: Skin is warm and dry.   Psychiatric: He has a normal mood and affect. His  behavior is normal. Judgment and thought content normal.       Assessment/Plan       The encounter diagnosis was Attention to G-tube (CMS/HCC).    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  There is no excoriation of the skin at this time.  This was discussed with the patient.  In order to surgically treat the fistula, he would require a left upper quadrant incision at the G-tube site to traverse the abdominal wall into the abdomen and oversew the stomach.  Based on the low output nature of the fistula and the lack of skin excoriation, this should be managed conservatively at this time.  If the output increases or excoriation develops, he will return to the office to reconsider surgical management.

## 2020-08-20 RX ORDER — OLOPATADINE HYDROCHLORIDE 1 MG/ML
1 SOLUTION/ DROPS OPHTHALMIC 2 TIMES DAILY
Qty: 3 EACH | Refills: 12 | Status: SHIPPED | OUTPATIENT
Start: 2020-08-20 | End: 2021-09-16 | Stop reason: SDUPTHER

## 2020-08-20 RX ORDER — ACETAMINOPHEN 325 MG/1
650 TABLET ORAL EVERY 6 HOURS PRN
Qty: 720 TABLET | Refills: 2 | Status: SHIPPED | OUTPATIENT
Start: 2020-08-20 | End: 2021-09-16 | Stop reason: SDUPTHER

## 2020-08-20 RX ORDER — ECHINACEA PURPUREA EXTRACT 125 MG
2 TABLET ORAL AS NEEDED
Qty: 4 EACH | Refills: 3 | Status: SHIPPED | OUTPATIENT
Start: 2020-08-20 | End: 2021-09-16 | Stop reason: SDUPTHER

## 2020-08-20 NOTE — TELEPHONE ENCOUNTER
PT IS REQUESTING A REFILL ON acetaminophen (TYLENOL) 325 MG tablet, AND olopatadine (Patanol) 0.1 % ophthalmic solution    [facility] - [place], [place] - [address] - [phone]  - [phone] [facility] LOCATION.

## 2020-08-26 ENCOUNTER — TELEPHONE (OUTPATIENT)
Dept: FAMILY MEDICINE CLINIC | Facility: CLINIC | Age: 80
End: 2020-08-26

## 2020-08-26 NOTE — TELEPHONE ENCOUNTER
PATIENT STATES: that he would like his bone density test fax to [phone] Dr Salas please advise      PATIENT CAN BE REACHED ON:[phone]

## 2020-09-17 ENCOUNTER — CLINICAL SUPPORT (OUTPATIENT)
Dept: FAMILY MEDICINE CLINIC | Facility: CLINIC | Age: 80
End: 2020-09-17

## 2020-09-17 PROCEDURE — 90694 VACC AIIV4 NO PRSRV 0.5ML IM: CPT | Performed by: NURSE PRACTITIONER

## 2020-09-17 PROCEDURE — G0008 ADMIN INFLUENZA VIRUS VAC: HCPCS | Performed by: NURSE PRACTITIONER

## 2020-11-16 ENCOUNTER — TELEPHONE (OUTPATIENT)
Dept: FAMILY MEDICINE CLINIC | Facility: CLINIC | Age: 80
End: 2020-11-16

## 2020-11-16 RX ORDER — AZELASTINE 1 MG/ML
2 SPRAY, METERED NASAL 2 TIMES DAILY
Qty: 90 ML | Refills: 3 | Status: ON HOLD | OUTPATIENT
Start: 2020-11-16 | End: 2021-04-05

## 2020-11-16 NOTE — TELEPHONE ENCOUNTER
Patient called and stated he has been going back and forth on many allergy medications but now would like a liquid form of a medication. Patient is requesting RX for Azelastine Nasal Tavares sent to [facility] [place] [address] - [phone]  - [phone] FX     Patient would like 90 day supply with three refills     Please advise     [phone]

## 2020-12-21 RX ORDER — DIAPER,BRIEF,INFANT-TODD,DISP
1 EACH MISCELLANEOUS 2 TIMES DAILY PRN
Qty: 30 G | Refills: 0 | Status: SHIPPED | OUTPATIENT
Start: 2020-12-21 | End: 2022-09-23

## 2020-12-21 NOTE — TELEPHONE ENCOUNTER
Caller: Alessio Allen    Relationship: Self    Best call back number: [phone]    Medication needed:   Requested Prescriptions     Pending Prescriptions Disp Refills   • hydrocortisone 1 % cream        Sig: Apply 1 application topically to the appropriate area as directed 2 (Two) Times a Day As Needed for Rash. Chest       When do you need the refill by: ASAP    What details did the patient provide when requesting the medication: PT CANNOT REMEMBER IF HE HAS ALREADY REQUESTED THIS, BUT DOES NEED IT SOON    Does the patient have less than a 3 day supply:  [] Yes  [x] No    What is the patient's preferred pharmacy: [facility] [place] [facility] [place], [place] - [address] [facility] - [phone] [facility] [phone]

## 2021-02-04 ENCOUNTER — TELEPHONE (OUTPATIENT)
Dept: FAMILY MEDICINE CLINIC | Facility: CLINIC | Age: 81
End: 2021-02-04

## 2021-02-04 NOTE — TELEPHONE ENCOUNTER
PATIENT IS REQUESTING A CALL BACK FROM APRYL OR HER NURSE. HE IS NEEDING TO KNOW WHAT HIS LAST PSA RESULT WAS.       PATIENT CAN BE REACHED AT: HOME [phone] OR IF UNABLE TO REACH CALL HIS CELL [phone]

## 2021-02-26 ENCOUNTER — OFFICE VISIT (OUTPATIENT)
Dept: FAMILY MEDICINE CLINIC | Facility: CLINIC | Age: 81
End: 2021-02-26

## 2021-02-26 VITALS
HEIGHT: 74 IN | HEART RATE: 69 BPM | WEIGHT: 226.4 LBS | OXYGEN SATURATION: 90 % | SYSTOLIC BLOOD PRESSURE: 130 MMHG | TEMPERATURE: 98 F | DIASTOLIC BLOOD PRESSURE: 70 MMHG | BODY MASS INDEX: 29.05 KG/M2

## 2021-02-26 DIAGNOSIS — I10 ESSENTIAL HYPERTENSION: ICD-10-CM

## 2021-02-26 DIAGNOSIS — C32.9 LARYNGEAL CANCER (HCC): ICD-10-CM

## 2021-02-26 DIAGNOSIS — R79.89 ABNORMAL TSH: ICD-10-CM

## 2021-02-26 DIAGNOSIS — I48.0 PAROXYSMAL ATRIAL FIBRILLATION (HCC): ICD-10-CM

## 2021-02-26 DIAGNOSIS — Z12.5 ENCOUNTER FOR SCREENING FOR MALIGNANT NEOPLASM OF PROSTATE: ICD-10-CM

## 2021-02-26 DIAGNOSIS — Z00.00 MEDICARE ANNUAL WELLNESS VISIT, SUBSEQUENT: Primary | ICD-10-CM

## 2021-02-26 DIAGNOSIS — R86.9 SEMEN ABNORMAL: ICD-10-CM

## 2021-02-26 DIAGNOSIS — J44.9 CHRONIC OBSTRUCTIVE PULMONARY DISEASE, UNSPECIFIED COPD TYPE (HCC): ICD-10-CM

## 2021-02-26 DIAGNOSIS — E78.5 DYSLIPIDEMIA: ICD-10-CM

## 2021-02-26 LAB
ALBUMIN SERPL-MCNC: 4.4 G/DL (ref 3.5–5.2)
ALBUMIN/GLOB SERPL: 1.8 G/DL
ALP SERPL-CCNC: 62 U/L (ref 39–117)
ALT SERPL W P-5'-P-CCNC: 16 U/L (ref 1–41)
ANION GAP SERPL CALCULATED.3IONS-SCNC: 11.6 MMOL/L (ref 5–15)
AST SERPL-CCNC: 22 U/L (ref 1–40)
BACTERIA UR QL AUTO: NORMAL /HPF
BILIRUB SERPL-MCNC: 0.7 MG/DL (ref 0–1.2)
BILIRUB UR QL STRIP: NEGATIVE
BUN SERPL-MCNC: 11 MG/DL (ref 8–23)
BUN/CREAT SERPL: 10 (ref 7–25)
CALCIUM SPEC-SCNC: 9.5 MG/DL (ref 8.6–10.5)
CHLORIDE SERPL-SCNC: 98 MMOL/L (ref 98–107)
CHOLEST SERPL-MCNC: 132 MG/DL (ref 0–200)
CLARITY UR: CLEAR
CO2 SERPL-SCNC: 30.4 MMOL/L (ref 22–29)
COLOR UR: YELLOW
CREAT SERPL-MCNC: 1.1 MG/DL (ref 0.76–1.27)
ERYTHROCYTE [DISTWIDTH] IN BLOOD BY AUTOMATED COUNT: 13.5 % (ref 12.3–15.4)
GFR SERPL CREATININE-BSD FRML MDRD: 65 ML/MIN/1.73
GLOBULIN UR ELPH-MCNC: 2.5 GM/DL
GLUCOSE SERPL-MCNC: 97 MG/DL (ref 65–99)
GLUCOSE UR STRIP-MCNC: NEGATIVE MG/DL
HCT VFR BLD AUTO: 52 % (ref 37.5–51)
HDLC SERPL-MCNC: 49 MG/DL (ref 40–60)
HGB BLD-MCNC: 17.2 G/DL (ref 13–17.7)
HGB UR QL STRIP.AUTO: NEGATIVE
KETONES UR QL STRIP: NEGATIVE
LDLC SERPL CALC-MCNC: 70 MG/DL (ref 0–100)
LDLC/HDLC SERPL: 1.43 {RATIO}
LEUKOCYTE ESTERASE UR QL STRIP.AUTO: NEGATIVE
LYMPHOCYTES # BLD AUTO: 1.1 10*3/MM3 (ref 0.7–3.1)
LYMPHOCYTES NFR BLD AUTO: 18.2 % (ref 19.6–45.3)
MCH RBC QN AUTO: 30.6 PG (ref 26.6–33)
MCHC RBC AUTO-ENTMCNC: 33.1 G/DL (ref 31.5–35.7)
MCV RBC AUTO: 92.2 FL (ref 79–97)
MONOCYTES # BLD AUTO: 0.5 10*3/MM3 (ref 0.1–0.9)
MONOCYTES NFR BLD AUTO: 8.2 % (ref 5–12)
NEUTROPHILS NFR BLD AUTO: 4.3 10*3/MM3 (ref 1.7–7)
NEUTROPHILS NFR BLD AUTO: 73.6 % (ref 42.7–76)
NITRITE UR QL STRIP: NEGATIVE
PH UR STRIP.AUTO: 7 [PH] (ref 4.6–8)
PLATELET # BLD AUTO: 161 10*3/MM3 (ref 140–450)
PMV BLD AUTO: 6.4 FL (ref 6–12)
POTASSIUM SERPL-SCNC: 4.1 MMOL/L (ref 3.5–5.2)
PROT SERPL-MCNC: 6.9 G/DL (ref 6–8.5)
PROT UR QL STRIP: NEGATIVE
PSA SERPL-MCNC: 0.65 NG/ML (ref 0–4)
RBC # BLD AUTO: 5.64 10*6/MM3 (ref 4.14–5.8)
RBC # UR: NORMAL /HPF
REF LAB TEST METHOD: NORMAL
SODIUM SERPL-SCNC: 140 MMOL/L (ref 136–145)
SP GR UR STRIP: 1.02 (ref 1–1.03)
SQUAMOUS #/AREA URNS HPF: NORMAL /HPF
TRIGL SERPL-MCNC: 64 MG/DL (ref 0–150)
TSH SERPL DL<=0.05 MIU/L-ACNC: 4.27 UIU/ML (ref 0.27–4.2)
UROBILINOGEN UR QL STRIP: NORMAL
VLDLC SERPL-MCNC: 13 MG/DL (ref 5–40)
WBC # BLD AUTO: 5.8 10*3/MM3 (ref 3.4–10.8)
WBC UR QL AUTO: NORMAL /HPF

## 2021-02-26 PROCEDURE — 84443 ASSAY THYROID STIM HORMONE: CPT | Performed by: NURSE PRACTITIONER

## 2021-02-26 PROCEDURE — 80061 LIPID PANEL: CPT | Performed by: NURSE PRACTITIONER

## 2021-02-26 PROCEDURE — 36415 COLL VENOUS BLD VENIPUNCTURE: CPT | Performed by: NURSE PRACTITIONER

## 2021-02-26 PROCEDURE — 81001 URINALYSIS AUTO W/SCOPE: CPT | Performed by: NURSE PRACTITIONER

## 2021-02-26 PROCEDURE — G0439 PPPS, SUBSEQ VISIT: HCPCS | Performed by: NURSE PRACTITIONER

## 2021-02-26 PROCEDURE — 85025 COMPLETE CBC W/AUTO DIFF WBC: CPT | Performed by: NURSE PRACTITIONER

## 2021-02-26 PROCEDURE — G0103 PSA SCREENING: HCPCS | Performed by: NURSE PRACTITIONER

## 2021-02-26 PROCEDURE — 99214 OFFICE O/P EST MOD 30 MIN: CPT | Performed by: NURSE PRACTITIONER

## 2021-02-26 PROCEDURE — 80053 COMPREHEN METABOLIC PANEL: CPT | Performed by: NURSE PRACTITIONER

## 2021-02-26 RX ORDER — FUROSEMIDE 40 MG/1
40 TABLET ORAL DAILY
COMMUNITY

## 2021-02-26 RX ORDER — CETIRIZINE HYDROCHLORIDE 10 MG/1
10 TABLET ORAL DAILY
COMMUNITY
End: 2021-03-29

## 2021-02-26 RX ORDER — PILOCARPINE HYDROCHLORIDE 5 MG/1
5 TABLET, FILM COATED ORAL 3 TIMES DAILY
COMMUNITY

## 2021-02-26 RX ORDER — OLOPATADINE HYDROCHLORIDE 1 MG/ML
1 SOLUTION/ DROPS OPHTHALMIC 2 TIMES DAILY
Status: ON HOLD | COMMUNITY
End: 2021-04-05 | Stop reason: SDUPTHER

## 2021-02-26 NOTE — PATIENT INSTRUCTIONS
Advance Care Planning and Advance Directives     You make decisions on a daily basis - decisions about where you want to live, your career, your home, your life. Perhaps one of the most important decisions you face is your choice for future medical care. Take time to talk with your family and your healthcare team and start planning today.  Advance Care Planning is a process that can help you:  · Understand possible future healthcare decisions in light of your own experiences  · Reflect on those decision in light of your goals and values  · Discuss your decisions with those closest to you and the healthcare professionals that care for you  · Make a plan by creating a document that reflects your wishes    Surrogate Decision Maker  In the event of a medical emergency, which has left you unable to communicate or to make your own decisions, you would need someone to make decisions for you.  It is important to discuss your preferences for medical treatment with this person while you are in good health.     Qualities of a surrogate decision maker:  • Willing to take on this role and responsibility  • Knows what you want for future medical care  • Willing to follow your wishes even if they don't agree with them  • Able to make difficult medical decisions under stressful circumstances    Advance Directives  These are legal documents you can create that will guide your healthcare team and decision maker(s) when needed. These documents can be stored in the electronic medical record.    · Living Will - a legal document to guide your care if you have a terminal condition or a serious illness and are unable to communicate. States vary by statute in document names/types, but most forms may include one or more of the following:        -  Directions regarding life-prolonging treatments        -  Directions regarding artificially provided nutrition/hydration        -  Choosing a healthcare decision maker        -  Direction  regarding organ/tissue donation    · Durable Power of  for Healthcare - this document names an -in-fact to make medical decisions for you, but it may also allow this person to make personal and financial decisions for you. Please seek the advice of an  if you need this type of document.    **Advance Directives are not required and no one may discriminate against you if you do not sign one.    Medical Orders  Many states allow specific forms/orders signed by your physician to record your wishes for medical treatment in your current state of health. This form, signed in personal communication with your physician, addresses resuscitation and other medical interventions that you may or may not want.      For more information or to schedule a time with a [facility] Advance Care Planning Facilitator contact: [facility] or call [phone] and someone will contact you directly.  Medicare Wellness  Personal Prevention Plan of Service     Date of Office Visit:  2021  Encounter Provider:  DERRELL Rose  Place of Service:  [facility]  Patient Name: Alessio Allen  :  1940    As part of the Medicare Wellness portion of your visit today, we are providing you with this personalized preventive plan of services (PPPS). This plan is based upon recommendations of the [place] Preventive Services Task Force (USPSTF) and the Advisory Committee on Immunization Practices (ACIP).    This lists the preventive care services that should be considered, and provides dates of when you are due. Items listed as completed are up-to-date and do not require any further intervention.    Health Maintenance   Topic Date Due   • COVID-19 Vaccine (1 of 2) 1957   • TDAP/TD VACCINES (1 - Tdap) 1960   • ZOSTER VACCINE (2 of 3) 2012   • LIPID PANEL  2019   • DIABETIC EYE EXAM  2021   • ANNUAL WELLNESS VISIT  2022   •  COLONOSCOPY  12/05/2026   • HEPATITIS C SCREENING  Completed   • INFLUENZA VACCINE  Completed   • Pneumococcal Vaccine 65+  Completed   • MENINGOCOCCAL VACCINE  Aged Out   • HEMOGLOBIN A1C  Discontinued   • URINE MICROALBUMIN  Discontinued       Orders Placed This Encounter   Procedures   • Comprehensive Metabolic Panel   • Lipid Panel   • TSH   • PSA Screen   • CBC & Differential     Order Specific Question:   Manual Differential     Answer:   No   • Urinalysis With Microscopic - Urine, Clean Catch       Return if symptoms worsen or fail to improve.      Labs today will call with results.   Cont f/u with specialists as scheduled.   Deferred papa today.   Cont current meds,   Pending labs will consider urology consult if needed.   Patient agrees with plan of care and understands instructions. Call if worsening symptoms or any problems or concerns.

## 2021-02-26 NOTE — PROGRESS NOTES
The ABCs of the Annual Wellness Visit  Subsequent Medicare Wellness Visit    Chief Complaint   Patient presents with   • check up     prostate exam   • labs       Subjective   History of Present Illness:  Alessio Allen is a 80 y.o. male who presents for a Subsequent Medicare Wellness Visit.    HEALTH RISK ASSESSMENT    Recent Hospitalizations:  No hospitalization(s) within the last year.    Current Medical Providers:  Patient Care Team:  Luly Nickerson APRN as PCP - General (Nurse Practitioner)  Rao Maguire MD as Consulting Physician (Hematology and Oncology)  Alan Santana MD as Referring Physician (Family Medicine)  Gaurav Merrill MD as Consulting Physician (Otolaryngology)  Edgardo Brown MD (Otolaryngology)  Isael Beltre MD as Consulting Physician (Hematology and Oncology)  Herminia Salas MD as Consulting Physician (Internal Medicine)    Smoking Status:  Social History     Tobacco Use   Smoking Status Former Smoker   • Packs/day: 1.50   • Years: 45.00   • Pack years: 67.50   • Types: Cigarettes   • Quit date: 1/3/2001   • Years since quittin.1   Smokeless Tobacco Never Used       Alcohol Consumption:  Social History     Substance and Sexual Activity   Alcohol Use No       Depression Screen:   PHQ-2/PHQ-9 Depression Screening 2021   Little interest or pleasure in doing things 0   Feeling down, depressed, or hopeless 0   Trouble falling or staying asleep, or sleeping too much -   Feeling tired or having little energy -   Poor appetite or overeating -   Feeling bad about yourself - or that you are a failure or have let yourself or your family down -   Trouble concentrating on things, such as reading the newspaper or watching television -   Moving or speaking so slowly that other people could have noticed. Or the opposite - being so fidgety or restless that you have been moving around a lot more than usual -   Thoughts that you would be better off dead, or of  hurting yourself in some way -   Total Score 0   If you checked off any problems, how difficult have these problems made it for you to do your work, take care of things at home, or get along with other people? -       Fall Risk Screen:  [facility] Fall Risk Assessment was completed, and patient is at LOW risk for falls.Assessment completed on:2/26/2021    Health Habits and Functional and Cognitive Screening:  Functional & Cognitive Status 2/26/2021   Do you have difficulty preparing food and eating? No   Do you have difficulty bathing yourself, getting dressed or grooming yourself? No   Do you have difficulty using the toilet? No   Do you have difficulty moving around from place to place? No   Do you have trouble with steps or getting out of a bed or a chair? No   Current Diet Well Balanced Diet   Dental Exam Up to date   Eye Exam Up to date   Exercise (times per week) 3 times per week   Current Exercises Include Treadmill   Current Exercise Activities Include -   Do you need help using the phone?  No   Are you deaf or do you have serious difficulty hearing?  No   Do you need help with transportation? No   Do you need help shopping? No   Do you need help preparing meals?  No   Do you need help with housework?  No   Do you need help with laundry? No   Do you need help taking your medications? No   Do you need help managing money? No   Do you ever drive or ride in a car without wearing a seat belt? No   Have you felt unusual stress, anger or loneliness in the last month? No   Who do you live with? Other   If you need help, do you have trouble finding someone available to you? No   Have you been bothered in the last four weeks by sexual problems? No   Do you have difficulty concentrating, remembering or making decisions? No         Does the patient have evidence of cognitive impairment? No    Asprin use counseling:Contraindicated from taking ASA    Age-appropriate Screening Schedule:  Refer to the list below for future  screening recommendations based on patient's age, sex and/or medical conditions. Orders for these recommended tests are listed in the plan section. The patient has been provided with a written plan.    Health Maintenance   Topic Date Due   • TDAP/TD VACCINES (1 - Tdap) 06/12/1959   • ZOSTER VACCINE (2 of 3) 02/28/2012   • LIPID PANEL  01/03/2019   • DIABETIC EYE EXAM  07/28/2021   • COLONOSCOPY  12/05/2026   • INFLUENZA VACCINE  Completed   • HEMOGLOBIN A1C  Discontinued   • URINE MICROALBUMIN  Discontinued          The following portions of the patient's history were reviewed and updated as appropriate: allergies, current medications, past family history, past medical history, past social history, past surgical history and problem list.    Outpatient Medications Prior to Visit   Medication Sig Dispense Refill   • acetaminophen (TYLENOL) 325 MG tablet Take 2 tablets by mouth Every 6 (Six) Hours As Needed (prn for pain). 720 tablet 2   • albuterol (PROVENTIL HFA;VENTOLIN HFA) 108 (90 Base) MCG/ACT inhaler Inhale 2 puffs Every 6 (Six) Hours As Needed for Wheezing or Shortness of Air. 1 inhaler 11   • azelastine (ASTELIN) 0.1 % nasal spray 2 sprays into the nostril(s) as directed by provider 2 (Two) Times a Day. Use in each nostril as directed 90 mL 3   • cetirizine (zyrTEC) 10 MG tablet Take 10 mg by mouth Daily.     • doxazosin (CARDURA) 2 MG tablet Take 2 mg by mouth Every Night.     • ferrous sulfate 325 (65 FE) MG tablet Take 1 tablet by mouth Daily With Breakfast. 90 tablet 3   • fluocinolone (SYNALAR) 0.01 % external solution Apply  topically As Needed (prn). 60 mL 1   • furosemide (LASIX) 40 MG tablet Take 40 mg by mouth 2 (Two) Times a Day.     • hydrocortisone 1 % cream Apply 1 application topically to the appropriate area as directed 2 (Two) Times a Day As Needed for Rash. Chest 30 g 0   • montelukast (SINGULAIR) 10 MG tablet Take 10 mg by mouth Every Night.     • olopatadine (Patanol) 0.1 % ophthalmic  solution Administer 1 drop to both eyes 2 (Two) Times a Day. 3 each 12   • olopatadine (PATANOL) 0.1 % ophthalmic solution 1 drop 2 (Two) Times a Day.     • pantoprazole (PROTONIX) 40 MG pack packet Take 40 mg by mouth Every Morning Before Breakfast. PEG tube     • pilocarpine (SALAGEN) 5 MG tablet Take 5 mg by mouth 3 (Three) Times a Day.     • potassium chloride (K-DUR) 10 MEQ CR tablet Take 2 tablets by mouth Daily. 180 tablet 3   • pyridoxine (VITAMIN B-6) 50 MG tablet Take  by mouth daily.     • REFRESH TEARS 0.5 % solution      • simvastatin (ZOCOR) 20 MG tablet Take 0.5 tablets by mouth daily.     • sodium chloride (Ocean Nasal Spray) 0.65 % nasal spray 2 sprays into the nostril(s) as directed by provider As Needed for Congestion (qid prn). May refill q 21 days 4 each 3   • Spacer/Aero-Holding Chambers device Give spacer to use with his inhalers whichever brand he has received is fine 2 each 3   • tadalafil (ADCIRCA) 20 MG tablet tablet Take 40 mg by mouth Daily.     • TRELEGY ELLIPTA 100-62.5-25 MCG/INH aerosol powder       • XARELTO 20 MG tablet      • budesonide (PULMICORT) 0.5 MG/2ML nebulizer solution Take 2 mL by nebulization Daily. 30 mL 0   • Cetirizine HCl (ZYRTEC ALLERGY) 10 MG capsule Take 1 capsule by mouth Every Night.     • polyvinyl alcohol (LIQUIFILM) 1.4 % ophthalmic solution 1 drop As Needed for Dry Eyes.     • pregabalin (LYRICA) 75 MG capsule Take 75 mg by mouth 2 (two) times a day.     • aspirin 81 MG tablet Take 81 mg by mouth Daily. HOLD PER MD INSTR     • baclofen (LIORESAL) 10 MG tablet Take 1 tablet by mouth 2 (Two) Times a Day. 60 tablet 3   • furosemide (LASIX) 20 MG tablet Take 2 tablets by mouth 2 (Two) Times a Day. 120 tablet 3   • ipratropium-albuterol (DUO-NEB) 0.5-2.5 mg/3 ml nebulizer Take 3 mL by nebulization 4 (Four) Times a Day. 360 mL 0     No facility-administered medications prior to visit.        Patient Active Problem List   Diagnosis   • A-fib (CMS/HCC)   •  "Dyslipidemia   • BP (high blood pressure)   • Neuropathy   • Hypertension   • COPD (chronic obstructive pulmonary disease) (CMS/HCC)   • BPH (benign prostatic hypertrophy)   • Atrial fibrillation (CMS/HCC)   • Asthma   • Hypoxia   • Pneumonia   • Chronic anticoagulation   • Pneumonia of both lungs due to infectious organism   • Hyponatremia   • COPD exacerbation (CMS/HCC)   • Acute on chronic respiratory failure with hypoxia (CMS/HCC)   • Pneumonia of both lower lobes due to infectious organism   • Acute on chronic diastolic heart failure (CMS/HCC)   • IPF (idiopathic pulmonary fibrosis) (CMS/[facility])   • Acute respiratory failure with hypoxia (CMS/HCC)   • Attention to G-tube (CMS/HCC)   • Massive hemoptysis   • Laryngeal cancer (CMS/[facility])       Advanced Care Planning:  ACP discussion was held with the patient during this visit. Patient does not have an advance directive, information provided.    Review of Systems    Compared to one year ago, the patient feels his physical health is worse.  Compared to one year ago, the patient feels his mental health is the same.    Reviewed chart for potential of high risk medication in the elderly: yes  Reviewed chart for potential of harmful drug interactions in the elderly:yes    Objective         Vitals:    02/26/21 0818   BP: 130/70   BP Location: Left arm   Patient Position: Sitting   Cuff Size: Adult   Pulse: 69   Temp: 98 °F (36.7 °C)   TempSrc: Temporal   SpO2: 90%  Comment: 5 L pulsating   Weight: 103 kg (226 lb 6.4 oz)   Height: 188 cm (74\")   PainSc:   4       Body mass index is 29.07 kg/m².  Discussed the patient's BMI with him. The BMI is in the acceptable range.    Physical Exam          Assessment/Plan   Medicare Risks and Personalized Health Plan  CMS Preventative Services Quick Reference  Advance Directive Discussion  Diabetic Lab Screening   Fall Risk  Immunizations Discussed/Encouraged (specific immunizations; covid )  Obesity/Overweight   Prostate Cancer Screening "     The above risks/problems have been discussed with the patient.  Pertinent information has been shared with the patient in the After Visit Summary.  Follow up plans and orders are seen below in the Assessment/Plan Section.    Diagnoses and all orders for this visit:    1. Paroxysmal atrial fibrillation (CMS/HCC) (Primary)  -     CBC & Differential  -     Comprehensive Metabolic Panel  -     Lipid Panel  -     TSH  -     PSA Screen  -     Urinalysis With Microscopic - Urine, Clean Catch    2. Essential hypertension  -     CBC & Differential  -     Comprehensive Metabolic Panel  -     Lipid Panel  -     TSH  -     PSA Screen  -     Urinalysis With Microscopic - Urine, Clean Catch    3. Dyslipidemia  -     CBC & Differential  -     Comprehensive Metabolic Panel  -     Lipid Panel  -     TSH  -     PSA Screen  -     Urinalysis With Microscopic - Urine, Clean Catch    4. Laryngeal cancer (CMS/HCC)  -     CBC & Differential  -     Comprehensive Metabolic Panel  -     Lipid Panel  -     TSH  -     PSA Screen  -     Urinalysis With Microscopic - Urine, Clean Catch    5. Chronic obstructive pulmonary disease, unspecified COPD type (CMS/HCC)  -     CBC & Differential  -     Comprehensive Metabolic Panel  -     Lipid Panel  -     TSH  -     PSA Screen  -     Urinalysis With Microscopic - Urine, Clean Catch    6. Abnormal TSH  -     CBC & Differential  -     Comprehensive Metabolic Panel  -     Lipid Panel  -     TSH  -     PSA Screen  -     Urinalysis With Microscopic - Urine, Clean Catch    7. Semen abnormal  -     CBC & Differential  -     Comprehensive Metabolic Panel  -     Lipid Panel  -     TSH  -     PSA Screen  -     Urinalysis With Microscopic - Urine, Clean Catch    8. Encounter for screening for malignant neoplasm of prostate   -     PSA Screen      Follow Up:  No follow-ups on file.     An After Visit Summary and PPPS were given to the patient.

## 2021-02-26 NOTE — PROGRESS NOTES
"Chief Complaint  check up (prostate exam) and labs    Subjective          Alessio Allen presents to [facility]  History of Present Illness  Here today for f/u, with laryngeal cancer, sees ENT at [facility] Dr. Villar, last PET scan 10/19, no evidence of reoccurance. Also sees oncology Dr. Beltre. States he had thyroid checked at oncology and had elevated tsh yesterday. Sees ent again next week.   With afib, HTN, CHF, taking xarelto 20mg daily, sees cardiology, sarabia .   With HLD taking simvastatin 20mg daily, tolerating well.   With COPD, using trelegy inhaler, albuterol inhaler as needed. Also using singular, astelin nasal spray, wearing 3L home 02, sees [facility], last visit 12/2020, with PHT taking adcirca.   Due for labs, he is fasting today.   States he noticed abnormal semen coloring, states noticed yellow color, noticed about 1 month ago, he denies urinary symptoms, does not see urology.        Objective   Vital Signs:   /70 (BP Location: Left arm, Patient Position: Sitting, Cuff Size: Adult)   Pulse 69   Temp 98 °F (36.7 °C) (Temporal)   Ht 188 cm (74\")   Wt 103 kg (226 lb 6.4 oz)   SpO2 90% Comment: 5 L pulsating  BMI 29.07 kg/m²     Physical Exam  Vitals signs and nursing note reviewed.   Constitutional:       Appearance: He is well-developed.   HENT:      Head: Normocephalic.   Eyes:      Pupils: Pupils are equal, round, and reactive to light.   Cardiovascular:      Rate and Rhythm: Normal rate and regular rhythm.      Heart sounds: Normal heart sounds.   Pulmonary:      Effort: Pulmonary effort is normal.      Breath sounds: Normal breath sounds.   Skin:     General: Skin is warm and dry.   Neurological:      Mental Status: He is alert and oriented to person, place, and time.   Psychiatric:         Behavior: Behavior normal.         Judgment: Judgment normal.        Result Review :                 Assessment and Plan    Diagnoses and all orders for this " visit:    1. Medicare annual wellness visit, subsequent (Primary)    2. Paroxysmal atrial fibrillation (CMS/HCC)  -     CBC & Differential  -     Comprehensive Metabolic Panel  -     Lipid Panel  -     TSH  -     PSA Screen  -     Urinalysis With Microscopic - Urine, Clean Catch  -     CBC Auto Differential  -     Urinalysis without microscopic (no culture) - Urine, Clean Catch  -     Urinalysis, Microscopic Only - Urine, Clean Catch    3. Essential hypertension  -     CBC & Differential  -     Comprehensive Metabolic Panel  -     Lipid Panel  -     TSH  -     PSA Screen  -     Urinalysis With Microscopic - Urine, Clean Catch  -     CBC Auto Differential  -     Urinalysis without microscopic (no culture) - Urine, Clean Catch  -     Urinalysis, Microscopic Only - Urine, Clean Catch    4. Dyslipidemia  -     CBC & Differential  -     Comprehensive Metabolic Panel  -     Lipid Panel  -     TSH  -     PSA Screen  -     Urinalysis With Microscopic - Urine, Clean Catch  -     CBC Auto Differential  -     Urinalysis without microscopic (no culture) - Urine, Clean Catch  -     Urinalysis, Microscopic Only - Urine, Clean Catch    5. Laryngeal cancer (CMS/HCC)  -     CBC & Differential  -     Comprehensive Metabolic Panel  -     Lipid Panel  -     TSH  -     PSA Screen  -     Urinalysis With Microscopic - Urine, Clean Catch  -     CBC Auto Differential  -     Urinalysis without microscopic (no culture) - Urine, Clean Catch  -     Urinalysis, Microscopic Only - Urine, Clean Catch    6. Chronic obstructive pulmonary disease, unspecified COPD type (CMS/[facility])  -     CBC & Differential  -     Comprehensive Metabolic Panel  -     Lipid Panel  -     TSH  -     PSA Screen  -     Urinalysis With Microscopic - Urine, Clean Catch  -     CBC Auto Differential  -     Urinalysis without microscopic (no culture) - Urine, Clean Catch  -     Urinalysis, Microscopic Only - Urine, Clean Catch    7. Abnormal TSH  -     CBC & Differential  -      Comprehensive Metabolic Panel  -     Lipid Panel  -     TSH  -     PSA Screen  -     Urinalysis With Microscopic - Urine, Clean Catch  -     CBC Auto Differential  -     Urinalysis without microscopic (no culture) - Urine, Clean Catch  -     Urinalysis, Microscopic Only - Urine, Clean Catch    8. Semen abnormal  -     CBC & Differential  -     Comprehensive Metabolic Panel  -     Lipid Panel  -     TSH  -     PSA Screen  -     Urinalysis With Microscopic - Urine, Clean Catch  -     CBC Auto Differential  -     Urinalysis without microscopic (no culture) - Urine, Clean Catch  -     Urinalysis, Microscopic Only - Urine, Clean Catch    9. Encounter for screening for malignant neoplasm of prostate   -     PSA Screen        Follow Up   Return if symptoms worsen or fail to improve.  Patient was given instructions and counseling regarding his condition or for health maintenance advice. Please see specific information pulled into the AVS if appropriate.     Labs today will call with results.   Cont f/u with specialists as scheduled.   Deferred papa today.   Cont current meds,   Pending labs will consider urology consult if needed.   Patient agrees with plan of care and understands instructions. Call if worsening symptoms or any problems or concerns.

## 2021-03-01 ENCOUNTER — TELEPHONE (OUTPATIENT)
Dept: FAMILY MEDICINE CLINIC | Facility: CLINIC | Age: 81
End: 2021-03-01

## 2021-03-01 DIAGNOSIS — R86.9 SEMEN ABNORMALITY: Primary | ICD-10-CM

## 2021-03-01 DIAGNOSIS — R94.6 ABNORMAL RESULTS OF THYROID FUNCTION STUDIES: ICD-10-CM

## 2021-03-01 DIAGNOSIS — R79.89 ELEVATED TSH: Primary | ICD-10-CM

## 2021-03-01 NOTE — TELEPHONE ENCOUNTER
Patient called and would like his lab results from 2/26 mailed to him at:  [id number] [facility] [id number]  Also would like a copy faxed to [facility] at [phone].

## 2021-03-16 ENCOUNTER — OFFICE VISIT (OUTPATIENT)
Dept: SURGERY | Facility: CLINIC | Age: 81
End: 2021-03-16

## 2021-03-16 VITALS — WEIGHT: 227.4 LBS | BODY MASS INDEX: 30.8 KG/M2 | HEIGHT: 72 IN

## 2021-03-16 DIAGNOSIS — Z45.2 ENCOUNTER FOR VENOUS ACCESS DEVICE CARE: Primary | ICD-10-CM

## 2021-03-16 PROCEDURE — 99213 OFFICE O/P EST LOW 20 MIN: CPT | Performed by: SURGERY

## 2021-03-16 NOTE — PROGRESS NOTES
Subjective   Alessio Allen is a 80 y.o. male who returns to the office from Luly Nickerson APRN for a Mediport that is no longer needed.    History of Present Illness     The patient has a previous history of head and neck cancer that required chemotherapy.  He has a right chest Mediport that is no longer needed.  He would like to have this removed.  It is not causing in any symptoms.    The patient has a history of atrial fibrillation as well as coronary artery disease and is on Xarelto for anticoagulation.    He had a G-tube in the past that he used while being treated for the head and neck cancer.  He developed drainage from around the G-tube and the G-tube was ultimately removed.  He has continued to have drainage from the site but it is much improved.    Review of Systems   Constitutional: Negative for fatigue and fever.   Respiratory: Negative for chest tightness and shortness of breath.    Cardiovascular: Negative for chest pain and palpitations.   Gastrointestinal: Negative for abdominal pain, blood in stool, constipation, diarrhea, nausea and vomiting.     Past Medical History:   Diagnosis Date   • Acute on chronic diastolic heart failure (CMS/HCC)    • Arthritis    • Asthma    • Atrial fibrillation (CMS/HCC)    • BPH (benign prostatic hypertrophy)    • Cancer (CMS/HCC)     throat CA   • COPD (chronic obstructive pulmonary disease) (CMS/HCC)    • Diverticulosis    • GERD (gastroesophageal reflux disease)    • H/O Abnormal CBC 2017   • History of heart artery stent 03/2017   • Hyperlipidemia    • Hypertension    • Lung disease    • Neuropathy     feet and hands    • Pneumonia      Past Surgical History:   Procedure Laterality Date   • BRONCHOSCOPY N/A 4/7/2018    Procedure: BRONCHOSCOPY with specimens;  Surgeon: Suresh Hernandez MD;  Location: [facility];  Service: Pulmonary   • BRONCHOSCOPY N/A 3/6/2019    Procedure: BRONCHOSCOPY WITH BAL AND BIOPSY UNDER FLUORO AND ANESTHESIA;  Surgeon: Suresh Hernandez MD;   Location:  [facility];  Service: Pulmonary   • BRONCHOSCOPY N/A 2019    Procedure: BRONCHOSCOPY;  Surgeon: Suresh Hernandez MD;  Location: [facility] OR;  Service: Pulmonary   • CARDIAC CATHETERIZATION N/A 2018    Procedure: Right Heart Cath with possible vasodilator study;  Surgeon: Torey Schuler MD;  Location:  [facility] C[facility] INVASIVE LOCATION;  Service: Cardiovascular   • CARDIAC CATHETERIZATION N/A 3/7/2019    Procedure: RIGHT AND LEFT HEART CATH;  Surgeon: Marizol Holt MD;  Location:  [facility] CATH INVASIVE LOCATION;  Service: Cardiology   • CARDIAC CATHETERIZATION N/A 3/7/2019    Procedure: CORONARY ANGIOGRAPHY;  Surgeon: Marizol Holt MD;  Location: [facility] CATH [facility] LOC[facility];  Service: Cardiology   • COLONOSCOPY  2016    polyps   • FRACTURE SURGERY     • INSERT / REPLACE / VENOUS ACCESS CATHETER Right    • PACEMAKER IMPLANTATION       Family History   Problem Relation Age of Onset   • Hypertension Mother    • Heart attack Father    • Malig Hyperthermia Neg Hx      Social History     Socioeconomic History   • Marital status:      Spouse name: Not on file   • Number of children: 2   • Years of education: Not on file   • Highest education level: Not on file   Tobacco Use   • Smoking status: Former Smoker     Packs/day: 1.50     Years: 45.00     Pack years: 67.50     Types: Cigarettes     Quit date: 1/3/2001     Years since quittin.2   • Smokeless tobacco: Never Used   Vaping Use   • Vaping Use: Never used   Substance and Sexual Activity   • Alcohol use: No   • Drug use: No   • Sexual activity: Defer       Objective   Physical Exam  Constitutional:       Appearance: Normal appearance. He is well-developed. He is not toxic-appearing.   Eyes:      General: No scleral icterus.  Pulmonary:      Effort: Pulmonary effort is normal. No respiratory distress.   Chest:      Comments: Right upper chest Mediport with no complicating features.  Abdominal:      Palpations: Abdomen is  soft.      Tenderness: There is no abdominal tenderness.      Comments: G-tube site: Well-healed with a small sinus tract with minimal drainage.  There is no evidence of infection.   Skin:     General: Skin is warm and dry.   Neurological:      Mental Status: He is alert and oriented to person, place, and time.   Psychiatric:         Behavior: Behavior normal.         Thought Content: Thought content normal.         Judgment: Judgment normal.         Assessment/Plan       The encounter diagnosis was Encounter for venous access device care.    The patient has a Mediport that is no longer needed.  He will be scheduled for Mediport removal.  The patient understands the indications, alternatives, risks, and benefits of the procedure and wishes to proceed.

## 2021-03-24 ENCOUNTER — TRANSCRIBE ORDERS (OUTPATIENT)
Dept: PREADMISSION TESTING | Facility: HOSPITAL | Age: 81
End: 2021-03-24

## 2021-03-29 ENCOUNTER — APPOINTMENT (OUTPATIENT)
Dept: PREADMISSION TESTING | Facility: HOSPITAL | Age: 81
End: 2021-03-29

## 2021-03-29 VITALS
HEART RATE: 85 BPM | DIASTOLIC BLOOD PRESSURE: 90 MMHG | SYSTOLIC BLOOD PRESSURE: 154 MMHG | TEMPERATURE: 98.1 F | HEIGHT: 72 IN | OXYGEN SATURATION: 94 % | RESPIRATION RATE: 20 BRPM | WEIGHT: 226 LBS | BODY MASS INDEX: 30.61 KG/M2

## 2021-03-29 LAB
ANION GAP SERPL CALCULATED.3IONS-SCNC: 10.2 MMOL/L (ref 5–15)
BUN SERPL-MCNC: 14 MG/DL (ref 8–23)
BUN/CREAT SERPL: 13.5 (ref 7–25)
CALCIUM SPEC-SCNC: 9.6 MG/DL (ref 8.6–10.5)
CHLORIDE SERPL-SCNC: 97 MMOL/L (ref 98–107)
CO2 SERPL-SCNC: 28.8 MMOL/L (ref 22–29)
CREAT SERPL-MCNC: 1.04 MG/DL (ref 0.76–1.27)
DEPRECATED RDW RBC AUTO: 42 FL (ref 37–54)
ERYTHROCYTE [DISTWIDTH] IN BLOOD BY AUTOMATED COUNT: 12.5 % (ref 12.3–15.4)
GFR SERPL CREATININE-BSD FRML MDRD: 69 ML/MIN/1.73
GLUCOSE SERPL-MCNC: 97 MG/DL (ref 65–99)
HCT VFR BLD AUTO: 49.8 % (ref 37.5–51)
HGB BLD-MCNC: 17.1 G/DL (ref 13–17.7)
MCH RBC QN AUTO: 31.8 PG (ref 26.6–33)
MCHC RBC AUTO-ENTMCNC: 34.3 G/DL (ref 31.5–35.7)
MCV RBC AUTO: 92.6 FL (ref 79–97)
PLATELET # BLD AUTO: 149 10*3/MM3 (ref 140–450)
PMV BLD AUTO: 9.7 FL (ref 6–12)
POTASSIUM SERPL-SCNC: 4.8 MMOL/L (ref 3.5–5.2)
QT INTERVAL: 410 MS
RBC # BLD AUTO: 5.38 10*6/MM3 (ref 4.14–5.8)
SODIUM SERPL-SCNC: 136 MMOL/L (ref 136–145)
WBC # BLD AUTO: 5.04 10*3/MM3 (ref 3.4–10.8)

## 2021-03-29 PROCEDURE — 85027 COMPLETE CBC AUTOMATED: CPT

## 2021-03-29 PROCEDURE — 36415 COLL VENOUS BLD VENIPUNCTURE: CPT

## 2021-03-29 PROCEDURE — 80048 BASIC METABOLIC PNL TOTAL CA: CPT

## 2021-03-29 PROCEDURE — 93010 ELECTROCARDIOGRAM REPORT: CPT | Performed by: INTERNAL MEDICINE

## 2021-03-29 PROCEDURE — 93005 ELECTROCARDIOGRAM TRACING: CPT

## 2021-03-29 RX ORDER — FEXOFENADINE HYDROCHLORIDE 30 MG/5ML
30 SUSPENSION ORAL DAILY
COMMUNITY
End: 2021-05-14

## 2021-03-29 NOTE — DISCHARGE INSTRUCTIONS
Take the following medications the morning of surgery:    ALLEGRA, LYRICA, INHALER, ADCIRCA    ARRIVE TO MAIN SURGERY AT 7:30 AM ON 4/5/2021    If you are on prescription narcotic pain medication to control your pain you may also take that medication the morning of surgery.    General Instructions:  • Do not eat solid food after midnight the night before surgery.  • You may drink clear liquids day of surgery but must stop at least one hour before your hospital arrival time.  • It is beneficial for you to have a clear drink that contains carbohydrates the day of surgery.  We suggest a 12 to 20 ounce bottle of Gatorade or Powerade for non-diabetic patients or a 12 to 20 ounce bottle of G2 or Powerade Zero for diabetic patients. (Pediatric patients, are not advised to drink a 12 to 20 ounce carbohydrate drink)    Clear liquids are liquids you can see through.  Nothing red in color.     Plain water                               Sports drinks  Sodas                                   Gelatin (Jell-O)  Fruit juices without pulp such as white grape juice and apple juice  Popsicles that contain no fruit or yogurt  Tea or coffee (no cream or milk added)  Gatorade / Powerade  G2 / Powerade Zero    • Infants may have breast milk up to four hours before surgery.  • Infants drinking formula may drink formula up to six hours before surgery.   • Patients who avoid smoking, chewing tobacco and alcohol for 4 weeks prior to surgery have a reduced risk of post-operative complications.  Quit smoking as many days before surgery as you can.  • Do not smoke, use chewing tobacco or drink alcohol the day of surgery.   • If applicable bring your C-PAP/ BI-PAP machine.  • Bring any papers given to you in the doctor’s office.  • Wear clean comfortable clothes.  • Do not wear contact lenses, false eyelashes or make-up.  Bring a case for your glasses.   • Bring crutches or walker if applicable.  • Remove all piercings.  Leave jewelry and any  other valuables at home.  • Hair extensions with metal clips must be removed prior to surgery.  • The Pre-Admission Testing nurse will instruct you to bring medications if unable to obtain an accurate list in Pre-Admission Testing.          Preventing a Surgical Site Infection:  • For 2 to 3 days before surgery, avoid shaving with a razor because the razor can irritate skin and make it easier to develop an infection.    • Any areas of open skin can increase the risk of a post-operative wound infection by allowing bacteria to enter and travel throughout the body.  Notify your surgeon if you have any skin wounds / rashes even if it is not near the expected surgical site.  The area will need assessed to determine if surgery should be delayed until it is healed.  • The night prior to surgery shower using a fresh bar of anti-bacterial soap (such as Dial) and clean washcloth.  Sleep in a clean bed with clean clothing.  Do not allow pets to sleep with you.  • Shower on the morning of surgery using a fresh bar of anti-bacterial soap (such as Dial) and clean washcloth.  Dry with a clean towel and dress in clean clothing.  • Ask your surgeon if you will be receiving antibiotics prior to surgery.  • Make sure you, your family, and all healthcare providers clean their hands with soap and water or an alcohol based hand  before caring for you or your wound.    Day of surgery:  Your arrival time is approximately two hours before your scheduled surgery time.  Upon arrival, a Pre-op nurse and Anesthesiologist will review your health history, obtain vital signs, and answer questions you may have.  The only belongings needed at this time will be a list of your home medications and if applicable your C-PAP/BI-PAP machine.  A Pre-op nurse will start an IV and you may receive medication in preparation for surgery, including something to help you relax.     Please be aware that surgery does come with discomfort.  We want to make  every effort to control your discomfort so please discuss any uncontrolled symptoms with your nurse.   Your doctor will most likely have prescribed pain medications.      If you are going home after surgery you will receive individualized written care instructions before being discharged.  A responsible adult must drive you to and from the hospital on the day of your surgery and stay with you for 24 hours.  Discharge prescriptions can be filled by the hospital pharmacy during regular pharmacy hours.  If you are having surgery late in the day/evening your prescription may be e-prescribed to your pharmacy.  Please verify your pharmacy hours or chose a 24 hour pharmacy to avoid not having access to your prescription because your pharmacy has closed for the day.    If you are staying overnight following surgery, you will be transported to your hospital room following the recovery period.  [facility] has all private rooms.    If you have any questions please call Pre-Admission Testing at [phone].  Deductibles and co-payments are collected on the day of service. Please be prepared to pay the required co-pay, deductible or deposit on the day of service as defined by your plan.    Patient Education for Self-Quarantine Process    Following your COVID testing, we strongly recommend that you do not leave your home after you have been tested for COVID except to get medical care. This includes not going to work, school or to public areas.  If this is not possible for you to do please limit your activities to only required outings.  Be sure to wear a mask when you are with other people, practice social distancing and wash your hands frequently.      The following items provide additional details to keep you safe.  • Wash your hands with soap and water frequently for at least 20 seconds.   • Avoid touching your eyes, nose and mouth with unwashed hands.  • Do not share anything - utensils, towels, food from the  same bowl.   • Have your own utensils, drinking glass, dishes, towels and bedding.   • Do not have visitors.   • Do use FaceTime to stay in touch with family and friends.  • You should stay in a specific room away from others if possible.   • Stay at least 6 feet away from others in the home if you cannot have a dedicated room to yourself.   • Do not snuggle with your pet. While the CDC says there is no evidence that pets can spread COVID-19 or be infected from humans, it is probably best to avoid “petting, snuggling, being kissed or licked and sharing food (during self-quarantine)”, according to the CDC.   • Sanitize household surfaces daily. Include all high touch areas (door handles, light switches, phones, countertops, etc.)  • Do not share a bathroom with others, if possible.   • Wear a mask around others in your home if you are unable to stay in a separate room or 6 feet apart. If  you are unable to wear a mask, have your family member wear a mask if they must be within 6 feet of you.   Call your surgeon immediately if you experience any of the following symptoms:  • Sore Throat  • Shortness of Breath or difficulty breathing  • Cough  • Chills  • Body soreness or muscle pain  • Headache  • Fever  • New loss of taste or smell  • Do not arrive for your surgery ill.  Your procedure will need to be rescheduled to another time.  You will need to call your physician before the day of surgery to avoid any unnecessary exposure to hospital staff as well as other patients.

## 2021-04-02 ENCOUNTER — LAB (OUTPATIENT)
Dept: LAB | Facility: HOSPITAL | Age: 81
End: 2021-04-02

## 2021-04-02 PROCEDURE — U0004 COV-19 TEST NON-CDC HGH THRU: HCPCS

## 2021-04-02 PROCEDURE — U0005 INFEC AGEN DETEC AMPLI PROBE: HCPCS

## 2021-04-02 PROCEDURE — C9803 HOPD COVID-19 SPEC COLLECT: HCPCS

## 2021-04-03 LAB — SARS-COV-2 ORF1AB RESP QL NAA+PROBE: NOT DETECTED

## 2021-04-05 ENCOUNTER — ANESTHESIA EVENT (OUTPATIENT)
Dept: PERIOP | Facility: HOSPITAL | Age: 81
End: 2021-04-05

## 2021-04-05 ENCOUNTER — ANESTHESIA (OUTPATIENT)
Dept: PERIOP | Facility: HOSPITAL | Age: 81
End: 2021-04-05

## 2021-04-05 ENCOUNTER — TELEPHONE (OUTPATIENT)
Dept: SURGERY | Facility: CLINIC | Age: 81
End: 2021-04-05

## 2021-04-05 ENCOUNTER — HOSPITAL ENCOUNTER (OUTPATIENT)
Facility: HOSPITAL | Age: 81
Setting detail: HOSPITAL OUTPATIENT SURGERY
Discharge: HOME | End: 2021-04-05
Attending: SURGERY | Admitting: SURGERY

## 2021-04-05 VITALS
OXYGEN SATURATION: 95 % | WEIGHT: 224.3 LBS | HEART RATE: 80 BPM | DIASTOLIC BLOOD PRESSURE: 92 MMHG | HEIGHT: 72 IN | SYSTOLIC BLOOD PRESSURE: 130 MMHG | RESPIRATION RATE: 18 BRPM | BODY MASS INDEX: 30.38 KG/M2 | TEMPERATURE: 97.6 F

## 2021-04-05 PROCEDURE — 36590 REMOVAL TUNNELED CV CATH: CPT | Performed by: SURGERY

## 2021-04-05 PROCEDURE — 25010000002 PROPOFOL 10 MG/ML EMULSION: Performed by: NURSE ANESTHETIST, CERTIFIED REGISTERED

## 2021-04-05 RX ORDER — LIDOCAINE HYDROCHLORIDE 20 MG/ML
INJECTION, SOLUTION INFILTRATION; PERINEURAL AS NEEDED
Status: DISCONTINUED | OUTPATIENT
Start: 2021-04-05 | End: 2021-04-05 | Stop reason: SURG

## 2021-04-05 RX ORDER — DIPHENHYDRAMINE HYDROCHLORIDE 50 MG/ML
12.5 INJECTION INTRAMUSCULAR; INTRAVENOUS
Status: DISCONTINUED | OUTPATIENT
Start: 2021-04-05 | End: 2021-04-05 | Stop reason: HOSPADM

## 2021-04-05 RX ORDER — LIDOCAINE HYDROCHLORIDE 10 MG/ML
INJECTION, SOLUTION EPIDURAL; INFILTRATION; INTRACAUDAL; PERINEURAL AS NEEDED
Status: DISCONTINUED | OUTPATIENT
Start: 2021-04-05 | End: 2021-04-05 | Stop reason: HOSPADM

## 2021-04-05 RX ORDER — HYDROCODONE BITARTRATE AND ACETAMINOPHEN 5; 325 MG/1; MG/1
1 TABLET ORAL ONCE AS NEEDED
Status: DISCONTINUED | OUTPATIENT
Start: 2021-04-05 | End: 2021-04-05 | Stop reason: HOSPADM

## 2021-04-05 RX ORDER — PROMETHAZINE HYDROCHLORIDE 12.5 MG/1
25 TABLET ORAL ONCE AS NEEDED
Status: DISCONTINUED | OUTPATIENT
Start: 2021-04-05 | End: 2021-04-05 | Stop reason: HOSPADM

## 2021-04-05 RX ORDER — SODIUM CHLORIDE, SODIUM LACTATE, POTASSIUM CHLORIDE, CALCIUM CHLORIDE 600; 310; 30; 20 MG/100ML; MG/100ML; MG/100ML; MG/100ML
9 INJECTION, SOLUTION INTRAVENOUS CONTINUOUS
Status: DISCONTINUED | OUTPATIENT
Start: 2021-04-05 | End: 2021-04-05 | Stop reason: HOSPADM

## 2021-04-05 RX ORDER — SODIUM CHLORIDE 0.9 % (FLUSH) 0.9 %
10 SYRINGE (ML) INJECTION EVERY 12 HOURS SCHEDULED
Status: DISCONTINUED | OUTPATIENT
Start: 2021-04-05 | End: 2021-04-05 | Stop reason: HOSPADM

## 2021-04-05 RX ORDER — SODIUM CHLORIDE 0.9 % (FLUSH) 0.9 %
10 SYRINGE (ML) INJECTION AS NEEDED
Status: DISCONTINUED | OUTPATIENT
Start: 2021-04-05 | End: 2021-04-05 | Stop reason: HOSPADM

## 2021-04-05 RX ORDER — EPHEDRINE SULFATE 50 MG/ML
5 INJECTION, SOLUTION INTRAVENOUS ONCE AS NEEDED
Status: DISCONTINUED | OUTPATIENT
Start: 2021-04-05 | End: 2021-04-05 | Stop reason: HOSPADM

## 2021-04-05 RX ORDER — GLYCOPYRROLATE 0.2 MG/ML
INJECTION INTRAMUSCULAR; INTRAVENOUS AS NEEDED
Status: DISCONTINUED | OUTPATIENT
Start: 2021-04-05 | End: 2021-04-05 | Stop reason: SURG

## 2021-04-05 RX ORDER — ONDANSETRON 2 MG/ML
4 INJECTION INTRAMUSCULAR; INTRAVENOUS ONCE AS NEEDED
Status: DISCONTINUED | OUTPATIENT
Start: 2021-04-05 | End: 2021-04-05 | Stop reason: HOSPADM

## 2021-04-05 RX ORDER — HYDROCODONE BITARTRATE AND ACETAMINOPHEN 7.5; 325 MG/1; MG/1
2 TABLET ORAL EVERY 4 HOURS PRN
Status: DISCONTINUED | OUTPATIENT
Start: 2021-04-05 | End: 2021-04-05 | Stop reason: HOSPADM

## 2021-04-05 RX ORDER — HYDRALAZINE HYDROCHLORIDE 20 MG/ML
5 INJECTION INTRAMUSCULAR; INTRAVENOUS
Status: DISCONTINUED | OUTPATIENT
Start: 2021-04-05 | End: 2021-04-05 | Stop reason: HOSPADM

## 2021-04-05 RX ORDER — DIPHENHYDRAMINE HCL 25 MG
25 CAPSULE ORAL
Status: DISCONTINUED | OUTPATIENT
Start: 2021-04-05 | End: 2021-04-05 | Stop reason: HOSPADM

## 2021-04-05 RX ORDER — FAMOTIDINE 10 MG/ML
20 INJECTION, SOLUTION INTRAVENOUS ONCE
Status: COMPLETED | OUTPATIENT
Start: 2021-04-05 | End: 2021-04-05

## 2021-04-05 RX ORDER — PROMETHAZINE HYDROCHLORIDE 25 MG/1
25 SUPPOSITORY RECTAL ONCE AS NEEDED
Status: DISCONTINUED | OUTPATIENT
Start: 2021-04-05 | End: 2021-04-05 | Stop reason: HOSPADM

## 2021-04-05 RX ORDER — HYDROMORPHONE HYDROCHLORIDE 1 MG/ML
0.2 INJECTION, SOLUTION INTRAMUSCULAR; INTRAVENOUS; SUBCUTANEOUS
Status: DISCONTINUED | OUTPATIENT
Start: 2021-04-05 | End: 2021-04-05 | Stop reason: HOSPADM

## 2021-04-05 RX ORDER — MAGNESIUM HYDROXIDE 1200 MG/15ML
LIQUID ORAL AS NEEDED
Status: DISCONTINUED | OUTPATIENT
Start: 2021-04-05 | End: 2021-04-05 | Stop reason: HOSPADM

## 2021-04-05 RX ORDER — FLUMAZENIL 0.1 MG/ML
0.2 INJECTION INTRAVENOUS AS NEEDED
Status: DISCONTINUED | OUTPATIENT
Start: 2021-04-05 | End: 2021-04-05 | Stop reason: HOSPADM

## 2021-04-05 RX ORDER — NALOXONE HCL 0.4 MG/ML
0.2 VIAL (ML) INJECTION AS NEEDED
Status: DISCONTINUED | OUTPATIENT
Start: 2021-04-05 | End: 2021-04-05 | Stop reason: HOSPADM

## 2021-04-05 RX ORDER — FENTANYL CITRATE 50 UG/ML
25 INJECTION, SOLUTION INTRAMUSCULAR; INTRAVENOUS
Status: DISCONTINUED | OUTPATIENT
Start: 2021-04-05 | End: 2021-04-05 | Stop reason: HOSPADM

## 2021-04-05 RX ORDER — PROPOFOL 10 MG/ML
VIAL (ML) INTRAVENOUS CONTINUOUS PRN
Status: DISCONTINUED | OUTPATIENT
Start: 2021-04-05 | End: 2021-04-05 | Stop reason: SURG

## 2021-04-05 RX ORDER — ALBUTEROL SULFATE 2.5 MG/3ML
2.5 SOLUTION RESPIRATORY (INHALATION) ONCE AS NEEDED
Status: DISCONTINUED | OUTPATIENT
Start: 2021-04-05 | End: 2021-04-05 | Stop reason: HOSPADM

## 2021-04-05 RX ADMIN — GLYCOPYRROLATE 0.2 MG: 0.2 INJECTION INTRAMUSCULAR; INTRAVENOUS at 09:10

## 2021-04-05 RX ADMIN — LIDOCAINE HYDROCHLORIDE 60 MG: 20 INJECTION, SOLUTION INFILTRATION; PERINEURAL at 09:12

## 2021-04-05 RX ADMIN — PROPOFOL 50 MCG/KG/MIN: 10 INJECTION, EMULSION INTRAVENOUS at 09:12

## 2021-04-05 RX ADMIN — FAMOTIDINE 20 MG: 10 INJECTION INTRAVENOUS at 08:32

## 2021-04-05 RX ADMIN — SODIUM CHLORIDE, POTASSIUM CHLORIDE, SODIUM LACTATE AND CALCIUM CHLORIDE 9 ML/HR: 600; 310; 30; 20 INJECTION, SOLUTION INTRAVENOUS at 08:31

## 2021-04-05 NOTE — TELEPHONE ENCOUNTER
Patient s/p port removal 4/5/21 and would like to know when to resume his Xarelto. Please advise thank you

## 2021-04-05 NOTE — ANESTHESIA POSTPROCEDURE EVALUATION
"Patient: Alessio Allen    Procedure Summary     Date: 04/05/21 Room / Location: [facility] OR 08 / [facility] MAIN OR    Anesthesia Start: 0901 Anesthesia Stop: 0946    Procedure: Mediport Removal (Right ) Diagnosis:       Encounter for venous access device care      (Encounter for venous access device care [Z45.2])    Surgeons: Joseph Nickerson Jr., MD Provider: Shiraz Britton MD    Anesthesia Type: MAC ASA Status: 4          Anesthesia Type: MAC    Vitals  Vitals Value Taken Time   /92 04/05/21 1005   Temp 36.4 °C (97.6 °F) 04/05/21 0945   Pulse 80 04/05/21 1005   Resp 18 04/05/21 1005   SpO2 95 % 04/05/21 1012   Vitals shown include unvalidated device data.        Post Anesthesia Care and Evaluation      Comments: Patient discharged before being evaluated by an Anesthesiologist. No apparent complications per the record.  This case was not medically directed. I am completing this chart for medical records purposes; I personally have no medical involvement with this patient.    /92   Pulse 80   Temp 36.4 °C (97.6 °F) (Oral)   Resp 18   Ht 182.9 cm (72\")   Wt 102 kg (224 lb 4.8 oz)   SpO2 95%   BMI 30.42 kg/m²           "

## 2021-04-05 NOTE — OP NOTE
Surgeon: Joseph Nickerson Jr., M.D.    Assistant: None    Pre-Operative Diagnosis:     Encounter for venous access device care [Z45.2]    Post-Operative Diagnosis:    Mediport no longer needed    Procedure Performed:     Mediport removal    Indications:     The patient is a pleasant 80-year-old male who had a Mediport for chemotherapy access to treat head neck cancer.  He no longer needs Mediport.  He presents for Mediport removal.  The patient understands the indications, alternatives, risks, and benefits of the procedure and wishes to proceed.    Procedure:     The patient was identified and taken to the operating room where he was placed in the supine position on the operating table.  Monitors were placed and he underwent a Mac anesthesia and was appropriately monitored throughout the case by the anesthesia personnel.  The right chest and shoulder were prepped and draped in the standard surgical fashion.  The area of the previous Mediport was infiltrated with 1% lidocaine without epinephrine.  The previous Mediport incision was opened with a scalpel carried through the skin into the subcutaneous tissue.  The subcutaneous tissue was divided using Bovie electrocautery down to the Mediport which was freed from the pocket.  The Mediport was completely removed and noted to be intact.  The tract to the subclavian vein was oversewn with 2-0 Vicryl suture.  Hemostasis was noted to be adequate.  The skin was closed with a 4-0 Monocryl in a subcuticular fashion followed by benzoin and Steri-Strips.  The sponge, needle, and instrument counts were correct at the end the case.  The patient tolerated the procedure well and was transferred to the recovery area in stable condition.    Estimated Blood Loss:      minimal    Specimens:     None    Joseph Nikcerson Jr., M.D.

## 2021-04-20 ENCOUNTER — TELEPHONE (OUTPATIENT)
Dept: FAMILY MEDICINE CLINIC | Facility: CLINIC | Age: 81
End: 2021-04-20

## 2021-04-20 NOTE — TELEPHONE ENCOUNTER
I spoke with pt and will email billing for an explanation and let him know what that $87.66 balance is for    Price back from Billing.  Coding error on the Dx for the 88719.  Corrected and re-submitted.  SBO will call patient back.

## 2021-04-20 NOTE — TELEPHONE ENCOUNTER
Caller: Alessio Allen    Relationship to patient: Self    Best call back number:[phone]    Patient is needing: PATIENT IS CALLING TO REQUEST A CALL FROM THE PRACTICE TO DISCUSS A BILLING ISSUE. HE STATES THE DATE OF SERVICE WAS 02/26/21.  PATIENT STATES HE ALREADY CALLED THE BILLING OFFICE.      UNABLE TO REACH .          PLEASE ADVISE.

## 2021-05-14 ENCOUNTER — TELEPHONE (OUTPATIENT)
Dept: FAMILY MEDICINE CLINIC | Facility: CLINIC | Age: 81
End: 2021-05-14

## 2021-05-14 RX ORDER — FEXOFENADINE HYDROCHLORIDE 60 MG/1
60 TABLET, FILM COATED ORAL DAILY
Qty: 90 TABLET | Refills: 0 | Status: SHIPPED | OUTPATIENT
Start: 2021-05-14 | End: 2021-05-20 | Stop reason: SDUPTHER

## 2021-05-14 NOTE — TELEPHONE ENCOUNTER
PATIENT CALLED TO GET ALLEGRA 60MG, HAS DIFFICULTY SWALLOING THE 80MG.     [facility] [place] [facility] - [place] - [address] - [phone]  - [phone] FX

## 2021-05-20 ENCOUNTER — TELEPHONE (OUTPATIENT)
Dept: FAMILY MEDICINE CLINIC | Facility: CLINIC | Age: 81
End: 2021-05-20

## 2021-05-20 RX ORDER — FEXOFENADINE HYDROCHLORIDE 60 MG/1
60 TABLET, FILM COATED ORAL 2 TIMES DAILY
Qty: 180 TABLET | Refills: 1 | Status: SHIPPED | OUTPATIENT
Start: 2021-05-20 | End: 2021-12-16

## 2021-05-20 NOTE — PLAN OF CARE
Problem: Patient Care Overview  Goal: Plan of Care Review   03/01/19 7637   Coping/Psychosocial   Plan of Care Reviewed With patient   Plan of Care Review   Progress no change   OTHER   Outcome Summary no c/o pain; pt working harder for breaths; Respiratory called and they will give pt a treatment; 02@10L with 89% stats via NC; Boost cooling down to room temp before giving via PEG tube;Peg tube to be replaced tomorrow @ Radiology; will continue to monitor pt       Problem: Fall Risk (Adult)  Goal: Absence of Fall  Outcome: Ongoing (interventions implemented as appropriate)      Problem: Breathing Pattern Ineffective (Adult)  Goal: Effective Oxygenation/Ventilation  Outcome: Ongoing (interventions implemented as appropriate)    Goal: Anxiety/Fear Reduction  Outcome: Ongoing (interventions implemented as appropriate)      Problem: Nutrition, Enteral (Adult)  Goal: Signs and Symptoms of Listed Potential Problems Will be Absent, Minimized or Managed (Nutrition, Enteral)  Outcome: Ongoing (interventions implemented as appropriate)         no outdoor environmental allergies/no indoor environmental allergies/no reactions to food/no reactions to animals

## 2021-09-16 RX ORDER — ACETAMINOPHEN 325 MG/1
650 TABLET ORAL EVERY 6 HOURS PRN
Qty: 720 TABLET | Refills: 2 | Status: SHIPPED | OUTPATIENT
Start: 2021-09-16 | End: 2022-07-25 | Stop reason: SDUPTHER

## 2021-09-16 RX ORDER — ECHINACEA PURPUREA EXTRACT 125 MG
2 TABLET ORAL AS NEEDED
Qty: 4 EACH | Refills: 3 | Status: SHIPPED | OUTPATIENT
Start: 2021-09-16 | End: 2022-07-25 | Stop reason: SDUPTHER

## 2021-09-16 RX ORDER — OLOPATADINE HYDROCHLORIDE 1 MG/ML
1 SOLUTION/ DROPS OPHTHALMIC 2 TIMES DAILY
Qty: 3 EACH | Refills: 12 | Status: SHIPPED | OUTPATIENT
Start: 2021-09-16 | End: 2022-10-19 | Stop reason: SDUPTHER

## 2021-09-16 NOTE — TELEPHONE ENCOUNTER
Caller: Alessio Allen    Relationship: Self    Best call back number: [phone]    Medication needed:   Requested Prescriptions     Pending Prescriptions Disp Refills   • acetaminophen (TYLENOL) 325 MG tablet 720 tablet 2     Sig: Take 2 tablets by mouth Every 6 (Six) Hours As Needed (prn for pain).   • olopatadine (Patanol) 0.1 % ophthalmic solution 3 each 12     Sig: Administer 1 drop to both eyes 2 (Two) Times a Day.   • sodium chloride (Ocean Nasal Spray) 0.65 % nasal spray 4 each 3     Si sprays into the nostril(s) as directed by provider As Needed for Congestion (qid prn). May refill q 21 days       When do you need the refill by: ASAP     What additional details did the patient provide when requesting the medication: PATIENT HAS ABOUT 1 WEEK LEFT OF MEDICATIONS.     Does the patient have less than a 3 day supply:  [] Yes  [x] No    What is the patient's preferred pharmacy: [facility] [place] [facility] -  [place] - [address] [phone] [facility] [phone]

## 2021-12-06 ENCOUNTER — TELEPHONE (OUTPATIENT)
Dept: FAMILY MEDICINE CLINIC | Facility: CLINIC | Age: 81
End: 2021-12-06

## 2021-12-06 NOTE — TELEPHONE ENCOUNTER
If he has been vaccinated, we don't have the lab test to check antibodies. I believe there is an antibody test available through [employer] but have to pay for it.

## 2021-12-06 NOTE — TELEPHONE ENCOUNTER
Caller: Alessio Allen    Relationship: Self    Best call back number: [phone]    What is the medical concern/diagnosis: ANTIBODY CHECK OF COVID    What specialty or service is being requested: LAB DRAW    PLEASE ADVISE.

## 2021-12-16 ENCOUNTER — OFFICE VISIT (OUTPATIENT)
Dept: FAMILY MEDICINE CLINIC | Facility: CLINIC | Age: 81
End: 2021-12-16

## 2021-12-16 VITALS
DIASTOLIC BLOOD PRESSURE: 74 MMHG | WEIGHT: 227 LBS | HEIGHT: 72 IN | OXYGEN SATURATION: 94 % | TEMPERATURE: 98.4 F | HEART RATE: 86 BPM | BODY MASS INDEX: 30.75 KG/M2 | SYSTOLIC BLOOD PRESSURE: 134 MMHG

## 2021-12-16 DIAGNOSIS — J30.9 ALLERGIC RHINITIS, UNSPECIFIED SEASONALITY, UNSPECIFIED TRIGGER: ICD-10-CM

## 2021-12-16 DIAGNOSIS — H61.22 IMPACTED CERUMEN OF LEFT EAR: Primary | ICD-10-CM

## 2021-12-16 DIAGNOSIS — H92.02 LEFT EAR PAIN: ICD-10-CM

## 2021-12-16 PROCEDURE — 69209 REMOVE IMPACTED EAR WAX UNI: CPT | Performed by: NURSE PRACTITIONER

## 2021-12-16 PROCEDURE — 99213 OFFICE O/P EST LOW 20 MIN: CPT | Performed by: NURSE PRACTITIONER

## 2021-12-16 RX ORDER — LEVOCETIRIZINE DIHYDROCHLORIDE 5 MG/1
5 TABLET, FILM COATED ORAL EVERY EVENING
Qty: 30 TABLET | Refills: 3 | Status: SHIPPED | OUTPATIENT
Start: 2021-12-16 | End: 2022-02-21

## 2021-12-16 NOTE — PROGRESS NOTES
"Chief Complaint  Ear Fullness    Subjective          Alessio Allen presents to [facility]  History of Present Illness  C/o ear pressure, fullness, states he would like checked today, he has nasal drainage, he has eye itching, burning, denies denies facial pain, fever. He denies ear pain, does have itching at times.   C/o swelling, states he had noticed after thanksgiving, thinks related to foods, now resolved, denies chest congestion, denies any recent SOA. Wearing home 02. Denies cough.           Objective   Vital Signs:   /74 (BP Location: Left arm, Patient Position: Sitting, Cuff Size: Adult)   Pulse 86   Temp 98.4 °F (36.9 °C) (Temporal)   Ht 182.9 cm (72\")   Wt 103 kg (227 lb)   SpO2 94% Comment: 5L  BMI 30.79 kg/m²     Physical Exam  Vitals and nursing note reviewed.   Constitutional:       Appearance: He is well-developed.   HENT:      Head: Normocephalic.      Right Ear: Tympanic membrane normal.      Left Ear: There is impacted cerumen.   Eyes:      Pupils: Pupils are equal, round, and reactive to light.   Cardiovascular:      Rate and Rhythm: Normal rate and regular rhythm.      Heart sounds: Normal heart sounds.   Pulmonary:      Effort: Pulmonary effort is normal.      Breath sounds: Normal breath sounds. No decreased breath sounds, wheezing, rhonchi or rales.   Skin:     General: Skin is warm and dry.   Neurological:      Mental Status: He is alert and oriented to person, place, and time.   Psychiatric:         Behavior: Behavior normal.         Judgment: Judgment normal.      left TM WNL after lavage.     Result Review :                 Assessment and Plan    Diagnoses and all orders for this visit:    1. Impacted cerumen of left ear (Primary)    2. Left ear pain    3. Allergic rhinitis, unspecified seasonality, unspecified trigger    Other orders  -     levocetirizine (Xyzal) 5 MG tablet; Take 1 tablet by mouth Every Evening.  Dispense: 30 tablet; Refill: " 3        Follow Up   Return if symptoms worsen or fail to improve.  Patient was given instructions and counseling regarding his condition or for health maintenance advice. Please see specific information pulled into the AVS if appropriate.     Ear lavage today, tolerated well,   Do not put anything in ears.   Trial xyzal daily, hold allegra.   Patient agrees with plan of care and understands instructions. Call if worsening symptoms or any problems or concerns.

## 2021-12-16 NOTE — PATIENT INSTRUCTIONS
Ear lavage today, tolerated well,   Do not put anything in ears.   Patient agrees with plan of care and understands instructions. Call if worsening symptoms or any problems or concerns.

## 2022-02-21 ENCOUNTER — TELEPHONE (OUTPATIENT)
Dept: FAMILY MEDICINE CLINIC | Facility: CLINIC | Age: 82
End: 2022-02-21

## 2022-02-21 RX ORDER — FEXOFENADINE HCL 180 MG/1
180 TABLET ORAL DAILY
COMMUNITY
End: 2022-02-21 | Stop reason: SDUPTHER

## 2022-02-21 RX ORDER — FEXOFENADINE HCL 180 MG/1
180 TABLET ORAL DAILY
Qty: 90 TABLET | Refills: 3 | Status: SHIPPED | OUTPATIENT
Start: 2022-02-21 | End: 2022-02-28

## 2022-02-21 NOTE — TELEPHONE ENCOUNTER
PATIENT CALLED TO REQUEST REFILLS OF ALLEGRA (NO ON MED LIST)   90 DAY SUPPLY WITH 3 REFILLS    [facility] [place] [facility] [place] - [address] - [phone]  - [phone] FX

## 2022-02-28 ENCOUNTER — TELEPHONE (OUTPATIENT)
Dept: FAMILY MEDICINE CLINIC | Facility: CLINIC | Age: 82
End: 2022-02-28

## 2022-02-28 RX ORDER — FEXOFENADINE HCL 60 MG/1
60 TABLET, FILM COATED ORAL 2 TIMES DAILY
Qty: 180 TABLET | Refills: 3 | Status: CANCELLED | OUTPATIENT
Start: 2022-02-28

## 2022-02-28 RX ORDER — FEXOFENADINE HYDROCHLORIDE 60 MG/1
60 TABLET, FILM COATED ORAL 2 TIMES DAILY
Qty: 180 TABLET | Refills: 3 | Status: SHIPPED | OUTPATIENT
Start: 2022-02-28 | End: 2022-05-12 | Stop reason: SDUPTHER

## 2022-02-28 RX ORDER — FEXOFENADINE HYDROCHLORIDE 60 MG/1
60 TABLET, FILM COATED ORAL 2 TIMES DAILY
Qty: 180 TABLET | Refills: 3 | Status: SHIPPED | OUTPATIENT
Start: 2022-02-28 | End: 2022-02-28 | Stop reason: SDUPTHER

## 2022-02-28 NOTE — TELEPHONE ENCOUNTER
Spoke with pharmacists at [facility] pharmacy and cancelled 2 RX for Fexofenadine

## 2022-02-28 NOTE — TELEPHONE ENCOUNTER
THE PATIENT STATES THAT HIS LAST PRESCRIPTION FOR ALLEGRA WAS WRONG. HE NEEDS HIS FUTURE PRESCRIPTION TO BE FOR 60 MG TABLETS TWICE A DAY. INSTEAD, HE RECEIVED 180 MG TABLETS. THESE TABLETS ARE TOO BIG FOR THE PATIENT, SO HE SOMETIMES CUTS THEM IN HALF TO SWALLOW AND IS LEFT WITH JAGGED PILL EDGES THAT SCRATCH HIS THROAT.

## 2022-03-01 NOTE — PROGRESS NOTES
"    Patient Name: Alessio Allen  :1940  78 y.o.      Patient Care Team:  Alan Santana MD as PCP - General (Family Medicine)  Isael Beltre MD as PCP - Claims Attributed  Rao Maguire MD as Consulting Physician (Hematology and Oncology)  Alan Santana MD as Referring Physician (Family Medicine)    Chief Complaint: follow up pulmonary HTN, hypoxia    Interval History: He is still a little drowsy after bronch. He can not really tell a difference in breathing. Wife is at bedside. Discussed plans for right and left heart catheterization tomorrow. Questions answered.        Objective   Vital Signs  Temp:  [97.4 °F (36.3 °C)-99 °F (37.2 °C)] 98.5 °F (36.9 °C)  Heart Rate:  [] 88  Resp:  [16-24] 20  BP: (102-152)/(58-92) 105/58    Intake/Output Summary (Last 24 hours) at 3/6/2019 1503  Last data filed at 3/6/2019 1042  Gross per 24 hour   Intake 420 ml   Output 1110 ml   Net -690 ml     Flowsheet Rows      First Filed Value   Admission Height  188 cm (74\") Documented at 2019 1532   Admission Weight  109 kg (240 lb) Documented at 2019 1023          Physical Exam:   General Appearance:    Alert, cooperative, in no acute distress   Lungs:     rhonchi to auscultation.  Normal respiratory effort and rate.      Heart:    Regular rhythm and normal rate, normal S1 and S2, no murmurs, gallops or rubs.     Chest Wall:    No abnormalities observed   Abdomen:     Soft, nontender, positive bowel sounds.     Extremities:   no cyanosis, clubbing or edema.  No marked joint deformities.  Adequate musculoskeletal strength.       Results Review:    Results from last 7 days   Lab Units 19  0539   SODIUM mmol/L 145   POTASSIUM mmol/L 3.4*   CHLORIDE mmol/L 101   CO2 mmol/L 33.3*   BUN mg/dL 39*   CREATININE mg/dL 1.00   GLUCOSE mg/dL 127*   CALCIUM mg/dL 10.3     Results from last 7 days   Lab Units 19  1243   TROPONIN T ng/mL <0.010     Results from last 7 days   Lab Units 19  0539   WBC " HISTORY OF PRESENT ILLNESS  Yovany Shipley is a 77 y.o. male. HPI  Assessment:  Layton Scherer is seen today for a Medicare wellness visit and follow up of chronic problems. 1. Preventive care. See attached wellness visit note. He is due for Pneumovax, Tdap booster, shingles vaccine and colonoscopy. He is up to date with labs, which I fully reviewed, and the COVID booster. 2. Chronic problems are reviewed. Blood pressure remains fair. We will add Amlodipine 5 mg daily. We will also check the labs in three months in order to help reassess A1c and parathyroid hormone. Review of Systems   Constitutional: Negative for weight loss. Respiratory: Negative. Cardiovascular: Negative for chest pain, palpitations, leg swelling and PND. Gastrointestinal: Negative. Genitourinary: Negative. Musculoskeletal: Negative for myalgias. Neurological: Negative for focal weakness. Physical Exam  Vitals and nursing note reviewed. Constitutional:       General: He is not in acute distress. Neck:      Vascular: No carotid bruit. Cardiovascular:      Rate and Rhythm: Normal rate and regular rhythm. Heart sounds: No murmur heard. No friction rub. No gallop. Pulmonary:      Effort: Pulmonary effort is normal. No respiratory distress. Breath sounds: Normal breath sounds. Musculoskeletal:      Right lower leg: No edema. Left lower leg: No edema. Skin:     General: Skin is warm and dry. Neurological:      Mental Status: He is alert. Psychiatric:         Behavior: Behavior normal.         ASSESSMENT and PLAN  Diagnoses and all orders for this visit:    1. Medicare annual wellness visit, subsequent    2. Essential hypertension  -     amLODIPine (NORVASC) 5 mg tablet; Take 1 Tablet by mouth daily. 3. Type 2 diabetes mellitus without complication, without long-term current use of insulin (HCC)  -     HEMOGLOBIN A1C WITH EAG; Future  -     METABOLIC PANEL, BASIC; Future    4.  Low 10*3/mm3 12.02*   HEMOGLOBIN g/dL 14.0   HEMATOCRIT % 44.4   PLATELETS 10*3/mm3 217         Results from last 7 days   Lab Units 03/05/19  0517   MAGNESIUM mg/dL 2.3                   Medication Review:     acetylcysteine 4 mL Nebulization 4x Daily - RT   aspirin 81 mg Oral Daily   atorvastatin 10 mg Oral Daily   enoxaparin 1 mg/kg Subcutaneous Once   ferrous sulfate 325 mg Oral Daily With Breakfast   insulin lispro 0-9 Units Subcutaneous 4x Daily With Meals & Nightly   ipratropium-albuterol 3 mL Nebulization 4x Daily - RT   ketotifen 1 drop Both Eyes BID   lansoprazole 30 mg Per PEG Tube Q AM   levoFLOXacin 750 mg Intravenous Q24H   montelukast 10 mg Oral Nightly          lactated ringers 9 mL/hr Last Rate: Stopped (03/06/19 1042)       Assessment/Plan   1. Acute/chronic hypoxic respiratory failure.  No significant improvement despite treatment for pneumonia and diuresis.    Left ventricular systolic function normal on echo yesterday, diastolic function indeterminate.  No shunt noted on agitated saline contrast study. Bronch today with thick mucoid secretions that may be contributing to hypoxia.   2. Bilateral pulmonary infiltrates.  Noted on CT scan.   3. Throat cancer.  Status post chemotherapy and radiation - in need of radical neck dissection.   4. PEG placement.  Some dysfunction that was addressed by surgery.     2. Paroxsymal atrial fibrillation. Paced rhythm with underlying atrial fibrillation.   Rivaroxaban on hold for bronchoscopy. Plan to resume after cath.   3. Status post permanent pacemaker placement.  No recent interrogations here or at [facility] noted.   4. COPD  5. Pulmonary hypertension.  Moderate to severe on echo yesterday.  Could be all related to COPD but can not rule out cardiac component.    6. Coronary artery disease.  History of prior stent placement.   7. Ascending aortic aneurysm.  Stable on recent CT.   8. Mitral regurgitation. Moderate on echo yesterday.     Bronch noted. Thick  testosterone    5. IFG (impaired fasting glucose)    6. Screen for colon cancer    7. Mixed hyperlipidemia    8. Hypercalcemia  -     PTH INTACT;  Future secretions may be contributing to hypoxia. I still feel he would benefit from right/left heart catheterization. I discussed this at length with he and his wife and they are agreeable to proceed. Will schedule for tomorrow with Dr. Holt.      DERRELL Joseph  [facility]  03/06/19  3:03 PM

## 2022-03-14 ENCOUNTER — OFFICE VISIT (OUTPATIENT)
Dept: FAMILY MEDICINE CLINIC | Facility: CLINIC | Age: 82
End: 2022-03-14

## 2022-03-14 VITALS
SYSTOLIC BLOOD PRESSURE: 130 MMHG | HEART RATE: 82 BPM | HEIGHT: 72 IN | OXYGEN SATURATION: 98 % | TEMPERATURE: 98.7 F | BODY MASS INDEX: 30.91 KG/M2 | DIASTOLIC BLOOD PRESSURE: 72 MMHG | WEIGHT: 228.2 LBS

## 2022-03-14 DIAGNOSIS — B02.9 HERPES ZOSTER WITHOUT COMPLICATION: Primary | ICD-10-CM

## 2022-03-14 DIAGNOSIS — R53.83 OTHER FATIGUE: ICD-10-CM

## 2022-03-14 PROCEDURE — 99213 OFFICE O/P EST LOW 20 MIN: CPT | Performed by: NURSE PRACTITIONER

## 2022-03-14 RX ORDER — ACYCLOVIR 400 MG/1
800 TABLET ORAL
Qty: 100 TABLET | Refills: 0 | Status: SHIPPED | OUTPATIENT
Start: 2022-03-14 | End: 2022-04-28

## 2022-03-14 NOTE — PROGRESS NOTES
"Chief Complaint  Possible shingles (Right leg and groin area/ painful and blisters)    Subjective          Alessio Allen presents to [facility]  History of Present Illness   81-year-old male, patient of Luly Nickerson, new to me, presenting with concerns of possible shingles, states pain of right groin and leg started last Thursday followed by fatigue, then noticed painful blisters to right inner thigh/groin area 2 days ago.     Objective   Vital Signs:   /72 (BP Location: Left arm, Patient Position: Sitting, Cuff Size: Large Adult)   Pulse 82   Temp 98.7 °F (37.1 °C) (Infrared)   Ht 182.9 cm (72.01\")   Wt 104 kg (228 lb 3.2 oz)   SpO2 98%   BMI 30.94 kg/m²     Physical Exam  Cardiovascular:      Rate and Rhythm: Normal rate and regular rhythm.      Pulses: Normal pulses.      Heart sounds: Normal heart sounds.   Pulmonary:      Effort: Pulmonary effort is normal.      Breath sounds: Normal breath sounds.   Skin:     Findings: Rash present. Rash is vesicular.      Comments: Multiple vesicular patches to Right inner thigh near groin    Neurological:      General: No focal deficit present.      Mental Status: He is alert and oriented to person, place, and time.        Result Review :                 Assessment and Plan    Diagnoses and all orders for this visit:    1. Herpes zoster without complication (Primary)  -     acyclovir (Zovirax) 400 MG tablet; Take 2 tablets by mouth 5 (Five) Times a Day. Take no more than 5 doses a day.  Dispense: 100 tablet; Refill: 0    2. Other fatigue        Follow Up   Return if symptoms worsen or fail to improve.  Patient was given instructions and counseling regarding his condition or for health maintenance advice. Please see specific information pulled into the AVS if appropriate.       "

## 2022-03-25 ENCOUNTER — TELEPHONE (OUTPATIENT)
Dept: FAMILY MEDICINE CLINIC | Facility: CLINIC | Age: 82
End: 2022-03-25

## 2022-03-25 NOTE — TELEPHONE ENCOUNTER
Out of acyclovir for 2 days scraping only slight pain but lots better ([phone] )So does that mean soap only? Or okay for lotion now around spots not on spots?

## 2022-03-25 NOTE — TELEPHONE ENCOUNTER
He should continue acyclovir for shingles as prescribed per Miri. He can use dove soap, lotion after shingles, shingles is a virus that can come on at any time. If symptoms persist f/u in office for recheck.

## 2022-03-25 NOTE — TELEPHONE ENCOUNTER
Caller: Alessio Allen    Relationship: Self    Best call back number: [phone]        ADDITIONAL NOTES:    PATIENT IS WANTING SOMEONE TO CALL HIM AND GIVE HIM ADVISE ON HOW TO CARE FOR HIS SKIN/BODY.  HE WAS DIAGNOSED WITH SHINGLES ON 3/14/2022.    PLEASE CALL AND ADVISE.

## 2022-04-01 ENCOUNTER — OFFICE VISIT (OUTPATIENT)
Dept: FAMILY MEDICINE CLINIC | Facility: CLINIC | Age: 82
End: 2022-04-01

## 2022-04-01 VITALS
DIASTOLIC BLOOD PRESSURE: 78 MMHG | BODY MASS INDEX: 30.37 KG/M2 | HEART RATE: 73 BPM | SYSTOLIC BLOOD PRESSURE: 132 MMHG | WEIGHT: 224.2 LBS | TEMPERATURE: 99.1 F | OXYGEN SATURATION: 92 % | HEIGHT: 72 IN

## 2022-04-01 DIAGNOSIS — Z13.6 ENCOUNTER FOR LIPID SCREENING FOR CARDIOVASCULAR DISEASE: ICD-10-CM

## 2022-04-01 DIAGNOSIS — B02.9 HERPES ZOSTER WITHOUT COMPLICATION: ICD-10-CM

## 2022-04-01 DIAGNOSIS — Z13.220 ENCOUNTER FOR LIPID SCREENING FOR CARDIOVASCULAR DISEASE: ICD-10-CM

## 2022-04-01 DIAGNOSIS — Z00.00 MEDICARE ANNUAL WELLNESS VISIT, SUBSEQUENT: Primary | ICD-10-CM

## 2022-04-01 PROCEDURE — 99213 OFFICE O/P EST LOW 20 MIN: CPT | Performed by: NURSE PRACTITIONER

## 2022-04-01 PROCEDURE — 1125F AMNT PAIN NOTED PAIN PRSNT: CPT | Performed by: NURSE PRACTITIONER

## 2022-04-01 PROCEDURE — 1159F MED LIST DOCD IN RCRD: CPT | Performed by: NURSE PRACTITIONER

## 2022-04-01 PROCEDURE — G0439 PPPS, SUBSEQ VISIT: HCPCS | Performed by: NURSE PRACTITIONER

## 2022-04-01 PROCEDURE — 1170F FXNL STATUS ASSESSED: CPT | Performed by: NURSE PRACTITIONER

## 2022-04-01 NOTE — PATIENT INSTRUCTIONS
He will return for fasting labs.   Shingles improved, already taking lyrica, he will monitor symptoms, call if symptoms persist.   Medicare wellness today.   Patient agrees with plan of care and understands instructions. Call if worsening symptoms or any problems or concerns.

## 2022-04-01 NOTE — PROGRESS NOTES
The ABCs of the Annual Wellness Visit  Subsequent Medicare Wellness Visit    Chief Complaint   Patient presents with   • Medicare Wellness-subsequent   • shingles lower back groin rt knee      Subjective    History of Present Illness:  Alessio Allen is a 81 y.o. male who presents for a Subsequent Medicare Wellness Visit.    The following portions of the patient's history were reviewed and   updated as appropriate: allergies, current medications, past family history, past medical history, past social history, past surgical history and problem list.    Compared to one year ago, the patient feels his physical   health is the same.    Compared to one year ago, the patient feels his mental   health is the same.    Recent Hospitalizations:  He was not admitted to the hospital during the last year.       Current Medical Providers:  Patient Care Team:  Luly Nickerson APRN as PCP - General (Nurse Practitioner)  Rao Maguire MD as Consulting Physician (Hematology and Oncology)  Alan Santana MD as Referring Physician (Family Medicine)  Gaurav Merrill MD as Consulting Physician (Otolaryngology)  Edgardo Brown MD (Otolaryngology)  Isael Beltre MD as Consulting Physician (Hematology and Oncology)  Herminia Salas MD as Consulting Physician (Internal Medicine)  Juventino Miller MD as Consulting Physician (Cardiac Electrophysiology)  Suresh Hernandez MD as Consulting Physician (Pulmonary Disease)    Outpatient Medications Prior to Visit   Medication Sig Dispense Refill   • acetaminophen (TYLENOL) 325 MG tablet Take 2 tablets by mouth Every 6 (Six) Hours As Needed (prn for pain). 720 tablet 2   • acyclovir (Zovirax) 400 MG tablet Take 2 tablets by mouth 5 (Five) Times a Day. Take no more than 5 doses a day. 100 tablet 0   • albuterol (PROVENTIL HFA;VENTOLIN HFA) 108 (90 Base) MCG/ACT inhaler Inhale 2 puffs Every 6 (Six) Hours As Needed for Wheezing or Shortness of Air. 1 inhaler 11   •  doxazosin (CARDURA) 2 MG tablet Take 2 mg by mouth Every Night.     • ferrous sulfate 325 (65 FE) MG tablet Take 1 tablet by mouth Daily With Breakfast. 90 tablet 3   • fexofenadine (Allegra Allergy) 60 MG tablet Take 1 tablet by mouth 2 (Two) Times a Day. 180 tablet 3   • fluocinolone (SYNALAR) 0.01 % external solution Apply  topically As Needed (prn). 60 mL 1   • furosemide (LASIX) 40 MG tablet Take 40 mg by mouth 2 (Two) Times a Day.     • hydrocortisone 1 % cream Apply 1 application topically to the appropriate area as directed 2 (Two) Times a Day As Needed for Rash. Chest 30 g 0   • montelukast (SINGULAIR) 10 MG tablet Take 10 mg by mouth Every Night.     • O2 (OXYGEN) Inhale 3.5 L/min 1 (One) Time.     • olopatadine (Patanol) 0.1 % ophthalmic solution Administer 1 drop to both eyes 2 (Two) Times a Day. 3 each 12   • pantoprazole (PROTONIX) 40 MG pack packet Take 40 mg by mouth Every Morning Before Breakfast. PEG tube     • pilocarpine (SALAGEN) 5 MG tablet Take 5 mg by mouth 3 (Three) Times a Day.     • polyvinyl alcohol (LIQUIFILM) 1.4 % ophthalmic solution 1 drop As Needed for Dry Eyes.     • potassium chloride (K-DUR) 10 MEQ CR tablet Take 2 tablets by mouth Daily. (Patient taking differently: Take 10 mEq by mouth Daily.) 180 tablet 3   • pregabalin (LYRICA) 75 MG capsule Take 75 mg by mouth 2 (two) times a day.     • pyridoxine (VITAMIN B-6) 50 MG tablet Take 50 mg by mouth Daily.     • REFRESH TEARS 0.5 % solution Administer 1 drop to both eyes As Needed.     • simvastatin (ZOCOR) 20 MG tablet Take 0.5 tablets by mouth Every Night.     • sodium chloride (Ocean Nasal Spray) 0.65 % nasal spray 2 sprays into the nostril(s) as directed by provider As Needed for Congestion (qid prn). May refill q 21 days 4 each 3   • Spacer/Aero-Holding Chambers device Give spacer to use with his inhalers whichever brand he has received is fine 2 each 3   • tadalafil (ADCIRCA) 20 MG tablet tablet Take 40 mg by mouth Daily.    "  • TRELEGY ELLIPTA 100-62.5-25 MCG/INH aerosol powder  1 puff Daily.     • XARELTO 20 MG tablet Take 20 mg by mouth Daily With Dinner. HOLD 2 DAYS PRIOR TO SURG       No facility-administered medications prior to visit.       No opioid medication identified on active medication list. I have reviewed chart for other potential  high risk medication/s and harmful drug interactions in the elderly.          Aspirin is not on active medication list.  Aspirin use is not indicated based on review of current medical condition/s. Risk of harm outweighs potential benefits.  .    Patient Active Problem List   Diagnosis   • A-fib (HCC)   • Dyslipidemia   • BP (high blood pressure)   • Neuropathy   • Hypertension   • COPD (chronic obstructive pulmonary disease) (HCC)   • BPH (benign prostatic hypertrophy)   • Atrial fibrillation (HCC)   • Asthma   • Hypoxia   • Pneumonia   • Chronic anticoagulation   • Pneumonia of both lungs due to infectious organism   • Hyponatremia   • COPD exacerbation (HCC)   • Acute on chronic respiratory failure with hypoxia (HCC)   • Pneumonia of both lower lobes due to infectious organism   • Acute on chronic diastolic heart failure (HCC)   • IPF (idiopathic pulmonary fibrosis) (HCC)   • Acute respiratory failure with hypoxia (HCC)   • Attention to G-tube (Formerly [facility])   • Massive hemoptysis   • Laryngeal cancer (HCC)   • Encounter for venous access device care     Advance Care Planning  Advance Directive is not on file.  ACP discussion was held with the patient during this visit. Patient does not have an advance directive, information provided.          Objective    Vitals:    04/01/22 0801   BP: 132/78   Pulse: 73   Temp: 99.1 °F (37.3 °C)   SpO2: 92%   Weight: 102 kg (224 lb 3.2 oz)   Height: 182.9 cm (72\")   PainSc:   8     BMI Readings from Last 1 Encounters:   04/01/22 30.41 kg/m²   BMI is above normal parameters. Recommendations include: educational material and nutrition counseling    Does the patient " have evidence of cognitive impairment? No    Physical Exam            HEALTH RISK ASSESSMENT    Smoking Status:  Social History     Tobacco Use   Smoking Status Former Smoker   • Packs/day: 1.50   • Years: 45.00   • Pack years: 67.50   • Types: Cigarettes   • Quit date: 1/3/2001   • Years since quittin.2   Smokeless Tobacco Never Used     Alcohol Consumption:  Social History     Substance and Sexual Activity   Alcohol Use No     Fall Risk Screen:    [place] Fall Risk Assessment was completed, and patient is at LOW risk for falls.Assessment completed on:2022    Depression Screening:  PHQ-2/PHQ-9 Depression Screening 2022   Retired Total Score -   Little Interest or Pleasure in Doing Things 0-->not at all   Feeling Down, Depressed or Hopeless 0-->not at all   PHQ-9: Brief Depression Severity Measure Score 0       Health Habits and Functional and Cognitive Screening:  Functional & Cognitive Status 2022   Do you have difficulty preparing food and eating? No   Do you have difficulty bathing yourself, getting dressed or grooming yourself? No   Do you have difficulty using the toilet? No   Do you have difficulty moving around from place to place? Yes   Do you have trouble with steps or getting out of a bed or a chair? Yes   Current Diet Well Balanced Diet   Dental Exam Up to date   Eye Exam Up to date   Exercise (times per week) 0 times per week   Current Exercises Include No Regular Exercise   Current Exercise Activities Include -   Do you need help using the phone?  No   Are you deaf or do you have serious difficulty hearing?  No   Do you need help with transportation? No   Do you need help shopping? No   Do you need help preparing meals?  No   Do you need help with housework?  No   Do you need help with laundry? No   Do you need help taking your medications? No   Do you need help managing money? No   Do you ever drive or ride in a car without wearing a seat belt? No   Have you felt unusual stress, anger  or loneliness in the last month? No   Who do you live with? Other   If you need help, do you have trouble finding someone available to you? No   Have you been bothered in the last four weeks by sexual problems? No   Do you have difficulty concentrating, remembering or making decisions? No       Age-appropriate Screening Schedule:  Refer to the list below for future screening recommendations based on patient's age, sex and/or medical conditions. Orders for these recommended tests are listed in the plan section. The patient has been provided with a written plan.    Health Maintenance   Topic Date Due   • TDAP/TD VACCINES (1 - Tdap) Never done   • LIPID PANEL  02/26/2022   • ZOSTER VACCINE (2 of 3) 04/01/2022 (Originally 2/28/2012)   • INFLUENZA VACCINE  08/01/2022              Assessment/Plan   CMS Preventative Services Quick Reference  Risk Factors Identified During Encounter  Cardiovascular Disease  Chronic Pain   Inactivity/Sedentary  Obesity/Overweight   The above risks/problems have been discussed with the patient.  Follow up actions/plans if indicated are seen below in the Assessment/Plan Section.  Pertinent information has been shared with the patient in the After Visit Summary.    Diagnoses and all orders for this visit:    1. Medicare annual wellness visit, subsequent (Primary)        Follow Up:   No follow-ups on file.     An After Visit Summary and PPPS were made available to the patient.

## 2022-04-01 NOTE — PROGRESS NOTES
"Chief Complaint  Medicare Wellness-subsequent and shingles lower back groin rt knee    Subjective          Alessio Allen presents to [facility]  History of Present Illness  Here today for f/u, he saw noris hadley NP 3/14/2022 for shingles right groin and right leg, given acyclovir 800mg 5x day for 10 days. He states rash is cleared and scabbed over, still having pain right upper pain, right hip pain, lower back pain, does have numbness at times, he denies weakness or falls. He is taking lyrica 75mg bid per [facility] for neuropathy. He states this does help some. He states rash was present right groin, right knee, and right lower back.       Objective   Vital Signs:   /78   Pulse 73   Temp 99.1 °F (37.3 °C)   Ht 182.9 cm (72\")   Wt 102 kg (224 lb 3.2 oz)   SpO2 92%   BMI 30.41 kg/m²            Physical Exam  Vitals and nursing note reviewed.   Constitutional:       Appearance: He is well-developed.   HENT:      Head: Normocephalic.   Eyes:      Pupils: Pupils are equal, round, and reactive to light.   Cardiovascular:      Rate and Rhythm: Normal rate and regular rhythm.      Heart sounds: Normal heart sounds.   Pulmonary:      Effort: Pulmonary effort is normal.      Breath sounds: Normal breath sounds.   Skin:     General: Skin is warm and dry.      Findings: Rash present. Rash is macular.          Neurological:      Mental Status: He is alert and oriented to person, place, and time.   Psychiatric:         Behavior: Behavior normal.         Judgment: Judgment normal.        Result Review :                 Assessment and Plan    Diagnoses and all orders for this visit:    1. Medicare annual wellness visit, subsequent (Primary)  -     Lipid panel; Future  -     Comprehensive Metabolic Panel; Future    2. Encounter for lipid screening for cardiovascular disease  -     Lipid panel; Future  -     Comprehensive Metabolic Panel; Future    3. Herpes zoster without complication  -    "  Lipid panel; Future  -     Comprehensive Metabolic Panel; Future        Follow Up   No follow-ups on file.  Patient was given instructions and counseling regarding his condition or for health maintenance advice. Please see specific information pulled into the AVS if appropriate.     He will return for fasting labs.   Shingles improved, already taking lyrica, he will monitor symptoms, call if symptoms persist.   Medicare wellness today.   Patient agrees with plan of care and understands instructions. Call if worsening symptoms or any problems or concerns.

## 2022-04-14 NOTE — TELEPHONE ENCOUNTER
CALLING TO LET US KNOW HE HAS BEEN DISCHARGED FROM PHYSICAL THERAPY...  
Per pt's request we ok'd this yesterday.   
The patient is a 16y Female complaining of arm pain/injury.

## 2022-04-19 ENCOUNTER — TELEPHONE (OUTPATIENT)
Dept: FAMILY MEDICINE CLINIC | Facility: CLINIC | Age: 82
End: 2022-04-19

## 2022-04-19 RX ORDER — HYDROCODONE BITARTRATE AND ACETAMINOPHEN 5; 325 MG/1; MG/1
1 TABLET ORAL EVERY 8 HOURS PRN
Qty: 30 TABLET | Refills: 0 | Status: SHIPPED | OUTPATIENT
Start: 2022-04-19 | End: 2022-09-23

## 2022-04-19 NOTE — TELEPHONE ENCOUNTER
Called in 30 hydrocodone for pain control.  Also recommending getting Lidoderm patches over-the-counter and place him on the painful areas.  Need to follow-up next week.

## 2022-04-19 NOTE — TELEPHONE ENCOUNTER
Caller: Alessio Allen    Relationship: Self    Best call back number: [phone]    Who are you requesting to speak with (clinical staff, provider,  specific staff member): DERRELL WHITLEY MA    What was the call regarding: PATIENT WAS RECENTLY TREATED FOR SHINGLES, AND HAD THE BBLISTERS TREATED. HOWEVER, HE NOW STATES THAT HE IS EXPERIENCING NERVE PAIN AT THE SITE, AND REQUESTS A CALL BACK TO DISCUSS TREATMENT OPTIONS    Do you require a callback: YES

## 2022-04-26 ENCOUNTER — LAB (OUTPATIENT)
Dept: FAMILY MEDICINE CLINIC | Facility: CLINIC | Age: 82
End: 2022-04-26

## 2022-04-26 DIAGNOSIS — Z13.6 ENCOUNTER FOR LIPID SCREENING FOR CARDIOVASCULAR DISEASE: ICD-10-CM

## 2022-04-26 DIAGNOSIS — B02.9 HERPES ZOSTER WITHOUT COMPLICATION: ICD-10-CM

## 2022-04-26 DIAGNOSIS — Z00.00 MEDICARE ANNUAL WELLNESS VISIT, SUBSEQUENT: ICD-10-CM

## 2022-04-26 DIAGNOSIS — Z13.220 ENCOUNTER FOR LIPID SCREENING FOR CARDIOVASCULAR DISEASE: ICD-10-CM

## 2022-04-26 LAB
ALBUMIN SERPL-MCNC: 4.4 G/DL (ref 3.5–5.2)
ALBUMIN/GLOB SERPL: 1.8 G/DL
ALP SERPL-CCNC: 62 U/L (ref 39–117)
ALT SERPL W P-5'-P-CCNC: 18 U/L (ref 1–41)
ANION GAP SERPL CALCULATED.3IONS-SCNC: 11.2 MMOL/L (ref 5–15)
AST SERPL-CCNC: 17 U/L (ref 1–40)
BILIRUB SERPL-MCNC: 0.8 MG/DL (ref 0–1.2)
BUN SERPL-MCNC: 14 MG/DL (ref 8–23)
BUN/CREAT SERPL: 13 (ref 7–25)
CALCIUM SPEC-SCNC: 9.3 MG/DL (ref 8.6–10.5)
CHLORIDE SERPL-SCNC: 97 MMOL/L (ref 98–107)
CHOLEST SERPL-MCNC: 109 MG/DL (ref 0–200)
CO2 SERPL-SCNC: 27.8 MMOL/L (ref 22–29)
CREAT SERPL-MCNC: 1.08 MG/DL (ref 0.76–1.27)
EGFRCR SERPLBLD CKD-EPI 2021: 69.4 ML/MIN/1.73
GLOBULIN UR ELPH-MCNC: 2.4 GM/DL
GLUCOSE SERPL-MCNC: 89 MG/DL (ref 65–99)
HDLC SERPL-MCNC: 48 MG/DL (ref 40–60)
LDLC SERPL CALC-MCNC: 49 MG/DL (ref 0–100)
LDLC/HDLC SERPL: 1.06 {RATIO}
POTASSIUM SERPL-SCNC: 4.1 MMOL/L (ref 3.5–5.2)
PROT SERPL-MCNC: 6.8 G/DL (ref 6–8.5)
SODIUM SERPL-SCNC: 136 MMOL/L (ref 136–145)
TRIGL SERPL-MCNC: 51 MG/DL (ref 0–150)
VLDLC SERPL-MCNC: 12 MG/DL (ref 5–40)

## 2022-04-26 PROCEDURE — 80053 COMPREHEN METABOLIC PANEL: CPT | Performed by: NURSE PRACTITIONER

## 2022-04-26 PROCEDURE — 36415 COLL VENOUS BLD VENIPUNCTURE: CPT | Performed by: NURSE PRACTITIONER

## 2022-04-26 PROCEDURE — 80061 LIPID PANEL: CPT | Performed by: NURSE PRACTITIONER

## 2022-04-28 ENCOUNTER — OFFICE VISIT (OUTPATIENT)
Dept: FAMILY MEDICINE CLINIC | Facility: CLINIC | Age: 82
End: 2022-04-28

## 2022-04-28 DIAGNOSIS — B02.9 HERPES ZOSTER WITHOUT COMPLICATION: Primary | ICD-10-CM

## 2022-04-28 DIAGNOSIS — I10 PRIMARY HYPERTENSION: ICD-10-CM

## 2022-04-28 PROCEDURE — 99441 PR PHYS/QHP TELEPHONE EVALUATION 5-10 MIN: CPT | Performed by: INTERNAL MEDICINE

## 2022-04-28 RX ORDER — LIDOCAINE 50 MG/G
1 PATCH TOPICAL EVERY 24 HOURS
Qty: 30 PATCH | Refills: 3 | Status: SHIPPED | OUTPATIENT
Start: 2022-04-28 | End: 2022-07-22

## 2022-04-28 NOTE — PROGRESS NOTES
Leyda Allen is a 81 y.o. male.     There were no vitals filed for this visit.   There is no height or weight on file to calculate BMI.     History of Present Illness   You have chosen to receive care through a telephone visit. Do you consent to use a telephone visit for your medical care today? Yes  Patient had a 10-minute phone encounter.  Patient was seen for shingles.  Patient had a round of shingles after getting his Shingrix vaccine.  Patient is still having residual pain but not as bad as it was when he had the shingles active.  Patient was put on hydrocodone but does not want to stay on it.  Patient was informed he can get a postherpetic pain syndrome if not taking the medication to relieve the pain.  Patient wants to try the Lidoderm patch 5% and was sent to pharmacy.  Patient was informed he could use 3 at the same time if necessary.  Patch can only use for 12 hours and must be off 12 hours.  Patient is also on pregabalin 75 mg 2 tablets twice a day.  Patient will continue medication.  Patient blood pressures running 130s over 70s.  Patient will continue monitoring and continue present medication.    Dictated utilizing Dragon dictation. If there are questions or for further clarification, please contact me.  The following portions of the patient's history were reviewed and updated as appropriate: allergies, current medications, past family history, past medical history, past social history, past surgical history and problem list.    Review of Systems   Constitutional: Negative for fatigue and fever.   HENT: Positive for congestion. Negative for trouble swallowing.    Eyes: Negative for discharge and visual disturbance.   Respiratory: Negative for choking and shortness of breath.    Cardiovascular: Negative for chest pain and palpitations.   Gastrointestinal: Negative for abdominal pain and blood in stool.   Endocrine: Negative.    Genitourinary: Negative for genital sores and hematuria.    Musculoskeletal: Negative for gait problem and joint swelling.   Skin: Negative for color change, pallor, rash and wound.        Shingles with pain   Allergic/Immunologic: Positive for environmental allergies. Negative for immunocompromised state.   Neurological: Negative for facial asymmetry and speech difficulty.   Psychiatric/Behavioral: Negative for hallucinations and suicidal ideas.       Objective   Physical Exam  Pulmonary:      Effort: Pulmonary effort is normal.      Breath sounds: Normal breath sounds.   Neurological:      General: No focal deficit present.      Mental Status: He is oriented to person, place, and time. Mental status is at baseline.   Psychiatric:         Mood and Affect: Mood normal.         Behavior: Behavior normal.         Thought Content: Thought content normal.         Judgment: Judgment normal.         Assessment/Plan #1 continue pregabalin No. 2 Lidoderm 5% patches as needed #3 continue monitoring blood pressure  Problems Addressed this Visit        Cardiac and Vasculature    BP (high blood pressure)      Other Visit Diagnoses     Herpes zoster without complication    -  Primary      Diagnoses     Diagnosis Codes Comments    Herpes zoster without complication    -  Primary ICD-10-CM: B02.9  ICD-9-CM: 053.9     Primary hypertension     ICD-10-CM: I10  ICD-9-CM: 401.9

## 2022-05-12 ENCOUNTER — TRANSCRIBE ORDERS (OUTPATIENT)
Dept: ADMINISTRATIVE | Facility: HOSPITAL | Age: 82
End: 2022-05-12

## 2022-05-12 DIAGNOSIS — M54.50 LUMBAR PAIN: Primary | ICD-10-CM

## 2022-05-12 RX ORDER — FEXOFENADINE HYDROCHLORIDE 60 MG/1
60 TABLET, FILM COATED ORAL 2 TIMES DAILY
Qty: 180 TABLET | Refills: 3 | Status: SHIPPED | OUTPATIENT
Start: 2022-05-12 | End: 2023-04-03 | Stop reason: SDUPTHER

## 2022-05-12 NOTE — TELEPHONE ENCOUNTER
Caller: Alessio Allen    Relationship: Self    Best call back number: [phone]    Requested Prescriptions:   Requested Prescriptions     Pending Prescriptions Disp Refills   • fexofenadine (Allegra Allergy) 60 MG tablet 180 tablet 3     Sig: Take 1 tablet by mouth 2 (Two) Times a Day.        Pharmacy where request should be sent: [facility] [place], [place] - [address] [phone] [facility] [phone] FX     Additional details provided by patient: PATIENT WOULD LIKE 90 DAY PRESCRIPTION    Does the patient have less than a 3 day supply:  [] Yes  [] No    Tessa Iverson Rep   05/12/22 13:26 EDT

## 2022-05-18 ENCOUNTER — HOSPITAL ENCOUNTER (OUTPATIENT)
Dept: MRI IMAGING | Facility: HOSPITAL | Age: 82
Discharge: HOME | End: 2022-05-18
Admitting: NEUROLOGICAL SURGERY

## 2022-05-18 VITALS
DIASTOLIC BLOOD PRESSURE: 94 MMHG | WEIGHT: 221.6 LBS | HEART RATE: 84 BPM | OXYGEN SATURATION: 92 % | BODY MASS INDEX: 30.02 KG/M2 | TEMPERATURE: 98.2 F | SYSTOLIC BLOOD PRESSURE: 154 MMHG | HEIGHT: 72 IN | RESPIRATION RATE: 18 BRPM

## 2022-05-18 DIAGNOSIS — M54.50 LUMBAR PAIN: ICD-10-CM

## 2022-05-18 LAB — CREAT BLDA-MCNC: 1 MG/DL (ref 0.6–1.3)

## 2022-05-18 PROCEDURE — 0 GADOBENATE DIMEGLUMINE 529 MG/ML SOLUTION: Performed by: NEUROLOGICAL SURGERY

## 2022-05-18 PROCEDURE — 82565 ASSAY OF CREATININE: CPT

## 2022-05-18 PROCEDURE — A9577 INJ MULTIHANCE: HCPCS | Performed by: NEUROLOGICAL SURGERY

## 2022-05-18 PROCEDURE — 72158 MRI LUMBAR SPINE W/O & W/DYE: CPT

## 2022-05-18 RX ADMIN — GADOBENATE DIMEGLUMINE 20 ML: 529 INJECTION, SOLUTION INTRAVENOUS at 12:24

## 2022-05-24 ENCOUNTER — TELEPHONE (OUTPATIENT)
Dept: FAMILY MEDICINE CLINIC | Facility: CLINIC | Age: 82
End: 2022-05-24

## 2022-05-24 DIAGNOSIS — S32.000A CLOSED COMPRESSION FRACTURE OF LUMBOSACRAL SPINE, INITIAL ENCOUNTER: Primary | ICD-10-CM

## 2022-05-24 NOTE — TELEPHONE ENCOUNTER
PATIENT IS REQUESTING APRYL TO ORDER A BONE DENSITY TEST FOR PATIENT, HIS LAST WA June 17,2020 IF TIME FOR ANOTHER PLEASE ORDER AND CALL TO SCHEDULE.     PATIENT> [phone]

## 2022-05-24 NOTE — TELEPHONE ENCOUNTER
Caller: Alessio Allen    Relationship to patient: Self    Best call back number: [phone]    Patient is needing: PATIENT WOULD LIKE TO KNOW THE RESULTS OF THE LAST BONE DENSITY TEST ASAP

## 2022-05-25 ENCOUNTER — TELEPHONE (OUTPATIENT)
Dept: FAMILY MEDICINE CLINIC | Facility: CLINIC | Age: 82
End: 2022-05-25

## 2022-05-25 NOTE — TELEPHONE ENCOUNTER
Patient wants to make sure the dexa scan is going to be done on his back area were he had previous fractures. Would like a call back to be sure that is what's being done.

## 2022-06-28 NOTE — PROGRESS NOTES
Irwin Koo,    Thank you for allowing [facility] to manage your care.    I sent your prescriptions to your pharmacy.    For your convenience, test results are released as soon as they are available  Please allow 1-2 business days for me to send you a comment about your results.  If not done so, I encourage you to login into [employer] ([email or web address] to review your results in real time.     If you have any questions or concerns, please feel free to call us at [phone].    Sincerely,    Dr. Desai    Did you know?      You can schedule a video visit for follow-up appointments as well as future appointments for certain conditions.  Please see the below link.     [email or web address]    If you have not already done so,  I encourage you to sign up for [employer] ([email or web address]).  This will allow you to review your results, securely communicate with a provider, and schedule virtual visits as well.    Instructions for Patients      What are the symptoms of COVID-19?  Symptoms can include fever, cough, shortness of breath, chills, headache, muscle pain sore throat, fatigue, runny or stuffy nose, and loss of taste and smell. Other less common symptoms include nausea, vomiting, or diarrhea (watery stools).    Know when to call 911. Emergency warning signs include:  Trouble breathing or shortness of breath  Pain or pressure in the chest that doesn't go away  Feeling confused like you haven't felt before, or not being able to wake up  Bluish-colored lips or face    How can I take care of myself?  Get lots of rest. Drink extra fluids (unless a doctor has told you not to).  Take Tylenol (acetaminophen) for fever or pain. If you have liver or kidney problems, ask your family doctor if it's okay to take Tylenol   Adults:   650 mg (two 325 mg pills or tablets) every 4 to 6 hours, or...   1,000 mg (two 500 mg pills or tablets) every 8 hours  Patient tolerated exercise without complaints.   as needed.  Note: Don't take more than 3,000 mg in one day. Acetaminophen is found in many medicines (both prescribed and over the counter). Read all labels to be sure you don't take too much.  For children, check the Tylenol bottle for the right dose. The dose is based on the child's age or weight.  Take over the counter medicines for your symptoms as needed. Talk to your pharmacist.  If you have other health problems (like cancer, heart failure, an organ transplant, or severe kidney disease): Call your specialty clinic if you don't feel better in the next 2 days.    These guidelines are for isolating and quarantining before returning to work, school or .   For employers, schools and day cares: This is an official notice for this person s medical guidelines for returning in-person.   For health care sites: The CDC gives different isolation and quarantine guidelines for healthcare sites, please check with these sites before arriving.     How do I self-isolate?  You isolate when you have symptoms of COVID or a test shows you have COVID, even if you don t have symptoms.   If you DO have symptoms:  Stay home and away from others  For at least 5 days after your symptoms started, AND   You are fever free for 24 hours (with no medicine that reduces fever), AND  Your other symptoms are better.  Wear a mask for 10 full days any time you are around others.  If you DON T have symptoms:  Stay at home and away from others for at least 5 days after your positive test.  Wear a mask for 10 full days any time you are around others.    How and when do I quarantine?  You quarantine when you may have been exposed to the virus and DON T have symptoms.   Stay home and away from others.   You must quarantine for 5 days after your last contact with a person who has COVID.  Note: If you are fully vaccinated, you don t need to quarantine. You should still follow the steps below.   Wear a mask for 10 full days any time you re around  others.  Get tested at least 5 days after you were exposed, even if you don t have symptoms.   If you start to have symptoms, isolate right away and get tested.    Where can I get more information?  [facility] COVID-19 Resource Hub: [email or web address]   CDC Quarantine & Isolation: https://www.cdc.gov/coronavirus/2019-ncov/your-health/quarantine-isolation.html   [facility] - What to Do If You're Sick: https://www.cdc.gov/coronavirus/2019-ncov/if-you-are-sick/index.html  [facility] clinical trials (COVID-19 research studies): [email or web address]-trials  [place] Department of Health COVID-19 Public Hotline: 1-664.900.5277

## 2022-07-22 RX ORDER — LIDOCAINE 50 MG/G
PATCH TOPICAL
Qty: 30 PATCH | Refills: 3 | Status: SHIPPED | OUTPATIENT
Start: 2022-07-22 | End: 2022-09-23

## 2022-07-25 RX ORDER — ACETAMINOPHEN 325 MG/1
650 TABLET ORAL EVERY 6 HOURS PRN
Qty: 720 TABLET | Refills: 2 | Status: SHIPPED | OUTPATIENT
Start: 2022-07-25

## 2022-07-25 RX ORDER — ECHINACEA PURPUREA EXTRACT 125 MG
2 TABLET ORAL AS NEEDED
Qty: 4 EACH | Refills: 3 | Status: SHIPPED | OUTPATIENT
Start: 2022-07-25

## 2022-07-25 NOTE — TELEPHONE ENCOUNTER
Caller: Alessio Allen    Relationship: Self    Best call back number: [phone]    Requested Prescriptions:   Requested Prescriptions     Pending Prescriptions Disp Refills   • acetaminophen (TYLENOL) 325 MG tablet 720 tablet 2     Sig: Take 2 tablets by mouth Every 6 (Six) Hours As Needed (prn for pain).   • sodium chloride (Ocean Nasal Spray) 0.65 % nasal spray 4 each 3     Si sprays into the nostril(s) as directed by provider As Needed for Congestion (qid prn). May refill q 21 days        Pharmacy where request should be sent: [facility] [place] [facility] -  [place] - [address] - [phone]  - [phone] FX     Additional details provided by patient: PATIENT HAS 2 WEEKS LEFT    Does the patient have less than a 3 day supply:  [] Yes  [x] No    Tessa LAW Rep   22 12:51 EDT

## 2022-08-25 ENCOUNTER — TELEPHONE (OUTPATIENT)
Dept: FAMILY MEDICINE CLINIC | Facility: CLINIC | Age: 82
End: 2022-08-25

## 2022-08-25 NOTE — TELEPHONE ENCOUNTER
Caller: Alejandro Alessio    Relationship: Self    Best call back number: [phone]    What is the best time to reach you: ANY    Who are you requesting to speak with (clinical staff, provider,  specific staff member): MARKIE ARVIZU     What was the call regarding: PAT[facility] AND [facility]  AND WOULD LIKE FOR MARKIE TO WRITE HIM A SCRIPT SO THAT HE CAN DO THIS AND CALL IT INTO THE [facility], [facility] - [facility], [place] - [address] - [phone]  - [phone] FX        Do you require a callback: YES

## 2022-08-25 NOTE — TELEPHONE ENCOUNTER
Patient will need to schedule an appointment to have controlled substance contract signed and UDS for initial prescription.

## 2022-09-19 ENCOUNTER — HOSPITAL ENCOUNTER (OUTPATIENT)
Dept: BONE DENSITY | Facility: HOSPITAL | Age: 82
Discharge: HOME | End: 2022-09-19
Admitting: INTERNAL MEDICINE

## 2022-09-19 DIAGNOSIS — S32.000A CLOSED COMPRESSION FRACTURE OF LUMBOSACRAL SPINE, INITIAL ENCOUNTER: ICD-10-CM

## 2022-09-19 PROCEDURE — 77080 DXA BONE DENSITY AXIAL: CPT

## 2022-09-23 ENCOUNTER — OFFICE VISIT (OUTPATIENT)
Dept: FAMILY MEDICINE CLINIC | Facility: CLINIC | Age: 82
End: 2022-09-23

## 2022-09-23 VITALS
DIASTOLIC BLOOD PRESSURE: 82 MMHG | SYSTOLIC BLOOD PRESSURE: 128 MMHG | TEMPERATURE: 97.8 F | WEIGHT: 229.6 LBS | HEART RATE: 84 BPM | HEIGHT: 72 IN | OXYGEN SATURATION: 92 % | BODY MASS INDEX: 31.1 KG/M2

## 2022-09-23 DIAGNOSIS — M85.851 OSTEOPENIA OF BOTH HIPS: ICD-10-CM

## 2022-09-23 DIAGNOSIS — Z51.81 ENCOUNTER FOR MONITORING DIURETIC THERAPY: ICD-10-CM

## 2022-09-23 DIAGNOSIS — Z79.899 ENCOUNTER FOR MONITORING DIURETIC THERAPY: ICD-10-CM

## 2022-09-23 DIAGNOSIS — M85.852 OSTEOPENIA OF BOTH HIPS: ICD-10-CM

## 2022-09-23 DIAGNOSIS — Z23 NEED FOR STREPTOCOCCUS PNEUMONIAE AND INFLUENZA VACCINATION: ICD-10-CM

## 2022-09-23 DIAGNOSIS — Z79.899 ON POTASSIUM WASTING DIURETIC THERAPY: ICD-10-CM

## 2022-09-23 DIAGNOSIS — Z76.89 ENCOUNTER TO ESTABLISH CARE WITH NEW DOCTOR: Primary | ICD-10-CM

## 2022-09-23 LAB
ALBUMIN SERPL-MCNC: 4.5 G/DL (ref 3.5–5.2)
ALBUMIN/GLOB SERPL: 2 G/DL
ALP SERPL-CCNC: 87 U/L (ref 39–117)
ALT SERPL W P-5'-P-CCNC: 17 U/L (ref 1–41)
ANION GAP SERPL CALCULATED.3IONS-SCNC: 14 MMOL/L (ref 5–15)
AST SERPL-CCNC: 25 U/L (ref 1–40)
BILIRUB SERPL-MCNC: 1 MG/DL (ref 0–1.2)
BUN SERPL-MCNC: 11 MG/DL (ref 8–23)
BUN/CREAT SERPL: 11.1 (ref 7–25)
CALCIUM SPEC-SCNC: 9.2 MG/DL (ref 8.6–10.5)
CHLORIDE SERPL-SCNC: 95 MMOL/L (ref 98–107)
CO2 SERPL-SCNC: 26 MMOL/L (ref 22–29)
CREAT SERPL-MCNC: 0.99 MG/DL (ref 0.76–1.27)
EGFRCR SERPLBLD CKD-EPI 2021: 76.5 ML/MIN/1.73
GLOBULIN UR ELPH-MCNC: 2.3 GM/DL
GLUCOSE SERPL-MCNC: 98 MG/DL (ref 65–99)
POTASSIUM SERPL-SCNC: 4 MMOL/L (ref 3.5–5.2)
PROT SERPL-MCNC: 6.8 G/DL (ref 6–8.5)
SODIUM SERPL-SCNC: 135 MMOL/L (ref 136–145)

## 2022-09-23 PROCEDURE — 99214 OFFICE O/P EST MOD 30 MIN: CPT | Performed by: NURSE PRACTITIONER

## 2022-09-23 PROCEDURE — G0008 ADMIN INFLUENZA VIRUS VAC: HCPCS | Performed by: NURSE PRACTITIONER

## 2022-09-23 PROCEDURE — 90677 PCV20 VACCINE IM: CPT | Performed by: NURSE PRACTITIONER

## 2022-09-23 PROCEDURE — G0009 ADMIN PNEUMOCOCCAL VACCINE: HCPCS | Performed by: NURSE PRACTITIONER

## 2022-09-23 PROCEDURE — 36415 COLL VENOUS BLD VENIPUNCTURE: CPT | Performed by: NURSE PRACTITIONER

## 2022-09-23 PROCEDURE — 80053 COMPREHEN METABOLIC PANEL: CPT | Performed by: NURSE PRACTITIONER

## 2022-09-23 PROCEDURE — 90662 IIV NO PRSV INCREASED AG IM: CPT | Performed by: NURSE PRACTITIONER

## 2022-09-23 RX ORDER — ALENDRONATE SODIUM 70 MG/1
70 TABLET ORAL
Qty: 12 TABLET | Refills: 2 | Status: SHIPPED | OUTPATIENT
Start: 2022-09-23 | End: 2022-12-16

## 2022-09-23 RX ORDER — ALBUTEROL SULFATE 90 UG/1
2 AEROSOL, METERED RESPIRATORY (INHALATION) EVERY 4 HOURS PRN
COMMUNITY

## 2022-09-23 NOTE — PATIENT INSTRUCTIONS
Will notify of lab results. Continue current medication regimen and diet. Follow-up in 6 months or sooner as needed.  Continue use of compression socks and elevation of legs when sitting.  Notify me with episodes of increased edema.

## 2022-09-23 NOTE — PROGRESS NOTES
"Chief Complaint  Follow-up (Bone scan), Leg Swelling (Bilateral/), and Foot Swelling (Bilateral/)    Subjective        Alessio Allen presents to [facility]  History of Present Illness   82-year-old male with history of A. fib, dyslipidemia, HTN, COPD, CHF, former patient of Luly Nickerson, new to me, presenting to establish care and discuss results of DEXA scan, completed on 9/19/2022, results positive for osteopenia, shows decrease of 5% and 7% of femoral necks compared to previous, agrees to starting Fosamax 70 mg once weekly.  He also reports has intermittent increased edema BLE, wears knee-high compression stockings daily, reports elevation of legs when sitting, today he has trace edema to BLE, states one month ago swelling increased and was given instructions to increase his Lasix to 2 tablets in a.m. and 1 tablet in p.m. and then back to normal dosing once edema subsided, will check a CMP today to ensure electrolytes especially potassium is within normal limits.  He is also needing flu pneumonia vaccine.    Objective   Vital Signs:  /82 (BP Location: Left arm, Patient Position: Sitting, Cuff Size: Large Adult)   Pulse 84   Temp 97.8 °F (36.6 °C) (Infrared)   Ht 182.9 cm (72\")   Wt 104 kg (229 lb 9.6 oz)   SpO2 92%   BMI 31.14 kg/m²   Estimated body mass index is 31.14 kg/m² as calculated from the following:    Height as of this encounter: 182.9 cm (72\").    Weight as of this encounter: 104 kg (229 lb 9.6 oz).          Physical Exam  Cardiovascular:      Rate and Rhythm: Normal rate.      Pulses: Normal pulses.      Heart sounds: Normal heart sounds.      Comments: Trace edema BLE, knee-high compression stockings in place  Pulmonary:      Breath sounds: Normal breath sounds. No wheezing, rhonchi or rales.      Comments: Wears continuous O2 therapy at 2L NC  Abdominal:      General: Bowel sounds are normal.      Palpations: Abdomen is soft.   Skin:     General: Skin is " warm.   Neurological:      General: No focal deficit present.      Mental Status: He is alert and oriented to person, place, and time.   Psychiatric:         Mood and Affect: Mood normal.         Behavior: Behavior normal.         Thought Content: Thought content normal.         Judgment: Judgment normal.        Result Review :                Assessment and Plan   Diagnoses and all orders for this visit:    1. Encounter to establish care with new doctor (Primary)    2. Osteopenia of both hips  -     alendronate (Fosamax) 70 MG tablet; Take 1 tablet by mouth Every 7 (Seven) Days.  Dispense: 12 tablet; Refill: 2    3. Encounter for monitoring diuretic therapy  -     Comprehensive metabolic panel    4. On potassium wasting diuretic therapy  -     Comprehensive metabolic panel    5. Need for Streptococcus pneumoniae and influenza vaccination  -     Pneumococcal Conjugate Vaccine 20-Valent (PCV20)  -     Fluzone High-Dose 65+yrs (3999-1703)           I spent 30 minutes caring for Alessio on this date of service. This time includes time spent by me in the following activities:reviewing tests, obtaining and/or reviewing a separately obtained history, performing a medically appropriate examination and/or evaluation , counseling and educating the patient/family/caregiver, ordering medications, tests, or procedures, documenting information in the medical record and independently interpreting results and communicating that information with the patient/family/caregiver  Follow Up   Return in about 6 months (around 3/23/2023), or if symptoms worsen or fail to improve, for Recheck.  Patient was given instructions and counseling regarding his condition or for health maintenance advice. Please see specific information pulled into the AVS if appropriate.     Will notify of lab results. Continue current medication regimen and diet. Follow-up in 6 months or sooner as needed.  Continue use of compression socks and elevation of legs  when sitting.  Notify me with episodes of increased edema.    Mask and goggles worn.

## 2022-09-26 ENCOUNTER — TELEPHONE (OUTPATIENT)
Dept: FAMILY MEDICINE CLINIC | Facility: CLINIC | Age: 82
End: 2022-09-26

## 2022-09-26 NOTE — TELEPHONE ENCOUNTER
Caller: Alessio Allen    Relationship: Self    Best call back number: [phone]    What is the best time to reach you: ANYTIME     Who are you requesting to speak with (clinical staff, provider,  specific staff member): MARKIE ARVIZU     What was the call regarding: PATIENT WOULD LIKE TO DISCUSS THE MEDICATION alendronate (Fosamax) 70 MG tablet WITH HIS PROVIDER     Do you require a callback: YES

## 2022-10-04 ENCOUNTER — APPOINTMENT (OUTPATIENT)
Dept: GENERAL RADIOLOGY | Facility: HOSPITAL | Age: 82
End: 2022-10-04

## 2022-10-04 ENCOUNTER — HOSPITAL ENCOUNTER (EMERGENCY)
Facility: HOSPITAL | Age: 82
Discharge: HOME | End: 2022-10-04
Attending: EMERGENCY MEDICINE | Admitting: EMERGENCY MEDICINE

## 2022-10-04 ENCOUNTER — APPOINTMENT (OUTPATIENT)
Dept: CT IMAGING | Facility: HOSPITAL | Age: 82
End: 2022-10-04

## 2022-10-04 VITALS
OXYGEN SATURATION: 92 % | BODY MASS INDEX: 31.05 KG/M2 | SYSTOLIC BLOOD PRESSURE: 147 MMHG | HEIGHT: 72 IN | DIASTOLIC BLOOD PRESSURE: 99 MMHG | HEART RATE: 80 BPM | RESPIRATION RATE: 20 BRPM | TEMPERATURE: 97.3 F | WEIGHT: 229.28 LBS

## 2022-10-04 DIAGNOSIS — D69.6 THROMBOCYTOPENIA: ICD-10-CM

## 2022-10-04 DIAGNOSIS — R04.2 HEMOPTYSIS: Primary | ICD-10-CM

## 2022-10-04 LAB
ALBUMIN SERPL-MCNC: 4.3 G/DL (ref 3.5–5.2)
ALBUMIN/GLOB SERPL: 1.7 G/DL
ALP SERPL-CCNC: 94 U/L (ref 39–117)
ALT SERPL W P-5'-P-CCNC: 15 U/L (ref 1–41)
ANION GAP SERPL CALCULATED.3IONS-SCNC: 12 MMOL/L (ref 5–15)
AST SERPL-CCNC: 27 U/L (ref 1–40)
BASOPHILS # BLD AUTO: 0.03 10*3/MM3 (ref 0–0.2)
BASOPHILS NFR BLD AUTO: 0.5 % (ref 0–1.5)
BILIRUB SERPL-MCNC: 1.3 MG/DL (ref 0–1.2)
BUN SERPL-MCNC: 11 MG/DL (ref 8–23)
BUN/CREAT SERPL: 12 (ref 7–25)
CALCIUM SPEC-SCNC: 9.3 MG/DL (ref 8.6–10.5)
CHLORIDE SERPL-SCNC: 91 MMOL/L (ref 98–107)
CO2 SERPL-SCNC: 26 MMOL/L (ref 22–29)
CREAT SERPL-MCNC: 0.92 MG/DL (ref 0.76–1.27)
DEPRECATED RDW RBC AUTO: 42.5 FL (ref 37–54)
EGFRCR SERPLBLD CKD-EPI 2021: 83.6 ML/MIN/1.73
EOSINOPHIL # BLD AUTO: 0.01 10*3/MM3 (ref 0–0.4)
EOSINOPHIL NFR BLD AUTO: 0.2 % (ref 0.3–6.2)
ERYTHROCYTE [DISTWIDTH] IN BLOOD BY AUTOMATED COUNT: 12.7 % (ref 12.3–15.4)
GLOBULIN UR ELPH-MCNC: 2.6 GM/DL
GLUCOSE SERPL-MCNC: 119 MG/DL (ref 65–99)
HCT VFR BLD AUTO: 49.7 % (ref 37.5–51)
HGB BLD-MCNC: 16.5 G/DL (ref 13–17.7)
HOLD SPECIMEN: NORMAL
HOLD SPECIMEN: NORMAL
IMM GRANULOCYTES # BLD AUTO: 0.01 10*3/MM3 (ref 0–0.05)
IMM GRANULOCYTES NFR BLD AUTO: 0.2 % (ref 0–0.5)
LYMPHOCYTES # BLD AUTO: 0.34 10*3/MM3 (ref 0.7–3.1)
LYMPHOCYTES NFR BLD AUTO: 6 % (ref 19.6–45.3)
MCH RBC QN AUTO: 30.1 PG (ref 26.6–33)
MCHC RBC AUTO-ENTMCNC: 33.2 G/DL (ref 31.5–35.7)
MCV RBC AUTO: 90.7 FL (ref 79–97)
MONOCYTES # BLD AUTO: 0.64 10*3/MM3 (ref 0.1–0.9)
MONOCYTES NFR BLD AUTO: 11.2 % (ref 5–12)
NEUTROPHILS NFR BLD AUTO: 4.66 10*3/MM3 (ref 1.7–7)
NEUTROPHILS NFR BLD AUTO: 81.9 % (ref 42.7–76)
NRBC BLD AUTO-RTO: 0 /100 WBC (ref 0–0.2)
NT-PROBNP SERPL-MCNC: 2576 PG/ML (ref 0–1800)
PLATELET # BLD AUTO: 87 10*3/MM3 (ref 140–450)
PMV BLD AUTO: 9.4 FL (ref 6–12)
POTASSIUM SERPL-SCNC: 4.3 MMOL/L (ref 3.5–5.2)
PROT SERPL-MCNC: 6.9 G/DL (ref 6–8.5)
QT INTERVAL: 421 MS
RBC # BLD AUTO: 5.48 10*6/MM3 (ref 4.14–5.8)
SODIUM SERPL-SCNC: 129 MMOL/L (ref 136–145)
TROPONIN T SERPL-MCNC: <0.01 NG/ML (ref 0–0.03)
WBC NRBC COR # BLD: 5.69 10*3/MM3 (ref 3.4–10.8)
WHOLE BLOOD HOLD COAG: NORMAL
WHOLE BLOOD HOLD SPECIMEN: NORMAL

## 2022-10-04 PROCEDURE — 83880 ASSAY OF NATRIURETIC PEPTIDE: CPT | Performed by: EMERGENCY MEDICINE

## 2022-10-04 PROCEDURE — 71250 CT THORAX DX C-: CPT

## 2022-10-04 PROCEDURE — 84484 ASSAY OF TROPONIN QUANT: CPT | Performed by: EMERGENCY MEDICINE

## 2022-10-04 PROCEDURE — 71046 X-RAY EXAM CHEST 2 VIEWS: CPT

## 2022-10-04 PROCEDURE — 93010 ELECTROCARDIOGRAM REPORT: CPT | Performed by: INTERNAL MEDICINE

## 2022-10-04 PROCEDURE — 93005 ELECTROCARDIOGRAM TRACING: CPT

## 2022-10-04 PROCEDURE — 80053 COMPREHEN METABOLIC PANEL: CPT | Performed by: EMERGENCY MEDICINE

## 2022-10-04 PROCEDURE — 99284 EMERGENCY DEPT VISIT MOD MDM: CPT

## 2022-10-04 PROCEDURE — 85025 COMPLETE CBC W/AUTO DIFF WBC: CPT | Performed by: EMERGENCY MEDICINE

## 2022-10-04 PROCEDURE — 93005 ELECTROCARDIOGRAM TRACING: CPT | Performed by: EMERGENCY MEDICINE

## 2022-10-04 RX ORDER — SODIUM CHLORIDE 0.9 % (FLUSH) 0.9 %
10 SYRINGE (ML) INJECTION AS NEEDED
Status: DISCONTINUED | OUTPATIENT
Start: 2022-10-04 | End: 2022-10-04 | Stop reason: HOSPADM

## 2022-10-04 NOTE — DISCHARGE INSTRUCTIONS
Please hold off on the Xarelto until bleeding has fully stopped.  Please contact Dr. Suresh Hernandez about further evaluation and possible bronchoscopy.  Do not hesitate to return for increased bleeding, shortness of breath or as needed.

## 2022-10-04 NOTE — ED PROVIDER NOTES
EMERGENCY DEPARTMENT ENCOUNTER    Room Number:  22/22  Date of encounter:  10/4/2022  PCP: Krys Shabazz APRN  Historian: Patient     I used full protective equipment while examining this patient.  This includes face mask, gloves and protective eyewear.  I washed my hands before entering the room and immediately upon leaving the room      HPI:  Chief Complaint: Coughing up blood  A complete HPI/ROS/PMH/PSH/SH/FH are unobtainable due to: None    Context: Alessio Allen is a 82 y.o. male who presents to the ED c/o coughing up blood.  Symptoms began on Friday and have been fairly constant.  He states that he coughs up blood and sputum around 2-4 times a day.  Initially it was mostly sputum and some blood but has become half sputum and half blood over the last several days.  Patient has chronic shortness of breath that is unchanged from baseline.  He is on chronic oxygen.  He denies any chest pain.  He denies any fever.  Patient is anticoagulated on Xarelto and took his last dose yesterday.  He does take Xarelto for chronic atrial fibrillation.  Patient states that he had hemoptysis several years ago and did get a bronchoscopy but they did not find a clear reason for his hemoptysis.      MEDICAL RECORD REVIEW  I reviewed prior medical records note the patient has multiple medical problems including but not limited to the following: A. fib, hyperlipidemia, hypertension, COPD.  He is anticoagulated on Xarelto.    PAST MEDICAL HISTORY  Active Ambulatory Problems     Diagnosis Date Noted   • A-fib (HCC) 03/02/2016   • Dyslipidemia 03/02/2016   • BP (high blood pressure) 03/02/2016   • Neuropathy    • Hypertension    • COPD (chronic obstructive pulmonary disease) (HCC)    • BPH (benign prostatic hypertrophy)    • Atrial fibrillation (HCC)    • Asthma    • Hypoxia 02/07/2018   • Pneumonia 02/07/2018   • Chronic anticoagulation 02/07/2018   • Pneumonia of both lungs due to infectious organism 02/07/2018   •  Hyponatremia 02/14/2018   • COPD exacerbation (HCC) 02/26/2018   • Acute on chronic respiratory failure with hypoxia ([facility]) 03/17/2018   • Pneumonia of both lower lobes due to infectious organism 04/05/2018   • Acute on chronic diastolic heart failure (HCC) 04/19/2018   • IPF (idiopathic pulmonary fibrosis) ([facility]) 04/19/2018   • Acute respiratory failure with hypoxia ([facility]) 02/28/2019   • Attention to G-tube ([facility]) 03/03/2019   • Massive hemoptysis 06/13/2019   • Laryngeal cancer ([facility]) 09/27/2019   • Encounter for venous access device care 03/16/2021     Resolved Ambulatory Problems     Diagnosis Date Noted   • No Resolved Ambulatory Problems     Past Medical History:   Diagnosis Date   • Arthritis    • BPH (benign prostatic hypertrophy)    • Cancer (HCC)    • Dependence on continuous supplemental oxygen    • Diverticulosis    • GERD (gastroesophageal reflux disease)    • H/O Abnormal CBC 2017   • History of heart artery stent 03/2017   • Hyperlipidemia    • Lung disease          PAST SURGICAL HISTORY  Past Surgical History:   Procedure Laterality Date   • BRONCHOSCOPY N/A 4/7/2018    Procedure: BRONCHOSCOPY with specimens;  Surgeon: Suresh Hernandez MD;  Location: [facility] OR;  Service: Pulmonary   • BRONCHOSCOPY N/A 3/6/2019    Procedure: BRONCHOSCOPY WITH BAL AND BIOPSY UNDER FLUORO AND ANESTHESIA;  Surgeon: Suresh Hernandez MD;  Location: [facility] OR;  Service: Pulmonary   • BRONCHOSCOPY N/A 6/13/2019    Procedure: BRONCHOSCOPY;  Surgeon: Suresh Hernandez MD;  Location: [facility] OR;  Service: Pulmonary   • CARDIAC CATHETERIZATION N/A 4/18/2018    Procedure: Right Heart Cath with possible vasodilator study;  Surgeon: Torey Schuler MD;  Location: [facility] INVASIVE LOCATION;  Service: Cardiovascular   • CARDIAC CATHETERIZATION N/A 3/7/2019    Procedure: RIGHT AND LEFT HEART CATH;  Surgeon: Marizol Holt MD;  Location: [facility] CATH INVASIVE LOCATION;  Service: Cardiology   • CARDIAC CATHETERIZATION N/A 3/7/2019     Procedure: CORONARY ANGIOGRAPHY;  Surgeon: Marizol Holt MD;  Location: [facility] INVASIVE LOCATION;  Service: Cardiology   • COLONOSCOPY  2016    polyps   • FRACTURE SURGERY     • INSERT / REPLACE / VENOUS ACCESS CATHETER Right    • PACEMAKER IMPLANTATION     • VENOUS ACCESS DEVICE (PORT) REMOVAL Right 2021    Procedure: Mediport Removal;  Surgeon: Joseph Nickerson Jr., MD;  Location: [facility] OR;  Service: General;  Laterality: Right;         FAMILY HISTORY  Family History   Problem Relation Age of Onset   • Hypertension Mother    • Heart attack Father    • Malig Hyperthermia Neg Hx          SOCIAL HISTORY  Social History     Socioeconomic History   • Marital status:    • Number of children: 2   Tobacco Use   • Smoking status: Former Smoker     Packs/day: 1.50     Years: 45.00     Pack years: 67.50     Types: Cigarettes     Quit date: 1/3/2001     Years since quittin.7   • Smokeless tobacco: Never Used   Vaping Use   • Vaping Use: Never used   Substance and Sexual Activity   • Alcohol use: No   • Drug use: No   • Sexual activity: Defer         ALLERGIES  Lisinopril, Sulfa antibiotics, Penicillins, Atenolol, Celebrex [celecoxib], Coreg [carvedilol], and Ibuprofen       REVIEW OF SYSTEMS  Review of Systems   Constitutional: Negative.  Negative for fever.   HENT: Negative.  Negative for sore throat.    Eyes: Negative.    Respiratory: Positive for cough (As per HPI) and shortness of breath (Chronic on oxygen).    Cardiovascular: Negative.  Negative for chest pain.   Gastrointestinal: Negative.    Genitourinary: Negative.  Negative for dysuria.   Musculoskeletal: Negative.  Negative for back pain.   Skin: Negative.  Negative for rash.   Neurological: Negative.  Negative for headaches.   All other systems reviewed and are negative.          PHYSICAL EXAM    I have reviewed the triage vital signs and nursing notes.    ED Triage Vitals [10/04/22 1458]   Temp Heart Rate Resp BP SpO2   97.3  °F (36.3 °C) 86 20 137/90 91 %      Temp src Heart Rate Source Patient Position BP Location FiO2 (%)   Tympanic Monitor Sitting -- --       Physical Exam  GENERAL: Alert male in no obvious distress.  Triage vitals reviewed.  O2 sats reported 91% on unclear oxygen.  Temperature heart rate and blood pressure benign  HENT: nares patent  EYES: no scleral icterus  CV: Irregular without murmur  RESPIRATORY: Coarse rhonchi appreciated, respirations unlabored.  O2 sats low 90s on nasal cannula oxygen  ABDOMEN: soft, nontender to palpation  MUSCULOSKELETAL: no deformity-no segment swelling or tenderness to palpation  NEURO: Strength sensation and coordination are grossly intact.  Speech and mentation are unremarkable  SKIN: warm, dry      LAB RESULTS  Recent Results (from the past 24 hour(s))   ECG 12 Lead    Collection Time: 10/04/22  3:04 PM   Result Value Ref Range    QT Interval 421 ms   Comprehensive Metabolic Panel    Collection Time: 10/04/22  4:12 PM    Specimen: Blood   Result Value Ref Range    Glucose 119 (H) 65 - 99 mg/dL    BUN 11 8 - 23 mg/dL    Creatinine 0.92 0.76 - 1.27 mg/dL    Sodium 129 (L) 136 - 145 mmol/L    Potassium 4.3 3.5 - 5.2 mmol/L    Chloride 91 (L) 98 - 107 mmol/L    CO2 26.0 22.0 - 29.0 mmol/L    Calcium 9.3 8.6 - 10.5 mg/dL    Total Protein 6.9 6.0 - 8.5 g/dL    Albumin 4.30 3.50 - 5.20 g/dL    ALT (SGPT) 15 1 - 41 U/L    AST (SGOT) 27 1 - 40 U/L    Alkaline Phosphatase 94 39 - 117 U/L    Total Bilirubin 1.3 (H) 0.0 - 1.2 mg/dL    Globulin 2.6 gm/dL    A/G Ratio 1.7 g/dL    BUN/Creatinine Ratio 12.0 7.0 - 25.0    Anion Gap 12.0 5.0 - 15.0 mmol/L    eGFR 83.6 >60.0 mL/min/1.73   BNP    Collection Time: 10/04/22  4:12 PM    Specimen: Blood   Result Value Ref Range    proBNP 2,576.0 (H) 0.0 - 1,800.0 pg/mL   Troponin    Collection Time: 10/04/22  4:12 PM    Specimen: Blood   Result Value Ref Range    Troponin T <0.010 0.000 - 0.030 ng/mL   Green Top (Gel)    Collection Time: 10/04/22  4:12 PM    Result Value Ref Range    Extra Tube Hold for add-ons.    Lavender Top    Collection Time: 10/04/22  4:12 PM   Result Value Ref Range    Extra Tube hold for add-on    Gold Top - SST    Collection Time: 10/04/22  4:12 PM   Result Value Ref Range    Extra Tube Hold for add-ons.    Light Blue Top    Collection Time: 10/04/22  4:12 PM   Result Value Ref Range    Extra Tube Hold for add-ons.    CBC Auto Differential    Collection Time: 10/04/22  4:12 PM    Specimen: Blood   Result Value Ref Range    WBC 5.69 3.40 - 10.80 10*3/mm3    RBC 5.48 4.14 - 5.80 10*6/mm3    Hemoglobin 16.5 13.0 - 17.7 g/dL    Hematocrit 49.7 37.5 - 51.0 %    MCV 90.7 79.0 - 97.0 fL    MCH 30.1 26.6 - 33.0 pg    MCHC 33.2 31.5 - 35.7 g/dL    RDW 12.7 12.3 - 15.4 %    RDW-SD 42.5 37.0 - 54.0 fl    MPV 9.4 6.0 - 12.0 fL    Platelets 87 (L) 140 - 450 10*3/mm3    Neutrophil % 81.9 (H) 42.7 - 76.0 %    Lymphocyte % 6.0 (L) 19.6 - 45.3 %    Monocyte % 11.2 5.0 - 12.0 %    Eosinophil % 0.2 (L) 0.3 - 6.2 %    Basophil % 0.5 0.0 - 1.5 %    Immature Grans % 0.2 0.0 - 0.5 %    Neutrophils, Absolute 4.66 1.70 - 7.00 10*3/mm3    Lymphocytes, Absolute 0.34 (L) 0.70 - 3.10 10*3/mm3    Monocytes, Absolute 0.64 0.10 - 0.90 10*3/mm3    Eosinophils, Absolute 0.01 0.00 - 0.40 10*3/mm3    Basophils, Absolute 0.03 0.00 - 0.20 10*3/mm3    Immature Grans, Absolute 0.01 0.00 - 0.05 10*3/mm3    nRBC 0.0 0.0 - 0.2 /100 WBC       Ordered the above labs and independently reviewed the results.      RADIOLOGY  XR Chest 2 View    Result Date: 10/4/2022  XR CHEST 2 VW-  HISTORY: Male who is 82 years-old,  short of breath  TECHNIQUE: Frontal and lateral views of the chest  COMPARISON: 06/17/2019, correlated with MRI from 05/18/2022  FINDINGS: The heart is enlarged. Aorta is tortuous, calcified. Left-sided pacemaker, cardiac leads are seen. Mild prominence of vascular and interstitial markings. No focal pulmonary consolidation, pleural effusion, or pneumothorax. Lumbar  compression deformities, new from the prior x-ray, appear grossly similar from the prior MRI.      No focal pulmonary consolidation. Cardiomegaly with mild prominence of vascular and interstitial markings. Tortuous aorta.  This report was finalized on 10/4/2022 3:32 PM by Dr. Krishan Escalera M.D.      CT Chest Without Contrast Diagnostic    Result Date: 10/4/2022  CT CHEST WO CONTRAST DIAGNOSTIC-  HISTORY:  Hemoptysis, on anticoagulation. History of squamous cell laryngeal carcinoma.  TECHNIQUE: CT of the chest was performed without intravenous contrast. Reformatted images were reviewed. Radiation dose reduction techniques were utilized, including automated exposure control and exposure modulation based on body size.  COMPARISON:  Chest radiograph 10/4/2022. CT chest 5/31/2019. MRI lumbar spine 5/18/2022   FINDINGS: There is a left chest cardiac device with electrodes in the right atrium and right ventricle. Small mediastinal lymph nodes are not significantly changed. There are calcified lymph nodes. The heart is enlarged. There is no pericardial effusion. There is advanced calcific coronary artery atherosclerosis. There is at least moderate calcific aortic atherosclerosis. The ascending aorta is dilated to 4.5 cm at the level of the pulmonary trunk, not definitely changed. The pulmonary trunk is dilated to 4.6 cm, not significantly changed when remeasured. The descending aorta is tortuous. There are trace bilateral pleural effusions. Limited imaging through the upper abdomen demonstrates calcific atherosclerosis of the visualized upper abdominal aorta. There is cirrhotic liver morphology. There is small volume ascites. There is diffuse osseous mineralization. There is multilevel degenerative disc disease. There are multiple old compression fractures, better assessed on prior MR lumbar spine. The trachea is clear. There is small amount of secretions in the left mainstem bronchus. There is advanced emphysema.  There is mild dependent atelectasis in the posterior lungs. There is no focal consolidation. There are new slightly nodular opacities in the lateral left upper lobe, which could be due to underlying scarring.        1.  Advanced emphysema. No focal consolidation. Trace bilateral pleural effusions. 2.  New slightly nodular opacities in the left upper lobe could be due to underlying scarring, however, short-term follow-up CT chest is recommended in 3 months. 3.  Small mediastinal lymph nodes are not significantly changed. 4.  Partially imaged cirrhotic liver morphology with new small volume upper abdominal ascites.  Discussed with Dr. Black at 5:52 PM.  This report was finalized on 10/4/2022 5:53 PM by Dr. Preeti Veliz M.D.        I ordered the above noted radiological studies. Reviewed by me and discussed with radiologist.  See dictation for official radiology interpretation.      PROCEDURES  Procedures      MEDICATIONS GIVEN IN ER    Medications   sodium chloride 0.9 % flush 10 mL (has no administration in time range)         PROGRESS, DATA ANALYSIS, CONSULTS, AND MEDICAL DECISION MAKING    All labs have been independently reviewed by me.  All radiology studies have been reviewed by me and discussed with radiologist dictating the report.   EKG's independently viewed and interpreted by me.  Discussion below represents my analysis of pertinent findings related to patient's condition, differential diagnosis, treatment plan and final disposition.      ED Course as of 10/04/22 1918   Tue Oct 04, 2022   1546 Chest x-ray discussed with radiologist shows no obvious acute disease within the chest.  There is a tortuous aorta [DB]   1548 [id number]-old male with history of COPD and anticoagulated on Xarelto presents with hemoptysis ongoing since Friday.  Patient's respirations are unlabored and is relatively well-appearing.  He did require bronchoscopy in the past and his pulmonologist is Dr. Suresh Hernandez.    Physical exam is  consistent with patient with chronic pulmonary disease.  He does not appear acutely ill.    MDM-chest x-ray is fairly unremarkable.  We will send off for labs to look for infection or anemia.  We will review electrolytes and renal function as well as PT and INR which will likely be elevated on Xarelto.  Etiology of hemoptysis is unclear.  Would consider bronchitis or bronchiectasis.  Patient may have pulmonary malignancy.  Also consider less likely causes such as AVM.  We will contact Dr. Suresh Hernandez or pulmonologist to help with disposition.  As patient may need repeat bronchoscopy. [DB]   5429 I discussed treatment and evaluation of this patient with Dr. Sarabia from the pulmonary service.    He felt that his labs were benign and CT scan of the chest was unremarkable the patient could likely be discharged off of anticoagulation to follow-up with Dr. Prince as outpatient for possible bronchoscopy.    If labs and CT are abnormal we may need admission. [DB]   0974 I discussed the CT scan of the chest with the reading radiologist.  No obvious malignancy or pneumonia.  Patient does have findings consistent with COPD.    Blood work reviewed is fairly unremarkable with normal white count and hemoglobin.  Patient does have some thrombocytopenia with platelet count of 87.  This may play some role in his hemoptysis and may need some work-up with hematology as outpatient.    Patient has had no further bleeding here and really wants to go home.  I think that this is not unreasonable.  I will send a message to Dr. Suresh Hernandez try to work-up for some mopped assist as outpatient as per patient's wishes.  I advised patient to stop his Xarelto for now and certainly to return to the ED if he had increased bleeding or shortness of breath. [DB]      ED Course User Index  [DB] Alden Black MD       AS OF 19:18 EDT VITALS:    BP - 147/99  HR - 80  TEMP - 97.3 °F (36.3 °C) (Tympanic)  O2 SATS - 92%      DIAGNOSIS  Final diagnoses:    Hemoptysis   Thrombocytopenia (HCC)         DISPOSITION  DISCHARGE    Patient discharged in stable condition.    Reviewed implications of results, diagnosis, meds, responsibility to follow up, warning signs and symptoms of possible worsening, potential complications and reasons to return to ER, including increased shortness of breath, bleeding or as needed.    Patient/Family voiced understanding of above instructions.    Discussed plan for discharge, as there is no emergent indication for admission. Patient referred to primary care provider for BP management due to today's BP. Pt/family is agreeable and understands need for follow up and repeat testing.  Pt is aware that discharge does not mean that nothing is wrong but it indicates no emergency is present that requires admission and they must continue care with follow-up as given below or physician of their choice.     FOLLOW-UP  Suresh Hernandez MD  [address] [id number] [phone]      Call for Appointment         Medication List      Changed    potassium chloride 10 MEQ CR tablet  Take 2 tablets by mouth Daily.  What changed: how much to take                   Alden Black MD  10/04/22 5748

## 2022-10-06 ENCOUNTER — TRANSCRIBE ORDERS (OUTPATIENT)
Dept: ADMINISTRATIVE | Facility: HOSPITAL | Age: 82
End: 2022-10-06

## 2022-10-06 DIAGNOSIS — R91.8 PULMONARY INFILTRATE: Primary | ICD-10-CM

## 2022-10-19 ENCOUNTER — TELEPHONE (OUTPATIENT)
Dept: FAMILY MEDICINE CLINIC | Facility: CLINIC | Age: 82
End: 2022-10-19

## 2022-10-19 RX ORDER — OLOPATADINE HYDROCHLORIDE 1 MG/ML
1 SOLUTION/ DROPS OPHTHALMIC 2 TIMES DAILY
Qty: 3 EACH | Refills: 12 | Status: SHIPPED | OUTPATIENT
Start: 2022-10-19

## 2022-10-19 RX ORDER — OLOPATADINE HYDROCHLORIDE 1 MG/ML
1 SOLUTION/ DROPS OPHTHALMIC 2 TIMES DAILY
Qty: 3 EACH | Refills: 12 | Status: CANCELLED | OUTPATIENT
Start: 2022-10-19

## 2022-10-19 NOTE — TELEPHONE ENCOUNTER
Caller: Alessio Allen    Relationship: Self    Best call back number: [phone]    Requested Prescriptions:   Requested Prescriptions     Pending Prescriptions Disp Refills   • olopatadine (Patanol) 0.1 % ophthalmic solution 3 each 12     Sig: Administer 1 drop to both eyes 2 (Two) Times a Day.        Pharmacy where request should be sent:    [facility]  [address] [place] [address]  [phone]  Additional details provided by patient:   Does the patient have less than a 3 day supply:  [x] Yes  [] No    Tesas Willingham Rep   10/19/22 08:59 EDT

## 2022-12-16 ENCOUNTER — OFFICE VISIT (OUTPATIENT)
Dept: FAMILY MEDICINE CLINIC | Facility: CLINIC | Age: 82
End: 2022-12-16

## 2022-12-16 VITALS
DIASTOLIC BLOOD PRESSURE: 78 MMHG | HEART RATE: 86 BPM | OXYGEN SATURATION: 94 % | RESPIRATION RATE: 18 BRPM | HEIGHT: 72 IN | BODY MASS INDEX: 28.31 KG/M2 | TEMPERATURE: 98.2 F | SYSTOLIC BLOOD PRESSURE: 126 MMHG | WEIGHT: 209 LBS

## 2022-12-16 DIAGNOSIS — R16.0 ENLARGED LIVER: ICD-10-CM

## 2022-12-16 DIAGNOSIS — R93.89 ABNORMAL CT SCAN: Primary | ICD-10-CM

## 2022-12-16 DIAGNOSIS — Z09 FOLLOW-UP EXAM: ICD-10-CM

## 2022-12-16 PROCEDURE — 99213 OFFICE O/P EST LOW 20 MIN: CPT | Performed by: NURSE PRACTITIONER

## 2022-12-16 RX ORDER — EMPAGLIFLOZIN 10 MG/1
TABLET, FILM COATED ORAL
COMMUNITY
Start: 2022-11-23

## 2022-12-16 RX ORDER — ELTROMBOPAG OLAMINE 50 MG/1
TABLET, FILM COATED ORAL
COMMUNITY
Start: 2022-11-16

## 2022-12-16 NOTE — PROGRESS NOTES
"Chief Complaint  Liver Cirrhosis (Possible liver cirrhosis from CT scan in October.)    Subjective        Alessio Allen presents to [facility]  History of Present Illness   82-year-old male presenting with questions regarding last CT scan.  On 10/4/2022 he underwent CT of chest related to hemoptysis, results showed advanced emphysema, also on noted on exam to have cirrhotic liver morphology with new small volume upper abdominal ascites, patient reports was never informed of this in had concerns, his liver enzymes have been normal with last CMP on 10/4/2022, no jaundice of skin, since chest CT was a partial view of liver we will obtain imaging of abdomen and placed referral to gastroenterology.    Objective   Vital Signs:  /78 (BP Location: Right arm, Patient Position: Sitting)   Pulse 86   Temp 98.2 °F (36.8 °C) (Infrared)   Resp 18   Ht 182.9 cm (72.01\")   Wt 94.8 kg (209 lb)   SpO2 94%   BMI 28.34 kg/m²   Estimated body mass index is 28.34 kg/m² as calculated from the following:    Height as of this encounter: 182.9 cm (72.01\").    Weight as of this encounter: 94.8 kg (209 lb).          Physical Exam  Cardiovascular:      Rate and Rhythm: Normal rate.      Pulses: Normal pulses.      Heart sounds: Normal heart sounds.   Pulmonary:      Effort: Pulmonary effort is normal.      Breath sounds: Normal breath sounds.   Abdominal:      General: Bowel sounds are normal.      Palpations: Abdomen is soft.      Tenderness: There is no abdominal tenderness.   Neurological:      General: No focal deficit present.      Mental Status: He is alert and oriented to person, place, and time.   Psychiatric:         Mood and Affect: Mood normal.         Behavior: Behavior normal.        Result Review :                Assessment and Plan   Diagnoses and all orders for this visit:    1. Abnormal CT scan (Primary)  -     CT Abdomen Pelvis Without Contrast; Future    2. Enlarged liver  -     " CT Abdomen Pelvis Without Contrast; Future    3. Follow-up exam             Follow Up   Return if symptoms worsen or fail to improve.  Patient was given instructions and counseling regarding his condition or for health maintenance advice. Please see specific information pulled into the AVS if appropriate.     You will get a call to schedule abdominal CT - I will notify you of results once completed by Radiology.     Mask and gloves worn    Answers for HPI/ROS submitted by the patient on 12/16/2022  What is the primary reason for your visit?: Other  Please describe your symptoms.: Discuss possible cirrhosis  Have you had these symptoms before?: No  How long have you been having these symptoms?: 1-4 days

## 2022-12-16 NOTE — PATIENT INSTRUCTIONS
You will get a call to schedule abdominal CT - I will notify you of results once completed by Radiology.

## 2023-01-04 ENCOUNTER — TRANSCRIBE ORDERS (OUTPATIENT)
Dept: ADMINISTRATIVE | Facility: HOSPITAL | Age: 83
End: 2023-01-04
Payer: MEDICARE

## 2023-01-04 DIAGNOSIS — C32.9 LARYNGEAL CANCER: ICD-10-CM

## 2023-01-23 ENCOUNTER — HOSPITAL ENCOUNTER (OUTPATIENT)
Dept: CT IMAGING | Facility: HOSPITAL | Age: 83
Discharge: HOME | End: 2023-01-23
Admitting: OTOLARYNGOLOGY
Payer: MEDICARE

## 2023-01-23 DIAGNOSIS — C32.9 LARYNGEAL CANCER: ICD-10-CM

## 2023-01-23 DIAGNOSIS — R91.8 PULMONARY INFILTRATE: ICD-10-CM

## 2023-01-23 DIAGNOSIS — R16.0 ENLARGED LIVER: ICD-10-CM

## 2023-01-23 DIAGNOSIS — R93.89 ABNORMAL CT SCAN: ICD-10-CM

## 2023-01-23 LAB — CREAT BLDA-MCNC: 1.1 MG/DL (ref 0.6–1.3)

## 2023-01-23 PROCEDURE — 74177 CT ABD & PELVIS W/CONTRAST: CPT

## 2023-01-23 PROCEDURE — 70491 CT SOFT TISSUE NECK W/DYE: CPT

## 2023-01-23 PROCEDURE — 82565 ASSAY OF CREATININE: CPT

## 2023-01-23 PROCEDURE — 71260 CT THORAX DX C+: CPT

## 2023-01-23 PROCEDURE — 25010000002 IOPAMIDOL 61 % SOLUTION: Performed by: OTOLARYNGOLOGY

## 2023-01-23 RX ADMIN — IOPAMIDOL 85 ML: 612 INJECTION, SOLUTION INTRAVENOUS at 13:47

## 2023-01-30 ENCOUNTER — OFFICE VISIT (OUTPATIENT)
Dept: FAMILY MEDICINE CLINIC | Facility: CLINIC | Age: 83
End: 2023-01-30
Payer: MEDICARE

## 2023-01-30 VITALS
SYSTOLIC BLOOD PRESSURE: 132 MMHG | DIASTOLIC BLOOD PRESSURE: 76 MMHG | WEIGHT: 201.6 LBS | HEART RATE: 87 BPM | OXYGEN SATURATION: 93 % | HEIGHT: 72 IN | BODY MASS INDEX: 27.3 KG/M2 | TEMPERATURE: 97.7 F

## 2023-01-30 DIAGNOSIS — Z09 FOLLOW-UP EXAM: Primary | ICD-10-CM

## 2023-01-30 DIAGNOSIS — K74.60 CIRRHOSIS OF LIVER WITHOUT ASCITES, UNSPECIFIED HEPATIC CIRRHOSIS TYPE: ICD-10-CM

## 2023-01-30 DIAGNOSIS — R93.5 ABNORMAL CT OF THE ABDOMEN: ICD-10-CM

## 2023-01-30 PROCEDURE — 99213 OFFICE O/P EST LOW 20 MIN: CPT | Performed by: NURSE PRACTITIONER

## 2023-01-30 NOTE — PROGRESS NOTES
"Chief Complaint  f/u testing ct scan    Subjective        Alessio Allen presents to [facility]  History of Present Illness   82-year-old male presenting for follow-up to discuss recent CT of abdomen and pelvis.  CT of abdomen and pelvis showed subcentimeter renal lesions but too small to characterize, liver with lobulated contour, findings suggestive of cirrhosis, Appendix, gallbladder pancreas spleen and adrenal glands were all unremarkable. His liver enzymes on 10/4/2022 were normal, will obtain hepatitis panel, possible referral to gastroenterology pending lab results.  He denies changes in bladder or bowel habit, no abdominal distention or jaundice noted on assessment today.    Objective   Vital Signs:  /76   Pulse 87   Temp 97.7 °F (36.5 °C)   Ht 182.9 cm (72\")   Wt 91.4 kg (201 lb 9.6 oz)   SpO2 93%   BMI 27.34 kg/m²   Estimated body mass index is 27.34 kg/m² as calculated from the following:    Height as of this encounter: 182.9 cm (72\").    Weight as of this encounter: 91.4 kg (201 lb 9.6 oz).             Physical Exam  Cardiovascular:      Rate and Rhythm: Normal rate.      Pulses: Normal pulses.      Heart sounds: Normal heart sounds.   Pulmonary:      Effort: Pulmonary effort is normal.      Breath sounds: Normal breath sounds.   Neurological:      General: No focal deficit present.      Mental Status: He is alert and oriented to person, place, and time.   Psychiatric:         Mood and Affect: Mood normal.         Behavior: Behavior normal.         Thought Content: Thought content normal.         Judgment: Judgment normal.        Result Review :                   Assessment and Plan   Diagnoses and all orders for this visit:    1. Follow-up exam (Primary)    2. Abnormal CT of the abdomen  -     Hepatitis panel, acute    3. Cirrhosis of liver without ascites, unspecified hepatic cirrhosis type (HCC)  -     Hepatitis panel, acute             Follow Up   Return if " symptoms worsen or fail to improve.  Patient was given instructions and counseling regarding his condition or for health maintenance advice. Please see specific information pulled into the AVS if appropriate.       Answers for HPI/ROS submitted by the patient on 1/30/2023  What is the primary reason for your visit?: Other  Please describe your symptoms.: Abdomen CT results  Have you had these symptoms before?: No  How long have you been having these symptoms?: 1-4 days

## 2023-01-31 LAB
HAV IGM SERPL QL IA: NEGATIVE
HBV CORE IGM SERPL QL IA: NEGATIVE
HBV SURFACE AG SERPL QL IA: NEGATIVE
HCV AB S/CO SERPL IA: 0.1 S/CO RATIO (ref 0–0.9)
HCV AB SERPL QL IA: NORMAL

## 2023-04-03 RX ORDER — FEXOFENADINE HYDROCHLORIDE 60 MG/1
60 TABLET, FILM COATED ORAL 2 TIMES DAILY
Qty: 180 TABLET | Refills: 3 | Status: SHIPPED | OUTPATIENT
Start: 2023-04-03

## 2023-04-03 NOTE — TELEPHONE ENCOUNTER
"Caller: Alessio Allen \"NADIA\"    Relationship: Self    Requested Prescriptions:   Requested Prescriptions     Pending Prescriptions Disp Refills   • fexofenadine (Allegra Allergy) 60 MG tablet 180 tablet 3     Sig: Take 1 tablet by mouth 2 (Two) Times a Day.        Pharmacy where request should be sent: [facility] [place] [facility] [place] - [address] [phone] [facility] [phone]      Last office visit with prescribing clinician: Visit date not found   Last telemedicine visit with prescribing clinician: 2023   Next office visit with prescribing clinician: 2023     Additional details provided by patient: PATIENT STATES HE WOULD LIKE THIS REFILLED BECAUSE HIS OLD ONE .  PATIENT TRIED TO SCHEDULE A NEW PATIENT APPOINTMENT SINCE MARKIE ARVIZU LEFT, AND THE FIRST AVAILABLE IS NOT UNTIL 23 AND PATIENT WILL NEED REFILLS PRIOR TO THAT.    PATIENT WANTS THE OFFICE TO CALL HIM  [phone] WHEN THIS HAS BEEN SENT TO HIS PHARMACY.        Does the patient have less than a 3 day supply:  [] Yes  [x] No    Would you like a call back once the refill request has been completed: [x] Yes [x] No    If the office needs to give you a call back, can they leave a voicemail: [x] Yes [x] No    Tessa Blanc Rep   23 13:25 EDT         "

## 2023-04-24 ENCOUNTER — OFFICE VISIT (OUTPATIENT)
Dept: FAMILY MEDICINE CLINIC | Facility: CLINIC | Age: 83
End: 2023-04-24
Payer: MEDICARE

## 2023-04-24 VITALS
BODY MASS INDEX: 26.44 KG/M2 | TEMPERATURE: 98 F | WEIGHT: 195.2 LBS | OXYGEN SATURATION: 91 % | HEIGHT: 72 IN | DIASTOLIC BLOOD PRESSURE: 76 MMHG | HEART RATE: 84 BPM | SYSTOLIC BLOOD PRESSURE: 126 MMHG

## 2023-04-24 DIAGNOSIS — R05.9 COUGH, UNSPECIFIED TYPE: ICD-10-CM

## 2023-04-24 DIAGNOSIS — J44.1 COPD WITH EXACERBATION: Primary | ICD-10-CM

## 2023-04-24 LAB
EXPIRATION DATE: NORMAL
FLUAV AG UPPER RESP QL IA.RAPID: NOT DETECTED
FLUBV AG UPPER RESP QL IA.RAPID: NOT DETECTED
INTERNAL CONTROL: NORMAL
Lab: NORMAL
SARS-COV-2 AG UPPER RESP QL IA.RAPID: NORMAL

## 2023-04-24 RX ORDER — SODIUM CHLORIDE FOR INHALATION 7 %
VIAL, NEBULIZER (ML) INHALATION
COMMUNITY
Start: 2023-03-31

## 2023-04-24 RX ORDER — AZITHROMYCIN 250 MG/1
TABLET, FILM COATED ORAL
Qty: 6 TABLET | Refills: 0 | Status: SHIPPED | OUTPATIENT
Start: 2023-04-24

## 2023-04-24 RX ORDER — METHYLPREDNISOLONE 4 MG/1
TABLET ORAL
Qty: 21 TABLET | Refills: 0 | Status: SHIPPED | OUTPATIENT
Start: 2023-04-24

## 2023-04-24 RX ORDER — ALBUTEROL SULFATE 2.5 MG/3ML
SOLUTION RESPIRATORY (INHALATION)
COMMUNITY
Start: 2023-03-31

## 2023-04-24 NOTE — PROGRESS NOTES
"Chief Complaint  coughing up yellow stuff (No fever or sore throat)    Subjective        Alessio Allen presents to [facility]  History of Present Illness  Patient is an 82-year-old male, established patient of Miri Shabazz, and new to me.  Patient here today for acute productive cough without fever that started about 3 days ago.  Patient reports he takes Allegra twice a day and Singulair at night.  With COPD, patient is prescribed Trelegy 1 puff daily.  Patient follows Dr. Hrenandez with [facility] and is currently on 5 L nasal cannula in office.  Patient denies chest pain, shortness of breath, or running fever at home.    Objective   Vital Signs:  /76   Pulse 84   Temp 98 °F (36.7 °C)   Ht 182.9 cm (72\")   Wt 88.5 kg (195 lb 3.2 oz)   SpO2 91%   BMI 26.47 kg/m²   Estimated body mass index is 26.47 kg/m² as calculated from the following:    Height as of this encounter: 182.9 cm (72\").    Weight as of this encounter: 88.5 kg (195 lb 3.2 oz).             Physical Exam  Constitutional:       General: He is awake.      Appearance: Normal appearance.   HENT:      Head: Normocephalic and atraumatic.      Nose: Nose normal.   Eyes:      Extraocular Movements: Extraocular movements intact.      Conjunctiva/sclera: Conjunctivae normal.      Pupils: Pupils are equal, round, and reactive to light.   Cardiovascular:      Rate and Rhythm: Normal rate and regular rhythm.      Pulses: Normal pulses.      Heart sounds: Normal heart sounds.   Pulmonary:      Effort: Pulmonary effort is normal.      Breath sounds: Normal air entry. Examination of the right-middle field reveals rhonchi. Examination of the right-lower field reveals rhonchi and rales. Examination of the left-lower field reveals rhonchi and rales. Rhonchi and rales present.   Skin:     General: Skin is warm and dry.   Neurological:      General: No focal deficit present.      Mental Status: He is alert and oriented " to person, place, and time. Mental status is at baseline.   Psychiatric:         Attention and Perception: Attention normal.         Behavior: Behavior normal. Behavior is cooperative.        Result Review :                   Assessment and Plan   Diagnoses and all orders for this visit:    1. COPD with exacerbation (Primary)  -     methylPREDNISolone (MEDROL) 4 MG dose pack; Take as directed on package instructions.  Dispense: 21 tablet; Refill: 0  -     azithromycin (Zithromax Z-Cole) 250 MG tablet; Take 2 tablets by mouth on day 1, then 1 tablet daily on days 2-5  Dispense: 6 tablet; Refill: 0    2. Cough, unspecified type  -     POCT SARS-CoV-2 Antigen SOFIYA + Flu  -     methylPREDNISolone (MEDROL) 4 MG dose pack; Take as directed on package instructions.  Dispense: 21 tablet; Refill: 0  -     azithromycin (Zithromax Z-Cole) 250 MG tablet; Take 2 tablets by mouth on day 1, then 1 tablet daily on days 2-5  Dispense: 6 tablet; Refill: 0    Complete entire steroid Dosepak and antibiotic.  If symptoms do not improve in 1 week, please notify our office and I will send you to the hospital for chest x-ray.    Please schedule an appointment in 6 months to establish care with a new provider.    Patient agrees with plan of care and understands instructions. Call if worsening symptoms or any problems or concerns.          Follow Up   Return in about 6 months (around 10/24/2023), or if symptoms worsen or fail to improve, for Fasting labs, establish care with new provider.  Patient was given instructions and counseling regarding his condition or for health maintenance advice. Please see specific information pulled into the AVS if appropriate.

## 2023-04-24 NOTE — PATIENT INSTRUCTIONS
Complete entire steroid Dosepak and antibiotic.  If symptoms do not improve in 1 week, please notify our office and I will send you to the hospital for chest x-ray.    Please schedule an appointment in 6 months to establish care with a new provider.    Patient agrees with plan of care and understands instructions. Call if worsening symptoms or any problems or concerns.

## 2023-05-07 ENCOUNTER — APPOINTMENT (OUTPATIENT)
Dept: CT IMAGING | Facility: HOSPITAL | Age: 83
DRG: 193 | End: 2023-05-07
Payer: MEDICARE

## 2023-05-07 ENCOUNTER — HOSPITAL ENCOUNTER (INPATIENT)
Facility: HOSPITAL | Age: 83
LOS: 6 days | Discharge: HOME HEALTH | DRG: 193 | End: 2023-05-13
Attending: EMERGENCY MEDICINE | Admitting: HOSPITALIST
Payer: MEDICARE

## 2023-05-07 DIAGNOSIS — R04.2 HEMOPTYSIS: ICD-10-CM

## 2023-05-07 DIAGNOSIS — J96.21 ACUTE ON CHRONIC RESPIRATORY FAILURE WITH HYPOXIA: ICD-10-CM

## 2023-05-07 DIAGNOSIS — R53.81 DEBILITY: ICD-10-CM

## 2023-05-07 DIAGNOSIS — J18.9 PNEUMONIA OF LEFT LOWER LOBE DUE TO INFECTIOUS ORGANISM: Primary | ICD-10-CM

## 2023-05-07 DIAGNOSIS — J44.1 COPD EXACERBATION: ICD-10-CM

## 2023-05-07 DIAGNOSIS — Z79.01 CHRONIC ANTICOAGULATION: ICD-10-CM

## 2023-05-07 LAB
ALBUMIN SERPL-MCNC: 3.9 G/DL (ref 3.5–5.2)
ALBUMIN/GLOB SERPL: 1.5 G/DL
ALP SERPL-CCNC: 76 U/L (ref 39–117)
ALT SERPL W P-5'-P-CCNC: 17 U/L (ref 1–41)
ANION GAP SERPL CALCULATED.3IONS-SCNC: 10 MMOL/L (ref 5–15)
APTT PPP: 42.3 SECONDS (ref 22.7–35.4)
AST SERPL-CCNC: 24 U/L (ref 1–40)
B PARAPERT DNA SPEC QL NAA+PROBE: NOT DETECTED
B PERT DNA SPEC QL NAA+PROBE: NOT DETECTED
BILIRUB SERPL-MCNC: 1.4 MG/DL (ref 0–1.2)
BUN SERPL-MCNC: 17 MG/DL (ref 8–23)
BUN/CREAT SERPL: 15.9 (ref 7–25)
C PNEUM DNA NPH QL NAA+NON-PROBE: NOT DETECTED
CALCIUM SPEC-SCNC: 8.9 MG/DL (ref 8.6–10.5)
CHLORIDE SERPL-SCNC: 97 MMOL/L (ref 98–107)
CO2 SERPL-SCNC: 28 MMOL/L (ref 22–29)
CREAT SERPL-MCNC: 1.07 MG/DL (ref 0.76–1.27)
D-LACTATE SERPL-SCNC: 1 MMOL/L (ref 0.5–2)
DEPRECATED RDW RBC AUTO: 44.7 FL (ref 37–54)
EGFRCR SERPLBLD CKD-EPI 2021: 69.7 ML/MIN/1.73
ERYTHROCYTE [DISTWIDTH] IN BLOOD BY AUTOMATED COUNT: 13.5 % (ref 12.3–15.4)
FLUAV SUBTYP SPEC NAA+PROBE: NOT DETECTED
FLUBV RNA ISLT QL NAA+PROBE: NOT DETECTED
GLOBULIN UR ELPH-MCNC: 2.6 GM/DL
GLUCOSE SERPL-MCNC: 99 MG/DL (ref 65–99)
HADV DNA SPEC NAA+PROBE: NOT DETECTED
HCOV 229E RNA SPEC QL NAA+PROBE: NOT DETECTED
HCOV HKU1 RNA SPEC QL NAA+PROBE: NOT DETECTED
HCOV NL63 RNA SPEC QL NAA+PROBE: NOT DETECTED
HCOV OC43 RNA SPEC QL NAA+PROBE: NOT DETECTED
HCT VFR BLD AUTO: 51.3 % (ref 37.5–51)
HGB BLD-MCNC: 16.9 G/DL (ref 13–17.7)
HMPV RNA NPH QL NAA+NON-PROBE: NOT DETECTED
HPIV1 RNA ISLT QL NAA+PROBE: NOT DETECTED
HPIV2 RNA SPEC QL NAA+PROBE: NOT DETECTED
HPIV3 RNA NPH QL NAA+PROBE: NOT DETECTED
HPIV4 P GENE NPH QL NAA+PROBE: NOT DETECTED
INR PPP: 1.84 (ref 0.9–1.1)
LYMPHOCYTES # BLD MANUAL: 0.38 10*3/MM3 (ref 0.7–3.1)
LYMPHOCYTES NFR BLD MANUAL: 4.1 % (ref 5–12)
M PNEUMO IGG SER IA-ACNC: NOT DETECTED
MCH RBC QN AUTO: 29.8 PG (ref 26.6–33)
MCHC RBC AUTO-ENTMCNC: 32.9 G/DL (ref 31.5–35.7)
MCV RBC AUTO: 90.5 FL (ref 79–97)
MONOCYTES # BLD: 0.5 10*3/MM3 (ref 0.1–0.9)
NEUTROPHILS # BLD AUTO: 11.32 10*3/MM3 (ref 1.7–7)
NEUTROPHILS NFR BLD MANUAL: 92.9 % (ref 42.7–76)
NRBC BLD AUTO-RTO: 0 /100 WBC (ref 0–0.2)
PLAT MORPH BLD: NORMAL
PLATELET # BLD AUTO: 368 10*3/MM3 (ref 140–450)
PMV BLD AUTO: 9.7 FL (ref 6–12)
POTASSIUM SERPL-SCNC: 4.2 MMOL/L (ref 3.5–5.2)
PROCALCITONIN SERPL-MCNC: 0.33 NG/ML (ref 0–0.25)
PROT SERPL-MCNC: 6.5 G/DL (ref 6–8.5)
PROTHROMBIN TIME: 21.5 SECONDS (ref 11.7–14.2)
QT INTERVAL: 429 MS
RBC # BLD AUTO: 5.67 10*6/MM3 (ref 4.14–5.8)
RBC MORPH BLD: NORMAL
RHINOVIRUS RNA SPEC NAA+PROBE: NOT DETECTED
RSV RNA NPH QL NAA+NON-PROBE: NOT DETECTED
SARS-COV-2 RNA NPH QL NAA+NON-PROBE: NOT DETECTED
SODIUM SERPL-SCNC: 135 MMOL/L (ref 136–145)
VARIANT LYMPHS NFR BLD MANUAL: 3.1 % (ref 19.6–45.3)
WBC MORPH BLD: NORMAL
WBC NRBC COR # BLD: 12.19 10*3/MM3 (ref 3.4–10.8)

## 2023-05-07 PROCEDURE — 93005 ELECTROCARDIOGRAM TRACING: CPT | Performed by: EMERGENCY MEDICINE

## 2023-05-07 PROCEDURE — 93010 ELECTROCARDIOGRAM REPORT: CPT | Performed by: INTERNAL MEDICINE

## 2023-05-07 PROCEDURE — 0 CEFEPIME PER 500 MG: Performed by: HOSPITALIST

## 2023-05-07 PROCEDURE — 71250 CT THORAX DX C-: CPT

## 2023-05-07 PROCEDURE — 0 CEFEPIME PER 500 MG: Performed by: PHYSICIAN ASSISTANT

## 2023-05-07 PROCEDURE — 25010000002 VANCOMYCIN 10 G RECONSTITUTED SOLUTION: Performed by: HOSPITALIST

## 2023-05-07 PROCEDURE — 87040 BLOOD CULTURE FOR BACTERIA: CPT | Performed by: PHYSICIAN ASSISTANT

## 2023-05-07 PROCEDURE — 0202U NFCT DS 22 TRGT SARS-COV-2: CPT | Performed by: PHYSICIAN ASSISTANT

## 2023-05-07 PROCEDURE — 85610 PROTHROMBIN TIME: CPT | Performed by: PHYSICIAN ASSISTANT

## 2023-05-07 PROCEDURE — 85007 BL SMEAR W/DIFF WBC COUNT: CPT | Performed by: PHYSICIAN ASSISTANT

## 2023-05-07 PROCEDURE — 36415 COLL VENOUS BLD VENIPUNCTURE: CPT

## 2023-05-07 PROCEDURE — 84145 PROCALCITONIN (PCT): CPT | Performed by: PHYSICIAN ASSISTANT

## 2023-05-07 PROCEDURE — 80053 COMPREHEN METABOLIC PANEL: CPT | Performed by: PHYSICIAN ASSISTANT

## 2023-05-07 PROCEDURE — 94640 AIRWAY INHALATION TREATMENT: CPT

## 2023-05-07 PROCEDURE — 94799 UNLISTED PULMONARY SVC/PX: CPT

## 2023-05-07 PROCEDURE — 99285 EMERGENCY DEPT VISIT HI MDM: CPT

## 2023-05-07 PROCEDURE — 85025 COMPLETE CBC W/AUTO DIFF WBC: CPT | Performed by: PHYSICIAN ASSISTANT

## 2023-05-07 PROCEDURE — 83605 ASSAY OF LACTIC ACID: CPT | Performed by: PHYSICIAN ASSISTANT

## 2023-05-07 PROCEDURE — 85730 THROMBOPLASTIN TIME PARTIAL: CPT | Performed by: PHYSICIAN ASSISTANT

## 2023-05-07 RX ORDER — ECHINACEA PURPUREA EXTRACT 125 MG
2 TABLET ORAL 4 TIMES DAILY PRN
Status: DISCONTINUED | OUTPATIENT
Start: 2023-05-07 | End: 2023-05-13 | Stop reason: HOSPADM

## 2023-05-07 RX ORDER — TERAZOSIN 2 MG/1
2 CAPSULE ORAL NIGHTLY
Status: DISCONTINUED | OUTPATIENT
Start: 2023-05-07 | End: 2023-05-13 | Stop reason: HOSPADM

## 2023-05-07 RX ORDER — SODIUM CHLORIDE 9 MG/ML
40 INJECTION, SOLUTION INTRAVENOUS AS NEEDED
Status: DISCONTINUED | OUTPATIENT
Start: 2023-05-07 | End: 2023-05-13 | Stop reason: HOSPADM

## 2023-05-07 RX ORDER — PILOCARPINE HYDROCHLORIDE 5 MG/1
5 TABLET, FILM COATED ORAL 3 TIMES DAILY
Status: DISCONTINUED | OUTPATIENT
Start: 2023-05-07 | End: 2023-05-13 | Stop reason: HOSPADM

## 2023-05-07 RX ORDER — SODIUM CHLORIDE FOR INHALATION 7 %
4 VIAL, NEBULIZER (ML) INHALATION
Status: DISCONTINUED | OUTPATIENT
Start: 2023-05-07 | End: 2023-05-13

## 2023-05-07 RX ORDER — KETOTIFEN FUMARATE 0.35 MG/ML
1 SOLUTION/ DROPS OPHTHALMIC 2 TIMES DAILY
Status: DISCONTINUED | OUTPATIENT
Start: 2023-05-07 | End: 2023-05-13 | Stop reason: HOSPADM

## 2023-05-07 RX ORDER — ACETAMINOPHEN 160 MG/5ML
650 SOLUTION ORAL EVERY 4 HOURS PRN
Status: DISCONTINUED | OUTPATIENT
Start: 2023-05-07 | End: 2023-05-13 | Stop reason: HOSPADM

## 2023-05-07 RX ORDER — ACETAMINOPHEN 325 MG/1
650 TABLET ORAL EVERY 4 HOURS PRN
Status: DISCONTINUED | OUTPATIENT
Start: 2023-05-07 | End: 2023-05-13 | Stop reason: HOSPADM

## 2023-05-07 RX ORDER — LANOLIN ALCOHOL/MO/W.PET/CERES
50 CREAM (GRAM) TOPICAL DAILY
Status: DISCONTINUED | OUTPATIENT
Start: 2023-05-08 | End: 2023-05-13 | Stop reason: HOSPADM

## 2023-05-07 RX ORDER — CARBOXYMETHYLCELLULOSE SODIUM 10 MG/ML
1 GEL OPHTHALMIC 4 TIMES DAILY PRN
Status: DISCONTINUED | OUTPATIENT
Start: 2023-05-07 | End: 2023-05-07 | Stop reason: SDUPTHER

## 2023-05-07 RX ORDER — ONDANSETRON 2 MG/ML
4 INJECTION INTRAMUSCULAR; INTRAVENOUS EVERY 6 HOURS PRN
Status: DISCONTINUED | OUTPATIENT
Start: 2023-05-07 | End: 2023-05-13 | Stop reason: HOSPADM

## 2023-05-07 RX ORDER — MONTELUKAST SODIUM 10 MG/1
10 TABLET ORAL NIGHTLY
Status: DISCONTINUED | OUTPATIENT
Start: 2023-05-07 | End: 2023-05-07

## 2023-05-07 RX ORDER — IPRATROPIUM BROMIDE AND ALBUTEROL SULFATE 2.5; .5 MG/3ML; MG/3ML
3 SOLUTION RESPIRATORY (INHALATION)
Status: DISCONTINUED | OUTPATIENT
Start: 2023-05-07 | End: 2023-05-13 | Stop reason: HOSPADM

## 2023-05-07 RX ORDER — PREGABALIN 75 MG/1
75 CAPSULE ORAL EVERY 12 HOURS SCHEDULED
Status: DISCONTINUED | OUTPATIENT
Start: 2023-05-07 | End: 2023-05-13 | Stop reason: HOSPADM

## 2023-05-07 RX ORDER — FERROUS SULFATE 325(65) MG
325 TABLET ORAL
Status: DISCONTINUED | OUTPATIENT
Start: 2023-05-08 | End: 2023-05-13 | Stop reason: HOSPADM

## 2023-05-07 RX ORDER — ONDANSETRON 4 MG/1
4 TABLET, FILM COATED ORAL EVERY 6 HOURS PRN
Status: DISCONTINUED | OUTPATIENT
Start: 2023-05-07 | End: 2023-05-13 | Stop reason: HOSPADM

## 2023-05-07 RX ORDER — ALBUTEROL SULFATE 2.5 MG/3ML
2.5 SOLUTION RESPIRATORY (INHALATION) EVERY 6 HOURS PRN
Status: DISCONTINUED | OUTPATIENT
Start: 2023-05-07 | End: 2023-05-13 | Stop reason: HOSPADM

## 2023-05-07 RX ORDER — FUROSEMIDE 40 MG/1
40 TABLET ORAL DAILY
Status: DISCONTINUED | OUTPATIENT
Start: 2023-05-08 | End: 2023-05-13 | Stop reason: HOSPADM

## 2023-05-07 RX ORDER — SODIUM CHLORIDE 0.9 % (FLUSH) 0.9 %
10 SYRINGE (ML) INJECTION AS NEEDED
Status: DISCONTINUED | OUTPATIENT
Start: 2023-05-07 | End: 2023-05-13 | Stop reason: HOSPADM

## 2023-05-07 RX ORDER — PANTOPRAZOLE SODIUM 40 MG/1
40 TABLET, DELAYED RELEASE ORAL
Status: DISCONTINUED | OUTPATIENT
Start: 2023-05-08 | End: 2023-05-13 | Stop reason: HOSPADM

## 2023-05-07 RX ORDER — BUDESONIDE AND FORMOTEROL FUMARATE DIHYDRATE 160; 4.5 UG/1; UG/1
2 AEROSOL RESPIRATORY (INHALATION)
Status: DISCONTINUED | OUTPATIENT
Start: 2023-05-07 | End: 2023-05-13 | Stop reason: HOSPADM

## 2023-05-07 RX ORDER — POTASSIUM CHLORIDE 750 MG/1
10 TABLET, FILM COATED, EXTENDED RELEASE ORAL DAILY
Status: DISCONTINUED | OUTPATIENT
Start: 2023-05-08 | End: 2023-05-13 | Stop reason: HOSPADM

## 2023-05-07 RX ORDER — ACETAMINOPHEN 650 MG/1
650 SUPPOSITORY RECTAL EVERY 4 HOURS PRN
Status: DISCONTINUED | OUTPATIENT
Start: 2023-05-07 | End: 2023-05-13 | Stop reason: HOSPADM

## 2023-05-07 RX ORDER — CETIRIZINE HYDROCHLORIDE 10 MG/1
10 TABLET ORAL DAILY
Status: DISCONTINUED | OUTPATIENT
Start: 2023-05-08 | End: 2023-05-07

## 2023-05-07 RX ORDER — NITROGLYCERIN 0.4 MG/1
0.4 TABLET SUBLINGUAL
Status: DISCONTINUED | OUTPATIENT
Start: 2023-05-07 | End: 2023-05-07

## 2023-05-07 RX ORDER — SODIUM CHLORIDE 0.9 % (FLUSH) 0.9 %
10 SYRINGE (ML) INJECTION EVERY 12 HOURS SCHEDULED
Status: DISCONTINUED | OUTPATIENT
Start: 2023-05-07 | End: 2023-05-13 | Stop reason: HOSPADM

## 2023-05-07 RX ORDER — TADALAFIL 20 MG/1
40 TABLET ORAL
Status: DISCONTINUED | OUTPATIENT
Start: 2023-05-08 | End: 2023-05-13 | Stop reason: HOSPADM

## 2023-05-07 RX ADMIN — PILOCARPINE HYDROCHLORIDE 5 MG: 5 TABLET, FILM COATED ORAL at 21:55

## 2023-05-07 RX ADMIN — TERAZOSIN 2 MG: 2 CAPSULE ORAL at 21:55

## 2023-05-07 RX ADMIN — MONTELUKAST SODIUM 10 MG: 10 TABLET, FILM COATED ORAL at 21:55

## 2023-05-07 RX ADMIN — KETOTIFEN FUMARATE 1 DROP: 0.25 SOLUTION OPHTHALMIC at 21:55

## 2023-05-07 RX ADMIN — CEFEPIME 2 G: 2 INJECTION, POWDER, FOR SOLUTION INTRAVENOUS at 21:54

## 2023-05-07 RX ADMIN — PREGABALIN 75 MG: 75 CAPSULE ORAL at 20:11

## 2023-05-07 RX ADMIN — Medication 10 ML: at 20:10

## 2023-05-07 RX ADMIN — ALBUTEROL SULFATE 2.5 MG: 2.5 SOLUTION RESPIRATORY (INHALATION) at 19:44

## 2023-05-07 RX ADMIN — VANCOMYCIN HYDROCHLORIDE 1750 MG: 10 INJECTION, POWDER, LYOPHILIZED, FOR SOLUTION INTRAVENOUS at 19:03

## 2023-05-07 RX ADMIN — CEFEPIME 2 G: 2 INJECTION, POWDER, FOR SOLUTION INTRAVENOUS at 14:06

## 2023-05-07 NOTE — PLAN OF CARE
Problem: Fall Injury Risk  Goal: Absence of Fall and Fall-Related Injury  Outcome: Ongoing, Progressing  Intervention: Identify and Manage Contributors  Recent Flowsheet Documentation  Taken 5/7/2023 1608 by Reed Ya, RN  Medication Review/Management:   medications reviewed   high-risk medications identified  Intervention: Promote Injury-Free Environment  Recent Flowsheet Documentation  Taken 5/7/2023 1825 by Reed Ya, RN  Safety Promotion/Fall Prevention:   fall prevention program maintained   assistive device/personal items within reach   nonskid shoes/slippers when out of bed   safety round/check completed   mobility aid in reach  Taken 5/7/2023 1608 by Reed Ya, RN  Safety Promotion/Fall Prevention:   mobility aid in reach   assistive device/personal items within reach   clutter free environment maintained   fall prevention program maintained   nonskid shoes/slippers when out of bed   safety round/check completed   Goal Outcome Evaluation:      Pt somewhat anxious, 4L O2 satting at 91%, desats with exertion, encourage deep breaths and is able to recover on 4L, sig other at bedside, able to answer all question in data base, amb to br, used urinal voided 500cc. Vpaced on monitor, c/o chronic back pain, requested vanc iv from pharmacy, nad, bed alarm on. Safety precautions and room orientation provided.

## 2023-05-07 NOTE — ED NOTES
Nursing report ED to floor  Alessio Allen  82 y.o.  male    HPI :   Chief Complaint   Patient presents with    Coughing Up Blood       Admitting doctor:   No admitting provider for patient encounter.    Admitting diagnosis:   The primary encounter diagnosis was Pneumonia of left lower lobe due to infectious organism. Diagnoses of Hemoptysis and Chronic anticoagulation were also pertinent to this visit.    Code status:   Current Code Status       Date Active Code Status Order ID Comments User Context       Prior            Allergies:   Lisinopril, Sulfa antibiotics, Penicillins, Atenolol, Celebrex [celecoxib], Coreg [carvedilol], and Ibuprofen    Isolation:   No active isolations    Intake and Output  No intake or output data in the 24 hours ending 05/07/23 1407    Weight:       05/07/23  1116   Weight: 85.7 kg (189 lb)       Most recent vitals:   Vitals:    05/07/23 1156 05/07/23 1326 05/07/23 1350 05/07/23 1356   BP: 110/68 114/69  121/65   Pulse: 83 79 82 84   Resp:  20 18 18   Temp:       TempSrc:       SpO2: 93% 94% 91% 91%   Weight:       Height:           Active LDAs/IV Access:   Lines, Drains & Airways       Active LDAs       Name Placement date Placement time Site Days    Peripheral IV 05/07/23 1130 Right Forearm 05/07/23  1130  Forearm  less than 1                    Labs (abnormal labs have a star):   Labs Reviewed   COMPREHENSIVE METABOLIC PANEL - Abnormal; Notable for the following components:       Result Value    Sodium 135 (*)     Chloride 97 (*)     Total Bilirubin 1.4 (*)     All other components within normal limits    Narrative:     GFR Normal >60  Chronic Kidney Disease <60  Kidney Failure <15    The GFR formula is only valid for adults with stable renal function between ages 18 and 70.   PROTIME-INR - Abnormal; Notable for the following components:    Protime 21.5 (*)     INR 1.84 (*)     All other components within normal limits   APTT - Abnormal; Notable for the following components:    PTT  42.3 (*)     All other components within normal limits   CBC WITH AUTO DIFFERENTIAL - Abnormal; Notable for the following components:    WBC 12.19 (*)     Hematocrit 51.3 (*)     All other components within normal limits   MANUAL DIFFERENTIAL - Abnormal; Notable for the following components:    Neutrophil % 92.9 (*)     Lymphocyte % 3.1 (*)     Monocyte % 4.1 (*)     Neutrophils Absolute 11.32 (*)     Lymphocytes Absolute 0.38 (*)     All other components within normal limits   RESPIRATORY PANEL PCR W/ COVID-19 (SARS-COV-2) GUSTABO/ASTRID/DIONICIO/PAD/COR/MAD/NASIM IN-HOUSE, NP SWAB IN [facility], 3-4 HR TAT - Normal    Narrative:     In the setting of a positive respiratory panel with a viral infection PLUS a negative procalcitonin without other underlying concern for bacterial infection, consider observing off antibiotics or discontinuation of antibiotics and continue supportive care. If the respiratory panel is positive for atypical bacterial infection (Bordetella pertussis, Chlamydophila pneumoniae, or Mycoplasma pneumoniae), consider antibiotic de-escalation to target atypical bacterial infection.   BLOOD CULTURE   BLOOD CULTURE   LACTIC ACID, PLASMA   PROCALCITONIN   CBC AND DIFFERENTIAL    Narrative:     The following orders were created for panel order CBC & Differential.  Procedure                               Abnormality         Status                     ---------                               -----------         ------                     CBC Auto Differential[[id number]]        Abnormal            Final result                 Please view results for these tests on the individual orders.       EKG:   ECG 12 Lead Dyspnea   Preliminary Result   HEART RATE= 80  bpm   RR Interval= 750  ms   OR Interval= 174  ms   P Horizontal Axis=   deg   P Front Axis= 266  deg   QRSD Interval= 142  ms   QT Interval= 429  ms   QRS Axis= -87  deg   T Wave Axis= 96  deg   - ABNORMAL ECG -   Ventricular-paced rhythm   No further analysis  attempted due to paced rhythm   Electronically Signed By:    Date and Time of Study: 2023 11:33:03          Meds given in ED:   Medications   cefepime 2 gm IVPB in 100 ml NS (VTB) (2 g Intravenous New Bag 23 1406)   vancomycin 1750 mg/500 mL 0.9% NS IVPB ([facility]) (has no administration in time range)       Imaging results:  CT Chest Without Contrast Diagnostic    Result Date: 2023   Infiltrate in the left lower lung, follow-up recommended. Minimal bilateral pleural effusions.  Aneurysmal ascending aorta.  A new age-indeterminate T7 compression deformity, correlate clinically.  This report was finalized on 2023 1:30 PM by Dr. Krishan Escalera M.D.       Ambulatory status:   - Walks with a cane & oxygen; Assist x 1    Social issues:   Social History     Socioeconomic History    Marital status:     Number of children: 2   Tobacco Use    Smoking status: Former     Packs/day: 1.50     Years: 45.00     Pack years: 67.50     Types: Cigarettes     Quit date: 1/3/2000     Years since quittin.3    Smokeless tobacco: Never   Vaping Use    Vaping Use: Never used   Substance and Sexual Activity    Alcohol use: No    Drug use: No    Sexual activity: Not Currently     Partners: Female       NIH Stroke Scale:         Haydee Brown RN  23 14:07 EDT

## 2023-05-07 NOTE — ED PROVIDER NOTES
EMERGENCY DEPARTMENT ENCOUNTER    Room Number:  E656/1  Date seen:  5/7/2023  PCP: Madison Lewis APRN  Discussed/ obtained information from independent historians: patient      HPI:  Chief Complaint: coughing up blood  A complete HPI/ROS/PMH/PSH/SH/FH are unobtainable due to: none  Context: Alessio Allen is a 82 y.o. male with a history of COPD, chronic respiratory failure on 3 L of oxygen continuously, anticoagulated on Xarelto for A-fib with chronic thrombocytopenia who presents to the ED c/o 3-day history of coughing up bloody sputum.  He states initially it was very small in amount but has gradually progressed. He has a history of hemoptysis a couple different times, states that he has had a bronchoscopy previously and they were able to find no clear etiology for the bleeding.  His pulmonologist is Dr. Hernandez.  He does report an increase in his mucus production for the last few days but denies any other infectious symptoms including fever chills sinus congestion rhinorrhea body aches.  Also denies any chest pain or any acute worsening of his chronic dyspnea.    Patient had an ER visit for hemoptysis in October 2022.  Labs were not significantly abnormal, CT chest showed no acute findings and after discussion with pulmonology the patient was discharged for outpatient follow-up.      External (non-ED) record review: Office visit with Dr. Hernandez 2/1/2023 for chronic respiratory failure, COPD.  Had a CT scan of the chest on 10/6/2022 that showed multiple incidental findings including those consistent with previous radiation for laryngeal cancer, thoracic adenopathy and pulmonary nodules similar to before, findings suggestive of pulmonary arterial hypertension, thoracic aorta dilated to 4.4 cm, abnormal liver findings suggestive of cirrhosis.      PAST MEDICAL HISTORY  Active Ambulatory Problems     Diagnosis Date Noted   • A-fib 03/02/2016   • Dyslipidemia 03/02/2016   • BP (high blood pressure) 03/02/2016   •  Neuropathy    • Hypertension    • COPD (chronic obstructive pulmonary disease)    • BPH (benign prostatic hypertrophy)    • Atrial fibrillation    • Asthma    • Hypoxia 02/07/2018   • Pneumonia 02/07/2018   • Chronic anticoagulation 02/07/2018   • Pneumonia of both lungs due to infectious organism 02/07/2018   • Hyponatremia 02/14/2018   • COPD exacerbation 02/26/2018   • Acute on chronic respiratory failure with hypoxia 03/17/2018   • Pneumonia of both lower lobes due to infectious organism 04/05/2018   • Acute on chronic diastolic heart failure 04/19/2018   • IPF (idiopathic pulmonary fibrosis) 04/19/2018   • Acute respiratory failure with hypoxia 02/28/2019   • Attention to G-tube 03/03/2019   • Massive hemoptysis 06/13/2019   • Laryngeal cancer 09/27/2019   • Encounter for venous access device care 03/16/2021     Resolved Ambulatory Problems     Diagnosis Date Noted   • No Resolved Ambulatory Problems     Past Medical History:   Diagnosis Date   • Anemia 2018   • Arthritis    • BPH (benign prostatic hypertrophy)    • Cancer    • Cataract 2012   • CHF (congestive heart failure) 2018   • Colon polyp 2004   • Coronary artery disease 2017   • Dependence on continuous supplemental oxygen    • Diverticulosis    • Erectile dysfunction    • GERD (gastroesophageal reflux disease)    • H/O Abnormal CBC 2017   • History of heart artery stent 03/2017   • History of medical problems 2017   • Hyperlipidemia    • Low back pain 2012   • Lung disease    • Osteopenia 2012         PAST SURGICAL HISTORY  Past Surgical History:   Procedure Laterality Date   • BRONCHOSCOPY N/A 04/07/2018    Procedure: BRONCHOSCOPY with specimens;  Surgeon: Suresh Hernandez MD;  Location: [facility];  Service: Pulmonary   • BRONCHOSCOPY N/A 03/06/2019    Procedure: BRONCHOSCOPY WITH BAL AND BIOPSY UNDER FLUORO AND ANESTHESIA;  Surgeon: Suresh Hernandez MD;  Location: [facility] OR;  Service: Pulmonary   • BRONCHOSCOPY N/A 06/13/2019    Procedure:  BRONCHOSCOPY;  Surgeon: Suresh Hernandez MD;  Location:  [facility];  Service: Pulmonary   • CARDIAC CATHETERIZATION N/A 2018    Procedure: Right Heart Cath with possible vasodilator study;  Surgeon: Torey Schuler MD;  Location:  [facility];  Service: Cardiovascular   • CARDIAC CATHETERIZATION N/A 2019    Procedure: RIGHT AND LEFT HEART CATH;  Surgeon: Marizol Holt MD;  Location:  [facility];  Service: Cardiology   • CARDIAC CATHETERIZATION N/A 2019    Procedure: CORONARY ANGIOGRAPHY;  Surgeon: Marizol Holt MD;  Location:  [facility];  Service: Cardiology   • COLONOSCOPY  2016    polyps   • CORONARY STENT PLACEMENT  2017   • FRACTURE SURGERY     • INSERT / REPLACE / VENOUS ACCESS CATHETER Right    • PACEMAKER IMPLANTATION     • VENOUS ACCESS DEVICE (PORT) REMOVAL Right 2021    Procedure: Mediport Removal;  Surgeon: Joseph Nickerson Jr., MD;  Location:  [facility];  Service: General;  Laterality: Right;         FAMILY HISTORY  Family History   Problem Relation Age of Onset   • Hypertension Mother    • Arthritis Mother    • Heart attack Father    • Asthma Father    • Early death Father    • COPD Brother    • Early death Brother    • Heart disease Brother    • Hypertension Brother    • Malig Hyperthermia Neg Hx          SOCIAL HISTORY  Social History     Socioeconomic History   • Marital status:    • Number of children: 2   Tobacco Use   • Smoking status: Former     Packs/day: 1.50     Years: 45.00     Pack years: 67.50     Types: Cigarettes     Quit date: 1/3/2000     Years since quittin.3   • Smokeless tobacco: Never   Vaping Use   • Vaping Use: Never used   Substance and Sexual Activity   • Alcohol use: No   • Drug use: No   • Sexual activity: Not Currently     Partners: Female         ALLERGIES  Lisinopril, Sulfa antibiotics, Penicillins, Atenolol, Celebrex [celecoxib], Coreg [carvedilol], and  Ibuprofen        REVIEW OF SYSTEMS  Review of Systems         PHYSICAL EXAM  ED Triage Vitals   Temp Heart Rate Resp BP SpO2   05/07/23 1115 05/07/23 1114 05/07/23 1114 -- --   98.3 °F (36.8 °C) 84 22        Temp src Heart Rate Source Patient Position BP Location FiO2 (%)   05/07/23 1114 05/07/23 1114 -- -- --   Tympanic Monitor          Physical Exam      GENERAL: Chronically ill-appearing though no acute distress  HENT: normocephalic, atraumatic  EYES: no scleral icterus  CV: regular rhythm, normal rate, no significant lower extremity edema  RESPIRATORY: normal effort, scattered crackles and diffusely diminished, no rhonchi or wheezes  ABDOMEN: nondistended soft nontender  MUSCULOSKELETAL: no deformity  NEURO: alert, moves all extremities, follows commands  PSYCH:  calm, cooperative  SKIN: warm, dry    Vital signs and nursing notes reviewed.          LAB RESULTS  Recent Results (from the past 24 hour(s))   ECG 12 Lead Dyspnea    Collection Time: 05/07/23 11:33 AM   Result Value Ref Range    QT Interval 429 ms   Comprehensive Metabolic Panel    Collection Time: 05/07/23 11:39 AM    Specimen: Blood   Result Value Ref Range    Glucose 99 65 - 99 mg/dL    BUN 17 8 - 23 mg/dL    Creatinine 1.07 0.76 - 1.27 mg/dL    Sodium 135 (L) 136 - 145 mmol/L    Potassium 4.2 3.5 - 5.2 mmol/L    Chloride 97 (L) 98 - 107 mmol/L    CO2 28.0 22.0 - 29.0 mmol/L    Calcium 8.9 8.6 - 10.5 mg/dL    Total Protein 6.5 6.0 - 8.5 g/dL    Albumin 3.9 3.5 - 5.2 g/dL    ALT (SGPT) 17 1 - 41 U/L    AST (SGOT) 24 1 - 40 U/L    Alkaline Phosphatase 76 39 - 117 U/L    Total Bilirubin 1.4 (H) 0.0 - 1.2 mg/dL    Globulin 2.6 gm/dL    A/G Ratio 1.5 g/dL    BUN/Creatinine Ratio 15.9 7.0 - 25.0    Anion Gap 10.0 5.0 - 15.0 mmol/L    eGFR 69.7 >60.0 mL/min/1.73   Protime-INR    Collection Time: 05/07/23 11:39 AM    Specimen: Blood   Result Value Ref Range    Protime 21.5 (H) 11.7 - 14.2 Seconds    INR 1.84 (H) 0.90 - 1.10   aPTT    Collection Time:  05/07/23 11:39 AM    Specimen: Blood   Result Value Ref Range    PTT 42.3 (H) 22.7 - 35.4 seconds   CBC Auto Differential    Collection Time: 05/07/23 11:39 AM    Specimen: Blood   Result Value Ref Range    WBC 12.19 (H) 3.40 - 10.80 10*3/mm3    RBC 5.67 4.14 - 5.80 10*6/mm3    Hemoglobin 16.9 13.0 - 17.7 g/dL    Hematocrit 51.3 (H) 37.5 - 51.0 %    MCV 90.5 79.0 - 97.0 fL    MCH 29.8 26.6 - 33.0 pg    MCHC 32.9 31.5 - 35.7 g/dL    RDW 13.5 12.3 - 15.4 %    RDW-SD 44.7 37.0 - 54.0 fl    MPV 9.7 6.0 - 12.0 fL    Platelets 368 140 - 450 10*3/mm3    nRBC 0.0 0.0 - 0.2 /100 WBC   Manual Differential    Collection Time: 05/07/23 11:39 AM    Specimen: Blood   Result Value Ref Range    Neutrophil % 92.9 (H) 42.7 - 76.0 %    Lymphocyte % 3.1 (L) 19.6 - 45.3 %    Monocyte % 4.1 (L) 5.0 - 12.0 %    Neutrophils Absolute 11.32 (H) 1.70 - 7.00 10*3/mm3    Lymphocytes Absolute 0.38 (L) 0.70 - 3.10 10*3/mm3    Monocytes Absolute 0.50 0.10 - 0.90 10*3/mm3    RBC Morphology Normal Normal    WBC Morphology Normal Normal    Platelet Morphology Normal Normal   Procalcitonin    Collection Time: 05/07/23 11:39 AM    Specimen: Blood   Result Value Ref Range    Procalcitonin 0.33 (H) 0.00 - 0.25 ng/mL   Respiratory Panel PCR w/COVID-19(SARS-CoV-2) [facility] In-House, [facility], [place], 3-4 HR TAT - Swab, Nasopharynx    Collection Time: 05/07/23 11:42 AM    Specimen: Nasopharynx; Swab   Result Value Ref Range    ADENOVIRUS, PCR Not Detected Not Detected    Coronavirus 229E Not Detected Not Detected    Coronavirus HKU1 Not Detected Not Detected    Coronavirus NL63 Not Detected Not Detected    Coronavirus OC43 Not Detected Not Detected    COVID19 Not Detected Not Detected - Ref. Range    Human Metapneumovirus Not Detected Not Detected    Human Rhinovirus/Enterovirus Not Detected Not Detected    Influenza A PCR Not Detected Not Detected    Influenza B PCR Not Detected Not Detected    Parainfluenza Virus 1 Not Detected Not  Detected    Parainfluenza Virus 2 Not Detected Not Detected    Parainfluenza Virus 3 Not Detected Not Detected    Parainfluenza Virus 4 Not Detected Not Detected    RSV, PCR Not Detected Not Detected    Bordetella pertussis pcr Not Detected Not Detected    Bordetella parapertussis PCR Not Detected Not Detected    Chlamydophila pneumoniae PCR Not Detected Not Detected    Mycoplasma pneumo by PCR Not Detected Not Detected   Lactic Acid, Plasma    Collection Time: 05/07/23  2:00 PM    Specimen: Arm, Left; Blood   Result Value Ref Range    Lactate 1.0 0.5 - 2.0 mmol/L       Ordered the above labs and reviewed the results.        RADIOLOGY  CT Chest Without Contrast Diagnostic    Result Date: 5/7/2023  CT CHEST WO CONTRAST DIAGNOSTIC-  INDICATIONS: Hemoptysis  TECHNIQUE: Radiation dose reduction techniques were utilized, including automated exposure control and exposure modulation based on body size. ENHANCED CHEST CT  COMPARISON: 01/23/2023  FINDINGS:    The heart size is enlarged without pericardial effusion.. Moderate coronary arterial calcifications are apparent. Left-sided pacemaker and cardiac leads are noted.. Ascending aorta is aneurysmal, about 4.7 cm, stable at similar level. Prominence of the central pulmonary arterial caliber is noted, compatible with pulmonary arterial hypertension. A few small subcentimeter short axis mediastinal lymph nodes are seen that are not significant by size criteria.  The airways appear clear.  Minimal bilateral pleural effusions are seen.  The lungs show new patchy and consolidative infiltrate in the left lower lobe and posterior left upper lobe may represent pneumonia; setting of hemoptysis, possibility of pulmonary hemorrhage is not excluded; follow-up is recommended to characterize resolution and to exclude any possibility of underlying nodules. Minimal right basilar atelectasis or infiltrate. Apical predominant emphysematous changes are seen.  Upper abdominal structures show  no acute findings.  Degenerative changes are seen in the spine. T7 compression deformity with 50% loss of height, is age-indeterminate, but new from the prior exam, correlate clinically. Other compression deformities appear stable.       Infiltrate in the left lower lung, follow-up recommended. Minimal bilateral pleural effusions.  Aneurysmal ascending aorta.  A new age-indeterminate T7 compression deformity, correlate clinically.  This report was finalized on 5/7/2023 1:30 PM by Dr. Krishan Escalera M.D.        Ordered the above noted radiological studies. Reviewed by me in PACS.            PROCEDURES  Procedures              MEDICATIONS GIVEN IN ER  Medications   albuterol (PROVENTIL) nebulizer solution 0.083% 2.5 mg/3mL (2.5 mg Nebulization Given 5/7/23 1944)   terazosin (HYTRIN) capsule 2 mg (has no administration in time range)   ferrous sulfate tablet 325 mg (has no administration in time range)   cetirizine (zyrTEC) tablet 10 mg (has no administration in time range)   furosemide (LASIX) tablet 40 mg (has no administration in time range)   empagliflozin (JARDIANCE) tablet 10 mg (has no administration in time range)   montelukast (SINGULAIR) tablet 10 mg (has no administration in time range)   ketotifen (ZADITOR) 0.025 % ophthalmic solution 1 drop (has no administration in time range)   pantoprazole (PROTONIX) EC tablet 40 mg (has no administration in time range)   pilocarpine (SALAGEN) tablet 5 mg (has no administration in time range)   Glycerin-Hypromellose- (ARTIFICIAL TEARS) 0.2-0.2-1 % ophthalmic solution solution 1 drop (has no administration in time range)   potassium chloride (K-DUR,KLOR-CON) ER tablet 10 mEq (has no administration in time range)   pregabalin (LYRICA) capsule 75 mg (75 mg Oral Given 5/7/23 2011)   eltrombopag (PROMACTA) tablet 50 mg (has no administration in time range)   vitamin B-6 (PYRIDOXINE) tablet 50 mg (has no administration in time range)   sodium chloride nasal spray 2  spray (has no administration in time range)   sodium chloride 7 % nebulizer solution nebulizer solution 4 mL (4 mL Nebulization Not Given 5/7/23 1952)   tadalafil (ADCIRCA) tablet 40 mg (has no administration in time range)   ipratropium-albuterol (DUO-NEB) nebulizer solution 3 mL (3 mL Nebulization Not Given 5/7/23 1952)   budesonide-formoterol (SYMBICORT) 160-4.5 MCG/ACT inhaler 2 puff (2 puffs Inhalation Not Given 5/7/23 1951)   sodium chloride 0.9 % flush 10 mL (10 mL Intravenous Given 5/7/23 2010)   sodium chloride 0.9 % flush 10 mL (has no administration in time range)   sodium chloride 0.9 % infusion 40 mL (has no administration in time range)   acetaminophen (TYLENOL) tablet 650 mg (has no administration in time range)     Or   acetaminophen (TYLENOL) 160 MG/5ML solution 650 mg (has no administration in time range)     Or   acetaminophen (TYLENOL) suppository 650 mg (has no administration in time range)   ondansetron (ZOFRAN) tablet 4 mg (has no administration in time range)     Or   ondansetron (ZOFRAN) injection 4 mg (has no administration in time range)   Pharmacy to dose vancomycin (has no administration in time range)   cefepime 2 gm IVPB in 100 ml NS (VTB) (has no administration in time range)   vancomycin 1500 mg/500 mL 0.9% NS IVPB ([facility]) (has no administration in time range)   cefepime 2 gm IVPB in 100 ml NS (VTB) (2 g Intravenous New Bag 5/7/23 1406)   vancomycin 1750 mg/500 mL 0.9% NS IVPB ([facility]) (1,750 mg Intravenous New Bag 5/7/23 1903)                   MEDICAL DECISION MAKING, PROGRESS, and CONSULTS    All labs have been independently reviewed by me.  All radiology studies have been reviewed by me and I have also reviewed the radiology report.   EKG's independently viewed and interpreted by me.  Discussion below represents my analysis of pertinent findings related to patient's condition, differential diagnosis, treatment plan and final disposition.      Additional sources:    - Chronic or social  conditions impacting care: COPD, chronic respiratory failure, thrombocytopenia and chronic anticoagulation        Orders placed during this visit:  Orders Placed This Encounter   Procedures   • Respiratory Panel PCR w/COVID-19(SARS-CoV-2) [facility] In-House, NP Swab in UTM/VTM, 3-4 HR TAT - Swab, Nasopharynx   • Blood Culture - Blood,   • Blood Culture - Blood,   • MRSA Screen, PCR (Inpatient) - Swab, Nares   • CT Chest Without Contrast Diagnostic   • Comprehensive Metabolic Panel   • Protime-INR   • aPTT   • CBC Auto Differential   • Manual Differential   • Lactic Acid, Plasma   • Procalcitonin   • Potassium   • Magnesium   • High Sensitivity Troponin T   • Blood Gas, Arterial -   • Basic Metabolic Panel   • CBC Auto Differential   • TSH   • Vancomycin, Trough Prior to dose due at 1800 on 5/9. Please make sure dose is not infusing prior to drawing level.   • Diet: Regular/House Diet; Texture: Regular Texture (IDDSI 7); Fluid Consistency: Thin (IDDSI 0)   • Vital Signs   • Intake & Output   • Weigh Patient   • Oral Care   • Place Sequential Compression Device   • Maintain Sequential Compression Device   • Telemetry - Maintain IV Access   • Continuous Cardiac Monitoring   • Telemetry - Pulse Oximetry   • May Be Off Telemetry for Tests   • Up With Assistance   • Code Status and Medical Interventions:   • LHA (on-call MD unless specified) Details   • Inpatient Pulmonology Consult   • Discontinue Patient Isolation   • Oxygen Therapy- Nasal Cannula; Titrate for SPO2: 90% - 95%   • Oxygen Therapy- Nasal Cannula; Titrate for SPO2: 90% - 95%   • SLP Consult: Eval & Treat Other; reports swallowing issue and is followed by SLP in community, hx throat cancer.   • ECG 12 Lead Dyspnea   • ECG 12 Lead Chest Pain   • Insert Peripheral IV   • Inpatient Admission   • CBC & Differential     Differential diagnosis:  Bronchitis, bronchiectasis, complication of chronic anticoagulation, complication of chronic  thrombocytopenia, neoplasm, VTE      Independent interpretation of labs, radiology studies, and discussions with consultants:  ED Course as of 05/07/23 2037   Sun May 07, 2023   1244 Hemoglobin: 16.9 [KA]   1244 Platelets: 368 [KA]   1244 Glucose: 99 [KA]   1244 Creatinine: 1.07 [KA]   1304 EKG personally interpreted by me.  My personal interpretation is:          EKG time: 11:33 AM  Rhythm/Rate: Ventricular paced rhythm, rate 80  QRS, axis: IVCD  ST and T waves: Nonspecific ST/T wave changes in the lateral leads    Interpreted Contemporaneously by me, independently viewed  EKG is not significantly changed compared to prior EKG done on 10/4/2022   [WH]   1322 CT chest personally interpreted by me.  My personal interpretation is: There is increased opacity in the left lung.  No pneumothorax.  No pleural effusion [WH]   1337 WBC(!): 12.19 [KA]   1358 I updated the patient about findings of infiltrate versus hemorrhage on the CT scan in the setting of new hemoptysis and elevated white blood cell count recommend admission for IV antibiotics and further evaluation and he is agreeable [KA]   1359 I discussed the patient with Tierney Tapia, ER pharmacist.  Reviewed the patient's history and work-up and she has reviewed his chart and allergies I recommend cefepime and vancomycin. [KA]   1412 I discussed patient with Dr. Trujillo, hospitalist.  We discussed the patient's history presentation labs and imaging and he agrees to admit for further evaluation treatment. [KA]      ED Course User Index  [KA] Madison Arechiga PA  [WH] Roderick Velasquez MD             Patient was wearing a face mask when I entered the room and they continued to wear a mask throughout their stay in the ED.  I wore PPE, including  gloves, face mask with shield or face mask with goggles whenever I was in the room with patient.     DIAGNOSIS  Final diagnoses:   Pneumonia of left lower lobe due to infectious organism   Hemoptysis   Chronic anticoagulation             --    Please note that portions of this were completed with a voice recognition program.       Note Disclaimer: At [facility], we believe that sharing information builds trust and better relationships. You are receiving this note because you are receiving care at [facility] or recently visited. It is possible you will see health information before a provider has talked with you about it. This kind of information can be easy to misunderstand. To help you fully understand what it means for your health, we urge you to discuss this note with your provider.           Madison Arechiga PA  05/07/23 3561

## 2023-05-07 NOTE — ED PROVIDER NOTES
MD ATTESTATION NOTE    The CALLY and I have discussed this patient's history, physical exam, and treatment plan.  I have reviewed the documentation and personally had a face to face interaction with the patient. I affirm the documentation and agree with the treatment and plan.  The attached note describes my personal findings.      I provided a substantive portion of the care of the patient.  I personally performed the physical exam in its entirety, and below are my findings.  For this patient encounter, the patient wore surgical mask, I wore full protective PPE including N95 and eye protection.      Brief HPI: Patient complains of hemoptysis for the past several days.  He has chronic respiratory failure and is on 3 L of oxygen at all times.  Denies worsening shortness of breath, fever, dizziness, syncope, or chest pain.  He takes Xarelto.  He has had previous issues of hemoptysis.    PHYSICAL EXAM  ED Triage Vitals   Temp Heart Rate Resp BP SpO2   05/07/23 1115 05/07/23 1114 05/07/23 1114 05/07/23 1125 05/07/23 1125   98.3 °F (36.8 °C) 84 22 123/68 (!) 86 %      Temp src Heart Rate Source Patient Position BP Location FiO2 (%)   05/07/23 1114 05/07/23 1114 -- -- --   Tympanic Monitor            GENERAL: Awake, alert, oriented x3.  Chronically ill appearing elderly male.  Does not appear overtly toxic.  Resting comfortably in no acute distress  HENT: nares patent  EYES: no scleral icterus  CV: regular rhythm, normal rate  RESPIRATORY: normal effort, diminished breath sounds in both lung bases  ABDOMEN: soft, obese, nontender  MUSCULOSKELETAL: no deformity, no calf tenderness  NEURO: alert, moves all extremities, follows commands  PSYCH:  calm, cooperative  SKIN: warm, dry    Vital signs and nursing notes reviewed.        Plan: Obtain labs, EKG, and CT of the chest.    CT scan showed an infiltrate in the left lower lung.  Respiratory panel was negative.  Hemoglobin was stable.  White blood cell count in progress Tylenol  mildly elevated.  Patient was started on IV antibiotics.  He will be admitted.    ED Course as of 05/07/23 1841   Sun May 07, 2023   1244 Hemoglobin: 16.9 [KA]   1244 Platelets: 368 [KA]   1244 Glucose: 99 [KA]   1244 Creatinine: 1.07 [KA]   1304 EKG personally interpreted by me.  My personal interpretation is:          EKG time: 11:33 AM  Rhythm/Rate: Ventricular paced rhythm, rate 80  QRS, axis: IVCD  ST and T waves: Nonspecific ST/T wave changes in the lateral leads    Interpreted Contemporaneously by me, independently viewed  EKG is not significantly changed compared to prior EKG done on 10/4/2022   [WH]   1322 CT chest personally interpreted by me.  My personal interpretation is: There is increased opacity in the left lung.  No pneumothorax.  No pleural effusion [WH]   1337 WBC(!): 12.19 [KA]   1358 I updated the patient about findings of infiltrate versus hemorrhage on the CT scan in the setting of new hemoptysis and elevated white blood cell count recommend admission for IV antibiotics and further evaluation and he is agreeable [KA]   1359 I discussed the patient with Tierney Tapia, ER pharmacist.  Reviewed the patient's history and work-up and she has reviewed his chart and allergies I recommend cefepime and vancomycin. [KA]   1412 I discussed patient with Dr. Trujillo, hospitalist.  We discussed the patient's history presentation labs and imaging and he agrees to admit for further evaluation treatment. [KA]      ED Course User Index  [KA] Madison Arechiga PA  [WH] Roderick Velasquez MD Holland, William D, MD  05/07/23 1841

## 2023-05-07 NOTE — ED NOTES
Pt to ed from home via [facility].    Pt c/o coughing up blood this am. Pt states he started coughing 2 days ago. Pt on 3l at baseline

## 2023-05-07 NOTE — PROGRESS NOTES
"[facility] Clinical Pharmacy Services: Vancomycin Pharmacokinetic Initial Consult Note    Alessio Allen is a 82 y.o. male who is on day 1 of pharmacy to dose vancomycin.    Indication: Pneumonia  Consulting Provider: Dr. Trujillo  Planned Duration of Therapy: TBD  Loading Dose Ordered or Given: 1750 mg on 5/7 at 1903  MRSA PCR performed: ordered  Culture/Source:   Target: -600 mg/L.hr   Other Antimicrobials: cefepime    Vitals/Labs  Ht: 182.9 cm (72\"); Wt: 86.1 kg (189 lb 12.8 oz)  Temp Readings from Last 1 Encounters:   05/07/23 98.1 °F (36.7 °C) (Oral)    Estimated Creatinine Clearance: 65.9 mL/min (by C-G formula based on SCr of 1.07 mg/dL).     Results from last 7 days   Lab Units 05/07/23  1139   CREATININE mg/dL 1.07   WBC 10*3/mm3 12.19*     Assessment/Plan:    Vancomycin Dose:   Loading dose given in ED. Start vancomycin 1500 mg IV every  24  hours tomorrow.  Predictive AUC level for the dose ordered is 528 mg/L.hr, which is within the target of 400-600 mg/L.hr  Vanc Trough has been ordered for 5/9 at 1730     Pharmacy will follow patient's kidney function and will adjust doses and obtain levels as necessary. Thank you for involving pharmacy in this patient's care. Please contact pharmacy with any questions or concerns.                           Vickie Lee, PharmD  Clinical Pharmacist    "

## 2023-05-07 NOTE — H&P
HISTORY AND PHYSICAL   [facility]        Patient Identification:  Name: Alessio Allen  Age: 82 y.o.  Sex: male  :  1940  MRN: [id number]                     Primary Care Physician: Madison Lewis APRN    Chief Complaint: Hemoptysis    History of Present Illness:   82-year-old gentleman with history of severe COPD as well as interstitial pulmonary fibrosis and previous history of hemoptysis.  He has undergone 1 bronchoscopy at least without finding the source thereof.  He presents noting a recurrence of the hemoptysis.  This been going on about 3 days now.  On questioning he actually states it is mostly mucus with blood in it.  He has not noticed any increase shortness of air.  No notable fever sweats or chills.  He states he is usually on 4 L nasal cannula oxygen at home.    Past Medical History:  Past Medical History:   Diagnosis Date   • Acute on chronic diastolic heart failure    • Anemia 2018   • Arthritis    • Asthma    • Atrial fibrillation    • BPH (benign prostatic hypertrophy)    • Cancer     throat CA   • Cataract     Removed   • CHF (congestive heart failure)    • Colon polyp    • COPD (chronic obstructive pulmonary disease)    • Coronary artery disease    • Dependence on continuous supplemental oxygen     3L-5L DEPENDING ON ACTIVITY   • Diverticulosis    • Erectile dysfunction    • GERD (gastroesophageal reflux disease)    • H/O Abnormal CBC    • History of heart artery stent 2017   • History of medical problems 2017    Afib   • Hyperlipidemia    • Hypertension    • Low back pain    • Lung disease    • Neuropathy     feet and hands    • Osteopenia     Osteopenia   • Pneumonia      Past Surgical History:  Past Surgical History:   Procedure Laterality Date   • BRONCHOSCOPY N/A 2018    Procedure: BRONCHOSCOPY with specimens;  Surgeon: Suresh Hernandez MD;  Location: [facility];  Service: Pulmonary   • BRONCHOSCOPY N/A 2019    Procedure:  BRONCHOSCOPY WITH BAL AND BIOPSY UNDER FLUORO AND ANESTHESIA;  Surgeon: Suresh Hernandez MD;  [facility];  Service: Pulmonary   • BRONCHOSCOPY N/A 06/13/2019    Procedure: BRONCHOSCOPY;  Surgeon: Suresh Hernandez MD;  [facility];  Service: Pulmonary   • CARDIAC CATHETERIZATION N/A 04/18/2018    Procedure: Right Heart Cath with possible vasodilator study;  Surgeon: Torey Schuler MD;  [facility];  Service: Cardiovascular   • CARDIAC CATHETERIZATION N/A 03/07/2019    Procedure: RIGHT AND LEFT HEART CATH;  Surgeon: Marizol Holt MD;  Location:  [facility] CATH INVASIVE LOCATION;  Service: Cardiology   • CARDIAC CATHETERIZATION N/A 03/07/2019    Procedure: CORONARY ANGIOGRAPHY;  Surgeon: Marizol Holt MD;  [facility];  Service: Cardiology   • COLONOSCOPY  12/05/2016    polyps   • CORONARY STENT PLACEMENT  2017   • FRACTURE SURGERY     • INSERT / REPLACE / VENOUS ACCESS CATHETER Right    • PACEMAKER IMPLANTATION     • VENOUS ACCESS DEVICE (PORT) REMOVAL Right 04/05/2021    Procedure: Mediport Removal;  Surgeon: Joseph Nickerson Jr., MD;  Location: [facility] MAIN OR;  Service: General;  Laterality: Right;      Home Meds:  Medications Prior to Admission   Medication Sig Dispense Refill Last Dose   • acetaminophen (TYLENOL) 325 MG tablet Take 2 tablets by mouth Every 6 (Six) Hours As Needed (prn for pain). 720 tablet 2 5/7/2023   • albuterol (PROVENTIL) (2.5 MG/3ML) 0.083% nebulizer solution    5/6/2023   • doxazosin (CARDURA) 2 MG tablet Take 1 tablet by mouth Every Night.   5/6/2023   • ferrous sulfate 325 (65 FE) MG tablet Take 1 tablet by mouth Daily With Breakfast. 90 tablet 3 5/7/2023   • fexofenadine (Allegra Allergy) 60 MG tablet Take 1 tablet by mouth 2 (Two) Times a Day. 180 tablet 3 5/7/2023   • furosemide (LASIX) 40 MG tablet Take 1 tablet by mouth Daily.   5/7/2023   • Jardiance 10 MG tablet tablet    5/7/2023   • montelukast  (SINGULAIR) 10 MG tablet Take 1 tablet by mouth Every Night.   5/6/2023   • O2 (OXYGEN) Inhale 3.5 L/min 1 (One) Time.   5/7/2023   • olopatadine (Patanol) 0.1 % ophthalmic solution Administer 1 drop to both eyes 2 (Two) Times a Day. 3 each 12 5/7/2023   • pantoprazole (PROTONIX) 40 MG pack packet Take 1 packet by mouth Every Morning Before Breakfast. PEG tube   5/7/2023   • pilocarpine (SALAGEN) 5 MG tablet Take 1 tablet by mouth 3 (Three) Times a Day.   5/7/2023   • polyvinyl alcohol (LIQUIFILM) 1.4 % ophthalmic solution 1 drop As Needed for Dry Eyes.   5/7/2023   • potassium chloride (K-DUR) 10 MEQ CR tablet Take 2 tablets by mouth Daily. (Patient taking differently: Take 1 tablet by mouth Daily.) 180 tablet 3 5/7/2023   • pregabalin (LYRICA) 75 MG capsule Take 1 capsule by mouth 2 (Two) Times a Day.   5/7/2023   • Promacta 50 MG tablet    5/7/2023   • pyridoxine (VITAMIN B-6) 50 MG tablet Take 1 tablet by mouth Daily.   5/7/2023   • REFRESH TEARS 0.5 % solution Administer 1 drop to both eyes As Needed.   5/7/2023   • simvastatin (ZOCOR) 20 MG tablet Take 0.5 tablets by mouth Every Night.   5/6/2023   • sodium chloride (Ocean Nasal Spray) 0.65 % nasal spray 2 sprays into the nostril(s) as directed by provider As Needed for Congestion (qid prn). May refill q 21 days 4 each 3 5/7/2023   • sodium chloride 7 % nebulizer solution nebulizer solution    5/7/2023   • Spacer/Aero-Holding Chambers device Give spacer to use with his inhalers whichever brand he has received is fine 2 each 3 5/7/2023   • tadalafil (ADCIRCA) 20 MG tablet tablet Take 2 tablets by mouth Daily.   5/7/2023   • TRELEGY ELLIPTA 100-62.5-25 MCG/INH aerosol powder  1 puff Daily.   5/7/2023   • XARELTO 20 MG tablet Take 1 tablet by mouth Daily With Dinner. HOLD 2 DAYS PRIOR TO SURG   5/6/2023   • albuterol sulfate  (90 Base) MCG/ACT inhaler Inhale 2 puffs Every 4 (Four) Hours As Needed for Wheezing.   More than a month        Allergies:  Allergies   Allergen Reactions   • Lisinopril Shortness Of Breath   • Sulfa Antibiotics Shortness Of Breath   • Penicillins Rash   • Atenolol Other (See Comments)     drowsiness   • Celebrex [Celecoxib] Rash   • Coreg [Carvedilol] Cough     SOA,   • Ibuprofen Other (See Comments)     Conflict with other medications he is taking     Immunizations:  Immunization History   Administered Date(s) Administered   • COVID-19 (MODERNA) 1st,2nd,3rd Dose Monovalent 2021, 2021, 2021   • COVID-19 (MODERNA) Monovalent Original Booster 2022, 2022   • COVID-19 (UNSPECIFIED) 2021   • FLUAD TRI 65YR+ 2018, 10/09/2019   • Fluad Quad 65+ 2020   • Fluzone High Dose =>65 Years ([employer] ONLY) 10/06/2016   • Fluzone High-Dose 65+yrs 10/11/2021, 2022   • Fluzone Quad >6mos (Multi-dose) 10/01/2021   • Pneumococcal Conjugate 13-Valent (PCV13) 2014   • Pneumococcal Conjugate 20-Valent (PCV20) 2022   • Pneumococcal Polysaccharide (PPSV23) 2015   • Shingrix 2022   • Tdap 2022   • Zostavax 2012     Social History:   Social History     Social History Narrative   • Not on file     Social History     Tobacco Use   • Smoking status: Former     Packs/day: 1.50     Years: 45.00     Pack years: 67.50     Types: Cigarettes     Quit date: 1/3/2000     Years since quittin.3   • Smokeless tobacco: Never   Substance Use Topics   • Alcohol use: No     Family History:  Family History   Problem Relation Age of Onset   • Hypertension Mother    • Arthritis Mother    • Heart attack Father    • Asthma Father    • Early death Father    • COPD Brother    • Early death Brother    • Heart disease Brother    • Hypertension Brother    • Malig Hyperthermia Neg Hx         Review of Systems  Review of Systems   Constitutional: Negative.    HENT: Negative.    Eyes: Negative.    Respiratory:        As per history of present illness.   Cardiovascular: Negative.     Gastrointestinal: Negative.    Endocrine: Negative.    Genitourinary: Negative.    Musculoskeletal:        He has chronic back pain.  CT scan incidentally noted compression fracture at T7.  He believes that this is not new.  He has known previous compression fractures.  There has been no change in the pattern of his back pain.   Skin: Negative.    Allergic/Immunologic: Negative.    Neurological: Negative.    Hematological: Negative.    Psychiatric/Behavioral: Negative.        Objective:  T Max 24 hrs: Temp (24hrs), Av.2 °F (36.8 °C), Min:98.1 °F (36.7 °C), Max:98.3 °F (36.8 °C)    Vitals Ranges:   Temp:  [98.1 °F (36.7 °C)-98.3 °F (36.8 °C)] 98.1 °F (36.7 °C)  Heart Rate:  [79-84] 81  Resp:  [18-24] 24  BP: (110-128)/(65-72) 128/72      Exam:  Physical Exam  Constitutional:       General: He is not in acute distress.     Appearance: He is normal weight. He is not ill-appearing, toxic-appearing or diaphoretic.   HENT:      Head: Normocephalic and atraumatic.      Right Ear: External ear normal.      Left Ear: External ear normal.      Nose: Nose normal.   Eyes:      General: No scleral icterus.        Right eye: No discharge.         Left eye: No discharge.      Extraocular Movements: Extraocular movements intact.      Conjunctiva/sclera: Conjunctivae normal.   Cardiovascular:      Rate and Rhythm: Normal rate. Rhythm irregular.      Heart sounds: Normal heart sounds.   Pulmonary:      Comments: Appears mild to moderately chronically short of air.  Bilateral scattered rhonchi.  Bilateral basilar rales the left more prominent than the right.  Abdominal:      General: Abdomen is flat. Bowel sounds are normal. There is no distension.      Palpations: Abdomen is soft. There is no mass.      Tenderness: There is no abdominal tenderness. There is no guarding or rebound.   Musculoskeletal:      Cervical back: Neck supple.      Right lower leg: No edema.      Left lower leg: No edema.      Comments: Severe  kyphoscoliosis   Skin:     General: Skin is warm and dry.   Neurological:      General: No focal deficit present.      Mental Status: He is alert and oriented to person, place, and time.   Psychiatric:         Mood and Affect: Mood normal.         Behavior: Behavior normal.         Thought Content: Thought content normal.         Judgment: Judgment normal.         Data Review:  All labs and radiology reviewed.    Assessment:  Pneumonia of left lower lobe due to infectious organism: No fever sweats or chills but there is an elevated white count as well as procalcitonin level.  Continue vancomycin and cefepime.  Await cultures.  Hemoptysis: Secondary to above in the presence of anticoagulants.  There is a possibility that the hemoptysis itself may be causing the infiltrate.  Briefly hold anticoagulants.  See above.  Acute on chronic hypoxic respiratory failure: Secondary to above.  Treat underlying cause.  Wean oxygen as tolerated.  Consult pulmonology.  COPD: Continue bronchodilators  IPF:  Severe kyphosis: Also contributing to his chronic respiratory failure.  Hypertension: Continue home meds.  Atrial fibrillation: Continue rate control.  Hold anticoagulants briefly.  Chronic back pain: History of compression fractures.  T7 compression fracture noted on CT scan incidentally.  No recent change in symptomatology.  Patient believes this is old.  He would also be a poor candidate for aggressive intervention.  Continue Lyrica  Chronic ITP: Platelets presently normal.        Plan:  Please see above.  Discussed with patient.  Discussed with ER provider    Marcos Trujillo MD  5/7/2023  18:27 EDT    EMR Dragon/Transcription disclaimer:   Much of this encounter note is an electronic transcription/translation of spoken language to printed text. The electronic translation of spoken language may permit erroneous, or at times, nonsensical words or phrases to be inadvertently transcribed; Although I have reviewed the note for  such errors, some may still exist.

## 2023-05-08 PROBLEM — R04.2 HEMOPTYSIS: Status: ACTIVE | Noted: 2023-05-08

## 2023-05-08 LAB
ANION GAP SERPL CALCULATED.3IONS-SCNC: 11.3 MMOL/L (ref 5–15)
BASOPHILS # BLD AUTO: 0.03 10*3/MM3 (ref 0–0.2)
BASOPHILS NFR BLD AUTO: 0.2 % (ref 0–1.5)
BUN SERPL-MCNC: 21 MG/DL (ref 8–23)
BUN/CREAT SERPL: 17.9 (ref 7–25)
CALCIUM SPEC-SCNC: 8.7 MG/DL (ref 8.6–10.5)
CHLORIDE SERPL-SCNC: 99 MMOL/L (ref 98–107)
CO2 SERPL-SCNC: 24.7 MMOL/L (ref 22–29)
CREAT SERPL-MCNC: 1.17 MG/DL (ref 0.76–1.27)
DEPRECATED RDW RBC AUTO: 44.9 FL (ref 37–54)
EGFRCR SERPLBLD CKD-EPI 2021: 62.6 ML/MIN/1.73
EOSINOPHIL # BLD AUTO: 0.06 10*3/MM3 (ref 0–0.4)
EOSINOPHIL NFR BLD AUTO: 0.4 % (ref 0.3–6.2)
ERYTHROCYTE [DISTWIDTH] IN BLOOD BY AUTOMATED COUNT: 13.5 % (ref 12.3–15.4)
GLUCOSE SERPL-MCNC: 108 MG/DL (ref 65–99)
HCT VFR BLD AUTO: 49.6 % (ref 37.5–51)
HGB BLD-MCNC: 16.7 G/DL (ref 13–17.7)
IMM GRANULOCYTES # BLD AUTO: 0.1 10*3/MM3 (ref 0–0.05)
IMM GRANULOCYTES NFR BLD AUTO: 0.7 % (ref 0–0.5)
LYMPHOCYTES # BLD AUTO: 0.2 10*3/MM3 (ref 0.7–3.1)
LYMPHOCYTES NFR BLD AUTO: 1.4 % (ref 19.6–45.3)
MCH RBC QN AUTO: 30.3 PG (ref 26.6–33)
MCHC RBC AUTO-ENTMCNC: 33.7 G/DL (ref 31.5–35.7)
MCV RBC AUTO: 90 FL (ref 79–97)
MONOCYTES # BLD AUTO: 0.39 10*3/MM3 (ref 0.1–0.9)
MONOCYTES NFR BLD AUTO: 2.6 % (ref 5–12)
MRSA DNA SPEC QL NAA+PROBE: NORMAL
NEUTROPHILS NFR BLD AUTO: 13.94 10*3/MM3 (ref 1.7–7)
NEUTROPHILS NFR BLD AUTO: 94.7 % (ref 42.7–76)
NRBC BLD AUTO-RTO: 0 /100 WBC (ref 0–0.2)
PLATELET # BLD AUTO: 358 10*3/MM3 (ref 140–450)
PMV BLD AUTO: 9.8 FL (ref 6–12)
POTASSIUM SERPL-SCNC: 4.1 MMOL/L (ref 3.5–5.2)
RBC # BLD AUTO: 5.51 10*6/MM3 (ref 4.14–5.8)
SODIUM SERPL-SCNC: 135 MMOL/L (ref 136–145)
TSH SERPL DL<=0.05 MIU/L-ACNC: 2.09 UIU/ML (ref 0.27–4.2)
WBC NRBC COR # BLD: 14.72 10*3/MM3 (ref 3.4–10.8)

## 2023-05-08 PROCEDURE — 94761 N-INVAS EAR/PLS OXIMETRY MLT: CPT

## 2023-05-08 PROCEDURE — 94799 UNLISTED PULMONARY SVC/PX: CPT

## 2023-05-08 PROCEDURE — 25010000002 CEFTRIAXONE PER 250 MG

## 2023-05-08 PROCEDURE — 85025 COMPLETE CBC W/AUTO DIFF WBC: CPT | Performed by: HOSPITALIST

## 2023-05-08 PROCEDURE — 97530 THERAPEUTIC ACTIVITIES: CPT

## 2023-05-08 PROCEDURE — 84443 ASSAY THYROID STIM HORMONE: CPT | Performed by: HOSPITALIST

## 2023-05-08 PROCEDURE — 80048 BASIC METABOLIC PNL TOTAL CA: CPT | Performed by: HOSPITALIST

## 2023-05-08 PROCEDURE — 94664 DEMO&/EVAL PT USE INHALER: CPT

## 2023-05-08 PROCEDURE — 92610 EVALUATE SWALLOWING FUNCTION: CPT | Performed by: SPEECH-LANGUAGE PATHOLOGIST

## 2023-05-08 PROCEDURE — 94660 CPAP INITIATION&MGMT: CPT

## 2023-05-08 PROCEDURE — 87641 MR-STAPH DNA AMP PROBE: CPT | Performed by: HOSPITALIST

## 2023-05-08 PROCEDURE — 97162 PT EVAL MOD COMPLEX 30 MIN: CPT

## 2023-05-08 RX ORDER — SACCHAROMYCES BOULARDII 250 MG
500 CAPSULE ORAL 2 TIMES DAILY
Status: DISCONTINUED | OUTPATIENT
Start: 2023-05-08 | End: 2023-05-13 | Stop reason: HOSPADM

## 2023-05-08 RX ADMIN — IPRATROPIUM BROMIDE AND ALBUTEROL SULFATE 3 ML: 2.5; .5 SOLUTION RESPIRATORY (INHALATION) at 15:32

## 2023-05-08 RX ADMIN — TADALAFIL 40 MG: 20 TABLET ORAL at 09:54

## 2023-05-08 RX ADMIN — Medication 10 ML: at 09:56

## 2023-05-08 RX ADMIN — ACETAMINOPHEN 650 MG: 325 TABLET, FILM COATED ORAL at 00:43

## 2023-05-08 RX ADMIN — CEFTRIAXONE 2 G: 2 INJECTION, POWDER, FOR SOLUTION INTRAMUSCULAR; INTRAVENOUS at 00:43

## 2023-05-08 RX ADMIN — BUDESONIDE AND FORMOTEROL FUMARATE DIHYDRATE 2 PUFF: 160; 4.5 AEROSOL RESPIRATORY (INHALATION) at 21:06

## 2023-05-08 RX ADMIN — Medication 50 MG: at 09:55

## 2023-05-08 RX ADMIN — IPRATROPIUM BROMIDE AND ALBUTEROL SULFATE 3 ML: 2.5; .5 SOLUTION RESPIRATORY (INHALATION) at 07:46

## 2023-05-08 RX ADMIN — EMPAGLIFLOZIN 10 MG: 10 TABLET, FILM COATED ORAL at 09:54

## 2023-05-08 RX ADMIN — Medication 10 ML: at 22:07

## 2023-05-08 RX ADMIN — DOXYCYCLINE 100 MG: 100 INJECTION, POWDER, LYOPHILIZED, FOR SOLUTION INTRAVENOUS at 14:26

## 2023-05-08 RX ADMIN — Medication 500 MG: at 22:06

## 2023-05-08 RX ADMIN — PILOCARPINE HYDROCHLORIDE 5 MG: 5 TABLET, FILM COATED ORAL at 17:30

## 2023-05-08 RX ADMIN — PREGABALIN 75 MG: 75 CAPSULE ORAL at 22:07

## 2023-05-08 RX ADMIN — PILOCARPINE HYDROCHLORIDE 5 MG: 5 TABLET, FILM COATED ORAL at 09:55

## 2023-05-08 RX ADMIN — PANTOPRAZOLE SODIUM 40 MG: 40 TABLET, DELAYED RELEASE ORAL at 07:06

## 2023-05-08 RX ADMIN — Medication 4 ML: at 07:50

## 2023-05-08 RX ADMIN — DOXYCYCLINE 100 MG: 100 INJECTION, POWDER, LYOPHILIZED, FOR SOLUTION INTRAVENOUS at 02:09

## 2023-05-08 RX ADMIN — IPRATROPIUM BROMIDE AND ALBUTEROL SULFATE 3 ML: 2.5; .5 SOLUTION RESPIRATORY (INHALATION) at 11:59

## 2023-05-08 RX ADMIN — PREGABALIN 75 MG: 75 CAPSULE ORAL at 10:03

## 2023-05-08 RX ADMIN — PILOCARPINE HYDROCHLORIDE 5 MG: 5 TABLET, FILM COATED ORAL at 22:06

## 2023-05-08 RX ADMIN — FUROSEMIDE 40 MG: 40 TABLET ORAL at 09:55

## 2023-05-08 RX ADMIN — KETOTIFEN FUMARATE 1 DROP: 0.25 SOLUTION OPHTHALMIC at 22:07

## 2023-05-08 RX ADMIN — BUDESONIDE AND FORMOTEROL FUMARATE DIHYDRATE 2 PUFF: 160; 4.5 AEROSOL RESPIRATORY (INHALATION) at 09:01

## 2023-05-08 RX ADMIN — FERROUS SULFATE TAB 325 MG (65 MG ELEMENTAL FE) 325 MG: 325 (65 FE) TAB at 09:54

## 2023-05-08 RX ADMIN — KETOTIFEN FUMARATE 1 DROP: 0.25 SOLUTION OPHTHALMIC at 09:56

## 2023-05-08 RX ADMIN — POTASSIUM CHLORIDE 10 MEQ: 750 TABLET, EXTENDED RELEASE ORAL at 10:03

## 2023-05-08 NOTE — PLAN OF CARE
Goal Outcome Evaluation:  Plan of Care Reviewed With: patient        Progress: no change  Outcome Evaluation: Pt resting comfortably, taking naps. desats when asleep, wife brought home cpap, currently on 5L , satting at 91%, vpaced on monitor, denies pain, tilt to side as iman, up to tpilet , voiding adequately, eating adequately, slp evaluated, will do video swallowing in am. bed alarm on.

## 2023-05-08 NOTE — PROGRESS NOTES
Nutrition Services    Patient Name:  Alessio Allen  YOB: 1940  MRN: [id number]  Admit Date:  5/7/2023    Assessment Date:  05/08/23    Comment: MST 2:  Pt seen for MST 2 for unsure wt loss. Pt with 12 lb (6%) wt loss x 4 months but states mostly d/t fluid loss from CHF guidelines he follows for CHF clinic. Pt eating lunch during visit today and states they are giving him too much sodium on trays as well as too much fluids. Changed pt's diet to 2g Na diet and took pt's dinner order. No fat/muscle wasting noted on NFPE.     Recommend:  1. Change diet to 2g Na diet, will change order.    Will follow per protocol.    CLINICAL NUTRITION ASSESSMENT      Reason for Assessment MST score 2+     Diagnosis/Problem   PNA, hemoptysis, chronic anticoagulation   Medical/Surgical History Past Medical History:   Diagnosis Date   • Acute on chronic diastolic heart failure    • Anemia 2018   • Arthritis    • Asthma    • Atrial fibrillation    • BPH (benign prostatic hypertrophy)    • Cancer     throat CA   • Cataract 2012    Removed   • CHF (congestive heart failure) 2018   • Colon polyp 2004   • COPD (chronic obstructive pulmonary disease)    • Coronary artery disease 2017   • Dependence on continuous supplemental oxygen     3L-5L DEPENDING ON ACTIVITY   • Diverticulosis    • Erectile dysfunction    • GERD (gastroesophageal reflux disease)    • H/O Abnormal CBC 2017   • History of heart artery stent 03/2017   • History of medical problems 2017    Afib   • Hyperlipidemia    • Hypertension    • Low back pain 2012   • Lung disease    • Neuropathy     feet and hands    • Osteopenia 2012    Osteopenia   • Pneumonia        Past Surgical History:   Procedure Laterality Date   • BRONCHOSCOPY N/A 04/07/2018    Procedure: BRONCHOSCOPY with specimens;  Surgeon: Suresh Hernandez MD;  Location: [facility];  Service: Pulmonary   • BRONCHOSCOPY N/A 03/06/2019    Procedure: BRONCHOSCOPY WITH BAL AND BIOPSY UNDER FLUORO AND  "ANESTHESIA;  Surgeon: Suresh Hernandez MD;  Location: [facility] MAIN OR;  Service: Pulmonary   • BRONCHOSCOPY N/A 06/13/2019    Procedure: BRONCHOSCOPY;  Surgeon: Suresh Hernandez MD;  Location: [facility] MAIN OR;  Service: Pulmonary   • CARDIAC CATHETERIZATION N/A 04/18/2018    Procedure: Right Heart Cath with possible vasodilator study;  Surgeon: Torey Schuler MD;  [facility];  Service: Cardiovascular   • CARDIAC CATHETERIZATION N/A 03/07/2019    Procedure: RIGHT AND LEFT HEART CATH;  Surgeon: Marizol Holt MD;  [facility];  Service: Cardiology   • CARDIAC CATHETERIZATION N/A 03/07/2019    Procedure: CORONARY ANGIOGRAPHY;  Surgeon: Marizol Holt MD;  [facility];  Service: Cardiology   • COLONOSCOPY  12/05/2016    polyps   • CORONARY STENT PLACEMENT  2017   • FRACTURE SURGERY     • INSERT / REPLACE / VENOUS ACCESS CATHETER Right    • PACEMAKER IMPLANTATION     • VENOUS ACCESS DEVICE (PORT) REMOVAL Right 04/05/2021    Procedure: Mediport Removal;  Surgeon: Joseph Nickerson Jr., MD;  Location: [facility] OR;  Service: General;  Laterality: Right;        Encounter Information        Nutrition History:  Pt seen for MST 2 for unsure wt loss. Pt with 12 lb (6%) wt loss x 4 months but states mostly d/t fluid loss from CHF guidelines he follows for CHF clinic. Pt eating lunch during visit today and states they are giving him too much sodium on trays as well as too much fluids. Changed pt's diet to 2g Na diet and took pt's dinner order. No fat/muscle wasting noted on NFPE.    Food Preferences:    Supplements:    Factors Affecting Intake: No factors at this time     Anthropometrics        Current Height  Current Weight  BMI kg/m2 Height: 182.9 cm (72\")  Weight: 86.1 kg (189 lb 12.8 oz) (05/07/23 1604)  Body mass index is 25.74 kg/m².   Adjusted BMI (if applicable)    BMI Category Overweight (25 - 29.9)       Admission Weight Weight: 86.1 " kg (189 lb 12.8 oz)       Ideal Body Weight (IBW) 171 lb   Adjusted IBW (if applicable)        Usual Body Weight (UBW) 201 lb   Weight Change/Trend Loss, Amount/Timeframe: 12 lb (6%) wt loss x 4 months       Weight History Wt Readings from Last 30 Encounters:   05/07/23 1604 86.1 kg (189 lb 12.8 oz)   05/07/23 1116 85.7 kg (189 lb)   04/24/23 1423 88.5 kg (195 lb 3.2 oz)   01/30/23 1339 91.4 kg (201 lb 9.6 oz)   12/16/22 1305 94.8 kg (209 lb)   10/04/22 1600 104 kg (229 lb 4.5 oz)   09/23/22 1343 104 kg (229 lb 9.6 oz)   05/18/22 1036 101 kg (221 lb 9.6 oz)   05/18/22 0727 102 kg (224 lb)   04/01/22 0801 102 kg (224 lb 3.2 oz)   03/14/22 1549 104 kg (228 lb 3.2 oz)   12/16/21 1306 103 kg (227 lb)   04/05/21 0801 102 kg (224 lb 4.8 oz)   03/29/21 1521 103 kg (226 lb)   03/16/21 1049 103 kg (227 lb 6.4 oz)   02/26/21 0818 103 kg (226 lb 6.4 oz)   07/14/20 1317 106 kg (233 lb 6.4 oz)   03/04/20 1532 104 kg (230 lb)   02/26/20 1256 105 kg (231 lb)   01/13/20 1327 105 kg (231 lb)   11/14/19 1326 109 kg (240 lb)   10/16/19 1616 109 kg (241 lb)   09/27/19 0856 109 kg (240 lb)   06/21/19 0509 106 kg (233 lb 1.6 oz)   06/19/19 0352 106 kg (233 lb 0.4 oz)   06/18/19 0545 109 kg (240 lb 11.9 oz)   06/16/19 0503 108 kg (238 lb 12.1 oz)   06/15/19 0600 110 kg (241 lb 10 oz)   06/14/19 0430 108 kg (238 lb 5.1 oz)   06/13/19 2112 108 kg (238 lb 5.1 oz)   06/13/19 1908 116 kg (255 lb 11.7 oz)   06/13/19 0611 109 kg (239 lb 10.2 oz)   06/07/19 0824 110 kg (243 lb)   03/20/19 0524 108 kg (238 lb 15.7 oz)   03/19/19 0519 105 kg (232 lb 2.3 oz)   03/18/19 0526 105 kg (230 lb 9.6 oz)   03/16/19 0457 103 kg (227 lb 1.2 oz)   03/15/19 0526 104 kg (228 lb 8 oz)   03/14/19 0515 102 kg (225 lb 11.2 oz)   03/13/19 0646 103 kg (227 lb 11.8 oz)   03/12/19 0551 102 kg (224 lb 10.4 oz)   03/11/19 0624 103 kg (226 lb 3.1 oz)   03/10/19 0500 99.7 kg (219 lb 12.8 oz)   03/09/19 0459 103 kg (227 lb 15.3 oz)   03/08/19 0507 103 kg (227 lb 1.2 oz)    03/07/19 0502 102 kg (225 lb 8.5 oz)   03/06/19 0500 101 kg (222 lb)   03/05/19 0532 105 kg (231 lb 8 oz)   03/04/19 1311 103 kg (226 lb)   03/04/19 0600 103 kg (226 lb 6.6 oz)   03/03/19 0600 104 kg (228 lb 8 oz)   03/02/19 1532 106 kg (232 lb 9.4 oz)   03/02/19 0600 105 kg (232 lb 9.4 oz)   03/01/19 0631 106 kg (234 lb 6.4 oz)   02/28/19 1023 109 kg (240 lb)   09/28/18 1317 118 kg (261 lb)   06/29/18 0937 111 kg (245 lb)   06/01/18 1443 112 kg (247 lb)   05/03/18 1316 109 kg (240 lb)           --  Tests/Procedures        Tests/Procedures CT scan     Labs       Pertinent Labs    Results from last 7 days   Lab Units 05/08/23  0357 05/07/23  1139   SODIUM mmol/L 135* 135*   POTASSIUM mmol/L 4.1 4.2   CHLORIDE mmol/L 99 97*   CO2 mmol/L 24.7 28.0   BUN mg/dL 21 17   CREATININE mg/dL 1.17 1.07   CALCIUM mg/dL 8.7 8.9   BILIRUBIN mg/dL  --  1.4*   ALK PHOS U/L  --  76   ALT (SGPT) U/L  --  17   AST (SGOT) U/L  --  24   GLUCOSE mg/dL 108* 99     Results from last 7 days   Lab Units 05/08/23  0357 05/07/23  1139   HEMOGLOBIN g/dL 16.7 16.9   HEMATOCRIT % 49.6 51.3*   WBC 10*3/mm3 14.72* 12.19*   ALBUMIN g/dL  --  3.9     Results from last 7 days   Lab Units 05/08/23  0357 05/07/23  1139   INR   --  1.84*   APTT seconds  --  42.3*   PLATELETS 10*3/mm3 358 368     COVID19   Date Value Ref Range Status   05/07/2023 Not Detected Not Detected - Ref. Range Final     Lab Results   Component Value Date    HGBA1C 6.00 (H) 01/03/2018          Medications           Scheduled Medications budesonide-formoterol, 2 puff, Inhalation, BID - RT  cefTRIAXone, 2 g, Intravenous, Q24H  doxycycline, 100 mg, Intravenous, Q12H  eltrombopag, 50 mg, Oral, Daily  empagliflozin, 10 mg, Oral, Daily  ferrous sulfate, 325 mg, Oral, Daily With Breakfast  furosemide, 40 mg, Oral, Daily  ipratropium-albuterol, 3 mL, Nebulization, 4x Daily - RT  ketotifen, 1 drop, Both Eyes, BID  pantoprazole, 40 mg, Oral, Q AM  pilocarpine, 5 mg, Oral, TID  potassium  chloride, 10 mEq, Oral, Daily  pregabalin, 75 mg, Oral, Q12H  sodium chloride, 10 mL, Intravenous, Q12H  sodium chloride, 4 mL, Nebulization, BID - RT  tadalafil, 40 mg, Oral, Q24H  terazosin, 2 mg, Oral, Nightly  vitamin B-6, 50 mg, Oral, Daily       Infusions     PRN Medications •  acetaminophen **OR** acetaminophen **OR** acetaminophen  •  albuterol  •  Glycerin-Hypromellose-  •  ondansetron **OR** ondansetron  •  sodium chloride  •  sodium chloride  •  sodium chloride     Physical Findings          Physical Appearance alert, oriented, overweight, on oxygen therapy   Oral/Mouth Cavity dentures   Edema  no edema   Gastrointestinal non-distended , normoactive, last bowel movement:5/7   Skin  skin intact, bruising   Tubes/Drains/Lines none   NFPE No clinical signs of muscle wasting or fat loss   --  Current Nutrition Orders & Evaluation of Intake       Oral Nutrition     Food Allergies NKFA   Current PO Diet Diet: Regular/House Diet, Cardiac Diets; Low Sodium (2g); Texture: Regular Texture (IDDSI 7); Fluid Consistency: Thin (IDDSI 0)   Supplement n/a   PO Evaluation     % PO Intake 75% breakfast    # of Days Evaluated 1   --  PES STATEMENT / NUTRITION DIAGNOSIS      Nutrition Dx Problem  Problem: Other Needs Modified diet  Etiology: Medical Diagnosis CHF  Signs/Symptoms: Report/Observation    Comment:    --  NUTRITION INTERVENTION / PLAN OF CARE      Intervention Goal(s) Maintain nutrition status, Meet estimated needs, Disease management/therapy, Tolerate PO  and Other:         RD Intervention/Action Interview for preferences, Adjusted menu, Follow Tx Progress, Care plan reviewed and Recommend/ordered:          Prescription/Orders:       PO Diet Change diet to 2g Na diet      Supplements       Snacks       Enteral Nutrition       Parenteral Nutrition    New Prescription Ordered? Yes   --      Monitor/Evaluation Per protocol   Discharge Plan/Needs Pending clinical course   Education Will instruct as  appropriate   --    RD to follow per protocol.      Electronically signed by:  Domonique White RD  05/08/23 17:32 EDT

## 2023-05-08 NOTE — PROGRESS NOTES
[facility]     Mar/chart reviewed  Follow up pneumonia, hemoptysis, COPD, pulmonary hypertension  Minimal further hemoptysis, still short of breath    Vital Sign Min/Max for last 24 hours  Temp  Min: 97.4 °F (36.3 °C)  Max: 101 °F (38.3 °C)   BP  Min: 93/50  Max: 134/71   Pulse  Min: 79  Max: 88   Resp  Min: 18  Max: 24   SpO2  Min: 87 %  Max: 94 %   Flow (L/min)  Min: 4  Max: 10   Weight  Min: 86.1 kg (189 lb 12.8 oz)  Max: 86.1 kg (189 lb 12.8 oz)     Appears ill, axox3,   perrl, eomi, no icterus,  mmm, no jvd, trachea midline, neck supple,  chest decreased ae bilaterally, + crackles, no wheezes,   rrr,   soft, nt, nd +bs,  no c/c/ e  Skin warm, dry no rashes    Labs: 5/8: reviewed:  Bun 21  Cr 1.17  Na 135  Bicarb 24  Wbc 14.7  hgb 16.7  plts 358    A/P:  1. Hemoptysis -- suspect 2/2 to pneumonia in patient on anticoagulation -- continue observation  2. Pneumonia -- agree with current antibiotic selection  3. COPD -- continue broncohdilators -- appears to have advanced emphysematous changes on ct chest as well as some kyphosis  4. Acute on chronic hypoxemic respiratory failure -- wean oxygen as able  5. Pulmonary hypertension -- suspect this is mainly who group II and III but patient was on home adcirca per Dr. Hernandez, defer to Dr. Hernandez  6. afib on xarelto    Continue current, hold xarelto for now

## 2023-05-08 NOTE — PROGRESS NOTES
Discharge Planning Assessment  [facility]     Patient Name: Alessio Allen  MRN: [id number]  Today's Date: 5/8/2023    Admit Date: 5/7/2023    Plan: Tbd, Home with family and HH vs SNF   Discharge Needs Assessment     Row Name 05/08/23 1717       Living Environment    People in Home significant other    Name(s) of People in Home Pat    Current Living Arrangements home    Primary Care Provided by spouse/significant other    Provides Primary Care For no one, unable/limited ability to care for self    Family Caregiver if Needed significant other;child(brunilda), adult    Family Caregiver Names Dulce and son, Ellis    Quality of Family Relationships helpful;involved;supportive       Resource/Environmental Concerns    Resource/Environmental Concerns none       Transition Planning    Patient/Family Anticipates Transition to home with family;inpatient rehabilitation facility    Transportation Anticipated health plan transportation;family or friend will provide       Discharge Needs Assessment    Readmission Within the Last 30 Days no previous admission in last 30 days    Equipment Currently Used at Home oxygen;bipap;cane, straight;respiratory supplies    Concerns to be Addressed adjustment to diagnosis/illness    Equipment Needed After Discharge none    Discharge Facility/Level of Care Needs home with home health;nursing facility, skilled    Provided Post Acute Provider List? N/A    Provided Post Acute Provider Quality & Resource List? N/A    Current Discharge Risk dependent with mobility/activities of daily living               Discharge Plan     Row Name 05/08/23 1718       Plan    Plan Tbd, Home with family and HH vs SNF    Patient/Family in Agreement with Plan yes    Plan Comments Met with patient at the bedside, verified facesheet. Patient lives with his SODulce.  He is dependent with most ADL.  He has home O2 and home [facility] through [facility]. He uses a nebulizer, cane, walker,  BP cuff and scale. H/o [facility],  [facility] and [facility]. SO and family provide all tranportation needs to appointments etc. PCP is Madison Lewis and preferred pharmacy is [facility] in [place] or [facility]. Patient stated he does not want to go to rehab. He is planning to return home with the help of family. He is agreeable to use [facility] again for HH. CCP will follow progress.              Continued Care and Services - Admitted Since 5/7/2023     Home Medical Care     Service Provider Request Status Selected Services Address Phone Fax Patient Preferred    [facility] Pending - Request Sent N/A [id number], [address], [facility] [address] [phone] --              Expected Discharge Date and Time     Expected Discharge Date Expected Discharge Time    May 11, 2023          Demographic Summary    No documentation.                Functional Status     Row Name 05/08/23 1718       Functional Status    Usual Activity Tolerance fair       Assessment of Health Literacy    Health Literacy Moderate       Functional Status, IADL    Medications assistive person    Meal Preparation completely dependent    Housekeeping completely dependent    Laundry completely dependent    Shopping completely dependent       Mental Status    General Appearance WDL WDL       Mental Status Summary    Recent Changes in Mental Status/Cognitive Functioning no changes               Psychosocial    No documentation.                Abuse/Neglect    No documentation.                Legal    No documentation.                Substance Abuse    No documentation.                Patient Forms    No documentation.                   Stacy King RN

## 2023-05-08 NOTE — PROGRESS NOTES
Name: Alessio Allen ADMIT: 2023   : 1940  PCP: Madison Lewis APRN    MRN: [id number] LOS: 1 days   AGE/SEX: 82 y.o. male  ROOM: [facility]     Subjective   Subjective   Resting comfortably in bed, he has no new complaints this morning.    Objective   Objective   Vital Signs  Temp:  [97.4 °F (36.3 °C)-101 °F (38.3 °C)] 97.4 °F (36.3 °C)  Heart Rate:  [79-88] 82  Resp:  [18-24] 20  BP: ()/(50-72) 113/62  SpO2:  [86 %-94 %] 91 %  on  Flow (L/min):  [4-10] 10;   Device (Oxygen Therapy): humidified;high-flow nasal cannula  Body mass index is 25.74 kg/m².  Physical Exam  Constitutional:       General: He is not in acute distress.     Appearance: He is ill-appearing.   Pulmonary:      Effort: Pulmonary effort is normal.      Breath sounds: Rales present.      Comments: Fine crackles  Abdominal:      General: Abdomen is flat. There is no distension.      Palpations: Abdomen is soft.   Musculoskeletal:         General: Normal range of motion.      Right lower leg: No edema.      Left lower leg: No edema.   Neurological:      General: No focal deficit present.      Mental Status: He is alert.         Results Review     I reviewed the patient's new clinical results.  Results from last 7 days   Lab Units 23  0357 23  1139   WBC 10*3/mm3 14.72* 12.19*   HEMOGLOBIN g/dL 16.7 16.9   PLATELETS 10*3/mm3 358 368     Results from last 7 days   Lab Units 23  0357 23  1139   SODIUM mmol/L 135* 135*   POTASSIUM mmol/L 4.1 4.2   CHLORIDE mmol/L 99 97*   CO2 mmol/L 24.7 28.0   BUN mg/dL 21 17   CREATININE mg/dL 1.17 1.07   GLUCOSE mg/dL 108* 99   EGFR mL/min/1.73 62.6 69.7     Results from last 7 days   Lab Units 23  1139   ALBUMIN g/dL 3.9   BILIRUBIN mg/dL 1.4*   ALK PHOS U/L 76   AST (SGOT) U/L 24   ALT (SGPT) U/L 17     Results from last 7 days   Lab Units 23  0357 23  1139   CALCIUM mg/dL 8.7 8.9   ALBUMIN g/dL  --  3.9     Results from last 7 days   Lab Units  05/07/23  1400 05/07/23  1139   PROCALCITONIN ng/mL  --  0.33*   LACTATE mmol/L 1.0  --      No results found for: HGBA1C, POCGLU    CT Chest Without Contrast Diagnostic    Result Date: 5/7/2023   Infiltrate in the left lower lung, follow-up recommended. Minimal bilateral pleural effusions.  Aneurysmal ascending aorta.  A new age-indeterminate T7 compression deformity, correlate clinically.  This report was finalized on 5/7/2023 1:30 PM by Dr. Krishan Escalera M.D.      Scheduled Medications  budesonide-formoterol, 2 puff, Inhalation, BID - RT  cefTRIAXone, 2 g, Intravenous, Q24H  doxycycline, 100 mg, Intravenous, Q12H  eltrombopag, 50 mg, Oral, Daily  empagliflozin, 10 mg, Oral, Daily  ferrous sulfate, 325 mg, Oral, Daily With Breakfast  furosemide, 40 mg, Oral, Daily  ipratropium-albuterol, 3 mL, Nebulization, 4x Daily - RT  ketotifen, 1 drop, Both Eyes, BID  pantoprazole, 40 mg, Oral, Q AM  pilocarpine, 5 mg, Oral, TID  potassium chloride, 10 mEq, Oral, Daily  pregabalin, 75 mg, Oral, Q12H  sodium chloride, 10 mL, Intravenous, Q12H  sodium chloride, 4 mL, Nebulization, BID - RT  tadalafil, 40 mg, Oral, Q24H  terazosin, 2 mg, Oral, Nightly  vitamin B-6, 50 mg, Oral, Daily    Infusions   Diet  Diet: Regular/House Diet; Texture: Regular Texture (IDDSI 7); Fluid Consistency: Thin (IDDSI 0)       Assessment/Plan     Active Hospital Problems    Diagnosis  POA   • **Pneumonia of left lower lobe due to infectious organism [J18.9]  Yes   • Hemoptysis [R04.2]  Yes   • IPF (idiopathic pulmonary fibrosis) [J84.112]  Yes   • Acute on chronic respiratory failure with hypoxia [J96.21]  Yes   • COPD (chronic obstructive pulmonary disease) [J44.9]  Yes   • A-fib [I48.91]  Yes   • Dyslipidemia [E78.5]  Yes   • BP (high blood pressure) [I10]  Yes      Resolved Hospital Problems   No resolved problems to display.       82 y.o. male admitted with Pneumonia of left lower lobe due to infectious organism.      05/08/23  Continue  antibiotics.    Hemoptysis  -2/2 PNA, Xarelto  -no acute intervention per Pulm    PNA  -CT chest (5/7/2023): Infiltrate in left lower lung, minimal bilateral pleural effusions  -cefepime, vancomycin switched to ceftriaxone and doxycycline    COPD  IPF  Acute on chronic respiratory failure (4L home O2)  -Continue Symbicort, scheduled DuoNebs    Paroxysmal Afib  -RC: none  -AC: Xarelto; ON HOLD     Chronic back pain  Hx compression fx  -new age-indeterminate T7 compression fx  -prn lidocaine patch    Ascending aortic aneurysm  -4.7 cm seen on CT 5/7/2023, stable from prior    · SCDs for DVT prophylaxis; Xarelto ON HOLD  · Discussed with patient and care team on multidisciplinary rounds.  · Anticipate discharge home when cleared by consultants      Edwin Zapata MD  [facility]  05/08/23  09:53 EDT

## 2023-05-08 NOTE — DISCHARGE PLACEMENT REQUEST
"Chioma Rushing \"NADIA\" (82 y.o. Male)     Date of Birth   1940    Social Security Number       Address   [address] [id number]    Home Phone   [phone]    MRN   [id number]       [facility]    Marital Status                               Admission Date   5/7/23    Admission Type   Emergency    Admitting Provider   Marcos Trujillo MD    Attending Provider   Edwin Zapata MD    Department, Room/Bed   [address], [address]656/1       Discharge Date       Discharge Disposition       Discharge Destination                               Attending Provider: Edwin Zapata MD    Allergies: Lisinopril, Sulfa Antibiotics, Penicillins, Atenolol, Celebrex [Celecoxib], Coreg [Carvedilol], Ibuprofen    Isolation: None   Infection: MRSA/History Only (04/05/21)   Code Status: CPR    Ht: 182.9 cm (72\")   Wt: 86.1 kg (189 lb 12.8 oz)    Admission Cmt: None   Principal Problem: Pneumonia of left lower lobe due to infectious organism [J18.9]                 Active Insurance as of 5/7/2023     Primary Coverage     Payor Plan Insurance Group Employer/Plan Group    MEDICARE MEDICARE A & B      Payor Plan Address Payor Plan Phone Number Payor Plan Fax Number Effective Dates    [address] [phone]  5/1/2000 - None Entered    [facility] [id number]       Subscriber Name Subscriber Birth Date Member ID       CHIOMA RUSHING 1940 [id number]           Secondary Coverage     Payor Plan Insurance Group Employer/Plan Group     FOR LIFE  FOR LIFE  SUP       Payor Plan Address Payor Plan Phone Number Payor Plan Fax Number Effective Dates    [address] [phone]  1/30/2018 - None Entered    [facility] [address]       Subscriber Name Subscriber Birth Date Member ID       CHIOMA RUSHING 1940 [id number]                 Emergency Contacts      (Rel.) Home Phone Work Phone Mobile Phone    Dulce Rushing (Significant Other) " [phone] -- --    Ellis Allen ([facility]) [phone] -- [phone]

## 2023-05-08 NOTE — THERAPY EVALUATION
Acute Care - Speech Language Pathology   Swallow Initial Evaluation [facility]     Patient Name: Alessio Allen  : 1940  MRN: [id number]  Today's Date: 2023               Admit Date: 2023    Visit Dx:     ICD-10-CM ICD-9-CM   1. Pneumonia of left lower lobe due to infectious organism  J18.9 486   2. Hemoptysis  R04.2 786.30   3. Chronic anticoagulation  Z79.01 V58.61     Patient Active Problem List   Diagnosis    A-fib    Dyslipidemia    BP (high blood pressure)    Neuropathy    Hypertension    COPD (chronic obstructive pulmonary disease)    BPH (benign prostatic hypertrophy)    Atrial fibrillation    Asthma    Hypoxia    Pneumonia    Chronic anticoagulation    Pneumonia of both lungs due to infectious organism    Hyponatremia    COPD exacerbation    Acute on chronic respiratory failure with hypoxia    Pneumonia of both lower lobes due to infectious organism    Acute on chronic diastolic heart failure    IPF (idiopathic pulmonary fibrosis)    Acute respiratory failure with hypoxia    Attention to G-tube    Massive hemoptysis    Laryngeal cancer    Encounter for venous access device care    Pneumonia of left lower lobe due to infectious organism    Hemoptysis     Past Medical History:   Diagnosis Date    Acute on chronic diastolic heart failure     Anemia 2018    Arthritis     Asthma     Atrial fibrillation     BPH (benign prostatic hypertrophy)     Cancer     throat CA    Cataract 2012    Removed    CHF (congestive heart failure) 2018    Colon polyp 2004    COPD (chronic obstructive pulmonary disease)     Coronary artery disease 2017    Dependence on continuous supplemental oxygen     3L-5L DEPENDING ON ACTIVITY    Diverticulosis     Erectile dysfunction     GERD (gastroesophageal reflux disease)     H/O Abnormal CBC     History of heart artery stent 2017    History of medical problems 2017    Afib    Hyperlipidemia     Hypertension     Low back pain 2012    Lung disease     Neuropathy      feet and hands     Osteopenia 2012    Osteopenia    Pneumonia      Past Surgical History:   Procedure Laterality Date    BRONCHOSCOPY N/A 04/07/2018    Procedure: BRONCHOSCOPY with specimens;  Surgeon: Suresh Hernandez MD;  Location: [facility] MAIN OR;  Service: Pulmonary    BRONCHOSCOPY N/A 03/06/2019    Procedure: BRONCHOSCOPY WITH BAL AND BIOPSY UNDER FLUORO AND ANESTHESIA;  Surgeon: Suresh Hernandez MD;  Location: [facility] MAIN OR;  Service: Pulmonary    BRONCHOSCOPY N/A 06/13/2019    Procedure: BRONCHOSCOPY;  Surgeon: Suresh Hernandez MD;  Location: [facility] MAIN OR;  Service: Pulmonary    CARDIAC CATHETERIZATION N/A 04/18/2018    Procedure: Right Heart Cath with possible vasodilator study;  Surgeon: Torey Schuler MD;  Location: [facility] CATH INVASIVE LOCATION;  Service: Cardiovascular    CARDIAC CATHETERIZATION N/A 03/07/2019    Procedure: RIGHT AND LEFT HEART CATH;  Surgeon: Marizol Holt MD;  [facility] C[facility]ASIVE LOC[facility];  Service: Cardiology    CARDIAC CATHETERIZATION N/A 03/07/2019    Procedure: CORONARY ANGIOGRAPHY;  Surgeon: Marizol Holt MD;  Location: [facility] CATH INVASIVE LOCATION;  Service: Cardiology    COLONOSCOPY  12/05/2016    polyps    CORONARY STENT PLACEMENT  2017    FRACTURE SURGERY      INSERT / REPLACE / VENOUS ACCESS CATHETER Right     PACEMAKER IMPLANTATION      VENOUS ACCESS DEVICE (PORT) REMOVAL Right 04/05/2021    Procedure: Mediport Removal;  Surgeon: Joseph Nickerson Jr., MD;  Location: [facility];  Service: General;  Laterality: Right;       SLP Recommendation and Plan  SLP Swallowing Diagnosis: suspected pharyngeal dysphagia (05/08/23 1500)  SLP Diet Recommendation: regular textures, thin liquids (05/08/23 1500)  Recommended Precautions and Strategies: upright posture during/after eating, small bites of food and sips of liquid (05/08/23 1500)  SLP Rec. for Method of Medication Administration: meds whole, meds crushed, with puree, as tolerated (05/08/23 1500)     Monitor for  "Signs of Aspiration: yes, notify SLP if any concerns (05/08/23 1500)  Recommended Diagnostics: VFSS (MBS) (05/08/23 1500)  Swallow Criteria for Skilled Therapeutic Interventions Met: demonstrates skilled criteria (05/08/23 1500)  Anticipated Discharge Disposition (SLP): anticipate therapy at next level of care (05/08/23 1500)  Rehab Potential/Prognosis, Swallowing: good, to achieve stated therapy goals (05/08/23 1500)  Therapy Frequency (Swallow): PRN (05/08/23 1500)  Predicted Duration Therapy Intervention (Days): until discharge (05/08/23 1500)                                        Plan of Care Reviewed With: patient  Outcome Evaluation: Clinical swallow evaluation completed, recommend pt continue with current diet of regular solids and thin liquids. Hx of dysphagia and aspiration from head and neck cancer. Wife reports patient was recently told he was \"aspirating\" after VFSS at [facility], see note for detail. Recommend VFSS to r/o silent aspiration, pt/wife agreeable. Meds whole or crushed with puree.      SWALLOW EVALUATION (last 72 hours)       SLP Adult Swallow Evaluation       Row Name 05/08/23 1500                   Rehab Evaluation    Document Type evaluation  -KA        Subjective Information no complaints  -KA        Patient Observations alert;cooperative  -KA        Patient Effort good  -KA        Symptoms Noted During/After Treatment none  -KA           General Information    Patient Profile Reviewed yes  -KA        Pertinent History Of Current Problem severe COPD, pulmonary fibrosis, hemoptysis, infiltrate left lung, PNA. Hx of head and neck cancer fall of 2018, NPO with PEG in 2019. VFSS at [facility] on 1/3/20 \"deep laryngeal penetration with thins without evidence of tracheal aspiration. Penetration improved with chin tuck. No other pen/asp with solids, puree and semisolid. Residue in vallaculae requiring multiple swallows for clearance.\" Wife/patietn report pt had recent VFSS at [facility] in February of this " "year, test results not available in care everywhere. Wife reports results revealed \"he is aspirating,\" however pt and wife deny any diet modifications. Overall pt and wife unable to recall any detail of recent VFSS. Patient reports he has been eating regular solids and thin liquids since 2020 without PNA. He currently is receiving OP dysphagia therapy with [facility].  -KA        Current Method of Nutrition regular textures;thin liquids  -KA        Prior Level of Function-Swallowing other (see comments)  see above  -KA        Plans/Goals Discussed with patient;spouse/S.O.;agreed upon  -KA        Barriers to Rehab medically complex  -KA        Patient's Goals for Discharge --  eat and drink without aspirating  -KA           Oral Motor Structure and Function    Dentition Assessment natural, present and adequate;missing teeth  -KA        Secretion Management WNL/WFL  -KA        Mucosal Quality moist, healthy  -KA           Oral Musculature and Cranial Nerve Assessment    Oral Motor General Assessment generalized oral motor weakness  -KA           General Eating/Swallowing Observations    Respiratory Support Currently in Use nasal cannula  -KA        Eating/Swallowing Skills self-fed  -KA        Positioning During Eating upright in bed  -KA        Utensils Used spoon;cup  -KA        Consistencies Trialed regular textures;soft to chew textures;chopped;mixed consistency;thin liquids  -KA           Clinical Swallow Eval    Clinical Swallow Evaluation Summary Patient upright in bed eating lunch. With regular and mechanical soft solids pt demonstrated intermittent throat clearing and pt stated it was a \"habit.\" No overt s/s of pen/asp with thin liquids. SLP discussed with patient his history of dysphagia. Patient reports PEG was removed in 2020, has tolerated regular and thins with no PNA until now. SLP voiced concerns with patient recently being told he \"aspirated\" on VFSS at [facility] with no diet modifications and no swallow " strategies per pt and wife. Discussed concerns for aspiration and current dx of PNA. Patient is agreeable for VFSS and possible need for diet modifcations.  -           SLP Evaluation Clinical Impression    SLP Swallowing Diagnosis suspected pharyngeal dysphagia  -KA        Functional Impact risk of aspiration/pneumonia  -        Rehab Potential/Prognosis, Swallowing good, to achieve stated therapy goals  -        Swallow Criteria for Skilled Therapeutic Interventions Met demonstrates skilled criteria  -KA           Recommendations    Therapy Frequency (Swallow) PRN  -KA        Predicted Duration Therapy Intervention (Days) until discharge  -        SLP Diet Recommendation regular textures;thin liquids  -KA        Recommended Diagnostics VFSS (MBS)  -KA        Recommended Precautions and Strategies upright posture during/after eating;small bites of food and sips of liquid  -KA        Oral Care Recommendations Oral Care BID/PRN  -KA        SLP Rec. for Method of Medication Administration meds whole;meds crushed;with puree;as tolerated  -        Monitor for Signs of Aspiration yes;notify SLP if any concerns  -        Anticipated Discharge Disposition (SLP) anticipate therapy at next level of care  -                  User Key  (r) = Recorded By, (t) = Taken By, (c) = Cosigned By      Initials Name Effective Dates    KA Clarke Maher MA,[facility]-SLP 06/02/22 -                     EDUCATION  The patient has been educated in the following areas:   Dysphagia (Swallowing Impairment).              Time Calculation:    Time Calculation- SLP       Row Name 05/08/23 1536             Time Calculation- SLP    SLP Start Time 1400  -KA      SLP Received On 05/08/23  -KA         Untimed Charges    [id number] Eval Oral Pharyng Swallow Minutes 60  -KA         Total Minutes    Untimed Charges Total Minutes 60  -KA       Total Minutes 60  -KA                User Key  (r) = Recorded By, (t) = Taken By, (c) = Cosigned By       Initials Name Provider Type    KA Clarke Maher MA,CCC-SLP Speech and Language Pathologist                    Therapy Charges for Today       Code Description Service Date Service Provider Modifiers Qty    [id number] HC ST EVAL ORAL PHARYNG SWALLOW 4 5/8/2023 Clarke Maher MA,CCC-SLP GN 1                 Clarke Maher MA,GLYNN-SLP  5/8/2023

## 2023-05-08 NOTE — PLAN OF CARE
"Goal Outcome Evaluation:  Plan of Care Reviewed With: patient           Outcome Evaluation: Clinical swallow evaluation completed, recommend pt continue with current diet of regular solids and thin liquids. Hx of dysphagia and aspiration from head and neck cancer. Wife reports patient was recently told he was \"aspirating\" after VFSS at [facility], see note for detail. Recommend VFSS to r/o silent aspiration, pt/wife agreeable. Meds whole or crushed with puree.  "

## 2023-05-08 NOTE — PLAN OF CARE
Goal Outcome Evaluation:     Patient received all IV antibiotics ordered and tolerated them without any s/s of adverse side effect. No c/o N/V at this time, he is alert, oriented, and able to make his needs known. Patient continues to cough and expectorate thick, yellow, pink tinged mucus in a moderate amount. He is on 5 ltrs. Humidified high flow  oxygen to keep saturation on 88% average. Nursing is keeping him turned and up in bed with HOB raised to facilitate postural drainage.

## 2023-05-08 NOTE — PLAN OF CARE
Goal Outcome Evaluation:  Plan of Care Reviewed With: patient           Outcome Evaluation: Pt is an 83 y/o M admitted to [facility] d/t pneumonia and hemoptysis. PMH includes pulmonary fibrosis, on 4L O2 at baseline. Pt lives with SO, 0 KAYLA/stairs. Uses quad tip cane for mobility and states he is normally (I) with all ADL's. Today, pt was able to perform bed mobility with SBA , on 8L O2 and SpO2 at 92% at start of session. Pt was able to ambulate 20' with quad tip cane and CGA, pt has inc'd SOA following and SpO2 drops to 80%. Pt has green tubing for NC, but has clear extension, removed clear extension and hooked up high flow tubing to wall. Pt's SpO2 recovered to 94% by end of the session. RN notified. Pt demo's dec'd strength, balance, and endurance and he will benefit from skilled PT intervention. Rec D/C home with 24/7 assist and HHPT.

## 2023-05-08 NOTE — CONSULTS
Group: [facility]         CONSULT NOTE    Patient Identification:  Alessio Allen  82 y.o.  male  1940  [id number]            Requesting physician: Dr. Trujillo    Reason for Consultation:  hemoptysis    CC: hemoptysis     History of Present Illness:  Alessio Mancini is an 82-year-old male with a past medical history of diastolic heart failure, atrial fibrillation on Xarelto, throat cancer, advanced COPD, GERD, hyperlipidemia, hypertension and hemoptysis secondary to thrombocytopenia.    He follows with Dr. Hernandez in the pulmonary office for his COPD management.  He is on trilogy at night and supplemental oxygen between 3 and 5 L continuously. He takes Trelegy Ellipta for COPD maintenance therapy.  He also has pulmonary hypertension and is on Adcirca.    Patient presented to the emergency department with complaints of a 3-day history of coughing up bloody sputum.  He reports an increase in his sputum production for the last few days, but denies fever, chills, sinus congestion, rhinorrhea or sick contacts.  He reports shortness of breath, no worse than his baseline.  ED evaluation was unremarkable except procalcitonin 0.33 and WBC 12.14.  CT chest was performed which showed an infiltrate in the left lower lung and minimal bilateral pleural effusions.  Respiratory viral panel was negative.    Patient was admitted for pneumonia left lower lobe and hemoptysis.    Review of Systems:  Constitutional: Negative for fever, chills, diaphoresis, activity change.  HEENT: Negative for congestion, rhinorrhea, sinus pain or pressure.  Respiratory: Negative for chest tightness.  Positive for chronic cough, shortness of breath and wheezing.  Cardiac: Negative for chest pain, leg swelling or palpitations.  GI: Negative for abdominal pain, constipation, diarrhea, nausea or vomiting.  Positive for chronic abdominal distention.  : Negative for dysuria, frequency or urgency.  Musculoskeletal: Negative for arthralgias  or myalgias.  Skin: Negative for color change, pallor, rash or wound.  Neurological negative for dizziness, headaches, lightheadedness or weakness.      Past Medical History:  Past Medical History:   Diagnosis Date   • Acute on chronic diastolic heart failure    • Anemia 2018   • Arthritis    • Asthma    • Atrial fibrillation    • BPH (benign prostatic hypertrophy)    • Cancer     throat CA   • Cataract 2012    Removed   • CHF (congestive heart failure) 2018   • Colon polyp 2004   • COPD (chronic obstructive pulmonary disease)    • Coronary artery disease 2017   • Dependence on continuous supplemental oxygen     3L-5L DEPENDING ON ACTIVITY   • Diverticulosis    • Erectile dysfunction    • GERD (gastroesophageal reflux disease)    • H/O Abnormal CBC 2017   • History of heart artery stent 03/2017   • History of medical problems 2017    Afib   • Hyperlipidemia    • Hypertension    • Low back pain 2012   • Lung disease    • Neuropathy     feet and hands    • Osteopenia 2012    Osteopenia   • Pneumonia        Past Surgical History:  Past Surgical History:   Procedure Laterality Date   • BRONCHOSCOPY N/A 04/07/2018    Procedure: BRONCHOSCOPY with specimens;  Surgeon: Suresh Hernandez MD;  Location: [facility] OR;  Service: Pulmonary   • BRONCHOSCOPY N/A 03/06/2019    Procedure: BRONCHOSCOPY WITH BAL AND BIOPSY UNDER FLUORO AND ANESTHESIA;  Surgeon: Suresh Hernandez MD;  Location: [facility] OR;  Service: Pulmonary   • BRONCHOSCOPY N/A 06/13/2019    Procedure: BRONCHOSCOPY;  Surgeon: Suresh Hernandez MD;  Location: [facility] OR;  Service: Pulmonary   • CARDIAC CATHETERIZATION N/A 04/18/2018    Procedure: Right Heart Cath with possible vasodilator study;  Surgeon: Torey Schuler MD;  Location: [facility] INVASIVE LOCATION;  Service: Cardiovascular   • CARDIAC CATHETERIZATION N/A 03/07/2019    Procedure: RIGHT AND LEFT HEART CATH;  Surgeon: Marizol Holt MD;  Location: [facility] CATH INVASIVE LOCATION;  Service: Cardiology    • CARDIAC CATHETERIZATION N/A 03/07/2019    Procedure: CORONARY ANGIOGRAPHY;  Surgeon: Marizol Holt MD;  Location:  [facility] CATH INVASIVE LOCATION;  Service: Cardiology   • COLONOSCOPY  12/05/2016    polyps   • CORONARY STENT PLACEMENT  2017   • FRACTURE SURGERY     • INSERT / REPLACE / VENOUS ACCESS CATHETER Right    • PACEMAKER IMPLANTATION     • VENOUS ACCESS DEVICE (PORT) REMOVAL Right 04/05/2021    Procedure: Mediport Removal;  Surgeon: Joseph Nickerson Jr., MD;  Location: [facility] MAIN OR;  Service: General;  Laterality: Right;        Home Meds:  Medications Prior to Admission   Medication Sig Dispense Refill Last Dose   • acetaminophen (TYLENOL) 325 MG tablet Take 2 tablets by mouth Every 6 (Six) Hours As Needed (prn for pain). 720 tablet 2 5/7/2023   • albuterol (PROVENTIL) (2.5 MG/3ML) 0.083% nebulizer solution    5/6/2023   • doxazosin (CARDURA) 2 MG tablet Take 1 tablet by mouth Every Night.   5/6/2023   • ferrous sulfate 325 (65 FE) MG tablet Take 1 tablet by mouth Daily With Breakfast. 90 tablet 3 5/7/2023   • fexofenadine (Allegra Allergy) 60 MG tablet Take 1 tablet by mouth 2 (Two) Times a Day. 180 tablet 3 5/7/2023   • furosemide (LASIX) 40 MG tablet Take 1 tablet by mouth Daily.   5/7/2023   • Jardiance 10 MG tablet tablet    5/7/2023   • montelukast (SINGULAIR) 10 MG tablet Take 1 tablet by mouth Every Night.   5/6/2023   • O2 (OXYGEN) Inhale 3.5 L/min 1 (One) Time.   5/7/2023   • olopatadine (Patanol) 0.1 % ophthalmic solution Administer 1 drop to both eyes 2 (Two) Times a Day. 3 each 12 5/7/2023   • pantoprazole (PROTONIX) 40 MG pack packet Take 1 packet by mouth Every Morning Before Breakfast. PEG tube   5/7/2023   • pilocarpine (SALAGEN) 5 MG tablet Take 1 tablet by mouth 3 (Three) Times a Day.   5/7/2023   • polyvinyl alcohol (LIQUIFILM) 1.4 % ophthalmic solution 1 drop As Needed for Dry Eyes.   5/7/2023   • potassium chloride (K-DUR) 10 MEQ CR tablet Take 2 tablets by mouth Daily. (Patient  taking differently: Take 1 tablet by mouth Daily.) 180 tablet 3 2023   • pregabalin (LYRICA) 75 MG capsule Take 1 capsule by mouth 2 (Two) Times a Day.   2023   • Promacta 50 MG tablet    2023   • pyridoxine (VITAMIN B-6) 50 MG tablet Take 1 tablet by mouth Daily.   2023   • REFRESH TEARS 0.5 % solution Administer 1 drop to both eyes As Needed.   2023   • simvastatin (ZOCOR) 20 MG tablet Take 0.5 tablets by mouth Every Night.   2023   • sodium chloride (Ocean Nasal Spray) 0.65 % nasal spray 2 sprays into the nostril(s) as directed by provider As Needed for Congestion (qid prn). May refill q 21 days 4 each 3 2023   • sodium chloride 7 % nebulizer solution nebulizer solution    2023   • Spacer/Aero-Holding Chambers device Give spacer to use with his inhalers whichever brand he has received is fine 2 each 3 2023   • tadalafil (ADCIRCA) 20 MG tablet tablet Take 2 tablets by mouth Daily.   2023   • TRELEGY ELLIPTA 100-62.5-25 MCG/INH aerosol powder  1 puff Daily.   2023   • XARELTO 20 MG tablet Take 1 tablet by mouth Daily With Dinner. HOLD 2 DAYS PRIOR TO SURG   2023   • albuterol sulfate  (90 Base) MCG/ACT inhaler Inhale 2 puffs Every 4 (Four) Hours As Needed for Wheezing.   More than a month       Allergies:  Allergies   Allergen Reactions   • Lisinopril Shortness Of Breath   • Sulfa Antibiotics Shortness Of Breath   • Penicillins Rash   • Atenolol Other (See Comments)     drowsiness   • Celebrex [Celecoxib] Rash   • Coreg [Carvedilol] Cough     SOA,   • Ibuprofen Other (See Comments)     Conflict with other medications he is taking       Social History:   Social History     Socioeconomic History   • Marital status:    • Number of children: 2   Tobacco Use   • Smoking status: Former     Packs/day: 1.50     Years: 45.00     Pack years: 67.50     Types: Cigarettes     Quit date: 1/3/2000     Years since quittin.3   • Smokeless tobacco: Never   Vaping Use  "  • Vaping Use: Never used   Substance and Sexual Activity   • Alcohol use: No   • Drug use: No   • Sexual activity: Not Currently     Partners: Female       Family History:  Family History   Problem Relation Age of Onset   • Hypertension Mother    • Arthritis Mother    • Heart attack Father    • Asthma Father    • Early death Father    • COPD Brother    • Early death Brother    • Heart disease Brother    • Hypertension Brother    • Malig Hyperthermia Neg Hx        Physical Exam:  /71 (BP Location: Right arm, Patient Position: Lying)   Pulse 82   Temp 98.7 °F (37.1 °C) (Oral)   Resp 18   Ht 182.9 cm (72\")   Wt 86.1 kg (189 lb 12.8 oz)   SpO2 (!) 87%   BMI 25.74 kg/m²  Body mass index is 25.74 kg/m². (!) 87% 86.1 kg (189 lb 12.8 oz)     Physical Exam:  Constitutional: Elderly, chronically ill-appearing male laying in bed, does not appear to be in acute distress.  Head: Normocephalic, atraumatic.  Mouth/throat: Moist mucous membranes.  Eyes: PERRLA, EOM intact, normal conjunctivae.  Neck: Normal range of motion, supple, no JVD, trachea midline.  Cardiovascular: Normal rate, irregular rhythm.  Pulmonary: Normal respiratory effort, breath sounds diminished with some mild expiratory wheezing.  Abdominal: Distended/rounded, soft, nontender, normal bowel sounds  : Not assessed  Musculoskeletal: Normal range of motion no swelling, tenderness or edema.  Skin: Warm, dry, no erythema, jaundice or ecchymosis.  Capillary fill less than 3 seconds  Neurological: Alert and orient x3, no focal deficits.    LABS:  COVID19   Date Value Ref Range Status   05/07/2023 Not Detected Not Detected - Ref. Range Final       Lab Results   Component Value Date    CALCIUM 8.9 05/07/2023    PHOS 3.8 03/16/2019     Results from last 7 days   Lab Units 05/07/23  1139   SODIUM mmol/L 135*   POTASSIUM mmol/L 4.2   CHLORIDE mmol/L 97*   CO2 mmol/L 28.0   BUN mg/dL 17   CREATININE mg/dL 1.07   GLUCOSE mg/dL 99   CALCIUM mg/dL 8.9   WBC " 10*3/mm3 12.19*   HEMOGLOBIN g/dL 16.9   PLATELETS 10*3/mm3 368   ALT (SGPT) U/L 17   AST (SGOT) U/L 24   PROCALCITONIN ng/mL 0.33*     Lab Results   Component Value Date    CKTOTAL 32 04/06/2018    TROPONINT <0.010 10/04/2022             Results from last 7 days   Lab Units 05/07/23  1400 05/07/23  1139   PROCALCITONIN ng/mL  --  0.33*   LACTATE mmol/L 1.0  --          Results from last 7 days   Lab Units 05/07/23  1142   ADENOVIRUS DETECTION BY PCR  Not Detected   CORONAVIRUS 229E  Not Detected   CORONAVIRUS HKU1  Not Detected   CORONAVIRUS NL63  Not Detected   CORONAVIRUS OC43  Not Detected   HUMAN METAPNEUMOVIRUS  Not Detected   HUMAN RHINOVIRUS/ENTEROVIRUS  Not Detected   INFLUENZA B PCR  Not Detected   PARAINFLUENZA 1  Not Detected   PARAINFLUENZA VIRUS 2  Not Detected   PARAINFLUENZA VIRUS 3  Not Detected   PARAINFLUENZA VIRUS 4  Not Detected   BORDETELLA PERTUSSIS PCR  Not Detected   BORDETELLA PARAPERTUSSIS PCR  Not Detected   CHLAMYDOPHILA PNEUMONIAE PCR  Not Detected   MYCOPLAMA PNEUMO PCR  Not Detected   RSV, PCR  Not Detected     Results from last 7 days   Lab Units 05/07/23  1139   INR  1.84*         Lab Results   Component Value Date    TSH 4.270 (H) 02/26/2021     Estimated Creatinine Clearance: 65.9 mL/min (by C-G formula based on SCr of 1.07 mg/dL).         Imaging: I personally visualized the images of scans/x-rays performed within last 3 days.      Assessment:  1. Community-acquired left lower lobe pneumonia  2. Hemoptysis  3. Acute on chronic hypoxic respiratory failure  4. COPD  5. Severe kyphosis  6. Hypertension  7. Atrial fibrillation  8. Chronic anticoagulation with Xarelto  9. Chronic back pain  10. History of thrombocytopenia    Recommendations:  Community-acquired left lower lobe pneumonia  · Patient does not have any recent admissions to the hospital, as likely his pneumonia is community-acquired.  Discontinue vancomycin and Rocephin in favor of doxycycline and  ceftriaxone.    Hemoptysis  · Likely secondary to pneumonia and chronic anticoagulation use.  · Agree with mucus clearing techniques including hypertonic saline nebulizer to aid in clearance.  · Agree with holding anticoagulation given acute bleed.    Acute on chronic hypoxic respiratory failure  · Continue supplemental oxygen to maintain SPO2 88 to 92%.  Patient is chronically on 3 to 5 L, most recently on 4 L nasal cannula at baseline.    COPD  · Does not appear to be in acute exacerbation, location for supplemental IV steroids.  · Patient is on Trelegy Ellipta at home, continue Symbicort and DuoNebs while in the hospital.    Patient was placed in face mask upon entering room and kept mask on throughout our encounter. I wore full protective equipment throughout this patient encounter including a face mask, gown and gloves. Hand hygiene was performed before donning protective equipment and after removal when leaving the room.    Nneka Kemp, APRN  5/7/2023  22:12 EDT      Much of this encounter note is an electronic transcription/translation of spoken language to printed text using Dragon Software.

## 2023-05-08 NOTE — THERAPY EVALUATION
Patient Name: Alessio Allen  : 1940    MRN: [id number]                              Today's Date: 2023       Admit Date: 2023    Visit Dx:     ICD-10-CM ICD-9-CM   1. Pneumonia of left lower lobe due to infectious organism  J18.9 486   2. Hemoptysis  R04.2 786.30   3. Chronic anticoagulation  Z79.01 V58.61     Patient Active Problem List   Diagnosis   • A-fib   • Dyslipidemia   • BP (high blood pressure)   • Neuropathy   • Hypertension   • COPD (chronic obstructive pulmonary disease)   • BPH (benign prostatic hypertrophy)   • Atrial fibrillation   • Asthma   • Hypoxia   • Pneumonia   • Chronic anticoagulation   • Pneumonia of both lungs due to infectious organism   • Hyponatremia   • COPD exacerbation   • Acute on chronic respiratory failure with hypoxia   • Pneumonia of both lower lobes due to infectious organism   • Acute on chronic diastolic heart failure   • IPF (idiopathic pulmonary fibrosis)   • Acute respiratory failure with hypoxia   • Attention to G-tube   • Massive hemoptysis   • Laryngeal cancer   • Encounter for venous access device care   • Pneumonia of left lower lobe due to infectious organism   • Hemoptysis     Past Medical History:   Diagnosis Date   • Acute on chronic diastolic heart failure    • Anemia 2018   • Arthritis    • Asthma    • Atrial fibrillation    • BPH (benign prostatic hypertrophy)    • Cancer     throat CA   • Cataract 2012    Removed   • CHF (congestive heart failure) 2018   • Colon polyp    • COPD (chronic obstructive pulmonary disease)    • Coronary artery disease    • Dependence on continuous supplemental oxygen     3L-5L DEPENDING ON ACTIVITY   • Diverticulosis    • Erectile dysfunction    • GERD (gastroesophageal reflux disease)    • H/O Abnormal CBC    • History of heart artery stent 2017   • History of medical problems 2017    Afib   • Hyperlipidemia    • Hypertension    • Low back pain    • Lung disease    • Neuropathy     feet and  hands    • Osteopenia 2012    Osteopenia   • Pneumonia      Past Surgical History:   Procedure Laterality Date   • BRONCHOSCOPY N/A 04/07/2018    Procedure: BRONCHOSCOPY with specimens;  Surgeon: Suresh Hernandez MD;  Location: [facility] MAIN OR;  Service: Pulmonary   • BRONCHOSCOPY N/A 03/06/2019    Procedure: BRONCHOSCOPY WITH BAL AND BIOPSY UNDER FLUORO AND ANESTHESIA;  Surgeon: Suresh Hernandez MD;  Location: [facility] MA[facility];  Service: Pulmonary   • BRONCHOSCOPY N/A 06/13/2019    Procedure: BRONCHOSCOPY;  Surgeon: Suresh Hernandez MD;  Location: [facility] MAIN OR;  Service: Pulmonary   • CARDIAC CATHETERIZATION N/A 04/18/2018    Procedure: Right Heart Cath with possible vasodilator study;  Surgeon: Torey Schuler MD;  Location: [facility] CATH [facility]ASIVE LOC[facility];  Service: Cardiovascular   • CARDIAC CATHETERIZATION N/A 03/07/2019    Procedure: RIGHT AND LEFT HEART CATH;  Surgeon: Marizol Holt MD;  Location[facility];  Service: Cardiology   • CARDIAC CATHETERIZATION N/A 03/07/2019    Procedure: CORONARY ANGIOGRAPHY;  Surgeon: Marizol Holt MD;  Location: [facility] CATH INVASIVE LOCATION;  Service: Cardiology   • COLONOSCOPY  12/05/2016    polyps   • CORONARY STENT PLACEMENT  2017   • FRACTURE SURGERY     • INSERT / REPLACE / VENOUS ACCESS CATHETER Right    • PACEMAKER IMPLANTATION     • VENOUS ACCESS DEVICE (PORT) REMOVAL Right 04/05/2021    Procedure: Mediport Removal;  Surgeon: Joseph Nickerson Jr., MD;  Location: [facility] MAIN OR;  Service: General;  Laterality: Right;      General Information     Row Name 05/08/23 1431          Physical Therapy Time and Intention    Document Type evaluation  -DB     Mode of Treatment individual therapy;physical therapy  -DB     Row Name 05/08/23 143          General Information    Patient Profile Reviewed yes  -DB     Prior Level of Function independent:;ADL's;all household mobility  -DB     Existing Precautions/Restrictions fall;oxygen therapy device and L/min  -DB      Barriers to Rehab medically complex  -DB     Row Name 05/08/23 1430          Living Environment    People in Home significant other  -DB     Row Name 05/08/23 1430          Home Main Entrance    Number of Stairs, Main Entrance none  -DB     Row Name 05/08/23 1430          Stairs Within Home, Primary    Number of Stairs, Within Home, Primary none  -DB     Row Name 05/08/23 1430          Cognition    Orientation Status (Cognition) oriented x 4  -DB     Row Name 05/08/23 1430          Safety Issues, Functional Mobility    Safety Issues Affecting Function (Mobility) awareness of need for assistance  -DB     Impairments Affecting Function (Mobility) balance;endurance/activity tolerance;shortness of breath;strength  -DB           User Key  (r) = Recorded By, (t) = Taken By, (c) = Cosigned By    Initials Name Provider Type    DB Haydee Velazquez PT Physical Therapist               Mobility     Row Name 05/08/23 1431          Bed Mobility    Bed Mobility supine-sit;sit-supine  -DB     Supine-Sit [place] (Bed Mobility) standby assist  -DB     Sit-Supine [place] (Bed Mobility) standby assist  -DB     Assistive Device (Bed Mobility) bed rails;head of bed elevated  -DB     Row Name 05/08/23 1431          Sit-Stand Transfer    Sit-Stand [place] (Transfers) contact guard;verbal cues  -DB     Assistive Device (Sit-Stand Transfers) cane, quad tip  -DB     Row Name 05/08/23 1431          Gait/Stairs (Locomotion)    [place] Level (Gait) contact guard;verbal cues  -DB     Assistive Device (Gait) cane, quad  -DB     Deviations/Abnormal Patterns (Gait) festinating/shuffling;base of support, narrow;stride length decreased;gait speed decreased;crystal decreased  -DB     Bilateral Gait Deviations forward flexed posture;heel strike decreased  -DB     Comment, (Gait/Stairs) slight unsteadiness  -DB           User Key  (r) = Recorded By, (t) = Taken By, (c) = Cosigned By    Initials Name Provider Type    KARINA Velazquez  Haydee, PT Physical Therapist               Obj/Interventions     Row Name 05/08/23 1432          Range of Motion Comprehensive    General Range of Motion no range of motion deficits identified  -DB     Row Name 05/08/23 1432          Strength Comprehensive (MMT)    Comment, General Manual Muscle Testing (MMT) Assessment generalized weakness  -DB     Row Name 05/08/23 1432          Balance    Balance Assessment sitting static balance;sitting dynamic balance;standing static balance;standing dynamic balance  -DB     Static Sitting Balance standby assist  -DB     Dynamic Sitting Balance standby assist  -DB     Position, Sitting Balance unsupported;sitting edge of bed  -DB     Static Standing Balance contact guard  -DB     Dynamic Standing Balance contact guard  -DB     Position/Device Used, Standing Balance supported;cane, straight  -DB     Balance Interventions sitting;standing;sit to stand  -DB           User Key  (r) = Recorded By, (t) = Taken By, (c) = Cosigned By    Initials Name Provider Type    DB Haydee Velazquez, PT Physical Therapist               Goals/Plan     Row Name 05/08/23 1438          Bed Mobility Goal 1 (PT)    Activity/Assistive Device (Bed Mobility Goal 1, PT) bed mobility activities, all  -DB     [place] Level/Cues Needed (Bed Mobility Goal 1, PT) modified independence  -DB     Time Frame (Bed Mobility Goal 1, PT) 1 week  -DB     Row Name 05/08/23 1438          Transfer Goal 1 (PT)    Activity/Assistive Device (Transfer Goal 1, PT) transfers, all  -DB     [place] Level/Cues Needed (Transfer Goal 1, PT) supervision required  -DB     Time Frame (Transfer Goal 1, PT) 1 week  -DB     Row Name 05/08/23 1438          Gait Training Goal 1 (PT)    Activity/Assistive Device (Gait Training Goal 1, PT) gait (walking locomotion)  -DB     [place] Level (Gait Training Goal 1, PT) standby assist  -DB     Time Frame (Gait Training Goal 1, PT) 1 week  -DB     Row Name 05/08/23 1437           Therapy Assessment/Plan (PT)    Planned Therapy Interventions (PT) balance training;bed mobility training;gait training;home exercise program;neuromuscular re-education;postural re-education;transfer training;patient/family education;strengthening  -DB           User Key  (r) = Recorded By, (t) = Taken By, (c) = Cosigned By    Initials Name Provider Type    DB Haydee Velazquez PT Physical Therapist               Clinical Impression     Row Name 05/08/23 1433          Pain    Pain Intervention(s) Ambulation/increased activity;Repositioned  -DB     Row Name 05/08/23 1433          Plan of Care Review    Plan of Care Reviewed With patient  -DB     Outcome Evaluation Pt is [facility] 83 [facility]. PMH includes pulmonary fibrosis, on 4L O2 at baseline. Pt lives with SO, 0 KAYLA/stairs. Uses quad tip cane for mobility and states he is normally (I) with all ADL's. Today, pt was able to perform bed mobility with SBA , on 8L O2 and SpO2 at 92% at start of session. Pt was able to ambulate 20' with quad tip cane and CGA, pt has inc'd SOA following and SpO2 drops to 80%. Pt has green tubing for NC, but has clear extension, removed clear extension and hooked up high flow tubing to wall. Pt's SpO2 recovered to 94% by end of the session. RN notified. Pt demo's dec'd strength, balance, and endurance and he will benefit from skilled PT intervention. Rec D/C home with 24/7 assist and HHPT.  -DB     Row Name 05/08/23 1437          Therapy Assessment/Plan (PT)    Rehab Potential (PT) good, to achieve stated therapy goals  -DB     Criteria for Skilled Interventions Met (PT) yes  -DB     Therapy Frequency (PT) 6 times/wk  -DB     Row Name 05/08/23 1433          Vital Signs    Pre SpO2 (%) 92  -DB     O2 Delivery Pre Treatment supplemental O2  -DB     Intra SpO2 (%) 80  -DB     O2 Delivery Intra Treatment supplemental O2  -DB     Post SpO2 (%) 94  -DB     O2 Delivery Post Treatment supplemental O2  -DB      Pre Patient Position Supine  -DB     Intra Patient Position Standing  -DB     Post Patient Position Supine  -DB     Row Name 05/08/23 1433          Positioning and Restraints    Pre-Treatment Position in bed  -DB     Post Treatment Position bed  -DB     In Bed supine;call light within reach;encouraged to call for assist;exit alarm on;notified nsg;with family/caregiver  -DB           User Key  (r) = Recorded By, (t) = Taken By, (c) = Cosigned By    Initials Name Provider Type    Haydee Hayes, [place] Physical Therapist               Outcome Measures     Row Name 05/08/23 1439 05/08/23 0800       How much help from another person do you currently need...    Turning from your back to your side while in flat bed without using bedrails? 3  -DB 3  -JS    Moving from lying on back to sitting on the side of a flat bed without bedrails? 3  -DB 3  -JS    Moving to and from a bed to a chair (including a wheelchair)? 3  -DB 3  -JS    Standing up from a chair using your arms (e.g., wheelchair, bedside chair)? 3  -DB 3  -JS    Climbing 3-5 steps with a railing? 2  -DB 2  -JS    To walk in hospital room? 3  -DB 3  -JS    AM-PAC 6 Clicks Score (PT) 17  -DB 17  -JS    Highest level of mobility 5 --> Static standing  -DB 5 --> Static standing  -JS    Row Name 05/08/23 1439          Functional Assessment    Outcome Measure Options AM-PAC 6 Clicks Basic Mobility (PT)  -DB           User Key  (r) = Recorded By, (t) = Taken By, (c) = Cosigned By    Initials Name Provider Type    Reed Tsai, RN Registered Nurse    Haydee Hayes, [place] Physical Therapist                             Physical Therapy Education     Title: PT OT SLP Therapies (Done)     Topic: Physical Therapy (Done)     Point: Mobility training (Done)     Learning Progress Summary           Patient Acceptance, E, VU by KARINA at 5/8/2023 1440                   Point: Home exercise program (Done)     Learning Progress Summary           Patient Acceptance, E, VU by KARINA  at 5/8/2023 1440                   Point: Body mechanics (Done)     Learning Progress Summary           Patient Acceptance, E, VU by DB at 5/8/2023 1440                   Point: Precautions (Done)     Learning Progress Summary           Patient Acceptance, E, VU by DB at 5/8/2023 1440                               User Key     Initials Effective Dates Name Provider Type Discipline    DB 06/16/21 -  Haydee Velazquez, PT Physical Therapist PT              PT Recommendation and Plan  Planned Therapy Interventions (PT): balance training, bed mobility training, gait training, home exercise program, neuromuscular re-education, postural re-education, transfer training, patient/family education, strengthening  Plan of Care Reviewed With: patient  Outcome Evaluation: Pt is an 81 y/o M admitted to [facility] d/t pneumonia and hemoptysis. PMH includes pulmonary fibrosis, on 4L O2 at baseline. Pt lives with SO, 0 KAYLA/stairs. Uses quad tip cane for mobility and states he is normally (I) with all ADL's. Today, pt was able to perform bed mobility with SBA , on 8L O2 and SpO2 at 92% at start of session. Pt was able to ambulate 20' with quad tip cane and CGA, pt has inc'd SOA following and SpO2 drops to 80%. Pt has green tubing for NC, but has clear extension, removed clear extension and hooked up high flow tubing to wall. Pt's SpO2 recovered to 94% by end of the session. RN notified. Pt demo's dec'd strength, balance, and endurance and he will benefit from skilled PT intervention. Rec D/C home with 24/7 assist and HHPT.     Time Calculation:    PT Charges     Row Name 05/08/23 1440             Time Calculation    Start Time 1353  -DB      Stop Time 1410  -DB      Time Calculation (min) 17 min  -DB      PT Received On 05/08/23  -DB      PT - Next Appointment 05/09/23  -DB      PT Goal Re-Cert Due Date 05/15/23  -DB         Time Calculation- PT    Total Timed Code Minutes- PT 8 minute(s)  -DB            User Key  (r) = Recorded By, (t) =  Taken By, (c) = Cosigned By    Initials Name Provider Type    DB Haydee Velazquez, PT Physical Therapist              Therapy Charges for Today     Code Description Service Date Service Provider Modifiers Qty    [id number]  PT EVAL MOD COMPLEXITY 3 5/8/2023 Haydee Velazquez, PT GP 1    [id number]  PT THERAPEUTIC ACT EA 15 MIN 5/8/2023 Haydee Velazquez, PT GP 1          PT G-Codes  Outcome Measure Options: AM-PAC 6 Clicks Basic Mobility (PT)  AM-PAC 6 Clicks Score (PT): 17  PT Discharge Summary  Anticipated Discharge Disposition (PT): home with 24/7 care, home with home health    Haydee Velazquez, PT  5/8/2023

## 2023-05-09 ENCOUNTER — APPOINTMENT (OUTPATIENT)
Dept: GENERAL RADIOLOGY | Facility: HOSPITAL | Age: 83
DRG: 193 | End: 2023-05-09
Payer: MEDICARE

## 2023-05-09 LAB
ANION GAP SERPL CALCULATED.3IONS-SCNC: 12.5 MMOL/L (ref 5–15)
BUN SERPL-MCNC: 22 MG/DL (ref 8–23)
BUN/CREAT SERPL: 19.3 (ref 7–25)
CALCIUM SPEC-SCNC: 9.3 MG/DL (ref 8.6–10.5)
CHLORIDE SERPL-SCNC: 99 MMOL/L (ref 98–107)
CO2 SERPL-SCNC: 24.5 MMOL/L (ref 22–29)
CREAT SERPL-MCNC: 1.14 MG/DL (ref 0.76–1.27)
DEPRECATED RDW RBC AUTO: 44.5 FL (ref 37–54)
EGFRCR SERPLBLD CKD-EPI 2021: 64.6 ML/MIN/1.73
ERYTHROCYTE [DISTWIDTH] IN BLOOD BY AUTOMATED COUNT: 13.2 % (ref 12.3–15.4)
GLUCOSE SERPL-MCNC: 149 MG/DL (ref 65–99)
HCT VFR BLD AUTO: 50.1 % (ref 37.5–51)
HGB BLD-MCNC: 17.1 G/DL (ref 13–17.7)
MCH RBC QN AUTO: 30.9 PG (ref 26.6–33)
MCHC RBC AUTO-ENTMCNC: 34.1 G/DL (ref 31.5–35.7)
MCV RBC AUTO: 90.6 FL (ref 79–97)
PLATELET # BLD AUTO: 332 10*3/MM3 (ref 140–450)
PMV BLD AUTO: 9.8 FL (ref 6–12)
POTASSIUM SERPL-SCNC: 4.5 MMOL/L (ref 3.5–5.2)
RBC # BLD AUTO: 5.53 10*6/MM3 (ref 4.14–5.8)
SODIUM SERPL-SCNC: 136 MMOL/L (ref 136–145)
WBC NRBC COR # BLD: 10.48 10*3/MM3 (ref 3.4–10.8)

## 2023-05-09 PROCEDURE — 94799 UNLISTED PULMONARY SVC/PX: CPT

## 2023-05-09 PROCEDURE — 94761 N-INVAS EAR/PLS OXIMETRY MLT: CPT

## 2023-05-09 PROCEDURE — 94760 N-INVAS EAR/PLS OXIMETRY 1: CPT

## 2023-05-09 PROCEDURE — 92611 MOTION FLUOROSCOPY/SWALLOW: CPT | Performed by: SPEECH-LANGUAGE PATHOLOGIST

## 2023-05-09 PROCEDURE — 97116 GAIT TRAINING THERAPY: CPT

## 2023-05-09 PROCEDURE — 80048 BASIC METABOLIC PNL TOTAL CA: CPT | Performed by: STUDENT IN AN ORGANIZED HEALTH CARE EDUCATION/TRAINING PROGRAM

## 2023-05-09 PROCEDURE — 25010000002 CEFTRIAXONE PER 250 MG

## 2023-05-09 PROCEDURE — 74230 X-RAY XM SWLNG FUNCJ C+: CPT

## 2023-05-09 PROCEDURE — 85027 COMPLETE CBC AUTOMATED: CPT | Performed by: STUDENT IN AN ORGANIZED HEALTH CARE EDUCATION/TRAINING PROGRAM

## 2023-05-09 PROCEDURE — 94664 DEMO&/EVAL PT USE INHALER: CPT

## 2023-05-09 RX ADMIN — PILOCARPINE HYDROCHLORIDE 5 MG: 5 TABLET, FILM COATED ORAL at 11:52

## 2023-05-09 RX ADMIN — ELTROMBOPAG OLAMINE 50 MG: 50 TABLET, FILM COATED ORAL at 09:33

## 2023-05-09 RX ADMIN — FUROSEMIDE 40 MG: 40 TABLET ORAL at 08:50

## 2023-05-09 RX ADMIN — KETOTIFEN FUMARATE 1 DROP: 0.25 SOLUTION OPHTHALMIC at 08:51

## 2023-05-09 RX ADMIN — EMPAGLIFLOZIN 10 MG: 10 TABLET, FILM COATED ORAL at 08:50

## 2023-05-09 RX ADMIN — BARIUM SULFATE 1 TEASPOON(S): 0.6 CREAM ORAL at 10:46

## 2023-05-09 RX ADMIN — KETOTIFEN FUMARATE 1 DROP: 0.25 SOLUTION OPHTHALMIC at 21:00

## 2023-05-09 RX ADMIN — PILOCARPINE HYDROCHLORIDE 5 MG: 5 TABLET, FILM COATED ORAL at 20:13

## 2023-05-09 RX ADMIN — BARIUM SULFATE 55 ML: 0.81 POWDER, FOR SUSPENSION ORAL at 10:46

## 2023-05-09 RX ADMIN — POTASSIUM CHLORIDE 10 MEQ: 750 TABLET, EXTENDED RELEASE ORAL at 08:50

## 2023-05-09 RX ADMIN — ACETAMINOPHEN 650 MG: 325 TABLET, FILM COATED ORAL at 20:13

## 2023-05-09 RX ADMIN — PANTOPRAZOLE SODIUM 40 MG: 40 TABLET, DELAYED RELEASE ORAL at 05:13

## 2023-05-09 RX ADMIN — TERAZOSIN 2 MG: 2 CAPSULE ORAL at 20:13

## 2023-05-09 RX ADMIN — BARIUM SULFATE 50 ML: 400 SUSPENSION ORAL at 10:46

## 2023-05-09 RX ADMIN — Medication 500 MG: at 20:14

## 2023-05-09 RX ADMIN — BARIUM SULFATE 4 ML: 980 POWDER, FOR SUSPENSION ORAL at 10:46

## 2023-05-09 RX ADMIN — Medication 500 MG: at 08:49

## 2023-05-09 RX ADMIN — Medication 4 ML: at 07:20

## 2023-05-09 RX ADMIN — TADALAFIL 40 MG: 20 TABLET ORAL at 08:51

## 2023-05-09 RX ADMIN — PILOCARPINE HYDROCHLORIDE 5 MG: 5 TABLET, FILM COATED ORAL at 18:48

## 2023-05-09 RX ADMIN — IPRATROPIUM BROMIDE AND ALBUTEROL SULFATE 3 ML: 2.5; .5 SOLUTION RESPIRATORY (INHALATION) at 11:29

## 2023-05-09 RX ADMIN — BUDESONIDE AND FORMOTEROL FUMARATE DIHYDRATE 2 PUFF: 160; 4.5 AEROSOL RESPIRATORY (INHALATION) at 22:41

## 2023-05-09 RX ADMIN — RIVAROXABAN 20 MG: 20 TABLET, FILM COATED ORAL at 20:14

## 2023-05-09 RX ADMIN — CEFTRIAXONE 2 G: 2 INJECTION, POWDER, FOR SOLUTION INTRAMUSCULAR; INTRAVENOUS at 00:50

## 2023-05-09 RX ADMIN — BUDESONIDE AND FORMOTEROL FUMARATE DIHYDRATE 2 PUFF: 160; 4.5 AEROSOL RESPIRATORY (INHALATION) at 07:20

## 2023-05-09 RX ADMIN — Medication 10 ML: at 08:51

## 2023-05-09 RX ADMIN — FERROUS SULFATE TAB 325 MG (65 MG ELEMENTAL FE) 325 MG: 325 (65 FE) TAB at 08:50

## 2023-05-09 RX ADMIN — PREGABALIN 75 MG: 75 CAPSULE ORAL at 08:50

## 2023-05-09 RX ADMIN — DOXYCYCLINE 100 MG: 100 INJECTION, POWDER, LYOPHILIZED, FOR SOLUTION INTRAVENOUS at 00:50

## 2023-05-09 RX ADMIN — PREGABALIN 75 MG: 75 CAPSULE ORAL at 20:13

## 2023-05-09 RX ADMIN — Medication 10 ML: at 20:14

## 2023-05-09 RX ADMIN — IPRATROPIUM BROMIDE AND ALBUTEROL SULFATE 3 ML: 2.5; .5 SOLUTION RESPIRATORY (INHALATION) at 07:20

## 2023-05-09 RX ADMIN — DOXYCYCLINE 100 MG: 100 INJECTION, POWDER, LYOPHILIZED, FOR SOLUTION INTRAVENOUS at 13:23

## 2023-05-09 RX ADMIN — Medication 50 MG: at 08:50

## 2023-05-09 NOTE — PLAN OF CARE
Goal Outcome Evaluation:  Plan of Care Reviewed With: patient        Progress: no change  Outcome Evaluation: Pt eating dinner , iman well, mech soft, chopped diet per SLP rec, after swallowing study this am. Pt updated of new diet and was accepting, educ about asp precautions, voiced understanding. vss, remians on 4-5L of HHF O2, satting at 89-92%, desats with exertion, up to br twice with cane and sba, bm x1 mod amount, voiding adequately, bed alarm activated. rossy, sr on tele

## 2023-05-09 NOTE — MBS/VFSS/FEES
Acute Care - Speech Language Pathology   Swallow Initial Evaluation [facility]     Patient Name: Alessio Allen  : 1940  MRN: [id number]  Today's Date: 2023               Admit Date: 2023    Visit Dx:     ICD-10-CM ICD-9-CM   1. Pneumonia of left lower lobe due to infectious organism  J18.9 486   2. Hemoptysis  R04.2 786.30   3. Chronic anticoagulation  Z79.01 V58.61     Patient Active Problem List   Diagnosis    A-fib    Dyslipidemia    BP (high blood pressure)    Neuropathy    Hypertension    COPD (chronic obstructive pulmonary disease)    BPH (benign prostatic hypertrophy)    Atrial fibrillation    Asthma    Hypoxia    Pneumonia    Chronic anticoagulation    Pneumonia of both lungs due to infectious organism    Hyponatremia    COPD exacerbation    Acute on chronic respiratory failure with hypoxia    Pneumonia of both lower lobes due to infectious organism    Acute on chronic diastolic heart failure    IPF (idiopathic pulmonary fibrosis)    Acute respiratory failure with hypoxia    Attention to G-tube    Massive hemoptysis    Laryngeal cancer    Encounter for venous access device care    Pneumonia of left lower lobe due to infectious organism    Hemoptysis     Past Medical History:   Diagnosis Date    Acute on chronic diastolic heart failure     Anemia 2018    Arthritis     Asthma     Atrial fibrillation     BPH (benign prostatic hypertrophy)     Cancer     throat CA    Cataract 2012    Removed    CHF (congestive heart failure) 2018    Colon polyp 2004    COPD (chronic obstructive pulmonary disease)     Coronary artery disease 2017    Dependence on continuous supplemental oxygen     3L-5L DEPENDING ON ACTIVITY    Diverticulosis     Erectile dysfunction     GERD (gastroesophageal reflux disease)     H/O Abnormal CBC     History of heart artery stent 2017    History of medical problems 2017    Afib    Hyperlipidemia     Hypertension     Low back pain 2012    Lung disease     Neuropathy      feet and hands     Osteopenia 2012    Osteopenia    Pneumonia      Past Surgical History:   Procedure Laterality Date    BRONCHOSCOPY N/A 04/07/2018    Procedure: BRONCHOSCOPY with specimens;  Surgeon: Suresh Hernandez MD;  Location: [facility] MAIN OR;  Service: Pulmonary    BRONCHOSCOPY N/A 03/06/2019    Procedure: BRONCHOSCOPY WITH BAL AND BIOPSY UNDER FLUORO AND ANESTHESIA;  Surgeon: Suresh Hernandez MD;  Location: [facility] MAIN OR;  Service: Pulmonary    BRONCHOSCOPY N/A 06/13/2019    Procedure: BRONCHOSCOPY;  Surgeon: Suresh Hernandez MD;  Location: [facility] MAIN OR;  Service: Pulmonary    CARDIAC CATHETERIZATION N/A 04/18/2018    Procedure: Right Heart Cath with possible vasodilator study;  Surgeon: Torey Schuler MD;  Location: [facility] CATH INVASIVE LOCATION;  Service: Cardiovascular    CARDIAC CATHETERIZATION N/A 03/07/2019    Procedure: RIGHT AND LEFT HEART CATH;  Surgeon: Marizol Holt MD;  Location: [facility] CATH INVASIVE LOCATION;  Service: Cardiology    CARDIAC CATHETERIZATION N/A 03/07/2019    Procedure: CORONARY ANGIOGRAPHY;  Surgeon: Marizol Holt MD;  Location: [facility] CATH INVASIVE LOCATION;  Service: Cardiology    COLONOSCOPY  12/05/2016    polyps    CORONARY STENT PLACEMENT  2017    FRACTURE SURGERY      INSERT / REPLACE / VENOUS ACCESS CATHETER Right     PACEMAKER IMPLANTATION      VENOUS ACCESS DEVICE (PORT) REMOVAL Right 04/05/2021    Procedure: Mediport Removal;  Surgeon: Joseph Nickerson Jr., MD;  Location: [facility];  Service: General;  Laterality: Right;       SLP Recommendation and Plan  SLP Swallowing Diagnosis: mild, oral dysphagia, pharyngeal dysphagia, esophageal dysphagia (05/09/23 1100)  SLP Diet Recommendation: soft to chew textures, chopped, thin liquids (05/09/23 1100)  Recommended Precautions and Strategies: upright posture during/after eating, small bites of food and sips of liquid, multiple swallows per bite of food, multiple swallows per sip of liquid, alternate between small bites  "of food and sips of liquid, reflux precautions (05/09/23 1100)  SLP Rec. for Method of Medication Administration: meds whole, meds crushed, as tolerated (05/09/23 1100)     Monitor for Signs of Aspiration: yes, notify SLP if any concerns (05/09/23 1100)  Recommended Diagnostics: VFSS ([facility]) (05/08/23 1500)  Swallow Criteria for Skilled Therapeutic Interventions Met: demonstrates skilled criteria (05/09/23 1100)  Anticipated Discharge Disposition (SLP): anticipate therapy at next level of care (05/09/23 1100)  Rehab Potential/Prognosis, Swallowing: good, to achieve stated therapy goals (05/09/23 1100)  Therapy Frequency (Swallow): PRN (05/09/23 1100)  Predicted Duration Therapy Intervention (Days): until discharge (05/09/23 1100)  Demonstrates Need for Referral to Another Service: gastroenterology (05/09/23 1100)                                     Plan of Care Reviewed With: patient  Outcome Evaluation: VFSS completed and pt demonstrates mild oropharyngeal dysphagia and esophageal dysphagia. Penetration x1 on first sip of thins, no other penetration on thins and all other tested consistencies. Statis was noted in upper esophagus with all tested consistencies and esophageal screen was completed with thins,  Per radiologist \"spasm and significant intraesophageal reflux and statis.\" SLP recommends GI consult and overall patient is at high risk for aspirating reflux. Recommend mechanical soft chopped and thin liquids, small bites and sips, multiple swallows and alternate liquids and solids and periodic throat clear.. Reflux precautions, upright 90 degrees during meals and remain upright atleast one hour after meals. Meds whole with puree.      SWALLOW EVALUATION (last 72 hours)       SLP Adult Swallow Evaluation       Row Name 05/09/23 1100 05/08/23 1500                Rehab Evaluation    Document Type evaluation  -KA evaluation  -KA       Subjective Information no complaints  -KA no complaints  -KA       Patient " "Observations alert;cooperative  -KA alert;cooperative  -KA       Patient Effort good  -KA good  -KA       Symptoms Noted During/After Treatment none  -KA none  -KA          General Information    Patient Profile Reviewed yes  -KA yes  -KA       Pertinent History Of Current Problem -- severe COPD, pulmonary fibrosis, hemoptysis, infiltrate left lung, PNA. Hx of head and neck cancer fall of 2018, NPO with PEG in 2019. VFSS at [facility] on 1/3/20 \"deep laryngeal penetration with thins without evidence of tracheal aspiration. Penetration improved with chin tuck. No other pen/asp with solids, puree and semisolid. Residue in vallaculae requiring multiple swallows for clearance.\" Wife/patietn report [facility], test results not available in care everywhere. Wife reports results revealed \"he is aspirating,\" however pt and wife deny any diet modifications. Overall pt and wife unable to recall any detail of recent VFSS. Patient reports he has been eating regular solids and thin liquids since 2020 without PNA. He currently is receiving OP dysphagia therapy with [facility].  -KA       Current Method of Nutrition -- regular textures;thin liquids  -KA       Prior Level of Function-Swallowing -- other (see comments)  see above  -KA       Plans/Goals Discussed with -- patient;spouse/S.O.;agreed upon  -KA       Barriers to Rehab -- medically complex  -KA       Patient's Goals for Discharge -- --  eat and drink without aspirating  -KA          Oral Motor Structure and Function    Dentition Assessment -- natural, present and adequate;missing teeth  -KA       Secretion Management -- WNL/WFL  -KA       Mucosal Quality -- moist, healthy  -KA          Oral Musculature and Cranial Nerve Assessment    Oral Motor General Assessment -- generalized oral motor weakness  -KA          General Eating/Swallowing Observations    Respiratory Support Currently in Use -- nasal cannula  -KA       Eating/Swallowing Skills " "-- self-fed  -KA       Positioning During Eating -- upright in bed  -KA       Utensils Used -- spoon;cup  -KA       Consistencies Trialed -- regular textures;soft to chew textures;chopped;mixed consistency;thin liquids  -KA          Clinical Swallow Eval    Clinical Swallow Evaluation Summary -- Patient upright in bed eating lunch. With regular and mechanical soft solids pt demonstrated intermittent throat clearing and pt stated it was a \"habit.\" No overt s/s of pen/asp with thin liquids. SLP discussed with patient his history of dysphagia. Patient reports PEG was removed in 2020, has tolerated regular and thins with no PNA until now. SLP voiced concerns with patient recently being [facility] and no swallow strategies per pt and wife. Discussed concerns for aspiration and current dx of PNA. Patient is agreeable for VFSS and possible need for diet modifcations.  -KA          MBS/VFSS    Utensils Used spoon;cup  -KA --       Consistencies Trialed regular textures;soft to chew textures;chopped;mixed consistency;thin liquids;nectar/syrup-thick liquids  -KA --          MBS/VFSS Interpretation    VFSS Summary Radiologist Dr Epperson present: Patient demonstrates mild oropharyngeal dysphagia characterized by mistiming, reduced pharyngeal constriction and BOT retraction. Patient demonstrated silent penetration before the swallow on first sip of thins via cup, majority expelled during completion of swallow. No other penetration occured during VFSS including subsequent trials of thins via cup and all other PO trials. Mild vallaculae and BOTresidue with all tested consistencies, liquid washes (both thins and NTL assessed) and cued multiple swallows assisted with clearing. Patient required liquid wash (NTL used) to initiate swallow with regular solids due to dry mouth. Statis in upper esophagus was noted with all tested consistencies. Esophageal screen was completed with thins " "and revealed per radiologist \"spasm and significant intraesophageal reflux and statis.\"  -KA --          Esophageal Phase    Esophageal Phase esophageal retention with retrograde flow below PES;esophageal retention  -KA --          SLP Communication to Radiology    Summary Statement Radiologist Dr Epperson present: Patient demonstrates mild oropharyngeal dysphagia characterized by mistiming, reduced pharyngeal constriction and BOT retraction. Patient demonstrated silent penetration before the swallow on first sip of thins via cup, majority expelled during completion of swallow. No other penetration occured during VFSS including subsequent trials of thins via cup and all other PO trials. Mild vallaculae and BOTresidue with all tested consistencies, liquid washes (both thins and NTL assessed) and cued multiple swallows assisted with clearing. Patient required liquid wash (NTL used) to initiate swallow with regular solids due to dry mouth. Statis in upper esophagus was noted with all tested consistencies. Esophageal screen was completed with thins and revealed per radiologist \"spasm and significant intraesophageal reflux and statis.\"  -KA --          SLP Evaluation Clinical Impression    SLP Swallowing Diagnosis mild;oral dysphagia;pharyngeal dysphagia;esophageal dysphagia  -KA suspected pharyngeal dysphagia  -KA       Functional Impact risk of aspiration/pneumonia  -KA risk of aspiration/pneumonia  -KA       Rehab Potential/Prognosis, Swallowing good, to achieve stated therapy goals  -KA good, to achieve stated therapy goals  -KA       Swallow Criteria for Skilled Therapeutic Interventions Met demonstrates skilled criteria  -KA demonstrates skilled criteria  -KA          Recommendations    Therapy Frequency (Swallow) PRN  -KA PRN  -KA       Predicted Duration Therapy Intervention (Days) until discharge  -KA until discharge  -KA       SLP Diet Recommendation soft to chew textures;chopped;thin liquids  -KA regular " textures;thin liquids  -KA       Recommended Diagnostics -- VFSS ([facility])  -KA       Recommended Precautions and Strategies upright posture during/after eating;small bites of food and sips of liquid;multiple swallows per bite of food;multiple swallows per sip of liquid;alternate between small bites of food and sips of liquid;reflux precautions  -KA upright posture during/after eating;small bites of food and sips of liquid  -KA       Oral Care Recommendations Oral Care BID/PRN  -KA Oral Care BID/PRN  -KA       SLP Rec. for Method of Medication Administration meds whole;meds crushed;as tolerated  -KA meds whole;meds crushed;with puree;as tolerated  -KA       Monitor for Signs of Aspiration yes;notify SLP if any concerns  -KA yes;notify SLP if any concerns  -KA       Anticipated Discharge Disposition (SLP) anticipate therapy at next level of care  -KA anticipate therapy at next level of care  -KA       Demonstrates Need for Referral to Another Service gastroenterology  -KA --          Swallow Goals (SLP)    Swallow STGs diet tolerance goal selection (SLP)  -KA --       Diet Tolerance Goal Selection (SLP) Patient will tolerate trials of  -KA --          (STG) Patient will tolerate trials of    Consistencies Trialed (Tolerate trials) soft to chew (chopped) textures;thin liquids  -KA --       Desired Outcome (Tolerate trials) without signs/symptoms of aspiration  -KA --       [place] (Tolerate trials) independently (over 90% accuracy)  -KA --                 User Key  (r) = Recorded By, (t) = Taken By, (c) = Cosigned By      Initials Name Effective Dates    Clarke Leggett MA,[facility]-SLP 06/02/22 -                     EDUCATION  The patient has been educated in the following areas:   Dysphagia (Swallowing Impairment).        SLP GOALS       Row Name 05/09/23 1100             (STG) Patient will tolerate trials of    Consistencies Trialed (Tolerate trials) soft to chew (chopped) textures;thin liquids  -KA      Desired  Outcome (Tolerate trials) without signs/symptoms of aspiration  -KA      [place] (Tolerate trials) independently (over 90% accuracy)  -DIEUDONNE                User Key  (r) = Recorded By, (t) = Taken By, (c) = Cosigned By      Initials Name Provider Type    Clarke Leggett MA,CCC-SLP Speech and Language Pathologist                       Time Calculation:    Time Calculation- SLP       Row Name 05/09/23 1208 05/09/23 1202          Time Calculation- SLP    SLP Start Time -- 1015  -KA        Untimed Charges    SLP Eval/Re-eval  [facility] Fluoro Eval Swallow - [id number]  -KA --     [id number] Motion Fluoro Eval Swallow Minutes 76  -KA --        Total Minutes    Untimed Charges Total Minutes 76  -KA --      Total Minutes 76  -KA --               User Key  (r) = Recorded By, (t) = Taken By, (c) = Cosigned By      Initials Name Provider Type    Clarke Leggett MA,CCC-SLP Speech and Language Pathologist                    Therapy Charges for Today       Code Description Service Date Service Provider Modifiers Qty    [id number] HC ST EVAL ORAL PHARYNG SWALLOW 4 5/8/2023 Clarke Maher MA,CCC-SLP GN 1    [id number] HC ST MOTION FLUORO EVAL SWALLOW 5 5/9/2023 Clarke Maher MA,CCC-SLP GN 1                 Clarke Maher MA,CCC-SLP  5/9/2023

## 2023-05-09 NOTE — THERAPY TREATMENT NOTE
Patient Name: Alessio Allen  : 1940    MRN: [id number]                              Today's Date: 2023       Admit Date: 2023    Visit Dx:     ICD-10-CM ICD-9-CM   1. Pneumonia of left lower lobe due to infectious organism  J18.9 486   2. Hemoptysis  R04.2 786.30   3. Chronic anticoagulation  Z79.01 V58.61     Patient Active Problem List   Diagnosis   • A-fib   • Dyslipidemia   • BP (high blood pressure)   • Neuropathy   • Hypertension   • COPD (chronic obstructive pulmonary disease)   • BPH (benign prostatic hypertrophy)   • Atrial fibrillation   • Asthma   • Hypoxia   • Pneumonia   • Chronic anticoagulation   • Pneumonia of both lungs due to infectious organism   • Hyponatremia   • COPD exacerbation   • Acute on chronic respiratory failure with hypoxia   • Pneumonia of both lower lobes due to infectious organism   • Acute on chronic diastolic heart failure   • IPF (idiopathic pulmonary fibrosis)   • Acute respiratory failure with hypoxia   • Attention to G-tube   • Massive hemoptysis   • Laryngeal cancer   • Encounter for venous access device care   • Pneumonia of left lower lobe due to infectious organism   • Hemoptysis     Past Medical History:   Diagnosis Date   • Acute on chronic diastolic heart failure    • Anemia 2018   • Arthritis    • Asthma    • Atrial fibrillation    • BPH (benign prostatic hypertrophy)    • Cancer     throat CA   • Cataract 2012    Removed   • CHF (congestive heart failure) 2018   • Colon polyp    • COPD (chronic obstructive pulmonary disease)    • Coronary artery disease    • Dependence on continuous supplemental oxygen     3L-5L DEPENDING ON ACTIVITY   • Diverticulosis    • Erectile dysfunction    • GERD (gastroesophageal reflux disease)    • H/O Abnormal CBC    • History of heart artery stent 2017   • History of medical problems 2017    Afib   • Hyperlipidemia    • Hypertension    • Low back pain    • Lung disease    • Neuropathy     feet and  hands    • Osteopenia 2012    Osteopenia   • Pneumonia      Past Surgical History:   Procedure Laterality Date   • BRONCHOSCOPY N/A 04/07/2018    Procedure: BRONCHOSCOPY with specimens;  Surgeon: Suresh Hernandez MD;  Location: [facility] MAIN OR;  Service: Pulmonary   • BRONCHOSCOPY N/A 03/06/2019    Procedure: BRONCHOSCOPY WITH BAL AND BIOPSY UNDER FLUORO AND ANESTHESIA;  Surgeon: Suresh Hernandez MD;  Location: [facility] MA[facility] OR;  Service: Pulmonary   • BRONCHOSCOPY N/A 06/13/2019    Procedure: BRONCHOSCOPY;  Surgeon: Suresh Hernandez MD;  Location: [facility] MAIN OR;  Service: Pulmonary   • CARDIAC CATHETERIZATION N/A 04/18/2018    Procedure: Right Heart Cath with possible vasodilator study;  Surgeon: Torey Schuler MD;  Location: [facility] CATH [facility]ASIVE LOC[facility];  Service: Cardiovascular   • CARDIAC CATHETERIZATION N/A 03/07/2019    Procedure: RIGHT AND LEFT HEART CATH;  Surgeon: Marizol Holt MD;  Location: [facility] CATH [facility]IVE LOC[facility];  Service: Cardiology   • CARDIAC CATHETERIZATION N/A 03/07/2019    Procedure: CORONARY ANGIOGRAPHY;  Surgeon: Marizol Holt MD;  Location: [facility] C[facility]IVE LOC[facility];  Service: Cardiology   • COLONOSCOPY  12/05/2016    polyps   • CORONARY STENT PLACEMENT  2017   • FRACTURE SURGERY     • INSERT / REPLACE / VENOUS ACCESS CATHETER Right    • PACEMAKER IMPLANTATION     • VENOUS ACCESS DEVICE (PORT) REMOVAL Right 04/05/2021    Procedure: Mediport Removal;  Surgeon: Joseph Nickerson Jr., MD;  Location: [facility] MAIN OR;  Service: General;  Laterality: Right;      General Information     Row Name 05/09/23 1210          Physical Therapy Time and Intention    Document Type therapy note (daily note)  -EB     Mode of Treatment individual therapy;physical therapy  -EB     Row Name 05/09/23 1210          General Information    Existing Precautions/Restrictions fall;oxygen therapy device and L/min  -EB     Row Name 05/09/23 1210          Cognition    Orientation Status (Cognition) oriented x 4  -EB      Row Name 05/09/23 1210          Safety Issues, Functional Mobility    Safety Issues Affecting Function (Mobility) awareness of need for assistance  -EB     Impairments Affecting Function (Mobility) endurance/activity tolerance;strength;shortness of breath  -EB           User Key  (r) = Recorded By, (t) = Taken By, (c) = Cosigned By    Initials Name Provider Type     Candy Jaimes PTA Physical Therapist Assistant               Mobility     Row Name 05/09/23 1210          Bed Mobility    Supine-Sit [place] (Bed Mobility) standby assist  -EB     Sit-Supine [place] (Bed Mobility) minimum assist (75% patient effort)  assist with LEs getting into transport bed.  -EB     Assistive Device (Bed Mobility) bed rails;head of bed elevated  -EB     Row Name 05/09/23 1210          Sit-Stand Transfer    Sit-Stand [place] (Transfers) contact guard  -EB     Assistive Device (Sit-Stand Transfers) walker, front-wheeled  -EB     Row Name 05/09/23 1210          Gait/Stairs (Locomotion)    [place]ette Level (Gait) contact guard  -EB     Assistive Device (Gait) walker, front-wheeled  -EB     Distance in Feet (Gait) 15ft  -EB     Deviations/Abnormal Patterns (Gait) stride length decreased;gait speed decreased  -EB     Bilateral Gait Deviations forward flexed posture;heel strike decreased  -EB     Comment, (Gait/Stairs) limited gait distance due to transport arriving to take pt down for swallow study.  -EB           User Key  (r) = Recorded By, (t) = Taken By, (c) = Cosigned By    Initials Name Provider Type     Candy Jaimes PTA Physical Therapist Assistant               Obj/Interventions    No documentation.                Goals/Plan    No documentation.                Clinical Impression     Row Name 05/09/23 1214          Plan of Care Review    Plan of Care Reviewed With patient  -EB     Progress no change  -EB     Outcome Evaluation Pt seen for PT treatment today. Pt completed bed mobility with SBA/Sarina, only  needing a little assist with getting BLEs into transport bed. Pt is CGA with STS transfers and ambulated 15ft with rwx, CGA. No unsteadiness or LOB noted. Transport arrived to take pt to swallow study and treatment cut short. Will continue to follow pt for d/c needs and progress as able.  -EB     Row Name 05/09/23 1214          Therapy Assessment/Plan (PT)    Therapy Frequency (PT) 6 times/wk  -EB     Row Name 05/09/23 1214          Positioning and Restraints    Pre-Treatment Position in bed  -EB     Post Treatment Position bed  -EB     In Bed supine;with other staff  transport bed.  -EB           User Key  (r) = Recorded By, (t) = Taken By, (c) = Cosigned By    Initials Name Provider Type    Candy Avila PTA Physical Therapist Assistant               Outcome Measures     Row Name 05/09/23 1222          How much help from another person do you currently need...    Turning from your back to your side while in flat bed without using bedrails? 3  -EB     Moving from lying on back to sitting on the side of a flat bed without bedrails? 3  -EB     Moving to and from a bed to a chair (including a wheelchair)? 3  -EB     Standing up from a chair using your arms (e.g., wheelchair, bedside chair)? 3  -EB     Climbing 3-5 steps with a railing? 2  -EB     To walk in hospital room? 3  -EB     AM-PAC 6 Clicks Score (PT) 17  -EB     Highest level of mobility 5 --> Static standing  -EB           User Key  (r) = Recorded By, (t) = Taken By, (c) = Cosigned By    Initials Name Provider Type    Candy Avila PTA Physical Therapist Assistant                             Physical Therapy Education     Title: PT OT SLP Therapies (In Progress)     Topic: Physical Therapy (In Progress)     Point: Mobility training (Done)     Learning Progress Summary           Patient Acceptance, E, VU,NR by SALUD at 5/9/2023 1222    Acceptance, E, NR by NINO at 5/9/2023 0305    Acceptance, E, VU by KARINA at 5/8/2023 1440                   Point: Home  exercise program (In Progress)     Learning Progress Summary           Patient Acceptance, E, NR by JL at 5/9/2023 0305    Acceptance, E, VU by DB at 5/8/2023 1440                   Point: Body mechanics (Done)     Learning Progress Summary           Patient Acceptance, E, VU,NR by EB at 5/9/2023 1222    Acceptance, E, NR by JL at 5/9/2023 0305    Acceptance, E, VU by DB at 5/8/2023 1440                   Point: Precautions (Done)     Learning Progress Summary           Patient Acceptance, E, VU,NR by EB at 5/9/2023 1222    Acceptance, E, NR by JL at 5/9/2023 0305    Acceptance, E, VU by DB at 5/8/2023 1440                               User Key     Initials Effective Dates Name Provider Type Discipline    EB 02/14/23 -  Candy Jaimes PTA Physical Therapist Assistant PT    DB 06/16/21 -  Haydee Velazquez, PT Physical Therapist PT     10/20/20 -  Noe Barros, RN Registered Nurse Nurse              PT Recommendation and Plan     Plan of Care Reviewed With: patient  Progress: no change  Outcome Evaluation: Pt seen for PT treatment today. Pt completed bed mobility with SBA/Sarina, only needing a little assist with getting BLEs into transport bed. Pt is CGA with STS transfers and ambulated 15ft with rwx, CGA. No unsteadiness or LOB noted. Transport arrived to take pt to swallow study and treatment cut short. Will continue to follow pt for d/c needs and progress as able.     Time Calculation:    PT Charges     Row Name 05/09/23 1209             Time Calculation    Start Time 1009  -EB      Stop Time 1019  -EB      Time Calculation (min) 10 min  -EB      PT Received On 05/09/23  -EB      PT - Next Appointment 05/10/23  -         Time Calculation- PT    Total Timed Code Minutes- PT 10 minute(s)  -EB            User Key  (r) = Recorded By, (t) = Taken By, (c) = Cosigned By    Initials Name Provider Type    EB Candy Jaimes PTA Physical Therapist Assistant              Therapy Charges for Today     Code Description Service  Date Service Provider Modifiers Qty    [id number] HC GAIT TRAINING EA 15 MIN 5/9/2023 Candy Jaimes, PTA GP 1          PT G-Codes  Outcome Measure Options: AM-PAC 6 Clicks Basic Mobility (PT)  AM-PAC 6 Clicks Score (PT): 17       Candy Jaimes PTA  5/9/2023

## 2023-05-09 NOTE — PROGRESS NOTES
Name: Alessio Allen ADMIT: 2023   : 1940  PCP: Madison Lewis APRN    MRN: [id number] LOS: 2 days   AGE/SEX: 82 y.o. male  ROOM: [facility]     Subjective   Subjective   Breathing is improved today. Feels weak.     Objective   Objective   Vital Signs  Temp:  [97.2 °F (36.2 °C)-98.6 °F (37 °C)] 98.3 °F (36.8 °C)  Heart Rate:  [72-82] 80  Resp:  [18-20] 20  BP: (108-126)/(62-71) 122/66  SpO2:  [89 %-97 %] 90 %  on  Flow (L/min):  [4-5] 4;   Device (Oxygen Therapy): humidified;high-flow nasal cannula  Body mass index is 25.74 kg/m².  Physical Exam  Constitutional:       General: He is not in acute distress.     Appearance: He is ill-appearing.   Pulmonary:      Effort: Pulmonary effort is normal.   Abdominal:      General: Abdomen is flat. There is no distension.      Palpations: Abdomen is soft.   Musculoskeletal:         General: Normal range of motion.      Right lower leg: No edema.      Left lower leg: No edema.   Neurological:      General: No focal deficit present.      Mental Status: He is alert.         Results Review     I reviewed the patient's new clinical results.  Results from last 7 days   Lab Units 23  0947 23  0357 23  1139   WBC 10*3/mm3 10.48 14.72* 12.19*   HEMOGLOBIN g/dL 17.1 16.7 16.9   PLATELETS 10*3/mm3 332 358 368     Results from last 7 days   Lab Units 23  0947 23  0357 23  1139   SODIUM mmol/L 136 135* 135*   POTASSIUM mmol/L 4.5 4.1 4.2   CHLORIDE mmol/L 99 99 97*   CO2 mmol/L 24.5 24.7 28.0   BUN mg/dL 22 21 17   CREATININE mg/dL 1.14 1.17 1.07   GLUCOSE mg/dL 149* 108* 99   EGFR mL/min/1.73 64.6 62.6 69.7     Results from last 7 days   Lab Units 23  1139   ALBUMIN g/dL 3.9   BILIRUBIN mg/dL 1.4*   ALK PHOS U/L 76   AST (SGOT) U/L 24   ALT (SGPT) U/L 17     Results from last 7 days   Lab Units 23  0947 23  0357 23  1139   CALCIUM mg/dL 9.3 8.7 8.9   ALBUMIN g/dL  --   --  3.9     Results from last 7 days   Lab  Units 05/07/23  1400 05/07/23  1139   PROCALCITONIN ng/mL  --  0.33*   LACTATE mmol/L 1.0  --      No results found for: HGBA1C, POCGLU    No radiology results for the last day  Scheduled Medications  budesonide-formoterol, 2 puff, Inhalation, BID - RT  cefTRIAXone, 2 g, Intravenous, Q24H  doxycycline, 100 mg, Intravenous, Q12H  eltrombopag, 50 mg, Oral, Daily  empagliflozin, 10 mg, Oral, Daily  ferrous sulfate, 325 mg, Oral, Daily With Breakfast  furosemide, 40 mg, Oral, Daily  ipratropium-albuterol, 3 mL, Nebulization, 4x Daily - RT  ketotifen, 1 drop, Both Eyes, BID  pantoprazole, 40 mg, Oral, Q AM  pilocarpine, 5 mg, Oral, TID  potassium chloride, 10 mEq, Oral, Daily  pregabalin, 75 mg, Oral, Q12H  rivaroxaban, 20 mg, Oral, Daily With Dinner  saccharomyces boulardii, 500 mg, Oral, BID  sodium chloride, 10 mL, Intravenous, Q12H  sodium chloride, 4 mL, Nebulization, BID - RT  tadalafil, 40 mg, Oral, Q24H  terazosin, 2 mg, Oral, Nightly  vitamin B-6, 50 mg, Oral, Daily    Infusions   Diet  Diet: Regular/House Diet, Cardiac Diets; Low Sodium (2g); Texture: Soft to Chew (NDD 3); Soft to Chew: Chopped Meat; Fluid Consistency: Thin (IDDSI 0)       Assessment/Plan     Active Hospital Problems    Diagnosis  POA   • **Pneumonia of left lower lobe due to infectious organism [J18.9]  Yes   • Hemoptysis [R04.2]  Yes   • IPF (idiopathic pulmonary fibrosis) [J84.112]  Yes   • Acute on chronic respiratory failure with hypoxia [J96.21]  Yes   • COPD (chronic obstructive pulmonary disease) [J44.9]  Yes   • A-fib [I48.91]  Yes   • Dyslipidemia [E78.5]  Yes   • BP (high blood pressure) [I10]  Yes      Resolved Hospital Problems   No resolved problems to display.       82 y.o. male admitted with Pneumonia of left lower lobe due to infectious organism.      05/09/23  Continue antibiotics. Resume Xarelto.    PNA  -CT chest (5/7/2023): Infiltrate in left lower lung, minimal bilateral pleural effusions  -cefepime, vancomycin switched to  ceftriaxone and doxycycline    Hemoptysis, resolved  -2/2 PNA, Xarelto  -no acute intervention per Pulm    COPD  IPF  Acute on chronic respiratory failure (4L home O2)  -Continue Symbicort, scheduled DuoNebs    Paroxysmal Afib  -RC: none  -AC: Xarelto    Chronic back pain  Hx compression fx  -new age-indeterminate T7 compression fx  -prn lidocaine patch    Ascending aortic aneurysm  -4.7 cm seen on CT 5/7/2023, stable from prior    · SCDs for DVT prophylaxis; Xarelto ON HOLD  · Discussed with patient and care team on multidisciplinary rounds.  · Anticipate discharge home with  when cleared by consultants      Edwin Zapata MD  [facility]  05/09/23  20:48 EDT

## 2023-05-09 NOTE — PLAN OF CARE
Goal Outcome Evaluation:               Pt. Alert, oriented x4, being monitored for 02 sats, o2 sats kept over 88% to 92% with high flow oxygen @6l/m per n/c and cpap during the sleeping time, no voiced c/o pain, v/s stable, no s/s acute distress noted

## 2023-05-09 NOTE — PLAN OF CARE
"Goal Outcome Evaluation:  Plan of Care Reviewed With: patient           Outcome Evaluation: VFSS completed and pt demonstrates mild oropharyngeal dysphagia and esophageal dysphagia. Penetration x1 on first sip of thins, no other penetration on thins and all other tested consistencies. Statis was noted in upper esophagus with all tested consistencies and esophageal screen was completed with thins,  Per radiologist \"spasm and significant intraesophageal reflux and statis.\" SLP recommends GI consult and overall patient is at high risk for aspirating reflux. Recommend mechanical soft chopped and thin liquids, small bites and sips, multiple swallows and alternate liquids and solids and periodic throat clear. Reflux precautions, upright 90 degrees during meals and remain upright atleast one hour after meals. Meds whole with puree.         "

## 2023-05-09 NOTE — PROGRESS NOTES
[facility]      Mar/chart reviewed  Follow up pneumonia, hemoptysis, COPD, pulmonary hypertension  no further hemoptysis, still short of breath, still with productive cough, dry mouth    Vital Sign Min/Max for last 24 hours  Temp  Min: 97.2 °F (36.2 °C)  Max: 98.8 °F (37.1 °C)   BP  Min: 104/65  Max: 126/71   Pulse  Min: 72  Max: 82   Resp  Min: 18  Max: 20   SpO2  Min: 88 %  Max: 97 %   Flow (L/min)  Min: 5  Max: 8   No data recorded     Appears ill, axox3,   perrl, eomi, no icterus,  mmm, no jvd, trachea midline, neck supple,  chest decreased ae bilaterally, + crackles, no wheezes,   rrr,   soft, nt, nd +bs,  no c/c/ e  Skin warm, dry no rashes    5/9: reviewed:  Bun 22  Cr 1.14  Bicarb 24  Wbc 10.5  hgb 17.1  plts 332    A/P:  1. Hemoptysis -- suspect 2/2 to pneumonia in patient on anticoagulation -- continue observation  2. Pneumonia -- agree with current antibiotic selection  3. COPD -- continue broncohdilators -- appears to have advanced emphysematous changes on ct chest as well as some kyphosis  4. Acute on chronic hypoxemic respiratory failure -- wean oxygen as able  5. Pulmonary hypertension -- suspect this is mainly who group II and III but patient was on home adcirca per Dr. Hernanedz, defer to Dr. Hernandez  6. afib on xarelto    Can resume xarelto tomorrow if no further hemoptysis, continue antibiotics, pulmonary toilet.

## 2023-05-09 NOTE — PLAN OF CARE
Goal Outcome Evaluation:  Plan of Care Reviewed With: patient        Progress: no change  Outcome Evaluation: Pt seen for PT treatment today. Pt completed bed mobility with SBA/Sarina, only needing a little assist with getting BLEs into transport bed. Pt is CGA with STS transfers and ambulated 15ft with rwx, CGA. No unsteadiness or LOB noted. Transport arrived to take pt to swallow study and treatment cut short. Will continue to follow pt for d/c needs and progress as able.

## 2023-05-10 PROBLEM — R13.10 DYSPHAGIA: Status: ACTIVE | Noted: 2023-05-10

## 2023-05-10 LAB
ANION GAP SERPL CALCULATED.3IONS-SCNC: 10.8 MMOL/L (ref 5–15)
BACTERIA SPEC RESP CULT: NORMAL
BUN SERPL-MCNC: 19 MG/DL (ref 8–23)
BUN/CREAT SERPL: 20.2 (ref 7–25)
CALCIUM SPEC-SCNC: 8.7 MG/DL (ref 8.6–10.5)
CHLORIDE SERPL-SCNC: 100 MMOL/L (ref 98–107)
CO2 SERPL-SCNC: 26.2 MMOL/L (ref 22–29)
CREAT SERPL-MCNC: 0.94 MG/DL (ref 0.76–1.27)
DEPRECATED RDW RBC AUTO: 44.1 FL (ref 37–54)
EGFRCR SERPLBLD CKD-EPI 2021: 81.4 ML/MIN/1.73
ERYTHROCYTE [DISTWIDTH] IN BLOOD BY AUTOMATED COUNT: 13.3 % (ref 12.3–15.4)
GLUCOSE SERPL-MCNC: 92 MG/DL (ref 65–99)
GRAM STN SPEC: NORMAL
GRAM STN SPEC: NORMAL
HCT VFR BLD AUTO: 49.3 % (ref 37.5–51)
HGB BLD-MCNC: 16.3 G/DL (ref 13–17.7)
MCH RBC QN AUTO: 29.7 PG (ref 26.6–33)
MCHC RBC AUTO-ENTMCNC: 33.1 G/DL (ref 31.5–35.7)
MCV RBC AUTO: 89.8 FL (ref 79–97)
PLATELET # BLD AUTO: 349 10*3/MM3 (ref 140–450)
PMV BLD AUTO: 9.7 FL (ref 6–12)
POTASSIUM SERPL-SCNC: 3.8 MMOL/L (ref 3.5–5.2)
RBC # BLD AUTO: 5.49 10*6/MM3 (ref 4.14–5.8)
SODIUM SERPL-SCNC: 137 MMOL/L (ref 136–145)
WBC NRBC COR # BLD: 7.99 10*3/MM3 (ref 3.4–10.8)

## 2023-05-10 PROCEDURE — 94799 UNLISTED PULMONARY SVC/PX: CPT

## 2023-05-10 PROCEDURE — 87205 SMEAR GRAM STAIN: CPT | Performed by: INTERNAL MEDICINE

## 2023-05-10 PROCEDURE — 94761 N-INVAS EAR/PLS OXIMETRY MLT: CPT

## 2023-05-10 PROCEDURE — 94660 CPAP INITIATION&MGMT: CPT

## 2023-05-10 PROCEDURE — 80048 BASIC METABOLIC PNL TOTAL CA: CPT | Performed by: STUDENT IN AN ORGANIZED HEALTH CARE EDUCATION/TRAINING PROGRAM

## 2023-05-10 PROCEDURE — 94664 DEMO&/EVAL PT USE INHALER: CPT

## 2023-05-10 PROCEDURE — 99222 1ST HOSP IP/OBS MODERATE 55: CPT | Performed by: INTERNAL MEDICINE

## 2023-05-10 PROCEDURE — 25010000002 CEFTRIAXONE PER 250 MG

## 2023-05-10 PROCEDURE — 94760 N-INVAS EAR/PLS OXIMETRY 1: CPT

## 2023-05-10 PROCEDURE — 85027 COMPLETE CBC AUTOMATED: CPT | Performed by: STUDENT IN AN ORGANIZED HEALTH CARE EDUCATION/TRAINING PROGRAM

## 2023-05-10 RX ADMIN — Medication 500 MG: at 09:15

## 2023-05-10 RX ADMIN — TADALAFIL 40 MG: 20 TABLET ORAL at 09:15

## 2023-05-10 RX ADMIN — EMPAGLIFLOZIN 10 MG: 10 TABLET, FILM COATED ORAL at 09:16

## 2023-05-10 RX ADMIN — POTASSIUM CHLORIDE 10 MEQ: 750 TABLET, EXTENDED RELEASE ORAL at 09:16

## 2023-05-10 RX ADMIN — CEFTRIAXONE 2 G: 2 INJECTION, POWDER, FOR SOLUTION INTRAMUSCULAR; INTRAVENOUS at 00:11

## 2023-05-10 RX ADMIN — Medication 4 ML: at 08:01

## 2023-05-10 RX ADMIN — KETOTIFEN FUMARATE 1 DROP: 0.25 SOLUTION OPHTHALMIC at 21:15

## 2023-05-10 RX ADMIN — IPRATROPIUM BROMIDE AND ALBUTEROL SULFATE 3 ML: 2.5; .5 SOLUTION RESPIRATORY (INHALATION) at 08:01

## 2023-05-10 RX ADMIN — ELTROMBOPAG OLAMINE 50 MG: 50 TABLET, FILM COATED ORAL at 09:15

## 2023-05-10 RX ADMIN — Medication 500 MG: at 21:14

## 2023-05-10 RX ADMIN — DOXYCYCLINE 100 MG: 100 INJECTION, POWDER, LYOPHILIZED, FOR SOLUTION INTRAVENOUS at 13:25

## 2023-05-10 RX ADMIN — PANTOPRAZOLE SODIUM 40 MG: 40 TABLET, DELAYED RELEASE ORAL at 06:27

## 2023-05-10 RX ADMIN — DOXYCYCLINE 100 MG: 100 INJECTION, POWDER, LYOPHILIZED, FOR SOLUTION INTRAVENOUS at 01:04

## 2023-05-10 RX ADMIN — PREGABALIN 75 MG: 75 CAPSULE ORAL at 09:16

## 2023-05-10 RX ADMIN — KETOTIFEN FUMARATE 1 DROP: 0.25 SOLUTION OPHTHALMIC at 09:16

## 2023-05-10 RX ADMIN — BUDESONIDE AND FORMOTEROL FUMARATE DIHYDRATE 2 PUFF: 160; 4.5 AEROSOL RESPIRATORY (INHALATION) at 08:01

## 2023-05-10 RX ADMIN — FUROSEMIDE 40 MG: 40 TABLET ORAL at 09:16

## 2023-05-10 RX ADMIN — TERAZOSIN 2 MG: 2 CAPSULE ORAL at 21:14

## 2023-05-10 RX ADMIN — Medication 50 MG: at 09:16

## 2023-05-10 RX ADMIN — Medication 10 ML: at 21:15

## 2023-05-10 RX ADMIN — PILOCARPINE HYDROCHLORIDE 5 MG: 5 TABLET, FILM COATED ORAL at 18:10

## 2023-05-10 RX ADMIN — BUDESONIDE AND FORMOTEROL FUMARATE DIHYDRATE 2 PUFF: 160; 4.5 AEROSOL RESPIRATORY (INHALATION) at 22:16

## 2023-05-10 RX ADMIN — RIVAROXABAN 20 MG: 20 TABLET, FILM COATED ORAL at 18:10

## 2023-05-10 RX ADMIN — FERROUS SULFATE TAB 325 MG (65 MG ELEMENTAL FE) 325 MG: 325 (65 FE) TAB at 09:16

## 2023-05-10 RX ADMIN — IPRATROPIUM BROMIDE AND ALBUTEROL SULFATE 3 ML: 2.5; .5 SOLUTION RESPIRATORY (INHALATION) at 11:02

## 2023-05-10 RX ADMIN — PREGABALIN 75 MG: 75 CAPSULE ORAL at 21:15

## 2023-05-10 RX ADMIN — PILOCARPINE HYDROCHLORIDE 5 MG: 5 TABLET, FILM COATED ORAL at 21:15

## 2023-05-10 RX ADMIN — PILOCARPINE HYDROCHLORIDE 5 MG: 5 TABLET, FILM COATED ORAL at 09:16

## 2023-05-10 NOTE — CONSULTS
Chief Complaint   Patient presents with   • Coughing Up Blood       Alessio Allen is a 82 y.o. male who presents with hemoptysis    HPI  Mr. Allen is an 82 yoM w h/o throat cancer s/p XRT and chemotherapy, COPD, chronic resp failure on 4L O2 at home, pulm HTN, Afib on xarelto who presented with hemoptysis and is being treated for PNA.  He has had some dysphagia for which speech evaluated.  VFSS performed showing mild oropharyngeal dysphagia and was noted to have spasm and intraesophageal reflux and stasis.  He remains on antibiotics for PNA as well as Xarelto.    He reports he has had intermittent congestion in his throat after eating since receiving radiation for throat cancer several years ago but he hasn't noticed any change recently.  He reports he takes protonix at home for reflux and that has controlled symptoms.   He denies any heartburn.  No odynophagia.  No n/v, abdominal pain.  He reports he has been swallowing solids and liquids overall without much difficulty prior to admission.   Past Medical History:   Diagnosis Date   • Acute on chronic diastolic heart failure    • Anemia 2018   • Arthritis    • Asthma    • Atrial fibrillation    • BPH (benign prostatic hypertrophy)    • Cancer     throat CA   • Cataract 2012    Removed   • CHF (congestive heart failure) 2018   • Colon polyp 2004   • COPD (chronic obstructive pulmonary disease)    • Coronary artery disease 2017   • Dependence on continuous supplemental oxygen     3L-5L DEPENDING ON ACTIVITY   • Diverticulosis    • Erectile dysfunction    • GERD (gastroesophageal reflux disease)    • H/O Abnormal CBC 2017   • History of heart artery stent 03/2017   • History of medical problems 2017    Afib   • Hyperlipidemia    • Hypertension    • Low back pain 2012   • Lung disease    • Neuropathy     feet and hands    • Osteopenia 2012    Osteopenia   • Pneumonia        Past Surgical History:   Procedure Laterality Date   • BRONCHOSCOPY N/A 04/07/2018     Procedure: BRONCHOSCOPY with specimens;  Surgeon: Suresh Hernandez MD;  Location: [facility] MAIN OR;  Service: Pulmonary   • BRONCHOSCOPY N/A 03/06/2019    Procedure: BRONCHOSCOPY WITH BAL AND BIOPSY UNDER FLUORO AND ANESTHESIA;  Surgeon: Suresh Hernandez MD;  Location: [facility] MAIN OR;  Service: Pulmonary   • BRONCHOSCOPY N/A 06/13/2019    Procedure: BRONCHOSCOPY;  Surgeon: Suresh Hernandez MD;  Location: [facility] MAIN OR;  Service: Pulmonary   • CARDIAC CATHETERIZATION N/A 04/18/2018    Procedure: Right Heart Cath with possible vasodilator study;  Surgeon: Torey Schuler MD;  Location: [facility] CATH INVASIVE LOCATION;  Service: Cardiovascular   • CARDIAC CATHETERIZATION N/A 03/07/2019    Procedure: RIGHT AND LEFT HEART CATH;  Surgeon: Marizol Holt MD;  Location: [facility] CATH INVASIVE LOCATION;  Service: Cardiology   • CARDIAC CATHETERIZATION N/A 03/07/2019    Procedure: CORONARY ANGIOGRAPHY;  Surgeon: Marizol Holt MD;  Location: [facility] CATH INVASIVE LOCATION;  Service: Cardiology   • COLONOSCOPY  12/05/2016    polyps   • CORONARY STENT PLACEMENT  2017   • FRACTURE SURGERY     • INSERT / REPLACE / VENOUS ACCESS CATHETER Right    • PACEMAKER IMPLANTATION     • VENOUS ACCESS DEVICE (PORT) REMOVAL Right 04/05/2021    Procedure: Mediport Removal;  Surgeon: Joseph Nickerson Jr., MD;  Location: [facility] MAIN OR;  Service: General;  Laterality: Right;         Current Facility-Administered Medications:   •  acetaminophen (TYLENOL) tablet 650 mg, 650 mg, Oral, Q4H PRN, 650 mg at 05/09/23 2013 **OR** acetaminophen (TYLENOL) 160 MG/5ML solution 650 mg, 650 mg, Oral, Q4H PRN **OR** acetaminophen (TYLENOL) suppository 650 mg, 650 mg, Rectal, Q4H PRN, Marcos Trujillo MD  •  albuterol (PROVENTIL) nebulizer solution 0.083% 2.5 mg/3mL, 2.5 mg, Nebulization, Q6H PRN, Marcos Trujillo MD, 2.5 mg at 05/07/23 1944  •  budesonide-formoterol (SYMBICORT) 160-4.5 MCG/ACT inhaler 2 puff, 2 puff, Inhalation, BID - RT, Marcos Trujillo MD,  2 puff at 05/10/23 0801  •  cefTRIAXone (ROCEPHIN) 2 g in sodium chloride 0.9 % 100 mL IVPB-VTB, 2 g, Intravenous, Q24H, Nneka Kemp APRN, Last Rate: 200 mL/hr at 05/10/23 0011, 2 g at 05/10/23 0011  •  doxycycline (VIBRAMYCIN) 100 mg in sodium chloride 0.9 % 100 mL IVPB-VTB, 100 mg, Intravenous, Q12H, Nneka Kemp APRN, 100 mg at 05/10/23 1325  •  eltrombopag (PROMACTA) tablet 50 mg, 50 mg, Oral, Daily, Marcos Trujillo MD, 50 mg at 05/10/23 0915  •  empagliflozin (JARDIANCE) tablet 10 mg, 10 mg, Oral, Daily, Marcos Trujillo MD, 10 mg at 05/10/23 0916  •  ferrous sulfate tablet 325 mg, 325 mg, Oral, Daily With Breakfast, Marcos Trujillo MD, 325 mg at 05/10/23 0916  •  furosemide (LASIX) tablet 40 mg, 40 mg, Oral, Daily, Marcos Trujillo MD, 40 mg at 05/10/23 0916  •  Glycerin-Hypromellose- (ARTIFICIAL TEARS) 0.2-0.2-1 % ophthalmic solution solution 1 drop, 1 drop, Both Eyes, Q2H PRN, Marcos Trujillo MD  •  ipratropium-albuterol (DUO-NEB) nebulizer solution 3 mL, 3 mL, Nebulization, 4x Daily - RT, Marcos Trujillo MD, 3 mL at 05/10/23 1102  •  ketotifen (ZADITOR) 0.025 % ophthalmic solution 1 drop, 1 drop, Both Eyes, BID, Marcos Trujillo MD, 1 drop at 05/10/23 0916  •  ondansetron (ZOFRAN) tablet 4 mg, 4 mg, Oral, Q6H PRN **OR** ondansetron (ZOFRAN) injection 4 mg, 4 mg, Intravenous, Q6H PRN, Marcos Trujillo MD  •  pantoprazole (PROTONIX) EC tablet 40 mg, 40 mg, Oral, Q AM, Marcos Trujillo MD, 40 mg at 05/10/23 0627  •  pilocarpine (SALAGEN) tablet 5 mg, 5 mg, Oral, TID, Marcos Trujillo MD, 5 mg at 05/10/23 0916  •  potassium chloride (K-DUR,KLOR-CON) ER tablet 10 mEq, 10 mEq, Oral, Daily, Marcos Trujillo MD, 10 mEq at 05/10/23 0916  •  pregabalin (LYRICA) capsule 75 mg, 75 mg, Oral, Q12H, Marcos Trujillo MD, 75 mg at 05/10/23 0916  •  rivaroxaban (XARELTO) tablet 20 mg, 20 mg, Oral, Daily With Dinner, Benny, Edwin Lee MD, 20 mg at  23  •  saccharomyces boulardii (FLORASTOR) capsule 500 mg, 500 mg, Oral, BID, HoskingEdwin MD, 500 mg at 05/10/23 0915  •  sodium chloride 0.9 % flush 10 mL, 10 mL, Intravenous, Q12H, Marcos Trujillo MD, 10 mL at 23  •  sodium chloride 0.9 % flush 10 mL, 10 mL, Intravenous, PRN, Marcos Trujillo MD  •  sodium chloride 0.9 % infusion 40 mL, 40 mL, Intravenous, PRN, Marcos Trujillo MD  •  sodium chloride 7 % nebulizer solution nebulizer solution 4 mL, 4 mL, Nebulization, BID - RT, Marcos Trujillo MD, 4 mL at 05/10/23 0801  •  sodium chloride nasal spray 2 spray, 2 spray, Nasal, 4x Daily PRN, Marcos Trujillo MD  •  tadalafil (ADCIRCA) tablet 40 mg, 40 mg, Oral, Q24H, Marcos Trujillo MD, 40 mg at 05/10/23 0915  •  terazosin (HYTRIN) capsule 2 mg, 2 mg, Oral, Nightly, Marcos Trujillo MD, 2 mg at 23  •  vitamin B-6 (PYRIDOXINE) tablet 50 mg, 50 mg, Oral, Daily, Marcos Trujillo MD, 50 mg at 05/10/23 0916    Allergies   Allergen Reactions   • Lisinopril Shortness Of Breath   • Sulfa Antibiotics Shortness Of Breath   • Penicillins Rash   • Atenolol Other (See Comments)     drowsiness   • Celebrex [Celecoxib] Rash   • Coreg [Carvedilol] Cough     SOA,   • Ibuprofen Other (See Comments)     Conflict with other medications he is taking       Social History     Socioeconomic History   • Marital status:    • Number of children: 2   Tobacco Use   • Smoking status: Former     Packs/day: 1.50     Years: 45.00     Pack years: 67.50     Types: Cigarettes     Quit date: 1/3/2000     Years since quittin.3   • Smokeless tobacco: Never   Vaping Use   • Vaping Use: Never used   Substance and Sexual Activity   • Alcohol use: No   • Drug use: No   • Sexual activity: Not Currently     Partners: Female       Family History   Problem Relation Age of Onset   • Hypertension Mother    • Arthritis Mother    • Heart attack Father    • Asthma Father    •  Early death Father    • COPD Brother    • Early death Brother    • Heart disease Brother    • Hypertension Brother    • Malig Hyperthermia Neg Hx        Review of Systems   Constitutional: Negative for chills and fever.   Respiratory: Negative for cough and shortness of breath.    Gastrointestinal: Negative for abdominal distention, abdominal pain, nausea and vomiting.   Skin: Negative for color change and rash.   All other systems reviewed and are negative.      Vitals:    05/10/23 1239   BP: 112/72   Pulse: 82   Resp: 18   Temp: 97.7 °F (36.5 °C)   SpO2: 91%       Physical Exam   General: Patient awake, alert and cooperative   Eyes: Normal lids and lashes, no scleral icterus   Neck: Supple, normal ROM   Skin: Warm and dry, not jaundiced   Cardiovascular: Regular rate, regular rhythm, well-perfused extremities   Pulm: Equal expansion bilaterally, no increased WOB   Abdomen: Soft, nontender, nondistended;    Extremities: No rash or edema              Neuro: A&O, o obvious sign of focal deficit   Psychiatric: Normal mood and behavior; memory intact    CT Chest Without Contrast Diagnostic    Result Date: 5/7/2023   Infiltrate in the left lower lung, follow-up recommended. Minimal bilateral pleural effusions.  Aneurysmal ascending aorta.  A new age-indeterminate T7 compression deformity, correlate clinically.  This report was finalized on 5/7/2023 1:30 PM by Dr. Krishan Escalera M.D.      Impression:  Dysphagia, Oropharyngeal, Possible Esophageal   H/o Throat Cancer s/p XRT, chemotherapy  PNA  Chronic Respiratory Failure on 4L O2 at baseline  Pulm HTN  COPD  Afib on Xarelto    Plan:  - Speech working with patient regarding oropharyngeal dysphagia, VFSS with concern for reflux and stasis in esophagus.  He reports he has had some congestion in his throat after swallowing since radiation several years ago but hadn't noticed any change recently  - Patient with PNA, chronic respiratory failure and remains on AC making him  a less than ideal candidate for endoscopy. Further he is not interested in EGD but is okay with non-invasive diagnostic testing  - Plan for dedicated esophagram with barium tablet  - Can continue on daily PPI       Toby Burton MD

## 2023-05-10 NOTE — PROGRESS NOTES
Name: Alessio Allen ADMIT: 2023   : 1940  PCP: Madison Lewis APRN    MRN: [id number] LOS: 3 days   AGE/SEX: 82 y.o. male  ROOM: [facility]     Subjective   Subjective   He feels little bit better overall.  Still coughing up copious amounts of sputum today.    Objective   Objective   Vital Signs  Temp:  [97.7 °F (36.5 °C)-98.3 °F (36.8 °C)] 97.7 °F (36.5 °C)  Heart Rate:  [79-87] 82  Resp:  [18-20] 18  BP: (111-123)/(63-72) 112/72  SpO2:  [86 %-92 %] 91 %  on  Flow (L/min):  [4-6] 5;   Device (Oxygen Therapy): humidified;high-flow nasal cannula  Body mass index is 25.74 kg/m².  Physical Exam  Constitutional:       General: He is not in acute distress.     Appearance: He is ill-appearing.   Pulmonary:      Effort: Pulmonary effort is normal.      Breath sounds: Rhonchi present.      Comments: Significant rhonchi especially in left upper lobe  Abdominal:      General: Abdomen is flat. There is no distension.      Palpations: Abdomen is soft.   Musculoskeletal:         General: Normal range of motion.      Right lower leg: No edema.      Left lower leg: No edema.   Neurological:      General: No focal deficit present.      Mental Status: He is alert.         Results Review     I reviewed the patient's new clinical results.  Results from last 7 days   Lab Units 05/10/23  0608 23  0923  1139   WBC 10*3/mm3 7.99 10.48 14.72* 12.19*   HEMOGLOBIN g/dL 16.3 17.1 16.7 16.9   PLATELETS 10*3/mm3 349 332 358 368     Results from last 7 days   Lab Units 05/10/23  0608 23  0947 23  0357 23  1139   SODIUM mmol/L 137 136 135* 135*   POTASSIUM mmol/L 3.8 4.5 4.1 4.2   CHLORIDE mmol/L 100 99 99 97*   CO2 mmol/L 26.2 24.5 24.7 28.0   BUN mg/dL 19 22 21 17   CREATININE mg/dL 0.94 1.14 1.17 1.07   GLUCOSE mg/dL 92 149* 108* 99   EGFR mL/min/1.73 81.4 64.6 62.6 69.7     Results from last 7 days   Lab Units 23  1139   ALBUMIN g/dL 3.9   BILIRUBIN mg/dL 1.4*   ALK PHOS  U/L 76   AST (SGOT) U/L 24   ALT (SGPT) U/L 17     Results from last 7 days   Lab Units 05/10/23  0608 05/09/23  0947 05/08/23  0357 05/07/23  1139   CALCIUM mg/dL 8.7 9.3 8.7 8.9   ALBUMIN g/dL  --   --   --  3.9     Results from last 7 days   Lab Units 05/07/23  1400 05/07/23  1139   PROCALCITONIN ng/mL  --  0.33*   LACTATE mmol/L 1.0  --      No results found for: HGBA1C, POCGLU    No radiology results for the last day  Scheduled Medications  budesonide-formoterol, 2 puff, Inhalation, BID - RT  cefTRIAXone, 2 g, Intravenous, Q24H  doxycycline, 100 mg, Intravenous, Q12H  eltrombopag, 50 mg, Oral, Daily  empagliflozin, 10 mg, Oral, Daily  ferrous sulfate, 325 mg, Oral, Daily With Breakfast  furosemide, 40 mg, Oral, Daily  ipratropium-albuterol, 3 mL, Nebulization, 4x Daily - RT  ketotifen, 1 drop, Both Eyes, BID  pantoprazole, 40 mg, Oral, Q AM  pilocarpine, 5 mg, Oral, TID  potassium chloride, 10 mEq, Oral, Daily  pregabalin, 75 mg, Oral, Q12H  rivaroxaban, 20 mg, Oral, Daily With Dinner  saccharomyces boulardii, 500 mg, Oral, BID  sodium chloride, 10 mL, Intravenous, Q12H  sodium chloride, 4 mL, Nebulization, BID - RT  tadalafil, 40 mg, Oral, Q24H  terazosin, 2 mg, Oral, Nightly  vitamin B-6, 50 mg, Oral, Daily    Infusions   Diet  Diet: Regular/House Diet, Cardiac Diets; Low Sodium (2g); Texture: Soft to Chew (NDD 3); Soft to Chew: Chopped Meat; Fluid Consistency: Thin (IDDSI 0)       Assessment/Plan     Active Hospital Problems    Diagnosis  POA   • **Pneumonia of left lower lobe due to infectious organism [J18.9]  Yes   • Dysphagia [R13.10]  Unknown   • Hemoptysis [R04.2]  Yes   • IPF (idiopathic pulmonary fibrosis) [J84.112]  Yes   • Acute on chronic respiratory failure with hypoxia [J96.21]  Yes   • COPD (chronic obstructive pulmonary disease) [J44.9]  Yes   • A-fib [I48.91]  Yes   • Dyslipidemia [E78.5]  Yes   • BP (high blood pressure) [I10]  Yes      Resolved Hospital Problems   No resolved problems to  display.       82 y.o. male admitted with Pneumonia of left lower lobe due to infectious organism.      05/10/23  No bleeding since resuming Xarelto.  Continue antibiotics.  Work with PT.  Consult GI given esophageal reflux and stasis.    PNA  -CT chest (5/7/2023): Infiltrate in left lower lung, minimal bilateral pleural effusions  -cefepime, vancomycin switched to ceftriaxone and doxycycline    Hemoptysis, resolved  -2/2 PNA, Xarelto  -no acute intervention per Pulm    Dysphagia  -VFSS on 5/9/2023-spasm and significant intraesophageal reflux and stasis  -mechanical soft chopped and thin liquids  -GI consulted    COPD  IPF  Acute on chronic respiratory failure (4L home O2)  -Continue Symbicort, scheduled DuoNebs    Paroxysmal Afib  -RC: none  -AC: Xarelto    Chronic back pain  Hx compression fx  -new age-indeterminate T7 compression fx  -prn lidocaine patch    Ascending aortic aneurysm  -4.7 cm seen on CT 5/7/2023, stable from prior    · Xarelto for DVT prophylaxis  · Discussed with patient and care team on multidisciplinary rounds.  · Anticipate discharge home with HH when cleared by consultants      Edwin Zapata MD  [facility]  05/10/23  14:01 EDT

## 2023-05-10 NOTE — PROGRESS NOTES
[facility]      Mar/chart reviewed  Follow up pneumonia, hemoptysis, COPD, pulmonary hypertension  no further hemoptysis, still short of breath, still with productive cough,     Vital Sign Min/Max for last 24 hours  Temp  Min: 97.9 °F (36.6 °C)  Max: 98.6 °F (37 °C)   BP  Min: 109/69  Max: 123/71   Pulse  Min: 79  Max: 87   Resp  Min: 18  Max: 20   SpO2  Min: 86 %  Max: 92 %   Flow (L/min)  Min: 4  Max: 6   No data recorded     Appears ill, axox3,   perrl, eomi, no icterus,  mmm, no jvd, trachea midline, neck supple,  chest decreased, coarse ae bilaterally, + crackles, no wheezes,   rrr,   soft, nt, nd +bs,  no c/c/ e  Skin warm, dry no rashes    Labs: 5/10: reviewed:  Bun 19  Cr 0.94  Bicarb 26  Wbc 7.9  hgb 16.34  plts 349    A/P:  1. Hemoptysis -- suspect 2/2 to pneumonia in patient on anticoagulation -- continue observation  2. Pneumonia -- agree with current antibiotic selection  3. COPD -- continue broncohdilators -- appears to have advanced emphysematous changes on ct chest as well as some kyphosis  4. Acute on chronic hypoxemic respiratory failure -- wean oxygen as able  5. Pulmonary hypertension -- suspect this is mainly who group II and III but patient was on home adcirca per Dr. Hernandez, defer to Dr. Hernandez  6. afib on xarelto    Continue antibiotics, pulm toilet

## 2023-05-11 ENCOUNTER — APPOINTMENT (OUTPATIENT)
Dept: GENERAL RADIOLOGY | Facility: HOSPITAL | Age: 83
DRG: 193 | End: 2023-05-11
Payer: MEDICARE

## 2023-05-11 LAB
ANION GAP SERPL CALCULATED.3IONS-SCNC: 10.2 MMOL/L (ref 5–15)
BUN SERPL-MCNC: 16 MG/DL (ref 8–23)
BUN/CREAT SERPL: 19.5 (ref 7–25)
CALCIUM SPEC-SCNC: 8.9 MG/DL (ref 8.6–10.5)
CHLORIDE SERPL-SCNC: 102 MMOL/L (ref 98–107)
CO2 SERPL-SCNC: 26.8 MMOL/L (ref 22–29)
CREAT SERPL-MCNC: 0.82 MG/DL (ref 0.76–1.27)
DEPRECATED RDW RBC AUTO: 43.3 FL (ref 37–54)
EGFRCR SERPLBLD CKD-EPI 2021: 88.3 ML/MIN/1.73
ERYTHROCYTE [DISTWIDTH] IN BLOOD BY AUTOMATED COUNT: 13.2 % (ref 12.3–15.4)
GLUCOSE SERPL-MCNC: 91 MG/DL (ref 65–99)
HCT VFR BLD AUTO: 47.8 % (ref 37.5–51)
HGB BLD-MCNC: 16.4 G/DL (ref 13–17.7)
MCH RBC QN AUTO: 30.8 PG (ref 26.6–33)
MCHC RBC AUTO-ENTMCNC: 34.3 G/DL (ref 31.5–35.7)
MCV RBC AUTO: 89.7 FL (ref 79–97)
PLATELET # BLD AUTO: 335 10*3/MM3 (ref 140–450)
PMV BLD AUTO: 9.7 FL (ref 6–12)
POTASSIUM SERPL-SCNC: 3.8 MMOL/L (ref 3.5–5.2)
RBC # BLD AUTO: 5.33 10*6/MM3 (ref 4.14–5.8)
SODIUM SERPL-SCNC: 139 MMOL/L (ref 136–145)
WBC NRBC COR # BLD: 6.91 10*3/MM3 (ref 3.4–10.8)

## 2023-05-11 PROCEDURE — 85027 COMPLETE CBC AUTOMATED: CPT | Performed by: STUDENT IN AN ORGANIZED HEALTH CARE EDUCATION/TRAINING PROGRAM

## 2023-05-11 PROCEDURE — 94664 DEMO&/EVAL PT USE INHALER: CPT

## 2023-05-11 PROCEDURE — 94761 N-INVAS EAR/PLS OXIMETRY MLT: CPT

## 2023-05-11 PROCEDURE — 94799 UNLISTED PULMONARY SVC/PX: CPT

## 2023-05-11 PROCEDURE — 94760 N-INVAS EAR/PLS OXIMETRY 1: CPT

## 2023-05-11 PROCEDURE — 25010000002 CEFTRIAXONE PER 250 MG

## 2023-05-11 PROCEDURE — 74221 X-RAY XM ESOPHAGUS 2CNTRST: CPT

## 2023-05-11 PROCEDURE — 99232 SBSQ HOSP IP/OBS MODERATE 35: CPT | Performed by: INTERNAL MEDICINE

## 2023-05-11 PROCEDURE — BD11YZZ FLUOROSCOPY OF ESOPHAGUS USING OTHER CONTRAST: ICD-10-PCS | Performed by: RADIOLOGY

## 2023-05-11 PROCEDURE — 97116 GAIT TRAINING THERAPY: CPT

## 2023-05-11 PROCEDURE — 80048 BASIC METABOLIC PNL TOTAL CA: CPT | Performed by: STUDENT IN AN ORGANIZED HEALTH CARE EDUCATION/TRAINING PROGRAM

## 2023-05-11 RX ORDER — DOXYCYCLINE 100 MG/1
100 CAPSULE ORAL EVERY 12 HOURS SCHEDULED
Status: COMPLETED | OUTPATIENT
Start: 2023-05-11 | End: 2023-05-12

## 2023-05-11 RX ADMIN — POTASSIUM CHLORIDE 10 MEQ: 750 TABLET, EXTENDED RELEASE ORAL at 09:40

## 2023-05-11 RX ADMIN — BUDESONIDE AND FORMOTEROL FUMARATE DIHYDRATE 2 PUFF: 160; 4.5 AEROSOL RESPIRATORY (INHALATION) at 08:39

## 2023-05-11 RX ADMIN — BARIUM SULFATE 135 ML: 980 POWDER, FOR SUSPENSION ORAL at 14:35

## 2023-05-11 RX ADMIN — PREGABALIN 75 MG: 75 CAPSULE ORAL at 21:59

## 2023-05-11 RX ADMIN — Medication 4 ML: at 08:38

## 2023-05-11 RX ADMIN — Medication 10 ML: at 09:45

## 2023-05-11 RX ADMIN — TERAZOSIN 2 MG: 2 CAPSULE ORAL at 21:59

## 2023-05-11 RX ADMIN — PILOCARPINE HYDROCHLORIDE 5 MG: 5 TABLET, FILM COATED ORAL at 18:12

## 2023-05-11 RX ADMIN — FUROSEMIDE 40 MG: 40 TABLET ORAL at 09:40

## 2023-05-11 RX ADMIN — CEFTRIAXONE 2 G: 2 INJECTION, POWDER, FOR SOLUTION INTRAMUSCULAR; INTRAVENOUS at 00:23

## 2023-05-11 RX ADMIN — RIVAROXABAN 20 MG: 20 TABLET, FILM COATED ORAL at 18:12

## 2023-05-11 RX ADMIN — TADALAFIL 40 MG: 20 TABLET ORAL at 13:06

## 2023-05-11 RX ADMIN — ELTROMBOPAG OLAMINE 50 MG: 50 TABLET, FILM COATED ORAL at 09:40

## 2023-05-11 RX ADMIN — Medication 4 ML: at 20:59

## 2023-05-11 RX ADMIN — FERROUS SULFATE TAB 325 MG (65 MG ELEMENTAL FE) 325 MG: 325 (65 FE) TAB at 09:40

## 2023-05-11 RX ADMIN — IPRATROPIUM BROMIDE AND ALBUTEROL SULFATE 3 ML: 2.5; .5 SOLUTION RESPIRATORY (INHALATION) at 20:59

## 2023-05-11 RX ADMIN — ANTACID/ANTIFLATULENT 1 PACKET: 380; 550; 10; 10 GRANULE, EFFERVESCENT ORAL at 14:35

## 2023-05-11 RX ADMIN — DOXYCYCLINE 100 MG: 100 INJECTION, POWDER, LYOPHILIZED, FOR SOLUTION INTRAVENOUS at 00:26

## 2023-05-11 RX ADMIN — DOXYCYCLINE 100 MG: 100 INJECTION, POWDER, LYOPHILIZED, FOR SOLUTION INTRAVENOUS at 13:06

## 2023-05-11 RX ADMIN — IPRATROPIUM BROMIDE AND ALBUTEROL SULFATE 3 ML: 2.5; .5 SOLUTION RESPIRATORY (INHALATION) at 08:38

## 2023-05-11 RX ADMIN — PILOCARPINE HYDROCHLORIDE 5 MG: 5 TABLET, FILM COATED ORAL at 09:40

## 2023-05-11 RX ADMIN — Medication 500 MG: at 21:59

## 2023-05-11 RX ADMIN — PREGABALIN 75 MG: 75 CAPSULE ORAL at 09:40

## 2023-05-11 RX ADMIN — IPRATROPIUM BROMIDE AND ALBUTEROL SULFATE 3 ML: 2.5; .5 SOLUTION RESPIRATORY (INHALATION) at 11:39

## 2023-05-11 RX ADMIN — Medication 10 ML: at 21:59

## 2023-05-11 RX ADMIN — KETOTIFEN FUMARATE 1 DROP: 0.25 SOLUTION OPHTHALMIC at 22:02

## 2023-05-11 RX ADMIN — Medication 500 MG: at 09:39

## 2023-05-11 RX ADMIN — KETOTIFEN FUMARATE 1 DROP: 0.25 SOLUTION OPHTHALMIC at 09:45

## 2023-05-11 RX ADMIN — EMPAGLIFLOZIN 10 MG: 10 TABLET, FILM COATED ORAL at 09:40

## 2023-05-11 RX ADMIN — Medication 50 MG: at 09:40

## 2023-05-11 RX ADMIN — BARIUM SULFATE 700 MG: 700 TABLET ORAL at 14:35

## 2023-05-11 RX ADMIN — DOXYCYCLINE 100 MG: 100 CAPSULE ORAL at 21:59

## 2023-05-11 RX ADMIN — PILOCARPINE HYDROCHLORIDE 5 MG: 5 TABLET, FILM COATED ORAL at 21:59

## 2023-05-11 NOTE — PROGRESS NOTES
[facility]      Mar/chart reviewed  Follow up pneumonia, hemoptysis, COPD, pulmonary hypertension  no further hemoptysis, still short of breath,   Feeling better, cough and mucous improving    Vital Sign Min/Max for last 24 hours  Temp  Min: 97.7 °F (36.5 °C)  Max: 98.1 °F (36.7 °C)   BP  Min: 112/72  Max: 138/97   Pulse  Min: 80  Max: 82   Resp  Min: 18  Max: 24   SpO2  Min: 88 %  Max: 93 %   Flow (L/min)  Min: 5  Max: 7   No data recorded     Appears ill, axox3,   perrl, eomi, no icterus,  mmm, no jvd, trachea midline, neck supple,  chest decreased, coarse ae bilaterally, + crackles, no wheezes,   rrr,   soft, nt, nd +bs,  no c/c/ e  Skin warm, dry no rashes     Labs: 5/11: reviewed:  Bun 16  Cr 0.82  Bicarb 26  Wbc 6.9  hgb 16.4  plts 335     A/P:  1. Hemoptysis -- suspect 2/2 to pneumonia in patient on anticoagulation -- continue observation  2. Pneumonia -- agree with current antibiotic selection  3. COPD -- continue broncohdilators -- appears to have advanced emphysematous changes on ct chest as well as some kyphosis  4. Acute on chronic hypoxemic respiratory failure -- wean oxygen as able  5. Pulmonary hypertension -- suspect this is mainly who group II and III but patient was on home adcirca per Dr. Hernandez, defer to Dr. Hernandez  6. afib on xarelto    Improving nicely, I am ok with change to po antibiotics, d/c in a few days?

## 2023-05-11 NOTE — THERAPY TREATMENT NOTE
Patient Name: Alessio Allen  : 1940    MRN: [id number]                              Today's Date: 2023       Admit Date: 2023    Visit Dx:     ICD-10-CM ICD-9-CM   1. Pneumonia of left lower lobe due to infectious organism  J18.9 486   2. Hemoptysis  R04.2 786.30   3. Chronic anticoagulation  Z79.01 V58.61     Patient Active Problem List   Diagnosis   • A-fib   • Dyslipidemia   • BP (high blood pressure)   • Neuropathy   • Hypertension   • COPD (chronic obstructive pulmonary disease)   • BPH (benign prostatic hypertrophy)   • Atrial fibrillation   • Asthma   • Hypoxia   • Pneumonia   • Chronic anticoagulation   • Pneumonia of both lungs due to infectious organism   • Hyponatremia   • COPD exacerbation   • Acute on chronic respiratory failure with hypoxia   • Pneumonia of both lower lobes due to infectious organism   • Acute on chronic diastolic heart failure   • IPF (idiopathic pulmonary fibrosis)   • Acute respiratory failure with hypoxia   • Attention to G-tube   • Massive hemoptysis   • Laryngeal cancer   • Encounter for venous access device care   • Pneumonia of left lower lobe due to infectious organism   • Hemoptysis   • Dysphagia     Past Medical History:   Diagnosis Date   • Acute on chronic diastolic heart failure    • Anemia 2018   • Arthritis    • Asthma    • Atrial fibrillation    • BPH (benign prostatic hypertrophy)    • Cancer     throat CA   • Cataract 2012    Removed   • CHF (congestive heart failure) 2018   • Colon polyp    • COPD (chronic obstructive pulmonary disease)    • Coronary artery disease    • Dependence on continuous supplemental oxygen     3L-5L DEPENDING ON ACTIVITY   • Diverticulosis    • Erectile dysfunction    • GERD (gastroesophageal reflux disease)    • H/O Abnormal CBC    • History of heart artery stent 2017   • History of medical problems 2017    Afib   • Hyperlipidemia    • Hypertension    • Low back pain    • Lung disease    • Neuropathy      feet and hands    • Osteopenia 2012    Osteopenia   • Pneumonia      Past Surgical History:   Procedure Laterality Date   • BRONCHOSCOPY N/A 04/07/2018    Procedure: BRONCHOSCOPY with specimens;  Surgeon: Suresh Hernandez MD;  Location: [facility] MAIN OR;  Service: Pulmonary   • BRONCHOSCOPY N/A 03/06/2019    Procedure: BRONCHOSCOPY WITH BAL AND BIOPSY UNDER FLUORO AND ANESTHESIA;  Surgeon: Suresh Hernandez MD;  Location: [facility] MAIN OR;  Service: Pulmonary   • BRONCHOSCOPY N/A 06/13/2019    Procedure: BRONCHOSCOPY;  Surgeon: Suresh Hernandez MD;  Location: [facility] MAIN OR;  Service: Pulmonary   • CARDIAC CATHETERIZATION N/A 04/18/2018    Procedure: Right Heart Cath with possible vasodilator study;  Surgeon: Torey Schuler MD;  Location: [facility] CATH INVASIVE LOCATION;  Service: Cardiovascular   • CARDIAC CATHETERIZATION N/A 03/07/2019    Procedure: RIGHT AND LEFT HEART CATH;  Surgeon: Marizol Holt MD;  Location: [facility] CATH INVASIVE LOCATION;  Service: Cardiology   • CARDIAC CATHETERIZATION N/A 03/07/2019    Procedure: CORONARY ANGIOGRAPHY;  Surgeon: Marizol Holt MD;  Location: [facility] CATH INVASIVE LOCATION;  Service: Cardiology   • COLONOSCOPY  12/05/2016    polyps   • CORONARY STENT PLACEMENT  2017   • FRACTURE SURGERY     • INSERT / REPLACE / VENOUS ACCESS CATHETER Right    • PACEMAKER IMPLANTATION     • VENOUS ACCESS DEVICE (PORT) REMOVAL Right 04/05/2021    Procedure: Mediport Removal;  Surgeon: Joseph Nickerson Jr., MD;  Location: [facility] MAIN OR;  Service: General;  Laterality: Right;      General Information     Row Name 05/11/23 1319          Physical Therapy Time and Intention    Document Type therapy note (daily note)  -EB     Mode of Treatment individual therapy;physical therapy  -EB     Row Name 05/11/23 4996          General Information    Existing Precautions/Restrictions fall;oxygen therapy device and L/min  -EB     Row Name 05/11/23 2051          Cognition    Orientation Status (Cognition) oriented x  4  -EB     Row Name 05/11/23 1319          Safety Issues, Functional Mobility    Safety Issues Affecting Function (Mobility) awareness of need for assistance  -EB     Impairments Affecting Function (Mobility) endurance/activity tolerance;strength;shortness of breath  -EB           User Key  (r) = Recorded By, (t) = Taken By, (c) = Cosigned By    Initials Name Provider Type     Candy Jaimes PTA Physical Therapist Assistant               Mobility     Row Name 05/11/23 1320          Bed Mobility    Supine-Sit [place] (Bed Mobility) standby assist  -EB     Sit-Supine [place] (Bed Mobility) not tested  -EB     Assistive Device (Bed Mobility) bed rails;head of bed elevated  -EB     Row Name 05/11/23 1320          Sit-Stand Transfer    Sit-Stand [place] (Transfers) standby assist  -EB     Assistive Device (Sit-Stand Transfers) walker, front-wheeled  -EB     Row Name 05/11/23 1320          Gait/Stairs (Locomotion)    [place] Level (Gait) contact guard;standby assist  -EB     Assistive Device (Gait) walker, front-wheeled  -EB     Distance in Feet (Gait) 150ft  -EB     Deviations/Abnormal Patterns (Gait) stride length decreased;gait speed decreased  -EB     Bilateral Gait Deviations forward flexed posture;heel strike decreased  -EB     Comment, (Gait/Stairs) cues for upright posture. Pt needing a couple of standing rest breaks. Slow gait pace but fairly steady.  -EB           User Key  (r) = Recorded By, (t) = Taken By, (c) = Cosigned By    Initials Name Provider Type    Candy Avila PTA Physical Therapist Assistant               Obj/Interventions    No documentation.                Goals/Plan    No documentation.                Clinical Impression     Row Name 05/11/23 1321          Plan of Care Review    Plan of Care Reviewed With patient  -EB     Progress improving  -EB     Outcome Evaluation Pt seen for PT treatment today. pt is improving with mobility. Pt required SBA with bed mobility and  transfers. pt was able to ambulate 150ft with rwx, CGA/SBA. pt with slow gait pace but fairly steady. Cues for upright posture. Pt did have a little SOA during ambulation and increase of O2 needed. pt on 6L increased to 8L. Will continue to progress pt as able.  -     Row Name 05/11/23 1321          Therapy Assessment/Plan (PT)    Therapy Frequency (PT) 6 times/wk  -     Row Name 05/11/23 1321          Positioning and Restraints    Pre-Treatment Position in bed  -     Post Treatment Position bathroom  -EB     Bathroom sitting;call light within reach;encouraged to call for assist;notified nsg  -EB           User Key  (r) = Recorded By, (t) = Taken By, (c) = Cosigned By    Initials Name Provider Type    Candy Avila PTA Physical Therapist Assistant               Outcome Measures     Row Name 05/11/23 1324          How much help from another person do you currently need...    Turning from your back to your side while in flat bed without using bedrails? 3  -EB     Moving from lying on back to sitting on the side of a flat bed without bedrails? 3  -EB     Moving to and from a bed to a chair (including a wheelchair)? 3  -EB     Standing up from a chair using your arms (e.g., wheelchair, bedside chair)? 3  -EB     Climbing 3-5 steps with a railing? 2  -EB     To walk in hospital room? 3  -EB     AM-PAC 6 Clicks Score (PT) 17  -EB     Highest level of mobility 5 --> Static standing  -           User Key  (r) = Recorded By, (t) = Taken By, (c) = Cosigned By    Initials Name Provider Type    Candy Avila PTA Physical Therapist Assistant                             Physical Therapy Education     Title: PT OT SLP Therapies (In Progress)     Topic: Physical Therapy (In Progress)     Point: Mobility training (Done)     Learning Progress Summary           Patient Acceptance, E, VU by SALUD at 5/11/2023 1324    Acceptance, E, VU,NR by SALUD at 5/9/2023 1222    Acceptance, E, NR by NINO at 5/9/2023 0305    Acceptance, E,  VU by DB at 5/8/2023 1440                   Point: Home exercise program (In Progress)     Learning Progress Summary           Patient Acceptance, E, NR by JL at 5/9/2023 0305    Acceptance, E, VU by DB at 5/8/2023 1440                   Point: Body mechanics (Done)     Learning Progress Summary           Patient Acceptance, E, VU by EB at 5/11/2023 1324    Acceptance, E, VU,NR by EB at 5/9/2023 1222    Acceptance, E, NR by JL at 5/9/2023 0305    Acceptance, E, VU by DB at 5/8/2023 1440                   Point: Precautions (Done)     Learning Progress Summary           Patient Acceptance, E, VU by EB at 5/11/2023 1324    Acceptance, E, VU,NR by EB at 5/9/2023 1222    Acceptance, E, NR by JL at 5/9/2023 0305    Acceptance, E, VU by DB at 5/8/2023 1440                               User Key     Initials Effective Dates Name Provider Type Discipline    EB 02/14/23 -  Candy Jaimes, PTA Physical Therapist Assistant PT    DB 06/16/21 -  Haydee Velazquez, PT Physical Therapist PT     10/20/20 -  Noe Barros, RN Registered Nurse Nurse              PT Recommendation and Plan     Plan of Care Reviewed With: patient  Progress: improving  Outcome Evaluation: Pt seen for PT treatment today. pt is improving with mobility. Pt required SBA with bed mobility and transfers. pt was able to ambulate 150ft with rwx, CGA/SBA. pt with slow gait pace but fairly steady. Cues for upright posture. Pt did have a little SOA during ambulation and increase of O2 needed. pt on 6L increased to 8L. Will continue to progress pt as able.     Time Calculation:    PT Charges     Row Name 05/11/23 4098             Time Calculation    Start Time 0908  -EB      Stop Time 0922  -EB      Time Calculation (min) 14 min  -EB      PT Received On 05/11/23  -      PT - Next Appointment 05/12/23  -         Time Calculation- PT    Total Timed Code Minutes- PT 14 minute(s)  -            User Key  (r) = Recorded By, (t) = Taken By, (c) = Cosigned By     Initials Name Provider Type     Candy Jaimes PTA Physical Therapist Assistant              Therapy Charges for Today     Code Description Service Date Service Provider Modifiers Qty    [id number] HC GAIT TRAINING EA 15 MIN 5/11/2023 Candy Jaimes PTA GP 1          PT G-Codes  Outcome Measure Options: AM-PAC 6 Clicks Basic Mobility (PT)  AM-PAC 6 Clicks Score (PT): 17       Candy Jaimes PTA  5/11/2023

## 2023-05-11 NOTE — PROGRESS NOTES
Name: Alessio Allen ADMIT: 2023   : 1940  PCP: Madison Lewis APRN    MRN: [id number] LOS: 4 days   AGE/SEX: 82 y.o. male  ROOM: [facility]     Subjective   Subjective   Patient seen and examined this morning. Hospital day 4.  At time of my examination, patient is awake, resting in bed, continues to have dyspnea and cough, however, slightly improved when compared to prior.  Discussed with nursing this morning, patient on 6 L HFNC (on 3 to 4 L O2 via NC at home, baseline).  Pulmonology and GI consulted and following.     Objective   Objective   Vital Signs  Temp:  [97.7 °F (36.5 °C)-98.1 °F (36.7 °C)] 97.9 °F (36.6 °C)  Heart Rate:  [80-82] 81  Resp:  [18-24] 18  BP: (112-138)/(65-97) 138/97  SpO2:  [88 %-93 %] 93 %  on  Flow (L/min):  [5-7] 7;   Device (Oxygen Therapy): humidified;high-flow nasal cannula  Body mass index is 25.74 kg/m².  Physical Exam  Vitals and nursing note reviewed.   Constitutional:       General: He is awake. He is not in acute distress.     Appearance: He is ill-appearing.   Cardiovascular:      Rate and Rhythm: Normal rate and regular rhythm.      Pulses: Normal pulses.      Heart sounds: Normal heart sounds.   Pulmonary:      Effort: Pulmonary effort is normal. No respiratory distress.      Breath sounds: Decreased breath sounds, wheezing and rhonchi present.   Abdominal:      General: Bowel sounds are normal. There is no distension.      Palpations: Abdomen is soft.      Tenderness: There is no abdominal tenderness.   Skin:     General: Skin is warm and dry.   Neurological:      Mental Status: He is alert.   Psychiatric:         Behavior: Behavior is cooperative.       Results Review     I reviewed the patient's new clinical results.  Results from last 7 days   Lab Units 23  0548 05/10/23  0608 23  0947 23  0357   WBC 10*3/mm3 6.91 7.99 10.48 14.72*   HEMOGLOBIN g/dL 16.4 16.3 17.1 16.7   PLATELETS 10*3/mm3 335 349 332 358     Results from last 7 days    Lab Units 05/11/23  0548 05/10/23  0608 05/09/23  0947 05/08/23  0357   SODIUM mmol/L 139 137 136 135*   POTASSIUM mmol/L 3.8 3.8 4.5 4.1   CHLORIDE mmol/L 102 100 99 99   CO2 mmol/L 26.8 26.2 24.5 24.7   BUN mg/dL 16 19 22 21   CREATININE mg/dL 0.82 0.94 1.14 1.17   GLUCOSE mg/dL 91 92 149* 108*   EGFR mL/min/1.73 88.3 81.4 64.6 62.6     Results from last 7 days   Lab Units 05/07/23  1139   ALBUMIN g/dL 3.9   BILIRUBIN mg/dL 1.4*   ALK PHOS U/L 76   AST (SGOT) U/L 24   ALT (SGPT) U/L 17     Results from last 7 days   Lab Units 05/11/23  0548 05/10/23  0608 05/09/23  0947 05/08/23  0357 05/07/23  1139   CALCIUM mg/dL 8.9 8.7 9.3 8.7 8.9   ALBUMIN g/dL  --   --   --   --  3.9     Results from last 7 days   Lab Units 05/07/23  1400 05/07/23  1139   PROCALCITONIN ng/mL  --  0.33*   LACTATE mmol/L 1.0  --      No results found for: HGBA1C, POCGLU    No radiology results for the last day  I have personally reviewed all medications:  Scheduled Medications  budesonide-formoterol, 2 puff, Inhalation, BID - RT  cefTRIAXone, 2 g, Intravenous, Q24H  doxycycline, 100 mg, Intravenous, Q12H  eltrombopag, 50 mg, Oral, Daily  empagliflozin, 10 mg, Oral, Daily  ferrous sulfate, 325 mg, Oral, Daily With Breakfast  furosemide, 40 mg, Oral, Daily  ipratropium-albuterol, 3 mL, Nebulization, 4x Daily - RT  ketotifen, 1 drop, Both Eyes, BID  pantoprazole, 40 mg, Oral, Q AM  pilocarpine, 5 mg, Oral, TID  potassium chloride, 10 mEq, Oral, Daily  pregabalin, 75 mg, Oral, Q12H  rivaroxaban, 20 mg, Oral, Daily With Dinner  saccharomyces boulardii, 500 mg, Oral, BID  sodium chloride, 10 mL, Intravenous, Q12H  sodium chloride, 4 mL, Nebulization, BID - RT  tadalafil, 40 mg, Oral, Q24H  terazosin, 2 mg, Oral, Nightly  vitamin B-6, 50 mg, Oral, Daily    Infusions   Diet  Diet: Regular/House Diet, Cardiac Diets; Low Sodium (2g); Texture: Soft to Chew (NDD 3); Soft to Chew: Chopped Meat; Fluid Consistency: Thin (IDDSI 0)    I have personally  reviewed:  [x]  Laboratory   [x]  Microbiology   [x]  Radiology   [x]  EKG/Telemetry  [x]  Cardiology/Vascular   [x]  Records       Assessment/Plan     Active Hospital Problems    Diagnosis  POA   • **Pneumonia of left lower lobe due to infectious organism [J18.9]  Yes   • Dysphagia [R13.10]  Unknown   • Hemoptysis [R04.2]  Yes   • IPF (idiopathic pulmonary fibrosis) [J84.112]  Yes   • Acute on chronic respiratory failure with hypoxia [J96.21]  Yes   • COPD (chronic obstructive pulmonary disease) [J44.9]  Yes   • A-fib [I48.91]  Yes   • Dyslipidemia [E78.5]  Yes   • BP (high blood pressure) [I10]  Yes      Resolved Hospital Problems   No resolved problems to display.       82 y.o. male admitted with Pneumonia of left lower lobe due to infectious organism.    Acute on chronic respiratory failure (on 4L home O2) with hypoxia  Pneumonia  COPD  IPF  - Patient meets criteria for diagnosis of acute on chronic respiratory failure with hypoxia with: Worsening dyspnea, increased oxygen requirements from that of his normal baseline and multiple documented O2 sats in the 80s%, charted low appears to be 86.  - CT Chest on admission (05/07): new patchy and consolidative infiltrate in the left lower lobe and posterior left upper lobe concerning for pneumonia.  - Procalcitonin 0.33, coupled with initial leukocytosis.  RVP negative blood cultures no growth to date, MRSA PCR negative, respiratory culture showing 1+ WBC with no organisms.  - Was on Vancomycin and Cefepime, now on Ceftriaxone and Doxycycline, appears to be tolerating well. WBC improved, patient afebrile, oxygen requirements slowly being weaned.  - Pulmonology consulted and following. Will continue to follow their plans/recommendations. Greatly appreciate their help.  - Continue antibiotics and bronchodilators as prescribed.  - Supplemental O2 PRN to maintain O2 sat 88-92%, aspiration precautions, elevate HOB, telemetry, pulse oximetry.   - Repeat procalcitonin in  AM.     Dysphagia  - VFSS on 5/9/2023-spasm and significant intraesophageal reflux and stasis, had diet modified to mechanical soft chopped and thin liquids.  - GI consulted and following. Will continue to follow their plans/recommendations. Greatly appreciate their help.  - Aspiration precautions.     Paroxysmal atrial fibrillation on long term anticoagulation  - [id number] score: 4.  - Vitals, telemetry reviewed, patient appears to be stable, rate controlled at present.  Patient not on rate control medication.  On Xarelto for anticoagulation.  - Continue current treatment as prescribed. Continue telemetry monitoring.    Chronic back pain  Hx compression fx  - new age-indeterminate T7 compression fracture.  - Monitor for now, fall precautions, PT/OT, lidocaine patch PRN.  -prn lidocaine patch     Ascending aortic aneurysm  - 4.7 cm seen on CT 5/7/2023, stable from prior. Follow up with PCP for repeat imaging after discharge as scheduled based on guidelines.     Hemoptysis, resolved  - Felt to be secondary to pneumonia, complicated by Xarelto use.  Pulmonology following, no acute intervention warranted.  Patient has since been recently started on his Xarelto, without any further episodes of hemoptysis.  Appears to remain resolved at this time.     All workup, problems and plans new to me today. First day taking care of patient.    · Xarelto (home med) for DVT prophylaxis.  · Full code.  · Discussed with patient, nursing staff, CCP and care team on multidisciplinary rounds.  · Anticipate discharge home with home health in 2-3 days..      Jad Narayanan DO  [facility]  05/11/23  09:42 EDT

## 2023-05-11 NOTE — PLAN OF CARE
Goal Outcome Evaluation:  Plan of Care Reviewed With: patient        Progress: improving  Outcome Evaluation: Pt seen for PT treatment today. pt is improving with mobility. Pt required SBA with bed mobility and transfers. pt was able to ambulate 150ft with rwx, CGA/SBA. pt with slow gait pace but fairly steady. Cues for upright posture. Pt did have a little SOA during ambulation and increase of O2 needed. pt on 6L increased to 8L. Will continue to progress pt as able.

## 2023-05-11 NOTE — PROGRESS NOTES
[facility], [place], operated by [facility]  Inpatient Progress Note    Reason for Follow Up: Dysphagia    Subjective     Interval History:   Awaiting esophagram today.  Anxious for home.    Current Facility-Administered Medications:   •  acetaminophen (TYLENOL) tablet 650 mg, 650 mg, Oral, Q4H PRN, 650 mg at 05/09/23 2013 **OR** acetaminophen (TYLENOL) 160 MG/5ML solution 650 mg, 650 mg, Oral, Q4H PRN **OR** acetaminophen (TYLENOL) suppository 650 mg, 650 mg, Rectal, Q4H PRN, Marcos Trujillo MD  •  albuterol (PROVENTIL) nebulizer solution 0.083% 2.5 mg/3mL, 2.5 mg, Nebulization, Q6H PRN, Marcos Trujillo MD, 2.5 mg at 05/07/23 1944  •  budesonide-formoterol (SYMBICORT) 160-4.5 MCG/ACT inhaler 2 puff, 2 puff, Inhalation, BID - RT, Marcos Trujillo MD, 2 puff at 05/11/23 0839  •  cefTRIAXone (ROCEPHIN) 2 g in sodium chloride 0.9 % 100 mL IVPB-VTB, 2 g, Intravenous, Q24H, Nneka Kemp APRN, Last Rate: 200 mL/hr at 05/11/23 0023, 2 g at 05/11/23 0023  •  doxycycline (VIBRAMYCIN) 100 mg in sodium chloride 0.9 % 100 mL IVPB-VTB, 100 mg, Intravenous, Q12H, Nneka Kemp APRN, 100 mg at 05/11/23 0026  •  eltrombopag (PROMACTA) tablet 50 mg, 50 mg, Oral, Daily, Marcos Trujillo MD, 50 mg at 05/11/23 0940  •  empagliflozin (JARDIANCE) tablet 10 mg, 10 mg, Oral, Daily, Marcos Trujillo MD, 10 mg at 05/11/23 0940  •  ferrous sulfate tablet 325 mg, 325 mg, Oral, Daily With Breakfast, Marcos Trujillo MD, 325 mg at 05/11/23 0940  •  furosemide (LASIX) tablet 40 mg, 40 mg, Oral, Daily, Marcos Trujillo MD, 40 mg at 05/11/23 0940  •  Glycerin-Hypromellose- (ARTIFICIAL TEARS) 0.2-0.2-1 % ophthalmic solution solution 1 drop, 1 drop, Both Eyes, Q2H PRN, Marcos Trujillo MD  •  ipratropium-albuterol (DUO-NEB) nebulizer solution 3 mL, 3 mL, Nebulization, 4x Daily - RT, Marcos Trujillo MD, 3 mL at 05/11/23 0838  •  ketotifen (ZADITOR) 0.025 % ophthalmic solution 1 drop, 1 drop, Both Eyes, BID,  Marcos Trujillo MD, 1 drop at 05/11/23 0945  •  ondansetron (ZOFRAN) tablet 4 mg, 4 mg, Oral, Q6H PRN **OR** ondansetron (ZOFRAN) injection 4 mg, 4 mg, Intravenous, Q6H PRN, Marcos Trujillo MD  •  pantoprazole (PROTONIX) EC tablet 40 mg, 40 mg, Oral, Q AM, Marcos Trujillo MD, 40 mg at 05/10/23 0627  •  pilocarpine (SALAGEN) tablet 5 mg, 5 mg, Oral, TID, Marcos Trujillo MD, 5 mg at 05/11/23 0940  •  potassium chloride (K-DUR,KLOR-CON) ER tablet 10 mEq, 10 mEq, Oral, Daily, Marcos Trujillo MD, 10 mEq at 05/11/23 0940  •  pregabalin (LYRICA) capsule 75 mg, 75 mg, Oral, Q12H, Marcos Trujillo MD, 75 mg at 05/11/23 0940  •  rivaroxaban (XARELTO) tablet 20 mg, 20 mg, Oral, Daily With Dinner, Edwin Zapata MD, 20 mg at 05/10/23 1810  •  saccharomyces boulardii (FLORASTOR) capsule 500 mg, 500 mg, Oral, BID, Edwin Zapata MD, 500 mg at 05/11/23 0939  •  sodium chloride 0.9 % flush 10 mL, 10 mL, Intravenous, Q12H, Marcos Trujillo MD, 10 mL at 05/11/23 0945  •  sodium chloride 0.9 % flush 10 mL, 10 mL, Intravenous, PRN, Marcos Trujillo MD  •  sodium chloride 0.9 % infusion 40 mL, 40 mL, Intravenous, PRN, Marcos Trujillo MD  •  sodium chloride 7 % nebulizer solution nebulizer solution 4 mL, 4 mL, Nebulization, BID - RT, Marcos Trujillo MD, 4 mL at 05/11/23 0838  •  sodium chloride nasal spray 2 spray, 2 spray, Nasal, 4x Daily PRN, Marcos Trujillo MD  •  tadalafil (ADCIRCA) tablet 40 mg, 40 mg, Oral, Q24H, Marcos Trujillo MD, 40 mg at 05/10/23 0915  •  terazosin (HYTRIN) capsule 2 mg, 2 mg, Oral, Nightly, Marcos Trujillo MD, 2 mg at 05/10/23 2114  •  vitamin B-6 (PYRIDOXINE) tablet 50 mg, 50 mg, Oral, Daily, Marcos Trujillo MD, 50 mg at 05/11/23 0940  Review of Systems:   All systems reviewed and negative except for: Pulmonary: Shortness of breath; GI: Chronic dysphagia    Objective     Vital Signs  Temp:  [97.7 °F (36.5 °C)-98.1 °F (36.7 °C)]  97.9 °F (36.6 °C)  Heart Rate:  [80-85] 85  Resp:  [18-24] 18  BP: (112-138)/(65-97) 138/97  Body mass index is 25.74 kg/m².    Intake/Output Summary (Last 24 hours) at 5/11/2023 1016  Last data filed at 5/11/2023 0527  Gross per 24 hour   Intake --   Output 450 ml   Net -450 ml     No intake/output data recorded.     Physical Exam:   General: patient awake, alert and cooperative, appears stated age, elderly and frail   Eyes: Normal lids and lashes, no scleral icterus   Neck: supple, normal ROM   Skin: warm and dry, not jaundiced   Cardiovascular: regular rhythm and rate, no murmurs auscultated   Pulm: clear to auscultation bilaterally, regular and unlabored   Abdomen: soft, nontender, nondistended; normal bowel sounds   Rectal: deferred   Extremities: no rash or edema   Psychiatric: Normal mood and behavior; memory intact     Results Review:     I reviewed the patient's new clinical results.    Results from last 7 days   Lab Units 05/11/23  0548 05/10/23  0608 05/09/23  0947   WBC 10*3/mm3 6.91 7.99 10.48   HEMOGLOBIN g/dL 16.4 16.3 17.1   HEMATOCRIT % 47.8 49.3 50.1   PLATELETS 10*3/mm3 335 349 332     Results from last 7 days   Lab Units 05/11/23  0548 05/10/23  0608 05/09/23  0947 05/08/23  0357 05/07/23  1139   SODIUM mmol/L 139 137 136   < > 135*   POTASSIUM mmol/L 3.8 3.8 4.5   < > 4.2   CHLORIDE mmol/L 102 100 99   < > 97*   CO2 mmol/L 26.8 26.2 24.5   < > 28.0   BUN mg/dL 16 19 22   < > 17   CREATININE mg/dL 0.82 0.94 1.14   < > 1.07   CALCIUM mg/dL 8.9 8.7 9.3   < > 8.9   BILIRUBIN mg/dL  --   --   --   --  1.4*   ALK PHOS U/L  --   --   --   --  76   ALT (SGPT) U/L  --   --   --   --  17   AST (SGOT) U/L  --   --   --   --  24   GLUCOSE mg/dL 91 92 149*   < > 99    < > = values in this interval not displayed.     Results from last 7 days   Lab Units 05/07/23  1139   INR  1.84*     No results found for: LIPASE    Radiology:  FL Video Swallow With Speech Single Contrast   Final Result      CT Chest Without  Contrast Diagnostic   Final Result       Infiltrate in the left lower lung, follow-up recommended. Minimal   bilateral pleural effusions.       Aneurysmal ascending aorta.       A new age-indeterminate T7 compression deformity, correlate clinically.       This report was finalized on 5/7/2023 1:30 PM by Dr. Krishan Escalera M.D.          FL Esophagram Complete Single Contrast    (Results Pending)       Assessment & Plan     Active Hospital Problems    Diagnosis    • **Pneumonia of left lower lobe due to infectious organism    • Dysphagia    • Hemoptysis    • IPF (idiopathic pulmonary fibrosis)    • Acute on chronic respiratory failure with hypoxia    • COPD (chronic obstructive pulmonary disease)    • A-fib    • Dyslipidemia    • BP (high blood pressure)        Impression  1.  Dysphagia: Oropharyngeal with possible esophageal component    2.  History of throat cancer: Status post radiation therapy, chemotherapy    3.  Pneumonia with hemoptysis    4.  Atrial fibrillation: On anticoagulation    Plan  Continue PPI  Diet per speech  Await esophagram with barium tablet results  Consideration of EGD based on patient preference and results of above; there is significant risk with endoscopic procedures in this gentleman and he would need to hold his anticoagulation    I discussed the patients findings and my recommendations with patient and nursing staff.    All necessary PPE, including face mask and eye protection, were worn during this encounter.  Hand sanitization was performed both before and after the patient interaction.    Over 25 minutes was spent reviewing the patient's chart and records, in discussion with the patient, in examination of the patient, and in discussion with members of the patient's medical team.    Shraddha Conklin MD

## 2023-05-11 NOTE — PLAN OF CARE
Goal Outcome Evaluation:  Plan of Care Reviewed With: patient      Pt A&Ox4. Was 5L highflow, O2 decreased to 4L, sats 88-92%. Esophagram today. Breathing tx, abx and skin care continued. NAD noted. Will continue to monitor.

## 2023-05-11 NOTE — CASE MANAGEMENT/SOCIAL WORK
Continued Stay Note  [facility]     Patient Name: Alessio Allen  MRN: [id number]  Today's Date: 5/11/2023    Admit Date: 5/7/2023    Plan: Home w/ HH, family to transport   Discharge Plan     Row Name 05/11/23 0949       Plan    Plan Home w/ HH, family to transport    Plan Comments CCP spoke with patient at bedside regarding updates on the dc plan. Patient continues to refuse SNF x2. Patient reports plan is home w/ caretenders HH. Patient states his family will be able to transport him home. FELIPE BREWER               Discharge Codes    No documentation.               Expected Discharge Date and Time     Expected Discharge Date Expected Discharge Time    May 13, 2023             FELIPE Ramirez

## 2023-05-12 LAB
ANION GAP SERPL CALCULATED.3IONS-SCNC: 9.6 MMOL/L (ref 5–15)
BACTERIA SPEC AEROBE CULT: NORMAL
BACTERIA SPEC AEROBE CULT: NORMAL
BUN SERPL-MCNC: 15 MG/DL (ref 8–23)
BUN/CREAT SERPL: 18.5 (ref 7–25)
CALCIUM SPEC-SCNC: 9 MG/DL (ref 8.6–10.5)
CHLORIDE SERPL-SCNC: 101 MMOL/L (ref 98–107)
CO2 SERPL-SCNC: 27.4 MMOL/L (ref 22–29)
CREAT SERPL-MCNC: 0.81 MG/DL (ref 0.76–1.27)
DEPRECATED RDW RBC AUTO: 42.5 FL (ref 37–54)
EGFRCR SERPLBLD CKD-EPI 2021: 88.6 ML/MIN/1.73
ERYTHROCYTE [DISTWIDTH] IN BLOOD BY AUTOMATED COUNT: 13.1 % (ref 12.3–15.4)
GLUCOSE SERPL-MCNC: 95 MG/DL (ref 65–99)
HCT VFR BLD AUTO: 49 % (ref 37.5–51)
HGB BLD-MCNC: 16.3 G/DL (ref 13–17.7)
MCH RBC QN AUTO: 29.6 PG (ref 26.6–33)
MCHC RBC AUTO-ENTMCNC: 33.3 G/DL (ref 31.5–35.7)
MCV RBC AUTO: 89.1 FL (ref 79–97)
PLATELET # BLD AUTO: 328 10*3/MM3 (ref 140–450)
PMV BLD AUTO: 9.5 FL (ref 6–12)
POTASSIUM SERPL-SCNC: 3.8 MMOL/L (ref 3.5–5.2)
PROCALCITONIN SERPL-MCNC: 0.08 NG/ML (ref 0–0.25)
RBC # BLD AUTO: 5.5 10*6/MM3 (ref 4.14–5.8)
SODIUM SERPL-SCNC: 138 MMOL/L (ref 136–145)
WBC NRBC COR # BLD: 6.84 10*3/MM3 (ref 3.4–10.8)

## 2023-05-12 PROCEDURE — 94799 UNLISTED PULMONARY SVC/PX: CPT

## 2023-05-12 PROCEDURE — 99232 SBSQ HOSP IP/OBS MODERATE 35: CPT | Performed by: INTERNAL MEDICINE

## 2023-05-12 PROCEDURE — 94761 N-INVAS EAR/PLS OXIMETRY MLT: CPT

## 2023-05-12 PROCEDURE — 84145 PROCALCITONIN (PCT): CPT | Performed by: STUDENT IN AN ORGANIZED HEALTH CARE EDUCATION/TRAINING PROGRAM

## 2023-05-12 PROCEDURE — 80048 BASIC METABOLIC PNL TOTAL CA: CPT | Performed by: STUDENT IN AN ORGANIZED HEALTH CARE EDUCATION/TRAINING PROGRAM

## 2023-05-12 PROCEDURE — 25010000002 CEFTRIAXONE PER 250 MG

## 2023-05-12 PROCEDURE — 94664 DEMO&/EVAL PT USE INHALER: CPT

## 2023-05-12 PROCEDURE — 85027 COMPLETE CBC AUTOMATED: CPT | Performed by: STUDENT IN AN ORGANIZED HEALTH CARE EDUCATION/TRAINING PROGRAM

## 2023-05-12 RX ORDER — DOCUSATE SODIUM 100 MG/1
100 CAPSULE, LIQUID FILLED ORAL 2 TIMES DAILY
Status: DISCONTINUED | OUTPATIENT
Start: 2023-05-12 | End: 2023-05-13 | Stop reason: HOSPADM

## 2023-05-12 RX ORDER — POLYETHYLENE GLYCOL 3350 17 G/17G
17 POWDER, FOR SOLUTION ORAL DAILY
Status: DISCONTINUED | OUTPATIENT
Start: 2023-05-12 | End: 2023-05-13 | Stop reason: HOSPADM

## 2023-05-12 RX ADMIN — Medication 4 ML: at 21:52

## 2023-05-12 RX ADMIN — FERROUS SULFATE TAB 325 MG (65 MG ELEMENTAL FE) 325 MG: 325 (65 FE) TAB at 08:29

## 2023-05-12 RX ADMIN — EMPAGLIFLOZIN 10 MG: 10 TABLET, FILM COATED ORAL at 08:30

## 2023-05-12 RX ADMIN — BUDESONIDE AND FORMOTEROL FUMARATE DIHYDRATE 2 PUFF: 160; 4.5 AEROSOL RESPIRATORY (INHALATION) at 22:02

## 2023-05-12 RX ADMIN — KETOTIFEN FUMARATE 1 DROP: 0.25 SOLUTION OPHTHALMIC at 21:09

## 2023-05-12 RX ADMIN — PANTOPRAZOLE SODIUM 40 MG: 40 TABLET, DELAYED RELEASE ORAL at 05:50

## 2023-05-12 RX ADMIN — DOXYCYCLINE 100 MG: 100 CAPSULE ORAL at 10:04

## 2023-05-12 RX ADMIN — Medication 10 ML: at 08:30

## 2023-05-12 RX ADMIN — Medication 50 MG: at 08:30

## 2023-05-12 RX ADMIN — PILOCARPINE HYDROCHLORIDE 5 MG: 5 TABLET, FILM COATED ORAL at 08:30

## 2023-05-12 RX ADMIN — FUROSEMIDE 40 MG: 40 TABLET ORAL at 08:30

## 2023-05-12 RX ADMIN — Medication 500 MG: at 21:08

## 2023-05-12 RX ADMIN — PREGABALIN 75 MG: 75 CAPSULE ORAL at 21:07

## 2023-05-12 RX ADMIN — PILOCARPINE HYDROCHLORIDE 5 MG: 5 TABLET, FILM COATED ORAL at 21:08

## 2023-05-12 RX ADMIN — POLYETHYLENE GLYCOL 3350 17 G: 17 POWDER, FOR SOLUTION ORAL at 15:04

## 2023-05-12 RX ADMIN — Medication 4 ML: at 07:58

## 2023-05-12 RX ADMIN — TERAZOSIN 2 MG: 2 CAPSULE ORAL at 21:08

## 2023-05-12 RX ADMIN — BUDESONIDE AND FORMOTEROL FUMARATE DIHYDRATE 2 PUFF: 160; 4.5 AEROSOL RESPIRATORY (INHALATION) at 08:08

## 2023-05-12 RX ADMIN — PILOCARPINE HYDROCHLORIDE 5 MG: 5 TABLET, FILM COATED ORAL at 15:04

## 2023-05-12 RX ADMIN — IPRATROPIUM BROMIDE AND ALBUTEROL SULFATE 3 ML: 2.5; .5 SOLUTION RESPIRATORY (INHALATION) at 11:43

## 2023-05-12 RX ADMIN — POTASSIUM CHLORIDE 10 MEQ: 750 TABLET, EXTENDED RELEASE ORAL at 08:30

## 2023-05-12 RX ADMIN — IPRATROPIUM BROMIDE AND ALBUTEROL SULFATE 3 ML: 2.5; .5 SOLUTION RESPIRATORY (INHALATION) at 15:48

## 2023-05-12 RX ADMIN — IPRATROPIUM BROMIDE AND ALBUTEROL SULFATE 3 ML: 2.5; .5 SOLUTION RESPIRATORY (INHALATION) at 21:46

## 2023-05-12 RX ADMIN — IPRATROPIUM BROMIDE AND ALBUTEROL SULFATE 3 ML: 2.5; .5 SOLUTION RESPIRATORY (INHALATION) at 07:55

## 2023-05-12 RX ADMIN — RIVAROXABAN 20 MG: 20 TABLET, FILM COATED ORAL at 17:51

## 2023-05-12 RX ADMIN — ELTROMBOPAG OLAMINE 50 MG: 50 TABLET, FILM COATED ORAL at 08:29

## 2023-05-12 RX ADMIN — PREGABALIN 75 MG: 75 CAPSULE ORAL at 08:30

## 2023-05-12 RX ADMIN — TADALAFIL 40 MG: 20 TABLET ORAL at 08:30

## 2023-05-12 RX ADMIN — Medication 500 MG: at 08:29

## 2023-05-12 RX ADMIN — CEFTRIAXONE 2 G: 2 INJECTION, POWDER, FOR SOLUTION INTRAMUSCULAR; INTRAVENOUS at 00:46

## 2023-05-12 RX ADMIN — KETOTIFEN FUMARATE 1 DROP: 0.25 SOLUTION OPHTHALMIC at 08:30

## 2023-05-12 RX ADMIN — DOCUSATE SODIUM 100 MG: 100 CAPSULE, LIQUID FILLED ORAL at 21:07

## 2023-05-12 RX ADMIN — Medication 10 ML: at 21:09

## 2023-05-12 NOTE — PROGRESS NOTES
[facility]      Mar/chart reviewed  Follow up pneumonia, hemoptysis, COPD, pulmonary hypertension  no further hemoptysis, still short of breath,   Feeling better, cough and mucous improving    Vital Sign Min/Max for last 24 hours  Temp  Min: 97.3 °F (36.3 °C)  Max: 98.7 °F (37.1 °C)   BP  Min: 124/73  Max: 130/67   Pulse  Min: 72  Max: 87   Resp  Min: 18  Max: 24   SpO2  Min: 89 %  Max: 94 %   Flow (L/min)  Min: 4  Max: 7   No data recorded     Appears ill, axox3,   perrl, eomi, no icterus,  mmm, no jvd, trachea midline, neck supple,  chest decreased, coarse ae bilaterally, + crackles, no wheezes,   rrr,   soft, nt, nd +bs,  no c/c/ e  Skin warm, dry no rashes     Labs: : reviewed:  Bun 15  Cr 0.81  Bicarb 27  Wbc 6.8  hgb 16.3  plts 328     A/P:  1. Hemoptysis -- suspect 2/2 to pneumonia in patient on anticoagulation -- continue observation --tolerating resupmption of anticoagulation fine.   2. Pneumonia -- he is about completed scheduled course, I would be ok with observation off antibiotics when the ones he is on   3. COPD -- continue broncohdilators -- appears to have advanced emphysematous changes on ct chest as well as some kyphosis  4. Acute on chronic hypoxemic respiratory failure -- wean oxygen as able  5. Pulmonary hypertension -- suspect this is mainly who group II and III but patient was on home adcirca per Dr. Hernandez, defer to Dr. Hernandez  6. afib on xarelto     No objection to d/c from a pulmonary standpoint.

## 2023-05-12 NOTE — PROGRESS NOTES
"Nutrition Services    Patient Name:  Alessio Allen  YOB: 1940  MRN: [id number]  Admit Date:  5/7/2023    FOLLOW UP - CLINICAL NUTRITION    Assessment Date:  05/12/23    Encounter Information         Reason for Encounter Follow up    Current Issues 50-75% at meals.  S/p esophagram yesterday - without obstruction and no delay to passage of barium tablet.  Possible dc soon.     Current Nutrition Orders & Evaluation of Intake       Oral Nutrition     Current PO Diet Diet: Regular/House Diet, Cardiac Diets; Low Sodium (2g); Texture: Soft to Chew (NDD 3); Soft to Chew: Chopped Meat; Fluid Consistency: Thin (IDDSI 0)   Supplement n/a   PO Evaluation     % PO Intake 50-75% x 3 meals    # of Days Evaluated 3    Factors Affecting Intake  No factors at this time   --  Anthropometrics          Height    Weight Height: 182.9 cm (72\")  Weight: 86.1 kg (189 lb 12.8 oz) (05/07/23 1604)    BMI kg/m2 Body mass index is 25.74 kg/m².  Overweight (25 - 29.9)    Weight trend Loss     Labs        Pertinent Labs Reviewed, listed below     Results from last 7 days   Lab Units 05/12/23  0630 05/11/23  0548 05/10/23  0608 05/08/23  0357 05/07/23  1139   SODIUM mmol/L 138 139 137   < > 135*   POTASSIUM mmol/L 3.8 3.8 3.8   < > 4.2   CHLORIDE mmol/L 101 102 100   < > 97*   CO2 mmol/L 27.4 26.8 26.2   < > 28.0   BUN mg/dL 15 16 19   < > 17   CREATININE mg/dL 0.81 0.82 0.94   < > 1.07   CALCIUM mg/dL 9.0 8.9 8.7   < > 8.9   BILIRUBIN mg/dL  --   --   --   --  1.4*   ALK PHOS U/L  --   --   --   --  76   ALT (SGPT) U/L  --   --   --   --  17   AST (SGOT) U/L  --   --   --   --  24   GLUCOSE mg/dL 95 91 92   < > 99    < > = values in this interval not displayed.     Results from last 7 days   Lab Units 05/12/23  0630 05/08/23 0357 05/07/23  1139   HEMOGLOBIN g/dL 16.3   < > 16.9   HEMATOCRIT % 49.0   < > 51.3*   WBC 10*3/mm3 6.84   < > 12.19*   ALBUMIN g/dL  --   --  3.9    < > = values in this interval not displayed.     Results " from last 7 days   Lab Units 05/12/23  0630 05/11/23  0548 05/10/23  0608 05/09/23  0947 05/08/23  0357 05/07/23  1139   INR   --   --   --   --   --  1.84*   APTT seconds  --   --   --   --   --  42.3*   PLATELETS 10*3/mm3 328 335 349 332 358 368     COVID19   Date Value Ref Range Status   05/07/2023 Not Detected Not Detected - Ref. Range Final     Lab Results   Component Value Date    HGBA1C 6.00 (H) 01/03/2018          Medications            Scheduled Medications budesonide-formoterol, 2 puff, Inhalation, BID - RT  docusate sodium, 100 mg, Oral, BID  eltrombopag, 50 mg, Oral, Daily  empagliflozin, 10 mg, Oral, Daily  ferrous sulfate, 325 mg, Oral, Daily With Breakfast  furosemide, 40 mg, Oral, Daily  ipratropium-albuterol, 3 mL, Nebulization, 4x Daily - RT  ketotifen, 1 drop, Both Eyes, BID  pantoprazole, 40 mg, Oral, Q AM  pilocarpine, 5 mg, Oral, TID  polyethylene glycol, 17 g, Oral, Daily  potassium chloride, 10 mEq, Oral, Daily  pregabalin, 75 mg, Oral, Q12H  rivaroxaban, 20 mg, Oral, Daily With Dinner  saccharomyces boulardii, 500 mg, Oral, BID  sodium chloride, 10 mL, Intravenous, Q12H  sodium chloride, 4 mL, Nebulization, BID - RT  tadalafil, 40 mg, Oral, Q24H  terazosin, 2 mg, Oral, Nightly  vitamin B-6, 50 mg, Oral, Daily        Infusions      PRN Medications •  acetaminophen **OR** acetaminophen **OR** acetaminophen  •  albuterol  •  Glycerin-Hypromellose-  •  ondansetron **OR** ondansetron  •  sodium chloride  •  sodium chloride  •  sodium chloride     Physical Findings          Physical Appearance alert, oriented, overweight, on oxygen therapy   Oral/Mouth Cavity other:dental appliance present   Edema  lower extremity , 1+ (trace)   Gastrointestinal normoactive, last bowel movement:5/9   Skin  bruising   Tubes/Drains/Lines none   NFPE Not applicable at this time   --  NUTRITION INTERVENTION / PLAN OF CARE  Intervention Goal         Intervention Goal(s) Maintain nutrition status, Disease  management/therapy, Maintain intake and No significant weight loss     Nutrition Intervention         RD Action Follow Tx Progress and Care plan reviewed     Nutrition Prescription         Diet Prescription     Supplement Prescription n/a   EN/PN Prescription    New Prescription Ordered? No changes at this time   --  Monitor/Evaluation        Monitor Per protocol, PO intake, Pertinent labs, Weight, Symptoms   Discharge Needs Pending clinical course   Education Will instruct as appropriate   --    RD to follow up per protocol.    Electronically signed by:  Makayla Mccollum RD  05/12/23 16:14 EDT

## 2023-05-12 NOTE — PROGRESS NOTES
[facility]  Inpatient Progress Note    Reason for Follow Up: Dysphagia    Subjective     Interval History:   Esophagram without obstruction and no delay to passage of barium tablet.  Note made of mild intermittent narrowing of the distal esophagus but without definite malignant features.  Patient without complaints today.    Current Facility-Administered Medications:   •  acetaminophen (TYLENOL) tablet 650 mg, 650 mg, Oral, Q4H PRN, 650 mg at 05/09/23 2013 **OR** acetaminophen (TYLENOL) 160 MG/5ML solution 650 mg, 650 mg, Oral, Q4H PRN **OR** acetaminophen (TYLENOL) suppository 650 mg, 650 mg, Rectal, Q4H PRN, Marcos Trujillo MD  •  albuterol (PROVENTIL) nebulizer solution 0.083% 2.5 mg/3mL, 2.5 mg, Nebulization, Q6H PRN, Marcos Trujillo MD, 2.5 mg at 05/07/23 1944  •  budesonide-formoterol (SYMBICORT) 160-4.5 MCG/ACT inhaler 2 puff, 2 puff, Inhalation, BID - RT, Marcos Trujillo MD, 2 puff at 05/12/23 0808  •  docusate sodium (COLACE) capsule 100 mg, 100 mg, Oral, BID, Jad Narayanan, DO  •  eltrombopag (PROMACTA) tablet 50 mg, 50 mg, Oral, Daily, Marcos Trujillo MD, 50 mg at 05/12/23 0829  •  empagliflozin (JARDIANCE) tablet 10 mg, 10 mg, Oral, Daily, Marcos Trujillo MD, 10 mg at 05/12/23 0830  •  ferrous sulfate tablet 325 mg, 325 mg, Oral, Daily With Breakfast, Marcos Trujillo MD, 325 mg at 05/12/23 0829  •  furosemide (LASIX) tablet 40 mg, 40 mg, Oral, Daily, Marcos Trujillo MD, 40 mg at 05/12/23 0830  •  Glycerin-Hypromellose- (ARTIFICIAL TEARS) 0.2-0.2-1 % ophthalmic solution solution 1 drop, 1 drop, Both Eyes, Q2H PRN, Marcos Trujillo MD  •  ipratropium-albuterol (DUO-NEB) nebulizer solution 3 mL, 3 mL, Nebulization, 4x Daily - RT, Marcos Trujillo MD, 3 mL at 05/12/23 1143  •  ketotifen (ZADITOR) 0.025 % ophthalmic solution 1 drop, 1 drop, Both Eyes, BID, Marcos Trujillo MD, 1 drop at 05/12/23 3261  •  ondansetron (ZOFRAN) tablet  4 mg, 4 mg, Oral, Q6H PRN **OR** ondansetron (ZOFRAN) injection 4 mg, 4 mg, Intravenous, Q6H PRN, Marcos Trujillo MD  •  pantoprazole (PROTONIX) EC tablet 40 mg, 40 mg, Oral, Q AM, Marcos Trujillo MD, 40 mg at 05/12/23 0550  •  pilocarpine (SALAGEN) tablet 5 mg, 5 mg, Oral, TID, Marcos Trujillo MD, 5 mg at 05/12/23 1504  •  polyethylene glycol (MIRALAX) packet 17 g, 17 g, Oral, Daily, Jad Narayanan DO, 17 g at 05/12/23 1504  •  potassium chloride (K-DUR,KLOR-CON) ER tablet 10 mEq, 10 mEq, Oral, Daily, Marcos Trujillo MD, 10 mEq at 05/12/23 0830  •  pregabalin (LYRICA) capsule 75 mg, 75 mg, Oral, Q12H, Marcos Trujillo MD, 75 mg at 05/12/23 0830  •  rivaroxaban (XARELTO) tablet 20 mg, 20 mg, Oral, Daily With Dinner, Edwin Zapata MD, 20 mg at 05/11/23 1812  •  saccharomyces boulardii (FLORASTOR) capsule 500 mg, 500 mg, Oral, BID, Edwin Zapata MD, 500 mg at 05/12/23 0829  •  sodium chloride 0.9 % flush 10 mL, 10 mL, Intravenous, Q12H, Marcos Trujillo MD, 10 mL at 05/12/23 0830  •  sodium chloride 0.9 % flush 10 mL, 10 mL, Intravenous, PRN, Marcos Trujillo MD  •  sodium chloride 0.9 % infusion 40 mL, 40 mL, Intravenous, PRN, Marcos Trujillo MD  •  sodium chloride 7 % nebulizer solution nebulizer solution 4 mL, 4 mL, Nebulization, BID - RT, Marcos Trujillo MD, 4 mL at 05/12/23 0758  •  sodium chloride nasal spray 2 spray, 2 spray, Nasal, 4x Daily PRN, Marcos Trujillo MD  •  tadalafil (ADCIRCA) tablet 40 mg, 40 mg, Oral, Q24H, Marcos Trujillo MD, 40 mg at 05/12/23 0830  •  terazosin (HYTRIN) capsule 2 mg, 2 mg, Oral, Nightly, Marcos Trujillo MD, 2 mg at 05/11/23 2159  •  vitamin B-6 (PYRIDOXINE) tablet 50 mg, 50 mg, Oral, Daily, Marcos Trujillo MD, 50 mg at 05/12/23 0830  Review of Systems:   All systems reviewed and negative except for: Pulmonary: Shortness of breath    Objective     Vital Signs  Temp:  [97.3 °F (36.3 °C)-98.6 °F (37 °C)]  98 °F (36.7 °C)  Heart Rate:  [72-87] 82  Resp:  [18-24] 18  BP: (116-130)/(67-73) 116/73  Body mass index is 25.74 kg/m².    Intake/Output Summary (Last 24 hours) at 5/12/2023 1537  Last data filed at 5/12/2023 0538  Gross per 24 hour   Intake --   Output 400 ml   Net -400 ml     No intake/output data recorded.     Physical Exam:   General: patient awake, alert and cooperative, appears stated age, elderly and frail   Eyes: Normal lids and lashes, no scleral icterus   Neck: supple, normal ROM   Skin: warm and dry, not jaundiced   Cardiovascular: regular rhythm and rate, no murmurs auscultated   Pulm: Diminished breath sounds bilaterally, increased work of breathing on nasal cannula   Abdomen: soft, nontender, nondistended; normal bowel sounds   Rectal: deferred   Extremities: no rash or edema   Psychiatric: Normal mood and behavior; memory intact     Results Review:     I reviewed the patient's new clinical results.    Results from last 7 days   Lab Units 05/12/23  0630 05/11/23  0548 05/10/23  0608   WBC 10*3/mm3 6.84 6.91 7.99   HEMOGLOBIN g/dL 16.3 16.4 16.3   HEMATOCRIT % 49.0 47.8 49.3   PLATELETS 10*3/mm3 328 335 349     Results from last 7 days   Lab Units 05/12/23  0630 05/11/23  0548 05/10/23  0608 05/08/23  0357 05/07/23  1139   SODIUM mmol/L 138 139 137   < > 135*   POTASSIUM mmol/L 3.8 3.8 3.8   < > 4.2   CHLORIDE mmol/L 101 102 100   < > 97*   CO2 mmol/L 27.4 26.8 26.2   < > 28.0   BUN mg/dL 15 16 19   < > 17   CREATININE mg/dL 0.81 0.82 0.94   < > 1.07   CALCIUM mg/dL 9.0 8.9 8.7   < > 8.9   BILIRUBIN mg/dL  --   --   --   --  1.4*   ALK PHOS U/L  --   --   --   --  76   ALT (SGPT) U/L  --   --   --   --  17   AST (SGOT) U/L  --   --   --   --  24   GLUCOSE mg/dL 95 91 92   < > 99    < > = values in this interval not displayed.     Results from last 7 days   Lab Units 05/07/23  1139   INR  1.84*     No results found for: LIPASE    Radiology:  FL Esophagram Complete Double-Contrast   Final Result   1.  Mild narrowing of the distal esophagus just proximal to the GE   junction. No definite malignant features are seen however if clinically   indicated follow-up endoscopy may be helpful   2. Barium tablet passes easily into the stomach.       Fluoroscopy time 1 minute 31 seconds 83 images       This report was finalized on 5/11/2023 4:00 PM by Dr. Mychal De Luna M.D.          FL Video Swallow With Speech Single Contrast   Final Result      CT Chest Without Contrast Diagnostic   Final Result       Infiltrate in the left lower lung, follow-up recommended. Minimal   bilateral pleural effusions.       Aneurysmal ascending aorta.       A new age-indeterminate T7 compression deformity, correlate clinically.       This report was finalized on 5/7/2023 1:30 PM by Dr. Krishan Escalera M.D.              Assessment & Plan     Active Hospital Problems    Diagnosis    • **Pneumonia of left lower lobe due to infectious organism    • Dysphagia    • Hemoptysis    • IPF (idiopathic pulmonary fibrosis)    • Acute on chronic respiratory failure with hypoxia    • COPD (chronic obstructive pulmonary disease)    • A-fib    • Dyslipidemia    • BP (high blood pressure)        Impression  1.  Dysphagia: Oropharyngeal, barium tablet passed without issue.  Intermittent mild narrowing of the distal esophagus    2.  History of throat cancer: Status post radiation therapy, chemotherapy    3.  Pneumonia with hemoptysis    4.  Atrial fibrillation: On anticoagulation    Plan  We discussed the possibility of EGD based on results of esophagram but also the significant risk to him with his significantly compromised pulmonary status.  He understands the risk and opts not to proceed with EGD, understanding both the risks and benefits of performing the procedure and the risk and benefits of not doing anything.    Continue PPI, he is requesting a change from pantoprazole in the outpatient setting and I think Nexium or Prevacid/generic would be  reasonable    Continue diet as per speech    GI will sign off but remain available as needed in this patient's care.  Thank you.          I discussed the patients findings and my recommendations with patient and nursing staff.    All necessary PPE, including face mask and eye protection, were worn during this encounter.  Hand sanitization was performed both before and after the patient interaction.    Over 25 minutes was spent reviewing the patient's chart and records, in discussion with the patient, in examination of the patient, and in discussion with members of the patient's medical team.    Shraddha Conklin MD

## 2023-05-12 NOTE — PLAN OF CARE
Problem: Fall Injury Risk  Goal: Absence of Fall and Fall-Related Injury  Outcome: Ongoing, Progressing  Intervention: Promote Injury-Free Environment  Recent Flowsheet Documentation  Taken 5/12/2023 1330 by Juventino Contreras RN  Safety Promotion/Fall Prevention:   assistive device/personal items within reach   fall prevention program maintained   nonskid shoes/slippers when out of bed   safety round/check completed  Taken 5/12/2023 1200 by Juventino Contreras RN  Safety Promotion/Fall Prevention:   assistive device/personal items within reach   fall prevention program maintained   nonskid shoes/slippers when out of bed   safety round/check completed  Taken 5/12/2023 1000 by Juventino Contreras RN  Safety Promotion/Fall Prevention:   assistive device/personal items within reach   fall prevention program maintained   nonskid shoes/slippers when out of bed   safety round/check completed  Taken 5/12/2023 0818 by Juventino Contreras RN  Safety Promotion/Fall Prevention:   assistive device/personal items within reach   fall prevention program maintained   nonskid shoes/slippers when out of bed   safety round/check completed     Problem: COPD (Chronic Obstructive Pulmonary Disease) Comorbidity  Goal: Maintenance of COPD Symptom Control  Outcome: Ongoing, Progressing     Problem: Adult Inpatient Plan of Care  Goal: Plan of Care Review  Outcome: Ongoing, Progressing  Flowsheets (Taken 5/12/2023 1439)  Progress: improving  Plan of Care Reviewed With: patient  Outcome Evaluation: Patient has been pleasant and cooperative during shift. Wean O2. AOx4, assist x1, 5LO2. Wife assisting to the restroom per patients request. Vpaced. Mild pain in back but refuses medication. No nausea. Will continue to monitor and assist patient as needed.     Problem: Infection  Goal: Absence of Infection Signs and Symptoms  Outcome: Ongoing, Progressing     Problem: Infection (Pneumonia)  Goal: Resolution of Infection Signs and Symptoms  Outcome:  Ongoing, Progressing     Problem: Respiratory Compromise (Pneumonia)  Goal: Effective Oxygenation and Ventilation  Outcome: Ongoing, Progressing  Intervention: Promote Airway Secretion Clearance  Recent Flowsheet Documentation  Taken 5/12/2023 0818 by Juventino Contreras RN  Cough And Deep Breathing: done independently per patient  Intervention: Optimize Oxygenation and Ventilation  Recent Flowsheet Documentation  Taken 5/12/2023 1330 by Juventino Contreras RN  Head of Bed (HOB) Positioning: HOB at 30 degrees  Taken 5/12/2023 1200 by Juventino Contreras RN  Head of Bed (HOB) Positioning: HOB at 30 degrees  Taken 5/12/2023 1000 by Juventino Contreras RN  Head of Bed (HOB) Positioning: HOB at 30-45 degrees  Taken 5/12/2023 0818 by Juventino Contreras RN  Head of Bed (HOB) Positioning: HOB at 45 degrees   Goal Outcome Evaluation:  Plan of Care Reviewed With: patient        Progress: improving  Outcome Evaluation: Patient has been pleasant and cooperative during shift. Wean O2. AOx4, assist x1, 5LO2. Wife assisting to the restroom per patients request. Vpaced. Mild pain in back but refuses medication. No nausea. Will continue to monitor and assist patient as needed.

## 2023-05-12 NOTE — PROGRESS NOTES
Name: Alessio Allen ADMIT: 2023   : 1940  PCP: Madison Lewis APRN    MRN: [id number] LOS: 5 days   AGE/SEX: 82 y.o. male  ROOM: [facility]     Subjective   Subjective   Patient seen and examined this morning. Hospital day 5.  At time of my examination, patient is awake, resting in bed, continues to have dyspnea and cough, however, continues to improve. Remains on supplemental O2, at higher requirements when compared to home, currently trying to wean as tolerated.     Objective   Objective   Vital Signs  Temp:  [97.3 °F (36.3 °C)-98.6 °F (37 °C)] 98 °F (36.7 °C)  Heart Rate:  [72-87] 82  Resp:  [18-24] 18  BP: (116-130)/(67-73) 116/73  SpO2:  [89 %-94 %] 93 %  on  Flow (L/min):  [4-7] 5;   Device (Oxygen Therapy): high-flow nasal cannula;humidified  Body mass index is 25.74 kg/m².  Physical Exam  Vitals and nursing note reviewed.   Constitutional:       General: He is awake. He is not in acute distress.     Appearance: He is ill-appearing.   Cardiovascular:      Rate and Rhythm: Normal rate and regular rhythm.      Pulses: Normal pulses.      Heart sounds: Normal heart sounds.   Pulmonary:      Effort: Pulmonary effort is normal. No respiratory distress.      Breath sounds: Decreased breath sounds, wheezing and rhonchi present.   Abdominal:      General: Bowel sounds are normal.      Palpations: Abdomen is soft.      Tenderness: There is no abdominal tenderness.   Skin:     General: Skin is warm and dry.   Neurological:      Mental Status: He is alert.   Psychiatric:         Behavior: Behavior is cooperative.       Results Review     I reviewed the patient's new clinical results.  Results from last 7 days   Lab Units 23  0630 23  0548 05/10/23  0608 23  0947   WBC 10*3/mm3 6.84 6.91 7.99 10.48   HEMOGLOBIN g/dL 16.3 16.4 16.3 17.1   PLATELETS 10*3/mm3 328 335 349 332     Results from last 7 days   Lab Units 23  0630 23  0548 05/10/23  0608 23  0947   SODIUM  mmol/L 138 139 137 136   POTASSIUM mmol/L 3.8 3.8 3.8 4.5   CHLORIDE mmol/L 101 102 100 99   CO2 mmol/L 27.4 26.8 26.2 24.5   BUN mg/dL 15 16 19 22   CREATININE mg/dL 0.81 0.82 0.94 1.14   GLUCOSE mg/dL 95 91 92 149*   EGFR mL/min/1.73 88.6 88.3 81.4 64.6     Results from last 7 days   Lab Units 05/07/23  1139   ALBUMIN g/dL 3.9   BILIRUBIN mg/dL 1.4*   ALK PHOS U/L 76   AST (SGOT) U/L 24   ALT (SGPT) U/L 17     Results from last 7 days   Lab Units 05/12/23  0630 05/11/23  0548 05/10/23  0608 05/09/23  0947 05/08/23  0357 05/07/23  1139   CALCIUM mg/dL 9.0 8.9 8.7 9.3   < > 8.9   ALBUMIN g/dL  --   --   --   --   --  3.9    < > = values in this interval not displayed.     Results from last 7 days   Lab Units 05/12/23  0630 05/07/23  1400 05/07/23  1139   PROCALCITONIN ng/mL 0.08  --  0.33*   LACTATE mmol/L  --  1.0  --      No results found for: HGBA1C, POCGLU    FL Esophagram Complete Double-Contrast    Result Date: 5/11/2023  1. Mild narrowing of the distal esophagus just proximal to the GE junction. No definite malignant features are seen however if clinically indicated follow-up endoscopy may be helpful 2. Barium tablet passes easily into the stomach.  Fluoroscopy time 1 minute 31 seconds 83 images  This report was finalized on 5/11/2023 4:00 PM by Dr. Mychal De Luna M.D.      I have personally reviewed all medications:  Scheduled Medications  budesonide-formoterol, 2 puff, Inhalation, BID - RT  docusate sodium, 100 mg, Oral, BID  eltrombopag, 50 mg, Oral, Daily  empagliflozin, 10 mg, Oral, Daily  ferrous sulfate, 325 mg, Oral, Daily With Breakfast  furosemide, 40 mg, Oral, Daily  ipratropium-albuterol, 3 mL, Nebulization, 4x Daily - RT  ketotifen, 1 drop, Both Eyes, BID  pantoprazole, 40 mg, Oral, Q AM  pilocarpine, 5 mg, Oral, TID  polyethylene glycol, 17 g, Oral, Daily  potassium chloride, 10 mEq, Oral, Daily  pregabalin, 75 mg, Oral, Q12H  rivaroxaban, 20 mg, Oral, Daily With Dinner  saccharomyces  boulardii, 500 mg, Oral, BID  sodium chloride, 10 mL, Intravenous, Q12H  sodium chloride, 4 mL, Nebulization, BID - RT  tadalafil, 40 mg, Oral, Q24H  terazosin, 2 mg, Oral, Nightly  vitamin B-6, 50 mg, Oral, Daily    Infusions   Diet  Diet: Regular/House Diet, Cardiac Diets; Low Sodium (2g); Texture: Soft to Chew (NDD 3); Soft to Chew: Chopped Meat; Fluid Consistency: Thin (IDDSI 0)    I have personally reviewed:  [x]  Laboratory   [x]  Microbiology   [x]  Radiology   [x]  EKG/Telemetry  [x]  Cardiology/Vascular        Assessment/Plan     Active Hospital Problems    Diagnosis  POA   • **Pneumonia of left lower lobe due to infectious organism [J18.9]  Yes   • Dysphagia [R13.10]  Unknown   • Hemoptysis [R04.2]  Yes   • IPF (idiopathic pulmonary fibrosis) [J84.112]  Yes   • Acute on chronic respiratory failure with hypoxia [J96.21]  Yes   • COPD (chronic obstructive pulmonary disease) [J44.9]  Yes   • A-fib [I48.91]  Yes   • Dyslipidemia [E78.5]  Yes   • BP (high blood pressure) [I10]  Yes      Resolved Hospital Problems   No resolved problems to display.       82 y.o. male admitted with Pneumonia of left lower lobe due to infectious organism.    Acute on chronic respiratory failure (on 4L home O2) with hypoxia  Pneumonia  COPD  IPF  - Patient meets criteria for diagnosis of acute on chronic respiratory failure with hypoxia with: Worsening dyspnea, increased oxygen requirements from that of his normal baseline and multiple documented O2 sats in the 80s%, charted low appears to be 86.  - CT Chest on admission (05/07): new patchy and consolidative infiltrate in the left lower lobe and posterior left upper lobe concerning for pneumonia.  - Procalcitonin 0.33, coupled with initial leukocytosis.  RVP negative blood cultures no growth to date, MRSA PCR negative, respiratory culture showing 1+ WBC with no organisms.  - Was on Vancomycin and Cefepime, transitioned to Ceftriaxone and Doxycycline, today (05/12) is final day of  treatment. WBC stable, repeat procalcitonin improved and now within normal limits, patient afebrile, oxygen requirements being weaned.  - Pulmonology consulted and following. Patient felt to be stable from their perspective, no further acute intervention warranted. Greatly appreciate their help.  - Continue bronchodilators as prescribed.  - Supplemental O2 PRN to maintain O2 sat 88-92%, aspiration precautions, elevate HOB, telemetry, pulse oximetry.   - Repeat procalcitonin in AM.     Dysphagia  - VFSS on 5/9/2023-spasm and significant intraesophageal reflux and stasis, had diet modified to mechanical soft chopped and thin liquids.  - GI consulted and following. Patient underwent esophagram which showed mild narrowing of distal esophagus, otherwise, no acute findings. Greatly appreciate their help.  - Aspiration precautions.     Paroxysmal atrial fibrillation on long term anticoagulation  - [id number] score: 4.  - Vitals, telemetry reviewed, patient appears to be stable, rate controlled at present.  Patient not on rate control medication.  On Xarelto for anticoagulation.  - Continue current treatment as prescribed. Continue telemetry monitoring.    Chronic back pain  Hx compression fx  - Imaging obtained, showing age-indeterminate T7 compression fracture.  - Monitor for now, fall precautions, PT/OT, lidocaine patch PRN.     Ascending aortic aneurysm  - 4.7 cm seen on CT 5/7/2023, stable from prior. Follow up with PCP for repeat imaging after discharge as scheduled based on guidelines.     Hemoptysis, resolved  - Felt to be secondary to pneumonia, complicated by Xarelto use.  Pulmonology following, no acute intervention warranted.  Patient has since been recently started on his Xarelto, without any further episodes of hemoptysis.  Appears to remain resolved at this time.     · Xarelto (home med) for DVT prophylaxis.  · Full code.  · Discussed with patient, nursing staff, CCP and care team on multidisciplinary  rounds.  · Anticipate discharge home with home health tomorrow..      Jad Narayanan DO  [facility]  05/12/23

## 2023-05-13 ENCOUNTER — READMISSION MANAGEMENT (OUTPATIENT)
Dept: CALL CENTER | Facility: HOSPITAL | Age: 83
End: 2023-05-13
Payer: MEDICARE

## 2023-05-13 VITALS
OXYGEN SATURATION: 88 % | DIASTOLIC BLOOD PRESSURE: 74 MMHG | TEMPERATURE: 97.4 F | WEIGHT: 189.8 LBS | HEART RATE: 83 BPM | SYSTOLIC BLOOD PRESSURE: 125 MMHG | HEIGHT: 72 IN | RESPIRATION RATE: 18 BRPM | BODY MASS INDEX: 25.71 KG/M2

## 2023-05-13 LAB
ANION GAP SERPL CALCULATED.3IONS-SCNC: 8 MMOL/L (ref 5–15)
BUN SERPL-MCNC: 13 MG/DL (ref 8–23)
BUN/CREAT SERPL: 16 (ref 7–25)
CALCIUM SPEC-SCNC: 8.7 MG/DL (ref 8.6–10.5)
CHLORIDE SERPL-SCNC: 98 MMOL/L (ref 98–107)
CO2 SERPL-SCNC: 30 MMOL/L (ref 22–29)
CREAT SERPL-MCNC: 0.81 MG/DL (ref 0.76–1.27)
DEPRECATED RDW RBC AUTO: 43.9 FL (ref 37–54)
EGFRCR SERPLBLD CKD-EPI 2021: 88.6 ML/MIN/1.73
ERYTHROCYTE [DISTWIDTH] IN BLOOD BY AUTOMATED COUNT: 13.2 % (ref 12.3–15.4)
GLUCOSE SERPL-MCNC: 89 MG/DL (ref 65–99)
HCT VFR BLD AUTO: 49.4 % (ref 37.5–51)
HGB BLD-MCNC: 16.3 G/DL (ref 13–17.7)
MCH RBC QN AUTO: 30 PG (ref 26.6–33)
MCHC RBC AUTO-ENTMCNC: 33 G/DL (ref 31.5–35.7)
MCV RBC AUTO: 90.8 FL (ref 79–97)
PLATELET # BLD AUTO: 318 10*3/MM3 (ref 140–450)
PMV BLD AUTO: 9.5 FL (ref 6–12)
POTASSIUM SERPL-SCNC: 3.8 MMOL/L (ref 3.5–5.2)
RBC # BLD AUTO: 5.44 10*6/MM3 (ref 4.14–5.8)
SODIUM SERPL-SCNC: 136 MMOL/L (ref 136–145)
WBC NRBC COR # BLD: 6.85 10*3/MM3 (ref 3.4–10.8)

## 2023-05-13 PROCEDURE — 94799 UNLISTED PULMONARY SVC/PX: CPT

## 2023-05-13 PROCEDURE — 94761 N-INVAS EAR/PLS OXIMETRY MLT: CPT

## 2023-05-13 PROCEDURE — 94664 DEMO&/EVAL PT USE INHALER: CPT

## 2023-05-13 PROCEDURE — 80048 BASIC METABOLIC PNL TOTAL CA: CPT | Performed by: STUDENT IN AN ORGANIZED HEALTH CARE EDUCATION/TRAINING PROGRAM

## 2023-05-13 PROCEDURE — 85027 COMPLETE CBC AUTOMATED: CPT | Performed by: STUDENT IN AN ORGANIZED HEALTH CARE EDUCATION/TRAINING PROGRAM

## 2023-05-13 RX ORDER — SACCHAROMYCES BOULARDII 250 MG
500 CAPSULE ORAL 2 TIMES DAILY
Qty: 30 CAPSULE | Refills: 0 | Status: ON HOLD | OUTPATIENT
Start: 2023-05-13

## 2023-05-13 RX ORDER — DOCUSATE SODIUM 100 MG/1
100 CAPSULE, LIQUID FILLED ORAL 2 TIMES DAILY
Qty: 60 CAPSULE | Refills: 0 | Status: SHIPPED | OUTPATIENT
Start: 2023-05-13 | End: 2023-05-24

## 2023-05-13 RX ORDER — POLYETHYLENE GLYCOL 3350 17 G/17G
17 POWDER, FOR SOLUTION ORAL DAILY
Qty: 510 G | Refills: 0 | Status: SHIPPED | OUTPATIENT
Start: 2023-05-14 | End: 2023-05-24

## 2023-05-13 RX ADMIN — POLYETHYLENE GLYCOL 3350 17 G: 17 POWDER, FOR SOLUTION ORAL at 08:48

## 2023-05-13 RX ADMIN — PILOCARPINE HYDROCHLORIDE 5 MG: 5 TABLET, FILM COATED ORAL at 08:48

## 2023-05-13 RX ADMIN — FERROUS SULFATE TAB 325 MG (65 MG ELEMENTAL FE) 325 MG: 325 (65 FE) TAB at 08:48

## 2023-05-13 RX ADMIN — EMPAGLIFLOZIN 10 MG: 10 TABLET, FILM COATED ORAL at 08:47

## 2023-05-13 RX ADMIN — PREGABALIN 75 MG: 75 CAPSULE ORAL at 08:48

## 2023-05-13 RX ADMIN — TADALAFIL 40 MG: 20 TABLET ORAL at 08:48

## 2023-05-13 RX ADMIN — POTASSIUM CHLORIDE 10 MEQ: 750 TABLET, EXTENDED RELEASE ORAL at 08:48

## 2023-05-13 RX ADMIN — IPRATROPIUM BROMIDE AND ALBUTEROL SULFATE 3 ML: 2.5; .5 SOLUTION RESPIRATORY (INHALATION) at 10:31

## 2023-05-13 RX ADMIN — Medication 500 MG: at 08:47

## 2023-05-13 RX ADMIN — BUDESONIDE AND FORMOTEROL FUMARATE DIHYDRATE 2 PUFF: 160; 4.5 AEROSOL RESPIRATORY (INHALATION) at 06:17

## 2023-05-13 RX ADMIN — Medication 4 ML: at 06:17

## 2023-05-13 RX ADMIN — Medication 10 ML: at 08:48

## 2023-05-13 RX ADMIN — Medication 50 MG: at 08:47

## 2023-05-13 RX ADMIN — IPRATROPIUM BROMIDE AND ALBUTEROL SULFATE 3 ML: 2.5; .5 SOLUTION RESPIRATORY (INHALATION) at 06:18

## 2023-05-13 RX ADMIN — FUROSEMIDE 40 MG: 40 TABLET ORAL at 08:48

## 2023-05-13 RX ADMIN — PANTOPRAZOLE SODIUM 40 MG: 40 TABLET, DELAYED RELEASE ORAL at 06:29

## 2023-05-13 RX ADMIN — KETOTIFEN FUMARATE 1 DROP: 0.25 SOLUTION OPHTHALMIC at 08:48

## 2023-05-13 RX ADMIN — DOCUSATE SODIUM 100 MG: 100 CAPSULE, LIQUID FILLED ORAL at 08:47

## 2023-05-13 RX ADMIN — ELTROMBOPAG OLAMINE 50 MG: 50 TABLET, FILM COATED ORAL at 08:48

## 2023-05-13 NOTE — DISCHARGE SUMMARY
Patient Name: Alessio Allen  : 1940  MRN: [id number]    Date of Admission: 2023  Date of Discharge:  2023  Primary Care Physician: Madison Lewis APRN      Chief Complaint:   Coughing Up Blood      Discharge Diagnoses     Active Hospital Problems    Diagnosis  POA   • **Pneumonia of left lower lobe due to infectious organism [J18.9]  Yes   • Dysphagia [R13.10]  Yes   • Hemoptysis [R04.2]  Yes   • IPF (idiopathic pulmonary fibrosis) [J84.112]  Yes   • Acute on chronic respiratory failure with hypoxia [J96.21]  Yes   • COPD (chronic obstructive pulmonary disease) [J44.9]  Yes   • A-fib [I48.91]  Yes   • Dyslipidemia [E78.5]  Yes   • BP (high blood pressure) [I10]  Yes      Resolved Hospital Problems   No resolved problems to display.        Hospital Course     Mr. Allen is a 82 y.o. male with a history of *** who presented to  initially complaining of ***.  Please see the admitting history and physical for further details.  He was found to have *** and was admitted to the hospital for further evaluation and treatment.  ***    PMH:   -COPD (4 L O2), paroxysmal A-fib, AAA  Admit:  -Dyspnea and hemoptysis  Course:  -No further hemoptysis since admission, though noted to have PNA on admission.  Pulm saw and attributed hemoptysis to pneumonia with AC.  No acute work-up recommended.  He has had improvement in his oxygenation on antibiotics, though VFSS did show some dysphagia with intraesophageal reflux and stasis.  They recommended GI consult and I put order in today.  Dc plan:   -likely HH once cleared by consultants        Per GI:  We discussed the possibility of EGD based on results of esophagram but also the significant risk to him with his significantly compromised pulmonary status.  He understands the risk and opts not to proceed with EGD, understanding both the risks and benefits of performing the procedure and the risk and benefits of not doing anything.      Continue PPI, he is requesting a change from pantoprazole in the outpatient setting and I think Nexium or Prevacid/generic would be reasonable     Continue diet as per speech    Acute on chronic respiratory failure (on 4L home O2) with hypoxia  Pneumonia  COPD  IPF  - Patient meets criteria for diagnosis of acute on chronic respiratory failure with hypoxia with: Worsening dyspnea, increased oxygen requirements from that of his normal baseline and multiple documented O2 sats in the 80s%, charted low appears to be 86.  - CT Chest on admission (05/07): new patchy and consolidative infiltrate in the left lower lobe and posterior left upper lobe concerning for pneumonia.  - Procalcitonin 0.33, coupled with initial leukocytosis.  RVP negative blood cultures no growth to date, MRSA PCR negative, respiratory culture showing 1+ WBC with no organisms.  - Was on Vancomycin and Cefepime, transitioned to Ceftriaxone and Doxycycline, today (05/12) is final day of treatment. WBC stable, repeat procalcitonin improved and now within normal limits, patient afebrile, oxygen requirements being weaned.  - Pulmonology consulted and following. Patient felt to be stable from their perspective, no further acute intervention warranted. Greatly appreciate their help.  - Continue bronchodilators as prescribed.  - Supplemental O2 PRN to maintain O2 sat 88-92%, aspiration precautions, elevate HOB, telemetry, pulse oximetry.   - Repeat procalcitonin in AM.     Dysphagia  - VFSS on 5/9/2023-spasm and significant intraesophageal reflux and stasis, had diet modified to mechanical soft chopped and thin liquids.  - GI consulted and following. Patient underwent esophagram which showed mild narrowing of distal esophagus, otherwise, no acute findings. Greatly appreciate their help.  - Aspiration precautions.    Soft to chew, chopped meats, thin liquids. Continue PPI as prescribed after discharge.     Paroxysmal atrial fibrillation on long term  anticoagulation  - [id number] score: 4.  - Vitals, telemetry reviewed, patient appears to be stable, rate controlled at present.  Patient not on rate control medication.  On Xarelto for anticoagulation.  - Continue current treatment as prescribed. Continue telemetry monitoring.     Chronic back pain  Hx compression fx  - Imaging obtained, showing age-indeterminate T7 compression fracture.  - Monitor for now, fall precautions, PT/OT, lidocaine patch PRN.     Ascending aortic aneurysm  - 4.7 cm seen on CT 5/7/2023, stable from prior. Follow up with PCP for repeat imaging after discharge as scheduled based on guidelines.     Hemoptysis, resolved  - Felt to be secondary to pneumonia, complicated by Xarelto use.  Pulmonology following, no acute intervention warranted.  Patient has since been recently started on his Xarelto, without any further episodes of hemoptysis.  Appears to remain resolved at this time.   Edited by: Jad Narayanan DO at 5/13/2023 1022    Day of Discharge     Subjective:  Patient seen and examined this morning. Hospital day 6.  At time of my examination, patient is awake, resting in bed, respiratory symptoms improved, patient stable at present, cleared for discharge by consultants.    Physical Exam:  Temp:  [97.4 °F (36.3 °C)-98.7 °F (37.1 °C)] 97.4 °F (36.3 °C)  Heart Rate:  [79-85] 80  Resp:  [18-20] 18  BP: (116-128)/(70-75) 125/74  Body mass index is 25.74 kg/m².  Physical Exam  Vitals and nursing note reviewed.   Constitutional:       General: He is awake. He is not in acute distress.     Appearance: He is ill-appearing.   Cardiovascular:      Rate and Rhythm: Normal rate and regular rhythm.      Pulses: Normal pulses.      Heart sounds: Normal heart sounds.   Pulmonary:      Effort: Pulmonary effort is normal. No respiratory distress.      Breath sounds: Decreased breath sounds present.      Comments: On supplemental O2  Abdominal:      General: Bowel sounds are normal.      Palpations:  Abdomen is soft.      Tenderness: There is no abdominal tenderness.   Skin:     General: Skin is warm and dry.   Neurological:      Mental Status: He is alert.   Psychiatric:         Behavior: Behavior is cooperative.         Consultants     Consult Orders (all) (From admission, onward)     Start     Ordered    05/10/23 1402  Inpatient Gastroenterology Consult  Once        Specialty:  Gastroenterology  Provider:  Shraddha Conklin MD    05/10/23 1401    05/10/23 1401  Inpatient Gastroenterology Consult  Once,   Status:  Canceled        Specialty:  Gastroenterology  Provider:  (Not yet assigned)    05/10/23 1400    05/07/23 2304  Inpatient Pulmonology Consult  Once        Comments: Spoke with Keerthi.   Specialty:  Pulmonary Disease  Provider:  Suresh Hernandez MD    05/07/23 2306    05/07/23 1848  Inpatient Pulmonology Consult  Once,   Status:  Canceled        Specialty:  Pulmonary Disease  Provider:  (Not yet assigned)    05/07/23 1854    05/07/23 1401  [facility] (on-call MD unless specified) Details  Once        Specialty:  Hospitalist  Provider:  Marcos Trujillo MD    05/07/23 1401              Procedures     * Surgery not found *      Imaging Results (All)     Procedure Component Value Units Date/Time    FL Esophagram Complete Double-Contrast [[id number]] Collected: 05/11/23 1558     Updated: 05/11/23 1603    Narrative:      FL ESOPHAGRAM COMPLETE DOUBLE-CONTRAST-  05/11/2023     HISTORY: Dysphagia.      image of the chest shows mild cardiomegaly. There is increased  density in the base of the left hemithorax which may be from pleural  fluid atelectasis and/or infiltrate.     Double contrast esophagram was performed. No aspiration was seen. There  is mild intermittently visualized narrowing of the distal esophagus just  proximal to the gastroesophageal junction which is smoothly tapered. No  reflux, hiatal hernia or evidence of esophagitis is seen.     Patient ingested the barium tablet without difficulty and it  "passes  easily into the stomach. Stomach is incompletely distended but otherwise  appears unremarkable.       Impression:      1. Mild narrowing of the distal esophagus just proximal to the GE  junction. No definite malignant features are seen however if clinically  indicated follow-up endoscopy may be helpful  2. Barium tablet passes easily into the stomach.     Fluoroscopy time 1 minute 31 seconds 83 images     This report was finalized on 5/11/2023 4:00 PM by Dr. Mychal De Luna M.D.       FL Video Swallow With Speech Single Contrast [[id number]] Collected: 05/09/23 1450     Updated: 05/09/23 1454    Narrative:      VIDEO SWALLOWING EXAMINATION BY SPEECH PATHOLOGY     Clinical: Dysphasia     Video swallowing examination performed under the direction of speech  pathology. Imaging reviewed by radiologist who concurs with the  findings.     Speech pathology summary:Radiologist Dr Epperson present: Patient  demonstrates mild oropharyngeal dysphagia characterized by mistiming,  reduced pharyngeal constriction and BOT retraction. Patient demonstrated  silent penetration before the swallow on first sip of thins via cup,  majority expelled during completion of swallow. No other penetration  occurred during VFSS including subsequent trials of thins via cup and  all other PO trials. Mild valleculae and BOT residue with all tested  consistencies, liquid washes (both thins and NTL assessed) and cued  multiple swallows assisted with clearing. Patient required liquid wash  (NTL used) to initiate swallow with regular solids due to dry mouth.  Statis in upper esophagus was noted with all tested consistencies.  Esophageal screen was completed with thins and revealed per radiologist  \"spasm and significant intraesophageal reflux and statis.     FLUOROSCOPY TIME: 2 minutes 18 seconds, 5077 images.     This report was finalized on 5/9/2023 2:51 PM by Dr. Zach Epperson M.D.       CT Chest Without Contrast Diagnostic [[id number]] " Collected: 05/07/23 1324     Updated: 05/07/23 1333    Narrative:      CT CHEST WO CONTRAST DIAGNOSTIC-     INDICATIONS: Hemoptysis     TECHNIQUE: Radiation dose reduction techniques were utilized, including  automated exposure control and exposure modulation based on body size.  ENHANCED CHEST CT     COMPARISON: 01/23/2023      FINDINGS:           The heart size is enlarged without pericardial effusion.. Moderate  coronary arterial calcifications are apparent. Left-sided pacemaker and  cardiac leads are noted.. Ascending aorta is aneurysmal, about 4.7 cm,  stable at similar level. Prominence of the central pulmonary arterial  caliber is noted, compatible with pulmonary arterial hypertension. A few  small subcentimeter short axis mediastinal lymph nodes are seen that are  not significant by size criteria.     The airways appear clear.     Minimal bilateral pleural effusions are seen.     The lungs show new patchy and consolidative infiltrate in the left lower  lobe and posterior left upper lobe may represent pneumonia; setting of  hemoptysis, possibility of pulmonary hemorrhage is not excluded;  follow-up is recommended to characterize resolution and to exclude any  possibility of underlying nodules. Minimal right basilar atelectasis or  infiltrate. Apical predominant emphysematous changes are seen.     Upper abdominal structures show no acute findings.     Degenerative changes are seen in the spine. T7 compression deformity  with 50% loss of height, is age-indeterminate, but new from the prior  exam, correlate clinically. Other compression deformities appear stable.       Impression:         Infiltrate in the left lower lung, follow-up recommended. Minimal  bilateral pleural effusions.     Aneurysmal ascending aorta.     A new age-indeterminate T7 compression deformity, correlate clinically.     This report was finalized on 5/7/2023 1:30 PM by Dr. Krishan Escalera M.D.           Results for orders placed during  the hospital encounter of 03/17/18    Duplex Venous Lower Extremity - Bilateral CAR    Interpretation Summary  · Normal bilateral lower extremity venous duplex scan.    Results for orders placed during the hospital encounter of 02/28/19    Adult Transthoracic Echo Complete W/ Cont if Necessary Per Protocol    Interpretation Summary  · Left ventricular systolic function is normal. Calculated EF = 58.0%. Estimated EF was in agreement with the calculated EF. Normal left ventricular cavity size noted. Left ventricular wall thickness is consistent with mild concentric hypertrophy. Left ventricular diastolic function was indeterminate.  · Left atrial cavity size is severely dilated.  · Right atrial cavity size is moderately dilated.  · The aortic valve is abnormal in structure. There is moderate calcification of the aortic valve.No significant aortic valve regurgitation is present. No hemodynamically significant aortic valve stenosis is present.  · Moderate mitral valve regurgitation is present. In some views it appears moderately severe. No significant mitral valve stenosis is present.  · Moderate tricuspid valve regurgitation is present. Moderate to severe pulmonary hypertension is present.  · Moderate dilation of the ascending aorta is present. 4.4 cm.    Pertinent Labs     Results from last 7 days   Lab Units 05/13/23  0609 05/12/23  0630 05/11/23  0548 05/10/23  0608   WBC 10*3/mm3 6.85 6.84 6.91 7.99   HEMOGLOBIN g/dL 16.3 16.3 16.4 16.3   PLATELETS 10*3/mm3 318 328 335 349     Results from last 7 days   Lab Units 05/13/23  0609 05/12/23  0630 05/11/23  0548 05/10/23  0608   SODIUM mmol/L 136 138 139 137   POTASSIUM mmol/L 3.8 3.8 3.8 3.8   CHLORIDE mmol/L 98 101 102 100   CO2 mmol/L 30.0* 27.4 26.8 26.2   BUN mg/dL 13 15 16 19   CREATININE mg/dL 0.81 0.81 0.82 0.94   GLUCOSE mg/dL 89 95 91 92   EGFR mL/min/1.73 88.6 88.6 88.3 81.4     Results from last 7 days   Lab Units 05/07/23  1139   ALBUMIN g/dL 3.9    BILIRUBIN mg/dL 1.4*   ALK PHOS U/L 76   AST (SGOT) U/L 24   ALT (SGPT) U/L 17     Results from last 7 days   Lab Units 05/13/23  0609 05/12/23  0630 05/11/23  0548 05/10/23  0608 05/08/23  0357 05/07/23  1139   CALCIUM mg/dL 8.7 9.0 8.9 8.7   < > 8.9   ALBUMIN g/dL  --   --   --   --   --  3.9    < > = values in this interval not displayed.               Invalid input(s): LDLCALC  Results from last 7 days   Lab Units 05/10/23  1215 05/08/23  0115 05/07/23  1400 05/07/23  1350   BLOODCX   --   --  No growth at 5 days No growth at 5 days   RESPCX  Rejected  --   --   --    MRSAPCR   --  No MRSA Detected  --   --      Results from last 7 days   Lab Units 05/07/23  1142   COVID19  Not Detected       Test Results Pending at Discharge       Discharge Details        Discharge Medications      New Medications      Instructions Start Date   docusate sodium 100 MG capsule   100 mg, Oral, 2 Times Daily      polyethylene glycol 17 g packet  Commonly known as: MIRALAX   17 g, Oral, Daily   Start Date: May 14, 2023     saccharomyces boulardii 250 MG capsule  Commonly known as: FLORASTOR   500 mg, Oral, 2 Times Daily         Changes to Medications      Instructions Start Date   potassium chloride 10 MEQ CR tablet  What changed: how much to take   20 mEq, Oral, Daily         Continue These Medications      Instructions Start Date   acetaminophen 325 MG tablet  Commonly known as: TYLENOL   650 mg, Oral, Every 6 Hours PRN      albuterol sulfate  (90 Base) MCG/ACT inhaler  Commonly known as: PROVENTIL HFA;VENTOLIN HFA;PROAIR HFA   2 puffs, Inhalation, Every 4 Hours PRN      albuterol (2.5 MG/3ML) 0.083% nebulizer solution  Commonly known as: PROVENTIL   No dose, route, or frequency recorded.      doxazosin 2 MG tablet  Commonly known as: CARDURA   2 mg, Oral, Nightly      ferrous sulfate 325 (65 FE) MG tablet   325 mg, Oral, Daily With Breakfast      fexofenadine 60 MG tablet  Commonly known as: Allegra Allergy   60 mg,  Oral, 2 Times Daily      furosemide 40 MG tablet  Commonly known as: LASIX   40 mg, Oral, Daily      Jardiance 10 MG tablet tablet  Generic drug: empagliflozin   No dose, route, or frequency recorded.      montelukast 10 MG tablet  Commonly known as: SINGULAIR   10 mg, Oral, Nightly      O2  Commonly known as: OXYGEN   3.5 L/min, Inhalation, Once      olopatadine 0.1 % ophthalmic solution  Commonly known as: Patanol   1 drop, Both Eyes, 2 Times Daily      pantoprazole 40 MG pack packet  Commonly known as: PROTONIX   40 mg, Oral, Every Morning Before Breakfast, PEG tube       pilocarpine 5 MG tablet  Commonly known as: SALAGEN   5 mg, Oral, 3 Times Daily      polyvinyl alcohol 1.4 % ophthalmic solution  Commonly known as: LIQUIFILM   1 drop, As Needed      pregabalin 75 MG capsule  Commonly known as: LYRICA   75 mg, Oral, 2 Times Daily      Promacta 50 MG tablet  Generic drug: eltrombopag   No dose, route, or frequency recorded.      Refresh Tears 0.5 % solution  Generic drug: carboxymethylcellulose   1 drop, Both Eyes, As Needed      simvastatin 20 MG tablet  Commonly known as: ZOCOR   0.5 tablets, Oral, Nightly      sodium chloride 0.65 % nasal spray  Commonly known as: Ocean Nasal Spray   2 sprays, Nasal, As Needed, May refill q 21 days      sodium chloride 7 % nebulizer solution nebulizer solution   No dose, route, or frequency recorded.      Spacer/Aero-Holding Chambers device   Give spacer to use with his inhalers whichever brand he has received is fine      tadalafil 20 MG tablet tablet  Commonly known as: ADCIRCA   40 mg, Oral, Every 24 Hours Scheduled      Trelegy Ellipta 100-62.5-25 MCG/ACT inhaler  Generic drug: Fluticasone-Umeclidin-Vilant   1 puff, Daily      vitamin B-6 50 MG tablet  Commonly known as: PYRIDOXINE   50 mg, Oral, Daily      Xarelto 20 MG tablet  Generic drug: rivaroxaban   20 mg, Oral, Daily With Dinner, HOLD 2 DAYS PRIOR TO SURG             Allergies   Allergen Reactions   • Lisinopril  Shortness Of Breath   • Sulfa Antibiotics Shortness Of Breath   • Penicillins Rash   • Atenolol Other (See Comments)     drowsiness   • Celebrex [Celecoxib] Rash   • Coreg [Carvedilol] Cough     SOA,   • Ibuprofen Other (See Comments)     Conflict with other medications he is taking       Discharge Disposition:  Home-Health Care Svc      Discharge Diet:  Diet Order   Procedures   • Diet: Regular/House Diet, Cardiac Diets; Low Sodium (2g); Texture: Soft to Chew (NDD 3); Soft to Chew: Chopped Meat; Fluid Consistency: Thin (IDDSI 0)       Discharge Activity:   Activity Instructions     Gradually Increase Activity Until at Pre-Hospitalization Level      Other Activity Instructions      Activity Instructions: Per home health PT          CODE STATUS:    Code Status and Medical Interventions:   Ordered at: 05/07/23 6987     Code Status (Patient has no pulse and is not breathing):    CPR (Attempt to Resuscitate)     Medical Interventions (Patient has pulse or is breathing):    Full Support       Future Appointments   Date Time Provider Department Center   9/20/2023  1:45 PM Madison Lewis APRN [facility]     Additional Instructions for the Follow-ups that You Need to Schedule     Discharge Follow-up with PCP   As directed       Currently Documented PCP:    Madison Lewis APRN    PCP Phone Number:    [phone]     Follow Up Details: Within 1 week after discharge for reassessment, medication and symptom review, repeat BMP and CBC         Discharge Follow-up with Specialty: Pulmonology and gastroenterology   As directed      Specialty: Pulmonology and gastroenterology    Follow Up Details: Please follow-up with these specialties within 1 to 2 weeks after discharge or scheduled for reassessment.            Contact information for follow-up providers     Madison Lewis APRN .    Specialty: Nurse Practitioner  Why: Within 1 week after discharge for reassessment, medication and symptom review, repeat BMP  and CBC  Contact information:  [address]  [address]  [facility] [address]  [phone]                   Contact information for after-discharge care     Home Medical Care     [facility] .    Service: Home Health Services  Contact information:  [address], [address]  [facility] [address] [phone]                             Additional Instructions for the Follow-ups that You Need to Schedule     Discharge Follow-up with PCP   As directed       Currently Documented PCP:    Madison Lewis APRN    PCP Phone Number:    [phone]     Follow Up Details: Within 1 week after discharge for reassessment, medication and symptom review, repeat BMP and CBC         Discharge Follow-up with Specialty: Pulmonology and gastroenterology   As directed      Specialty: Pulmonology and gastroenterology    Follow Up Details: Please follow-up with these specialties within 1 to 2 weeks after discharge or scheduled for reassessment.           Time Spent on Discharge:  Greater than 30 minutes      Jad Narayanan DO  [facility]  05/13/23  10:22 EDT

## 2023-05-13 NOTE — PROGRESS NOTES
[facility]      Mar/chart reviewed  Follow up pneumonia, hemoptysis, COPD, pulmonary hypertension  no further hemoptysis, still short of breath,   Feeling better, cough and mucous improving    Vital Sign Min/Max for last 24 hours  Temp  Min: 97.4 °F (36.3 °C)  Max: 98.7 °F (37.1 °C)   BP  Min: 116/73  Max: 128/75   Pulse  Min: 79  Max: 85   Resp  Min: 18  Max: 20   SpO2  Min: 88 %  Max: 93 %   Flow (L/min)  Min: 4  Max: 5   No data recorded     Appears ill, axox3,   perrl, eomi, no icterus,  mmm, no jvd, trachea midline, neck supple,  chest decreased, coarse ae bilaterally, + crackles, no wheezes,   rrr,   soft, nt, nd +bs,  no c/c/ e  Skin warm, dry no rashes    Labs: : reviewed:  Glucose 89  Bun 13  Cr 0.81  Bicarb 30  Wbc 6.8  hgb 16.3  plts 318    A/P:  1. Hemoptysis -- suspect 2/2 to pneumonia in patient on anticoagulation -- continue observation --tolerating resupmption of anticoagulation fine.   2. Pneumonia -- he is about completed scheduled course, I would be ok with observation off antibiotics when the ones he is on   3. COPD -- continue broncohdilators -- appears to have advanced emphysematous changes on ct chest as well as some kyphosis  4. Acute on chronic hypoxemic respiratory failure -- wean oxygen as able  5. Pulmonary hypertension -- suspect this is mainly who group II and III but patient was on home adcirca per Dr. Hernandez, defer to Dr. Hernandez  6. afib on xarelto     No objection to d/c from a pulmonary standpoint.

## 2023-05-13 NOTE — OUTREACH NOTE
Prep Survey    Flowsheet Row Responses   [facility] facility patient discharged from? [place]   Is LACE score < 7 ? No   Eligibility [facility]   Date of Admission 05/07/23   Date of Discharge 05/13/23   Discharge Disposition Home-Health Care Svc   Discharge diagnosis PNA   Does the patient have one of the following disease processes/diagnoses(primary or secondary)? Pneumonia   Does the patient have Home health ordered? Yes   What is the Home health agency?  [facility]   Is there a DME ordered? No   Prep survey completed? Yes          YENI LIMA - Registered Nurse

## 2023-05-14 NOTE — CASE MANAGEMENT/SOCIAL WORK
Case Management Discharge Note      Final Note: DC'd home 5/13 with [facility] following    Provided Post Acute Provider List?: N/A  Provided Post Acute Provider Quality & Resource List?: N/A        Home Medical Care Coordination complete.    Service Provider Selected Services Address Phone Fax Patient Preferred    [facility] [address], [facility] [id number] [phone] --               Transportation Services  Private: Car    Final Discharge Disposition Code: 06 - home with home health care

## 2023-05-15 ENCOUNTER — TRANSITIONAL CARE MANAGEMENT TELEPHONE ENCOUNTER (OUTPATIENT)
Dept: CALL CENTER | Facility: HOSPITAL | Age: 83
End: 2023-05-15
Payer: MEDICARE

## 2023-05-15 NOTE — OUTREACH NOTE
Call Center TCM Note    Flowsheet Row Responses   [facility] patient discharged from? [place]   Does the patient have one of the following disease processes/diagnoses(primary or secondary)? Pneumonia   TCM attempt successful? Yes   Call start time 0809   Call end time 0816   Discharge diagnosis PNA   Is patient permission given to speak with other caregiver? Yes   List who call center can speak with Wife   Person spoke with today (if not patient) and relationship Patient and wife   Meds reviewed with patient/caregiver? Yes   Is the patient having any side effects they believe may be caused by any medication additions or changes? No   Does the patient have all medications ordered at discharge? Yes   Is the patient taking all medications as directed (includes completed medication regime)? Yes   Medication comments Has held some of the stool softner due to loose stools.  States pt was on these due to constipation in the hospital.    Comments Appt made for 5/24 @ 11 with DERRELL Cuevas   Does the patient have an appointment with their PCP within 7 days of discharge? No   Nursing Interventions Assisted patient with making appointment per protocol   What is the [facility] health agency?  [facility]    Has home health visited the patient within 72 hours of discharge? Yes   Home health comments Nurse came yesterday   Pulse Ox monitoring Intermittent   Pulse Ox device source Patient   O2 Sat comments Sats are 88-90% on 4L.  Has had to turn it up at times when he is active.    O2 Sat: education provided Sat levels, When to seek care   Psychosocial issues? No   Did the patient receive a copy of their discharge instructions? Yes   Nursing interventions Reviewed instructions with patient   What is the patient's perception of their health status since discharge? Improving   If the patient is a current smoker, are they able to teach back resources for cessation? Not a smoker   Is the patient/caregiver able to teach  back the hierarchy of who to call/visit for symptoms/problems? PCP, Specialist, Home health nurse, Urgent Care, ED, 911 Yes   Additional teach back comments He is has had to increase oxygen at times when he has been active due to sats dropping to 88% but come up at rest.    Is the patient/caregiver able to teach back signs and symptoms of worsening condition: Fever/chills, Chest pain, Shortness of breath   Is the patient/caregiver able to teach back importance of completing antibiotic course of treatment? Yes   TCM call completed? Yes   Wrap up additional comments Appt made with PCP.  No other needs or questions at this time.   Call end time 0816          Rae Sheldon LPN    5/15/2023, 08:20 EDT

## 2023-05-16 ENCOUNTER — TELEPHONE (OUTPATIENT)
Dept: FAMILY MEDICINE CLINIC | Facility: CLINIC | Age: 83
End: 2023-05-16
Payer: MEDICARE

## 2023-05-16 NOTE — TELEPHONE ENCOUNTER
LATANYA WITH [facility] REQUESTING NURSING ORDERS TO SEE PT, HE WAS D/C ON Saturday.    2 WEEK 1  1 WEEK 6  2 PRN VISITS

## 2023-05-22 ENCOUNTER — TELEPHONE (OUTPATIENT)
Dept: FAMILY MEDICINE CLINIC | Facility: CLINIC | Age: 83
End: 2023-05-22
Payer: MEDICARE

## 2023-05-23 ENCOUNTER — READMISSION MANAGEMENT (OUTPATIENT)
Dept: CALL CENTER | Facility: HOSPITAL | Age: 83
End: 2023-05-23
Payer: MEDICARE

## 2023-05-23 NOTE — OUTREACH NOTE
COPD/PN Week 2 Survey    Flowsheet Row Responses   [facility] patient discharged from? [place]   Does the patient have one of the following disease processes/diagnoses(primary or secondary)? Pneumonia   Week 2 attempt successful? Yes   Call start time 1549   Call end time 1554   Discharge diagnosis PNA   Meds reviewed with patient/caregiver? Yes   Is the patient having any side effects they believe may be caused by any medication additions or changes? No   Does the patient have all medications ordered at discharge? Yes   Is the patient taking all medications as directed (includes completed medication regime)? Yes   Does the patient have a primary care provider?  Yes   Does the patient have an appointment with their PCP or specialist within 7 days of discharge? Yes   Has the patient kept scheduled appointments due by today? N/A   What is the Home health agency?  [facility]    Has home health visited the patient within 72 hours of discharge? Yes   Has all DME been delivered? Yes   Pulse Ox monitoring Intermittent   Pulse Ox device source Patient   O2 Sat comments Says it has been dropping down today.   O2 Sat: education provided Sat levels, Monitoring frequency, When to seek care   O2 Sat education comments If 88% or below and stay there, call 911,   Psychosocial issues? No   Did the patient receive a copy of their discharge instructions? Yes   Nursing interventions Reviewed instructions with patient   What is the patient's perception of their health status since discharge? Same   Nursing Interventions Nurse provided patient education   Are the patient's immunizations up to date?  Yes   Nursing interventions Educated on importance of maintaining up to date immunizations as advised by provider   If the patient is a current smoker, are they able to teach back resources for cessation? Not a smoker   Is the patient/caregiver able to teach back the hierarchy of who to call/visit for symptoms/problems? PCP,  Specialist, Home health nurse, Urgent Care, ED, 911 Yes   Additional teach back comments HH nurse came in during the call.   Week 2 call completed? Yes   Is the patient interested in additional calls from an ambulatory ?  NOTE:  applies to high risk patients requiring additional follow-up. No   Wrap up additional comments Says joya has been dropping he says.          Priti CASTELLON - Registered Nurse

## 2023-05-24 ENCOUNTER — TELEPHONE (OUTPATIENT)
Dept: FAMILY MEDICINE CLINIC | Facility: CLINIC | Age: 83
End: 2023-05-24

## 2023-05-24 ENCOUNTER — APPOINTMENT (OUTPATIENT)
Dept: GENERAL RADIOLOGY | Facility: HOSPITAL | Age: 83
DRG: 202 | End: 2023-05-24
Payer: MEDICARE

## 2023-05-24 ENCOUNTER — READMISSION MANAGEMENT (OUTPATIENT)
Dept: CALL CENTER | Facility: HOSPITAL | Age: 83
End: 2023-05-24
Payer: MEDICARE

## 2023-05-24 ENCOUNTER — HOSPITAL ENCOUNTER (INPATIENT)
Facility: HOSPITAL | Age: 83
LOS: 8 days | Discharge: HOME | DRG: 202 | End: 2023-06-01
Attending: EMERGENCY MEDICINE | Admitting: INTERNAL MEDICINE
Payer: MEDICARE

## 2023-05-24 DIAGNOSIS — J96.21 ACUTE AND CHRONIC RESPIRATORY FAILURE WITH HYPOXIA: ICD-10-CM

## 2023-05-24 DIAGNOSIS — J44.1 ACUTE EXACERBATION OF CHRONIC OBSTRUCTIVE PULMONARY DISEASE (COPD): ICD-10-CM

## 2023-05-24 DIAGNOSIS — J96.21 ACUTE ON CHRONIC RESPIRATORY FAILURE WITH HYPOXIA: Primary | ICD-10-CM

## 2023-05-24 DIAGNOSIS — J44.1 COPD WITH ACUTE EXACERBATION: ICD-10-CM

## 2023-05-24 DIAGNOSIS — J20.4 PARAINFLUENZA VIRUS BRONCHITIS: ICD-10-CM

## 2023-05-24 PROBLEM — Z95.0 PRESENCE OF CARDIAC PACEMAKER: Status: ACTIVE | Noted: 2023-05-24

## 2023-05-24 PROBLEM — J96.12 CHRONIC RESPIRATORY FAILURE WITH HYPOXIA AND HYPERCAPNIA: Status: ACTIVE | Noted: 2018-03-17

## 2023-05-24 PROBLEM — J43.9 PULMONARY EMPHYSEMA: Status: ACTIVE | Noted: 2018-02-26

## 2023-05-24 PROBLEM — R13.13 PHARYNGEAL DYSPHAGIA: Status: ACTIVE | Noted: 2023-05-10

## 2023-05-24 PROBLEM — I27.20 PULMONARY HYPERTENSION: Status: ACTIVE | Noted: 2023-05-24

## 2023-05-24 PROBLEM — J96.11 CHRONIC RESPIRATORY FAILURE WITH HYPOXIA: Status: ACTIVE | Noted: 2018-03-17

## 2023-05-24 PROBLEM — I50.32 CHRONIC DIASTOLIC CHF (CONGESTIVE HEART FAILURE): Status: ACTIVE | Noted: 2018-04-19

## 2023-05-24 LAB
ALBUMIN SERPL-MCNC: 3.7 G/DL (ref 3.5–5.2)
ALBUMIN/GLOB SERPL: 1.4 G/DL
ALP SERPL-CCNC: 106 U/L (ref 39–117)
ALT SERPL W P-5'-P-CCNC: 18 U/L (ref 1–41)
ANION GAP SERPL CALCULATED.3IONS-SCNC: 8 MMOL/L (ref 5–15)
AST SERPL-CCNC: 30 U/L (ref 1–40)
B PARAPERT DNA SPEC QL NAA+PROBE: NOT DETECTED
B PERT DNA SPEC QL NAA+PROBE: NOT DETECTED
BASOPHILS # BLD AUTO: 0.03 10*3/MM3 (ref 0–0.2)
BASOPHILS NFR BLD AUTO: 0.6 % (ref 0–1.5)
BILIRUB SERPL-MCNC: 0.7 MG/DL (ref 0–1.2)
BUN SERPL-MCNC: 13 MG/DL (ref 8–23)
BUN/CREAT SERPL: 13.3 (ref 7–25)
C PNEUM DNA NPH QL NAA+NON-PROBE: NOT DETECTED
CALCIUM SPEC-SCNC: 8.6 MG/DL (ref 8.6–10.5)
CHLORIDE SERPL-SCNC: 99 MMOL/L (ref 98–107)
CO2 SERPL-SCNC: 31 MMOL/L (ref 22–29)
CREAT SERPL-MCNC: 0.98 MG/DL (ref 0.76–1.27)
D-LACTATE SERPL-SCNC: 1.1 MMOL/L (ref 0.5–2)
DEPRECATED RDW RBC AUTO: 44.8 FL (ref 37–54)
EGFRCR SERPLBLD CKD-EPI 2021: 77.5 ML/MIN/1.73
EOSINOPHIL # BLD AUTO: 0 10*3/MM3 (ref 0–0.4)
EOSINOPHIL NFR BLD AUTO: 0 % (ref 0.3–6.2)
ERYTHROCYTE [DISTWIDTH] IN BLOOD BY AUTOMATED COUNT: 13.3 % (ref 12.3–15.4)
FLUAV SUBTYP SPEC NAA+PROBE: NOT DETECTED
FLUBV RNA ISLT QL NAA+PROBE: NOT DETECTED
GEN 5 2HR TROPONIN T REFLEX: 33 NG/L
GLOBULIN UR ELPH-MCNC: 2.7 GM/DL
GLUCOSE SERPL-MCNC: 105 MG/DL (ref 65–99)
HADV DNA SPEC NAA+PROBE: NOT DETECTED
HCOV 229E RNA SPEC QL NAA+PROBE: NOT DETECTED
HCOV HKU1 RNA SPEC QL NAA+PROBE: NOT DETECTED
HCOV NL63 RNA SPEC QL NAA+PROBE: NOT DETECTED
HCOV OC43 RNA SPEC QL NAA+PROBE: NOT DETECTED
HCT VFR BLD AUTO: 49.5 % (ref 37.5–51)
HGB BLD-MCNC: 16.6 G/DL (ref 13–17.7)
HMPV RNA NPH QL NAA+NON-PROBE: NOT DETECTED
HOLD SPECIMEN: NORMAL
HOLD SPECIMEN: NORMAL
HPIV1 RNA ISLT QL NAA+PROBE: NOT DETECTED
HPIV2 RNA SPEC QL NAA+PROBE: NOT DETECTED
HPIV3 RNA NPH QL NAA+PROBE: DETECTED
HPIV4 P GENE NPH QL NAA+PROBE: NOT DETECTED
IMM GRANULOCYTES # BLD AUTO: 0.01 10*3/MM3 (ref 0–0.05)
IMM GRANULOCYTES NFR BLD AUTO: 0.2 % (ref 0–0.5)
LYMPHOCYTES # BLD AUTO: 0.59 10*3/MM3 (ref 0.7–3.1)
LYMPHOCYTES NFR BLD AUTO: 11.8 % (ref 19.6–45.3)
M PNEUMO IGG SER IA-ACNC: NOT DETECTED
MCH RBC QN AUTO: 30.5 PG (ref 26.6–33)
MCHC RBC AUTO-ENTMCNC: 33.5 G/DL (ref 31.5–35.7)
MCV RBC AUTO: 90.8 FL (ref 79–97)
MONOCYTES # BLD AUTO: 0.67 10*3/MM3 (ref 0.1–0.9)
MONOCYTES NFR BLD AUTO: 13.5 % (ref 5–12)
NEUTROPHILS NFR BLD AUTO: 3.68 10*3/MM3 (ref 1.7–7)
NEUTROPHILS NFR BLD AUTO: 73.9 % (ref 42.7–76)
NRBC BLD AUTO-RTO: 0 /100 WBC (ref 0–0.2)
NT-PROBNP SERPL-MCNC: 2789 PG/ML (ref 0–1800)
PLATELET # BLD AUTO: 262 10*3/MM3 (ref 140–450)
PMV BLD AUTO: 9.6 FL (ref 6–12)
POTASSIUM SERPL-SCNC: 4.2 MMOL/L (ref 3.5–5.2)
PROCALCITONIN SERPL-MCNC: 0.06 NG/ML (ref 0–0.25)
PROT SERPL-MCNC: 6.4 G/DL (ref 6–8.5)
QT INTERVAL: 414 MS
RBC # BLD AUTO: 5.45 10*6/MM3 (ref 4.14–5.8)
RHINOVIRUS RNA SPEC NAA+PROBE: NOT DETECTED
RSV RNA NPH QL NAA+NON-PROBE: NOT DETECTED
SARS-COV-2 RNA NPH QL NAA+NON-PROBE: NOT DETECTED
SODIUM SERPL-SCNC: 138 MMOL/L (ref 136–145)
TROPONIN T DELTA: -4 NG/L
TROPONIN T SERPL HS-MCNC: 37 NG/L
WBC NRBC COR # BLD: 4.98 10*3/MM3 (ref 3.4–10.8)
WHOLE BLOOD HOLD COAG: NORMAL
WHOLE BLOOD HOLD SPECIMEN: NORMAL

## 2023-05-24 PROCEDURE — 0202U NFCT DS 22 TRGT SARS-COV-2: CPT | Performed by: PHYSICIAN ASSISTANT

## 2023-05-24 PROCEDURE — 99285 EMERGENCY DEPT VISIT HI MDM: CPT

## 2023-05-24 PROCEDURE — 87040 BLOOD CULTURE FOR BACTERIA: CPT | Performed by: PHYSICIAN ASSISTANT

## 2023-05-24 PROCEDURE — 93005 ELECTROCARDIOGRAM TRACING: CPT | Performed by: EMERGENCY MEDICINE

## 2023-05-24 PROCEDURE — 94660 CPAP INITIATION&MGMT: CPT

## 2023-05-24 PROCEDURE — 84145 PROCALCITONIN (PCT): CPT | Performed by: PHYSICIAN ASSISTANT

## 2023-05-24 PROCEDURE — 94799 UNLISTED PULMONARY SVC/PX: CPT

## 2023-05-24 PROCEDURE — 25010000002 METHYLPREDNISOLONE PER 125 MG: Performed by: PHYSICIAN ASSISTANT

## 2023-05-24 PROCEDURE — 80053 COMPREHEN METABOLIC PANEL: CPT | Performed by: PHYSICIAN ASSISTANT

## 2023-05-24 PROCEDURE — 83605 ASSAY OF LACTIC ACID: CPT | Performed by: PHYSICIAN ASSISTANT

## 2023-05-24 PROCEDURE — 85025 COMPLETE CBC W/AUTO DIFF WBC: CPT | Performed by: PHYSICIAN ASSISTANT

## 2023-05-24 PROCEDURE — 94664 DEMO&/EVAL PT USE INHALER: CPT

## 2023-05-24 PROCEDURE — 94640 AIRWAY INHALATION TREATMENT: CPT

## 2023-05-24 PROCEDURE — 94761 N-INVAS EAR/PLS OXIMETRY MLT: CPT

## 2023-05-24 PROCEDURE — 83880 ASSAY OF NATRIURETIC PEPTIDE: CPT | Performed by: PHYSICIAN ASSISTANT

## 2023-05-24 PROCEDURE — 93010 ELECTROCARDIOGRAM REPORT: CPT | Performed by: INTERNAL MEDICINE

## 2023-05-24 PROCEDURE — 84484 ASSAY OF TROPONIN QUANT: CPT | Performed by: PHYSICIAN ASSISTANT

## 2023-05-24 PROCEDURE — 71045 X-RAY EXAM CHEST 1 VIEW: CPT

## 2023-05-24 PROCEDURE — 25010000002 METHYLPREDNISOLONE PER 40 MG: Performed by: INTERNAL MEDICINE

## 2023-05-24 RX ORDER — KETOTIFEN FUMARATE 0.35 MG/ML
1 SOLUTION/ DROPS OPHTHALMIC 2 TIMES DAILY
Status: DISCONTINUED | OUTPATIENT
Start: 2023-05-24 | End: 2023-06-01 | Stop reason: HOSPADM

## 2023-05-24 RX ORDER — PANTOPRAZOLE SODIUM 40 MG/1
40 TABLET, DELAYED RELEASE ORAL
Status: DISCONTINUED | OUTPATIENT
Start: 2023-05-25 | End: 2023-06-01 | Stop reason: HOSPADM

## 2023-05-24 RX ORDER — ECHINACEA PURPUREA EXTRACT 125 MG
2 TABLET ORAL 4 TIMES DAILY
Status: DISCONTINUED | OUTPATIENT
Start: 2023-05-24 | End: 2023-06-01 | Stop reason: HOSPADM

## 2023-05-24 RX ORDER — ATORVASTATIN CALCIUM 20 MG/1
10 TABLET, FILM COATED ORAL DAILY
Status: DISCONTINUED | OUTPATIENT
Start: 2023-05-24 | End: 2023-06-01 | Stop reason: HOSPADM

## 2023-05-24 RX ORDER — NITROGLYCERIN 0.4 MG/1
0.4 TABLET SUBLINGUAL
Status: DISCONTINUED | OUTPATIENT
Start: 2023-05-24 | End: 2023-06-01 | Stop reason: HOSPADM

## 2023-05-24 RX ORDER — PILOCARPINE HYDROCHLORIDE 5 MG/1
5 TABLET, FILM COATED ORAL 3 TIMES DAILY
Status: DISCONTINUED | OUTPATIENT
Start: 2023-05-24 | End: 2023-06-01 | Stop reason: HOSPADM

## 2023-05-24 RX ORDER — SODIUM CHLORIDE 0.9 % (FLUSH) 0.9 %
10 SYRINGE (ML) INJECTION EVERY 12 HOURS SCHEDULED
Status: DISCONTINUED | OUTPATIENT
Start: 2023-05-24 | End: 2023-06-01 | Stop reason: HOSPADM

## 2023-05-24 RX ORDER — POLYETHYLENE GLYCOL 3350 17 G/17G
17 POWDER, FOR SOLUTION ORAL DAILY PRN
Status: DISCONTINUED | OUTPATIENT
Start: 2023-05-24 | End: 2023-06-01 | Stop reason: HOSPADM

## 2023-05-24 RX ORDER — BISACODYL 5 MG/1
5 TABLET, DELAYED RELEASE ORAL DAILY PRN
Status: DISCONTINUED | OUTPATIENT
Start: 2023-05-24 | End: 2023-06-01 | Stop reason: HOSPADM

## 2023-05-24 RX ORDER — ONDANSETRON 4 MG/1
4 TABLET, FILM COATED ORAL EVERY 6 HOURS PRN
Status: DISCONTINUED | OUTPATIENT
Start: 2023-05-24 | End: 2023-06-01 | Stop reason: HOSPADM

## 2023-05-24 RX ORDER — BUDESONIDE 0.5 MG/2ML
0.5 INHALANT ORAL
Status: DISCONTINUED | OUTPATIENT
Start: 2023-05-24 | End: 2023-06-01 | Stop reason: HOSPADM

## 2023-05-24 RX ORDER — SODIUM CHLORIDE FOR INHALATION 7 %
4 VIAL, NEBULIZER (ML) INHALATION
Status: DISCONTINUED | OUTPATIENT
Start: 2023-05-24 | End: 2023-06-01 | Stop reason: HOSPADM

## 2023-05-24 RX ORDER — AMOXICILLIN 250 MG
2 CAPSULE ORAL 2 TIMES DAILY PRN
Status: DISCONTINUED | OUTPATIENT
Start: 2023-05-24 | End: 2023-06-01 | Stop reason: HOSPADM

## 2023-05-24 RX ORDER — ACETAMINOPHEN 650 MG/1
650 SUPPOSITORY RECTAL EVERY 4 HOURS PRN
Status: DISCONTINUED | OUTPATIENT
Start: 2023-05-24 | End: 2023-06-01 | Stop reason: HOSPADM

## 2023-05-24 RX ORDER — POTASSIUM CHLORIDE 750 MG/1
20 TABLET, FILM COATED, EXTENDED RELEASE ORAL DAILY
Status: DISCONTINUED | OUTPATIENT
Start: 2023-05-24 | End: 2023-06-01 | Stop reason: HOSPADM

## 2023-05-24 RX ORDER — ONDANSETRON 2 MG/ML
4 INJECTION INTRAMUSCULAR; INTRAVENOUS EVERY 6 HOURS PRN
Status: DISCONTINUED | OUTPATIENT
Start: 2023-05-24 | End: 2023-06-01 | Stop reason: HOSPADM

## 2023-05-24 RX ORDER — ACETAMINOPHEN 160 MG/5ML
650 SOLUTION ORAL EVERY 4 HOURS PRN
Status: DISCONTINUED | OUTPATIENT
Start: 2023-05-24 | End: 2023-06-01 | Stop reason: HOSPADM

## 2023-05-24 RX ORDER — MONTELUKAST SODIUM 10 MG/1
10 TABLET ORAL NIGHTLY
Status: DISCONTINUED | OUTPATIENT
Start: 2023-05-24 | End: 2023-06-01 | Stop reason: HOSPADM

## 2023-05-24 RX ORDER — GUAIFENESIN AND DEXTROMETHORPHAN HYDROBROMIDE 600; 30 MG/1; MG/1
2 TABLET, EXTENDED RELEASE ORAL 2 TIMES DAILY
Status: DISCONTINUED | OUTPATIENT
Start: 2023-05-24 | End: 2023-05-26

## 2023-05-24 RX ORDER — SACCHAROMYCES BOULARDII 250 MG
500 CAPSULE ORAL 2 TIMES DAILY
Status: DISCONTINUED | OUTPATIENT
Start: 2023-05-24 | End: 2023-06-01 | Stop reason: HOSPADM

## 2023-05-24 RX ORDER — SODIUM CHLORIDE 0.9 % (FLUSH) 0.9 %
10 SYRINGE (ML) INJECTION AS NEEDED
Status: DISCONTINUED | OUTPATIENT
Start: 2023-05-24 | End: 2023-06-01 | Stop reason: HOSPADM

## 2023-05-24 RX ORDER — CETIRIZINE HYDROCHLORIDE 10 MG/1
10 TABLET ORAL DAILY
Status: DISCONTINUED | OUTPATIENT
Start: 2023-05-24 | End: 2023-06-01 | Stop reason: HOSPADM

## 2023-05-24 RX ORDER — METHYLPREDNISOLONE SODIUM SUCCINATE 125 MG/2ML
125 INJECTION, POWDER, LYOPHILIZED, FOR SOLUTION INTRAMUSCULAR; INTRAVENOUS ONCE
Status: COMPLETED | OUTPATIENT
Start: 2023-05-24 | End: 2023-05-24

## 2023-05-24 RX ORDER — SODIUM CHLORIDE 9 MG/ML
40 INJECTION, SOLUTION INTRAVENOUS AS NEEDED
Status: DISCONTINUED | OUTPATIENT
Start: 2023-05-24 | End: 2023-06-01 | Stop reason: HOSPADM

## 2023-05-24 RX ORDER — IPRATROPIUM BROMIDE AND ALBUTEROL SULFATE 2.5; .5 MG/3ML; MG/3ML
3 SOLUTION RESPIRATORY (INHALATION) ONCE
Status: COMPLETED | OUTPATIENT
Start: 2023-05-24 | End: 2023-05-24

## 2023-05-24 RX ORDER — FUROSEMIDE 40 MG/1
40 TABLET ORAL DAILY
Status: DISCONTINUED | OUTPATIENT
Start: 2023-05-24 | End: 2023-06-01 | Stop reason: HOSPADM

## 2023-05-24 RX ORDER — PREGABALIN 75 MG/1
75 CAPSULE ORAL EVERY 12 HOURS SCHEDULED
Status: DISCONTINUED | OUTPATIENT
Start: 2023-05-24 | End: 2023-06-01 | Stop reason: HOSPADM

## 2023-05-24 RX ORDER — METHYLPREDNISOLONE SODIUM SUCCINATE 40 MG/ML
40 INJECTION, POWDER, LYOPHILIZED, FOR SOLUTION INTRAMUSCULAR; INTRAVENOUS EVERY 8 HOURS
Status: DISCONTINUED | OUTPATIENT
Start: 2023-05-24 | End: 2023-05-25

## 2023-05-24 RX ORDER — TERAZOSIN 2 MG/1
2 CAPSULE ORAL NIGHTLY
Status: DISCONTINUED | OUTPATIENT
Start: 2023-05-24 | End: 2023-06-01 | Stop reason: HOSPADM

## 2023-05-24 RX ORDER — BUDESONIDE AND FORMOTEROL FUMARATE DIHYDRATE 160; 4.5 UG/1; UG/1
2 AEROSOL RESPIRATORY (INHALATION)
Status: DISCONTINUED | OUTPATIENT
Start: 2023-05-24 | End: 2023-05-24

## 2023-05-24 RX ORDER — ALBUTEROL SULFATE 2.5 MG/3ML
2.5 SOLUTION RESPIRATORY (INHALATION)
Status: COMPLETED | OUTPATIENT
Start: 2023-05-24 | End: 2023-05-24

## 2023-05-24 RX ORDER — ACETAMINOPHEN 325 MG/1
650 TABLET ORAL EVERY 4 HOURS PRN
Status: DISCONTINUED | OUTPATIENT
Start: 2023-05-24 | End: 2023-06-01 | Stop reason: HOSPADM

## 2023-05-24 RX ORDER — CALCIUM CARBONATE 500 MG/1
2 TABLET, CHEWABLE ORAL 3 TIMES DAILY PRN
Status: DISCONTINUED | OUTPATIENT
Start: 2023-05-24 | End: 2023-06-01 | Stop reason: HOSPADM

## 2023-05-24 RX ORDER — METHYLPREDNISOLONE SODIUM SUCCINATE 40 MG/ML
40 INJECTION, POWDER, LYOPHILIZED, FOR SOLUTION INTRAMUSCULAR; INTRAVENOUS EVERY 8 HOURS
Status: DISCONTINUED | OUTPATIENT
Start: 2023-05-24 | End: 2023-05-24

## 2023-05-24 RX ORDER — CHOLECALCIFEROL (VITAMIN D3) 125 MCG
5 CAPSULE ORAL NIGHTLY PRN
Status: DISCONTINUED | OUTPATIENT
Start: 2023-05-24 | End: 2023-06-01 | Stop reason: HOSPADM

## 2023-05-24 RX ORDER — ARFORMOTEROL TARTRATE 15 UG/2ML
15 SOLUTION RESPIRATORY (INHALATION)
Status: DISCONTINUED | OUTPATIENT
Start: 2023-05-24 | End: 2023-06-01 | Stop reason: HOSPADM

## 2023-05-24 RX ORDER — IPRATROPIUM BROMIDE AND ALBUTEROL SULFATE 2.5; .5 MG/3ML; MG/3ML
3 SOLUTION RESPIRATORY (INHALATION)
Status: DISCONTINUED | OUTPATIENT
Start: 2023-05-24 | End: 2023-06-01 | Stop reason: HOSPADM

## 2023-05-24 RX ORDER — BISACODYL 10 MG
10 SUPPOSITORY, RECTAL RECTAL DAILY PRN
Status: DISCONTINUED | OUTPATIENT
Start: 2023-05-24 | End: 2023-06-01 | Stop reason: HOSPADM

## 2023-05-24 RX ORDER — TADALAFIL 20 MG/1
40 TABLET ORAL
Status: DISCONTINUED | OUTPATIENT
Start: 2023-05-24 | End: 2023-06-01 | Stop reason: HOSPADM

## 2023-05-24 RX ADMIN — POTASSIUM CHLORIDE 20 MEQ: 750 TABLET, EXTENDED RELEASE ORAL at 17:56

## 2023-05-24 RX ADMIN — METHYLPREDNISOLONE SODIUM SUCCINATE 125 MG: 125 INJECTION, POWDER, FOR SOLUTION INTRAMUSCULAR; INTRAVENOUS at 11:02

## 2023-05-24 RX ADMIN — ALBUTEROL SULFATE 2.5 MG: 2.5 SOLUTION RESPIRATORY (INHALATION) at 11:30

## 2023-05-24 RX ADMIN — Medication 10 ML: at 20:59

## 2023-05-24 RX ADMIN — PILOCARPINE HYDRCHLORIDE 5 MG: 5 TABLET, FILM COATED ORAL at 20:58

## 2023-05-24 RX ADMIN — Medication 10 ML: at 17:58

## 2023-05-24 RX ADMIN — KETOTIFEN FUMARATE 1 DROP: 0.25 SOLUTION OPHTHALMIC at 20:59

## 2023-05-24 RX ADMIN — ATORVASTATIN CALCIUM 10 MG: 20 TABLET, FILM COATED ORAL at 20:55

## 2023-05-24 RX ADMIN — IPRATROPIUM BROMIDE AND ALBUTEROL SULFATE 3 ML: .5; 3 SOLUTION RESPIRATORY (INHALATION) at 19:27

## 2023-05-24 RX ADMIN — SODIUM CHLORIDE SOLN NEBU 7% 4 ML: 7 NEBU SOLN at 19:27

## 2023-05-24 RX ADMIN — BUDESONIDE 0.5 MG: 0.5 INHALANT ORAL at 19:27

## 2023-05-24 RX ADMIN — GUAIFENESIN AND DEXTROMETHORPHAN HYDROBROMIDE 2 TABLET: 600; 30 TABLET, EXTENDED RELEASE ORAL at 20:58

## 2023-05-24 RX ADMIN — MONTELUKAST SODIUM 10 MG: 10 TABLET, FILM COATED ORAL at 20:58

## 2023-05-24 RX ADMIN — Medication 500 MG: at 20:57

## 2023-05-24 RX ADMIN — SALINE NASAL SPRAY 2 SPRAY: 1.5 SOLUTION NASAL at 20:59

## 2023-05-24 RX ADMIN — ALBUTEROL SULFATE 2.5 MG: 2.5 SOLUTION RESPIRATORY (INHALATION) at 11:29

## 2023-05-24 RX ADMIN — METHYLPREDNISOLONE SODIUM SUCCINATE 40 MG: 40 INJECTION, POWDER, FOR SOLUTION INTRAMUSCULAR; INTRAVENOUS at 20:55

## 2023-05-24 RX ADMIN — IPRATROPIUM BROMIDE AND ALBUTEROL SULFATE 3 ML: .5; 3 SOLUTION RESPIRATORY (INHALATION) at 16:06

## 2023-05-24 RX ADMIN — IPRATROPIUM BROMIDE AND ALBUTEROL SULFATE 3 ML: .5; 3 SOLUTION RESPIRATORY (INHALATION) at 11:31

## 2023-05-24 RX ADMIN — RIVAROXABAN 20 MG: 20 TABLET, FILM COATED ORAL at 20:58

## 2023-05-24 RX ADMIN — PREGABALIN 75 MG: 75 CAPSULE ORAL at 20:55

## 2023-05-24 RX ADMIN — TERAZOSIN 2 MG: 2 CAPSULE ORAL at 20:58

## 2023-05-24 NOTE — ED NOTES
..Nursing report ED to floor  Alessio Allen  82 y.o.  male    HPI :   Chief Complaint   Patient presents with    Shortness of Breath       Admitting doctor:   Killian Castillo MD    Admitting diagnosis:   There were no encounter diagnoses.    Code status:   Current Code Status       Date Active Code Status Order ID Comments User Context       5/24/2023 1405 CPR (Attempt to Resuscitate) [id number]  Killian Castillo MD ED        Question Answer    Code Status (Patient has no pulse and is not breathing) CPR (Attempt to Resuscitate)    Medical Interventions (Patient has pulse or is breathing) Full Support                    Allergies:   Lisinopril, Sulfa antibiotics, Penicillins, Atenolol, Celebrex [celecoxib], Coreg [carvedilol], and Ibuprofen    Isolation:   Contact    Intake and Output  No intake or output data in the 24 hours ending 05/24/23 1419    Weight:       05/24/23  1030   Weight: 85.7 kg (189 lb)       Most recent vitals:   Vitals:    05/24/23 1102 05/24/23 1129 05/24/23 1131 05/24/23 1346   BP: 110/69   123/74   Pulse: 82 81 81 79   Resp:  20     Temp:       TempSrc:       SpO2: (!) 89% 93% 93% 92%   Weight:       Height:           Active LDAs/IV Access:   Lines, Drains & Airways       Active LDAs       Name Placement date Placement time Site Days    Peripheral IV 05/07/23 1130 Right Forearm 05/07/23  1130  Forearm  17    Peripheral IV 05/09/23 1200 Anterior;Distal;Left Forearm 05/09/23  1200  Forearm  15    Peripheral IV 05/24/23 1045 Right Antecubital 05/24/23  1045  Antecubital  less than 1                    Labs (abnormal labs have a star):   Labs Reviewed   RESPIRATORY PANEL PCR W/ COVID-19 (SARS-COV-2) GUSTABO/ASTRID/DIONICIO/PAD/COR/MAD/NASIM IN-HOUSE, NP SWAB IN UTM/VTP, 3-4 HR TAT - Abnormal; Notable for the following components:       Result Value    Parainfluenza Virus 3 Detected (*)     All other components within normal limits    Narrative:     In the setting of a positive respiratory panel with a viral  infection PLUS a negative procalcitonin without other underlying concern for bacterial infection, consider observing off antibiotics or discontinuation of antibiotics and continue supportive care. If the respiratory panel is positive for atypical bacterial infection (Bordetella pertussis, Chlamydophila pneumoniae, or Mycoplasma pneumoniae), consider antibiotic de-escalation to target atypical bacterial infection.   COMPREHENSIVE METABOLIC PANEL - Abnormal; Notable for the following components:    Glucose 105 (*)     CO2 31.0 (*)     All other components within normal limits    Narrative:     GFR Normal >60  Chronic Kidney Disease <60  Kidney Failure <15    The GFR formula is only valid for adults with stable renal function between ages 18 and 70.   BNP (IN-HOUSE) - Abnormal; Notable for the following components:    proBNP 2,789.0 (*)     All other components within normal limits    Narrative:     Among patients with dyspnea, NT-proBNP is highly sensitive for the detection of acute congestive heart failure. In addition NT-proBNP of <300 pg/ml effectively rules out acute congestive heart failure with 99% negative predictive value.    Results may be falsely decreased if patient taking Biotin.     TROPONIN - Abnormal; Notable for the following components:    HS Troponin T 37 (*)     All other components within normal limits    Narrative:     High Sensitive Troponin T Reference Range:  <10.0 ng/L- Negative Female for AMI  <15.0 ng/L- Negative Male for AMI  >=10 - Abnormal Female indicating possible myocardial injury.  >=15 - Abnormal Male indicating possible myocardial injury.   Clinicians would have to utilize clinical acumen, EKG, Troponin, and serial changes to determine if it is an Acute Myocardial Infarction or myocardial injury due to an underlying chronic condition.        CBC WITH AUTO DIFFERENTIAL - Abnormal; Notable for the following components:    Lymphocyte % 11.8 (*)     Monocyte % 13.5 (*)     Eosinophil %  "0.0 (*)     Lymphocytes, Absolute 0.59 (*)     All other components within normal limits   HIGH SENSITIVITIY TROPONIN T 2HR - Abnormal; Notable for the following components:    HS Troponin T 33 (*)     Troponin T Delta -4 (*)     All other components within normal limits    Narrative:     High Sensitive Troponin T Reference Range:  <10.0 ng/L- Negative Female for AMI  <15.0 ng/L- Negative Male for AMI  >=10 - Abnormal Female indicating possible myocardial injury.  >=15 - Abnormal Male indicating possible myocardial injury.   Clinicians would have to utilize clinical acumen, EKG, Troponin, and serial changes to determine if it is an Acute Myocardial Infarction or myocardial injury due to an underlying chronic condition.        LACTIC ACID, PLASMA - Normal   PROCALCITONIN - Normal    Narrative:     As a Marker for Sepsis (Non-Neonates):    1. <0.5 ng/mL represents a low risk of severe sepsis and/or septic shock.  2. >2 ng/mL represents a high risk of severe sepsis and/or septic shock.    As a Marker for Lower Respiratory Tract Infections that require antibiotic therapy:    PCT on Admission    Antibiotic Therapy       6-12 Hrs later    >0.5                Strongly Recommended  >0.25 - <0.5        Recommended   0.1 - 0.25          Discouraged              Remeasure/reassess PCT  <0.1                Strongly Discouraged     Remeasure/reassess PCT    As 28 day mortality risk marker: \"Change in Procalcitonin Result\" (>80% or <=80%) if Day 0 (or Day 1) and Day 4 values are available. Refer to http://www.[employer]-pct-calculator.com    Change in PCT <=80%  A decrease of PCT levels below or equal to 80% defines a positive change in PCT test result representing a higher risk for 28-day all-cause mortality of patients diagnosed with severe sepsis for septic shock.    Change in PCT >80%  A decrease of PCT levels of more than 80% defines a negative change in PCT result representing a lower risk for 28-day all-cause mortality of " patients diagnosed with severe sepsis or septic shock.      BLOOD CULTURE   BLOOD CULTURE   RAINBOW DRAW    Narrative:     The following orders were created for panel order [place] Draw.  Procedure                               Abnormality         Status                     ---------                               -----------         ------                     Green Top (Gel)[[id number]]                                  Final result               Lavender Top[[id number]]                                     Final result               Gold Top - SST[[id number]]                                   Final result               Light Blue Top[[id number]]                                   Final result                 Please view results for these tests on the individual orders.   CBC AND DIFFERENTIAL    Narrative:     The following orders were created for panel order CBC & Differential.  Procedure                               Abnormality         Status                     ---------                               -----------         ------                     CBC Auto Differential[[id number]]        Abnormal            Final result                 Please view results for these tests on the individual orders.   GREEN TOP   LAVENDER TOP   GOLD TOP - SST   LIGHT BLUE TOP       EKG:   ECG 12 Lead Dyspnea   Preliminary Result   HEART RATE= 80  bpm   RR Interval= 750  ms   ME Interval= 164  ms   P Horizontal Axis= 207  deg   P Front Axis= 0  deg   QRSD Interval= 144  ms   QT Interval= 414  ms   QRS Axis= 247  deg   T Wave Axis= 80  deg   - ABNORMAL ECG -   Ventricular-paced rhythm   No further analysis attempted due to paced rhythm   Electronically Signed By:    Date and Time of Study: 2023-05-24 10:52:00          Meds given in ED:   Medications   sodium chloride 0.9 % flush 10 mL (has no administration in time range)   sodium chloride 0.9 % flush 10 mL (has no administration in time range)   sodium chloride 0.9 % flush 10 mL (has no  administration in time range)   sodium chloride 0.9 % infusion 40 mL (has no administration in time range)   sennosides-docusate (PERICOLACE) 8.6-50 MG per tablet 2 tablet (has no administration in time range)     And   polyethylene glycol (MIRALAX) packet 17 g (has no administration in time range)     And   bisacodyl (DULCOLAX) EC tablet 5 mg (has no administration in time range)     And   bisacodyl (DULCOLAX) suppository 10 mg (has no administration in time range)   nitroglycerin (NITROSTAT) SL tablet 0.4 mg (has no administration in time range)   acetaminophen (TYLENOL) tablet 650 mg (has no administration in time range)     Or   acetaminophen (TYLENOL) 160 MG/5ML solution 650 mg (has no administration in time range)     Or   acetaminophen (TYLENOL) suppository 650 mg (has no administration in time range)   melatonin tablet 5 mg (has no administration in time range)   ondansetron (ZOFRAN) tablet 4 mg (has no administration in time range)     Or   ondansetron (ZOFRAN) injection 4 mg (has no administration in time range)   calcium carbonate (TUMS) chewable tablet 500 mg (200 mg elemental) (has no administration in time range)   ipratropium-albuterol (DUO-NEB) nebulizer solution 3 mL (has no administration in time range)   methylPREDNISolone sodium succinate (SOLU-Medrol) injection 40 mg (has no administration in time range)   methylPREDNISolone sodium succinate (SOLU-Medrol) injection 125 mg (125 mg Intravenous Given 5/24/23 1102)   ipratropium-albuterol (DUO-NEB) nebulizer solution 3 mL (3 mL Nebulization Given 5/24/23 1131)   albuterol (PROVENTIL) nebulizer solution 0.083% 2.5 mg/3mL (2.5 mg Nebulization Given 5/24/23 1130)       Imaging results:  No radiology results for the last day    Ambulatory status:   - up with assist x2    Social issues:   Social History     Socioeconomic History    Marital status:     Number of children: 2   Tobacco Use    Smoking status: Former     Packs/day: 1.50     Years:  45.00     Pack years: 67.50     Types: Cigarettes     Quit date: 1/3/2000     Years since quittin.4    Smokeless tobacco: Never   Vaping Use    Vaping Use: Never used   Substance and Sexual Activity    Alcohol use: No    Drug use: No    Sexual activity: Not Currently     Partners: Female       NIH Stroke Scale:         Angelika Rea RN  23 14:19 EDT

## 2023-05-24 NOTE — PLAN OF CARE
Goal Outcome Evaluation:  Plan of Care Reviewed With: patient         New admission with acute/chronic respiratory failure. A&Ox4. 8L high flow NC, baseline is 4L NC at home. Desats quickly and takes time to recover with ambulation. Productive cough. Standby assist with walker; uses cane at home. Paced on monitor. Consult to pulmonology, normally sees Dr. Hernandez. No patient complaints at this time. VSS. All needs met.         1739-patient's home medication eltrombopag sent to pharmacy to be used on the floor.       Daily Care Plan Summary: Heart Failure    Diuretic in use (IV or PO): PO lasix       Daily weight (up or down):  n/a      Output > Intake (yes/no): n/a    O2 Requirements (current, any change?): 8L high flow NC    Symptoms noted with Activity (Respiratory Tolerance, functional state):  desats and c/o SOA with activity     Anticipated Discharge Plans:  n/a

## 2023-05-24 NOTE — PROGRESS NOTES
Clinical Pharmacy Services: Medication History    Alessio Allen is a 82 y.o. male presenting to [facility] for   Chief Complaint   Patient presents with   • Shortness of Breath       He  has a past medical history of Acute on chronic diastolic heart failure, Anemia (2018), Arthritis, Asthma, Atrial fibrillation, BPH (benign prostatic hypertrophy), Cancer, Cataract (2012), CHF (congestive heart failure) (2018), Colon polyp (2004), COPD (chronic obstructive pulmonary disease), Coronary artery disease (2017), Dependence on continuous supplemental oxygen, Diverticulosis, Erectile dysfunction, GERD (gastroesophageal reflux disease), H/O Abnormal CBC (2017), History of heart artery stent (03/2017), History of medical problems (2017), Hyperlipidemia, Hypertension, Low back pain (2012), Lung disease, Neuropathy, Osteopenia (2012), and Pneumonia.    Allergies as of 05/24/2023 - Reviewed 05/24/2023   Allergen Reaction Noted   • Lisinopril Shortness Of Breath 05/10/2016   • Sulfa antibiotics Shortness Of Breath 05/10/2016   • Penicillins Rash 05/10/2016   • Atenolol Other (See Comments) 05/10/2016   • Celebrex [celecoxib] Rash 05/10/2016   • Coreg [carvedilol] Cough 01/03/2018   • Ibuprofen Other (See Comments) 05/10/2016       Medication information was obtained from: Patient   Pharmacy and Phone Number:     Prior to Admission Medications     Prescriptions Last Dose Informant Patient Reported? Taking?    acetaminophen (TYLENOL) 325 MG tablet 5/24/2023 Self No Yes    Take 2 tablets by mouth Every 6 (Six) Hours As Needed (prn for pain).    albuterol (PROVENTIL) (2.5 MG/3ML) 0.083% nebulizer solution 5/24/2023 Self Yes Yes    Take 2.5 mg by nebulization Every 4 (Four) Hours As Needed.    doxazosin (CARDURA) 2 MG tablet 5/23/2023 Self Yes Yes    Take 1 tablet by mouth Every Night.    eltrombopag (PROMACTA) 50 MG tablet 5/24/2023 Self Yes Yes    Take 1 tablet by mouth Daily.    empagliflozin (JARDIANCE) 10 MG  tablet tablet 5/24/2023 Self Yes Yes    Take 1 tablet by mouth Daily.    ferrous sulfate 325 (65 FE) MG tablet 5/23/2023 Self No Yes    Take 1 tablet by mouth Daily With Breakfast.    fexofenadine (Allegra Allergy) 60 MG tablet 5/24/2023 Self No Yes    Take 1 tablet by mouth 2 (Two) Times a Day.    furosemide (LASIX) 40 MG tablet 5/24/2023 Self Yes Yes    Take 1 tablet by mouth Daily.    montelukast (SINGULAIR) 10 MG tablet 5/23/2023 Self Yes Yes    Take 1 tablet by mouth Every Night.    olopatadine (Patanol) 0.1 % ophthalmic solution 5/23/2023 Self No Yes    Administer 1 drop to both eyes 2 (Two) Times a Day.    pantoprazole (PROTONIX) 40 MG pack packet 5/24/2023 Self Yes Yes    Take 1 packet by mouth Every Morning Before Breakfast.    pilocarpine (SALAGEN) 5 MG tablet 5/24/2023 Self Yes Yes    Take 1 tablet by mouth 3 (Three) Times a Day.    polyvinyl alcohol (LIQUIFILM) 1.4 % ophthalmic solution Past Week Self Yes Yes    1 drop As Needed for Dry Eyes.    potassium chloride (K-DUR) 10 MEQ CR tablet 5/23/2023 Self No Yes    Take 2 tablets by mouth Daily.    pregabalin (LYRICA) 75 MG capsule 5/24/2023 Self Yes Yes    Take 1 capsule by mouth 2 (Two) Times a Day.    pyridoxine (VITAMIN B-6) 50 MG tablet 5/23/2023 Self Yes Yes    Take 1 tablet by mouth Daily.    saccharomyces boulardii (FLORASTOR) 250 MG capsule 5/23/2023 Self No Yes    Take 2 capsules by mouth 2 (Two) Times a Day.    simvastatin (ZOCOR) 20 MG tablet 5/23/2023 Self Yes Yes    Take 0.5 tablets by mouth Every Night.    sodium chloride (Ocean Nasal Spray) 0.65 % nasal spray 5/24/2023 Self No Yes    2 sprays into the nostril(s) as directed by provider As Needed for Congestion (qid prn). May refill q 21 days    Patient taking differently:  2 sprays into the nostril(s) as directed by provider As Needed for Congestion (qid prn).    sodium chloride 7 % nebulizer solution nebulizer solution 5/23/2023 Self Yes Yes    Take 4 mL by nebulization As Needed.     Sodium Fluoride 1.1 % gel 5/23/2023 Self Yes Yes    Take 1 application by mouth Daily.    tadalafil (ADCIRCA) 20 MG tablet tablet 5/24/2023 Self Yes Yes    Take 2 tablets by mouth Daily.    TRELEGY ELLIPTA 100-62.5-25 MCG/INH aerosol powder  5/23/2023 Self Yes Yes    Inhale 1 puff Daily.    XARELTO 20 MG tablet 5/23/2023 Self Yes Yes    Take 1 tablet by mouth Daily With Dinner.    albuterol sulfate  (90 Base) MCG/ACT inhaler More than a month Self Yes No    Inhale 2 puffs Every 4 (Four) Hours As Needed for Wheezing.            Medication notes:     This medication list is complete to the best of my knowledge as of 5/24/2023    Please call if questions.    Hung Moya  Medication History Technician  [phone]    5/24/2023 14:45 EDT

## 2023-05-24 NOTE — CONSULTS
Referring Provider: Dr. Castillo  Reason for Consultation: Shortness of breath and hypoxia.    Patient Care Team:  Madison Lewis APRN as PCP - General (Nurse Practitioner)  Rao Maguire MD as Consulting Physician (Hematology and Oncology)  Alan Santana MD as Referring Physician (Family Medicine)  Gaurav Merrill MD as Consulting Physician (Otolaryngology)  Edgardo Brown MD (Otolaryngology)  Isael Beltre MD as Consulting Physician (Hematology and Oncology)  Herminia Salas MD as Consulting Physician (Internal Medicine)  Juventino Miller MD as Consulting Physician (Cardiac Electrophysiology)  Suresh Hernandez MD as Consulting Physician (Pulmonary Disease)    Chief complaint:   Shortness of breath.    History of present illness:    Subjective   This is an 82-year-old male patient with history of COPD and chronic respiratory failure on oxygen 5 L/min on exertion and 4 L at rest.    He follows at the PC clinic with Dr. Hernandez.  PFT on 4/15/2021 showed obstructive pattern FEV1 79%.  It is reported in the medical records that he has a history of IPF but I do not see this diagnosis through [facility] clinic and his recent CT were not typical for UIP.    In addition to oxygen, patient uses Trilogy vent and Trelegy inhaler and nebulizer therapy on average twice a day recently.    He was just hospitalized recently for pneumonia of the left lower lobe and was discharged on 5/16..     He presented to the hospital today for symptoms of shortness of breath and cough.  Dyspnea is worse with activities.  Cough is productive of greenish to yellowish phlegm.  This started about 2 days ago and was preceded by sore throat, nasal congestion.  No relief with the use of nebulizer therapy.    In the ED, SPO2 was 84% on 6 L.  He required 9 L oxygen.    Labs:  Pro-Kingsley normal.  RVP positive for parainfluenza.    Review of Systems  Constitutional: No fever or chills.   ENMT: No sinus  congestion  Cardiovascular: No chest pain, palpitation or legs swelling.    Respiratory: See above  Gastrointestinal: No constipation, diarrhea or abdominal pain.  No nausea or vomiting.  Neurology: No headache, weakness, numbness or dizziness.   Musculoskeletal: No joints pain, stiffness or swelling.   Psychiatry: No depression.  Genitourinary: No dysuria or frequent urination  Endo: No weight changes. No cold or warm intolerance.  Lymphatic: No swollen glands.  Integumentary: No rash.    History  Past Medical History:   Diagnosis Date   • Acute on chronic diastolic heart failure    • Anemia 2018   • Arthritis    • Asthma    • Atrial fibrillation    • BPH (benign prostatic hypertrophy)    • Cancer     throat CA   • Cataract 2012    Removed   • CHF (congestive heart failure) 2018   • Colon polyp 2004   • COPD (chronic obstructive pulmonary disease)    • Coronary artery disease 2017   • Dependence on continuous supplemental oxygen     3L-5L DEPENDING ON ACTIVITY   • Diverticulosis    • Erectile dysfunction    • GERD (gastroesophageal reflux disease)    • H/O Abnormal CBC 2017   • History of heart artery stent 03/2017   • History of medical problems 2017    Afib   • Hyperlipidemia    • Hypertension    • Low back pain 2012   • Lung disease    • Neuropathy     feet and hands    • Osteopenia 2012    Osteopenia   • Pneumonia    ,   Past Surgical History:   Procedure Laterality Date   • BRONCHOSCOPY N/A 04/07/2018    Procedure: BRONCHOSCOPY with specimens;  Surgeon: Suresh Hernandez MD;  Location: [facility] OR;  Service: Pulmonary   • BRONCHOSCOPY N/A 03/06/2019    Procedure: BRONCHOSCOPY WITH BAL AND BIOPSY UNDER FLUORO AND ANESTHESIA;  Surgeon: Suresh Hernandez MD;  Location: [facility] OR;  Service: Pulmonary   • BRONCHOSCOPY N/A 06/13/2019    Procedure: BRONCHOSCOPY;  Surgeon: Suresh Hernandez MD;  Location: [facility];  Service: Pulmonary   • CARDIAC CATHETERIZATION N/A 04/18/2018    Procedure: Right Heart Cath with  possible vasodilator study;  Surgeon: Torey Schuler MD;  Location:  [facility];  Service: Cardiovascular   • CARDIAC CATHETERIZATION N/A 2019    Procedure: RIGHT AND LEFT HEART CATH;  Surgeon: Marizol Holt MD;  Location:  [facility];  Service: Cardiology   • CARDIAC CATHETERIZATION N/A 2019    Procedure: CORONARY ANGIOGRAPHY;  Surgeon: Marizol Holt MD;  Location:  [facility];  Service: Cardiology   • COLONOSCOPY  2016    polyps   • CORONARY STENT PLACEMENT  2017   • FRACTURE SURGERY     • INSERT / REPLACE / VENOUS ACCESS CATHETER Right    • PACEMAKER IMPLANTATION     • VENOUS ACCESS DEVICE (PORT) REMOVAL Right 2021    Procedure: Mediport Removal;  Surgeon: Joseph Nickerson Jr., MD;  Location: [facility] MAIN OR;  Service: General;  Laterality: Right;   ,   Family History   Problem Relation Age of Onset   • Hypertension Mother    • Arthritis Mother    • Heart attack Father    • Asthma Father    • Early death Father    • COPD Brother    • Early death Brother    • Heart disease Brother    • Hypertension Brother    • Malig Hyperthermia Neg Hx    ,   Social History     Socioeconomic History   • Marital status:    • Number of children: 2   Tobacco Use   • Smoking status: Former     Packs/day: 1.50     Years: 45.00     Pack years: 67.50     Types: Cigarettes     Quit date: 1/3/2000     Years since quittin.4   • Smokeless tobacco: Never   Vaping Use   • Vaping Use: Never used   Substance and Sexual Activity   • Alcohol use: No   • Drug use: No   • Sexual activity: Not Currently     Partners: Female     E-cigarette/Vaping   • E-cigarette/Vaping Use Never User      E-cigarette/Vaping Substances   • Nicotine No    • THC No    • CBD No    • Flavoring No      E-cigarette/Vaping Devices   • Disposable No    • Pre-filled or Refillable Cartridge No    • Refillable Tank No    • Pre-filled Pod No        ,   Medications Prior to  Admission   Medication Sig Dispense Refill Last Dose   • acetaminophen (TYLENOL) 325 MG tablet Take 2 tablets by mouth Every 6 (Six) Hours As Needed (prn for pain). 720 tablet 2 5/24/2023   • albuterol (PROVENTIL) (2.5 MG/3ML) 0.083% nebulizer solution Take 2.5 mg by nebulization Every 4 (Four) Hours As Needed.   5/24/2023   • doxazosin (CARDURA) 2 MG tablet Take 1 tablet by mouth Every Night.   5/23/2023   • eltrombopag (PROMACTA) 50 MG tablet Take 1 tablet by mouth Daily.   5/24/2023   • empagliflozin (JARDIANCE) 10 MG tablet tablet Take 1 tablet by mouth Daily.   5/24/2023   • ferrous sulfate 325 (65 FE) MG tablet Take 1 tablet by mouth Daily With Breakfast. 90 tablet 3 5/23/2023   • fexofenadine (Allegra Allergy) 60 MG tablet Take 1 tablet by mouth 2 (Two) Times a Day. 180 tablet 3 5/24/2023   • furosemide (LASIX) 40 MG tablet Take 1 tablet by mouth Daily.   5/24/2023   • montelukast (SINGULAIR) 10 MG tablet Take 1 tablet by mouth Every Night.   5/23/2023   • olopatadine (Patanol) 0.1 % ophthalmic solution Administer 1 drop to both eyes 2 (Two) Times a Day. 3 each 12 5/23/2023   • pantoprazole (PROTONIX) 40 MG pack packet Take 1 packet by mouth Every Morning Before Breakfast.   5/24/2023   • pilocarpine (SALAGEN) 5 MG tablet Take 1 tablet by mouth 3 (Three) Times a Day.   5/24/2023   • polyvinyl alcohol (LIQUIFILM) 1.4 % ophthalmic solution 1 drop As Needed for Dry Eyes.   Past Week   • potassium chloride (K-DUR) 10 MEQ CR tablet Take 2 tablets by mouth Daily. 180 tablet 3 5/23/2023   • pregabalin (LYRICA) 75 MG capsule Take 1 capsule by mouth 2 (Two) Times a Day.   5/24/2023   • pyridoxine (VITAMIN B-6) 50 MG tablet Take 1 tablet by mouth Daily.   5/23/2023   • saccharomyces boulardii (FLORASTOR) 250 MG capsule Take 2 capsules by mouth 2 (Two) Times a Day. 30 capsule 0 5/23/2023   • simvastatin (ZOCOR) 20 MG tablet Take 0.5 tablets by mouth Every Night.   5/23/2023   • sodium chloride (Ocean Nasal Spray) 0.65 %  nasal spray 2 sprays into the nostril(s) as directed by provider As Needed for Congestion (qid prn). May refill q 21 days (Patient taking differently: 2 sprays into the nostril(s) as directed by provider As Needed for Congestion (qid prn).) 4 each 3 5/24/2023   • sodium chloride 7 % nebulizer solution nebulizer solution Take 4 mL by nebulization As Needed.   5/23/2023   • Sodium Fluoride 1.1 % gel Take 1 application by mouth Daily.   5/23/2023   • tadalafil (ADCIRCA) 20 MG tablet tablet Take 2 tablets by mouth Daily.   5/24/2023   • TRELEGY ELLIPTA 100-62.5-25 MCG/INH aerosol powder  Inhale 1 puff Daily.   5/23/2023   • XARELTO 20 MG tablet Take 1 tablet by mouth Daily With Dinner.   5/23/2023   • albuterol sulfate  (90 Base) MCG/ACT inhaler Inhale 2 puffs Every 4 (Four) Hours As Needed for Wheezing.   More than a month   , Scheduled Meds:  atorvastatin, 10 mg, Oral, Daily  budesonide-formoterol, 2 puff, Inhalation, BID - RT   And  tiotropium bromide monohydrate, 2 puff, Inhalation, Daily - RT  cetirizine, 10 mg, Oral, Daily  eltrombopag, 50 mg, Oral, Daily  furosemide, 40 mg, Oral, Daily  guaifenesin-dextromethorphan, 2 tablet, Oral, BID  ipratropium-albuterol, 3 mL, Nebulization, 4x Daily - RT  ketotifen, 1 drop, Both Eyes, BID  montelukast, 10 mg, Oral, Nightly  [START ON 5/25/2023] pantoprazole, 40 mg, Oral, Q AM  pilocarpine, 5 mg, Oral, TID  potassium chloride, 20 mEq, Oral, Daily  pregabalin, 75 mg, Oral, Q12H  rivaroxaban, 20 mg, Oral, Daily With Dinner  saccharomyces boulardii, 500 mg, Oral, BID  sodium chloride, 10 mL, Intravenous, Q12H  sodium chloride, 4 mL, Nebulization, BID - RT  sodium chloride, 2 spray, Each Nare, 4x Daily  tadalafil, 40 mg, Oral, Q24H  terazosin, 2 mg, Oral, Nightly     and Allergies:  Lisinopril, Sulfa antibiotics, Penicillins, Atenolol, Celebrex [celecoxib], Coreg [carvedilol], and Ibuprofen    Objective     Vital Signs   Temp:  [98.4 °F (36.9 °C)-98.8 °F (37.1 °C)] 98.4 °F  (36.9 °C)  Heart Rate:  [79-86] 82  Resp:  [20] 20  BP: (110-138)/(69-85) 138/85    PPE used per hospital policy    Physical Exam:  Constitutional: Not in acute distress.  Eyes: Injected conjunctivae, EOMI. pupils equal reactive to light.  ENMT: Mishra 3. No oral thrush.   Neck: Large. Trachea midline. No thyromegaly  Heart: RRR, no murmur  Lungs: Equal but diminished air entry throughout.  Coarse breath sounds bilaterally.  Left lower lobe crackles.  Abdomen: Obese. Soft. No tenderness or dullness. No HSM.  Extremities: No cyanosis, clubbing or pitting edema.  Warm extremities and well-perfused.  Neuro: Conscious, alert, oriented x3.  Strength 5/5 in arms and legs.  Psych: Appropriate mood and affect.    Integumentary: No rash.  Normal skin turgor  Lymphatic: No palpable cervical or supraclavicular lymph nodes.      Diagnostic imaging:  I personally and independently reviewed the following images:   CXR 5/24/2023: Pulmonary infiltrates bibasilar but more prominent on the right.  Emphysema.  Pulmonary infiltrates have worsened compared to prior CXR 10/4/2022.    CT chest 5/7/2023 and CT chest 1/23/2023:       Laboratory workup:    Results from last 7 days   Lab Units 05/24/23  1046   SODIUM mmol/L 138   POTASSIUM mmol/L 4.2   CHLORIDE mmol/L 99   CO2 mmol/L 31.0*   BUN mg/dL 13   CREATININE mg/dL 0.98   GLUCOSE mg/dL 105*   CALCIUM mg/dL 8.6     Results from last 7 days   Lab Units 05/24/23  1235 05/24/23  1046   HSTROP T ng/L 33* 37*     Results from last 7 days   Lab Units 05/24/23  1046   WBC 10*3/mm3 4.98   HEMOGLOBIN g/dL 16.6   HEMATOCRIT % 49.5   PLATELETS 10*3/mm3 262         Results from last 7 days   Lab Units 05/24/23  1046   PROBNP pg/mL 2,789.0*       Assessment   1. COPD exacerbation  2. Parainfluenza bronchitis  3. Pulmonary infiltrates: Seems chronic and waxing and waning.  Now right lower lobe.  Previously mostly in the left lower lobe.  Unclear how much of that represents scarring.  It does not  appear that it is related to bacterial pneumonia but could be viral pneumonia  4. Acute on chronic hypoxic respiratory failure  5. Chronic hypercapnic respiratory failure, on trilogy ventilator.    Recommendations:    · Duoneb 4 times a day  · Symbicort and start Pulmicort tw and Brovana twice a day  · Solu-Medrol 40 mg every 8 hours  · Trilogy ventilator at night  · Oxygen by NC and titrate keep SPO2 >90%  · Flutter to improve mucus clearance      Ken Up MD  05/24/23  17:31 EDT

## 2023-05-24 NOTE — TELEPHONE ENCOUNTER
Caller: Dulce Allen    Relationship to patient: Emergency Contact    Best call back number: [phone]    Chief complaint: LOW OXYGEN LEVELS.      Additional notes: PATIENT WIFE CALLED TO CANCEL THE HOSPITAL FOLLOW UP APPOINTMENT TODAY. REPORTING PATIENT HAVING LOW OXYGEN LEVELS AND THEY WERE HEADING TO THE EMERGENCY DEPARTMENT

## 2023-05-24 NOTE — ED PROVIDER NOTES
EMERGENCY DEPARTMENT ENCOUNTER    Room Number:  06/06  Date seen:  5/24/2023  PCP: Madison Lewis APRN  Discussed/ obtained information from independent historians: patient      HPI:  Chief Complaint: shortness of breath  A complete HPI/ROS/PMH/PSH/SH/FH are unobtainable due to: none  Context: Alessio Allen is a 82 y.o. male with a history of COPD, chronic respiratory failure on 4 L of nasal cannula oxygen all of the time, CHF, A-fib chronically anticoagulated on Xarelto who presents to the ED c/o acute shortness of breath this morning.  States he checked his oxygen when he started feeling bad and it was 80%.  Oxygen is currently 93% on 6 L.  He denies any chest pain cough congestion fever chills.  He does report some increase in his bilateral lower extremity edema.  Pulmonologist is Dr. Hernandez.      External (non-ED) record review: Patient mended 5/7/2023 until 5/13/2023 for hemoptysis with left lower lobe pneumonia.  He was treated with ceftriaxone and doxycycline.  He was to follow-up with pulmonology in 1 to 2 weeks for reassessment.      PAST MEDICAL HISTORY  Active Ambulatory Problems     Diagnosis Date Noted   • Paroxysmal atrial fibrillation 03/02/2016   • Dyslipidemia 03/02/2016   • Primary hypertension 03/02/2016   • Neuropathy    • Hypertension    • COPD (chronic obstructive pulmonary disease)    • BPH (benign prostatic hypertrophy)    • Atrial fibrillation    • Asthma    • Hypoxia 02/07/2018   • Pneumonia 02/07/2018   • Chronic anticoagulation 02/07/2018   • Pneumonia of both lungs due to infectious organism 02/07/2018   • Hyponatremia 02/14/2018   • Pulmonary emphysema 02/26/2018   • Chronic respiratory failure with hypoxia and hypercapnia 03/17/2018   • Pneumonia of both lower lobes due to infectious organism 04/05/2018   • Chronic diastolic CHF (congestive heart failure) 04/19/2018   • IPF (idiopathic pulmonary fibrosis) 04/19/2018   • Acute respiratory failure with hypoxia 02/28/2019   •  Attention to G-tube 03/03/2019   • Massive hemoptysis 06/13/2019   • Laryngeal cancer 09/27/2019   • Encounter for venous access device care 03/16/2021   • Pneumonia of left lower lobe due to infectious organism 05/07/2023   • Hemoptysis 05/08/2023   • Pharyngeal dysphagia 05/10/2023     Resolved Ambulatory Problems     Diagnosis Date Noted   • No Resolved Ambulatory Problems     Past Medical History:   Diagnosis Date   • Acute on chronic diastolic heart failure    • Anemia 2018   • Arthritis    • BPH (benign prostatic hypertrophy)    • Cancer    • Cataract 2012   • CHF (congestive heart failure) 2018   • Colon polyp 2004   • Coronary artery disease 2017   • Dependence on continuous supplemental oxygen    • Diverticulosis    • Erectile dysfunction    • GERD (gastroesophageal reflux disease)    • H/O Abnormal CBC 2017   • History of heart artery stent 03/2017   • History of medical problems 2017   • Hyperlipidemia    • Low back pain 2012   • Lung disease    • Osteopenia 2012         PAST SURGICAL HISTORY  Past Surgical History:   Procedure Laterality Date   • BRONCHOSCOPY N/A 04/07/2018    Procedure: BRONCHOSCOPY with specimens;  Surgeon: Suresh Hernandez MD;  Location: [facility] OR;  Service: Pulmonary   • BRONCHOSCOPY N/A 03/06/2019    Procedure: BRONCHOSCOPY WITH BAL AND BIOPSY UNDER FLUORO AND ANESTHESIA;  Surgeon: Suresh Hernandez MD;  Location: [facility] MAIN OR;  Service: Pulmonary   • BRONCHOSCOPY N/A 06/13/2019    Procedure: BRONCHOSCOPY;  Surgeon: Suresh Hernandez MD;  Location: [facility] MAIN OR;  Service: Pulmonary   • CARDIAC CATHETERIZATION N/A 04/18/2018    Procedure: Right Heart Cath with possible vasodilator study;  Surgeon: Torey Schuler MD;  Location: [facility] INVASIVE LOCATION;  Service: Cardiovascular   • CARDIAC CATHETERIZATION N/A 03/07/2019    Procedure: RIGHT AND LEFT HEART CATH;  Surgeon: Marizol Holt MD;  Location: [facility] CATH INVASIVE LOCATION;  Service: Cardiology   • CARDIAC  CATHETERIZATION N/A 2019    Procedure: CORONARY ANGIOGRAPHY;  Surgeon: Marizol Holt MD;  Location:  [facility] CATH INVASIVE LOCATION;  Service: Cardiology   • COLONOSCOPY  2016    polyps   • CORONARY STENT PLACEMENT  2017   • FRACTURE SURGERY     • INSERT / REPLACE / VENOUS ACCESS CATHETER Right    • PACEMAKER IMPLANTATION     • VENOUS ACCESS DEVICE (PORT) REMOVAL Right 2021    Procedure: Mediport Removal;  Surgeon: Joseph Nickerson Jr., MD;  Location: [facility] MAIN OR;  Service: General;  Laterality: Right;         FAMILY HISTORY  Family History   Problem Relation Age of Onset   • Hypertension Mother    • Arthritis Mother    • Heart attack Father    • Asthma Father    • Early death Father    • COPD Brother    • Early death Brother    • Heart disease Brother    • Hypertension Brother    • Malig Hyperthermia Neg Hx          SOCIAL HISTORY  Social History     Socioeconomic History   • Marital status:    • Number of children: 2   Tobacco Use   • Smoking status: Former     Packs/day: 1.50     Years: 45.00     Pack years: 67.50     Types: Cigarettes     Quit date: 1/3/2000     Years since quittin.4   • Smokeless tobacco: Never   Vaping Use   • Vaping Use: Never used   Substance and Sexual Activity   • Alcohol use: No   • Drug use: No   • Sexual activity: Not Currently     Partners: Female         ALLERGIES  Lisinopril, Sulfa antibiotics, Penicillins, Atenolol, Celebrex [celecoxib], Coreg [carvedilol], and Ibuprofen        REVIEW OF SYSTEMS  Review of Systems         PHYSICAL EXAM  ED Triage Vitals [23 1030]   Temp Heart Rate Resp BP SpO2   98.8 °F (37.1 °C) 86 20 -- (!) 85 %      Temp src Heart Rate Source Patient Position BP Location FiO2 (%)   Tympanic Monitor -- -- --       Physical Exam      GENERAL: Chronically ill-appearing, no acute distress  HENT: normocephalic, atraumatic  EYES: no scleral icterus  CV: regular rhythm, normal rate, 1+ bilateral lower extremity edema  RESPIRATORY:  normal effort, diffusely diminished, coarse sounds on expiration  ABDOMEN: nondistended soft nontender  MUSCULOSKELETAL: no deformity  NEURO: alert, moves all extremities, follows commands  PSYCH:  calm, cooperative  SKIN: warm, dry    Vital signs and nursing notes reviewed.          LAB RESULTS  Recent Results (from the past 24 hour(s))   Comprehensive Metabolic Panel    Collection Time: 05/24/23 10:46 AM    Specimen: Blood   Result Value Ref Range    Glucose 105 (H) 65 - 99 mg/dL    BUN 13 8 - 23 mg/dL    Creatinine 0.98 0.76 - 1.27 mg/dL    Sodium 138 136 - 145 mmol/L    Potassium 4.2 3.5 - 5.2 mmol/L    Chloride 99 98 - 107 mmol/L    CO2 31.0 (H) 22.0 - 29.0 mmol/L    Calcium 8.6 8.6 - 10.5 mg/dL    Total Protein 6.4 6.0 - 8.5 g/dL    Albumin 3.7 3.5 - 5.2 g/dL    ALT (SGPT) 18 1 - 41 U/L    AST (SGOT) 30 1 - 40 U/L    Alkaline Phosphatase 106 39 - 117 U/L    Total Bilirubin 0.7 0.0 - 1.2 mg/dL    Globulin 2.7 gm/dL    A/G Ratio 1.4 g/dL    BUN/Creatinine Ratio 13.3 7.0 - 25.0    Anion Gap 8.0 5.0 - 15.0 mmol/L    eGFR 77.5 >60.0 mL/min/1.73   BNP    Collection Time: 05/24/23 10:46 AM    Specimen: Blood   Result Value Ref Range    proBNP 2,789.0 (H) 0.0 - 1,800.0 pg/mL   High Sensitivity Troponin T    Collection Time: 05/24/23 10:46 AM    Specimen: Blood   Result Value Ref Range    HS Troponin T 37 (H) <15 ng/L   Green Top (Gel)    Collection Time: 05/24/23 10:46 AM   Result Value Ref Range    Extra Tube Hold for add-ons.    Lavender Top    Collection Time: 05/24/23 10:46 AM   Result Value Ref Range    Extra Tube hold for add-on    Gold Top - SST    Collection Time: 05/24/23 10:46 AM   Result Value Ref Range    Extra Tube Hold for add-ons.    Light Blue Top    Collection Time: 05/24/23 10:46 AM   Result Value Ref Range    Extra Tube Hold for add-ons.    CBC Auto Differential    Collection Time: 05/24/23 10:46 AM    Specimen: Blood   Result Value Ref Range    WBC 4.98 3.40 - 10.80 10*3/mm3    RBC 5.45 4.14 - 5.80  10*6/mm3    Hemoglobin 16.6 13.0 - 17.7 g/dL    Hematocrit 49.5 37.5 - 51.0 %    MCV 90.8 79.0 - 97.0 fL    MCH 30.5 26.6 - 33.0 pg    MCHC 33.5 31.5 - 35.7 g/dL    RDW 13.3 12.3 - 15.4 %    RDW-SD 44.8 37.0 - 54.0 fl    MPV 9.6 6.0 - 12.0 fL    Platelets 262 140 - 450 10*3/mm3    Neutrophil % 73.9 42.7 - 76.0 %    Lymphocyte % 11.8 (L) 19.6 - 45.3 %    Monocyte % 13.5 (H) 5.0 - 12.0 %    Eosinophil % 0.0 (L) 0.3 - 6.2 %    Basophil % 0.6 0.0 - 1.5 %    Immature Grans % 0.2 0.0 - 0.5 %    Neutrophils, Absolute 3.68 1.70 - 7.00 10*3/mm3    Lymphocytes, Absolute 0.59 (L) 0.70 - 3.10 10*3/mm3    Monocytes, Absolute 0.67 0.10 - 0.90 10*3/mm3    Eosinophils, Absolute 0.00 0.00 - 0.40 10*3/mm3    Basophils, Absolute 0.03 0.00 - 0.20 10*3/mm3    Immature Grans, Absolute 0.01 0.00 - 0.05 10*3/mm3    nRBC 0.0 0.0 - 0.2 /100 WBC   Procalcitonin    Collection Time: 05/24/23 10:46 AM    Specimen: Blood   Result Value Ref Range    Procalcitonin 0.06 0.00 - 0.25 ng/mL   Respiratory Panel PCR w/COVID-19(SARS-CoV-2) [facility] In-House, NP Swab in [facility]/VTM, 3-4 HR TAT - Swab, Nasopharynx    Collection Time: 05/24/23 10:47 AM    Specimen: Nasopharynx; Swab   Result Value Ref Range    ADENOVIRUS, PCR Not Detected Not Detected    Coronavirus 229E Not Detected Not Detected    Coronavirus HKU1 Not Detected Not Detected    Coronavirus NL63 Not Detected Not Detected    Coronavirus OC43 Not Detected Not Detected    COVID19 Not Detected Not Detected - Ref. Range    Human Metapneumovirus Not Detected Not Detected    Human Rhinovirus/Enterovirus Not Detected Not Detected    Influenza A PCR Not Detected Not Detected    Influenza B PCR Not Detected Not Detected    Parainfluenza Virus 1 Not Detected Not Detected    Parainfluenza Virus 2 Not Detected Not Detected    Parainfluenza Virus 3 Detected (A) Not Detected    Parainfluenza Virus 4 Not Detected Not Detected    RSV, PCR Not Detected Not Detected    Bordetella pertussis pcr  Not Detected Not Detected    Bordetella parapertussis PCR Not Detected Not Detected    Chlamydophila pneumoniae PCR Not Detected Not Detected    Mycoplasma pneumo by PCR Not Detected Not Detected   ECG 12 Lead Dyspnea    Collection Time: 05/24/23 10:52 AM   Result Value Ref Range    QT Interval 414 ms   High Sensitivity Troponin T 2Hr    Collection Time: 05/24/23 12:35 PM    Specimen: Blood   Result Value Ref Range    HS Troponin T 33 (H) <15 ng/L    Troponin T Delta -4 (L) >=-4 - <+4 ng/L   Lactic Acid, Plasma    Collection Time: 05/24/23 12:35 PM    Specimen: Blood   Result Value Ref Range    Lactate 1.1 0.5 - 2.0 mmol/L       Ordered the above labs and reviewed the results.        RADIOLOGY  XR Chest 1 View    Result Date: 5/24/2023  XR CHEST 1 VW-  HISTORY:  CHF/COPD.  COMPARISON:  CT chest 05/07/2023. Chest radiograph 10/04/2022.  FINDINGS:  2 views of the chest were obtained. Limited evaluation due to patient positioning/rotation. There is a left chest cardiac pacemaker, not significantly changed. The cardiac silhouette is enlarged. There is calcific aortic atherosclerosis. The aorta is tortuous. There is emphysema. There are left greater than right bibasilar airspace opacities, new from 2022 radiographs, which correspond to pulmonary opacities on recent CT but may be progressed on the right when accounting for differences in modality. Findings again raise concern for multifocal pneumonia in the appropriate clinical setting. Recommend short-term follow chest radiographs to document complete resolution. There is a suspected small left pleural effusion.  This report was finalized on 5/24/2023 11:19 AM by Dr. Preeti Veliz M.D.        Ordered the above noted radiological studies. Reviewed by me in PACS.            PROCEDURES  Procedures              MEDICATIONS GIVEN IN ER  Medications   sodium chloride 0.9 % flush 10 mL (has no administration in time range)   sodium chloride 0.9 % flush 10 mL (has no  administration in time range)   sodium chloride 0.9 % flush 10 mL (has no administration in time range)   sodium chloride 0.9 % infusion 40 mL (has no administration in time range)   sennosides-docusate (PERICOLACE) 8.6-50 MG per tablet 2 tablet (has no administration in time range)     And   polyethylene glycol (MIRALAX) packet 17 g (has no administration in time range)     And   bisacodyl (DULCOLAX) EC tablet 5 mg (has no administration in time range)     And   bisacodyl (DULCOLAX) suppository 10 mg (has no administration in time range)   nitroglycerin (NITROSTAT) SL tablet 0.4 mg (has no administration in time range)   acetaminophen (TYLENOL) tablet 650 mg (has no administration in time range)     Or   acetaminophen (TYLENOL) 160 MG/5ML solution 650 mg (has no administration in time range)     Or   acetaminophen (TYLENOL) suppository 650 mg (has no administration in time range)   melatonin tablet 5 mg (has no administration in time range)   ondansetron (ZOFRAN) tablet 4 mg (has no administration in time range)     Or   ondansetron (ZOFRAN) injection 4 mg (has no administration in time range)   calcium carbonate (TUMS) chewable tablet 500 mg (200 mg elemental) (has no administration in time range)   ipratropium-albuterol (DUO-NEB) nebulizer solution 3 mL (has no administration in time range)   guaifenesin-dextromethorphan (MUCINEX DM) tablet 2 tablet (has no administration in time range)   sodium chloride 7 % nebulizer solution nebulizer solution 4 mL (has no administration in time range)   methylPREDNISolone sodium succinate (SOLU-Medrol) injection 125 mg (125 mg Intravenous Given 5/24/23 1102)   ipratropium-albuterol (DUO-NEB) nebulizer solution 3 mL (3 mL Nebulization Given 5/24/23 1131)   albuterol (PROVENTIL) nebulizer solution 0.083% 2.5 mg/3mL (2.5 mg Nebulization Given 5/24/23 1130)                   MEDICAL DECISION MAKING, PROGRESS, and CONSULTS    All labs have been independently reviewed by me.  All  radiology studies have been reviewed by me and I have also reviewed the radiology report.   EKG's independently viewed and interpreted by me.  Discussion below represents my analysis of pertinent findings related to patient's condition, differential diagnosis, treatment plan and final disposition.      Additional sources:    - Chronic or social conditions impacting care: COPD, chronic respiratory failure          Orders placed during this visit:  Orders Placed This Encounter   Procedures   • Respiratory Panel PCR w/COVID-19(SARS-CoV-2) [facility] In-House, NP Swab in UTM/VTM, 3-4 HR TAT - Swab, Nasopharynx   • Blood Culture - Blood,   • Blood Culture - Blood,   • XR Chest 1 View   • [place] Draw   • Comprehensive Metabolic Panel   • BNP   • High Sensitivity Troponin T   • CBC Auto Differential   • High Sensitivity Troponin T 2Hr   • Lactic Acid, Plasma   • Procalcitonin   • Basic Metabolic Panel   • Diet: Cardiac Diets; Healthy Heart (2-3 Na+); Texture: Soft to Chew (NDD 3); Soft to Chew: Chopped Meat; Fluid Consistency: Thin (IDDSI 0)   • Continuous Pulse Oximetry   • Vital Signs   • Vital Signs   • Intake & Output   • Weigh Patient   • Oral Care   • Telemetry - Maintain IV Access   • Telemetry - Place Orders & Notify Provider of Results When Patient Experiences Acute Chest Pain, Dysrhythmia or Respiratory Distress   • May Be Off Telemetry for Tests   • Notify Provider (With Default Parameters)   • Code Status and Medical Interventions:   • LHA (on-call MD unless specified) Details   • IP Palliative Care Nurse Consult   • Incentive Spirometry   • Oscillating Positive Expiratory Pressure (OPEP)   • Oxygen Therapy- Nasal Cannula; Titrate 1-6 LPM Per SpO2; 88 - 92%   • SLP Consult: Eval & Treat Swallow Disorder; Cardiac Diets; Healthy Heart (2-3 Na+)   • ECG 12 Lead Dyspnea   • Insert Peripheral IV   • Insert Peripheral IV   • Inpatient Admission   • CBC & Differential   • Green Top (Gel)   • Lavender  Top   • Gold Top - SST   • Light Blue Top   • CBC & Differential         Additional orders considered but not ordered:  I considered a CTA of the chest however the patient is chronically anticoagulated, has not missed any doses of this medication and no suspicion for PE at this time    Differential diagnosis:  Differential diagnosis includes but is not limited to CHF, acute coronary syndrome, pulmonary embolism, pneumothorax, pneumonia, asthma/COPD, deconditioning, anemia, anxiety.           Independent interpretation of labs, radiology studies, and discussions with consultants:  ED Course as of 05/24/23 1523   Wed May 24, 2023   1054 EKG personally interpreted by me.  My personal interpretation is:          EKG time: 10:52 AM  Rhythm/Rate: Ventricular paced rhythm, rate 80  QRS, axis: LAD, IVCD  ST and T waves: Nonspecific ST/T wave changes in the lateral leads    Interpreted Contemporaneously by me at 10:54 AM, independently viewed  EKG is not significantly changed compared to prior EKG done on 5/7/2023   [WH]   1157 Glucose(!): 105 [KA]   1157 Creatinine: 0.98 [KA]   1157 proBNP(!): 2,789.0 [KA]   1157 HS Troponin T(!): 37 [KA]   1157 WBC: 4.98 [KA]   1158 Hemoglobin: 16.6 [KA]   1207 Chest x-ray personally interpreted by me.  My personal interpretation is: Heart is enlarged.  Pacemaker is present.  No pneumothorax    Per the radiologist, there is emphysema.  There are bibasilar airspace opacities worse on the left and appear worse than on recent CT.  Findings raise concern for pneumonia. [WH]   1311 Parainfluenza Virus 3(!): Detected [KA]   1313 Procalcitonin: 0.06 [KA]   1313 Lactate: 1.1 [KA]   1401 I discussed the patient with Dr. Castillo, hospitalist.  We discussed the patient's history presentation labs and imaging and he agrees to admit for further evaluation and treatment. [KA]      ED Course User Index  [KA] Madison Arechiga PA  [WH] Roderick Velasquez MD       No antibiotics given at this time.  The  patient's white blood cell count lactic acid and procalcitonin are all within normal range.  Chest x-ray appears similar to prior chest imaging and he tested positive for parainfluenza virus, suspect his symptoms are viral and not bacterial in etiology.      Patient was wearing a face mask when I entered the room and they continued to wear a mask throughout their stay in the ED.  I wore PPE, including  gloves, face mask with shield or face mask with goggles whenever I was in the room with patient.     DIAGNOSIS  Final diagnoses:   Acute on chronic respiratory failure with hypoxia   Parainfluenza virus bronchitis   Acute exacerbation of chronic obstructive pulmonary disease (COPD)         Latest Documented Vital Signs:  As of 15:23 EDT  BP- 123/74 HR- 79 Temp- 98.8 °F (37.1 °C) (Tympanic) O2 sat- 92%              --    Please note that portions of this were completed with a voice recognition program.       Note Disclaimer: At [facility], we believe that sharing information builds trust and better relationships. You are receiving this note because you are receiving care at [facility] or recently visited. It is possible you will see health information before a provider has talked with you about it. This kind of information can be easy to misunderstand. To help you fully understand what it means for your health, we urge you to discuss this note with your provider.           Madison Arechiga PA  05/24/23 1527

## 2023-05-24 NOTE — ED PROVIDER NOTES
MD ATTESTATION NOTE    The CALLY and I have discussed this patient's history, physical exam, and treatment plan.  I have reviewed the documentation and personally had a face to face interaction with the patient. I affirm the documentation and agree with the treatment and plan.  The attached note describes my personal findings.      I provided a substantive portion of the care of the patient.  I personally performed the physical exam in its entirety, and below are my findings.  For this patient encounter, the patient wore surgical mask, I wore full protective PPE including N95 and eye protection.      Brief HPI: Patient complains of low O2 sats this morning.  He is chronically on 4 L of oxygen.  This morning his O2 sats were in the 80s.  He has chronic dyspnea but feels somewhat more short of breath than normal.  Denies chest pain, cough, fever, or chills.    PHYSICAL EXAM  ED Triage Vitals   Temp Heart Rate Resp BP SpO2   05/24/23 1030 05/24/23 1030 05/24/23 1030 05/24/23 1046 05/24/23 1030   98.8 °F (37.1 °C) 86 20 115/70 (!) 85 %      Temp src Heart Rate Source Patient Position BP Location FiO2 (%)   05/24/23 1030 05/24/23 1030 -- -- --   Tympanic Monitor            GENERAL: Awake, alert, oriented x3.  Chronically ill-appearing elderly male.  Does not appear overtly toxic.  No acute distress  HENT: nares patent  EYES: no scleral icterus  CV: regular rhythm, normal rate  RESPIRATORY: normal effort, decreased air movement in both lung bases  ABDOMEN: soft, nontender  MUSCULOSKELETAL: Extremities are nontender.  There is 1+ pedal edema bilaterally  NEURO: Speech is clear and fluent.  Normal strength in all extremities.  Follows commands  PSYCH:  calm, cooperative  SKIN: warm, dry    Vital signs and nursing notes reviewed.        Plan: Obtain labs, EKG, and chest x-ray    Respiratory panel was positive for parainfluenza.  Delta troponin was -4.  Lactic acid, procalcitonin, and white blood cell count were normal.   Patient will be admitted for acute on chronic respiratory failure.    ED Course as of 05/24/23 1405   Wed May 24, 2023   1054 EKG personally interpreted by me.  My personal interpretation is:          EKG time: 10:52 AM  Rhythm/Rate: Ventricular paced rhythm, rate 80  QRS, axis: LAD, IVCD  ST and T waves: Nonspecific ST/T wave changes in the lateral leads    Interpreted Contemporaneously by me at 10:54 AM, independently viewed  EKG is not significantly changed compared to prior EKG done on 5/7/2023   [WH]   1157 Glucose(!): 105 [KA]   1157 Creatinine: 0.98 [KA]   1157 proBNP(!): 2,789.0 [KA]   1157 HS Troponin T(!): 37 [KA]   1157 WBC: 4.98 [KA]   1158 Hemoglobin: 16.6 [KA]   1207 Chest x-ray personally interpreted by me.  My personal interpretation is: Heart is enlarged.  Pacemaker is present.  No pneumothorax    Per the radiologist, there is emphysema.  There are bibasilar airspace opacities worse on the left and appear worse than on recent CT.  Findings raise concern for pneumonia. [WH]   1311 Parainfluenza Virus 3(!): Detected [KA]   1313 Procalcitonin: 0.06 [KA]   1313 Lactate: 1.1 [KA]   1401 I discussed the patient with Dr. Castillo, hospitalist.  We discussed the patient's history presentation labs and imaging and he agrees to admit for further evaluation and treatment. [KA]      ED Course User Index  [KA] Madison Arechiga PA  [WH] Roderick Velasquez MD Holland, William D, MD  05/24/23 1405

## 2023-05-24 NOTE — H&P
Patient Name:  Alessio Allen  YOB: 1940  MRN:  [id number]  Admit Date:  5/24/2023  Patient Care Team:  Madison Lewis APRN as PCP - General (Nurse Practitioner)  Rao Maguire MD as Consulting Physician (Hematology and Oncology)  Alan Santana MD as Referring Physician (Family Medicine)  Gaurav Merrill MD as Consulting Physician (Otolaryngology)  Edgardo Brown MD (Otolaryngology)  Isael Beltre MD as Consulting Physician (Hematology and Oncology)  Herminia Salas MD as Consulting Physician (Internal Medicine)  Juventino Miller MD as Consulting Physician (Cardiac Electrophysiology)  Suresh Hernandez MD as Consulting Physician (Pulmonary Disease)      Subjective   History Present Illness     Chief Complaint   Patient presents with   • Shortness of Breath       Mr. Allen is a 82 y.o. former smoker with a history of HTN, HLD, BPH, IPF, COPD with chronic hypoxic respiratory failure on 4L NC at home, pulmonary hypertension on adcirca, PAF with SSS s/p pacemaker on xarelto, and laryngeal cancer with pharyngeal dysphagia that presents to [facility] complaining of increased cough and shortness of breath from baseline. This has been present for the past 1-2 days. His wife has also had symptoms of an upper respiratory infection of a similar duration. He was hospitalized earlier this month for pneumonia and discharged on 5/13/23. He states he was feeling better for a while before he became ill again.    Patient seen in the ER. His wife is at bedside.    History of Present Illness  Review of Systems   All other systems reviewed and are negative.       Personal History     Past Medical History:   Diagnosis Date   • Acute on chronic diastolic heart failure    • Anemia 2018   • Arthritis    • Asthma    • Atrial fibrillation    • BPH (benign prostatic hypertrophy)    • Cancer     throat CA   • Cataract 2012    Removed   • CHF (congestive heart failure)  2018   • Colon polyp 2004   • COPD (chronic obstructive pulmonary disease)    • Coronary artery disease 2017   • Dependence on continuous supplemental oxygen     3L-5L DEPENDING ON ACTIVITY   • Diverticulosis    • Erectile dysfunction    • GERD (gastroesophageal reflux disease)    • H/O Abnormal CBC 2017   • History of heart artery stent 03/2017   • History of medical problems 2017    Afib   • Hyperlipidemia    • Hypertension    • Low back pain 2012   • Lung disease    • Neuropathy     feet and hands    • Osteopenia 2012    Osteopenia   • Pneumonia      Past Surgical History:   Procedure Laterality Date   • BRONCHOSCOPY N/A 04/07/2018    Procedure: BRONCHOSCOPY with specimens;  Surgeon: Suresh Hernandez MD;  Location: [facility] OR;  Service: Pulmonary   • BRONCHOSCOPY N/A 03/06/2019    Procedure: BRONCHOSCOPY WITH BAL AND BIOPSY UNDER FLUORO AND ANESTHESIA;  Surgeon: Suresh Hernandez MD;  Location: [facility] MAIN OR;  Service: Pulmonary   • BRONCHOSCOPY N/A 06/13/2019    Procedure: BRONCHOSCOPY;  Surgeon: Suresh Hernandez MD;  Location: [facility] MAIN OR;  Service: Pulmonary   • CARDIAC CATHETERIZATION N/A 04/18/2018    Procedure: Right Heart Cath with possible vasodilator study;  Surgeon: Torey Schuler MD;  Location: [facility] INVASIVE LOCATION;  Service: Cardiovascular   • CARDIAC CATHETERIZATION N/A 03/07/2019    Procedure: RIGHT AND LEFT HEART CATH;  Surgeon: Marizol Holt MD;  Location: [facility] CATH INVASIVE LOCATION;  Service: Cardiology   • CARDIAC CATHETERIZATION N/A 03/07/2019    Procedure: CORONARY ANGIOGRAPHY;  Surgeon: Marizol Holt MD;  Location: [facility] CATH INVASIVE LOCATION;  Service: Cardiology   • COLONOSCOPY  12/05/2016    polyps   • CORONARY STENT PLACEMENT  2017   • FRACTURE SURGERY     • INSERT / REPLACE / VENOUS ACCESS CATHETER Right    • PACEMAKER IMPLANTATION     • VENOUS ACCESS DEVICE (PORT) REMOVAL Right 04/05/2021    Procedure: Mediport Removal;  Surgeon: Joseph Nickerson Jr., MD;  [facility];  Service: General;  Laterality: Right;     Family History   Problem Relation Age of Onset   • Hypertension Mother    • Arthritis Mother    • Heart attack Father    • Asthma Father    • Early death Father    • COPD Brother    • Early death Brother    • Heart disease Brother    • Hypertension Brother    • Malig Hyperthermia Neg Hx      Social History     Tobacco Use   • Smoking status: Former     Packs/day: 1.50     Years: 45.00     Pack years: 67.50     Types: Cigarettes     Quit date: 1/3/2000     Years since quittin.4   • Smokeless tobacco: Never   Vaping Use   • Vaping Use: Never used   Substance Use Topics   • Alcohol use: No   • Drug use: No     No current facility-administered medications on file prior to encounter.     Current Outpatient Medications on File Prior to Encounter   Medication Sig Dispense Refill   • acetaminophen (TYLENOL) 325 MG tablet Take 2 tablets by mouth Every 6 (Six) Hours As Needed (prn for pain). 720 tablet 2   • albuterol (PROVENTIL) (2.5 MG/3ML) 0.083% nebulizer solution      • albuterol sulfate  (90 Base) MCG/ACT inhaler Inhale 2 puffs Every 4 (Four) Hours As Needed for Wheezing.     • docusate sodium (COLACE) 100 MG capsule Take 1 capsule by mouth 2 (Two) Times a Day. 60 capsule 0   • doxazosin (CARDURA) 2 MG tablet Take 1 tablet by mouth Every Night.     • eltrombopag (Promacta) 25 MG tablet Promacta 25 mg tablet     • empagliflozin (Jardiance) 25 MG tablet tablet Jardiance 25 mg tablet     • ferrous sulfate 325 (65 FE) MG tablet Take 1 tablet by mouth Daily With Breakfast. 90 tablet 3   • fexofenadine (Allegra Allergy) 60 MG tablet Take 1 tablet by mouth 2 (Two) Times a Day. 180 tablet 3   • furosemide (LASIX) 40 MG tablet Take 1 tablet by mouth Daily.     • Jardiance 10 MG tablet tablet      • montelukast (SINGULAIR) 10 MG tablet Take 1 tablet by mouth Every Night.     • O2 (OXYGEN) Inhale 3.5 L/min 1 (One) Time.     • olopatadine (Patanol) 0.1 %  ophthalmic solution Administer 1 drop to both eyes 2 (Two) Times a Day. 3 each 12   • pantoprazole (PROTONIX) 40 MG pack packet Take 1 packet by mouth Every Morning Before Breakfast. PEG tube     • pilocarpine (SALAGEN) 5 MG tablet Take 1 tablet by mouth 3 (Three) Times a Day.     • polyethylene glycol (MIRALAX) 17 GM/SCOOP powder Take 17 g by mouth Daily. 510 g 0   • polyvinyl alcohol (LIQUIFILM) 1.4 % ophthalmic solution 1 drop As Needed for Dry Eyes.     • potassium chloride (K-DUR) 10 MEQ CR tablet Take 2 tablets by mouth Daily. 180 tablet 3   • pregabalin (LYRICA) 75 MG capsule Take 1 capsule by mouth 2 (Two) Times a Day.     • Promacta 50 MG tablet      • pyridoxine (VITAMIN B-6) 50 MG tablet Take 1 tablet by mouth Daily.     • REFRESH TEARS 0.5 % solution Administer 1 drop to both eyes As Needed.     • saccharomyces boulardii (FLORASTOR) 250 MG capsule Take 2 capsules by mouth 2 (Two) Times a Day. 30 capsule 0   • simvastatin (ZOCOR) 20 MG tablet Take 0.5 tablets by mouth Every Night.     • sodium chloride (Ocean Nasal Spray) 0.65 % nasal spray 2 sprays into the nostril(s) as directed by provider As Needed for Congestion (qid prn). May refill q 21 days 4 each 3   • sodium chloride 7 % nebulizer solution nebulizer solution      • Sodium Fluoride 1.1 % gel sodium fluoride 1.1 % dental cream     • Spacer/Aero-Holding Chambers device Give spacer to use with his inhalers whichever brand he has received is fine 2 each 3   • tadalafil (ADCIRCA) 20 MG tablet tablet Take 2 tablets by mouth Daily.     • TRELEGY ELLIPTA 100-62.5-25 MCG/INH aerosol powder  1 puff Daily.     • XARELTO 20 MG tablet Take 1 tablet by mouth Daily With Dinner. HOLD 2 DAYS PRIOR TO SURG       Allergies   Allergen Reactions   • Lisinopril Shortness Of Breath   • Sulfa Antibiotics Shortness Of Breath   • Penicillins Rash   • Atenolol Other (See Comments)     drowsiness   • Celebrex [Celecoxib] Rash   • Coreg [Carvedilol] Cough     SOA,   •  Ibuprofen Other (See Comments)     Conflict with other medications he is taking       Objective    Objective     Vital Signs  Temp:  [98.8 °F (37.1 °C)] 98.8 °F (37.1 °C)  Heart Rate:  [79-86] 79  Resp:  [20] 20  BP: (110-123)/(69-74) 123/74  SpO2:  [85 %-93 %] 92 %  on  Flow (L/min):  [5-6] 6;   Device (Oxygen Therapy): nasal cannula  Body mass index is 25.63 kg/m².    Physical Exam  Vitals and nursing note reviewed.   Constitutional:       General: He is not in acute distress.     Appearance: He is ill-appearing (chronically). He is not toxic-appearing or diaphoretic.   HENT:      Head: Normocephalic and atraumatic.      Nose: Nose normal.      Mouth/Throat:      Mouth: Mucous membranes are moist.      Pharynx: Oropharynx is clear.   Eyes:      Conjunctiva/sclera: Conjunctivae normal.      Pupils: Pupils are equal, round, and reactive to light.   Cardiovascular:      Rate and Rhythm: Normal rate and regular rhythm.      Pulses: Normal pulses.   Pulmonary:      Effort: Pulmonary effort is normal.      Breath sounds: Examination of the right-lower field reveals decreased breath sounds. Examination of the left-lower field reveals decreased breath sounds. Decreased breath sounds and rhonchi present. No wheezing or rales.   Abdominal:      General: Bowel sounds are normal.      Palpations: Abdomen is soft.      Tenderness: There is no abdominal tenderness.   Musculoskeletal:         General: No swelling or tenderness.      Cervical back: Normal range of motion and neck supple.      Comments: Thoracic kyphosis   Skin:     General: Skin is warm and dry.      Capillary Refill: Capillary refill takes less than 2 seconds.   Neurological:      General: No focal deficit present.      Mental Status: He is alert and oriented to person, place, and time.   Psychiatric:         Mood and Affect: Mood normal.         Behavior: Behavior normal.     Results Review:  I reviewed the patient's new clinical results.  I reviewed the  patient's new imaging results and agree with the interpretation.  I reviewed the patient's other test results and agree with the interpretation  I personally viewed and interpreted the patient's EKG/Telemetry data  Discussed with ED provider.    Lab Results (last 24 hours)     Procedure Component Value Units Date/Time    CBC & Differential [[id number]]  (Abnormal) Collected: 05/24/23 1046    Specimen: Blood Updated: 05/24/23 1107    Narrative:      The following orders were created for panel order CBC & Differential.  Procedure                               Abnormality         Status                     ---------                               -----------         ------                     CBC Auto Differential[[id number]]        Abnormal            Final result                 Please view results for these tests on the individual orders.    Comprehensive Metabolic Panel [[id number]]  (Abnormal) Collected: 05/24/23 1046    Specimen: Blood Updated: 05/24/23 1121     Glucose 105 mg/dL      BUN 13 mg/dL      Creatinine 0.98 mg/dL      Sodium 138 mmol/L      Potassium 4.2 mmol/L      Chloride 99 mmol/L      CO2 31.0 mmol/L      Calcium 8.6 mg/dL      Total Protein 6.4 g/dL      Albumin 3.7 g/dL      ALT (SGPT) 18 U/L      AST (SGOT) 30 U/L      Alkaline Phosphatase 106 U/L      Total Bilirubin 0.7 mg/dL      Globulin 2.7 gm/dL      A/G Ratio 1.4 g/dL      BUN/Creatinine Ratio 13.3     Anion Gap 8.0 mmol/L      eGFR 77.5 mL/min/1.73     Narrative:      GFR Normal >60  Chronic Kidney Disease <60  Kidney Failure <15    The GFR formula is only valid for adults with stable renal function between ages 18 and 70.    BNP [[id number]]  (Abnormal) Collected: 05/24/23 1046    Specimen: Blood Updated: 05/24/23 1117     proBNP 2,789.0 pg/mL     Narrative:      Among patients with dyspnea, NT-proBNP is highly sensitive for the detection of acute congestive heart failure. In addition NT-proBNP of <300 pg/ml effectively rules out acute  congestive heart failure with 99% negative predictive value.    Results may be falsely decreased if patient taking Biotin.      High Sensitivity Troponin T [[id number]]  (Abnormal) Collected: 05/24/23 1046    Specimen: Blood Updated: 05/24/23 1121     HS Troponin T 37 ng/L     Narrative:      High Sensitive Troponin T Reference Range:  <10.0 ng/L- Negative Female for AMI  <15.0 ng/L- Negative Male for AMI  >=10 - Abnormal Female indicating possible myocardial injury.  >=15 - Abnormal Male indicating possible myocardial injury.   Clinicians would have to utilize clinical acumen, EKG, Troponin, and serial changes to determine if it is an Acute Myocardial Infarction or myocardial injury due to an underlying chronic condition.         CBC Auto Differential [[id number]]  (Abnormal) Collected: 05/24/23 1046    Specimen: Blood Updated: 05/24/23 1107     WBC 4.98 10*3/mm3      RBC 5.45 10*6/mm3      Hemoglobin 16.6 g/dL      Hematocrit 49.5 %      MCV 90.8 fL      MCH 30.5 pg      MCHC 33.5 g/dL      RDW 13.3 %      RDW-SD 44.8 fl      MPV 9.6 fL      Platelets 262 10*3/mm3      Neutrophil % 73.9 %      Lymphocyte % 11.8 %      Monocyte % 13.5 %      Eosinophil % 0.0 %      Basophil % 0.6 %      Immature Grans % 0.2 %      Neutrophils, Absolute 3.68 10*3/mm3      Lymphocytes, Absolute 0.59 10*3/mm3      Monocytes, Absolute 0.67 10*3/mm3      Eosinophils, Absolute 0.00 10*3/mm3      Basophils, Absolute 0.03 10*3/mm3      Immature Grans, Absolute 0.01 10*3/mm3      nRBC 0.0 /100 WBC     Procalcitonin [[id number]]  (Normal) Collected: 05/24/23 1046    Specimen: Blood Updated: 05/24/23 1308     Procalcitonin 0.06 ng/mL     Narrative:      As a Marker for Sepsis (Non-Neonates):    1. <0.5 ng/mL represents a low risk of severe sepsis and/or septic shock.  2. >2 ng/mL represents a high risk of severe sepsis and/or septic shock.    As a Marker for Lower Respiratory Tract Infections that require antibiotic therapy:    PCT on Admission   "  Antibiotic Therapy       6-12 Hrs later    >0.5                Strongly Recommended  >0.25 - <0.5        Recommended   0.1 - 0.25          Discouraged              Remeasure/reassess PCT  <0.1                Strongly Discouraged     Remeasure/reassess PCT    As 28 day mortality risk marker: \"Change in Procalcitonin Result\" (>80% or <=80%) if Day 0 (or Day 1) and Day 4 values are available. Refer to http://www.[facility]-pct-calculator.com    Change in PCT <=80%  A decrease of PCT levels below or equal to 80% defines a positive change in PCT test result representing a higher risk for 28-day all-cause mortality of patients diagnosed with severe sepsis for septic shock.    Change in PCT >80%  A decrease of PCT levels of more than 80% defines a negative change in PCT result representing a lower risk for 28-day all-cause mortality of patients diagnosed with severe sepsis or septic shock.       Respiratory Panel PCR w/COVID-19(SARS-CoV-2) GUSTABO/ASTRID/DIONICIO/PAD/COR/MAD/NASIM In-House, NP Swab in UTM/VTM, 3-4 HR TAT - Swab, Nasopharynx [[id number]]  (Abnormal) Collected: 05/24/23 1047    Specimen: Swab from Nasopharynx Updated: 05/24/23 1244     ADENOVIRUS, PCR Not Detected     Coronavirus 229E Not Detected     Coronavirus HKU1 Not Detected     Coronavirus NL63 Not Detected     Coronavirus OC43 Not Detected     COVID19 Not Detected     Human Metapneumovirus Not Detected     Human Rhinovirus/Enterovirus Not Detected     Influenza A PCR Not Detected     Influenza B PCR Not Detected     Parainfluenza Virus 1 Not Detected     Parainfluenza Virus 2 Not Detected     Parainfluenza Virus 3 Detected     Parainfluenza Virus 4 Not Detected     RSV, PCR Not Detected     Bordetella pertussis pcr Not Detected     Bordetella parapertussis PCR Not Detected     Chlamydophila pneumoniae PCR Not Detected     Mycoplasma pneumo by PCR Not Detected    Narrative:      In the setting of a positive respiratory panel with a viral infection PLUS a negative " procalcitonin without other underlying concern for bacterial infection, consider observing off antibiotics or discontinuation of antibiotics and continue supportive care. If the respiratory panel is positive for atypical bacterial infection (Bordetella pertussis, Chlamydophila pneumoniae, or Mycoplasma pneumoniae), consider antibiotic de-escalation to target atypical bacterial infection.    High Sensitivity Troponin T 2Hr [[id number]]  (Abnormal) Collected: 05/24/23 1235    Specimen: Blood Updated: 05/24/23 1307     HS Troponin T 33 ng/L      Troponin T Delta -4 ng/L     Narrative:      High Sensitive Troponin T Reference Range:  <10.0 ng/L- Negative Female for AMI  <15.0 ng/L- Negative Male for AMI  >=10 - Abnormal Female indicating possible myocardial injury.  >=15 - Abnormal Male indicating possible myocardial injury.   Clinicians would have to utilize clinical acumen, EKG, Troponin, and serial changes to determine if it is an Acute Myocardial Infarction or myocardial injury due to an underlying chronic condition.         Blood Culture - Blood, Arm, Left [[id number]] Collected: 05/24/23 1235    Specimen: Blood from Arm, Left Updated: 05/24/23 1242    Lactic Acid, Plasma [[id number]]  (Normal) Collected: 05/24/23 1235    Specimen: Blood Updated: 05/24/23 1307     Lactate 1.1 mmol/L     Blood Culture - Blood, Arm, Left [[id number]] Collected: 05/24/23 1258    Specimen: Blood from Arm, Left Updated: 05/24/23 1301          Imaging Results (Last 24 Hours)     Procedure Component Value Units Date/Time    XR Chest 1 View [[id number]] Collected: 05/24/23 1116     Updated: 05/24/23 1123    Narrative:      XR CHEST 1 VW-     HISTORY:  CHF/COPD.     COMPARISON:  CT chest 05/07/2023. Chest radiograph 10/04/2022.     FINDINGS:    2 views of the chest were obtained. Limited evaluation due to patient  positioning/rotation.  There is a left chest cardiac pacemaker, not significantly changed. The  cardiac silhouette is enlarged. There  is calcific aortic  atherosclerosis. The aorta is tortuous.  There is emphysema. There are left greater than right bibasilar airspace  opacities, new from 2022 radiographs, which correspond to pulmonary  opacities on recent CT but may be progressed on the right when  accounting for differences in modality. Findings again raise concern for  multifocal pneumonia in the appropriate clinical setting. Recommend  short-term follow chest radiographs to document complete resolution.  There is a suspected small left pleural effusion.     This report was finalized on 5/24/2023 11:19 AM by Dr. Preeti Veliz M.D.             Results for orders placed during the hospital encounter of 02/28/19    Adult Transthoracic Echo Complete W/ Cont if Necessary Per Protocol    Interpretation Summary  · Left ventricular systolic function is normal. Calculated EF = 58.0%. Estimated EF was in agreement with the calculated EF. Normal left ventricular cavity size noted. Left ventricular wall thickness is consistent with mild concentric hypertrophy. Left ventricular diastolic function was indeterminate.  · Left atrial cavity size is severely dilated.  · Right atrial cavity size is moderately dilated.  · The aortic valve is abnormal in structure. There is moderate calcification of the aortic valve.No significant aortic valve regurgitation is present. No hemodynamically significant aortic valve stenosis is present.  · Moderate mitral valve regurgitation is present. In some views it appears moderately severe. No significant mitral valve stenosis is present.  · Moderate tricuspid valve regurgitation is present. Moderate to severe pulmonary hypertension is present.  · Moderate dilation of the ascending aorta is present. 4.4 cm.      ECG 12 Lead Dyspnea   Preliminary Result   HEART RATE= 80  bpm   RR Interval= 750  ms   FL Interval= 164  ms   P Horizontal Axis= 207  deg   P Front Axis= 0  deg   QRSD Interval= 144  ms   QT Interval= 414  ms   QRS Axis=  247  deg   T Wave Axis= 80  deg   - ABNORMAL ECG -   Ventricular-paced rhythm   No further analysis attempted due to paced rhythm   Electronically Signed By:    Date and Time of Study: 2023-05-24 10:52:00           Assessment/Plan     Active Hospital Problems    Diagnosis  POA   • Parainfluenza virus bronchitis [J20.4]  Yes   • Pulmonary hypertension [I27.20]  Yes   • Presence of cardiac pacemaker [Z95.0]  Yes   • Pharyngeal dysphagia [R13.13]  Yes   • IPF (idiopathic pulmonary fibrosis) [J84.112]  Yes   • Chronic diastolic CHF (congestive heart failure) [I50.32]  Yes   • Chronic respiratory failure with hypoxia and hypercapnia [J96.11, J96.12]  Yes   • Pulmonary emphysema [J43.9]  Yes   • Chronic anticoagulation [Z79.01]  Not Applicable   • BPH (benign prostatic hypertrophy) [N40.0]  Yes   • Dyslipidemia [E78.5]  Yes   • Paroxysmal atrial fibrillation [I48.0]  Yes   • Primary hypertension [I10]  Yes      Resolved Hospital Problems   No resolved problems to display.   Parainfluenza Virus Bronchitis  - complicated by underlying COPD, IPF, and pulmonary hypertension; requiring increased oxygen supplementation from baseline  - no evidence of bacterial pneumonia or bronchospasm  - will start on nebulized bronchodilators and mucolytics as well as flutter valve and incentive spirometry  - wean oxygen as tolerated    COPD/IPF/Pulmonary HTN/Chronic Hypoxic and Hypercapnic Respiratory Failure  - no evidence of bronchospasm  - supportive treatment with bronchodilators and mucolytics as above  - wean oxygen as tolerated-avoid hyperoxemia due to chronic hypercapnic respiratory failure-keep sats 88-92%, no higher  - on trelegy-will substitute symbicort per formulary, continue adcirca    PAF/SSS s/p Pacemaker/HTN/Chronic Diastolic CHF  - BP, volume status, and HR acceptable, continue home regimen  - on AC with xarelto    BPH  - continue cardura    Hx of Laryngeal Cancer/Pharyngeal Dysphagia  - on soft texture/chopped diet with  thin liquids-will resume  - consult SLP    I discussed the patient's findings and my recommendations with patient, spouse, nursing staff and ED provider.    VTE Prophylaxis - Xarelto (home med).  Code Status - Full code.       Killian Castillo MD  [facility]  05/24/23  14:28 EDT

## 2023-05-24 NOTE — ED TRIAGE NOTES
Pt states that he was checking his sats today when they were in the mid 80's. States that he does not feel any more SOA than normal. Pt is always on 4L. Has bumped himself up to 6L.

## 2023-05-25 PROBLEM — J44.1 COPD WITH ACUTE EXACERBATION: Status: ACTIVE | Noted: 2023-05-25

## 2023-05-25 PROBLEM — J96.21 ACUTE AND CHRONIC RESPIRATORY FAILURE WITH HYPOXIA: Status: ACTIVE | Noted: 2023-05-25

## 2023-05-25 LAB
ANION GAP SERPL CALCULATED.3IONS-SCNC: 11 MMOL/L (ref 5–15)
BASOPHILS # BLD AUTO: 0 10*3/MM3 (ref 0–0.2)
BASOPHILS NFR BLD AUTO: 0 % (ref 0–1.5)
BUN SERPL-MCNC: 15 MG/DL (ref 8–23)
BUN/CREAT SERPL: 18.5 (ref 7–25)
CALCIUM SPEC-SCNC: 9.6 MG/DL (ref 8.6–10.5)
CHLORIDE SERPL-SCNC: 98 MMOL/L (ref 98–107)
CO2 SERPL-SCNC: 29 MMOL/L (ref 22–29)
CREAT SERPL-MCNC: 0.81 MG/DL (ref 0.76–1.27)
DEPRECATED RDW RBC AUTO: 42.5 FL (ref 37–54)
EGFRCR SERPLBLD CKD-EPI 2021: 88.6 ML/MIN/1.73
EOSINOPHIL # BLD AUTO: 0 10*3/MM3 (ref 0–0.4)
EOSINOPHIL NFR BLD AUTO: 0 % (ref 0.3–6.2)
ERYTHROCYTE [DISTWIDTH] IN BLOOD BY AUTOMATED COUNT: 13.3 % (ref 12.3–15.4)
GLUCOSE SERPL-MCNC: 127 MG/DL (ref 65–99)
HCT VFR BLD AUTO: 50.4 % (ref 37.5–51)
HGB BLD-MCNC: 16.8 G/DL (ref 13–17.7)
IMM GRANULOCYTES # BLD AUTO: 0.01 10*3/MM3 (ref 0–0.05)
IMM GRANULOCYTES NFR BLD AUTO: 0.3 % (ref 0–0.5)
LYMPHOCYTES # BLD AUTO: 0.33 10*3/MM3 (ref 0.7–3.1)
LYMPHOCYTES NFR BLD AUTO: 8.8 % (ref 19.6–45.3)
MCH RBC QN AUTO: 29.8 PG (ref 26.6–33)
MCHC RBC AUTO-ENTMCNC: 33.3 G/DL (ref 31.5–35.7)
MCV RBC AUTO: 89.4 FL (ref 79–97)
MONOCYTES # BLD AUTO: 0.25 10*3/MM3 (ref 0.1–0.9)
MONOCYTES NFR BLD AUTO: 6.7 % (ref 5–12)
NEUTROPHILS NFR BLD AUTO: 3.16 10*3/MM3 (ref 1.7–7)
NEUTROPHILS NFR BLD AUTO: 84.2 % (ref 42.7–76)
NRBC BLD AUTO-RTO: 0 /100 WBC (ref 0–0.2)
PLATELET # BLD AUTO: 249 10*3/MM3 (ref 140–450)
PMV BLD AUTO: 9.6 FL (ref 6–12)
POTASSIUM SERPL-SCNC: 4.6 MMOL/L (ref 3.5–5.2)
RBC # BLD AUTO: 5.64 10*6/MM3 (ref 4.14–5.8)
SODIUM SERPL-SCNC: 138 MMOL/L (ref 136–145)
WBC NRBC COR # BLD: 3.75 10*3/MM3 (ref 3.4–10.8)

## 2023-05-25 PROCEDURE — 94761 N-INVAS EAR/PLS OXIMETRY MLT: CPT

## 2023-05-25 PROCEDURE — 92610 EVALUATE SWALLOWING FUNCTION: CPT

## 2023-05-25 PROCEDURE — 25010000002 METHYLPREDNISOLONE PER 40 MG: Performed by: INTERNAL MEDICINE

## 2023-05-25 PROCEDURE — 94799 UNLISTED PULMONARY SVC/PX: CPT

## 2023-05-25 PROCEDURE — 94664 DEMO&/EVAL PT USE INHALER: CPT

## 2023-05-25 PROCEDURE — 80048 BASIC METABOLIC PNL TOTAL CA: CPT | Performed by: INTERNAL MEDICINE

## 2023-05-25 PROCEDURE — 85025 COMPLETE CBC W/AUTO DIFF WBC: CPT | Performed by: INTERNAL MEDICINE

## 2023-05-25 PROCEDURE — 36415 COLL VENOUS BLD VENIPUNCTURE: CPT | Performed by: INTERNAL MEDICINE

## 2023-05-25 RX ORDER — METHYLPREDNISOLONE SODIUM SUCCINATE 40 MG/ML
40 INJECTION, POWDER, LYOPHILIZED, FOR SOLUTION INTRAMUSCULAR; INTRAVENOUS EVERY 12 HOURS
Status: DISCONTINUED | OUTPATIENT
Start: 2023-05-26 | End: 2023-05-30

## 2023-05-25 RX ADMIN — PREGABALIN 75 MG: 75 CAPSULE ORAL at 08:19

## 2023-05-25 RX ADMIN — IPRATROPIUM BROMIDE AND ALBUTEROL SULFATE 3 ML: .5; 3 SOLUTION RESPIRATORY (INHALATION) at 10:47

## 2023-05-25 RX ADMIN — SALINE NASAL SPRAY 2 SPRAY: 1.5 SOLUTION NASAL at 08:21

## 2023-05-25 RX ADMIN — RIVAROXABAN 20 MG: 20 TABLET, FILM COATED ORAL at 17:27

## 2023-05-25 RX ADMIN — SALINE NASAL SPRAY 2 SPRAY: 1.5 SOLUTION NASAL at 20:15

## 2023-05-25 RX ADMIN — IPRATROPIUM BROMIDE AND ALBUTEROL SULFATE 3 ML: .5; 3 SOLUTION RESPIRATORY (INHALATION) at 15:00

## 2023-05-25 RX ADMIN — ATORVASTATIN CALCIUM 10 MG: 20 TABLET, FILM COATED ORAL at 08:19

## 2023-05-25 RX ADMIN — CETIRIZINE HYDROCHLORIDE 10 MG: 10 TABLET ORAL at 08:19

## 2023-05-25 RX ADMIN — PANTOPRAZOLE SODIUM 40 MG: 40 TABLET, DELAYED RELEASE ORAL at 06:09

## 2023-05-25 RX ADMIN — MONTELUKAST SODIUM 10 MG: 10 TABLET, FILM COATED ORAL at 20:06

## 2023-05-25 RX ADMIN — TERAZOSIN 2 MG: 2 CAPSULE ORAL at 20:04

## 2023-05-25 RX ADMIN — ELTROMBOPAG OLAMINE 50 MG: 25 TABLET, FILM COATED ORAL at 06:09

## 2023-05-25 RX ADMIN — Medication 10 ML: at 08:20

## 2023-05-25 RX ADMIN — ARFORMOTEROL TARTRATE 15 MCG: 15 SOLUTION RESPIRATORY (INHALATION) at 19:12

## 2023-05-25 RX ADMIN — PREGABALIN 75 MG: 75 CAPSULE ORAL at 20:09

## 2023-05-25 RX ADMIN — PILOCARPINE HYDRCHLORIDE 5 MG: 5 TABLET, FILM COATED ORAL at 20:09

## 2023-05-25 RX ADMIN — PILOCARPINE HYDRCHLORIDE 5 MG: 5 TABLET, FILM COATED ORAL at 08:20

## 2023-05-25 RX ADMIN — Medication 10 ML: at 20:09

## 2023-05-25 RX ADMIN — BUDESONIDE 0.5 MG: 0.5 INHALANT ORAL at 19:12

## 2023-05-25 RX ADMIN — TADALAFIL 40 MG: 20 TABLET ORAL at 08:19

## 2023-05-25 RX ADMIN — POTASSIUM CHLORIDE 20 MEQ: 750 TABLET, EXTENDED RELEASE ORAL at 08:19

## 2023-05-25 RX ADMIN — Medication 500 MG: at 20:06

## 2023-05-25 RX ADMIN — ARFORMOTEROL TARTRATE 15 MCG: 15 SOLUTION RESPIRATORY (INHALATION) at 06:41

## 2023-05-25 RX ADMIN — Medication 500 MG: at 08:19

## 2023-05-25 RX ADMIN — METHYLPREDNISOLONE SODIUM SUCCINATE 40 MG: 40 INJECTION, POWDER, FOR SOLUTION INTRAMUSCULAR; INTRAVENOUS at 06:09

## 2023-05-25 RX ADMIN — SODIUM CHLORIDE SOLN NEBU 7% 4 ML: 7 NEBU SOLN at 06:43

## 2023-05-25 RX ADMIN — KETOTIFEN FUMARATE 1 DROP: 0.25 SOLUTION OPHTHALMIC at 20:10

## 2023-05-25 RX ADMIN — KETOTIFEN FUMARATE 1 DROP: 0.25 SOLUTION OPHTHALMIC at 08:19

## 2023-05-25 RX ADMIN — SODIUM CHLORIDE SOLN NEBU 7% 4 ML: 7 NEBU SOLN at 19:16

## 2023-05-25 RX ADMIN — IPRATROPIUM BROMIDE AND ALBUTEROL SULFATE 3 ML: .5; 3 SOLUTION RESPIRATORY (INHALATION) at 06:40

## 2023-05-25 RX ADMIN — IPRATROPIUM BROMIDE AND ALBUTEROL SULFATE 3 ML: .5; 3 SOLUTION RESPIRATORY (INHALATION) at 19:09

## 2023-05-25 RX ADMIN — SALINE NASAL SPRAY 2 SPRAY: 1.5 SOLUTION NASAL at 12:09

## 2023-05-25 RX ADMIN — PILOCARPINE HYDRCHLORIDE 5 MG: 5 TABLET, FILM COATED ORAL at 17:27

## 2023-05-25 RX ADMIN — BUDESONIDE 0.5 MG: 0.5 INHALANT ORAL at 06:42

## 2023-05-25 RX ADMIN — METHYLPREDNISOLONE SODIUM SUCCINATE 40 MG: 40 INJECTION, POWDER, FOR SOLUTION INTRAMUSCULAR; INTRAVENOUS at 12:09

## 2023-05-25 RX ADMIN — FUROSEMIDE 40 MG: 40 TABLET ORAL at 08:19

## 2023-05-25 NOTE — PROGRESS NOTES
LOS: 1 day   Patient Care Team:  Madison Lewis APRN as PCP - General (Nurse Practitioner)  Rao Maguire MD as Consulting Physician (Hematology and Oncology)  Alan Santana MD as Referring Physician (Family Medicine)  Gaurav Merrill MD as Consulting Physician (Otolaryngology)  Edgardo Brown MD (Otolaryngology)  Isael Beltre MD as Consulting Physician (Hematology and Oncology)  Herminia Salas MD as Consulting Physician (Internal Medicine)  Juventino Miller MD as Consulting Physician (Cardiac Electrophysiology)  Suresh Hernandez MD as Consulting Physician (Pulmonary Disease)    Chief Complaint:  F/up respiratory failure, COPD exacerbation and medical problems as below.    Subjective   Interval History  On oxygen 11 L/min.  Continues to have cough, productive of greenish phlegm but slightly since yesterday.    On NIPPV.    REVIEW OF SYSTEMS:   CARDIOVASCULAR: No chest pain, chest pressure or chest discomfort. No palpitations or edema.   GASTROINTESTINAL: No anorexia, nausea, vomiting or diarrhea. No abdominal pain.  CONSTITUTIONAL: No fever or chills.     Ventilator/Non-Invasive Ventilation Settings (From admission, onward)     Start     Ordered    05/24/23 2350  NIPPV - Provider Settings  (CPAP / BiPAP)  Until Discontinued        Comments: Settings transferred from from home [facility] machine   Question Answer Comment   Indication Acute Respiratory Failure    Type AVAPS/PC/PS    Backup Rate 5    Target VT (mL) 550    EPAP/PEEP (cm H2O) 6    Min Pressure (cm H2O) 12    Max Pressure (cm H2O) 28    Titrate Oxygen for SpO2 88 - 92%        05/24/23 2350                      Physical Exam:     Vital Signs  Temp:  [97.6 °F (36.4 °C)-98.6 °F (37 °C)] 97.6 °F (36.4 °C)  Heart Rate:  [79-86] 86  Resp:  [18] 18  BP: (108-149)/(64-83) 109/65    Intake/Output Summary (Last 24 hours) at 5/25/2023 1639  Last data filed at 5/25/2023 1400  Gross  "per 24 hour   Intake 600 ml   Output 1300 ml   Net -700 ml     Flowsheet Rows    Flowsheet Row First Filed Value   Admission Height 182.9 cm (72\") Documented at 05/24/2023 1030   Admission Weight 85.7 kg (189 lb) Documented at 05/24/2023 1030          PPE used per hospital policy    General Appearance:   Alert, cooperative, in no acute distress   ENMT:  Mallampati score 3, moist mucous membrane   Eyes:  Pupils equal and reactive to light. EOMI   Neck:   Large. Trachea midline. No thyromegaly.   Lungs:    Equal but diminished air entry throughout.  Mild coarse breath sounds.  No crackles.  No labored breathing.    Heart:   Regular rhythm and normal rate, normal S1 and S2, no         murmur   Skin:   No rash or ecchymosis   Abdomen:    Obese. Soft. No tenderness. No HSM.   Neuro/psych:  Conscious, alert, oriented x3. Strength 5/5 in upper and lower  ext.  Appropriate mood and affect   Extremities:  No cyanosis, clubbing but mild edema in legs.  Warm extremities and well-perfused          Results Review:        Results from last 7 days   Lab Units 05/25/23  0705 05/24/23  1046   SODIUM mmol/L 138 138   POTASSIUM mmol/L 4.6 4.2   CHLORIDE mmol/L 98 99   CO2 mmol/L 29.0 31.0*   BUN mg/dL 15 13   CREATININE mg/dL 0.81 0.98   GLUCOSE mg/dL 127* 105*   CALCIUM mg/dL 9.6 8.6     Results from last 7 days   Lab Units 05/24/23  1235 05/24/23  1046   HSTROP T ng/L 33* 37*     Results from last 7 days   Lab Units 05/25/23  0705 05/24/23  1046   WBC 10*3/mm3 3.75 4.98   HEMOGLOBIN g/dL 16.8 16.6   HEMATOCRIT % 50.4 49.5   PLATELETS 10*3/mm3 249 262         Results from last 7 days   Lab Units 05/24/23  1046   PROBNP pg/mL 2,789.0*                           I reviewed the patient's new clinical results.        Medication Review:   arformoterol, 15 mcg, Nebulization, BID - RT  atorvastatin, 10 mg, Oral, Daily  budesonide, 0.5 mg, Nebulization, BID - RT  cetirizine, 10 mg, Oral, Daily  eltrombopag, 50 mg, Oral, QAM  furosemide, 40 " mg, Oral, Daily  guaifenesin-dextromethorphan, 2 tablet, Oral, BID  ipratropium-albuterol, 3 mL, Nebulization, 4x Daily - RT  ketotifen, 1 drop, Both Eyes, BID  methylPREDNISolone sodium succinate, 40 mg, Intravenous, Q8H  montelukast, 10 mg, Oral, Nightly  pantoprazole, 40 mg, Oral, Q AM  pilocarpine, 5 mg, Oral, TID  potassium chloride, 20 mEq, Oral, Daily  pregabalin, 75 mg, Oral, Q12H  rivaroxaban, 20 mg, Oral, Daily With Dinner  saccharomyces boulardii, 500 mg, Oral, BID  sodium chloride, 10 mL, Intravenous, Q12H  sodium chloride, 4 mL, Nebulization, BID - RT  sodium chloride, 2 spray, Each Nare, 4x Daily  tadalafil, 40 mg, Oral, Q24H  terazosin, 2 mg, Oral, Nightly          Assessment     1. COPD exacerbation  2. Parainfluenza bronchitis  3. Pulmonary infiltrates: Seems chronic and waxing and waning.  Now right lower lobe.  Previously mostly in the left lower lobe.  Unclear how much of that represents scarring.  It does not appear that it is related to bacterial pneumonia but could be viral pneumonia  4. Acute on chronic hypoxic respiratory failure  5. Chronic hypercapnic respiratory failure, on trilogy ventilator.        Plan     · Oxygen by HFNC and titrate to keep SPO2 >90%  · NIPPV at night and as needed during the day.  Settings reviewed and adjusted.  When his oxygen requirement diminish then we can switch him from the V60 ventilator to his usual trilogy ventilator.  · Solu-Medrol: Decreased to 40 mg twice daily  · DuoNeb 4 times a day, Pulmicort and Brovana twice a day  · Flutter to improve mucus clearance  · DNR/DNI      Discussed with his wife.  Discussed with Dr. Lefty Up MD  05/25/23  16:35 EDT          This note was dictated utilizing Dragon dictation

## 2023-05-25 NOTE — PROGRESS NOTES
Name: Alessio Allen ADMIT: 2023   : 1940  PCP: Madison Lewis APRN    MRN: [id number] LOS: 1 days   AGE/SEX: 82 y.o. male  ROOM: [facility]     Subjective   Subjective   Feeling better than yesterday. No SOA at rest. Coughing spells come and go. No CP or palp. Voiding well. Tolerating diet. No subjective fever.    Review of Systems   as above    Objective   Objective   Vital Signs  Temp:  [97.6 °F (36.4 °C)-98.6 °F (37 °C)] 97.6 °F (36.4 °C)  Heart Rate:  [79-82] 79  Resp:  [18-20] 18  BP: (108-149)/(64-85) 109/65  SpO2:  [84 %-94 %] 93 %  on  Flow (L/min):  [6-12] 11;   Device (Oxygen Therapy): humidified;nasal cannula  Body mass index is 25.08 kg/m².  Physical Exam  Vitals and nursing note reviewed. Exam conducted with a chaperone present (Wife and RN).   Constitutional:       General: He is not in acute distress.     Appearance: He is ill-appearing. He is not toxic-appearing or diaphoretic.   HENT:      Head: Normocephalic.      Nose: Nose normal.      Mouth/Throat:      Mouth: Mucous membranes are moist.      Pharynx: Oropharynx is clear.   Eyes:      General: No scleral icterus.        Right eye: No discharge.         Left eye: No discharge.      Extraocular Movements: Extraocular movements intact.      Conjunctiva/sclera: Conjunctivae normal.   Cardiovascular:      Rate and Rhythm: Normal rate and regular rhythm.      Pulses: Normal pulses.      Comments: Paced rhythm on monitor  Pulmonary:      Effort: Pulmonary effort is normal. No respiratory distress.      Breath sounds: No wheezing.      Comments: Coarse BS, scattered rales anteriorly  Abdominal:      General: Bowel sounds are normal. There is no distension.      Palpations: Abdomen is soft.      Tenderness: There is no abdominal tenderness.   Musculoskeletal:         General: No swelling. Normal range of motion.      Cervical back: Neck supple. No rigidity.      Comments: Kyphosis of T-spine   Lymphadenopathy:      Cervical: No  cervical adenopathy.   Skin:     General: Skin is warm and dry.      Capillary Refill: Capillary refill takes less than 2 seconds.      Coloration: Skin is not jaundiced.   Neurological:      General: No focal deficit present.      Mental Status: He is alert and oriented to person, place, and time. Mental status is at baseline.      Cranial Nerves: No cranial nerve deficit.      Coordination: Coordination normal.   Psychiatric:         Mood and Affect: Mood normal.         Behavior: Behavior normal.         Thought Content: Thought content normal.      Comments: Very pleasant       Results Review     I reviewed the patient's new clinical results.  Results from last 7 days   Lab Units 05/25/23  0705 05/24/23  1046   WBC 10*3/mm3 3.75 4.98   HEMOGLOBIN g/dL 16.8 16.6   PLATELETS 10*3/mm3 249 262     Results from last 7 days   Lab Units 05/25/23  0705 05/24/23  1046   SODIUM mmol/L 138 138   POTASSIUM mmol/L 4.6 4.2   CHLORIDE mmol/L 98 99   CO2 mmol/L 29.0 31.0*   BUN mg/dL 15 13   CREATININE mg/dL 0.81 0.98   GLUCOSE mg/dL 127* 105*   EGFR mL/min/1.73 88.6 77.5     Results from last 7 days   Lab Units 05/24/23  1046   ALBUMIN g/dL 3.7   BILIRUBIN mg/dL 0.7   ALK PHOS U/L 106   AST (SGOT) U/L 30   ALT (SGPT) U/L 18     Results from last 7 days   Lab Units 05/25/23  0705 05/24/23  1046   CALCIUM mg/dL 9.6 8.6   ALBUMIN g/dL  --  3.7     Results from last 7 days   Lab Units 05/24/23  1235 05/24/23  1046   PROCALCITONIN ng/mL  --  0.06   LACTATE mmol/L 1.1  --      No results found for: HGBA1C, POCGLU    No radiology results for the last day  I have personally reviewed all medications:  Scheduled Medications  arformoterol, 15 mcg, Nebulization, BID - RT  atorvastatin, 10 mg, Oral, Daily  budesonide, 0.5 mg, Nebulization, BID - RT  cetirizine, 10 mg, Oral, Daily  eltrombopag, 50 mg, Oral, QAM  furosemide, 40 mg, Oral, Daily  guaifenesin-dextromethorphan, 2 tablet, Oral, BID  ipratropium-albuterol, 3 mL, Nebulization, 4x  Daily - RT  ketotifen, 1 drop, Both Eyes, BID  methylPREDNISolone sodium succinate, 40 mg, Intravenous, Q8H  montelukast, 10 mg, Oral, Nightly  pantoprazole, 40 mg, Oral, Q AM  pilocarpine, 5 mg, Oral, TID  potassium chloride, 20 mEq, Oral, Daily  pregabalin, 75 mg, Oral, Q12H  rivaroxaban, 20 mg, Oral, Daily With Dinner  saccharomyces boulardii, 500 mg, Oral, BID  sodium chloride, 10 mL, Intravenous, Q12H  sodium chloride, 4 mL, Nebulization, BID - RT  sodium chloride, 2 spray, Each Nare, 4x Daily  tadalafil, 40 mg, Oral, Q24H  terazosin, 2 mg, Oral, Nightly    Infusions   Diet  Diet: Cardiac Diets; Healthy Heart (2-3 Na+); Texture: Mechanical Ground (NDD 2); Fluid Consistency: Thin (IDDSI 0)    I have personally reviewed:  [x]  Laboratory   [x]  Microbiology   [x]  Radiology   [x]  EKG/Telemetry  []  Cardiology/Vascular   []  Pathology    [x]  Records       Assessment/Plan     Active Hospital Problems    Diagnosis  POA   • **Parainfluenza virus bronchitis [J20.4]  Yes   • Acute and chronic respiratory failure with hypoxia [J96.21]  Yes   • COPD with acute exacerbation [J44.1]  Yes   • Pulmonary hypertension [I27.20]  Yes   • Presence of cardiac pacemaker [Z95.0]  Yes   • Pharyngeal dysphagia [R13.13]  Yes   • IPF (idiopathic pulmonary fibrosis) [J84.112]  Yes   • Chronic diastolic CHF (congestive heart failure) [I50.32]  Yes   • Chronic respiratory failure with hypoxia and hypercapnia [J96.11, J96.12]  Yes   • Pulmonary emphysema [J43.9]  Yes   • Chronic anticoagulation [Z79.01]  Not Applicable   • BPH (benign prostatic hypertrophy) [N40.0]  Yes   • Dyslipidemia [E78.5]  Yes   • Paroxysmal atrial fibrillation [I48.0]  Yes   • Primary hypertension [I10]  Yes      Resolved Hospital Problems   No resolved problems to display.       81yo gentleman with h/o HTN, HLD, BPH, IPF, COPD, chronic hypoxic/hypercapnic resp failure (4L/min and Trilogy vent at home), pulm HTN, PAF/SSS/PPM (Xarelto), and prior laryngeal CA with  pharyngeal dysphagia, who presented to ER with c/o cough and SOA worse than baseline. He was just admitted here earlier this month for pneumonia. He is followed by [facility].    Parainfluenza 3 Bronchitis  - complicated by underlying COPD, IPF, and pulmonary hypertension   - requiring increased oxygen supplementation from baseline  - no evidence of bacterial pneumonia or bronchospasm  - Pulm does feel there is component of AECOPD due to virus, continue IV SoluMedrol  - continue Brovana and Pulmicort, scheduled DuoNebs, hypertonic saline nebs, as well as OPEP and IS  - wean oxygen as tolerated     COPD/IPF/Pulmonary HTN/Chronic Hypoxic and Hypercapnic Respiratory Failure  - appreciate Pulm attention to pt  - supportive treatment with bronchodilators and mucolytics as above  - wean oxygen as tolerated, avoid hyperoxemia due to chronic hypercapnic respiratory failure, keep sats 88-92%, no higher  - continue Adcirca, Trilogy vent     PAF/SSS s/p Pacemaker/HTN/Chronic Diastolic CHF  - BPs, volume status, and HRs acceptable  - not on RC meds at home  - on AC with Xarelto     BPH  - continue alpha-blocker  - voiding fine     Hx of Laryngeal Cancer/Pharyngeal Dysphagia  - on soft texture/chopped diet with thin liquids per SLP recs    · Xarelto (home med) for DVT prophylaxis.  · Limited code (no CPR, no intubation). D/w pt, wife, RN, and Palliative--have changed code status in Epic today to reflect pt's wishes.  · Discussed with patient, spouse, nursing staff, CCP and care team on multidisciplinary rounds. D/w SLP.  · Anticipate discharge home with family, timing yet to be determined..      Toby Olea MD  [facility]  05/25/23  14:04 EDT

## 2023-05-25 NOTE — THERAPY EVALUATION
Acute Care - Speech Language Pathology   Swallow Initial Evaluation [facility]     Patient Name: Alessio Allen  : 1940  MRN: [id number]  Today's Date: 2023               Admit Date: 2023    Visit Dx:     ICD-10-CM ICD-9-CM   1. Acute on chronic respiratory failure with hypoxia  J96.21 518.84     799.02   2. Parainfluenza virus bronchitis  J20.4 466.0     079.89   3. Acute exacerbation of chronic obstructive pulmonary disease (COPD)  J44.1 491.21     Patient Active Problem List   Diagnosis   • Paroxysmal atrial fibrillation   • Dyslipidemia   • Primary hypertension   • Neuropathy   • Hypertension   • COPD (chronic obstructive pulmonary disease)   • BPH (benign prostatic hypertrophy)   • Atrial fibrillation   • Asthma   • Hypoxia   • Pneumonia   • Chronic anticoagulation   • Pneumonia of both lungs due to infectious organism   • Hyponatremia   • Pulmonary emphysema   • Chronic respiratory failure with hypoxia and hypercapnia   • Pneumonia of both lower lobes due to infectious organism   • Chronic diastolic CHF (congestive heart failure)   • IPF (idiopathic pulmonary fibrosis)   • Acute respiratory failure with hypoxia   • Attention to G-tube   • Massive hemoptysis   • Laryngeal cancer   • Encounter for venous access device care   • Pneumonia of left lower lobe due to infectious organism   • Hemoptysis   • Pharyngeal dysphagia   • Parainfluenza virus bronchitis   • Pulmonary hypertension   • Presence of cardiac pacemaker   • Acute and chronic respiratory failure with hypoxia   • COPD with acute exacerbation     Past Medical History:   Diagnosis Date   • Acute on chronic diastolic heart failure    • Anemia 2018   • Arthritis    • Asthma    • Atrial fibrillation    • BPH (benign prostatic hypertrophy)    • Cancer     throat CA   • Cataract 2012    Removed   • CHF (congestive heart failure) 2018   • Colon polyp    • COPD (chronic obstructive pulmonary disease)    • Coronary artery disease 2017    • Dependence on continuous supplemental oxygen     3L-5L DEPENDING ON ACTIVITY   • Diverticulosis    • Erectile dysfunction    • GERD (gastroesophageal reflux disease)    • H/O Abnormal CBC 2017   • History of heart artery stent 03/2017   • History of medical problems 2017    Afib   • Hyperlipidemia    • Hypertension    • Low back pain 2012   • Lung disease    • Neuropathy     feet and hands    • Osteopenia 2012    Osteopenia   • Pneumonia      Past Surgical History:   Procedure Laterality Date   • BRONCHOSCOPY N/A 04/07/2018    Procedure: BRONCHOSCOPY with specimens;  Surgeon: Suresh Hernandez MD;  Location: [facility] MAIN OR;  Service: Pulmonary   • BRONCHOSCOPY N/A 03/06/2019    Procedure: BRONCHOSCOPY WITH BAL AND BIOPSY UNDER FLUORO AND ANESTHESIA;  Surgeon: Suresh Hernandez MD;  Location: [facility] MAIN OR;  Service: Pulmonary   • BRONCHOSCOPY N/A 06/13/2019    Procedure: BRONCHOSCOPY;  Surgeon: Suresh Hernandez MD;  Location: [facility] MAIN OR;  Service: Pulmonary   • CARDIAC CATHETERIZATION N/A 04/18/2018    Procedure: Right Heart Cath with possible vasodilator study;  Surgeon: Torey Schuler MD;  Location: [facility] CATH INVASIVE LOCATION;  Service: Cardiovascular   • CARDIAC CATHETERIZATION N/A 03/07/2019    Procedure: RIGHT AND LEFT HEART CATH;  Surgeon: Marizol Holt MD;  Location: [facility] CATH INVASIVE LOCATION;  Service: Cardiology   • CARDIAC CATHETERIZATION N/A 03/07/2019    Procedure: CORONARY ANGIOGRAPHY;  Surgeon: Marizol Holt MD;  Location: [facility] CATH INVASIVE LOCATION;  Service: Cardiology   • COLONOSCOPY  12/05/2016    polyps   • CORONARY STENT PLACEMENT  2017   • FRACTURE SURGERY     • INSERT / REPLACE / VENOUS ACCESS CATHETER Right    • PACEMAKER IMPLANTATION     • VENOUS ACCESS DEVICE (PORT) REMOVAL Right 04/05/2021    Procedure: Mediport Removal;  Surgeon: Joseph Nickerson Jr., MD;  Location: [facility] MAIN OR;  Service: General;  Laterality: Right;       SLP Recommendation and Plan  SLP Swallowing  Diagnosis: mild, pharyngeal dysphagia, esophageal dysphagia (05/25/23 1100)  SLP Diet Recommendation: mechanical ground textures, thin liquids (05/25/23 1100)  Recommended Precautions and Strategies: reflux precautions, upright posture during/after eating, small bites of food and sips of liquid, multiple swallows per bite of food, multiple swallows per sip of liquid, alternate between small bites of food and sips of liquid, other (see comments) (slow rate) (05/25/23 1100)  SLP Rec. for Method of Medication Administration: meds whole, with puree, as tolerated (05/25/23 1100)     Monitor for Signs of Aspiration: yes, notify SLP if any concerns (05/25/23 1100)  Recommended Diagnostics: reassess via clinical swallow evaluation (05/25/23 1100)  Swallow Criteria for Skilled Therapeutic Interventions Met: demonstrates skilled criteria (05/25/23 1100)  Anticipated Discharge Disposition (SLP): unknown (05/25/23 1100)  Rehab Potential/Prognosis, Swallowing: good, to achieve stated therapy goals (05/25/23 1100)  Therapy Frequency (Swallow): PRN (05/25/23 1100)  Predicted Duration Therapy Intervention (Days): until discharge (05/25/23 1100)                                        Plan of Care Reviewed With: patient  Outcome Evaluation: Clinical swallow evaluation completed. Patient reports no swallow difficulties this hospitalization. Previous VFSS results discussed with patient who verbalized understanding. No overt s/s of aspiration demonstrated with ice chips, thins by spoon/cup/straw, puree, soft/chopped solids. Slight facial grimace noted with swallow initiation of soft/chopped solid. Mild lingual residue; of note, patient cleared with thin liquid wash. Recommend patient initiate thin liquid and mechanical soft diet. Also recommend GI referral as aspiration could be possible due to reflux. Meds whole with puree, as tolerated. Sitting upright, slow rate, small bites/sips, alternate solids/liquids. Speech to follow for diet  tolerance.      SWALLOW EVALUATION (last 72 hours)     SLP Adult Swallow Evaluation     Row Name 05/25/23 1100                   Rehab Evaluation    Document Type evaluation  -CR        Subjective Information no complaints  -CR        Patient Observations alert;cooperative  -CR        Patient Effort good  -CR        Symptoms Noted During/After Treatment none  -CR           General Information    Patient Profile Reviewed yes  -CR        Pertinent History Of Current Problem 81 y/o male admitted due to increased cough and shortness of breath. H/o laryngeal cancer. CXR 5/24 questions multifocal pneumonia. Previous VFSS performed during recent hospitalization for pneumonia on 5/9/2023 which revealed significant reflux during esophageal screen and recommended soft/chopped and thin liquids. GI followed previous hospitalization.  -CR        Current Method of Nutrition soft to chew textures;chopped;thin liquids  -CR        Precautions/Limitations, Vision WFL  -CR        Precautions/Limitations, Hearing WFL  -CR        Prior Level of Function-Communication unknown  -CR        Prior Level of Function-Swallowing soft to chew;chopped;thin liquids  -CR        Plans/Goals Discussed with patient;agreed upon  -CR        Barriers to Rehab medically complex  -CR           Pain    Additional Documentation Pain Scale: Numbers Pre/Post-Treatment (Group)  -CR           Pain Scale: Numbers Pre/Post-Treatment    Pretreatment Pain Rating 7/10  -CR        Pain Location - Side/Orientation Right;Left  -CR        Pain Location - foot  -CR           Oral Motor Structure and Function    Dentition Assessment upper dentures/partial in place;lower dentures/partial in place  -CR        Secretion Management WNL/WFL  -CR        Mucosal Quality moist, healthy  -CR           Oral Musculature and Cranial Nerve Assessment    Oral Motor General Assessment WFL  -CR           Clinical Swallow Eval    Clinical Swallow Evaluation Summary Clinical swallow  evaluation completed. Patient reports no swallow difficulties this hospitalization. Previous VFSS results discussed with patient who verbalized understanding. No overt s/s of aspiration demonstrated with ice chips, thins by spoon/cup/straw, puree, soft/chopped solids. Slight facial grimace noted with swallow initiation of soft/chopped solid. Mild lingual residue; of note, patient cleared with thin liquid wash. Recommend patient initiate thin liquid and mechanical soft diet. Also recommend GI referral if concerned for aspiration due to reflux. Meds whole with puree, as tolerated. Discussed with RN and MD. Sitting upright, slow rate, small bites/sips, alternate solids/liquids. Speech to follow for diet tolerance.  -CR           SLP Evaluation Clinical Impression    SLP Swallowing Diagnosis mild;pharyngeal dysphagia;esophageal dysphagia  -CR        Functional Impact risk of aspiration/pneumonia  -CR        Rehab Potential/Prognosis, Swallowing good, to achieve stated therapy goals  -CR        Swallow Criteria for Skilled Therapeutic Interventions Met demonstrates skilled criteria  -CR           Recommendations    Therapy Frequency (Swallow) PRN  -CR        Predicted Duration Therapy Intervention (Days) until discharge  -CR        SLP Diet Recommendation mechanical ground textures;thin liquids  -CR        Recommended Diagnostics reassess via clinical swallow evaluation  -CR        Recommended Precautions and Strategies reflux precautions;upright posture during/after eating;small bites of food and sips of liquid;multiple swallows per bite of food;multiple swallows per sip of liquid;alternate between small bites of food and sips of liquid;other (see comments)  slow rate  -CR        Oral Care Recommendations Oral Care BID/PRN  -CR        SLP Rec. for Method of Medication Administration meds whole;with puree;as tolerated  -CR        Monitor for Signs of Aspiration yes;notify SLP if any concerns  -CR        Anticipated  Discharge Disposition (SLP) unknown  -CR           Swallow Goals (SLP)    Swallow STGs diet tolerance goal selection (SLP)  -CR        Diet Tolerance Goal Selection (SLP) Patient will tolerate trials of  -CR           (STG) Patient will tolerate trials of    Consistencies Trialed (Tolerate trials) mechanical ground textures;thin liquids  -CR        Desired Outcome (Tolerate trials) without signs/symptoms of aspiration  -CR        Vaughn (Tolerate trials) independently (over 90% accuracy)  -CR        Time Frame (Tolerate trials) 1 week  -CR              User Key  (r) = Recorded By, (t) = Taken By, (c) = Cosigned By    Initials Name Effective Dates    Kerry Christensen CF-SLP 11/10/22 -                 EDUCATION  The patient has been educated in the following areas:   Dysphagia (Swallowing Impairment).        SLP GOALS     Row Name 05/25/23 1100             (STG) Patient will tolerate trials of    Consistencies Trialed (Tolerate trials) mechanical ground textures;thin liquids  -CR      Desired Outcome (Tolerate trials) without signs/symptoms of aspiration  -CR      Vaughn (Tolerate trials) independently (over 90% accuracy)  -CR      Time Frame (Tolerate trials) 1 week  -CR            User Key  (r) = Recorded By, (t) = Taken By, (c) = Cosigned By    Initials Name Provider Type    Kerry Christensen CF-SLP Speech and Language Pathologist                   Time Calculation:    Time Calculation- SLP     Row Name 05/25/23 1117             Time Calculation- SLP    SLP Start Time 1030  -CR      SLP Received On 05/25/23  -CR            User Key  (r) = Recorded By, (t) = Taken By, (c) = Cosigned By    Initials Name Provider Type    Kerry Christensen CF-SLP Speech and Language Pathologist                Therapy Charges for Today     Code Description Service Date Service Provider Modifiers Qty    [id number]  ST EVAL ORAL PHARYNG SWALLOW 3 5/25/2023 Kerry Ovalles CF-SLP GN 1                Kerry Ovalles, CF-SLP  5/25/2023

## 2023-05-25 NOTE — PLAN OF CARE
Patient: Israel Sutton Date: 8/3/2020   : 1949 Attending: Martha Infante MD   71 year old male Allergies: ALLERGIES:  Not on File     Post-op Day: 7    Operation Procedure: Post-op Radical Cystectomy    Chief Complaint: Bladder Cancer    Subjective/HPI:  Patient doing well. Advised patient to keep moving and get in 3 walks a day. Spent     Denies fever, chills.  + Flatus and diet advanced to clears    Urine output last 24 hours: 820 cc  Urine output last shift: 320 cc  Drain output last 24 hours: 725 cc  Drain output last shift: 350 cc      Recent Labs   Lab 20  0426 20  0326 20  0039 20  0501 20  0556  20  0425   Creatinine, Fluid 2.80  --   --   --  2.46  --  2.99   Creatinine  --  1.97* 1.96* 1.80*  --    < >  --     < > = values in this interval not displayed.       KUB: stent in place      Vital Last Value 24 Hour Range   Temperature 97.8 °F (36.6 °C) (20 0800) Temp  Min: 97.8 °F (36.6 °C)  Max: 98.5 °F (36.9 °C)   Pulse 79 (20 1400) Pulse  Min: 68  Max: 130   Respiratory (!) 21 (20 1400) Resp  Min: 14  Max: 26   Non-Invasive  Blood Pressure 117/60 (20 1400) BP  Min: 95/53  Max: 131/84   Arterial   Blood Pressure (!) 67/52 (20 1230) No data recorded   Pulse Oximetry 99 % (20 1400) SpO2  Min: 90 %  Max: 100 %     Vital Today Admitted   Weight 134.1 kg (20 0019) Weight: 125.2 kg (2046)   Height N/A Height: 5' 11\" (180.3 cm) (20)   BMI N/A BMI (Calculated): 38.49 (20)     Intake/Output:    No intake/output data recorded.    I/O last 3 completed shifts:  In: 1718.1 [P.O.:1580; I.V.:138.1]  Out: 1880 [Urine:1045; Drains:835]      Intake/Output Summary (Last 24 hours) at 8/3/2020 1526  Last data filed at 8/3/2020 1400  Gross per 24 hour   Intake 1718.06 ml   Output 1880 ml   Net -161.94 ml       Medications/Infusions:  Scheduled:   • pantoprazole  40 mg Oral Nightly   • insulin lispro   Subcutaneous  Goal Outcome Evaluation:           Progress: improving  Outcome Evaluation: vss overnight on 10L highflow NC.  IV steroids. Breathing txs. No complaints of pain.          TID AC   • metoPROLOL succinate  25 mg Oral BID   • AMIODarone  200 mg Oral 2 times per day   • polyethylene glycol  17 g Oral Daily   • furosemide  80 mg Oral BID   • insulin glargine  20 Units Subcutaneous 2 times per day   • melatonin  9 mg Oral Nightly   • heparin (porcine)  7,500 Units Subcutaneous 3 times per day   • atorvastatin  40 mg Oral Nightly   • sodium chloride (PF)  10 mL Intravenous 2 times per day       Continuous Infusions:   • dextrose 5 % infusion     • AMIODarone (CORDARONE) 450 mg/250 mL dextrose 5% infusion 1 mg/min (08/03/20 1408)     Physical Exam:     General No acute distress    Lungs:   Regular nonlabored   Abdomen:   Semi-firm,   Stoma grey-pink.  Stent x 2   Extremities: Diffuse anasarca   Incision: Dressing clean dry and intact   Urine: Sosa.         Laboratory Results:    Lab Results   Component Value Date    WBC 9.3 08/03/2020    HCT 29.3 (L) 08/03/2020    HGB 9.7 (L) 08/03/2020     08/03/2020    INR 1.1 07/30/2020    PTT 21 (L) 07/27/2020    BUN 65 (H) 08/03/2020    CREATININE 1.97 (H) 08/03/2020    SODIUM 132 (L) 08/03/2020    POTASSIUM 4.5 08/03/2020    CHLORIDE 103 08/03/2020    AST 15 08/03/2020    ALKPT 60 08/03/2020    BILIRUBIN 0.4 08/03/2020    CO2 20 (L) 08/03/2020    GPT 13 08/03/2020    GLUCOSE 217 (H) 08/03/2020    MG 2.3 08/03/2020    PHOS 3.9 08/03/2020    CALCIUM 8.2 (L) 08/03/2020       Surgical Pathology: [id number]   Order: [id number]   Collected:  7/27/2020 07:52 Status:  Final result   Visible to patient:  No (Not Released)   Component    Pathologic Diagnosis   A.   Right ureter, biopsy for frozen section:  -Benign ureter tissue     B.   Left ureter, biopsy for frozen section:  -Benign ureter tissue with focal reactive atypia     C.   Bladder and prostate; cystoprostatectomy specimen:  -Bladder without evidence of residual urothelial carcinoma (status post neoadjuvant chemotherapy)  -Focal residual urothelial carcinoma present in the prostate (8 mm  nodule)  -AJCC staging for urothelial carcinoma: ypT4a N0     -Prostate with prostatic adenocarcinoma, [place] score 6 (3+3), limited to the prostate  -Margins are negative for prostatic adenocarcinoma  -AJCC staging for prostate adenocarcinoma: pT2 N0     D.   Pelvic lymph nodes:  -19 lymph nodes, negative for metastatic tumor (0/19)     E.   Appendix, appendectomy:  -Benign appendix     F.   Ileum; segment of small bowel:  -Benign small intestinal tissue     G.   Distal left ureter, biopsy for frozen section:  -Benign ureter tissue   Electronically signed by Marcela Bhandari MD on 7/29/2020 at 1538             KUB: Stents in position    Surgical Care Improvement Project:    Arteaga: No  DVT/VTE Prophylaxis:   VTE Pharmacologic Prophylaxis: Yes  VTE Mechanical Prophylaxis: Yes  Antibiotics D/C'd within 24 hrs of surgery end: Yes    Central Line: No    Diagnosis/Comorbidities/Complications:  Bladder Cancer  CAD.  Postop cardiac arrest  Chronic and significant deconditioning    Plans/Recommendations:  Appreciate consultants help in the complex care of this patient  Daily labs   Drain fluid creatinine QOD  DVT Prophylaxis - SCDs and SQ heparin - RN to start teaching injections  PT/OT- ambulate/up to chair   Out of bed greater than 8 hours including 4 or more walks and sitting in chair  Pathology noted and previously discussed with patient  Seen by Stoma RN    Diet per colorectal    Transfer back to the floor when cleared by intensivist team      Discussed with:  Patient, wife, RN, CHRISTO Lam  (Available:  Mon/Tues/Wed)  Pager:  [phone]    Please page urology PA/NP with questions on weekdays between the hours of 7A-7P. Please page the on call urologist on nights and weekends.    ** If you have paged me and have not had paged returned in 15 minutes- Please page [phone]**    Urologist:  Nii Cheatham [phone]

## 2023-05-25 NOTE — PLAN OF CARE
Goal Outcome Evaluation:  Plan of Care Reviewed With: patient        Progress: declining       A&Ox4. Between 11-13L high flow NC. Agreeable to wear bipap during the day, but states that he does not want to wear today. Desats with activity. Paced on monitor. Assist x1. Seen by speech, diet change to healthy heart mechanical ground thin liquids. Pills whole with applesauce. Discussed care with palliative RN, patient now DNR. No other patient complaints at this time. VSS. All needs met. Significant other at bedside and updated.

## 2023-05-25 NOTE — OUTREACH NOTE
COPD/PN Week 3 Survey    Flowsheet Row Responses   [facility] facility patient discharged from? [facility]   Does the patient have one of the following disease processes/diagnoses(primary or secondary)? COPD   Week 3 attempt successful? No   Unsuccessful attempts Attempt 1   Revoke Readmitted          Vickie YU - Registered Nurse

## 2023-05-25 NOTE — CASE MANAGEMENT/SOCIAL WORK
Discharge Planning Assessment  [facility]     Patient Name: Alessio Allen  MRN: [id number]  Today's Date: 5/25/2023    Admit Date: 5/24/2023    Plan: Return home with S/O   Discharge Needs Assessment     Row Name 05/25/23 1324       Living Environment    People in Home significant other    Name(s) of People in Home Pat    Current Living Arrangements home    Potentially Unsafe Housing Conditions none    Primary Care Provided by self    Provides Primary Care For no one, unable/limited ability to care for self    Family Caregiver if Needed significant other    Family Caregiver Names Dulce [phone] or son Ellis [phone]    Quality of Family Relationships helpful    Able to Return to Prior Arrangements yes       Resource/Environmental Concerns    Resource/Environmental Concerns none    Transportation Concerns none       Food Insecurity    Within the past 12 months, you worried that your food would run out before you got the money to buy more. Never true    Within the past 12 months, the food you bought just didn't last and you didn't have money to get more. Never true       Transition Planning    Patient/Family Anticipates Transition to home with family    Patient/Family Anticipated Services at Transition home health care    Transportation Anticipated family or friend will provide       Discharge Needs Assessment    Readmission Within the Last 30 Days other (see comments)  Ongoing problem    Equipment Currently Used at Home cane, straight;walker, rolling;wheelchair;grab bar;shower chair;oxygen;negative pressure wound therapy device;nebulizer    Concerns to be Addressed basic needs;discharge planning    Anticipated Changes Related to Illness none    Equipment Needed After Discharge none    Provided Post Acute Provider List? N/A    N/A Provider List Comment Current with Alanna  and will continue to use them               Discharge Plan     Row Name 05/25/23 4724       Plan    Plan Return home with S/O     Patient/Family in Agreement with Plan yes    Plan Comments Spoke with patient and S/O Pat [phone] at bedside.  Patient has a cane, walker, W/C if needed, grab bars in the BR, shower chair, O2 from [employer], nebulizer and [employer].  He is current with [facility] - Saahra left a message and she is following and has been to [facility] in the past.  PCP is [facility] and pharmacy is [facility].  Wife drives and assists patient as needed.  Current plan is to return home with Alanna  following.  CCP will follow.  Elias MAJANO              Continued Care and Services - Admitted Since 5/24/2023     Home Medical Care     Service Provider Request Status Selected Services Address Phone Fax Patient Preferred    [facility] [address], [address], [place] [address] [phone] --            Selected Continued Care - Prior Encounters Includes continued care and service providers with selected services from prior encounters from 2/23/2023 to 5/25/2023    Discharged on 5/13/2023 Admission date: 5/7/2023 - Discharge disposition: Home-Health Care Svc    Home Medical Care     Service Provider Selected Services Address Phone Fax Patient Preferred    [facility] [address], [address], [place] [address] [phone] --                    Expected Discharge Date and Time     Expected Discharge Date Expected Discharge Time    May 29, 2023          Demographic Summary     Row Name 05/25/23 1322       General Information    Admission Type inpatient    Arrived From home    Referral Source admission list    Reason for Consult discharge planning    Preferred Language English               Functional Status     Row Name 05/25/23 1329       Functional Status    Usual Activity Tolerance moderate    Current Activity Tolerance fair       Functional Status, IADL    Medications assistive  person    Meal Preparation assistive person    Housekeeping assistive person    Laundry assistive person    Shopping assistive person       Mental Status    General Appearance WDL WDL       Mental Status Summary    Recent Changes in Mental Status/Cognitive Functioning no changes                      Becky S. [place], RN

## 2023-05-25 NOTE — PLAN OF CARE
Goal Outcome Evaluation:  Plan of Care Reviewed With: patient           Outcome Evaluation: Clinical swallow evaluation completed. Patient reports no swallow difficulties this hospitalization. Previous VFSS results discussed with patient who verbalized understanding. No overt s/s of aspiration demonstrated with ice chips, thins by spoon/cup/straw, puree, soft/chopped solids. Slight facial grimace noted with swallow initiation of soft/chopped solid. Mild lingual residue; of note, patient cleared with thin liquid wash. Recommend patient initiate thin liquid and mechanical soft diet. Also recommend GI referral if concerned for aspiration due to reflux. Meds whole with puree, as tolerated. Discussed with RN and MD. Sitting upright, slow rate, small bites/sips, alternate solids/liquids. Speech to follow for diet tolerance.

## 2023-05-25 NOTE — DISCHARGE PLACEMENT REQUEST
"Chioma Rushing \"NADIA\" (82 y.o. Male)     Date of Birth   1940    Social Security Number       Address   [address] [id number]    Home Phone   [phone]    MRN   [id number]       [facility]    Marital Status                               Admission Date   5/24/23    Admission Type   Emergency    Admitting Provider   Killian Castillo MD    Attending Provider   Toby Olea MD    Department, Room/Bed   [address], S612/1       Discharge Date       Discharge Disposition       Discharge Destination                               Attending Provider: Toby Olae MD    Allergies: Lisinopril, Sulfa Antibiotics, Penicillins, Atenolol, Celebrex [Celecoxib], Coreg [Carvedilol], Ibuprofen    Isolation: Contact   Infection: MRSA (05/24/23)   Code Status: No CPR    Ht: 182.9 cm (72\")   Wt: 83.9 kg (184 lb 14.4 oz)    Admission Cmt: None   Principal Problem: Parainfluenza virus bronchitis [J20.4]                 Active Insurance as of 5/24/2023     Primary Coverage     Payor Plan Insurance Group Employer/Plan Group    MEDICARE MEDICARE A & B      Payor Plan Address Payor Plan Phone Number Payor Plan Fax Number Effective Dates    [address] [phone]  5/1/2000 - None Entered    [facility] [id number]       Subscriber Name Subscriber Birth Date Member ID       CHIOMA RUSHING 1940 [id number]           Secondary Coverage     Payor Plan Insurance Group Employer/Plan Group     FOR LIFE  FOR LIFE  SUP       Payor Plan Address Payor Plan Phone Number Payor Plan Fax Number Effective Dates    [address] [phone]  1/30/2018 - None Entered    [facility] [id number]       Subscriber Name Subscriber Birth Date Member ID       CHIOMA RUSHING 1940 [id number]                 Emergency Contacts      (Rel.) Home Phone Work Phone Mobile Phone    Alejandro,Pat (Significant Other) [phone] -- --    AlejandroEllis (Son) " [phone] -- [phone]

## 2023-05-25 NOTE — CONSULTS
Purpose of the visit was to evaluate for: goals of care/advanced care planning, support for patient/family and pain/symptom management. Spoke with RN as well as patient and family and discussed goals of care, care options and resuscitation status.      Assessment:  Patient is palliative care appropriate for community based services with [facility] or SNF with palliative care (list reasons): COPD, chronic respiratory failure, diastolic CHF, atrial fibrillation, asthma, HTN, SSS, and CAD.    Recommendations/Plan: Continue current level of care, Mr. Allen's goal is to return to his baseline function and return home when medically able. He would like to update his CODE status to DNR.     Other Comments: Met with Mr. Allen at the bedside to discuss goals of care and CODE status. Mr. Allen has a good understanding of his medical conditions. He states his pulmonologist, Dr. Hernandez, has shared with him he has advanced lung disease at baseline and that there isnt a cure for this just management. He is concerned about his increasing oxygen needs as he is currently on 12 L NC and at home he is on 4 L. He would like to discuss with the pulmonology team if they think he will require intubation and in that case if he would be able to come off a ventilator. He does not think he would wish to be on a ventilator but wants to speak with the doctors about this prior to finalizing a decision. I reached out to Dr. Up to notify him of Mr. Allen's request to discuss this. When discussing CODE status, Mr. Allen states he would not want to be resuscitated and would want to be let go if he were to pass. I am reaching out to Dr. Olea to update his CODE status. Mr. Allen shared he does have an advanced directive that names his son, Ellis, as his health care decision maker with his significant other, Pat, being the alternate. He states he will have Pat bring this in for our records. Update his CODE status to DNR, Mr. Allen  will decide on his wishes for ventilation status after speaking with the doctors. We will sign off at this time but can be reconsulted for future needs.

## 2023-05-26 LAB
ANION GAP SERPL CALCULATED.3IONS-SCNC: 8.4 MMOL/L (ref 5–15)
BASOPHILS # BLD AUTO: 0.01 10*3/MM3 (ref 0–0.2)
BASOPHILS NFR BLD AUTO: 0.1 % (ref 0–1.5)
BUN SERPL-MCNC: 24 MG/DL (ref 8–23)
BUN/CREAT SERPL: 28.6 (ref 7–25)
CALCIUM SPEC-SCNC: 9.3 MG/DL (ref 8.6–10.5)
CHLORIDE SERPL-SCNC: 97 MMOL/L (ref 98–107)
CO2 SERPL-SCNC: 30.6 MMOL/L (ref 22–29)
CREAT SERPL-MCNC: 0.84 MG/DL (ref 0.76–1.27)
DEPRECATED RDW RBC AUTO: 43.1 FL (ref 37–54)
EGFRCR SERPLBLD CKD-EPI 2021: 87.6 ML/MIN/1.73
EOSINOPHIL # BLD AUTO: 0 10*3/MM3 (ref 0–0.4)
EOSINOPHIL NFR BLD AUTO: 0 % (ref 0.3–6.2)
ERYTHROCYTE [DISTWIDTH] IN BLOOD BY AUTOMATED COUNT: 12.9 % (ref 12.3–15.4)
GLUCOSE SERPL-MCNC: 132 MG/DL (ref 65–99)
HCT VFR BLD AUTO: 49.5 % (ref 37.5–51)
HGB BLD-MCNC: 16.7 G/DL (ref 13–17.7)
IMM GRANULOCYTES # BLD AUTO: 0.03 10*3/MM3 (ref 0–0.05)
IMM GRANULOCYTES NFR BLD AUTO: 0.4 % (ref 0–0.5)
LYMPHOCYTES # BLD AUTO: 0.24 10*3/MM3 (ref 0.7–3.1)
LYMPHOCYTES NFR BLD AUTO: 3.5 % (ref 19.6–45.3)
MAGNESIUM SERPL-MCNC: 2.2 MG/DL (ref 1.6–2.4)
MCH RBC QN AUTO: 30.1 PG (ref 26.6–33)
MCHC RBC AUTO-ENTMCNC: 33.7 G/DL (ref 31.5–35.7)
MCV RBC AUTO: 89.4 FL (ref 79–97)
MONOCYTES # BLD AUTO: 0.33 10*3/MM3 (ref 0.1–0.9)
MONOCYTES NFR BLD AUTO: 4.8 % (ref 5–12)
NEUTROPHILS NFR BLD AUTO: 6.33 10*3/MM3 (ref 1.7–7)
NEUTROPHILS NFR BLD AUTO: 91.2 % (ref 42.7–76)
NRBC BLD AUTO-RTO: 0 /100 WBC (ref 0–0.2)
PLATELET # BLD AUTO: 235 10*3/MM3 (ref 140–450)
PMV BLD AUTO: 10 FL (ref 6–12)
POTASSIUM SERPL-SCNC: 4.8 MMOL/L (ref 3.5–5.2)
RBC # BLD AUTO: 5.54 10*6/MM3 (ref 4.14–5.8)
SODIUM SERPL-SCNC: 136 MMOL/L (ref 136–145)
WBC NRBC COR # BLD: 6.94 10*3/MM3 (ref 3.4–10.8)

## 2023-05-26 PROCEDURE — 94761 N-INVAS EAR/PLS OXIMETRY MLT: CPT

## 2023-05-26 PROCEDURE — 94664 DEMO&/EVAL PT USE INHALER: CPT

## 2023-05-26 PROCEDURE — 36415 COLL VENOUS BLD VENIPUNCTURE: CPT | Performed by: INTERNAL MEDICINE

## 2023-05-26 PROCEDURE — 25010000002 METHYLPREDNISOLONE PER 40 MG: Performed by: INTERNAL MEDICINE

## 2023-05-26 PROCEDURE — 94799 UNLISTED PULMONARY SVC/PX: CPT

## 2023-05-26 PROCEDURE — 97162 PT EVAL MOD COMPLEX 30 MIN: CPT

## 2023-05-26 PROCEDURE — 94660 CPAP INITIATION&MGMT: CPT

## 2023-05-26 PROCEDURE — 85025 COMPLETE CBC W/AUTO DIFF WBC: CPT | Performed by: INTERNAL MEDICINE

## 2023-05-26 PROCEDURE — 97530 THERAPEUTIC ACTIVITIES: CPT

## 2023-05-26 PROCEDURE — 80048 BASIC METABOLIC PNL TOTAL CA: CPT | Performed by: INTERNAL MEDICINE

## 2023-05-26 PROCEDURE — 83735 ASSAY OF MAGNESIUM: CPT | Performed by: HOSPITALIST

## 2023-05-26 RX ORDER — GUAIFENESIN/DEXTROMETHORPHAN 100-10MG/5
15 SYRUP ORAL 4 TIMES DAILY
Status: DISCONTINUED | OUTPATIENT
Start: 2023-05-26 | End: 2023-05-30

## 2023-05-26 RX ADMIN — SALINE NASAL SPRAY 2 SPRAY: 1.5 SOLUTION NASAL at 18:04

## 2023-05-26 RX ADMIN — ARFORMOTEROL TARTRATE 15 MCG: 15 SOLUTION RESPIRATORY (INHALATION) at 18:48

## 2023-05-26 RX ADMIN — GUAIFENESIN AND DEXTROMETHORPHAN HYDROBROMIDE 2 TABLET: 600; 30 TABLET, EXTENDED RELEASE ORAL at 09:16

## 2023-05-26 RX ADMIN — METHYLPREDNISOLONE SODIUM SUCCINATE 40 MG: 40 INJECTION, POWDER, FOR SOLUTION INTRAMUSCULAR; INTRAVENOUS at 00:09

## 2023-05-26 RX ADMIN — PILOCARPINE HYDRCHLORIDE 5 MG: 5 TABLET, FILM COATED ORAL at 09:12

## 2023-05-26 RX ADMIN — BUDESONIDE 0.5 MG: 0.5 INHALANT ORAL at 18:50

## 2023-05-26 RX ADMIN — Medication 500 MG: at 20:26

## 2023-05-26 RX ADMIN — PREGABALIN 75 MG: 75 CAPSULE ORAL at 20:25

## 2023-05-26 RX ADMIN — PREGABALIN 75 MG: 75 CAPSULE ORAL at 09:12

## 2023-05-26 RX ADMIN — ELTROMBOPAG OLAMINE 50 MG: 25 TABLET, FILM COATED ORAL at 06:25

## 2023-05-26 RX ADMIN — IPRATROPIUM BROMIDE AND ALBUTEROL SULFATE 3 ML: .5; 3 SOLUTION RESPIRATORY (INHALATION) at 14:50

## 2023-05-26 RX ADMIN — RIVAROXABAN 20 MG: 20 TABLET, FILM COATED ORAL at 18:04

## 2023-05-26 RX ADMIN — SODIUM CHLORIDE SOLN NEBU 7% 4 ML: 7 NEBU SOLN at 18:52

## 2023-05-26 RX ADMIN — SALINE NASAL SPRAY 2 SPRAY: 1.5 SOLUTION NASAL at 20:26

## 2023-05-26 RX ADMIN — KETOTIFEN FUMARATE 1 DROP: 0.25 SOLUTION OPHTHALMIC at 09:15

## 2023-05-26 RX ADMIN — IPRATROPIUM BROMIDE AND ALBUTEROL SULFATE 3 ML: .5; 3 SOLUTION RESPIRATORY (INHALATION) at 10:54

## 2023-05-26 RX ADMIN — ARFORMOTEROL TARTRATE 15 MCG: 15 SOLUTION RESPIRATORY (INHALATION) at 07:16

## 2023-05-26 RX ADMIN — CETIRIZINE HYDROCHLORIDE 10 MG: 10 TABLET ORAL at 09:12

## 2023-05-26 RX ADMIN — PILOCARPINE HYDRCHLORIDE 5 MG: 5 TABLET, FILM COATED ORAL at 16:15

## 2023-05-26 RX ADMIN — MONTELUKAST SODIUM 10 MG: 10 TABLET, FILM COATED ORAL at 20:26

## 2023-05-26 RX ADMIN — SALINE NASAL SPRAY 2 SPRAY: 1.5 SOLUTION NASAL at 09:17

## 2023-05-26 RX ADMIN — Medication 10 ML: at 20:30

## 2023-05-26 RX ADMIN — PANTOPRAZOLE SODIUM 40 MG: 40 TABLET, DELAYED RELEASE ORAL at 06:16

## 2023-05-26 RX ADMIN — POTASSIUM CHLORIDE 20 MEQ: 750 TABLET, EXTENDED RELEASE ORAL at 09:12

## 2023-05-26 RX ADMIN — KETOTIFEN FUMARATE 1 DROP: 0.25 SOLUTION OPHTHALMIC at 20:26

## 2023-05-26 RX ADMIN — TERAZOSIN 2 MG: 2 CAPSULE ORAL at 20:26

## 2023-05-26 RX ADMIN — IPRATROPIUM BROMIDE AND ALBUTEROL SULFATE 3 ML: .5; 3 SOLUTION RESPIRATORY (INHALATION) at 07:16

## 2023-05-26 RX ADMIN — SALINE NASAL SPRAY 2 SPRAY: 1.5 SOLUTION NASAL at 12:28

## 2023-05-26 RX ADMIN — GUAIFENESIN SYRUP AND DEXTROMETHORPHAN 15 ML: 100; 10 SYRUP ORAL at 13:49

## 2023-05-26 RX ADMIN — METHYLPREDNISOLONE SODIUM SUCCINATE 40 MG: 40 INJECTION, POWDER, FOR SOLUTION INTRAMUSCULAR; INTRAVENOUS at 23:31

## 2023-05-26 RX ADMIN — METHYLPREDNISOLONE SODIUM SUCCINATE 40 MG: 40 INJECTION, POWDER, FOR SOLUTION INTRAMUSCULAR; INTRAVENOUS at 12:28

## 2023-05-26 RX ADMIN — Medication 500 MG: at 09:12

## 2023-05-26 RX ADMIN — BUDESONIDE 0.5 MG: 0.5 INHALANT ORAL at 07:16

## 2023-05-26 RX ADMIN — Medication 10 ML: at 09:16

## 2023-05-26 RX ADMIN — PILOCARPINE HYDRCHLORIDE 5 MG: 5 TABLET, FILM COATED ORAL at 20:25

## 2023-05-26 RX ADMIN — IPRATROPIUM BROMIDE AND ALBUTEROL SULFATE 3 ML: .5; 3 SOLUTION RESPIRATORY (INHALATION) at 18:46

## 2023-05-26 RX ADMIN — ATORVASTATIN CALCIUM 10 MG: 20 TABLET, FILM COATED ORAL at 09:12

## 2023-05-26 RX ADMIN — TADALAFIL 40 MG: 20 TABLET ORAL at 09:12

## 2023-05-26 RX ADMIN — FUROSEMIDE 40 MG: 40 TABLET ORAL at 09:12

## 2023-05-26 RX ADMIN — SODIUM CHLORIDE SOLN NEBU 7% 4 ML: 7 NEBU SOLN at 07:16

## 2023-05-26 RX ADMIN — GUAIFENESIN SYRUP AND DEXTROMETHORPHAN 15 ML: 100; 10 SYRUP ORAL at 20:25

## 2023-05-26 NOTE — PROGRESS NOTES
"Attempted to place patient on V60, patient refused, stating\" I can't sleep with that thing on.\"  "

## 2023-05-26 NOTE — PLAN OF CARE
Goal Outcome Evaluation:  Plan of Care Reviewed With: patient                Patient is a  82 y.o. male admitted to [facility] for parainfluenza virus and acute on chronic respiratory failure on 5/24/2023. PMHx includes COPD, CHF, HTN. Patient is ind at baseline with use of SPC and lives sig other. Today, patient performed bed mobility with SBA, required CGA-Pasha for STS, and took sidesteps at EOB requiring CGA. Balance deficits, decreased functional activity, and SOA with activity, and overall functional decline. Patient may benefit from skilled PT services to address functional deficits and improve level of independence prior to discharge. Anticipate home with HHPT vs SNF  upon DC pending progress and hospital course.     Encouraged pt to sit EOB for meals and pt able to mobilize with nsg over the weekend assist x1.

## 2023-05-26 NOTE — PLAN OF CARE
Goal Outcome Evaluation:                 Patient A&Ox4. BIPAP worn overnight. Currently on 12L highflow nasal cannula. No complaints of pain. VSS.

## 2023-05-26 NOTE — PROGRESS NOTES
LOS: 2 days   Patient Care Team:  Madison Lewis APRN as PCP - General (Nurse Practitioner)  Rao Maguire MD as Consulting Physician (Hematology and Oncology)  Alan Santana MD as Referring Physician (Family Medicine)  Gaurav Merrill MD as Consulting Physician (Otolaryngology)  Edgardo Brown MD (Otolaryngology)  Isael Beltre MD as Consulting Physician (Hematology and Oncology)  Herminia Salas MD as Consulting Physician (Internal Medicine)  Juventino Miller MD as Consulting Physician (Cardiac Electrophysiology)  Suresh Hernandez MD as Consulting Physician (Pulmonary Disease)    Chief Complaint:  F/up respiratory failure, COPD exacerbation and medical problems as below.    Subjective   Interval History  On 12 L oxygen.    He did use the hospital NIPPV last night for about 6 hours.    Continues to have significant cough with greenish phlegm.    REVIEW OF SYSTEMS:   CARDIOVASCULAR: No chest pain, chest pressure or chest discomfort. No palpitations or edema.   GASTROINTESTINAL: No anorexia, nausea, vomiting or diarrhea.  Had a BM yesterday.  CONSTITUTIONAL: No fever or chills.     Ventilator/Non-Invasive Ventilation Settings (From admission, onward)     Start     Ordered    05/24/23 2350  NIPPV - Provider Settings  (CPAP / BiPAP)  Until Discontinued        Comments: [facility]   Question Answer Comment   Indication Acute Respiratory Failure    Type AVAPS/PC/PS    Backup Rate 5    Target VT (mL) 550    EPAP/PEEP (cm H2O) 6    Min Pressure (cm H2O) 12    Max Pressure (cm H2O) 28    Titrate Oxygen for SpO2 88 - 92%        05/24/23 2350                      Physical Exam:     Vital Signs  Temp:  [97.5 °F (36.4 °C)-98 °F (36.7 °C)] 97.5 °F (36.4 °C)  Heart Rate:  [79-86] 79  Resp:  [14-20] 16  BP: (122-136)/(70-83) 128/73    Intake/Output Summary (Last 24 hours) at 5/26/2023 1609  Last data filed  "at 5/26/2023 1300  Gross per 24 hour   Intake 956 ml   Output 400 ml   Net 556 ml     Flowsheet Rows    Flowsheet Row First Filed Value   Admission Height 182.9 cm (72\") Documented at 05/24/2023 1030   Admission Weight 85.7 kg (189 lb) Documented at 05/24/2023 1030          PPE used per hospital policy    General Appearance:   Alert, cooperative, in no acute distress   ENMT:  Mallampati score 3, moist mucous membrane   Eyes:  Pupils equal and reactive to light. EOMI   Neck:   Large. Trachea midline. No thyromegaly.   Lungs:    Equal but diminished air entry throughout.  Mild expiratory wheezing bilaterally.  No crackles.    Heart:   Regular rhythm and normal rate, normal S1 and S2, no         murmur   Skin:   No rash or ecchymosis   Abdomen:    Obese. Soft. No tenderness. No HSM.   Neuro/psych:  Conscious, alert, oriented x3. Strength 5/5 in upper and lower  ext.  Appropriate mood and affect   Extremities:  No cyanosis, clubbing but mild edema in legs.  Warm extremities and well-perfused          Results Review:        Results from last 7 days   Lab Units 05/26/23  0648 05/25/23  0705 05/24/23  1046   SODIUM mmol/L 136 138 138   POTASSIUM mmol/L 4.8 4.6 4.2   CHLORIDE mmol/L 97* 98 99   CO2 mmol/L 30.6* 29.0 31.0*   BUN mg/dL 24* 15 13   CREATININE mg/dL 0.84 0.81 0.98   GLUCOSE mg/dL 132* 127* 105*   CALCIUM mg/dL 9.3 9.6 8.6     Results from last 7 days   Lab Units 05/24/23  1235 05/24/23  1046   HSTROP T ng/L 33* 37*     Results from last 7 days   Lab Units 05/26/23  0648 05/25/23  0705 05/24/23  1046   WBC 10*3/mm3 6.94 3.75 4.98   HEMOGLOBIN g/dL 16.7 16.8 16.6   HEMATOCRIT % 49.5 50.4 49.5   PLATELETS 10*3/mm3 235 249 262         Results from last 7 days   Lab Units 05/24/23  1046   PROBNP pg/mL 2,789.0*                           I reviewed the patient's new clinical results.        Medication Review:   arformoterol, 15 mcg, Nebulization, BID - RT  atorvastatin, 10 mg, Oral, Daily  budesonide, 0.5 mg, " Nebulization, BID - RT  cetirizine, 10 mg, Oral, Daily  eltrombopag, 50 mg, Oral, QAM  furosemide, 40 mg, Oral, Daily  guaiFENesin-dextromethorphan, 15 mL, Oral, 4x Daily  ipratropium-albuterol, 3 mL, Nebulization, 4x Daily - RT  ketotifen, 1 drop, Both Eyes, BID  methylPREDNISolone sodium succinate, 40 mg, Intravenous, Q12H  montelukast, 10 mg, Oral, Nightly  pantoprazole, 40 mg, Oral, Q AM  pilocarpine, 5 mg, Oral, TID  potassium chloride, 20 mEq, Oral, Daily  pregabalin, 75 mg, Oral, Q12H  rivaroxaban, 20 mg, Oral, Daily With Dinner  saccharomyces boulardii, 500 mg, Oral, BID  sodium chloride, 10 mL, Intravenous, Q12H  sodium chloride, 4 mL, Nebulization, BID - RT  sodium chloride, 2 spray, Each Nare, 4x Daily  tadalafil, 40 mg, Oral, Q24H  terazosin, 2 mg, Oral, Nightly          Assessment     1. COPD exacerbation  2. Parainfluenza bronchitis  3. Pulmonary infiltrates: Seems chronic and waxing and waning.  Now right lower lobe.  Previously mostly in the left lower lobe.  Unclear how much of that represents scarring.  It does not appear that it is related to bacterial pneumonia but could be viral pneumonia  4. Acute on chronic hypoxic respiratory failure  5. Chronic hypercapnic respiratory failure, on trilogy ventilator.  6. Expected mild steroid-induced hyperglycemia        Plan     · Oxygen by HFNC and titrate to keep SPO2 >90%  · NIPPV at night and as needed during the day.  Settings reviewed and adjusted.  When his oxygen requirement diminish then we can switch him from the V60 ventilator to his usual trilogy ventilator.  · Solu-Medrol 40 mg twice daily  · Insulin NovoLog SC as needed for hyperglycemia  · DuoNeb 4 times a day, Pulmicort and Brovana twice a day  · Flutter to improve mucus clearance  · DNR/DNI      Discussed with his wife.     Ken Up MD  05/26/23  16:04 EDT          This note was dictated utilizing [employer] dictation

## 2023-05-26 NOTE — THERAPY EVALUATION
Patient Name: Alessio Allen  : 1940    MRN: [id number]                              Today's Date: 2023       Admit Date: 2023    Visit Dx:     ICD-10-CM ICD-9-CM   1. Acute on chronic respiratory failure with hypoxia  J96.21 518.84     799.02   2. Parainfluenza virus bronchitis  J20.4 466.0     079.89   3. Acute exacerbation of chronic obstructive pulmonary disease (COPD)  J44.1 491.21     Patient Active Problem List   Diagnosis   • Paroxysmal atrial fibrillation   • Dyslipidemia   • Primary hypertension   • Neuropathy   • Hypertension   • COPD (chronic obstructive pulmonary disease)   • BPH (benign prostatic hypertrophy)   • Atrial fibrillation   • Asthma   • Hypoxia   • Pneumonia   • Chronic anticoagulation   • Pneumonia of both lungs due to infectious organism   • Hyponatremia   • Pulmonary emphysema   • Chronic respiratory failure with hypoxia and hypercapnia   • Pneumonia of both lower lobes due to infectious organism   • Chronic diastolic CHF (congestive heart failure)   • IPF (idiopathic pulmonary fibrosis)   • Acute respiratory failure with hypoxia   • Attention to G-tube   • Massive hemoptysis   • Laryngeal cancer   • Encounter for venous access device care   • Pneumonia of left lower lobe due to infectious organism   • Hemoptysis   • Pharyngeal dysphagia   • Parainfluenza virus bronchitis   • Pulmonary hypertension   • Presence of cardiac pacemaker   • Acute and chronic respiratory failure with hypoxia   • COPD with acute exacerbation     Past Medical History:   Diagnosis Date   • Acute on chronic diastolic heart failure    • Anemia 2018   • Arthritis    • Asthma    • Atrial fibrillation    • BPH (benign prostatic hypertrophy)    • Cancer     throat CA   • Cataract 2012    Removed   • CHF (congestive heart failure) 2018   • Colon polyp 2004   • COPD (chronic obstructive pulmonary disease)    • Coronary artery disease    • Dependence on continuous supplemental oxygen     3L-5L  DEPENDING ON ACTIVITY   • Diverticulosis    • Erectile dysfunction    • GERD (gastroesophageal reflux disease)    • H/O Abnormal CBC 2017   • History of heart artery stent 03/2017   • History of medical problems 2017    Afib   • Hyperlipidemia    • Hypertension    • Low back pain 2012   • Lung disease    • Neuropathy     feet and hands    • Osteopenia 2012    Osteopenia   • Pneumonia      Past Surgical History:   Procedure Laterality Date   • BRONCHOSCOPY N/A 04/07/2018    Procedure: BRONCHOSCOPY with specimens;  Surgeon: Suresh Hernandez MD;  Location: [facility] MAIN OR;  Service: Pulmonary   • BRONCHOSCOPY N/A 03/06/2019    Procedure: BRONCHOSCOPY WITH BAL AND BIOPSY UNDER FLUORO AND ANESTHESIA;  Surgeon: Suresh Hernandez MD;  [facility];  Service: Pulmonary   • BRONCHOSCOPY N/A 06/13/2019    Procedure: BRONCHOSCOPY;  Surgeon: Suresh Hernandez MD;  Location: [facility] MAIN OR;  Service: Pulmonary   • CARDIAC CATHETERIZATION N/A 04/18/2018    Procedure: Right Heart Cath with possible vasodilator study;  Surgeon: Torey Schuler MD;  Location: [facility] CATH INVASIVE LOCATION;  Service: Cardiovascular   • CARDIAC CATHETERIZATION N/A 03/07/2019    Procedure: RIGHT AND LEFT HEART CATH;  Surgeon: Marizol Holt MD;  Location: [facility] CATH INVASIVE LOCATION;  Service: Cardiology   • CARDIAC CATHETERIZATION N/A 03/07/2019    Procedure: CORONARY ANGIOGRAPHY;  Surgeon: Marizol Holt MD;  Location: [facility] CATH INVASIVE LOCATION;  Service: Cardiology   • COLONOSCOPY  12/05/2016    polyps   • CORONARY STENT PLACEMENT  2017   • FRACTURE SURGERY     • INSERT / REPLACE / VENOUS ACCESS CATHETER Right    • PACEMAKER IMPLANTATION     • VENOUS ACCESS DEVICE (PORT) REMOVAL Right 04/05/2021    Procedure: Mediport Removal;  Surgeon: Joseph Nickerson Jr., MD;  Location: [facility] MAIN OR;  Service: General;  Laterality: Right;      General Information     Row Name 05/26/23 1506          Physical Therapy Time and Intention    Document Type  evaluation  -CB     Mode of Treatment individual therapy;physical therapy  -CB     Row Name 05/26/23 1506          General Information    Patient Profile Reviewed yes  -CB     Prior Level of Function independent:;gait;transfer;bed mobility  SPC at baseline  -CB     Existing Precautions/Restrictions fall;oxygen therapy device and L/min  -CB     Barriers to Rehab none identified  -CB     Row Name 05/26/23 1506          Living Environment    People in Home significant other  -CB     Row Name 05/26/23 1506          Home Main Entrance    Number of Stairs, Main Entrance none  -CB     Row Name 05/26/23 1506          Cognition    Orientation Status (Cognition) oriented x 3  -CB     Row Name 05/26/23 1506          Safety Issues, Functional Mobility    Impairments Affecting Function (Mobility) shortness of breath;endurance/activity tolerance;balance  -CB           User Key  (r) = Recorded By, (t) = Taken By, (c) = Cosigned By    Initials Name Provider Type    Yanet García PT Physical Therapist               Mobility     Row Name 05/26/23 1506          Bed Mobility    Bed Mobility supine-sit;sit-supine  -CB     Supine-Sit [place] (Bed Mobility) standby assist;verbal cues  -CB     Sit-Supine [place] (Bed Mobility) standby assist;verbal cues  -CB     Assistive Device (Bed Mobility) head of bed elevated;bed rails  -CB     Row Name 05/26/23 1508          Sit-Stand Transfer    Sit-Stand [place] (Transfers) contact guard;minimum assist (75% patient effort);verbal cues  -CB     Assistive Device (Sit-Stand Transfers) walker, front-wheeled  -CB     Comment, (Sit-Stand Transfer) x2 STS to rwx  -CB     Row Name 05/26/23 1501          Gait/Stairs (Locomotion)    [place] Level (Gait) contact guard;verbal cues  -CB     Assistive Device (Gait) walker, front-wheeled  -CB     Distance in Feet (Gait) 8 sidesteps at EOB  -CB     Deviations/Abnormal Patterns (Gait) gait speed decreased;stride length decreased  -CB      Bilateral Gait Deviations forward flexed posture  -CB     Comment, (Gait/Stairs) limited by SOA  -CB           User Key  (r) = Recorded By, (t) = Taken By, (c) = Cosigned By    Initials Name Provider Type    Yanet García PT Physical Therapist               Obj/Interventions     Row Name 05/26/23 1511          Range of Motion Comprehensive    General Range of Motion bilateral lower extremity ROM WFL  -CB     Row Name 05/26/23 1511          Strength Comprehensive (MMT)    Comment, General Manual Muscle Testing (MMT) Assessment BLE 4/5  -CB     Row Name 05/26/23 1511          Balance    Balance Assessment standing static balance;standing dynamic balance  -CB     Static Standing Balance contact guard  -CB     Dynamic Standing Balance contact guard;minimal assist  -CB     Position/Device Used, Standing Balance supported;walker, front-wheeled  -CB     Balance Interventions sitting;standing;sit to stand;supported;static;dynamic;minimal challenge  -CB     Comment, Balance standing in place marching x10 reps  -CB     Row Name 05/26/23 1511          Sensory Assessment (Somatosensory)    Sensory Assessment (Somatosensory) sensation intact  -CB           User Key  (r) = Recorded By, (t) = Taken By, (c) = Cosigned By    Initials Name Provider Type    Yanet García, PT Physical Therapist               Goals/Plan     Row Name 05/26/23 1514          Bed Mobility Goal 1 (PT)    Activity/Assistive Device (Bed Mobility Goal 1, PT) bed mobility activities, all  -CB     [place] Level/Cues Needed (Bed Mobility Goal 1, PT) modified independence  -CB     Time Frame (Bed Mobility Goal 1, PT) long term goal (LTG);1 week  -CB     Row Name 05/26/23 1514          Transfer Goal 1 (PT)    Activity/Assistive Device (Transfer Goal 1, PT) bed-to-chair/chair-to-bed;sit-to-stand/stand-to-sit;walker, rolling  -CB     [place] Level/Cues Needed (Transfer Goal 1, PT) standby assist  -CB     Time Frame (Transfer Goal 1, PT) long term  goal (LTG);1 week  -CB     Row Name 05/26/23 1514          Gait Training Goal 1 (PT)    Activity/Assistive Device (Gait Training Goal 1, PT) assistive device use;improve balance and speed;gait (walking locomotion);increase endurance/gait distance;decrease fall risk;diminish gait deviation;increase energy conservation  -CB     [place] Level (Gait Training Goal 1, PT) standby assist  -CB     Distance (Gait Training Goal 1, PT) 50ft  -CB     Time Frame (Gait Training Goal 1, PT) long term goal (LTG);1 week  -CB     Row Name 05/26/23 1514          Therapy Assessment/Plan (PT)    Planned Therapy Interventions (PT) balance training;bed mobility training;gait training;home exercise program;patient/family education;strengthening;transfer training  -CB           User Key  (r) = Recorded By, (t) = Taken By, (c) = Cosigned By    Initials Name Provider Type    Yanet García, PT Physical Therapist               Clinical Impression     Row Name 05/26/23 1513          Pain    Pretreatment Pain Rating 0/10 - no pain  -CB     Row Name 05/26/23 1513          Plan of Care Review    Plan of Care Reviewed With patient  -CB     Outcome Evaluation Patient is a  82 y.o. male admitted to [facility] for parainfluenza virus and acute on chronic respiratory failure on 5/24/2023. PMHx includes COPD, CHF, HTN. Patient is ind at baseline with use of SPC and lives sig other. Today, patient performed bed mobility with SBA, required CGA-Pasha for STS, and took sidesteps at EOB requiring CGA. Balance deficits, decreased functional activity, and SOA with activity, and overall functional decline. Patient may benefit from skilled PT services to address functional deficits and improve level of independence prior to discharge. Anticipate home with HHPT vs SNF  upon DC pending progress and hospital course.  -CB     Row Name 05/26/23 1515          Therapy Assessment/Plan (PT)    Rehab Potential (PT) good, to achieve stated therapy goals  -CB     Criteria  for Skilled Interventions Met (PT) yes  -CB     Therapy Frequency (PT) 6 times/wk  -CB     Row Name 05/26/23 1513          Vital Signs    Pre SpO2 (%) 92  -CB     O2 Delivery Pre Treatment hi-flow  -CB     Intra SpO2 (%) 84  -CB     O2 Delivery Intra Treatment hi-flow  -CB     Post SpO2 (%) 88  -CB     O2 Delivery Post Treatment hi-flow  -CB     Row Name 05/26/23 1513          Positioning and Restraints    Pre-Treatment Position in bed  -CB     Post Treatment Position bed  -CB     In Bed notified nsg;fowlers;call light within reach;encouraged to call for assist;exit alarm on;with family/caregiver;side rails up x3  -CB           User Key  (r) = Recorded By, (t) = Taken By, (c) = Cosigned By    Initials Name Provider Type    Yanet García, [place] Physical Therapist               Outcome Measures     Row Name 05/26/23 1515 05/26/23 0909       How much help from another person do you currently need...    Turning from your back to your side while in flat bed without using bedrails? 3  -CB 3  -DS    Moving from lying on back to sitting on the side of a flat bed without bedrails? 3  -CB 3  -DS    Moving to and from a bed to a chair (including a wheelchair)? 3  -CB 3  -DS    Standing up from a chair using your arms (e.g., wheelchair, bedside chair)? 3  -CB 3  -DS    Climbing 3-5 steps with a railing? 2  -CB 2  -DS    To walk in hospital room? 2  -CB 3  -DS    AM-PAC 6 Clicks Score (PT) 16  -CB 17  -DS    Highest level of mobility 5 --> Static standing  -CB 5 --> Static standing  -DS    Row Name 05/26/23 1515          Functional Assessment    Outcome Measure Options AM-PAC 6 Clicks Basic Mobility (PT)  -CB           User Key  (r) = Recorded By, (t) = Taken By, (c) = Cosigned By    Initials Name Provider Type    Love Schulz, RN Registered Nurse    Yanet García, [place] Physical Therapist                             Physical Therapy Education     Title: PT OT SLP Therapies (Done)     Topic: Physical Therapy (Done)      Point: Mobility training (Done)     Learning Progress Summary           Patient Acceptance, E,TB, VU,NR by CB at 5/26/2023 1516                   Point: Home exercise program (Done)     Learning Progress Summary           Patient Acceptance, E,TB, VU,NR by CB at 5/26/2023 1516                   Point: Body mechanics (Done)     Learning Progress Summary           Patient Acceptance, E,TB, VU,NR by CB at 5/26/2023 1516                   Point: Precautions (Done)     Learning Progress Summary           Patient Acceptance, E,TB, VU,NR by CB at 5/26/2023 1516                               User Key     Initials Effective Dates Name Provider Type Discipline    CB 10/22/21 -  Yanet Mallory, PT Physical Therapist PT              PT Recommendation and Plan  Planned Therapy Interventions (PT): balance training, bed mobility training, gait training, home exercise program, patient/family education, strengthening, transfer training  Plan of Care Reviewed With: patient  Outcome Evaluation: Patient is a  82 y.o. male admitted to [facility] for parainfluenza virus and acute on chronic respiratory failure on 5/24/2023. PMHx includes COPD, CHF, HTN. Patient is ind at baseline with use of SPC and lives sig other. Today, patient performed bed mobility with SBA, required CGA-Pasha for STS, and took sidesteps at EOB requiring CGA. Balance deficits, decreased functional activity, and SOA with activity, and overall functional decline. Patient may benefit from skilled PT services to address functional deficits and improve level of independence prior to discharge. Anticipate home with HHPT vs SNF  upon DC pending progress and hospital course.     Time Calculation:    PT Charges     Row Name 05/26/23 1520             Time Calculation    Start Time 1404  -CB      Stop Time 1418  -CB      Time Calculation (min) 14 min  -CB      PT Received On 05/26/23  -CB      PT - Next Appointment 05/30/23  -CB      PT Goal Re-Cert Due Date 06/02/23  -CB         Time  Calculation- PT    Total Timed Code Minutes- PT 8 minute(s)  -CB         Timed Charges    80833 - PT Therapeutic Activity Minutes 8  -CB         Total Minutes    Timed Charges Total Minutes 8  -CB       Total Minutes 8  -CB            User Key  (r) = Recorded By, (t) = Taken By, (c) = Cosigned By    Initials Name Provider Type    CB Yanet Mallory, PT Physical Therapist              Therapy Charges for Today     Code Description Service Date Service Provider Modifiers Qty    05606287114 HC PT THERAPEUTIC ACT EA 15 MIN 5/26/2023 Ynaet Mallory, PT GP 1    05981662107 HC PT EVAL MOD COMPLEXITY 3 5/26/2023 Yanet Mallory, PT GP 1          PT G-Codes  Outcome Measure Options: AM-PAC 6 Clicks Basic Mobility (PT)  AM-PAC 6 Clicks Score (PT): 16  PT Discharge Summary  Anticipated Discharge Disposition (PT): home with home health, home with assist    Yanet Mallory, PT  5/26/2023

## 2023-05-26 NOTE — PROGRESS NOTES
Name: Alessio Allen ADMIT: 2023   : 1940  PCP: Madison Lewis APRN    MRN: [id number] LOS: 2 days   AGE/SEX: 82 y.o. male  ROOM: [facility]     Subjective   Subjective   Feeling a little better today. No SOA at rest. Coughing spells come and go. No CP or palp. Voiding well. Tolerating diet. No subjective fever.    Review of Systems     as above    Objective   Objective   Vital Signs  Temp:  [97.5 °F (36.4 °C)-98 °F (36.7 °C)] 97.5 °F (36.4 °C)  Heart Rate:  [79-86] 83  Resp:  [14-20] 16  BP: (109-136)/(65-83) 125/83  SpO2:  [89 %-93 %] 90 %  on  Flow (L/min):  [11-13] 12;   Device (Oxygen Therapy): high-flow nasal cannula  Body mass index is 25.08 kg/m².     Physical Exam  Vitals and nursing note reviewed. Chaperone present: Wife.   Constitutional:       General: He is not in acute distress.     Appearance: He is ill-appearing. He is not toxic-appearing or diaphoretic.   HENT:      Head: Normocephalic.      Nose: Nose normal.      Mouth/Throat:      Mouth: Mucous membranes are moist.      Pharynx: Oropharynx is clear.   Eyes:      General: No scleral icterus.        Right eye: No discharge.         Left eye: No discharge.      Extraocular Movements: Extraocular movements intact.      Conjunctiva/sclera: Conjunctivae normal.   Cardiovascular:      Rate and Rhythm: Normal rate and regular rhythm.      Pulses: Normal pulses.      Comments: Paced rhythm on monitor  Pulmonary:      Effort: Pulmonary effort is normal. No respiratory distress.      Breath sounds: No wheezing or rales.      Comments: Coarse BS anteriorly  Abdominal:      General: Bowel sounds are normal. There is no distension.      Palpations: Abdomen is soft.      Tenderness: There is no abdominal tenderness.   Musculoskeletal:         General: No swelling. Normal range of motion.      Cervical back: Neck supple. No rigidity.      Comments: Kyphosis of T-spine   Lymphadenopathy:      Cervical: No cervical adenopathy.   Skin:      General: Skin is warm and dry.      Capillary Refill: Capillary refill takes less than 2 seconds.      Coloration: Skin is not jaundiced.   Neurological:      General: No focal deficit present.      Mental Status: He is alert and oriented to person, place, and time. Mental status is at baseline.      Cranial Nerves: No cranial nerve deficit.      Coordination: Coordination normal.   Psychiatric:         Mood and Affect: Mood normal.         Behavior: Behavior normal.         Thought Content: Thought content normal.      Comments: Very pleasant       Results Review     I reviewed the patient's new clinical results.  Results from last 7 days   Lab Units 05/26/23  0648 05/25/23  0705 05/24/23  1046   WBC 10*3/mm3 6.94 3.75 4.98   HEMOGLOBIN g/dL 16.7 16.8 16.6   PLATELETS 10*3/mm3 235 249 262     Results from last 7 days   Lab Units 05/26/23  0648 05/25/23  0705 05/24/23  1046   SODIUM mmol/L 136 138 138   POTASSIUM mmol/L 4.8 4.6 4.2   CHLORIDE mmol/L 97* 98 99   CO2 mmol/L 30.6* 29.0 31.0*   BUN mg/dL 24* 15 13   CREATININE mg/dL 0.84 0.81 0.98   GLUCOSE mg/dL 132* 127* 105*   EGFR mL/min/1.73 87.6 88.6 77.5     Results from last 7 days   Lab Units 05/24/23  1046   ALBUMIN g/dL 3.7   BILIRUBIN mg/dL 0.7   ALK PHOS U/L 106   AST (SGOT) U/L 30   ALT (SGPT) U/L 18     Results from last 7 days   Lab Units 05/26/23  0648 05/25/23  0705 05/24/23  1046   CALCIUM mg/dL 9.3 9.6 8.6   ALBUMIN g/dL  --   --  3.7   MAGNESIUM mg/dL 2.2  --   --      Results from last 7 days   Lab Units 05/24/23  1235 05/24/23  1046   PROCALCITONIN ng/mL  --  0.06   LACTATE mmol/L 1.1  --      No results found for: HGBA1C, POCGLU    No radiology results for the last day  I have personally reviewed all medications:  Scheduled Medications  arformoterol, 15 mcg, Nebulization, BID - RT  atorvastatin, 10 mg, Oral, Daily  budesonide, 0.5 mg, Nebulization, BID - RT  cetirizine, 10 mg, Oral, Daily  eltrombopag, 50 mg, Oral, QAM  furosemide, 40 mg, Oral,  Daily  guaiFENesin-dextromethorphan, 15 mL, Oral, 4x Daily  ipratropium-albuterol, 3 mL, Nebulization, 4x Daily - RT  ketotifen, 1 drop, Both Eyes, BID  methylPREDNISolone sodium succinate, 40 mg, Intravenous, Q12H  montelukast, 10 mg, Oral, Nightly  pantoprazole, 40 mg, Oral, Q AM  pilocarpine, 5 mg, Oral, TID  potassium chloride, 20 mEq, Oral, Daily  pregabalin, 75 mg, Oral, Q12H  rivaroxaban, 20 mg, Oral, Daily With Dinner  saccharomyces boulardii, 500 mg, Oral, BID  sodium chloride, 10 mL, Intravenous, Q12H  sodium chloride, 4 mL, Nebulization, BID - RT  sodium chloride, 2 spray, Each Nare, 4x Daily  tadalafil, 40 mg, Oral, Q24H  terazosin, 2 mg, Oral, Nightly    Infusions   Diet  Diet: Cardiac Diets; Healthy Heart (2-3 Na+); Texture: Mechanical Ground (NDD 2); Fluid Consistency: Thin (IDDSI 0)    I have personally reviewed:  [x]  Laboratory   [x]  Microbiology   []  Radiology   [x]  EKG/Telemetry  []  Cardiology/Vascular   []  Pathology    []  Records       Assessment/Plan     Active Hospital Problems    Diagnosis  POA   • **Parainfluenza virus bronchitis [J20.4]  Yes   • Acute and chronic respiratory failure with hypoxia [J96.21]  Yes   • COPD with acute exacerbation [J44.1]  Yes   • Pulmonary hypertension [I27.20]  Yes   • Presence of cardiac pacemaker [Z95.0]  Yes   • Pharyngeal dysphagia [R13.13]  Yes   • IPF (idiopathic pulmonary fibrosis) [J84.112]  Yes   • Chronic diastolic CHF (congestive heart failure) [I50.32]  Yes   • Chronic respiratory failure with hypoxia and hypercapnia [J96.11, J96.12]  Yes   • Pulmonary emphysema [J43.9]  Yes   • Chronic anticoagulation [Z79.01]  Not Applicable   • BPH (benign prostatic hypertrophy) [N40.0]  Yes   • Dyslipidemia [E78.5]  Yes   • Paroxysmal atrial fibrillation [I48.0]  Yes   • Primary hypertension [I10]  Yes      Resolved Hospital Problems   No resolved problems to display.       83yo gentleman with h/o HTN, HLD, BPH, IPF, COPD, chronic hypoxic/hypercapnic resp  failure (4L/min and Trilogy vent at home), pulm HTN, PAF/SSS/PPM (Xarelto), and prior laryngeal CA with pharyngeal dysphagia, who presented to ER with c/o cough and SOA worse than baseline. He was just admitted here earlier this month for pneumonia. He is followed by LPC.    Parainfluenza 3 Bronchitis  - complicated by underlying COPD, IPF, and pulmonary hypertension   - requiring increased oxygen supplementation from baseline  - no evidence of bacterial pneumonia or bronchospasm  - Pulm does feel there is component of AECOPD due to virus, continue IV SoluMedrol  - continue Brovana and Pulmicort, scheduled DuoNebs, hypertonic saline nebs, as well as OPEP and IS  - wean oxygen as tolerated     COPD/IPF/Pulmonary HTN/Chronic Hypoxic and Hypercapnic Respiratory Failure  - appreciate Pulm attention to pt  - supportive treatment with bronchodilators and mucolytics as above  - NIPPV at night, on high flow NC O2 during the day, wean oxygen as tolerated, avoid hyperoxemia due to chronic hypercapnic respiratory failure, keep sats 88-92%, no higher  - continue Adcirca, Trilogy vent     PAF/SSS s/p Pacemaker/HTN/Chronic Diastolic CHF  - BPs, volume status, and HRs acceptable  - not on RC meds at home  - on AC with Xarelto     BPH  - continue alpha-blocker  - voiding fine     Hx of Laryngeal Cancer/Pharyngeal Dysphagia  - on soft texture/chopped diet with thin liquids per SLP recs    · Xarelto (home med) should suffice for DVT prophylaxis.  · Limited code (no CPR, no intubation) confirmed with pt and wife.  · Discussed with patient, spouse, nursing staff, CCP and care team on multidisciplinary rounds.  · Anticipate discharge home with family, timing yet to be determined..      Toby Olea MD  [facility]  05/26/23  12:10 EDT

## 2023-05-27 LAB
ANION GAP SERPL CALCULATED.3IONS-SCNC: 7.6 MMOL/L (ref 5–15)
BASOPHILS # BLD AUTO: 0.01 10*3/MM3 (ref 0–0.2)
BASOPHILS NFR BLD AUTO: 0.2 % (ref 0–1.5)
BUN SERPL-MCNC: 24 MG/DL (ref 8–23)
BUN/CREAT SERPL: 27 (ref 7–25)
CALCIUM SPEC-SCNC: 9.2 MG/DL (ref 8.6–10.5)
CHLORIDE SERPL-SCNC: 99 MMOL/L (ref 98–107)
CO2 SERPL-SCNC: 31.4 MMOL/L (ref 22–29)
CREAT SERPL-MCNC: 0.89 MG/DL (ref 0.76–1.27)
DEPRECATED RDW RBC AUTO: 44.5 FL (ref 37–54)
EGFRCR SERPLBLD CKD-EPI 2021: 86.1 ML/MIN/1.73
EOSINOPHIL # BLD AUTO: 0 10*3/MM3 (ref 0–0.4)
EOSINOPHIL NFR BLD AUTO: 0 % (ref 0.3–6.2)
ERYTHROCYTE [DISTWIDTH] IN BLOOD BY AUTOMATED COUNT: 13.1 % (ref 12.3–15.4)
GLUCOSE SERPL-MCNC: 127 MG/DL (ref 65–99)
HCT VFR BLD AUTO: 51.4 % (ref 37.5–51)
HGB BLD-MCNC: 17 G/DL (ref 13–17.7)
IMM GRANULOCYTES # BLD AUTO: 0.03 10*3/MM3 (ref 0–0.05)
IMM GRANULOCYTES NFR BLD AUTO: 0.5 % (ref 0–0.5)
LYMPHOCYTES # BLD AUTO: 0.14 10*3/MM3 (ref 0.7–3.1)
LYMPHOCYTES NFR BLD AUTO: 2.1 % (ref 19.6–45.3)
MAGNESIUM SERPL-MCNC: 2.2 MG/DL (ref 1.6–2.4)
MCH RBC QN AUTO: 30.2 PG (ref 26.6–33)
MCHC RBC AUTO-ENTMCNC: 33.1 G/DL (ref 31.5–35.7)
MCV RBC AUTO: 91.3 FL (ref 79–97)
MONOCYTES # BLD AUTO: 0.36 10*3/MM3 (ref 0.1–0.9)
MONOCYTES NFR BLD AUTO: 5.4 % (ref 5–12)
NEUTROPHILS NFR BLD AUTO: 6.08 10*3/MM3 (ref 1.7–7)
NEUTROPHILS NFR BLD AUTO: 91.8 % (ref 42.7–76)
NRBC BLD AUTO-RTO: 0 /100 WBC (ref 0–0.2)
PLATELET # BLD AUTO: 227 10*3/MM3 (ref 140–450)
PMV BLD AUTO: 10 FL (ref 6–12)
POTASSIUM SERPL-SCNC: 4.9 MMOL/L (ref 3.5–5.2)
RBC # BLD AUTO: 5.63 10*6/MM3 (ref 4.14–5.8)
SODIUM SERPL-SCNC: 138 MMOL/L (ref 136–145)
WBC NRBC COR # BLD: 6.62 10*3/MM3 (ref 3.4–10.8)

## 2023-05-27 PROCEDURE — 83735 ASSAY OF MAGNESIUM: CPT | Performed by: HOSPITALIST

## 2023-05-27 PROCEDURE — 94799 UNLISTED PULMONARY SVC/PX: CPT

## 2023-05-27 PROCEDURE — 94660 CPAP INITIATION&MGMT: CPT

## 2023-05-27 PROCEDURE — 94760 N-INVAS EAR/PLS OXIMETRY 1: CPT

## 2023-05-27 PROCEDURE — 25010000002 METHYLPREDNISOLONE PER 40 MG: Performed by: INTERNAL MEDICINE

## 2023-05-27 PROCEDURE — 94664 DEMO&/EVAL PT USE INHALER: CPT

## 2023-05-27 PROCEDURE — 85025 COMPLETE CBC W/AUTO DIFF WBC: CPT | Performed by: INTERNAL MEDICINE

## 2023-05-27 PROCEDURE — 94761 N-INVAS EAR/PLS OXIMETRY MLT: CPT

## 2023-05-27 PROCEDURE — 80048 BASIC METABOLIC PNL TOTAL CA: CPT | Performed by: INTERNAL MEDICINE

## 2023-05-27 RX ADMIN — GUAIFENESIN SYRUP AND DEXTROMETHORPHAN 15 ML: 100; 10 SYRUP ORAL at 09:05

## 2023-05-27 RX ADMIN — KETOTIFEN FUMARATE 1 DROP: 0.25 SOLUTION OPHTHALMIC at 09:06

## 2023-05-27 RX ADMIN — KETOTIFEN FUMARATE 1 DROP: 0.25 SOLUTION OPHTHALMIC at 20:34

## 2023-05-27 RX ADMIN — SALINE NASAL SPRAY 2 SPRAY: 1.5 SOLUTION NASAL at 20:34

## 2023-05-27 RX ADMIN — SODIUM CHLORIDE SOLN NEBU 7% 4 ML: 7 NEBU SOLN at 18:51

## 2023-05-27 RX ADMIN — GUAIFENESIN SYRUP AND DEXTROMETHORPHAN 15 ML: 100; 10 SYRUP ORAL at 12:49

## 2023-05-27 RX ADMIN — TADALAFIL 40 MG: 20 TABLET ORAL at 09:05

## 2023-05-27 RX ADMIN — GUAIFENESIN SYRUP AND DEXTROMETHORPHAN 15 ML: 100; 10 SYRUP ORAL at 22:46

## 2023-05-27 RX ADMIN — PREGABALIN 75 MG: 75 CAPSULE ORAL at 20:33

## 2023-05-27 RX ADMIN — ELTROMBOPAG OLAMINE 50 MG: 25 TABLET, FILM COATED ORAL at 09:09

## 2023-05-27 RX ADMIN — SALINE NASAL SPRAY 2 SPRAY: 1.5 SOLUTION NASAL at 12:49

## 2023-05-27 RX ADMIN — PILOCARPINE HYDRCHLORIDE 5 MG: 5 TABLET, FILM COATED ORAL at 20:33

## 2023-05-27 RX ADMIN — IPRATROPIUM BROMIDE AND ALBUTEROL SULFATE 3 ML: .5; 3 SOLUTION RESPIRATORY (INHALATION) at 11:08

## 2023-05-27 RX ADMIN — SODIUM CHLORIDE SOLN NEBU 7% 4 ML: 7 NEBU SOLN at 07:28

## 2023-05-27 RX ADMIN — IPRATROPIUM BROMIDE AND ALBUTEROL SULFATE 3 ML: .5; 3 SOLUTION RESPIRATORY (INHALATION) at 07:25

## 2023-05-27 RX ADMIN — BUDESONIDE 0.5 MG: 0.5 INHALANT ORAL at 18:46

## 2023-05-27 RX ADMIN — POTASSIUM CHLORIDE 20 MEQ: 750 TABLET, EXTENDED RELEASE ORAL at 09:05

## 2023-05-27 RX ADMIN — ARFORMOTEROL TARTRATE 15 MCG: 15 SOLUTION RESPIRATORY (INHALATION) at 18:46

## 2023-05-27 RX ADMIN — IPRATROPIUM BROMIDE AND ALBUTEROL SULFATE 3 ML: .5; 3 SOLUTION RESPIRATORY (INHALATION) at 18:42

## 2023-05-27 RX ADMIN — SALINE NASAL SPRAY 2 SPRAY: 1.5 SOLUTION NASAL at 09:06

## 2023-05-27 RX ADMIN — Medication 500 MG: at 09:05

## 2023-05-27 RX ADMIN — FUROSEMIDE 40 MG: 40 TABLET ORAL at 09:05

## 2023-05-27 RX ADMIN — PANTOPRAZOLE SODIUM 40 MG: 40 TABLET, DELAYED RELEASE ORAL at 09:05

## 2023-05-27 RX ADMIN — BUDESONIDE 0.5 MG: 0.5 INHALANT ORAL at 07:26

## 2023-05-27 RX ADMIN — GUAIFENESIN SYRUP AND DEXTROMETHORPHAN 15 ML: 100; 10 SYRUP ORAL at 17:09

## 2023-05-27 RX ADMIN — ATORVASTATIN CALCIUM 10 MG: 20 TABLET, FILM COATED ORAL at 09:05

## 2023-05-27 RX ADMIN — RIVAROXABAN 20 MG: 20 TABLET, FILM COATED ORAL at 17:09

## 2023-05-27 RX ADMIN — Medication 10 ML: at 09:06

## 2023-05-27 RX ADMIN — TERAZOSIN 2 MG: 2 CAPSULE ORAL at 20:33

## 2023-05-27 RX ADMIN — IPRATROPIUM BROMIDE AND ALBUTEROL SULFATE 3 ML: .5; 3 SOLUTION RESPIRATORY (INHALATION) at 15:18

## 2023-05-27 RX ADMIN — MONTELUKAST SODIUM 10 MG: 10 TABLET, FILM COATED ORAL at 20:33

## 2023-05-27 RX ADMIN — PREGABALIN 75 MG: 75 CAPSULE ORAL at 09:05

## 2023-05-27 RX ADMIN — Medication 10 ML: at 20:34

## 2023-05-27 RX ADMIN — SALINE NASAL SPRAY 2 SPRAY: 1.5 SOLUTION NASAL at 17:09

## 2023-05-27 RX ADMIN — PILOCARPINE HYDRCHLORIDE 5 MG: 5 TABLET, FILM COATED ORAL at 17:00

## 2023-05-27 RX ADMIN — CETIRIZINE HYDROCHLORIDE 10 MG: 10 TABLET ORAL at 09:05

## 2023-05-27 RX ADMIN — Medication 500 MG: at 20:33

## 2023-05-27 RX ADMIN — ARFORMOTEROL TARTRATE 15 MCG: 15 SOLUTION RESPIRATORY (INHALATION) at 07:27

## 2023-05-27 RX ADMIN — METHYLPREDNISOLONE SODIUM SUCCINATE 40 MG: 40 INJECTION, POWDER, FOR SOLUTION INTRAMUSCULAR; INTRAVENOUS at 12:49

## 2023-05-27 RX ADMIN — PILOCARPINE HYDRCHLORIDE 5 MG: 5 TABLET, FILM COATED ORAL at 09:05

## 2023-05-27 NOTE — PROGRESS NOTES
Name: Alessio Allen ADMIT: 2023   : 1940  PCP: Madison Lewis APRN    MRN: [id number] LOS: 3 days   AGE/SEX: 82 y.o. male  ROOM: [facility]     Subjective   Subjective   Feeling a little better again today. No SOA at rest. Coughing spells come and go. No CP or palp. Voiding well. Tolerating diet. No subjective fever.    Review of Systems     as above    Objective   Objective   Vital Signs  Temp:  [97.4 °F (36.3 °C)-98 °F (36.7 °C)] 97.9 °F (36.6 °C)  Heart Rate:  [79-92] 80  Resp:  [10-20] 20  BP: (116-127)/(70-78) 127/78  SpO2:  [90 %-96 %] 92 %  on  Flow (L/min):  [8-12] 8;   Device (Oxygen Therapy): humidified;high-flow nasal cannula  Body mass index is 25.08 kg/m².     Physical Exam  Vitals and nursing note reviewed.   Constitutional:       General: He is not in acute distress.     Appearance: He is ill-appearing. He is not toxic-appearing or diaphoretic.   HENT:      Head: Normocephalic.      Nose: Nose normal.      Mouth/Throat:      Mouth: Mucous membranes are moist.      Pharynx: Oropharynx is clear.   Eyes:      General: No scleral icterus.        Right eye: No discharge.         Left eye: No discharge.      Extraocular Movements: Extraocular movements intact.      Conjunctiva/sclera: Conjunctivae normal.   Cardiovascular:      Rate and Rhythm: Normal rate and regular rhythm.      Pulses: Normal pulses.      Comments: Paced rhythm on monitor  Pulmonary:      Effort: Pulmonary effort is normal. No respiratory distress.      Breath sounds: Wheezing present. No rales.   Abdominal:      General: Bowel sounds are normal. There is no distension.      Palpations: Abdomen is soft.      Tenderness: There is no abdominal tenderness.   Musculoskeletal:         General: No swelling. Normal range of motion.      Cervical back: Neck supple. No rigidity.      Comments: Kyphosis of T-spine   Lymphadenopathy:      Cervical: No cervical adenopathy.   Skin:     General: Skin is warm and dry.      Capillary  Refill: Capillary refill takes less than 2 seconds.      Coloration: Skin is not jaundiced.   Neurological:      General: No focal deficit present.      Mental Status: He is alert and oriented to person, place, and time. Mental status is at baseline.      Cranial Nerves: No cranial nerve deficit.      Coordination: Coordination normal.   Psychiatric:         Mood and Affect: Mood normal.         Behavior: Behavior normal.         Thought Content: Thought content normal.      Comments: Very pleasant       Results Review     I reviewed the patient's new clinical results.  Results from last 7 days   Lab Units 05/27/23  0625 05/26/23  0648 05/25/23  0705 05/24/23  1046   WBC 10*3/mm3 6.62 6.94 3.75 4.98   HEMOGLOBIN g/dL 17.0 16.7 16.8 16.6   PLATELETS 10*3/mm3 227 235 249 262     Results from last 7 days   Lab Units 05/27/23  0624 05/26/23  0648 05/25/23  0705 05/24/23  1046   SODIUM mmol/L 138 136 138 138   POTASSIUM mmol/L 4.9 4.8 4.6 4.2   CHLORIDE mmol/L 99 97* 98 99   CO2 mmol/L 31.4* 30.6* 29.0 31.0*   BUN mg/dL 24* 24* 15 13   CREATININE mg/dL 0.89 0.84 0.81 0.98   GLUCOSE mg/dL 127* 132* 127* 105*   EGFR mL/min/1.73 86.1 87.6 88.6 77.5     Results from last 7 days   Lab Units 05/24/23  1046   ALBUMIN g/dL 3.7   BILIRUBIN mg/dL 0.7   ALK PHOS U/L 106   AST (SGOT) U/L 30   ALT (SGPT) U/L 18     Results from last 7 days   Lab Units 05/27/23  0624 05/26/23  0648 05/25/23  0705 05/24/23  1046   CALCIUM mg/dL 9.2 9.3 9.6 8.6   ALBUMIN g/dL  --   --   --  3.7   MAGNESIUM mg/dL 2.2 2.2  --   --      Results from last 7 days   Lab Units 05/24/23  1235 05/24/23  1046   PROCALCITONIN ng/mL  --  0.06   LACTATE mmol/L 1.1  --      No results found for: HGBA1C, POCGLU    No radiology results for the last day  I have personally reviewed all medications:  Scheduled Medications  arformoterol, 15 mcg, Nebulization, BID - RT  atorvastatin, 10 mg, Oral, Daily  budesonide, 0.5 mg, Nebulization, BID - RT  cetirizine, 10 mg, Oral,  Daily  eltrombopag, 50 mg, Oral, QAM  furosemide, 40 mg, Oral, Daily  guaiFENesin-dextromethorphan, 15 mL, Oral, 4x Daily  ipratropium-albuterol, 3 mL, Nebulization, 4x Daily - RT  ketotifen, 1 drop, Both Eyes, BID  methylPREDNISolone sodium succinate, 40 mg, Intravenous, Q12H  montelukast, 10 mg, Oral, Nightly  pantoprazole, 40 mg, Oral, Q AM  pilocarpine, 5 mg, Oral, TID  potassium chloride, 20 mEq, Oral, Daily  pregabalin, 75 mg, Oral, Q12H  rivaroxaban, 20 mg, Oral, Daily With Dinner  saccharomyces boulardii, 500 mg, Oral, BID  sodium chloride, 10 mL, Intravenous, Q12H  sodium chloride, 4 mL, Nebulization, BID - RT  sodium chloride, 2 spray, Each Nare, 4x Daily  tadalafil, 40 mg, Oral, Q24H  terazosin, 2 mg, Oral, Nightly    Infusions   Diet  Diet: Cardiac Diets; Healthy Heart (2-3 Na+); Texture: Mechanical Ground (NDD 2); Fluid Consistency: Thin (IDDSI 0)    I have personally reviewed:  [x]  Laboratory   [x]  Microbiology   []  Radiology   [x]  EKG/Telemetry  []  Cardiology/Vascular   []  Pathology    []  Records       Assessment/Plan     Active Hospital Problems    Diagnosis  POA   • **Parainfluenza virus bronchitis [J20.4]  Yes   • Acute and chronic respiratory failure with hypoxia [J96.21]  Yes   • COPD with acute exacerbation [J44.1]  Yes   • Pulmonary hypertension [I27.20]  Yes   • Presence of cardiac pacemaker [Z95.0]  Yes   • Pharyngeal dysphagia [R13.13]  Yes   • IPF (idiopathic pulmonary fibrosis) [J84.112]  Yes   • Chronic diastolic CHF (congestive heart failure) [I50.32]  Yes   • Chronic respiratory failure with hypoxia and hypercapnia [J96.11, J96.12]  Yes   • Pulmonary emphysema [J43.9]  Yes   • Chronic anticoagulation [Z79.01]  Not Applicable   • BPH (benign prostatic hypertrophy) [N40.0]  Yes   • Dyslipidemia [E78.5]  Yes   • Paroxysmal atrial fibrillation [I48.0]  Yes   • Primary hypertension [I10]  Yes      Resolved Hospital Problems   No resolved problems to display.       81yo gentleman with  h/o HTN, HLD, BPH, IPF, COPD, chronic hypoxic/hypercapnic resp failure (4L/min and Trilogy vent at home), pulm HTN, PAF/SSS/PPM (Xarelto), and prior laryngeal CA with pharyngeal dysphagia, who presented to ER with c/o cough and SOA worse than baseline. He was just admitted here earlier this month for pneumonia. He is followed by [facility].    Parainfluenza 3 Bronchitis  - complicated by underlying COPD, IPF, and pulmonary hypertension   - requiring increased oxygen supplementation from baseline  - no evidence of bacterial pneumonia  - Pulm does feel there is component of AECOPD due to virus, continue IV SoluMedrol  - continue Brovana and Pulmicort, scheduled DuoNebs, hypertonic saline nebs, as well as OPEP and IS  - weaning oxygen as tolerated, down to 8L/min today!     COPD/IPF/Pulmonary HTN/Chronic Hypoxic and Hypercapnic Respiratory Failure  - appreciate Pulm attention to pt  - supportive treatment with bronchodilators and mucolytics as above  - NIPPV at night, on high flow NC O2 during the day, weaning oxygen as tolerated, avoid hyperoxemia due to chronic hypercapnic respiratory failure, keep sats 88-92%, no higher  - continue Adcirca     PAF/SSS s/p Pacemaker/HTN/Chronic Diastolic CHF  - BPs, volume status, and HRs acceptable  - not on RC meds at home  - on AC with Xarelto     BPH  - continue alpha-blocker  - voiding fine     Hx of Laryngeal Cancer/Pharyngeal Dysphagia  - on soft texture/chopped diet with thin liquids per SLP recs    · Xarelto (home med) should suffice for DVT prophylaxis.  · Limited code (no CPR, no intubation) confirmed with pt and wife.  · Discussed with patient.  · Anticipate discharge home with family, timing yet to be determined..      Toby Olea MD  [facility]  05/27/23  12:02 EDT

## 2023-05-27 NOTE — PROGRESS NOTES
LOS: 3 days   Patient Care Team:  Madison Lewis APRN as PCP - General (Nurse Practitioner)  Rao Maguire MD as Consulting Physician (Hematology and Oncology)  Alan Santana MD as Referring Physician (Family Medicine)  Gaurav Merrill MD as Consulting Physician (Otolaryngology)  Edgardo Brown MD (Otolaryngology)  Isael Beltre MD as Consulting Physician (Hematology and Oncology)  Herminia Salas MD as Consulting Physician (Internal Medicine)  Juventino Miller MD as Consulting Physician (Cardiac Electrophysiology)  Suresh Hernandez MD as Consulting Physician (Pulmonary Disease)    Chief Complaint:  F/up respiratory failure, COPD exacerbation and medical problems as below.    Subjective   Interval History  On 10 L oxygen.    Continues to have productive cough with yellowish to greenish phlegm.    Shortness of breath improved.    REVIEW OF SYSTEMS:   CARDIOVASCULAR: No chest pain, chest pressure or chest discomfort. No palpitations or edema.   GASTROINTESTINAL: No anorexia, nausea, vomiting or diarrhea.   CONSTITUTIONAL: No fever or chills.     Ventilator/Non-Invasive Ventilation Settings (From admission, onward)     Start     Ordered    05/24/23 2350  NIPPV - Provider Settings  (CPAP / BiPAP)  Until Discontinued        Comments: Settings transferred from from home [facility] machine   Question Answer Comment   Indication Acute Respiratory Failure    Type AVAPS/PC/PS    Backup Rate 5    Target VT (mL) 550    EPAP/PEEP (cm H2O) 6    Min Pressure (cm H2O) 12    Max Pressure (cm H2O) 28    Titrate Oxygen for SpO2 88 - 92%        05/24/23 2350                      Physical Exam:     Vital Signs  Temp:  [97.4 °F (36.3 °C)-98 °F (36.7 °C)] 97.7 °F (36.5 °C)  Heart Rate:  [79-92] 91  Resp:  [10-20] 20  BP: (116-143)/(70-81) 143/81    Intake/Output Summary (Last 24 hours) at 5/27/2023 1635  Last data filed at 5/27/2023 1543  Gross per 24 hour  "  Intake 810 ml   Output 500 ml   Net 310 ml     Flowsheet Rows    Flowsheet Row First Filed Value   Admission Height 182.9 cm (72\") Documented at 05/24/2023 1030   Admission Weight 85.7 kg (189 lb) Documented at 05/24/2023 1030          PPE used per hospital policy    General Appearance:   Alert, cooperative, in no acute distress   ENMT:  Mallampati score 3, moist mucous membrane   Eyes:  Pupils equal and reactive to light. EOMI   Neck:   Large. Trachea midline. No thyromegaly.   Lungs:    Equal but diminished air entry throughout.  Minimal expiratory wheezing bilaterally.  No crackles.    Heart:   Regular rhythm and normal rate, normal S1 and S2, no         murmur   Skin:   No rash or ecchymosis   Abdomen:    Obese. Soft. No tenderness. No HSM.   Neuro/psych:  Conscious, alert, oriented x3. Strength 5/5 in upper and lower  ext.  Appropriate mood and affect   Extremities:  No cyanosis, clubbing but mild edema in legs.  Warm extremities and well-perfused          Results Review:        Results from last 7 days   Lab Units 05/27/23  0624 05/26/23  0648 05/25/23  0705   SODIUM mmol/L 138 136 138   POTASSIUM mmol/L 4.9 4.8 4.6   CHLORIDE mmol/L 99 97* 98   CO2 mmol/L 31.4* 30.6* 29.0   BUN mg/dL 24* 24* 15   CREATININE mg/dL 0.89 0.84 0.81   GLUCOSE mg/dL 127* 132* 127*   CALCIUM mg/dL 9.2 9.3 9.6     Results from last 7 days   Lab Units 05/24/23  1235 05/24/23  1046   HSTROP T ng/L 33* 37*     Results from last 7 days   Lab Units 05/27/23  0625 05/26/23  0648 05/25/23  0705   WBC 10*3/mm3 6.62 6.94 3.75   HEMOGLOBIN g/dL 17.0 16.7 16.8   HEMATOCRIT % 51.4* 49.5 50.4   PLATELETS 10*3/mm3 227 235 249         Results from last 7 days   Lab Units 05/24/23  1046   PROBNP pg/mL 2,789.0*                           I reviewed the patient's new clinical results.        Medication Review:   arformoterol, 15 mcg, Nebulization, BID - RT  atorvastatin, 10 mg, Oral, Daily  budesonide, 0.5 mg, Nebulization, BID - RT  cetirizine, 10 " mg, Oral, Daily  eltrombopag, 50 mg, Oral, QAM  furosemide, 40 mg, Oral, Daily  guaiFENesin-dextromethorphan, 15 mL, Oral, 4x Daily  ipratropium-albuterol, 3 mL, Nebulization, 4x Daily - RT  ketotifen, 1 drop, Both Eyes, BID  methylPREDNISolone sodium succinate, 40 mg, Intravenous, Q12H  montelukast, 10 mg, Oral, Nightly  pantoprazole, 40 mg, Oral, Q AM  pilocarpine, 5 mg, Oral, TID  potassium chloride, 20 mEq, Oral, Daily  pregabalin, 75 mg, Oral, Q12H  rivaroxaban, 20 mg, Oral, Daily With Dinner  saccharomyces boulardii, 500 mg, Oral, BID  sodium chloride, 10 mL, Intravenous, Q12H  sodium chloride, 4 mL, Nebulization, BID - RT  sodium chloride, 2 spray, Each Nare, 4x Daily  tadalafil, 40 mg, Oral, Q24H  terazosin, 2 mg, Oral, Nightly          Assessment     1. COPD exacerbation  2. Parainfluenza bronchitis  3. Pulmonary infiltrates: Seems chronic and waxing and waning.  Now right lower lobe.  Previously mostly in the left lower lobe.  Unclear how much of that represents scarring.  It does not appear that it is related to bacterial pneumonia but could be viral pneumonia  4. Acute on chronic hypoxic respiratory failure  5. Chronic hypercapnic respiratory failure, on trilogy ventilator.  6. Expected mild steroid-induced hyperglycemia        Plan     · Oxygen by HFNC and titrate to keep SPO2 >90%  · NIPPV at night and as needed during the day.  Settings reviewed and adjusted.  When his oxygen requirement diminish then we can switch him from the V60 ventilator to his usual trilogy ventilator.  · Solu-Medrol 40 mg twice daily  · Hypertonic saline nebs twice a day  · Insulin NovoLog SC as needed for hyperglycemia  · DuoNeb 4 times a day, Pulmicort and Brovana twice a day  · Flutter to improve mucus clearance  · DNR/DNI        Ken Up MD  05/27/23  16:35 EDT          This note was dictated utilizing [place] dictation

## 2023-05-27 NOTE — PLAN OF CARE
Goal Outcome Evaluation:  Plan of Care Reviewed With: patient        A&Ox4. Weaned to 8L high flow NC. V-paced on monitor. Standby assist. No patient complaints. VSS. All needs met.

## 2023-05-28 LAB
ANION GAP SERPL CALCULATED.3IONS-SCNC: 4 MMOL/L (ref 5–15)
BASOPHILS # BLD AUTO: 0 10*3/MM3 (ref 0–0.2)
BASOPHILS NFR BLD AUTO: 0 % (ref 0–1.5)
BUN SERPL-MCNC: 26 MG/DL (ref 8–23)
BUN/CREAT SERPL: 29.2 (ref 7–25)
CALCIUM SPEC-SCNC: 8.6 MG/DL (ref 8.6–10.5)
CHLORIDE SERPL-SCNC: 98 MMOL/L (ref 98–107)
CO2 SERPL-SCNC: 34 MMOL/L (ref 22–29)
CREAT SERPL-MCNC: 0.89 MG/DL (ref 0.76–1.27)
DEPRECATED RDW RBC AUTO: 43.7 FL (ref 37–54)
EGFRCR SERPLBLD CKD-EPI 2021: 86.1 ML/MIN/1.73
EOSINOPHIL # BLD AUTO: 0 10*3/MM3 (ref 0–0.4)
EOSINOPHIL NFR BLD AUTO: 0 % (ref 0.3–6.2)
ERYTHROCYTE [DISTWIDTH] IN BLOOD BY AUTOMATED COUNT: 13 % (ref 12.3–15.4)
GLUCOSE SERPL-MCNC: 116 MG/DL (ref 65–99)
HCT VFR BLD AUTO: 50.1 % (ref 37.5–51)
HGB BLD-MCNC: 16.8 G/DL (ref 13–17.7)
IMM GRANULOCYTES # BLD AUTO: 0.03 10*3/MM3 (ref 0–0.05)
IMM GRANULOCYTES NFR BLD AUTO: 0.5 % (ref 0–0.5)
LYMPHOCYTES # BLD AUTO: 0.12 10*3/MM3 (ref 0.7–3.1)
LYMPHOCYTES NFR BLD AUTO: 1.8 % (ref 19.6–45.3)
MAGNESIUM SERPL-MCNC: 2.1 MG/DL (ref 1.6–2.4)
MCH RBC QN AUTO: 30.6 PG (ref 26.6–33)
MCHC RBC AUTO-ENTMCNC: 33.5 G/DL (ref 31.5–35.7)
MCV RBC AUTO: 91.3 FL (ref 79–97)
MONOCYTES # BLD AUTO: 0.6 10*3/MM3 (ref 0.1–0.9)
MONOCYTES NFR BLD AUTO: 9.1 % (ref 5–12)
NEUTROPHILS NFR BLD AUTO: 5.85 10*3/MM3 (ref 1.7–7)
NEUTROPHILS NFR BLD AUTO: 88.6 % (ref 42.7–76)
NRBC BLD AUTO-RTO: 0 /100 WBC (ref 0–0.2)
PLATELET # BLD AUTO: 227 10*3/MM3 (ref 140–450)
PMV BLD AUTO: 9.7 FL (ref 6–12)
POTASSIUM SERPL-SCNC: 4.8 MMOL/L (ref 3.5–5.2)
RBC # BLD AUTO: 5.49 10*6/MM3 (ref 4.14–5.8)
SODIUM SERPL-SCNC: 136 MMOL/L (ref 136–145)
WBC NRBC COR # BLD: 6.6 10*3/MM3 (ref 3.4–10.8)

## 2023-05-28 PROCEDURE — 94664 DEMO&/EVAL PT USE INHALER: CPT

## 2023-05-28 PROCEDURE — 94799 UNLISTED PULMONARY SVC/PX: CPT

## 2023-05-28 PROCEDURE — 25010000002 METHYLPREDNISOLONE PER 40 MG: Performed by: INTERNAL MEDICINE

## 2023-05-28 PROCEDURE — 94660 CPAP INITIATION&MGMT: CPT

## 2023-05-28 PROCEDURE — 94760 N-INVAS EAR/PLS OXIMETRY 1: CPT

## 2023-05-28 PROCEDURE — 94761 N-INVAS EAR/PLS OXIMETRY MLT: CPT

## 2023-05-28 PROCEDURE — 36415 COLL VENOUS BLD VENIPUNCTURE: CPT | Performed by: INTERNAL MEDICINE

## 2023-05-28 PROCEDURE — 80048 BASIC METABOLIC PNL TOTAL CA: CPT | Performed by: INTERNAL MEDICINE

## 2023-05-28 PROCEDURE — 83735 ASSAY OF MAGNESIUM: CPT | Performed by: HOSPITALIST

## 2023-05-28 PROCEDURE — 85025 COMPLETE CBC W/AUTO DIFF WBC: CPT | Performed by: INTERNAL MEDICINE

## 2023-05-28 RX ADMIN — SALINE NASAL SPRAY 2 SPRAY: 1.5 SOLUTION NASAL at 12:31

## 2023-05-28 RX ADMIN — PILOCARPINE HYDRCHLORIDE 5 MG: 5 TABLET, FILM COATED ORAL at 09:00

## 2023-05-28 RX ADMIN — PILOCARPINE HYDRCHLORIDE 5 MG: 5 TABLET, FILM COATED ORAL at 21:09

## 2023-05-28 RX ADMIN — METHYLPREDNISOLONE SODIUM SUCCINATE 40 MG: 40 INJECTION, POWDER, FOR SOLUTION INTRAMUSCULAR; INTRAVENOUS at 12:31

## 2023-05-28 RX ADMIN — MONTELUKAST SODIUM 10 MG: 10 TABLET, FILM COATED ORAL at 21:09

## 2023-05-28 RX ADMIN — KETOTIFEN FUMARATE 1 DROP: 0.25 SOLUTION OPHTHALMIC at 09:00

## 2023-05-28 RX ADMIN — SODIUM CHLORIDE SOLN NEBU 7% 4 ML: 7 NEBU SOLN at 19:41

## 2023-05-28 RX ADMIN — KETOTIFEN FUMARATE 1 DROP: 0.25 SOLUTION OPHTHALMIC at 21:09

## 2023-05-28 RX ADMIN — SALINE NASAL SPRAY 2 SPRAY: 1.5 SOLUTION NASAL at 21:09

## 2023-05-28 RX ADMIN — IPRATROPIUM BROMIDE AND ALBUTEROL SULFATE 3 ML: .5; 3 SOLUTION RESPIRATORY (INHALATION) at 08:05

## 2023-05-28 RX ADMIN — RIVAROXABAN 20 MG: 20 TABLET, FILM COATED ORAL at 17:04

## 2023-05-28 RX ADMIN — CETIRIZINE HYDROCHLORIDE 10 MG: 10 TABLET ORAL at 09:00

## 2023-05-28 RX ADMIN — IPRATROPIUM BROMIDE AND ALBUTEROL SULFATE 3 ML: .5; 3 SOLUTION RESPIRATORY (INHALATION) at 14:43

## 2023-05-28 RX ADMIN — BUDESONIDE 0.5 MG: 0.5 INHALANT ORAL at 19:35

## 2023-05-28 RX ADMIN — PILOCARPINE HYDRCHLORIDE 5 MG: 5 TABLET, FILM COATED ORAL at 17:00

## 2023-05-28 RX ADMIN — PANTOPRAZOLE SODIUM 40 MG: 40 TABLET, DELAYED RELEASE ORAL at 06:44

## 2023-05-28 RX ADMIN — IPRATROPIUM BROMIDE AND ALBUTEROL SULFATE 3 ML: .5; 3 SOLUTION RESPIRATORY (INHALATION) at 11:27

## 2023-05-28 RX ADMIN — ARFORMOTEROL TARTRATE 15 MCG: 15 SOLUTION RESPIRATORY (INHALATION) at 19:35

## 2023-05-28 RX ADMIN — GUAIFENESIN SYRUP AND DEXTROMETHORPHAN 15 ML: 100; 10 SYRUP ORAL at 21:09

## 2023-05-28 RX ADMIN — GUAIFENESIN SYRUP AND DEXTROMETHORPHAN 15 ML: 100; 10 SYRUP ORAL at 09:00

## 2023-05-28 RX ADMIN — GUAIFENESIN SYRUP AND DEXTROMETHORPHAN 15 ML: 100; 10 SYRUP ORAL at 12:31

## 2023-05-28 RX ADMIN — TADALAFIL 40 MG: 20 TABLET ORAL at 09:00

## 2023-05-28 RX ADMIN — METHYLPREDNISOLONE SODIUM SUCCINATE 40 MG: 40 INJECTION, POWDER, FOR SOLUTION INTRAMUSCULAR; INTRAVENOUS at 01:26

## 2023-05-28 RX ADMIN — ATORVASTATIN CALCIUM 10 MG: 20 TABLET, FILM COATED ORAL at 09:00

## 2023-05-28 RX ADMIN — ELTROMBOPAG OLAMINE 50 MG: 25 TABLET, FILM COATED ORAL at 06:44

## 2023-05-28 RX ADMIN — ARFORMOTEROL TARTRATE 15 MCG: 15 SOLUTION RESPIRATORY (INHALATION) at 08:07

## 2023-05-28 RX ADMIN — SALINE NASAL SPRAY 2 SPRAY: 1.5 SOLUTION NASAL at 09:00

## 2023-05-28 RX ADMIN — BUDESONIDE 0.5 MG: 0.5 INHALANT ORAL at 08:06

## 2023-05-28 RX ADMIN — Medication 10 ML: at 21:10

## 2023-05-28 RX ADMIN — GUAIFENESIN SYRUP AND DEXTROMETHORPHAN 15 ML: 100; 10 SYRUP ORAL at 17:04

## 2023-05-28 RX ADMIN — POTASSIUM CHLORIDE 20 MEQ: 750 TABLET, EXTENDED RELEASE ORAL at 09:00

## 2023-05-28 RX ADMIN — TERAZOSIN 2 MG: 2 CAPSULE ORAL at 21:09

## 2023-05-28 RX ADMIN — FUROSEMIDE 40 MG: 40 TABLET ORAL at 09:00

## 2023-05-28 RX ADMIN — PREGABALIN 75 MG: 75 CAPSULE ORAL at 09:00

## 2023-05-28 RX ADMIN — IPRATROPIUM BROMIDE AND ALBUTEROL SULFATE 3 ML: .5; 3 SOLUTION RESPIRATORY (INHALATION) at 19:33

## 2023-05-28 RX ADMIN — Medication 500 MG: at 21:09

## 2023-05-28 RX ADMIN — Medication 500 MG: at 09:00

## 2023-05-28 RX ADMIN — SODIUM CHLORIDE SOLN NEBU 7% 4 ML: 7 NEBU SOLN at 08:08

## 2023-05-28 RX ADMIN — Medication 10 ML: at 09:01

## 2023-05-28 RX ADMIN — PREGABALIN 75 MG: 75 CAPSULE ORAL at 21:09

## 2023-05-28 NOTE — PLAN OF CARE
Goal Outcome Evaluation:  Plan of Care Reviewed With: patient        Progress: no change  Outcome Evaluation: Pt on 8L HF, Bipap at night, Vpaced on monitor. Assist x1 to bathroom. Rested well. VSS. Will continue to monitor.

## 2023-05-28 NOTE — PROGRESS NOTES
LOS: 4 days   Patient Care Team:  Madison Lewis APRN as PCP - General (Nurse Practitioner)  Rao Maguire MD as Consulting Physician (Hematology and Oncology)  Alan Santana MD as Referring Physician (Family Medicine)  Gaurav Merrill MD as Consulting Physician (Otolaryngology)  Edgardo Brown MD (Otolaryngology)  Isael Beltre MD as Consulting Physician (Hematology and Oncology)  Herminia Salas MD as Consulting Physician (Internal Medicine)  Juventino Miller MD as Consulting Physician (Cardiac Electrophysiology)  Suresh eHrnandez MD as Consulting Physician (Pulmonary Disease)    Chief Complaint:  F/up respiratory failure, COPD exacerbation and medical problems as below.    Subjective   Interval History  Oxygen requirement improved.  Currently on 6 L.    Continues to have cough with greenish phlegm.    Shortness of breath improved.    He used the hospital NIPPV overnight.    REVIEW OF SYSTEMS:   CARDIOVASCULAR: No chest pain, chest pressure or chest discomfort. No palpitations or edema.   GASTROINTESTINAL: No anorexia, nausea, vomiting or diarrhea.   CONSTITUTIONAL: No fever or chills.     Ventilator/Non-Invasive Ventilation Settings (From admission, onward)     Start     Ordered    05/24/23 2350  NIPPV - Provider Settings  (CPAP / BiPAP)  Until Discontinued        Comments: Settings transferred from from home [facility] machine   Question Answer Comment   Indication Acute Respiratory Failure    Type AVAPS/PC/PS    Backup Rate 5    Target VT (mL) 550    EPAP/PEEP (cm H2O) 6    Min Pressure (cm H2O) 12    Max Pressure (cm H2O) 28    Titrate Oxygen for SpO2 88 - 92%        05/24/23 2350                      Physical Exam:     Vital Signs  Temp:  [97.6 °F (36.4 °C)-97.9 °F (36.6 °C)] 97.6 °F (36.4 °C)  Heart Rate:  [79-90] 83  Resp:  [15-20] 20  BP: (123-175)/(74-91) 175/91    Intake/Output Summary (Last 24 hours) at 5/28/2023  "1610  Last data filed at 5/28/2023 1407  Gross per 24 hour   Intake 660 ml   Output 925 ml   Net -265 ml     Flowsheet Rows    Flowsheet Row First Filed Value   Admission Height 182.9 cm (72\") Documented at 05/24/2023 1030   Admission Weight 85.7 kg (189 lb) Documented at 05/24/2023 1030          PPE used per hospital policy    General Appearance:   Alert, cooperative, in no acute distress   ENMT:  Mallampati score 3, moist mucous membrane   Eyes:  Pupils equal and reactive to light. EOMI   Neck:   Large. Trachea midline. No thyromegaly.   Lungs:    Bilateral expiratory wheezing seems to be slightly worse than yesterday.  No crackles.    Heart:   Regular rhythm and normal rate, normal S1 and S2, no         murmur   Skin:   No rash or ecchymosis   Abdomen:    Obese. Soft. No tenderness. No HSM.   Neuro/psych:  Conscious, alert, oriented x3. Strength 5/5 in upper and lower  ext.  Appropriate mood and affect   Extremities:  No cyanosis, clubbing but mild edema in legs.  Warm extremities and well-perfused          Results Review:        Results from last 7 days   Lab Units 05/28/23  0433 05/27/23  0624 05/26/23  0648   SODIUM mmol/L 136 138 136   POTASSIUM mmol/L 4.8 4.9 4.8   CHLORIDE mmol/L 98 99 97*   CO2 mmol/L 34.0* 31.4* 30.6*   BUN mg/dL 26* 24* 24*   CREATININE mg/dL 0.89 0.89 0.84   GLUCOSE mg/dL 116* 127* 132*   CALCIUM mg/dL 8.6 9.2 9.3     Results from last 7 days   Lab Units 05/24/23  1235 05/24/23  1046   HSTROP T ng/L 33* 37*     Results from last 7 days   Lab Units 05/28/23  0433 05/27/23  0625 05/26/23  0648   WBC 10*3/mm3 6.60 6.62 6.94   HEMOGLOBIN g/dL 16.8 17.0 16.7   HEMATOCRIT % 50.1 51.4* 49.5   PLATELETS 10*3/mm3 227 227 235         Results from last 7 days   Lab Units 05/24/23  1046   PROBNP pg/mL 2,789.0*                           I reviewed the patient's new clinical results.        Medication Review:   arformoterol, 15 mcg, Nebulization, BID - RT  atorvastatin, 10 mg, Oral, " Daily  budesonide, 0.5 mg, Nebulization, BID - RT  cetirizine, 10 mg, Oral, Daily  eltrombopag, 50 mg, Oral, QAM  furosemide, 40 mg, Oral, Daily  guaiFENesin-dextromethorphan, 15 mL, Oral, 4x Daily  ipratropium-albuterol, 3 mL, Nebulization, 4x Daily - RT  ketotifen, 1 drop, Both Eyes, BID  methylPREDNISolone sodium succinate, 40 mg, Intravenous, Q12H  montelukast, 10 mg, Oral, Nightly  pantoprazole, 40 mg, Oral, Q AM  pilocarpine, 5 mg, Oral, TID  potassium chloride, 20 mEq, Oral, Daily  pregabalin, 75 mg, Oral, Q12H  rivaroxaban, 20 mg, Oral, Daily With Dinner  saccharomyces boulardii, 500 mg, Oral, BID  sodium chloride, 10 mL, Intravenous, Q12H  sodium chloride, 4 mL, Nebulization, BID - RT  sodium chloride, 2 spray, Each Nare, 4x Daily  tadalafil, 40 mg, Oral, Q24H  terazosin, 2 mg, Oral, Nightly          Assessment     1. COPD exacerbation  2. Parainfluenza bronchitis  3. Pulmonary infiltrates: Seems chronic and waxing and waning.  Now right lower lobe.  Previously mostly in the left lower lobe.  Unclear how much of that represents scarring.  It does not appear that it is related to bacterial pneumonia but could be viral pneumonia  4. Acute on chronic hypoxic respiratory failure  5. Chronic hypercapnic respiratory failure, on trilogy ventilator.  6. Expected mild steroid-induced hyperglycemia        Plan     · Oxygen by NC and titrate to keep SPO2 >90%  · Can use his trilogy ventilator overnight instead of the AVAPS V60 NIPPV  · Solu-Medrol 40 mg twice daily  · Hypertonic saline nebs twice a day  · Insulin NovoLog SC as needed for hyperglycemia  · DuoNeb 4 times a day.  Budesonide 0.5 mg twice a day and a formoterol 15 mcg twice daily.  · Flutter to improve mucus clearance  · DNR/DNI        Ken Up MD  05/28/23  16:10 EDT          This note was dictated utilizing [employer] dictation

## 2023-05-28 NOTE — SIGNIFICANT NOTE
05/28/23 0805   CPAP/BiPAP Settings   $ NIV Pt/System Assessment Charge yes   Mode of Delivery standby   Equipment Type V-60   Equipment ID [id number]  ([employer] #[id number], [place] #[id number])   Flow (L/min) 6   Skin Integrity intact     Bipap #[id number]  [employer] [id number]  [facility] [id number]

## 2023-05-28 NOTE — PROGRESS NOTES
Name: Alessio Allen ADMIT: 2023   : 1940  PCP: Madison Lewis APRN    MRN: [id number] LOS: 4 days   AGE/SEX: 82 y.o. male  ROOM: [facility]     Subjective   Subjective   Chief Complaint   Patient presents with   • Shortness of Breath     Dyspnea is still improving some.  He was on 6 L when I saw him.  Not reporting any chest pain.  No nausea vomiting or diarrhea.       Objective   Objective   Vital Signs  Temp:  [97.6 °F (36.4 °C)-97.9 °F (36.6 °C)] 97.6 °F (36.4 °C)  Heart Rate:  [79-91] 80  Resp:  [15-20] 20  BP: (123-143)/(74-81) 123/79  SpO2:  [89 %-98 %] 93 %  on  Flow (L/min):  [6-10] 6;   Device (Oxygen Therapy): high-flow nasal cannula;humidified  Body mass index is 25.08 kg/m².    Physical Exam  Vitals and nursing note reviewed.   Constitutional:       General: He is not in acute distress.     Appearance: He is ill-appearing. He is not diaphoretic.   Cardiovascular:      Rate and Rhythm: Normal rate and regular rhythm.      Pulses: Normal pulses.      Comments: Paced rhythm on monitor  Pulmonary:      Effort: Pulmonary effort is normal.      Breath sounds: Rhonchi (expiratory) present. No wheezing.   Abdominal:      General: There is no distension.      Palpations: Abdomen is soft.      Tenderness: There is no abdominal tenderness. There is no guarding or rebound.   Musculoskeletal:         General: No swelling or tenderness.      Comments: Kyphosis of T-spine   Skin:     General: Skin is warm and dry.   Neurological:      Mental Status: He is alert.      Cranial Nerves: No cranial nerve deficit.   Psychiatric:         Mood and Affect: Mood normal.         Behavior: Behavior normal.       Results Review  I reviewed the patient's new clinical results.    Results from last 7 days   Lab Units 23  0433 23  0625 23  0648 23  0705   WBC 10*3/mm3 6.60 6.62 6.94 3.75   HEMOGLOBIN g/dL 16.8 17.0 16.7 16.8   PLATELETS 10*3/mm3 227 227 235 249     Results from last 7 days    Lab Units 05/28/23  0433 05/27/23  0624 05/26/23  0648 05/25/23  0705   SODIUM mmol/L 136 138 136 138   POTASSIUM mmol/L 4.8 4.9 4.8 4.6   CHLORIDE mmol/L 98 99 97* 98   CO2 mmol/L 34.0* 31.4* 30.6* 29.0   BUN mg/dL 26* 24* 24* 15   CREATININE mg/dL 0.89 0.89 0.84 0.81   GLUCOSE mg/dL 116* 127* 132* 127*   EGFR mL/min/1.73 86.1 86.1 87.6 88.6     Results from last 7 days   Lab Units 05/24/23  1046   ALBUMIN g/dL 3.7   BILIRUBIN mg/dL 0.7   ALK PHOS U/L 106   AST (SGOT) U/L 30   ALT (SGPT) U/L 18     Results from last 7 days   Lab Units 05/28/23  0433 05/27/23  0624 05/26/23  0648 05/25/23  0705 05/24/23  1046   CALCIUM mg/dL 8.6 9.2 9.3 9.6 8.6   ALBUMIN g/dL  --   --   --   --  3.7   MAGNESIUM mg/dL 2.1 2.2 2.2  --   --      Results from last 7 days   Lab Units 05/24/23  1235 05/24/23  1046   PROCALCITONIN ng/mL  --  0.06   LACTATE mmol/L 1.1  --      No results found for: HGBA1C, POCGLU    No radiology results for the last day    I have personally reviewed all medications:  Scheduled Medications  arformoterol, 15 mcg, Nebulization, BID - RT  atorvastatin, 10 mg, Oral, Daily  budesonide, 0.5 mg, Nebulization, BID - RT  cetirizine, 10 mg, Oral, Daily  eltrombopag, 50 mg, Oral, QAM  furosemide, 40 mg, Oral, Daily  guaiFENesin-dextromethorphan, 15 mL, Oral, 4x Daily  ipratropium-albuterol, 3 mL, Nebulization, 4x Daily - RT  ketotifen, 1 drop, Both Eyes, BID  methylPREDNISolone sodium succinate, 40 mg, Intravenous, Q12H  montelukast, 10 mg, Oral, Nightly  pantoprazole, 40 mg, Oral, Q AM  pilocarpine, 5 mg, Oral, TID  potassium chloride, 20 mEq, Oral, Daily  pregabalin, 75 mg, Oral, Q12H  rivaroxaban, 20 mg, Oral, Daily With Dinner  saccharomyces boulardii, 500 mg, Oral, BID  sodium chloride, 10 mL, Intravenous, Q12H  sodium chloride, 4 mL, Nebulization, BID - RT  sodium chloride, 2 spray, Each Nare, 4x Daily  tadalafil, 40 mg, Oral, Q24H  terazosin, 2 mg, Oral, Nightly      Infusions     Diet  Diet: Cardiac Diets;  Healthy Heart (2-3 Na+); Texture: Mechanical Ground (NDD 2); Fluid Consistency: Thin (IDDSI 0)    I have personally reviewed:  [x]  Laboratory   [x]  Microbiology   [x]  Radiology   [x]  EKG/Telemetry  []  Cardiology/Vascular   []  Pathology    []  Records       Assessment/Plan     Active Hospital Problems    Diagnosis  POA   • **Parainfluenza virus bronchitis [J20.4]  Yes   • Acute and chronic respiratory failure with hypoxia [J96.21]  Yes   • COPD with acute exacerbation [J44.1]  Yes   • Pulmonary hypertension [I27.20]  Yes   • Presence of cardiac pacemaker [Z95.0]  Yes   • Pharyngeal dysphagia [R13.13]  Yes   • IPF (idiopathic pulmonary fibrosis) [J84.112]  Yes   • Chronic diastolic CHF (congestive heart failure) [I50.32]  Yes   • Chronic respiratory failure with hypoxia and hypercapnia [J96.11, J96.12]  Yes   • Pulmonary emphysema [J43.9]  Yes   • Chronic anticoagulation [Z79.01]  Not Applicable   • BPH (benign prostatic hypertrophy) [N40.0]  Yes   • Dyslipidemia [E78.5]  Yes   • Paroxysmal atrial fibrillation [I48.0]  Yes   • Primary hypertension [I10]  Yes      Resolved Hospital Problems   No resolved problems to display.       82 y.o. male admitted with Parainfluenza virus bronchitis.    83yo gentleman with h/o HTN, HLD, BPH, IPF, COPD, chronic hypoxic/hypercapnic resp failure (4L/min and Trilogy vent at home), pulm HTN, PAF/SSS/PPM (Xarelto), and prior laryngeal CA with pharyngeal dysphagia, who presented to ER with c/o cough and SOA worse than baseline. He was just admitted here earlier this month for pneumonia. He is followed by [facility].     Parainfluenza 3 Bronchitis  - complicated by underlying COPD with acute exacerbation, IPF, and pulmonary hypertension   - requiring increased oxygen supplementation from baseline  - no evidence of bacterial pneumonia  - On IV Solu-Medrol.  Did not hear any bronchospasm today.  -Continue breathing treatments  -Wean oxygen as able.  6 L currently.  He reported 4 L  baseline.  -Pulmonology following.     COPD/IPF/Pulmonary HTN/Chronic Hypoxic and Hypercapnic Respiratory Failure  - appreciate Pulm attention to pt  - supportive treatment with bronchodilators and mucolytics as above  - NIPPV at night, on high flow NC O2 during the day, weaning oxygen as tolerated, avoid hyperoxemia due to chronic hypercapnic respiratory failure, keep sats 88-92%, no higher  - continue Adcirca     PAF/SSS s/p Pacemaker/HTN/Chronic Diastolic CHF  - BPs, volume status, and HRs acceptable  - on AC with Xarelto     BPH  - continue alpha-blocker     Hx of Laryngeal Cancer/Pharyngeal Dysphagia  - on modified diet per SLP recs     • Prophylaxis: Xarelto as above  • Disposition: Home/timing TBD     Expected Discharge Date: 5/29/2023; Expected Discharge Time:      Jose Herrera MD  [facility]  05/28/23  12:36 EDT    Dictated portions of note using dragon dictation software.

## 2023-05-28 NOTE — PLAN OF CARE
Goal Outcome Evaluation:  Plan of Care Reviewed With: patient        Progress: no change       A&Ox4. 6L high flow NC. Assist x1 with walker to bathroom. SOA with activity. No patient complaints at this time. VSS. All needs met .

## 2023-05-29 LAB
ANION GAP SERPL CALCULATED.3IONS-SCNC: 8.7 MMOL/L (ref 5–15)
BACTERIA SPEC AEROBE CULT: NORMAL
BACTERIA SPEC AEROBE CULT: NORMAL
BUN SERPL-MCNC: 21 MG/DL (ref 8–23)
BUN/CREAT SERPL: 28.4 (ref 7–25)
CALCIUM SPEC-SCNC: 8.8 MG/DL (ref 8.6–10.5)
CHLORIDE SERPL-SCNC: 97 MMOL/L (ref 98–107)
CO2 SERPL-SCNC: 30.3 MMOL/L (ref 22–29)
CREAT SERPL-MCNC: 0.74 MG/DL (ref 0.76–1.27)
DEPRECATED RDW RBC AUTO: 41.5 FL (ref 37–54)
EGFRCR SERPLBLD CKD-EPI 2021: 91 ML/MIN/1.73
ERYTHROCYTE [DISTWIDTH] IN BLOOD BY AUTOMATED COUNT: 12.8 % (ref 12.3–15.4)
GLUCOSE SERPL-MCNC: 114 MG/DL (ref 65–99)
HCT VFR BLD AUTO: 50.9 % (ref 37.5–51)
HGB BLD-MCNC: 17.1 G/DL (ref 13–17.7)
MCH RBC QN AUTO: 30 PG (ref 26.6–33)
MCHC RBC AUTO-ENTMCNC: 33.6 G/DL (ref 31.5–35.7)
MCV RBC AUTO: 89.3 FL (ref 79–97)
NT-PROBNP SERPL-MCNC: 2610 PG/ML (ref 0–1800)
PLATELET # BLD AUTO: 224 10*3/MM3 (ref 140–450)
PMV BLD AUTO: 9.9 FL (ref 6–12)
POTASSIUM SERPL-SCNC: 4.7 MMOL/L (ref 3.5–5.2)
RBC # BLD AUTO: 5.7 10*6/MM3 (ref 4.14–5.8)
SODIUM SERPL-SCNC: 136 MMOL/L (ref 136–145)
WBC NRBC COR # BLD: 6.49 10*3/MM3 (ref 3.4–10.8)

## 2023-05-29 PROCEDURE — 80048 BASIC METABOLIC PNL TOTAL CA: CPT | Performed by: INTERNAL MEDICINE

## 2023-05-29 PROCEDURE — 94761 N-INVAS EAR/PLS OXIMETRY MLT: CPT

## 2023-05-29 PROCEDURE — 94799 UNLISTED PULMONARY SVC/PX: CPT

## 2023-05-29 PROCEDURE — 94664 DEMO&/EVAL PT USE INHALER: CPT

## 2023-05-29 PROCEDURE — 25010000002 FUROSEMIDE PER 20 MG: Performed by: INTERNAL MEDICINE

## 2023-05-29 PROCEDURE — 36415 COLL VENOUS BLD VENIPUNCTURE: CPT | Performed by: INTERNAL MEDICINE

## 2023-05-29 PROCEDURE — 85027 COMPLETE CBC AUTOMATED: CPT | Performed by: INTERNAL MEDICINE

## 2023-05-29 PROCEDURE — 94660 CPAP INITIATION&MGMT: CPT

## 2023-05-29 PROCEDURE — 83880 ASSAY OF NATRIURETIC PEPTIDE: CPT | Performed by: INTERNAL MEDICINE

## 2023-05-29 PROCEDURE — 25010000002 METHYLPREDNISOLONE PER 40 MG: Performed by: INTERNAL MEDICINE

## 2023-05-29 PROCEDURE — 94760 N-INVAS EAR/PLS OXIMETRY 1: CPT

## 2023-05-29 RX ORDER — FUROSEMIDE 10 MG/ML
40 INJECTION INTRAMUSCULAR; INTRAVENOUS ONCE
Status: COMPLETED | OUTPATIENT
Start: 2023-05-29 | End: 2023-05-29

## 2023-05-29 RX ADMIN — Medication 10 ML: at 08:50

## 2023-05-29 RX ADMIN — CETIRIZINE HYDROCHLORIDE 10 MG: 10 TABLET ORAL at 08:48

## 2023-05-29 RX ADMIN — PREGABALIN 75 MG: 75 CAPSULE ORAL at 20:32

## 2023-05-29 RX ADMIN — GUAIFENESIN SYRUP AND DEXTROMETHORPHAN 15 ML: 100; 10 SYRUP ORAL at 20:32

## 2023-05-29 RX ADMIN — Medication 500 MG: at 20:32

## 2023-05-29 RX ADMIN — TADALAFIL 40 MG: 20 TABLET ORAL at 08:48

## 2023-05-29 RX ADMIN — BUDESONIDE 0.5 MG: 0.5 INHALANT ORAL at 20:25

## 2023-05-29 RX ADMIN — SALINE NASAL SPRAY 2 SPRAY: 1.5 SOLUTION NASAL at 11:45

## 2023-05-29 RX ADMIN — SODIUM CHLORIDE SOLN NEBU 7% 4 ML: 7 NEBU SOLN at 20:28

## 2023-05-29 RX ADMIN — RIVAROXABAN 20 MG: 20 TABLET, FILM COATED ORAL at 17:08

## 2023-05-29 RX ADMIN — SALINE NASAL SPRAY 2 SPRAY: 1.5 SOLUTION NASAL at 20:32

## 2023-05-29 RX ADMIN — PILOCARPINE HYDRCHLORIDE 5 MG: 5 TABLET, FILM COATED ORAL at 20:32

## 2023-05-29 RX ADMIN — METHYLPREDNISOLONE SODIUM SUCCINATE 40 MG: 40 INJECTION, POWDER, FOR SOLUTION INTRAMUSCULAR; INTRAVENOUS at 00:33

## 2023-05-29 RX ADMIN — METHYLPREDNISOLONE SODIUM SUCCINATE 40 MG: 40 INJECTION, POWDER, FOR SOLUTION INTRAMUSCULAR; INTRAVENOUS at 23:23

## 2023-05-29 RX ADMIN — SALINE NASAL SPRAY 2 SPRAY: 1.5 SOLUTION NASAL at 17:08

## 2023-05-29 RX ADMIN — ARFORMOTEROL TARTRATE 15 MCG: 15 SOLUTION RESPIRATORY (INHALATION) at 20:28

## 2023-05-29 RX ADMIN — ELTROMBOPAG OLAMINE 50 MG: 25 TABLET, FILM COATED ORAL at 06:21

## 2023-05-29 RX ADMIN — PANTOPRAZOLE SODIUM 40 MG: 40 TABLET, DELAYED RELEASE ORAL at 06:21

## 2023-05-29 RX ADMIN — PREGABALIN 75 MG: 75 CAPSULE ORAL at 08:48

## 2023-05-29 RX ADMIN — GUAIFENESIN SYRUP AND DEXTROMETHORPHAN 15 ML: 100; 10 SYRUP ORAL at 17:08

## 2023-05-29 RX ADMIN — IPRATROPIUM BROMIDE AND ALBUTEROL SULFATE 3 ML: .5; 3 SOLUTION RESPIRATORY (INHALATION) at 15:05

## 2023-05-29 RX ADMIN — Medication 10 ML: at 20:33

## 2023-05-29 RX ADMIN — IPRATROPIUM BROMIDE AND ALBUTEROL SULFATE 3 ML: .5; 3 SOLUTION RESPIRATORY (INHALATION) at 20:25

## 2023-05-29 RX ADMIN — IPRATROPIUM BROMIDE AND ALBUTEROL SULFATE 3 ML: .5; 3 SOLUTION RESPIRATORY (INHALATION) at 07:48

## 2023-05-29 RX ADMIN — FUROSEMIDE 40 MG: 40 INJECTION, SOLUTION INTRAMUSCULAR; INTRAVENOUS at 13:23

## 2023-05-29 RX ADMIN — SALINE NASAL SPRAY 2 SPRAY: 1.5 SOLUTION NASAL at 08:48

## 2023-05-29 RX ADMIN — SODIUM CHLORIDE SOLN NEBU 7% 4 ML: 7 NEBU SOLN at 07:57

## 2023-05-29 RX ADMIN — METHYLPREDNISOLONE SODIUM SUCCINATE 40 MG: 40 INJECTION, POWDER, FOR SOLUTION INTRAMUSCULAR; INTRAVENOUS at 11:45

## 2023-05-29 RX ADMIN — POTASSIUM CHLORIDE 20 MEQ: 750 TABLET, EXTENDED RELEASE ORAL at 08:48

## 2023-05-29 RX ADMIN — BUDESONIDE 0.5 MG: 0.5 INHALANT ORAL at 07:52

## 2023-05-29 RX ADMIN — IPRATROPIUM BROMIDE AND ALBUTEROL SULFATE 3 ML: .5; 3 SOLUTION RESPIRATORY (INHALATION) at 11:16

## 2023-05-29 RX ADMIN — TERAZOSIN 2 MG: 2 CAPSULE ORAL at 20:32

## 2023-05-29 RX ADMIN — KETOTIFEN FUMARATE 1 DROP: 0.25 SOLUTION OPHTHALMIC at 08:49

## 2023-05-29 RX ADMIN — PILOCARPINE HYDRCHLORIDE 5 MG: 5 TABLET, FILM COATED ORAL at 08:48

## 2023-05-29 RX ADMIN — Medication 500 MG: at 08:48

## 2023-05-29 RX ADMIN — FUROSEMIDE 40 MG: 40 TABLET ORAL at 08:48

## 2023-05-29 RX ADMIN — KETOTIFEN FUMARATE 1 DROP: 0.25 SOLUTION OPHTHALMIC at 20:33

## 2023-05-29 RX ADMIN — ARFORMOTEROL TARTRATE 15 MCG: 15 SOLUTION RESPIRATORY (INHALATION) at 07:54

## 2023-05-29 RX ADMIN — GUAIFENESIN SYRUP AND DEXTROMETHORPHAN 15 ML: 100; 10 SYRUP ORAL at 08:48

## 2023-05-29 RX ADMIN — GUAIFENESIN SYRUP AND DEXTROMETHORPHAN 15 ML: 100; 10 SYRUP ORAL at 11:45

## 2023-05-29 RX ADMIN — MONTELUKAST SODIUM 10 MG: 10 TABLET, FILM COATED ORAL at 20:32

## 2023-05-29 RX ADMIN — ATORVASTATIN CALCIUM 10 MG: 20 TABLET, FILM COATED ORAL at 08:48

## 2023-05-29 RX ADMIN — PILOCARPINE HYDRCHLORIDE 5 MG: 5 TABLET, FILM COATED ORAL at 15:54

## 2023-05-29 NOTE — PROGRESS NOTES
Name: Alessio Allen ADMIT: 2023   : 1940  PCP: Madison Lewis APRN    MRN: [id number] LOS: 5 days   AGE/SEX: 82 y.o. male  ROOM: [facility]     Subjective   Subjective   Chief Complaint   Patient presents with   • Shortness of Breath     Dyspnea is still improving some.  He was on 6 L when I saw him.  No chest pain.  No nausea vomiting or diarrhea.       Objective   Objective   Vital Signs  Temp:  [97.4 °F (36.3 °C)-98.2 °F (36.8 °C)] 97.4 °F (36.3 °C)  Heart Rate:  [79-90] 81  Resp:  [18-20] 20  BP: (120-175)/(73-91) 163/91  SpO2:  [88 %-96 %] 88 %  on  Flow (L/min):  [6-8] 6;   Device (Oxygen Therapy): humidified;high-flow nasal cannula  Body mass index is 25.08 kg/m².    Physical Exam  Vitals and nursing note reviewed.   Constitutional:       General: He is not in acute distress.     Appearance: He is ill-appearing. He is not diaphoretic.   Cardiovascular:      Rate and Rhythm: Normal rate and regular rhythm.      Pulses: Normal pulses.   Pulmonary:      Effort: Pulmonary effort is normal.      Breath sounds: Wheezing (end expiratory) and rhonchi (improved) present.   Abdominal:      General: There is no distension.      Palpations: Abdomen is soft.      Tenderness: There is no abdominal tenderness. There is no guarding or rebound.   Musculoskeletal:         General: No swelling or tenderness.      Comments: Kyphosis of T-spine   Skin:     General: Skin is warm and dry.   Neurological:      Mental Status: He is alert.      Cranial Nerves: No cranial nerve deficit.   Psychiatric:         Mood and Affect: Mood normal.         Behavior: Behavior normal.       Results Review  I reviewed the patient's new clinical results.    Results from last 7 days   Lab Units 23  0607 23  0433 23  0625 23  0648   WBC 10*3/mm3 6.49 6.60 6.62 6.94   HEMOGLOBIN g/dL 17.1 16.8 17.0 16.7   PLATELETS 10*3/mm3 224 227 227 235     Results from last 7 days   Lab Units 23  0607 23  0433  05/27/23  0624 05/26/23  0648   SODIUM mmol/L 136 136 138 136   POTASSIUM mmol/L 4.7 4.8 4.9 4.8   CHLORIDE mmol/L 97* 98 99 97*   CO2 mmol/L 30.3* 34.0* 31.4* 30.6*   BUN mg/dL 21 26* 24* 24*   CREATININE mg/dL 0.74* 0.89 0.89 0.84   GLUCOSE mg/dL 114* 116* 127* 132*   EGFR mL/min/1.73 91.0 86.1 86.1 87.6     Results from last 7 days   Lab Units 05/24/23  1046   ALBUMIN g/dL 3.7   BILIRUBIN mg/dL 0.7   ALK PHOS U/L 106   AST (SGOT) U/L 30   ALT (SGPT) U/L 18     Results from last 7 days   Lab Units 05/29/23  0607 05/28/23  0433 05/27/23  0624 05/26/23  0648 05/25/23  0705 05/24/23  1046   CALCIUM mg/dL 8.8 8.6 9.2 9.3   < > 8.6   ALBUMIN g/dL  --   --   --   --   --  3.7   MAGNESIUM mg/dL  --  2.1 2.2 2.2  --   --     < > = values in this interval not displayed.     Results from last 7 days   Lab Units 05/24/23  1235 05/24/23  1046   PROCALCITONIN ng/mL  --  0.06   LACTATE mmol/L 1.1  --      No results found for: HGBA1C, POCGLU    No radiology results for the last day    I have personally reviewed all medications:  Scheduled Medications  arformoterol, 15 mcg, Nebulization, BID - RT  atorvastatin, 10 mg, Oral, Daily  budesonide, 0.5 mg, Nebulization, BID - RT  cetirizine, 10 mg, Oral, Daily  eltrombopag, 50 mg, Oral, QAM  furosemide, 40 mg, Oral, Daily  guaiFENesin-dextromethorphan, 15 mL, Oral, 4x Daily  ipratropium-albuterol, 3 mL, Nebulization, 4x Daily - RT  ketotifen, 1 drop, Both Eyes, BID  methylPREDNISolone sodium succinate, 40 mg, Intravenous, Q12H  montelukast, 10 mg, Oral, Nightly  pantoprazole, 40 mg, Oral, Q AM  pilocarpine, 5 mg, Oral, TID  potassium chloride, 20 mEq, Oral, Daily  pregabalin, 75 mg, Oral, Q12H  rivaroxaban, 20 mg, Oral, Daily With Dinner  saccharomyces boulardii, 500 mg, Oral, BID  sodium chloride, 10 mL, Intravenous, Q12H  sodium chloride, 4 mL, Nebulization, BID - RT  sodium chloride, 2 spray, Each Nare, 4x Daily  tadalafil, 40 mg, Oral, Q24H  terazosin, 2 mg, Oral,  Nightly      Infusions     Diet  Diet: Cardiac Diets; Healthy Heart (2-3 Na+); Texture: Mechanical Ground (NDD 2); Fluid Consistency: Thin (IDDSI 0)    I have personally reviewed:  [x]  Laboratory   []  Microbiology   []  Radiology   []  EKG/Telemetry  []  Cardiology/Vascular   []  Pathology    []  Records       Assessment/Plan     Active Hospital Problems    Diagnosis  POA   • **Parainfluenza virus bronchitis [J20.4]  Yes   • Acute and chronic respiratory failure with hypoxia [J96.21]  Yes   • COPD with acute exacerbation [J44.1]  Yes   • Pulmonary hypertension [I27.20]  Yes   • Presence of cardiac pacemaker [Z95.0]  Yes   • Pharyngeal dysphagia [R13.13]  Yes   • IPF (idiopathic pulmonary fibrosis) [J84.112]  Yes   • Chronic diastolic CHF (congestive heart failure) [I50.32]  Yes   • Chronic respiratory failure with hypoxia and hypercapnia [J96.11, J96.12]  Yes   • Pulmonary emphysema [J43.9]  Yes   • Chronic anticoagulation [Z79.01]  Not Applicable   • BPH (benign prostatic hypertrophy) [N40.0]  Yes   • Dyslipidemia [E78.5]  Yes   • Paroxysmal atrial fibrillation [I48.0]  Yes   • Primary hypertension [I10]  Yes      Resolved Hospital Problems   No resolved problems to display.       82 y.o. male admitted with Parainfluenza virus bronchitis.    83yo gentleman with h/o HTN, HLD, BPH, IPF, COPD, chronic hypoxic/hypercapnic resp failure (4L/min and Trilogy vent at home), pulm HTN, PAF/SSS/PPM (Xarelto), and prior laryngeal CA with pharyngeal dysphagia, who presented to ER with c/o cough and SOA worse than baseline. He was just admitted here earlier this month for pneumonia. He is followed by [facility].     Parainfluenza 3 Bronchitis  - complicated by underlying COPD with acute exacerbation, IPF, and pulmonary hypertension   - requiring increased oxygen supplementation from baseline  - no evidence of bacterial pneumonia  - On IV Solu-Medrol.  -Continue breathing treatments  -Wean oxygen as able.  6 L currently.  He reported 4  L baseline.  -Pulmonology following.     COPD/IPF/Pulmonary HTN/Chronic Hypoxic and Hypercapnic Respiratory Failure  - appreciate Pulm attention to pt  - supportive treatment with bronchodilators and mucolytics as above  - NIPPV at night, on high flow NC O2 during the day, weaning oxygen as tolerated, avoid hyperoxemia due to chronic hypercapnic respiratory failure, keep sats 88-92%, no higher  - continue Adcirca     PAF/SSS s/p Pacemaker/HTN/Chronic Diastolic CHF  - HR acceptable  -Blood pressure becoming elevated. Will give additional dose of Lasix IV today and repeat BNP.  - on AC with Xarelto     BPH  - continue alpha-blocker     Hx of Laryngeal Cancer/Pharyngeal Dysphagia  - on modified diet per SLP recs     • Prophylaxis: Xarelto as above  • Disposition: Home/timing TBD     Expected Discharge Date: 5/29/2023; Expected Discharge Time:      Jose Herrera MD  [facility]  05/29/23  12:39 EDT    Dictated portions of note using dragon dictation software.

## 2023-05-29 NOTE — PLAN OF CARE
Goal Outcome Evaluation:  Plan of Care Reviewed With: patient        Progress: no change  Outcome Evaluation: Pt currently on 6L HF. Crackles persist bilat. Enc cough and use of flutter valve. Appetite good. Skin intact. VSS. Given additional dose Lasix IV per order. WCTM.

## 2023-05-29 NOTE — PROGRESS NOTES
LOS: 5 days   Patient Care Team:  Madison Lewis APRN as PCP - General (Nurse Practitioner)  Roa Maguire MD as Consulting Physician (Hematology and Oncology)  Alan Santana MD as Referring Physician (Family Medicine)  Gaurav Merrill MD as Consulting Physician (Otolaryngology)  Edgardo Brown MD (Otolaryngology)  Isael Beltre MD as Consulting Physician (Hematology and Oncology)  Herminia Salas MD as Consulting Physician (Internal Medicine)  Juventino Miller MD as Consulting Physician (Cardiac Electrophysiology)  Suresh Hernandez MD as Consulting Physician (Pulmonary Disease)    Chief Complaint:  F/up respiratory failure, COPD exacerbation and medical problems as below.    Subjective   Interval History  On 6 L oxygen.  Continues to have cough with greenish phlegm.    Used his trilogy ventilator last night.    REVIEW OF SYSTEMS:     GASTROINTESTINAL: No anorexia, nausea, vomiting or diarrhea.   CONSTITUTIONAL: No fever or chills.     Ventilator/Non-Invasive Ventilation Settings (From admission, onward)     Start     Ordered    05/24/23 2350  NIPPV - Provider Settings  (CPAP / BiPAP)  Until Discontinued        Comments: Settings transferred from from home trilogy machine   Question Answer Comment   Indication Acute Respiratory Failure    Type AVAPS/PC/PS    Backup Rate 5    Target VT (mL) 550    EPAP/PEEP (cm H2O) 6    Min Pressure (cm H2O) 12    Max Pressure (cm H2O) 28    Titrate Oxygen for SpO2 88 - 92%        05/24/23 2350                      Physical Exam:     Vital Signs  Temp:  [97.4 °F (36.3 °C)-98.2 °F (36.8 °C)] 97.4 °F (36.3 °C)  Heart Rate:  [79-90] 79  Resp:  [18-20] 18  BP: (120-175)/(73-91) 154/83    Intake/Output Summary (Last 24 hours) at 5/29/2023 1405  Last data filed at 5/29/2023 1339  Gross per 24 hour   Intake 870 ml   Output 750 ml   Net 120 ml     Flowsheet Rows    Flowsheet Row First Filed Value  "  Admission Height 182.9 cm (72\") Documented at 05/24/2023 1030   Admission Weight 85.7 kg (189 lb) Documented at 05/24/2023 1030          PPE used per hospital policy    General Appearance:   Alert, cooperative, in no acute distress   ENMT:  Mallampati score 3, moist mucous membrane   Eyes:  Pupils equal and reactive to light. EOMI   Neck:   Large. Trachea midline. No thyromegaly.   Lungs:    Coarse breath sounds bilaterally with expiratory wheezing.  No use of accessory muscles (    Heart:   Regular rhythm and normal rate, normal S1 and S2, no         murmur   Skin:   No rash or ecchymosis   Abdomen:    Obese. Soft. No tenderness. No HSM.   Neuro/psych:  Conscious, alert, oriented x3. Strength 5/5 in upper and lower  ext.  Appropriate mood and affect   Extremities:  No cyanosis, clubbing but mild edema in legs.  Warm extremities and well-perfused          Results Review:        Results from last 7 days   Lab Units 05/29/23  0607 05/28/23  0433 05/27/23  0624   SODIUM mmol/L 136 136 138   POTASSIUM mmol/L 4.7 4.8 4.9   CHLORIDE mmol/L 97* 98 99   CO2 mmol/L 30.3* 34.0* 31.4*   BUN mg/dL 21 26* 24*   CREATININE mg/dL 0.74* 0.89 0.89   GLUCOSE mg/dL 114* 116* 127*   CALCIUM mg/dL 8.8 8.6 9.2     Results from last 7 days   Lab Units 05/24/23  1235 05/24/23  1046   HSTROP T ng/L 33* 37*     Results from last 7 days   Lab Units 05/29/23  0607 05/28/23  0433 05/27/23  0625   WBC 10*3/mm3 6.49 6.60 6.62   HEMOGLOBIN g/dL 17.1 16.8 17.0   HEMATOCRIT % 50.9 50.1 51.4*   PLATELETS 10*3/mm3 224 227 227         Results from last 7 days   Lab Units 05/29/23  0607   PROBNP pg/mL 2,610.0*                           I reviewed the patient's new clinical results.        Medication Review:   arformoterol, 15 mcg, Nebulization, BID - RT  atorvastatin, 10 mg, Oral, Daily  budesonide, 0.5 mg, Nebulization, BID - RT  cetirizine, 10 mg, Oral, Daily  eltrombopag, 50 mg, Oral, QAM  furosemide, 40 mg, Oral, " Daily  guaiFENesin-dextromethorphan, 15 mL, Oral, 4x Daily  ipratropium-albuterol, 3 mL, Nebulization, 4x Daily - RT  ketotifen, 1 drop, Both Eyes, BID  methylPREDNISolone sodium succinate, 40 mg, Intravenous, Q12H  montelukast, 10 mg, Oral, Nightly  pantoprazole, 40 mg, Oral, Q AM  pilocarpine, 5 mg, Oral, TID  potassium chloride, 20 mEq, Oral, Daily  pregabalin, 75 mg, Oral, Q12H  rivaroxaban, 20 mg, Oral, Daily With Dinner  saccharomyces boulardii, 500 mg, Oral, BID  sodium chloride, 10 mL, Intravenous, Q12H  sodium chloride, 4 mL, Nebulization, BID - RT  sodium chloride, 2 spray, Each Nare, 4x Daily  tadalafil, 40 mg, Oral, Q24H  terazosin, 2 mg, Oral, Nightly          Assessment     1. COPD exacerbation  2. Parainfluenza bronchitis  3. Pulmonary infiltrates: Seems chronic and waxing and waning.  Now right lower lobe.  Previously mostly in the left lower lobe.  Unclear how much of that represents scarring.  It does not appear that it is related to bacterial pneumonia but could be viral pneumonia  4. Acute on chronic hypoxic respiratory failure  5. Chronic hypercapnic respiratory failure, on trilogy ventilator.  6. Expected mild steroid-induced hyperglycemia        Plan     · Oxygen by NC and titrate to keep SPO2 >90%  · Can use his trilogy ventilator overnight instead of the AVAPS V60 NIPPV  · Solu-Medrol 40 mg twice daily  · Hypertonic saline nebs twice a day  · Insulin NovoLog SC as needed for hyperglycemia  · DuoNeb 4 times a day.  Budesonide 0.5 mg twice a day and a formoterol 15 mcg twice daily.  · Flutter to improve mucus clearance  · Lasix 40 mg daily  · DNR/DNI    I talked to his wife over the phone.  Apparently she feels sick with symptoms of respiratory infection.  She was concerned about him being discharged and she is not able to take care of him.  I reassured her that we would ensure that he would get the necessary help he needs when he is ready to be discharged.    Ken Up MD  05/29/23  14:05  EDT          This note was dictated utilizing Dragon dictation

## 2023-05-29 NOTE — PLAN OF CARE
Goal Outcome Evaluation:  Plan of Care Reviewed With: patient        Progress: improving  Outcome Evaluation: Pt on 6L HF while awake and on Trilogy during sleep. VSS. Will continue to monitor.

## 2023-05-30 ENCOUNTER — APPOINTMENT (OUTPATIENT)
Dept: GENERAL RADIOLOGY | Facility: HOSPITAL | Age: 83
DRG: 202 | End: 2023-05-30
Payer: MEDICARE

## 2023-05-30 LAB
ANION GAP SERPL CALCULATED.3IONS-SCNC: 7.6 MMOL/L (ref 5–15)
BUN SERPL-MCNC: 21 MG/DL (ref 8–23)
BUN/CREAT SERPL: 27.3 (ref 7–25)
CALCIUM SPEC-SCNC: 9.4 MG/DL (ref 8.6–10.5)
CHLORIDE SERPL-SCNC: 95 MMOL/L (ref 98–107)
CO2 SERPL-SCNC: 34.4 MMOL/L (ref 22–29)
CREAT SERPL-MCNC: 0.77 MG/DL (ref 0.76–1.27)
DEPRECATED RDW RBC AUTO: 42.1 FL (ref 37–54)
EGFRCR SERPLBLD CKD-EPI 2021: 89.9 ML/MIN/1.73
ERYTHROCYTE [DISTWIDTH] IN BLOOD BY AUTOMATED COUNT: 13 % (ref 12.3–15.4)
GLUCOSE SERPL-MCNC: 128 MG/DL (ref 65–99)
HCT VFR BLD AUTO: 54.8 % (ref 37.5–51)
HGB BLD-MCNC: 18.2 G/DL (ref 13–17.7)
MCH RBC QN AUTO: 29.7 PG (ref 26.6–33)
MCHC RBC AUTO-ENTMCNC: 33.2 G/DL (ref 31.5–35.7)
MCV RBC AUTO: 89.4 FL (ref 79–97)
PLATELET # BLD AUTO: 259 10*3/MM3 (ref 140–450)
PMV BLD AUTO: 9.7 FL (ref 6–12)
POTASSIUM SERPL-SCNC: 4.5 MMOL/L (ref 3.5–5.2)
RBC # BLD AUTO: 6.13 10*6/MM3 (ref 4.14–5.8)
SODIUM SERPL-SCNC: 137 MMOL/L (ref 136–145)
WBC NRBC COR # BLD: 7.92 10*3/MM3 (ref 3.4–10.8)

## 2023-05-30 PROCEDURE — 63710000001 PREDNISONE PER 1 MG: Performed by: INTERNAL MEDICINE

## 2023-05-30 PROCEDURE — 94799 UNLISTED PULMONARY SVC/PX: CPT

## 2023-05-30 PROCEDURE — 94761 N-INVAS EAR/PLS OXIMETRY MLT: CPT

## 2023-05-30 PROCEDURE — 80048 BASIC METABOLIC PNL TOTAL CA: CPT | Performed by: INTERNAL MEDICINE

## 2023-05-30 PROCEDURE — 25010000002 METHYLPREDNISOLONE PER 40 MG: Performed by: INTERNAL MEDICINE

## 2023-05-30 PROCEDURE — 85027 COMPLETE CBC AUTOMATED: CPT | Performed by: INTERNAL MEDICINE

## 2023-05-30 PROCEDURE — 97110 THERAPEUTIC EXERCISES: CPT

## 2023-05-30 PROCEDURE — 71045 X-RAY EXAM CHEST 1 VIEW: CPT

## 2023-05-30 PROCEDURE — 94760 N-INVAS EAR/PLS OXIMETRY 1: CPT

## 2023-05-30 PROCEDURE — 94664 DEMO&/EVAL PT USE INHALER: CPT

## 2023-05-30 RX ORDER — GUAIFENESIN 600 MG/1
1200 TABLET, EXTENDED RELEASE ORAL EVERY 12 HOURS SCHEDULED
Status: DISCONTINUED | OUTPATIENT
Start: 2023-05-30 | End: 2023-06-01 | Stop reason: HOSPADM

## 2023-05-30 RX ORDER — PREDNISONE 20 MG/1
20 TABLET ORAL 2 TIMES DAILY WITH MEALS
Status: DISCONTINUED | OUTPATIENT
Start: 2023-05-30 | End: 2023-06-01 | Stop reason: HOSPADM

## 2023-05-30 RX ADMIN — BUDESONIDE 0.5 MG: 0.5 INHALANT ORAL at 20:19

## 2023-05-30 RX ADMIN — IPRATROPIUM BROMIDE AND ALBUTEROL SULFATE 3 ML: .5; 3 SOLUTION RESPIRATORY (INHALATION) at 11:49

## 2023-05-30 RX ADMIN — IPRATROPIUM BROMIDE AND ALBUTEROL SULFATE 3 ML: .5; 3 SOLUTION RESPIRATORY (INHALATION) at 15:47

## 2023-05-30 RX ADMIN — PILOCARPINE HYDRCHLORIDE 5 MG: 5 TABLET, FILM COATED ORAL at 16:30

## 2023-05-30 RX ADMIN — SALINE NASAL SPRAY 2 SPRAY: 1.5 SOLUTION NASAL at 21:37

## 2023-05-30 RX ADMIN — PILOCARPINE HYDRCHLORIDE 5 MG: 5 TABLET, FILM COATED ORAL at 08:15

## 2023-05-30 RX ADMIN — GUAIFENESIN 1200 MG: 600 TABLET, EXTENDED RELEASE ORAL at 21:35

## 2023-05-30 RX ADMIN — PILOCARPINE HYDRCHLORIDE 5 MG: 5 TABLET, FILM COATED ORAL at 21:36

## 2023-05-30 RX ADMIN — ATORVASTATIN CALCIUM 10 MG: 20 TABLET, FILM COATED ORAL at 08:15

## 2023-05-30 RX ADMIN — IPRATROPIUM BROMIDE AND ALBUTEROL SULFATE 3 ML: .5; 3 SOLUTION RESPIRATORY (INHALATION) at 20:19

## 2023-05-30 RX ADMIN — GUAIFENESIN 1200 MG: 600 TABLET, EXTENDED RELEASE ORAL at 12:29

## 2023-05-30 RX ADMIN — METHYLPREDNISOLONE SODIUM SUCCINATE 40 MG: 40 INJECTION, POWDER, FOR SOLUTION INTRAMUSCULAR; INTRAVENOUS at 11:41

## 2023-05-30 RX ADMIN — Medication 500 MG: at 08:15

## 2023-05-30 RX ADMIN — RIVAROXABAN 20 MG: 20 TABLET, FILM COATED ORAL at 17:42

## 2023-05-30 RX ADMIN — ARFORMOTEROL TARTRATE 15 MCG: 15 SOLUTION RESPIRATORY (INHALATION) at 20:24

## 2023-05-30 RX ADMIN — Medication 10 ML: at 21:36

## 2023-05-30 RX ADMIN — Medication 500 MG: at 21:36

## 2023-05-30 RX ADMIN — GUAIFENESIN SYRUP AND DEXTROMETHORPHAN 15 ML: 100; 10 SYRUP ORAL at 08:14

## 2023-05-30 RX ADMIN — SALINE NASAL SPRAY 2 SPRAY: 1.5 SOLUTION NASAL at 17:42

## 2023-05-30 RX ADMIN — POTASSIUM CHLORIDE 20 MEQ: 750 TABLET, EXTENDED RELEASE ORAL at 08:15

## 2023-05-30 RX ADMIN — PREDNISONE 20 MG: 20 TABLET ORAL at 17:42

## 2023-05-30 RX ADMIN — PREGABALIN 75 MG: 75 CAPSULE ORAL at 21:35

## 2023-05-30 RX ADMIN — KETOTIFEN FUMARATE 1 DROP: 0.25 SOLUTION OPHTHALMIC at 21:37

## 2023-05-30 RX ADMIN — KETOTIFEN FUMARATE 1 DROP: 0.25 SOLUTION OPHTHALMIC at 08:16

## 2023-05-30 RX ADMIN — SALINE NASAL SPRAY 2 SPRAY: 1.5 SOLUTION NASAL at 08:15

## 2023-05-30 RX ADMIN — TADALAFIL 40 MG: 20 TABLET ORAL at 08:15

## 2023-05-30 RX ADMIN — FUROSEMIDE 40 MG: 40 TABLET ORAL at 08:14

## 2023-05-30 RX ADMIN — Medication 10 ML: at 08:16

## 2023-05-30 RX ADMIN — TERAZOSIN 2 MG: 2 CAPSULE ORAL at 21:36

## 2023-05-30 RX ADMIN — PREGABALIN 75 MG: 75 CAPSULE ORAL at 08:14

## 2023-05-30 RX ADMIN — PANTOPRAZOLE SODIUM 40 MG: 40 TABLET, DELAYED RELEASE ORAL at 05:51

## 2023-05-30 RX ADMIN — SODIUM CHLORIDE SOLN NEBU 7% 4 ML: 7 NEBU SOLN at 20:30

## 2023-05-30 RX ADMIN — CETIRIZINE HYDROCHLORIDE 10 MG: 10 TABLET ORAL at 08:15

## 2023-05-30 RX ADMIN — ELTROMBOPAG OLAMINE 50 MG: 25 TABLET, FILM COATED ORAL at 06:04

## 2023-05-30 RX ADMIN — SALINE NASAL SPRAY 2 SPRAY: 1.5 SOLUTION NASAL at 11:41

## 2023-05-30 RX ADMIN — MONTELUKAST SODIUM 10 MG: 10 TABLET, FILM COATED ORAL at 21:35

## 2023-05-30 NOTE — PROGRESS NOTES
" LOS: 6 days     Name: Alessio Allen  Age: 82 y.o.  Sex: male  :  1940  MRN: [id number]         Primary Care Physician: Madison Lewis APRN    Subjective   Subjective  Currently requiring 6 L of oxygen.  Desats with exertion.  Reports some improvement of his shortness of breath.  Minimal cough.    Objective   Vital Signs  Temp:  [97.4 °F (36.3 °C)-98.2 °F (36.8 °C)] 97.5 °F (36.4 °C)  Heart Rate:  [79-85] 85  Resp:  [16-18] 16  BP: (120-154)/(73-89) 123/73  Body mass index is 25.08 kg/m².    Objective:  General Appearance:  Comfortable, in no acute distress and ill-appearing (Very chronically ill, weak, frail and deconditioned appearing).    Vital signs: (most recent): Blood pressure 123/73, pulse 85, temperature 97.5 °F (36.4 °C), temperature source Oral, resp. rate 16, height 182.9 cm (72\"), weight 83.9 kg (184 lb 14.4 oz), SpO2 90 %.    Lungs:  Normal effort and normal respiratory rate.  There are decreased breath sounds, wheezes and rhonchi.    Heart: Normal rate.  Regular rhythm.    Abdomen: Abdomen is soft.  Bowel sounds are normal.   There is no abdominal tenderness.     Extremities: There is no dependent edema or local swelling.    Neurological: Patient is alert and oriented to person, place and time.    Skin:  Warm and dry.              Results Review:       I reviewed the patient's new clinical results.    Results from last 7 days   Lab Units 23  0544 23  0607 23  0433 23  0625 23  0648 23  0705 23  1046   WBC 10*3/mm3 7.92 6.49 6.60 6.62 6.94 3.75 4.98   HEMOGLOBIN g/dL 18.2* 17.1 16.8 17.0 16.7 16.8 16.6   PLATELETS 10*3/mm3 259 224 227 227 235 249 262     Results from last 7 days   Lab Units 23  0544 23  0607 23  0433 23  0624 23  0648 23  0705 23  1046   SODIUM mmol/L 137 136 136 138 136 138 138   POTASSIUM mmol/L 4.5 4.7 4.8 4.9 4.8 4.6 4.2   CHLORIDE mmol/L 95* 97* 98 99 97* 98 99   CO2 mmol/L 34.4* " 30.3* 34.0* 31.4* 30.6* 29.0 31.0*   BUN mg/dL 21 21 26* 24* 24* 15 13   CREATININE mg/dL 0.77 0.74* 0.89 0.89 0.84 0.81 0.98   CALCIUM mg/dL 9.4 8.8 8.6 9.2 9.3 9.6 8.6   GLUCOSE mg/dL 128* 114* 116* 127* 132* 127* 105*                 Scheduled Meds:   arformoterol, 15 mcg, Nebulization, BID - RT  atorvastatin, 10 mg, Oral, Daily  budesonide, 0.5 mg, Nebulization, BID - RT  cetirizine, 10 mg, Oral, Daily  eltrombopag, 50 mg, Oral, QAM  furosemide, 40 mg, Oral, Daily  guaiFENesin, 1,200 mg, Oral, Q12H  ipratropium-albuterol, 3 mL, Nebulization, 4x Daily - RT  ketotifen, 1 drop, Both Eyes, BID  montelukast, 10 mg, Oral, Nightly  pantoprazole, 40 mg, Oral, Q AM  pilocarpine, 5 mg, Oral, TID  potassium chloride, 20 mEq, Oral, Daily  predniSONE, 20 mg, Oral, BID With Meals  pregabalin, 75 mg, Oral, Q12H  rivaroxaban, 20 mg, Oral, Daily With Dinner  saccharomyces boulardii, 500 mg, Oral, BID  sodium chloride, 10 mL, Intravenous, Q12H  sodium chloride, 4 mL, Nebulization, BID - RT  sodium chloride, 2 spray, Each Nare, 4x Daily  tadalafil, 40 mg, Oral, Q24H  terazosin, 2 mg, Oral, Nightly      PRN Meds:   •  acetaminophen **OR** acetaminophen **OR** acetaminophen  •  senna-docusate sodium **AND** polyethylene glycol **AND** bisacodyl **AND** bisacodyl  •  calcium carbonate  •  melatonin  •  nitroglycerin  •  ondansetron **OR** ondansetron  •  sodium chloride  •  sodium chloride  •  sodium chloride  Continuous Infusions:       Assessment & Plan   Active Hospital Problems    Diagnosis  POA   • **Parainfluenza virus bronchitis [J20.4]  Yes   • Acute and chronic respiratory failure with hypoxia [J96.21]  Yes   • COPD with acute exacerbation [J44.1]  Yes   • Pulmonary hypertension [I27.20]  Yes   • Presence of cardiac pacemaker [Z95.0]  Yes   • Pharyngeal dysphagia [R13.13]  Yes   • IPF (idiopathic pulmonary fibrosis) [J84.112]  Yes   • Chronic diastolic CHF (congestive heart failure) [I50.32]  Yes   • Chronic respiratory  failure with hypoxia and hypercapnia [J96.11, J96.12]  Yes   • Pulmonary emphysema [J43.9]  Yes   • Chronic anticoagulation [Z79.01]  Not Applicable   • BPH (benign prostatic hypertrophy) [N40.0]  Yes   • Dyslipidemia [E78.5]  Yes   • Paroxysmal atrial fibrillation [I48.0]  Yes   • Primary hypertension [I10]  Yes      Resolved Hospital Problems   No resolved problems to display.       Assessment & Plan    83yo gentleman with h/o HTN, HLD, BPH, IPF, COPD, chronic hypoxic/hypercapnic resp failure (4L/min and Trilogy vent at home), pulm HTN, PAF/SSS/PPM (Xarelto), and prior laryngeal CA with pharyngeal dysphagia, who presented to ER with c/o cough and SOA worse than baseline. He was just admitted here earlier this month for pneumonia. He is followed by [facility].     Parainfluenza 3 Bronchitis  - complicated by underlying COPD with acute exacerbation, IPF, and pulmonary hypertension   - requiring increased oxygen supplementation from baseline  - no evidence of bacterial pneumonia  -Switching to prednisone today.  -Continue breathing treatments  -Wean oxygen as able.  6 L currently.  He reported 4 L baseline.  -Pulmonology following.     COPD/IPF/Pulmonary HTN/Chronic Hypoxic and Hypercapnic Respiratory Failure  - appreciate Pulm attention to pt  - supportive treatment with bronchodilators and mucolytics as above  - NIPPV at night, on high flow NC O2 during the day, weaning oxygen as tolerated, avoid hyperoxemia due to chronic hypercapnic respiratory failure, keep sats 88-92%, no higher  - continue Adcirca     PAF/SSS s/p Pacemaker/HTN/Chronic Diastolic CHF  - HR acceptable  -Continue daily oral Lasix  - on AC with Xarelto     BPH  - continue alpha-blocker     Hx of Laryngeal Cancer/Pharyngeal Dysphagia  - on modified diet per SLP recs     • Prophylaxis: Xarelto as above  • Disposition: Home/timing TBD       Expected Discharge Date: 5/30/2023; Expected Discharge Time:      Jeff Montero MD  [facility]  05/30/23  13:14 EDT

## 2023-05-30 NOTE — PROGRESS NOTES
"                                              LOS: 6 days   Patient Care Team:  Madison Lewis APRN as PCP - General (Nurse Practitioner)  Rao Maguire MD as Consulting Physician (Hematology and Oncology)  Alan Santana MD as Referring Physician (Family Medicine)  Gaurav Merrill MD as Consulting Physician (Otolaryngology)  Edgardo Brown MD (Otolaryngology)  Isael Beltre MD as Consulting Physician (Hematology and Oncology)  Herminia Salas MD as Consulting Physician (Internal Medicine)  Juventino Miller MD as Consulting Physician (Cardiac Electrophysiology)  Suresh Hernandez MD as Consulting Physician (Pulmonary Disease)    Chief Complaint:  F/up respiratory failure, COPD exacerbation and medical problems as below.    Subjective   Interval History  Currently on 8 L oxygen nasal cannula.  Tells me uses at baseline 4 L oxygen.    REVIEW OF SYSTEMS:     GASTROINTESTINAL: No anorexia, nausea, vomiting or diarrhea.   CONSTITUTIONAL: No fever or chills.     Ventilator/Non-Invasive Ventilation Settings (From admission, onward)     Start     Ordered    05/24/23 2350  NIPPV - Provider Settings  (CPAP / BiPAP)  Until Discontinued        Comments: Settings transferred from from home trilogy machine   Question Answer Comment   Indication Acute Respiratory Failure    Type AVAPS/PC/PS    Backup Rate 5    Target VT (mL) 550    EPAP/PEEP (cm H2O) 6    Min Pressure (cm H2O) 12    Max Pressure (cm H2O) 28    Titrate Oxygen for SpO2 88 - 92%        05/24/23 2350                      Physical Exam:     Vital Signs  Temp:  [97.4 °F (36.3 °C)-98.2 °F (36.8 °C)] 97.5 °F (36.4 °C)  Heart Rate:  [79-83] 79  Resp:  [16-18] 16  BP: (120-154)/(74-89) 143/89    Intake/Output Summary (Last 24 hours) at 5/30/2023 1206  Last data filed at 5/30/2023 0931  Gross per 24 hour   Intake 598 ml   Output 900 ml   Net -302 ml     Flowsheet Rows    Flowsheet Row First Filed Value   Admission Height 182.9 cm (72\") " Documented at 05/24/2023 1030   Admission Weight 85.7 kg (189 lb) Documented at 05/24/2023 1030          PPE used per hospital policy    General Appearance:   Alert, cooperative, in no acute distress   ENMT:  Mallampati score 3, moist mucous membrane   Eyes:  Pupils equal and reactive to light. EOMI   Neck:   Large. Trachea midline. No thyromegaly.   Lungs:    Coarse breath sounds bilaterally with expiratory wheezing.  No use of accessory muscles (    Heart:   Regular rhythm and normal rate, normal S1 and S2, no         murmur   Skin:   No rash or ecchymosis   Abdomen:    Obese. Soft. No tenderness. No HSM.   Neuro/psych:  Conscious, alert, oriented x3. Strength 5/5 in upper and lower  ext.  Appropriate mood and affect   Extremities:  No cyanosis, clubbing but mild edema in legs.  Warm extremities and well-perfused          Results Review:        Results from last 7 days   Lab Units 05/30/23  0544 05/29/23  0607 05/28/23  0433   SODIUM mmol/L 137 136 136   POTASSIUM mmol/L 4.5 4.7 4.8   CHLORIDE mmol/L 95* 97* 98   CO2 mmol/L 34.4* 30.3* 34.0*   BUN mg/dL 21 21 26*   CREATININE mg/dL 0.77 0.74* 0.89   GLUCOSE mg/dL 128* 114* 116*   CALCIUM mg/dL 9.4 8.8 8.6     Results from last 7 days   Lab Units 05/24/23  1235 05/24/23  1046   HSTROP T ng/L 33* 37*     Results from last 7 days   Lab Units 05/30/23  0544 05/29/23  0607 05/28/23  0433   WBC 10*3/mm3 7.92 6.49 6.60   HEMOGLOBIN g/dL 18.2* 17.1 16.8   HEMATOCRIT % 54.8* 50.9 50.1   PLATELETS 10*3/mm3 259 224 227         Results from last 7 days   Lab Units 05/29/23  0607   PROBNP pg/mL 2,610.0*                           I reviewed the patient's new clinical results.        Medication Review:   arformoterol, 15 mcg, Nebulization, BID - RT  atorvastatin, 10 mg, Oral, Daily  budesonide, 0.5 mg, Nebulization, BID - RT  cetirizine, 10 mg, Oral, Daily  eltrombopag, 50 mg, Oral, QAM  furosemide, 40 mg, Oral, Daily  guaiFENesin, 1,200 mg, Oral, Q12H  ipratropium-albuterol, 3  mL, Nebulization, 4x Daily - RT  ketotifen, 1 drop, Both Eyes, BID  methylPREDNISolone sodium succinate, 40 mg, Intravenous, Q12H  montelukast, 10 mg, Oral, Nightly  pantoprazole, 40 mg, Oral, Q AM  pilocarpine, 5 mg, Oral, TID  potassium chloride, 20 mEq, Oral, Daily  pregabalin, 75 mg, Oral, Q12H  rivaroxaban, 20 mg, Oral, Daily With Dinner  saccharomyces boulardii, 500 mg, Oral, BID  sodium chloride, 10 mL, Intravenous, Q12H  sodium chloride, 4 mL, Nebulization, BID - RT  sodium chloride, 2 spray, Each Nare, 4x Daily  tadalafil, 40 mg, Oral, Q24H  terazosin, 2 mg, Oral, Nightly          Assessment     1. COPD exacerbation  2. Parainfluenza bronchitis  3. Pulmonary infiltrates: Seems chronic and waxing and waning.  Now right lower lobe.  Previously mostly in the left lower lobe.  Unclear how much of that represents scarring.  It does not appear that it is related to bacterial pneumonia but could be viral pneumonia  4. Acute on chronic hypoxic respiratory failure  5. Chronic hypercapnic respiratory failure, on trilogy ventilator.  6. Expected mild steroid-induced hyperglycemia        Plan     · Oxygen by NC and titrate to keep SPO2 >90%.  Will wean oxygen as tolerated at baseline uses 4 L at home  · Can use his trilogy ventilator overnight instead of the AVAPS V60 NIPPV  · We will switch steroids to oral.  Repeat chest x-ray in the morning.  · Hypertonic saline nebs twice a day  · Insulin NovoLog SC as needed for hyperglycemia  · DuoNeb 4 times a day.  Budesonide 0.5 mg twice a day and a formoterol 15 mcg twice daily.  · Flutter to improve mucus clearance  · Lasix 40 mg daily  · DNR/DNI        Nancy Sarabia MD  05/30/23  12:06 EDT          This note was dictated utilizing Dragon dictation

## 2023-05-30 NOTE — THERAPY TREATMENT NOTE
Patient Name: Alessio Allen  : 1940    MRN: [id number]                              Today's Date: 2023       Admit Date: 2023    Visit Dx:     ICD-10-CM ICD-9-CM   1. Acute on chronic respiratory failure with hypoxia  J96.21 518.84     799.02   2. Parainfluenza virus bronchitis  J20.4 466.0     079.89   3. Acute exacerbation of chronic obstructive pulmonary disease (COPD)  J44.1 491.21     Patient Active Problem List   Diagnosis   • Paroxysmal atrial fibrillation   • Dyslipidemia   • Primary hypertension   • Neuropathy   • Hypertension   • COPD (chronic obstructive pulmonary disease)   • BPH (benign prostatic hypertrophy)   • Atrial fibrillation   • Asthma   • Hypoxia   • Pneumonia   • Chronic anticoagulation   • Pneumonia of both lungs due to infectious organism   • Hyponatremia   • Pulmonary emphysema   • Chronic respiratory failure with hypoxia and hypercapnia   • Pneumonia of both lower lobes due to infectious organism   • Chronic diastolic CHF (congestive heart failure)   • IPF (idiopathic pulmonary fibrosis)   • Acute respiratory failure with hypoxia   • Attention to G-tube   • Massive hemoptysis   • Laryngeal cancer   • Encounter for venous access device care   • Pneumonia of left lower lobe due to infectious organism   • Hemoptysis   • Pharyngeal dysphagia   • Parainfluenza virus bronchitis   • Pulmonary hypertension   • Presence of cardiac pacemaker   • Acute and chronic respiratory failure with hypoxia   • COPD with acute exacerbation     Past Medical History:   Diagnosis Date   • Acute on chronic diastolic heart failure    • Anemia 2018   • Arthritis    • Asthma    • Atrial fibrillation    • BPH (benign prostatic hypertrophy)    • Cancer     throat CA   • Cataract 2012    Removed   • CHF (congestive heart failure) 2018   • Colon polyp 2004   • COPD (chronic obstructive pulmonary disease)    • Coronary artery disease 2017   • Dependence on continuous supplemental oxygen     3L-5L  DEPENDING ON ACTIVITY   • Diverticulosis    • Erectile dysfunction    • GERD (gastroesophageal reflux disease)    • H/O Abnormal CBC 2017   • History of heart artery stent 03/2017   • History of medical problems 2017    Afib   • Hyperlipidemia    • Hypertension    • Low back pain 2012   • Lung disease    • Neuropathy     feet and hands    • Osteopenia 2012    Osteopenia   • Pneumonia      Past Surgical History:   Procedure Laterality Date   • BRONCHOSCOPY N/A 04/07/2018    Procedure: BRONCHOSCOPY with specimens;  Surgeon: Suresh Hernandez MD;  Location: [facility] MAIN OR;  Service: Pulmonary   • BRONCHOSCOPY N/A 03/06/2019    Procedure: BRONCHOSCOPY WITH BAL AND BIOPSY UNDER FLUORO AND ANESTHESIA;  Surgeon: Suresh Hernandez MD;  [facility];  Service: Pulmonary   • BRONCHOSCOPY N/A 06/13/2019    Procedure: BRONCHOSCOPY;  Surgeon: Suresh Hernandez MD;  Location: [facility] MAIN OR;  Service: Pulmonary   • CARDIAC CATHETERIZATION N/A 04/18/2018    Procedure: Right Heart Cath with possible vasodilator study;  Surgeon: Torey Schuler MD;  Location: [facility] CATH INVASIVE LOCATION;  Service: Cardiovascular   • CARDIAC CATHETERIZATION N/A 03/07/2019    Procedure: RIGHT AND LEFT HEART CATH;  Surgeon: Marizol Holt MD;  Location: [facility] CATH INVASIVE LOCATION;  Service: Cardiology   • CARDIAC CATHETERIZATION N/A 03/07/2019    Procedure: CORONARY ANGIOGRAPHY;  Surgeon: Marizol Holt MD;  Location: [facility] CATH INVASIVE LOCATION;  Service: Cardiology   • COLONOSCOPY  12/05/2016    polyps   • CORONARY STENT PLACEMENT  2017   • FRACTURE SURGERY     • INSERT / REPLACE / VENOUS ACCESS CATHETER Right    • PACEMAKER IMPLANTATION     • VENOUS ACCESS DEVICE (PORT) REMOVAL Right 04/05/2021    Procedure: Mediport Removal;  Surgeon: Joseph Nickerson Jr., MD;  Location: [facility] MAIN OR;  Service: General;  Laterality: Right;      General Information     Row Name 05/30/23 0903          Physical Therapy Time and Intention    Document Type  therapy note (daily note)  -     Mode of Treatment physical therapy;individual therapy  -     Row Name 05/30/23 0923          General Information    Existing Precautions/Restrictions fall;oxygen therapy device and L/min  -EB     Row Name 05/30/23 0923          Cognition    Orientation Status (Cognition) oriented x 3  -EB     Row Name 05/30/23 0923          Safety Issues, Functional Mobility    Impairments Affecting Function (Mobility) endurance/activity tolerance;strength;range of motion (ROM)  -EB           User Key  (r) = Recorded By, (t) = Taken By, (c) = Cosigned By    Initials Name Provider Type    Candy Avila PTA Physical Therapist Assistant               Mobility     Row Name 05/30/23 0923          Bed Mobility    Comment, (Bed Mobility) NT-pt reported he just got back into bed from walking to/from bathroom. Agreeable to therex.  -EB           User Key  (r) = Recorded By, (t) = Taken By, (c) = Cosigned By    Initials Name Provider Type    Candy Avila PTA Physical Therapist Assistant               Obj/Interventions     Row Name 05/30/23 0924          Motor Skills    Therapeutic Exercise --  BLE: AP, HS, SAQs, hip abd/add (X10) BUE: elbow flex, shoulder flex, forward punches (X10)  -EB           User Key  (r) = Recorded By, (t) = Taken By, (c) = Cosigned By    Initials Name Provider Type    Candy Avila PTA Physical Therapist Assistant               Goals/Plan    No documentation.                Clinical Impression     Row Name 05/30/23 0925          Plan of Care Review    Plan of Care Reviewed With patient  -     Progress no change  -     Outcome Evaluation Pt tolerated treatment with no complaints. Pt reported just ambulating from bathroom with nsg. Pt agreeable to therapeutic BLE/BUE exercises with pt's O2 staying in the 90s. Pt fatigued afterwards. Will continue to progress pt as able.  -EB     Row Name 05/30/23 0925          Therapy Assessment/Plan (PT)    Therapy Frequency (PT) 6  times/wk  -EB     Row Name 05/30/23 0925          Positioning and Restraints    Pre-Treatment Position in bed  -EB     Post Treatment Position bed  -EB     In Bed supine;call light within reach;encouraged to call for assist;exit alarm on  HOB elevated  -EB           User Key  (r) = Recorded By, (t) = Taken By, (c) = Cosigned By    Initials Name Provider Type    Candy Avila PTA Physical Therapist Assistant               Outcome Measures     Row Name 05/30/23 0929 05/30/23 0819       How much help from another person do you currently need...    Turning from your back to your side while in flat bed without using bedrails? 4  -EB 4  -LT    Moving from lying on back to sitting on the side of a flat bed without bedrails? 4  -EB 4  -LT    Moving to and from a bed to a chair (including a wheelchair)? 3  -EB 3  -LT    Standing up from a chair using your arms (e.g., wheelchair, bedside chair)? 3  -EB 3  -LT    Climbing 3-5 steps with a railing? 2  -EB 2  -LT    To walk in hospital room? 3  -EB 3  -LT    AM-PAC 6 Clicks Score (PT) 19  -EB 19  -LT    Highest level of mobility 6 --> Walked 10 steps or more  -EB 6 --> Walked 10 steps or more  -LT          User Key  (r) = Recorded By, (t) = Taken By, (c) = Cosigned By    Initials Name Provider Type    Candy Avila PTA Physical Therapist Assistant    LT Damari Lopez RN Registered Nurse                             Physical Therapy Education     Title: PT OT SLP Therapies (Done)     Topic: Physical Therapy (Done)     Point: Mobility training (Done)     Learning Progress Summary           Patient Acceptance, E,D,TB, VU,DU by  at 5/30/2023 0929    Acceptance, E,TB, VU,NR by CB at 5/26/2023 1516                   Point: Home exercise program (Done)     Learning Progress Summary           Patient Acceptance, E,TB, VU,NR by CB at 5/26/2023 1516                   Point: Body mechanics (Done)     Learning Progress Summary           Patient Acceptance, E,D,TB, VU,DU by   at 5/30/2023 0929    Acceptance, E,TB, VU,NR by CB at 5/26/2023 1516                   Point: Precautions (Done)     Learning Progress Summary           Patient Acceptance, E,D,TB, VU,DU by EB at 5/30/2023 0929    Acceptance, E,TB, VU,NR by CB at 5/26/2023 1516                               User Key     Initials Effective Dates Name Provider Type Discipline     02/14/23 -  Candy Jaimes PTA Physical Therapist Assistant PT    CB 10/22/21 -  Yanet Mallory, [place] Physical Therapist PT              PT Recommendation and Plan     Plan of Care Reviewed With: patient  Progress: no change  Outcome Evaluation: Pt tolerated treatment with no complaints. Pt reported just ambulating from bathroom with nsg. Pt agreeable to therapeutic BLE/BUE exercises with pt's O2 staying in the 90s. Pt fatigued afterwards. Will continue to progress pt as able.     Time Calculation:    PT Charges     Row Name 05/30/23 0922             Time Calculation    Start Time 0905  -EB      Stop Time 0917  -EB      Time Calculation (min) 12 min  -EB      PT Received On 05/30/23  -EB      PT - Next Appointment 05/31/23  -         Time Calculation- PT    Total Timed Code Minutes- PT 12 minute(s)  -EB            User Key  (r) = Recorded By, (t) = Taken By, (c) = Cosigned By    Initials Name Provider Type    EB Candy Jaimes PTA Physical Therapist Assistant              Therapy Charges for Today     Code Description Service Date Service Provider Modifiers Qty    94441418561 HC PT THER PROC EA 15 MIN 5/30/2023 Candy Jaimes PTA GP 1          PT G-Codes  Outcome Measure Options: AM-PAC 6 Clicks Basic Mobility (PT)  AM-PAC 6 Clicks Score (PT): 19       Candy Jaimes PTA  5/30/2023

## 2023-05-30 NOTE — PLAN OF CARE
Goal Outcome Evaluation:              Outcome Evaluation: A&Ox4. 6L HFNC, will wean as tolerated. Up SBA with walker. Repositioning frequently. PO steroid. Home trilogy at bedside. Meds given in applesauce. Encouraged use of IS, pt performs independently. VSS.     Daily Care Plan Summary: Heart Failure    Diuretic in use (IV or PO): PO Lasix         Daily weight (up or down):   MELANIE      Output > Intake (yes/no): Yes      O2 Requirements (current, any change?): 6L HFNC, baseline is 5L NC      Symptoms noted with Activity (Respiratory Tolerance, functional state):   SOA noted throughout activty, encouraged deep breathing.    Anticipated Discharge Plans: Not yet determined.

## 2023-05-30 NOTE — PLAN OF CARE
Goal Outcome Evaluation:  Plan of Care Reviewed With: patient        Progress: no change  Outcome Evaluation: Pt tolerated treatment with no complaints. Pt reported just ambulating from bathroom with nsg. Pt agreeable to therapeutic BLE/BUE exercises with pt's O2 staying in the 90s. Pt fatigued afterwards. Will continue to progress pt as able.

## 2023-05-30 NOTE — PLAN OF CARE
Goal Outcome Evaluation:  Plan of Care Reviewed With: patient        Progress: no change  Outcome Evaluation: vss, no complaints of pain. was able to wean pt to 5L HF and then with this home triology increased to 8L and then able to wean to 6L while sleeping. pt rested well during shift. will continue to monitor.

## 2023-05-31 ENCOUNTER — OUTSIDE FACILITY SERVICE (OUTPATIENT)
Dept: FAMILY MEDICINE CLINIC | Facility: CLINIC | Age: 83
End: 2023-05-31
Payer: MEDICARE

## 2023-05-31 LAB
ANION GAP SERPL CALCULATED.3IONS-SCNC: 6.3 MMOL/L (ref 5–15)
BUN SERPL-MCNC: 26 MG/DL (ref 8–23)
BUN/CREAT SERPL: 32.9 (ref 7–25)
CALCIUM SPEC-SCNC: 8.9 MG/DL (ref 8.6–10.5)
CHLORIDE SERPL-SCNC: 96 MMOL/L (ref 98–107)
CO2 SERPL-SCNC: 34.7 MMOL/L (ref 22–29)
CREAT SERPL-MCNC: 0.79 MG/DL (ref 0.76–1.27)
DEPRECATED RDW RBC AUTO: 42.3 FL (ref 37–54)
EGFRCR SERPLBLD CKD-EPI 2021: 89.2 ML/MIN/1.73
ERYTHROCYTE [DISTWIDTH] IN BLOOD BY AUTOMATED COUNT: 13.1 % (ref 12.3–15.4)
GLUCOSE BLDC GLUCOMTR-MCNC: 93 MG/DL (ref 70–130)
GLUCOSE SERPL-MCNC: 106 MG/DL (ref 65–99)
HCT VFR BLD AUTO: 54.2 % (ref 37.5–51)
HGB BLD-MCNC: 17.7 G/DL (ref 13–17.7)
MCH RBC QN AUTO: 29.3 PG (ref 26.6–33)
MCHC RBC AUTO-ENTMCNC: 32.7 G/DL (ref 31.5–35.7)
MCV RBC AUTO: 89.7 FL (ref 79–97)
PLATELET # BLD AUTO: 266 10*3/MM3 (ref 140–450)
PMV BLD AUTO: 9.9 FL (ref 6–12)
POTASSIUM SERPL-SCNC: 4.3 MMOL/L (ref 3.5–5.2)
RBC # BLD AUTO: 6.04 10*6/MM3 (ref 4.14–5.8)
SODIUM SERPL-SCNC: 137 MMOL/L (ref 136–145)
WBC NRBC COR # BLD: 8.16 10*3/MM3 (ref 3.4–10.8)

## 2023-05-31 PROCEDURE — 94761 N-INVAS EAR/PLS OXIMETRY MLT: CPT

## 2023-05-31 PROCEDURE — 94799 UNLISTED PULMONARY SVC/PX: CPT

## 2023-05-31 PROCEDURE — 80048 BASIC METABOLIC PNL TOTAL CA: CPT | Performed by: INTERNAL MEDICINE

## 2023-05-31 PROCEDURE — G0180 MD CERTIFICATION HHA PATIENT: HCPCS | Performed by: NURSE PRACTITIONER

## 2023-05-31 PROCEDURE — 85027 COMPLETE CBC AUTOMATED: CPT | Performed by: INTERNAL MEDICINE

## 2023-05-31 PROCEDURE — 63710000001 PREDNISONE PER 1 MG: Performed by: INTERNAL MEDICINE

## 2023-05-31 PROCEDURE — 82948 REAGENT STRIP/BLOOD GLUCOSE: CPT

## 2023-05-31 PROCEDURE — 94664 DEMO&/EVAL PT USE INHALER: CPT

## 2023-05-31 PROCEDURE — 36415 COLL VENOUS BLD VENIPUNCTURE: CPT | Performed by: INTERNAL MEDICINE

## 2023-05-31 RX ADMIN — GUAIFENESIN 1200 MG: 600 TABLET, EXTENDED RELEASE ORAL at 08:25

## 2023-05-31 RX ADMIN — IPRATROPIUM BROMIDE AND ALBUTEROL SULFATE 3 ML: .5; 3 SOLUTION RESPIRATORY (INHALATION) at 19:34

## 2023-05-31 RX ADMIN — PILOCARPINE HYDRCHLORIDE 5 MG: 5 TABLET, FILM COATED ORAL at 20:44

## 2023-05-31 RX ADMIN — MONTELUKAST SODIUM 10 MG: 10 TABLET, FILM COATED ORAL at 20:44

## 2023-05-31 RX ADMIN — PREGABALIN 75 MG: 75 CAPSULE ORAL at 20:44

## 2023-05-31 RX ADMIN — ARFORMOTEROL TARTRATE 15 MCG: 15 SOLUTION RESPIRATORY (INHALATION) at 19:38

## 2023-05-31 RX ADMIN — GUAIFENESIN 1200 MG: 600 TABLET, EXTENDED RELEASE ORAL at 20:44

## 2023-05-31 RX ADMIN — PREGABALIN 75 MG: 75 CAPSULE ORAL at 08:25

## 2023-05-31 RX ADMIN — PANTOPRAZOLE SODIUM 40 MG: 40 TABLET, DELAYED RELEASE ORAL at 06:49

## 2023-05-31 RX ADMIN — SALINE NASAL SPRAY 2 SPRAY: 1.5 SOLUTION NASAL at 20:44

## 2023-05-31 RX ADMIN — FUROSEMIDE 40 MG: 40 TABLET ORAL at 08:26

## 2023-05-31 RX ADMIN — CETIRIZINE HYDROCHLORIDE 10 MG: 10 TABLET ORAL at 08:26

## 2023-05-31 RX ADMIN — Medication 10 ML: at 08:26

## 2023-05-31 RX ADMIN — POTASSIUM CHLORIDE 20 MEQ: 750 TABLET, EXTENDED RELEASE ORAL at 08:26

## 2023-05-31 RX ADMIN — IPRATROPIUM BROMIDE AND ALBUTEROL SULFATE 3 ML: .5; 3 SOLUTION RESPIRATORY (INHALATION) at 07:57

## 2023-05-31 RX ADMIN — ATORVASTATIN CALCIUM 10 MG: 20 TABLET, FILM COATED ORAL at 08:25

## 2023-05-31 RX ADMIN — KETOTIFEN FUMARATE 1 DROP: 0.25 SOLUTION OPHTHALMIC at 20:44

## 2023-05-31 RX ADMIN — SALINE NASAL SPRAY 2 SPRAY: 1.5 SOLUTION NASAL at 11:05

## 2023-05-31 RX ADMIN — SODIUM CHLORIDE SOLN NEBU 7% 4 ML: 7 NEBU SOLN at 19:38

## 2023-05-31 RX ADMIN — IPRATROPIUM BROMIDE AND ALBUTEROL SULFATE 3 ML: .5; 3 SOLUTION RESPIRATORY (INHALATION) at 11:15

## 2023-05-31 RX ADMIN — Medication 10 ML: at 20:44

## 2023-05-31 RX ADMIN — PILOCARPINE HYDRCHLORIDE 5 MG: 5 TABLET, FILM COATED ORAL at 11:05

## 2023-05-31 RX ADMIN — BUDESONIDE 0.5 MG: 0.5 INHALANT ORAL at 19:34

## 2023-05-31 RX ADMIN — SODIUM CHLORIDE SOLN NEBU 7% 4 ML: 7 NEBU SOLN at 07:59

## 2023-05-31 RX ADMIN — SALINE NASAL SPRAY 2 SPRAY: 1.5 SOLUTION NASAL at 08:26

## 2023-05-31 RX ADMIN — PREDNISONE 20 MG: 20 TABLET ORAL at 17:02

## 2023-05-31 RX ADMIN — ELTROMBOPAG OLAMINE 50 MG: 25 TABLET, FILM COATED ORAL at 06:49

## 2023-05-31 RX ADMIN — ARFORMOTEROL TARTRATE 15 MCG: 15 SOLUTION RESPIRATORY (INHALATION) at 07:58

## 2023-05-31 RX ADMIN — TERAZOSIN 2 MG: 2 CAPSULE ORAL at 20:44

## 2023-05-31 RX ADMIN — KETOTIFEN FUMARATE 1 DROP: 0.25 SOLUTION OPHTHALMIC at 08:33

## 2023-05-31 RX ADMIN — RIVAROXABAN 20 MG: 20 TABLET, FILM COATED ORAL at 17:02

## 2023-05-31 RX ADMIN — SALINE NASAL SPRAY 2 SPRAY: 1.5 SOLUTION NASAL at 17:03

## 2023-05-31 RX ADMIN — BUDESONIDE 0.5 MG: 0.5 INHALANT ORAL at 08:00

## 2023-05-31 RX ADMIN — PREDNISONE 20 MG: 20 TABLET ORAL at 08:26

## 2023-05-31 RX ADMIN — TADALAFIL 40 MG: 20 TABLET ORAL at 08:25

## 2023-05-31 RX ADMIN — Medication 500 MG: at 20:44

## 2023-05-31 RX ADMIN — Medication 500 MG: at 08:26

## 2023-05-31 RX ADMIN — IPRATROPIUM BROMIDE AND ALBUTEROL SULFATE 3 ML: .5; 3 SOLUTION RESPIRATORY (INHALATION) at 14:54

## 2023-05-31 NOTE — PROGRESS NOTES
" LOS: 7 days     Name: Alessio Allen  Age: 82 y.o.  Sex: male  :  1940  MRN: [id number]         Primary Care Physician: Madison Lewis APRN    Subjective   Subjective  No new complaints or acute overnight events.  Feels as if his breathing is improving.  Currently requiring 8 L high flow nasal cannula.    Objective   Vital Signs  Temp:  [96.9 °F (36.1 °C)-97.8 °F (36.6 °C)] 97.8 °F (36.6 °C)  Heart Rate:  [80-86] 80  Resp:  [16-18] 16  BP: (114-144)/(70-96) 126/96  Body mass index is 24.71 kg/m².    Objective:  General Appearance:  Comfortable and in no acute distress.    Vital signs: (most recent): Blood pressure 126/96, pulse 80, temperature 97.8 °F (36.6 °C), temperature source Oral, resp. rate 16, height 182.9 cm (72\"), weight 82.6 kg (182 lb 3.2 oz), SpO2 91 %.    Lungs:  Normal effort and normal respiratory rate.  He is not in respiratory distress.  There are decreased breath sounds.    Heart: Normal rate.  Regular rhythm.    Abdomen: Abdomen is soft.  Bowel sounds are normal.   There is no abdominal tenderness.     Extremities: There is no dependent edema or local swelling.    Neurological: Patient is alert and oriented to person, place and time.    Skin:  Warm and dry.              Results Review:       I reviewed the patient's new clinical results.    Results from last 7 days   Lab Units 23  0623  0544 23  0607 23  0433 23  0625 23  0648 23  0705   WBC 10*3/mm3 8.16 7.92 6.49 6.60 6.62 6.94 3.75   HEMOGLOBIN g/dL 17.7 18.2* 17.1 16.8 17.0 16.7 16.8   PLATELETS 10*3/mm3 266 259 224 227 227 235 249     Results from last 7 days   Lab Units 23  0619 23  0544 23  0607 23  0433 23  0624 23  0648 23  0705   SODIUM mmol/L 137 137 136 136 138 136 138   POTASSIUM mmol/L 4.3 4.5 4.7 4.8 4.9 4.8 4.6   CHLORIDE mmol/L 96* 95* 97* 98 99 97* 98   CO2 mmol/L 34.7* 34.4* 30.3* 34.0* 31.4* 30.6* 29.0   BUN mg/dL 26* 21 21 " 26* 24* 24* 15   CREATININE mg/dL 0.79 0.77 0.74* 0.89 0.89 0.84 0.81   CALCIUM mg/dL 8.9 9.4 8.8 8.6 9.2 9.3 9.6   GLUCOSE mg/dL 106* 128* 114* 116* 127* 132* 127*                 Scheduled Meds:   arformoterol, 15 mcg, Nebulization, BID - RT  atorvastatin, 10 mg, Oral, Daily  budesonide, 0.5 mg, Nebulization, BID - RT  cetirizine, 10 mg, Oral, Daily  eltrombopag, 50 mg, Oral, QAM  furosemide, 40 mg, Oral, Daily  guaiFENesin, 1,200 mg, Oral, Q12H  ipratropium-albuterol, 3 mL, Nebulization, 4x Daily - RT  ketotifen, 1 drop, Both Eyes, BID  montelukast, 10 mg, Oral, Nightly  pantoprazole, 40 mg, Oral, Q AM  pilocarpine, 5 mg, Oral, TID  potassium chloride, 20 mEq, Oral, Daily  predniSONE, 20 mg, Oral, BID With Meals  pregabalin, 75 mg, Oral, Q12H  rivaroxaban, 20 mg, Oral, Daily With Dinner  saccharomyces boulardii, 500 mg, Oral, BID  sodium chloride, 10 mL, Intravenous, Q12H  sodium chloride, 4 mL, Nebulization, BID - RT  sodium chloride, 2 spray, Each Nare, 4x Daily  tadalafil, 40 mg, Oral, Q24H  terazosin, 2 mg, Oral, Nightly      PRN Meds:   •  acetaminophen **OR** acetaminophen **OR** acetaminophen  •  senna-docusate sodium **AND** polyethylene glycol **AND** bisacodyl **AND** bisacodyl  •  calcium carbonate  •  melatonin  •  nitroglycerin  •  ondansetron **OR** ondansetron  •  sodium chloride  •  sodium chloride  •  sodium chloride  Continuous Infusions:       Assessment & Plan   Active Hospital Problems    Diagnosis  POA   • **Parainfluenza virus bronchitis [J20.4]  Yes   • Acute and chronic respiratory failure with hypoxia [J96.21]  Yes   • COPD with acute exacerbation [J44.1]  Yes   • Pulmonary hypertension [I27.20]  Yes   • Presence of cardiac pacemaker [Z95.0]  Yes   • Pharyngeal dysphagia [R13.13]  Yes   • IPF (idiopathic pulmonary fibrosis) [J84.112]  Yes   • Chronic diastolic CHF (congestive heart failure) [I50.32]  Yes   • Chronic respiratory failure with hypoxia and hypercapnia [J96.11, J96.12]  Yes    • Pulmonary emphysema [J43.9]  Yes   • Chronic anticoagulation [Z79.01]  Not Applicable   • BPH (benign prostatic hypertrophy) [N40.0]  Yes   • Dyslipidemia [E78.5]  Yes   • Paroxysmal atrial fibrillation [I48.0]  Yes   • Primary hypertension [I10]  Yes      Resolved Hospital Problems   No resolved problems to display.       Assessment & Plan    81yo gentleman with h/o HTN, HLD, BPH, IPF, COPD, chronic hypoxic/hypercapnic resp failure (4L/min and Trilogy vent at home), pulm HTN, PAF/SSS/PPM (Xarelto), and prior laryngeal CA with pharyngeal dysphagia, who presented to ER with c/o cough and SOA worse than baseline. He was just admitted here earlier this month for pneumonia. He is followed by [facility].     Parainfluenza 3 Bronchitis  - complicated by underlying COPD with acute exacerbation, IPF, and pulmonary hypertension   - requiring increased oxygen supplementation from baseline  - no evidence of bacterial pneumonia  -He has been switched to prednisone  -Continue breathing treatments  -Wean oxygen as able.  Wears 4 L at home.  -Pulmonology following.     COPD/IPF/Pulmonary HTN/Chronic Hypoxic and Hypercapnic Respiratory Failure  - appreciate Pulm attention to pt  - supportive treatment with bronchodilators and mucolytics as above  - NIPPV at night, on high flow NC O2 during the day, weaning oxygen as tolerated, avoid hyperoxemia due to chronic hypercapnic respiratory failure, keep sats 88-92%, no higher  - continue Adcirca     PAF/SSS s/p Pacemaker/HTN/Chronic Diastolic CHF  - HR acceptable  -Continue daily oral Lasix  - on AC with Xarelto     BPH  - continue alpha-blocker     Hx of Laryngeal Cancer/Pharyngeal Dysphagia  - on modified diet per [facility] recs     • Prophylaxis: Xarelto as above  • Disposition: Home/timing TBD       Expected Discharge Date: 6/1/2023; Expected Discharge Time:      Jeff Montero MD  [facility]  05/31/23  12:36 EDT

## 2023-05-31 NOTE — PLAN OF CARE
Goal Outcome Evaluation:  Plan of Care Reviewed With: patient      Progress: declining  Outcome Evaluation: All needs met. Rested well. Up w/ stand-by assist and walker to toilet. Cardiac ground meat diet. Meds in applesauce. VSS. Oriented x 4. 6.5L HFNC while awake and Trilogy for sleep w/ 12L. V-paced on the monitor. No complaints.

## 2023-05-31 NOTE — PROGRESS NOTES
LOS: 7 days   Patient Care Team:  Madison Lewis APRN as PCP - General (Nurse Practitioner)  Rao Maguire MD as Consulting Physician (Hematology and Oncology)  Alan Santana MD as Referring Physician (Family Medicine)  Gaurav Merrill MD as Consulting Physician (Otolaryngology)  Edgardo Brown MD (Otolaryngology)  Isael Beltre MD as Consulting Physician (Hematology and Oncology)  Herminia Salas MD as Consulting Physician (Internal Medicine)  Juventino Miller MD as Consulting Physician (Cardiac Electrophysiology)  Suresh Hernandez MD as Consulting Physician (Pulmonary Disease)    Chief Complaint:  F/up respiratory failure, COPD exacerbation and medical problems as below.    Subjective   Interval History  Remains on 8 L oxygen nasal cannula.  At baseline uses 4 L.  Reports breathing is slowly improving.    REVIEW OF SYSTEMS:     GASTROINTESTINAL: No anorexia, nausea, vomiting or diarrhea.   CONSTITUTIONAL: No fever or chills.     Ventilator/Non-Invasive Ventilation Settings (From admission, onward)     Start     Ordered    05/24/23 2350  NIPPV - Provider Settings  (CPAP / BiPAP)  Until Discontinued        Comments: Settings transferred from from home trilogy machine   Question Answer Comment   Indication Acute Respiratory Failure    Type AVAPS/PC/PS    Backup Rate 5    Target VT (mL) 550    EPAP/PEEP (cm H2O) 6    Min Pressure (cm H2O) 12    Max Pressure (cm H2O) 28    Titrate Oxygen for SpO2 88 - 92%        05/24/23 2350                      Physical Exam:     Vital Signs  Temp:  [96.9 °F (36.1 °C)-98.2 °F (36.8 °C)] 98.2 °F (36.8 °C)  Heart Rate:  [80-89] 89  Resp:  [16-18] 16  BP: (114-144)/(70-96) 126/96    Intake/Output Summary (Last 24 hours) at 5/31/2023 1432  Last data filed at 5/31/2023 1312  Gross per 24 hour   Intake 220 ml   Output 500 ml   Net -280 ml     Flowsheet Rows    Flowsheet Row First Filed Value   Admission  "Height 182.9 cm (72\") Documented at 05/24/2023 1030   Admission Weight 85.7 kg (189 lb) Documented at 05/24/2023 1030          PPE used per hospital policy    General Appearance:   Alert, cooperative, in no acute distress   ENMT:  Mallampati score 3, moist mucous membrane   Eyes:  Pupils equal and reactive to light. EOMI   Neck:   Large. Trachea midline. No thyromegaly.   Lungs:    Coarse breath sounds bilaterally with expiratory wheezing.  No use of accessory muscles (    Heart:   Regular rhythm and normal rate, normal S1 and S2, no         murmur   Skin:   No rash or ecchymosis   Abdomen:    Obese. Soft. No tenderness. No HSM.   Neuro/psych:  Conscious, alert, oriented x3. Strength 5/5 in upper and lower  ext.  Appropriate mood and affect   Extremities:  No cyanosis, clubbing but mild edema in legs.  Warm extremities and well-perfused          Results Review:        Results from last 7 days   Lab Units 05/31/23  0619 05/30/23  0544 05/29/23  0607   SODIUM mmol/L 137 137 136   POTASSIUM mmol/L 4.3 4.5 4.7   CHLORIDE mmol/L 96* 95* 97*   CO2 mmol/L 34.7* 34.4* 30.3*   BUN mg/dL 26* 21 21   CREATININE mg/dL 0.79 0.77 0.74*   GLUCOSE mg/dL 106* 128* 114*   CALCIUM mg/dL 8.9 9.4 8.8         Results from last 7 days   Lab Units 05/31/23  0619 05/30/23  0544 05/29/23  0607   WBC 10*3/mm3 8.16 7.92 6.49   HEMOGLOBIN g/dL 17.7 18.2* 17.1   HEMATOCRIT % 54.2* 54.8* 50.9   PLATELETS 10*3/mm3 266 259 224         Results from last 7 days   Lab Units 05/29/23  0607   PROBNP pg/mL 2,610.0*                           I reviewed the patient's new clinical results.        Medication Review:   arformoterol, 15 mcg, Nebulization, BID - RT  atorvastatin, 10 mg, Oral, Daily  budesonide, 0.5 mg, Nebulization, BID - RT  cetirizine, 10 mg, Oral, Daily  eltrombopag, 50 mg, Oral, QAM  furosemide, 40 mg, Oral, Daily  guaiFENesin, 1,200 mg, Oral, Q12H  ipratropium-albuterol, 3 mL, Nebulization, 4x Daily - RT  ketotifen, 1 drop, Both Eyes, " BID  montelukast, 10 mg, Oral, Nightly  pantoprazole, 40 mg, Oral, Q AM  pilocarpine, 5 mg, Oral, TID  potassium chloride, 20 mEq, Oral, Daily  predniSONE, 20 mg, Oral, BID With Meals  pregabalin, 75 mg, Oral, Q12H  rivaroxaban, 20 mg, Oral, Daily With Dinner  saccharomyces boulardii, 500 mg, Oral, BID  sodium chloride, 10 mL, Intravenous, Q12H  sodium chloride, 4 mL, Nebulization, BID - RT  sodium chloride, 2 spray, Each Nare, 4x Daily  tadalafil, 40 mg, Oral, Q24H  terazosin, 2 mg, Oral, Nightly          Assessment     1. COPD exacerbation  2. Parainfluenza bronchitis  3. Pulmonary infiltrates: Seems chronic and waxing and waning.  Now right lower lobe.  Previously mostly in the left lower lobe.  Unclear how much of that represents scarring.  It does not appear that it is related to bacterial pneumonia but could be viral pneumonia  4. Acute on chronic hypoxic respiratory failure  5. Chronic hypercapnic respiratory failure, on trilogy ventilator.  6. Expected mild steroid-induced hyperglycemia  7. Paroxysmal A-fib on anticoagulation        Plan     · Oxygen by NC and titrate to keep SPO2 >90%.  Will wean oxygen as tolerated at baseline uses 4 L at home.  · Can use his trilogy home ventilator overnight and as needed  · Oral steroids continue to wean as tolerated.  Chest x-ray stable.  · Hypertonic saline nebs twice a day  · Insulin NovoLog SC as needed for hyperglycemia  · DuoNeb 4 times a day.  Budesonide 0.5 mg twice a day and a formoterol 15 mcg twice daily.  · Flutter to improve mucus clearance  · Lasix 40 mg daily  · DNR/DNI        Nancy Sarabia MD  05/31/23  14:32 EDT          This note was dictated utilizing Dragon dictation

## 2023-06-01 ENCOUNTER — NURSE TRIAGE (OUTPATIENT)
Dept: CALL CENTER | Facility: HOSPITAL | Age: 83
End: 2023-06-01

## 2023-06-01 ENCOUNTER — READMISSION MANAGEMENT (OUTPATIENT)
Dept: CALL CENTER | Facility: HOSPITAL | Age: 83
End: 2023-06-01

## 2023-06-01 VITALS
OXYGEN SATURATION: 90 % | HEART RATE: 79 BPM | HEIGHT: 72 IN | BODY MASS INDEX: 24.3 KG/M2 | RESPIRATION RATE: 18 BRPM | DIASTOLIC BLOOD PRESSURE: 87 MMHG | TEMPERATURE: 97.7 F | WEIGHT: 179.4 LBS | SYSTOLIC BLOOD PRESSURE: 138 MMHG

## 2023-06-01 PROBLEM — I50.32 CHRONIC HEART FAILURE WITH PRESERVED EJECTION FRACTION (HFPEF): Status: ACTIVE | Noted: 2023-06-01

## 2023-06-01 LAB
ANION GAP SERPL CALCULATED.3IONS-SCNC: 7.8 MMOL/L (ref 5–15)
BUN SERPL-MCNC: 23 MG/DL (ref 8–23)
BUN/CREAT SERPL: 29.9 (ref 7–25)
CALCIUM SPEC-SCNC: 8.8 MG/DL (ref 8.6–10.5)
CHLORIDE SERPL-SCNC: 91 MMOL/L (ref 98–107)
CO2 SERPL-SCNC: 34.2 MMOL/L (ref 22–29)
CREAT SERPL-MCNC: 0.77 MG/DL (ref 0.76–1.27)
DEPRECATED RDW RBC AUTO: 42.4 FL (ref 37–54)
EGFRCR SERPLBLD CKD-EPI 2021: 89.9 ML/MIN/1.73
ERYTHROCYTE [DISTWIDTH] IN BLOOD BY AUTOMATED COUNT: 13.1 % (ref 12.3–15.4)
GLUCOSE SERPL-MCNC: 94 MG/DL (ref 65–99)
HCT VFR BLD AUTO: 54.7 % (ref 37.5–51)
HGB BLD-MCNC: 18.1 G/DL (ref 13–17.7)
MCH RBC QN AUTO: 29.6 PG (ref 26.6–33)
MCHC RBC AUTO-ENTMCNC: 33.1 G/DL (ref 31.5–35.7)
MCV RBC AUTO: 89.5 FL (ref 79–97)
PLATELET # BLD AUTO: 310 10*3/MM3 (ref 140–450)
PMV BLD AUTO: 9.9 FL (ref 6–12)
POTASSIUM SERPL-SCNC: 4.1 MMOL/L (ref 3.5–5.2)
RBC # BLD AUTO: 6.11 10*6/MM3 (ref 4.14–5.8)
SODIUM SERPL-SCNC: 133 MMOL/L (ref 136–145)
WBC NRBC COR # BLD: 8.46 10*3/MM3 (ref 3.4–10.8)

## 2023-06-01 PROCEDURE — 85027 COMPLETE CBC AUTOMATED: CPT | Performed by: INTERNAL MEDICINE

## 2023-06-01 PROCEDURE — 94799 UNLISTED PULMONARY SVC/PX: CPT

## 2023-06-01 PROCEDURE — 63710000001 PREDNISONE PER 1 MG: Performed by: INTERNAL MEDICINE

## 2023-06-01 PROCEDURE — 94761 N-INVAS EAR/PLS OXIMETRY MLT: CPT

## 2023-06-01 PROCEDURE — 94664 DEMO&/EVAL PT USE INHALER: CPT

## 2023-06-01 PROCEDURE — 80048 BASIC METABOLIC PNL TOTAL CA: CPT | Performed by: INTERNAL MEDICINE

## 2023-06-01 RX ORDER — GUAIFENESIN 600 MG/1
1200 TABLET, EXTENDED RELEASE ORAL EVERY 12 HOURS SCHEDULED
Qty: 56 TABLET | Refills: 0 | Status: SHIPPED | OUTPATIENT
Start: 2023-06-01 | End: 2023-06-01 | Stop reason: SDUPTHER

## 2023-06-01 RX ORDER — PREDNISONE 10 MG/1
TABLET ORAL
Qty: 30 TABLET | Refills: 0 | Status: SHIPPED | OUTPATIENT
Start: 2023-06-01 | End: 2023-06-01 | Stop reason: SDUPTHER

## 2023-06-01 RX ORDER — GUAIFENESIN 600 MG/1
1200 TABLET, EXTENDED RELEASE ORAL EVERY 12 HOURS SCHEDULED
Qty: 56 TABLET | Refills: 0 | Status: SHIPPED | OUTPATIENT
Start: 2023-06-01 | End: 2023-06-15

## 2023-06-01 RX ORDER — PREDNISONE 10 MG/1
TABLET ORAL
Qty: 30 TABLET | Refills: 0 | Status: SHIPPED | OUTPATIENT
Start: 2023-06-01 | End: 2023-06-13

## 2023-06-01 RX ADMIN — PREGABALIN 75 MG: 75 CAPSULE ORAL at 08:29

## 2023-06-01 RX ADMIN — TADALAFIL 40 MG: 20 TABLET ORAL at 08:29

## 2023-06-01 RX ADMIN — ELTROMBOPAG OLAMINE 50 MG: 25 TABLET, FILM COATED ORAL at 06:26

## 2023-06-01 RX ADMIN — PANTOPRAZOLE SODIUM 40 MG: 40 TABLET, DELAYED RELEASE ORAL at 06:26

## 2023-06-01 RX ADMIN — PILOCARPINE HYDRCHLORIDE 5 MG: 5 TABLET, FILM COATED ORAL at 08:29

## 2023-06-01 RX ADMIN — Medication 500 MG: at 08:29

## 2023-06-01 RX ADMIN — ARFORMOTEROL TARTRATE 15 MCG: 15 SOLUTION RESPIRATORY (INHALATION) at 06:54

## 2023-06-01 RX ADMIN — SALINE NASAL SPRAY 2 SPRAY: 1.5 SOLUTION NASAL at 12:42

## 2023-06-01 RX ADMIN — IPRATROPIUM BROMIDE AND ALBUTEROL SULFATE 3 ML: .5; 3 SOLUTION RESPIRATORY (INHALATION) at 06:55

## 2023-06-01 RX ADMIN — ATORVASTATIN CALCIUM 10 MG: 20 TABLET, FILM COATED ORAL at 08:28

## 2023-06-01 RX ADMIN — CETIRIZINE HYDROCHLORIDE 10 MG: 10 TABLET ORAL at 08:29

## 2023-06-01 RX ADMIN — FUROSEMIDE 40 MG: 40 TABLET ORAL at 08:31

## 2023-06-01 RX ADMIN — Medication 10 ML: at 08:32

## 2023-06-01 RX ADMIN — SALINE NASAL SPRAY 2 SPRAY: 1.5 SOLUTION NASAL at 08:29

## 2023-06-01 RX ADMIN — SODIUM CHLORIDE SOLN NEBU 7% 4 ML: 7 NEBU SOLN at 06:55

## 2023-06-01 RX ADMIN — IPRATROPIUM BROMIDE AND ALBUTEROL SULFATE 3 ML: .5; 3 SOLUTION RESPIRATORY (INHALATION) at 11:26

## 2023-06-01 RX ADMIN — GUAIFENESIN 1200 MG: 600 TABLET, EXTENDED RELEASE ORAL at 08:29

## 2023-06-01 RX ADMIN — BUDESONIDE 0.5 MG: 0.5 INHALANT ORAL at 06:55

## 2023-06-01 RX ADMIN — PREDNISONE 20 MG: 20 TABLET ORAL at 08:28

## 2023-06-01 RX ADMIN — KETOTIFEN FUMARATE 1 DROP: 0.25 SOLUTION OPHTHALMIC at 08:32

## 2023-06-01 RX ADMIN — POTASSIUM CHLORIDE 20 MEQ: 750 TABLET, EXTENDED RELEASE ORAL at 08:29

## 2023-06-01 NOTE — PROGRESS NOTES
LOS: 8 days   Patient Care Team:  Madison Lewis APRN as PCP - General (Nurse Practitioner)  Rao Maguire MD as Consulting Physician (Hematology and Oncology)  Alan Santana MD as Referring Physician (Family Medicine)  Gaurav Merrill MD as Consulting Physician (Otolaryngology)  Edgardo Brown MD (Otolaryngology)  Isael Beltre MD as Consulting Physician (Hematology and Oncology)  Herminia Salas MD as Consulting Physician (Internal Medicine)  Juventino Miller MD as Consulting Physician (Cardiac Electrophysiology)  Suresh Hernandez MD as Consulting Physician (Pulmonary Disease)    Chief Complaint:  F/up respiratory failure, COPD exacerbation and medical problems as below.    Subjective   Interval History  Patient is down to 4 L oxygen nasal cannula which is his baseline.  Resting comfortably and feels better today.    REVIEW OF SYSTEMS:     GASTROINTESTINAL: No anorexia, nausea, vomiting or diarrhea.   CONSTITUTIONAL: No fever or chills.     Ventilator/Non-Invasive Ventilation Settings (From admission, onward)     Start     Ordered    05/24/23 2350  NIPPV - Provider Settings  (CPAP / BiPAP)  Until Discontinued        Comments: Settings transferred from from home trilogy machine   Question Answer Comment   Indication Acute Respiratory Failure    Type AVAPS/PC/PS    Backup Rate 5    Target VT (mL) 550    EPAP/PEEP (cm H2O) 6    Min Pressure (cm H2O) 12    Max Pressure (cm H2O) 28    Titrate Oxygen for SpO2 88 - 92%        05/24/23 2350                      Physical Exam:     Vital Signs  Temp:  [95.5 °F (35.3 °C)-98.2 °F (36.8 °C)] 97.4 °F (36.3 °C)  Heart Rate:  [79-92] 92  Resp:  [16-18] 18  BP: (121-137)/(75-91) 137/88    Intake/Output Summary (Last 24 hours) at 6/1/2023 1221  Last data filed at 6/1/2023 0913  Gross per 24 hour   Intake 628 ml   Output 500 ml   Net 128 ml     Flowsheet Rows    Flowsheet Row First Filed Value  "  Admission Height 182.9 cm (72\") Documented at 05/24/2023 1030   Admission Weight 85.7 kg (189 lb) Documented at 05/24/2023 1030          PPE used per hospital policy    General Appearance:   Alert, cooperative, in no acute distress   ENMT:  Mallampati score 3, moist mucous membrane   Eyes:  Pupils equal and reactive to light. EOMI   Neck:   Large. Trachea midline. No thyromegaly.   Lungs:    Coarse breath sounds bilaterally with expiratory wheezing.  No use of accessory muscles (    Heart:   Regular rhythm and normal rate, normal S1 and S2, no         murmur   Skin:   No rash or ecchymosis   Abdomen:    Obese. Soft. No tenderness. No HSM.   Neuro/psych:  Conscious, alert, oriented x3. Strength 5/5 in upper and lower  ext.  Appropriate mood and affect   Extremities:  No cyanosis, clubbing but mild edema in legs.  Warm extremities and well-perfused          Results Review:        Results from last 7 days   Lab Units 06/01/23  0543 05/31/23  0619 05/30/23  0544   SODIUM mmol/L 133* 137 137   POTASSIUM mmol/L 4.1 4.3 4.5   CHLORIDE mmol/L 91* 96* 95*   CO2 mmol/L 34.2* 34.7* 34.4*   BUN mg/dL 23 26* 21   CREATININE mg/dL 0.77 0.79 0.77   GLUCOSE mg/dL 94 106* 128*   CALCIUM mg/dL 8.8 8.9 9.4         Results from last 7 days   Lab Units 06/01/23  0543 05/31/23  0619 05/30/23  0544   WBC 10*3/mm3 8.46 8.16 7.92   HEMOGLOBIN g/dL 18.1* 17.7 18.2*   HEMATOCRIT % 54.7* 54.2* 54.8*   PLATELETS 10*3/mm3 310 266 259         Results from last 7 days   Lab Units 05/29/23  0607   PROBNP pg/mL 2,610.0*                           I reviewed the patient's new clinical results.        Medication Review:   arformoterol, 15 mcg, Nebulization, BID - RT  atorvastatin, 10 mg, Oral, Daily  budesonide, 0.5 mg, Nebulization, BID - RT  cetirizine, 10 mg, Oral, Daily  eltrombopag, 50 mg, Oral, QAM  furosemide, 40 mg, Oral, Daily  guaiFENesin, 1,200 mg, Oral, Q12H  ipratropium-albuterol, 3 mL, Nebulization, 4x Daily - RT  ketotifen, 1 drop, " Both Eyes, BID  montelukast, 10 mg, Oral, Nightly  pantoprazole, 40 mg, Oral, Q AM  pilocarpine, 5 mg, Oral, TID  potassium chloride, 20 mEq, Oral, Daily  predniSONE, 20 mg, Oral, BID With Meals  pregabalin, 75 mg, Oral, Q12H  rivaroxaban, 20 mg, Oral, Daily With Dinner  saccharomyces boulardii, 500 mg, Oral, BID  sodium chloride, 10 mL, Intravenous, Q12H  sodium chloride, 4 mL, Nebulization, BID - RT  sodium chloride, 2 spray, Each Nare, 4x Daily  tadalafil, 40 mg, Oral, Q24H  terazosin, 2 mg, Oral, Nightly          Assessment     1. COPD exacerbation  2. Parainfluenza bronchitis  3. Pulmonary infiltrates: Seems chronic and waxing and waning.  Now right lower lobe.  Previously mostly in the left lower lobe.  Unclear how much of that represents scarring.  It does not appear that it is related to bacterial pneumonia but could be viral pneumonia  4. Acute on chronic hypoxic respiratory failure  5. Chronic hypercapnic respiratory failure, on trilogy ventilator.  6. Expected mild steroid-induced hyperglycemia  7. Paroxysmal A-fib on anticoagulation        Plan     · Oxygen requirement now baseline at 4 L.  · Can use his trilogy home ventilator overnight and as needed  · Oral steroids continue to wean as tolerated.  Chest x-ray stable.  · Hypertonic saline nebs twice a day  · Insulin NovoLog SC as needed for hyperglycemia  · DuoNeb 4 times a day.  Budesonide 0.5 mg twice a day and a formoterol 15 mcg twice daily.  · Flutter to improve mucus clearance  · Lasix 40 mg daily  · DNR/DNI  · No objections to discharge from pulmonary point of view        Nancy Sarabia MD  06/01/23  12:21 EDT          This note was dictated utilizing Dragon dictation

## 2023-06-01 NOTE — PLAN OF CARE
Goal Outcome Evaluation:  Plan of Care Reviewed With: patient        Progress: improving  Outcome Evaluation: VSS. No complaints of pain. Appetite good. Takes meds with applesauce. Remains on 4L NC, home dose. Discharged to home with significant other. HH to follow via [facility]. Provided discharge instruction and education sheets.

## 2023-06-01 NOTE — PROGRESS NOTES
"Nutrition Services    Patient Name:  Alessio Allen  YOB: 1940  MRN: [id number]  Admit Date:  5/24/2023    Assessment Date:  06/01/23    NUTRITION SCREENING      Reason for Encounter LOS 8   Diagnosis/Problem Parainfluenza virus bronchitis, acute exacerbation of COPD, acute on chronic respiratory failure with hypoxia, chronic CHF       PO Diet Diet: Cardiac Diets; Healthy Heart (2-3 Na+); Texture: Mechanical Ground (NDD 2); Fluid Consistency: Thin (IDDSI 0)   PO Supplements/Snacks -   PO Intake % %       Labs  Listed below, reviewed   Physical Findings A/Ox4, on O2 therapy   GI Function Last BM 5/30   Skin Status Skin intact, bruising       Height:  Weight:  BMI:   Category  Weight Trend Height: 182.9 cm (72\")  Weight: 81.4 kg (179 lb 6.4 oz) (06/01/23 0538)  Body mass index is 24.33 kg/m².  Normal/Healthy (18.4 - 24.9)  Loss, Amount/Timeframe: 5 lb wt loss x 1 week (likley fluids)       Intervention/Plan Pt tolerating HH diet well and follows diet at home. Noted plans for possible d/c today per MD notes.        Results from last 7 days   Lab Units 06/01/23  0543 05/31/23  0619 05/30/23  0544   SODIUM mmol/L 133* 137 137   POTASSIUM mmol/L 4.1 4.3 4.5   CHLORIDE mmol/L 91* 96* 95*   CO2 mmol/L 34.2* 34.7* 34.4*   BUN mg/dL 23 26* 21   CREATININE mg/dL 0.77 0.79 0.77   CALCIUM mg/dL 8.8 8.9 9.4   GLUCOSE mg/dL 94 106* 128*     Results from last 7 days   Lab Units 06/01/23  0543 05/29/23  0607 05/28/23  0433 05/27/23  0625 05/27/23  0624 05/26/23  0648   MAGNESIUM mg/dL  --   --  2.1  --  2.2 2.2   HEMOGLOBIN g/dL 18.1*   < > 16.8   < >  --  16.7   HEMATOCRIT % 54.7*   < > 50.1   < >  --  49.5    < > = values in this interval not displayed.     COVID19   Date Value Ref Range Status   05/24/2023 Not Detected Not Detected - Ref. Range Final     Lab Results   Component Value Date    HGBA1C 6.00 (H) 01/03/2018       RD to follow up per protocol.    Electronically signed by:  Domonique White, " RD  06/01/23 13:42 EDT

## 2023-06-01 NOTE — DISCHARGE SUMMARY
Date of Admission: 5/24/2023  Date of Discharge:  6/1/2023  Primary Care Physician: Madison Lewis APRN     Discharge Diagnosis:  Active Hospital Problems    Diagnosis  POA   • **Parainfluenza virus bronchitis [J20.4]  Yes   • Chronic heart failure with preserved ejection fraction (HFpEF) [I50.32]  Yes   • Acute and chronic respiratory failure with hypoxia [J96.21]  Yes   • COPD with acute exacerbation [J44.1]  Yes   • Pulmonary hypertension [I27.20]  Yes   • Presence of cardiac pacemaker [Z95.0]  Yes   • Pharyngeal dysphagia [R13.13]  Yes   • IPF (idiopathic pulmonary fibrosis) [J84.112]  Yes   • Chronic diastolic CHF (congestive heart failure) [I50.32]  Yes   • Chronic respiratory failure with hypoxia and hypercapnia [J96.11, J96.12]  Yes   • Pulmonary emphysema [J43.9]  Yes   • Chronic anticoagulation [Z79.01]  Not Applicable   • BPH (benign prostatic hypertrophy) [N40.0]  Yes   • Dyslipidemia [E78.5]  Yes   • Paroxysmal atrial fibrillation [I48.0]  Yes   • Primary hypertension [I10]  Yes      Resolved Hospital Problems   No resolved problems to display.       DETAILS OF HOSPITAL STAY     Pertinent Test Results and Procedures Performed    Chest x-ray:  There is a left chest cardiac pacemaker, not significantly changed. The   cardiac silhouette is enlarged. There is calcific aortic   atherosclerosis. The aorta is tortuous.   There is emphysema. There are left greater than right bibasilar airspace   opacities, new from 2022 radiographs, which correspond to pulmonary   opacities on recent CT but may be progressed on the right when   accounting for differences in modality. Findings again raise concern for   multifocal pneumonia in the appropriate clinical setting. Recommend   short-term follow chest radiographs to document complete resolution.   There is a suspected small left pleural effusion.     Hospital Course  This is an 83yo gentleman with h/o HTN, HLD, BPH, IPF, COPD, chronic hypoxic resp failure  (4L/min and Trilogy vent at home), pulm HTN, PAF/SSS/PPM (Xarelto), and prior laryngeal CA with pharyngeal dysphagia, who presented to ER with c/o cough and SOA worse than baseline.  Please see H&P for full details of admission.  He had had a very recent admission for pneumonia prior to this presentation.  Upon this evaluation he was found to have parainfluenza bronchitis with acute exacerbation of COPD.  He was provided supportive care for the viral respiratory infection and placed on IV steroids along with bronchodilators and aggressive pulmonary hygiene measures.  After about a week he finally started to improve and today is back at his baseline oxygenation.  He has been transitioned to prednisone and will have him complete a taper of this.  Pulmonology has cleared him for discharge and he will be released back home today with home health.      Physical Exam at Discharge:  General: No acute distress, AAOx3  HEENT: EOMI, PERRL  Cardiovascular: +s1 and s2, RRR  Lungs: No rhonchi or wheezing  Abdomen: soft, nontender    Consults:   Consults     Date and Time Order Name Status Description    5/24/2023  5:10 PM Inpatient Pulmonology Consult Completed     5/24/2023  1:17 PM [facility] (on-call MD unless specified) Details      5/10/2023  2:01 PM Inpatient Gastroenterology Consult Completed             Condition on Discharge: Stable    Discharge Disposition  Home or Self Care    Discharge Medications     Discharge Medications      New Medications      Instructions Start Date   guaiFENesin 600 MG 12 hr tablet  Commonly known as: MUCINEX   1,200 mg, Oral, Every 12 Hours Scheduled      predniSONE 10 MG tablet  Commonly known as: DELTASONE   Take 2 tablets by mouth 2 (Two) Times a Day for 3 days, THEN 3 tablets Daily for 3 days, THEN 2 tablets Daily for 3 days, THEN 1 tablet Daily for 3 days.   Start Date: June 1, 2023        Changes to Medications      Instructions Start Date   sodium chloride 0.65 % nasal spray  Commonly known  as: Ocean Nasal Spray  What changed: additional instructions   2 sprays, Nasal, As Needed, May refill q 21 days         Continue These Medications      Instructions Start Date   acetaminophen 325 MG tablet  Commonly known as: TYLENOL   650 mg, Oral, Every 6 Hours PRN      albuterol sulfate  (90 Base) MCG/ACT inhaler  Commonly known as: PROVENTIL HFA;VENTOLIN HFA;PROAIR HFA   2 puffs, Inhalation, Every 4 Hours PRN      albuterol (2.5 MG/3ML) 0.083% nebulizer solution  Commonly known as: PROVENTIL   2.5 mg, Nebulization, Every 4 Hours PRN      doxazosin 2 MG tablet  Commonly known as: CARDURA   2 mg, Oral, Nightly      eltrombopag 50 MG tablet  Commonly known as: PROMACTA   Take 1 tablet by mouth Daily.      empagliflozin 10 MG tablet tablet  Commonly known as: JARDIANCE   Take 1 tablet by mouth Daily.      ferrous sulfate 325 (65 FE) MG tablet   325 mg, Oral, Daily With Breakfast      fexofenadine 60 MG tablet  Commonly known as: Allegra Allergy   60 mg, Oral, 2 Times Daily      furosemide 40 MG tablet  Commonly known as: LASIX   40 mg, Oral, Daily      montelukast 10 MG tablet  Commonly known as: SINGULAIR   10 mg, Oral, Nightly      olopatadine 0.1 % ophthalmic solution  Commonly known as: Patanol   1 drop, Both Eyes, 2 Times Daily      pantoprazole 40 MG pack packet  Commonly known as: PROTONIX   40 mg, Oral, Every Morning Before Breakfast      pilocarpine 5 MG tablet  Commonly known as: SALAGEN   5 mg, Oral, 3 Times Daily      polyvinyl alcohol 1.4 % ophthalmic solution  Commonly known as: LIQUIFILM   1 drop, As Needed      potassium chloride 10 MEQ CR tablet   20 mEq, Oral, Daily      pregabalin 75 MG capsule  Commonly known as: LYRICA   75 mg, Oral, 2 Times Daily      saccharomyces boulardii 250 MG capsule  Commonly known as: FLORASTOR   500 mg, Oral, 2 Times Daily      simvastatin 20 MG tablet  Commonly known as: ZOCOR   0.5 tablets, Oral, Nightly      sodium chloride 7 % nebulizer solution nebulizer  solution   4 mL, Nebulization, As Needed      Sodium Fluoride 1.1 % gel   Take 1 application by mouth Daily.      tadalafil 20 MG tablet tablet  Commonly known as: ADCIRCA   40 mg, Oral, Every 24 Hours Scheduled      Trelegy Ellipta 100-62.5-25 MCG/ACT inhaler  Generic drug: Fluticasone-Umeclidin-Vilant   1 puff, Inhalation, Daily      vitamin B-6 50 MG tablet  Commonly known as: PYRIDOXINE   50 mg, Oral, Daily      Xarelto 20 MG tablet  Generic drug: rivaroxaban   20 mg, Oral, Daily With Dinner             Discharge Diet:   Diet Instructions     Diet: Regular/House Diet, Cardiac Diets; Healthy Heart (2-3 Na+); Regular Texture (IDDSI 7); Thin (IDDSI 0)      Discharge Diet:  Regular/House Diet  Cardiac Diets       Cardiac Diet: Healthy Heart (2-3 Na+)    Texture: Regular Texture (IDDSI 7)    Fluid Consistency: Thin (IDDSI 0)          Activity at Discharge:   Activity Instructions     Activity as Tolerated            Follow-up Appointments  Future Appointments   Date Time Provider Department Center   9/20/2023  1:45 PM [facility]     Additional Instructions for the Follow-ups that You Need to Schedule     Ambulatory Referral to Home Health (Hospital)   As directed      Face to Face Visit Date: 6/1/2023    Follow-up provider for Plan of Care?: I treated the patient in an acute care facility and will not continue treatment after discharge.    Follow-up provider: MADISON MAGALLANES [[id number]]    Reason/Clinical Findings: Chronic respiratory failure, weakness and deconditioning    Describe mobility limitations that make leaving home difficult: Chronic respiratory failure, weakness and deconditioning    Nursing/Therapeutic Services Requested: Skilled Nursing Physical Therapy Occupational Therapy    Skilled nursing orders: O2 instruction COPD management    Frequency: 1 Week 1         Discharge Follow-up with PCP   As directed       Currently Documented PCP:    Madison Magallanes APRN    PCP  Phone Number:    [phone]     Follow Up Details: 1 week         Discharge Follow-up with Specialty: Pulmonology in 3 to 4 weeks   As directed      Specialty: Pulmonology in 3 to 4 weeks                 I have examined and discussed discharge planning with the patient today.    I wore full protective equipment throughout the patient encounter including eye protection and facemask.  Hand hygiene was performed before donning protective equipment and after removal when leaving the room.     Jeff Montero MD  06/01/23  12:38 EDT    Time: Discharge greater than 30 min

## 2023-06-01 NOTE — PLAN OF CARE
Goal Outcome Evaluation:  Plan of Care Reviewed With: patient        Progress: improving  Outcome Evaluation: All needs met. Rested well. Up w/ walker and stand-by assist. Cardiac diet w/ ground meat. VSS. Oriented. 4L NC while awake and Trilogy w/ 6L. V-paced on the monitor. IS use tonight. No complaints.

## 2023-06-01 NOTE — CASE MANAGEMENT/SOCIAL WORK
Continued Stay Note  [facility]     Patient Name: Alessio Allen  MRN: [id number]  Today's Date: 6/1/2023    Admit Date: 5/24/2023    Plan: Return home with S/O and [facility] following   Discharge Plan     Row Name 06/01/23 1443       Plan    Plan Return home with [facility] and [facility] following    Plan Comments Call to [facility] and she is aware of DC    Final Discharge Disposition Code 06 - home with home health care    Final Note DC'd home with [facility] following                    Expected Discharge Date and Time     Expected Discharge Date Expected Discharge Time    Jun 1, 2023             Becky S. [place], RN

## 2023-06-01 NOTE — OUTREACH NOTE
Prep Survey    Flowsheet Row Responses   [facility] facility patient discharged from? [place]   Is LACE score < 7 ? No   Eligibility [facility]   Date of Admission 05/24/23   Date of Discharge 06/01/23   Discharge Disposition Home or Self Care   Discharge diagnosis Parainfluenza virus bronchitisCOPD with acute exacerbation   Does the patient have one of the following disease processes/diagnoses(primary or secondary)? COPD   Does the patient have Home health ordered? Yes   What is the Home health agency?  [facility]   Is there a DME ordered? No   Prep survey completed? Yes          PASTOR CHAVEZ - Registered Nurse

## 2023-06-02 ENCOUNTER — TRANSITIONAL CARE MANAGEMENT TELEPHONE ENCOUNTER (OUTPATIENT)
Dept: CALL CENTER | Facility: HOSPITAL | Age: 83
End: 2023-06-02

## 2023-06-02 NOTE — TELEPHONE ENCOUNTER
Caller stated  discharged with prednisone taper, picked up meds from pharmacy, if follows instructions on bottle will be short. Upon reviewing AVS noted instructions for taper were different, also wanting to know if would get dose tonight. Advised caller per AVS and that pt would get dose this evening  Reason for Disposition  • Caller has medicine question only, adult not sick, AND triager answers question    Additional Information  • Negative: [1] Intentional drug overdose AND [2] suicidal thoughts or ideas  • Negative: Drug overdose and triager unable to answer question  • Negative: Caller requesting a renewal or refill of a medicine patient is currently taking  • Negative: Caller requesting information unrelated to medicine  • Negative: Caller requesting information about COVID-19 Vaccine  • Negative: Caller requesting information about Emergency Contraception  • Negative: Caller requesting information about Combined Birth Control Pills  • Negative: Caller requesting information about Progestin Birth Control Pills  • Negative: Caller requesting information about Post-Op pain or medicines  • Negative: Caller requesting a prescription antibiotic (such as Penicillin) for Strep throat and has a positive culture result  • Negative: Caller requesting a prescription anti-viral med (such as Tamiflu) and has influenza (flu) symptoms  • Negative: Immunization reaction suspected  • Negative: Rash while taking a medicine or within 3 days of stopping it  • Negative: [1] Asthma and [2] having symptoms of asthma (cough, wheezing, etc.)  • Negative: [1] Symptom of illness (e.g., headache, abdominal pain, earache, vomiting) AND [2] more than mild  • Negative: Breastfeeding questions about mother's medicines and diet  • Negative: MORE THAN A DOUBLE DOSE of a prescription or over-the-counter (OTC) drug  • Negative: [1] DOUBLE DOSE (an extra dose or lesser amount) of prescription drug AND [2] any symptoms (e.g., dizziness,  "nausea, pain, sleepiness)  • Negative: [1] DOUBLE DOSE (an extra dose or lesser amount) of over-the-counter (OTC) drug AND [2] any symptoms (e.g., dizziness, nausea, pain, sleepiness)  • Negative: Took another person's prescription drug  • Negative: [1] DOUBLE DOSE (an extra dose or lesser amount) of prescription drug AND [2] NO symptoms  (Exception: A double dose of antibiotics.)  • Negative: Diabetes drug error or overdose (e.g., took wrong type of insulin or took extra dose)  • Negative: [1] Prescription not at pharmacy AND [2] was prescribed by PCP recently (Exception: Triager has access to EMR and prescription is recorded there. Go to Home Care and confirm for pharmacy.)  • Negative: [1] Pharmacy calling with prescription question AND [2] triager unable to answer question  • Negative: [1] Caller has URGENT medicine question about med that PCP or specialist prescribed AND [2] triager unable to answer question  • Negative: Medicine patch causing local rash or itching  • Negative: [1] Caller has medicine question about med NOT prescribed by PCP AND [2] triager unable to answer question (e.g., compatibility with other med, storage)  • Negative: Prescription request for new medicine (not a refill)  • Negative: [1] Caller has NON-URGENT medicine question about med that PCP prescribed AND [2] triager unable to answer question  • Negative: Caller wants to use a complementary or alternative medicine  • Negative: [1] Prescription prescribed recently is not at pharmacy AND [2] triager has access to patient's EMR AND [3] prescription is recorded in the EMR  • Negative: [1] DOUBLE DOSE (an extra dose or lesser amount) of over-the-counter (OTC) drug AND [2] NO symptoms  • Negative: [1] DOUBLE DOSE (an extra dose or lesser amount) of antibiotic drug AND [2] NO symptoms    Answer Assessment - Initial Assessment Questions  1. NAME of MEDICINE: \"What medicine(s) are you calling about?\"      prednisone  2. QUESTION: \"What is your " "question?\" (e.g., double dose of medicine, side effect)       discharged home with prednisone taper, picked up from pharmacy, received 30 pills but if goes by instructions on bottle will be short  3. PRESCRIBER: \"Who prescribed the medicine?\" Reason: if prescribed by specialist, call should be referred to that group.      Dr. Montero  4. SYMPTOMS: \"Do you have any symptoms?\" If Yes, ask: \"What symptoms are you having?\"  \"How bad are the symptoms (e.g., mild, moderate, severe)      n/a  5. PREGNANCY:  \"Is there any chance that you are pregnant?\" \"When was your last menstrual period?\"      n/a    Protocols used: MEDICATION QUESTION CALL-ADULT-    "

## 2023-06-02 NOTE — OUTREACH NOTE
Call Center TCM Note    Flowsheet Row Responses   [facility] patient discharged from? [place]   Does the patient have one of the following disease processes/diagnoses(primary or secondary)? COPD   TCM attempt successful? Yes  [PAt]   Call end time 1310   Discharge diagnosis Parainfluenza virus bronchitisCOPD with acute exacerbation   Meds reviewed with patient/caregiver? Yes   Is the patient having any side effects they believe may be caused by any medication additions or changes? No   Does the patient have all medications ordered at discharge? Yes   Is the patient taking all medications as directed (includes completed medication regime)? Yes   Comments No available HOSP DC FU appt that meets TCM guidelines.   Does the patient have an appointment with their PCP within 7 days of discharge? No   What is the Home health agency?  [facility]    Has home health visited the patient within 72 hours of discharge? Yes   Psychosocial issues? No   Did the patient receive a copy of their discharge instructions? Yes   Nursing interventions Reviewed instructions with patient   What is the patient's perception of their health status since discharge? Improving   Nursing Interventions Nurse provided patient education   Is the patient/caregiver able to teach back the hierarchy of who to call/visit for symptoms/problems? PCP, Specialist, Home health nurse, Urgent Care, ED, 911 Yes   TCM call completed? Yes   Wrap up additional comments Pt reports he feels much better.   Call end time 1310          Coty Porter RN    6/2/2023, 13:11 EDT

## 2023-06-05 ENCOUNTER — TELEPHONE (OUTPATIENT)
Dept: FAMILY MEDICINE CLINIC | Facility: CLINIC | Age: 83
End: 2023-06-05

## 2023-06-05 NOTE — TELEPHONE ENCOUNTER
Caller: SMITA OCCUPATINAL THERAPIST    Relationship: Home Health    Best call back number: [phone]    What is the best time to reach you:ANY    Who are you requesting to speak with (clinical staff, provider,  specific staff member): CLINICAL    Do you know the name of the person who called:     What was the call regarding:DARRYL OCCUPATIONAL THERAPIST CALLED TO ADVISE THAT THE EVALUATION IS COMPLETE AND NO ADDITIONAL OCCUPATIONAL THERAPY IS NEEDED.    Is it okay if the provider responds through MyChart:

## 2023-06-12 ENCOUNTER — READMISSION MANAGEMENT (OUTPATIENT)
Dept: CALL CENTER | Facility: HOSPITAL | Age: 83
End: 2023-06-12
Payer: MEDICARE

## 2023-06-12 NOTE — OUTREACH NOTE
COPD/PN Week 2 Survey      Flowsheet Row Responses   [facility] patient discharged from? [place]   Does the patient have one of the following disease processes/diagnoses(primary or secondary)? COPD   Week 2 attempt successful? Yes   Call start time 1124   Call end time 1152   Discharge diagnosis Parainfluenza virus bronchitisCOPD with acute exacerbation   Person spoke with today (if not patient) and relationship patient   Meds reviewed with patient/caregiver? Yes   Is the patient having any side effects they believe may be caused by any medication additions or changes? No   Does the patient have all medications ordered at discharge? Yes   Is the patient taking all medications as directed (includes completed medication regime)? Yes   Does the patient have a primary care provider?  Yes   Does the patient have an appointment with their PCP or specialist within 7 days of discharge? Yes   Has the patient kept scheduled appointments due by today? Yes   What is the Home health agency?  [facility]    Has home health visited the patient within 72 hours of discharge? Yes   Psychosocial issues? No   Did the patient receive a copy of their discharge instructions? Yes   Nursing interventions Reviewed instructions with patient   What is the patient's perception of their health status since discharge? Improving   Nursing Interventions Nurse provided patient education   Are the patient's immunizations up to date?  Yes   If the patient is a current smoker, are they able to teach back resources for cessation? Not a smoker   Is the patient/caregiver able to teach back the hierarchy of who to call/visit for symptoms/problems? PCP, Specialist, Home health nurse, Urgent Care, ED, 911 Yes   Is the patient able to teach back COPD zones? Yes   Nursing interventions Education provided on various zones   Patient reports what zone on this call? Green Zone   Green Zone Breathing without shortness of breath, Reports doing well    Green Zone interventions: Take daily medications   Is the patient/caregiver able to teach back signs and symptoms of worsening condition: Fever/chills, Shortness of breath, Chest pain   Is the patient/caregiver able to teach back importance of completing antibiotic course of treatment? Yes   Week 2 call completed? Yes   Is the patient interested in additional calls from an ambulatory ?  NOTE:  applies to high risk patients requiring additional follow-up. No   Wrap up additional comments Pt reports he feels much better.            Syed CHAVEZ - Registered Nurse

## 2023-06-15 ENCOUNTER — OFFICE VISIT (OUTPATIENT)
Dept: FAMILY MEDICINE CLINIC | Facility: CLINIC | Age: 83
End: 2023-06-15
Payer: MEDICARE

## 2023-06-15 VITALS
DIASTOLIC BLOOD PRESSURE: 70 MMHG | HEART RATE: 80 BPM | TEMPERATURE: 99.9 F | SYSTOLIC BLOOD PRESSURE: 118 MMHG | HEIGHT: 72 IN | OXYGEN SATURATION: 90 % | BODY MASS INDEX: 24.65 KG/M2 | RESPIRATION RATE: 20 BRPM | WEIGHT: 182 LBS

## 2023-06-15 DIAGNOSIS — J44.9 CHRONIC OBSTRUCTIVE PULMONARY DISEASE, UNSPECIFIED COPD TYPE: ICD-10-CM

## 2023-06-15 DIAGNOSIS — J43.9 PULMONARY EMPHYSEMA, UNSPECIFIED EMPHYSEMA TYPE: Primary | ICD-10-CM

## 2023-06-15 DIAGNOSIS — J96.21 ACUTE AND CHRONIC RESPIRATORY FAILURE WITH HYPOXIA: ICD-10-CM

## 2023-06-15 NOTE — PROGRESS NOTES
Transitional Care Follow Up Visit  Subjective     Alessio Allen is a 83 y.o. male who presents for a transitional care management visit.    Within 48 business hours after discharge our office contacted him via telephone to coordinate his care and needs.      I reviewed and discussed the details of that call along with the discharge summary, hospital problems, inpatient lab results, inpatient diagnostic studies, and consultation reports with Alessio.     Current outpatient and discharge medications have been reconciled for the patient.  Reviewed by: Nadia Villalba MD          6/1/2023     5:37 PM   Date of TCM Phone Call   [facility]   Date of Admission 5/24/2023   Date of Discharge 6/1/2023   Discharge Disposition Home or Self Care     Risk for Readmission (LACE) Score: 15 (6/1/2023  6:01 AM)      History of Present Illness   Course During Hospital Stay:       82 yo male patient of  Krys [facility] present for hospital follow up.  He was admitted for acute on chronic respiratory failure, parainfluenza , and copd exacerbation.  He was treated with antibiotics and steroids in the hospital.  He states discharged on steroids but has completed.   Currently on continously 3-4 liters. States moves up with up an moving about.  He has pulmonary Dr. Hernandez.  States he is using inhalers as prescribed.     Has home coming out and will start physical therapy schedule to start soon.        The following portions of the patient's history were reviewed and updated as appropriate: allergies, current medications, past family history, past medical history, past social history, past surgical history, and problem list.    Review of Systems    Objective   Physical Exam  Constitutional:       General: He is not in acute distress.     Comments: Sitting in motorized scooter   Cardiovascular:      Rate and Rhythm: Regular rhythm.      Heart sounds: Normal heart sounds.   Pulmonary:      Effort: No respiratory distress.       Breath sounds: Normal breath sounds. No wheezing.   Neurological:      Mental Status: He is alert.       Assessment & Plan   Diagnoses and all orders for this visit:    1. Pulmonary emphysema, unspecified emphysema type (Primary)    2. Chronic obstructive pulmonary disease, unspecified COPD type    3. Acute and chronic respiratory failure with hypoxia      Continue inhalers as prescribed   Follow up with pulmonary   Follow up with new pcp Madison DOVE

## 2023-06-17 PROBLEM — J96.21 ACUTE ON CHRONIC RESPIRATORY FAILURE WITH HYPOXIA: Status: ACTIVE | Noted: 2023-06-17

## 2023-06-27 NOTE — TELEPHONE ENCOUNTER
If he would like to reschedule he can.    Detail Level: Simple Instructions: This plan will send the code FBSD to the PM system.  DO NOT or CHANGE the price. Price (Do Not Change): 0.00

## 2023-08-02 ENCOUNTER — OUTSIDE FACILITY SERVICE (OUTPATIENT)
Dept: FAMILY MEDICINE CLINIC | Facility: CLINIC | Age: 83
End: 2023-08-02
Payer: MEDICARE

## 2023-08-02 PROCEDURE — G0180 MD CERTIFICATION HHA PATIENT: HCPCS | Performed by: NURSE PRACTITIONER

## 2023-08-04 ENCOUNTER — OFFICE VISIT (OUTPATIENT)
Dept: FAMILY MEDICINE CLINIC | Facility: CLINIC | Age: 83
End: 2023-08-04
Payer: MEDICARE

## 2023-08-04 VITALS
TEMPERATURE: 98.4 F | BODY MASS INDEX: 24.57 KG/M2 | SYSTOLIC BLOOD PRESSURE: 110 MMHG | OXYGEN SATURATION: 92 % | HEIGHT: 72 IN | WEIGHT: 181.4 LBS | DIASTOLIC BLOOD PRESSURE: 78 MMHG | HEART RATE: 82 BPM

## 2023-08-04 DIAGNOSIS — I10 PRIMARY HYPERTENSION: Primary | ICD-10-CM

## 2023-08-04 DIAGNOSIS — I50.32 CHRONIC DIASTOLIC CHF (CONGESTIVE HEART FAILURE): ICD-10-CM

## 2023-08-04 DIAGNOSIS — J44.9 CHRONIC OBSTRUCTIVE PULMONARY DISEASE, UNSPECIFIED COPD TYPE: ICD-10-CM

## 2023-08-04 DIAGNOSIS — I48.0 PAROXYSMAL ATRIAL FIBRILLATION: ICD-10-CM

## 2023-08-04 DIAGNOSIS — Z95.0 PRESENCE OF CARDIAC PACEMAKER: ICD-10-CM

## 2023-08-04 NOTE — PROGRESS NOTES
"Chief Complaint  Follow-up (Pt is here today presents doing better with physical therapy.)    Subjective        Alessio Allen presents to [facility] PRIMARY [facility]ARE  History of Present Illness  Patient presents to the office today for a follow-up.  He has been working with physical therapy and has improved with strength. With copd   He is using 3 to 4 L continuous O2.  He is following pulmonary Dr. Hernandez.  He is going to heart failure clinic.  He is taking inhalers as directed.  With CHF he is taking Lasix and potassium.  With diabetes was taking Jardiance.  With atrial fibrillation he is on daily anticoagulation Xarelto 20 mg.  He is stable has a pacemaker.            Objective   Vital Signs:  /78   Pulse 82   Temp 98.4 øF (36.9 øC)   Ht 182.9 cm (72\")   Wt 82.3 kg (181 lb 6.4 oz)   SpO2 92%   BMI 24.60 kg/mý   Estimated body mass index is 24.6 kg/mý as calculated from the following:    Height as of this encounter: 182.9 cm (72\").    Weight as of this encounter: 82.3 kg (181 lb 6.4 oz).       BMI is within normal parameters. No other follow-up for BMI required.      Physical Exam  Constitutional:       General: He is not in acute distress.     Appearance: Normal appearance.   HENT:      Head: Normocephalic.   Eyes:      Pupils: Pupils are equal, round, and reactive to light.   Cardiovascular:      Rate and Rhythm: Normal rate and regular rhythm.      Pulses: Normal pulses.      Heart sounds: Normal heart sounds.   Pulmonary:      Effort: Pulmonary effort is normal. No respiratory distress.      Breath sounds: Normal breath sounds. No stridor. No wheezing, rhonchi or rales.   Chest:      Chest wall: No tenderness.   Musculoskeletal:         General: Normal range of motion.      Cervical back: Normal range of motion and neck supple.   Skin:     General: Skin is warm.   Neurological:      General: No focal deficit present.      Mental Status: He is alert and oriented to person, place, and " time.   Psychiatric:         Mood and Affect: Mood normal.         Behavior: Behavior normal.         Thought Content: Thought content normal.         Judgment: Judgment normal.      Result Review :  The following data was reviewed by: DERERLL Pham on 08/04/2023:  Common labs          6/21/2023    04:39 6/22/2023    04:57 6/23/2023    05:36   Common Labs   Glucose 101  100  83    BUN 17  16  17    Creatinine 0.88  0.80  0.92    Sodium 139  138  141    Potassium 3.7  3.8  3.9    Chloride 96  96  96    Calcium 8.5  8.8  9.2    WBC 4.96  4.51  5.37    Hemoglobin 15.1  15.5  16.0    Hematocrit 47.0  46.6  47.5    Platelets 293  273  316      Data reviewed : Radiologic studies chest xray              Assessment and Plan   Diagnoses and all orders for this visit:    1. Primary hypertension (Primary)    2. Paroxysmal atrial fibrillation    3. Presence of cardiac pacemaker    4. Chronic obstructive pulmonary disease, unspecified COPD type    5. Chronic diastolic CHF (congestive heart failure)      Hypertension stable taking medication consuming a DASH diet.    A-fib stable takes Xarelto.  No chest pain shortness of air noted.    Cardiac pacemaker stable    COPD follows pulmonary takes inhalers as directed with improvement in symptoms.    CHF taking Lasix and potassium denies swelling take medication as directed.       I spent 30 minutes caring for Alessio on this date of service. This time includes time spent by me in the following activities:preparing for the visit, reviewing tests, obtaining and/or reviewing a separately obtained history, performing a medically appropriate examination and/or evaluation , counseling and educating the patient/family/caregiver, ordering medications, tests, or procedures, documenting information in the medical record, independently interpreting results and communicating that information with the patient/family/caregiver, and care coordination  Follow Up   Return in about 3 months  (around 11/4/2023) for Medicare Wellness.  Patient was given instructions and counseling regarding his condition or for health maintenance advice. Please see specific information pulled into the AVS if appropriate.

## 2023-08-21 ENCOUNTER — HOSPITAL ENCOUNTER (OUTPATIENT)
Dept: CT IMAGING | Facility: HOSPITAL | Age: 83
Discharge: HOME | End: 2023-08-21
Admitting: NURSE PRACTITIONER
Payer: MEDICARE

## 2023-08-21 DIAGNOSIS — R91.8 PULMONARY INFILTRATES: ICD-10-CM

## 2023-08-21 PROCEDURE — 71250 CT THORAX DX C-: CPT

## 2023-08-24 ENCOUNTER — TELEPHONE (OUTPATIENT)
Dept: FAMILY MEDICINE CLINIC | Facility: CLINIC | Age: 83
End: 2023-08-24
Payer: MEDICARE

## 2023-08-24 NOTE — TELEPHONE ENCOUNTER
"  Caller: Alessio Allen \"NADIA\"    Relationship: Self    Best call back number: [phone]       What was the call regarding: PATIENT WOULD LIKE TO KNOW IF MARICRUZ MAGALLANES COULD GIVE HIM A CALL. PATIENT STATES HE WOULD LIKE TO KNOW IF HE CAN STILL USE HIS SMART VEST EVEN THOUGH HE HAS A FRACTURE IN HIS BACK    PLEASE CALL AND ADVISE   "

## 2023-09-13 ENCOUNTER — TELEPHONE (OUTPATIENT)
Dept: FAMILY MEDICINE CLINIC | Facility: CLINIC | Age: 83
End: 2023-09-13
Payer: MEDICARE

## 2023-09-13 ENCOUNTER — TRANSCRIBE ORDERS (OUTPATIENT)
Dept: ADMINISTRATIVE | Facility: HOSPITAL | Age: 83
End: 2023-09-13
Payer: MEDICARE

## 2023-09-13 DIAGNOSIS — M54.6 ACUTE MIDLINE THORACIC BACK PAIN: Primary | ICD-10-CM

## 2023-09-13 NOTE — TELEPHONE ENCOUNTER
"    Caller: Alessio Allen \"NADIA\"    Relationship to patient: Self    Best call back number:[phone]     Chief complaint: WANTS TO KNOW IF UP COMING APPOINTMENT FOR 9-20-23 IS REALLY NEEDED.  IT SAYS FOR MEDICATION REFILLS   HE WAS JUST SEEN AUGUST 4 2023 AND SCHEDULED AGAIN IN NOV 2023 FOR MEDICARE WELLNESS VII.    Type of visit: OV    If rescheduling, when is the original appointment: 9-20-23    Additional notes:PLEASE CALL TO ADVISE    "

## 2023-09-18 NOTE — NURSING NOTE
Dr. Awan (ENT), called for update on patient status. Notified MD of scant sanguinous drainage from subglottic suction but none noted from deep suction. Per MD, remove tonsil patches at back of throat. Call if any problems or concerns.    Validate Note Data When Using Inventory: Yes

## 2023-11-28 ENCOUNTER — HOSPITAL ENCOUNTER (OUTPATIENT)
Dept: MRI IMAGING | Facility: HOSPITAL | Age: 83
Discharge: HOME | End: 2023-11-28
Admitting: NURSE PRACTITIONER
Payer: MEDICARE

## 2023-11-28 VITALS
TEMPERATURE: 97.8 F | OXYGEN SATURATION: 91 % | DIASTOLIC BLOOD PRESSURE: 75 MMHG | HEART RATE: 82 BPM | SYSTOLIC BLOOD PRESSURE: 120 MMHG | RESPIRATION RATE: 18 BRPM

## 2023-11-28 DIAGNOSIS — M54.6 ACUTE MIDLINE THORACIC BACK PAIN: ICD-10-CM

## 2023-11-28 PROCEDURE — 72146 MRI CHEST SPINE W/O DYE: CPT

## 2023-12-06 ENCOUNTER — TRANSCRIBE ORDERS (OUTPATIENT)
Dept: ADMINISTRATIVE | Facility: HOSPITAL | Age: 83
End: 2023-12-06
Payer: MEDICARE

## 2023-12-06 DIAGNOSIS — R91.8 PULMONARY INFILTRATES: Primary | ICD-10-CM

## 2024-01-02 ENCOUNTER — OFFICE VISIT (OUTPATIENT)
Dept: FAMILY MEDICINE CLINIC | Facility: CLINIC | Age: 84
End: 2024-01-02
Payer: MEDICARE

## 2024-01-02 VITALS
DIASTOLIC BLOOD PRESSURE: 80 MMHG | HEIGHT: 72 IN | OXYGEN SATURATION: 96 % | TEMPERATURE: 98 F | SYSTOLIC BLOOD PRESSURE: 118 MMHG | HEART RATE: 84 BPM | BODY MASS INDEX: 23.24 KG/M2 | WEIGHT: 171.6 LBS

## 2024-01-02 DIAGNOSIS — Z95.0 PRESENCE OF CARDIAC PACEMAKER: ICD-10-CM

## 2024-01-02 DIAGNOSIS — E78.5 DYSLIPIDEMIA: ICD-10-CM

## 2024-01-02 DIAGNOSIS — I48.0 PAROXYSMAL ATRIAL FIBRILLATION: ICD-10-CM

## 2024-01-02 DIAGNOSIS — J30.2 SEASONAL ALLERGIES: ICD-10-CM

## 2024-01-02 DIAGNOSIS — I10 PRIMARY HYPERTENSION: ICD-10-CM

## 2024-01-02 DIAGNOSIS — Z00.00 MEDICARE ANNUAL WELLNESS VISIT, SUBSEQUENT: Primary | ICD-10-CM

## 2024-01-02 PROCEDURE — 1160F RVW MEDS BY RX/DR IN RCRD: CPT | Performed by: NURSE PRACTITIONER

## 2024-01-02 PROCEDURE — 1170F FXNL STATUS ASSESSED: CPT | Performed by: NURSE PRACTITIONER

## 2024-01-02 PROCEDURE — 3074F SYST BP LT 130 MM HG: CPT | Performed by: NURSE PRACTITIONER

## 2024-01-02 PROCEDURE — 3079F DIAST BP 80-89 MM HG: CPT | Performed by: NURSE PRACTITIONER

## 2024-01-02 PROCEDURE — 1159F MED LIST DOCD IN RCRD: CPT | Performed by: NURSE PRACTITIONER

## 2024-01-02 PROCEDURE — G0439 PPPS, SUBSEQ VISIT: HCPCS | Performed by: NURSE PRACTITIONER

## 2024-01-02 RX ORDER — OLOPATADINE HYDROCHLORIDE 1 MG/ML
1 SOLUTION/ DROPS OPHTHALMIC 2 TIMES DAILY
Qty: 3 EACH | Refills: 12 | Status: SHIPPED | OUTPATIENT
Start: 2024-01-02

## 2024-01-02 RX ORDER — ECHINACEA PURPUREA EXTRACT 125 MG
2 TABLET ORAL AS NEEDED
Qty: 4 EACH | Refills: 3 | Status: SHIPPED | OUTPATIENT
Start: 2024-01-02

## 2024-01-02 NOTE — PROGRESS NOTES
The ABCs of the Annual Wellness Visit  Subsequent Medicare Wellness Visit    Subjective      Alessio Allen is a 83 y.o. male who presents for a Subsequent Medicare Wellness Visit.    The following portions of the patient's history were reviewed and   updated as appropriate: allergies, current medications, past family history, past medical history, past social history, past surgical history, and problem list.    Compared to one year ago, the patient feels his physical   health is the same.    Compared to one year ago, the patient feels his mental   health is the same.    Recent Hospitalizations:  This patient has had a [facility] admission record on file within the last 365 days.    Current Medical Providers:  Patient Care Team:  Madison Lewis APRN as PCP - General (Nurse Practitioner)  Gaurav Merrill MD as Consulting Physician (Otolaryngology)  Edgardo Brown MD (Otolaryngology)  Herminia Salas MD as Consulting Physician (Internal Medicine)  Suresh Hernandez MD as Consulting Physician (Pulmonary Disease)    Outpatient Medications Prior to Visit   Medication Sig Dispense Refill    acetaminophen (TYLENOL) 325 MG tablet Take 2 tablets by mouth Every 6 (Six) Hours As Needed (prn for pain). 720 tablet 2    albuterol (PROVENTIL) (2.5 MG/3ML) 0.083% nebulizer solution Take 2.5 mg by nebulization Every 4 (Four) Hours As Needed.      doxazosin (CARDURA) 2 MG tablet Take 1 tablet by mouth Every Night.      eltrombopag (PROMACTA) 50 MG tablet Take 1 tablet by mouth Daily.      empagliflozin (JARDIANCE) 10 MG tablet tablet Take 1 tablet by mouth Daily.      ferrous sulfate 325 (65 FE) MG tablet Take 1 tablet by mouth Daily With Breakfast. 90 tablet 3    fexofenadine (Allegra Allergy) 60 MG tablet Take 1 tablet by mouth 2 (Two) Times a Day. 180 tablet 3    montelukast (SINGULAIR) 10 MG tablet Take 1 tablet by mouth Every Night.      olopatadine (Patanol) 0.1 % ophthalmic solution Administer 1  drop to both eyes 2 (Two) Times a Day. 3 each 12    pantoprazole (PROTONIX) 40 MG pack packet Take 1 packet by mouth Every Morning Before Breakfast.      pilocarpine (SALAGEN) 5 MG tablet Take 1 tablet by mouth 3 (Three) Times a Day.      polyvinyl alcohol (LIQUIFILM) 1.4 % ophthalmic solution 1 drop As Needed for Dry Eyes.      potassium chloride (K-DUR) 10 MEQ CR tablet Take 2 tablets by mouth Daily. 180 tablet 3    pregabalin (LYRICA) 75 MG capsule Take 1 capsule by mouth 2 (Two) Times a Day.      pyridoxine (VITAMIN B-6) 50 MG tablet Take 1 tablet by mouth Daily.      saccharomyces boulardii (FLORASTOR) 250 MG capsule Take 2 capsules by mouth 2 (Two) Times a Day. 30 capsule 0    simvastatin (ZOCOR) 20 MG tablet Take 5 mg by mouth Every Night. Takes 5 mg daily      sodium chloride (Ocean Nasal Spray) 0.65 % nasal spray 2 sprays into the nostril(s) as directed by provider As Needed for Congestion (qid prn). May refill q 21 days 4 each 3    tadalafil (ADCIRCA) 20 MG tablet tablet Take 2 tablets by mouth Daily.      TRELEGY ELLIPTA 100-62.5-25 MCG/INH aerosol powder  Inhale 1 puff Daily.      XARELTO 20 MG tablet Take 1 tablet by mouth Daily With Dinner.      furosemide (LASIX) 80 MG tablet Take 1 tablet by mouth 2 (Two) Times a Day for 60 days. 120 tablet 0    sodium chloride 7 % nebulizer solution nebulizer solution Take 4 mL by nebulization As Needed. (Patient not taking: Reported on 8/4/2023)      Sodium Fluoride 1.1 % gel Take 1 application by mouth Daily. (Patient not taking: Reported on 8/4/2023)       No facility-administered medications prior to visit.       No opioid medication identified on active medication list. I have reviewed chart for other potential  high risk medication/s and harmful drug interactions in the elderly.        Aspirin is not on active medication list.  Aspirin use is not indicated based on review of current medical condition/s. Risk of harm outweighs potential benefits.  .    Patient  "Active Problem List   Diagnosis    Paroxysmal atrial fibrillation    Dyslipidemia    Primary hypertension    Neuropathy    Hypertension    COPD (chronic obstructive pulmonary disease)    BPH (benign prostatic hypertrophy)    Atrial fibrillation    Asthma    Hypoxia    Pneumonia    Chronic anticoagulation    Pneumonia of both lungs due to infectious organism    Hyponatremia    Pulmonary emphysema    Chronic respiratory failure with hypoxia and hypercapnia    Pneumonia of both lower lobes due to infectious organism    Chronic diastolic CHF (congestive heart failure)    IPF (idiopathic pulmonary fibrosis)    Acute respiratory failure with hypoxia    Attention to G-tube    Massive hemoptysis    Laryngeal cancer    Medicare annual wellness visit, subsequent    Pneumonia of left lower lobe due to infectious organism    Hemoptysis    Pharyngeal dysphagia    Parainfluenza virus bronchitis    Pulmonary hypertension    Presence of cardiac pacemaker    Acute and chronic respiratory failure with hypoxia    COPD with acute exacerbation    Chronic heart failure with preserved ejection fraction (HFpEF)    Acute on chronic respiratory failure with hypoxia     Advance Care Planning   Advance Care Planning     Advance Directive is not on file.  ACP discussion was held with the patient during this visit. Patient has an advance directive (not in EMR), copy requested.     Objective    Vitals:    01/02/24 1436   BP: 118/80   Pulse: 84   Temp: 98 °F (36.7 °C)   SpO2: 96%   Weight: 77.8 kg (171 lb 9.6 oz)   Height: 182.9 cm (72\")   PainSc: 0-No pain     Estimated body mass index is 23.27 kg/m² as calculated from the following:    Height as of this encounter: 182.9 cm (72\").    Weight as of this encounter: 77.8 kg (171 lb 9.6 oz).    BMI is within normal parameters. No other follow-up for BMI required.      Does the patient have evidence of cognitive impairment?   No            HEALTH RISK ASSESSMENT    Smoking Status:  Social History "     Tobacco Use   Smoking Status Former    Packs/day: 1.50    Years: 45.00    Additional pack years: 0.00    Total pack years: 67.50    Types: Cigarettes    Quit date: 1/3/2000    Years since quittin.0   Smokeless Tobacco Never     Alcohol Consumption:  Social History     Substance and Sexual Activity   Alcohol Use No     Fall Risk Screen:    [place] Fall Risk Assessment was completed, and patient is at LOW risk for falls.Assessment completed on:2024    Depression Screenin/2/2024     2:39 PM   PHQ-2/PHQ-9 Depression Screening   Little Interest or Pleasure in Doing Things 0-->not at all   Feeling Down, Depressed or Hopeless 0-->not at all   PHQ-9: Brief Depression Severity Measure Score 0       Health Habits and Functional and Cognitive Screenin/2/2024     2:41 PM   Functional & Cognitive Status   Do you have difficulty preparing food and eating? No   Do you have difficulty bathing yourself, getting dressed or grooming yourself? No   Do you have difficulty using the toilet? No   Do you have difficulty moving around from place to place? No   Do you have trouble with steps or getting out of a bed or a chair? No   Current Diet Well Balanced Diet   Dental Exam Up to date   Eye Exam Up to date   Exercise (times per week) 3 times per week   Current Exercises Include Aerobics   Do you need help using the phone?  No   Are you deaf or do you have serious difficulty hearing?  No   Do you need help to go to places out of walking distance? No   Do you need help shopping? No   Do you need help preparing meals?  No   Do you need help with housework?  No   Do you need help with laundry? No   Do you need help taking your medications? No   Do you need help managing money? No   Do you ever drive or ride in a car without wearing a seat belt? No   Have you felt unusual stress, anger or loneliness in the last month? No   Who do you live with? Other   If you need help, do you have trouble finding someone  available to you? No   Have you been bothered in the last four weeks by sexual problems? No   Do you have difficulty concentrating, remembering or making decisions? No       Age-appropriate Screening Schedule:  Refer to the list below for future screening recommendations based on patient's age, sex and/or medical conditions. Orders for these recommended tests are listed in the plan section. The patient has been provided with a written plan.    Health Maintenance   Topic Date Due    Hepatitis B (1 of 3 - Risk 3-dose series) Never done    DIABETIC EYE EXAM  07/28/2021    ANNUAL WELLNESS VISIT  04/01/2023    LIPID PANEL  04/26/2023    DXA SCAN  09/19/2024    COLORECTAL CANCER SCREENING  12/05/2026    TDAP/TD VACCINES (2 - Td or Tdap) 05/28/2032    COVID-19 Vaccine  Completed    INFLUENZA VACCINE  Completed    Pneumococcal Vaccine 65+  Completed    ZOSTER VACCINE  Completed    HEMOGLOBIN A1C  Discontinued    URINE MICROALBUMIN  Discontinued                  CMS Preventative Services Quick Reference  Risk Factors Identified During Encounter:    Immunizations Discussed/Encouraged: Influenza, Prevnar 20 (Pneumococcal 20-valent conjugate), COVID19, and RSV (Respiratory Syncytial Virus)  Dental Screening Recommended  Vision Screening Recommended    The above risks/problems have been discussed with the patient.  Pertinent information has been shared with the patient in the After Visit Summary.    Diagnoses and all orders for this visit:    1. Medicare annual wellness visit, subsequent (Primary)  Assessment & Plan:  Counseling was provided on nutrition, physical activity, development, and injury prevention, dental health, and safe sex practices patient verbalizes understanding no additional questions were asked.        2. Primary hypertension  Assessment & Plan:  Hypertension is improving with treatment.  Continue current treatment regimen.  Dietary sodium restriction.  Weight loss.  Regular aerobic exercise.  Blood pressure  will be reassessed at the next regular appointment.    Orders:  -     Comprehensive Metabolic Panel    3. Dyslipidemia  Assessment & Plan:  Check lipid panel continue working on healthy diet and exercise.  Stable taking simvastatin.    Orders:  -     Lipid Panel    4. Paroxysmal atrial fibrillation  Assessment & Plan:  Stable follows cardiology.    Orders:  -     Comprehensive Metabolic Panel    5. Presence of cardiac pacemaker  Assessment & Plan:  Stable follows cardiology      6. Seasonal allergies  -     CBC & Differential        Follow Up:   Next Medicare Wellness visit to be scheduled in 1 year.      An After Visit Summary and PPPS were made available to the patient.

## 2024-01-02 NOTE — TELEPHONE ENCOUNTER
"Caller: Alessio Allen \"NADIA\"    Relationship: Self    Best call back number:     [phone] (Mobile)       Requested Prescriptions:   Requested Prescriptions     Pending Prescriptions Disp Refills    sodium chloride (Ocean Nasal Spray) 0.65 % nasal spray 4 each 3     Si sprays into the nostril(s) as directed by provider As Needed for Congestion (qid prn). May refill q 21 days    olopatadine (Patanol) 0.1 % ophthalmic solution 3 each 12     Sig: Administer 1 drop to both eyes 2 (Two) Times a Day.        Pharmacy where request should be sent: [address] [phone] [facility] [phone]      Last office visit with prescribing clinician: 2023   Last telemedicine visit with prescribing clinician: Visit date not found   Next office visit with prescribing clinician: 2024     Additional details provided by patient: PATIENT STATES HE NEEDS NEW PRESCRIPTIONS FAXED OVER TO PHARMACY ASAP    Does the patient have less than a 3 day supply:  [x] Yes  [] No    Tessa Smith Rep   24 11:54 EST     "

## 2024-01-03 LAB
ALBUMIN SERPL-MCNC: 4.6 G/DL (ref 3.7–4.7)
ALBUMIN/GLOB SERPL: 2 {RATIO} (ref 1.2–2.2)
ALP SERPL-CCNC: 79 IU/L (ref 44–121)
ALT SERPL-CCNC: 15 IU/L (ref 0–44)
AST SERPL-CCNC: 26 IU/L (ref 0–40)
BASOPHILS # BLD AUTO: 0.1 X10E3/UL (ref 0–0.2)
BASOPHILS NFR BLD AUTO: 1 %
BILIRUB SERPL-MCNC: 1 MG/DL (ref 0–1.2)
BUN SERPL-MCNC: 21 MG/DL (ref 8–27)
BUN/CREAT SERPL: 17 (ref 10–24)
CALCIUM SERPL-MCNC: 9.5 MG/DL (ref 8.6–10.2)
CHLORIDE SERPL-SCNC: 94 MMOL/L (ref 96–106)
CHOLEST SERPL-MCNC: 144 MG/DL (ref 100–199)
CO2 SERPL-SCNC: 33 MMOL/L (ref 20–29)
CREAT SERPL-MCNC: 1.22 MG/DL (ref 0.76–1.27)
EGFRCR SERPLBLD CKD-EPI 2021: 59 ML/MIN/1.73
EOSINOPHIL # BLD AUTO: 0.7 X10E3/UL (ref 0–0.4)
EOSINOPHIL NFR BLD AUTO: 12 %
ERYTHROCYTE [DISTWIDTH] IN BLOOD BY AUTOMATED COUNT: 13.3 % (ref 11.6–15.4)
GLOBULIN SER CALC-MCNC: 2.3 G/DL (ref 1.5–4.5)
GLUCOSE SERPL-MCNC: 86 MG/DL (ref 70–99)
HCT VFR BLD AUTO: 52.5 % (ref 37.5–51)
HDLC SERPL-MCNC: 72 MG/DL
HGB BLD-MCNC: 17.7 G/DL (ref 13–17.7)
IMM GRANULOCYTES # BLD AUTO: 0 X10E3/UL (ref 0–0.1)
IMM GRANULOCYTES NFR BLD AUTO: 0 %
LDLC SERPL CALC-MCNC: 62 MG/DL (ref 0–99)
LYMPHOCYTES # BLD AUTO: 0.9 X10E3/UL (ref 0.7–3.1)
LYMPHOCYTES NFR BLD AUTO: 15 %
MCH RBC QN AUTO: 30.7 PG (ref 26.6–33)
MCHC RBC AUTO-ENTMCNC: 33.7 G/DL (ref 31.5–35.7)
MCV RBC AUTO: 91 FL (ref 79–97)
MONOCYTES # BLD AUTO: 0.5 X10E3/UL (ref 0.1–0.9)
MONOCYTES NFR BLD AUTO: 9 %
NEUTROPHILS # BLD AUTO: 3.9 X10E3/UL (ref 1.4–7)
NEUTROPHILS NFR BLD AUTO: 63 %
PLATELET # BLD AUTO: 176 X10E3/UL (ref 150–450)
POTASSIUM SERPL-SCNC: 4 MMOL/L (ref 3.5–5.2)
PROT SERPL-MCNC: 6.9 G/DL (ref 6–8.5)
RBC # BLD AUTO: 5.76 X10E6/UL (ref 4.14–5.8)
SODIUM SERPL-SCNC: 141 MMOL/L (ref 134–144)
TRIGL SERPL-MCNC: 42 MG/DL (ref 0–149)
VLDLC SERPL CALC-MCNC: 10 MG/DL (ref 5–40)
WBC # BLD AUTO: 6.1 X10E3/UL (ref 3.4–10.8)

## 2024-01-08 ENCOUNTER — TELEPHONE (OUTPATIENT)
Dept: FAMILY MEDICINE CLINIC | Facility: CLINIC | Age: 84
End: 2024-01-08
Payer: MEDICARE

## 2024-01-08 NOTE — TELEPHONE ENCOUNTER
"  Caller: Alessio Allen \"NADIA\"    Relationship: Self    Best call back number: [phone]    What is the best time to reach you: ANYTIME     Who are you requesting to speak with (clinical staff, provider,  specific staff member): CLINICAL STAFF     What was the call regarding: PATIENT WOULD LIKE TO KNOW HOW LONG HE SHOULD CONTINUE TAKING THE MUCINEX SAMPLES HE WAS GIVEN.     PLEASE ADVISE   "

## 2024-02-20 ENCOUNTER — HOSPITAL ENCOUNTER (OUTPATIENT)
Dept: CT IMAGING | Facility: HOSPITAL | Age: 84
Discharge: HOME | End: 2024-02-20
Admitting: INTERNAL MEDICINE
Payer: MEDICARE

## 2024-02-20 DIAGNOSIS — R91.8 PULMONARY INFILTRATES: ICD-10-CM

## 2024-02-20 PROCEDURE — 71250 CT THORAX DX C-: CPT

## 2024-03-28 ENCOUNTER — OFFICE VISIT (OUTPATIENT)
Dept: FAMILY MEDICINE CLINIC | Facility: CLINIC | Age: 84
End: 2024-03-28
Payer: MEDICARE

## 2024-03-28 VITALS
TEMPERATURE: 98.7 F | HEART RATE: 86 BPM | SYSTOLIC BLOOD PRESSURE: 120 MMHG | BODY MASS INDEX: 23.51 KG/M2 | HEIGHT: 72 IN | RESPIRATION RATE: 18 BRPM | OXYGEN SATURATION: 90 % | DIASTOLIC BLOOD PRESSURE: 75 MMHG | WEIGHT: 173.6 LBS

## 2024-03-28 DIAGNOSIS — R53.83 OTHER FATIGUE: Primary | ICD-10-CM

## 2024-03-28 DIAGNOSIS — I10 PRIMARY HYPERTENSION: ICD-10-CM

## 2024-03-28 PROCEDURE — 3078F DIAST BP <80 MM HG: CPT

## 2024-03-28 PROCEDURE — 3074F SYST BP LT 130 MM HG: CPT

## 2024-03-28 PROCEDURE — 99214 OFFICE O/P EST MOD 30 MIN: CPT

## 2024-03-28 NOTE — PROGRESS NOTES
"Chief Complaint  Fatigue    Subjective        Alessio Allen presents to [facility]  History of Present Illness  Patient is a 83 y.o. male who presents to the office today for fatigue. He is new to me but a patient of DERRELL Pham.  He was last seen by her on 1/2/2024. He states he is getting good sleep and still feels fatigued for the past 2 months. He has allergies and thinks it could be causing it, taking allegra. With COPD, he switched to a trilogy mask at night around the same time the fatigue started, followed by Dr. Hernandez, sleep medicine. He wears 3L of oxygen at home and he monitors his oxygen saturation which has been stable, above 90%.  Denies chest pain or palpations. He has a history of hypertension, A-fib, CHF, pacemaker in place.  He is followed by cardiology. Blood pressure today is 120/75.             Objective   Vital Signs:  /75 (BP Location: Left arm, Patient Position: Sitting, Cuff Size: Adult)   Pulse 86   Temp 98.7 °F (37.1 °C) (Temporal)   Resp 18   Ht 182.9 cm (72.01\")   Wt 78.7 kg (173 lb 9.6 oz)   SpO2 90%   BMI 23.54 kg/m²   Estimated body mass index is 23.54 kg/m² as calculated from the following:    Height as of this encounter: 182.9 cm (72.01\").    Weight as of this encounter: 78.7 kg (173 lb 9.6 oz).       BMI is within normal parameters. No other follow-up for BMI required.      Physical Exam  Constitutional:       Appearance: Normal appearance.   Cardiovascular:      Rate and Rhythm: Normal rate and regular rhythm.      Heart sounds: Normal heart sounds.   Pulmonary:      Breath sounds: Normal breath sounds.   Musculoskeletal:      Right lower leg: No edema.      Left lower leg: No edema.   Neurological:      Mental Status: He is alert.   Psychiatric:         Mood and Affect: Mood normal.        Result Review :    The following data was reviewed by: DERRELL Madison on 03/28/2024:  Common labs          6/22/2023    04:57 " 6/23/2023    05:36 1/2/2024    15:18   Common Labs   Glucose 100  83  86    BUN 16  17  21    Creatinine 0.80  0.92  1.22    Sodium 138  141  141    Potassium 3.8  3.9  4.0    Chloride 96  96  94    Calcium 8.8  9.2  9.5    Total Protein   6.9    Albumin   4.6    Total Bilirubin   1.0    Alkaline Phosphatase   79    AST (SGOT)   26    ALT (SGPT)   15    WBC 4.51  5.37  6.1    Hemoglobin 15.5  16.0  17.7    Hematocrit 46.6  47.5  52.5    Platelets 273  316  176    Total Cholesterol   144    Triglycerides   42    HDL Cholesterol   72    LDL Cholesterol    62                   Assessment and Plan     Diagnoses and all orders for this visit:    1. Other fatigue (Primary)  -     TSH Rfx On Abnormal To Free T4  -     CBC & Differential  -     Comprehensive Metabolic Panel  -     Vitamin B12    2. Primary hypertension  Assessment & Plan:  Hypertension is stable and controlled  Continue current treatment regimen.  Dietary sodium restriction.  Ambulatory blood pressure monitoring.  Blood pressure will be reassessed  at next scheduled follow up .      Recommended follow up with sleep medicine for trilogy mask evaluation.     Patient agrees with plan of care and understands instructions. Call if worsening symptoms or any problems or concerns.            Follow Up     Return if symptoms worsen or fail to improve, for Next scheduled follow up.  Patient was given instructions and counseling regarding his condition or for health maintenance advice. Please see specific information pulled into the AVS if appropriate.

## 2024-03-28 NOTE — ASSESSMENT & PLAN NOTE
Hypertension is stable and controlled  Continue current treatment regimen.  Dietary sodium restriction.  Ambulatory blood pressure monitoring.  Blood pressure will be reassessed  at next scheduled follow up .

## 2024-03-29 LAB
ALBUMIN SERPL-MCNC: 4.3 G/DL (ref 3.7–4.7)
ALBUMIN/GLOB SERPL: 1.9 {RATIO} (ref 1.2–2.2)
ALP SERPL-CCNC: 72 IU/L (ref 44–121)
ALT SERPL-CCNC: 13 IU/L (ref 0–44)
AST SERPL-CCNC: 21 IU/L (ref 0–40)
BASOPHILS # BLD AUTO: 0 X10E3/UL (ref 0–0.2)
BASOPHILS NFR BLD AUTO: 1 %
BILIRUB SERPL-MCNC: 1 MG/DL (ref 0–1.2)
BUN SERPL-MCNC: 20 MG/DL (ref 8–27)
BUN/CREAT SERPL: 17 (ref 10–24)
CALCIUM SERPL-MCNC: 9.2 MG/DL (ref 8.6–10.2)
CHLORIDE SERPL-SCNC: 95 MMOL/L (ref 96–106)
CO2 SERPL-SCNC: 31 MMOL/L (ref 20–29)
CREAT SERPL-MCNC: 1.17 MG/DL (ref 0.76–1.27)
EGFRCR SERPLBLD CKD-EPI 2021: 62 ML/MIN/1.73
EOSINOPHIL # BLD AUTO: 0 X10E3/UL (ref 0–0.4)
EOSINOPHIL NFR BLD AUTO: 0 %
ERYTHROCYTE [DISTWIDTH] IN BLOOD BY AUTOMATED COUNT: 12.4 % (ref 11.6–15.4)
GLOBULIN SER CALC-MCNC: 2.3 G/DL (ref 1.5–4.5)
GLUCOSE SERPL-MCNC: 87 MG/DL (ref 70–99)
HCT VFR BLD AUTO: 54.2 % (ref 37.5–51)
HGB BLD-MCNC: 17.8 G/DL (ref 13–17.7)
IMM GRANULOCYTES # BLD AUTO: 0 X10E3/UL (ref 0–0.1)
IMM GRANULOCYTES NFR BLD AUTO: 0 %
LYMPHOCYTES # BLD AUTO: 0.8 X10E3/UL (ref 0.7–3.1)
LYMPHOCYTES NFR BLD AUTO: 18 %
MCH RBC QN AUTO: 30.1 PG (ref 26.6–33)
MCHC RBC AUTO-ENTMCNC: 32.8 G/DL (ref 31.5–35.7)
MCV RBC AUTO: 92 FL (ref 79–97)
MONOCYTES # BLD AUTO: 0.4 X10E3/UL (ref 0.1–0.9)
MONOCYTES NFR BLD AUTO: 10 %
NEUTROPHILS # BLD AUTO: 3.2 X10E3/UL (ref 1.4–7)
NEUTROPHILS NFR BLD AUTO: 71 %
PLATELET # BLD AUTO: 159 X10E3/UL (ref 150–450)
POTASSIUM SERPL-SCNC: 4.1 MMOL/L (ref 3.5–5.2)
PROT SERPL-MCNC: 6.6 G/DL (ref 6–8.5)
RBC # BLD AUTO: 5.91 X10E6/UL (ref 4.14–5.8)
SODIUM SERPL-SCNC: 141 MMOL/L (ref 134–144)
T4 FREE SERPL-MCNC: 1.48 NG/DL (ref 0.82–1.77)
TSH SERPL DL<=0.005 MIU/L-ACNC: 5.95 UIU/ML (ref 0.45–4.5)
VIT B12 SERPL-MCNC: 544 PG/ML (ref 232–1245)
WBC # BLD AUTO: 4.4 X10E3/UL (ref 3.4–10.8)

## 2024-03-29 RX ORDER — FEXOFENADINE HCL 60 MG/1
60 TABLET, FILM COATED ORAL 2 TIMES DAILY
Qty: 180 TABLET | Refills: 3 | Status: SHIPPED | OUTPATIENT
Start: 2024-03-29

## 2024-03-29 NOTE — TELEPHONE ENCOUNTER
Caller: Alessio Allen    Relationship: Self    Best call back number: [phone]     Requested Prescriptions:   Requested Prescriptions     Pending Prescriptions Disp Refills    fexofenadine (Allegra Allergy) 60 MG tablet 180 tablet 3     Sig: Take 1 tablet by mouth 2 (Two) Times a Day.        Pharmacy where request should be sent: [address] [phone] [facility] [phone]      Last office visit with prescribing clinician: 1/2/2024   Last telemedicine visit with prescribing clinician: Visit date not found   Next office visit with prescribing clinician: 7/9/2024     Additional details provided by patient: PATIENT HAS ABOUT A 15 DAY SUPPLY     Does the patient have less than a 3 day supply:  [] Yes  [x] No    Would you like a call back once the refill request has been completed: [] Yes [x] No    If the office needs to give you a call back, can they leave a voicemail: [] Yes [x] No    Tessa Vargas Rep   03/29/24 13:43 EDT

## 2024-04-01 DIAGNOSIS — E03.8 SUBCLINICAL HYPOTHYROIDISM: Primary | ICD-10-CM

## 2024-04-02 ENCOUNTER — TELEPHONE (OUTPATIENT)
Dept: FAMILY MEDICINE CLINIC | Facility: CLINIC | Age: 84
End: 2024-04-02
Payer: MEDICARE

## 2024-04-02 NOTE — TELEPHONE ENCOUNTER
Caller: Alessio Allen    Relationship: Self    Best call back number:[phone] (Home)     What is the best time to reach you: ANYTIME    Who are you requesting to speak with (clinical staff, provider,  specific staff member): ANGELI     Do you know the name of the person who called: ANGELI    What was the call regarding: RESULTS FROM HIS TEST.    Is it okay if the provider responds through MyChart:      THANKS

## 2024-04-02 NOTE — TELEPHONE ENCOUNTER
"Hub staff attempted to follow warm transfer process and was unsuccessful     Caller: Alessio Allen    Relationship to patient: Self    Best call back number:     [phone] (Mobile)       Patient is needing: PATIENT STATES THAT HE MISSED A CALL FROM \"LESA\" AND WOULD LIKE HER TO CALL HIM BACK .    "

## 2024-04-10 ENCOUNTER — TRANSCRIBE ORDERS (OUTPATIENT)
Dept: ADMINISTRATIVE | Facility: HOSPITAL | Age: 84
End: 2024-04-10
Payer: MEDICARE

## 2024-04-10 DIAGNOSIS — R91.8 PULMONARY INFILTRATE: Primary | ICD-10-CM

## 2024-04-17 ENCOUNTER — TELEPHONE (OUTPATIENT)
Dept: FAMILY MEDICINE CLINIC | Facility: CLINIC | Age: 84
End: 2024-04-17
Payer: MEDICARE

## 2024-04-17 NOTE — TELEPHONE ENCOUNTER
Caller: Alessio Allen    Relationship: Self    Best call back number: [phone]     What medication are you requesting: FLOW RATE FOR OXYGEN SHOULD BE AT REST IS 4 AT MOVING IS AT 7.      If a prescription is needed, what is your preferred pharmacy and phone number:      [facility]  Additional notes:  A NEW REQUEST NEEDS TO BE PUT IN FOR THIS.

## 2024-04-18 NOTE — TELEPHONE ENCOUNTER
Caller: Alessio Allen    Relationship: Self    Best call back number: [phone]       What was the call regarding:     PATIENT IS WANTING A CALL BACK TO UP DATE HIM ON THE STATUS OF HIS REQUEST.    HE IS NEEDING IT TO BE PROCESSED ASAP

## 2024-04-19 DIAGNOSIS — J44.9 CHRONIC OBSTRUCTIVE PULMONARY DISEASE, UNSPECIFIED COPD TYPE: Primary | ICD-10-CM

## 2024-04-24 ENCOUNTER — TELEPHONE (OUTPATIENT)
Dept: FAMILY MEDICINE CLINIC | Facility: CLINIC | Age: 84
End: 2024-04-24
Payer: MEDICARE

## 2024-04-24 NOTE — TELEPHONE ENCOUNTER
Caller: Alessio Allen    Relationship: Self    Best call back number: [phone]     What was the call regarding: ASKING FOR HIS OXYGEN FLOW RATE TO BE SENT TO RESPICARE  PATIENT WAS VERY FRUSTRATED

## 2024-04-24 NOTE — TELEPHONE ENCOUNTER
Faxed order 4/19/24. Spoke with Kelsey at [email or web address] Sent to her email at :    [email or web address]    Informed patient.

## 2024-05-03 DIAGNOSIS — E03.8 SUBCLINICAL HYPOTHYROIDISM: Primary | ICD-10-CM

## (undated) DEVICE — CATH DIAG IMPULSE FR4 5F 100CM

## (undated) DEVICE — SINGLE USE BIOPSY VALVE MAJ-210: Brand: SINGLE USE BIOPSY VALVE (STERILE)

## (undated) DEVICE — PK CATH CARD 40

## (undated) DEVICE — HI-TORQUE BALANCE MIDDLEWEIGHT GUIDE WIRE .014 STRAIGHT TIP 3.0 CM X 190 CM: Brand: HI-TORQUE BALANCE MIDDLEWEIGHT

## (undated) DEVICE — SINGLE USE SUCTION VALVE MAJ-209: Brand: SINGLE USE SUCTION VALVE (STERILE)

## (undated) DEVICE — LN SMPL O2 NASL/ORL SMART/CAPNOLINE PLS A/

## (undated) DEVICE — LOU MINOR PROCEDURE: Brand: MEDLINE INDUSTRIES, INC.

## (undated) DEVICE — GLIDESHEATH BASIC HYDROPHILIC COATED INTRODUCER SHEATH: Brand: GLIDESHEATH

## (undated) DEVICE — ADAPT SWVL FIBROPTIC BRONCH

## (undated) DEVICE — SUT MNCRYL PLS ANTIB UD 4/0 PS2 18IN

## (undated) DEVICE — KT MANIFLD CARDIAC

## (undated) DEVICE — ANTIBACTERIAL UNDYED BRAIDED (POLYGLACTIN 910), SYNTHETIC ABSORBABLE SUTURE: Brand: COATED VICRYL

## (undated) DEVICE — TRAP,MUCUS SPECIMEN, 80CC: Brand: MEDLINE

## (undated) DEVICE — VITAL SIGNS™ JACKSON-REES CIRCUITS: Brand: VITAL SIGNS™

## (undated) DEVICE — FRCP BX RADJAW4 PULM WO NDL STD1.8X2 100

## (undated) DEVICE — 3M™ STERI-STRIP™ REINFORCED ADHESIVE SKIN CLOSURES, R1547, 1/2 IN X 4 IN (12 MM X 100 MM), 6 STRIPS/ENVELOPE: Brand: 3M™ STERI-STRIP™

## (undated) DEVICE — Device

## (undated) DEVICE — CATH DIAG IMPULSE FL3.5 5F 100CM

## (undated) DEVICE — MSK AIRWY LARYNG LMA PILOT SZ4

## (undated) DEVICE — GLV SURG PREMIERPRO ORTHO LTX PF SZ7.5 BRN

## (undated) DEVICE — CATH VENT MIV RADL PIG ST TIP 5F 110CM

## (undated) DEVICE — FRCP BX RADJAW4 GASTRO PED 1.8X160X2

## (undated) DEVICE — CATH DIAG IMPULSE FL4 5F 100CM

## (undated) DEVICE — 3M™ STERI-STRIP™ COMPOUND BENZOIN TINCTURE 40 BAGS/CARTON 4 CARTONS/CASE C1544: Brand: 3M™ STERI-STRIP™

## (undated) DEVICE — MSK AIRWY LARYNG LMA UNIQUE STD PK SZ4

## (undated) DEVICE — TUBING, SUCTION, 1/4" X 10', STRAIGHT: Brand: MEDLINE

## (undated) DEVICE — BALN PRESS WEDGE 5F 110CM

## (undated) DEVICE — GW EMR FIX EXCHG J STD .035 3MM 260CM

## (undated) DEVICE — CONMED DISPOSABLE BRONCHIAL CYTOLOGY BRUSH, STRAIGHT HANDLE, 3 MM X 120 CM: Brand: CONMED

## (undated) DEVICE — NDL HYPO PRECISIONGLIDE REG 25G 1 1/2